# Patient Record
Sex: FEMALE | Race: BLACK OR AFRICAN AMERICAN | NOT HISPANIC OR LATINO | URBAN - METROPOLITAN AREA
[De-identification: names, ages, dates, MRNs, and addresses within clinical notes are randomized per-mention and may not be internally consistent; named-entity substitution may affect disease eponyms.]

---

## 2017-05-25 VITALS
WEIGHT: 156.97 LBS | RESPIRATION RATE: 17 BRPM | HEIGHT: 67 IN | OXYGEN SATURATION: 96 % | HEART RATE: 68 BPM | DIASTOLIC BLOOD PRESSURE: 87 MMHG | TEMPERATURE: 98 F | SYSTOLIC BLOOD PRESSURE: 127 MMHG

## 2017-05-25 NOTE — PATIENT PROFILE ADULT. - PMH
Anxiety    Breast CA, left  lumpectomy / RTX in the past  Hypertension    Irritable bowel syndrome (IBS)    Polyp of colon  "pre-cancerous" x 2, removed 11/2014  Sleep apnea Anxiety    Breast CA, left  lumpectomy / RTX in the past  HLD (hyperlipidemia)    Hypertension    Irritable bowel syndrome (IBS)    Polyp of colon  "pre-cancerous" x 2, removed 11/2014  Sleep apnea Anxiety    Breast CA, left  lumpectomy / RTX in the past  HLD (hyperlipidemia)    Hypertension    Irritable bowel syndrome (IBS)    Polyp of colon  "pre-cancerous" x 2, removed 11/2014  Sleep apnea  uses  cpap machine

## 2017-05-25 NOTE — PATIENT PROFILE ADULT. - PSH
S/P     S/P colon resection  reversal, had complications for fibriods  S/P hysterectomy Breast lump    History of surgery  right groin fatty tissue removed  S/P     S/P colon resection  reversal, had complications for fibriods  S/P hysterectomy Breast lump    H/O lumpectomy  left  History of surgery  right groin fatty tissue removed  S/P     S/P colon resection  reversal, had complications for fibriods  S/P hysterectomy

## 2017-05-26 LAB
APPEARANCE UR: CLEAR — SIGNIFICANT CHANGE UP
BACTERIA # UR AUTO: PRESENT /HPF
BILIRUB UR-MCNC: NEGATIVE — SIGNIFICANT CHANGE UP
COLOR SPEC: YELLOW — SIGNIFICANT CHANGE UP
COMMENT - URINE: SIGNIFICANT CHANGE UP
DIFF PNL FLD: (no result)
EPI CELLS # UR: (no result) /HPF
GLUCOSE UR QL: NEGATIVE — SIGNIFICANT CHANGE UP
KETONES UR-MCNC: NEGATIVE — SIGNIFICANT CHANGE UP
LEUKOCYTE ESTERASE UR-ACNC: (no result)
NITRITE UR-MCNC: NEGATIVE — SIGNIFICANT CHANGE UP
PH UR: 6 — SIGNIFICANT CHANGE UP (ref 5–8)
PROT UR-MCNC: 30 MG/DL
RBC CASTS # UR COMP ASSIST: (no result) /HPF
SP GR SPEC: 1.02 — SIGNIFICANT CHANGE UP (ref 1–1.03)
UROBILINOGEN FLD QL: 0.2 E.U./DL — SIGNIFICANT CHANGE UP
WBC UR QL: > 10 /HPF

## 2017-05-26 NOTE — ASU PATIENT PROFILE, ADULT - PMH
Anxiety    Breast CA, left  lumpectomy / RTX in the past  Hypertension    Irritable bowel syndrome (IBS)    Polyp of colon  "pre-cancerous" x 2, removed 11/2014  Sleep apnea

## 2017-05-26 NOTE — ASU PATIENT PROFILE, ADULT - PSH
Breast lump    S/P     S/P colon resection  reversal, had complications for fibriods  S/P hysterectomy

## 2017-05-27 LAB
CULTURE RESULTS: NO GROWTH — SIGNIFICANT CHANGE UP
SPECIMEN SOURCE: SIGNIFICANT CHANGE UP

## 2017-05-28 LAB
MRSA PCR RESULT.: NEGATIVE — SIGNIFICANT CHANGE UP
S AUREUS DNA NOSE QL NAA+PROBE: NEGATIVE — SIGNIFICANT CHANGE UP

## 2017-06-02 ENCOUNTER — INPATIENT (INPATIENT)
Facility: HOSPITAL | Age: 67
LOS: 3 days | Discharge: NURSING, REHAB OR LONG-TERM CARE | DRG: 460 | End: 2017-06-06
Attending: ORTHOPAEDIC SURGERY | Admitting: ORTHOPAEDIC SURGERY
Payer: COMMERCIAL

## 2017-06-02 DIAGNOSIS — Z90.710 ACQUIRED ABSENCE OF BOTH CERVIX AND UTERUS: Chronic | ICD-10-CM

## 2017-06-02 DIAGNOSIS — Z98.89 OTHER SPECIFIED POSTPROCEDURAL STATES: Chronic | ICD-10-CM

## 2017-06-02 DIAGNOSIS — Z98.890 OTHER SPECIFIED POSTPROCEDURAL STATES: Chronic | ICD-10-CM

## 2017-06-02 DIAGNOSIS — N63 UNSPECIFIED LUMP IN BREAST: Chronic | ICD-10-CM

## 2017-06-02 DIAGNOSIS — M54.42 LUMBAGO WITH SCIATICA, LEFT SIDE: ICD-10-CM

## 2017-06-02 LAB
APPEARANCE UR: SIGNIFICANT CHANGE UP
BACTERIA # UR AUTO: (no result) /HPF
BILIRUB UR-MCNC: NEGATIVE — SIGNIFICANT CHANGE UP
COLOR SPEC: YELLOW — SIGNIFICANT CHANGE UP
COMMENT - URINE: SIGNIFICANT CHANGE UP
DIFF PNL FLD: (no result)
EPI CELLS # UR: (no result) /HPF
GLUCOSE UR QL: NEGATIVE — SIGNIFICANT CHANGE UP
KETONES UR-MCNC: NEGATIVE — SIGNIFICANT CHANGE UP
LEUKOCYTE ESTERASE UR-ACNC: (no result)
NITRITE UR-MCNC: NEGATIVE — SIGNIFICANT CHANGE UP
PH UR: 6 — SIGNIFICANT CHANGE UP (ref 5–8)
PROT UR-MCNC: 30 MG/DL
RBC CASTS # UR COMP ASSIST: (no result) /HPF
SP GR SPEC: 1.02 — SIGNIFICANT CHANGE UP (ref 1–1.03)
UROBILINOGEN FLD QL: 0.2 E.U./DL — SIGNIFICANT CHANGE UP
WBC UR QL: (no result) /HPF

## 2017-06-02 RX ORDER — ASCORBIC ACID 60 MG
500 TABLET,CHEWABLE ORAL
Qty: 0 | Refills: 0 | Status: DISCONTINUED | OUTPATIENT
Start: 2017-06-02 | End: 2017-06-06

## 2017-06-02 RX ORDER — BUPIVACAINE 13.3 MG/ML
20 INJECTION, SUSPENSION, LIPOSOMAL INFILTRATION ONCE
Qty: 0 | Refills: 0 | Status: DISCONTINUED | OUTPATIENT
Start: 2017-06-02 | End: 2017-06-06

## 2017-06-02 RX ORDER — ATORVASTATIN CALCIUM 80 MG/1
10 TABLET, FILM COATED ORAL AT BEDTIME
Qty: 0 | Refills: 0 | Status: DISCONTINUED | OUTPATIENT
Start: 2017-06-02 | End: 2017-06-06

## 2017-06-02 RX ORDER — NALOXONE HYDROCHLORIDE 4 MG/.1ML
0.1 SPRAY NASAL
Qty: 0 | Refills: 0 | Status: DISCONTINUED | OUTPATIENT
Start: 2017-06-02 | End: 2017-06-06

## 2017-06-02 RX ORDER — ONDANSETRON 8 MG/1
4 TABLET, FILM COATED ORAL EVERY 6 HOURS
Qty: 0 | Refills: 0 | Status: DISCONTINUED | OUTPATIENT
Start: 2017-06-02 | End: 2017-06-06

## 2017-06-02 RX ORDER — FAMOTIDINE 10 MG/ML
20 INJECTION INTRAVENOUS EVERY 12 HOURS
Qty: 0 | Refills: 0 | Status: DISCONTINUED | OUTPATIENT
Start: 2017-06-02 | End: 2017-06-06

## 2017-06-02 RX ORDER — NITROFURANTOIN MACROCRYSTAL 50 MG
100 CAPSULE ORAL
Qty: 0 | Refills: 0 | Status: DISCONTINUED | OUTPATIENT
Start: 2017-06-02 | End: 2017-06-06

## 2017-06-02 RX ORDER — HYDROMORPHONE HYDROCHLORIDE 2 MG/ML
0.5 INJECTION INTRAMUSCULAR; INTRAVENOUS; SUBCUTANEOUS
Qty: 0 | Refills: 0 | Status: DISCONTINUED | OUTPATIENT
Start: 2017-06-02 | End: 2017-06-03

## 2017-06-02 RX ORDER — DOCUSATE SODIUM 100 MG
100 CAPSULE ORAL THREE TIMES A DAY
Qty: 0 | Refills: 0 | Status: DISCONTINUED | OUTPATIENT
Start: 2017-06-02 | End: 2017-06-06

## 2017-06-02 RX ORDER — SODIUM CHLORIDE 9 MG/ML
1000 INJECTION, SOLUTION INTRAVENOUS
Qty: 0 | Refills: 0 | Status: DISCONTINUED | OUTPATIENT
Start: 2017-06-02 | End: 2017-06-06

## 2017-06-02 RX ORDER — HYDROMORPHONE HYDROCHLORIDE 2 MG/ML
30 INJECTION INTRAMUSCULAR; INTRAVENOUS; SUBCUTANEOUS
Qty: 0 | Refills: 0 | Status: DISCONTINUED | OUTPATIENT
Start: 2017-06-02 | End: 2017-06-03

## 2017-06-02 RX ORDER — MAGNESIUM HYDROXIDE 400 MG/1
30 TABLET, CHEWABLE ORAL EVERY 12 HOURS
Qty: 0 | Refills: 0 | Status: DISCONTINUED | OUTPATIENT
Start: 2017-06-02 | End: 2017-06-06

## 2017-06-02 RX ORDER — FOLIC ACID 0.8 MG
1 TABLET ORAL DAILY
Qty: 0 | Refills: 0 | Status: DISCONTINUED | OUTPATIENT
Start: 2017-06-02 | End: 2017-06-06

## 2017-06-02 RX ORDER — ACETAMINOPHEN 500 MG
1000 TABLET ORAL ONCE
Qty: 0 | Refills: 0 | Status: DISCONTINUED | OUTPATIENT
Start: 2017-06-02 | End: 2017-06-03

## 2017-06-02 RX ORDER — SENNA PLUS 8.6 MG/1
2 TABLET ORAL AT BEDTIME
Qty: 0 | Refills: 0 | Status: DISCONTINUED | OUTPATIENT
Start: 2017-06-02 | End: 2017-06-06

## 2017-06-02 RX ORDER — CEFAZOLIN SODIUM 1 G
2000 VIAL (EA) INJECTION EVERY 8 HOURS
Qty: 0 | Refills: 0 | Status: COMPLETED | OUTPATIENT
Start: 2017-06-02 | End: 2017-06-03

## 2017-06-02 RX ORDER — METOCLOPRAMIDE HCL 10 MG
10 TABLET ORAL EVERY 12 HOURS
Qty: 0 | Refills: 0 | Status: DISCONTINUED | OUTPATIENT
Start: 2017-06-02 | End: 2017-06-06

## 2017-06-02 RX ORDER — ACETAMINOPHEN 500 MG
650 TABLET ORAL EVERY 6 HOURS
Qty: 0 | Refills: 0 | Status: DISCONTINUED | OUTPATIENT
Start: 2017-06-02 | End: 2017-06-06

## 2017-06-02 RX ORDER — AMLODIPINE BESYLATE 2.5 MG/1
2.5 TABLET ORAL DAILY
Qty: 0 | Refills: 0 | Status: DISCONTINUED | OUTPATIENT
Start: 2017-06-02 | End: 2017-06-06

## 2017-06-02 RX ADMIN — HYDROMORPHONE HYDROCHLORIDE 0.5 MILLIGRAM(S): 2 INJECTION INTRAMUSCULAR; INTRAVENOUS; SUBCUTANEOUS at 20:02

## 2017-06-02 RX ADMIN — HYDROMORPHONE HYDROCHLORIDE 0.5 MILLIGRAM(S): 2 INJECTION INTRAMUSCULAR; INTRAVENOUS; SUBCUTANEOUS at 21:05

## 2017-06-02 RX ADMIN — Medication 100 MILLIGRAM(S): at 23:47

## 2017-06-02 RX ADMIN — HYDROMORPHONE HYDROCHLORIDE 30 MILLILITER(S): 2 INJECTION INTRAMUSCULAR; INTRAVENOUS; SUBCUTANEOUS at 20:04

## 2017-06-02 NOTE — PROGRESS NOTE ADULT - SUBJECTIVE AND OBJECTIVE BOX
Orthopaedic Surgery Post-Operative Note  Procedure: PSF L4-S1  Surgeon: Daly  Pt comfortable, pain controlled  Denies CP, SOB, numbness/tingling of extremity    PE: VS:  AFVSS  Dressing: CDI   DSG CDI    B/L LE:    Strength:  R: 5/5 GS, 5/5 TA, 5/5 EHL, 5/5 Q, 5/5 H, 5/5 IP  L: 5/5 GS, 5/5 TA, 5/5 EHL, 5/5 Q, 5/5 H, 5/5 IP    Sensation:  R: L2-S1 SILT  L: L2-S1 SILT    Extremity:  B/L: WWP    Labs: None    X-Ray: None  A/P:      66F     s/p PSH L4-S1  Stable  IV Abx Ancef  Pain control  PT/OOB WBAT  f/u AM labs  DVT PPX SCD  XR POD2  LSO from [facility] MD PGY2   Pager: [phone]

## 2017-06-02 NOTE — H&P ADULT - NSHPLABSRESULTS_GEN_ALL_CORE
Preop CBC, BMP, PT/PTT/INR, within normal limits- reviewed by medical clearance.   Abnormal pre-op UA treated with 7 days of Cipro. Repeat UA day of surgery.  Preop EKG: NSR, reviewed by medical clearance.

## 2017-06-02 NOTE — CONSULT NOTE ADULT - SUBJECTIVE AND OBJECTIVE BOX
Pain Management Consult Note - [facility] [phone]    Chief Complaint:  back pain    HPI:  67 y/o female with back pain that radiates down the left leg.  Also complains of tingling/burning sensation and weakness in the left leg.  States she was taking ibuprofen and percocet 5/325 2 tabs/day as an outpatient.      Pain is ___ sharp ____dull ___burning _x__achy ___ Intensity: ____ mild ___mod ___severe     Location ____surgical site ____cervical ___x__lumbar ____abd ____upper ext__x__lower ext    Worse with __x__activity _x___movement _____physical therapy___ Rest    Improved with _x___medication __x__rest ____physical therapy    ROS: Const:  __-_febrile   Eyes:___ENT:_-__CV: __-_chest pain  Resp: __-__sob  GI:_-__nausea _-_vomiting _-__abd pain _+__npo ___clears __full diet __bm  :__-_ Musk: _+__pain ___spasm  Skin:___ Neuro:  _-__autrvtal_-__emdlszywe___ numbness _+__weakness _+__paresth  Psych:__anxiety  Endo:_-__ Heme:_-__Allergy:NKDA_________, ___all others reviewed and negative    PAST MEDICAL & SURGICAL HISTORY:  Polyp of colon: &quot;pre-cancerous&quot; x 2, removed 2014  Irritable bowel syndrome (IBS)  Sleep apnea  Hypertension  Breast CA, left: lumpectomy / RTX in the past  Anxiety  Breast lump  S/P hysterectomy  S/P colon resection: reversal, had complications for fibriods  S/P       SH: _denies__Tobacco   ___Alcohol                          FH:FAMILY HISTORY:  Family history of coronary artery disease (Mother)          T(C): --  HR: --  BP: --  RR: --  SpO2: --  Wt(kg): --    T(C): --  HR: --  BP: --  RR: --  SpO2: --  Wt(kg): --    T(C): --  HR: --  BP: --  RR: --  SpO2: --  Wt(kg): --    PHYSICAL EXAM:  Gen Appearance: __x_no acute distress _x__appropriate        Neuro: _x__SILT feet__x__ EOM Intact Psych: AAOX_3_, _x__mood/affect appropriate        Eyes: _x__conjunctiva WNL  _x____ Pupils equal and round        ENT: x___ears and nose atraumatic__x Hearing grossly intact        Neck: ___trachea midline, no visible masses ___thyroid without palpable mass    Resp: _x__Nml WOB____No tactile fremitus ___clear to auscultation    Cardio: ___extremities free from edema ____pedal pulses palpable    GI/Abdomen: _x__soft __x___ Nontender______Nondistended_____HSM    Lymphatic: ___no palpable nodes in neck  ___no palpable nodes calves and feet    Skin/Wound: ___Incision, ___Dressing c/d/i,   ____surrounding tissues soft,  ___drain/chest tube present____    Muscular: EHL _5__/5  Gastrocnemius___/5    _x__absent clubbing/cyanosis      ASSESSMENT: This is a 66y old Female with a history of   M54.16: M54.16  Family history of coronary artery disease (Mother)  Polyp of colon: &quot;pre-cancerous&quot; x 2, removed 2014  Irritable bowel syndrome (IBS)  Sleep apnea  Hypertension  Breast CA, left: lumpectomy / RTX in the past  Anxiety  Breast lump  S/P hysterectomy  S/P colon resection: reversal, had complications for fibriods  S/P   and worsening back pain going for PSF L4-S1 today    Recommended Treatment PLAN:  1.  Dilaudid PCA 0/0.2mg/6min with 0.5 mg IV q2h prn  2.  Tylenol 1000 mg IV x1 Pain Management Consult Note - [facility] [phone]    Chief Complaint:  back pain    HPI:  67 y/o female with back pain that radiates down the left leg.  Also complains of tingling/burning sensation and weakness in the left leg.  States she was taking ibuprofen and percocet 5/325 2 tabs/day as an outpatient.      Pain is ___ sharp ____dull ___burning _x__achy ___ Intensity: ____ mild ___mod ___severe     Location ____surgical site ____cervical ___x__lumbar ____abd ____upper ext__x__lower ext    Worse with __x__activity _x___movement _____physical therapy___ Rest    Improved with _x___medication __x__rest ____physical therapy    ROS: Const:  __-_febrile   Eyes:___ENT:_-__CV: __-_chest pain  Resp: __-__sob  GI:_-__nausea _-_vomiting _-__abd pain _+__npo ___clears __full diet __bm  :__-_ Musk: _+__pain ___spasm  Skin:___ Neuro:  _-__uxdxgiyq_-__blhfygjbw___ numbness _+__weakness _+__paresth  Psych:__anxiety  Endo:_-__ Heme:_-__Allergy:NKDA_________, ___all others reviewed and negative    PAST MEDICAL & SURGICAL HISTORY:  Polyp of colon: &quot;pre-cancerous&quot; x 2, removed 2014  Irritable bowel syndrome (IBS)  Sleep apnea  Hypertension  Breast CA, left: lumpectomy / RTX in the past  Anxiety  Breast lump  S/P hysterectomy  S/P colon resection: reversal, had complications for fibriods  S/P       SH: _denies__Tobacco   ___Alcohol                          FH:FAMILY HISTORY:  Family history of coronary artery disease (Mother)          T(C): --97.0  HR: --70  BP: --128/70  RR: --18  SpO2: --  Wt(kg): --    T(C): --  HR: --  BP: --  RR: --  SpO2: --  Wt(kg): --    T(C): --  HR: --  BP: --  RR: --  SpO2: --  Wt(kg): --    PHYSICAL EXAM:  Gen Appearance: __x_no acute distress _x__appropriate        Neuro: _x__SILT feet__x__ EOM Intact Psych: AAOX_3_, _x__mood/affect appropriate        Eyes: _x__conjunctiva WNL  _x____ Pupils equal and round        ENT: x___ears and nose atraumatic__x Hearing grossly intact        Neck: ___trachea midline, no visible masses ___thyroid without palpable mass    Resp: _x__Nml WOB____No tactile fremitus ___clear to auscultation    Cardio: ___extremities free from edema ____pedal pulses palpable    GI/Abdomen: _x__soft __x___ Nontender______Nondistended_____HSM    Lymphatic: ___no palpable nodes in neck  ___no palpable nodes calves and feet    Skin/Wound: ___Incision, ___Dressing c/d/i,   ____surrounding tissues soft,  ___drain/chest tube present____    Muscular: EHL _5__/5  Gastrocnemius___/5    _x__absent clubbing/cyanosis      ASSESSMENT: This is a 66y old Female with a history of   M54.16: M54.16  Family history of coronary artery disease (Mother)  Polyp of colon: &quot;pre-cancerous&quot; x 2, removed 2014  Irritable bowel syndrome (IBS)  Sleep apnea  Hypertension  Breast CA, left: lumpectomy / RTX in the past  Anxiety  Breast lump  S/P hysterectomy  S/P colon resection: reversal, had complications for fibriods  S/P   and worsening back pain going for PSF L4-S1 today    Recommended Treatment PLAN:  1.  Dilaudid PCA 0/0.2mg/6min with 0.5 mg IV q2h prn  2.  Tylenol 1000 mg IV x1

## 2017-06-02 NOTE — H&P ADULT - NSHPPHYSICALEXAM_GEN_ALL_CORE
PE: Normal head, atraumatic. Normal skin, no rashes/lesions/erythema.  MSK: No deformity or open wounds. No rashes or lesions. EHL/TA/GS/FHL 5/5 BLE. Sensation intact and equal BLE. Skin warm and well perfused. DP palpable BLE. Cap refill brisk. Decreased ROM to lumbar spine secondary to pain.     Rest of PE per medical clearance.

## 2017-06-02 NOTE — H&P ADULT - HISTORY OF PRESENT ILLNESS
66F with low back pain x over 1 year, worsened in November 2016 after a fall in her kitchen. Pain radiates down LLE, pain is constant. Pt denies numbness to her BLE but endorses feeling weakness in her LLE causing her to walk with a limp and also has intermittent tingling down her LLE. Pt ambulates with no assistance. Denies hx of DVT.

## 2017-06-02 NOTE — H&P ADULT - PROBLEM SELECTOR PLAN 1
Admit to Orthopedics  Medically optimized by Dr. KASIA Kauffman  Elective L4-S1 decompression and PSF today.

## 2017-06-03 LAB
CULTURE RESULTS: NO GROWTH — SIGNIFICANT CHANGE UP
MRSA PCR RESULT.: NEGATIVE — SIGNIFICANT CHANGE UP
S AUREUS DNA NOSE QL NAA+PROBE: NEGATIVE — SIGNIFICANT CHANGE UP
SPECIMEN SOURCE: SIGNIFICANT CHANGE UP

## 2017-06-03 RX ORDER — HYDROMORPHONE HYDROCHLORIDE 2 MG/ML
0.5 INJECTION INTRAMUSCULAR; INTRAVENOUS; SUBCUTANEOUS
Qty: 0 | Refills: 0 | Status: DISCONTINUED | OUTPATIENT
Start: 2017-06-03 | End: 2017-06-06

## 2017-06-03 RX ORDER — ACETAMINOPHEN 500 MG
975 TABLET ORAL EVERY 8 HOURS
Qty: 0 | Refills: 0 | Status: DISCONTINUED | OUTPATIENT
Start: 2017-06-03 | End: 2017-06-06

## 2017-06-03 RX ORDER — HYDROMORPHONE HYDROCHLORIDE 2 MG/ML
2 INJECTION INTRAMUSCULAR; INTRAVENOUS; SUBCUTANEOUS EVERY 4 HOURS
Qty: 0 | Refills: 0 | Status: DISCONTINUED | OUTPATIENT
Start: 2017-06-03 | End: 2017-06-04

## 2017-06-03 RX ORDER — HYDROMORPHONE HYDROCHLORIDE 2 MG/ML
4 INJECTION INTRAMUSCULAR; INTRAVENOUS; SUBCUTANEOUS EVERY 4 HOURS
Qty: 0 | Refills: 0 | Status: DISCONTINUED | OUTPATIENT
Start: 2017-06-03 | End: 2017-06-04

## 2017-06-03 RX ADMIN — ATORVASTATIN CALCIUM 10 MILLIGRAM(S): 80 TABLET, FILM COATED ORAL at 22:14

## 2017-06-03 RX ADMIN — HYDROMORPHONE HYDROCHLORIDE 4 MILLIGRAM(S): 2 INJECTION INTRAMUSCULAR; INTRAVENOUS; SUBCUTANEOUS at 13:42

## 2017-06-03 RX ADMIN — HYDROMORPHONE HYDROCHLORIDE 4 MILLIGRAM(S): 2 INJECTION INTRAMUSCULAR; INTRAVENOUS; SUBCUTANEOUS at 18:27

## 2017-06-03 RX ADMIN — Medication 500 MILLIGRAM(S): at 06:09

## 2017-06-03 RX ADMIN — Medication 100 MILLIGRAM(S): at 13:41

## 2017-06-03 RX ADMIN — Medication 1 MILLIGRAM(S): at 10:49

## 2017-06-03 RX ADMIN — Medication 975 MILLIGRAM(S): at 22:14

## 2017-06-03 RX ADMIN — SENNA PLUS 2 TABLET(S): 8.6 TABLET ORAL at 22:14

## 2017-06-03 RX ADMIN — HYDROMORPHONE HYDROCHLORIDE 4 MILLIGRAM(S): 2 INJECTION INTRAMUSCULAR; INTRAVENOUS; SUBCUTANEOUS at 17:57

## 2017-06-03 RX ADMIN — Medication 100 MILLIGRAM(S): at 07:04

## 2017-06-03 RX ADMIN — AMLODIPINE BESYLATE 2.5 MILLIGRAM(S): 2.5 TABLET ORAL at 06:09

## 2017-06-03 RX ADMIN — Medication 10 MILLIGRAM(S): at 17:56

## 2017-06-03 RX ADMIN — HYDROMORPHONE HYDROCHLORIDE 4 MILLIGRAM(S): 2 INJECTION INTRAMUSCULAR; INTRAVENOUS; SUBCUTANEOUS at 14:12

## 2017-06-03 RX ADMIN — Medication 1 TABLET(S): at 13:41

## 2017-06-03 RX ADMIN — Medication 500 MILLIGRAM(S): at 17:57

## 2017-06-03 RX ADMIN — Medication 100 MILLIGRAM(S): at 18:04

## 2017-06-03 RX ADMIN — FAMOTIDINE 20 MILLIGRAM(S): 10 INJECTION INTRAVENOUS at 06:09

## 2017-06-03 RX ADMIN — FAMOTIDINE 20 MILLIGRAM(S): 10 INJECTION INTRAVENOUS at 17:57

## 2017-06-03 RX ADMIN — Medication 10 MILLIGRAM(S): at 06:10

## 2017-06-03 RX ADMIN — Medication 100 MILLIGRAM(S): at 06:09

## 2017-06-03 RX ADMIN — Medication 975 MILLIGRAM(S): at 13:41

## 2017-06-03 RX ADMIN — Medication 1 TABLET(S): at 22:14

## 2017-06-03 RX ADMIN — Medication 1 TABLET(S): at 10:50

## 2017-06-03 RX ADMIN — Medication 1 TABLET(S): at 06:09

## 2017-06-03 RX ADMIN — Medication 100 MILLIGRAM(S): at 10:49

## 2017-06-03 RX ADMIN — Medication 100 MILLIGRAM(S): at 22:14

## 2017-06-03 NOTE — PHYSICAL THERAPY INITIAL EVALUATION ADULT - ADDITIONAL COMMENTS
Patient lives alone in an apartment, 4 stairs to enter building. Patient reports independence with all functional mobility without the use of an assistive device prior to hospitalization.

## 2017-06-03 NOTE — PHYSICAL THERAPY INITIAL EVALUATION ADULT - ACTIVE RANGE OF MOTION EXAMINATION, REHAB EVAL
Left UE Active ROM was WFL (within functional limits)/Left LE Active ROM was WFL (within functional limits)/Right LE Active ROM was WFL (within functional limits)/Right UE Active ROM was WFL (within functional limits)

## 2017-06-03 NOTE — PROGRESS NOTE ADULT - SUBJECTIVE AND OBJECTIVE BOX
No events    T(F): 99.5, Max: 99.6 (06-02 @ 22:50)  HR: 80 (79 - 96)  BP: 132/73 (132/73 - 157/91)  RR: 16 (12 - 24)  SpO2: 98% (95% - 99%)  Wt(kg): --  I & Os for 24h ending 06-03 @ 07:00  =============================================  IN:    lactated ringers.: 360 ml    Total IN: 360 ml  ---------------------------------------------  OUT:    Indwelling Catheter - Urethral: 1700 ml    Accordian: 325 ml    Total OUT: 2025 ml  ---------------------------------------------  Total NET: -1665 ml    I & Os for current day (as of 06-03 @ 10:33)  =============================================  IN:    Oral Fluid: 120 ml    Total IN: 120 ml  ---------------------------------------------  OUT:    Indwelling Catheter - Urethral: 750 ml    Total OUT: 750 ml  ---------------------------------------------  Total NET: -630 ml    PE: DSG CDI    B/L LE:    Strength:  R: 5/5 GS, 5/5 TA, 5/5 EHL, 5/5 Q, 5/5 H, 5/5 IP  L: 5/5 GS, 5/5 TA, 5/5 EHL, 5/5 Q, 5/5 H, 5/5 IP    Sensation:  R: L2-S1 SILT  L: L2-S1 SILT    Extremity:  B/L: WWP

## 2017-06-03 NOTE — PHYSICAL THERAPY INITIAL EVALUATION ADULT - CRITERIA FOR SKILLED THERAPEUTIC INTERVENTIONS
impairments found/predicted duration of therapy intervention/functional limitations in following categories/therapy frequency/rehab potential/anticipated equipment needs at discharge/risk reduction/prevention/anticipated discharge recommendation

## 2017-06-03 NOTE — PHYSICAL THERAPY INITIAL EVALUATION ADULT - PASSIVE RANGE OF MOTION EXAMINATION, REHAB EVAL
Right UE Passive ROM was WFL (within functional limits)/Left LE Passive ROM was WFL (within functional limits)/Left UE Passive ROM was WFL (within functional limits)/Right LE Passive ROM was WFL (within functional limits)

## 2017-06-03 NOTE — PROGRESS NOTE ADULT - ASSESSMENT
66y f s/p PSF L4-S1  POD#1  -Pain Control  -PT WBAT  -DVT PPX SCD  -Dispo Planning TBD  -Customized TLSO from [facility]  -F/u Neville metzgers - on macrobid for UTI

## 2017-06-03 NOTE — PHYSICAL THERAPY INITIAL EVALUATION ADULT - PLANNED THERAPY INTERVENTIONS, PT EVAL
gait training/balance training/bed mobility training/postural re-education/ROM/transfer training/strengthening

## 2017-06-03 NOTE — PROGRESS NOTE ADULT - SUBJECTIVE AND OBJECTIVE BOX
Status post lumbar decompression and fusionPLIF L4-S1  Patient is conscious and oriented  Patient states preoperative pain has decreased.  Patient have not waked yet .  Patient denies any fever chills.  Patient is not wearing the brace.  The patient feels some mild stiffness in the lumbar spine; however the patient is very happy with the outcome of the surgery.  Patient denies any bowel or bladder incontinence  Lumbar spine- skin is clean dry and intact.  Incision is clean dry and intact there is no erythema, exudates or effusion. Power is 5 out of 5 for Pssoas quads TA and Gastrocs.  Sensation is grossly intact.  Reflexes are brisk and symmetrical.  Negative clonus, downgoing Babinski  Pulses ok in both legs  Drain in  Pain control  Abx  PT  Lumbar brace all time. No bending lifting or twisting

## 2017-06-03 NOTE — PHYSICAL THERAPY INITIAL EVALUATION ADULT - PERTINENT HX OF CURRENT PROBLEM, REHAB EVAL
66 yea old female presenting with low back pain x over 1 year, worsened in November 2016 after a fall in her kitchen. Pain radiates down LLE, pain is constant

## 2017-06-03 NOTE — PROGRESS NOTE ADULT - SUBJECTIVE AND OBJECTIVE BOX
Pain Management Progress Note - [facility] [phone]    HPI:  Pt. complains of incisional back pain.  States PCA is not helping manage pain much.            Pertinent PMH: Pain at: __x_Back ___Neck___Knee ___Hip ___Shoulder ___ Opioid tolerance    Pain is ___ sharp ____dull ___burning __x_achy ___ Intensity: ____ mild ____mod ____severe     Location __x___surgical site _____cervical __x___lumbar ____abd _____upper ext____lower ext    Worse with __x__activity _x___movement _____physical therapy___ Rest    Improved with __x__medication _x___rest ____physical therapy      naloxone Injectable  ondansetron Injectable  lactated ringers.  famotidine    Tablet  metoclopramide Injectable  docusate sodium  magnesium hydroxide Suspension  senna  calcium carbonate 1250 mG + Vitamin D (OsCal 500 + D)  folic acid  multivitamin  ascorbic acid  nitrofurantoin (MACROBID)  acetaminophen   Tablet  atorvastatin  amLODIPine   Tablet  HYDROmorphone   Tablet  HYDROmorphone   Tablet  HYDROmorphone  Injectable  acetaminophen   Tablet      ROS: Const:  ___febrile   Eyes:___ENT:___CV: ___chest pain  Resp: ____sob  GI:_-__nausea _-__vomiting __-__abd pain ___npo ___clears __+_full diet __bm  :___ Musk: _+__pain ___spasm  Skin:___ Neuro:  _-__twnmmsjn_-__vzcvmzkcj____ numbness ___weakness ___paresthesia  Psych:___anxiety  Endo:___ Heme:___Allergy:___                T(C): 37.5, Max: 37.6 (-02 @ 22:50)  HR: 70 (70 - 96)  BP: 124/79 (120/72 - 157/91)  RR: 16 (12 - 24)  SpO2: 99% (95% - 99%)  Wt(kg): --     PHYSICAL EXAM:  Gen Appearance: _x__no acute distress _x__appropriate         Neuro: __x_SILT feet_x___ EOM Intact Psych: AAOX_3_, _x__mood/affect appropriate        Eyes: _x__conjunctiva WNL  ____x_ Pupils equal and round        ENT: _x__ears and nose atraumatic_x__ Hearing grossly intact        Neck: ___trachea midline, no visible masses ___thyroid without palpable mass    Resp: _x__Nml WOB____No tactile fremitus ___clear to auscultation    Cardio: ___extremities free from edema ____pedal pulses palpable    GI/Abdomen: ___soft _____ Nontender______Nondistended_____HSM    Lymphatic: ___no palpable nodes in neck  ___no palpable nodes calves and feet    Skin/Wound: ___Incision, ___Dressing c/d/i,   ____surrounding tissues soft,  ___drain/chest tube present____    Muscular: EHL _5__/5  Gastrocnemius___/5    __x_absent clubbing/cyanosis         ASSESSMENT:  This is a 66y old Female with a history of:  M54.16  No h/o HF  Family history of coronary artery disease (Mother)  HLD (hyperlipidemia)  Polyp of colon  Irritable bowel syndrome (IBS)  Sleep apnea  Hypertension  Breast CA, left  Anxiety  Chronic midline low back pain with left-sided sciatica  H/O lumpectomy  History of surgery  Breast lump  S/P hysterectomy  S/P colon resection  S/P   and back pain and is S/P L4-S1 PSF and pain is not well controlled with PCA      Recommended Treatment PLAN:  1.  D/C PCA  2.  Dilaudid 2-4 mg po q4h prn  3.  Dilaudid 0.5 mg IV q2h prn  4.  Tylenol 975 mg po q8h

## 2017-06-03 NOTE — PHYSICAL THERAPY INITIAL EVALUATION ADULT - LEVEL OF INDEPENDENCE: SIT/STAND, REHAB EVAL
to be assessed. Unable to assess secondary to increased back back with sitting. Patient became emotional secondary to increased pain and started to cry, requesting to return to supine

## 2017-06-04 DIAGNOSIS — N39.0 URINARY TRACT INFECTION, SITE NOT SPECIFIED: ICD-10-CM

## 2017-06-04 DIAGNOSIS — I10 ESSENTIAL (PRIMARY) HYPERTENSION: ICD-10-CM

## 2017-06-04 DIAGNOSIS — K58.9 IRRITABLE BOWEL SYNDROME WITHOUT DIARRHEA: ICD-10-CM

## 2017-06-04 DIAGNOSIS — G47.30 SLEEP APNEA, UNSPECIFIED: ICD-10-CM

## 2017-06-04 DIAGNOSIS — E78.5 HYPERLIPIDEMIA, UNSPECIFIED: ICD-10-CM

## 2017-06-04 LAB
ANION GAP SERPL CALC-SCNC: 12 MMOL/L — SIGNIFICANT CHANGE UP (ref 5–17)
BUN SERPL-MCNC: 12 MG/DL — SIGNIFICANT CHANGE UP (ref 7–23)
CALCIUM SERPL-MCNC: 9.4 MG/DL — SIGNIFICANT CHANGE UP (ref 8.4–10.5)
CHLORIDE SERPL-SCNC: 98 MMOL/L — SIGNIFICANT CHANGE UP (ref 96–108)
CO2 SERPL-SCNC: 27 MMOL/L — SIGNIFICANT CHANGE UP (ref 22–31)
CREAT SERPL-MCNC: 0.6 MG/DL — SIGNIFICANT CHANGE UP (ref 0.5–1.3)
GLUCOSE SERPL-MCNC: 133 MG/DL — HIGH (ref 70–99)
HCT VFR BLD CALC: 36.8 % — SIGNIFICANT CHANGE UP (ref 34.5–45)
HGB BLD-MCNC: 11.6 G/DL — SIGNIFICANT CHANGE UP (ref 11.5–15.5)
MCHC RBC-ENTMCNC: 28.4 PG — SIGNIFICANT CHANGE UP (ref 27–34)
MCHC RBC-ENTMCNC: 31.5 G/DL — LOW (ref 32–36)
MCV RBC AUTO: 90.2 FL — SIGNIFICANT CHANGE UP (ref 80–100)
PLATELET # BLD AUTO: 173 K/UL — SIGNIFICANT CHANGE UP (ref 150–400)
POTASSIUM SERPL-MCNC: 3.6 MMOL/L — SIGNIFICANT CHANGE UP (ref 3.5–5.3)
POTASSIUM SERPL-SCNC: 3.6 MMOL/L — SIGNIFICANT CHANGE UP (ref 3.5–5.3)
RBC # BLD: 4.08 M/UL — SIGNIFICANT CHANGE UP (ref 3.8–5.2)
RBC # FLD: 13.8 % — SIGNIFICANT CHANGE UP (ref 10.3–16.9)
SODIUM SERPL-SCNC: 137 MMOL/L — SIGNIFICANT CHANGE UP (ref 135–145)
WBC # BLD: 10.7 K/UL — HIGH (ref 3.8–10.5)
WBC # FLD AUTO: 10.7 K/UL — HIGH (ref 3.8–10.5)

## 2017-06-04 PROCEDURE — 72100 X-RAY EXAM L-S SPINE 2/3 VWS: CPT | Mod: 26

## 2017-06-04 PROCEDURE — 99223 1ST HOSP IP/OBS HIGH 75: CPT | Mod: GC

## 2017-06-04 RX ORDER — LIDOCAINE 4 G/100G
1 CREAM TOPICAL DAILY
Qty: 0 | Refills: 0 | Status: DISCONTINUED | OUTPATIENT
Start: 2017-06-04 | End: 2017-06-06

## 2017-06-04 RX ORDER — POLYETHYLENE GLYCOL 3350 17 G/17G
17 POWDER, FOR SOLUTION ORAL DAILY
Qty: 0 | Refills: 0 | Status: DISCONTINUED | OUTPATIENT
Start: 2017-06-04 | End: 2017-06-06

## 2017-06-04 RX ADMIN — SENNA PLUS 2 TABLET(S): 8.6 TABLET ORAL at 22:16

## 2017-06-04 RX ADMIN — HYDROMORPHONE HYDROCHLORIDE 2 MILLIGRAM(S): 2 INJECTION INTRAMUSCULAR; INTRAVENOUS; SUBCUTANEOUS at 05:02

## 2017-06-04 RX ADMIN — Medication 975 MILLIGRAM(S): at 05:02

## 2017-06-04 RX ADMIN — Medication 100 MILLIGRAM(S): at 18:47

## 2017-06-04 RX ADMIN — Medication 1 TABLET(S): at 05:03

## 2017-06-04 RX ADMIN — Medication 975 MILLIGRAM(S): at 22:16

## 2017-06-04 RX ADMIN — Medication 500 MILLIGRAM(S): at 18:47

## 2017-06-04 RX ADMIN — FAMOTIDINE 20 MILLIGRAM(S): 10 INJECTION INTRAVENOUS at 05:02

## 2017-06-04 RX ADMIN — Medication 500 MILLIGRAM(S): at 05:02

## 2017-06-04 RX ADMIN — Medication 1 TABLET(S): at 14:08

## 2017-06-04 RX ADMIN — Medication 975 MILLIGRAM(S): at 14:09

## 2017-06-04 RX ADMIN — HYDROMORPHONE HYDROCHLORIDE 4 MILLIGRAM(S): 2 INJECTION INTRAMUSCULAR; INTRAVENOUS; SUBCUTANEOUS at 12:22

## 2017-06-04 RX ADMIN — Medication 1 TABLET(S): at 22:16

## 2017-06-04 RX ADMIN — AMLODIPINE BESYLATE 2.5 MILLIGRAM(S): 2.5 TABLET ORAL at 09:54

## 2017-06-04 RX ADMIN — Medication 100 MILLIGRAM(S): at 22:16

## 2017-06-04 RX ADMIN — HYDROMORPHONE HYDROCHLORIDE 4 MILLIGRAM(S): 2 INJECTION INTRAMUSCULAR; INTRAVENOUS; SUBCUTANEOUS at 11:22

## 2017-06-04 RX ADMIN — Medication 100 MILLIGRAM(S): at 05:02

## 2017-06-04 RX ADMIN — Medication 1 MILLIGRAM(S): at 14:08

## 2017-06-04 RX ADMIN — ATORVASTATIN CALCIUM 10 MILLIGRAM(S): 80 TABLET, FILM COATED ORAL at 22:16

## 2017-06-04 RX ADMIN — Medication 100 MILLIGRAM(S): at 14:08

## 2017-06-04 RX ADMIN — Medication 10 MILLIGRAM(S): at 05:02

## 2017-06-04 RX ADMIN — HYDROMORPHONE HYDROCHLORIDE 2 MILLIGRAM(S): 2 INJECTION INTRAMUSCULAR; INTRAVENOUS; SUBCUTANEOUS at 06:00

## 2017-06-04 RX ADMIN — FAMOTIDINE 20 MILLIGRAM(S): 10 INJECTION INTRAVENOUS at 18:47

## 2017-06-04 RX ADMIN — Medication 100 MILLIGRAM(S): at 09:54

## 2017-06-04 NOTE — PROGRESS NOTE ADULT - ASSESSMENT
66y f s/p PSF L4-S1  POD#2  -Pain Control  -PT WBAT  -DVT PPX SCD  -Dispo Planning  -Customized TLSO from [facility]  -F/u Neville cason - on macrobid for UTI

## 2017-06-04 NOTE — PROGRESS NOTE ADULT - SUBJECTIVE AND OBJECTIVE BOX
No events    T(F): 100.1, Max: 100.1 (06-04 @ 04:47)  HR: 78 (66 - 80)  BP: 134/82 (108/58 - 134/82)  RR: 16 (16 - 20)  SpO2: 95% (95% - 99%)  Wt(kg): --    I & Os for current day (as of 06-04 @ 08:33)  =============================================  IN:    Oral Fluid: 760 ml    Total IN: 760 ml  ---------------------------------------------  OUT:    Indwelling Catheter - Urethral: 2650 ml    Accordian: 270 ml    Total OUT: 2920 ml  ---------------------------------------------  Total NET: -2160 ml      PE: DSG CDI    B/L LE:    Strength:  R: 5/5 GS, 5/5 TA, 5/5 EHL, 5/5 Q, 5/5 H, 5/5 IP  L: 5/5 GS, 5/5 TA, 5/5 EHL, 5/5 Q, 5/5 H, 5/5 IP    Sensation:  R: L2-S1 SILT  L: L2-S1 SILT    Extremity:  B/L: WWP

## 2017-06-04 NOTE — CONSULT NOTE ADULT - SUBJECTIVE AND OBJECTIVE BOX
Patient is a 66y old  Female who presents with a chief complaint of low back pain (2017 13:56)      HPI:  66F with low back pain x over 1 year, worsened in 2016 after a fall in her kitchen. Pain radiates down LLE, pain is constant. Pt denies numbness to her BLE but endorses feeling weakness in her LLE causing her to walk with a limp and also has intermittent tingling down her LLE. Pt ambulates with no assistance. Denies hx of DVT. (2017 13:56)      PAST MEDICAL & SURGICAL HISTORY:  HLD (hyperlipidemia)  Polyp of colon: &quot;pre-cancerous&quot; x 2, removed 2014  Irritable bowel syndrome (IBS)  Sleep apnea: uses  cpap machine  Hypertension  Breast CA, left: lumpectomy / RTX in the past  Anxiety  H/O lumpectomy: left  History of surgery: right groin fatty tissue removed  Breast lump  S/P hysterectomy  S/P colon resection: reversal, had complications for fibriods  S/P       FAMILY HISTORY:  Family history of coronary artery disease (Mother)      SOCIAL HISTORY:  Smoking Status: [ ] Current, [ ] Former, [ ] Never  Pack Years:    MEDICATIONS:  Pulmonary:    Antimicrobials:  nitrofurantoin (MACROBID) 100milliGRAM(s) Oral two times a day with meals    Anticoagulants:    Onc:    GI/:  famotidine    Tablet 20milliGRAM(s) Oral every 12 hours  docusate sodium 100milliGRAM(s) Oral three times a day  magnesium hydroxide Suspension 30milliLiter(s) Oral every 12 hours PRN  senna 2Tablet(s) Oral at bedtime  bisacodyl 5milliGRAM(s) Oral every 12 hours PRN  bisacodyl Suppository 10milliGRAM(s) Rectal daily PRN  polyethylene glycol 3350 17Gram(s) Oral daily    Endocrine:  atorvastatin 10milliGRAM(s) Oral at bedtime    Cardiac:  amLODIPine   Tablet 2.5milliGRAM(s) Oral daily    Other Medications:  naloxone Injectable 0.1milliGRAM(s) IV Push every 3 minutes PRN  ondansetron Injectable 4milliGRAM(s) IV Push every 6 hours PRN  BUpivacaine liposome 1.3% Injectable (no eMAR) 20milliLiter(s) Local Injection once  lactated ringers. 1000milliLiter(s) IV Continuous <Continuous>  metoclopramide Injectable 10milliGRAM(s) IV Push every 12 hours  calcium carbonate 1250 mG + Vitamin D (OsCal 500 + D) 1Tablet(s) Oral three times a day  folic acid 1milliGRAM(s) Oral daily  multivitamin 1Tablet(s) Oral daily  ascorbic acid 500milliGRAM(s) Oral two times a day  acetaminophen   Tablet 650milliGRAM(s) Oral every 6 hours PRN  HYDROmorphone  Injectable 0.5milliGRAM(s) IV Push every 2 hours PRN  acetaminophen   Tablet 975milliGRAM(s) Oral every 8 hours  oxyCODONE  5 mG/acetaminophen 325 mG 1Tablet(s) Oral every 4 hours PRN  oxyCODONE  5 mG/acetaminophen 325 mG 2Tablet(s) Oral every 4 hours PRN  lidocaine   Patch 1Patch Transdermal daily      Allergies    sulfa drugs (Unknown)    Intolerances        Vital Signs Last 24 Hrs  T(C): 36.2, Max: 37.8 ( @ 04:47)  T(F): 97.2, Max: 100.1 ( @ 04:47)  HR: 89 (67 - 94)  BP: 123/79 (108/58 - 134/82)  BP(mean): --  RR: 16 (15 - 17)  SpO2: 93% (93% - 98%)  I & Os for 24h ending  @ 07:00  =============================================  IN: 760 ml / OUT: 2920 ml / NET: -2160 ml    I & Os for current day (as of  @ 18:06)  =============================================  IN: 600 ml / OUT: 1170 ml / NET: -570 ml        LABS:      CBC Full  -  ( 2017 14:59 )  WBC Count : 10.7 K/uL  Hemoglobin : 11.6 g/dL  Hematocrit : 36.8 %  Platelet Count - Automated : 173 K/uL  Mean Cell Volume : 90.2 fL  Mean Cell Hemoglobin : 28.4 pg  Mean Cell Hemoglobin Concentration : 31.5 g/dL  Auto Neutrophil # : x  Auto Lymphocyte # : x  Auto Monocyte # : x  Auto Eosinophil # : x  Auto Basophil # : x  Auto Neutrophil % : x  Auto Lymphocyte % : x  Auto Monocyte % : x  Auto Eosinophil % : x  Auto Basophil % : x    06-04    137  |  98  |  12  ----------------------------<  133<H>  3.6   |  27  |  0.60    Ca    9.4      2017 14:59    urine culture negative x2                      RADIOLOGY & ADDITIONAL STUDIES (The following images were personally reviewed):

## 2017-06-05 LAB
ANION GAP SERPL CALC-SCNC: 9 MMOL/L — SIGNIFICANT CHANGE UP (ref 5–17)
BUN SERPL-MCNC: 13 MG/DL — SIGNIFICANT CHANGE UP (ref 7–23)
CALCIUM SERPL-MCNC: 9.5 MG/DL — SIGNIFICANT CHANGE UP (ref 8.4–10.5)
CHLORIDE SERPL-SCNC: 97 MMOL/L — SIGNIFICANT CHANGE UP (ref 96–108)
CO2 SERPL-SCNC: 31 MMOL/L — SIGNIFICANT CHANGE UP (ref 22–31)
CREAT SERPL-MCNC: 0.6 MG/DL — SIGNIFICANT CHANGE UP (ref 0.5–1.3)
GLUCOSE SERPL-MCNC: 119 MG/DL — HIGH (ref 70–99)
HCT VFR BLD CALC: 34.5 % — SIGNIFICANT CHANGE UP (ref 34.5–45)
HGB BLD-MCNC: 11.1 G/DL — LOW (ref 11.5–15.5)
MCHC RBC-ENTMCNC: 29.1 PG — SIGNIFICANT CHANGE UP (ref 27–34)
MCHC RBC-ENTMCNC: 32.2 G/DL — SIGNIFICANT CHANGE UP (ref 32–36)
MCV RBC AUTO: 90.3 FL — SIGNIFICANT CHANGE UP (ref 80–100)
PLATELET # BLD AUTO: 169 K/UL — SIGNIFICANT CHANGE UP (ref 150–400)
POTASSIUM SERPL-MCNC: 3.6 MMOL/L — SIGNIFICANT CHANGE UP (ref 3.5–5.3)
POTASSIUM SERPL-SCNC: 3.6 MMOL/L — SIGNIFICANT CHANGE UP (ref 3.5–5.3)
RBC # BLD: 3.82 M/UL — SIGNIFICANT CHANGE UP (ref 3.8–5.2)
RBC # FLD: 13.7 % — SIGNIFICANT CHANGE UP (ref 10.3–16.9)
SODIUM SERPL-SCNC: 137 MMOL/L — SIGNIFICANT CHANGE UP (ref 135–145)
WBC # BLD: 8.8 K/UL — SIGNIFICANT CHANGE UP (ref 3.8–10.5)
WBC # FLD AUTO: 8.8 K/UL — SIGNIFICANT CHANGE UP (ref 3.8–10.5)

## 2017-06-05 PROCEDURE — 99232 SBSQ HOSP IP/OBS MODERATE 35: CPT | Mod: GC

## 2017-06-05 RX ORDER — LACTULOSE 10 G/15ML
10 SOLUTION ORAL ONCE
Qty: 0 | Refills: 0 | Status: COMPLETED | OUTPATIENT
Start: 2017-06-05 | End: 2017-06-05

## 2017-06-05 RX ADMIN — Medication 100 MILLIGRAM(S): at 05:42

## 2017-06-05 RX ADMIN — SENNA PLUS 2 TABLET(S): 8.6 TABLET ORAL at 21:38

## 2017-06-05 RX ADMIN — AMLODIPINE BESYLATE 2.5 MILLIGRAM(S): 2.5 TABLET ORAL at 05:42

## 2017-06-05 RX ADMIN — Medication 1 TABLET(S): at 21:39

## 2017-06-05 RX ADMIN — FAMOTIDINE 20 MILLIGRAM(S): 10 INJECTION INTRAVENOUS at 05:42

## 2017-06-05 RX ADMIN — FAMOTIDINE 20 MILLIGRAM(S): 10 INJECTION INTRAVENOUS at 18:11

## 2017-06-05 RX ADMIN — Medication 100 MILLIGRAM(S): at 15:03

## 2017-06-05 RX ADMIN — Medication 975 MILLIGRAM(S): at 05:43

## 2017-06-05 RX ADMIN — Medication 975 MILLIGRAM(S): at 13:00

## 2017-06-05 RX ADMIN — Medication 1 TABLET(S): at 05:42

## 2017-06-05 RX ADMIN — Medication 1 TABLET(S): at 12:44

## 2017-06-05 RX ADMIN — Medication 975 MILLIGRAM(S): at 21:39

## 2017-06-05 RX ADMIN — LACTULOSE 10 GRAM(S): 10 SOLUTION ORAL at 15:03

## 2017-06-05 RX ADMIN — Medication 1 MILLIGRAM(S): at 12:44

## 2017-06-05 RX ADMIN — Medication 1 TABLET(S): at 15:03

## 2017-06-05 RX ADMIN — ATORVASTATIN CALCIUM 10 MILLIGRAM(S): 80 TABLET, FILM COATED ORAL at 21:38

## 2017-06-05 RX ADMIN — Medication 100 MILLIGRAM(S): at 18:11

## 2017-06-05 RX ADMIN — Medication 100 MILLIGRAM(S): at 08:07

## 2017-06-05 RX ADMIN — Medication 500 MILLIGRAM(S): at 05:42

## 2017-06-05 RX ADMIN — Medication 500 MILLIGRAM(S): at 18:11

## 2017-06-05 RX ADMIN — Medication 100 MILLIGRAM(S): at 21:39

## 2017-06-05 NOTE — DISCHARGE NOTE ADULT - CARE PROVIDER_API CALL
Naveen Kauffman (DO), Orthopaedic Surgery  [address] [place], [place] [address]  Phone: [phone]  Fax: [phone]

## 2017-06-05 NOTE — DISCHARGE NOTE ADULT - PLAN OF CARE
improvement after surgery No strenuous activity, heavy lifting, driving, tub bathing, or returning to work until cleared by MD.  You may shower--dressing is waterproof.  Change dressing daily x 5 days, then leave incision open to air.  Follow up with Dr. Kauffman, call office to schedule an appt within 10-14 days.  If you don't have a bowel movement by post op day 3, then take Milk of Magnesia (over the counter).  If no bowel movement by at least post op day 5, then use a Dulcolax suppository (over the counter) and/or a Fleets enema--if still no bowel movement, call your MD.  Contact your doctor if you experience: fever greater than 101.5, chills, chest pain, difficulty breathing, bleeding, redness or heat around the incision.

## 2017-06-05 NOTE — PROGRESS NOTE ADULT - SUBJECTIVE AND OBJECTIVE BOX
Pain Management Progress Note - [facility] [phone]    HPI:  Pt. states pain controlled.  Pt denies side effects with pain meds.          Pertinent PMH: Pain at: __x_Back ___Neck___Knee ___Hip ___Shoulder ___ Opioid tolerance    Pain is ___ sharp ____dull ___burning __x_achy ___ Intensity: ___x_ mild __x__mod ____severe     Location __x___surgical site _____cervical __x___lumbar ____abd _____upper ext____lower ext    Worse with __x__activity _x___movement _____physical therapy___ Rest    Improved with __x__medication _x___rest ____physical therapy          ROS: Const:  ___febrile   Eyes:___ENT:___CV: ___chest pain  Resp: ____sob  GI:_-__nausea _-__vomiting __-__abd pain ___npo ___clears __+_full diet __bm  :___ Musk: _+__pain ___spasm  Skin:___ Neuro:  _-__wtoeyotm_-__wngccrmke____ numbness ___weakness ___paresthesia  Psych:___anxiety  Endo:___ Heme:___Allergy:___      Vital Signs Last 24 Hrs  T(C): 36.7, Max: 37.2 (06-04 @ 21:13)  T(F): 98, Max: 99 (06-04 @ 21:13)  HR: 76 (70 - 94)  BP: 119/83 (112/73 - 128/81)  BP(mean): --  RR: 17 (15 - 17)  SpO2: 94% (93% - 100%)     PHYSICAL EXAM:  Gen Appearance: _x__no acute distress _x__appropriate         Neuro: __x_SILT feet_x___ EOM Intact Psych: AAOX_3_, _x__mood/affect appropriate        Eyes: _x__conjunctiva WNL  ____x_ Pupils equal and round        ENT: _x__ears and nose atraumatic_x__ Hearing grossly intact        Neck: ___trachea midline, no visible masses ___thyroid without palpable mass    Resp: _x__Nml WOB____No tactile fremitus ___clear to auscultation    Cardio: ___extremities free from edema ____pedal pulses palpable    GI/Abdomen: ___soft _____ Nontender______Nondistended_____HSM    Lymphatic: ___no palpable nodes in neck  ___no palpable nodes calves and feet    Skin/Wound: ___Incision, ___Dressing c/d/i,   ____surrounding tissues soft,  ___drain/chest tube present____    Muscular: EHL _5__/5  Gastrocnemius___/5    __x_absent clubbing/cyanosis         ASSESSMENT:  This is a 66y old Female with a history of:  Back pain and is S/P L4-S1 PSF and pain is not well controlled with PCA      Recommended Treatment PLAN:    1.  Dilaudid 2-4 mg po q4h prn  2.  Dilaudid 0.5 mg IV q2h prn  3.  Tylenol 975 mg po q8h

## 2017-06-05 NOTE — DISCHARGE NOTE ADULT - MEDICATION SUMMARY - MEDICATIONS TO TAKE
I will START or STAY ON the medications listed below when I get home from the hospital:    vitamin B complex  --   once a day  -- Indication: For Home med    acetaminophen-oxycodone 325 mg-5 mg oral tablet  -- 1 tab(s) by mouth every 4 hours, As needed, Moderate Pain (4 - 6)  -- Indication: For pain    acetaminophen-oxycodone 325 mg-5 mg oral tablet  -- 2 tab(s) by mouth every 4 hours, As needed, Severe Pain (7 - 10)  -- Indication: For pain    pravastatin 10 mg oral tablet  -- 1 tab(s) by mouth once a day  -- Indication: For Home med    Norvasc 2.5 mg oral tablet  -- 1 tab(s) by mouth once a day  -- Indication: For Home med

## 2017-06-05 NOTE — DISCHARGE NOTE ADULT - CARE PLAN
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  You may shower--dressing is waterproof.  Change dressing daily x 5 days, then leave incision open to air.  Follow up with Dr. Kauffman, call office to schedule an appt within 10-14 days.  If you don't have a bowel movement by post op day 3, then take Milk of Magnesia (over the counter).  If no bowel movement by at least post op day 5, then use a Dulcolax suppository (over the counter) and/or a Fleets enema--if still no bowel movement, call your MD.  Contact your doctor if you experience: fever greater than 101.5, chills, chest pain, difficulty breathing, bleeding, redness or heat around the incision.

## 2017-06-05 NOTE — PROGRESS NOTE ADULT - PROBLEM SELECTOR PLAN 1
Urine culture was negative and discontinue antibiotic. The Lua was removed. The patient is afebrile normal white count

## 2017-06-05 NOTE — PROGRESS NOTE ADULT - SUBJECTIVE AND OBJECTIVE BOX
Patient seen and examined at bedside.     SUBJECTIVE: No acute events overnight.  Pain tolerable.    Denies Chest Pain/SOB.    Denies Headache.    Denies Nausea/vomiting.      OBJECTIVE:   T(C): 36.7, Max: 37.2 (06-04 @ 21:13)  T(F): 98, Max: 99 (06-04 @ 21:13)  HR: 76 (70 - 94)  BP: 119/83 (112/73 - 128/81)  RR:  (15 - 17)  SpO2:  (93% - 100%)  Wt(kg): --    NAD, non labored respirations  Affected extremity:          Dressing: changed          Drains: none         Sensation: [x ] intact to light touch  [ ] decreased    complains of pins and needles RLE                          Vascular: [x ] warm well perfused; capillary refill <3 seconds          Motor exam: [x ]         [ ] Upper extremity                   Sanjuanita (c5)   Bi(c5/6)   WE(c6)   EE(c7)    (c8)                                                R           5              5            5             5             5                                               L            5              5            5             5            5         [x ] Lower extremity                   IP(L2)     Q(L3)     TA(L4)     EHL(L5)     GS(s1)                                                 R          5           5          5            5              5                                               L           5           5         5             5              5                                     11.1<L>  8.8   )-------------------( 169      ( 06-05 @ 02:21 )                 34.5     I&O's Detail  I & Os for 24h ending 04 Jun 2017 07:00  =============================================  IN:    Oral Fluid: 760 ml    Total IN: 760 ml  ---------------------------------------------  OUT:    Indwelling Catheter - Urethral: 2650 ml    Accordian: 270 ml    Total OUT: 2920 ml  ---------------------------------------------  Total NET: -2160 ml    I & Os for current day (as of 05 Jun 2017 06:26)  =============================================  IN:    Oral Fluid: 600 ml    Total IN: 600 ml  ---------------------------------------------  OUT:    Indwelling Catheter - Urethral: 700 ml    Voided: 400 ml    Accordian: 160 ml    Total OUT: 1260 ml  ---------------------------------------------  Total NET: -660 ml      A/P :  66y Female s/p Lumbar Laminectomy L4-S1 PSF   -    Pain control + bowel regimen  -    DVT ppx: SCDs    -    Periop abx    -    ADAT  -    Check AM labs  -    Weight bearing status:   WBAT        -    Physical Therapy  -    Resume home meds  -    Dispo planning

## 2017-06-05 NOTE — PROGRESS NOTE ADULT - SUBJECTIVE AND OBJECTIVE BOX
Status post lumbar decompression and fusion PLIF L4-S1  Patient is conscious and oriented  Patient states preoperative pain has decreased.  Patient have not waked yet .  Patient denies any fever chills.  Patient is not wearing the brace.  The patient feels some mild stiffness in the lumbar spine; however the patient is very happy with the outcome of the surgery.  Patient denies any bowel or bladder incontinence  Lumbar spine- skin is clean dry and intact.  Incision is clean dry and intact there is no erythema, exudates or effusion. Power is 5 out of 5 for Pssoas quads TA and Gastrocs.  Sensation is grossly intact.  Reflexes are brisk and symmetrical.  Negative clonus, downgoing Babinski  Pulses ok in both legs  Drain in  Pain control  Abx  PT  Lumbar brace all time. No bending lifting or twisting

## 2017-06-05 NOTE — PROGRESS NOTE ADULT - SUBJECTIVE AND OBJECTIVE BOX
POST OPERATIVE DAY #: 3  STATUS POST: PSF L4-5                       SUBJECTIVE: Patient seen and examined.     Pain:  well controlled      OBJECTIVE:     Vital Signs Last 24 Hrs  T(C): 37.2, Max: 37.2 (06-04 @ 21:13)  T(F): 98.9, Max: 99 (06-04 @ 21:13)  HR: 75 (70 - 94)  BP: 114/78 (112/73 - 128/81)  BP(mean): --  RR: 16 (15 - 17)  SpO2: 95% (93% - 100%)    B/L lower extremity:          Dressing: clean/dry/intact, changed today. Drain intact, output: 90 overnight.         Sensation: intact to light touch to patient's baseline         Motor exam:  5 / 5 EHL          warm, well-perfused; capillary refill < 3 seconds              I&O's Detail    I & Os for current day (as of 05 Jun 2017 11:42)  =============================================  IN:    Oral Fluid: 600 ml    Total IN: 600 ml  ---------------------------------------------  OUT:    Indwelling Catheter - Urethral: 700 ml    Voided: 400 ml    Accordian: 160 ml    Total OUT: 1260 ml  ---------------------------------------------  Total NET: -660 ml      LABS:                        11.1   8.8   )-----------( 169      ( 05 Jun 2017 02:21 )             34.5     06-05    137  |  97  |  13  ----------------------------<  119<H>  3.6   |  31  |  0.60    Ca    9.5      05 Jun 2017 02:20            MEDICATIONS:  nitrofurantoin (MACROBID) 100milliGRAM(s) Oral two times a day with meals    ondansetron Injectable 4milliGRAM(s) IV Push every 6 hours PRN  metoclopramide Injectable 10milliGRAM(s) IV Push every 12 hours  acetaminophen   Tablet 650milliGRAM(s) Oral every 6 hours PRN  HYDROmorphone  Injectable 0.5milliGRAM(s) IV Push every 2 hours PRN  acetaminophen   Tablet 975milliGRAM(s) Oral every 8 hours  oxyCODONE  5 mG/acetaminophen 325 mG 1Tablet(s) Oral every 4 hours PRN  oxyCODONE  5 mG/acetaminophen 325 mG 2Tablet(s) Oral every 4 hours PRN        RADIOLOGY & ADDITIONAL STUDIES:    ASSESSMENT AND PLAN:     1. Analgesic pain control  2. DVT prophylaxis: SCDs  3. Macrobid for UTI  4. [facility] needs to be in the brace at all times per Dr. Luz  5. Weight Bearing Status:  Weight bearing as tolerated   6. Disposition:  Subacute Rehab

## 2017-06-05 NOTE — PROGRESS NOTE ADULT - SUBJECTIVE AND OBJECTIVE BOX
Interval Events: reviewed  Patient seen and examined at bedside.    Patient is a 66y old  Female who presents with a chief complaint of low back pain (2017 11:45)    She's doing better the catheter was removed. She is did not have a bowel movement and the belly is to stand  PAST MEDICAL & SURGICAL HISTORY:  HLD (hyperlipidemia)  Polyp of colon: &quot;pre-cancerous&quot; x 2, removed 2014  Irritable bowel syndrome (IBS)  Sleep apnea: uses  cpap machine  Hypertension  Breast CA, left: lumpectomy / RTX in the past  Anxiety  H/O lumpectomy: left  History of surgery: right groin fatty tissue removed  Breast lump  S/P hysterectomy  S/P colon resection: reversal, had complications for fibriods  S/P       MEDICATIONS:  Pulmonary:    Antimicrobials:  nitrofurantoin (MACROBID) 100milliGRAM(s) Oral two times a day with meals    Anticoagulants:    Cardiac:  amLODIPine   Tablet 2.5milliGRAM(s) Oral daily      Allergies    sulfa drugs (Unknown)    Intolerances        Vital Signs Last 24 Hrs  T(C): 36.8, Max: 37.2 ( @ 09:30)  T(F): 98.2, Max: 98.9 ( @ 09:30)  HR: 80 (67 - 80)  BP: 125/85 (114/78 - 127/86)  BP(mean): --  RR: 16 (16 - 17)  SpO2: 98% (93% - 98%)  I & Os for 24h ending  @ 07:00  =============================================  IN: 600 ml / OUT: 1260 ml / NET: -660 ml    I & Os for current day (as of  @ 22:46)  =============================================  IN: 0 ml / OUT: 40 ml / NET: -40 ml        LABS:      CBC Full  -  ( 2017 02:21 )  WBC Count : 8.8 K/uL  Hemoglobin : 11.1 g/dL  Hematocrit : 34.5 %  Platelet Count - Automated : 169 K/uL  Mean Cell Volume : 90.3 fL  Mean Cell Hemoglobin : 29.1 pg  Mean Cell Hemoglobin Concentration : 32.2 g/dL  Auto Neutrophil # : x  Auto Lymphocyte # : x  Auto Monocyte # : x  Auto Eosinophil # : x  Auto Basophil # : x  Auto Neutrophil % : x  Auto Lymphocyte % : x  Auto Monocyte % : x  Auto Eosinophil % : x  Auto Basophil % : x    06-05    137  |  97  |  13  ----------------------------<  119<H>  3.6   |  31  |  0.60    Ca    9.5      2017 02:20                          RADIOLOGY & ADDITIONAL STUDIES (The following images were personally reviewed):  Lua:                                 No  Urine output:               Yes       DVT prophylaxis:         Yes        Flattus:                          Yes          Bowel movement:             No

## 2017-06-06 VITALS
DIASTOLIC BLOOD PRESSURE: 80 MMHG | HEART RATE: 62 BPM | OXYGEN SATURATION: 100 % | SYSTOLIC BLOOD PRESSURE: 122 MMHG | TEMPERATURE: 97 F | RESPIRATION RATE: 16 BRPM

## 2017-06-06 PROCEDURE — 87641 MR-STAPH DNA AMP PROBE: CPT

## 2017-06-06 PROCEDURE — 76000 FLUOROSCOPY <1 HR PHYS/QHP: CPT

## 2017-06-06 PROCEDURE — 80048 BASIC METABOLIC PNL TOTAL CA: CPT

## 2017-06-06 PROCEDURE — 97116 GAIT TRAINING THERAPY: CPT

## 2017-06-06 PROCEDURE — 72100 X-RAY EXAM L-S SPINE 2/3 VWS: CPT

## 2017-06-06 PROCEDURE — 85027 COMPLETE CBC AUTOMATED: CPT

## 2017-06-06 PROCEDURE — C1889: CPT

## 2017-06-06 PROCEDURE — 97161 PT EVAL LOW COMPLEX 20 MIN: CPT

## 2017-06-06 PROCEDURE — 86850 RBC ANTIBODY SCREEN: CPT

## 2017-06-06 PROCEDURE — 36415 COLL VENOUS BLD VENIPUNCTURE: CPT

## 2017-06-06 PROCEDURE — 86901 BLOOD TYPING SEROLOGIC RH(D): CPT

## 2017-06-06 PROCEDURE — 88304 TISSUE EXAM BY PATHOLOGIST: CPT

## 2017-06-06 PROCEDURE — C1713: CPT

## 2017-06-06 PROCEDURE — 81001 URINALYSIS AUTO W/SCOPE: CPT

## 2017-06-06 PROCEDURE — 87086 URINE CULTURE/COLONY COUNT: CPT

## 2017-06-06 PROCEDURE — 86900 BLOOD TYPING SEROLOGIC ABO: CPT

## 2017-06-06 RX ORDER — OXYCODONE HYDROCHLORIDE 5 MG/1
1 TABLET ORAL
Qty: 0 | Refills: 0 | COMMUNITY
Start: 2017-06-06

## 2017-06-06 RX ORDER — OXYCODONE HYDROCHLORIDE 5 MG/1
2 TABLET ORAL
Qty: 0 | Refills: 0 | COMMUNITY
Start: 2017-06-06

## 2017-06-06 RX ADMIN — Medication 1 TABLET(S): at 06:01

## 2017-06-06 RX ADMIN — Medication 5 MILLIGRAM(S): at 06:01

## 2017-06-06 RX ADMIN — Medication 100 MILLIGRAM(S): at 09:25

## 2017-06-06 RX ADMIN — Medication 500 MILLIGRAM(S): at 06:01

## 2017-06-06 RX ADMIN — Medication 1 TABLET(S): at 14:06

## 2017-06-06 RX ADMIN — Medication 100 MILLIGRAM(S): at 14:06

## 2017-06-06 RX ADMIN — AMLODIPINE BESYLATE 2.5 MILLIGRAM(S): 2.5 TABLET ORAL at 06:01

## 2017-06-06 RX ADMIN — Medication 100 MILLIGRAM(S): at 06:01

## 2017-06-06 RX ADMIN — Medication 1 TABLET(S): at 12:14

## 2017-06-06 RX ADMIN — Medication 975 MILLIGRAM(S): at 06:01

## 2017-06-06 RX ADMIN — Medication 975 MILLIGRAM(S): at 14:06

## 2017-06-06 RX ADMIN — FAMOTIDINE 20 MILLIGRAM(S): 10 INJECTION INTRAVENOUS at 06:01

## 2017-06-06 RX ADMIN — Medication 1 MILLIGRAM(S): at 12:14

## 2017-06-06 NOTE — PROGRESS NOTE ADULT - PROVIDER SPECIALTY LIST ADULT
Internal Medicine
Orthopedics
Pain Medicine
Orthopedics
Orthopedics

## 2017-06-06 NOTE — PROGRESS NOTE ADULT - SUBJECTIVE AND OBJECTIVE BOX
POST OPERATIVE DAY #: 4  STATUS POST: PSF L4-S1                        SUBJECTIVE: Patient seen and examined.     Pain:  well controlled      OBJECTIVE:     Vital Signs Last 24 Hrs  T(C): 36.9, Max: 37.1 (06-05 @ 17:13)  T(F): 98.4, Max: 98.8 (06-05 @ 17:13)  HR: 68 (67 - 80)  BP: 115/78 (115/78 - 127/86)  BP(mean): --  RR: 16 (16 - 17)  SpO2: 96% (93% - 98%)    Affected extremity:          Dressing: clean/dry/intact          Sensation: intact to light touch to patient's baseline         Motor exam:  5 / 5 EHL          warm, well-perfused; capillary refill < 3 seconds              I&O's Detail    I & Os for current day (as of 06 Jun 2017 09:32)  =============================================  IN:    Total IN: 0 ml  ---------------------------------------------  OUT:    Voided: 600 ml    Accordian: 95 ml    Total OUT: 695 ml  ---------------------------------------------  Total NET: -695 ml      LABS:                        11.1   8.8   )-----------( 169      ( 05 Jun 2017 02:21 )             34.5     06-05    137  |  97  |  13  ----------------------------<  119<H>  3.6   |  31  |  0.60    Ca    9.5      05 Jun 2017 02:20            MEDICATIONS:  nitrofurantoin (MACROBID) 100milliGRAM(s) Oral two times a day with meals    ondansetron Injectable 4milliGRAM(s) IV Push every 6 hours PRN  metoclopramide Injectable 10milliGRAM(s) IV Push every 12 hours  acetaminophen   Tablet 650milliGRAM(s) Oral every 6 hours PRN  HYDROmorphone  Injectable 0.5milliGRAM(s) IV Push every 2 hours PRN  acetaminophen   Tablet 975milliGRAM(s) Oral every 8 hours  oxyCODONE  5 mG/acetaminophen 325 mG 1Tablet(s) Oral every 4 hours PRN  oxyCODONE  5 mG/acetaminophen 325 mG 2Tablet(s) Oral every 4 hours PRN        RADIOLOGY & ADDITIONAL STUDIES:    ASSESSMENT AND PLAN:     1. Analgesic pain control  2. DVT prophylaxis: SCDs  3. Weight Bearing Status:  Weight bearing as tolerated   4. Disposition:  Subacute Rehab POST OPERATIVE DAY #: 4  STATUS POST: PSF L4-S1                        SUBJECTIVE: Patient seen and examined.     Pain:  well controlled      OBJECTIVE:     Vital Signs Last 24 Hrs  T(C): 36.9, Max: 37.1 (06-05 @ 17:13)  T(F): 98.4, Max: 98.8 (06-05 @ 17:13)  HR: 68 (67 - 80)  BP: 115/78 (115/78 - 127/86)  BP(mean): --  RR: 16 (16 - 17)  SpO2: 96% (93% - 98%)    Affected extremity:          Dressing: clean/dry/intact, drain intact         Sensation: intact to light touch to patient's baseline         Motor exam:  5 / 5 EHL          warm, well-perfused; capillary refill < 3 seconds              I&O's Detail    I & Os for current day (as of 06 Jun 2017 09:32)  =============================================  IN:    Total IN: 0 ml  ---------------------------------------------  OUT:    Voided: 600 ml    Accordian: 95 ml    Total OUT: 695 ml  ---------------------------------------------  Total NET: -695 ml      LABS:                        11.1   8.8   )-----------( 169      ( 05 Jun 2017 02:21 )             34.5     06-05    137  |  97  |  13  ----------------------------<  119<H>  3.6   |  31  |  0.60    Ca    9.5      05 Jun 2017 02:20            MEDICATIONS:  nitrofurantoin (MACROBID) 100milliGRAM(s) Oral two times a day with meals    ondansetron Injectable 4milliGRAM(s) IV Push every 6 hours PRN  metoclopramide Injectable 10milliGRAM(s) IV Push every 12 hours  acetaminophen   Tablet 650milliGRAM(s) Oral every 6 hours PRN  HYDROmorphone  Injectable 0.5milliGRAM(s) IV Push every 2 hours PRN  acetaminophen   Tablet 975milliGRAM(s) Oral every 8 hours  oxyCODONE  5 mG/acetaminophen 325 mG 1Tablet(s) Oral every 4 hours PRN  oxyCODONE  5 mG/acetaminophen 325 mG 2Tablet(s) Oral every 4 hours PRN        RADIOLOGY & ADDITIONAL STUDIES:    ASSESSMENT AND PLAN:     1. Analgesic pain control  2. DVT prophylaxis: SCDs  3. Weight Bearing Status:  Weight bearing as tolerated   4. Disposition:  Subacute Rehab

## 2017-06-06 NOTE — PROGRESS NOTE ADULT - ATTENDING COMMENTS
pt seen and examined doing well. + flatus   agree with above   for rehab
pt seen agree with above   pt has mild bowel distention not passing flatus will decrease pain meds and add motility agents   for xrays   brace
I discussed the case with the PA and to give laxative. The abdomen is distended but she has bowel sounds no clinical evidence of small bowel obstruction

## 2017-06-06 NOTE — PROGRESS NOTE ADULT - SUBJECTIVE AND OBJECTIVE BOX
Patient seen and examined at bedside.     SUBJECTIVE: No acute events overnight.  Pain tolerable.    Denies Chest Pain/SOB.    Denies Headache.    Denies Nausea/vomiting.      OBJECTIVE:   T(C): 36.9, Max: 37.2 (06-05 @ 09:30)  T(F): 98.4, Max: 98.9 (06-05 @ 09:30)  HR: 68 (67 - 80)  BP: 115/78 (114/78 - 127/86)  RR:  (16 - 17)  SpO2:  (93% - 98%)  Wt(kg): --    NAD, non labored respirations  Affected extremity:          Dressing: clean/dry/intact          Drains: none         Sensation: [x ] intact to light touch  [ ] decreased                              Vascular: [x ] warm well perfused; capillary refill <3 seconds          Motor exam: [x ]         [ ] Upper extremity                   Sanjuanita (c5)   Bi(c5/6)   WE(c6)   EE(c7)    (c8)                                                R           5              5            5             5             5                                               L            5              5            5             5            5         [x ] Lower extremity                   IP(L2)     Q(L3)     TA(L4)     EHL(L5)     GS(s1)                                                 R          5           5          5            5              5                                               L           5           5         5             5              5                                     11.1<L>  8.8   )-------------------( 169      ( 06-05 @ 02:21 )                 34.5     I&O's Detail  I & Os for 24h ending 05 Jun 2017 07:00  =============================================  IN:    Oral Fluid: 600 ml    Total IN: 600 ml  ---------------------------------------------  OUT:    Indwelling Catheter - Urethral: 700 ml    Voided: 400 ml    Accordian: 160 ml    Total OUT: 1260 ml  ---------------------------------------------  Total NET: -660 ml    I & Os for current day (as of 06 Jun 2017 06:16)  =============================================  IN:    Total IN: 0 ml  ---------------------------------------------  OUT:    Voided: 600 ml    Accordian: 95 ml    Total OUT: 695 ml  ---------------------------------------------  Total NET: -695 ml      A/P :  66y Female s/p Lumbar Laminectomy PSF L4-S1 06/02/17       -    Pain control + bowel regimen  -    DVT ppx: SCDs    -    Periop abx    -    ADAT  -    Check AM labs  -    Weight bearing status:   WBAT        -    Physical Therapy  -    Resume home meds  -    Dispo planning

## 2017-06-06 NOTE — PROGRESS NOTE ADULT - SUBJECTIVE AND OBJECTIVE BOX
Pain Management Progress Note - [facility] [phone]    HPI:  Pt. complains of back pain.  States left leg pain that was present prior to surgery is improved.  Used prn pain medication minimally over the last 24 hours.  Complains of constipation.          Pertinent PMH: Pain at: __x_Back ___Neck___Knee ___Hip ___Shoulder ___ Opioid tolerance    Pain is ___ sharp ____dull ___burning __x_achy ___ Intensity: ____ mild ____mod ____severe     Location _x____surgical site _____cervical __x___lumbar ____abd _____upper ext____lower ext    Worse with __x__activity __x__movement _____physical therapy___ Rest    Improved with __x__medication __x__rest ____physical therapy      naloxone Injectable  ondansetron Injectable  lactated ringers.  famotidine    Tablet  metoclopramide Injectable  docusate sodium  magnesium hydroxide Suspension  senna  calcium carbonate 1250 mG + Vitamin D (OsCal 500 + D)  folic acid  multivitamin  ascorbic acid  nitrofurantoin (MACROBID)  acetaminophen   Tablet  atorvastatin  amLODIPine   Tablet  HYDROmorphone  Injectable  acetaminophen   Tablet  oxyCODONE  5 mG/acetaminophen 325 mG  oxyCODONE  5 mG/acetaminophen 325 mG  bisacodyl  bisacodyl Suppository  polyethylene glycol 3350  lidocaine   Patch  bisacodyl Suppository      ROS: Const:  ___febrile   Eyes:___ENT:___CV: _-__chest pain  Resp: ____sob  GI:__-_nausea _-__vomiting ____abd pain ___npo ___clears +___full diet __bm  :___ Musk: +___pain ___spasm  Skin:___ Neuro:  __-_qmmnwawo_-__iazupzsft__-__ numbness _-__weakness ___paresthesia  Psych:___anxiety  Endo:___ Heme:___Allergy:___          Hemoglobin: 11.1 g/dL ( @ 02:21)  Hemoglobin: 11.6 g/dL ( @ 14:59)        T(C): 37.3, Max: 37.3 ( @ 09:25)  HR: 85 (67 - 85)  BP: 109/72 (109/72 - 127/86)  RR: 16 (16 - 17)  SpO2: 97% (93% - 98%)  Wt(kg): --     PHYSICAL EXAM:  Gen Appearance: __x_no acute distress _x_appropriate         Neuro: _x__SILT feet___x_ EOM Intact Psych: AAOX_3_, _x__mood/affect appropriate        Eyes: _x__conjunctiva WNL  ___x__ Pupils equal and round        ENT: _x__ears and nose atraumatic_x__ Hearing grossly intact        Neck: ___trachea midline, no visible masses ___thyroid without palpable mass    Resp: _x__Nml WOB____No tactile fremitus ___clear to auscultation    Cardio: _x__extremities free from edema ____pedal pulses palpable    GI/Abdomen: ___soft _____ Nontender____x__distended_____HSM    Lymphatic: ___no palpable nodes in neck  ___no palpable nodes calves and feet    Skin/Wound: ___Incision, ___Dressing c/d/i,   ____surrounding tissues soft,  _x__drain/chest tube present____    Muscular: EHL _5__/5  Gastrocnemius___/5    __x_absent clubbing/cyanosis         ASSESSMENT:  This is a 66y old Female with a history of:  HLD (hyperlipidemia)  Polyp of colon  Irritable bowel syndrome (IBS)  Sleep apnea  Hypertension  Breast CA, left  Anxiety  Chronic midline low back pain with left-sided sciatica  Hypertension  UTI (urinary tract infection)  H/O lumpectomy  History of surgery  Breast lump  S/P hysterectomy  S/P colon resection  S/P   and back pain S/P PSF L4-S1 and pain is tolerable with use of minimal narcotics.      Recommended Treatment PLAN:  1.  Percocet 5/325 1-2 tab q4h prn  2.  Dilaudid 0.5 mg IV q2h prn

## 2017-06-08 DIAGNOSIS — G47.30 SLEEP APNEA, UNSPECIFIED: ICD-10-CM

## 2017-06-08 DIAGNOSIS — M51.17 INTERVERTEBRAL DISC DISORDERS WITH RADICULOPATHY, LUMBOSACRAL REGION: ICD-10-CM

## 2017-06-08 DIAGNOSIS — E78.5 HYPERLIPIDEMIA, UNSPECIFIED: ICD-10-CM

## 2017-06-08 DIAGNOSIS — Z86.010 PERSONAL HISTORY OF COLONIC POLYPS: ICD-10-CM

## 2017-06-08 DIAGNOSIS — I10 ESSENTIAL (PRIMARY) HYPERTENSION: ICD-10-CM

## 2017-06-08 DIAGNOSIS — K58.9 IRRITABLE BOWEL SYNDROME WITHOUT DIARRHEA: ICD-10-CM

## 2017-06-08 DIAGNOSIS — Z85.3 PERSONAL HISTORY OF MALIGNANT NEOPLASM OF BREAST: ICD-10-CM

## 2017-06-08 DIAGNOSIS — F41.9 ANXIETY DISORDER, UNSPECIFIED: ICD-10-CM

## 2017-06-08 DIAGNOSIS — Z88.2 ALLERGY STATUS TO SULFONAMIDES: ICD-10-CM

## 2017-06-08 DIAGNOSIS — M48.07 SPINAL STENOSIS, LUMBOSACRAL REGION: ICD-10-CM

## 2017-06-08 DIAGNOSIS — Z90.710 ACQUIRED ABSENCE OF BOTH CERVIX AND UTERUS: ICD-10-CM

## 2017-06-14 LAB — SURGICAL PATHOLOGY STUDY: SIGNIFICANT CHANGE UP

## 2017-06-17 ENCOUNTER — RESULT REVIEW (OUTPATIENT)
Age: 67
End: 2017-06-17

## 2017-06-27 ENCOUNTER — EMERGENCY (EMERGENCY)
Facility: HOSPITAL | Age: 67
LOS: 1 days | Discharge: HOME | End: 2017-06-27
Attending: EMERGENCY MEDICINE | Admitting: EMERGENCY MEDICINE
Payer: MEDICARE

## 2017-06-27 VITALS
OXYGEN SATURATION: 98 % | SYSTOLIC BLOOD PRESSURE: 114 MMHG | DIASTOLIC BLOOD PRESSURE: 86 MMHG | HEART RATE: 72 BPM | TEMPERATURE: 98 F | RESPIRATION RATE: 18 BRPM

## 2017-06-27 VITALS
TEMPERATURE: 98 F | OXYGEN SATURATION: 97 % | SYSTOLIC BLOOD PRESSURE: 141 MMHG | DIASTOLIC BLOOD PRESSURE: 868 MMHG | HEART RATE: 70 BPM | RESPIRATION RATE: 18 BRPM

## 2017-06-27 DIAGNOSIS — Z98.89 OTHER SPECIFIED POSTPROCEDURAL STATES: Chronic | ICD-10-CM

## 2017-06-27 DIAGNOSIS — Z98.890 OTHER SPECIFIED POSTPROCEDURAL STATES: Chronic | ICD-10-CM

## 2017-06-27 DIAGNOSIS — Z90.710 ACQUIRED ABSENCE OF BOTH CERVIX AND UTERUS: Chronic | ICD-10-CM

## 2017-06-27 DIAGNOSIS — N63 UNSPECIFIED LUMP IN BREAST: Chronic | ICD-10-CM

## 2017-06-27 LAB
ALBUMIN SERPL ELPH-MCNC: 3.9 G/DL — SIGNIFICANT CHANGE UP (ref 3.3–5)
ALP SERPL-CCNC: 92 U/L — SIGNIFICANT CHANGE UP (ref 40–120)
ALT FLD-CCNC: 15 U/L — SIGNIFICANT CHANGE UP (ref 4–33)
APPEARANCE UR: SIGNIFICANT CHANGE UP
AST SERPL-CCNC: 18 U/L — SIGNIFICANT CHANGE UP (ref 4–32)
BACTERIA # UR AUTO: SIGNIFICANT CHANGE UP
BASOPHILS # BLD AUTO: 0.04 K/UL — SIGNIFICANT CHANGE UP (ref 0–0.2)
BASOPHILS NFR BLD AUTO: 0.5 % — SIGNIFICANT CHANGE UP (ref 0–2)
BILIRUB SERPL-MCNC: 0.2 MG/DL — SIGNIFICANT CHANGE UP (ref 0.2–1.2)
BILIRUB UR-MCNC: NEGATIVE — SIGNIFICANT CHANGE UP
BLOOD UR QL VISUAL: HIGH
BUN SERPL-MCNC: 16 MG/DL — SIGNIFICANT CHANGE UP (ref 7–23)
CALCIUM SERPL-MCNC: 10.2 MG/DL — SIGNIFICANT CHANGE UP (ref 8.4–10.5)
CHLORIDE SERPL-SCNC: 103 MMOL/L — SIGNIFICANT CHANGE UP (ref 98–107)
CO2 SERPL-SCNC: 27 MMOL/L — SIGNIFICANT CHANGE UP (ref 22–31)
COLOR SPEC: YELLOW — SIGNIFICANT CHANGE UP
CREAT SERPL-MCNC: 0.79 MG/DL — SIGNIFICANT CHANGE UP (ref 0.5–1.3)
EOSINOPHIL # BLD AUTO: 0.74 K/UL — HIGH (ref 0–0.5)
EOSINOPHIL NFR BLD AUTO: 9.4 % — HIGH (ref 0–6)
GLUCOSE SERPL-MCNC: 95 MG/DL — SIGNIFICANT CHANGE UP (ref 70–99)
GLUCOSE UR-MCNC: NEGATIVE — SIGNIFICANT CHANGE UP
HCT VFR BLD CALC: 36.4 % — SIGNIFICANT CHANGE UP (ref 34.5–45)
HGB BLD-MCNC: 11.3 G/DL — LOW (ref 11.5–15.5)
HYALINE CASTS # UR AUTO: SIGNIFICANT CHANGE UP (ref 0–?)
IMM GRANULOCYTES NFR BLD AUTO: 0.3 % — SIGNIFICANT CHANGE UP (ref 0–1.5)
KETONES UR-MCNC: NEGATIVE — SIGNIFICANT CHANGE UP
LEUKOCYTE ESTERASE UR-ACNC: HIGH
LYMPHOCYTES # BLD AUTO: 2.64 K/UL — SIGNIFICANT CHANGE UP (ref 1–3.3)
LYMPHOCYTES # BLD AUTO: 33.4 % — SIGNIFICANT CHANGE UP (ref 13–44)
MCHC RBC-ENTMCNC: 28.5 PG — SIGNIFICANT CHANGE UP (ref 27–34)
MCHC RBC-ENTMCNC: 31 % — LOW (ref 32–36)
MCV RBC AUTO: 91.9 FL — SIGNIFICANT CHANGE UP (ref 80–100)
MONOCYTES # BLD AUTO: 0.68 K/UL — SIGNIFICANT CHANGE UP (ref 0–0.9)
MONOCYTES NFR BLD AUTO: 8.6 % — SIGNIFICANT CHANGE UP (ref 2–14)
MUCOUS THREADS # UR AUTO: SIGNIFICANT CHANGE UP
NEUTROPHILS # BLD AUTO: 3.79 K/UL — SIGNIFICANT CHANGE UP (ref 1.8–7.4)
NEUTROPHILS NFR BLD AUTO: 47.8 % — SIGNIFICANT CHANGE UP (ref 43–77)
NITRITE UR-MCNC: NEGATIVE — SIGNIFICANT CHANGE UP
PH UR: 6.5 — SIGNIFICANT CHANGE UP (ref 4.6–8)
PLATELET # BLD AUTO: 230 K/UL — SIGNIFICANT CHANGE UP (ref 150–400)
PMV BLD: 12.7 FL — SIGNIFICANT CHANGE UP (ref 7–13)
POTASSIUM SERPL-MCNC: 4.7 MMOL/L — SIGNIFICANT CHANGE UP (ref 3.5–5.3)
POTASSIUM SERPL-SCNC: 4.7 MMOL/L — SIGNIFICANT CHANGE UP (ref 3.5–5.3)
PROT SERPL-MCNC: 7.1 G/DL — SIGNIFICANT CHANGE UP (ref 6–8.3)
PROT UR-MCNC: 30 — HIGH
RBC # BLD: 3.96 M/UL — SIGNIFICANT CHANGE UP (ref 3.8–5.2)
RBC # FLD: 13.7 % — SIGNIFICANT CHANGE UP (ref 10.3–14.5)
RBC CASTS # UR COMP ASSIST: >50 — HIGH (ref 0–?)
SODIUM SERPL-SCNC: 143 MMOL/L — SIGNIFICANT CHANGE UP (ref 135–145)
SP GR SPEC: 1.02 — SIGNIFICANT CHANGE UP (ref 1–1.03)
SQUAMOUS # UR AUTO: SIGNIFICANT CHANGE UP
UROBILINOGEN FLD QL: NORMAL E.U. — SIGNIFICANT CHANGE UP (ref 0.1–0.2)
WBC # BLD: 7.91 K/UL — SIGNIFICANT CHANGE UP (ref 3.8–10.5)
WBC # FLD AUTO: 7.91 K/UL — SIGNIFICANT CHANGE UP (ref 3.8–10.5)
WBC UR QL: >50 — HIGH (ref 0–?)
YEAST BUDDING # UR COMP ASSIST: SIGNIFICANT CHANGE UP

## 2017-06-27 PROCEDURE — 99285 EMERGENCY DEPT VISIT HI MDM: CPT | Mod: GC

## 2017-06-27 RX ORDER — MORPHINE SULFATE 50 MG/1
4 CAPSULE, EXTENDED RELEASE ORAL ONCE
Qty: 0 | Refills: 0 | Status: DISCONTINUED | OUTPATIENT
Start: 2017-06-27 | End: 2017-06-27

## 2017-06-27 RX ORDER — CEPHALEXIN 500 MG
1 CAPSULE ORAL
Qty: 14 | Refills: 0 | OUTPATIENT
Start: 2017-06-27 | End: 2017-07-04

## 2017-06-27 RX ORDER — CEFTRIAXONE 500 MG/1
1 INJECTION, POWDER, FOR SOLUTION INTRAMUSCULAR; INTRAVENOUS ONCE
Qty: 0 | Refills: 0 | Status: COMPLETED | OUTPATIENT
Start: 2017-06-27 | End: 2017-06-27

## 2017-06-27 RX ADMIN — MORPHINE SULFATE 4 MILLIGRAM(S): 50 CAPSULE, EXTENDED RELEASE ORAL at 21:16

## 2017-06-27 RX ADMIN — MORPHINE SULFATE 4 MILLIGRAM(S): 50 CAPSULE, EXTENDED RELEASE ORAL at 18:10

## 2017-06-27 RX ADMIN — MORPHINE SULFATE 4 MILLIGRAM(S): 50 CAPSULE, EXTENDED RELEASE ORAL at 17:49

## 2017-06-27 RX ADMIN — MORPHINE SULFATE 4 MILLIGRAM(S): 50 CAPSULE, EXTENDED RELEASE ORAL at 21:35

## 2017-06-27 RX ADMIN — CEFTRIAXONE 100 GRAM(S): 500 INJECTION, POWDER, FOR SOLUTION INTRAMUSCULAR; INTRAVENOUS at 23:10

## 2017-06-27 NOTE — ED PROVIDER NOTE - PMH
Anxiety    Breast CA, left  lumpectomy / RTX in the past  HLD (hyperlipidemia)    Hypertension    Irritable bowel syndrome (IBS)    Polyp of colon  "pre-cancerous" x 2, removed 11/2014  Sleep apnea  uses  cpap machine

## 2017-06-27 NOTE — ED PROVIDER NOTE - CONDUCTED A DETAILED DISCUSSION WITH PATIENT AND/OR GUARDIAN REGARDING, MDM
radiology results/lab results radiology results/lab results/need for outpatient follow-up/return to ED if symptoms worsen, persist or questions arise

## 2017-06-27 NOTE — ED ADULT TRIAGE NOTE - CHIEF COMPLAINT QUOTE
s/p spine surgery 3 weeks ago and states "since 1 week before my surgery I have been having trouble my bladder."  c/o dysuria and urinary urgency.  "they say I have red blood cell in my urine but I don't see any blood.  They told me to come to the ER."  hx: htn, spine surgery r.t. herniated discs

## 2017-06-27 NOTE — ED PROVIDER NOTE - PROGRESS NOTE DETAILS
UA positive for large leuk esterase, will d/c with course of keflex, f/u urology outpatient and w/ PMD. UA positive for large leuk esterase, will d/c with course of keflex, f/u urology outpatient and w/ PMD. f/u urine culture.

## 2017-06-27 NOTE — ED ADULT NURSE NOTE - NS ED PATIENT SAFETY CONERN FT
Patient reports does not feel safe at home after discharge from facility. States no aftercare was planned.

## 2017-06-27 NOTE — ED PROVIDER NOTE - ABDOMINAL EXAM
nondistended/no organomegaly/no rebound, guarding, or peritoneal signs; +exquisite suprapubic tenderness, making patient desire to urinate./soft/tender...

## 2017-06-27 NOTE — ED ADULT NURSE REASSESSMENT NOTE - NS ED NURSE REASSESS COMMENT FT1
rec'd pt. a&ox3, c/o back pain, denies any numbness. MD aware. Morphine given as ordered. awaits Urologist. g20 saline lock noted on rt ac with no ss of infiltration. will continue to monitor

## 2017-06-27 NOTE — ED ADULT NURSE REASSESSMENT NOTE - NS ED NURSE REASSESS COMMENT FT1
Patient states would like to speak with social work in order to get information in finding arrangements for after care at home. Called extension, no answer. Will call again.

## 2017-06-27 NOTE — ED ADULT NURSE NOTE - OBJECTIVE STATEMENT
A&Ox3, respirations even and unlabored, skin intact with no signs of decubiti, scar along midback with no redness or swelling, tenderness on palpation and patient guards and grimace area, abdomen soft, lower abdominal tenderness. Patient reports spinal fusion surgery x 3 weeks ago at [facility], was discharged from rehab facility on Friday, reports dysuria and urgency, when voiding feels faint "I feel like I'm going to pass out". Was told by PCP she has RBCs in urine and recommended to visit ER for continued evaluation.

## 2017-06-27 NOTE — ED PROVIDER NOTE - ATTENDING CONTRIBUTION TO CARE
AME Attending Note - Dr. Choudhary 65 y/o F w/ a PMHx of HTN, Breast CA s/p lumpectomy and radiation in remission, s/p lumbar laminectomy who p/w bladder pain since 6/3/17 associated with dysuria, urgency and dysuria despite 2 courses of Ciprl.   PE: pt is alert and oriented, perrl, ent normal, membranes are moist, neck supple. no lymphadenopathy or thyroid enlargement, No JVD.  Chest clear to P&A, Heart- reg rhythm without murmur, rubs or gallops, radial pulses equal bilaterally.  Abd is soft, with suprapubic tenderness, Bowel sounds are active. no mass or organomegaly. : No CVA tenderness. Neuro:  Pt alert and oriented x 3. Perrl    Distal neurosensory is intact. Motor function is 5/5 strength bilaterally.  No focal deficits. Extremities:  No edema.  Skin: warm and dry.  Plan labs, U/A Imp. UTI

## 2017-06-27 NOTE — ED PROVIDER NOTE - SKIN, MLM
Skin normal color for race, warm, dry and intact. No evidence of rash. Midline back scar with slight tenderness but only

## 2017-06-27 NOTE — ED PROVIDER NOTE - OBJECTIVE STATEMENT
Pt is a 67 y/o F w/ a PMHx of HTN, Breast CA s/p lumpectomy and radiation in remission, s/p lumbar laminectomy who p/w bladder pain since 6/3/17. Pt states that the pain initially started prior to surgery treated with a course of cipro then surgery was done and pain has progressively gotten worse and treated again with course of cipro. Pt states it hurts the most when she tries to urinate to the point where she feels like the pain is going to make her pass out. Pt started on oxybutynin as outpt. Pt +fevers and chills intermittently during these episodes. +partial colectomy in past but with normal BMs (Last yesterday), +flatus, no new lower extremity weakness. Pt was told that she had blood in her urine on recent tests and was told to go to ER. Recently out of rehab from recent surgery.

## 2017-06-27 NOTE — ED PROVIDER NOTE - PSH
Breast lump    H/O lumpectomy  left  History of surgery  right groin fatty tissue removed  S/P     S/P colon resection  reversal, had complications for fibriods  S/P hysterectomy

## 2017-06-29 LAB
BACTERIA UR CULT: SIGNIFICANT CHANGE UP
SPECIMEN SOURCE: SIGNIFICANT CHANGE UP

## 2017-07-14 ENCOUNTER — APPOINTMENT (OUTPATIENT)
Dept: UROLOGY | Facility: CLINIC | Age: 67
End: 2017-07-14

## 2017-07-14 RX ORDER — ACETAMINOPHEN AND CODEINE 300; 30 MG/1; MG/1
300-30 TABLET ORAL
Qty: 30 | Refills: 0 | Status: COMPLETED | COMMUNITY
Start: 2017-02-21

## 2017-07-14 RX ORDER — OXYBUTYNIN CHLORIDE 5 MG/1
5 TABLET, EXTENDED RELEASE ORAL
Qty: 7 | Refills: 0 | Status: COMPLETED | COMMUNITY
Start: 2017-06-22

## 2017-07-18 LAB
BACTERIA UR CULT: NORMAL
BILIRUB UR QL STRIP: NORMAL
CLARITY UR: CLEAR
COLLECTION METHOD: NORMAL
GLUCOSE UR-MCNC: NORMAL
HCG UR QL: 0.2 EU/DL
HGB UR QL STRIP.AUTO: ABNORMAL
KETONES UR-MCNC: NORMAL
LEUKOCYTE ESTERASE UR QL STRIP: ABNORMAL
NITRITE UR QL STRIP: NORMAL
PH UR STRIP: 5.5
PROT UR STRIP-MCNC: 100
SP GR UR STRIP: 1.03

## 2017-07-21 ENCOUNTER — APPOINTMENT (OUTPATIENT)
Dept: INFECTIOUS DISEASE | Facility: CLINIC | Age: 67
End: 2017-07-21

## 2017-09-01 ENCOUNTER — APPOINTMENT (OUTPATIENT)
Dept: UROLOGY | Facility: CLINIC | Age: 67
End: 2017-09-01

## 2017-09-01 ENCOUNTER — APPOINTMENT (OUTPATIENT)
Dept: UROLOGY | Facility: CLINIC | Age: 67
End: 2017-09-01
Payer: COMMERCIAL

## 2017-09-01 VITALS
RESPIRATION RATE: 16 BRPM | TEMPERATURE: 98.7 F | DIASTOLIC BLOOD PRESSURE: 79 MMHG | SYSTOLIC BLOOD PRESSURE: 114 MMHG | HEART RATE: 80 BPM

## 2017-09-01 PROCEDURE — 99213 OFFICE O/P EST LOW 20 MIN: CPT

## 2017-09-02 LAB
APPEARANCE: ABNORMAL
BACTERIA: ABNORMAL
BILIRUBIN URINE: NEGATIVE
BLOOD URINE: ABNORMAL
COLOR: ABNORMAL
GLUCOSE QUALITATIVE U: NORMAL MG/DL
HYALINE CASTS: 0 /LPF
KETONES URINE: ABNORMAL
LEUKOCYTE ESTERASE URINE: ABNORMAL
MICROSCOPIC-UA: NORMAL
NITRITE URINE: NEGATIVE
PH URINE: 5.5
PROTEIN URINE: 100 MG/DL
RED BLOOD CELLS URINE: 36 /HPF
SPECIFIC GRAVITY URINE: 1.02
SQUAMOUS EPITHELIAL CELLS: 3 /HPF
URINE COMMENTS: NORMAL
UROBILINOGEN URINE: 1 MG/DL
WHITE BLOOD CELLS URINE: >720 /HPF

## 2017-09-06 LAB — BACTERIA UR CULT: NORMAL

## 2017-09-12 ENCOUNTER — APPOINTMENT (OUTPATIENT)
Dept: UROLOGY | Facility: CLINIC | Age: 67
End: 2017-09-12
Payer: COMMERCIAL

## 2017-09-12 ENCOUNTER — APPOINTMENT (OUTPATIENT)
Dept: UROLOGY | Facility: CLINIC | Age: 67
End: 2017-09-12

## 2017-09-12 ENCOUNTER — OUTPATIENT (OUTPATIENT)
Dept: OUTPATIENT SERVICES | Facility: HOSPITAL | Age: 67
LOS: 1 days | End: 2017-09-12
Payer: COMMERCIAL

## 2017-09-12 VITALS
DIASTOLIC BLOOD PRESSURE: 81 MMHG | RESPIRATION RATE: 16 BRPM | TEMPERATURE: 98.3 F | HEART RATE: 61 BPM | SYSTOLIC BLOOD PRESSURE: 137 MMHG

## 2017-09-12 DIAGNOSIS — Z98.890 OTHER SPECIFIED POSTPROCEDURAL STATES: Chronic | ICD-10-CM

## 2017-09-12 DIAGNOSIS — Z90.710 ACQUIRED ABSENCE OF BOTH CERVIX AND UTERUS: Chronic | ICD-10-CM

## 2017-09-12 DIAGNOSIS — N63 UNSPECIFIED LUMP IN BREAST: Chronic | ICD-10-CM

## 2017-09-12 DIAGNOSIS — R35.0 FREQUENCY OF MICTURITION: ICD-10-CM

## 2017-09-12 DIAGNOSIS — Z98.89 OTHER SPECIFIED POSTPROCEDURAL STATES: Chronic | ICD-10-CM

## 2017-09-12 PROCEDURE — 52000 CYSTOURETHROSCOPY: CPT

## 2017-09-14 DIAGNOSIS — D49.4 NEOPLASM OF UNSPECIFIED BEHAVIOR OF BLADDER: ICD-10-CM

## 2017-09-14 DIAGNOSIS — R39.89 OTHER SYMPTOMS AND SIGNS INVOLVING THE GENITOURINARY SYSTEM: ICD-10-CM

## 2017-09-18 LAB — CORE LAB FLUID CYTOLOGY: NORMAL

## 2017-09-28 ENCOUNTER — OUTPATIENT (OUTPATIENT)
Dept: OUTPATIENT SERVICES | Facility: HOSPITAL | Age: 67
LOS: 1 days | End: 2017-09-28
Payer: COMMERCIAL

## 2017-09-28 VITALS
DIASTOLIC BLOOD PRESSURE: 89 MMHG | HEART RATE: 72 BPM | TEMPERATURE: 98 F | RESPIRATION RATE: 14 BRPM | HEIGHT: 67 IN | OXYGEN SATURATION: 96 % | WEIGHT: 156.09 LBS | SYSTOLIC BLOOD PRESSURE: 155 MMHG

## 2017-09-28 DIAGNOSIS — Z98.89 OTHER SPECIFIED POSTPROCEDURAL STATES: Chronic | ICD-10-CM

## 2017-09-28 DIAGNOSIS — Z98.890 OTHER SPECIFIED POSTPROCEDURAL STATES: Chronic | ICD-10-CM

## 2017-09-28 DIAGNOSIS — D41.4 NEOPLASM OF UNCERTAIN BEHAVIOR OF BLADDER: ICD-10-CM

## 2017-09-28 DIAGNOSIS — Z90.710 ACQUIRED ABSENCE OF BOTH CERVIX AND UTERUS: Chronic | ICD-10-CM

## 2017-09-28 DIAGNOSIS — N63 UNSPECIFIED LUMP IN BREAST: Chronic | ICD-10-CM

## 2017-09-28 DIAGNOSIS — Z01.818 ENCOUNTER FOR OTHER PREPROCEDURAL EXAMINATION: ICD-10-CM

## 2017-09-28 DIAGNOSIS — D49.4 NEOPLASM OF UNSPECIFIED BEHAVIOR OF BLADDER: ICD-10-CM

## 2017-09-28 DIAGNOSIS — G47.30 SLEEP APNEA, UNSPECIFIED: ICD-10-CM

## 2017-09-28 DIAGNOSIS — I10 ESSENTIAL (PRIMARY) HYPERTENSION: ICD-10-CM

## 2017-09-28 LAB
HCT VFR BLD CALC: 39.6 % — SIGNIFICANT CHANGE UP (ref 34.5–45)
HGB BLD-MCNC: 12.5 G/DL — SIGNIFICANT CHANGE UP (ref 11.5–15.5)
MCHC RBC-ENTMCNC: 27.7 PG — SIGNIFICANT CHANGE UP (ref 27–34)
MCHC RBC-ENTMCNC: 31.6 GM/DL — LOW (ref 32–36)
MCV RBC AUTO: 87.6 FL — SIGNIFICANT CHANGE UP (ref 80–100)
PLATELET # BLD AUTO: 177 K/UL — SIGNIFICANT CHANGE UP (ref 150–400)
RBC # BLD: 4.52 M/UL — SIGNIFICANT CHANGE UP (ref 3.8–5.2)
RBC # FLD: 15.4 % — HIGH (ref 10.3–14.5)
WBC # BLD: 6.72 K/UL — SIGNIFICANT CHANGE UP (ref 3.8–10.5)
WBC # FLD AUTO: 6.72 K/UL — SIGNIFICANT CHANGE UP (ref 3.8–10.5)

## 2017-09-28 PROCEDURE — G0463: CPT

## 2017-09-28 PROCEDURE — 80048 BASIC METABOLIC PNL TOTAL CA: CPT

## 2017-09-28 PROCEDURE — 85027 COMPLETE CBC AUTOMATED: CPT

## 2017-09-28 RX ORDER — CEFAZOLIN SODIUM 1 G
2000 VIAL (EA) INJECTION ONCE
Qty: 0 | Refills: 0 | Status: DISCONTINUED | OUTPATIENT
Start: 2017-10-04 | End: 2017-10-19

## 2017-09-28 RX ORDER — LIDOCAINE HCL 20 MG/ML
0.2 VIAL (ML) INJECTION ONCE
Qty: 0 | Refills: 0 | Status: DISCONTINUED | OUTPATIENT
Start: 2017-10-04 | End: 2017-10-19

## 2017-09-28 NOTE — H&P PST ADULT - PSH
S/P     S/P colon resection  reversal, had complications for fibriods  S/P hysterectomy Breast lump    H/O lumpectomy  left   History of surgery  right groin fatty tissue removed  Personal history of spine surgery  L1-L4 fusion 2017  S/P     S/P colon resection  reversal, had complications for fibriods  S/P hysterectomy

## 2017-09-28 NOTE — H&P PST ADULT - PMH
Anxiety    Breast CA, left    Hypertension    Irritable bowel syndrome (IBS)    Sleep apnea Anxiety    Bladder polyp    Breast CA, left  lumpectomy / RTX in the past  HLD (hyperlipidemia)    Hypertension    Irritable bowel syndrome (IBS)    Polyp of colon  "pre-cancerous" x 2, removed 11/2014  Sleep apnea  uses  cpap machine

## 2017-09-28 NOTE — H&P PST ADULT - ACTIVITY
able to walk up 2 flights of stairs, able to wash dishes, dusting, sweeping able to walk up 2 flights of stairs, able to wash dishes, dusting, sweeping, activity level limited by chronic back pain

## 2017-09-28 NOTE — H&P PST ADULT - NEUROLOGICAL SYMPTOMS
difficulty walking/chronic low back pain with radiculopathy to LLE- constant, "discomfort", 7-8/10, takes percocet prn for some pain relief

## 2017-09-28 NOTE — H&P PST ADULT - HISTORY OF PRESENT ILLNESS
65 yo female with h/o IBS, KARI (on CPAP) Anxiety, remote Breast CA, Hypertension, Sleep apnea, colon resection (as a result of fibroids complication, states 17cm or inches of colon were removed)  c/o crampy diffuse intermittent abd pain, 5/10-10/10, associated with chills, watery diarrhea, 10-15 episodes, and nausea with nonbloody/nonbilous vomiting x 3 episodes; The pt states that she was not hospitalized or on Abx recently. Reports no sick contacts and being seen for Abd fullness and burning recently by Dr. Mac GI, who was planing on performing EGD. Reports colonoscopy 3 years ago - s/p removal of 3 polyps.   The pt was dining out with family a day prior to this admission/symptoms (ethnic food including chicken), however, nobody else developed symptoms.  The pt takes Librax PRN, also has been self medicating for general Abd symptoms, as above, with Pepto-Bismol and Zantac OTC, for the past months on/off. 63 yo female with h/o IBS, KARI (on CPAP) Anxiety, remote Breast CA (s/p left lumpectomy, XRT 2007), Hypertension, colon resection (as a result of fibroids complication, states 17cm or inches of colon were removed). s/p lumbar spinal fusion surgery at [facility] in June 2017. c/o recurrent UTI x 4 months, evaluated by urologist, cysto revealed bladder polyp. now presents to PST scheduled for TURBT.  repeat urine CNS done on 9/1/2017- no growth.

## 2017-09-29 LAB
ANION GAP SERPL CALC-SCNC: 16 MMOL/L — SIGNIFICANT CHANGE UP (ref 5–17)
BUN SERPL-MCNC: 15 MG/DL — SIGNIFICANT CHANGE UP (ref 7–23)
CALCIUM SERPL-MCNC: 10.4 MG/DL — SIGNIFICANT CHANGE UP (ref 8.4–10.5)
CHLORIDE SERPL-SCNC: 102 MMOL/L — SIGNIFICANT CHANGE UP (ref 96–108)
CO2 SERPL-SCNC: 24 MMOL/L — SIGNIFICANT CHANGE UP (ref 22–31)
CREAT SERPL-MCNC: 0.87 MG/DL — SIGNIFICANT CHANGE UP (ref 0.5–1.3)
GLUCOSE SERPL-MCNC: 88 MG/DL — SIGNIFICANT CHANGE UP (ref 70–99)
POTASSIUM SERPL-MCNC: 4.4 MMOL/L — SIGNIFICANT CHANGE UP (ref 3.5–5.3)
POTASSIUM SERPL-SCNC: 4.4 MMOL/L — SIGNIFICANT CHANGE UP (ref 3.5–5.3)
SODIUM SERPL-SCNC: 142 MMOL/L — SIGNIFICANT CHANGE UP (ref 135–145)

## 2017-10-04 ENCOUNTER — TRANSCRIPTION ENCOUNTER (OUTPATIENT)
Age: 67
End: 2017-10-04

## 2017-10-04 ENCOUNTER — APPOINTMENT (OUTPATIENT)
Dept: UROLOGY | Facility: HOSPITAL | Age: 67
End: 2017-10-04

## 2017-10-04 ENCOUNTER — OUTPATIENT (OUTPATIENT)
Dept: OUTPATIENT SERVICES | Facility: HOSPITAL | Age: 67
LOS: 1 days | Discharge: HOME | End: 2017-10-04
Payer: COMMERCIAL

## 2017-10-04 ENCOUNTER — RESULT REVIEW (OUTPATIENT)
Age: 67
End: 2017-10-04

## 2017-10-04 VITALS
DIASTOLIC BLOOD PRESSURE: 94 MMHG | HEART RATE: 64 BPM | OXYGEN SATURATION: 96 % | SYSTOLIC BLOOD PRESSURE: 155 MMHG | TEMPERATURE: 99 F | RESPIRATION RATE: 18 BRPM

## 2017-10-04 VITALS
DIASTOLIC BLOOD PRESSURE: 92 MMHG | RESPIRATION RATE: 16 BRPM | OXYGEN SATURATION: 98 % | TEMPERATURE: 99 F | SYSTOLIC BLOOD PRESSURE: 153 MMHG | WEIGHT: 156.09 LBS | HEIGHT: 67 IN | HEART RATE: 63 BPM

## 2017-10-04 DIAGNOSIS — Z98.890 OTHER SPECIFIED POSTPROCEDURAL STATES: Chronic | ICD-10-CM

## 2017-10-04 DIAGNOSIS — Z98.89 OTHER SPECIFIED POSTPROCEDURAL STATES: Chronic | ICD-10-CM

## 2017-10-04 DIAGNOSIS — D49.4 NEOPLASM OF UNSPECIFIED BEHAVIOR OF BLADDER: ICD-10-CM

## 2017-10-04 DIAGNOSIS — N63 UNSPECIFIED LUMP IN BREAST: Chronic | ICD-10-CM

## 2017-10-04 DIAGNOSIS — Z90.710 ACQUIRED ABSENCE OF BOTH CERVIX AND UTERUS: Chronic | ICD-10-CM

## 2017-10-04 PROCEDURE — 88307 TISSUE EXAM BY PATHOLOGIST: CPT

## 2017-10-04 PROCEDURE — 88307 TISSUE EXAM BY PATHOLOGIST: CPT | Mod: 26

## 2017-10-04 PROCEDURE — 52235 CYSTOSCOPY AND TREATMENT: CPT

## 2017-10-04 RX ORDER — SODIUM CHLORIDE 9 MG/ML
1000 INJECTION, SOLUTION INTRAVENOUS
Qty: 0 | Refills: 0 | Status: DISCONTINUED | OUTPATIENT
Start: 2017-10-04 | End: 2017-10-19

## 2017-10-04 RX ORDER — FENTANYL CITRATE 50 UG/ML
25 INJECTION INTRAVENOUS
Qty: 0 | Refills: 0 | Status: DISCONTINUED | OUTPATIENT
Start: 2017-10-04 | End: 2017-10-04

## 2017-10-04 RX ORDER — CEPHALEXIN 500 MG
1 CAPSULE ORAL
Qty: 9 | Refills: 0 | OUTPATIENT
Start: 2017-10-04 | End: 2017-10-07

## 2017-10-04 RX ORDER — PHENAZOPYRIDINE HCL 100 MG
1 TABLET ORAL
Qty: 6 | Refills: 0 | OUTPATIENT
Start: 2017-10-04 | End: 2017-10-06

## 2017-10-04 RX ORDER — SODIUM CHLORIDE 9 MG/ML
3 INJECTION INTRAMUSCULAR; INTRAVENOUS; SUBCUTANEOUS EVERY 8 HOURS
Qty: 0 | Refills: 0 | Status: DISCONTINUED | OUTPATIENT
Start: 2017-10-04 | End: 2017-10-04

## 2017-10-04 RX ORDER — HYDROMORPHONE HYDROCHLORIDE 2 MG/ML
0.5 INJECTION INTRAMUSCULAR; INTRAVENOUS; SUBCUTANEOUS
Qty: 0 | Refills: 0 | Status: DISCONTINUED | OUTPATIENT
Start: 2017-10-04 | End: 2017-10-04

## 2017-10-04 RX ADMIN — HYDROMORPHONE HYDROCHLORIDE 0.5 MILLIGRAM(S): 2 INJECTION INTRAMUSCULAR; INTRAVENOUS; SUBCUTANEOUS at 15:00

## 2017-10-04 RX ADMIN — SODIUM CHLORIDE 125 MILLILITER(S): 9 INJECTION, SOLUTION INTRAVENOUS at 15:12

## 2017-10-04 RX ADMIN — HYDROMORPHONE HYDROCHLORIDE 0.5 MILLIGRAM(S): 2 INJECTION INTRAMUSCULAR; INTRAVENOUS; SUBCUTANEOUS at 15:12

## 2017-10-04 NOTE — BRIEF OPERATIVE NOTE - PROCEDURE
<<-----Click on this checkbox to enter Procedure TURBT, at orifice of ureter, with cystoscopy  10/04/2017    Active  SUSHMA

## 2017-10-04 NOTE — ASU DISCHARGE PLAN (ADULT/PEDIATRIC). - MEDICATION SUMMARY - MEDICATIONS TO TAKE
I will START or STAY ON the medications listed below when I get home from the hospital:    vitamin B complex  --   once a day  -- Indication: For home med    Percocet 5/325 oral tablet  -- 1 tab(s) by mouth every 8 hours, As Needed  -- Indication: For home med    gabapentin 300 mg oral capsule  -- 1 cap(s) by mouth once a day (at bedtime)  -- Indication: For home med    pravastatin 10 mg oral tablet  -- 1 tab(s) by mouth once a day  -- Indication: For home med    Norvasc 2.5 mg oral tablet  -- 1 tab(s) by mouth once a day (at bedtime)  -- Indication: For home med    Keflex 500 mg oral capsule  -- 1 cap(s) by mouth every 8 hours   -- Finish all this medication unless otherwise directed by prescriber.    -- Indication: For UTI prevention    Pyridium 200 mg oral tablet  -- 1 tab(s) by mouth 3 times a day (after meals)   -- May discolor urine or feces.  Medication should be taken with plenty of water.  Take with food or milk.    -- Indication: For urinary discomfort    VESIcare 10 mg oral tablet  -- 1 tab(s) by mouth once a day (at bedtime)  -- Indication: For home med

## 2017-10-04 NOTE — ASU DISCHARGE PLAN (ADULT/PEDIATRIC). - SPECIAL INSTRUCTIONS
Take pyridium for two days for urinary discomfort.     Tylenol or ibuprofen for pain.    Drink plenty of fluids. You may notice blood in your urine for several days, which is normal.    Take 3 days of antibiotics to prevent urinary tract infection    Dr. Hargrove would like to have you call next week to discuss biopsy results.    [facility]  [address] [place] [address] [phone]

## 2017-10-08 LAB — SURGICAL PATHOLOGY STUDY: SIGNIFICANT CHANGE UP

## 2017-10-09 ENCOUNTER — RESULT REVIEW (OUTPATIENT)
Age: 67
End: 2017-10-09

## 2017-10-10 ENCOUNTER — FORM ENCOUNTER (OUTPATIENT)
Age: 67
End: 2017-10-10

## 2017-10-11 ENCOUNTER — APPOINTMENT (OUTPATIENT)
Dept: CT IMAGING | Facility: IMAGING CENTER | Age: 67
End: 2017-10-11
Payer: COMMERCIAL

## 2017-10-11 ENCOUNTER — OUTPATIENT (OUTPATIENT)
Dept: OUTPATIENT SERVICES | Facility: HOSPITAL | Age: 67
LOS: 1 days | End: 2017-10-11
Payer: COMMERCIAL

## 2017-10-11 DIAGNOSIS — Z98.89 OTHER SPECIFIED POSTPROCEDURAL STATES: Chronic | ICD-10-CM

## 2017-10-11 DIAGNOSIS — Z90.710 ACQUIRED ABSENCE OF BOTH CERVIX AND UTERUS: Chronic | ICD-10-CM

## 2017-10-11 DIAGNOSIS — N63 UNSPECIFIED LUMP IN BREAST: Chronic | ICD-10-CM

## 2017-10-11 DIAGNOSIS — Z98.890 OTHER SPECIFIED POSTPROCEDURAL STATES: Chronic | ICD-10-CM

## 2017-10-11 DIAGNOSIS — C67.6 MALIGNANT NEOPLASM OF URETERIC ORIFICE: ICD-10-CM

## 2017-10-11 PROCEDURE — 71260 CT THORAX DX C+: CPT

## 2017-10-11 PROCEDURE — 74178 CT ABD&PLV WO CNTR FLWD CNTR: CPT | Mod: 26

## 2017-10-11 PROCEDURE — 74178 CT ABD&PLV WO CNTR FLWD CNTR: CPT

## 2017-10-11 PROCEDURE — 82565 ASSAY OF CREATININE: CPT

## 2017-10-11 PROCEDURE — 71260 CT THORAX DX C+: CPT | Mod: 26

## 2017-10-13 ENCOUNTER — APPOINTMENT (OUTPATIENT)
Dept: UROLOGY | Facility: CLINIC | Age: 67
End: 2017-10-13
Payer: COMMERCIAL

## 2017-10-13 PROCEDURE — 99215 OFFICE O/P EST HI 40 MIN: CPT

## 2017-10-20 ENCOUNTER — OUTPATIENT (OUTPATIENT)
Dept: OUTPATIENT SERVICES | Facility: HOSPITAL | Age: 67
LOS: 1 days | Discharge: HOME | End: 2017-10-20

## 2017-10-20 ENCOUNTER — OUTPATIENT (OUTPATIENT)
Dept: OUTPATIENT SERVICES | Facility: HOSPITAL | Age: 67
LOS: 1 days | Discharge: STILL ADMITTED OR ADMINISTRATIVE | End: 2017-10-20

## 2017-10-20 DIAGNOSIS — Z98.890 OTHER SPECIFIED POSTPROCEDURAL STATES: Chronic | ICD-10-CM

## 2017-10-20 DIAGNOSIS — Z98.89 OTHER SPECIFIED POSTPROCEDURAL STATES: Chronic | ICD-10-CM

## 2017-10-20 DIAGNOSIS — C67.9 MALIGNANT NEOPLASM OF BLADDER, UNSPECIFIED: ICD-10-CM

## 2017-10-20 DIAGNOSIS — N63 UNSPECIFIED LUMP IN BREAST: Chronic | ICD-10-CM

## 2017-10-20 DIAGNOSIS — Z90.710 ACQUIRED ABSENCE OF BOTH CERVIX AND UTERUS: Chronic | ICD-10-CM

## 2017-10-23 DIAGNOSIS — F43.23 ADJUSTMENT DISORDER WITH MIXED ANXIETY AND DEPRESSED MOOD: ICD-10-CM

## 2017-10-25 ENCOUNTER — APPOINTMENT (OUTPATIENT)
Dept: HEMATOLOGY ONCOLOGY | Facility: CLINIC | Age: 67
End: 2017-10-25
Payer: COMMERCIAL

## 2017-10-25 VITALS
HEIGHT: 67 IN | WEIGHT: 154.54 LBS | HEART RATE: 64 BPM | OXYGEN SATURATION: 96 % | BODY MASS INDEX: 24.26 KG/M2 | TEMPERATURE: 98.5 F | SYSTOLIC BLOOD PRESSURE: 127 MMHG | RESPIRATION RATE: 16 BRPM | DIASTOLIC BLOOD PRESSURE: 87 MMHG

## 2017-10-25 DIAGNOSIS — Z81.1 FAMILY HISTORY OF ALCOHOL ABUSE AND DEPENDENCE: ICD-10-CM

## 2017-10-25 DIAGNOSIS — Z60.2 PROBLEMS RELATED TO LIVING ALONE: ICD-10-CM

## 2017-10-25 DIAGNOSIS — Z63.5 DISRUPTION OF FAMILY BY SEPARATION AND DIVORCE: ICD-10-CM

## 2017-10-25 DIAGNOSIS — Z78.9 OTHER SPECIFIED HEALTH STATUS: ICD-10-CM

## 2017-10-25 DIAGNOSIS — Z82.49 FAMILY HISTORY OF ISCHEMIC HEART DISEASE AND OTHER DISEASES OF THE CIRCULATORY SYSTEM: ICD-10-CM

## 2017-10-25 DIAGNOSIS — D49.3 NEOPLASM OF UNSPECIFIED BEHAVIOR OF BREAST: ICD-10-CM

## 2017-10-25 PROCEDURE — 99245 OFF/OP CONSLTJ NEW/EST HI 55: CPT

## 2017-10-25 RX ORDER — SENNOSIDES 8.6 MG TABLETS 8.6 MG/1
8.6 TABLET ORAL
Qty: 60 | Refills: 0 | Status: DISCONTINUED | COMMUNITY
Start: 2017-06-22 | End: 2017-10-25

## 2017-10-25 RX ORDER — CEPHALEXIN 500 MG/1
500 CAPSULE ORAL
Qty: 14 | Refills: 0 | Status: DISCONTINUED | COMMUNITY
Start: 2017-06-28 | End: 2017-10-25

## 2017-10-25 SDOH — SOCIAL STABILITY - SOCIAL INSECURITY: PROBLEMS RELATED TO LIVING ALONE: Z60.2

## 2017-10-25 SDOH — SOCIAL STABILITY - SOCIAL INSECURITY: DISRUPTION OF FAMILY BY SEPARATION AND DIVORCE: Z63.5

## 2017-10-27 ENCOUNTER — RESULT REVIEW (OUTPATIENT)
Age: 67
End: 2017-10-27

## 2017-10-27 ENCOUNTER — APPOINTMENT (OUTPATIENT)
Dept: HEMATOLOGY ONCOLOGY | Facility: CLINIC | Age: 67
End: 2017-10-27
Payer: COMMERCIAL

## 2017-10-27 VITALS
SYSTOLIC BLOOD PRESSURE: 134 MMHG | TEMPERATURE: 98.1 F | DIASTOLIC BLOOD PRESSURE: 84 MMHG | RESPIRATION RATE: 16 BRPM | HEART RATE: 70 BPM | WEIGHT: 158.07 LBS | BODY MASS INDEX: 24.76 KG/M2 | OXYGEN SATURATION: 94 %

## 2017-10-27 LAB
HCT VFR BLD CALC: 38.9 % — SIGNIFICANT CHANGE UP (ref 34.5–45)
HGB BLD-MCNC: 13.2 G/DL — SIGNIFICANT CHANGE UP (ref 11.5–15.5)
MCHC RBC-ENTMCNC: 29.9 PG — SIGNIFICANT CHANGE UP (ref 27–34)
MCHC RBC-ENTMCNC: 33.8 G/DL — SIGNIFICANT CHANGE UP (ref 32–36)
MCV RBC AUTO: 88.5 FL — SIGNIFICANT CHANGE UP (ref 80–100)
PLATELET # BLD AUTO: 184 K/UL — SIGNIFICANT CHANGE UP (ref 150–400)
RBC # BLD: 4.4 M/UL — SIGNIFICANT CHANGE UP (ref 3.8–5.2)
RBC # FLD: 13.8 % — SIGNIFICANT CHANGE UP (ref 10.3–14.5)
WBC # BLD: 6.1 K/UL — SIGNIFICANT CHANGE UP (ref 3.8–10.5)
WBC # FLD AUTO: 6.1 K/UL — SIGNIFICANT CHANGE UP (ref 3.8–10.5)

## 2017-10-27 PROCEDURE — 99213 OFFICE O/P EST LOW 20 MIN: CPT

## 2017-10-30 ENCOUNTER — OUTPATIENT (OUTPATIENT)
Dept: OUTPATIENT SERVICES | Facility: HOSPITAL | Age: 67
LOS: 1 days | Discharge: HOME | End: 2017-10-30

## 2017-10-30 DIAGNOSIS — Z98.89 OTHER SPECIFIED POSTPROCEDURAL STATES: Chronic | ICD-10-CM

## 2017-10-30 DIAGNOSIS — Z90.710 ACQUIRED ABSENCE OF BOTH CERVIX AND UTERUS: Chronic | ICD-10-CM

## 2017-10-30 DIAGNOSIS — Z98.890 OTHER SPECIFIED POSTPROCEDURAL STATES: Chronic | ICD-10-CM

## 2017-10-30 DIAGNOSIS — N63 UNSPECIFIED LUMP IN BREAST: Chronic | ICD-10-CM

## 2017-10-31 DIAGNOSIS — F43.23 ADJUSTMENT DISORDER WITH MIXED ANXIETY AND DEPRESSED MOOD: ICD-10-CM

## 2017-11-02 ENCOUNTER — OUTPATIENT (OUTPATIENT)
Dept: OUTPATIENT SERVICES | Facility: HOSPITAL | Age: 67
LOS: 1 days | End: 2017-11-02
Payer: COMMERCIAL

## 2017-11-02 ENCOUNTER — APPOINTMENT (OUTPATIENT)
Dept: NUCLEAR MEDICINE | Facility: IMAGING CENTER | Age: 67
End: 2017-11-02

## 2017-11-02 DIAGNOSIS — Z98.89 OTHER SPECIFIED POSTPROCEDURAL STATES: Chronic | ICD-10-CM

## 2017-11-02 DIAGNOSIS — Z98.890 OTHER SPECIFIED POSTPROCEDURAL STATES: Chronic | ICD-10-CM

## 2017-11-02 DIAGNOSIS — Z90.710 ACQUIRED ABSENCE OF BOTH CERVIX AND UTERUS: Chronic | ICD-10-CM

## 2017-11-02 DIAGNOSIS — N63 UNSPECIFIED LUMP IN BREAST: Chronic | ICD-10-CM

## 2017-11-02 DIAGNOSIS — C67.6 MALIGNANT NEOPLASM OF URETERIC ORIFICE: ICD-10-CM

## 2017-11-02 LAB — ACID FAST STN UR: NORMAL

## 2017-11-02 PROCEDURE — 78306 BONE IMAGING WHOLE BODY: CPT | Mod: 26

## 2017-11-02 PROCEDURE — 78306 BONE IMAGING WHOLE BODY: CPT

## 2017-11-02 PROCEDURE — A9561: CPT

## 2017-11-03 ENCOUNTER — RESULT REVIEW (OUTPATIENT)
Age: 67
End: 2017-11-03

## 2017-11-03 ENCOUNTER — APPOINTMENT (OUTPATIENT)
Dept: INFUSION THERAPY | Facility: HOSPITAL | Age: 67
End: 2017-11-03

## 2017-11-03 LAB
HCT VFR BLD CALC: 39.3 % — SIGNIFICANT CHANGE UP (ref 34.5–45)
HGB BLD-MCNC: 13.4 G/DL — SIGNIFICANT CHANGE UP (ref 11.5–15.5)
MCHC RBC-ENTMCNC: 29.7 PG — SIGNIFICANT CHANGE UP (ref 27–34)
MCHC RBC-ENTMCNC: 34.1 G/DL — SIGNIFICANT CHANGE UP (ref 32–36)
MCV RBC AUTO: 87 FL — SIGNIFICANT CHANGE UP (ref 80–100)
PLATELET # BLD AUTO: 158 K/UL — SIGNIFICANT CHANGE UP (ref 150–400)
RBC # BLD: 4.52 M/UL — SIGNIFICANT CHANGE UP (ref 3.8–5.2)
RBC # FLD: 13.6 % — SIGNIFICANT CHANGE UP (ref 10.3–14.5)
WBC # BLD: 5.8 K/UL — SIGNIFICANT CHANGE UP (ref 3.8–10.5)
WBC # FLD AUTO: 5.8 K/UL — SIGNIFICANT CHANGE UP (ref 3.8–10.5)

## 2017-11-06 DIAGNOSIS — E86.0 DEHYDRATION: ICD-10-CM

## 2017-11-06 DIAGNOSIS — R11.2 NAUSEA WITH VOMITING, UNSPECIFIED: ICD-10-CM

## 2017-11-06 DIAGNOSIS — Z51.11 ENCOUNTER FOR ANTINEOPLASTIC CHEMOTHERAPY: ICD-10-CM

## 2017-11-06 LAB
ALBUMIN SERPL ELPH-MCNC: 4.1 G/DL
ALP BLD-CCNC: 107 U/L
ALT SERPL-CCNC: 17 U/L
ANION GAP SERPL CALC-SCNC: 16 MMOL/L
AST SERPL-CCNC: 18 U/L
BILIRUB SERPL-MCNC: 0.3 MG/DL
BUN SERPL-MCNC: 14 MG/DL
CALCIUM SERPL-MCNC: 9.7 MG/DL
CHLORIDE SERPL-SCNC: 104 MMOL/L
CO2 SERPL-SCNC: 24 MMOL/L
CREAT SERPL-MCNC: 0.77 MG/DL
GLUCOSE SERPL-MCNC: 88 MG/DL
HBV CORE IGG+IGM SER QL: NONREACTIVE
HBV SURFACE AG SER QL: NONREACTIVE
HCV AB SER QL: NONREACTIVE
HCV S/CO RATIO: 0.24 S/CO
LDH SERPL-CCNC: 185 U/L
MAGNESIUM SERPL-MCNC: 1.7 MG/DL
POTASSIUM SERPL-SCNC: 4 MMOL/L
PROT SERPL-MCNC: 6.9 G/DL
SODIUM SERPL-SCNC: 144 MMOL/L

## 2017-11-10 ENCOUNTER — APPOINTMENT (OUTPATIENT)
Dept: INFUSION THERAPY | Facility: HOSPITAL | Age: 67
End: 2017-11-10

## 2017-11-10 ENCOUNTER — RESULT REVIEW (OUTPATIENT)
Age: 67
End: 2017-11-10

## 2017-11-10 LAB
HCT VFR BLD CALC: 39.4 % — SIGNIFICANT CHANGE UP (ref 34.5–45)
HGB BLD-MCNC: 13.3 G/DL — SIGNIFICANT CHANGE UP (ref 11.5–15.5)
MCHC RBC-ENTMCNC: 29.5 PG — SIGNIFICANT CHANGE UP (ref 27–34)
MCHC RBC-ENTMCNC: 33.8 G/DL — SIGNIFICANT CHANGE UP (ref 32–36)
MCV RBC AUTO: 87.2 FL — SIGNIFICANT CHANGE UP (ref 80–100)
PLATELET # BLD AUTO: 104 K/UL — LOW (ref 150–400)
RBC # BLD: 4.52 M/UL — SIGNIFICANT CHANGE UP (ref 3.8–5.2)
RBC # FLD: 13.2 % — SIGNIFICANT CHANGE UP (ref 10.3–14.5)
WBC # BLD: 4.4 K/UL — SIGNIFICANT CHANGE UP (ref 3.8–10.5)
WBC # FLD AUTO: 4.4 K/UL — SIGNIFICANT CHANGE UP (ref 3.8–10.5)

## 2017-11-13 ENCOUNTER — RESULT REVIEW (OUTPATIENT)
Age: 67
End: 2017-11-13

## 2017-11-13 ENCOUNTER — APPOINTMENT (OUTPATIENT)
Dept: HEMATOLOGY ONCOLOGY | Facility: CLINIC | Age: 67
End: 2017-11-13
Payer: COMMERCIAL

## 2017-11-13 VITALS
WEIGHT: 152.12 LBS | OXYGEN SATURATION: 99 % | BODY MASS INDEX: 23.83 KG/M2 | SYSTOLIC BLOOD PRESSURE: 120 MMHG | RESPIRATION RATE: 16 BRPM | DIASTOLIC BLOOD PRESSURE: 78 MMHG | TEMPERATURE: 99 F | HEART RATE: 70 BPM

## 2017-11-13 DIAGNOSIS — T50.905A ADVERSE EFFECT OF UNSPECIFIED DRUGS, MEDICAMENTS AND BIOLOGICAL SUBSTANCES, INITIAL ENCOUNTER: ICD-10-CM

## 2017-11-13 LAB
BASOPHILS # BLD AUTO: 0 K/UL — SIGNIFICANT CHANGE UP (ref 0–0.2)
BASOPHILS NFR BLD AUTO: 0 % — SIGNIFICANT CHANGE UP (ref 0–2)
EOSINOPHIL # BLD AUTO: 0 K/UL — SIGNIFICANT CHANGE UP (ref 0–0.5)
EOSINOPHIL NFR BLD AUTO: 0.9 % — SIGNIFICANT CHANGE UP (ref 0–6)
HCT VFR BLD CALC: 36.9 % — SIGNIFICANT CHANGE UP (ref 34.5–45)
HGB BLD-MCNC: 12.4 G/DL — SIGNIFICANT CHANGE UP (ref 11.5–15.5)
LYMPHOCYTES # BLD AUTO: 1.6 K/UL — SIGNIFICANT CHANGE UP (ref 1–3.3)
LYMPHOCYTES # BLD AUTO: 35.6 % — SIGNIFICANT CHANGE UP (ref 13–44)
MCHC RBC-ENTMCNC: 29.5 PG — SIGNIFICANT CHANGE UP (ref 27–34)
MCHC RBC-ENTMCNC: 33.6 G/DL — SIGNIFICANT CHANGE UP (ref 32–36)
MCV RBC AUTO: 87.7 FL — SIGNIFICANT CHANGE UP (ref 80–100)
MONOCYTES # BLD AUTO: 0 K/UL — SIGNIFICANT CHANGE UP (ref 0–0.9)
MONOCYTES NFR BLD AUTO: 0.5 % — LOW (ref 2–14)
NEUTROPHILS # BLD AUTO: 2.9 K/UL — SIGNIFICANT CHANGE UP (ref 1.8–7.4)
NEUTROPHILS NFR BLD AUTO: 63 % — SIGNIFICANT CHANGE UP (ref 43–77)
PLATELET # BLD AUTO: 90 K/UL — LOW (ref 150–400)
RBC # BLD: 4.21 M/UL — SIGNIFICANT CHANGE UP (ref 3.8–5.2)
RBC # FLD: 13.2 % — SIGNIFICANT CHANGE UP (ref 10.3–14.5)
WBC # BLD: 4.6 K/UL — SIGNIFICANT CHANGE UP (ref 3.8–10.5)
WBC # FLD AUTO: 4.6 K/UL — SIGNIFICANT CHANGE UP (ref 3.8–10.5)

## 2017-11-13 PROCEDURE — 99215 OFFICE O/P EST HI 40 MIN: CPT

## 2017-11-13 RX ORDER — SOLIFENACIN SUCCINATE 10 MG/1
10 TABLET, FILM COATED ORAL DAILY
Qty: 30 | Refills: 3 | Status: DISCONTINUED | COMMUNITY
Start: 2017-07-14 | End: 2017-11-13

## 2017-11-17 ENCOUNTER — OUTPATIENT (OUTPATIENT)
Dept: OUTPATIENT SERVICES | Facility: HOSPITAL | Age: 67
LOS: 1 days | Discharge: HOME | End: 2017-11-17

## 2017-11-17 DIAGNOSIS — Z98.890 OTHER SPECIFIED POSTPROCEDURAL STATES: Chronic | ICD-10-CM

## 2017-11-17 DIAGNOSIS — Z90.710 ACQUIRED ABSENCE OF BOTH CERVIX AND UTERUS: Chronic | ICD-10-CM

## 2017-11-17 DIAGNOSIS — C67.9 MALIGNANT NEOPLASM OF BLADDER, UNSPECIFIED: ICD-10-CM

## 2017-11-17 DIAGNOSIS — Z98.89 OTHER SPECIFIED POSTPROCEDURAL STATES: Chronic | ICD-10-CM

## 2017-11-17 DIAGNOSIS — N63 UNSPECIFIED LUMP IN BREAST: Chronic | ICD-10-CM

## 2017-11-17 LAB
ALBUMIN SERPL ELPH-MCNC: 3.8 G/DL
ALP BLD-CCNC: 109 U/L
ALT SERPL-CCNC: 16 U/L
ANION GAP SERPL CALC-SCNC: 19 MMOL/L
AST SERPL-CCNC: 14 U/L
BILIRUB SERPL-MCNC: 0.6 MG/DL
BUN SERPL-MCNC: 15 MG/DL
CALCIUM SERPL-MCNC: 9.3 MG/DL
CHLORIDE SERPL-SCNC: 102 MMOL/L
CO2 SERPL-SCNC: 24 MMOL/L
CREAT SERPL-MCNC: 0.69 MG/DL
GLUCOSE SERPL-MCNC: 110 MG/DL
LDH SERPL-CCNC: 177 U/L
POTASSIUM SERPL-SCNC: 3.9 MMOL/L
PROT SERPL-MCNC: 6.5 G/DL
SODIUM SERPL-SCNC: 145 MMOL/L

## 2017-11-21 ENCOUNTER — RESULT REVIEW (OUTPATIENT)
Age: 67
End: 2017-11-21

## 2017-11-21 ENCOUNTER — APPOINTMENT (OUTPATIENT)
Dept: HEMATOLOGY ONCOLOGY | Facility: CLINIC | Age: 67
End: 2017-11-21
Payer: COMMERCIAL

## 2017-11-21 VITALS
OXYGEN SATURATION: 96 % | RESPIRATION RATE: 16 BRPM | WEIGHT: 156.53 LBS | HEART RATE: 77 BPM | DIASTOLIC BLOOD PRESSURE: 83 MMHG | SYSTOLIC BLOOD PRESSURE: 124 MMHG | BODY MASS INDEX: 24.52 KG/M2

## 2017-11-21 LAB
HCT VFR BLD CALC: 37.2 % — SIGNIFICANT CHANGE UP (ref 34.5–45)
HGB BLD-MCNC: 12.4 G/DL — SIGNIFICANT CHANGE UP (ref 11.5–15.5)
MCHC RBC-ENTMCNC: 29.6 PG — SIGNIFICANT CHANGE UP (ref 27–34)
MCHC RBC-ENTMCNC: 33.3 G/DL — SIGNIFICANT CHANGE UP (ref 32–36)
MCV RBC AUTO: 88.9 FL — SIGNIFICANT CHANGE UP (ref 80–100)
PLATELET # BLD AUTO: 284 K/UL — SIGNIFICANT CHANGE UP (ref 150–400)
RBC # BLD: 4.18 M/UL — SIGNIFICANT CHANGE UP (ref 3.8–5.2)
RBC # FLD: 13.5 % — SIGNIFICANT CHANGE UP (ref 10.3–14.5)
WBC # BLD: 4.3 K/UL — SIGNIFICANT CHANGE UP (ref 3.8–10.5)
WBC # FLD AUTO: 4.3 K/UL — SIGNIFICANT CHANGE UP (ref 3.8–10.5)

## 2017-11-21 PROCEDURE — 99214 OFFICE O/P EST MOD 30 MIN: CPT

## 2017-11-21 RX ORDER — MOMETASONE FUROATE 1 MG/G
0.1 OINTMENT TOPICAL
Qty: 180 | Refills: 0 | Status: DISCONTINUED | COMMUNITY
Start: 2017-05-22

## 2017-11-21 RX ORDER — PHENAZOPYRIDINE HYDROCHLORIDE 200 MG/1
200 TABLET ORAL
Qty: 6 | Refills: 0 | Status: DISCONTINUED | COMMUNITY
Start: 2017-10-04

## 2017-11-21 RX ORDER — ALPRAZOLAM 0.5 MG/1
0.5 TABLET ORAL
Qty: 30 | Refills: 0 | Status: DISCONTINUED | COMMUNITY
Start: 2017-09-14

## 2017-11-21 RX ORDER — SERTRALINE 25 MG/1
25 TABLET, FILM COATED ORAL
Qty: 60 | Refills: 0 | Status: DISCONTINUED | COMMUNITY
Start: 2017-10-20

## 2017-11-21 RX ORDER — GABAPENTIN 300 MG/1
300 CAPSULE ORAL
Qty: 30 | Refills: 0 | Status: DISCONTINUED | COMMUNITY
Start: 2017-08-31

## 2017-11-21 RX ORDER — SIMVASTATIN 10 MG/1
10 TABLET, FILM COATED ORAL
Qty: 30 | Refills: 0 | Status: DISCONTINUED | COMMUNITY
Start: 2017-06-22

## 2017-11-21 RX ORDER — OXYCODONE AND ACETAMINOPHEN 5; 325 MG/1; MG/1
5-325 TABLET ORAL
Qty: 50 | Refills: 0 | Status: DISCONTINUED | COMMUNITY
Start: 2017-05-26

## 2017-11-21 RX ORDER — CIPROFLOXACIN HYDROCHLORIDE 500 MG/1
500 TABLET, FILM COATED ORAL
Qty: 10 | Refills: 0 | Status: DISCONTINUED | COMMUNITY
Start: 2017-05-26

## 2017-11-22 LAB
ALBUMIN SERPL ELPH-MCNC: 3.8 G/DL
ALP BLD-CCNC: 104 U/L
ALT SERPL-CCNC: 17 U/L
ANION GAP SERPL CALC-SCNC: 12 MMOL/L
AST SERPL-CCNC: 17 U/L
BILIRUB SERPL-MCNC: <0.2 MG/DL
BUN SERPL-MCNC: 13 MG/DL
CALCIUM SERPL-MCNC: 9.8 MG/DL
CHLORIDE SERPL-SCNC: 105 MMOL/L
CO2 SERPL-SCNC: 24 MMOL/L
CREAT SERPL-MCNC: 0.67 MG/DL
GLUCOSE SERPL-MCNC: 97 MG/DL
LDH SERPL-CCNC: 192 U/L
MAGNESIUM SERPL-MCNC: 1.9 MG/DL
POTASSIUM SERPL-SCNC: 4.2 MMOL/L
PROT SERPL-MCNC: 6.5 G/DL
SODIUM SERPL-SCNC: 141 MMOL/L

## 2017-11-24 ENCOUNTER — RESULT REVIEW (OUTPATIENT)
Age: 67
End: 2017-11-24

## 2017-11-24 ENCOUNTER — APPOINTMENT (OUTPATIENT)
Dept: INFUSION THERAPY | Facility: HOSPITAL | Age: 67
End: 2017-11-24

## 2017-11-24 LAB
HCT VFR BLD CALC: 33.5 % — LOW (ref 34.5–45)
HGB BLD-MCNC: 11.9 G/DL — SIGNIFICANT CHANGE UP (ref 11.5–15.5)
MCHC RBC-ENTMCNC: 31.2 PG — SIGNIFICANT CHANGE UP (ref 27–34)
MCHC RBC-ENTMCNC: 35.4 G/DL — SIGNIFICANT CHANGE UP (ref 32–36)
MCV RBC AUTO: 88.2 FL — SIGNIFICANT CHANGE UP (ref 80–100)
PLATELET # BLD AUTO: 478 K/UL — HIGH (ref 150–400)
RBC # BLD: 3.8 M/UL — SIGNIFICANT CHANGE UP (ref 3.8–5.2)
RBC # FLD: 13.7 % — SIGNIFICANT CHANGE UP (ref 10.3–14.5)
WBC # BLD: 5.2 K/UL — SIGNIFICANT CHANGE UP (ref 3.8–10.5)
WBC # FLD AUTO: 5.2 K/UL — SIGNIFICANT CHANGE UP (ref 3.8–10.5)

## 2017-11-27 DIAGNOSIS — E86.0 DEHYDRATION: ICD-10-CM

## 2017-11-27 DIAGNOSIS — Z51.11 ENCOUNTER FOR ANTINEOPLASTIC CHEMOTHERAPY: ICD-10-CM

## 2017-11-27 DIAGNOSIS — R11.2 NAUSEA WITH VOMITING, UNSPECIFIED: ICD-10-CM

## 2017-12-01 ENCOUNTER — FORM ENCOUNTER (OUTPATIENT)
Age: 67
End: 2017-12-01

## 2017-12-01 ENCOUNTER — APPOINTMENT (OUTPATIENT)
Dept: INFUSION THERAPY | Facility: HOSPITAL | Age: 67
End: 2017-12-01

## 2017-12-01 ENCOUNTER — RESULT REVIEW (OUTPATIENT)
Age: 67
End: 2017-12-01

## 2017-12-01 LAB
HCT VFR BLD CALC: 35.9 % — SIGNIFICANT CHANGE UP (ref 34.5–45)
HGB BLD-MCNC: 12.4 G/DL — SIGNIFICANT CHANGE UP (ref 11.5–15.5)
MCHC RBC-ENTMCNC: 29.6 PG — SIGNIFICANT CHANGE UP (ref 27–34)
MCHC RBC-ENTMCNC: 34.5 G/DL — SIGNIFICANT CHANGE UP (ref 32–36)
MCV RBC AUTO: 85.7 FL — SIGNIFICANT CHANGE UP (ref 80–100)
PLATELET # BLD AUTO: 349 K/UL — SIGNIFICANT CHANGE UP (ref 150–400)
RBC # BLD: 4.19 M/UL — SIGNIFICANT CHANGE UP (ref 3.8–5.2)
RBC # FLD: 13.6 % — SIGNIFICANT CHANGE UP (ref 10.3–14.5)
WBC # BLD: 6 K/UL — SIGNIFICANT CHANGE UP (ref 3.8–10.5)
WBC # FLD AUTO: 6 K/UL — SIGNIFICANT CHANGE UP (ref 3.8–10.5)

## 2017-12-01 RX ORDER — SOLIFENACIN SUCCINATE 10 MG/1
1 TABLET ORAL
Qty: 0 | Refills: 0 | COMMUNITY

## 2017-12-01 RX ORDER — GABAPENTIN 400 MG/1
1 CAPSULE ORAL
Qty: 0 | Refills: 0 | COMMUNITY

## 2017-12-02 ENCOUNTER — OUTPATIENT (OUTPATIENT)
Dept: OUTPATIENT SERVICES | Facility: HOSPITAL | Age: 67
LOS: 1 days | End: 2017-12-02
Payer: COMMERCIAL

## 2017-12-02 ENCOUNTER — APPOINTMENT (OUTPATIENT)
Dept: MRI IMAGING | Facility: IMAGING CENTER | Age: 67
End: 2017-12-02
Payer: COMMERCIAL

## 2017-12-02 DIAGNOSIS — Z98.890 OTHER SPECIFIED POSTPROCEDURAL STATES: Chronic | ICD-10-CM

## 2017-12-02 DIAGNOSIS — Z90.710 ACQUIRED ABSENCE OF BOTH CERVIX AND UTERUS: Chronic | ICD-10-CM

## 2017-12-02 DIAGNOSIS — Z98.89 OTHER SPECIFIED POSTPROCEDURAL STATES: Chronic | ICD-10-CM

## 2017-12-02 DIAGNOSIS — Z00.8 ENCOUNTER FOR OTHER GENERAL EXAMINATION: ICD-10-CM

## 2017-12-02 DIAGNOSIS — C67.6 MALIGNANT NEOPLASM OF URETERIC ORIFICE: ICD-10-CM

## 2017-12-02 DIAGNOSIS — N63 UNSPECIFIED LUMP IN BREAST: Chronic | ICD-10-CM

## 2017-12-02 PROCEDURE — 72158 MRI LUMBAR SPINE W/O & W/DYE: CPT

## 2017-12-02 PROCEDURE — 72158 MRI LUMBAR SPINE W/O & W/DYE: CPT | Mod: 26

## 2017-12-02 PROCEDURE — A9585: CPT

## 2017-12-02 PROCEDURE — 82565 ASSAY OF CREATININE: CPT

## 2017-12-08 ENCOUNTER — RESULT REVIEW (OUTPATIENT)
Age: 67
End: 2017-12-08

## 2017-12-08 ENCOUNTER — APPOINTMENT (OUTPATIENT)
Dept: HEMATOLOGY ONCOLOGY | Facility: CLINIC | Age: 67
End: 2017-12-08
Payer: COMMERCIAL

## 2017-12-08 VITALS
WEIGHT: 154.32 LBS | TEMPERATURE: 99 F | RESPIRATION RATE: 16 BRPM | SYSTOLIC BLOOD PRESSURE: 120 MMHG | DIASTOLIC BLOOD PRESSURE: 80 MMHG | BODY MASS INDEX: 24.17 KG/M2 | HEART RATE: 78 BPM | OXYGEN SATURATION: 99 %

## 2017-12-08 LAB
HCT VFR BLD CALC: 31.3 % — LOW (ref 34.5–45)
HGB BLD-MCNC: 10.8 G/DL — LOW (ref 11.5–15.5)
MCHC RBC-ENTMCNC: 30.4 PG — SIGNIFICANT CHANGE UP (ref 27–34)
MCHC RBC-ENTMCNC: 34.5 G/DL — SIGNIFICANT CHANGE UP (ref 32–36)
MCV RBC AUTO: 88 FL — SIGNIFICANT CHANGE UP (ref 80–100)
PLATELET # BLD AUTO: 112 K/UL — LOW (ref 150–400)
RBC # BLD: 3.55 M/UL — LOW (ref 3.8–5.2)
RBC # FLD: 13.3 % — SIGNIFICANT CHANGE UP (ref 10.3–14.5)
WBC # BLD: 4.2 K/UL — SIGNIFICANT CHANGE UP (ref 3.8–10.5)
WBC # FLD AUTO: 4.2 K/UL — SIGNIFICANT CHANGE UP (ref 3.8–10.5)

## 2017-12-08 PROCEDURE — 99215 OFFICE O/P EST HI 40 MIN: CPT

## 2017-12-08 RX ORDER — OXYCODONE 5 MG/1
5 TABLET ORAL
Qty: 20 | Refills: 0 | Status: DISCONTINUED | COMMUNITY
Start: 2017-06-23 | End: 2017-12-08

## 2017-12-11 LAB
ALBUMIN SERPL ELPH-MCNC: 4 G/DL
ALP BLD-CCNC: 106 U/L
ALT SERPL-CCNC: 14 U/L
ANION GAP SERPL CALC-SCNC: 13 MMOL/L
AST SERPL-CCNC: 14 U/L
BILIRUB SERPL-MCNC: 0.2 MG/DL
BUN SERPL-MCNC: 14 MG/DL
CALCIUM SERPL-MCNC: 9.3 MG/DL
CHLORIDE SERPL-SCNC: 101 MMOL/L
CO2 SERPL-SCNC: 28 MMOL/L
CREAT SERPL-MCNC: 0.65 MG/DL
GLUCOSE SERPL-MCNC: 107 MG/DL
LDH SERPL-CCNC: 170 U/L
MAGNESIUM SERPL-MCNC: 1.7 MG/DL
POTASSIUM SERPL-SCNC: 4 MMOL/L
PROT SERPL-MCNC: 6.2 G/DL
SODIUM SERPL-SCNC: 142 MMOL/L

## 2017-12-13 ENCOUNTER — OUTPATIENT (OUTPATIENT)
Dept: OUTPATIENT SERVICES | Facility: HOSPITAL | Age: 67
LOS: 1 days | Discharge: HOME | End: 2017-12-13

## 2017-12-13 DIAGNOSIS — Z98.890 OTHER SPECIFIED POSTPROCEDURAL STATES: Chronic | ICD-10-CM

## 2017-12-13 DIAGNOSIS — Z98.89 OTHER SPECIFIED POSTPROCEDURAL STATES: Chronic | ICD-10-CM

## 2017-12-13 DIAGNOSIS — Z90.710 ACQUIRED ABSENCE OF BOTH CERVIX AND UTERUS: Chronic | ICD-10-CM

## 2017-12-13 DIAGNOSIS — N63 UNSPECIFIED LUMP IN BREAST: Chronic | ICD-10-CM

## 2017-12-14 ENCOUNTER — APPOINTMENT (OUTPATIENT)
Dept: RADIATION ONCOLOGY | Facility: CLINIC | Age: 67
End: 2017-12-14
Payer: MEDICARE

## 2017-12-14 ENCOUNTER — APPOINTMENT (OUTPATIENT)
Age: 67
End: 2017-12-14
Payer: COMMERCIAL

## 2017-12-14 VITALS
HEART RATE: 67 BPM | RESPIRATION RATE: 16 BRPM | HEIGHT: 66 IN | TEMPERATURE: 98.24 F | SYSTOLIC BLOOD PRESSURE: 138 MMHG | DIASTOLIC BLOOD PRESSURE: 88 MMHG | BODY MASS INDEX: 25.03 KG/M2 | WEIGHT: 155.75 LBS | OXYGEN SATURATION: 97 %

## 2017-12-14 PROCEDURE — 99244 OFF/OP CNSLTJ NEW/EST MOD 40: CPT | Mod: 25

## 2017-12-14 PROCEDURE — 77263 THER RADIOLOGY TX PLNG CPLX: CPT

## 2017-12-15 ENCOUNTER — APPOINTMENT (OUTPATIENT)
Dept: INFUSION THERAPY | Facility: HOSPITAL | Age: 67
End: 2017-12-15

## 2017-12-15 ENCOUNTER — OUTPATIENT (OUTPATIENT)
Dept: OUTPATIENT SERVICES | Facility: HOSPITAL | Age: 67
LOS: 1 days | Discharge: HOME | End: 2017-12-15

## 2017-12-15 DIAGNOSIS — N63 UNSPECIFIED LUMP IN BREAST: Chronic | ICD-10-CM

## 2017-12-15 DIAGNOSIS — Z98.890 OTHER SPECIFIED POSTPROCEDURAL STATES: Chronic | ICD-10-CM

## 2017-12-15 DIAGNOSIS — Z98.89 OTHER SPECIFIED POSTPROCEDURAL STATES: Chronic | ICD-10-CM

## 2017-12-15 DIAGNOSIS — Z90.710 ACQUIRED ABSENCE OF BOTH CERVIX AND UTERUS: Chronic | ICD-10-CM

## 2017-12-15 DIAGNOSIS — C67.9 MALIGNANT NEOPLASM OF BLADDER, UNSPECIFIED: ICD-10-CM

## 2017-12-20 ENCOUNTER — APPOINTMENT (OUTPATIENT)
Age: 67
End: 2017-12-20

## 2017-12-20 PROCEDURE — 77290 THER RAD SIMULAJ FIELD CPLX: CPT | Mod: 26

## 2017-12-20 PROCEDURE — 77333 RADIATION TREATMENT AID(S): CPT | Mod: 26

## 2017-12-22 ENCOUNTER — APPOINTMENT (OUTPATIENT)
Dept: INFUSION THERAPY | Facility: HOSPITAL | Age: 67
End: 2017-12-22

## 2017-12-29 ENCOUNTER — RESULT REVIEW (OUTPATIENT)
Age: 67
End: 2017-12-29

## 2017-12-29 ENCOUNTER — APPOINTMENT (OUTPATIENT)
Dept: HEMATOLOGY ONCOLOGY | Facility: CLINIC | Age: 67
End: 2017-12-29
Payer: COMMERCIAL

## 2017-12-29 VITALS
HEART RATE: 84 BPM | WEIGHT: 154.32 LBS | TEMPERATURE: 208.94 F | BODY MASS INDEX: 24.91 KG/M2 | OXYGEN SATURATION: 95 % | RESPIRATION RATE: 16 BRPM | DIASTOLIC BLOOD PRESSURE: 99 MMHG | SYSTOLIC BLOOD PRESSURE: 146 MMHG

## 2017-12-29 LAB
ALBUMIN SERPL ELPH-MCNC: 4.3 G/DL
ALP BLD-CCNC: 108 U/L
ALT SERPL-CCNC: 17 U/L
ANION GAP SERPL CALC-SCNC: 14 MMOL/L
AST SERPL-CCNC: 17 U/L
BILIRUB SERPL-MCNC: 0.4 MG/DL
BUN SERPL-MCNC: 13 MG/DL
CALCIUM SERPL-MCNC: 10 MG/DL
CHLORIDE SERPL-SCNC: 102 MMOL/L
CO2 SERPL-SCNC: 28 MMOL/L
CREAT SERPL-MCNC: 0.73 MG/DL
GLUCOSE SERPL-MCNC: 139 MG/DL
HCT VFR BLD CALC: 37.1 % — SIGNIFICANT CHANGE UP (ref 34.5–45)
HGB BLD-MCNC: 12.3 G/DL — SIGNIFICANT CHANGE UP (ref 11.5–15.5)
MAGNESIUM SERPL-MCNC: 1.6 MG/DL
MCHC RBC-ENTMCNC: 29.7 PG — SIGNIFICANT CHANGE UP (ref 27–34)
MCHC RBC-ENTMCNC: 33.3 G/DL — SIGNIFICANT CHANGE UP (ref 32–36)
MCV RBC AUTO: 89.3 FL — SIGNIFICANT CHANGE UP (ref 80–100)
PLATELET # BLD AUTO: 219 K/UL — SIGNIFICANT CHANGE UP (ref 150–400)
POTASSIUM SERPL-SCNC: 3.9 MMOL/L
PROT SERPL-MCNC: 7 G/DL
RBC # BLD: 4.15 M/UL — SIGNIFICANT CHANGE UP (ref 3.8–5.2)
RBC # FLD: 15 % — HIGH (ref 10.3–14.5)
SODIUM SERPL-SCNC: 144 MMOL/L
WBC # BLD: 6.6 K/UL — SIGNIFICANT CHANGE UP (ref 3.8–10.5)
WBC # FLD AUTO: 6.6 K/UL — SIGNIFICANT CHANGE UP (ref 3.8–10.5)

## 2017-12-29 PROCEDURE — 99214 OFFICE O/P EST MOD 30 MIN: CPT

## 2018-01-02 ENCOUNTER — FORM ENCOUNTER (OUTPATIENT)
Age: 68
End: 2018-01-02

## 2018-01-02 PROCEDURE — 77306 TELETHX ISODOSE PLAN SIMPLE: CPT | Mod: 26

## 2018-01-03 ENCOUNTER — OUTPATIENT (OUTPATIENT)
Dept: OUTPATIENT SERVICES | Facility: HOSPITAL | Age: 68
LOS: 1 days | End: 2018-01-03
Payer: COMMERCIAL

## 2018-01-03 ENCOUNTER — APPOINTMENT (OUTPATIENT)
Dept: CT IMAGING | Facility: IMAGING CENTER | Age: 68
End: 2018-01-03
Payer: COMMERCIAL

## 2018-01-03 DIAGNOSIS — Z98.890 OTHER SPECIFIED POSTPROCEDURAL STATES: Chronic | ICD-10-CM

## 2018-01-03 DIAGNOSIS — Z98.89 OTHER SPECIFIED POSTPROCEDURAL STATES: Chronic | ICD-10-CM

## 2018-01-03 DIAGNOSIS — N63 UNSPECIFIED LUMP IN BREAST: Chronic | ICD-10-CM

## 2018-01-03 DIAGNOSIS — Z90.710 ACQUIRED ABSENCE OF BOTH CERVIX AND UTERUS: Chronic | ICD-10-CM

## 2018-01-03 DIAGNOSIS — D49.4 NEOPLASM OF UNSPECIFIED BEHAVIOR OF BLADDER: ICD-10-CM

## 2018-01-03 DIAGNOSIS — C67.6 MALIGNANT NEOPLASM OF URETERIC ORIFICE: ICD-10-CM

## 2018-01-03 PROCEDURE — 71270 CT THORAX DX C-/C+: CPT | Mod: 26

## 2018-01-03 PROCEDURE — 82565 ASSAY OF CREATININE: CPT

## 2018-01-03 PROCEDURE — 74178 CT ABD&PLV WO CNTR FLWD CNTR: CPT | Mod: 26

## 2018-01-03 PROCEDURE — 71270 CT THORAX DX C-/C+: CPT

## 2018-01-03 PROCEDURE — 74178 CT ABD&PLV WO CNTR FLWD CNTR: CPT

## 2018-01-05 PROCEDURE — 77280 THER RAD SIMULAJ FIELD SMPL: CPT | Mod: 26

## 2018-01-09 VITALS
TEMPERATURE: 208.9 F | HEART RATE: 60 BPM | DIASTOLIC BLOOD PRESSURE: 86 MMHG | WEIGHT: 148.5 LBS | BODY MASS INDEX: 23.97 KG/M2 | RESPIRATION RATE: 14 BRPM | OXYGEN SATURATION: 97 % | SYSTOLIC BLOOD PRESSURE: 127 MMHG

## 2018-01-10 ENCOUNTER — APPOINTMENT (OUTPATIENT)
Dept: PULMONOLOGY | Facility: CLINIC | Age: 68
End: 2018-01-10
Payer: COMMERCIAL

## 2018-01-10 VITALS
TEMPERATURE: 97.9 F | SYSTOLIC BLOOD PRESSURE: 120 MMHG | DIASTOLIC BLOOD PRESSURE: 80 MMHG | OXYGEN SATURATION: 97 % | BODY MASS INDEX: 24.59 KG/M2 | RESPIRATION RATE: 16 BRPM | WEIGHT: 153 LBS | HEART RATE: 72 BPM | HEIGHT: 66 IN

## 2018-01-10 DIAGNOSIS — Z99.89 OBSTRUCTIVE SLEEP APNEA (ADULT) (PEDIATRIC): ICD-10-CM

## 2018-01-10 DIAGNOSIS — G47.33 OBSTRUCTIVE SLEEP APNEA (ADULT) (PEDIATRIC): ICD-10-CM

## 2018-01-10 PROCEDURE — 99215 OFFICE O/P EST HI 40 MIN: CPT

## 2018-01-10 RX ORDER — DRONABINOL 5 MG/1
5 CAPSULE ORAL EVERY 8 HOURS
Qty: 90 | Refills: 0 | Status: DISCONTINUED | COMMUNITY
Start: 2017-11-24 | End: 2018-01-10

## 2018-01-10 RX ORDER — METOCLOPRAMIDE 5 MG/1
5 TABLET ORAL
Qty: 30 | Refills: 5 | Status: DISCONTINUED | COMMUNITY
Start: 2017-10-27 | End: 2018-01-10

## 2018-01-10 RX ORDER — GEMCITABINE 100 MG/ML
INJECTION, SOLUTION INTRAVENOUS
Refills: 0 | Status: DISCONTINUED | COMMUNITY
End: 2018-01-10

## 2018-01-10 RX ORDER — DEXAMETHASONE 4 MG/1
4 TABLET ORAL
Qty: 30 | Refills: 0 | Status: DISCONTINUED | COMMUNITY
Start: 2017-10-27 | End: 2018-01-10

## 2018-01-10 RX ORDER — CISPLATIN 1 MG/ML
INJECTION, SOLUTION INTRAVENOUS
Refills: 0 | Status: DISCONTINUED | COMMUNITY
End: 2018-01-10

## 2018-01-10 RX ORDER — PROCHLORPERAZINE MALEATE 10 MG/1
10 TABLET ORAL
Qty: 30 | Refills: 3 | Status: DISCONTINUED | COMMUNITY
Start: 2017-11-24 | End: 2018-01-10

## 2018-01-12 PROCEDURE — 77427 RADIATION TX MANAGEMENT X5: CPT

## 2018-01-25 ENCOUNTER — OUTPATIENT (OUTPATIENT)
Dept: OUTPATIENT SERVICES | Facility: HOSPITAL | Age: 68
LOS: 1 days | Discharge: HOME | End: 2018-01-25

## 2018-01-25 DIAGNOSIS — Z90.710 ACQUIRED ABSENCE OF BOTH CERVIX AND UTERUS: Chronic | ICD-10-CM

## 2018-01-25 DIAGNOSIS — Z98.890 OTHER SPECIFIED POSTPROCEDURAL STATES: Chronic | ICD-10-CM

## 2018-01-25 DIAGNOSIS — Z98.89 OTHER SPECIFIED POSTPROCEDURAL STATES: Chronic | ICD-10-CM

## 2018-01-25 DIAGNOSIS — C67.9 MALIGNANT NEOPLASM OF BLADDER, UNSPECIFIED: ICD-10-CM

## 2018-01-25 DIAGNOSIS — N63 UNSPECIFIED LUMP IN BREAST: Chronic | ICD-10-CM

## 2018-01-26 ENCOUNTER — RESULT REVIEW (OUTPATIENT)
Age: 68
End: 2018-01-26

## 2018-01-26 ENCOUNTER — APPOINTMENT (OUTPATIENT)
Dept: HEMATOLOGY ONCOLOGY | Facility: CLINIC | Age: 68
End: 2018-01-26
Payer: COMMERCIAL

## 2018-01-26 VITALS
SYSTOLIC BLOOD PRESSURE: 120 MMHG | BODY MASS INDEX: 24.55 KG/M2 | WEIGHT: 152.12 LBS | HEART RATE: 70 BPM | OXYGEN SATURATION: 98 % | TEMPERATURE: 98.4 F | DIASTOLIC BLOOD PRESSURE: 80 MMHG | RESPIRATION RATE: 16 BRPM

## 2018-01-26 DIAGNOSIS — Z92.3 PERSONAL HISTORY OF IRRADIATION: ICD-10-CM

## 2018-01-26 LAB
HCT VFR BLD CALC: 35.4 % — SIGNIFICANT CHANGE UP (ref 34.5–45)
HGB BLD-MCNC: 12.3 G/DL — SIGNIFICANT CHANGE UP (ref 11.5–15.5)
MCHC RBC-ENTMCNC: 31.4 PG — SIGNIFICANT CHANGE UP (ref 27–34)
MCHC RBC-ENTMCNC: 34.9 G/DL — SIGNIFICANT CHANGE UP (ref 32–36)
MCV RBC AUTO: 90 FL — SIGNIFICANT CHANGE UP (ref 80–100)
PLATELET # BLD AUTO: 187 K/UL — SIGNIFICANT CHANGE UP (ref 150–400)
RBC # BLD: 3.93 M/UL — SIGNIFICANT CHANGE UP (ref 3.8–5.2)
RBC # FLD: 14 % — SIGNIFICANT CHANGE UP (ref 10.3–14.5)
WBC # BLD: 4.2 K/UL — SIGNIFICANT CHANGE UP (ref 3.8–10.5)
WBC # FLD AUTO: 4.2 K/UL — SIGNIFICANT CHANGE UP (ref 3.8–10.5)

## 2018-01-26 PROCEDURE — 99214 OFFICE O/P EST MOD 30 MIN: CPT

## 2018-01-29 LAB
ALBUMIN SERPL ELPH-MCNC: 4.4 G/DL
ALP BLD-CCNC: 100 U/L
ALT SERPL-CCNC: 11 U/L
ANION GAP SERPL CALC-SCNC: 13 MMOL/L
AST SERPL-CCNC: 16 U/L
BILIRUB SERPL-MCNC: 0.4 MG/DL
BUN SERPL-MCNC: 15 MG/DL
CALCIUM SERPL-MCNC: 10.3 MG/DL
CHLORIDE SERPL-SCNC: 103 MMOL/L
CO2 SERPL-SCNC: 28 MMOL/L
CORTIS SERPL-MCNC: 9 UG/DL
CREAT SERPL-MCNC: 0.69 MG/DL
GLUCOSE SERPL-MCNC: 107 MG/DL
LDH SERPL-CCNC: 179 U/L
POTASSIUM SERPL-SCNC: 4.5 MMOL/L
PROT SERPL-MCNC: 7.1 G/DL
SODIUM SERPL-SCNC: 144 MMOL/L
T4 FREE SERPL-MCNC: 1.3 NG/DL
TSH SERPL-ACNC: 0.46 UIU/ML

## 2018-02-12 ENCOUNTER — APPOINTMENT (OUTPATIENT)
Dept: INFUSION THERAPY | Facility: HOSPITAL | Age: 68
End: 2018-02-12

## 2018-02-13 DIAGNOSIS — Z51.11 ENCOUNTER FOR ANTINEOPLASTIC CHEMOTHERAPY: ICD-10-CM

## 2018-02-27 ENCOUNTER — OUTPATIENT (OUTPATIENT)
Dept: OUTPATIENT SERVICES | Facility: HOSPITAL | Age: 68
LOS: 1 days | Discharge: HOME | End: 2018-02-27

## 2018-02-27 ENCOUNTER — APPOINTMENT (OUTPATIENT)
Dept: RADIATION ONCOLOGY | Facility: CLINIC | Age: 68
End: 2018-02-27
Payer: COMMERCIAL

## 2018-02-27 VITALS
DIASTOLIC BLOOD PRESSURE: 84 MMHG | HEART RATE: 77 BPM | BODY MASS INDEX: 24.52 KG/M2 | OXYGEN SATURATION: 97 % | HEIGHT: 66 IN | TEMPERATURE: 97.8 F | WEIGHT: 152.56 LBS | SYSTOLIC BLOOD PRESSURE: 128 MMHG | RESPIRATION RATE: 16 BRPM

## 2018-02-27 DIAGNOSIS — N63 UNSPECIFIED LUMP IN BREAST: Chronic | ICD-10-CM

## 2018-02-27 DIAGNOSIS — C67.9 MALIGNANT NEOPLASM OF BLADDER, UNSPECIFIED: ICD-10-CM

## 2018-02-27 DIAGNOSIS — Z98.89 OTHER SPECIFIED POSTPROCEDURAL STATES: Chronic | ICD-10-CM

## 2018-02-27 DIAGNOSIS — Z98.890 OTHER SPECIFIED POSTPROCEDURAL STATES: Chronic | ICD-10-CM

## 2018-02-27 DIAGNOSIS — Z90.710 ACQUIRED ABSENCE OF BOTH CERVIX AND UTERUS: Chronic | ICD-10-CM

## 2018-02-27 PROCEDURE — 99024 POSTOP FOLLOW-UP VISIT: CPT | Mod: GC

## 2018-03-05 ENCOUNTER — OTHER (OUTPATIENT)
Age: 68
End: 2018-03-05

## 2018-03-05 ENCOUNTER — APPOINTMENT (OUTPATIENT)
Dept: INFUSION THERAPY | Facility: HOSPITAL | Age: 68
End: 2018-03-05

## 2018-03-06 DIAGNOSIS — Z51.11 ENCOUNTER FOR ANTINEOPLASTIC CHEMOTHERAPY: ICD-10-CM

## 2018-03-14 ENCOUNTER — RESULT REVIEW (OUTPATIENT)
Age: 68
End: 2018-03-14

## 2018-03-14 ENCOUNTER — APPOINTMENT (OUTPATIENT)
Dept: HEMATOLOGY ONCOLOGY | Facility: CLINIC | Age: 68
End: 2018-03-14
Payer: COMMERCIAL

## 2018-03-14 VITALS
HEART RATE: 68 BPM | SYSTOLIC BLOOD PRESSURE: 120 MMHG | OXYGEN SATURATION: 99 % | WEIGHT: 149.89 LBS | DIASTOLIC BLOOD PRESSURE: 80 MMHG | BODY MASS INDEX: 24.2 KG/M2 | RESPIRATION RATE: 16 BRPM | TEMPERATURE: 98.2 F

## 2018-03-14 LAB
ALBUMIN SERPL ELPH-MCNC: 4 G/DL
ALP BLD-CCNC: 113 U/L
ALT SERPL-CCNC: 19 U/L
ANION GAP SERPL CALC-SCNC: 13 MMOL/L
AST SERPL-CCNC: 20 U/L
BILIRUB SERPL-MCNC: 0.3 MG/DL
BUN SERPL-MCNC: 11 MG/DL
CALCIUM SERPL-MCNC: 9.9 MG/DL
CHLORIDE SERPL-SCNC: 104 MMOL/L
CO2 SERPL-SCNC: 27 MMOL/L
CORTIS SERPL-MCNC: 10.9 UG/DL
CREAT SERPL-MCNC: 0.62 MG/DL
GLUCOSE SERPL-MCNC: 116 MG/DL
HCT VFR BLD CALC: 36.6 % — SIGNIFICANT CHANGE UP (ref 34.5–45)
HGB BLD-MCNC: 12.5 G/DL — SIGNIFICANT CHANGE UP (ref 11.5–15.5)
LDH SERPL-CCNC: 170 U/L
MCHC RBC-ENTMCNC: 31 PG — SIGNIFICANT CHANGE UP (ref 27–34)
MCHC RBC-ENTMCNC: 34.2 G/DL — SIGNIFICANT CHANGE UP (ref 32–36)
MCV RBC AUTO: 90.6 FL — SIGNIFICANT CHANGE UP (ref 80–100)
PLATELET # BLD AUTO: 123 K/UL — LOW (ref 150–400)
POTASSIUM SERPL-SCNC: 3.8 MMOL/L
PROT SERPL-MCNC: 7.1 G/DL
RBC # BLD: 4.04 M/UL — SIGNIFICANT CHANGE UP (ref 3.8–5.2)
RBC # FLD: 13.4 % — SIGNIFICANT CHANGE UP (ref 10.3–14.5)
SODIUM SERPL-SCNC: 144 MMOL/L
TSH SERPL-ACNC: 0.64 UIU/ML
WBC # BLD: 4.8 K/UL — SIGNIFICANT CHANGE UP (ref 3.8–10.5)
WBC # FLD AUTO: 4.8 K/UL — SIGNIFICANT CHANGE UP (ref 3.8–10.5)

## 2018-03-14 PROCEDURE — 99214 OFFICE O/P EST MOD 30 MIN: CPT

## 2018-03-14 RX ORDER — LACTULOSE 10 G/15ML
10 SOLUTION ORAL EVERY 6 HOURS
Qty: 1200 | Refills: 3 | Status: DISCONTINUED | COMMUNITY
Start: 2018-01-22 | End: 2018-03-14

## 2018-03-14 RX ORDER — CYCLOBENZAPRINE HYDROCHLORIDE 5 MG/1
5 TABLET, FILM COATED ORAL
Qty: 90 | Refills: 0 | Status: DISCONTINUED | COMMUNITY
Start: 2018-01-26 | End: 2018-03-14

## 2018-03-14 RX ORDER — HYDROMORPHONE HYDROCHLORIDE 2 MG/1
2 TABLET ORAL
Qty: 120 | Refills: 0 | Status: DISCONTINUED | COMMUNITY
Start: 2017-12-08 | End: 2018-03-14

## 2018-03-25 NOTE — H&P PST ADULT - PRO PAIN EXPRESSION
The history, relevant review of systems, past medical and surgical history, medical decision making, and physical examination was documented by the scribe in my presence and I attest to the accuracy of the documentation.
verbalization

## 2018-03-26 ENCOUNTER — APPOINTMENT (OUTPATIENT)
Dept: INFUSION THERAPY | Facility: HOSPITAL | Age: 68
End: 2018-03-26

## 2018-04-02 ENCOUNTER — OUTPATIENT (OUTPATIENT)
Dept: OUTPATIENT SERVICES | Facility: HOSPITAL | Age: 68
LOS: 1 days | Discharge: HOME | End: 2018-04-02

## 2018-04-02 DIAGNOSIS — Z90.710 ACQUIRED ABSENCE OF BOTH CERVIX AND UTERUS: Chronic | ICD-10-CM

## 2018-04-02 DIAGNOSIS — C67.9 MALIGNANT NEOPLASM OF BLADDER, UNSPECIFIED: ICD-10-CM

## 2018-04-02 DIAGNOSIS — Z98.890 OTHER SPECIFIED POSTPROCEDURAL STATES: Chronic | ICD-10-CM

## 2018-04-02 DIAGNOSIS — Z98.89 OTHER SPECIFIED POSTPROCEDURAL STATES: Chronic | ICD-10-CM

## 2018-04-02 DIAGNOSIS — N63 UNSPECIFIED LUMP IN BREAST: Chronic | ICD-10-CM

## 2018-04-03 ENCOUNTER — FORM ENCOUNTER (OUTPATIENT)
Age: 68
End: 2018-04-03

## 2018-04-04 ENCOUNTER — APPOINTMENT (OUTPATIENT)
Dept: HEMATOLOGY ONCOLOGY | Facility: CLINIC | Age: 68
End: 2018-04-04
Payer: COMMERCIAL

## 2018-04-04 ENCOUNTER — OUTPATIENT (OUTPATIENT)
Dept: OUTPATIENT SERVICES | Facility: HOSPITAL | Age: 68
LOS: 1 days | End: 2018-04-04
Payer: COMMERCIAL

## 2018-04-04 ENCOUNTER — APPOINTMENT (OUTPATIENT)
Dept: RADIOLOGY | Facility: IMAGING CENTER | Age: 68
End: 2018-04-04

## 2018-04-04 VITALS
TEMPERATURE: 99 F | SYSTOLIC BLOOD PRESSURE: 120 MMHG | BODY MASS INDEX: 24.16 KG/M2 | WEIGHT: 149.67 LBS | DIASTOLIC BLOOD PRESSURE: 80 MMHG | HEART RATE: 62 BPM | OXYGEN SATURATION: 99 % | RESPIRATION RATE: 16 BRPM

## 2018-04-04 DIAGNOSIS — Z98.890 OTHER SPECIFIED POSTPROCEDURAL STATES: Chronic | ICD-10-CM

## 2018-04-04 DIAGNOSIS — M25.541 PAIN IN JOINTS OF RIGHT HAND: ICD-10-CM

## 2018-04-04 DIAGNOSIS — Z98.89 OTHER SPECIFIED POSTPROCEDURAL STATES: Chronic | ICD-10-CM

## 2018-04-04 DIAGNOSIS — N63 UNSPECIFIED LUMP IN BREAST: Chronic | ICD-10-CM

## 2018-04-04 DIAGNOSIS — Z90.710 ACQUIRED ABSENCE OF BOTH CERVIX AND UTERUS: Chronic | ICD-10-CM

## 2018-04-04 PROCEDURE — 73120 X-RAY EXAM OF HAND: CPT | Mod: 26,50

## 2018-04-04 PROCEDURE — 99214 OFFICE O/P EST MOD 30 MIN: CPT

## 2018-04-04 PROCEDURE — 73120 X-RAY EXAM OF HAND: CPT

## 2018-04-16 ENCOUNTER — APPOINTMENT (OUTPATIENT)
Dept: INFUSION THERAPY | Facility: HOSPITAL | Age: 68
End: 2018-04-16

## 2018-04-25 ENCOUNTER — APPOINTMENT (OUTPATIENT)
Dept: INFUSION THERAPY | Facility: HOSPITAL | Age: 68
End: 2018-04-25

## 2018-04-25 ENCOUNTER — APPOINTMENT (OUTPATIENT)
Dept: HEMATOLOGY ONCOLOGY | Facility: CLINIC | Age: 68
End: 2018-04-25
Payer: COMMERCIAL

## 2018-04-25 ENCOUNTER — RESULT REVIEW (OUTPATIENT)
Age: 68
End: 2018-04-25

## 2018-04-25 VITALS
OXYGEN SATURATION: 98 % | RESPIRATION RATE: 16 BRPM | SYSTOLIC BLOOD PRESSURE: 118 MMHG | BODY MASS INDEX: 23.66 KG/M2 | WEIGHT: 146.59 LBS | HEART RATE: 58 BPM | DIASTOLIC BLOOD PRESSURE: 70 MMHG | TEMPERATURE: 98.4 F

## 2018-04-25 LAB
HCT VFR BLD CALC: 38.4 % — SIGNIFICANT CHANGE UP (ref 34.5–45)
HGB BLD-MCNC: 12.8 G/DL — SIGNIFICANT CHANGE UP (ref 11.5–15.5)
MCHC RBC-ENTMCNC: 29.8 PG — SIGNIFICANT CHANGE UP (ref 27–34)
MCHC RBC-ENTMCNC: 33.3 G/DL — SIGNIFICANT CHANGE UP (ref 32–36)
MCV RBC AUTO: 89.5 FL — SIGNIFICANT CHANGE UP (ref 80–100)
PLATELET # BLD AUTO: 163 K/UL — SIGNIFICANT CHANGE UP (ref 150–400)
RBC # BLD: 4.29 M/UL — SIGNIFICANT CHANGE UP (ref 3.8–5.2)
RBC # FLD: 13.2 % — SIGNIFICANT CHANGE UP (ref 10.3–14.5)
WBC # BLD: 5 K/UL — SIGNIFICANT CHANGE UP (ref 3.8–10.5)
WBC # FLD AUTO: 5 K/UL — SIGNIFICANT CHANGE UP (ref 3.8–10.5)

## 2018-04-25 PROCEDURE — 99214 OFFICE O/P EST MOD 30 MIN: CPT

## 2018-04-26 DIAGNOSIS — Z51.11 ENCOUNTER FOR ANTINEOPLASTIC CHEMOTHERAPY: ICD-10-CM

## 2018-04-27 LAB
ALBUMIN SERPL ELPH-MCNC: 4 G/DL
ALP BLD-CCNC: 109 U/L
ALT SERPL-CCNC: 16 U/L
ANION GAP SERPL CALC-SCNC: 15 MMOL/L
AST SERPL-CCNC: 16 U/L
BILIRUB SERPL-MCNC: 0.3 MG/DL
BUN SERPL-MCNC: 13 MG/DL
CALCIUM SERPL-MCNC: 9.6 MG/DL
CHLORIDE SERPL-SCNC: 104 MMOL/L
CO2 SERPL-SCNC: 27 MMOL/L
CORTIS SERPL-MCNC: 8.2 UG/DL
CREAT SERPL-MCNC: 0.72 MG/DL
GLUCOSE SERPL-MCNC: 102 MG/DL
LDH SERPL-CCNC: 177 U/L
POTASSIUM SERPL-SCNC: 4.2 MMOL/L
PROT SERPL-MCNC: 6.6 G/DL
SODIUM SERPL-SCNC: 146 MMOL/L
T4 SERPL-MCNC: 7.1 UG/DL
TSH SERPL-ACNC: 0.56 UIU/ML

## 2018-05-02 ENCOUNTER — FORM ENCOUNTER (OUTPATIENT)
Age: 68
End: 2018-05-02

## 2018-05-03 ENCOUNTER — OUTPATIENT (OUTPATIENT)
Dept: OUTPATIENT SERVICES | Facility: HOSPITAL | Age: 68
LOS: 1 days | End: 2018-05-03
Payer: COMMERCIAL

## 2018-05-03 ENCOUNTER — OUTPATIENT (OUTPATIENT)
Dept: OUTPATIENT SERVICES | Facility: HOSPITAL | Age: 68
LOS: 1 days | Discharge: HOME | End: 2018-05-03

## 2018-05-03 ENCOUNTER — APPOINTMENT (OUTPATIENT)
Dept: CT IMAGING | Facility: IMAGING CENTER | Age: 68
End: 2018-05-03
Payer: COMMERCIAL

## 2018-05-03 DIAGNOSIS — N63 UNSPECIFIED LUMP IN BREAST: Chronic | ICD-10-CM

## 2018-05-03 DIAGNOSIS — Z98.89 OTHER SPECIFIED POSTPROCEDURAL STATES: Chronic | ICD-10-CM

## 2018-05-03 DIAGNOSIS — Z98.890 OTHER SPECIFIED POSTPROCEDURAL STATES: Chronic | ICD-10-CM

## 2018-05-03 DIAGNOSIS — C67.9 MALIGNANT NEOPLASM OF BLADDER, UNSPECIFIED: ICD-10-CM

## 2018-05-03 DIAGNOSIS — C67.6 MALIGNANT NEOPLASM OF URETERIC ORIFICE: ICD-10-CM

## 2018-05-03 DIAGNOSIS — Z90.710 ACQUIRED ABSENCE OF BOTH CERVIX AND UTERUS: Chronic | ICD-10-CM

## 2018-05-03 PROCEDURE — 71260 CT THORAX DX C+: CPT

## 2018-05-03 PROCEDURE — 74178 CT ABD&PLV WO CNTR FLWD CNTR: CPT

## 2018-05-03 PROCEDURE — 71260 CT THORAX DX C+: CPT | Mod: 26

## 2018-05-03 PROCEDURE — 74178 CT ABD&PLV WO CNTR FLWD CNTR: CPT | Mod: 26

## 2018-05-07 ENCOUNTER — APPOINTMENT (OUTPATIENT)
Dept: HEMATOLOGY ONCOLOGY | Facility: CLINIC | Age: 68
End: 2018-05-07
Payer: COMMERCIAL

## 2018-05-07 VITALS
TEMPERATURE: 97.9 F | OXYGEN SATURATION: 99 % | SYSTOLIC BLOOD PRESSURE: 130 MMHG | HEART RATE: 62 BPM | WEIGHT: 147.71 LBS | BODY MASS INDEX: 23.84 KG/M2 | RESPIRATION RATE: 16 BRPM | DIASTOLIC BLOOD PRESSURE: 70 MMHG

## 2018-05-07 PROCEDURE — 99214 OFFICE O/P EST MOD 30 MIN: CPT

## 2018-05-16 ENCOUNTER — APPOINTMENT (OUTPATIENT)
Dept: INFUSION THERAPY | Facility: HOSPITAL | Age: 68
End: 2018-05-16

## 2018-05-17 DIAGNOSIS — Z51.11 ENCOUNTER FOR ANTINEOPLASTIC CHEMOTHERAPY: ICD-10-CM

## 2018-05-30 ENCOUNTER — APPOINTMENT (OUTPATIENT)
Dept: INFUSION THERAPY | Facility: HOSPITAL | Age: 68
End: 2018-05-30

## 2018-05-30 ENCOUNTER — APPOINTMENT (OUTPATIENT)
Dept: HEMATOLOGY ONCOLOGY | Facility: CLINIC | Age: 68
End: 2018-05-30
Payer: COMMERCIAL

## 2018-05-30 VITALS
TEMPERATURE: 97.1 F | RESPIRATION RATE: 16 BRPM | BODY MASS INDEX: 23.48 KG/M2 | OXYGEN SATURATION: 98 % | DIASTOLIC BLOOD PRESSURE: 80 MMHG | HEART RATE: 60 BPM | WEIGHT: 145.48 LBS | SYSTOLIC BLOOD PRESSURE: 120 MMHG

## 2018-05-30 PROCEDURE — 99214 OFFICE O/P EST MOD 30 MIN: CPT

## 2018-06-04 ENCOUNTER — OUTPATIENT (OUTPATIENT)
Dept: OUTPATIENT SERVICES | Facility: HOSPITAL | Age: 68
LOS: 1 days | Discharge: HOME | End: 2018-06-04

## 2018-06-04 DIAGNOSIS — Z98.890 OTHER SPECIFIED POSTPROCEDURAL STATES: Chronic | ICD-10-CM

## 2018-06-04 DIAGNOSIS — Z98.89 OTHER SPECIFIED POSTPROCEDURAL STATES: Chronic | ICD-10-CM

## 2018-06-04 DIAGNOSIS — N63 UNSPECIFIED LUMP IN BREAST: Chronic | ICD-10-CM

## 2018-06-04 DIAGNOSIS — Z90.710 ACQUIRED ABSENCE OF BOTH CERVIX AND UTERUS: Chronic | ICD-10-CM

## 2018-06-04 DIAGNOSIS — C67.9 MALIGNANT NEOPLASM OF BLADDER, UNSPECIFIED: ICD-10-CM

## 2018-06-06 ENCOUNTER — APPOINTMENT (OUTPATIENT)
Dept: INFUSION THERAPY | Facility: HOSPITAL | Age: 68
End: 2018-06-06

## 2018-06-07 DIAGNOSIS — Z51.11 ENCOUNTER FOR ANTINEOPLASTIC CHEMOTHERAPY: ICD-10-CM

## 2018-06-19 ENCOUNTER — APPOINTMENT (OUTPATIENT)
Dept: HEMATOLOGY ONCOLOGY | Facility: CLINIC | Age: 68
End: 2018-06-19
Payer: COMMERCIAL

## 2018-06-19 ENCOUNTER — LABORATORY RESULT (OUTPATIENT)
Age: 68
End: 2018-06-19

## 2018-06-19 ENCOUNTER — RESULT REVIEW (OUTPATIENT)
Age: 68
End: 2018-06-19

## 2018-06-19 VITALS
RESPIRATION RATE: 16 BRPM | TEMPERATURE: 98.4 F | SYSTOLIC BLOOD PRESSURE: 152 MMHG | DIASTOLIC BLOOD PRESSURE: 95 MMHG | HEART RATE: 58 BPM | BODY MASS INDEX: 24.48 KG/M2 | WEIGHT: 151.68 LBS | OXYGEN SATURATION: 98 %

## 2018-06-19 LAB
ALBUMIN SERPL ELPH-MCNC: 4 G/DL
ALP BLD-CCNC: 103 U/L
ALT SERPL-CCNC: 13 U/L
ANION GAP SERPL CALC-SCNC: 14 MMOL/L
APPEARANCE: CLEAR
AST SERPL-CCNC: 15 U/L
BASOPHILS # BLD AUTO: 0 K/UL — SIGNIFICANT CHANGE UP (ref 0–0.2)
BASOPHILS NFR BLD AUTO: 0.2 % — SIGNIFICANT CHANGE UP (ref 0–2)
BILIRUB SERPL-MCNC: 0.2 MG/DL
BILIRUBIN URINE: NEGATIVE
BLOOD URINE: ABNORMAL
BUN SERPL-MCNC: 17 MG/DL
CALCIUM SERPL-MCNC: 9.3 MG/DL
CHLORIDE SERPL-SCNC: 106 MMOL/L
CO2 SERPL-SCNC: 24 MMOL/L
COLOR: YELLOW
CORTIS SERPL-MCNC: 10.1 UG/DL
CREAT SERPL-MCNC: 0.69 MG/DL
EOSINOPHIL # BLD AUTO: 0.1 K/UL — SIGNIFICANT CHANGE UP (ref 0–0.5)
EOSINOPHIL NFR BLD AUTO: 2.4 % — SIGNIFICANT CHANGE UP (ref 0–6)
GLUCOSE QUALITATIVE U: NEGATIVE MG/DL
GLUCOSE SERPL-MCNC: 111 MG/DL
HCT VFR BLD CALC: 36.7 % — SIGNIFICANT CHANGE UP (ref 34.5–45)
HGB BLD-MCNC: 12.2 G/DL — SIGNIFICANT CHANGE UP (ref 11.5–15.5)
KETONES URINE: NEGATIVE
LEUKOCYTE ESTERASE URINE: NEGATIVE
LYMPHOCYTES # BLD AUTO: 1.4 K/UL — SIGNIFICANT CHANGE UP (ref 1–3.3)
LYMPHOCYTES # BLD AUTO: 30.1 % — SIGNIFICANT CHANGE UP (ref 13–44)
MCHC RBC-ENTMCNC: 30 PG — SIGNIFICANT CHANGE UP (ref 27–34)
MCHC RBC-ENTMCNC: 33.3 G/DL — SIGNIFICANT CHANGE UP (ref 32–36)
MCV RBC AUTO: 90.2 FL — SIGNIFICANT CHANGE UP (ref 80–100)
MONOCYTES # BLD AUTO: 0.6 K/UL — SIGNIFICANT CHANGE UP (ref 0–0.9)
MONOCYTES NFR BLD AUTO: 12.2 % — SIGNIFICANT CHANGE UP (ref 2–14)
NEUTROPHILS # BLD AUTO: 2.6 K/UL — SIGNIFICANT CHANGE UP (ref 1.8–7.4)
NEUTROPHILS NFR BLD AUTO: 55 % — SIGNIFICANT CHANGE UP (ref 43–77)
NITRITE URINE: NEGATIVE
PH URINE: 5.5
PLATELET # BLD AUTO: 149 K/UL — LOW (ref 150–400)
POTASSIUM SERPL-SCNC: 3.8 MMOL/L
PROT SERPL-MCNC: 6.5 G/DL
PROTEIN URINE: NEGATIVE MG/DL
RBC # BLD: 4.07 M/UL — SIGNIFICANT CHANGE UP (ref 3.8–5.2)
RBC # FLD: 13.7 % — SIGNIFICANT CHANGE UP (ref 10.3–14.5)
SODIUM SERPL-SCNC: 144 MMOL/L
SPECIFIC GRAVITY URINE: 1.02
T4 SERPL-MCNC: 6.8 UG/DL
TSH SERPL-ACNC: 0.87 UIU/ML
UROBILINOGEN URINE: NEGATIVE MG/DL
WBC # BLD: 4.6 K/UL — SIGNIFICANT CHANGE UP (ref 3.8–10.5)
WBC # FLD AUTO: 4.6 K/UL — SIGNIFICANT CHANGE UP (ref 3.8–10.5)

## 2018-06-19 PROCEDURE — 99214 OFFICE O/P EST MOD 30 MIN: CPT

## 2018-06-26 ENCOUNTER — APPOINTMENT (OUTPATIENT)
Dept: UROLOGY | Facility: CLINIC | Age: 68
End: 2018-06-26
Payer: COMMERCIAL

## 2018-06-26 PROCEDURE — 99214 OFFICE O/P EST MOD 30 MIN: CPT

## 2018-06-27 ENCOUNTER — APPOINTMENT (OUTPATIENT)
Dept: INFUSION THERAPY | Facility: HOSPITAL | Age: 68
End: 2018-06-27

## 2018-07-03 ENCOUNTER — OUTPATIENT (OUTPATIENT)
Dept: OUTPATIENT SERVICES | Facility: HOSPITAL | Age: 68
LOS: 1 days | Discharge: HOME | End: 2018-07-03

## 2018-07-03 DIAGNOSIS — C67.9 MALIGNANT NEOPLASM OF BLADDER, UNSPECIFIED: ICD-10-CM

## 2018-07-03 DIAGNOSIS — Z98.890 OTHER SPECIFIED POSTPROCEDURAL STATES: Chronic | ICD-10-CM

## 2018-07-03 DIAGNOSIS — Z90.710 ACQUIRED ABSENCE OF BOTH CERVIX AND UTERUS: Chronic | ICD-10-CM

## 2018-07-03 DIAGNOSIS — Z98.89 OTHER SPECIFIED POSTPROCEDURAL STATES: Chronic | ICD-10-CM

## 2018-07-03 DIAGNOSIS — N63 UNSPECIFIED LUMP IN BREAST: Chronic | ICD-10-CM

## 2018-07-10 ENCOUNTER — APPOINTMENT (OUTPATIENT)
Dept: HEMATOLOGY ONCOLOGY | Facility: CLINIC | Age: 68
End: 2018-07-10
Payer: COMMERCIAL

## 2018-07-10 VITALS
WEIGHT: 149.89 LBS | RESPIRATION RATE: 16 BRPM | SYSTOLIC BLOOD PRESSURE: 120 MMHG | DIASTOLIC BLOOD PRESSURE: 86 MMHG | OXYGEN SATURATION: 99 % | TEMPERATURE: 97.8 F | BODY MASS INDEX: 24.2 KG/M2 | HEART RATE: 64 BPM

## 2018-07-10 PROCEDURE — 99214 OFFICE O/P EST MOD 30 MIN: CPT

## 2018-07-18 ENCOUNTER — APPOINTMENT (OUTPATIENT)
Dept: INFUSION THERAPY | Facility: HOSPITAL | Age: 68
End: 2018-07-18

## 2018-07-18 PROBLEM — D41.4 NEOPLASM OF UNCERTAIN BEHAVIOR OF BLADDER: Chronic | Status: ACTIVE | Noted: 2017-09-28

## 2018-07-18 PROBLEM — E78.5 HYPERLIPIDEMIA, UNSPECIFIED: Chronic | Status: ACTIVE | Noted: 2017-06-02

## 2018-07-19 DIAGNOSIS — Z51.11 ENCOUNTER FOR ANTINEOPLASTIC CHEMOTHERAPY: ICD-10-CM

## 2018-07-31 ENCOUNTER — OUTPATIENT (OUTPATIENT)
Dept: OUTPATIENT SERVICES | Facility: HOSPITAL | Age: 68
LOS: 1 days | Discharge: HOME | End: 2018-07-31

## 2018-07-31 DIAGNOSIS — Z98.890 OTHER SPECIFIED POSTPROCEDURAL STATES: Chronic | ICD-10-CM

## 2018-07-31 DIAGNOSIS — Z98.89 OTHER SPECIFIED POSTPROCEDURAL STATES: Chronic | ICD-10-CM

## 2018-07-31 DIAGNOSIS — C67.9 MALIGNANT NEOPLASM OF BLADDER, UNSPECIFIED: ICD-10-CM

## 2018-07-31 DIAGNOSIS — Z90.710 ACQUIRED ABSENCE OF BOTH CERVIX AND UTERUS: Chronic | ICD-10-CM

## 2018-07-31 DIAGNOSIS — N63 UNSPECIFIED LUMP IN BREAST: Chronic | ICD-10-CM

## 2018-08-09 ENCOUNTER — APPOINTMENT (OUTPATIENT)
Dept: INFUSION THERAPY | Facility: HOSPITAL | Age: 68
End: 2018-08-09

## 2018-08-10 DIAGNOSIS — Z51.11 ENCOUNTER FOR ANTINEOPLASTIC CHEMOTHERAPY: ICD-10-CM

## 2018-08-29 ENCOUNTER — APPOINTMENT (OUTPATIENT)
Dept: INFUSION THERAPY | Facility: HOSPITAL | Age: 68
End: 2018-08-29

## 2018-08-29 ENCOUNTER — APPOINTMENT (OUTPATIENT)
Dept: HEMATOLOGY ONCOLOGY | Facility: CLINIC | Age: 68
End: 2018-08-29
Payer: COMMERCIAL

## 2018-08-29 VITALS
RESPIRATION RATE: 17 BRPM | HEART RATE: 67 BPM | WEIGHT: 155.43 LBS | TEMPERATURE: 98.6 F | BODY MASS INDEX: 25.09 KG/M2 | OXYGEN SATURATION: 99 % | SYSTOLIC BLOOD PRESSURE: 126 MMHG | DIASTOLIC BLOOD PRESSURE: 88 MMHG

## 2018-08-29 PROCEDURE — 99214 OFFICE O/P EST MOD 30 MIN: CPT

## 2018-08-29 RX ORDER — IBUPROFEN 800 MG/1
800 TABLET, FILM COATED ORAL 3 TIMES DAILY
Qty: 90 | Refills: 5 | Status: DISCONTINUED | COMMUNITY
Start: 2018-05-30 | End: 2018-08-29

## 2018-09-07 NOTE — H&P ADULT - NSHPREVIEWOFSYSTEMS_GEN_ALL_CORE
MSK: + for low back and left leg pain.  See HPI.
Adequate: hears normal conversation without difficulty

## 2018-09-12 ENCOUNTER — OUTPATIENT (OUTPATIENT)
Dept: OUTPATIENT SERVICES | Facility: HOSPITAL | Age: 68
LOS: 1 days | Discharge: HOME | End: 2018-09-12

## 2018-09-12 DIAGNOSIS — Z90.710 ACQUIRED ABSENCE OF BOTH CERVIX AND UTERUS: Chronic | ICD-10-CM

## 2018-09-12 DIAGNOSIS — Z98.890 OTHER SPECIFIED POSTPROCEDURAL STATES: Chronic | ICD-10-CM

## 2018-09-12 DIAGNOSIS — C67.9 MALIGNANT NEOPLASM OF BLADDER, UNSPECIFIED: ICD-10-CM

## 2018-09-12 DIAGNOSIS — N63 UNSPECIFIED LUMP IN BREAST: Chronic | ICD-10-CM

## 2018-09-12 DIAGNOSIS — Z98.89 OTHER SPECIFIED POSTPROCEDURAL STATES: Chronic | ICD-10-CM

## 2018-09-19 ENCOUNTER — LABORATORY RESULT (OUTPATIENT)
Age: 68
End: 2018-09-19

## 2018-09-19 ENCOUNTER — FORM ENCOUNTER (OUTPATIENT)
Age: 68
End: 2018-09-19

## 2018-09-19 ENCOUNTER — RESULT REVIEW (OUTPATIENT)
Age: 68
End: 2018-09-19

## 2018-09-19 ENCOUNTER — APPOINTMENT (OUTPATIENT)
Dept: HEMATOLOGY ONCOLOGY | Facility: CLINIC | Age: 68
End: 2018-09-19
Payer: COMMERCIAL

## 2018-09-19 ENCOUNTER — APPOINTMENT (OUTPATIENT)
Dept: INFUSION THERAPY | Facility: HOSPITAL | Age: 68
End: 2018-09-19

## 2018-09-19 VITALS
RESPIRATION RATE: 16 BRPM | WEIGHT: 157.63 LBS | OXYGEN SATURATION: 96 % | DIASTOLIC BLOOD PRESSURE: 88 MMHG | TEMPERATURE: 98.3 F | BODY MASS INDEX: 25.44 KG/M2 | SYSTOLIC BLOOD PRESSURE: 145 MMHG | HEART RATE: 63 BPM

## 2018-09-19 LAB
HCT VFR BLD CALC: 39.3 % — SIGNIFICANT CHANGE UP (ref 34.5–45)
HGB BLD-MCNC: 13.2 G/DL — SIGNIFICANT CHANGE UP (ref 11.5–15.5)
MCHC RBC-ENTMCNC: 30.5 PG — SIGNIFICANT CHANGE UP (ref 27–34)
MCHC RBC-ENTMCNC: 33.6 G/DL — SIGNIFICANT CHANGE UP (ref 32–36)
MCV RBC AUTO: 90.7 FL — SIGNIFICANT CHANGE UP (ref 80–100)
PLATELET # BLD AUTO: 152 K/UL — SIGNIFICANT CHANGE UP (ref 150–400)
RBC # BLD: 4.33 M/UL — SIGNIFICANT CHANGE UP (ref 3.8–5.2)
RBC # FLD: 13 % — SIGNIFICANT CHANGE UP (ref 10.3–14.5)
WBC # BLD: 5 K/UL — SIGNIFICANT CHANGE UP (ref 3.8–10.5)
WBC # FLD AUTO: 5 K/UL — SIGNIFICANT CHANGE UP (ref 3.8–10.5)

## 2018-09-19 PROCEDURE — 99214 OFFICE O/P EST MOD 30 MIN: CPT

## 2018-09-19 NOTE — REVIEW OF SYSTEMS
[Fatigue] : fatigue [Lower Ext Edema] : lower extremity edema [Dysuria] : dysuria [Anxiety] : anxiety [Negative] : Gastrointestinal [Fever] : no fever [Chills] : no chills [Night Sweats] : no night sweats [Recent Change In Weight] : ~T no recent weight change [Chest Pain] : no chest pain [Palpitations] : no palpitations [Leg Claudication] : no intermittent leg claudication [Cough] : no cough [SOB on Exertion] : no shortness of breath during exertion [Incontinence] : no incontinence [Joint Pain] : no joint pain [Joint Stiffness] : no joint stiffness [Skin Rash] : no skin rash [Dizziness] : no dizziness [Difficulty Walking] : no difficulty walking [Depression] : no depression [Hot Flashes] : no hot flashes [Muscle Weakness] : no muscle weakness [Easy Bleeding] : no tendency for easy bleeding [Easy Bruising] : no tendency for easy bruising [de-identified] : vitiligo more prominent.  [de-identified] : no headaches

## 2018-09-19 NOTE — PHYSICAL EXAM
[Fully active, able to carry on all pre-disease performance without restriction] : Status 0 - Fully active, able to carry on all pre-disease performance without restriction [Normal] : affect appropriate [de-identified] : usual back pain to percussion, lumbar [de-identified] : callouses medial to the ball of feet, bilaterally, vitiligo

## 2018-09-19 NOTE — HISTORY OF PRESENT ILLNESS
[Disease: _____________________] : Disease: [unfilled] [T: ___] : T[unfilled] [N: ___] : N[unfilled] [M: ___] : M[unfilled] [AJCC Stage: ____] : AJCC Stage: [unfilled] [de-identified] : Ms. 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 The tumor was found on cystoscopy at the right ureteral orifice. 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 She is referred for consideration of neoadjuvant chemotherapy. In late May, at the time of scheduled back surgery, she thought she had a UTI. Urine was tested and she was treated, then had the surgery. She had burning post-op. She was treated again with an antibiotic. She went to Rehab and was treated again and she was seen by a urologist there. Went to PCP after discharge from rehab, he noted some blood in the urine. She went to [facility] ER and \par she was referred to Dr. Hargrove. She underwent a cystoscopy, revealing tumor. She never noted blood in her urine, but did note it was darker than usual. Still has some "tingling" sensation, no incontinence. Nocturia occurs, which she says is related to awakening from CPAP. No cough. WALKER/bone pains.\par \par 10/27/17...Seen at [facility] [place]. They wanted to repeat procedures again, including cystoscopy. They called  again today. Saw Dr. Montalvo. She was asked to return next Thursday. They want to do a PET CT scan. They recommended she receive cis/gem, likely due to the glandular differentiation. She was overwhelmed by all the information she was given. No pains, no cough/SOB.  \par \par 10/27/17...Juju completed cycle 1 this past Friday, and she noticed on Saturday that her lips appeared swollen and she noticed sores in her mouth. This occurred after going out to eat chinese food.Today she feels that it may be going away. She has fatigue, and feels chills but no fever. She just feels " lousy."  She is feeling Nausea this time and if she takes the metoclopramide in time, then she is okay, She denies vomiting. She is also having some dysgeusia, She denies paraesthesias of the fingers and toes.  She is having constipation, and she is controlling it with the stool softeners.She states her appetite is ok, but the food doesn't taste good, and it hurts when she swallows\par \par 11/21/17...Chemo with cis/gem #2 due this Friday.has increased lower back pain. The pain had stopped. She had prior back surgery. The pain became more notable since starting the chemo. She had constipation with the chemo and noted an increase in the pain with the constipation in lower back near the site of the surgery. The pain feels like pressure, described as heavy, somewhat relieved by BM. Worse with activities. Took some oxycodone a few days ago, but had a headaches afterwards. Pain score currently at 6-7, no recent pain med use. Taking MiraLAX, senna, Colace and prune juice with occasional MOM. Appetite is good, some altered taste, when present, affects appetite. Had some nausea and vomited x 3 after initial chemo, more gagging than vomiting. She felt she had some sores in the mouth after the gem alone, resolved. No paresthesias. She had some discomfort in the right wrist area, at the site where chemo was administered. Fatigue present all the time, more since the start of chemo. No hematuria, good flow, no urgency or incontinence as before. \par \par 12/11/17...day 15, cycle #2, cis/gem.  Juju has increased sensitivity in the lips after chemo. She had some vomiting at the end of her chemo infusion, however she did not have increased nausea and vomiting since. She has fatigue, and she complains of constipation and is taking miralax. She does complain of back pain that has increased in intensity.She notices it when she bends over. She feels that there is something noticeably there in her back. She takes pain medication to help her fall asleep. She doesn't sleep well. She does use c-pap secondary to sleep apnea but she is awaken from her sleep secondary to nocturia. She also notices her pain more when she has constipation and has to go to the bathroom. She describes her lower back pain as 8/10 at worst, and best 5/10. HEr back pain went away after the surgery and she noticed it came back once starting Chemo. She states her appetite is good, She does complain of dysgeusia. She denies paraesthesias. She does not like how the oxycodone makes her feel. It gives her HAs.\par \par 1/26/18...Had RT from the 9th to the 13th of January. She feels there is some decrease in the pain, not as piercing. She feels the area is stiff. Worse when she awakens. Takes 3 x 325 Tylenol at night which helps the pain. has constipation, went one week w/o evacuation. Was given lactulose, but she feels that MiraLAX works better. She is less active as a result of the pain. the pain is worse when she bends. Appetite is variable. No weight loss. Has occasional nausea. No vomiting. Has some indigestion at times. No blood in the urine, no dysuria. No incontinence. Fatigue is present. No pains elsewhere. \par \par 3/14/18...She still has some pain. She is doing PT, which helps temporarily. She has not had the relief of pain that she thought would be the result. Pain score at 4-5, described as aggravation and it inhibits activities. Worse with bending, getting in and out of cars. She is not taking hydromorphone at this time.. She is distressed with constipation from the pain meds. Says lactulose does not work. She takes some Tylenol sporadically, not daily. Appetite is good, lost 1 kilo. No hematuria, no dysuria, no frequency, no incontinence. No pains elsewhere. She remains very active. She has alopecia. No diarrhea. No cough/WALKER. Fatigue is present, mild. She feels it at the end of the day. No N/V. \par \par 4/4/18...Completed 3 cycles of Tecentriq. She noes curling of the right fourth finger involuntarily. She can bend it back up, but it hurts to do so. No difficulty with strength on the right hand, no tremors.  She has tried Icy Hot w/o benefit. She points to DIP and PIP joints as the sites of pain. She feels she has lumps on her head. She is losing her hair, likely secondary to the chemo she received. Her scalp is pruritic. She has also lost pubic hair. Her pain continues to get better,went to PT for 7 weeks. She is able to do all normal activities, with mild fatigue. Appetite is good, weight is stable. No dizziness, no unsteadiness, no headaches. No other issues. No hematuria. \par \par 4/25/18...Missed Rx on 4/16/18. feels "great". Still has back pains, much improved. No pain while seated, but will come on after a while. No worse with ambulation. Does not awaken her. Pain can be as high as 7-8 with activities, best at 3-4. No pain meds utilized. No blood in urine. denies fatigue until late in the day. Appetite is good, but she has lost about 3 pounds. She is avoiding fat products as she has an upset stomach from fats. Had a URI, treated with azithromycin, Flonase and a codeine containing cough suppressant, still has some residual cough. No diarrhea. No dyspnea. has constipation. \par \par 5/7/18...On atezolizumab since 2/12/18. Feels well. Still has some pains, not clearly as severe as before. It is worse if she walks for a while, such as several hundred feet. Also more prominent if she stands too long. Does not bother her at night or awaken her. It does not stop her activities. Takes an occasional ibuprofen, not daily. Appetite is good, weight is stable. No N/V. No diarrhea. No cough/WALKER. Some fatigue. No leg edema. \par \par 5/30/18...Feels "okay". Still has some back pain, nothing like it was before. Has a bunion of the foot which is bothersome and the podiatrist has recommended surgery. No cough/sputum,. No diarrhea. Appetite is good, but lost 2 pounds since last visit. No other pains. No headaches, no paresthesias. No dizziness noted, no balance issues. Mild fatigue, improved.\par \par 6/19/18...Cycle 6 was administered on 6/6/18, due #7 on 6/27/18. "I'm feeling good". Noted some pressure and noted urinary frequency about one week ago, then resolved. No dysuria, no blood, no frequency, nocturia x 1. The usual lower back pain, no pain at the current time. Extra exertion brings on the low back pain and she is not sure if this is from the cancer or from the prior back surgery. She notes several areas over her skin becoming depigmented in small spots. No pruritus. Appetite is good, no weight loss. Actually gained a few pounds. No diarrhea. has some fatigue towards the end of the day. No dizziness. No paresthesias. No cough/WALKER. \par \par 7/7/18...On atezo since 2/12/18. Has received 7 cycles. Saw Dr. Hargrove late June, cysto recommended, but she decided to wait until  after vacation. She notes lightening of her skin. She didn't go to the dermatologist. She has a prior dermatologist that she might see. No diarrhea. No upset stomach. Appetite is ":great", no weight loss. No cough/WALKER. No paresthesias. Minor fatigue along with aches in the evenings. No headaches, no dizziness. No leg edema. Slight hematuria. \par \par 8/29/18...last scans in May\par Reports skin changes due to the vitiligo has been bothering her.  Reports this has been happening more since last month.  Has also been taking Valium from two years ago occasionally for anxiety.  Reports has taken it once every 2-3 weeks, only when she feels overwhelmed with anxiety.  Has not seen an psychiatrist yet, but has been seeing a psychologist.  She has seeing her for one year..  When she gets anxious it occurs mostly at night.  Denies any pain, except after walking a long period of time.  Will occasionally use a cane.  Reports no urinary frequency.  No urinary incontinence, no hematuria.  Reports regular bowel movements.  Reports good appetite, occasional dyspepsia with fried foods.   [de-identified] : high-grade [FreeTextEntry1] : cis/gem, started chemo 11/3/17 as neoadjuvant, then bone mets, had RT. Now on atezolizumab since February 12 [de-identified] : Did not have an appt today, was added onto schedule. She has urgency, no dysuria, no foul odor. She feels bloated as well for the past 3 days. She wonders if it is related to her vitiligo, which is prominent in the urogenital area. Urine is clear, no blood. No fevers, no chills. Appetite is "great", gaining weight. No cough/WALKER. NO diarrheal stools, last BM yesterday, normal. No N/V. No vitiligo is more prominent, which concerns her. She says the bottom of her feet are tender, at the ball of the feet. Toes are numb. This has occurred over the last week. She has also had cramps in her hands. Mostly the thumb, told of he had an injection for her trigger finger. received cycle # 11 of atezolizumab today.

## 2018-09-19 NOTE — CONSULT LETTER
[Dear  ___] : Dear  [unfilled], [Courtesy Letter:] : I had the pleasure of seeing your patient, [unfilled], in my office today. [Please see my note below.] : Please see my note below. [Consult Closing:] : Thank you very much for allowing me to participate in the care of this patient.  If you have any questions, please do not hesitate to contact me. [Sincerely,] : Sincerely, [DrAzam  ___] : Dr. BRANDT

## 2018-09-19 NOTE — ASSESSMENT
[Palliative Care Plan] : not applicable at this time [FreeTextEntry1] : Juju is seen in the office today in followup. She received her treatment with atezolizumab today. She was started on therapy in February of 2018, and today was dose #11.\par \par She notes some urgency with mild dysuria. She believes it is related to her vitiligo, which has been present in the urogenital region for some time, but has been increasing since she started on immunotherapy. She says that her urine is clear. There has been no blood in the urine. She reports no fevers or chills. Her appetite is very good and she's been gaining weight. She does note some bloating sensation in the lower abdomen in recent times. She's also had pain in the bottom of her feet and she says they aren't tender. Toes are somewhat numb. She has an appointment to see a podiatrist tomorrow.\par \par On physical examination, her areas of vitiligo are somewhat more prominent. There are no palpable abnormalities on examination of the abdomen. There is some percussion tenderness over the lower lumbar spine, which has been present since before her diagnosis of metastatic bladder cancer, and are likely related to her prior lumbar surgery. The bottom of her feet show calluses that are located medial to the ball of her feet. There is some mild erythema.\par \par Skin toxicity usually occurs in the form of rash with atezolizumab. I suggested that she try some urea-based cream to the bottom of her feet. We will see what the podiatrist as tomorrow. She may benefit from seeing a dermatologist, and I can arrange that.\par \par Because of the bloating sensation in her urinary complaints, I have ordered a CT scan of the chest abdomen and pelvis. I have not performed one for some time, as her disease was non-measurable when the past. The bloating may indicate that she has progression of disease at this time. Urinalysis and urine culture has also been requested.\par \par All questions were answered to the best my ability and to her apparent satisfaction. She will be seen once again in 3 weeks' time.

## 2018-09-20 ENCOUNTER — APPOINTMENT (OUTPATIENT)
Dept: CT IMAGING | Facility: IMAGING CENTER | Age: 68
End: 2018-09-20
Payer: COMMERCIAL

## 2018-09-20 ENCOUNTER — OUTPATIENT (OUTPATIENT)
Dept: OUTPATIENT SERVICES | Facility: HOSPITAL | Age: 68
LOS: 1 days | End: 2018-09-20
Payer: COMMERCIAL

## 2018-09-20 DIAGNOSIS — Z98.89 OTHER SPECIFIED POSTPROCEDURAL STATES: Chronic | ICD-10-CM

## 2018-09-20 DIAGNOSIS — C67.6 MALIGNANT NEOPLASM OF URETERIC ORIFICE: ICD-10-CM

## 2018-09-20 DIAGNOSIS — Z98.890 OTHER SPECIFIED POSTPROCEDURAL STATES: Chronic | ICD-10-CM

## 2018-09-20 DIAGNOSIS — Z90.710 ACQUIRED ABSENCE OF BOTH CERVIX AND UTERUS: Chronic | ICD-10-CM

## 2018-09-20 DIAGNOSIS — N63 UNSPECIFIED LUMP IN BREAST: Chronic | ICD-10-CM

## 2018-09-20 DIAGNOSIS — Z51.11 ENCOUNTER FOR ANTINEOPLASTIC CHEMOTHERAPY: ICD-10-CM

## 2018-09-20 LAB
APPEARANCE: CLEAR
BACTERIA: NEGATIVE
BILIRUBIN URINE: NEGATIVE
BLOOD URINE: ABNORMAL
COLOR: YELLOW
GLUCOSE QUALITATIVE U: NEGATIVE MG/DL
KETONES URINE: NEGATIVE
LEUKOCYTE ESTERASE URINE: NEGATIVE
MICROSCOPIC-UA: NORMAL
NITRITE URINE: NEGATIVE
PH URINE: 7.5
PROTEIN URINE: NEGATIVE MG/DL
RED BLOOD CELLS URINE: 2 /HPF
SPECIFIC GRAVITY URINE: 1.01
SQUAMOUS EPITHELIAL CELLS: 1 /HPF
UROBILINOGEN URINE: NEGATIVE MG/DL
WHITE BLOOD CELLS URINE: 1 /HPF

## 2018-09-20 PROCEDURE — 74178 CT ABD&PLV WO CNTR FLWD CNTR: CPT

## 2018-09-20 PROCEDURE — 71260 CT THORAX DX C+: CPT

## 2018-09-20 PROCEDURE — 74178 CT ABD&PLV WO CNTR FLWD CNTR: CPT | Mod: 26

## 2018-09-20 PROCEDURE — 71260 CT THORAX DX C+: CPT | Mod: 26

## 2018-09-21 ENCOUNTER — APPOINTMENT (OUTPATIENT)
Dept: UROLOGY | Facility: CLINIC | Age: 68
End: 2018-09-21
Payer: COMMERCIAL

## 2018-09-21 LAB — BACTERIA UR CULT: NORMAL

## 2018-09-21 PROCEDURE — 99213 OFFICE O/P EST LOW 20 MIN: CPT

## 2018-09-24 LAB — CORE LAB FLUID CYTOLOGY: NORMAL

## 2018-10-10 ENCOUNTER — APPOINTMENT (OUTPATIENT)
Dept: INFUSION THERAPY | Facility: HOSPITAL | Age: 68
End: 2018-10-10

## 2018-10-11 ENCOUNTER — APPOINTMENT (OUTPATIENT)
Dept: HEMATOLOGY ONCOLOGY | Facility: CLINIC | Age: 68
End: 2018-10-11
Payer: COMMERCIAL

## 2018-10-11 VITALS
HEART RATE: 58 BPM | WEIGHT: 153.66 LBS | DIASTOLIC BLOOD PRESSURE: 83 MMHG | SYSTOLIC BLOOD PRESSURE: 129 MMHG | RESPIRATION RATE: 16 BRPM | BODY MASS INDEX: 24.8 KG/M2 | TEMPERATURE: 98.2 F | OXYGEN SATURATION: 95 %

## 2018-10-11 PROCEDURE — 99214 OFFICE O/P EST MOD 30 MIN: CPT

## 2018-10-16 ENCOUNTER — OUTPATIENT (OUTPATIENT)
Dept: OUTPATIENT SERVICES | Facility: HOSPITAL | Age: 68
LOS: 1 days | End: 2018-10-16
Payer: COMMERCIAL

## 2018-10-16 ENCOUNTER — APPOINTMENT (OUTPATIENT)
Dept: UROLOGY | Facility: CLINIC | Age: 68
End: 2018-10-16
Payer: COMMERCIAL

## 2018-10-16 VITALS
HEIGHT: 66 IN | BODY MASS INDEX: 25.07 KG/M2 | RESPIRATION RATE: 15 BRPM | TEMPERATURE: 98 F | DIASTOLIC BLOOD PRESSURE: 77 MMHG | HEART RATE: 64 BPM | SYSTOLIC BLOOD PRESSURE: 135 MMHG | WEIGHT: 156 LBS

## 2018-10-16 DIAGNOSIS — R35.0 FREQUENCY OF MICTURITION: ICD-10-CM

## 2018-10-16 DIAGNOSIS — Z98.890 OTHER SPECIFIED POSTPROCEDURAL STATES: Chronic | ICD-10-CM

## 2018-10-16 DIAGNOSIS — Z98.89 OTHER SPECIFIED POSTPROCEDURAL STATES: Chronic | ICD-10-CM

## 2018-10-16 DIAGNOSIS — N63 UNSPECIFIED LUMP IN BREAST: Chronic | ICD-10-CM

## 2018-10-16 DIAGNOSIS — Z90.710 ACQUIRED ABSENCE OF BOTH CERVIX AND UTERUS: Chronic | ICD-10-CM

## 2018-10-16 PROCEDURE — 52000 CYSTOURETHROSCOPY: CPT

## 2018-10-17 DIAGNOSIS — C67.6 MALIGNANT NEOPLASM OF URETERIC ORIFICE: ICD-10-CM

## 2018-10-23 ENCOUNTER — OUTPATIENT (OUTPATIENT)
Dept: OUTPATIENT SERVICES | Facility: HOSPITAL | Age: 68
LOS: 1 days | Discharge: HOME | End: 2018-10-23

## 2018-10-23 DIAGNOSIS — N63 UNSPECIFIED LUMP IN BREAST: Chronic | ICD-10-CM

## 2018-10-23 DIAGNOSIS — Z98.89 OTHER SPECIFIED POSTPROCEDURAL STATES: Chronic | ICD-10-CM

## 2018-10-23 DIAGNOSIS — Z98.890 OTHER SPECIFIED POSTPROCEDURAL STATES: Chronic | ICD-10-CM

## 2018-10-23 DIAGNOSIS — C67.9 MALIGNANT NEOPLASM OF BLADDER, UNSPECIFIED: ICD-10-CM

## 2018-10-23 DIAGNOSIS — Z90.710 ACQUIRED ABSENCE OF BOTH CERVIX AND UTERUS: Chronic | ICD-10-CM

## 2018-10-31 ENCOUNTER — LABORATORY RESULT (OUTPATIENT)
Age: 68
End: 2018-10-31

## 2018-10-31 ENCOUNTER — APPOINTMENT (OUTPATIENT)
Dept: INFUSION THERAPY | Facility: HOSPITAL | Age: 68
End: 2018-10-31

## 2018-10-31 ENCOUNTER — APPOINTMENT (OUTPATIENT)
Dept: HEMATOLOGY ONCOLOGY | Facility: CLINIC | Age: 68
End: 2018-10-31
Payer: COMMERCIAL

## 2018-10-31 VITALS
DIASTOLIC BLOOD PRESSURE: 88 MMHG | HEART RATE: 59 BPM | SYSTOLIC BLOOD PRESSURE: 137 MMHG | WEIGHT: 155.42 LBS | OXYGEN SATURATION: 98 % | RESPIRATION RATE: 16 BRPM | TEMPERATURE: 98.2 F | BODY MASS INDEX: 25.09 KG/M2

## 2018-10-31 PROCEDURE — 99214 OFFICE O/P EST MOD 30 MIN: CPT

## 2018-11-01 DIAGNOSIS — Z51.11 ENCOUNTER FOR ANTINEOPLASTIC CHEMOTHERAPY: ICD-10-CM

## 2018-11-16 ENCOUNTER — OTHER (OUTPATIENT)
Age: 68
End: 2018-11-16

## 2018-11-21 ENCOUNTER — APPOINTMENT (OUTPATIENT)
Dept: HEMATOLOGY ONCOLOGY | Facility: CLINIC | Age: 68
End: 2018-11-21
Payer: COMMERCIAL

## 2018-11-21 ENCOUNTER — RESULT REVIEW (OUTPATIENT)
Age: 68
End: 2018-11-21

## 2018-11-21 VITALS
OXYGEN SATURATION: 96 % | BODY MASS INDEX: 25.26 KG/M2 | TEMPERATURE: 98.3 F | HEART RATE: 62 BPM | WEIGHT: 156.53 LBS | RESPIRATION RATE: 16 BRPM | SYSTOLIC BLOOD PRESSURE: 134 MMHG | DIASTOLIC BLOOD PRESSURE: 87 MMHG

## 2018-11-21 LAB
CORTIS SERPL-MCNC: 13.5 UG/DL
HCT VFR BLD CALC: 39.6 % — SIGNIFICANT CHANGE UP (ref 34.5–45)
HGB BLD-MCNC: 13.4 G/DL — SIGNIFICANT CHANGE UP (ref 11.5–15.5)
MCHC RBC-ENTMCNC: 30.3 PG — SIGNIFICANT CHANGE UP (ref 27–34)
MCHC RBC-ENTMCNC: 33.7 G/DL — SIGNIFICANT CHANGE UP (ref 32–36)
MCV RBC AUTO: 89.7 FL — SIGNIFICANT CHANGE UP (ref 80–100)
PLATELET # BLD AUTO: 161 K/UL — SIGNIFICANT CHANGE UP (ref 150–400)
RBC # BLD: 4.41 M/UL — SIGNIFICANT CHANGE UP (ref 3.8–5.2)
RBC # FLD: 13.3 % — SIGNIFICANT CHANGE UP (ref 10.3–14.5)
TSH SERPL-ACNC: 0.76 UIU/ML
WBC # BLD: 5.2 K/UL — SIGNIFICANT CHANGE UP (ref 3.8–10.5)
WBC # FLD AUTO: 5.2 K/UL — SIGNIFICANT CHANGE UP (ref 3.8–10.5)

## 2018-11-21 PROCEDURE — 99214 OFFICE O/P EST MOD 30 MIN: CPT

## 2018-11-21 NOTE — PHYSICAL EXAM
[Fully active, able to carry on all pre-disease performance without restriction] : Status 0 - Fully active, able to carry on all pre-disease performance without restriction [Normal] : affect appropriate [de-identified] : some tenderness over the lower spine

## 2018-11-21 NOTE — REVIEW OF SYSTEMS
[Anxiety] : anxiety [Depression] : depression [Negative] : ENT [Fever] : no fever [Chills] : no chills [Recent Change In Weight] : ~T no recent weight change [Chest Pain] : no chest pain [Palpitations] : no palpitations [Lower Ext Edema] : no lower extremity edema [Wheezing] : no wheezing [Cough] : no cough [SOB on Exertion] : no shortness of breath during exertion [Abdominal Pain] : no abdominal pain [Vomiting] : no vomiting [Constipation] : no constipation [Diarrhea] : no diarrhea [Incontinence] : no incontinence [Joint Pain] : no joint pain [Joint Stiffness] : no joint stiffness [Muscle Pain] : no muscle pain [Skin Rash] : no skin rash [Hot Flashes] : no hot flashes [Muscle Weakness] : no muscle weakness [Easy Bleeding] : no tendency for easy bleeding [Easy Bruising] : no tendency for easy bruising [FreeTextEntry2] : mild fatigue [FreeTextEntry8] : no hematuria, no frequency, urgency [FreeTextEntry9] : low back pain, back stiffness [de-identified] : No HA, paresthesias of the toes, present for 5 months

## 2018-11-21 NOTE — HISTORY OF PRESENT ILLNESS
[Disease: _____________________] : Disease: [unfilled] [T: ___] : T[unfilled] [N: ___] : N[unfilled] [M: ___] : M[unfilled] [AJCC Stage: ____] : AJCC Stage: [unfilled] [de-identified] : Ms. 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 The tumor was found on cystoscopy at the right ureteral orifice. 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 She is referred for consideration of neoadjuvant chemotherapy. In late May, at the time of scheduled back surgery, she thought she had a UTI. Urine was tested and she was treated, then had the surgery. She had burning post-op. She was treated again with an antibiotic. She went to Rehab and was treated again and she was seen by a urologist there. Went to PCP after discharge from rehab, he noted some blood in the urine. She went to [facility] ER and \par she was referred to Dr. Hargrove. She underwent a cystoscopy, revealing tumor. She never noted blood in her urine, but did note it was darker than usual. Still has some "tingling" sensation, no incontinence. Nocturia occurs, which she says is related to awakening from CPAP. No cough. WALKER/bone pains.\par \par 10/27/17...Seen at [facility] in second opinion yesterday. They wanted to repeat procedures again, including cystoscopy. They called  again today. Saw Dr. Montalvo. She was asked to return next Thursday. They want to do a PET CT scan. They recommended she receive cis/gem, likely due to the glandular differentiation. She was overwhelmed by all the information she was given. No pains, no cough/SOB.  \par \par 10/27/17...Juju completed cycle 1 this past Friday, and she noticed on Saturday that her lips appeared swollen and she noticed sores in her mouth. This occurred after going out to eat chinese food.Today she feels that it may be going away. She has fatigue, and feels chills but no fever. She just feels " lousy."  She is feeling Nausea this time and if she takes the metoclopramide in time, then she is okay, She denies vomiting. She is also having some dysgeusia, She denies paraesthesias of the fingers and toes.  She is having constipation, and she is controlling it with the stool softeners.She states her appetite is ok, but the food doesn't taste good, and it hurts when she swallows\par \par 11/21/17...Chemo with cis/gem #2 due this Friday.has increased lower back pain. The pain had stopped. She had prior back surgery. The pain became more notable since starting the chemo. She had constipation with the chemo and noted an increase in the pain with the constipation in lower back near the site of the surgery. The pain feels like pressure, described as heavy, somewhat relieved by BM. Worse with activities. Took some oxycodone a few days ago, but had a headaches afterwards. Pain score currently at 6-7, no recent pain med use. Taking MiraLAX, senna, Colace and prune juice with occasional MOM. Appetite is good, some altered taste, when present, affects appetite. Had some nausea and vomited x 3 after initial chemo, more gagging than vomiting. She felt she had some sores in the mouth after the gem alone, resolved. No paresthesias. She had some discomfort in the right wrist area, at the site where chemo was administered. Fatigue present all the time, more since the start of chemo. No hematuria, good flow, no urgency or incontinence as before. \par \par 12/11/17...day 15, cycle #2, cis/gem.  Juju has increased sensitivity in the lips after chemo. She had some vomiting at the end of her chemo infusion, however she did not have increased nausea and vomiting since. She has fatigue, and she complains of constipation and is taking miralax. She does complain of back pain that has increased in intensity.She notices it when she bends over. She feels that there is something noticeably there in her back. She takes pain medication to help her fall asleep. She doesn't sleep well. She does use c-pap secondary to sleep apnea but she is awaken from her sleep secondary to nocturia. She also notices her pain more when she has constipation and has to go to the bathroom. She describes her lower back pain as 8/10 at worst, and best 5/10. HEr back pain went away after the surgery and she noticed it came back once starting Chemo. She states her appetite is good, She does complain of dysgeusia. She denies paraesthesias. She does not like how the oxycodone makes her feel. It gives her HAs.\par \par 1/26/18...Had RT from the 9th to the 13th of January. She feels there is some decrease in the pain, not as piercing. She feels the area is stiff. Worse when she awakens. Takes 3 x 325 Tylenol at night which helps the pain. has constipation, went one week w/o evacuation. Was given lactulose, but she feels that MiraLAX works better. She is less active as a result of the pain. the pain is worse when she bends. Appetite is variable. No weight loss. Has occasional nausea. No vomiting. Has some indigestion at times. No blood in the urine, no dysuria. No incontinence. Fatigue is present. No pains elsewhere. \par \par 3/14/18...She still has some pain. She is doing PT, which helps temporarily. She has not had the relief of pain that she thought would be the result. Pain score at 4-5, described as aggravation and it inhibits activities. Worse with bending, getting in and out of cars. She is not taking hydromorphone at this time.. She is distressed with constipation from the pain meds. Says lactulose does not work. She takes some Tylenol sporadically, not daily. Appetite is good, lost 1 kilo. No hematuria, no dysuria, no frequency, no incontinence. No pains elsewhere. She remains very active. She has alopecia. No diarrhea. No cough/WALKER. Fatigue is present, mild. She feels it at the end of the day. No N/V. \par \par 4/4/18...Completed 3 cycles of Tecentriq. She noes curling of the right fourth finger involuntarily. She can bend it back up, but it hurts to do so. No difficulty with strength on the right hand, no tremors.  She has tried Icy Hot w/o benefit. She points to DIP and PIP joints as the sites of pain. She feels she has lumps on her head. She is losing her hair, likely secondary to the chemo she received. Her scalp is pruritic. She has also lost pubic hair. Her pain continues to get better,went to PT for 7 weeks. She is able to do all normal activities, with mild fatigue. Appetite is good, weight is stable. No dizziness, no unsteadiness, no headaches. No other issues. No hematuria. \par \par 4/25/18...Missed Rx on 4/16/18. feels "great". Still has back pains, much improved. No pain while seated, but will come on after a while. No worse with ambulation. Does not awaken her. Pain can be as high as 7-8 with activities, best at 3-4. No pain meds utilized. No blood in urine. denies fatigue until late in the day. Appetite is good, but she has lost about 3 pounds. She is avoiding fat products as she has an upset stomach from fats. Had a URI, treated with azithromycin, Flonase and a codeine containing cough suppressant, still has some residual cough. No diarrhea. No dyspnea. has constipation. \par \par 5/7/18...On atezolizumab since 2/12/18. Feels well. Still has some pains, not clearly as severe as before. It is worse if she walks for a while, such as several hundred feet. Also more prominent if she stands too long. Does not bother her at night or awaken her. It does not stop her activities. Takes an occasional ibuprofen, not daily. Appetite is good, weight is stable. No N/V. No diarrhea. No cough/WALKER. Some fatigue. No leg edema. \par \par 5/30/18...Feels "okay". Still has some back pain, nothing like it was before. Has a bunion of the foot which is bothersome and the podiatrist has recommended surgery. No cough/sputum,. No diarrhea. Appetite is good, but lost 2 pounds since last visit. No other pains. No headaches, no paresthesias. No dizziness noted, no balance issues. Mild fatigue, improved.\par \par 6/19/18...Cycle 6 was administered on 6/6/18, due #7 on 6/27/18. "I'm feeling good". Noted some pressure and noted urinary frequency about one week ago, then resolved. No dysuria, no blood, no frequency, nocturia x 1. The usual lower back pain, no pain at the current time. Extra exertion brings on the low back pain and she is not sure if this is from the cancer or from the prior back surgery. She notes several areas over her skin becoming depigmented in small spots. No pruritus. Appetite is good, no weight loss. Actually gained a few pounds. No diarrhea. has some fatigue towards the end of the day. No dizziness. No paresthesias. No cough/WALKER. \par \par 7/7/18...On atezo since 2/12/18. Has received 7 cycles. Saw Dr. Hargrove late June, cysto recommended, but she decided to wait until  after vacation. She notes lightening of her skin. She didn't go to the dermatologist. She has a prior dermatologist that she might see. No diarrhea. No upset stomach. Appetite is ":great", no weight loss. No cough/WALKER. No paresthesias. Minor fatigue along with aches in the evenings. No headaches, no dizziness. No leg edema. Slight hematuria. \par \par 8/29/18...last scans in May\par Reports skin changes due to the vitiligo has been bothering her.  Reports this has been happening more since last month.  Has also been taking Valium from two years ago occasionally for anxiety.  Reports has taken it once every 2-3 weeks, only when she feels overwhelmed with anxiety.  Has not seen an psychiatrist yet, but has been seeing a psychologist.  She has seeing her for one year..  When she gets anxious it occurs mostly at night.  Denies any pain, except after walking a long period of time.  Will occasionally use a cane.  Reports no urinary frequency.  No urinary incontinence, no hematuria.  Reports regular bowel movements.  Reports good appetite, occasional dyspepsia with fried foods.  \par \par 8/19/18...Did not have an appt today, was added onto schedule. She has urgency, no dysuria, no foul odor. She feels bloated as well for the past 3 days. She wonders if it is related to her vitiligo, which is prominent in the urogenital area. Urine is clear, no blood. No fevers, no chills. Appetite is "great", gaining weight. No cough/WALKER. NO diarrheal stools, last BM yesterday, normal. No N/V. No vitiligo is more prominent, which concerns her. She says the bottom of her feet are tender, at the ball of the feet. Toes are numb. This has occurred over the last week. She has also had cramps in her hands. Mostly the thumb, told of he had an injection for her trigger finger. received cycle # 11 of atezolizumab today. \par \par 10/11/18...dose # 12 today. Saw Paulie Hargrove, plan for cysto. "I'm doing well".  She says that her feet always feels like there "is something there", involving the toes, no longer affects the ball of the foot. Saw her podiatrist. Cysto is scheduled for the 16th. Her vitiligo is more prominent his is prominent on the hands. She feels that some parts of her hands are darker as well. Appetite is "fabulous". No N/V/D/C. Urine flow is good, no incontinence, no blood, no dysuria. Urgency and fullness sensation resolved. No cough/SOB. No headaches. No fatigue\par \par 10/31/18....Dose #13 today. Feels well at this time. Back pain remains the same as before, no pain med use. If she does extra activities she has pain. She had back surgery before and she believes it is from the prior surgery and not the mets. No fatigue. Appetite is good, weight is stable., No diarrheal stools. No hematuria noted. No dysuria. Usual activities performed without impairment. Vitiligo is somewhat more prominent, which disturbs her. No leg edema. No cough, no WALKER. No upset stomach. No fevers, no chills. Had cysto on October 16, all was normal.  [de-identified] : high-grade [FreeTextEntry1] : cis/gem, started chemo 11/3/17 as neoadjuvant, then bone mets, had RT. Now on atezolizumab since February 12 [de-identified] : Toi "okay". Pain in the back somewhat more prominent, the pain from her prior surgery.Appetite is jeramy, no weight loss. No cough/WALKER. No diarrhea, no upset stomach. Some minor fatigue. She thinks she is depressed, attends a support group here. Starting with a therapist. [place]  left [place] recently[place]

## 2018-11-21 NOTE — ASSESSMENT
[Palliative Care Plan] : not applicable at this time [FreeTextEntry1] : Juju continues to do well at this time. Her appetite is good. There is no weight loss. She has her usual low back pain, which is secondary to prior surgery. It is somewhat more prominent at this time, but she's been in the garage and climbing and taking [place] decorations in decorating her home for the holidays. She is low but says that she seen a therapist at this time. Her  has left her and she is quite a bit to say in regards to this. There is no gross hematuria. She did have some small amount of red blood cells in her urine. She had cystoscopy performed in September which revealed no abnormality.\par \par She continues on treatment with atezolizumab, which was started on February of 2018. She is continued on therapy without any evidence of progression of disease. She has had no worsening of her back pain secondary to her malignancy, and this represents disease control. She is doing well without any significant toxicities and will continue on therapy. All questions were answer to the best of my ability and to her apparent satisfaction.

## 2018-11-23 ENCOUNTER — APPOINTMENT (OUTPATIENT)
Dept: INFUSION THERAPY | Facility: HOSPITAL | Age: 68
End: 2018-11-23

## 2018-11-23 ENCOUNTER — OUTPATIENT (OUTPATIENT)
Dept: OUTPATIENT SERVICES | Facility: HOSPITAL | Age: 68
LOS: 1 days | Discharge: HOME | End: 2018-11-23

## 2018-11-23 DIAGNOSIS — Z98.89 OTHER SPECIFIED POSTPROCEDURAL STATES: Chronic | ICD-10-CM

## 2018-11-23 DIAGNOSIS — Z98.890 OTHER SPECIFIED POSTPROCEDURAL STATES: Chronic | ICD-10-CM

## 2018-11-23 DIAGNOSIS — C67.9 MALIGNANT NEOPLASM OF BLADDER, UNSPECIFIED: ICD-10-CM

## 2018-11-23 DIAGNOSIS — Z90.710 ACQUIRED ABSENCE OF BOTH CERVIX AND UTERUS: Chronic | ICD-10-CM

## 2018-11-23 DIAGNOSIS — N63 UNSPECIFIED LUMP IN BREAST: Chronic | ICD-10-CM

## 2018-11-23 LAB
ALBUMIN SERPL ELPH-MCNC: 4.4 G/DL
ALP BLD-CCNC: 105 U/L
ALT SERPL-CCNC: 18 U/L
ANION GAP SERPL CALC-SCNC: 11 MMOL/L
AST SERPL-CCNC: 18 U/L
BILIRUB SERPL-MCNC: 0.4 MG/DL
BUN SERPL-MCNC: 14 MG/DL
CALCIUM SERPL-MCNC: 9.6 MG/DL
CHLORIDE SERPL-SCNC: 104 MMOL/L
CO2 SERPL-SCNC: 29 MMOL/L
CREAT SERPL-MCNC: 0.7 MG/DL
GLUCOSE SERPL-MCNC: 104 MG/DL
POTASSIUM SERPL-SCNC: 4.3 MMOL/L
PROT SERPL-MCNC: 6.9 G/DL
SODIUM SERPL-SCNC: 144 MMOL/L

## 2018-11-26 DIAGNOSIS — Z51.11 ENCOUNTER FOR ANTINEOPLASTIC CHEMOTHERAPY: ICD-10-CM

## 2018-12-11 ENCOUNTER — RESULT REVIEW (OUTPATIENT)
Age: 68
End: 2018-12-11

## 2018-12-11 ENCOUNTER — APPOINTMENT (OUTPATIENT)
Dept: HEMATOLOGY ONCOLOGY | Facility: CLINIC | Age: 68
End: 2018-12-11
Payer: COMMERCIAL

## 2018-12-11 VITALS
DIASTOLIC BLOOD PRESSURE: 83 MMHG | TEMPERATURE: 98.1 F | HEART RATE: 69 BPM | SYSTOLIC BLOOD PRESSURE: 125 MMHG | WEIGHT: 156.53 LBS | BODY MASS INDEX: 25.26 KG/M2 | OXYGEN SATURATION: 96 % | RESPIRATION RATE: 16 BRPM

## 2018-12-11 LAB
HCT VFR BLD CALC: 39.5 % — SIGNIFICANT CHANGE UP (ref 34.5–45)
HGB BLD-MCNC: 13.5 G/DL — SIGNIFICANT CHANGE UP (ref 11.5–15.5)
MCHC RBC-ENTMCNC: 30.2 PG — SIGNIFICANT CHANGE UP (ref 27–34)
MCHC RBC-ENTMCNC: 34.1 G/DL — SIGNIFICANT CHANGE UP (ref 32–36)
MCV RBC AUTO: 88.6 FL — SIGNIFICANT CHANGE UP (ref 80–100)
PLATELET # BLD AUTO: 164 K/UL — SIGNIFICANT CHANGE UP (ref 150–400)
RBC # BLD: 4.46 M/UL — SIGNIFICANT CHANGE UP (ref 3.8–5.2)
RBC # FLD: 13.1 % — SIGNIFICANT CHANGE UP (ref 10.3–14.5)
WBC # BLD: 5.2 K/UL — SIGNIFICANT CHANGE UP (ref 3.8–10.5)
WBC # FLD AUTO: 5.2 K/UL — SIGNIFICANT CHANGE UP (ref 3.8–10.5)

## 2018-12-11 PROCEDURE — 99214 OFFICE O/P EST MOD 30 MIN: CPT

## 2018-12-11 NOTE — REVIEW OF SYSTEMS
[Dysuria] : dysuria [Negative] : Respiratory [Fever] : no fever [Chills] : no chills [Night Sweats] : no night sweats [Fatigue] : no fatigue [Recent Change In Weight] : ~T no recent weight change [Dysphagia] : no dysphagia [Loss of Hearing] : no loss of hearing [Nosebleeds] : no nosebleeds [Odynophagia] : no odynophagia [Mucosal Pain] : no mucosal pain [Abdominal Pain] : no abdominal pain [Vomiting] : no vomiting [Constipation] : no constipation [Diarrhea] : no diarrhea [Incontinence] : no incontinence [Joint Pain] : no joint pain [Joint Stiffness] : no joint stiffness [Muscle Pain] : no muscle pain [Dizziness] : no dizziness [Anxiety] : no anxiety [Depression] : no depression [Muscle Weakness] : no muscle weakness [Easy Bleeding] : no tendency for easy bleeding [Easy Bruising] : no tendency for easy bruising [FreeTextEntry4] : sensitivity of tongue to spices and salt [FreeTextEntry9] : low back pains. [de-identified] : vitilgo, no rash  [de-identified] : no HA, neuropathy is better, related to back injury

## 2018-12-11 NOTE — HISTORY OF PRESENT ILLNESS
[Disease: _____________________] : Disease: [unfilled] [T: ___] : T[unfilled] [N: ___] : N[unfilled] [M: ___] : M[unfilled] [AJCC Stage: ____] : AJCC Stage: [unfilled] [de-identified] : Ms. 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 The tumor was found on cystoscopy at the right ureteral orifice. 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 She is referred for consideration of neoadjuvant chemotherapy. In late May, at the time of scheduled back surgery, she thought she had a UTI. Urine was tested and she was treated, then had the surgery. She had burning post-op. She was treated again with an antibiotic. She went to Rehab and was treated again and she was seen by a urologist there. Went to PCP after discharge from rehab, he noted some blood in the urine. She went [facility] ER and \par she was referred to Dr. Hargrove. She underwent a cystoscopy, revealing tumor. She never noted blood in her urine, but did note it was darker than usual. Still has some "tingling" sensation, no incontinence. Nocturia occurs, which she says is related to awakening from CPAP. No cough. WALKER/bone pains.\par \par 10/27/17...[place] [facility] [place]. They wanted to repeat procedures again, including cystoscopy. They called  again today. Saw Dr. Montalvo. She was asked to return next Thursday. They want to do a PET CT scan. They recommended she receive cis/gem, likely due to the glandular differentiation. She was overwhelmed by all the information she was given. No pains, no cough/SOB.  \par \par 10/27/17...Juju completed cycle 1 this past Friday, and she noticed on Saturday that her lips appeared swollen and she noticed sores in her mouth. This occurred after going out to eat chinese food.Today she feels that it may be going away. She has fatigue, and feels chills but no fever. She just feels " lousy."  She is feeling Nausea this time and if she takes the metoclopramide in time, then she is okay, She denies vomiting. She is also having some dysgeusia, She denies paraesthesias of the fingers and toes.  She is having constipation, and she is controlling it with the stool softeners.She states her appetite is ok, but the food doesn't taste good, and it hurts when she swallows\par \par 11/21/17...Chemo with cis/gem #2 due this Friday.has increased lower back pain. The pain had stopped. She had prior back surgery. The pain became more notable since starting the chemo. She had constipation with the chemo and noted an increase in the pain with the constipation in lower back near the site of the surgery. The pain feels like pressure, described as heavy, somewhat relieved by BM. Worse with activities. Took some oxycodone a few days ago, but had a headaches afterwards. Pain score currently at 6-7, no recent pain med use. Taking MiraLAX, senna, Colace and prune juice with occasional MOM. Appetite is good, some altered taste, when present, affects appetite. Had some nausea and vomited x 3 after initial chemo, more gagging than vomiting. She felt she had some sores in the mouth after the gem alone, resolved. No paresthesias. She had some discomfort in the right wrist area, at the site where chemo was administered. Fatigue present all the time, more since the start of chemo. No hematuria, good flow, no urgency or incontinence as before. \par \par 12/11/17...day 15, cycle #2, cis/gem.  Juju has increased sensitivity in the lips after chemo. She had some vomiting at the end of her chemo infusion, however she did not have increased nausea and vomiting since. She has fatigue, and she complains of constipation and is taking miralax. She does complain of back pain that has increased in intensity.She notices it when she bends over. She feels that there is something noticeably there in her back. She takes pain medication to help her fall asleep. She doesn't sleep well. She does use c-pap secondary to sleep apnea but she is awaken from her sleep secondary to nocturia. She also notices her pain more when she has constipation and has to go to the bathroom. She describes her lower back pain as 8/10 at worst, and best 5/10. HEr back pain went away after the surgery and she noticed it came back once starting Chemo. She states her appetite is good, She does complain of dysgeusia. She denies paraesthesias. She does not like how the oxycodone makes her feel. It gives her HAs.\par \par 1/26/18...Had RT from the 9th to the 13th of January. She feels there is some decrease in the pain, not as piercing. She feels the area is stiff. Worse when she awakens. Takes 3 x 325 Tylenol at night which helps the pain. has constipation, went one week w/o evacuation. Was given lactulose, but she feels that MiraLAX works better. She is less active as a result of the pain. the pain is worse when she bends. Appetite is variable. No weight loss. Has occasional nausea. No vomiting. Has some indigestion at times. No blood in the urine, no dysuria. No incontinence. Fatigue is present. No pains elsewhere. \par \par 3/14/18...She still has some pain. She is doing PT, which helps temporarily. She has not had the relief of pain that she thought would be the result. Pain score at 4-5, described as aggravation and it inhibits activities. Worse with bending, getting in and out of cars. She is not taking hydromorphone at this time.. She is distressed with constipation from the pain meds. Says lactulose does not work. She takes some Tylenol sporadically, not daily. Appetite is good, lost 1 kilo. No hematuria, no dysuria, no frequency, no incontinence. No pains elsewhere. She remains very active. She has alopecia. No diarrhea. No cough/WALKER. Fatigue is present, mild. She feels it at the end of the day. No N/V. \par \par 4/4/18...Completed 3 cycles of Tecentriq. She noes curling of the right fourth finger involuntarily. She can bend it back up, but it hurts to do so. No difficulty with strength on the right hand, no tremors.  She has tried Icy Hot w/o benefit. She points to DIP and PIP joints as the sites of pain. She feels she has lumps on her head. She is losing her hair, likely secondary to the chemo she received. Her scalp is pruritic. She has also lost pubic hair. Her pain continues to get better,went to PT for 7 weeks. She is able to do all normal activities, with mild fatigue. Appetite is good, weight is stable. No dizziness, no unsteadiness, no headaches. No other issues. No hematuria. \par \par 4/25/18...Missed Rx on 4/16/18. feels "great". Still has back pains, much improved. No pain while seated, but will come on after a while. No worse with ambulation. Does not awaken her. Pain can be as high as 7-8 with activities, best at 3-4. No pain meds utilized. No blood in urine. denies fatigue until late in the day. Appetite is good, but she has lost about 3 pounds. She is avoiding fat products as she has an upset stomach from fats. Had a URI, treated with azithromycin, Flonase and a codeine containing cough suppressant, still has some residual cough. No diarrhea. No dyspnea. has constipation. \par \par 5/7/18...On atezolizumab since 2/12/18. Feels well. Still has some pains, not clearly as severe as before. It is worse if she walks for a while, such as several hundred feet. Also more prominent if she stands too long. Does not bother her at night or awaken her. It does not stop her activities. Takes an occasional ibuprofen, not daily. Appetite is good, weight is stable. No N/V. No diarrhea. No cough/WALKER. Some fatigue. No leg edema. \par \par 5/30/18...Feels "okay". Still has some back pain, nothing like it was before. Has a bunion of the foot which is bothersome and the podiatrist has recommended surgery. No cough/sputum,. No diarrhea. Appetite is good, but lost 2 pounds since last visit. No other pains. No headaches, no paresthesias. No dizziness noted, no balance issues. Mild fatigue, improved.\par \par 6/19/18...Cycle 6 was administered on 6/6/18, due #7 on 6/27/18. "I'm feeling good". Noted some pressure and noted urinary frequency about one week ago, then resolved. No dysuria, no blood, no frequency, nocturia x 1. The usual lower back pain, no pain at the current time. Extra exertion brings on the low back pain and she is not sure if this is from the cancer or from the prior back surgery. She notes several areas over her skin becoming depigmented in small spots. No pruritus. Appetite is good, no weight loss. Actually gained a few pounds. No diarrhea. has some fatigue towards the end of the day. No dizziness. No paresthesias. No cough/WALKER. \par \par 7/7/18...On atezo since 2/12/18. Has received 7 cycles. Saw Dr. Hargrove late June, cysto recommended, but she decided to wait until  after vacation. She notes lightening of her skin. She didn't go to the dermatologist. She has a prior dermatologist that she might see. No diarrhea. No upset stomach. Appetite is ":great", no weight loss. No cough/WALKER. No paresthesias. Minor fatigue along with aches in the evenings. No headaches, no dizziness. No leg edema. Slight hematuria. \par \par 8/29/18...last scans in May\par Reports skin changes due to the vitiligo has been bothering her.  Reports this has been happening more since last month.  Has also been taking Valium from two years ago occasionally for anxiety.  Reports has taken it once every 2-3 weeks, only when she feels overwhelmed with anxiety.  Has not seen an psychiatrist yet, but has been seeing a psychologist.  She has seeing her for one year..  When she gets anxious it occurs mostly at night.  Denies any pain, except after walking a long period of time.  Will occasionally use a cane.  Reports no urinary frequency.  No urinary incontinence, no hematuria.  Reports regular bowel movements.  Reports good appetite, occasional dyspepsia with fried foods.  \par \par 8/19/18...Did not have an appt today, was added onto schedule. She has urgency, no dysuria, no foul odor. She feels bloated as well for the past 3 days. She wonders if it is related to her vitiligo, which is prominent in the urogenital area. Urine is clear, no blood. No fevers, no chills. Appetite is "great", gaining weight. No cough/WALKER. NO diarrheal stools, last BM yesterday, normal. No N/V. No vitiligo is more prominent, which concerns her. She says the bottom of her feet are tender, at the ball of the feet. Toes are numb. This has occurred over the last week. She has also had cramps in her hands. Mostly the thumb, told of he had an injection for her trigger finger. received cycle # 11 of atezolizumab today. \par \par 10/11/18...dose # 12 today. Saw Paulie Hargrove, plan for cysto. "I'm doing well".  She says that her feet always feels like there "is something there", involving the toes, no longer affects the ball of the foot. Saw her podiatrist. Cysto is scheduled for the 16th. Her vitiligo is more prominent his is prominent on the hands. She feels that some parts of her hands are darker as well. Appetite is "fabulous". No N/V/D/C. Urine flow is good, no incontinence, no blood, no dysuria. Urgency and fullness sensation resolved. No cough/SOB. No headaches. No fatigue\par \par 10/31/18....Dose #13 today. Feels well at this time. Back pain remains the same as before, no pain med use. If she does extra activities she has pain. She had back surgery before and she believes it is from the prior surgery and not the mets. No fatigue. Appetite is good, weight is stable., No diarrheal stools. No hematuria noted. No dysuria. Usual activities performed without impairment. Vitiligo is somewhat more prominent, which disturbs her. No leg edema. No cough, no WALKER. No upset stomach. No fevers, no chills. Had cysto on October 16, all was normal. \par \par 11/21/18...Feels "okay". Pain in the back somewhat more prominent, the pain from her prior surgery.Appetite is jeramy, no weight loss. No cough/WALKER. No diarrhea, no upset stomach. Some minor fatigue. She thinks she is depressed, attends a support group here. Starting with a therapist. [place]  left [place] recently[place]  [de-identified] : high-grade [FreeTextEntry1] : cis/gem, started chemo 11/3/17 as neoadjuvant, then bone mets, had RT. Now on atezolizumab since February 12 [de-identified] : Last scans 9/20/18. Getting laser therapy for her vitiligo, which she may not continue due to high c-pays. feels as if her tongue is sensitive to heat since starting the laser therapy. She senses salt more than before. Otherwise well, back pain "bearable", RTS, "part of my life". No pain meds used. Appetite is good, weight is stable. No diarrheal stools No cough, No SOB. She has some sensation in the bladder or vaginal area, pruritic, calmed with Vagisil.  She is concerned about some blood in the urine, microscopic....she had a cysto on 10/22/18, revealing no issue. She asked about clearance for sexual activity. No fatigue, no headaches. No dysuria, but occasional mild irritation. No incontinence. No urinary frequency, rare nocturia.

## 2018-12-11 NOTE — ASSESSMENT
[Palliative Care Plan] : not applicable at this time [FreeTextEntry1] : Juju is seen in followup in the office today. She is received atezolizumab since February of 2018 is on cycle #13 at this time. She reports that she feels well. She has her usual back pain from her prior surgery for which she does not take any pain medications. She also notes that the neuropathy from that surgery may be somewhat less at this time. Her appetite has been good. She does have some taste ulceration with some sensitivity to salts and spices. This has occurred over the course of the last month or so, coincident with starting of laser therapy for her vitiligo and she has received treatment around her mouth. Her appetite is good, and her weight is stable. She is concerned about the previously noted microscopic hematuria. She had a cystoscopy performed in October which revealed no evidence of tumor. Her last urine in September here revealed one red blood cell and trace blood.\par \par There are no rashes. She has no fatigue. She is considering returning to work. She went to a support group and realized how fortunate she is at this time.\par \par All questions were answered to the best my ability and to her apparent satisfaction. She will be seen once again in 3 weeks' time and I will try to coordinate her visit on the same day as treatment as long as she has blood work performed before that.

## 2018-12-12 ENCOUNTER — APPOINTMENT (OUTPATIENT)
Dept: INFUSION THERAPY | Facility: HOSPITAL | Age: 68
End: 2018-12-12

## 2018-12-12 LAB
ALBUMIN SERPL ELPH-MCNC: 4.4 G/DL
ALP BLD-CCNC: 105 U/L
ALT SERPL-CCNC: 18 U/L
ANION GAP SERPL CALC-SCNC: 13 MMOL/L
AST SERPL-CCNC: 17 U/L
BILIRUB SERPL-MCNC: 0.3 MG/DL
BUN SERPL-MCNC: 16 MG/DL
CALCIUM SERPL-MCNC: 9.9 MG/DL
CHLORIDE SERPL-SCNC: 104 MMOL/L
CO2 SERPL-SCNC: 28 MMOL/L
CORTIS SERPL-MCNC: 12.3 UG/DL
CREAT SERPL-MCNC: 0.71 MG/DL
GLUCOSE SERPL-MCNC: 116 MG/DL
POTASSIUM SERPL-SCNC: 3.8 MMOL/L
PROT SERPL-MCNC: 7 G/DL
SODIUM SERPL-SCNC: 145 MMOL/L
TSH SERPL-ACNC: 0.82 UIU/ML

## 2018-12-17 ENCOUNTER — OUTPATIENT (OUTPATIENT)
Dept: OUTPATIENT SERVICES | Facility: HOSPITAL | Age: 68
LOS: 1 days | Discharge: HOME | End: 2018-12-17

## 2018-12-17 DIAGNOSIS — N63 UNSPECIFIED LUMP IN BREAST: Chronic | ICD-10-CM

## 2018-12-17 DIAGNOSIS — Z98.890 OTHER SPECIFIED POSTPROCEDURAL STATES: Chronic | ICD-10-CM

## 2018-12-17 DIAGNOSIS — Z90.710 ACQUIRED ABSENCE OF BOTH CERVIX AND UTERUS: Chronic | ICD-10-CM

## 2018-12-17 DIAGNOSIS — Z98.89 OTHER SPECIFIED POSTPROCEDURAL STATES: Chronic | ICD-10-CM

## 2018-12-17 DIAGNOSIS — C67.9 MALIGNANT NEOPLASM OF BLADDER, UNSPECIFIED: ICD-10-CM

## 2018-12-27 NOTE — CONSULT LETTER
[Dear  ___] : Dear  [unfilled], [Courtesy Letter:] : I had the pleasure of seeing your patient, [unfilled], in my office today. [Please see my note below.] : Please see my note below. [Consult Closing:] : Thank you very much for allowing me to participate in the care of this patient.  If you have any questions, please do not hesitate to contact me. [Sincerely,] : Sincerely, [DrAzam  ___] : Dr. BRNADT

## 2018-12-31 ENCOUNTER — RESULT REVIEW (OUTPATIENT)
Age: 68
End: 2018-12-31

## 2018-12-31 ENCOUNTER — APPOINTMENT (OUTPATIENT)
Dept: HEMATOLOGY ONCOLOGY | Facility: CLINIC | Age: 68
End: 2018-12-31

## 2018-12-31 LAB
ALBUMIN SERPL ELPH-MCNC: 4.4 G/DL
ALP BLD-CCNC: 109 U/L
ALT SERPL-CCNC: 17 U/L
ANION GAP SERPL CALC-SCNC: 11 MMOL/L
AST SERPL-CCNC: 20 U/L
BILIRUB SERPL-MCNC: 0.4 MG/DL
BUN SERPL-MCNC: 12 MG/DL
CALCIUM SERPL-MCNC: 9.6 MG/DL
CHLORIDE SERPL-SCNC: 103 MMOL/L
CO2 SERPL-SCNC: 28 MMOL/L
CREAT SERPL-MCNC: 0.71 MG/DL
GLUCOSE SERPL-MCNC: 110 MG/DL
HCT VFR BLD CALC: 40.5 % — SIGNIFICANT CHANGE UP (ref 34.5–45)
HGB BLD-MCNC: 13.6 G/DL — SIGNIFICANT CHANGE UP (ref 11.5–15.5)
MCHC RBC-ENTMCNC: 30.1 PG — SIGNIFICANT CHANGE UP (ref 27–34)
MCHC RBC-ENTMCNC: 33.6 G/DL — SIGNIFICANT CHANGE UP (ref 32–36)
MCV RBC AUTO: 89.6 FL — SIGNIFICANT CHANGE UP (ref 80–100)
PLATELET # BLD AUTO: 175 K/UL — SIGNIFICANT CHANGE UP (ref 150–400)
POTASSIUM SERPL-SCNC: 4.6 MMOL/L
PROT SERPL-MCNC: 7 G/DL
RBC # BLD: 4.52 M/UL — SIGNIFICANT CHANGE UP (ref 3.8–5.2)
RBC # FLD: 13.3 % — SIGNIFICANT CHANGE UP (ref 10.3–14.5)
SODIUM SERPL-SCNC: 142 MMOL/L
WBC # BLD: 5.4 K/UL — SIGNIFICANT CHANGE UP (ref 3.8–10.5)
WBC # FLD AUTO: 5.4 K/UL — SIGNIFICANT CHANGE UP (ref 3.8–10.5)

## 2019-01-02 ENCOUNTER — APPOINTMENT (OUTPATIENT)
Dept: HEMATOLOGY ONCOLOGY | Facility: CLINIC | Age: 69
End: 2019-01-02
Payer: COMMERCIAL

## 2019-01-02 ENCOUNTER — APPOINTMENT (OUTPATIENT)
Dept: INFUSION THERAPY | Facility: HOSPITAL | Age: 69
End: 2019-01-02

## 2019-01-02 VITALS
DIASTOLIC BLOOD PRESSURE: 84 MMHG | HEART RATE: 78 BPM | BODY MASS INDEX: 26.69 KG/M2 | WEIGHT: 165.34 LBS | TEMPERATURE: 98.5 F | SYSTOLIC BLOOD PRESSURE: 132 MMHG | RESPIRATION RATE: 16 BRPM | OXYGEN SATURATION: 98 %

## 2019-01-02 LAB
CORTIS SERPL-MCNC: 7.3 UG/DL
TSH SERPL-ACNC: 0.7 UIU/ML

## 2019-01-02 PROCEDURE — 99214 OFFICE O/P EST MOD 30 MIN: CPT

## 2019-01-02 NOTE — REVIEW OF SYSTEMS
[Fatigue] : fatigue [Recent Change In Weight] : ~T recent weight change [Joint Pain] : joint pain [Skin Rash] : skin rash [Anxiety] : anxiety [Negative] : ENT [Fever] : no fever [Chills] : no chills [Night Sweats] : no night sweats [Chest Pain] : no chest pain [Palpitations] : no palpitations [Lower Ext Edema] : no lower extremity edema [Wheezing] : no wheezing [Cough] : no cough [SOB on Exertion] : no shortness of breath during exertion [Abdominal Pain] : no abdominal pain [Vomiting] : no vomiting [Constipation] : no constipation [Diarrhea] : no diarrhea [Dysuria] : no dysuria [Incontinence] : no incontinence [Joint Stiffness] : no joint stiffness [Muscle Pain] : no muscle pain [Dizziness] : no dizziness [Depression] : no depression [Hot Flashes] : no hot flashes [Muscle Weakness] : no muscle weakness [Easy Bleeding] : no tendency for easy bleeding [Easy Bruising] : no tendency for easy bruising [FreeTextEntry2] : gained 3 kilos, mild fatigue [FreeTextEntry9] : "usual aches and pains", no cramps, trigger finger, had shots [de-identified] : vitiligo [de-identified] : No HA, no paresthesias

## 2019-01-02 NOTE — HISTORY OF PRESENT ILLNESS
[Disease: _____________________] : Disease: [unfilled] [T: ___] : T[unfilled] [N: ___] : N[unfilled] [M: ___] : M[unfilled] [AJCC Stage: ____] : AJCC Stage: [unfilled] [de-identified] : Ms. 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 The tumor was found on cystoscopy at the right ureteral orifice. 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 She is referred for consideration of neoadjuvant chemotherapy. In late May, at the time of scheduled back surgery, she thought she had a UTI. Urine was tested and she was treated, then had the surgery. She had burning post-op. She was treated again with an antibiotic. She went to Rehab and was treated again and she was seen by a urologist there. Went to PCP after discharge from rehab, he noted some blood in the urine. She went to [facility] ER and \par she was referred to Dr. Hargrove. She underwent a cystoscopy, revealing tumor. She never noted blood in her urine, but did note it was darker than usual. Still has some "tingling" sensation, no incontinence. Nocturia occurs, which she says is related to awakening from CPAP. No cough. WALKER/bone pains.\par \par 10/27/17...Seen at [facility] in second opinion yesterday. They wanted to repeat procedures again, including cystoscopy. They called  again today. Saw Dr. Montalvo. She was asked to return next Thursday. They want to do a PET CT scan. They recommended she receive cis/gem, likely due to the glandular differentiation. She was overwhelmed by all the information she was given. No pains, no cough/SOB.  \par \par 10/27/17...Juju completed cycle 1 this past Friday, and she noticed on Saturday that her lips appeared swollen and she noticed sores in her mouth. This occurred after going out to eat chinese food.Today she feels that it may be going away. She has fatigue, and feels chills but no fever. She just feels " lousy."  She is feeling Nausea this time and if she takes the metoclopramide in time, then she is okay, She denies vomiting. She is also having some dysgeusia, She denies paraesthesias of the fingers and toes.  She is having constipation, and she is controlling it with the stool softeners.She states her appetite is ok, but the food doesn't taste good, and it hurts when she swallows\par \par 11/21/17...Chemo with cis/gem #2 due this Friday.has increased lower back pain. The pain had stopped. She had prior back surgery. The pain became more notable since starting the chemo. She had constipation with the chemo and noted an increase in the pain with the constipation in lower back near the site of the surgery. The pain feels like pressure, described as heavy, somewhat relieved by BM. Worse with activities. Took some oxycodone a few days ago, but had a headaches afterwards. Pain score currently at 6-7, no recent pain med use. Taking MiraLAX, senna, Colace and prune juice with occasional MOM. Appetite is good, some altered taste, when present, affects appetite. Had some nausea and vomited x 3 after initial chemo, more gagging than vomiting. She felt she had some sores in the mouth after the gem alone, resolved. No paresthesias. She had some discomfort in the right wrist area, at the site where chemo was administered. Fatigue present all the time, more since the start of chemo. No hematuria, good flow, no urgency or incontinence as before. \par \par 12/11/17...day 15, cycle #2, cis/gem.  Juju has increased sensitivity in the lips after chemo. She had some vomiting at the end of her chemo infusion, however she did not have increased nausea and vomiting since. She has fatigue, and she complains of constipation and is taking miralax. She does complain of back pain that has increased in intensity.She notices it when she bends over. She feels that there is something noticeably there in her back. She takes pain medication to help her fall asleep. She doesn't sleep well. She does use c-pap secondary to sleep apnea but she is awaken from her sleep secondary to nocturia. She also notices her pain more when she has constipation and has to go to the bathroom. She describes her lower back pain as 8/10 at worst, and best 5/10. HEr back pain went away after the surgery and she noticed it came back once starting Chemo. She states her appetite is good, She does complain of dysgeusia. She denies paraesthesias. She does not like how the oxycodone makes her feel. It gives her HAs.\par \par 1/26/18...Had RT from the 9th to the 13th of January. She feels there is some decrease in the pain, not as piercing. She feels the area is stiff. Worse when she awakens. Takes 3 x 325 Tylenol at night which helps the pain. has constipation, went one week w/o evacuation. Was given lactulose, but she feels that MiraLAX works better. She is less active as a result of the pain. the pain is worse when she bends. Appetite is variable. No weight loss. Has occasional nausea. No vomiting. Has some indigestion at times. No blood in the urine, no dysuria. No incontinence. Fatigue is present. No pains elsewhere. \par \par 3/14/18...She still has some pain. She is doing PT, which helps temporarily. She has not had the relief of pain that she thought would be the result. Pain score at 4-5, described as aggravation and it inhibits activities. Worse with bending, getting in and out of cars. She is not taking hydromorphone at this time.. She is distressed with constipation from the pain meds. Says lactulose does not work. She takes some Tylenol sporadically, not daily. Appetite is good, lost 1 kilo. No hematuria, no dysuria, no frequency, no incontinence. No pains elsewhere. She remains very active. She has alopecia. No diarrhea. No cough/WALKER. Fatigue is present, mild. She feels it at the end of the day. No N/V. \par \par 4/4/18...Completed 3 cycles of Tecentriq. She noes curling of the right fourth finger involuntarily. She can bend it back up, but it hurts to do so. No difficulty with strength on the right hand, no tremors.  She has tried Icy Hot w/o benefit. She points to DIP and PIP joints as the sites of pain. She feels she has lumps on her head. She is losing her hair, likely secondary to the chemo she received. Her scalp is pruritic. She has also lost pubic hair. Her pain continues to get better,went to PT for 7 weeks. She is able to do all normal activities, with mild fatigue. Appetite is good, weight is stable. No dizziness, no unsteadiness, no headaches. No other issues. No hematuria. \par \par 4/25/18...Missed Rx on 4/16/18. feels "great". Still has back pains, much improved. No pain while seated, but will come on after a while. No worse with ambulation. Does not awaken her. Pain can be as high as 7-8 with activities, best at 3-4. No pain meds utilized. No blood in urine. denies fatigue until late in the day. Appetite is good, but she has lost about 3 pounds. She is avoiding fat products as she has an upset stomach from fats. Had a URI, treated with azithromycin, Flonase and a codeine containing cough suppressant, still has some residual cough. No diarrhea. No dyspnea. has constipation. \par \par 5/7/18...On atezolizumab since 2/12/18. Feels well. Still has some pains, not clearly as severe as before. It is worse if she walks for a while, such as several hundred feet. Also more prominent if she stands too long. Does not bother her at night or awaken her. It does not stop her activities. Takes an occasional ibuprofen, not daily. Appetite is good, weight is stable. No N/V. No diarrhea. No cough/WALKER. Some fatigue. No leg edema. \par \par 5/30/18...Feels "okay". Still has some back pain, nothing like it was before. Has a bunion of the foot which is bothersome and the podiatrist has recommended surgery. No cough/sputum,. No diarrhea. Appetite is good, but lost 2 pounds since last visit. No other pains. No headaches, no paresthesias. No dizziness noted, no balance issues. Mild fatigue, improved.\par \par 6/19/18...Cycle 6 was administered on 6/6/18, due #7 on 6/27/18. "I'm feeling good". Noted some pressure and noted urinary frequency about one week ago, then resolved. No dysuria, no blood, no frequency, nocturia x 1. The usual lower back pain, no pain at the current time. Extra exertion brings on the low back pain and she is not sure if this is from the cancer or from the prior back surgery. She notes several areas over her skin becoming depigmented in small spots. No pruritus. Appetite is good, no weight loss. Actually gained a few pounds. No diarrhea. has some fatigue towards the end of the day. No dizziness. No paresthesias. No cough/WALKER. \par \par 7/7/18...On atezo since 2/12/18. Has received 7 cycles. Saw Dr. Hargrove late June, cysto recommended, but she decided to wait until  after vacation. She notes lightening of her skin. She didn't go to the dermatologist. She has a prior dermatologist that she might see. No diarrhea. No upset stomach. Appetite is ":great", no weight loss. No cough/WALKER. No paresthesias. Minor fatigue along with aches in the evenings. No headaches, no dizziness. No leg edema. Slight hematuria. \par \par 8/29/18...last scans in May\par Reports skin changes due to the vitiligo has been bothering her.  Reports this has been happening more since last month.  Has also been taking Valium from two years ago occasionally for anxiety.  Reports has taken it once every 2-3 weeks, only when she feels overwhelmed with anxiety.  Has not seen an psychiatrist yet, but has been seeing a psychologist.  She has seeing her for one year..  When she gets anxious it occurs mostly at night.  Denies any pain, except after walking a long period of time.  Will occasionally use a cane.  Reports no urinary frequency.  No urinary incontinence, no hematuria.  Reports regular bowel movements.  Reports good appetite, occasional dyspepsia with fried foods.  \par \par 8/19/18...Did not have an appt today, was added onto schedule. She has urgency, no dysuria, no foul odor. She feels bloated as well for the past 3 days. She wonders if it is related to her vitiligo, which is prominent in the urogenital area. Urine is clear, no blood. No fevers, no chills. Appetite is "great", gaining weight. No cough/WALKER. NO diarrheal stools, last BM yesterday, normal. No N/V. No vitiligo is more prominent, which concerns her. She says the bottom of her feet are tender, at the ball of the feet. Toes are numb. This has occurred over the last week. She has also had cramps in her hands. Mostly the thumb, told of he had an injection for her trigger finger. received cycle # 11 of atezolizumab today. \par \par 10/11/18...dose # 12 today. Saw Paulie Hargrove, plan for cysto. "I'm doing well".  She says that her feet always feels like there "is something there", involving the toes, no longer affects the ball of the foot. Saw her podiatrist. Cysto is scheduled for the 16th. Her vitiligo is more prominent his is prominent on the hands. She feels that some parts of her hands are darker as well. Appetite is "fabulous". No N/V/D/C. Urine flow is good, no incontinence, no blood, no dysuria. Urgency and fullness sensation resolved. No cough/SOB. No headaches. No fatigue\par \par 10/31/18....Dose #13 today. Feels well at this time. Back pain remains the same as before, no pain med use. If she does extra activities she has pain. She had back surgery before and she believes it is from the prior surgery and not the mets. No fatigue. Appetite is good, weight is stable., No diarrheal stools. No hematuria noted. No dysuria. Usual activities performed without impairment. Vitiligo is somewhat more prominent, which disturbs her. No leg edema. No cough, no WALKER. No upset stomach. No fevers, no chills. Had cysto on October 16, all was normal. \par \par 11/21/18...Feels "okay". Pain in the back somewhat more prominent, the pain from her prior surgery.Appetite is jeramy, no weight loss. No cough/WALKER. No diarrhea, no upset stomach. Some minor fatigue. She thinks she is depressed, attends a support group here. Starting with a therapist. He  left her recently. [place]. \par \par 12/11/18...Last scans 9/20/18. Getting laser therapy for her vitiligo, which she may not continue due to high c-pays. feels as if her tongue is sensitive to heat since starting the laser therapy. She senses salt more than before. Otherwise well, back pain "bearable", RTS, "part of my life". No pain meds used. Appetite is good, weight is stable. No diarrheal stools No cough, No SOB. She has some sensation in the bladder or vaginal area, pruritic, calmed with Vagisil.  She is concerned about some blood in the urine, microscopic....she had a cysto on 10/22/18, revealing no issue. She asked about clearance for sexual activity. No fatigue, no headaches. No dysuria, but occasional mild irritation. No incontinence. No urinary frequency, rare nocturia.  [de-identified] : high-grade [FreeTextEntry1] : cis/gem, started chemo 11/3/17 as neoadjuvant, then bone mets, had RT. Now on atezolizumab since February 12 [de-identified] : On atezolizumab since 2/12/18. Feels "great". No pains except for usual aches. Appetite is good, weight has increased. No cough, no WALKER. No diarrheal stools, No upset stomach. No edema. No skin rashes. Vitiligo is "going crazy", goes 2 times a week for laser therapy. States she may stop the laser therapy.

## 2019-01-02 NOTE — RESULTS/DATA
[FreeTextEntry1] : Laboratory values from December 31 reveal a normal chemistry panel. The TSH was 0.7. The a.m. cortisol value was 7.3. The CBC was normal.

## 2019-01-02 NOTE — ASSESSMENT
[Palliative Care Plan] : not applicable at this time [FreeTextEntry1] : Juju seen in the office in followup. She is on atezolizumab since mid February of this year. She states that she feels great. She reports no pains except for her usual aches. Her appetite is good and her weight is increased. There are no respiratory complaints. There were no diarrheal stools. She has no upset stomach. She continues to be focused on her vitiligo, and she been receiving laser therapy. She is considering discontinuing the laser therapy at this time.\par \par She did not have any measurable disease upon starting treatment. She had metastases within the lumbar spine became very painful during her second course of chemotherapy. S1 these lesions were discovered on an MRI. Most recent CT scan shows no new lesions or progression of disease.\par \par She is tolerating therapy very well without any major issues. All questions were asked to the best my ability and to her apparent satisfaction. She is having some issues with her . He left her when she became ill. He wants to purchase a home and move down south, and she is not willing to relocate at this time since she is doing so well. She is seeing a therapist at this time.

## 2019-01-03 DIAGNOSIS — Z51.11 ENCOUNTER FOR ANTINEOPLASTIC CHEMOTHERAPY: ICD-10-CM

## 2019-01-16 ENCOUNTER — OUTPATIENT (OUTPATIENT)
Dept: OUTPATIENT SERVICES | Facility: HOSPITAL | Age: 69
LOS: 1 days | Discharge: HOME | End: 2019-01-16

## 2019-01-16 DIAGNOSIS — C67.9 MALIGNANT NEOPLASM OF BLADDER, UNSPECIFIED: ICD-10-CM

## 2019-01-16 DIAGNOSIS — N63 UNSPECIFIED LUMP IN BREAST: Chronic | ICD-10-CM

## 2019-01-16 DIAGNOSIS — Z98.890 OTHER SPECIFIED POSTPROCEDURAL STATES: Chronic | ICD-10-CM

## 2019-01-16 DIAGNOSIS — Z98.89 OTHER SPECIFIED POSTPROCEDURAL STATES: Chronic | ICD-10-CM

## 2019-01-16 DIAGNOSIS — Z90.710 ACQUIRED ABSENCE OF BOTH CERVIX AND UTERUS: Chronic | ICD-10-CM

## 2019-01-23 ENCOUNTER — APPOINTMENT (OUTPATIENT)
Dept: INFUSION THERAPY | Facility: HOSPITAL | Age: 69
End: 2019-01-23

## 2019-01-23 ENCOUNTER — APPOINTMENT (OUTPATIENT)
Dept: HEMATOLOGY ONCOLOGY | Facility: CLINIC | Age: 69
End: 2019-01-23
Payer: COMMERCIAL

## 2019-01-23 VITALS
OXYGEN SATURATION: 98 % | RESPIRATION RATE: 16 BRPM | HEART RATE: 59 BPM | SYSTOLIC BLOOD PRESSURE: 129 MMHG | WEIGHT: 165.34 LBS | TEMPERATURE: 98.4 F | BODY MASS INDEX: 26.69 KG/M2 | DIASTOLIC BLOOD PRESSURE: 88 MMHG

## 2019-01-23 PROCEDURE — 99215 OFFICE O/P EST HI 40 MIN: CPT

## 2019-01-24 DIAGNOSIS — Z51.11 ENCOUNTER FOR ANTINEOPLASTIC CHEMOTHERAPY: ICD-10-CM

## 2019-01-25 ENCOUNTER — FORM ENCOUNTER (OUTPATIENT)
Age: 69
End: 2019-01-25

## 2019-01-26 ENCOUNTER — APPOINTMENT (OUTPATIENT)
Dept: CT IMAGING | Facility: IMAGING CENTER | Age: 69
End: 2019-01-26
Payer: COMMERCIAL

## 2019-01-26 ENCOUNTER — OUTPATIENT (OUTPATIENT)
Dept: OUTPATIENT SERVICES | Facility: HOSPITAL | Age: 69
LOS: 1 days | End: 2019-01-26
Payer: COMMERCIAL

## 2019-01-26 DIAGNOSIS — Z00.8 ENCOUNTER FOR OTHER GENERAL EXAMINATION: ICD-10-CM

## 2019-01-26 DIAGNOSIS — Z98.89 OTHER SPECIFIED POSTPROCEDURAL STATES: Chronic | ICD-10-CM

## 2019-01-26 DIAGNOSIS — Z98.890 OTHER SPECIFIED POSTPROCEDURAL STATES: Chronic | ICD-10-CM

## 2019-01-26 DIAGNOSIS — N63 UNSPECIFIED LUMP IN BREAST: Chronic | ICD-10-CM

## 2019-01-26 DIAGNOSIS — Z90.710 ACQUIRED ABSENCE OF BOTH CERVIX AND UTERUS: Chronic | ICD-10-CM

## 2019-01-26 PROCEDURE — 71260 CT THORAX DX C+: CPT

## 2019-01-26 PROCEDURE — 71260 CT THORAX DX C+: CPT | Mod: 26

## 2019-01-26 PROCEDURE — 74178 CT ABD&PLV WO CNTR FLWD CNTR: CPT

## 2019-01-26 PROCEDURE — 74178 CT ABD&PLV WO CNTR FLWD CNTR: CPT | Mod: 26

## 2019-02-04 NOTE — HISTORY OF PRESENT ILLNESS
[Disease: _____________________] : Disease: [unfilled] [T: ___] : T[unfilled] [N: ___] : N[unfilled] [M: ___] : M[unfilled] [AJCC Stage: ____] : AJCC Stage: [unfilled] [de-identified] : Ms. 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 The tumor was found on cystoscopy at the right ureteral orifice. 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 She is referred for consideration of neoadjuvant chemotherapy. In late May, at the time of scheduled back surgery, she thought she had a UTI. Urine was tested and she was treated, then had the surgery. She had burning post-op. She was treated again with an antibiotic. She went to Rehab and was treated again and she was seen by a urologist there. Went to PCP after discharge from rehab, he noted some blood in the urine. She went to [facility] ER and \par she was referred to Dr. Hargrove. She underwent a cystoscopy, revealing tumor. She never noted blood in her urine, but did note it was darker than usual. Still has some "tingling" sensation, no incontinence. Nocturia occurs, which she says is related to awakening from CPAP. No cough. WALKER/bone pains.\par \par 10/27/17...Seen at [facility] in second opinion yesterday. They wanted to repeat procedures again, including cystoscopy. They called  again today. Saw Dr. Montalvo. She was asked to return next Thursday. They want to do a PET CT scan. They recommended she receive cis/gem, likely due to the glandular differentiation. She was overwhelmed by all the information she was given. No pains, no cough/SOB.  \par \par 10/27/17...Juju completed cycle 1 this past Friday, and she noticed on Saturday that her lips appeared swollen and she noticed sores in her mouth. This occurred after going out to eat chinese food.Today she feels that it may be going away. She has fatigue, and feels chills but no fever. She just feels " lousy."  She is feeling Nausea this time and if she takes the metoclopramide in time, then she is okay, She denies vomiting. She is also having some dysgeusia, She denies paraesthesias of the fingers and toes.  She is having constipation, and she is controlling it with the stool softeners.She states her appetite is ok, but the food doesn't taste good, and it hurts when she swallows\par \par 11/21/17...Chemo with cis/gem #2 due this Friday.has increased lower back pain. The pain had stopped. She had prior back surgery. The pain became more notable since starting the chemo. She had constipation with the chemo and noted an increase in the pain with the constipation in lower back near the site of the surgery. The pain feels like pressure, described as heavy, somewhat relieved by BM. Worse with activities. Took some oxycodone a few days ago, but had a headaches afterwards. Pain score currently at 6-7, no recent pain med use. Taking MiraLAX, senna, Colace and prune juice with occasional MOM. Appetite is good, some altered taste, when present, affects appetite. Had some nausea and vomited x 3 after initial chemo, more gagging than vomiting. She felt she had some sores in the mouth after the gem alone, resolved. No paresthesias. She had some discomfort in the right wrist area, at the site where chemo was administered. Fatigue present all the time, more since the start of chemo. No hematuria, good flow, no urgency or incontinence as before. \par \par 12/11/17...day 15, cycle #2, cis/gem.  Juju has increased sensitivity in the lips after chemo. She had some vomiting at the end of her chemo infusion, however she did not have increased nausea and vomiting since. She has fatigue, and she complains of constipation and is taking miralax. She does complain of back pain that has increased in intensity.She notices it when she bends over. She feels that there is something noticeably there in her back. She takes pain medication to help her fall asleep. She doesn't sleep well. She does use c-pap secondary to sleep apnea but she is awaken from her sleep secondary to nocturia. She also notices her pain more when she has constipation and has to go to the bathroom. She describes her lower back pain as 8/10 at worst, and best 5/10. HEr back pain went away after the surgery and she noticed it came back once starting Chemo. She states her appetite is good, She does complain of dysgeusia. She denies paraesthesias. She does not like how the oxycodone makes her feel. It gives her HAs.\par \par 1/26/18...Had RT from the 9th to the 13th of January. She feels there is some decrease in the pain, not as piercing. She feels the area is stiff. Worse when she awakens. Takes 3 x 325 Tylenol at night which helps the pain. has constipation, went one week w/o evacuation. Was given lactulose, but she feels that MiraLAX works better. She is less active as a result of the pain. the pain is worse when she bends. Appetite is variable. No weight loss. Has occasional nausea. No vomiting. Has some indigestion at times. No blood in the urine, no dysuria. No incontinence. Fatigue is present. No pains elsewhere. \par \par 3/14/18...She still has some pain. She is doing PT, which helps temporarily. She has not had the relief of pain that she thought would be the result. Pain score at 4-5, described as aggravation and it inhibits activities. Worse with bending, getting in and out of cars. She is not taking hydromorphone at this time.. She is distressed with constipation from the pain meds. Says lactulose does not work. She takes some Tylenol sporadically, not daily. Appetite is good, lost 1 kilo. No hematuria, no dysuria, no frequency, no incontinence. No pains elsewhere. She remains very active. She has alopecia. No diarrhea. No cough/WALKER. Fatigue is present, mild. She feels it at the end of the day. No N/V. \par \par 4/4/18...Completed 3 cycles of Tecentriq. She noes curling of the right fourth finger involuntarily. She can bend it back up, but it hurts to do so. No difficulty with strength on the right hand, no tremors.  She has tried Icy Hot w/o benefit. She points to DIP and PIP joints as the sites of pain. She feels she has lumps on her head. She is losing her hair, likely secondary to the chemo she received. Her scalp is pruritic. She has also lost pubic hair. Her pain continues to get better,went to PT for 7 weeks. She is able to do all normal activities, with mild fatigue. Appetite is good, weight is stable. No dizziness, no unsteadiness, no headaches. No other issues. No hematuria. \par \par 4/25/18...Missed Rx on 4/16/18. feels "great". Still has back pains, much improved. No pain while seated, but will come on after a while. No worse with ambulation. Does not awaken her. Pain can be as high as 7-8 with activities, best at 3-4. No pain meds utilized. No blood in urine. denies fatigue until late in the day. Appetite is good, but she has lost about 3 pounds. She is avoiding fat products as she has an upset stomach from fats. Had a URI, treated with azithromycin, Flonase and a codeine containing cough suppressant, still has some residual cough. No diarrhea. No dyspnea. has constipation. \par \par 5/7/18...On atezolizumab since 2/12/18. Feels well. Still has some pains, not clearly as severe as before. It is worse if she walks for a while, such as several hundred feet. Also more prominent if she stands too long. Does not bother her at night or awaken her. It does not stop her activities. Takes an occasional ibuprofen, not daily. Appetite is good, weight is stable. No N/V. No diarrhea. No cough/WALKER. Some fatigue. No leg edema. \par \par 5/30/18...Feels "okay". Still has some back pain, nothing like it was before. Has a bunion of the foot which is bothersome and the podiatrist has recommended surgery. No cough/sputum,. No diarrhea. Appetite is good, but lost 2 pounds since last visit. No other pains. No headaches, no paresthesias. No dizziness noted, no balance issues. Mild fatigue, improved.\par \par 6/19/18...Cycle 6 was administered on 6/6/18, due #7 on 6/27/18. "I'm feeling good". Noted some pressure and noted urinary frequency about one week ago, then resolved. No dysuria, no blood, no frequency, nocturia x 1. The usual lower back pain, no pain at the current time. Extra exertion brings on the low back pain and she is not sure if this is from the cancer or from the prior back surgery. She notes several areas over her skin becoming depigmented in small spots. No pruritus. Appetite is good, no weight loss. Actually gained a few pounds. No diarrhea. has some fatigue towards the end of the day. No dizziness. No paresthesias. No cough/WALKER. \par \par 7/7/18...On atezo since 2/12/18. Has received 7 cycles. Saw Dr. Hargrove late June, cysto recommended, but she decided to wait until  after vacation. She notes lightening of her skin. She didn't go to the dermatologist. She has a prior dermatologist that she might see. No diarrhea. No upset stomach. Appetite is ":great", no weight loss. No cough/WALKER. No paresthesias. Minor fatigue along with aches in the evenings. No headaches, no dizziness. No leg edema. Slight hematuria. \par \par 8/29/18...last scans in May\par Reports skin changes due to the vitiligo has been bothering her.  Reports this has been happening more since last month.  Has also been taking Valium from two years ago occasionally for anxiety.  Reports has taken it once every 2-3 weeks, only when she feels overwhelmed with anxiety.  Has not seen an psychiatrist yet, but has been seeing a psychologist.  She has seeing her for one year..  When she gets anxious it occurs mostly at night.  Denies any pain, except after walking a long period of time.  Will occasionally use a cane.  Reports no urinary frequency.  No urinary incontinence, no hematuria.  Reports regular bowel movements.  Reports good appetite, occasional dyspepsia with fried foods.  \par \par 8/19/18...Did not have an appt today, was added onto schedule. She has urgency, no dysuria, no foul odor. She feels bloated as well for the past 3 days. She wonders if it is related to her vitiligo, which is prominent in the urogenital area. Urine is clear, no blood. No fevers, no chills. Appetite is "great", gaining weight. No cough/WALKER. NO diarrheal stools, last BM yesterday, normal. No N/V. No vitiligo is more prominent, which concerns her. She says the bottom of her feet are tender, at the ball of the feet. Toes are numb. This has occurred over the last week. She has also had cramps in her hands. Mostly the thumb, told of he had an injection for her trigger finger. received cycle # 11 of atezolizumab today. \par \par 10/11/18...dose # 12 today. Saw Paulie Hargrove, plan for cysto. "I'm doing well".  She says that her feet always feels like there "is something there", involving the toes, no longer affects the ball of the foot. Saw her podiatrist. Cysto is scheduled for the 16th. Her vitiligo is more prominent his is prominent on the hands. She feels that some parts of her hands are darker as well. Appetite is "fabulous". No N/V/D/C. Urine flow is good, no incontinence, no blood, no dysuria. Urgency and fullness sensation resolved. No cough/SOB. No headaches. No fatigue\par \par 10/31/18....Dose #13 today. Feels well at this time. Back pain remains the same as before, no pain med use. If she does extra activities she has pain. She had back surgery before and she believes it is from the prior surgery and not the mets. No fatigue. Appetite is good, weight is stable., No diarrheal stools. No hematuria noted. No dysuria. Usual activities performed without impairment. Vitiligo is somewhat more prominent, which disturbs her. No leg edema. No cough, no WALKER. No upset stomach. No fevers, no chills. Had cysto on October 16, all was normal. \par \par 11/21/18...Feels "okay". Pain in the back somewhat more prominent, the pain from her prior surgery.Appetite is jeramy, no weight loss. No cough/WALKER. No diarrhea, no upset stomach. Some minor fatigue. She thinks she is depressed, attends a support group here. Starting with a therapist. He  left [place] recently. [place] \par \par 12/11/18...Last scans 9/20/18. Getting laser therapy for her vitiligo, which she may not continue due to high c-pays. feels as if her tongue is sensitive to heat since starting the laser therapy. She senses salt more than before. Otherwise well, back pain "bearable", RTS, "part of my life". No pain meds used. Appetite is good, weight is stable. No diarrheal stools No cough, No SOB. She has some sensation in the bladder or vaginal area, pruritic, calmed with Vagisil.  She is concerned about some blood in the urine, microscopic....she had a cysto on 10/22/18, revealing no issue. She asked about clearance for sexual activity. No fatigue, no headaches. No dysuria, but occasional mild irritation. No incontinence. No urinary frequency, rare nocturia. \par \par 1/3/19 : On atezolizumab since 2/12/18. Feels "great". No pains except for usual aches. Appetite is good, weight has increased. No cough, no WALKER. No diarrheal stools, No upset stomach. No edema. No skin rashes. Vitiligo is "going crazy", goes 2 times a week for laser therapy. States she may stop the laser therapy.  [de-identified] : high-grade [FreeTextEntry1] : cis/gem, started chemo 11/3/17 as neoadjuvant, then bone mets, had RT. Now on atezolizumab since February 12 [de-identified] : 1/23/19 : Here for Atezolizumab(since 2/18) and follow up. \par She has been well overall wihtout any AEs aside form vitiligo which is a known side effect of these ICI's.

## 2019-02-04 NOTE — PHYSICAL EXAM
[Fully active, able to carry on all pre-disease performance without restriction] : Status 0 - Fully active, able to carry on all pre-disease performance without restriction [Normal] : affect appropriate [de-identified] : bilateral hands : visible vitiligo in the palms and fingers, surrounding her lips.

## 2019-02-04 NOTE — REVIEW OF SYSTEMS
[Fatigue] : fatigue [Recent Change In Weight] : ~T recent weight change [Joint Pain] : joint pain [Skin Rash] : skin rash [Anxiety] : anxiety [Negative] : ENT [Fever] : no fever [Chills] : no chills [Night Sweats] : no night sweats [Chest Pain] : no chest pain [Palpitations] : no palpitations [Lower Ext Edema] : no lower extremity edema [Wheezing] : no wheezing [Cough] : no cough [SOB on Exertion] : no shortness of breath during exertion [Abdominal Pain] : no abdominal pain [Vomiting] : no vomiting [Constipation] : no constipation [Diarrhea] : no diarrhea [Dysuria] : no dysuria [Incontinence] : no incontinence [Joint Stiffness] : no joint stiffness [Muscle Pain] : no muscle pain [Dizziness] : no dizziness [Depression] : no depression [Hot Flashes] : no hot flashes [Muscle Weakness] : no muscle weakness [Easy Bleeding] : no tendency for easy bleeding [Easy Bruising] : no tendency for easy bruising [FreeTextEntry2] : gained 3 kilos, mild fatigue [FreeTextEntry9] : "usual aches and pains", no cramps, trigger finger, had shots [de-identified] : vitiligo [de-identified] : No HA, no paresthesias

## 2019-02-04 NOTE — ASSESSMENT
[Palliative Care Plan] : not applicable at this time [FreeTextEntry1] : Juju seen in the office in followup. She is on atezolizumab since mid February of this year. She states that she feels great. She reports no pains except for her usual aches. Her appetite is good and her weight is increased. There are no respiratory complaints. There were no diarrheal stools. She has no upset stomach. She continues to be focused on her vitiligo, and she been receiving laser therapy. She is considering discontinuing the laser therapy at this time.\par \par She did not have any measurable disease upon starting treatment. She had metastases within the lumbar spine became very painful during her second course of chemotherapy. S1 these lesions were discovered on an MRI. Most recent CT scan shows no new lesions or progression of disease.\par \par She is tolerating therapy very well without any major issues at this point aside from vitiligo which is worsening however not a criteria to hold therapy. She was seen by her dermatologist at [facility] who agreed and also this extends to her vaginal region as well. \par \par Her last scan was 9/18 and therefore we will obtain another set of CT chest., Abd. Pelvis prior to her next treatment in 3 weeks. \par \par Dominick Kahn, ANP-BC

## 2019-02-12 ENCOUNTER — APPOINTMENT (OUTPATIENT)
Dept: HEMATOLOGY ONCOLOGY | Facility: CLINIC | Age: 69
End: 2019-02-12
Payer: MEDICARE

## 2019-02-12 ENCOUNTER — APPOINTMENT (OUTPATIENT)
Dept: INFUSION THERAPY | Facility: HOSPITAL | Age: 69
End: 2019-02-12

## 2019-02-12 VITALS
HEART RATE: 91 BPM | WEIGHT: 165.34 LBS | BODY MASS INDEX: 26.69 KG/M2 | TEMPERATURE: 98.1 F | DIASTOLIC BLOOD PRESSURE: 79 MMHG | RESPIRATION RATE: 16 BRPM | SYSTOLIC BLOOD PRESSURE: 127 MMHG

## 2019-02-12 PROCEDURE — 99214 OFFICE O/P EST MOD 30 MIN: CPT

## 2019-02-12 NOTE — RESULTS/DATA
[FreeTextEntry1] : EXAM: CT CHEST IC \par EXAM: CT ABDOMEN AND PELVIS WAW IC \par PROCEDURE DATE: 01/26/2019 \par INTERPRETATION: CLINICAL INFORMATION: Metastatic urothelial carcinoma for follow-up. Cancer of the urinary bladder. \par COMPARISON: Prior CTs, the most recent dated 9/20/2018. \par PROCEDURE: \par CT of the Chest, Abdomen and Pelvis was performed with and without intravenous contrast. Precontrast, Nephrographic and Excretory phases were acquired. Postcontrast \par images were obtained of the chest. 10 mg of Lasix was administered. Intravenous contrast: 90 ml Omnipaque 350. 10 ml discarded. Oral contrast: None. \par Sagittal and coronal reformats were performed. \par FINDINGS: \par CHEST: \par LUNGS AND LARGE AIRWAYS: Patent central airways. Calcified granuloma is again noted in the right lower lobe. Linear atelectasis in the right lower \par lobe, unchanged. Peripheral reticular opacities in the left upper lobe, which may be secondary to radiation therapy. \par PLEURA: No pleural effusion. Nodular thickening of the minor fissure, unchanged \par VESSELS: Atherosclerotic calcification of the aorta and coronary arteries. \par HEART: Heart size is normal. No pericardial effusion. \par MEDIASTINUM AND ALEJANDRINA: No lymphadenopathy. \par CHEST WALL AND LOWER NECK: Left axillary clips. \par ABDOMEN AND PELVIS: \par LIVER: Within normal limits. \par BILE DUCTS: Normal caliber. \par GALLBLADDER: Within normal limits. \par SPLEEN: Within normal limits. \par PANCREAS: Within normal limits. \par ADRENALS: Within normal limits. \par KIDNEYS/URETERS: No hydronephrosis. Nonobstructing 0.3 cm calculus in the lower pole of the left kidney. Symmetric enhancement. No filling defects in \par the opacified portions of the ureters bilaterally. Segments of both ureters are unopacified. The distal right ureter is not opacified with excreted contrast. \par BLADDER: There is apparent soft tissue density at the posterior aspect of the bladder in the region of the right ureterovesical junction (7:507 and \par 4:143). The distal right ureter is not opacified. \par REPRODUCTIVE ORGANS: Status post hysterectomy. \par BOWEL: No bowel obstruction. Colonic diverticulosis. \par PERITONEUM: No ascites. \par VESSELS: Atherosclerotic calcification. \par RETROPERITONEUM: No lymphadenopathy. \par ABDOMINAL WALL: Within normal limits. \par BONES: Degenerative changes in the spine. Status post posterior fusion with interbody grafts at L4-S1. Sclerotic lesion in the T12 vertebral body, unchanged. \par IMPRESSION: No hydronephrosis. \par The distal right ureter is not opacified with excreted contrast. \par Apparent soft tissue density at the right posterior aspect of the urinary bladder in the region of the right ureterovesical junction. Correlation with cystoscopy is suggested. \par Stable appearance of the chest as compared to 9/20/2018. \par These findings were discussed with nurse Leslie Tate in Dr. Richter's office on 1/28/2019 at 11:51 AM by Dr. Blackburn with read back confirmation. \par JAYLON BLACKBURN M.D., ATTENDING RADIOLOGIST \par This document has been electronically signed. Jan 28 2019 11:56AM \par \par

## 2019-02-12 NOTE — REVIEW OF SYSTEMS
[Fatigue] : fatigue [Eye Pain] : eye pain [Dysphagia] : dysphagia [Anxiety] : anxiety [Negative] : Gastrointestinal [Fever] : no fever [Chills] : no chills [Night Sweats] : no night sweats [Recent Change In Weight] : ~T no recent weight change [Chest Pain] : no chest pain [Palpitations] : no palpitations [Lower Ext Edema] : no lower extremity edema [Wheezing] : no wheezing [Cough] : no cough [SOB on Exertion] : no shortness of breath during exertion [Dysuria] : no dysuria [Incontinence] : no incontinence [Muscle Pain] : no muscle pain [Dizziness] : no dizziness [Difficulty Walking] : no difficulty walking [Depression] : no depression [Muscle Weakness] : no muscle weakness [Easy Bleeding] : no tendency for easy bleeding [Easy Bruising] : no tendency for easy bruising [FreeTextEntry2] : mild fatigue [FreeTextEntry8] : no blood [FreeTextEntry9] : arthritic pains, no changes [de-identified] : vitiligo [de-identified] : No HA.

## 2019-02-12 NOTE — ASSESSMENT
[Palliative Care Plan] : not applicable at this time [FreeTextEntry1] : Juju is seen in the office today in followup. She started atezolizumab exactly one year ago today. She states that she feels "fine". She has no diarrhea, upset stomach, or respiratory complaints. Her appetite has been good. She has difficulty with her hands once again, and she was seen by neurology. She has received some injections. There is no sensory deficit of her hands. She denies any fatigue, rash, or pruritus. The vitiligo continues to be more prominent. She was getting laser therapy but stopped it. She says that she had a complication of blistering of her lips as a result. The vitiligo affected her genitalia before starting immunotherapy. She now has been like to more extensive on the hands, the axilla, as well as the breast.\par \par On physical examination, she appears well. There no significant findings other than mild tenderness to percussion over her lower back where she previously had surgical intervention.\par \par Laboratory values from today are pending. We went over the results of her recent CT scan.\par \par All questions were answered to the best my ability and to her apparent satisfaction. She will continue on therapy and I'll see her once again in 3 weeks' time.

## 2019-02-12 NOTE — PHYSICAL EXAM
[Restricted in physically strenuous activity but ambulatory and able to carry out work of a light or sedentary nature] : Status 1- Restricted in physically strenuous activity but ambulatory and able to carry out work of a light or sedentary nature, e.g., light house work, office work [Normal] : affect appropriate [de-identified] : well hydrated

## 2019-02-12 NOTE — HISTORY OF PRESENT ILLNESS
[Disease: _____________________] : Disease: [unfilled] [T: ___] : T[unfilled] [N: ___] : N[unfilled] [M: ___] : M[unfilled] [AJCC Stage: ____] : AJCC Stage: [unfilled] [de-identified] : Ms. 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 The tumor was found on cystoscopy at the right ureteral orifice. 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 She is referred for consideration of neoadjuvant chemotherapy. In late May, at the time of scheduled back surgery, she thought she had a UTI. Urine was tested and she was treated, then had the surgery. She had burning post-op. She was treated again with an antibiotic. She went to Rehab and was treated again and she was seen by a urologist there. Went to PCP after discharge from rehab, he noted some blood in the urine. She went to [facility] ER and \par she was referred to Dr. Hargrove. She underwent a cystoscopy, revealing tumor. She never noted blood in her urine, but did note it was darker than usual. Still has some "tingling" sensation, no incontinence. Nocturia occurs, which she says is related to awakening from CPAP. No cough. WALKER/bone pains.\par \par 10/27/17...Seen at [facility] in second opinion yesterday. They wanted to repeat procedures again, including cystoscopy. They called  again today. Saw Dr. Montalvo. She was asked to return next Thursday. They want to do a PET CT scan. They recommended she receive cis/gem, likely due to the glandular differentiation. She was overwhelmed by all the information she was given. No pains, no cough/SOB.  \par \par 10/27/17...Juju completed cycle 1 this past Friday, and she noticed on Saturday that her lips appeared swollen and she noticed sores in her mouth. This occurred after going out to eat chinese food.Today she feels that it may be going away. She has fatigue, and feels chills but no fever. She just feels " lousy."  She is feeling Nausea this time and if she takes the metoclopramide in time, then she is okay, She denies vomiting. She is also having some dysgeusia, She denies paraesthesias of the fingers and toes.  She is having constipation, and she is controlling it with the stool softeners.She states her appetite is ok, but the food doesn't taste good, and it hurts when she swallows\par \par 11/21/17...Chemo with cis/gem #2 due this Friday.has increased lower back pain. The pain had stopped. She had prior back surgery. The pain became more notable since starting the chemo. She had constipation with the chemo and noted an increase in the pain with the constipation in lower back near the site of the surgery. The pain feels like pressure, described as heavy, somewhat relieved by BM. Worse with activities. Took some oxycodone a few days ago, but had a headaches afterwards. Pain score currently at 6-7, no recent pain med use. Taking MiraLAX, senna, Colace and prune juice with occasional MOM. Appetite is good, some altered taste, when present, affects appetite. Had some nausea and vomited x 3 after initial chemo, more gagging than vomiting. She felt she had some sores in the mouth after the gem alone, resolved. No paresthesias. She had some discomfort in the right wrist area, at the site where chemo was administered. Fatigue present all the time, more since the start of chemo. No hematuria, good flow, no urgency or incontinence as before. \par \par 12/11/17...day 15, cycle #2, cis/gem.  Juju has increased sensitivity in the lips after chemo. She had some vomiting at the end of her chemo infusion, however she did not have increased nausea and vomiting since. She has fatigue, and she complains of constipation and is taking miralax. She does complain of back pain that has increased in intensity.She notices it when she bends over. She feels that there is something noticeably there in her back. She takes pain medication to help her fall asleep. She doesn't sleep well. She does use c-pap secondary to sleep apnea but she is awaken from her sleep secondary to nocturia. She also notices her pain more when she has constipation and has to go to the bathroom. She describes her lower back pain as 8/10 at worst, and best 5/10. HEr back pain went away after the surgery and she noticed it came back once starting Chemo. She states her appetite is good, She does complain of dysgeusia. She denies paraesthesias. She does not like how the oxycodone makes her feel. It gives her HAs.\par \par 1/26/18...Had RT from the 9th to the 13th of January. She feels there is some decrease in the pain, not as piercing. She feels the area is stiff. Worse when she awakens. Takes 3 x 325 Tylenol at night which helps the pain. has constipation, went one week w/o evacuation. Was given lactulose, but she feels that MiraLAX works better. She is less active as a result of the pain. the pain is worse when she bends. Appetite is variable. No weight loss. Has occasional nausea. No vomiting. Has some indigestion at times. No blood in the urine, no dysuria. No incontinence. Fatigue is present. No pains elsewhere. \par \par 3/14/18...She still has some pain. She is doing PT, which helps temporarily. She has not had the relief of pain that she thought would be the result. Pain score at 4-5, described as aggravation and it inhibits activities. Worse with bending, getting in and out of cars. She is not taking hydromorphone at this time.. She is distressed with constipation from the pain meds. Says lactulose does not work. She takes some Tylenol sporadically, not daily. Appetite is good, lost 1 kilo. No hematuria, no dysuria, no frequency, no incontinence. No pains elsewhere. She remains very active. She has alopecia. No diarrhea. No cough/WALKER. Fatigue is present, mild. She feels it at the end of the day. No N/V. \par \par 4/4/18...Completed 3 cycles of Tecentriq. She noes curling of the right fourth finger involuntarily. She can bend it back up, but it hurts to do so. No difficulty with strength on the right hand, no tremors.  She has tried Icy Hot w/o benefit. She points to DIP and PIP joints as the sites of pain. She feels she has lumps on her head. She is losing her hair, likely secondary to the chemo she received. Her scalp is pruritic. She has also lost pubic hair. Her pain continues to get better,went to PT for 7 weeks. She is able to do all normal activities, with mild fatigue. Appetite is good, weight is stable. No dizziness, no unsteadiness, no headaches. No other issues. No hematuria. \par \par 4/25/18...Missed Rx on 4/16/18. feels "great". Still has back pains, much improved. No pain while seated, but will come on after a while. No worse with ambulation. Does not awaken her. Pain can be as high as 7-8 with activities, best at 3-4. No pain meds utilized. No blood in urine. denies fatigue until late in the day. Appetite is good, but she has lost about 3 pounds. She is avoiding fat products as she has an upset stomach from fats. Had a URI, treated with azithromycin, Flonase and a codeine containing cough suppressant, still has some residual cough. No diarrhea. No dyspnea. has constipation. \par \par 5/7/18...On atezolizumab since 2/12/18. Feels well. Still has some pains, not clearly as severe as before. It is worse if she walks for a while, such as several hundred feet. Also more prominent if she stands too long. Does not bother her at night or awaken her. It does not stop her activities. Takes an occasional ibuprofen, not daily. Appetite is good, weight is stable. No N/V. No diarrhea. No cough/WALKER. Some fatigue. No leg edema. \par \par 5/30/18...Feels "okay". Still has some back pain, nothing like it was before. Has a bunion of the foot which is bothersome and the podiatrist has recommended surgery. No cough/sputum,. No diarrhea. Appetite is good, but lost 2 pounds since last visit. No other pains. No headaches, no paresthesias. No dizziness noted, no balance issues. Mild fatigue, improved.\par \par 6/19/18...Cycle 6 was administered on 6/6/18, due #7 on 6/27/18. "I'm feeling good". Noted some pressure and noted urinary frequency about one week ago, then resolved. No dysuria, no blood, no frequency, nocturia x 1. The usual lower back pain, no pain at the current time. Extra exertion brings on the low back pain and she is not sure if this is from the cancer or from the prior back surgery. She notes several areas over her skin becoming depigmented in small spots. No pruritus. Appetite is good, no weight loss. Actually gained a few pounds. No diarrhea. has some fatigue towards the end of the day. No dizziness. No paresthesias. No cough/WALKER. \par \par 7/7/18...On atezo since 2/12/18. Has received 7 cycles. Saw Dr. Hargrove late June, cysto recommended, but she decided to wait until  after vacation. She notes lightening of her skin. She didn't go to the dermatologist. She has a prior dermatologist that she might see. No diarrhea. No upset stomach. Appetite is ":great", no weight loss. No cough/WALKER. No paresthesias. Minor fatigue along with aches in the evenings. No headaches, no dizziness. No leg edema. Slight hematuria. \par \par 8/29/18...last scans in May\par Reports skin changes due to the vitiligo has been bothering her.  Reports this has been happening more since last month.  Has also been taking Valium from two years ago occasionally for anxiety.  Reports has taken it once every 2-3 weeks, only when she feels overwhelmed with anxiety.  Has not seen an psychiatrist yet, but has been seeing a psychologist.  She has seeing her for one year..  When she gets anxious it occurs mostly at night.  Denies any pain, except after walking a long period of time.  Will occasionally use a cane.  Reports no urinary frequency.  No urinary incontinence, no hematuria.  Reports regular bowel movements.  Reports good appetite, occasional dyspepsia with fried foods.  \par \par 8/19/18...Did not have an appt today, was added onto schedule. She has urgency, no dysuria, no foul odor. She feels bloated as well for the past 3 days. She wonders if it is related to her vitiligo, which is prominent in the urogenital area. Urine is clear, no blood. No fevers, no chills. Appetite is "great", gaining weight. No cough/WALKER. NO diarrheal stools, last BM yesterday, normal. No N/V. No vitiligo is more prominent, which concerns her. She says the bottom of her feet are tender, at the ball of the feet. Toes are numb. This has occurred over the last week. She has also had cramps in her hands. Mostly the thumb, told of he had an injection for her trigger finger. received cycle # 11 of atezolizumab today. \par \par 10/11/18...dose # 12 today. Saw Paulie Hargrove, plan for cysto. "I'm doing well".  She says that her feet always feels like there "is something there", involving the toes, no longer affects the ball of the foot. Saw her podiatrist. Cysto is scheduled for the 16th. Her vitiligo is more prominent his is prominent on the hands. She feels that some parts of her hands are darker as well. Appetite is "fabulous". No N/V/D/C. Urine flow is good, no incontinence, no blood, no dysuria. Urgency and fullness sensation resolved. No cough/SOB. No headaches. No fatigue\par \par 10/31/18....Dose #13 today. Feels well at this time. Back pain remains the same as before, no pain med use. If she does extra activities she has pain. She had back surgery before and she believes it is from the prior surgery and not the mets. No fatigue. Appetite is good, weight is stable., No diarrheal stools. No hematuria noted. No dysuria. Usual activities performed without impairment. Vitiligo is somewhat more prominent, which disturbs her. No leg edema. No cough, no WALKER. No upset stomach. No fevers, no chills. Had cysto on October 16, all was normal. \par \par 11/21/18...Feels "okay". Pain in the back somewhat more prominent, the pain from her prior surgery.Appetite is jeramy, no weight loss. No cough/WALKER. No diarrhea, no upset stomach. Some minor fatigue. She thinks she is depressed, attends a support group here. Starting with a therapist. He  left [place] recently. [place] \par \par 12/11/18...Last scans 9/20/18. Getting laser therapy for her vitiligo, which she may not continue due to high c-pays. feels as if her tongue is sensitive to heat since starting the laser therapy. She senses salt more than before. Otherwise well, back pain "bearable", RTS, "part of my life". No pain meds used. Appetite is good, weight is stable. No diarrheal stools No cough, No SOB. She has some sensation in the bladder or vaginal area, pruritic, calmed with Vagisil.  She is concerned about some blood in the urine, microscopic....she had a cysto on 10/22/18, revealing no issue. She asked about clearance for sexual activity. No fatigue, no headaches. No dysuria, but occasional mild irritation. No incontinence. No urinary frequency, rare nocturia. \par \par 1/3/19 : On atezolizumab since 2/12/18. Feels "great". No pains except for usual aches. Appetite is good, weight has increased. No cough, no WALKER. No diarrheal stools, No upset stomach. No edema. No skin rashes. Vitiligo is "going crazy", goes 2 times a week for laser therapy. States she may stop the laser therapy. \par \par 1/23/19...1/23/19 : Here for Atezolizumab(since 2/18) and follow up. \par She has been well overall without any AEs aside form vitiligo which is a known side effect of these ICI's.  [de-identified] : high-grade [FreeTextEntry1] : cis/gem, started chemo 11/3/17 as neoadjuvant, then bone mets, had RT. Now on atezolizumab since February 12, 2018 [de-identified] : One year on atezolizumab. Feels "fine". No diarrheal stool, no dyspepsia, no cough/WALKER. Appetite s good, no weight loss. No headaches, some paresthesias of the feet, no change. Has seen neurology and received injections. Can't open her hands at times. No fatigue, no pruritus. NO skin rashes. Vitiligo continues to be more prominent. Went for laser therapy, wit stopped it. She says her lips blistered. Vitiligo affects genitalia, present prior to Rx, has vitiligo of the hands, axilla and breasts.

## 2019-02-25 ENCOUNTER — OUTPATIENT (OUTPATIENT)
Dept: OUTPATIENT SERVICES | Facility: HOSPITAL | Age: 69
LOS: 1 days | Discharge: HOME | End: 2019-02-25

## 2019-02-25 DIAGNOSIS — Z90.710 ACQUIRED ABSENCE OF BOTH CERVIX AND UTERUS: Chronic | ICD-10-CM

## 2019-02-25 DIAGNOSIS — Z98.89 OTHER SPECIFIED POSTPROCEDURAL STATES: Chronic | ICD-10-CM

## 2019-02-25 DIAGNOSIS — C67.9 MALIGNANT NEOPLASM OF BLADDER, UNSPECIFIED: ICD-10-CM

## 2019-02-25 DIAGNOSIS — Z98.890 OTHER SPECIFIED POSTPROCEDURAL STATES: Chronic | ICD-10-CM

## 2019-02-25 DIAGNOSIS — N63 UNSPECIFIED LUMP IN BREAST: Chronic | ICD-10-CM

## 2019-02-28 ENCOUNTER — OTHER (OUTPATIENT)
Age: 69
End: 2019-02-28

## 2019-03-02 ENCOUNTER — FORM ENCOUNTER (OUTPATIENT)
Age: 69
End: 2019-03-02

## 2019-03-03 ENCOUNTER — OUTPATIENT (OUTPATIENT)
Dept: OUTPATIENT SERVICES | Facility: HOSPITAL | Age: 69
LOS: 1 days | End: 2019-03-03
Payer: COMMERCIAL

## 2019-03-03 ENCOUNTER — APPOINTMENT (OUTPATIENT)
Dept: MRI IMAGING | Facility: IMAGING CENTER | Age: 69
End: 2019-03-03
Payer: COMMERCIAL

## 2019-03-03 DIAGNOSIS — G89.3 NEOPLASM RELATED PAIN (ACUTE) (CHRONIC): ICD-10-CM

## 2019-03-03 DIAGNOSIS — Z98.89 OTHER SPECIFIED POSTPROCEDURAL STATES: Chronic | ICD-10-CM

## 2019-03-03 DIAGNOSIS — M89.8X9 OTHER SPECIFIED DISORDERS OF BONE, UNSPECIFIED SITE: ICD-10-CM

## 2019-03-03 DIAGNOSIS — Z98.890 OTHER SPECIFIED POSTPROCEDURAL STATES: Chronic | ICD-10-CM

## 2019-03-03 DIAGNOSIS — N63 UNSPECIFIED LUMP IN BREAST: Chronic | ICD-10-CM

## 2019-03-03 DIAGNOSIS — C67.6 MALIGNANT NEOPLASM OF URETERIC ORIFICE: ICD-10-CM

## 2019-03-03 DIAGNOSIS — Z90.710 ACQUIRED ABSENCE OF BOTH CERVIX AND UTERUS: Chronic | ICD-10-CM

## 2019-03-03 PROCEDURE — 72158 MRI LUMBAR SPINE W/O & W/DYE: CPT

## 2019-03-03 PROCEDURE — A9585: CPT

## 2019-03-03 PROCEDURE — 72158 MRI LUMBAR SPINE W/O & W/DYE: CPT | Mod: 26

## 2019-03-06 ENCOUNTER — LABORATORY RESULT (OUTPATIENT)
Age: 69
End: 2019-03-06

## 2019-03-06 ENCOUNTER — APPOINTMENT (OUTPATIENT)
Dept: INFUSION THERAPY | Facility: HOSPITAL | Age: 69
End: 2019-03-06

## 2019-03-06 ENCOUNTER — RESULT REVIEW (OUTPATIENT)
Age: 69
End: 2019-03-06

## 2019-03-06 ENCOUNTER — APPOINTMENT (OUTPATIENT)
Dept: HEMATOLOGY ONCOLOGY | Facility: CLINIC | Age: 69
End: 2019-03-06
Payer: COMMERCIAL

## 2019-03-06 VITALS
TEMPERATURE: 98.7 F | WEIGHT: 165.32 LBS | HEART RATE: 69 BPM | OXYGEN SATURATION: 97 % | BODY MASS INDEX: 26.69 KG/M2 | DIASTOLIC BLOOD PRESSURE: 98 MMHG | SYSTOLIC BLOOD PRESSURE: 151 MMHG | RESPIRATION RATE: 18 BRPM

## 2019-03-06 DIAGNOSIS — D49.4 NEOPLASM OF UNSPECIFIED BEHAVIOR OF BLADDER: ICD-10-CM

## 2019-03-06 LAB
HCT VFR BLD CALC: 39.3 % — SIGNIFICANT CHANGE UP (ref 34.5–45)
HGB BLD-MCNC: 13.1 G/DL — SIGNIFICANT CHANGE UP (ref 11.5–15.5)
MCHC RBC-ENTMCNC: 29.9 PG — SIGNIFICANT CHANGE UP (ref 27–34)
MCHC RBC-ENTMCNC: 33.5 G/DL — SIGNIFICANT CHANGE UP (ref 32–36)
MCV RBC AUTO: 89.5 FL — SIGNIFICANT CHANGE UP (ref 80–100)
PLATELET # BLD AUTO: 190 K/UL — SIGNIFICANT CHANGE UP (ref 150–400)
RBC # BLD: 4.39 M/UL — SIGNIFICANT CHANGE UP (ref 3.8–5.2)
RBC # FLD: 12.9 % — SIGNIFICANT CHANGE UP (ref 10.3–14.5)
WBC # BLD: 6.6 K/UL — SIGNIFICANT CHANGE UP (ref 3.8–10.5)
WBC # FLD AUTO: 6.6 K/UL — SIGNIFICANT CHANGE UP (ref 3.8–10.5)

## 2019-03-06 PROCEDURE — 99215 OFFICE O/P EST HI 40 MIN: CPT

## 2019-03-07 DIAGNOSIS — Z51.11 ENCOUNTER FOR ANTINEOPLASTIC CHEMOTHERAPY: ICD-10-CM

## 2019-03-07 LAB
ALBUMIN SERPL ELPH-MCNC: 4.4 G/DL
ALP BLD-CCNC: 109 U/L
ALT SERPL-CCNC: 14 U/L
ANION GAP SERPL CALC-SCNC: 12 MMOL/L
AST SERPL-CCNC: 17 U/L
BILIRUB SERPL-MCNC: 0.4 MG/DL
BUN SERPL-MCNC: 16 MG/DL
CALCIUM SERPL-MCNC: 9.8 MG/DL
CHLORIDE SERPL-SCNC: 103 MMOL/L
CO2 SERPL-SCNC: 26 MMOL/L
CREAT SERPL-MCNC: 0.69 MG/DL
GLUCOSE SERPL-MCNC: 97 MG/DL
POTASSIUM SERPL-SCNC: 4.5 MMOL/L
PROT SERPL-MCNC: 7.1 G/DL
SODIUM SERPL-SCNC: 141 MMOL/L
TSH SERPL-ACNC: 0.74 UIU/ML

## 2019-03-26 ENCOUNTER — APPOINTMENT (OUTPATIENT)
Dept: HEMATOLOGY ONCOLOGY | Facility: CLINIC | Age: 69
End: 2019-03-26
Payer: COMMERCIAL

## 2019-03-26 ENCOUNTER — APPOINTMENT (OUTPATIENT)
Dept: INFUSION THERAPY | Facility: HOSPITAL | Age: 69
End: 2019-03-26

## 2019-03-26 VITALS
DIASTOLIC BLOOD PRESSURE: 85 MMHG | OXYGEN SATURATION: 74 % | BODY MASS INDEX: 26.87 KG/M2 | RESPIRATION RATE: 22 BRPM | SYSTOLIC BLOOD PRESSURE: 132 MMHG | TEMPERATURE: 97.9 F | WEIGHT: 166.45 LBS | HEART RATE: 97 BPM

## 2019-03-26 PROCEDURE — 99215 OFFICE O/P EST HI 40 MIN: CPT

## 2019-03-26 NOTE — CONSULT LETTER
[Dear  ___] : Dear  [unfilled], [Courtesy Letter:] : I had the pleasure of seeing your patient, [unfilled], in my office today. [Please see my note below.] : Please see my note below. [Consult Closing:] : Thank you very much for allowing me to participate in the care of this patient.  If you have any questions, please do not hesitate to contact me. [Sincerely,] : Sincerely, [DrAzam  ___] : Dr. BRANDT [FreeTextEntry2] : Dr. Kalen Kauffman MD [facility]

## 2019-03-26 NOTE — HISTORY OF PRESENT ILLNESS
[Disease: _____________________] : Disease: [unfilled] [T: ___] : T[unfilled] [N: ___] : N[unfilled] [M: ___] : M[unfilled] [AJCC Stage: ____] : AJCC Stage: [unfilled] [de-identified] : Ms. 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 The tumor was found on cystoscopy at the right ureteral orifice. 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 She is referred for consideration of neoadjuvant chemotherapy. In late May, at the time of scheduled back surgery, she thought she had a UTI. Urine was tested and she was treated, then had the surgery. She had burning post-op. She was treated again with an antibiotic. She went to Rehab and was treated again and she was seen by a urologist there. Went to PCP after discharge from rehab, he noted some blood in the urine. She went to [facility] ER and \par she was referred to Dr. Hargrove. She underwent a cystoscopy, revealing tumor. She never noted blood in her urine, but did note it was darker than usual. Still has some "tingling" sensation, no incontinence. Nocturia occurs, which she says is related to awakening from CPAP. No cough. CHRISTIE/bone pains.\par \par 10/27/17...Seen at [facility] [place] in second opinion yesterday. They wanted to repeat procedures again, including cystoscopy. They called  again today. Saw Dr. Montalvo. She was asked to return next Thursday. They want to do a PET CT scan. They recommended she receive cis/gem, likely due to the glandular differentiation. She was overwhelmed by all the information she was given. No pains, no cough/SOB.  \par \par 10/27/17...Juju completed cycle 1 this past Friday, and she noticed on Saturday that her lips appeared swollen and she noticed sores in her mouth. This occurred after going out to eat chinese food.Today she feels that it may be going away. She has fatigue, and feels chills but no fever. She just feels " lousy."  She is feeling Nausea this time and if she takes the metoclopramide in time, then she is okay, She denies vomiting. She is also having some dysgeusia, She denies paraesthesias of the fingers and toes.  She is having constipation, and she is controlling it with the stool softeners.She states her appetite is ok, but the food doesn't taste good, and it hurts when she swallows\par \par 11/21/17...Chemo with cis/gem #2 due this Friday.has increased lower back pain. The pain had stopped. She had prior back surgery. The pain became more notable since starting the chemo. She had constipation with the chemo and noted an increase in the pain with the constipation in lower back near the site of the surgery. The pain feels like pressure, described as heavy, somewhat relieved by BM. Worse with activities. Took some oxycodone a few days ago, but had a headaches afterwards. Pain score currently at 6-7, no recent pain med use. Taking MiraLAX, senna, Colace and prune juice with occasional MOM. Appetite is good, some altered taste, when present, affects appetite. Had some nausea and vomited x 3 after initial chemo, more gagging than vomiting. She felt she had some sores in the mouth after the gem alone, resolved. No paresthesias. She had some discomfort in the right wrist area, at the site where chemo was administered. Fatigue present all the time, more since the start of chemo. No hematuria, good flow, no urgency or incontinence as before. \par \par 12/11/17...day 15, cycle #2, cis/gem.  Juju has increased sensitivity in the lips after chemo. She had some vomiting at the end of her chemo infusion, however she did not have increased nausea and vomiting since. She has fatigue, and she complains of constipation and is taking miralax. She does complain of back pain that has increased in intensity.She notices it when she bends over. She feels that there is something noticeably there in her back. She takes pain medication to help her fall asleep. She doesn't sleep well. She does use c-pap secondary to sleep apnea but she is awaken from her sleep secondary to nocturia. She also notices her pain more when she has constipation and has to go to the bathroom. She describes her lower back pain as 8/10 at worst, and best 5/10. HEr back pain went away after the surgery and she noticed it came back once starting Chemo. She states her appetite is good, She does complain of dysgeusia. She denies paraesthesias. She does not like how the oxycodone makes her feel. It gives her HAs.\par \par 1/26/18...Had RT from the 9th to the 13th of January. She feels there is some decrease in the pain, not as piercing. She feels the area is stiff. Worse when she awakens. Takes 3 x 325 Tylenol at night which helps the pain. has constipation, went one week w/o evacuation. Was given lactulose, but she feels that MiraLAX works better. She is less active as a result of the pain. the pain is worse when she bends. Appetite is variable. No weight loss. Has occasional nausea. No vomiting. Has some indigestion at times. No blood in the urine, no dysuria. No incontinence. Fatigue is present. No pains elsewhere. \par \par 3/14/18...She still has some pain. She is doing PT, which helps temporarily. She has not had the relief of pain that she thought would be the result. Pain score at 4-5, described as aggravation and it inhibits activities. Worse with bending, getting in and out of cars. She is not taking hydromorphone at this time.. She is distressed with constipation from the pain meds. Says lactulose does not work. She takes some Tylenol sporadically, not daily. Appetite is good, lost 1 kilo. No hematuria, no dysuria, no frequency, no incontinence. No pains elsewhere. She remains very active. She has alopecia. No diarrhea. No cough/CHRISTIE. Fatigue is present, mild. She feels it at the end of the day. No N/V. \par \par 4/4/18...Completed 3 cycles of Tecentriq. She noes curling of the right fourth finger involuntarily. She can bend it back up, but it hurts to do so. No difficulty with strength on the right hand, no tremors.  She has tried Icy Hot w/o benefit. She points to DIP and PIP joints as the sites of pain. She feels she has lumps on her head. She is losing her hair, likely secondary to the chemo she received. Her scalp is pruritic. She has also lost pubic hair. Her pain continues to get better,went to PT for 7 weeks. She is able to do all normal activities, with mild fatigue. Appetite is good, weight is stable. No dizziness, no unsteadiness, no headaches. No other issues. No hematuria. \par \par 4/25/18...Missed Rx on 4/16/18. feels "great". Still has back pains, much improved. No pain while seated, but will come on after a while. No worse with ambulation. Does not awaken her. Pain can be as high as 7-8 with activities, best at 3-4. No pain meds utilized. No blood in urine. denies fatigue until late in the day. Appetite is good, but she has lost about 3 pounds. She is avoiding fat products as she has an upset stomach from fats. Had a URI, treated with azithromycin, Flonase and a codeine containing cough suppressant, still has some residual cough. No diarrhea. No dyspnea. has constipation. \par \par 5/7/18...On atezolizumab since 2/12/18. Feels well. Still has some pains, not clearly as severe as before. It is worse if she walks for a while, such as several hundred feet. Also more prominent if she stands too long. Does not bother her at night or awaken her. It does not stop her activities. Takes an occasional ibuprofen, not daily. Appetite is good, weight is stable. No N/V. No diarrhea. No cough/CHRISTIE. Some fatigue. No leg edema. \par \par 5/30/18...Feels "okay". Still has some back pain, nothing like it was before. Has a bunion of the foot which is bothersome and the podiatrist has recommended surgery. No cough/sputum,. No diarrhea. Appetite is good, but lost 2 pounds since last visit. No other pains. No headaches, no paresthesias. No dizziness noted, no balance issues. Mild fatigue, improved.\par \par 6/19/18...Cycle 6 was administered on 6/6/18, due #7 on 6/27/18. "I'm feeling good". Noted some pressure and noted urinary frequency about one week ago, then resolved. No dysuria, no blood, no frequency, nocturia x 1. The usual lower back pain, no pain at the current time. Extra exertion brings on the low back pain and she is not sure if this is from the cancer or from the prior back surgery. She notes several areas over her skin becoming depigmented in small spots. No pruritus. Appetite is good, no weight loss. Actually gained a few pounds. No diarrhea. has some fatigue towards the end of the day. No dizziness. No paresthesias. No cough/CHRISTIE. \par \par 7/7/18...On atezo since 2/12/18. Has received 7 cycles. Saw Dr. Hargrove late June, cysto recommended, but she decided to wait until  after vacation. She notes lightening of her skin. She didn't go to the dermatologist. She has a prior dermatologist that she might see. No diarrhea. No upset stomach. Appetite is ":great", no weight loss. No cough/CHRISTIE. No paresthesias. Minor fatigue along with aches in the evenings. No headaches, no dizziness. No leg edema. Slight hematuria. \par \par 8/29/18...last scans in May\par Reports skin changes due to the vitiligo has been bothering her.  Reports this has been happening more since last month.  Has also been taking Valium from two years ago occasionally for anxiety.  Reports has taken it once every 2-3 weeks, only when she feels overwhelmed with anxiety.  Has not seen an psychiatrist yet, but has been seeing a psychologist.  She has seeing her for one year..  When she gets anxious it occurs mostly at night.  Denies any pain, except after walking a long period of time.  Will occasionally use a cane.  Reports no urinary frequency.  No urinary incontinence, no hematuria.  Reports regular bowel movements.  Reports good appetite, occasional dyspepsia with fried foods.  \par \par 8/19/18...Did not have an appt today, was added onto schedule. She has urgency, no dysuria, no foul odor. She feels bloated as well for the past 3 days. She wonders if it is related to her vitiligo, which is prominent in the urogenital area. Urine is clear, no blood. No fevers, no chills. Appetite is "great", gaining weight. No cough/CHRISTIE. NO diarrheal stools, last BM yesterday, normal. No N/V. No vitiligo is more prominent, which concerns her. She says the bottom of her feet are tender, at the ball of the feet. Toes are numb. This has occurred over the last week. She has also had cramps in her hands. Mostly the thumb, told of he had an injection for her trigger finger. received cycle # 11 of atezolizumab today. \par \par 10/11/18...dose # 12 today. Saw Paulie Hargrove, plan for cysto. "I'm doing well".  She says that her feet always feels like there "is something there", involving the toes, no longer affects the ball of the foot. Saw her podiatrist. Cysto is scheduled for the 16th. Her vitiligo is more prominent his is prominent on the hands. She feels that some parts of her hands are darker as well. Appetite is "fabulous". No N/V/D/C. Urine flow is good, no incontinence, no blood, no dysuria. Urgency and fullness sensation resolved. No cough/SOB. No headaches. No fatigue\par \par 10/31/18....Dose #13 today. Feels well at this time. Back pain remains the same as before, no pain med use. If she does extra activities she has pain. She had back surgery before and she believes it is from the prior surgery and not the mets. No fatigue. Appetite is good, weight is stable., No diarrheal stools. No hematuria noted. No dysuria. Usual activities performed without impairment. Vitiligo is somewhat more prominent, which disturbs her. No leg edema. No cough, no CHRISTIE. No upset stomach. No fevers, no chills. Had cysto on October 16, all was normal. \par \par 11/21/18...Feels "okay". Pain in the back somewhat more prominent, the pain from her prior surgery.Appetite is jeramy, no weight loss. No cough/CHRISTIE. No diarrhea, no upset stomach. Some minor fatigue. She thinks she is depressed, attends a support group here. Starting with a therapist. He  left [place] recently. [place] \par \par 12/11/18...Last scans 9/20/18. Getting laser therapy for her vitiligo, which she may not continue due to high c-pays. feels as if her tongue is sensitive to heat since starting the laser therapy. She senses salt more than before. Otherwise well, back pain "bearable", RTS, "part of my life". No pain meds used. Appetite is good, weight is stable. No diarrheal stools No cough, No SOB. She has some sensation in the bladder or vaginal area, pruritic, calmed with Vagisil.  She is concerned about some blood in the urine, microscopic....she had a cysto on 10/22/18, revealing no issue. She asked about clearance for sexual activity. No fatigue, no headaches. No dysuria, but occasional mild irritation. No incontinence. No urinary frequency, rare nocturia. \par \par 1/3/19 : On atezolizumab since 2/12/18. Feels "great". No pains except for usual aches. Appetite is good, weight has increased. No cough, no CHRISTIE. No diarrheal stools, No upset stomach. No edema. No skin rashes. Vitiligo is "going crazy", goes 2 times a week for laser therapy. States she may stop the laser therapy. \par \par 1/23/19...1/23/19 : Here for Atezolizumab(since 2/18) and follow up. \par She has been well overall without any AEs aside form vitiligo which is a known side effect of these ICI's. \par \par 2/13/19 : One year on atezolizumab. Feels "fine". No diarrheal stool, no dyspepsia, no cough/CHRISTIE. Appetite s good, no weight loss. No headaches, some paresthesias of the feet, no change. Has seen neurology and received injections. Can't open her hands at times. No fatigue, no pruritus. NO skin rashes. Vitiligo continues to be more prominent. Went for laser therapy, wit stopped it. She says her lips blistered. Vitiligo affects genitalia, present prior to Rx, has vitiligo of the hands, axilla and breasts. \par \par 3/6/19 : Ms. Roweparson is here for a follow up and atezolizumab. She has been having lower back pain that resembled pain she had when she had recurrence. She underwent MRI on 3/3/19. This showed re-demonstrating the spine lesions, no changes were noted on the MRI. She has DJD and bulging disc with narrowing in L4-5.\par No new lesions were seen. She has not followed up with Dr. VINICIUS Kauffman for this either.\par \par 3/26/19 : Here for Atezolizumab. She had  R thumb and hand arthritis 2 weeks ago and was on pulse steroid and now much eased. She did not notify us but rather went to see her hand specialist.  She take Aleve at times with good effect. Her back DJD is bothersome but christie snot affects her ADLS and will follow up with Dr. Kauffman.  [de-identified] : high-grade [FreeTextEntry1] : cis/gem, started chemo 11/3/17 as neoadjuvant, then bone mets, had RT. Now on atezolizumab since February 12, 2018

## 2019-03-26 NOTE — ASSESSMENT
[Palliative Care Plan] : not applicable at this time [FreeTextEntry1] : Juju is seen in the office today in followup. She started atezolizumab exactly one year ago today. She states that she feels "fine". She has no diarrhea, upset stomach, or respiratory complaints. Her appetite has been good. She has difficulty with her hands once again, and she was seen by neurology. She has received some injections. There is no sensory deficit of her hands. She denies any fatigue, rash, or pruritus. The vitiligo continues to be more prominent. She was getting laser therapy but stopped it. She says that she had a complication of blistering of her lips as a result. The vitiligo affected her genitalia before starting immunotherapy. She now has more extensive areas on the hands, the axilla, as well as the breast.\par \par She had an episode of flair of arthritis in her R hand but now much improved. She has sensitivity in her genitourinary region due to vitiligo and paresthesias. \par \par We will proceed  with therapy today as planned. \par \par All questions were answered to the best my ability and to her apparent satisfaction. She will continue on therapy and we will see her once again in 3 weeks' time with Dr. Richter.\par \par Dominick Kahn, ANP-BC

## 2019-03-26 NOTE — REVIEW OF SYSTEMS
[Fatigue] : fatigue [Eye Pain] : eye pain [Dysphagia] : dysphagia [Anxiety] : anxiety [Negative] : Allergic/Immunologic [Fever] : no fever [Chills] : no chills [Night Sweats] : no night sweats [Recent Change In Weight] : ~T no recent weight change [Chest Pain] : no chest pain [Palpitations] : no palpitations [Lower Ext Edema] : no lower extremity edema [Wheezing] : no wheezing [Cough] : no cough [SOB on Exertion] : no shortness of breath during exertion [Dysuria] : no dysuria [Incontinence] : no incontinence [Muscle Pain] : no muscle pain [Dizziness] : no dizziness [Difficulty Walking] : no difficulty walking [Depression] : no depression [Muscle Weakness] : no muscle weakness [Easy Bleeding] : no tendency for easy bleeding [Easy Bruising] : no tendency for easy bruising [FreeTextEntry2] : mild fatigue [FreeTextEntry8] : no blood [FreeTextEntry9] : arthritic pains, no changes [de-identified] : vitiligo [de-identified] : No HA.

## 2019-03-26 NOTE — RESULTS/DATA
[FreeTextEntry1] : EXAM: MR SPINE LUMBAR WAW IC \par \par \par PROCEDURE DATE: 03/03/2019 \par \par \par \par INTERPRETATION: \par LUMBOSACRAL SPINE MRI \par \par CLINICAL INFORMATION: Severe back pain. Prior lumbar spine surgery. History \par of metastatic bladder cancer status post radiation January 2018, now with \par recent severe pain. \par TECHNIQUE: Multiplanar, multisequence MR imaging was obtained of the \par lumbosacral spine with and without the administration of intravenous \par contrast. 8 cc of Gadavist wrist administered intravenously. 2 cc were \par discarded \par \par COMPARISON: Lumbar spine MRI dated 12/2/2017. \par \par FINDINGS: \par \par \par Redemonstration of enhancing foci within the L1 and L2 vertebral bodies. \par Lesion within the T12 vertebral body is unchanged in size with interval \par decrease in enhancement. There is anterior endplate marrow edema at T11/T12, \par slightly worsened when compared with prior MRI. No new enhancing foci are \par visualized. \par \par Redemonstration of status post posterior decompression and instrumented \par fusion from L4 through S1. Susceptibility from interbody spacers are \par visualized at L4/L5 and L5/S1. \par \par DISC LEVEL EVALUATION: \par \par L1/L2: No spinal canal or foraminal narrowing. \par L2/L3: No spinal canal or foraminal narrowing. \par L3/L4: No spinal canal or foraminal narrowing. Small left ligamentum flavum \par cyst measuring 4 mm. \par L4/L5: Status post posterior decompression. Small disc bulge and bilateral \par facet arthrosis resulting in mild left foraminal narrowing. Granulation \par tissue postcontrast enhancement is visualized along the posterior aspect of \par the L5 vertebral body within the ventral epidural space as well as dorsal to \par the thecal sac. \par L5/S1: Status post posterior decompression. Circumferential epidural \par granulation tissue surrounds the spinal canal and proximal left lateral \par recess/L5 nerve root. No spinal canal narrowing and mild bilateral foraminal \par narrowing. \par \par SPINAL ALIGNMENT: Preservation of the vertebral body heights. There is no \par listhesis. \par DISTAL CORD AND CONUS: Distal cord terminates at L1. Distal cord and conus \par are intact. No abnormal cord signal enhancement. \par SI JOINTS: Sacroiliac joints are intact. \par PARASPINAL MUSCLE AND SOFT TISSUES: Paraspinal muscle atrophy at the level \par of the sacrum. \par INTRAABDOMINAL/INTRAPELVIC SOFT TISSUES: Unremarkable. \par \par IMPRESSION: \par Redemonstration of enhancing foci within the L1 and L2 vertebral bodies. \par Interval decrease in enhancement of T12 vertebral body lesion. No new \par enhancing foci visualized. \par \par Status post posterior decompression and instrumented fusion from L4 through \par S1 with circumferential granulation tissue at the level of L5 involving the \par left L5 lateral recess. \par \par \par CONNOR BELLA M.D., ATTENDING RADIOLOGIST \par This document has been electronically signed. Mar 5 2019 4:05PM \par \par \par \par \par \par EXAM: CT CHEST IC \par EXAM: CT ABDOMEN AND PELVIS WAW IC \par PROCEDURE DATE: 01/26/2019 \par INTERPRETATION: CLINICAL INFORMATION: Metastatic urothelial carcinoma for follow-up. Cancer of the urinary bladder. \par COMPARISON: Prior CTs, the most recent dated 9/20/2018. \par PROCEDURE: \par CT of the Chest, Abdomen and Pelvis was performed with and without intravenous contrast. Precontrast, Nephrographic and Excretory phases were acquired. Postcontrast \par images were obtained of the chest. 10 mg of Lasix was administered. Intravenous contrast: 90 ml Omnipaque 350. 10 ml discarded. Oral contrast: None. \par Sagittal and coronal reformats were performed. \par FINDINGS: \par CHEST: \par LUNGS AND LARGE AIRWAYS: Patent central airways. Calcified granuloma is again noted in the right lower lobe. Linear atelectasis in the right lower \par lobe, unchanged. Peripheral reticular opacities in the left upper lobe, which may be secondary to radiation therapy. \par PLEURA: No pleural effusion. Nodular thickening of the minor fissure, unchanged \par VESSELS: Atherosclerotic calcification of the aorta and coronary arteries. \par HEART: Heart size is normal. No pericardial effusion. \par MEDIASTINUM AND ALEJANDRINA: No lymphadenopathy. \par CHEST WALL AND LOWER NECK: Left axillary clips. \par ABDOMEN AND PELVIS: \par LIVER: Within normal limits. \par BILE DUCTS: Normal caliber. \par GALLBLADDER: Within normal limits. \par SPLEEN: Within normal limits. \par PANCREAS: Within normal limits. \par ADRENALS: Within normal limits. \par KIDNEYS/URETERS: No hydronephrosis. Nonobstructing 0.3 cm calculus in the lower pole of the left kidney. Symmetric enhancement. No filling defects in \par the opacified portions of the ureters bilaterally. Segments of both ureters are unopacified. The distal right ureter is not opacified with excreted contrast. \par BLADDER: There is apparent soft tissue density at the posterior aspect of the bladder in the region of the right ureterovesical junction (7:507 and \par 4:143). The distal right ureter is not opacified. \par REPRODUCTIVE ORGANS: Status post hysterectomy. \par BOWEL: No bowel obstruction. Colonic diverticulosis. \par PERITONEUM: No ascites. \par VESSELS: Atherosclerotic calcification. \par RETROPERITONEUM: No lymphadenopathy. \par ABDOMINAL WALL: Within normal limits. \par BONES: Degenerative changes in the spine. Status post posterior fusion with interbody grafts at L4-S1. Sclerotic lesion in the T12 vertebral body, unchanged. \par IMPRESSION: No hydronephrosis. \par The distal right ureter is not opacified with excreted contrast. \par Apparent soft tissue density at the right posterior aspect of the urinary bladder in the region of the right ureterovesical junction. Correlation with cystoscopy is suggested. \par Stable appearance of the chest as compared to 9/20/2018. \par These findings were discussed with nurse Leslie Tate in Dr. Richter's office on 1/28/2019 at 11:[address] Gusman with read back confirmation. \par JAYLON GUSMAN M.D., ATTENDING RADIOLOGIST \par This document has been electronically signed. Jan 28 2019 11:56AM \par \par

## 2019-03-26 NOTE — PHYSICAL EXAM
[Restricted in physically strenuous activity but ambulatory and able to carry out work of a light or sedentary nature] : Status 1- Restricted in physically strenuous activity but ambulatory and able to carry out work of a light or sedentary nature, e.g., light house work, office work [Normal] : affect appropriate [de-identified] : well hydrated

## 2019-03-29 ENCOUNTER — OTHER (OUTPATIENT)
Age: 69
End: 2019-03-29

## 2019-04-04 ENCOUNTER — OUTPATIENT (OUTPATIENT)
Dept: OUTPATIENT SERVICES | Facility: HOSPITAL | Age: 69
LOS: 1 days | Discharge: HOME | End: 2019-04-04

## 2019-04-04 DIAGNOSIS — Z98.89 OTHER SPECIFIED POSTPROCEDURAL STATES: Chronic | ICD-10-CM

## 2019-04-04 DIAGNOSIS — C67.9 MALIGNANT NEOPLASM OF BLADDER, UNSPECIFIED: ICD-10-CM

## 2019-04-04 DIAGNOSIS — Z90.710 ACQUIRED ABSENCE OF BOTH CERVIX AND UTERUS: Chronic | ICD-10-CM

## 2019-04-04 DIAGNOSIS — Z98.890 OTHER SPECIFIED POSTPROCEDURAL STATES: Chronic | ICD-10-CM

## 2019-04-04 DIAGNOSIS — N63 UNSPECIFIED LUMP IN BREAST: Chronic | ICD-10-CM

## 2019-04-08 ENCOUNTER — APPOINTMENT (OUTPATIENT)
Dept: PULMONOLOGY | Facility: CLINIC | Age: 69
End: 2019-04-08
Payer: MEDICARE

## 2019-04-08 VITALS
TEMPERATURE: 97.5 F | WEIGHT: 166 LBS | BODY MASS INDEX: 26.79 KG/M2 | RESPIRATION RATE: 16 BRPM | OXYGEN SATURATION: 98 % | DIASTOLIC BLOOD PRESSURE: 92 MMHG | SYSTOLIC BLOOD PRESSURE: 136 MMHG | HEART RATE: 88 BPM

## 2019-04-08 PROCEDURE — 99214 OFFICE O/P EST MOD 30 MIN: CPT | Mod: GC

## 2019-04-10 NOTE — PHYSICAL EXAM
[Normal Appearance] : normal appearance [General Appearance - In No Acute Distress] : no acute distress [Normal Conjunctiva] : the conjunctiva exhibited no abnormalities [IV] : IV [Neck Appearance] : the appearance of the neck was normal [Heart Rate And Rhythm] : heart rate was normal and rhythm regular [Murmurs] : no murmurs [] : no respiratory distress [Auscultation Breath Sounds / Voice Sounds] : lungs were clear to auscultation bilaterally [Involuntary Movements] : no involuntary movements were seen [No Focal Deficits] : no focal deficits [Affect] : the affect was normal [Mood] : the mood was normal [Respiration, Rhythm And Depth] : normal respiratory rhythm and effort [Heart Sounds] : normal S1 and S2 [Exaggerated Use Of Accessory Muscles For Inspiration] : no accessory muscle use [Nail Clubbing] : no clubbing of the fingernails [Cyanosis, Localized] : no localized cyanosis [FreeTextEntry2] : no pedal edema

## 2019-04-10 NOTE — ASSESSMENT
[FreeTextEntry1] : 69 y/o F on CPAP for severe obstructive sleep apnea (AHI 68.7) since 2014, continues to benefit from CPAP 8 cmH2O, with improvement in sleep quality and hypersomnolence.\par \par The patient was diagnosed with bladder cancer in 2017 underwent chemotherapy and radiation therapy. She is currently undergoing Immunotherapy, which has affected her nightly compliance.  She had a 13 lb weight gain.\par \par A review of therapy and compliance data demonstrates adequate compliance, effective pressure, and no significant mask leak.  She will continue CPAP 8 cmH2O with nasal mask as tolerated during her cancer therapy, \par Stimulus control: I advised the patient to avoid spending large amounts of time in bed awake, to get into bed only when sleepy in order to increase sleep efficiency. I explained the role of this intervention in strengthening the connection between the bed, bedroom environment and the ability to fall asleep.\par She will follow-up in one year or sooner as needed.

## 2019-04-10 NOTE — HISTORY OF PRESENT ILLNESS
[AHI: ___ per hour] : Apnea-hypopnea index:  [unfilled] per hour [T90%: ___] : T90%: [unfilled]% [Date: ___] : the most recent therapeutic polysomnogram was completed [unfilled] [CPAP: ___ cmH2O] : CPAP: [unfilled] cmH2O [ESS: ___] : ESS score [unfilled] [% Days used: ____] : Days used: [unfilled] % [Mean nightly usage: ___ hrs] : Mean nightly usage: [unfilled] hrs [Therapy based AHI: ___ /hr] : Therapy based AHI: [unfilled] / hr [FreeTextEntry1] : This 68 year old female presents for annual follow-up visit to continue CPAP 8 cmH2O therapy for severe obstructive sleep apnea (AHI 68.7). \par \par COMORBID: HTN, HLD  Bladder cancer s/p chemotherapy and radiation therapy. currently on immunotherapy\par \par The patient was diagnosed with severe KARI in 2014, has been utilizing CPAP regularly until Oct//2017 when she was diagnosed with bladder cancer.  She since uses it most nights for 4-6 hours.  She notes benefit from CPAP with improvement in sleep and a decrease in daytime somnolence. She can doze off primarily with passive activities. She denies drowsy driving.  The patient tolerates the pressure and nasal mask well.  She receives supplies regularly from [facility].   \par \par Patient falls asleep while watching TV without CPAP between 8:30 - 9 pm. She wakes up at 1 am and falls back asleep with CPAP therapy and arises around 6-7 am, receiving 6 hours of restorative sleep nightly.\par \par She reports a 13 lb weight gain within the year. She has chronic nasal congestion. She is currently receiving Immunotherapy every 3 weeks for bladder ca. Patient started working as a  in December 2018 with reports to feeling more energized and happier. [Nocturnal Oxygen] : The patient does not use nocturnal oxygen

## 2019-04-10 NOTE — REVIEW OF SYSTEMS
[Nasal Congestion] : nasal congestion [Anxious] : anxious [Arthralgias] : arthralgias [Unintentional Sleep while inactive] : unintentional sleep while inactive [Recent Wt Gain (___ Lbs)] : recent [unfilled] ~Ulb weight gain [Negative] : Endocrine [Fatigue] : no fatigue [Postnasal Drip] : no postnasal drip [Shortness Of Breath] : no shortness of breath [A.M. Dry Mouth] : no a.m. dry mouth [Palpitations] : no palpitations [Heartburn] : no heartburn [Nocturia] : no nocturia [Depression] : no depression [Snoring] : no snoring [Witnessed Apneas] : demonstrated no ~M apnea [Frequent Nocturnal Awakenings] : no nocturnal awakenings from sleep [Unintentional Sleep while active] : no unintentional sleep while active [Awakes Unrefreshed] : restorative sleep [Awakes With Headache] : awakes without a headache [Awakes With Dry Mouth] : awakes without dry mouth [Recent Wt Loss (___ Lbs)] : no recent weight loss [Difficulty Initiating Sleep] : no difficulty falling asleep [Lower Extremity Discomfort] : no lower extremity discomfort [Difficulty Maintaining Sleep] : no difficulty maintaining sleep [Irresistible urge to move legs] : no irresistible urge to move legs because of lower extremity discomfort [LE discomfort relieved by movement] : lower extremity discomfort not relieved by movement [Hypnogogic Hallucinations] : no hypnogogic hallucinations [Unusual Sleep Behavior] : no unusual sleep behavior [Hypnopompic Hallucinations] : no hypnopompic hallucinations [Sleep Paralysis] : no sleep paralysis [de-identified] : tingling in left leg since back surgery [FreeTextEntry2] : 6-7 am  [FreeTextEntry1] : falls asleep with the TV by 8:30 to 9 pm  [FreeTextEntry3] : within 15 minutes  [FreeTextEntry4] : 0-1 [FreeTextEntry5] : 30 minutes  [FreeTextEntry6] : 6 hours of restful sleep  [FreeTextEntry7] : no scheduled naps

## 2019-04-16 ENCOUNTER — APPOINTMENT (OUTPATIENT)
Dept: HEMATOLOGY ONCOLOGY | Facility: CLINIC | Age: 69
End: 2019-04-16
Payer: MEDICARE

## 2019-04-16 ENCOUNTER — LABORATORY RESULT (OUTPATIENT)
Age: 69
End: 2019-04-16

## 2019-04-16 ENCOUNTER — APPOINTMENT (OUTPATIENT)
Dept: INFUSION THERAPY | Facility: HOSPITAL | Age: 69
End: 2019-04-16

## 2019-04-16 ENCOUNTER — RESULT REVIEW (OUTPATIENT)
Age: 69
End: 2019-04-16

## 2019-04-16 VITALS
BODY MASS INDEX: 26.72 KG/M2 | WEIGHT: 165.55 LBS | OXYGEN SATURATION: 96 % | RESPIRATION RATE: 16 BRPM | SYSTOLIC BLOOD PRESSURE: 121 MMHG | TEMPERATURE: 98.5 F | HEART RATE: 89 BPM | DIASTOLIC BLOOD PRESSURE: 78 MMHG

## 2019-04-16 LAB
ALBUMIN SERPL ELPH-MCNC: 4.1 G/DL
ALP BLD-CCNC: 82 U/L
ALT SERPL-CCNC: 17 U/L
ANION GAP SERPL CALC-SCNC: 9 MMOL/L
AST SERPL-CCNC: 18 U/L
BILIRUB SERPL-MCNC: 0.3 MG/DL
BUN SERPL-MCNC: 11 MG/DL
CALCIUM SERPL-MCNC: 9.6 MG/DL
CHLORIDE SERPL-SCNC: 106 MMOL/L
CO2 SERPL-SCNC: 28 MMOL/L
CREAT SERPL-MCNC: 0.73 MG/DL
GLUCOSE SERPL-MCNC: 121 MG/DL
HCT VFR BLD CALC: 37 % — SIGNIFICANT CHANGE UP (ref 34.5–45)
HGB BLD-MCNC: 12.8 G/DL — SIGNIFICANT CHANGE UP (ref 11.5–15.5)
MCHC RBC-ENTMCNC: 31 PG — SIGNIFICANT CHANGE UP (ref 27–34)
MCHC RBC-ENTMCNC: 34.6 G/DL — SIGNIFICANT CHANGE UP (ref 32–36)
MCV RBC AUTO: 89.5 FL — SIGNIFICANT CHANGE UP (ref 80–100)
PLATELET # BLD AUTO: 190 K/UL — SIGNIFICANT CHANGE UP (ref 150–400)
POTASSIUM SERPL-SCNC: 3.7 MMOL/L
PROT SERPL-MCNC: 6.4 G/DL
RBC # BLD: 4.13 M/UL — SIGNIFICANT CHANGE UP (ref 3.8–5.2)
RBC # FLD: 13.4 % — SIGNIFICANT CHANGE UP (ref 10.3–14.5)
SODIUM SERPL-SCNC: 143 MMOL/L
T4 SERPL-MCNC: 6.5 UG/DL
TSH SERPL-ACNC: 0.64 UIU/ML
WBC # BLD: 4.8 K/UL — SIGNIFICANT CHANGE UP (ref 3.8–10.5)
WBC # FLD AUTO: 4.8 K/UL — SIGNIFICANT CHANGE UP (ref 3.8–10.5)

## 2019-04-16 PROCEDURE — 99214 OFFICE O/P EST MOD 30 MIN: CPT

## 2019-04-16 NOTE — ADDENDUM
[FreeTextEntry1] : Today's chemistry panel was normal. The TSH was 0.64 with a T4 level of 6.5. CBC was normal.
 Symptoms

## 2019-04-16 NOTE — HISTORY OF PRESENT ILLNESS
[Disease: _____________________] : Disease: [unfilled] [T: ___] : T[unfilled] [N: ___] : N[unfilled] [M: ___] : M[unfilled] [AJCC Stage: ____] : AJCC Stage: [unfilled] [de-identified] : Ms. 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 The tumor was found on cystoscopy at the right ureteral orifice. 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 She is referred for consideration of neoadjuvant chemotherapy. In late May, at the time of scheduled back surgery, she thought she had a UTI. Urine was tested and she was treated, then had the surgery. She had burning post-op. She was treated again with an antibiotic. She went to Rehab and was treated again and she was seen by a urologist there. Went to PCP after discharge from rehab, he noted some blood in the urine. She went to [facility] ER and \par she was referred to Dr. Hargrove. She underwent a cystoscopy, revealing tumor. She never noted blood in her urine, but did note it was darker than usual. Still has some "tingling" sensation, no incontinence. Nocturia occurs, which she says is related to awakening from CPAP. No cough. WALKER/bone pains.\par \par 10/27/17...Seen at [facility] in second opinion yesterday. They wanted to repeat procedures again, including cystoscopy. They called  again today. Saw Dr. Montalvo. She was asked to return next Thursday. They want to do a PET CT scan. They recommended she receive cis/gem, likely due to the glandular differentiation. She was overwhelmed by all the information she was given. No pains, no cough/SOB.  \par \par 10/27/17...Juju completed cycle 1 this past Friday, and she noticed on Saturday that her lips appeared swollen and she noticed sores in her mouth. This occurred after going out to eat chinese food.Today she feels that it may be going away. She has fatigue, and feels chills but no fever. She just feels " lousy."  She is feeling Nausea this time and if she takes the metoclopramide in time, then she is okay, She denies vomiting. She is also having some dysgeusia, She denies paraesthesias of the fingers and toes.  She is having constipation, and she is controlling it with the stool softeners.She states her appetite is ok, but the food doesn't taste good, and it hurts when she swallows\par \par 11/21/17...Chemo with cis/gem #2 due this Friday.has increased lower back pain. The pain had stopped. She had prior back surgery. The pain became more notable since starting the chemo. She had constipation with the chemo and noted an increase in the pain with the constipation in lower back near the site of the surgery. The pain feels like pressure, described as heavy, somewhat relieved by BM. Worse with activities. Took some oxycodone a few days ago, but had a headaches afterwards. Pain score currently at 6-7, no recent pain med use. Taking MiraLAX, senna, Colace and prune juice with occasional MOM. Appetite is good, some altered taste, when present, affects appetite. Had some nausea and vomited x 3 after initial chemo, more gagging than vomiting. She felt she had some sores in the mouth after the gem alone, resolved. No paresthesias. She had some discomfort in the right wrist area, at the site where chemo was administered. Fatigue present all the time, more since the start of chemo. No hematuria, good flow, no urgency or incontinence as before. \par \par 12/11/17...day 15, cycle #2, cis/gem.  Juju has increased sensitivity in the lips after chemo. She had some vomiting at the end of her chemo infusion, however she did not have increased nausea and vomiting since. She has fatigue, and she complains of constipation and is taking miralax. She does complain of back pain that has increased in intensity.She notices it when she bends over. She feels that there is something noticeably there in her back. She takes pain medication to help her fall asleep. She doesn't sleep well. She does use c-pap secondary to sleep apnea but she is awaken from her sleep secondary to nocturia. She also notices her pain more when she has constipation and has to go to the bathroom. She describes her lower back pain as 8/10 at worst, and best 5/10. HEr back pain went away after the surgery and she noticed it came back once starting Chemo. She states her appetite is good, She does complain of dysgeusia. She denies paraesthesias. She does not like how the oxycodone makes her feel. It gives her HAs.\par \par 1/26/18...Had RT from the 9th to the 13th of January. She feels there is some decrease in the pain, not as piercing. She feels the area is stiff. Worse when she awakens. Takes 3 x 325 Tylenol at night which helps the pain. has constipation, went one week w/o evacuation. Was given lactulose, but she feels that MiraLAX works better. She is less active as a result of the pain. the pain is worse when she bends. Appetite is variable. No weight loss. Has occasional nausea. No vomiting. Has some indigestion at times. No blood in the urine, no dysuria. No incontinence. Fatigue is present. No pains elsewhere. \par \par 3/14/18...She still has some pain. She is doing PT, which helps temporarily. She has not had the relief of pain that she thought would be the result. Pain score at 4-5, described as aggravation and it inhibits activities. Worse with bending, getting in and out of cars. She is not taking hydromorphone at this time.. She is distressed with constipation from the pain meds. Says lactulose does not work. She takes some Tylenol sporadically, not daily. Appetite is good, lost 1 kilo. No hematuria, no dysuria, no frequency, no incontinence. No pains elsewhere. She remains very active. She has alopecia. No diarrhea. No cough/WALKER. Fatigue is present, mild. She feels it at the end of the day. No N/V. \par \par 4/4/18...Completed 3 cycles of Tecentriq. She noes curling of the right fourth finger involuntarily. She can bend it back up, but it hurts to do so. No difficulty with strength on the right hand, no tremors.  She has tried Icy Hot w/o benefit. She points to DIP and PIP joints as the sites of pain. She feels she has lumps on her head. She is losing her hair, likely secondary to the chemo she received. Her scalp is pruritic. She has also lost pubic hair. Her pain continues to get better,went to PT for 7 weeks. She is able to do all normal activities, with mild fatigue. Appetite is good, weight is stable. No dizziness, no unsteadiness, no headaches. No other issues. No hematuria. \par \par 4/25/18...Missed Rx on 4/16/18. feels "great". Still has back pains, much improved. No pain while seated, but will come on after a while. No worse with ambulation. Does not awaken her. Pain can be as high as 7-8 with activities, best at 3-4. No pain meds utilized. No blood in urine. denies fatigue until late in the day. Appetite is good, but she has lost about 3 pounds. She is avoiding fat products as she has an upset stomach from fats. Had a URI, treated with azithromycin, Flonase and a codeine containing cough suppressant, still has some residual cough. No diarrhea. No dyspnea. has constipation. \par \par 5/7/18...On atezolizumab since 2/12/18. Feels well. Still has some pains, not clearly as severe as before. It is worse if she walks for a while, such as several hundred feet. Also more prominent if she stands too long. Does not bother her at night or awaken her. It does not stop her activities. Takes an occasional ibuprofen, not daily. Appetite is good, weight is stable. No N/V. No diarrhea. No cough/WALKER. Some fatigue. No leg edema. \par \par 5/30/18...Feels "okay". Still has some back pain, nothing like it was before. Has a bunion of the foot which is bothersome and the podiatrist has recommended surgery. No cough/sputum,. No diarrhea. Appetite is good, but lost 2 pounds since last visit. No other pains. No headaches, no paresthesias. No dizziness noted, no balance issues. Mild fatigue, improved.\par \par 6/19/18...Cycle 6 was administered on 6/6/18, due #7 on 6/27/18. "I'm feeling good". Noted some pressure and noted urinary frequency about one week ago, then resolved. No dysuria, no blood, no frequency, nocturia x 1. The usual lower back pain, no pain at the current time. Extra exertion brings on the low back pain and she is not sure if this is from the cancer or from the prior back surgery. She notes several areas over her skin becoming depigmented in small spots. No pruritus. Appetite is good, no weight loss. Actually gained a few pounds. No diarrhea. has some fatigue towards the end of the day. No dizziness. No paresthesias. No cough/WALKER. \par \par 7/7/18...On atezo since 2/12/18. Has received 7 cycles. Saw Dr. Hargrove late June, cysto recommended, but she decided to wait until  after vacation. She notes lightening of her skin. She didn't go to the dermatologist. She has a prior dermatologist that she might see. No diarrhea. No upset stomach. Appetite is ":great", no weight loss. No cough/WALKER. No paresthesias. Minor fatigue along with aches in the evenings. No headaches, no dizziness. No leg edema. Slight hematuria. \par \par 8/29/18...last scans in May\par Reports skin changes due to the vitiligo has been bothering her.  Reports this has been happening more since last month.  Has also been taking Valium from two years ago occasionally for anxiety.  Reports has taken it once every 2-3 weeks, only when she feels overwhelmed with anxiety.  Has not seen an psychiatrist yet, but has been seeing a psychologist.  She has seeing her for one year..  When she gets anxious it occurs mostly at night.  Denies any pain, except after walking a long period of time.  Will occasionally use a cane.  Reports no urinary frequency.  No urinary incontinence, no hematuria.  Reports regular bowel movements.  Reports good appetite, occasional dyspepsia with fried foods.  \par \par 8/19/18...Did not have an appt today, was added onto schedule. She has urgency, no dysuria, no foul odor. She feels bloated as well for the past 3 days. She wonders if it is related to her vitiligo, which is prominent in the urogenital area. Urine is clear, no blood. No fevers, no chills. Appetite is "great", gaining weight. No cough/WALKER. NO diarrheal stools, last BM yesterday, normal. No N/V. No vitiligo is more prominent, which concerns her. She says the bottom of her feet are tender, at the ball of the feet. Toes are numb. This has occurred over the last week. She has also had cramps in her hands. Mostly the thumb, told of he had an injection for her trigger finger. received cycle # 11 of atezolizumab today. \par \par 10/11/18...dose # 12 today. Saw Paulie Hargrove, plan for cysto. "I'm doing well".  She says that her feet always feels like there "is something there", involving the toes, no longer affects the ball of the foot. Saw her podiatrist. Cysto is scheduled for the 16th. Her vitiligo is more prominent his is prominent on the hands. She feels that some parts of her hands are darker as well. Appetite is "fabulous". No N/V/D/C. Urine flow is good, no incontinence, no blood, no dysuria. Urgency and fullness sensation resolved. No cough/SOB. No headaches. No fatigue\par \par 10/31/18....Dose #13 today. Feels well at this time. Back pain remains the same as before, no pain med use. If she does extra activities she has pain. She had back surgery before and she believes it is from the prior surgery and not the mets. No fatigue. Appetite is good, weight is stable., No diarrheal stools. No hematuria noted. No dysuria. Usual activities performed without impairment. Vitiligo is somewhat more prominent, which disturbs her. No leg edema. No cough, no WALKER. No upset stomach. No fevers, no chills. Had cysto on October 16, all was normal. \par \par 11/21/18...Feels "okay". Pain in the back somewhat more prominent, the pain from her prior surgery.Appetite is jeramy, no weight loss. No cough/WALKER. No diarrhea, no upset stomach. Some minor fatigue. She thinks she is depressed, attends a support group here. Starting with a therapist. He  left [place] recently[place] \par \par 12/11/18...Last scans 9/20/18. Getting laser therapy for her vitiligo, which she may not continue due to high c-pays. feels as if her tongue is sensitive to heat since starting the laser therapy. She senses salt more than before. Otherwise well, back pain "bearable", RTS, "part of my life". No pain meds used. Appetite is good, weight is stable. No diarrheal stools No cough, No SOB. She has some sensation in the bladder or vaginal area, pruritic, calmed with Vagisil.  She is concerned about some blood in the urine, microscopic....she had a cysto on 10/22/18, revealing no issue. She asked about clearance for sexual activity. No fatigue, no headaches. No dysuria, but occasional mild irritation. No incontinence. No urinary frequency, rare nocturia. \par \par 1/3/19 : On atezolizumab since 2/12/18. Feels "great". No pains except for usual aches. Appetite is good, weight has increased. No cough, no WALKER. No diarrheal stools, No upset stomach. No edema. No skin rashes. Vitiligo is "going crazy", goes 2 times a week for laser therapy. States she may stop the laser therapy. \par \par 1/23/19...1/23/19 : Here for Atezolizumab(since 2/18) and follow up. \par She has been well overall without any AEs aside form vitiligo which is a known side effect of these ICI's. \par \par 2/13/19 : One year on atezolizumab. Feels "fine". No diarrheal stool, no dyspepsia, no cough/WALKER. Appetite s good, no weight loss. No headaches, some paresthesias of the feet, no change. Has seen neurology and received injections. Can't open her hands at times. No fatigue, no pruritus. NO skin rashes. Vitiligo continues to be more prominent. Went for laser therapy, wit stopped it. She says her lips blistered. Vitiligo affects genitalia, present prior to Rx, has vitiligo of the hands, axilla and breasts. \par \par 3/6/19...3/6/19 : Ms. Solis is here for a follow up and atezolizumab. She has been having lower back pain that resembled pain she had when she had recurrence. She underwent MRI on 3/3/19. This showed re-demonstrating the spine lesions, no changes were noted on the MRI. She has DJD and bulging disc with narrowing in L4-5.\par No new lesions were seen. She has not followed up with Dr. VINICIUS Kauffman for this either. OTHerwise she feels well.  [de-identified] : high-grade [FreeTextEntry1] : cis/gem, started chemo 11/3/17 as neoadjuvant, then bone mets, had RT. Now on atezolizumab since February 12, 2018 [de-identified] : saw her orthopedic surgeon, who said the pain is not related to her cancer, but to scar tissue. He gave her Flexeril and Meloxicam. He referred her for PT. The pain is much better. The knot" is not as big as before. Otherwise, :fabulous". Appetite is good, weight is stable. Has some fatigue, noted at the end of the day. No pruritus, no rash. Vitiligo is progressive. No cough, no WALKER. No nausea, no vomiting, occ upset stomach. No diarrheal stools. Now 14 months on therapy.

## 2019-04-16 NOTE — REVIEW OF SYSTEMS
[Fatigue] : fatigue [Skin Rash] : skin rash [Anxiety] : anxiety [Negative] : ENT [Fever] : no fever [Chills] : no chills [Night Sweats] : no night sweats [Recent Change In Weight] : ~T no recent weight change [Chest Pain] : no chest pain [Palpitations] : no palpitations [Wheezing] : no wheezing [Cough] : no cough [SOB on Exertion] : no shortness of breath during exertion [Abdominal Pain] : no abdominal pain [Vomiting] : no vomiting [Constipation] : no constipation [Diarrhea] : no diarrhea [Dysuria] : no dysuria [Incontinence] : no incontinence [Joint Pain] : no joint pain [Dizziness] : no dizziness [Difficulty Walking] : no difficulty walking [Muscle Weakness] : no muscle weakness [Depression] : no depression [Easy Bleeding] : no tendency for easy bleeding [Easy Bruising] : no tendency for easy bruising [FreeTextEntry3] : told of cataracts [de-identified] : vitiligo [FreeTextEntry9] : low back pains, some hand cramps, told of tendonitis. [FreeTextEntry8] : no blood [de-identified] : no HA., paresthesias, dating to the back surgery

## 2019-04-16 NOTE — ASSESSMENT
[Palliative Care Plan] : not applicable at this time [FreeTextEntry1] : Juju seen in the office today in followup. She is received 14 months of atezolizumab at this time. She initially was treated with dose dense MVAC but developed severe low back pain and an MRI revealed the presence of metastases in the spine. She was given radiation therapy and then transitioned to atezolizumab. She is tolerating therapy well with the exception of her complaints about increasing vitiligo.\par \par She saw her of the peak surgeon who told her that the pain that she is experiencing is likely secondary to issues with her low back pain and her previous surgery. He prescribed Flexeril as well as my last exam which he says is improving the pain. She also prescribed physical therapy but she is disinclined to do so. I recommended that she do strongly consider the physical therapy.\par \par She does have some discomfort to percussion along the lower spine and there is some paraspinal muscle irritation as well to palpation. Lower she may motor power is normal.\par \par All questions or masses the best of my ability and to her apparent satisfaction. She will continue on therapy. She'll have a CT scan done in the middle of May to assess for stability of her disease. The only site of disease and she has had is in the lower lumbar spine.

## 2019-04-16 NOTE — CONSULT LETTER
[Courtesy Letter:] : I had the pleasure of seeing your patient, [unfilled], in my office today. [Dear  ___] : Dear  [unfilled], [Consult Closing:] : Thank you very much for allowing me to participate in the care of this patient.  If you have any questions, please do not hesitate to contact me. [Please see my note below.] : Please see my note below. [Sincerely,] : Sincerely, [DrAzam  ___] : Dr. BRANDT [FreeTextEntry2] : Dr. Kalen Kauffman MD [facility]

## 2019-04-17 DIAGNOSIS — Z51.11 ENCOUNTER FOR ANTINEOPLASTIC CHEMOTHERAPY: ICD-10-CM

## 2019-05-03 ENCOUNTER — OUTPATIENT (OUTPATIENT)
Dept: OUTPATIENT SERVICES | Facility: HOSPITAL | Age: 69
LOS: 1 days | Discharge: HOME | End: 2019-05-03

## 2019-05-03 DIAGNOSIS — Z98.89 OTHER SPECIFIED POSTPROCEDURAL STATES: Chronic | ICD-10-CM

## 2019-05-03 DIAGNOSIS — N63 UNSPECIFIED LUMP IN BREAST: Chronic | ICD-10-CM

## 2019-05-03 DIAGNOSIS — Z98.890 OTHER SPECIFIED POSTPROCEDURAL STATES: Chronic | ICD-10-CM

## 2019-05-03 DIAGNOSIS — C67.9 MALIGNANT NEOPLASM OF BLADDER, UNSPECIFIED: ICD-10-CM

## 2019-05-03 DIAGNOSIS — Z90.710 ACQUIRED ABSENCE OF BOTH CERVIX AND UTERUS: Chronic | ICD-10-CM

## 2019-05-07 ENCOUNTER — APPOINTMENT (OUTPATIENT)
Dept: HEMATOLOGY ONCOLOGY | Facility: CLINIC | Age: 69
End: 2019-05-07

## 2019-05-09 ENCOUNTER — APPOINTMENT (OUTPATIENT)
Dept: INFUSION THERAPY | Facility: HOSPITAL | Age: 69
End: 2019-05-09

## 2019-05-10 DIAGNOSIS — Z51.11 ENCOUNTER FOR ANTINEOPLASTIC CHEMOTHERAPY: ICD-10-CM

## 2019-05-13 ENCOUNTER — FORM ENCOUNTER (OUTPATIENT)
Age: 69
End: 2019-05-13

## 2019-05-14 ENCOUNTER — OUTPATIENT (OUTPATIENT)
Dept: OUTPATIENT SERVICES | Facility: HOSPITAL | Age: 69
LOS: 1 days | End: 2019-05-14
Payer: MEDICARE

## 2019-05-14 ENCOUNTER — APPOINTMENT (OUTPATIENT)
Dept: CT IMAGING | Facility: IMAGING CENTER | Age: 69
End: 2019-05-14
Payer: COMMERCIAL

## 2019-05-14 DIAGNOSIS — Z98.89 OTHER SPECIFIED POSTPROCEDURAL STATES: Chronic | ICD-10-CM

## 2019-05-14 DIAGNOSIS — C67.6 MALIGNANT NEOPLASM OF URETERIC ORIFICE: ICD-10-CM

## 2019-05-14 DIAGNOSIS — Z98.890 OTHER SPECIFIED POSTPROCEDURAL STATES: Chronic | ICD-10-CM

## 2019-05-14 DIAGNOSIS — N63 UNSPECIFIED LUMP IN BREAST: Chronic | ICD-10-CM

## 2019-05-14 DIAGNOSIS — Z90.710 ACQUIRED ABSENCE OF BOTH CERVIX AND UTERUS: Chronic | ICD-10-CM

## 2019-05-14 PROCEDURE — 74177 CT ABD & PELVIS W/CONTRAST: CPT

## 2019-05-14 PROCEDURE — 71260 CT THORAX DX C+: CPT | Mod: 26

## 2019-05-14 PROCEDURE — 71260 CT THORAX DX C+: CPT

## 2019-05-14 PROCEDURE — 74177 CT ABD & PELVIS W/CONTRAST: CPT | Mod: 26

## 2019-05-28 ENCOUNTER — APPOINTMENT (OUTPATIENT)
Dept: INFUSION THERAPY | Facility: HOSPITAL | Age: 69
End: 2019-05-28

## 2019-05-28 ENCOUNTER — RESULT REVIEW (OUTPATIENT)
Age: 69
End: 2019-05-28

## 2019-05-28 ENCOUNTER — APPOINTMENT (OUTPATIENT)
Dept: HEMATOLOGY ONCOLOGY | Facility: CLINIC | Age: 69
End: 2019-05-28
Payer: MEDICARE

## 2019-05-28 VITALS
DIASTOLIC BLOOD PRESSURE: 98 MMHG | TEMPERATURE: 99.1 F | RESPIRATION RATE: 16 BRPM | WEIGHT: 165.34 LBS | BODY MASS INDEX: 26.69 KG/M2 | OXYGEN SATURATION: 96 % | SYSTOLIC BLOOD PRESSURE: 155 MMHG | HEART RATE: 71 BPM

## 2019-05-28 LAB
HCT VFR BLD CALC: 39.7 % — SIGNIFICANT CHANGE UP (ref 34.5–45)
HGB BLD-MCNC: 13.2 G/DL — SIGNIFICANT CHANGE UP (ref 11.5–15.5)
MCHC RBC-ENTMCNC: 29.7 PG — SIGNIFICANT CHANGE UP (ref 27–34)
MCHC RBC-ENTMCNC: 33.3 G/DL — SIGNIFICANT CHANGE UP (ref 32–36)
MCV RBC AUTO: 89 FL — SIGNIFICANT CHANGE UP (ref 80–100)
PLATELET # BLD AUTO: 212 K/UL — SIGNIFICANT CHANGE UP (ref 150–400)
RBC # BLD: 4.46 M/UL — SIGNIFICANT CHANGE UP (ref 3.8–5.2)
RBC # FLD: 13.5 % — SIGNIFICANT CHANGE UP (ref 10.3–14.5)
WBC # BLD: 6.1 K/UL — SIGNIFICANT CHANGE UP (ref 3.8–10.5)
WBC # FLD AUTO: 6.1 K/UL — SIGNIFICANT CHANGE UP (ref 3.8–10.5)

## 2019-05-28 PROCEDURE — 99214 OFFICE O/P EST MOD 30 MIN: CPT

## 2019-05-28 RX ORDER — CYCLOBENZAPRINE HYDROCHLORIDE 7.5 MG/1
TABLET, FILM COATED ORAL
Refills: 0 | Status: DISCONTINUED | COMMUNITY
End: 2019-05-28

## 2019-05-28 RX ORDER — MELOXICAM 15 MG/1
TABLET ORAL
Refills: 0 | Status: DISCONTINUED | COMMUNITY
End: 2019-05-28

## 2019-05-29 LAB
ALBUMIN SERPL ELPH-MCNC: 4.2 G/DL
ALP BLD-CCNC: 109 U/L
ALT SERPL-CCNC: 14 U/L
ANION GAP SERPL CALC-SCNC: 13 MMOL/L
AST SERPL-CCNC: 21 U/L
BILIRUB SERPL-MCNC: 0.4 MG/DL
BUN SERPL-MCNC: 16 MG/DL
CALCIUM SERPL-MCNC: 9.7 MG/DL
CHLORIDE SERPL-SCNC: 104 MMOL/L
CO2 SERPL-SCNC: 26 MMOL/L
CORTIS SERPL-MCNC: 11.1 UG/DL
CREAT SERPL-MCNC: 0.65 MG/DL
GLUCOSE SERPL-MCNC: 112 MG/DL
POTASSIUM SERPL-SCNC: 4.1 MMOL/L
PROT SERPL-MCNC: 7.1 G/DL
SODIUM SERPL-SCNC: 143 MMOL/L
T4 SERPL-MCNC: 6.8 UG/DL
TSH SERPL-ACNC: 0.81 UIU/ML

## 2019-05-29 NOTE — ASSESSMENT
[Palliative Care Plan] : not applicable at this time [FreeTextEntry1] : Juju seen in the office today in followup. She's been on atezolizumab since February of 2018. She recently exacerbation of back pain for which she was given cyclobenzaprine and meloxicam. This is now improved and she is no longer taking the medications. She did return to work in April. She feels well otherwise. She does have a head cold with congestion at this time producing some yellow sputum. The nasal congestion and runny nose have improved. There are no fevers or chills. There is no wheezing, and the physical findings are normal in the chest. Her appetite is good and his no weight loss. There is no diarrhea. She does have fatigue in the evening and this indicates that she return to work. There is no rash or pruritus. Her vitiligo continues to progress by her assessment.\par \par She is tolerating therapy reasonably well. She does not have measurable disease as her disease was within bone in the sites were also radiated. The fact that she's been on treatment for 15 months and is not had any progression of disease likely indicates that she is having benefit from treatment. All questions were answered to the best of my ability and to her apparent satisfaction. I will see her once again in 3 weeks' time.

## 2019-05-29 NOTE — HISTORY OF PRESENT ILLNESS
[Disease: _____________________] : Disease: [unfilled] [T: ___] : T[unfilled] [N: ___] : N[unfilled] [M: ___] : M[unfilled] [AJCC Stage: ____] : AJCC Stage: [unfilled] [de-identified] : Ms. 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 The tumor was found on cystoscopy at the right ureteral orifice. 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 She is referred for consideration of neoadjuvant chemotherapy. In late May, at the time of scheduled back surgery, she thought she had a UTI. Urine was tested and she was treated, then had the surgery. She had burning post-op. She was treated again with an antibiotic. She went to Rehab and was treated again and she was seen by a urologist there. Went to PCP after discharge from rehab, he noted some blood in the urine. She went to [facility] ER and \par she was referred to Dr. Hargrove. She underwent a cystoscopy, revealing tumor. She never noted blood in her urine, but did note it was darker than usual. Still has some "tingling" sensation, no incontinence. Nocturia occurs, which she says is related to awakening from CPAP. No cough. WALKER/bone pains.\par \par 10/27/17...Seen at [facility] in second opinion yesterday. They wanted to repeat procedures again, including cystoscopy. They called  again today. Saw Dr. Montalvo. She was asked to return next Thursday. They want to do a PET CT scan. They recommended she receive cis/gem, likely due to the glandular differentiation. She was overwhelmed by all the information she was given. No pains, no cough/SOB.  \par \par 10/27/17...Juju completed cycle 1 this past Friday, and she noticed on Saturday that her lips appeared swollen and she noticed sores in her mouth. This occurred after going out to eat chinese food.Today she feels that it may be going away. She has fatigue, and feels chills but no fever. She just feels " lousy."  She is feeling Nausea this time and if she takes the metoclopramide in time, then she is okay, She denies vomiting. She is also having some dysgeusia, She denies paraesthesias of the fingers and toes.  She is having constipation, and she is controlling it with the stool softeners.She states her appetite is ok, but the food doesn't taste good, and it hurts when she swallows\par \par 11/21/17...Chemo with cis/gem #2 due this Friday.has increased lower back pain. The pain had stopped. She had prior back surgery. The pain became more notable since starting the chemo. She had constipation with the chemo and noted an increase in the pain with the constipation in lower back near the site of the surgery. The pain feels like pressure, described as heavy, somewhat relieved by BM. Worse with activities. Took some oxycodone a few days ago, but had a headaches afterwards. Pain score currently at 6-7, no recent pain med use. Taking MiraLAX, senna, Colace and prune juice with occasional MOM. Appetite is good, some altered taste, when present, affects appetite. Had some nausea and vomited x 3 after initial chemo, more gagging than vomiting. She felt she had some sores in the mouth after the gem alone, resolved. No paresthesias. She had some discomfort in the right wrist area, at the site where chemo was administered. Fatigue present all the time, more since the start of chemo. No hematuria, good flow, no urgency or incontinence as before. \par \par 12/11/17...day 15, cycle #2, cis/gem.  Juju has increased sensitivity in the lips after chemo. She had some vomiting at the end of her chemo infusion, however she did not have increased nausea and vomiting since. She has fatigue, and she complains of constipation and is taking miralax. She does complain of back pain that has increased in intensity.She notices it when she bends over. She feels that there is something noticeably there in her back. She takes pain medication to help her fall asleep. She doesn't sleep well. She does use c-pap secondary to sleep apnea but she is awaken from her sleep secondary to nocturia. She also notices her pain more when she has constipation and has to go to the bathroom. She describes her lower back pain as 8/10 at worst, and best 5/10. HEr back pain went away after the surgery and she noticed it came back once starting Chemo. She states her appetite is good, She does complain of dysgeusia. She denies paraesthesias. She does not like how the oxycodone makes her feel. It gives her HAs.\par \par 1/26/18...Had RT from the 9th to the 13th of January. She feels there is some decrease in the pain, not as piercing. She feels the area is stiff. Worse when she awakens. Takes 3 x 325 Tylenol at night which helps the pain. has constipation, went one week w/o evacuation. Was given lactulose, but she feels that MiraLAX works better. She is less active as a result of the pain. the pain is worse when she bends. Appetite is variable. No weight loss. Has occasional nausea. No vomiting. Has some indigestion at times. No blood in the urine, no dysuria. No incontinence. Fatigue is present. No pains elsewhere. \par \par 3/14/18...She still has some pain. She is doing PT, which helps temporarily. She has not had the relief of pain that she thought would be the result. Pain score at 4-5, described as aggravation and it inhibits activities. Worse with bending, getting in and out of cars. She is not taking hydromorphone at this time.. She is distressed with constipation from the pain meds. Says lactulose does not work. She takes some Tylenol sporadically, not daily. Appetite is good, lost 1 kilo. No hematuria, no dysuria, no frequency, no incontinence. No pains elsewhere. She remains very active. She has alopecia. No diarrhea. No cough/WALKER. Fatigue is present, mild. She feels it at the end of the day. No N/V. \par \par 4/4/18...Completed 3 cycles of Tecentriq. She noes curling of the right fourth finger involuntarily. She can bend it back up, but it hurts to do so. No difficulty with strength on the right hand, no tremors.  She has tried Icy Hot w/o benefit. She points to DIP and PIP joints as the sites of pain. She feels she has lumps on her head. She is losing her hair, likely secondary to the chemo she received. Her scalp is pruritic. She has also lost pubic hair. Her pain continues to get better,went to PT for 7 weeks. She is able to do all normal activities, with mild fatigue. Appetite is good, weight is stable. No dizziness, no unsteadiness, no headaches. No other issues. No hematuria. \par \par 4/25/18...Missed Rx on 4/16/18. feels "great". Still has back pains, much improved. No pain while seated, but will come on after a while. No worse with ambulation. Does not awaken her. Pain can be as high as 7-8 with activities, best at 3-4. No pain meds utilized. No blood in urine. denies fatigue until late in the day. Appetite is good, but she has lost about 3 pounds. She is avoiding fat products as she has an upset stomach from fats. Had a URI, treated with azithromycin, Flonase and a codeine containing cough suppressant, still has some residual cough. No diarrhea. No dyspnea. has constipation. \par \par 5/7/18...On atezolizumab since 2/12/18. Feels well. Still has some pains, not clearly as severe as before. It is worse if she walks for a while, such as several hundred feet. Also more prominent if she stands too long. Does not bother her at night or awaken her. It does not stop her activities. Takes an occasional ibuprofen, not daily. Appetite is good, weight is stable. No N/V. No diarrhea. No cough/WALKER. Some fatigue. No leg edema. \par \par 5/30/18...Feels "okay". Still has some back pain, nothing like it was before. Has a bunion of the foot which is bothersome and the podiatrist has recommended surgery. No cough/sputum,. No diarrhea. Appetite is good, but lost 2 pounds since last visit. No other pains. No headaches, no paresthesias. No dizziness noted, no balance issues. Mild fatigue, improved.\par \par 6/19/18...Cycle 6 was administered on 6/6/18, due #7 on 6/27/18. "I'm feeling good". Noted some pressure and noted urinary frequency about one week ago, then resolved. No dysuria, no blood, no frequency, nocturia x 1. The usual lower back pain, no pain at the current time. Extra exertion brings on the low back pain and she is not sure if this is from the cancer or from the prior back surgery. She notes several areas over her skin becoming depigmented in small spots. No pruritus. Appetite is good, no weight loss. Actually gained a few pounds. No diarrhea. has some fatigue towards the end of the day. No dizziness. No paresthesias. No cough/WALKER. \par \par 7/7/18...On atezo since 2/12/18. Has received 7 cycles. Saw Dr. Hargrove late June, cysto recommended, but she decided to wait until  after vacation. She notes lightening of her skin. She didn't go to the dermatologist. She has a prior dermatologist that she might see. No diarrhea. No upset stomach. Appetite is ":great", no weight loss. No cough/WALKER. No paresthesias. Minor fatigue along with aches in the evenings. No headaches, no dizziness. No leg edema. Slight hematuria. \par \par 8/29/18...last scans in May\par Reports skin changes due to the vitiligo has been bothering her.  Reports this has been happening more since last month.  Has also been taking Valium from two years ago occasionally for anxiety.  Reports has taken it once every 2-3 weeks, only when she feels overwhelmed with anxiety.  Has not seen an psychiatrist yet, but has been seeing a psychologist.  She has seeing her for one year..  When she gets anxious it occurs mostly at night.  Denies any pain, except after walking a long period of time.  Will occasionally use a cane.  Reports no urinary frequency.  No urinary incontinence, no hematuria.  Reports regular bowel movements.  Reports good appetite, occasional dyspepsia with fried foods.  \par \par 8/19/18...Did not have an appt today, was added onto schedule. She has urgency, no dysuria, no foul odor. She feels bloated as well for the past 3 days. She wonders if it is related to her vitiligo, which is prominent in the urogenital area. Urine is clear, no blood. No fevers, no chills. Appetite is "great", gaining weight. No cough/WALKER. NO diarrheal stools, last BM yesterday, normal. No N/V. No vitiligo is more prominent, which concerns her. She says the bottom of her feet are tender, at the ball of the feet. Toes are numb. This has occurred over the last week. She has also had cramps in her hands. Mostly the thumb, told of he had an injection for her trigger finger. received cycle # 11 of atezolizumab today. \par \par 10/11/18...dose # 12 today. Saw Paulie Hargrove, plan for cysto. "I'm doing well".  She says that her feet always feels like there "is something there", involving the toes, no longer affects the ball of the foot. Saw her podiatrist. Cysto is scheduled for the 16th. Her vitiligo is more prominent his is prominent on the hands. She feels that some parts of her hands are darker as well. Appetite is "fabulous". No N/V/D/C. Urine flow is good, no incontinence, no blood, no dysuria. Urgency and fullness sensation resolved. No cough/SOB. No headaches. No fatigue\par \par 10/31/18....Dose #13 today. Feels well at this time. Back pain remains the same as before, no pain med use. If she does extra activities she has pain. She had back surgery before and she believes it is from the prior surgery and not the mets. No fatigue. Appetite is good, weight is stable., No diarrheal stools. No hematuria noted. No dysuria. Usual activities performed without impairment. Vitiligo is somewhat more prominent, which disturbs her. No leg edema. No cough, no WALKER. No upset stomach. No fevers, no chills. Had cysto on October 16, all was normal. \par \par 11/21/18...Feels "okay". Pain in the back somewhat more prominent, the pain from her prior surgery.Appetite is jeramy, no weight loss. No cough/WALKER. No diarrhea, no upset stomach. Some minor fatigue. She thinks she is depressed, attends a support group here. Starting with a therapist. He  left [place] recently. [place] \par \par 12/11/18...Last scans 9/20/18. Getting laser therapy for her vitiligo, which she may not continue due to high c-pays. feels as if her tongue is sensitive to heat since starting the laser therapy. She senses salt more than before. Otherwise well, back pain "bearable", RTS, "part of my life". No pain meds used. Appetite is good, weight is stable. No diarrheal stools No cough, No SOB. She has some sensation in the bladder or vaginal area, pruritic, calmed with Vagisil.  She is concerned about some blood in the urine, microscopic....she had a cysto on 10/22/18, revealing no issue. She asked about clearance for sexual activity. No fatigue, no headaches. No dysuria, but occasional mild irritation. No incontinence. No urinary frequency, rare nocturia. \par \par 1/3/19 : On atezolizumab since 2/12/18. Feels "great". No pains except for usual aches. Appetite is good, weight has increased. No cough, no WALKER. No diarrheal stools, No upset stomach. No edema. No skin rashes. Vitiligo is "going crazy", goes 2 times a week for laser therapy. States she may stop the laser therapy. \par \par 1/23/19...1/23/19 : Here for Atezolizumab(since 2/18) and follow up. \par She has been well overall without any AEs aside form vitiligo which is a known side effect of these ICI's. \par \par 2/13/19 : One year on atezolizumab. Feels "fine". No diarrheal stool, no dyspepsia, no cough/WALKER. Appetite s good, no weight loss. No headaches, some paresthesias of the feet, no change. Has seen neurology and received injections. Can't open her hands at times. No fatigue, no pruritus. NO skin rashes. Vitiligo continues to be more prominent. Went for laser therapy, wit stopped it. She says her lips blistered. Vitiligo affects genitalia, present prior to Rx, has vitiligo of the hands, axilla and breasts. \par \par 3/6/19...3/6/19 : Ms. Crespo is here for a follow up and atezolizumab. She has been having lower back pain that resembled pain she had when she had recurrence. She underwent MRI on 3/3/19. This showed re-demonstrating the spine lesions, no changes were noted on the MRI. She has DJD and bulging disc with narrowing in L4-5.\par No new lesions were seen. She has not followed up with Dr. VINICIUS Kauffman for this either. OTHerwise she feels well. \par \par 4/16/19...saw her orthopedic surgeon, who said the pain is not related to her cancer, but to scar tissue. He gave her Flexeril and Meloxicam. He referred her for PT. The pain is much better. The knot" is not as big as before. Otherwise, :fabulous". Appetite is good, weight is stable. Has some fatigue, noted at the end of the day. No pruritus, no rash. Vitiligo is progressive. No cough, no WALKER. No nausea, no vomiting, occ upset stomach. No diarrheal stools. Now 14 months on therapy.  [FreeTextEntry1] : cis/gem, started chemo 11/3/17 as neoadjuvant, then bone mets, had RT. Now on atezolizumab since February 12, 2018 [de-identified] : high-grade [de-identified] : On Atezo since 2/2018. Had some back pain exacerbation recently, was on meloxicam and cyclobenzaprine, which has resolved to a great degree. She returned to work in April. Feels well otherwise, has a "head cold", with congestion, yellow sputum. Taking Claritin and other drugs, nasal congestion and runny nose improved. She is concerned it has moved to the chest. No F/C, no SOB, no wheezing. Appetite is good, no weight loss. No diarrheal stools., no upset stomach. Fatigue is present, in  the evening, she is "done". This has been the case since she returned to work. No rash, no pruritus. Vitiligo is progressing.

## 2019-05-29 NOTE — REVIEW OF SYSTEMS
[Fatigue] : fatigue [Cough] : cough [Anxiety] : anxiety [Negative] : Gastrointestinal [Fever] : no fever [Night Sweats] : no night sweats [Recent Change In Weight] : ~T no recent weight change [Chills] : no chills [Chest Pain] : no chest pain [Palpitations] : no palpitations [Lower Ext Edema] : no lower extremity edema [SOB on Exertion] : no shortness of breath during exertion [Wheezing] : no wheezing [Dysuria] : no dysuria [Incontinence] : no incontinence [Joint Pain] : no joint pain [Joint Stiffness] : no joint stiffness [Muscle Pain] : no muscle pain [Dizziness] : no dizziness [Skin Rash] : no skin rash [Depression] : no depression [Muscle Weakness] : no muscle weakness [Easy Bleeding] : no tendency for easy bleeding [FreeTextEntry8] : no hematuria [Easy Bruising] : no tendency for easy bruising [FreeTextEntry4] : nasal congestion, watery eyes [de-identified] : No headaches, no paresthesias...this resolved [de-identified] : vitiligo

## 2019-05-29 NOTE — RESULTS/DATA
[FreeTextEntry1] : EXAM: CT CHEST IC \par EXAM: CT ABDOMEN AND PELVIS OC IC \par PROCEDURE DATE: 05/14/2019 \par INTERPRETATION: CLINICAL INFORMATION: Urothelial cancer. \par COMPARISON: 1/26/2019. \par PROCEDURE: \par CT of the Chest, Abdomen and Pelvis was performed with intravenous contrast. Intravenous contrast: 90 ml Omnipaque 350. 10 ml discarded. \par Oral contrast: positive contrast was administered. Sagittal and coronal reformats were performed. \par FINDINGS: \par CHEST: \par LUNGS AND LARGE AIRWAYS: Patent central airways. No pulmonary nodules. Calcified granulomas. \par PLEURA: No pleural effusion. \par VESSELS: Within normal limits. \par HEART: Heart size is normal. No pericardial effusion. \par MEDIASTINUM AND ALEJANDRINA: No lymphadenopathy. \par CHEST WALL AND LOWER NECK: Left axillary lymph node dissection. \par ABDOMEN AND PELVIS: \par LIVER: Within normal limits. \par BILE DUCTS: Normal caliber. \par GALLBLADDER: Within normal limits. \par SPLEEN: Within normal limits. \par PANCREAS: Within normal limits. \par ADRENALS: Within normal limits. \par KIDNEYS/URETERS: Punctate left lower pole calculus. \par BLADDER: Within normal limits. \par REPRODUCTIVE ORGANS: Hysterectomy. No adnexal mass. \par BOWEL: No bowel obstruction. Colonic diverticulosis. \par PERITONEUM: No ascites. \par VESSELS: Within normal limits. \par RETROPERITONEUM: No lymphadenopathy. \par ABDOMINAL WALL: Within normal limits. \par BONES: Lumbar spine fusion hardware and posterior laminectomy changes. \par IMPRESSION: No evidence of metastatic disease. \par NAHUM BUCIO M.D., ATTENDING RADIOLOGIST \par This document has been electronically signed. May 15 2019 3:11PM \par

## 2019-05-29 NOTE — ADDENDUM
[FreeTextEntry1] : The cortisol level was 11.1. The comprehensive metabolic panel was normal. The total T4 was 6.8. The TSH was 0.81. The CBC was normal.

## 2019-05-29 NOTE — CONSULT LETTER
[Dear  ___] : Dear  [unfilled], [Courtesy Letter:] : I had the pleasure of seeing your patient, [unfilled], in my office today. [Please see my note below.] : Please see my note below. [Consult Closing:] : Thank you very much for allowing me to participate in the care of this patient.  If you have any questions, please do not hesitate to contact me. [Sincerely,] : Sincerely, [DrAzam  ___] : Dr. BRANDT [FreeTextEntry2] : Dr. Kalen Kauffman MD [facility]

## 2019-06-13 ENCOUNTER — OUTPATIENT (OUTPATIENT)
Dept: OUTPATIENT SERVICES | Facility: HOSPITAL | Age: 69
LOS: 1 days | Discharge: HOME | End: 2019-06-13

## 2019-06-13 DIAGNOSIS — Z98.89 OTHER SPECIFIED POSTPROCEDURAL STATES: Chronic | ICD-10-CM

## 2019-06-13 DIAGNOSIS — C67.9 MALIGNANT NEOPLASM OF BLADDER, UNSPECIFIED: ICD-10-CM

## 2019-06-13 DIAGNOSIS — N63 UNSPECIFIED LUMP IN BREAST: Chronic | ICD-10-CM

## 2019-06-13 DIAGNOSIS — Z98.890 OTHER SPECIFIED POSTPROCEDURAL STATES: Chronic | ICD-10-CM

## 2019-06-13 DIAGNOSIS — Z90.710 ACQUIRED ABSENCE OF BOTH CERVIX AND UTERUS: Chronic | ICD-10-CM

## 2019-06-18 ENCOUNTER — APPOINTMENT (OUTPATIENT)
Dept: INFUSION THERAPY | Facility: HOSPITAL | Age: 69
End: 2019-06-18

## 2019-06-18 ENCOUNTER — APPOINTMENT (OUTPATIENT)
Dept: HEMATOLOGY ONCOLOGY | Facility: CLINIC | Age: 69
End: 2019-06-18

## 2019-06-19 DIAGNOSIS — Z51.11 ENCOUNTER FOR ANTINEOPLASTIC CHEMOTHERAPY: ICD-10-CM

## 2019-07-01 ENCOUNTER — OTHER (OUTPATIENT)
Age: 69
End: 2019-07-01

## 2019-07-09 ENCOUNTER — APPOINTMENT (OUTPATIENT)
Dept: INFUSION THERAPY | Facility: HOSPITAL | Age: 69
End: 2019-07-09

## 2019-07-09 ENCOUNTER — LABORATORY RESULT (OUTPATIENT)
Age: 69
End: 2019-07-09

## 2019-07-09 ENCOUNTER — APPOINTMENT (OUTPATIENT)
Dept: HEMATOLOGY ONCOLOGY | Facility: CLINIC | Age: 69
End: 2019-07-09
Payer: MEDICARE

## 2019-07-09 VITALS
OXYGEN SATURATION: 98 % | TEMPERATURE: 98.4 F | HEART RATE: 62 BPM | RESPIRATION RATE: 16 BRPM | WEIGHT: 168.87 LBS | DIASTOLIC BLOOD PRESSURE: 83 MMHG | BODY MASS INDEX: 27.26 KG/M2 | SYSTOLIC BLOOD PRESSURE: 128 MMHG

## 2019-07-09 PROCEDURE — 99214 OFFICE O/P EST MOD 30 MIN: CPT

## 2019-07-09 NOTE — HISTORY OF PRESENT ILLNESS
[de-identified] : Ms. 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 The tumor was found on cystoscopy at the right ureteral orifice. 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 She is referred for consideration of neoadjuvant chemotherapy. In late May, at the time of scheduled back surgery, she thought she had a UTI. Urine was tested and she was treated, then had the surgery. She had burning post-op. She was treated again with an antibiotic. She went to Rehab and was treated again and she was seen by a urologist there. Went to PCP after discharge from rehab, he noted some blood in the urine. She went to [facility] ER and \par she was referred to Dr. Hargrove. She underwent a cystoscopy, revealing tumor. She never noted blood in her urine, but did note it was darker than usual. Still has some "tingling" sensation, no incontinence. Nocturia occurs, which she says is related to awakening from CPAP. No cough. WALKER/bone pains.\par \par 10/27/17...[place] [facility] [place]. They wanted to repeat procedures again, including cystoscopy. They called  again today. Saw Dr. Montalvo. She was asked to return next Thursday. They want to do a PET CT scan. They recommended she receive cis/gem, likely due to the glandular differentiation. She was overwhelmed by all the information she was given. No pains, no cough/SOB.  \par \par 10/27/17...Juju completed cycle 1 this past Friday, and she noticed on Saturday that her lips appeared swollen and she noticed sores in her mouth. This occurred after going out to eat chinese food.Today she feels that it may be going away. She has fatigue, and feels chills but no fever. She just feels " lousy."  She is feeling Nausea this time and if she takes the metoclopramide in time, then she is okay, She denies vomiting. She is also having some dysgeusia, She denies paraesthesias of the fingers and toes.  She is having constipation, and she is controlling it with the stool softeners.She states her appetite is ok, but the food doesn't taste good, and it hurts when she swallows\par \par 11/21/17...Chemo with cis/gem #2 due this Friday.has increased lower back pain. The pain had stopped. She had prior back surgery. The pain became more notable since starting the chemo. She had constipation with the chemo and noted an increase in the pain with the constipation in lower back near the site of the surgery. The pain feels like pressure, described as heavy, somewhat relieved by BM. Worse with activities. Took some oxycodone a few days ago, but had a headaches afterwards. Pain score currently at 6-7, no recent pain med use. Taking MiraLAX, senna, Colace and prune juice with occasional MOM. Appetite is good, some altered taste, when present, affects appetite. Had some nausea and vomited x 3 after initial chemo, more gagging than vomiting. She felt she had some sores in the mouth after the gem alone, resolved. No paresthesias. She had some discomfort in the right wrist area, at the site where chemo was administered. Fatigue present all the time, more since the start of chemo. No hematuria, good flow, no urgency or incontinence as before. \par \par 12/11/17...day 15, cycle #2, cis/gem.  Juju has increased sensitivity in the lips after chemo. She had some vomiting at the end of her chemo infusion, however she did not have increased nausea and vomiting since. She has fatigue, and she complains of constipation and is taking miralax. She does complain of back pain that has increased in intensity.She notices it when she bends over. She feels that there is something noticeably there in her back. She takes pain medication to help her fall asleep. She doesn't sleep well. She does use c-pap secondary to sleep apnea but she is awaken from her sleep secondary to nocturia. She also notices her pain more when she has constipation and has to go to the bathroom. She describes her lower back pain as 8/10 at worst, and best 5/10. HEr back pain went away after the surgery and she noticed it came back once starting Chemo. She states her appetite is good, She does complain of dysgeusia. She denies paraesthesias. She does not like how the oxycodone makes her feel. It gives her HAs.\par \par 1/26/18...Had RT from the 9th to the 13th of January. She feels there is some decrease in the pain, not as piercing. She feels the area is stiff. Worse when she awakens. Takes 3 x 325 Tylenol at night which helps the pain. has constipation, went one week w/o evacuation. Was given lactulose, but she feels that MiraLAX works better. She is less active as a result of the pain. the pain is worse when she bends. Appetite is variable. No weight loss. Has occasional nausea. No vomiting. Has some indigestion at times. No blood in the urine, no dysuria. No incontinence. Fatigue is present. No pains elsewhere. \par \par 3/14/18...She still has some pain. She is doing PT, which helps temporarily. She has not had the relief of pain that she thought would be the result. Pain score at 4-5, described as aggravation and it inhibits activities. Worse with bending, getting in and out of cars. She is not taking hydromorphone at this time.. She is distressed with constipation from the pain meds. Says lactulose does not work. She takes some Tylenol sporadically, not daily. Appetite is good, lost 1 kilo. No hematuria, no dysuria, no frequency, no incontinence. No pains elsewhere. She remains very active. She has alopecia. No diarrhea. No cough/WALKER. Fatigue is present, mild. She feels it at the end of the day. No N/V. \par \par 4/4/18...Completed 3 cycles of Tecentriq. She noes curling of the right fourth finger involuntarily. She can bend it back up, but it hurts to do so. No difficulty with strength on the right hand, no tremors.  She has tried Icy Hot w/o benefit. She points to DIP and PIP joints as the sites of pain. She feels she has lumps on her head. She is losing her hair, likely secondary to the chemo she received. Her scalp is pruritic. She has also lost pubic hair. Her pain continues to get better,went to PT for 7 weeks. She is able to do all normal activities, with mild fatigue. Appetite is good, weight is stable. No dizziness, no unsteadiness, no headaches. No other issues. No hematuria. \par \par 4/25/18...Missed Rx on 4/16/18. feels "great". Still has back pains, much improved. No pain while seated, but will come on after a while. No worse with ambulation. Does not awaken her. Pain can be as high as 7-8 with activities, best at 3-4. No pain meds utilized. No blood in urine. denies fatigue until late in the day. Appetite is good, but she has lost about 3 pounds. She is avoiding fat products as she has an upset stomach from fats. Had a URI, treated with azithromycin, Flonase and a codeine containing cough suppressant, still has some residual cough. No diarrhea. No dyspnea. has constipation. \par \par 5/7/18...On atezolizumab since 2/12/18. Feels well. Still has some pains, not clearly as severe as before. It is worse if she walks for a while, such as several hundred feet. Also more prominent if she stands too long. Does not bother her at night or awaken her. It does not stop her activities. Takes an occasional ibuprofen, not daily. Appetite is good, weight is stable. No N/V. No diarrhea. No cough/WALKER. Some fatigue. No leg edema. \par \par 5/30/18...Feels "okay". Still has some back pain, nothing like it was before. Has a bunion of the foot which is bothersome and the podiatrist has recommended surgery. No cough/sputum,. No diarrhea. Appetite is good, but lost 2 pounds since last visit. No other pains. No headaches, no paresthesias. No dizziness noted, no balance issues. Mild fatigue, improved.\par \par 6/19/18...Cycle 6 was administered on 6/6/18, due #7 on 6/27/18. "I'm feeling good". Noted some pressure and noted urinary frequency about one week ago, then resolved. No dysuria, no blood, no frequency, nocturia x 1. The usual lower back pain, no pain at the current time. Extra exertion brings on the low back pain and she is not sure if this is from the cancer or from the prior back surgery. She notes several areas over her skin becoming depigmented in small spots. No pruritus. Appetite is good, no weight loss. Actually gained a few pounds. No diarrhea. has some fatigue towards the end of the day. No dizziness. No paresthesias. No cough/WALKER. \par \par 7/7/18...On atezo since 2/12/18. Has received 7 cycles. Saw Dr. Hargrove late June, cysto recommended, but she decided to wait until  after vacation. She notes lightening of her skin. She didn't go to the dermatologist. She has a prior dermatologist that she might see. No diarrhea. No upset stomach. Appetite is ":great", no weight loss. No cough/WALKER. No paresthesias. Minor fatigue along with aches in the evenings. No headaches, no dizziness. No leg edema. Slight hematuria. \par \par 8/29/18...last scans in May\par Reports skin changes due to the vitiligo has been bothering her.  Reports this has been happening more since last month.  Has also been taking Valium from two years ago occasionally for anxiety.  Reports has taken it once every 2-3 weeks, only when she feels overwhelmed with anxiety.  Has not seen an psychiatrist yet, but has been seeing a psychologist.  She has seeing her for one year..  When she gets anxious it occurs mostly at night.  Denies any pain, except after walking a long period of time.  Will occasionally use a cane.  Reports no urinary frequency.  No urinary incontinence, no hematuria.  Reports regular bowel movements.  Reports good appetite, occasional dyspepsia with fried foods.  \par \par 8/19/18...Did not have an appt today, was added onto schedule. She has urgency, no dysuria, no foul odor. She feels bloated as well for the past 3 days. She wonders if it is related to her vitiligo, which is prominent in the urogenital area. Urine is clear, no blood. No fevers, no chills. Appetite is "great", gaining weight. No cough/WALKER. NO diarrheal stools, last BM yesterday, normal. No N/V. No vitiligo is more prominent, which concerns her. She says the bottom of her feet are tender, at the ball of the feet. Toes are numb. This has occurred over the last week. She has also had cramps in her hands. Mostly the thumb, told of he had an injection for her trigger finger. received cycle # 11 of atezolizumab today. \par \par 10/11/18...dose # 12 today. Saw Paulie Hargrove, plan for cysto. "I'm doing well".  She says that her feet always feels like there "is something there", involving the toes, no longer affects the ball of the foot. Saw her podiatrist. Cysto is scheduled for the 16th. Her vitiligo is more prominent his is prominent on the hands. She feels that some parts of her hands are darker as well. Appetite is "fabulous". No N/V/D/C. Urine flow is good, no incontinence, no blood, no dysuria. Urgency and fullness sensation resolved. No cough/SOB. No headaches. No fatigue\par \par 10/31/18....Dose #13 today. Feels well at this time. Back pain remains the same as before, no pain med use. If she does extra activities she has pain. She had back surgery before and she believes it is from the prior surgery and not the mets. No fatigue. Appetite is good, weight is stable., No diarrheal stools. No hematuria noted. No dysuria. Usual activities performed without impairment. Vitiligo is somewhat more prominent, which disturbs her. No leg edema. No cough, no WALKER. No upset stomach. No fevers, no chills. Had cysto on October 16, all was normal. \par \par 11/21/18...Feels "okay". Pain in the back somewhat more prominent, the pain from her prior surgery.Appetite is jeramy, no weight loss. No cough/WALKER. No diarrhea, no upset stomach. Some minor fatigue. She thinks she is depressed, attends a support group here. Starting with a therapist. He  left [place] recently[place] \par \par 12/11/18...Last scans 9/20/18. Getting laser therapy for her vitiligo, which she may not continue due to high c-pays. feels as if her tongue is sensitive to heat since starting the laser therapy. She senses salt more than before. Otherwise well, back pain "bearable", RTS, "part of my life". No pain meds used. Appetite is good, weight is stable. No diarrheal stools No cough, No SOB. She has some sensation in the bladder or vaginal area, pruritic, calmed with Vagisil.  She is concerned about some blood in the urine, microscopic....she had a cysto on 10/22/18, revealing no issue. She asked about clearance for sexual activity. No fatigue, no headaches. No dysuria, but occasional mild irritation. No incontinence. No urinary frequency, rare nocturia. \par \par 1/3/19 : On atezolizumab since 2/12/18. Feels "great". No pains except for usual aches. Appetite is good, weight has increased. No cough, no WALKER. No diarrheal stools, No upset stomach. No edema. No skin rashes. Vitiligo is "going crazy", goes 2 times a week for laser therapy. States she may stop the laser therapy. \par \par 1/23/19...1/23/19 : Here for Atezolizumab(since 2/18) and follow up. \par She has been well overall without any AEs aside form vitiligo which is a known side effect of these ICI's. \par \par 2/13/19 : One year on atezolizumab. Feels "fine". No diarrheal stool, no dyspepsia, no cough/WALKER. Appetite s good, no weight loss. No headaches, some paresthesias of the feet, no change. Has seen neurology and received injections. Can't open her hands at times. No fatigue, no pruritus. NO skin rashes. Vitiligo continues to be more prominent. Went for laser therapy, wit stopped it. She says her lips blistered. Vitiligo affects genitalia, present prior to Rx, has vitiligo of the hands, axilla and breasts. \par \par 3/6/19...3/6/19 : Ms. Crespo is here for a follow up and atezolizumab. She has been having lower back pain that resembled pain she had when she had recurrence. She underwent MRI on 3/3/19. This showed re-demonstrating the spine lesions, no changes were noted on the MRI. She has DJD and bulging disc with narrowing in L4-5.\par No new lesions were seen. She has not followed up with Dr. VINICIUS Kauffman for this either. OTHerwise she feels well. \par \par 4/16/19...saw her orthopedic surgeon, who said the pain is not related to her cancer, but to scar tissue. He gave her Flexeril and Meloxicam. He referred her for PT. The pain is much better. The knot" is not as big as before. Otherwise, :fabulous". Appetite is good, weight is stable. Has some fatigue, noted at the end of the day. No pruritus, no rash. Vitiligo is progressive. No cough, no WALKER. No nausea, no vomiting, occ upset stomach. No diarrheal stools. Now 14 months on therapy. \par \par 5/28/19...On Atezo since 2/2018. Had some back pain exacerbation recently, was on meloxicam and cyclobenzaprine, which has resolved to a great degree. She returned to work in April. Feels well otherwise, has a "head cold", with congestion, yellow sputum. Taking Claritin and other drugs, nasal congestion and runny nose improved. She is concerned it has moved to the chest. No F/C, no SOB, no wheezing. Appetite is good, no weight loss. No diarrheal stools., no upset stomach. Fatigue is present, in  the evening, she is "done". This has been the case since she returned to work. No rash, no pruritus. Vitiligo is progressing. [de-identified] : high-grade [FreeTextEntry1] : cis/gem, started chemo 11/3/17 as neoadjuvant, then bone mets, had RT. Now on atezolizumab since February 12, 2018 [de-identified] : Working and has fatigue. She feels that the fatigue is mostly due to the work schedule. 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 Cycle 25 is today. Overall, feels well. has some back pain, which she feels is related to her prior surgery. No hematuria noted, no incontinence. No dysuria. Appetite is excellent, no weight loss. No cough, No WALKER. No diarrheal stools. No F/C. No edema.

## 2019-07-09 NOTE — PHYSICAL EXAM
[Fully active, able to carry on all pre-disease performance without restriction] : Status 0 - Fully active, able to carry on all pre-disease performance without restriction [Normal] : affect appropriate [de-identified] : vitiligo, no worse

## 2019-07-09 NOTE — ASSESSMENT
[FreeTextEntry1] : Juju seen in followup today. She is working part-time and she has some fatigue as a result. 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 There is no diarrhea. There are no respiratory complaints. She feels reasonably well other than the fatigue. There are no rashes or pruritus. Vitiligo remains the same at this point.\par \par On physical examination, she appears well. The HEENT examination is normal. The chest is clear. This heart examination reveals a mild increase second heart sound. There is no murmur. There is no edema extremities. There is no spinal column or chest wall tenderness to palpation or percussion. \par \par Today is dose #25 of atezolizumab. She's been tolerating therapy well without any major adverse events. She will continue on treatment. I'll see her once again in 6 weeks time given that she has no significant toxicity. She should contact me if there are any concerns in the interim. Laboratory results will be drawn today at the time of her treatment. All questions are answered the best of my ability and to her apparent satisfaction.

## 2019-07-09 NOTE — REVIEW OF SYSTEMS
[Fatigue] : fatigue [Anxiety] : anxiety [Negative] : Gastrointestinal [Fever] : no fever [Chills] : no chills [Recent Change In Weight] : ~T no recent weight change [Chest Pain] : no chest pain [Palpitations] : no palpitations [Lower Ext Edema] : no lower extremity edema [Wheezing] : no wheezing [Cough] : no cough [SOB on Exertion] : no shortness of breath during exertion [Dysuria] : no dysuria [Incontinence] : no incontinence [Joint Pain] : no joint pain [Joint Stiffness] : no joint stiffness [Muscle Pain] : no muscle pain [Skin Rash] : no skin rash [Dizziness] : no dizziness [Depression] : no depression [Muscle Weakness] : no muscle weakness [Easy Bleeding] : no tendency for easy bleeding [Easy Bruising] : no tendency for easy bruising [FreeTextEntry9] : low back pains, as before [de-identified] : No HAA,  paresthesias of the bottom of the feet, since surgery, no worse.  [de-identified] : vitiligo, no rash., no pruritus

## 2019-07-11 NOTE — REVIEW OF SYSTEMS
[Fatigue] : fatigue [Eye Pain] : eye pain [Dysphagia] : dysphagia [Anxiety] : anxiety [Negative] : Gastrointestinal [Fever] : no fever [Night Sweats] : no night sweats [Chills] : no chills [Recent Change In Weight] : ~T no recent weight change [Chest Pain] : no chest pain [Palpitations] : no palpitations [Lower Ext Edema] : no lower extremity edema [Wheezing] : no wheezing [Cough] : no cough [SOB on Exertion] : no shortness of breath during exertion [Dysuria] : no dysuria [Incontinence] : no incontinence [Muscle Pain] : no muscle pain [Difficulty Walking] : no difficulty walking [Dizziness] : no dizziness [Depression] : no depression [Muscle Weakness] : no muscle weakness [Easy Bleeding] : no tendency for easy bleeding [Easy Bruising] : no tendency for easy bruising [FreeTextEntry2] : mild fatigue [FreeTextEntry8] : no blood [FreeTextEntry9] : arthritic pains, no changes [de-identified] : vitiligo [de-identified] : No HA.

## 2019-07-11 NOTE — ASSESSMENT
[Palliative Care Plan] : not applicable at this time [FreeTextEntry1] : Juju is seen in the office today in followup. She started atezolizumab exactly one year ago today. She states that she feels "fine". She has no diarrhea, upset stomach, or respiratory complaints. Her appetite has been good. She has difficulty with her hands once again, and she was seen by neurology. She has received some injections. There is no sensory deficit of her hands. She denies any fatigue, rash, or pruritus. The vitiligo continues to be more prominent. She was getting laser therapy but stopped it. She says that she had a complication of blistering of her lips as a result. The vitiligo affected her genitalia before starting immunotherapy. She now has more extensive areas on the hands, the axilla, as well as the breast.\par \par On physical examination, she appears well. There no significant findings other than mild tenderness to percussion over her lower back where she previously had surgical intervention.\par MRI did not show worsening or new areas however showed bulging disc between L4-5, arthrosis, and L sided foramen narrowing associated. we will review this with Dr. Kauffman as well and encouraged her for a follow up with him.\par \par We will proceed  with therapy today as planned. \par \par All questions were answered to the best my ability and to her apparent satisfaction. She will continue on therapy and I'll see her once again in 3 weeks' time.\par \par Dominick Kahn, ANP-BC

## 2019-07-11 NOTE — PHYSICAL EXAM
[Restricted in physically strenuous activity but ambulatory and able to carry out work of a light or sedentary nature] : Status 1- Restricted in physically strenuous activity but ambulatory and able to carry out work of a light or sedentary nature, e.g., light house work, office work [Normal] : affect appropriate [de-identified] : well hydrated

## 2019-07-11 NOTE — CONSULT LETTER
[Dear  ___] : Dear  [unfilled], [Courtesy Letter:] : I had the pleasure of seeing your patient, [unfilled], in my office today. [Please see my note below.] : Please see my note below. [Consult Closing:] : Thank you very much for allowing me to participate in the care of this patient.  If you have any questions, please do not hesitate to contact me. [Sincerely,] : Sincerely, [DrAzam  ___] : Dr. BRANDT [FreeTextEntry2] : Dr. Kalen Kauffman MD [facility]

## 2019-07-11 NOTE — HISTORY OF PRESENT ILLNESS
[Disease: _____________________] : Disease: [unfilled] [T: ___] : T[unfilled] [N: ___] : N[unfilled] [M: ___] : M[unfilled] [AJCC Stage: ____] : AJCC Stage: [unfilled] [de-identified] : Ms. 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 The tumor was found on cystoscopy at the right ureteral orifice. 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 She is referred for consideration of neoadjuvant chemotherapy. In late May, at the time of scheduled back surgery, she thought she had a UTI. Urine was tested and she was treated, then had the surgery. She had burning post-op. She was treated again with an antibiotic. She went to Rehab and was treated again and she was seen by a urologist there. Went to PCP after discharge from rehab, he noted some blood in the urine. She went to [facility] ER and \par she was referred to Dr. Hargrove. She underwent a cystoscopy, revealing tumor. She never noted blood in her urine, but did note it was darker than usual. Still has some "tingling" sensation, no incontinence. Nocturia occurs, which she says is related to awakening from CPAP. No cough. WALKER/bone pains.\par \par 10/27/17...[place] [facility] [place]. They wanted to repeat procedures again, including cystoscopy. They called  again today. Saw Dr. Montalvo. She was asked to return next Thursday. They want to do a PET CT scan. They recommended she receive cis/gem, likely due to the glandular differentiation. She was overwhelmed by all the information she was given. No pains, no cough/SOB.  \par \par 10/27/17...Juju completed cycle 1 this past Friday, and she noticed on Saturday that her lips appeared swollen and she noticed sores in her mouth. This occurred after going out to eat chinese food.Today she feels that it may be going away. She has fatigue, and feels chills but no fever. She just feels " lousy."  She is feeling Nausea this time and if she takes the metoclopramide in time, then she is okay, She denies vomiting. She is also having some dysgeusia, She denies paraesthesias of the fingers and toes.  She is having constipation, and she is controlling it with the stool softeners.She states her appetite is ok, but the food doesn't taste good, and it hurts when she swallows\par \par 11/21/17...Chemo with cis/gem #2 due this Friday.has increased lower back pain. The pain had stopped. She had prior back surgery. The pain became more notable since starting the chemo. She had constipation with the chemo and noted an increase in the pain with the constipation in lower back near the site of the surgery. The pain feels like pressure, described as heavy, somewhat relieved by BM. Worse with activities. Took some oxycodone a few days ago, but had a headaches afterwards. Pain score currently at 6-7, no recent pain med use. Taking MiraLAX, senna, Colace and prune juice with occasional MOM. Appetite is good, some altered taste, when present, affects appetite. Had some nausea and vomited x 3 after initial chemo, more gagging than vomiting. She felt she had some sores in the mouth after the gem alone, resolved. No paresthesias. She had some discomfort in the right wrist area, at the site where chemo was administered. Fatigue present all the time, more since the start of chemo. No hematuria, good flow, no urgency or incontinence as before. \par \par 12/11/17...day 15, cycle #2, cis/gem.  Juju has increased sensitivity in the lips after chemo. She had some vomiting at the end of her chemo infusion, however she did not have increased nausea and vomiting since. She has fatigue, and she complains of constipation and is taking miralax. She does complain of back pain that has increased in intensity.She notices it when she bends over. She feels that there is something noticeably there in her back. She takes pain medication to help her fall asleep. She doesn't sleep well. She does use c-pap secondary to sleep apnea but she is awaken from her sleep secondary to nocturia. She also notices her pain more when she has constipation and has to go to the bathroom. She describes her lower back pain as 8/10 at worst, and best 5/10. HEr back pain went away after the surgery and she noticed it came back once starting Chemo. She states her appetite is good, She does complain of dysgeusia. She denies paraesthesias. She does not like how the oxycodone makes her feel. It gives her HAs.\par \par 1/26/18...Had RT from the 9th to the 13th of January. She feels there is some decrease in the pain, not as piercing. She feels the area is stiff. Worse when she awakens. Takes 3 x 325 Tylenol at night which helps the pain. has constipation, went one week w/o evacuation. Was given lactulose, but she feels that MiraLAX works better. She is less active as a result of the pain. the pain is worse when she bends. Appetite is variable. No weight loss. Has occasional nausea. No vomiting. Has some indigestion at times. No blood in the urine, no dysuria. No incontinence. Fatigue is present. No pains elsewhere. \par \par 3/14/18...She still has some pain. She is doing PT, which helps temporarily. She has not had the relief of pain that she thought would be the result. Pain score at 4-5, described as aggravation and it inhibits activities. Worse with bending, getting in and out of cars. She is not taking hydromorphone at this time.. She is distressed with constipation from the pain meds. Says lactulose does not work. She takes some Tylenol sporadically, not daily. Appetite is good, lost 1 kilo. No hematuria, no dysuria, no frequency, no incontinence. No pains elsewhere. She remains very active. She has alopecia. No diarrhea. No cough/WALKER. Fatigue is present, mild. She feels it at the end of the day. No N/V. \par \par 4/4/18...Completed 3 cycles of Tecentriq. She noes curling of the right fourth finger involuntarily. She can bend it back up, but it hurts to do so. No difficulty with strength on the right hand, no tremors.  She has tried Icy Hot w/o benefit. She points to DIP and PIP joints as the sites of pain. She feels she has lumps on her head. She is losing her hair, likely secondary to the chemo she received. Her scalp is pruritic. She has also lost pubic hair. Her pain continues to get better,went to PT for 7 weeks. She is able to do all normal activities, with mild fatigue. Appetite is good, weight is stable. No dizziness, no unsteadiness, no headaches. No other issues. No hematuria. \par \par 4/25/18...Missed Rx on 4/16/18. feels "great". Still has back pains, much improved. No pain while seated, but will come on after a while. No worse with ambulation. Does not awaken her. Pain can be as high as 7-8 with activities, best at 3-4. No pain meds utilized. No blood in urine. denies fatigue until late in the day. Appetite is good, but she has lost about 3 pounds. She is avoiding fat products as she has an upset stomach from fats. Had a URI, treated with azithromycin, Flonase and a codeine containing cough suppressant, still has some residual cough. No diarrhea. No dyspnea. has constipation. \par \par 5/7/18...On atezolizumab since 2/12/18. Feels well. Still has some pains, not clearly as severe as before. It is worse if she walks for a while, such as several hundred feet. Also more prominent if she stands too long. Does not bother her at night or awaken her. It does not stop her activities. Takes an occasional ibuprofen, not daily. Appetite is good, weight is stable. No N/V. No diarrhea. No cough/WALKER. Some fatigue. No leg edema. \par \par 5/30/18...Feels "okay". Still has some back pain, nothing like it was before. Has a bunion of the foot which is bothersome and the podiatrist has recommended surgery. No cough/sputum,. No diarrhea. Appetite is good, but lost 2 pounds since last visit. No other pains. No headaches, no paresthesias. No dizziness noted, no balance issues. Mild fatigue, improved.\par \par 6/19/18...Cycle 6 was administered on 6/6/18, due #7 on 6/27/18. "I'm feeling good". Noted some pressure and noted urinary frequency about one week ago, then resolved. No dysuria, no blood, no frequency, nocturia x 1. The usual lower back pain, no pain at the current time. Extra exertion brings on the low back pain and she is not sure if this is from the cancer or from the prior back surgery. She notes several areas over her skin becoming depigmented in small spots. No pruritus. Appetite is good, no weight loss. Actually gained a few pounds. No diarrhea. has some fatigue towards the end of the day. No dizziness. No paresthesias. No cough/WALKER. \par \par 7/7/18...On atezo since 2/12/18. Has received 7 cycles. Saw Dr. Hargrove late June, cysto recommended, but she decided to wait until  after vacation. She notes lightening of her skin. She didn't go to the dermatologist. She has a prior dermatologist that she might see. No diarrhea. No upset stomach. Appetite is ":great", no weight loss. No cough/WALKER. No paresthesias. Minor fatigue along with aches in the evenings. No headaches, no dizziness. No leg edema. Slight hematuria. \par \par 8/29/18...last scans in May\par Reports skin changes due to the vitiligo has been bothering her.  Reports this has been happening more since last month.  Has also been taking Valium from two years ago occasionally for anxiety.  Reports has taken it once every 2-3 weeks, only when she feels overwhelmed with anxiety.  Has not seen an psychiatrist yet, but has been seeing a psychologist.  She has seeing her for one year..  When she gets anxious it occurs mostly at night.  Denies any pain, except after walking a long period of time.  Will occasionally use a cane.  Reports no urinary frequency.  No urinary incontinence, no hematuria.  Reports regular bowel movements.  Reports good appetite, occasional dyspepsia with fried foods.  \par \par 8/19/18...Did not have an appt today, was added onto schedule. She has urgency, no dysuria, no foul odor. She feels bloated as well for the past 3 days. She wonders if it is related to her vitiligo, which is prominent in the urogenital area. Urine is clear, no blood. No fevers, no chills. Appetite is "great", gaining weight. No cough/WALKER. NO diarrheal stools, last BM yesterday, normal. No N/V. No vitiligo is more prominent, which concerns her. She says the bottom of her feet are tender, at the ball of the feet. Toes are numb. This has occurred over the last week. She has also had cramps in her hands. Mostly the thumb, told of he had an injection for her trigger finger. received cycle # 11 of atezolizumab today. \par \par 10/11/18...dose # 12 today. Saw Paulie Hargrove, plan for cysto. "I'm doing well".  She says that her feet always feels like there "is something there", involving the toes, no longer affects the ball of the foot. Saw her podiatrist. Cysto is scheduled for the 16th. Her vitiligo is more prominent his is prominent on the hands. She feels that some parts of her hands are darker as well. Appetite is "fabulous". No N/V/D/C. Urine flow is good, no incontinence, no blood, no dysuria. Urgency and fullness sensation resolved. No cough/SOB. No headaches. No fatigue\par \par 10/31/18....Dose #13 today. Feels well at this time. Back pain remains the same as before, no pain med use. If she does extra activities she has pain. She had back surgery before and she believes it is from the prior surgery and not the mets. No fatigue. Appetite is good, weight is stable., No diarrheal stools. No hematuria noted. No dysuria. Usual activities performed without impairment. Vitiligo is somewhat more prominent, which disturbs her. No leg edema. No cough, no WALKER. No upset stomach. No fevers, no chills. Had cysto on October 16, all was normal. \par \par 11/21/18...Feels "okay". Pain in the back somewhat more prominent, the pain from her prior surgery.Appetite is jeramy, no weight loss. No cough/WALKER. No diarrhea, no upset stomach. Some minor fatigue. She thinks she is depressed, attends a support group here. Starting with a therapist. He  left [place] recently[place] \par \par 12/11/18...Last scans 9/20/18. Getting laser therapy for her vitiligo, which she may not continue due to high c-pays. feels as if her tongue is sensitive to heat since starting the laser therapy. She senses salt more than before. Otherwise well, back pain "bearable", RTS, "part of my life". No pain meds used. Appetite is good, weight is stable. No diarrheal stools No cough, No SOB. She has some sensation in the bladder or vaginal area, pruritic, calmed with Vagisil.  She is concerned about some blood in the urine, microscopic....she had a cysto on 10/22/18, revealing no issue. She asked about clearance for sexual activity. No fatigue, no headaches. No dysuria, but occasional mild irritation. No incontinence. No urinary frequency, rare nocturia. \par \par 1/3/19 : On atezolizumab since 2/12/18. Feels "great". No pains except for usual aches. Appetite is good, weight has increased. No cough, no WALKER. No diarrheal stools, No upset stomach. No edema. No skin rashes. Vitiligo is "going crazy", goes 2 times a week for laser therapy. States she may stop the laser therapy. \par \par 1/23/19...1/23/19 : Here for Atezolizumab(since 2/18) and follow up. \par She has been well overall without any AEs aside form vitiligo which is a known side effect of these ICI's. \par \par 2/13/19 : One year on atezolizumab. Feels "fine". No diarrheal stool, no dyspepsia, no cough/WALKER. Appetite s good, no weight loss. No headaches, some paresthesias of the feet, no change. Has seen neurology and received injections. Can't open her hands at times. No fatigue, no pruritus. NO skin rashes. Vitiligo continues to be more prominent. Went for laser therapy, wit stopped it. She says her lips blistered. Vitiligo affects genitalia, present prior to Rx, has vitiligo of the hands, axilla and breasts.  [FreeTextEntry1] : cis/gem, started chemo 11/3/17 as neoadjuvant, then bone mets, had RT. Now on atezolizumab since February 12, 2018 [de-identified] : high-grade [de-identified] : 3/6/19 : Ms. Solis is here for a follow up and atezolizumab. She has been having lower back pain that resembled pain she had when she had recurrence. She underwent MRI on 3/3/19. This showed re-demonstrating the spine lesions, no changes were noted on the MRI. She has DJD and bulging disc with narrowing in L4-5.\par No new lesions were seen. She has not followed up with Dr. VINICIUS Kauffman for this either. OTHerwise she feels well.

## 2019-07-19 ENCOUNTER — OUTPATIENT (OUTPATIENT)
Dept: OUTPATIENT SERVICES | Facility: HOSPITAL | Age: 69
LOS: 1 days | Discharge: HOME | End: 2019-07-19

## 2019-07-19 DIAGNOSIS — Z98.890 OTHER SPECIFIED POSTPROCEDURAL STATES: Chronic | ICD-10-CM

## 2019-07-19 DIAGNOSIS — Z90.710 ACQUIRED ABSENCE OF BOTH CERVIX AND UTERUS: Chronic | ICD-10-CM

## 2019-07-19 DIAGNOSIS — Z98.89 OTHER SPECIFIED POSTPROCEDURAL STATES: Chronic | ICD-10-CM

## 2019-07-19 DIAGNOSIS — N63 UNSPECIFIED LUMP IN BREAST: Chronic | ICD-10-CM

## 2019-07-19 DIAGNOSIS — C67.9 MALIGNANT NEOPLASM OF BLADDER, UNSPECIFIED: ICD-10-CM

## 2019-07-30 ENCOUNTER — APPOINTMENT (OUTPATIENT)
Dept: HEMATOLOGY ONCOLOGY | Facility: CLINIC | Age: 69
End: 2019-07-30

## 2019-07-30 ENCOUNTER — APPOINTMENT (OUTPATIENT)
Dept: INFUSION THERAPY | Facility: HOSPITAL | Age: 69
End: 2019-07-30

## 2019-07-31 DIAGNOSIS — Z51.11 ENCOUNTER FOR ANTINEOPLASTIC CHEMOTHERAPY: ICD-10-CM

## 2019-08-16 ENCOUNTER — OUTPATIENT (OUTPATIENT)
Dept: OUTPATIENT SERVICES | Facility: HOSPITAL | Age: 69
LOS: 1 days | Discharge: HOME | End: 2019-08-16

## 2019-08-16 DIAGNOSIS — Z98.89 OTHER SPECIFIED POSTPROCEDURAL STATES: Chronic | ICD-10-CM

## 2019-08-16 DIAGNOSIS — Z90.710 ACQUIRED ABSENCE OF BOTH CERVIX AND UTERUS: Chronic | ICD-10-CM

## 2019-08-16 DIAGNOSIS — Z98.890 OTHER SPECIFIED POSTPROCEDURAL STATES: Chronic | ICD-10-CM

## 2019-08-16 DIAGNOSIS — N63 UNSPECIFIED LUMP IN BREAST: Chronic | ICD-10-CM

## 2019-08-16 DIAGNOSIS — C67.9 MALIGNANT NEOPLASM OF BLADDER, UNSPECIFIED: ICD-10-CM

## 2019-08-20 ENCOUNTER — APPOINTMENT (OUTPATIENT)
Dept: INFUSION THERAPY | Facility: HOSPITAL | Age: 69
End: 2019-08-20

## 2019-08-20 ENCOUNTER — APPOINTMENT (OUTPATIENT)
Dept: HEMATOLOGY ONCOLOGY | Facility: CLINIC | Age: 69
End: 2019-08-20
Payer: MEDICARE

## 2019-08-20 ENCOUNTER — RESULT REVIEW (OUTPATIENT)
Age: 69
End: 2019-08-20

## 2019-08-20 VITALS
DIASTOLIC BLOOD PRESSURE: 86 MMHG | OXYGEN SATURATION: 99 % | TEMPERATURE: 98.3 F | BODY MASS INDEX: 27.51 KG/M2 | WEIGHT: 170.42 LBS | RESPIRATION RATE: 16 BRPM | SYSTOLIC BLOOD PRESSURE: 135 MMHG | HEART RATE: 71 BPM

## 2019-08-20 LAB
HCT VFR BLD CALC: 39.3 % — SIGNIFICANT CHANGE UP (ref 34.5–45)
HGB BLD-MCNC: 13.2 G/DL — SIGNIFICANT CHANGE UP (ref 11.5–15.5)
MCHC RBC-ENTMCNC: 30.8 PG — SIGNIFICANT CHANGE UP (ref 27–34)
MCHC RBC-ENTMCNC: 33.6 G/DL — SIGNIFICANT CHANGE UP (ref 32–36)
MCV RBC AUTO: 91.8 FL — SIGNIFICANT CHANGE UP (ref 80–100)
PLATELET # BLD AUTO: 178 K/UL — SIGNIFICANT CHANGE UP (ref 150–400)
RBC # BLD: 4.28 M/UL — SIGNIFICANT CHANGE UP (ref 3.8–5.2)
RBC # FLD: 13.5 % — SIGNIFICANT CHANGE UP (ref 10.3–14.5)
WBC # BLD: 5.2 K/UL — SIGNIFICANT CHANGE UP (ref 3.8–10.5)
WBC # FLD AUTO: 5.2 K/UL — SIGNIFICANT CHANGE UP (ref 3.8–10.5)

## 2019-08-20 PROCEDURE — 99213 OFFICE O/P EST LOW 20 MIN: CPT

## 2019-08-20 RX ORDER — PNV NO.95/FERROUS FUM/FOLIC AC 28MG-0.8MG
TABLET ORAL
Refills: 0 | Status: COMPLETED | COMMUNITY
End: 2019-08-20

## 2019-08-20 NOTE — CONSULT LETTER
[Dear  ___] : Dear  [unfilled], [Courtesy Letter:] : I had the pleasure of seeing your patient, [unfilled], in my office today. [Please see my note below.] : Please see my note below. [Consult Closing:] : Thank you very much for allowing me to participate in the care of this patient.  If you have any questions, please do not hesitate to contact me. [Sincerely,] : Sincerely, [DrAzam  ___] : Dr. BRANDT [FreeTextEntry2] : Dr. Kalen Kauffman MD [facility]

## 2019-08-20 NOTE — ASSESSMENT
[Palliative Care Plan] : not applicable at this time [FreeTextEntry1] : Progress in the office today. She's been on atezolizumab for approximately 1.5 years for metastatic urothelial cancer. She feels well overall. She notes occasional tingling sensation when she voids which she believes is secondary to sensitivity from the vitiligo. She is sad today, as she works as a  for an elderly demented patient who suddenly passed this morning. She was tearful and we discussed her emotions.\par \par She continues to have some low back pain which is due to her prior surgery and not to her metastases. There no diarrheal stools. There is no cough. There is no shortness of breath. She denies fatigue.\par \par On physical examination, she appears well. There are no notable findings present.\par \par Today's cortisol level was 4.8. It has been normal in many determinations but in October of last year was 5.1. The chemistry panel was normal. The TSH was 0.67 and the CBC was normal.\par \par She continues on atezolizumab without any major adverse effect. All questions were answered to the best of my ability and her apparent satisfaction. She will be seen once again in 3 weeks' time.  She will have a CT scan in September

## 2019-08-20 NOTE — REVIEW OF SYSTEMS
[Anxiety] : anxiety [Negative] : Gastrointestinal [Fever] : no fever [Chills] : no chills [Night Sweats] : no night sweats [Recent Change In Weight] : ~T no recent weight change [Fatigue] : no fatigue [Dysuria] : no dysuria [Joint Pain] : no joint pain [Joint Stiffness] : no joint stiffness [Muscle Pain] : no muscle pain [Dizziness] : no dizziness [Skin Rash] : no skin rash [Depression] : no depression [Muscle Weakness] : no muscle weakness [Easy Bleeding] : no tendency for easy bleeding [Easy Bruising] : no tendency for easy bruising [de-identified] : no headaches, no paresthesias [de-identified] : vitiligo

## 2019-08-20 NOTE — HISTORY OF PRESENT ILLNESS
[Disease: _____________________] : Disease: [unfilled] [T: ___] : T[unfilled] [N: ___] : N[unfilled] [M: ___] : M[unfilled] [AJCC Stage: ____] : AJCC Stage: [unfilled] [FreeTextEntry1] : cis/gem, started chemo 11/3/17 as neoadjuvant, then bone mets, had RT. Now on atezolizumab since February 12, 2018 [de-identified] : high-grade [de-identified] : Ms. 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 The tumor was found on cystoscopy at the right ureteral orifice. 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 She is referred for consideration of neoadjuvant chemotherapy. In late May, at the time of scheduled back surgery, she thought she had a UTI. Urine was tested and she was treated, then had the surgery. She had burning post-op. She was treated again with an antibiotic. She went to Rehab and was treated again and she was seen by a urologist there. Went to PCP after discharge from rehab, he noted some blood in the urine. She went to [facility] ER and \par she was referred to Dr. Hargrove. She underwent a cystoscopy, revealing tumor. She never noted blood in her urine, but did note it was darker than usual. Still has some "tingling" sensation, no incontinence. Nocturia occurs, which she says is related to awakening from CPAP. No cough. WALKER/bone pains.\par \par 10/27/17...Seen at [facility] in second opinion yesterday. They wanted to repeat procedures again, including cystoscopy. They called  again today. Saw Dr. Montalvo. She was asked to return next Thursday. They want to do a PET CT scan. They recommended she receive cis/gem, likely due to the glandular differentiation. She was overwhelmed by all the information she was given. No pains, no cough/SOB.  \par \par 10/27/17...Juju completed cycle 1 this past Friday, and she noticed on Saturday that her lips appeared swollen and she noticed sores in her mouth. This occurred after going out to eat chinese food.Today she feels that it may be going away. She has fatigue, and feels chills but no fever. She just feels " lousy."  She is feeling Nausea this time and if she takes the metoclopramide in time, then she is okay, She denies vomiting. She is also having some dysgeusia, She denies paraesthesias of the fingers and toes.  She is having constipation, and she is controlling it with the stool softeners.She states her appetite is ok, but the food doesn't taste good, and it hurts when she swallows\par \par 11/21/17...Chemo with cis/gem #2 due this Friday.has increased lower back pain. The pain had stopped. She had prior back surgery. The pain became more notable since starting the chemo. She had constipation with the chemo and noted an increase in the pain with the constipation in lower back near the site of the surgery. The pain feels like pressure, described as heavy, somewhat relieved by BM. Worse with activities. Took some oxycodone a few days ago, but had a headaches afterwards. Pain score currently at 6-7, no recent pain med use. Taking MiraLAX, senna, Colace and prune juice with occasional MOM. Appetite is good, some altered taste, when present, affects appetite. Had some nausea and vomited x 3 after initial chemo, more gagging than vomiting. She felt she had some sores in the mouth after the gem alone, resolved. No paresthesias. She had some discomfort in the right wrist area, at the site where chemo was administered. Fatigue present all the time, more since the start of chemo. No hematuria, good flow, no urgency or incontinence as before. \par \par 12/11/17...day 15, cycle #2, cis/gem.  Juju has increased sensitivity in the lips after chemo. She had some vomiting at the end of her chemo infusion, however she did not have increased nausea and vomiting since. She has fatigue, and she complains of constipation and is taking miralax. She does complain of back pain that has increased in intensity.She notices it when she bends over. She feels that there is something noticeably there in her back. She takes pain medication to help her fall asleep. She doesn't sleep well. She does use c-pap secondary to sleep apnea but she is awaken from her sleep secondary to nocturia. She also notices her pain more when she has constipation and has to go to the bathroom. She describes her lower back pain as 8/10 at worst, and best 5/10. HEr back pain went away after the surgery and she noticed it came back once starting Chemo. She states her appetite is good, She does complain of dysgeusia. She denies paraesthesias. She does not like how the oxycodone makes her feel. It gives her HAs.\par \par 1/26/18...Had RT from the 9th to the 13th of January. She feels there is some decrease in the pain, not as piercing. She feels the area is stiff. Worse when she awakens. Takes 3 x 325 Tylenol at night which helps the pain. has constipation, went one week w/o evacuation. Was given lactulose, but she feels that MiraLAX works better. She is less active as a result of the pain. the pain is worse when she bends. Appetite is variable. No weight loss. Has occasional nausea. No vomiting. Has some indigestion at times. No blood in the urine, no dysuria. No incontinence. Fatigue is present. No pains elsewhere. \par \par 3/14/18...She still has some pain. She is doing PT, which helps temporarily. She has not had the relief of pain that she thought would be the result. Pain score at 4-5, described as aggravation and it inhibits activities. Worse with bending, getting in and out of cars. She is not taking hydromorphone at this time.. She is distressed with constipation from the pain meds. Says lactulose does not work. She takes some Tylenol sporadically, not daily. Appetite is good, lost 1 kilo. No hematuria, no dysuria, no frequency, no incontinence. No pains elsewhere. She remains very active. She has alopecia. No diarrhea. No cough/WALKER. Fatigue is present, mild. She feels it at the end of the day. No N/V. \par \par 4/4/18...Completed 3 cycles of Tecentriq. She noes curling of the right fourth finger involuntarily. She can bend it back up, but it hurts to do so. No difficulty with strength on the right hand, no tremors.  She has tried Icy Hot w/o benefit. She points to DIP and PIP joints as the sites of pain. She feels she has lumps on her head. She is losing her hair, likely secondary to the chemo she received. Her scalp is pruritic. She has also lost pubic hair. Her pain continues to get better,went to PT for 7 weeks. She is able to do all normal activities, with mild fatigue. Appetite is good, weight is stable. No dizziness, no unsteadiness, no headaches. No other issues. No hematuria. \par \par 4/25/18...Missed Rx on 4/16/18. feels "great". Still has back pains, much improved. No pain while seated, but will come on after a while. No worse with ambulation. Does not awaken her. Pain can be as high as 7-8 with activities, best at 3-4. No pain meds utilized. No blood in urine. denies fatigue until late in the day. Appetite is good, but she has lost about 3 pounds. She is avoiding fat products as she has an upset stomach from fats. Had a URI, treated with azithromycin, Flonase and a codeine containing cough suppressant, still has some residual cough. No diarrhea. No dyspnea. has constipation. \par \par 5/7/18...On atezolizumab since 2/12/18. Feels well. Still has some pains, not clearly as severe as before. It is worse if she walks for a while, such as several hundred feet. Also more prominent if she stands too long. Does not bother her at night or awaken her. It does not stop her activities. Takes an occasional ibuprofen, not daily. Appetite is good, weight is stable. No N/V. No diarrhea. No cough/WALKER. Some fatigue. No leg edema. \par \par 5/30/18...Feels "okay". Still has some back pain, nothing like it was before. Has a bunion of the foot which is bothersome and the podiatrist has recommended surgery. No cough/sputum,. No diarrhea. Appetite is good, but lost 2 pounds since last visit. No other pains. No headaches, no paresthesias. No dizziness noted, no balance issues. Mild fatigue, improved.\par \par 6/19/18...Cycle 6 was administered on 6/6/18, due #7 on 6/27/18. "I'm feeling good". Noted some pressure and noted urinary frequency about one week ago, then resolved. No dysuria, no blood, no frequency, nocturia x 1. The usual lower back pain, no pain at the current time. Extra exertion brings on the low back pain and she is not sure if this is from the cancer or from the prior back surgery. She notes several areas over her skin becoming depigmented in small spots. No pruritus. Appetite is good, no weight loss. Actually gained a few pounds. No diarrhea. has some fatigue towards the end of the day. No dizziness. No paresthesias. No cough/WALKER. \par \par 7/7/18...On atezo since 2/12/18. Has received 7 cycles. Saw Dr. Hargrove late June, cysto recommended, but she decided to wait until  after vacation. She notes lightening of her skin. She didn't go to the dermatologist. She has a prior dermatologist that she might see. No diarrhea. No upset stomach. Appetite is ":great", no weight loss. No cough/WALKER. No paresthesias. Minor fatigue along with aches in the evenings. No headaches, no dizziness. No leg edema. Slight hematuria. \par \par 8/29/18...last scans in May\par Reports skin changes due to the vitiligo has been bothering her.  Reports this has been happening more since last month.  Has also been taking Valium from two years ago occasionally for anxiety.  Reports has taken it once every 2-3 weeks, only when she feels overwhelmed with anxiety.  Has not seen an psychiatrist yet, but has been seeing a psychologist.  She has seeing her for one year..  When she gets anxious it occurs mostly at night.  Denies any pain, except after walking a long period of time.  Will occasionally use a cane.  Reports no urinary frequency.  No urinary incontinence, no hematuria.  Reports regular bowel movements.  Reports good appetite, occasional dyspepsia with fried foods.  \par \par 8/19/18...Did not have an appt today, was added onto schedule. She has urgency, no dysuria, no foul odor. She feels bloated as well for the past 3 days. She wonders if it is related to her vitiligo, which is prominent in the urogenital area. Urine is clear, no blood. No fevers, no chills. Appetite is "great", gaining weight. No cough/WALKER. NO diarrheal stools, last BM yesterday, normal. No N/V. No vitiligo is more prominent, which concerns her. She says the bottom of her feet are tender, at the ball of the feet. Toes are numb. This has occurred over the last week. She has also had cramps in her hands. Mostly the thumb, told of he had an injection for her trigger finger. received cycle # 11 of atezolizumab today. \par \par 10/11/18...dose # 12 today. Saw Paulie Hargrove, plan for cysto. "I'm doing well".  She says that her feet always feels like there "is something there", involving the toes, no longer affects the ball of the foot. Saw her podiatrist. Cysto is scheduled for the 16th. Her vitiligo is more prominent his is prominent on the hands. She feels that some parts of her hands are darker as well. Appetite is "fabulous". No N/V/D/C. Urine flow is good, no incontinence, no blood, no dysuria. Urgency and fullness sensation resolved. No cough/SOB. No headaches. No fatigue\par \par 10/31/18....Dose #13 today. Feels well at this time. Back pain remains the same as before, no pain med use. If she does extra activities she has pain. She had back surgery before and she believes it is from the prior surgery and not the mets. No fatigue. Appetite is good, weight is stable., No diarrheal stools. No hematuria noted. No dysuria. Usual activities performed without impairment. Vitiligo is somewhat more prominent, which disturbs her. No leg edema. No cough, no WALKER. No upset stomach. No fevers, no chills. Had cysto on October 16, all was normal. \par \par 11/21/18...Feels "okay". Pain in the back somewhat more prominent, the pain from her prior surgery.Appetite is jeramy, no weight loss. No cough/WALKER. No diarrhea, no upset stomach. Some minor fatigue. She thinks she is depressed, attends a support group here. Starting with a therapist. He  left [place] recently[place] \par \par 12/11/18...Last scans 9/20/18. Getting laser therapy for her vitiligo, which she may not continue due to high c-pays. feels as if her tongue is sensitive to heat since starting the laser therapy. She senses salt more than before. Otherwise well, back pain "bearable", RTS, "part of my life". No pain meds used. Appetite is good, weight is stable. No diarrheal stools No cough, No SOB. She has some sensation in the bladder or vaginal area, pruritic, calmed with Vagisil.  She is concerned about some blood in the urine, microscopic....she had a cysto on 10/22/18, revealing no issue. She asked about clearance for sexual activity. No fatigue, no headaches. No dysuria, but occasional mild irritation. No incontinence. No urinary frequency, rare nocturia. \par \par 1/3/19 : On atezolizumab since 2/12/18. Feels "great". No pains except for usual aches. Appetite is good, weight has increased. No cough, no WALKER. No diarrheal stools, No upset stomach. No edema. No skin rashes. Vitiligo is "going crazy", goes 2 times a week for laser therapy. States she may stop the laser therapy. \par \par 1/23/19...1/23/19 : Here for Atezolizumab(since 2/18) and follow up. \par She has been well overall without any AEs aside form vitiligo which is a known side effect of these ICI's. \par \par 2/13/19 : One year on atezolizumab. Feels "fine". No diarrheal stool, no dyspepsia, no cough/WALKER. Appetite s good, no weight loss. No headaches, some paresthesias of the feet, no change. Has seen neurology and received injections. Can't open her hands at times. No fatigue, no pruritus. NO skin rashes. Vitiligo continues to be more prominent. Went for laser therapy, wit stopped it. She says her lips blistered. Vitiligo affects genitalia, present prior to Rx, has vitiligo of the hands, axilla and breasts. \par \par 3/6/19...3/6/19 : Ms. Crespo is here for a follow up and atezolizumab. She has been having lower back pain that resembled pain she had when she had recurrence. She underwent MRI on 3/3/19. This showed re-demonstrating the spine lesions, no changes were noted on the MRI. She has DJD and bulging disc with narrowing in L4-5.\par No new lesions were seen. She has not followed up with Dr. VINICIUS Kauffman for this either. OTHerwise she feels well. \par \par 4/16/19...saw her orthopedic surgeon, who said the pain is not related to her cancer, but to scar tissue. He gave her Flexeril and Meloxicam. He referred her for PT. The pain is much better. The knot" is not as big as before. Otherwise, :fabulous". Appetite is good, weight is stable. Has some fatigue, noted at the end of the day. No pruritus, no rash. Vitiligo is progressive. No cough, no WALKER. No nausea, no vomiting, occ upset stomach. No diarrheal stools. Now 14 months on therapy. \par \par 5/28/19...On Atezo since 2/2018. Had some back pain exacerbation recently, was on meloxicam and cyclobenzaprine, which has resolved to a great degree. She returned to work in April. Feels well otherwise, has a "head cold", with congestion, yellow sputum. Taking Claritin and other drugs, nasal congestion and runny nose improved. She is concerned it has moved to the chest. No F/C, no SOB, no wheezing. Appetite is good, no weight loss. No diarrheal stools., no upset stomach. Fatigue is present, in  the evening, she is "done". This has been the case since she returned to work. No rash, no pruritus. Vitiligo is progressing.\par \par 7/9/19...Working and has fatigue. She feels that the fatigue is mostly due to the work schedule. 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 Cycle 25 is today. Overall, feels well. has some back pain, which she feels is related to her prior surgery. No hematuria noted, no incontinence. No dysuria. Appetite is excellent, no weight loss. No cough, No WALKER. No diarrheal stools. No F/C. No edema.  [de-identified] : Now 1.5 years on atezo at this time. Feels well overall. has an occasional tingling when she voids, which she attributes to the genital vitiligo. She has some cream to apply. She is working as a  to the elderly and one of her clients  this AM. This upsets her somewhat. She became upset and tearful. Appetite is normal, weight is stable. She continues to have some back pains as before, related to prior surgery, perhaps somewhat more since she had to expend more effort. No diarrheal stools, no cough. No SOB No headaches, no dizziness. No fatigue, taking Geritol, which she feels has helped her. No fevers, no chills, no urinary issues

## 2019-08-21 DIAGNOSIS — Z51.11 ENCOUNTER FOR ANTINEOPLASTIC CHEMOTHERAPY: ICD-10-CM

## 2019-08-21 LAB
ALBUMIN SERPL ELPH-MCNC: 4.2 G/DL
ALP BLD-CCNC: 105 U/L
ALT SERPL-CCNC: 15 U/L
ANION GAP SERPL CALC-SCNC: 12 MMOL/L
AST SERPL-CCNC: 15 U/L
BILIRUB SERPL-MCNC: 0.3 MG/DL
BUN SERPL-MCNC: 15 MG/DL
CALCIUM SERPL-MCNC: 9.6 MG/DL
CHLORIDE SERPL-SCNC: 103 MMOL/L
CO2 SERPL-SCNC: 24 MMOL/L
CORTIS SERPL-MCNC: 4.8 UG/DL
CREAT SERPL-MCNC: 0.68 MG/DL
GLUCOSE SERPL-MCNC: 116 MG/DL
POTASSIUM SERPL-SCNC: 3.7 MMOL/L
PROT SERPL-MCNC: 7 G/DL
SODIUM SERPL-SCNC: 139 MMOL/L
TSH SERPL-ACNC: 0.67 UIU/ML

## 2019-09-04 ENCOUNTER — OUTPATIENT (OUTPATIENT)
Dept: OUTPATIENT SERVICES | Facility: HOSPITAL | Age: 69
LOS: 1 days | Discharge: HOME | End: 2019-09-04

## 2019-09-04 DIAGNOSIS — Z90.710 ACQUIRED ABSENCE OF BOTH CERVIX AND UTERUS: Chronic | ICD-10-CM

## 2019-09-04 DIAGNOSIS — Z98.89 OTHER SPECIFIED POSTPROCEDURAL STATES: Chronic | ICD-10-CM

## 2019-09-04 DIAGNOSIS — Z98.890 OTHER SPECIFIED POSTPROCEDURAL STATES: Chronic | ICD-10-CM

## 2019-09-04 DIAGNOSIS — N63 UNSPECIFIED LUMP IN BREAST: Chronic | ICD-10-CM

## 2019-09-04 DIAGNOSIS — C67.9 MALIGNANT NEOPLASM OF BLADDER, UNSPECIFIED: ICD-10-CM

## 2019-09-10 ENCOUNTER — RESULT REVIEW (OUTPATIENT)
Age: 69
End: 2019-09-10

## 2019-09-10 ENCOUNTER — APPOINTMENT (OUTPATIENT)
Dept: HEMATOLOGY ONCOLOGY | Facility: CLINIC | Age: 69
End: 2019-09-10
Payer: MEDICARE

## 2019-09-10 ENCOUNTER — APPOINTMENT (OUTPATIENT)
Dept: INFUSION THERAPY | Facility: HOSPITAL | Age: 69
End: 2019-09-10

## 2019-09-10 VITALS
WEIGHT: 168.43 LBS | DIASTOLIC BLOOD PRESSURE: 82 MMHG | RESPIRATION RATE: 16 BRPM | BODY MASS INDEX: 27.19 KG/M2 | HEART RATE: 68 BPM | SYSTOLIC BLOOD PRESSURE: 122 MMHG | OXYGEN SATURATION: 95 % | TEMPERATURE: 98.2 F

## 2019-09-10 LAB
CORTIS SERPL-MCNC: 7.1 UG/DL
HCT VFR BLD CALC: 41.4 % — SIGNIFICANT CHANGE UP (ref 34.5–45)
HGB BLD-MCNC: 13.4 G/DL — SIGNIFICANT CHANGE UP (ref 11.5–15.5)
MCHC RBC-ENTMCNC: 30 PG — SIGNIFICANT CHANGE UP (ref 27–34)
MCHC RBC-ENTMCNC: 32.4 G/DL — SIGNIFICANT CHANGE UP (ref 32–36)
MCV RBC AUTO: 92.8 FL — SIGNIFICANT CHANGE UP (ref 80–100)
PLATELET # BLD AUTO: 174 K/UL — SIGNIFICANT CHANGE UP (ref 150–400)
RBC # BLD: 4.46 M/UL — SIGNIFICANT CHANGE UP (ref 3.8–5.2)
RBC # FLD: 14.3 % — SIGNIFICANT CHANGE UP (ref 10.3–14.5)
TSH SERPL-ACNC: 0.6 UIU/ML
WBC # BLD: 5.6 K/UL — SIGNIFICANT CHANGE UP (ref 3.8–10.5)
WBC # FLD AUTO: 5.6 K/UL — SIGNIFICANT CHANGE UP (ref 3.8–10.5)

## 2019-09-10 PROCEDURE — 99214 OFFICE O/P EST MOD 30 MIN: CPT

## 2019-09-10 NOTE — REVIEW OF SYSTEMS
[Anxiety] : anxiety [Depression] : depression [Negative] : Gastrointestinal [Fever] : no fever [Chills] : no chills [Fatigue] : no fatigue [Night Sweats] : no night sweats [Wheezing] : no wheezing [Recent Change In Weight] : ~T no recent weight change [SOB on Exertion] : no shortness of breath during exertion [Cough] : no cough [Incontinence] : no incontinence [Dysuria] : no dysuria [Joint Pain] : no joint pain [Skin Rash] : no skin rash [Muscle Weakness] : no muscle weakness [Dizziness] : no dizziness [Easy Bruising] : no tendency for easy bruising [Easy Bleeding] : no tendency for easy bleeding [FreeTextEntry4] : no nasal congestion [FreeTextEntry9] : low back pain, no pain currently, just an annoyance [de-identified] : occ brief headache, notes paresthesias at night.  [de-identified] : depression is well controlled

## 2019-09-10 NOTE — HISTORY OF PRESENT ILLNESS
[Disease: _____________________] : Disease: [unfilled] [T: ___] : T[unfilled] [N: ___] : N[unfilled] [M: ___] : M[unfilled] [AJCC Stage: ____] : AJCC Stage: [unfilled] [de-identified] : Ms. 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 The tumor was found on cystoscopy at the right ureteral orifice. 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 She is referred for consideration of neoadjuvant chemotherapy. In late May, at the time of scheduled back surgery, she thought she had a UTI. Urine was tested and she was treated, then had the surgery. She had burning post-op. She was treated again with an antibiotic. She went to Rehab and was treated again and she was seen by a urologist there. Went to PCP after discharge from rehab, he noted some blood in the urine. She went to [facility] ER and \par she was referred to Dr. Hargrove. She underwent a cystoscopy, revealing tumor. She never noted blood in her urine, but did note it was darker than usual. Still has some "tingling" sensation, no incontinence. Nocturia occurs, which she says is related to awakening from CPAP. No cough. WALKER/bone pains.\par \par 10/27/17...[place] [facility] [place]. They wanted to repeat procedures again, including cystoscopy. They called  again today. Saw Dr. Montalvo. She was asked to return next Thursday. They want to do a PET CT scan. They recommended she receive cis/gem, likely due to the glandular differentiation. She was overwhelmed by all the information she was given. No pains, no cough/SOB.  \par \par 10/27/17...Juju completed cycle 1 this past Friday, and she noticed on Saturday that her lips appeared swollen and she noticed sores in her mouth. This occurred after going out to eat chinese food.Today she feels that it may be going away. She has fatigue, and feels chills but no fever. She just feels " lousy."  She is feeling Nausea this time and if she takes the metoclopramide in time, then she is okay, She denies vomiting. She is also having some dysgeusia, She denies paraesthesias of the fingers and toes.  She is having constipation, and she is controlling it with the stool softeners.She states her appetite is ok, but the food doesn't taste good, and it hurts when she swallows\par \par 17...Chemo with cis/gem #2 due this Friday.has increased lower back pain. The pain had stopped. She had prior back surgery. The pain became more notable since starting the chemo. She had constipation with the chemo and noted an increase in the pain with the constipation in lower back near the site of the surgery. The pain feels like pressure, described as heavy, somewhat relieved by BM. Worse with activities. Took some oxycodone a few days ago, but had a headaches afterwards. Pain score currently at 6-7, no recent pain med use. Taking MiraLAX, senna, Colace and prune juice with occasional MOM. Appetite is good, some altered taste, when present, affects appetite. Had some nausea and vomited x 3 after initial chemo, more gagging than vomiting. She felt she had some sores in the mouth after the gem alone, resolved. No paresthesias. She had some discomfort in the right wrist area, at the site where chemo was administered. Fatigue present all the time, more since the start of chemo. No hematuria, good flow, no urgency or incontinence as before. \par \par 17...day 15, cycle #2, cis/gem.  Juju has increased sensitivity in the lips after chemo. She had some vomiting at the end of her chemo infusion, however she did not have increased nausea and vomiting since. She has fatigue, and she complains of constipation and is taking miralax. She does complain of back pain that has increased in intensity.She notices it when she bends over. She feels that there is something noticeably there in her back. She takes pain medication to help her fall asleep. She doesn't sleep well. She does use c-pap secondary to sleep apnea but she is awaken from her sleep secondary to nocturia. She also notices her pain more when she has constipation and has to go to the bathroom. She describes her lower back pain as 8/10 at worst, and best 5/10. HEr back pain went away after the surgery and she noticed it came back once starting Chemo. She states her appetite is good, She does complain of dysgeusia. She denies paraesthesias. She does not like how the oxycodone makes her feel. It gives her HAs.\par \par 18...Had RT from the  to the . She feels there is some decrease in the pain, not as piercing. She feels the area is stiff. Worse when she awakens. Takes 3 x 325 Tylenol at night which helps the pain. has constipation, went one week w/o evacuation. Was given lactulose, but she feels that MiraLAX works better. She is less active as a result of the pain. the pain is worse when she bends. Appetite is variable. No weight loss. Has occasional nausea. No vomiting. Has some indigestion at times. No blood in the urine, no dysuria. No incontinence. Fatigue is present. No pains elsewhere. \par \par 3/14/18...She still has some pain. She is doing PT, which helps temporarily. She has not had the relief of pain that she thought would be the result. Pain score at 4-5, described as aggravation and it inhibits activities. Worse with bending, getting in and out of cars. She is not taking hydromorphone at this time.. She is distressed with constipation from the pain meds. Says lactulose does not work. She takes some Tylenol sporadically, not daily. Appetite is good, lost 1 kilo. No hematuria, no dysuria, no frequency, no incontinence. No pains elsewhere. She remains very active. She has alopecia. No diarrhea. No cough/WALKER. Fatigue is present, mild. She feels it at the end of the day. No N/V. \par \par 18...Completed 3 cycles of Tecentriq. She noes curling of the right fourth finger involuntarily. She can bend it back up, but it hurts to do so. No difficulty with strength on the right hand, no tremors.  She has tried Icy Hot w/o benefit. She points to DIP and PIP joints as the sites of pain. She feels she has lumps on her head. She is losing her hair, likely secondary to the chemo she received. Her scalp is pruritic. She has also lost pubic hair. Her pain continues to get better,went to PT for 7 weeks. She is able to do all normal activities, with mild fatigue. Appetite is good, weight is stable. No dizziness, no unsteadiness, no headaches. No other issues. No hematuria. \par \par 18...Missed Rx on 18. feels "great". Still has back pains, much improved. No pain while seated, but will come on after a while. No worse with ambulation. Does not awaken her. Pain can be as high as 7-8 with activities, best at 3-4. No pain meds utilized. No blood in urine. denies fatigue until late in the day. Appetite is good, but she has lost about 3 pounds. She is avoiding fat products as she has an upset stomach from fats. Had a URI, treated with azithromycin, Flonase and a codeine containing cough suppressant, still has some residual cough. No diarrhea. No dyspnea. has constipation. \par \par 18...On atezolizumab since 18. Feels well. Still has some pains, not clearly as severe as before. It is worse if she walks for a while, such as several hundred feet. Also more prominent if she stands too long. Does not bother her at night or awaken her. It does not stop her activities. Takes an occasional ibuprofen, not daily. Appetite is good, weight is stable. No N/V. No diarrhea. No cough/WALKER. Some fatigue. No leg edema. \par \par 18...Feels "okay". Still has some back pain, nothing like it was before. Has a bunion of the foot which is bothersome and the podiatrist has recommended surgery. No cough/sputum,. No diarrhea. Appetite is good, but lost 2 pounds since last visit. No other pains. No headaches, no paresthesias. No dizziness noted, no balance issues. Mild fatigue, improved.\par \par 18...Cycle 6 was administered on 18, due #7 on 18. "I'm feeling good". Noted some pressure and noted urinary frequency about one week ago, then resolved. No dysuria, no blood, no frequency, nocturia x 1. The usual lower back pain, no pain at the current time. Extra exertion brings on the low back pain and she is not sure if this is from the cancer or from the prior back surgery. She notes several areas over her skin becoming depigmented in small spots. No pruritus. Appetite is good, no weight loss. Actually gained a few pounds. No diarrhea. has some fatigue towards the end of the day. No dizziness. No paresthesias. No cough/WALKER. \par \par 18...On atezo since 18. Has received 7 cycles. Saw Dr. Hargrove late , cysto recommended, but she decided to wait until  after vacation. She notes lightening of her skin. She didn't go to the dermatologist. She has a prior dermatologist that she might see. No diarrhea. No upset stomach. Appetite is ":great", no weight loss. No cough/WALKER. No paresthesias. Minor fatigue along with aches in the evenings. No headaches, no dizziness. No leg edema. Slight hematuria. \par \par 18...last scans in May\par Reports skin changes due to the vitiligo has been bothering her.  Reports this has been happening more since last month.  Has also been taking Valium from two years ago occasionally for anxiety.  Reports has taken it once every 2-3 weeks, only when she feels overwhelmed with anxiety.  Has not seen an psychiatrist yet, but has been seeing a psychologist.  She has seeing her for one year..  When she gets anxious it occurs mostly at night.  Denies any pain, except after walking a long period of time.  Will occasionally use a cane.  Reports no urinary frequency.  No urinary incontinence, no hematuria.  Reports regular bowel movements.  Reports good appetite, occasional dyspepsia with fried foods.  \par \par 18...Did not have an appt today, was added onto schedule. She has urgency, no dysuria, no foul odor. She feels bloated as well for the past 3 days. She wonders if it is related to her vitiligo, which is prominent in the urogenital area. Urine is clear, no blood. No fevers, no chills. Appetite is "great", gaining weight. No cough/AWLKER. NO diarrheal stools, last BM yesterday, normal. No N/V. No vitiligo is more prominent, which concerns her. She says the bottom of her feet are tender, at the ball of the feet. Toes are numb. This has occurred over the last week. She has also had cramps in her hands. Mostly the thumb, told of he had an injection for her trigger finger. received cycle # 11 of atezolizumab today. \par \par 10/11/18...dose # 12 today. Saw Paulie Hargrove, plan for cysto. "I'm doing well".  She says that her feet always feels like there "is something there", involving the toes, no longer affects the ball of the foot. Saw her podiatrist. Cysto is scheduled for the . Her vitiligo is more prominent his is prominent on the hands. She feels that some parts of her hands are darker as well. Appetite is "fabulous". No N/V/D/C. Urine flow is good, no incontinence, no blood, no dysuria. Urgency and fullness sensation resolved. No cough/SOB. No headaches. No fatigue\par \par 10/31/18....Dose #13 today. Feels well at this time. Back pain remains the same as before, no pain med use. If she does extra activities she has pain. She had back surgery before and she believes it is from the prior surgery and not the mets. No fatigue. Appetite is good, weight is stable., No diarrheal stools. No hematuria noted. No dysuria. Usual activities performed without impairment. Vitiligo is somewhat more prominent, which disturbs her. No leg edema. No cough, no WALKER. No upset stomach. No fevers, no chills. Had cysto on , all was normal. \par \par 18...Feels "okay". Pain in the back somewhat more prominent, the pain from her prior surgery.Appetite is jeramy, no weight loss. No cough/WALKER. No diarrhea, no upset stomach. Some minor fatigue. She thinks she is depressed, attends a support group here. Starting with a therapist. He  left [place] recently[place] \par \par 18...Last scans 18. Getting laser therapy for her vitiligo, which she may not continue due to high c-pays. feels as if her tongue is sensitive to heat since starting the laser therapy. She senses salt more than before. Otherwise well, back pain "bearable", RTS, "part of my life". No pain meds used. Appetite is good, weight is stable. No diarrheal stools No cough, No SOB. She has some sensation in the bladder or vaginal area, pruritic, calmed with Vagisil.  She is concerned about some blood in the urine, microscopic....she had a cysto on 10/22/18, revealing no issue. She asked about clearance for sexual activity. No fatigue, no headaches. No dysuria, but occasional mild irritation. No incontinence. No urinary frequency, rare nocturia. \par \par 1/3/19 : On atezolizumab since 18. Feels "great". No pains except for usual aches. Appetite is good, weight has increased. No cough, no WALKER. No diarrheal stools, No upset stomach. No edema. No skin rashes. Vitiligo is "going crazy", goes 2 times a week for laser therapy. States she may stop the laser therapy. \par \par 19...19 : Here for Atezolizumab(since ) and follow up. \par She has been well overall without any AEs aside form vitiligo which is a known side effect of these ICI's. \par \par 19 : One year on atezolizumab. Feels "fine". No diarrheal stool, no dyspepsia, no cough/WALKER. Appetite s good, no weight loss. No headaches, some paresthesias of the feet, no change. Has seen neurology and received injections. Can't open her hands at times. No fatigue, no pruritus. NO skin rashes. Vitiligo continues to be more prominent. Went for laser therapy, wit stopped it. She says her lips blistered. Vitiligo affects genitalia, present prior to Rx, has vitiligo of the hands, axilla and breasts. \par \par 3/6/19...3/6/19 : Ms. Crespo is here for a follow up and atezolizumab. She has been having lower back pain that resembled pain she had when she had recurrence. She underwent MRI on 3/3/19. This showed re-demonstrating the spine lesions, no changes were noted on the MRI. She has DJD and bulging disc with narrowing in L4-5.\par No new lesions were seen. She has not followed up with Dr. VINICIUS Kauffman for this either. OTHerwise she feels well. \par \par 19...saw her orthopedic surgeon, who said the pain is not related to her cancer, but to scar tissue. He gave her Flexeril and Meloxicam. He referred her for PT. The pain is much better. The knot" is not as big as before. Otherwise, :fabulous". Appetite is good, weight is stable. Has some fatigue, noted at the end of the day. No pruritus, no rash. Vitiligo is progressive. No cough, no WALKER. No nausea, no vomiting, occ upset stomach. No diarrheal stools. Now 14 months on therapy. \par \par 19...On Atezo since 2018. Had some back pain exacerbation recently, was on meloxicam and cyclobenzaprine, which has resolved to a great degree. She returned to work in April. Feels well otherwise, has a "head cold", with congestion, yellow sputum. Taking Claritin and other drugs, nasal congestion and runny nose improved. She is concerned it has moved to the chest. No F/C, no SOB, no wheezing. Appetite is good, no weight loss. No diarrheal stools., no upset stomach. Fatigue is present, in  the evening, she is "done". This has been the case since she returned to work. No rash, no pruritus. Vitiligo is progressing.\par \par 19...Working and has fatigue. She feels that the fatigue is mostly due to the work schedule. 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 Cycle 25 is today. Overall, feels well. has some back pain, which she feels is related to her prior surgery. No hematuria noted, no incontinence. No dysuria. Appetite is excellent, no weight loss. No cough, No WALKER. No diarrheal stools. No F/C. No edema. \par \par 19...Now 1.5 years on atezo at this time. Feels well overall. has an occasional tingling when she voids, which she attributes to the genital vitiligo. She has some cream to apply. She is working as a  to the elderly and one of her clients  this AM. This upsets her somewhat. She became upset and tearful. Appetite is normal, weight is stable. She continues to have some back pains as before, related to prior surgery, perhaps somewhat more since she had to expend more effort. No diarrheal stools, no cough. No SOB No headaches, no dizziness. No fatigue, taking Geritol, which she feels has helped her. No fevers, no chills, no urinary issues [FreeTextEntry1] : cis/gem, started chemo 11/3/17 as neoadjuvant, then bone mets, had RT. Now on atezolizumab since February 12, 2018 [de-identified] : high-grade [de-identified] : Feels "wonderful". Back pain RTS, not severe, has taken some oxycodone at bed time recently Uses CPAP to sleep, occasionally awakened by back pain. She saw her orthopedist, who said that everything looked the same.Appetite is good, no weight loss. No cough, no WALKER, No diarrheal stools. No fatigue. No rash, no pruritus. Occ headaches, feel like sinus headaches, no meds, brief duration, slight in intensity. No edema. Orthopedist did plain films. Occ tramadol use. \par \par \par \par \par

## 2019-09-10 NOTE — ASSESSMENT
[Palliative Care Plan] : not applicable at this time [FreeTextEntry1] : Juju seen in the office today. She feels well. She continues to have her usual back pains. She saw the orthopedist and had plain films done and was told that there was no change. Her appetite is good. There's no weight loss. No respiratory complaints or diarrheal stools. She denies fatigue. There are no skin changes or pruritus. She has occasional headaches which she feels are related to sinus issues. They are brief duration and a slight intensity.\par \par On physical examination, she appears well. There is no significant findings on physical exam. Her cortisol level was somewhat low S. time as it has been before. This will be checked once again today. Thyroid functions will be checked as well. She is not had a CT scan since mid-May, and one will be ordered today to be done before her next visit.\par \par All questions were answered the best of my ability and to her apparent satisfaction. She started atezolizumab on February 12, 2018, and is now more than 1.5 years through treatment. I will see her once again in 6 weeks' time.

## 2019-09-10 NOTE — PHYSICAL EXAM
[Fully active, able to carry on all pre-disease performance without restriction] : Status 0 - Fully active, able to carry on all pre-disease performance without restriction [Normal] : affect appropriate [de-identified] : occ extrasystoles

## 2019-09-10 NOTE — CONSULT LETTER
[Dear  ___] : Dear  [unfilled], [Courtesy Letter:] : I had the pleasure of seeing your patient, [unfilled], in my office today. [Please see my note below.] : Please see my note below. [Consult Closing:] : Thank you very much for allowing me to participate in the care of this patient.  If you have any questions, please do not hesitate to contact me. [Sincerely,] : Sincerely, [DrAzam  ___] : Dr. BRANDT [FreeTextEntry2] : Dr. Kalen Kauffman MD [facility]

## 2019-09-11 DIAGNOSIS — Z51.11 ENCOUNTER FOR ANTINEOPLASTIC CHEMOTHERAPY: ICD-10-CM

## 2019-09-11 LAB
ALBUMIN SERPL ELPH-MCNC: 4.2 G/DL
ALP BLD-CCNC: 101 U/L
ALT SERPL-CCNC: 15 U/L
ANION GAP SERPL CALC-SCNC: 19 MMOL/L
AST SERPL-CCNC: 17 U/L
BILIRUB SERPL-MCNC: 0.4 MG/DL
BUN SERPL-MCNC: 14 MG/DL
CALCIUM SERPL-MCNC: 9.4 MG/DL
CHLORIDE SERPL-SCNC: 103 MMOL/L
CO2 SERPL-SCNC: 22 MMOL/L
CREAT SERPL-MCNC: 0.73 MG/DL
GLUCOSE SERPL-MCNC: 110 MG/DL
POTASSIUM SERPL-SCNC: 3.7 MMOL/L
PROT SERPL-MCNC: 6.7 G/DL
SODIUM SERPL-SCNC: 143 MMOL/L

## 2019-09-16 ENCOUNTER — FORM ENCOUNTER (OUTPATIENT)
Age: 69
End: 2019-09-16

## 2019-09-17 ENCOUNTER — OUTPATIENT (OUTPATIENT)
Dept: OUTPATIENT SERVICES | Facility: HOSPITAL | Age: 69
LOS: 1 days | End: 2019-09-17
Payer: MEDICARE

## 2019-09-17 ENCOUNTER — APPOINTMENT (OUTPATIENT)
Dept: CT IMAGING | Facility: IMAGING CENTER | Age: 69
End: 2019-09-17
Payer: MEDICARE

## 2019-09-17 DIAGNOSIS — C68.9 MALIGNANT NEOPLASM OF URINARY ORGAN, UNSPECIFIED: ICD-10-CM

## 2019-09-17 DIAGNOSIS — Z90.710 ACQUIRED ABSENCE OF BOTH CERVIX AND UTERUS: Chronic | ICD-10-CM

## 2019-09-17 DIAGNOSIS — Z98.89 OTHER SPECIFIED POSTPROCEDURAL STATES: Chronic | ICD-10-CM

## 2019-09-17 DIAGNOSIS — Z98.890 OTHER SPECIFIED POSTPROCEDURAL STATES: Chronic | ICD-10-CM

## 2019-09-17 DIAGNOSIS — N63 UNSPECIFIED LUMP IN BREAST: Chronic | ICD-10-CM

## 2019-09-17 PROCEDURE — 74178 CT ABD&PLV WO CNTR FLWD CNTR: CPT

## 2019-09-17 PROCEDURE — 71260 CT THORAX DX C+: CPT

## 2019-09-17 PROCEDURE — 74178 CT ABD&PLV WO CNTR FLWD CNTR: CPT | Mod: 26

## 2019-09-17 PROCEDURE — 71260 CT THORAX DX C+: CPT | Mod: 26

## 2019-10-01 ENCOUNTER — APPOINTMENT (OUTPATIENT)
Dept: INFUSION THERAPY | Facility: HOSPITAL | Age: 69
End: 2019-10-01

## 2019-10-17 ENCOUNTER — OUTPATIENT (OUTPATIENT)
Dept: OUTPATIENT SERVICES | Facility: HOSPITAL | Age: 69
LOS: 1 days | Discharge: HOME | End: 2019-10-17

## 2019-10-17 DIAGNOSIS — Z98.890 OTHER SPECIFIED POSTPROCEDURAL STATES: Chronic | ICD-10-CM

## 2019-10-17 DIAGNOSIS — Z90.710 ACQUIRED ABSENCE OF BOTH CERVIX AND UTERUS: Chronic | ICD-10-CM

## 2019-10-17 DIAGNOSIS — C67.9 MALIGNANT NEOPLASM OF BLADDER, UNSPECIFIED: ICD-10-CM

## 2019-10-17 DIAGNOSIS — Z98.89 OTHER SPECIFIED POSTPROCEDURAL STATES: Chronic | ICD-10-CM

## 2019-10-17 DIAGNOSIS — N63 UNSPECIFIED LUMP IN BREAST: Chronic | ICD-10-CM

## 2019-10-22 ENCOUNTER — APPOINTMENT (OUTPATIENT)
Dept: INFUSION THERAPY | Facility: HOSPITAL | Age: 69
End: 2019-10-22

## 2019-10-23 DIAGNOSIS — Z51.11 ENCOUNTER FOR ANTINEOPLASTIC CHEMOTHERAPY: ICD-10-CM

## 2019-10-31 ENCOUNTER — APPOINTMENT (OUTPATIENT)
Dept: HEMATOLOGY ONCOLOGY | Facility: CLINIC | Age: 69
End: 2019-10-31
Payer: MEDICARE

## 2019-10-31 VITALS
SYSTOLIC BLOOD PRESSURE: 124 MMHG | DIASTOLIC BLOOD PRESSURE: 81 MMHG | OXYGEN SATURATION: 98 % | BODY MASS INDEX: 27.26 KG/M2 | WEIGHT: 168.87 LBS | TEMPERATURE: 98.6 F | RESPIRATION RATE: 17 BRPM | HEART RATE: 64 BPM

## 2019-10-31 PROCEDURE — 99214 OFFICE O/P EST MOD 30 MIN: CPT

## 2019-10-31 NOTE — RESULTS/DATA
[FreeTextEntry1] : EXAM: CT ABDOMEN AND PELVIS OC WAWIC \par EXAM: CT CHEST IC \par PROCEDURE DATE: 09/17/2019 \par INTERPRETATION: CLINICAL INFORMATION: Metastatic urothelial cancer on immunotherapy, assess for progression/response. \par COMPARISON: CT chest abdomen and pelvis dated 5/14/2019. \par PROCEDURE: \par CT of the Chest, Abdomen and Pelvis was performed with and without intravenous contrast. \par Precontrast, Nephrographic and Excretory phases were acquired. \par 10 mg of Lasix was administered. \par Intravenous contrast: 90 ml Omnipaque 350. 10 ml discarded. \par Oral contrast: None. \par Sagittal and coronal reformats were performed. \par FINDINGS: \par CHEST: \par LUNGS AND LARGE AIRWAYS: Patent central airways. No pulmonary nodules. Right-sided punctate calcified granulomas. \par PLEURA: No pleural effusion. \par VESSELS: Atherosclerotic disease of the aorta and coronary arteries. \par HEART: Heart size is normal. No pericardial effusion. \par MEDIASTINUM AND ALEJANDRINA: No lymphadenopathy. \par CHEST WALL AND LOWER NECK: Left axillary surgical clips. Stable left chest wall intercostal lipoma. \par ABDOMEN AND PELVIS: \par LIVER: Within normal limits. \par BILE DUCTS: Normal caliber. \par GALLBLADDER: Within normal limits. \par SPLEEN: Within normal limits. \par PANCREAS: Within normal limits. \par ADRENALS: Within normal limits. \par KIDNEYS/URETERS: Symmetrically enhance without hydronephrosis. No collecting system or ureteral filling defect. Nonobstructing left lower pole calculus. \par BLADDER: No bladder wall thickening or bladder mass. \par REPRODUCTIVE ORGANS: Hysterectomy. \par BOWEL: No bowel obstruction. Colonic diverticulosis. \par PERITONEUM: No ascites. \par VESSELS: Atherosclerotic changes. \par RETROPERITONEUM/LYMPH NODES: No lymphadenopathy. \par ABDOMINAL WALL: Postsurgical changes. \par BONES: Degenerative changes of the spine. Spinal fusion from L4 to S1 with intervertebral disc spacers. Stable sclerotic lesion within the T12 vertebral body. New central compression deformity of the L3 superior endplate, likely a Schmorl's node. \par IMPRESSION: \par No evidence of recurrent or metastatic disease. Stable exam as compared to 5/14/2019. \par CHRISTIANNE WHITAKER M.D., RADIOLOGY RESIDENT \par This document has been electronically signed. \par EDUARDO MARIA M.D., ATTENDING RADIOLOGIST \par This document has been electronically signed. Sep 18 2019 8:19AM

## 2019-10-31 NOTE — ASSESSMENT
[Palliative Care Plan] : not applicable at this time [FreeTextEntry1] : Juju seen in the office today in followup. She's been on atezolizumab since February 2018. She was found to have bone metastases and lumbar spine. She also previously had surgery with right implants for back problems before her diagnosis of bladder cancer. She's noted that the back pain she is somewhat more prominent recently. 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 We had a lengthy discussion in regards to her feelings, and she has seen a therapist in the past. Her pain is currently at 4 and its worst in the last 24 hours is 4 as well. The breast was no pain. The pain is not any worse with walking and she finds is actually better. Her appetite is great and her weight is stable. There are no GI complaints. There's no cough or dyspnea. She does have some fatigue at the end of day after working. There are no headaches. She is going to see a dermatologist as well as the orthopedist. She has significant vitiligo which is worsened over time. He was present before the start of immunotherapy in the urogenital area. She says she has some tingling when she voids which she attributes to the vitiligo. She says this is not dysuria.\par \par On physical examination, she appears well. There are no palpable lymph nodes. Chest is clear and heart examination is normal. There are no palpable abnormalities on examination of the abdomen. Extremities normal.\par \par She continues on atezolizumab at this time with disease stability. Her pain will be evaluated by the orthopedist, and I mentioned to her that disease progression could also cause issues with increasing pain. She is certain it is due to her prior back surgery.\par \par The plan is for her to continue on atezolizumab to complete 2 years of therapy which will be in February of this coming year. She is tolerating it well without any major adverse effects. All questions were answered to best my ability and to her apparent satisfaction. She is going to be away at the time of her next scheduled visit, and she is going to cancel that visit. I will see her later in December.

## 2019-10-31 NOTE — PHYSICAL EXAM
[Fully active, able to carry on all pre-disease performance without restriction] : Status 0 - Fully active, able to carry on all pre-disease performance without restriction [Normal] : affect appropriate [de-identified] : no edema [de-identified] : mild lumbar discomfort to percussion [de-identified] : vitiligo

## 2019-10-31 NOTE — HISTORY OF PRESENT ILLNESS
[Disease: _____________________] : Disease: [unfilled] [T: ___] : T[unfilled] [N: ___] : N[unfilled] [M: ___] : M[unfilled] [AJCC Stage: ____] : AJCC Stage: [unfilled] [de-identified] : Ms. 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 The tumor was found on cystoscopy at the right ureteral orifice. 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 She is referred for consideration of neoadjuvant chemotherapy. In late May, at the time of scheduled back surgery, she thought she had a UTI. Urine was tested and she was treated, then had the surgery. She had burning post-op. She was treated again with an antibiotic. She went to Rehab and was treated again and she was seen by a urologist there. Went to PCP after discharge from rehab, he noted some blood in the urine. She went to [facility] ER and \par she was referred to Dr. Hargrove. She underwent a cystoscopy, revealing tumor. She never noted blood in her urine, but did note it was darker than usual. Still has some "tingling" sensation, no incontinence. Nocturia occurs, which she says is related to awakening from CPAP. No cough. WALKER/bone pains.\par \par 10/27/17...Seen at [facility] in second opinion yesterday. They wanted to repeat procedures again, including cystoscopy. They called  again today. Saw Dr. Montalvo. She was asked to return next Thursday. They want to do a PET CT scan. They recommended she receive cis/gem, likely due to the glandular differentiation. She was overwhelmed by all the information she was given. No pains, no cough/SOB.  \par \par 10/27/17...Juju completed cycle 1 this past Friday, and she noticed on Saturday that her lips appeared swollen and she noticed sores in her mouth. This occurred after going out to eat chinese food.Today she feels that it may be going away. She has fatigue, and feels chills but no fever. She just feels " lousy."  She is feeling Nausea this time and if she takes the metoclopramide in time, then she is okay, She denies vomiting. She is also having some dysgeusia, She denies paraesthesias of the fingers and toes.  She is having constipation, and she is controlling it with the stool softeners.She states her appetite is ok, but the food doesn't taste good, and it hurts when she swallows\par \par 17...Chemo with cis/gem #2 due this Friday.has increased lower back pain. The pain had stopped. She had prior back surgery. The pain became more notable since starting the chemo. She had constipation with the chemo and noted an increase in the pain with the constipation in lower back near the site of the surgery. The pain feels like pressure, described as heavy, somewhat relieved by BM. Worse with activities. Took some oxycodone a few days ago, but had a headaches afterwards. Pain score currently at 6-7, no recent pain med use. Taking MiraLAX, senna, Colace and prune juice with occasional MOM. Appetite is good, some altered taste, when present, affects appetite. Had some nausea and vomited x 3 after initial chemo, more gagging than vomiting. She felt she had some sores in the mouth after the gem alone, resolved. No paresthesias. She had some discomfort in the right wrist area, at the site where chemo was administered. Fatigue present all the time, more since the start of chemo. No hematuria, good flow, no urgency or incontinence as before. \par \par 17...day 15, cycle #2, cis/gem.  Juju has increased sensitivity in the lips after chemo. She had some vomiting at the end of her chemo infusion, however she did not have increased nausea and vomiting since. She has fatigue, and she complains of constipation and is taking miralax. She does complain of back pain that has increased in intensity.She notices it when she bends over. She feels that there is something noticeably there in her back. She takes pain medication to help her fall asleep. She doesn't sleep well. She does use c-pap secondary to sleep apnea but she is awaken from her sleep secondary to nocturia. She also notices her pain more when she has constipation and has to go to the bathroom. She describes her lower back pain as 8/10 at worst, and best 5/10. HEr back pain went away after the surgery and she noticed it came back once starting Chemo. She states her appetite is good, She does complain of dysgeusia. She denies paraesthesias. She does not like how the oxycodone makes her feel. It gives her HAs.\par \par 18...Had RT from the  to the . She feels there is some decrease in the pain, not as piercing. She feels the area is stiff. Worse when she awakens. Takes 3 x 325 Tylenol at night which helps the pain. has constipation, went one week w/o evacuation. Was given lactulose, but she feels that MiraLAX works better. She is less active as a result of the pain. the pain is worse when she bends. Appetite is variable. No weight loss. Has occasional nausea. No vomiting. Has some indigestion at times. No blood in the urine, no dysuria. No incontinence. Fatigue is present. No pains elsewhere. \par \par 3/14/18...She still has some pain. She is doing PT, which helps temporarily. She has not had the relief of pain that she thought would be the result. Pain score at 4-5, described as aggravation and it inhibits activities. Worse with bending, getting in and out of cars. She is not taking hydromorphone at this time.. She is distressed with constipation from the pain meds. Says lactulose does not work. She takes some Tylenol sporadically, not daily. Appetite is good, lost 1 kilo. No hematuria, no dysuria, no frequency, no incontinence. No pains elsewhere. She remains very active. She has alopecia. No diarrhea. No cough/WALKER. Fatigue is present, mild. She feels it at the end of the day. No N/V. \par \par 18...Completed 3 cycles of Tecentriq. She noes curling of the right fourth finger involuntarily. She can bend it back up, but it hurts to do so. No difficulty with strength on the right hand, no tremors.  She has tried Icy Hot w/o benefit. She points to DIP and PIP joints as the sites of pain. She feels she has lumps on her head. She is losing her hair, likely secondary to the chemo she received. Her scalp is pruritic. She has also lost pubic hair. Her pain continues to get better,went to PT for 7 weeks. She is able to do all normal activities, with mild fatigue. Appetite is good, weight is stable. No dizziness, no unsteadiness, no headaches. No other issues. No hematuria. \par \par 18...Missed Rx on 18. feels "great". Still has back pains, much improved. No pain while seated, but will come on after a while. No worse with ambulation. Does not awaken her. Pain can be as high as 7-8 with activities, best at 3-4. No pain meds utilized. No blood in urine. denies fatigue until late in the day. Appetite is good, but she has lost about 3 pounds. She is avoiding fat products as she has an upset stomach from fats. Had a URI, treated with azithromycin, Flonase and a codeine containing cough suppressant, still has some residual cough. No diarrhea. No dyspnea. has constipation. \par \par 18...On atezolizumab since 18. Feels well. Still has some pains, not clearly as severe as before. It is worse if she walks for a while, such as several hundred feet. Also more prominent if she stands too long. Does not bother her at night or awaken her. It does not stop her activities. Takes an occasional ibuprofen, not daily. Appetite is good, weight is stable. No N/V. No diarrhea. No cough/WALKER. Some fatigue. No leg edema. \par \par 18...Feels "okay". Still has some back pain, nothing like it was before. Has a bunion of the foot which is bothersome and the podiatrist has recommended surgery. No cough/sputum,. No diarrhea. Appetite is good, but lost 2 pounds since last visit. No other pains. No headaches, no paresthesias. No dizziness noted, no balance issues. Mild fatigue, improved.\par \par 18...Cycle 6 was administered on 18, due #7 on 18. "I'm feeling good". Noted some pressure and noted urinary frequency about one week ago, then resolved. No dysuria, no blood, no frequency, nocturia x 1. The usual lower back pain, no pain at the current time. Extra exertion brings on the low back pain and she is not sure if this is from the cancer or from the prior back surgery. She notes several areas over her skin becoming depigmented in small spots. No pruritus. Appetite is good, no weight loss. Actually gained a few pounds. No diarrhea. has some fatigue towards the end of the day. No dizziness. No paresthesias. No cough/WALKER. \par \par 18...On atezo since 18. Has received 7 cycles. Saw Dr. Hargrove late , cysto recommended, but she decided to wait until  after vacation. She notes lightening of her skin. She didn't go to the dermatologist. She has a prior dermatologist that she might see. No diarrhea. No upset stomach. Appetite is ":great", no weight loss. No cough/WALKER. No paresthesias. Minor fatigue along with aches in the evenings. No headaches, no dizziness. No leg edema. Slight hematuria. \par \par 18...last scans in May\par Reports skin changes due to the vitiligo has been bothering her.  Reports this has been happening more since last month.  Has also been taking Valium from two years ago occasionally for anxiety.  Reports has taken it once every 2-3 weeks, only when she feels overwhelmed with anxiety.  Has not seen an psychiatrist yet, but has been seeing a psychologist.  She has seeing her for one year..  When she gets anxious it occurs mostly at night.  Denies any pain, except after walking a long period of time.  Will occasionally use a cane.  Reports no urinary frequency.  No urinary incontinence, no hematuria.  Reports regular bowel movements.  Reports good appetite, occasional dyspepsia with fried foods.  \par \par 18...Did not have an appt today, was added onto schedule. She has urgency, no dysuria, no foul odor. She feels bloated as well for the past 3 days. She wonders if it is related to her vitiligo, which is prominent in the urogenital area. Urine is clear, no blood. No fevers, no chills. Appetite is "great", gaining weight. No cough/WALKER. NO diarrheal stools, last BM yesterday, normal. No N/V. No vitiligo is more prominent, which concerns her. She says the bottom of her feet are tender, at the ball of the feet. Toes are numb. This has occurred over the last week. She has also had cramps in her hands. Mostly the thumb, told of he had an injection for her trigger finger. received cycle # 11 of atezolizumab today. \par \par 10/11/18...dose # 12 today. Saw Paulie Hargrove, plan for cysto. "I'm doing well".  She says that her feet always feels like there "is something there", involving the toes, no longer affects the ball of the foot. Saw her podiatrist. Cysto is scheduled for the . Her vitiligo is more prominent his is prominent on the hands. She feels that some parts of her hands are darker as well. Appetite is "fabulous". No N/V/D/C. Urine flow is good, no incontinence, no blood, no dysuria. Urgency and fullness sensation resolved. No cough/SOB. No headaches. No fatigue\par \par 10/31/18....Dose #13 today. Feels well at this time. Back pain remains the same as before, no pain med use. If she does extra activities she has pain. She had back surgery before and she believes it is from the prior surgery and not the mets. No fatigue. Appetite is good, weight is stable., No diarrheal stools. No hematuria noted. No dysuria. Usual activities performed without impairment. Vitiligo is somewhat more prominent, which disturbs her. No leg edema. No cough, no WALKER. No upset stomach. No fevers, no chills. Had cysto on , all was normal. \par \par 18...Feels "okay". Pain in the back somewhat more prominent, the pain from her prior surgery.Appetite is jeramy, no weight loss. No cough/WALKER. No diarrhea, no upset stomach. Some minor fatigue. She thinks she is depressed, attends a support group here. Starting with a therapist. He  left [place] recently. [place] \par \par 18...Last scans 18. Getting laser therapy for her vitiligo, which she may not continue due to high c-pays. feels as if her tongue is sensitive to heat since starting the laser therapy. She senses salt more than before. Otherwise well, back pain "bearable", RTS, "part of my life". No pain meds used. Appetite is good, weight is stable. No diarrheal stools No cough, No SOB. She has some sensation in the bladder or vaginal area, pruritic, calmed with Vagisil.  She is concerned about some blood in the urine, microscopic....she had a cysto on 10/22/18, revealing no issue. She asked about clearance for sexual activity. No fatigue, no headaches. No dysuria, but occasional mild irritation. No incontinence. No urinary frequency, rare nocturia. \par \par 1/3/19 : On atezolizumab since 18. Feels "great". No pains except for usual aches. Appetite is good, weight has increased. No cough, no WALKER. No diarrheal stools, No upset stomach. No edema. No skin rashes. Vitiligo is "going crazy", goes 2 times a week for laser therapy. States she may stop the laser therapy. \par \par 19...19 : Here for Atezolizumab(since ) and follow up. \par She has been well overall without any AEs aside form vitiligo which is a known side effect of these ICI's. \par \par 19 : One year on atezolizumab. Feels "fine". No diarrheal stool, no dyspepsia, no cough/WALKER. Appetite s good, no weight loss. No headaches, some paresthesias of the feet, no change. Has seen neurology and received injections. Can't open her hands at times. No fatigue, no pruritus. NO skin rashes. Vitiligo continues to be more prominent. Went for laser therapy, wit stopped it. She says her lips blistered. Vitiligo affects genitalia, present prior to Rx, has vitiligo of the hands, axilla and breasts. \par \par 3/6/19...3/6/19 : Ms. Crespo is here for a follow up and atezolizumab. She has been having lower back pain that resembled pain she had when she had recurrence. She underwent MRI on 3/3/19. This showed re-demonstrating the spine lesions, no changes were noted on the MRI. She has DJD and bulging disc with narrowing in L4-5.\par No new lesions were seen. She has not followed up with Dr. VINICIUS Kauffman for this either. OTHerwise she feels well. \par \par 19...saw her orthopedic surgeon, who said the pain is not related to her cancer, but to scar tissue. He gave her Flexeril and Meloxicam. He referred her for PT. The pain is much better. The knot" is not as big as before. Otherwise, :fabulous". Appetite is good, weight is stable. Has some fatigue, noted at the end of the day. No pruritus, no rash. Vitiligo is progressive. No cough, no WALKER. No nausea, no vomiting, occ upset stomach. No diarrheal stools. Now 14 months on therapy. \par \par 19...On Atezo since 2018. Had some back pain exacerbation recently, was on meloxicam and cyclobenzaprine, which has resolved to a great degree. She returned to work in April. Feels well otherwise, has a "head cold", with congestion, yellow sputum. Taking Claritin and other drugs, nasal congestion and runny nose improved. She is concerned it has moved to the chest. No F/C, no SOB, no wheezing. Appetite is good, no weight loss. No diarrheal stools., no upset stomach. Fatigue is present, in  the evening, she is "done". This has been the case since she returned to work. No rash, no pruritus. Vitiligo is progressing.\par \par 19...Working and has fatigue. She feels that the fatigue is mostly due to the work schedule. 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 Cycle 25 is today. Overall, feels well. has some back pain, which she feels is related to her prior surgery. No hematuria noted, no incontinence. No dysuria. Appetite is excellent, no weight loss. No cough, No WALKER. No diarrheal stools. No F/C. No edema. \par \par 19...Now 1.5 years on atezo at this time. Feels well overall. has an occasional tingling when she voids, which she attributes to the genital vitiligo. She has some cream to apply. She is working as a  to the elderly and one of her clients  this AM. This upsets her somewhat. She became upset and tearful. Appetite is normal, weight is stable. She continues to have some back pains as before, related to prior surgery, perhaps somewhat more since she had to expend more effort. No diarrheal stools, no cough. No SOB No headaches, no dizziness. No fatigue, taking Geritol, which she feels has helped her. No fevers, no chills, no urinary issues.\par \par 9/10/19...Feels "wonderful". Back pain RTS, not severe, has taken some oxycodone at bed time recently Uses CPAP to sleep, occasionally awakened by back pain. She saw her orthopedist, who said that everything looked the same.Appetite is good, no weight loss. No cough, no WALKER, No diarrheal stools. No fatigue. No rash, no pruritus. Occ headaches, feel like sinus headaches, no meds, brief duration, slight in intensity. No edema. Orthopedist did plain films. Occ tramadol use.  [de-identified] : high-grade [FreeTextEntry1] : cis/gem, started chemo 11/3/17 as neoadjuvant, then bone mets, had RT. Now on atezolizumab since February 12, 2018 [de-identified] : On atezo since February 2018. Now has more below back pains Become worse in July or August, not present all the time. Occasionally awakens her. Takes oxycodone or hydromorphone infrequently. Pain at 4 currently, worst at  4 in the last 24, best at zero. No worse with walking, actually better. Appetite is "great". Weight stable. o N/V/C/D. No cough, no dyspnea. Working, has some fatigue at the end of the day. No headaches, occ paresthesias.  She had prior surgery with rods in her back. Going t see derm as well. Has some "tingling when she voids, which she attributes to the worsening vitiligo in the urogenital area. NO blood, nocturia x 1, flow is fine, no incontinence. No dysuria. No edema. Wants t cancel 11/26 appointment, wants to go to [place].\par \par \par \par \par

## 2019-10-31 NOTE — CONSULT LETTER
[Dear  ___] : Dear  [unfilled], [Courtesy Letter:] : I had the pleasure of seeing your patient, [unfilled], in my office today. [Please see my note below.] : Please see my note below. [Consult Closing:] : Thank you very much for allowing me to participate in the care of this patient.  If you have any questions, please do not hesitate to contact me. [Sincerely,] : Sincerely, [DrAzam  ___] : Dr. BRANDT [FreeTextEntry2] : Dr. Kalen Kauffman MD [facility]

## 2019-10-31 NOTE — REVIEW OF SYSTEMS
[Fatigue] : fatigue [Joint Stiffness] : joint stiffness [Depression] : depression [Negative] : Gastrointestinal [Fever] : no fever [Chills] : no chills [Night Sweats] : no night sweats [Recent Change In Weight] : ~T no recent weight change [Chest Pain] : no chest pain [Palpitations] : no palpitations [Lower Ext Edema] : no lower extremity edema [Wheezing] : no wheezing [Cough] : no cough [SOB on Exertion] : no shortness of breath during exertion [Dysuria] : no dysuria [Incontinence] : no incontinence [Joint Pain] : no joint pain [Dizziness] : no dizziness [Anxiety] : no anxiety [Hot Flashes] : no hot flashes [Muscle Weakness] : no muscle weakness [Easy Bleeding] : no tendency for easy bleeding [Easy Bruising] : no tendency for easy bruising [FreeTextEntry9] : low back pains, joint stiffness in hands. No cramps [de-identified] : vitilgo [de-identified] : no HA [de-identified] : has a therapist, no meds

## 2019-11-12 ENCOUNTER — APPOINTMENT (OUTPATIENT)
Dept: INFUSION THERAPY | Facility: HOSPITAL | Age: 69
End: 2019-11-12

## 2019-11-26 ENCOUNTER — APPOINTMENT (OUTPATIENT)
Dept: HEMATOLOGY ONCOLOGY | Facility: CLINIC | Age: 69
End: 2019-11-26

## 2019-11-27 ENCOUNTER — OUTPATIENT (OUTPATIENT)
Dept: OUTPATIENT SERVICES | Facility: HOSPITAL | Age: 69
LOS: 1 days | Discharge: HOME | End: 2019-11-27

## 2019-11-27 DIAGNOSIS — Z98.890 OTHER SPECIFIED POSTPROCEDURAL STATES: Chronic | ICD-10-CM

## 2019-11-27 DIAGNOSIS — N63 UNSPECIFIED LUMP IN BREAST: Chronic | ICD-10-CM

## 2019-11-27 DIAGNOSIS — Z98.89 OTHER SPECIFIED POSTPROCEDURAL STATES: Chronic | ICD-10-CM

## 2019-11-27 DIAGNOSIS — Z90.710 ACQUIRED ABSENCE OF BOTH CERVIX AND UTERUS: Chronic | ICD-10-CM

## 2019-11-27 DIAGNOSIS — C67.9 MALIGNANT NEOPLASM OF BLADDER, UNSPECIFIED: ICD-10-CM

## 2019-12-03 ENCOUNTER — APPOINTMENT (OUTPATIENT)
Dept: INFUSION THERAPY | Facility: HOSPITAL | Age: 69
End: 2019-12-03

## 2019-12-04 DIAGNOSIS — Z51.11 ENCOUNTER FOR ANTINEOPLASTIC CHEMOTHERAPY: ICD-10-CM

## 2019-12-17 ENCOUNTER — RESULT REVIEW (OUTPATIENT)
Age: 69
End: 2019-12-17

## 2019-12-17 ENCOUNTER — APPOINTMENT (OUTPATIENT)
Dept: HEMATOLOGY ONCOLOGY | Facility: CLINIC | Age: 69
End: 2019-12-17
Payer: MEDICARE

## 2019-12-17 VITALS
TEMPERATURE: 97.7 F | HEART RATE: 66 BPM | DIASTOLIC BLOOD PRESSURE: 85 MMHG | RESPIRATION RATE: 16 BRPM | WEIGHT: 167.99 LBS | BODY MASS INDEX: 27.11 KG/M2 | OXYGEN SATURATION: 98 % | SYSTOLIC BLOOD PRESSURE: 130 MMHG

## 2019-12-17 LAB
HCT VFR BLD CALC: 40.1 % — SIGNIFICANT CHANGE UP (ref 34.5–45)
HGB BLD-MCNC: 13.4 G/DL — SIGNIFICANT CHANGE UP (ref 11.5–15.5)
MCHC RBC-ENTMCNC: 30.6 PG — SIGNIFICANT CHANGE UP (ref 27–34)
MCHC RBC-ENTMCNC: 33.4 G/DL — SIGNIFICANT CHANGE UP (ref 32–36)
MCV RBC AUTO: 91.7 FL — SIGNIFICANT CHANGE UP (ref 80–100)
PLATELET # BLD AUTO: 175 K/UL — SIGNIFICANT CHANGE UP (ref 150–400)
RBC # BLD: 4.37 M/UL — SIGNIFICANT CHANGE UP (ref 3.8–5.2)
RBC # FLD: 13.2 % — SIGNIFICANT CHANGE UP (ref 10.3–14.5)
WBC # BLD: 5.8 K/UL — SIGNIFICANT CHANGE UP (ref 3.8–10.5)
WBC # FLD AUTO: 5.8 K/UL — SIGNIFICANT CHANGE UP (ref 3.8–10.5)

## 2019-12-17 PROCEDURE — 99213 OFFICE O/P EST LOW 20 MIN: CPT

## 2019-12-17 NOTE — HISTORY OF PRESENT ILLNESS
[Disease: _____________________] : Disease: [unfilled] [T: ___] : T[unfilled] [M: ___] : M[unfilled] [N: ___] : N[unfilled] [AJCC Stage: ____] : AJCC Stage: [unfilled] [de-identified] : Ms. 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 The tumor was found on cystoscopy at the right ureteral orifice. 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 She is referred for consideration of neoadjuvant chemotherapy. In late May, at the time of scheduled back surgery, she thought she had a UTI. Urine was tested and she was treated, then had the surgery. She had burning post-op. She was treated again with an antibiotic. She went to Rehab and was treated again and she was seen by a urologist there. Went to PCP after discharge from rehab, he noted some blood in the urine. She went to [facility] ER and \par she was referred to Dr. Hargrove. She underwent a cystoscopy, revealing tumor. She never noted blood in her urine, but did note it was darker than usual. Still has some "tingling" sensation, no incontinence. Nocturia occurs, which she says is related to awakening from CPAP. No cough. WALKER/bone pains.\par \par 10/27/17...Seen at [facility] in second opinion yesterday. They wanted to repeat procedures again, including cystoscopy. They called  again today. Saw Dr. Montalvo. She was asked to return next Thursday. They want to do a PET CT scan. They recommended she receive cis/gem, likely due to the glandular differentiation. She was overwhelmed by all the information she was given. No pains, no cough/SOB.  \par \par 10/27/17...Juju completed cycle 1 this past Friday, and she noticed on Saturday that her lips appeared swollen and she noticed sores in her mouth. This occurred after going out to eat chinese food.Today she feels that it may be going away. She has fatigue, and feels chills but no fever. She just feels " lousy."  She is feeling Nausea this time and if she takes the metoclopramide in time, then she is okay, She denies vomiting. She is also having some dysgeusia, She denies paraesthesias of the fingers and toes.  She is having constipation, and she is controlling it with the stool softeners.She states her appetite is ok, but the food doesn't taste good, and it hurts when she swallows\par \par 17...Chemo with cis/gem #2 due this Friday.has increased lower back pain. The pain had stopped. She had prior back surgery. The pain became more notable since starting the chemo. She had constipation with the chemo and noted an increase in the pain with the constipation in lower back near the site of the surgery. The pain feels like pressure, described as heavy, somewhat relieved by BM. Worse with activities. Took some oxycodone a few days ago, but had a headaches afterwards. Pain score currently at 6-7, no recent pain med use. Taking MiraLAX, senna, Colace and prune juice with occasional MOM. Appetite is good, some altered taste, when present, affects appetite. Had some nausea and vomited x 3 after initial chemo, more gagging than vomiting. She felt she had some sores in the mouth after the gem alone, resolved. No paresthesias. She had some discomfort in the right wrist area, at the site where chemo was administered. Fatigue present all the time, more since the start of chemo. No hematuria, good flow, no urgency or incontinence as before. \par \par 17...day 15, cycle #2, cis/gem.  Juju has increased sensitivity in the lips after chemo. She had some vomiting at the end of her chemo infusion, however she did not have increased nausea and vomiting since. She has fatigue, and she complains of constipation and is taking miralax. She does complain of back pain that has increased in intensity.She notices it when she bends over. She feels that there is something noticeably there in her back. She takes pain medication to help her fall asleep. She doesn't sleep well. She does use c-pap secondary to sleep apnea but she is awaken from her sleep secondary to nocturia. She also notices her pain more when she has constipation and has to go to the bathroom. She describes her lower back pain as 8/10 at worst, and best 5/10. HEr back pain went away after the surgery and she noticed it came back once starting Chemo. She states her appetite is good, She does complain of dysgeusia. She denies paraesthesias. She does not like how the oxycodone makes her feel. It gives her HAs.\par \par 18...Had RT from the  to the . She feels there is some decrease in the pain, not as piercing. She feels the area is stiff. Worse when she awakens. Takes 3 x 325 Tylenol at night which helps the pain. has constipation, went one week w/o evacuation. Was given lactulose, but she feels that MiraLAX works better. She is less active as a result of the pain. the pain is worse when she bends. Appetite is variable. No weight loss. Has occasional nausea. No vomiting. Has some indigestion at times. No blood in the urine, no dysuria. No incontinence. Fatigue is present. No pains elsewhere. \par \par 3/14/18...She still has some pain. She is doing PT, which helps temporarily. She has not had the relief of pain that she thought would be the result. Pain score at 4-5, described as aggravation and it inhibits activities. Worse with bending, getting in and out of cars. She is not taking hydromorphone at this time.. She is distressed with constipation from the pain meds. Says lactulose does not work. She takes some Tylenol sporadically, not daily. Appetite is good, lost 1 kilo. No hematuria, no dysuria, no frequency, no incontinence. No pains elsewhere. She remains very active. She has alopecia. No diarrhea. No cough/WALKER. Fatigue is present, mild. She feels it at the end of the day. No N/V. \par \par 18...Completed 3 cycles of Tecentriq. She noes curling of the right fourth finger involuntarily. She can bend it back up, but it hurts to do so. No difficulty with strength on the right hand, no tremors.  She has tried Icy Hot w/o benefit. She points to DIP and PIP joints as the sites of pain. She feels she has lumps on her head. She is losing her hair, likely secondary to the chemo she received. Her scalp is pruritic. She has also lost pubic hair. Her pain continues to get better,went to PT for 7 weeks. She is able to do all normal activities, with mild fatigue. Appetite is good, weight is stable. No dizziness, no unsteadiness, no headaches. No other issues. No hematuria. \par \par 18...Missed Rx on 18. feels "great". Still has back pains, much improved. No pain while seated, but will come on after a while. No worse with ambulation. Does not awaken her. Pain can be as high as 7-8 with activities, best at 3-4. No pain meds utilized. No blood in urine. denies fatigue until late in the day. Appetite is good, but she has lost about 3 pounds. She is avoiding fat products as she has an upset stomach from fats. Had a URI, treated with azithromycin, Flonase and a codeine containing cough suppressant, still has some residual cough. No diarrhea. No dyspnea. has constipation. \par \par 18...On atezolizumab since 18. Feels well. Still has some pains, not clearly as severe as before. It is worse if she walks for a while, such as several hundred feet. Also more prominent if she stands too long. Does not bother her at night or awaken her. It does not stop her activities. Takes an occasional ibuprofen, not daily. Appetite is good, weight is stable. No N/V. No diarrhea. No cough/WALKER. Some fatigue. No leg edema. \par \par 18...Feels "okay". Still has some back pain, nothing like it was before. Has a bunion of the foot which is bothersome and the podiatrist has recommended surgery. No cough/sputum,. No diarrhea. Appetite is good, but lost 2 pounds since last visit. No other pains. No headaches, no paresthesias. No dizziness noted, no balance issues. Mild fatigue, improved.\par \par 18...Cycle 6 was administered on 18, due #7 on 18. "I'm feeling good". Noted some pressure and noted urinary frequency about one week ago, then resolved. No dysuria, no blood, no frequency, nocturia x 1. The usual lower back pain, no pain at the current time. Extra exertion brings on the low back pain and she is not sure if this is from the cancer or from the prior back surgery. She notes several areas over her skin becoming depigmented in small spots. No pruritus. Appetite is good, no weight loss. Actually gained a few pounds. No diarrhea. has some fatigue towards the end of the day. No dizziness. No paresthesias. No cough/AWLKER. \par \par 18...On atezo since 18. Has received 7 cycles. Saw Dr. Hargrove late , cysto recommended, but she decided to wait until  after vacation. She notes lightening of her skin. She didn't go to the dermatologist. She has a prior dermatologist that she might see. No diarrhea. No upset stomach. Appetite is ":great", no weight loss. No cough/WALKER. No paresthesias. Minor fatigue along with aches in the evenings. No headaches, no dizziness. No leg edema. Slight hematuria. \par \par 18...last scans in May\par Reports skin changes due to the vitiligo has been bothering her.  Reports this has been happening more since last month.  Has also been taking Valium from two years ago occasionally for anxiety.  Reports has taken it once every 2-3 weeks, only when she feels overwhelmed with anxiety.  Has not seen an psychiatrist yet, but has been seeing a psychologist.  She has seeing her for one year..  When she gets anxious it occurs mostly at night.  Denies any pain, except after walking a long period of time.  Will occasionally use a cane.  Reports no urinary frequency.  No urinary incontinence, no hematuria.  Reports regular bowel movements.  Reports good appetite, occasional dyspepsia with fried foods.  \par \par 18...Did not have an appt today, was added onto schedule. She has urgency, no dysuria, no foul odor. She feels bloated as well for the past 3 days. She wonders if it is related to her vitiligo, which is prominent in the urogenital area. Urine is clear, no blood. No fevers, no chills. Appetite is "great", gaining weight. No cough/WALKER. NO diarrheal stools, last BM yesterday, normal. No N/V. No vitiligo is more prominent, which concerns her. She says the bottom of her feet are tender, at the ball of the feet. Toes are numb. This has occurred over the last week. She has also had cramps in her hands. Mostly the thumb, told of he had an injection for her trigger finger. received cycle # 11 of atezolizumab today. \par \par 10/11/18...dose # 12 today. Saw Paulie Hargrove, plan for cysto. "I'm doing well".  She says that her feet always feels like there "is something there", involving the toes, no longer affects the ball of the foot. Saw her podiatrist. Cysto is scheduled for the . Her vitiligo is more prominent his is prominent on the hands. She feels that some parts of her hands are darker as well. Appetite is "fabulous". No N/V/D/C. Urine flow is good, no incontinence, no blood, no dysuria. Urgency and fullness sensation resolved. No cough/SOB. No headaches. No fatigue\par \par 10/31/18....Dose #13 today. Feels well at this time. Back pain remains the same as before, no pain med use. If she does extra activities she has pain. She had back surgery before and she believes it is from the prior surgery and not the mets. No fatigue. Appetite is good, weight is stable., No diarrheal stools. No hematuria noted. No dysuria. Usual activities performed without impairment. Vitiligo is somewhat more prominent, which disturbs her. No leg edema. No cough, no WALKER. No upset stomach. No fevers, no chills. Had cysto on , all was normal. \par \par 18...Feels "okay". Pain in the back somewhat more prominent, the pain from her prior surgery.Appetite is jeramy, no weight loss. No cough/WALKER. No diarrhea, no upset stomach. Some minor fatigue. She thinks she is depressed, attends a support group here. Starting with a therapist. He  left her recently. [place]. \par \par 18...Last scans 18. Getting laser therapy for her vitiligo, which she may not continue due to high c-pays. feels as if her tongue is sensitive to heat since starting the laser therapy. She senses salt more than before. Otherwise well, back pain "bearable", RTS, "part of my life". No pain meds used. Appetite is good, weight is stable. No diarrheal stools No cough, No SOB. She has some sensation in the bladder or vaginal area, pruritic, calmed with Vagisil.  She is concerned about some blood in the urine, microscopic....she had a cysto on 10/22/18, revealing no issue. She asked about clearance for sexual activity. No fatigue, no headaches. No dysuria, but occasional mild irritation. No incontinence. No urinary frequency, rare nocturia. \par \par 1/3/19 : On atezolizumab since 18. Feels "great". No pains except for usual aches. Appetite is good, weight has increased. No cough, no WALKER. No diarrheal stools, No upset stomach. No edema. No skin rashes. Vitiligo is "going crazy", goes 2 times a week for laser therapy. States she may stop the laser therapy. \par \par 19...19 : Here for Atezolizumab(since ) and follow up. \par She has been well overall without any AEs aside form vitiligo which is a known side effect of these ICI's. \par \par 19 : One year on atezolizumab. Feels "fine". No diarrheal stool, no dyspepsia, no cough/WALKER. Appetite s good, no weight loss. No headaches, some paresthesias of the feet, no change. Has seen neurology and received injections. Can't open her hands at times. No fatigue, no pruritus. NO skin rashes. Vitiligo continues to be more prominent. Went for laser therapy, wit stopped it. She says her lips blistered. Vitiligo affects genitalia, present prior to Rx, has vitiligo of the hands, axilla and breasts. \par \par 3/6/19...3/6/19 : Ms. Crespo is here for a follow up and atezolizumab. She has been having lower back pain that resembled pain she had when she had recurrence. She underwent MRI on 3/3/19. This showed re-demonstrating the spine lesions, no changes were noted on the MRI. She has DJD and bulging disc with narrowing in L4-5.\par No new lesions were seen. She has not followed up with Dr. VINICIUS Kauffman for this either. OTHerwise she feels well. \par \par 19...saw her orthopedic surgeon, who said the pain is not related to her cancer, but to scar tissue. He gave her Flexeril and Meloxicam. He referred her for PT. The pain is much better. The knot" is not as big as before. Otherwise, :fabulous". Appetite is good, weight is stable. Has some fatigue, noted at the end of the day. No pruritus, no rash. Vitiligo is progressive. No cough, no WALKER. No nausea, no vomiting, occ upset stomach. No diarrheal stools. Now 14 months on therapy. \par \par 19...On Atezo since 2018. Had some back pain exacerbation recently, was on meloxicam and cyclobenzaprine, which has resolved to a great degree. She returned to work in April. Feels well otherwise, has a "head cold", with congestion, yellow sputum. Taking Claritin and other drugs, nasal congestion and runny nose improved. She is concerned it has moved to the chest. No F/C, no SOB, no wheezing. Appetite is good, no weight loss. No diarrheal stools., no upset stomach. Fatigue is present, in  the evening, she is "done". This has been the case since she returned to work. No rash, no pruritus. Vitiligo is progressing.\par \par 19...Working and has fatigue. She feels that the fatigue is mostly due to the work schedule. 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 Cycle 25 is today. Overall, feels well. has some back pain, which she feels is related to her prior surgery. No hematuria noted, no incontinence. No dysuria. Appetite is excellent, no weight loss. No cough, No WALKER. No diarrheal stools. No F/C. No edema. \par \par 19...Now 1.5 years on atezo at this time. Feels well overall. has an occasional tingling when she voids, which she attributes to the genital vitiligo. She has some cream to apply. She is working as a  to the elderly and one of her clients  this AM. This upsets her somewhat. She became upset and tearful. Appetite is normal, weight is stable. She continues to have some back pains as before, related to prior surgery, perhaps somewhat more since she had to expend more effort. No diarrheal stools, no cough. No SOB No headaches, no dizziness. No fatigue, taking Geritol, which she feels has helped her. No fevers, no chills, no urinary issues.\par \par 9/10/19...Feels "wonderful". Back pain RTS, not severe, has taken some oxycodone at bed time recently Uses CPAP to sleep, occasionally awakened by back pain. She saw her orthopedist, who said that everything looked the same.Appetite is good, no weight loss. No cough, no WALKER, No diarrheal stools. No fatigue. No rash, no pruritus. Occ headaches, feel like sinus headaches, no meds, brief duration, slight in intensity. No edema. Orthopedist did plain films. Occ tramadol use. \par \par 10/31/19...On atezo since 2018. Now has more below back pains Become worse in July or August, not present all the time. Occasionally awakens her. Takes oxycodone or hydromorphone infrequently. Pain at 4 currently, worst at  4 in the last 24, best at zero. No worse with walking, actually better. Appetite is "great". Weight stable. o N/V/C/D. No cough, no dyspnea. Working, has some fatigue at the end of the day. No headaches, occ paresthesias.  She had prior surgery with rods in her back. Going t see derm as well. Has some "tingling when she voids, which she attributes to the worsening vitiligo in the urogenital area. NO blood, nocturia x 1, flow is fine, no incontinence. No dysuria. No edema. Wants t cancel  appointment, wants to go to [place]. [de-identified] : high-grade [FreeTextEntry1] : cis/gem, started chemo 11/3/17 as neoadjuvant, then bone mets, had RT. Now on atezolizumab since February 12, 2018 [de-identified] : Had an MRI of the neck, ordered by Dr Naveen Kauffman, who performed her lumbar surgery. She was told she requires surgery in the neck. She was started on meloxicam, which she says does not help. December 22 dose will be # 33. The neck pain feels "like a monkey on my back", started about 6 weeks ago after her last visit. She was told it has nothing to do with her cancer. She did not bring the MRI results with her. Appetite is "great", no weight loss. No blood in the urine, no dysuria. She was started on some topicals and fluconazole for genital rash/vaginitis, saw both GYN and derm. No fatigue. Started atezo in February 2018. No edema. No diarrheal stools, no cough/WALKER. getting some PT/hydrotherapy. \par \par \par \par \par

## 2019-12-17 NOTE — ASSESSMENT
[Palliative Care Plan] : not applicable at this time [FreeTextEntry1] : Juju seen in the office in followup. She had an MRI of her neck as she developed some neck pains after her last visit. Didn't present for 6 weeks. She saw her orthopedist who recommended she have surgery. She did not bring the results of her MRI with her. She was started on meloxicam which she feels has not helped. She continues on atezolizumab. In December 22 dose will be dose #33. She was told that this pain had nothing to do with her cancer. Her appetite is great and has no weight loss. There is no hematuria.\par \par On physical examination, she appears well. There were no significant physical findings other than some discomfort in the lumbar spine area where she previously had surgery as well as evidence of metastases. \par \par She is tolerating atezolizumab well without any adverse effects. She will continue on treatment for a full 2 years, which will end in February. All questions were answered to best of my ability and to her apparent satisfaction. She has appointments to see me again shortly.

## 2019-12-17 NOTE — PHYSICAL EXAM
[Fully active, able to carry on all pre-disease performance without restriction] : Status 0 - Fully active, able to carry on all pre-disease performance without restriction [Normal] : affect appropriate [de-identified] : paraspinal muscles are tender bilaterally [de-identified] : no edema [de-identified] : some tenderness over lumbar spine [de-identified] : vitiligo

## 2019-12-17 NOTE — CONSULT LETTER
[Dear  ___] : Dear  [unfilled], [Please see my note below.] : Please see my note below. [Courtesy Letter:] : I had the pleasure of seeing your patient, [unfilled], in my office today. [Consult Closing:] : Thank you very much for allowing me to participate in the care of this patient.  If you have any questions, please do not hesitate to contact me. [Sincerely,] : Sincerely, [DrAzam  ___] : Dr. BRANDT [FreeTextEntry2] : Dr. Kalen Kauffman MD [facility]

## 2019-12-17 NOTE — REVIEW OF SYSTEMS
[Joint Pain] : joint pain [Anxiety] : anxiety [Depression] : depression [Negative] : Gastrointestinal [Fever] : no fever [Chills] : no chills [Night Sweats] : no night sweats [Fatigue] : no fatigue [Recent Change In Weight] : ~T no recent weight change [Chest Pain] : no chest pain [Palpitations] : no palpitations [Lower Ext Edema] : no lower extremity edema [Wheezing] : no wheezing [Cough] : no cough [SOB on Exertion] : no shortness of breath during exertion [Incontinence] : no incontinence [Joint Stiffness] : no joint stiffness [Dysuria] : no dysuria [Muscle Pain] : no muscle pain [Skin Rash] : no skin rash [Easy Bleeding] : no tendency for easy bleeding [Dizziness] : no dizziness [Muscle Weakness] : no muscle weakness [Easy Bruising] : no tendency for easy bruising [FreeTextEntry9] : neck and low back, occ  cramps--left hand, told of trigger finger [de-identified] : vitiligo [de-identified] : feels she can control it, sees a therapist [de-identified] : No HA, no paresthesias, neuropathy resolved.

## 2019-12-19 LAB
ALBUMIN SERPL ELPH-MCNC: 4.4 G/DL
ALP BLD-CCNC: 100 U/L
ALT SERPL-CCNC: 15 U/L
ANION GAP SERPL CALC-SCNC: 15 MMOL/L
AST SERPL-CCNC: 18 U/L
BILIRUB SERPL-MCNC: 0.5 MG/DL
BUN SERPL-MCNC: 15 MG/DL
CALCIUM SERPL-MCNC: 9.9 MG/DL
CHLORIDE SERPL-SCNC: 103 MMOL/L
CO2 SERPL-SCNC: 24 MMOL/L
CORTIS SERPL-MCNC: 6.9 UG/DL
CREAT SERPL-MCNC: 0.71 MG/DL
GLUCOSE SERPL-MCNC: 114 MG/DL
POTASSIUM SERPL-SCNC: 4.5 MMOL/L
PROT SERPL-MCNC: 6.9 G/DL
SODIUM SERPL-SCNC: 142 MMOL/L
T4 FREE SERPL-MCNC: 1.3 NG/DL
TSH SERPL-ACNC: 0.7 UIU/ML

## 2019-12-24 ENCOUNTER — APPOINTMENT (OUTPATIENT)
Dept: INFUSION THERAPY | Facility: HOSPITAL | Age: 69
End: 2019-12-24

## 2020-01-06 ENCOUNTER — APPOINTMENT (OUTPATIENT)
Dept: RADIOLOGY | Facility: IMAGING CENTER | Age: 70
End: 2020-01-06

## 2020-01-06 ENCOUNTER — OUTPATIENT (OUTPATIENT)
Dept: OUTPATIENT SERVICES | Facility: HOSPITAL | Age: 70
LOS: 1 days | End: 2020-01-06
Payer: MEDICARE

## 2020-01-06 DIAGNOSIS — Z90.710 ACQUIRED ABSENCE OF BOTH CERVIX AND UTERUS: Chronic | ICD-10-CM

## 2020-01-06 DIAGNOSIS — Z98.89 OTHER SPECIFIED POSTPROCEDURAL STATES: Chronic | ICD-10-CM

## 2020-01-06 DIAGNOSIS — N63 UNSPECIFIED LUMP IN BREAST: Chronic | ICD-10-CM

## 2020-01-06 DIAGNOSIS — Z00.8 ENCOUNTER FOR OTHER GENERAL EXAMINATION: ICD-10-CM

## 2020-01-06 DIAGNOSIS — Z98.890 OTHER SPECIFIED POSTPROCEDURAL STATES: Chronic | ICD-10-CM

## 2020-01-06 PROCEDURE — 71046 X-RAY EXAM CHEST 2 VIEWS: CPT

## 2020-01-06 PROCEDURE — 71046 X-RAY EXAM CHEST 2 VIEWS: CPT | Mod: 26

## 2020-01-10 ENCOUNTER — OUTPATIENT (OUTPATIENT)
Dept: OUTPATIENT SERVICES | Facility: HOSPITAL | Age: 70
LOS: 1 days | Discharge: HOME | End: 2020-01-10

## 2020-01-10 DIAGNOSIS — Z98.890 OTHER SPECIFIED POSTPROCEDURAL STATES: Chronic | ICD-10-CM

## 2020-01-10 DIAGNOSIS — C67.9 MALIGNANT NEOPLASM OF BLADDER, UNSPECIFIED: ICD-10-CM

## 2020-01-10 DIAGNOSIS — N63 UNSPECIFIED LUMP IN BREAST: Chronic | ICD-10-CM

## 2020-01-10 DIAGNOSIS — Z90.710 ACQUIRED ABSENCE OF BOTH CERVIX AND UTERUS: Chronic | ICD-10-CM

## 2020-01-10 DIAGNOSIS — Z98.89 OTHER SPECIFIED POSTPROCEDURAL STATES: Chronic | ICD-10-CM

## 2020-01-14 ENCOUNTER — LABORATORY RESULT (OUTPATIENT)
Age: 70
End: 2020-01-14

## 2020-01-14 ENCOUNTER — APPOINTMENT (OUTPATIENT)
Dept: INFUSION THERAPY | Facility: HOSPITAL | Age: 70
End: 2020-01-14

## 2020-01-14 ENCOUNTER — APPOINTMENT (OUTPATIENT)
Dept: HEMATOLOGY ONCOLOGY | Facility: CLINIC | Age: 70
End: 2020-01-14
Payer: MEDICARE

## 2020-01-14 VITALS
RESPIRATION RATE: 17 BRPM | TEMPERATURE: 99.1 F | WEIGHT: 169.76 LBS | SYSTOLIC BLOOD PRESSURE: 131 MMHG | HEART RATE: 66 BPM | OXYGEN SATURATION: 98 % | DIASTOLIC BLOOD PRESSURE: 87 MMHG | BODY MASS INDEX: 27.4 KG/M2

## 2020-01-14 PROCEDURE — 99213 OFFICE O/P EST LOW 20 MIN: CPT

## 2020-01-14 RX ORDER — CHROMIUM 200 MCG
1000 TABLET ORAL
Refills: 0 | Status: DISCONTINUED | COMMUNITY
End: 2020-01-14

## 2020-01-14 RX ORDER — ERYTHROMYCIN AND BENZOYL PEROXIDE 3 %-5 %
5-3 KIT TOPICAL
Qty: 47 | Refills: 0 | Status: DISCONTINUED | COMMUNITY
Start: 2019-12-04 | End: 2020-01-14

## 2020-01-14 NOTE — PHYSICAL EXAM
[Fully active, able to carry on all pre-disease performance without restriction] : Status 0 - Fully active, able to carry on all pre-disease performance without restriction [Normal] : affect appropriate [de-identified] : no edema

## 2020-01-14 NOTE — ASSESSMENT
[Palliative Care Plan] : not applicable at this time [FreeTextEntry1] : Juju seen in the office today. She was recently treated for urinary tract infection, although culture came back negative. She was also given fluconazole. She also received  azithromycin for an upper respiratory tract infection. She was using her CPAP machine. She feels well at this time. She denies any fevers. There is no fatigue. Her appetite is good and his no weight loss. Her back pains are "okay" and she does not take any pain medications. There are no cough or respiratory complaints. There were no diarrheal stools.\par \par The physical examination is remarkable. Today's dose #34 of atezolizumab. She will complete one more dose for a full 2 years of therapy and then we will discontinue treatment and observe her. She has tolerated therapy very well with the only significant complaint being an exacerbation of her already pre-existing vitiligo with more extensive involvement.\par \par All questions were answered the best my ability and to her apparent satisfaction.  I will see her once again in 3 months time.

## 2020-01-14 NOTE — HISTORY OF PRESENT ILLNESS
[Disease: _____________________] : Disease: [unfilled] [T: ___] : T[unfilled] [N: ___] : N[unfilled] [M: ___] : M[unfilled] [AJCC Stage: ____] : AJCC Stage: [unfilled] [de-identified] : Ms. 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 The tumor was found on cystoscopy at the right ureteral orifice. 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 She is referred for consideration of neoadjuvant chemotherapy. In late May, at the time of scheduled back surgery, she thought she had a UTI. Urine was tested and she was treated, then had the surgery. She had burning post-op. She was treated again with an antibiotic. She went to Rehab and was treated again and she was seen by a urologist there. Went to PCP after discharge from rehab, he noted some blood in the urine. She went to [facility] ER and \par she was referred to Dr. Hargrove. She underwent a cystoscopy, revealing tumor. She never noted blood in her urine, but did note it was darker than usual. Still has some "tingling" sensation, no incontinence. Nocturia occurs, which she says is related to awakening from CPAP. No cough. WALKER/bone pains.\par \par 10/27/17...Seen at [facility] [place]. They wanted to repeat procedures again, including cystoscopy. They called  again today. Saw Dr. Montalvo. She was asked to return next Thursday. They want to do a PET CT scan. They recommended she receive cis/gem, likely due to the glandular differentiation. She was overwhelmed by all the information she was given. No pains, no cough/SOB.  \par \par 10/27/17...Juju completed cycle 1 this past Friday, and she noticed on Saturday that her lips appeared swollen and she noticed sores in her mouth. This occurred after going out to eat chinese food.Today she feels that it may be going away. She has fatigue, and feels chills but no fever. She just feels " lousy."  She is feeling Nausea this time and if she takes the metoclopramide in time, then she is okay, She denies vomiting. She is also having some dysgeusia, She denies paraesthesias of the fingers and toes.  She is having constipation, and she is controlling it with the stool softeners.She states her appetite is ok, but the food doesn't taste good, and it hurts when she swallows\par \par 17...Chemo with cis/gem #2 due this Friday.has increased lower back pain. The pain had stopped. She had prior back surgery. The pain became more notable since starting the chemo. She had constipation with the chemo and noted an increase in the pain with the constipation in lower back near the site of the surgery. The pain feels like pressure, described as heavy, somewhat relieved by BM. Worse with activities. Took some oxycodone a few days ago, but had a headaches afterwards. Pain score currently at 6-7, no recent pain med use. Taking MiraLAX, senna, Colace and prune juice with occasional MOM. Appetite is good, some altered taste, when present, affects appetite. Had some nausea and vomited x 3 after initial chemo, more gagging than vomiting. She felt she had some sores in the mouth after the gem alone, resolved. No paresthesias. She had some discomfort in the right wrist area, at the site where chemo was administered. Fatigue present all the time, more since the start of chemo. No hematuria, good flow, no urgency or incontinence as before. \par \par 17...day 15, cycle #2, cis/gem.  Juju has increased sensitivity in the lips after chemo. She had some vomiting at the end of her chemo infusion, however she did not have increased nausea and vomiting since. She has fatigue, and she complains of constipation and is taking miralax. She does complain of back pain that has increased in intensity.She notices it when she bends over. She feels that there is something noticeably there in her back. She takes pain medication to help her fall asleep. She doesn't sleep well. She does use c-pap secondary to sleep apnea but she is awaken from her sleep secondary to nocturia. She also notices her pain more when she has constipation and has to go to the bathroom. She describes her lower back pain as 8/10 at worst, and best 5/10. HEr back pain went away after the surgery and she noticed it came back once starting Chemo. She states her appetite is good, She does complain of dysgeusia. She denies paraesthesias. She does not like how the oxycodone makes her feel. It gives her HAs.\par \par 18...Had RT from the  to the . She feels there is some decrease in the pain, not as piercing. She feels the area is stiff. Worse when she awakens. Takes 3 x 325 Tylenol at night which helps the pain. has constipation, went one week w/o evacuation. Was given lactulose, but she feels that MiraLAX works better. She is less active as a result of the pain. the pain is worse when she bends. Appetite is variable. No weight loss. Has occasional nausea. No vomiting. Has some indigestion at times. No blood in the urine, no dysuria. No incontinence. Fatigue is present. No pains elsewhere. \par \par 3/14/18...She still has some pain. She is doing PT, which helps temporarily. She has not had the relief of pain that she thought would be the result. Pain score at 4-5, described as aggravation and it inhibits activities. Worse with bending, getting in and out of cars. She is not taking hydromorphone at this time.. She is distressed with constipation from the pain meds. Says lactulose does not work. She takes some Tylenol sporadically, not daily. Appetite is good, lost 1 kilo. No hematuria, no dysuria, no frequency, no incontinence. No pains elsewhere. She remains very active. She has alopecia. No diarrhea. No cough/WALKER. Fatigue is present, mild. She feels it at the end of the day. No N/V. \par \par 18...Completed 3 cycles of Tecentriq. She noes curling of the right fourth finger involuntarily. She can bend it back up, but it hurts to do so. No difficulty with strength on the right hand, no tremors.  She has tried Icy Hot w/o benefit. She points to DIP and PIP joints as the sites of pain. She feels she has lumps on her head. She is losing her hair, likely secondary to the chemo she received. Her scalp is pruritic. She has also lost pubic hair. Her pain continues to get better,went to PT for 7 weeks. She is able to do all normal activities, with mild fatigue. Appetite is good, weight is stable. No dizziness, no unsteadiness, no headaches. No other issues. No hematuria. \par \par 18...Missed Rx on 18. feels "great". Still has back pains, much improved. No pain while seated, but will come on after a while. No worse with ambulation. Does not awaken her. Pain can be as high as 7-8 with activities, best at 3-4. No pain meds utilized. No blood in urine. denies fatigue until late in the day. Appetite is good, but she has lost about 3 pounds. She is avoiding fat products as she has an upset stomach from fats. Had a URI, treated with azithromycin, Flonase and a codeine containing cough suppressant, still has some residual cough. No diarrhea. No dyspnea. has constipation. \par \par 18...On atezolizumab since 18. Feels well. Still has some pains, not clearly as severe as before. It is worse if she walks for a while, such as several hundred feet. Also more prominent if she stands too long. Does not bother her at night or awaken her. It does not stop her activities. Takes an occasional ibuprofen, not daily. Appetite is good, weight is stable. No N/V. No diarrhea. No cough/WALKER. Some fatigue. No leg edema. \par \par 18...Feels "okay". Still has some back pain, nothing like it was before. Has a bunion of the foot which is bothersome and the podiatrist has recommended surgery. No cough/sputum,. No diarrhea. Appetite is good, but lost 2 pounds since last visit. No other pains. No headaches, no paresthesias. No dizziness noted, no balance issues. Mild fatigue, improved.\par \par 18...Cycle 6 was administered on 18, due #7 on 18. "I'm feeling good". Noted some pressure and noted urinary frequency about one week ago, then resolved. No dysuria, no blood, no frequency, nocturia x 1. The usual lower back pain, no pain at the current time. Extra exertion brings on the low back pain and she is not sure if this is from the cancer or from the prior back surgery. She notes several areas over her skin becoming depigmented in small spots. No pruritus. Appetite is good, no weight loss. Actually gained a few pounds. No diarrhea. has some fatigue towards the end of the day. No dizziness. No paresthesias. No cough/WALKER. \par \par 18...On atezo since 18. Has received 7 cycles. Saw Dr. Hargrove late , cysto recommended, but she decided to wait until  after vacation. She notes lightening of her skin. She didn't go to the dermatologist. She has a prior dermatologist that she might see. No diarrhea. No upset stomach. Appetite is ":great", no weight loss. No cough/WALKER. No paresthesias. Minor fatigue along with aches in the evenings. No headaches, no dizziness. No leg edema. Slight hematuria. \par \par 18...last scans in May\par Reports skin changes due to the vitiligo has been bothering her.  Reports this has been happening more since last month.  Has also been taking Valium from two years ago occasionally for anxiety.  Reports has taken it once every 2-3 weeks, only when she feels overwhelmed with anxiety.  Has not seen an psychiatrist yet, but has been seeing a psychologist.  She has seeing her for one year..  When she gets anxious it occurs mostly at night.  Denies any pain, except after walking a long period of time.  Will occasionally use a cane.  Reports no urinary frequency.  No urinary incontinence, no hematuria.  Reports regular bowel movements.  Reports good appetite, occasional dyspepsia with fried foods.  \par \par 18...Did not have an appt today, was added onto schedule. She has urgency, no dysuria, no foul odor. She feels bloated as well for the past 3 days. She wonders if it is related to her vitiligo, which is prominent in the urogenital area. Urine is clear, no blood. No fevers, no chills. Appetite is "great", gaining weight. No cough/WALKER. NO diarrheal stools, last BM yesterday, normal. No N/V. No vitiligo is more prominent, which concerns her. She says the bottom of her feet are tender, at the ball of the feet. Toes are numb. This has occurred over the last week. She has also had cramps in her hands. Mostly the thumb, told of he had an injection for her trigger finger. received cycle # 11 of atezolizumab today. \par \par 10/11/18...dose # 12 today. Saw Paulie Hargrove, plan for cysto. "I'm doing well".  She says that her feet always feels like there "is something there", involving the toes, no longer affects the ball of the foot. Saw her podiatrist. Cysto is scheduled for the . Her vitiligo is more prominent his is prominent on the hands. She feels that some parts of her hands are darker as well. Appetite is "fabulous". No N/V/D/C. Urine flow is good, no incontinence, no blood, no dysuria. Urgency and fullness sensation resolved. No cough/SOB. No headaches. No fatigue\par \par 10/31/18....Dose #13 today. Feels well at this time. Back pain remains the same as before, no pain med use. If she does extra activities she has pain. She had back surgery before and she believes it is from the prior surgery and not the mets. No fatigue. Appetite is good, weight is stable., No diarrheal stools. No hematuria noted. No dysuria. Usual activities performed without impairment. Vitiligo is somewhat more prominent, which disturbs her. No leg edema. No cough, no WALKER. No upset stomach. No fevers, no chills. Had cysto on , all was normal. \par \par 18...Feels "okay". Pain in the back somewhat more prominent, the pain from her prior surgery.Appetite is jeramy, no weight loss. No cough/WALKRE. No diarrhea, no upset stomach. Some minor fatigue. She thinks she is depressed, attends a support group here. Starting with a therapist. He  left her recently. [place]. \par \par 18...Last scans 18. Getting laser therapy for her vitiligo, which she may not continue due to high c-pays. feels as if her tongue is sensitive to heat since starting the laser therapy. She senses salt more than before. Otherwise well, back pain "bearable", RTS, "part of my life". No pain meds used. Appetite is good, weight is stable. No diarrheal stools No cough, No SOB. She has some sensation in the bladder or vaginal area, pruritic, calmed with Vagisil.  She is concerned about some blood in the urine, microscopic....she had a cysto on 10/22/18, revealing no issue. She asked about clearance for sexual activity. No fatigue, no headaches. No dysuria, but occasional mild irritation. No incontinence. No urinary frequency, rare nocturia. \par \par 1/3/19 : On atezolizumab since 18. Feels "great". No pains except for usual aches. Appetite is good, weight has increased. No cough, no WALKER. No diarrheal stools, No upset stomach. No edema. No skin rashes. Vitiligo is "going crazy", goes 2 times a week for laser therapy. States she may stop the laser therapy. \par \par 19...19 : Here for Atezolizumab(since ) and follow up. \par She has been well overall without any AEs aside form vitiligo which is a known side effect of these ICI's. \par \par 19 : One year on atezolizumab. Feels "fine". No diarrheal stool, no dyspepsia, no cough/WALKER. Appetite s good, no weight loss. No headaches, some paresthesias of the feet, no change. Has seen neurology and received injections. Can't open her hands at times. No fatigue, no pruritus. NO skin rashes. Vitiligo continues to be more prominent. Went for laser therapy, wit stopped it. She says her lips blistered. Vitiligo affects genitalia, present prior to Rx, has vitiligo of the hands, axilla and breasts. \par \par 3/6/19...3/6/19 : Ms. Crespo is here for a follow up and atezolizumab. She has been having lower back pain that resembled pain she had when she had recurrence. She underwent MRI on 3/3/19. This showed re-demonstrating the spine lesions, no changes were noted on the MRI. She has DJD and bulging disc with narrowing in L4-5.\par No new lesions were seen. She has not followed up with Dr. VINICIUS Kauffman for this either. OTHerwise she feels well. \par \par 19...saw her orthopedic surgeon, who said the pain is not related to her cancer, but to scar tissue. He gave her Flexeril and Meloxicam. He referred her for PT. The pain is much better. The knot" is not as big as before. Otherwise, :fabulous". Appetite is good, weight is stable. Has some fatigue, noted at the end of the day. No pruritus, no rash. Vitiligo is progressive. No cough, no WALKER. No nausea, no vomiting, occ upset stomach. No diarrheal stools. Now 14 months on therapy. \par \par 19...On Atezo since 2018. Had some back pain exacerbation recently, was on meloxicam and cyclobenzaprine, which has resolved to a great degree. She returned to work in April. Feels well otherwise, has a "head cold", with congestion, yellow sputum. Taking Claritin and other drugs, nasal congestion and runny nose improved. She is concerned it has moved to the chest. No F/C, no SOB, no wheezing. Appetite is good, no weight loss. No diarrheal stools., no upset stomach. Fatigue is present, in  the evening, she is "done". This has been the case since she returned to work. No rash, no pruritus. Vitiligo is progressing.\par \par 19...Working and has fatigue. She feels that the fatigue is mostly due to the work schedule. 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 Cycle 25 is today. Overall, feels well. has some back pain, which she feels is related to her prior surgery. No hematuria noted, no incontinence. No dysuria. Appetite is excellent, no weight loss. No cough, No WALKER. No diarrheal stools. No F/C. No edema. \par \par 19...Now 1.5 years on atezo at this time. Feels well overall. has an occasional tingling when she voids, which she attributes to the genital vitiligo. She has some cream to apply. She is working as a  to the elderly and one of her clients  this AM. This upsets her somewhat. She became upset and tearful. Appetite is normal, weight is stable. She continues to have some back pains as before, related to prior surgery, perhaps somewhat more since she had to expend more effort. No diarrheal stools, no cough. No SOB No headaches, no dizziness. No fatigue, taking Geritol, which she feels has helped her. No fevers, no chills, no urinary issues.\par \par 9/10/19...Feels "wonderful". Back pain RTS, not severe, has taken some oxycodone at bed time recently Uses CPAP to sleep, occasionally awakened by back pain. She saw her orthopedist, who said that everything looked the same.Appetite is good, no weight loss. No cough, no WALKER, No diarrheal stools. No fatigue. No rash, no pruritus. Occ headaches, feel like sinus headaches, no meds, brief duration, slight in intensity. No edema. Orthopedist did plain films. Occ tramadol use. \par \par 10/31/19...On atezo since 2018. Now has more below back pains Become worse in July or August, not present all the time. Occasionally awakens her. Takes oxycodone or hydromorphone infrequently. Pain at 4 currently, worst at  4 in the last 24, best at zero. No worse with walking, actually better. Appetite is "great". Weight stable. o N/V/C/D. No cough, no dyspnea. Working, has some fatigue at the end of the day. No headaches, occ paresthesias.  She had prior surgery with rods in her back. Going t see derm as well. Has some "tingling when she voids, which she attributes to the worsening vitiligo in the urogenital area. NO blood, nocturia x 1, flow is fine, no incontinence. No dysuria. No edema. Wants t cancel  appointment, wants to go to [place].\par \par 19...Had an MRI of the neck, ordered by Dr Naveen Kauffman, who performed her lumbar surgery. She was told she requires surgery in the neck. She was started on meloxicam, which she says does not help.  dose will be # 33. The neck pain feels "like a monkey on my back", started about 6 weeks ago after her last visit. She was told it has nothing to do with her cancer. She did not bring the MRI results with her. Appetite is "great", no weight loss. No blood in the urine, no dysuria. She was started on some topicals and fluconazole for genital rash/vaginitis, saw both GYN and derm. No fatigue. Started atezo in 2018. No edema. No diarrheal stools, no cough/WALKER. getting some PT/hydrotherapy.  [de-identified] : high-grade [FreeTextEntry1] : cis/gem, started chemo 11/3/17 as neoadjuvant, then bone mets, had RT. Now on atezolizumab since February 12, 2018 [de-identified] : Feels well today. Had some gyn issues, told of a urine infection, treated with Cipro and Diflucan, completed both. Saw her PCP for an annual exam. She had large leucocyte esterase, few bacteria , but a negative culture. She has been noting chills recently. No fevers. NO fatigue. Appetite is fine, no weight loss. Back pains are "okay", no meds used. No edema. No cough, No WALKER. No diarrheal stools. She was on her CPAP recently and was also treated with a Z pack. \par \par \par \par \par

## 2020-01-14 NOTE — REVIEW OF SYSTEMS
[Chills] : chills [Negative] : ENT [Night Sweats] : no night sweats [Fever] : no fever [Fatigue] : no fatigue [Recent Change In Weight] : ~T no recent weight change [Chest Pain] : no chest pain [Palpitations] : no palpitations [Lower Ext Edema] : no lower extremity edema [Wheezing] : no wheezing [Cough] : no cough [SOB on Exertion] : no shortness of breath during exertion [Dysuria] : no dysuria [Joint Stiffness] : no joint stiffness [Joint Pain] : no joint pain [Incontinence] : no incontinence [Anxiety] : no anxiety [Dizziness] : no dizziness [Muscle Weakness] : no muscle weakness [Depression] : no depression [FreeTextEntry9] : usual back pains, no crmaps [Easy Bleeding] : no tendency for easy bleeding [Easy Bruising] : no tendency for easy bruising [de-identified] : vitiligo, no rashes, minor pruritus [de-identified] : No HA,  paresthesias are better

## 2020-01-14 NOTE — CONSULT LETTER
[Courtesy Letter:] : I had the pleasure of seeing your patient, [unfilled], in my office today. [Dear  ___] : Dear  [unfilled], [Please see my note below.] : Please see my note below. [Consult Closing:] : Thank you very much for allowing me to participate in the care of this patient.  If you have any questions, please do not hesitate to contact me. [Sincerely,] : Sincerely, [DrAzam  ___] : Dr. BRANDT [FreeTextEntry2] : Dr. Kalen Kauffman MD [facility]

## 2020-01-15 DIAGNOSIS — Z51.11 ENCOUNTER FOR ANTINEOPLASTIC CHEMOTHERAPY: ICD-10-CM

## 2020-01-21 ENCOUNTER — OUTPATIENT (OUTPATIENT)
Dept: OUTPATIENT SERVICES | Facility: HOSPITAL | Age: 70
LOS: 1 days | End: 2020-01-21
Payer: MEDICARE

## 2020-01-21 ENCOUNTER — APPOINTMENT (OUTPATIENT)
Dept: UROLOGY | Facility: CLINIC | Age: 70
End: 2020-01-21
Payer: MEDICARE

## 2020-01-21 VITALS — HEART RATE: 68 BPM | DIASTOLIC BLOOD PRESSURE: 84 MMHG | RESPIRATION RATE: 16 BRPM | SYSTOLIC BLOOD PRESSURE: 127 MMHG

## 2020-01-21 DIAGNOSIS — Z98.890 OTHER SPECIFIED POSTPROCEDURAL STATES: Chronic | ICD-10-CM

## 2020-01-21 DIAGNOSIS — Z98.89 OTHER SPECIFIED POSTPROCEDURAL STATES: Chronic | ICD-10-CM

## 2020-01-21 DIAGNOSIS — Z90.710 ACQUIRED ABSENCE OF BOTH CERVIX AND UTERUS: Chronic | ICD-10-CM

## 2020-01-21 DIAGNOSIS — N63 UNSPECIFIED LUMP IN BREAST: Chronic | ICD-10-CM

## 2020-01-21 PROCEDURE — 52000 CYSTOURETHROSCOPY: CPT

## 2020-01-22 ENCOUNTER — APPOINTMENT (OUTPATIENT)
Dept: ULTRASOUND IMAGING | Facility: IMAGING CENTER | Age: 70
End: 2020-01-22
Payer: MEDICARE

## 2020-01-22 ENCOUNTER — OUTPATIENT (OUTPATIENT)
Dept: OUTPATIENT SERVICES | Facility: HOSPITAL | Age: 70
LOS: 1 days | End: 2020-01-22
Payer: MEDICARE

## 2020-01-22 DIAGNOSIS — Z98.89 OTHER SPECIFIED POSTPROCEDURAL STATES: Chronic | ICD-10-CM

## 2020-01-22 DIAGNOSIS — R10.2 PELVIC AND PERINEAL PAIN: ICD-10-CM

## 2020-01-22 DIAGNOSIS — Z90.710 ACQUIRED ABSENCE OF BOTH CERVIX AND UTERUS: Chronic | ICD-10-CM

## 2020-01-22 DIAGNOSIS — Z98.890 OTHER SPECIFIED POSTPROCEDURAL STATES: Chronic | ICD-10-CM

## 2020-01-22 DIAGNOSIS — N63 UNSPECIFIED LUMP IN BREAST: Chronic | ICD-10-CM

## 2020-01-22 LAB — URINE CYTOLOGY: NORMAL

## 2020-01-22 PROCEDURE — 76856 US EXAM PELVIC COMPLETE: CPT | Mod: 26

## 2020-01-22 PROCEDURE — 76830 TRANSVAGINAL US NON-OB: CPT | Mod: 26

## 2020-01-22 PROCEDURE — 76856 US EXAM PELVIC COMPLETE: CPT

## 2020-01-22 PROCEDURE — 76830 TRANSVAGINAL US NON-OB: CPT

## 2020-01-22 NOTE — HISTORY OF PRESENT ILLNESS
[FreeTextEntry1] : Patient seen initially last year for management of urethral pain which began following spinal fusion L1-4 - she had a adame for 3 days. After which she noted shooting pain in the urethra at the end of voiding and was associated with increased urinary frequency, urgency and incontinence. She was treated with Ciprofloxacin for a UTI. While in rehab the symptoms recurred/continued and she was treated with ciprofloxacin, oxybutynin and Pyridium. This helped the frequency but not the pain.I ended up doing a cystoscopy due to microscopic hematuria which noted a bladder tumor. She is now s/p TURBT. The tumor was sessile and covering the tight UO which i unroofed. Pathology c/w pT2 disease. F/u CT scan with no evidence of metastasis. There is no hydronephrosis though distal right ureter dilated. 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 Now S/P XRT to lesion and on Tecentriq with stable disease on recent scan.\par seen in June, today noting some feeling in her bladder; she notes some feeling of more frequency and a tingly sensation not discomfort after voiding. No hematuria or change in FOS. \par reviewed recent scan and no obvious tumor in bladder wall. \par Comes back with some same feelings - feels pressure alleviates to some degree; Ibuprofen takes care of it. No dysuria or hematuria though some increased frequency. Recent UA negative and culture negative.  \par \par about to finish therapy - here as notes chills with no fever, increased urinary frequency and sensation of tingling with voiding; no dysuria or hematuria. PCP checked UA which looked like UTI but culture was negative; took days of Cipro with no help. F/U UA - 6 RBC 7 WBCs. \par  \par

## 2020-01-23 LAB — BACTERIA UR CULT: NORMAL

## 2020-01-27 DIAGNOSIS — C67.6 MALIGNANT NEOPLASM OF URETERIC ORIFICE: ICD-10-CM

## 2020-02-04 ENCOUNTER — APPOINTMENT (OUTPATIENT)
Dept: HEMATOLOGY ONCOLOGY | Facility: CLINIC | Age: 70
End: 2020-02-04
Payer: MEDICARE

## 2020-02-04 ENCOUNTER — APPOINTMENT (OUTPATIENT)
Dept: INFUSION THERAPY | Facility: HOSPITAL | Age: 70
End: 2020-02-04

## 2020-02-04 VITALS
BODY MASS INDEX: 27.83 KG/M2 | DIASTOLIC BLOOD PRESSURE: 86 MMHG | WEIGHT: 172.4 LBS | OXYGEN SATURATION: 98 % | HEART RATE: 69 BPM | RESPIRATION RATE: 17 BRPM | SYSTOLIC BLOOD PRESSURE: 136 MMHG

## 2020-02-04 PROCEDURE — 99214 OFFICE O/P EST MOD 30 MIN: CPT

## 2020-02-04 RX ORDER — FLUCONAZOLE 150 MG/1
150 TABLET ORAL
Qty: 2 | Refills: 0 | Status: DISCONTINUED | COMMUNITY
Start: 2019-12-10 | End: 2020-02-04

## 2020-02-04 NOTE — ASSESSMENT
[Palliative Care Plan] : not applicable at this time [FreeTextEntry1] : Juju is seen in followup today. She brought her daughter, son, and daughter-in-law to the visit today. I have never met than previously. Today was to be her last dose of atezolizumab. She saw her gynecologist and complained of some pressure sensation. A sonogram was performed, and this revealed a vascular mass in the left pelvis, size was not specified. An MRI was recommended, and it has been ordered. It is scheduled to be done in 2 days. She continues to feel some pressure in the bladder area. It is constant but not increased with voiding. Recent cystoscopy was negative. Her appetite is good, and there is no weight loss. There were no other areas of pain or discomfort. There no fevers or chills. She has her usual low back pains and has had prior back surgery. Her primary care doctor was concerned about a left supraclavicular mass and fullness. A chest x-ray was ordered and no abnormality was noted.\par \par On physical examination, there are a few small soft lymph nodes in the supraclavicular area. They do not have the characteristic of malignant transformation, but have not been fully evaluated with a CT scan. I proposed a CT scan of the area, but she wants to have the MRI first and determine what this issue is. She will likely require a biopsy. I will have to see the films before it can be determined whether a biopsy can easily be performed or not. I described however likely be a CT-guided biopsy given the location within the pelvis. She had many questions to ask in regards of whether additional treatments would be involved, I explained to her that we must know what we are dealing with before we can have any discussion of potential treatments.\par \par Her daughter and daughter-in-law had multiple questions to ask as well. All questions were answered to the best my ability and to their apparent satisfaction. I will contact her after the results of the MRI is known and plan for a biopsy if it is feasible.\par \par

## 2020-02-04 NOTE — PHYSICAL EXAM
[Fully active, able to carry on all pre-disease performance without restriction] : Status 0 - Fully active, able to carry on all pre-disease performance without restriction [Normal] : affect appropriate [de-identified] : minimal soft left supraclavicular LNs

## 2020-02-04 NOTE — CONSULT LETTER
[Dear  ___] : Dear  [unfilled], [Please see my note below.] : Please see my note below. [Consult Closing:] : Thank you very much for allowing me to participate in the care of this patient.  If you have any questions, please do not hesitate to contact me. [Courtesy Letter:] : I had the pleasure of seeing your patient, [unfilled], in my office today. [Sincerely,] : Sincerely, [DrAzam  ___] : Dr. BRANDT [FreeTextEntry2] : Dr. Kalen Kauffman MD [facility]

## 2020-02-04 NOTE — REVIEW OF SYSTEMS
[Anxiety] : anxiety [Depression] : depression [Negative] : Eyes [Chest Pain] : no chest pain [Palpitations] : no palpitations [Wheezing] : no wheezing [Lower Ext Edema] : no lower extremity edema [Cough] : no cough [SOB on Exertion] : no shortness of breath during exertion [Vomiting] : no vomiting [Abdominal Pain] : no abdominal pain [Diarrhea] : no diarrhea [Difficulty Walking] : no difficulty walking [Dizziness] : no dizziness [Muscle Weakness] : no muscle weakness [Easy Bleeding] : no tendency for easy bleeding [Easy Bruising] : no tendency for easy bruising [FreeTextEntry9] : chronic joint pains and low back pains as before [FreeTextEntry7] : pelvic pressure [de-identified] : NO HA, no paresthesias  [de-identified] : vitiligo

## 2020-02-04 NOTE — RESULTS/DATA
[FreeTextEntry1] : EXAM: US TRANSVAGINAL \par EXAM: US PELVIC COMPLETE \par PROCEDURE DATE: 01/22/2020 \par INTERPRETATION: CLINICAL INFORMATION: 69-year-old female with bladder cancer. Pelvic pain. Status post hysterectomy. \par COMPARISON: None available. \par TECHNIQUE: \par Endovaginal and transabdominal pelvic sonogram. \par FINDINGS: \par Uterine bed unremarkable. \par Right ovary: Not visualized. \par Left ovary: Not visualized. \par Fluid: None. \par Vascular irregular soft tissue deep left pelvis suspicious for metastatic implant. Suggest follow-up pelvic MRI. \par IMPRESSION: \par Deep left pelvic soft tissue implant. Suggest follow-up pelvic MRI. \par AMANDEEP KOEHLER M.D., ATTENDING RADIOLOGIST \par This document has been electronically signed. Jan 23 2020 11:34AM \par ******************************************************\par \par

## 2020-02-05 ENCOUNTER — FORM ENCOUNTER (OUTPATIENT)
Age: 70
End: 2020-02-05

## 2020-02-06 ENCOUNTER — APPOINTMENT (OUTPATIENT)
Dept: MRI IMAGING | Facility: CLINIC | Age: 70
End: 2020-02-06
Payer: MEDICARE

## 2020-02-06 ENCOUNTER — OUTPATIENT (OUTPATIENT)
Dept: OUTPATIENT SERVICES | Facility: HOSPITAL | Age: 70
LOS: 1 days | End: 2020-02-06
Payer: MEDICARE

## 2020-02-06 DIAGNOSIS — Z90.710 ACQUIRED ABSENCE OF BOTH CERVIX AND UTERUS: Chronic | ICD-10-CM

## 2020-02-06 DIAGNOSIS — N63 UNSPECIFIED LUMP IN BREAST: Chronic | ICD-10-CM

## 2020-02-06 DIAGNOSIS — Z98.890 OTHER SPECIFIED POSTPROCEDURAL STATES: Chronic | ICD-10-CM

## 2020-02-06 DIAGNOSIS — C67.6 MALIGNANT NEOPLASM OF URETERIC ORIFICE: ICD-10-CM

## 2020-02-06 DIAGNOSIS — Z98.89 OTHER SPECIFIED POSTPROCEDURAL STATES: Chronic | ICD-10-CM

## 2020-02-06 PROCEDURE — 74183 MRI ABD W/O CNTR FLWD CNTR: CPT | Mod: 26

## 2020-02-06 PROCEDURE — 74183 MRI ABD W/O CNTR FLWD CNTR: CPT

## 2020-02-06 PROCEDURE — 72197 MRI PELVIS W/O & W/DYE: CPT | Mod: 26

## 2020-02-06 PROCEDURE — 72197 MRI PELVIS W/O & W/DYE: CPT

## 2020-02-06 PROCEDURE — A9585: CPT

## 2020-02-14 ENCOUNTER — FORM ENCOUNTER (OUTPATIENT)
Age: 70
End: 2020-02-14

## 2020-02-14 ENCOUNTER — APPOINTMENT (OUTPATIENT)
Dept: MRI IMAGING | Facility: IMAGING CENTER | Age: 70
End: 2020-02-14

## 2020-02-15 ENCOUNTER — APPOINTMENT (OUTPATIENT)
Dept: NUCLEAR MEDICINE | Facility: IMAGING CENTER | Age: 70
End: 2020-02-15
Payer: MEDICARE

## 2020-02-15 ENCOUNTER — OUTPATIENT (OUTPATIENT)
Dept: OUTPATIENT SERVICES | Facility: HOSPITAL | Age: 70
LOS: 1 days | End: 2020-02-15
Payer: MEDICARE

## 2020-02-15 DIAGNOSIS — C67.6 MALIGNANT NEOPLASM OF URETERIC ORIFICE: ICD-10-CM

## 2020-02-15 DIAGNOSIS — Z98.890 OTHER SPECIFIED POSTPROCEDURAL STATES: Chronic | ICD-10-CM

## 2020-02-15 DIAGNOSIS — Z98.89 OTHER SPECIFIED POSTPROCEDURAL STATES: Chronic | ICD-10-CM

## 2020-02-15 DIAGNOSIS — N63 UNSPECIFIED LUMP IN BREAST: Chronic | ICD-10-CM

## 2020-02-15 DIAGNOSIS — Z90.710 ACQUIRED ABSENCE OF BOTH CERVIX AND UTERUS: Chronic | ICD-10-CM

## 2020-02-15 PROCEDURE — A9552: CPT

## 2020-02-15 PROCEDURE — 78815 PET IMAGE W/CT SKULL-THIGH: CPT | Mod: 26,PI

## 2020-02-15 PROCEDURE — 78815 PET IMAGE W/CT SKULL-THIGH: CPT

## 2020-06-06 NOTE — H&P PST ADULT - MALLAMPATI CLASS
The patient is a 46y Female complaining of
Class II - visualization of the soft palate, fauces, and uvula

## 2020-06-25 ENCOUNTER — OUTPATIENT (OUTPATIENT)
Dept: OUTPATIENT SERVICES | Facility: HOSPITAL | Age: 70
LOS: 1 days | Discharge: HOME | End: 2020-06-25

## 2020-06-25 DIAGNOSIS — Z98.890 OTHER SPECIFIED POSTPROCEDURAL STATES: Chronic | ICD-10-CM

## 2020-06-25 DIAGNOSIS — Z98.89 OTHER SPECIFIED POSTPROCEDURAL STATES: Chronic | ICD-10-CM

## 2020-06-25 DIAGNOSIS — C67.9 MALIGNANT NEOPLASM OF BLADDER, UNSPECIFIED: ICD-10-CM

## 2020-06-25 DIAGNOSIS — N63 UNSPECIFIED LUMP IN BREAST: Chronic | ICD-10-CM

## 2020-06-25 DIAGNOSIS — Z90.710 ACQUIRED ABSENCE OF BOTH CERVIX AND UTERUS: Chronic | ICD-10-CM

## 2020-06-30 ENCOUNTER — APPOINTMENT (OUTPATIENT)
Dept: HEMATOLOGY ONCOLOGY | Facility: CLINIC | Age: 70
End: 2020-06-30
Payer: MEDICARE

## 2020-06-30 ENCOUNTER — RESULT REVIEW (OUTPATIENT)
Age: 70
End: 2020-06-30

## 2020-06-30 VITALS
DIASTOLIC BLOOD PRESSURE: 82 MMHG | SYSTOLIC BLOOD PRESSURE: 125 MMHG | HEART RATE: 64 BPM | BODY MASS INDEX: 26.69 KG/M2 | TEMPERATURE: 98.7 F | OXYGEN SATURATION: 97 % | RESPIRATION RATE: 17 BRPM | WEIGHT: 165.34 LBS

## 2020-06-30 LAB
ALBUMIN SERPL ELPH-MCNC: 4.5 G/DL
ALP BLD-CCNC: 93 U/L
ALT SERPL-CCNC: 15 U/L
ANION GAP SERPL CALC-SCNC: 10 MMOL/L
AST SERPL-CCNC: 20 U/L
BASOPHILS # BLD AUTO: 0.02 K/UL — SIGNIFICANT CHANGE UP (ref 0–0.2)
BASOPHILS NFR BLD AUTO: 0.4 % — SIGNIFICANT CHANGE UP (ref 0–2)
BILIRUB SERPL-MCNC: 0.5 MG/DL
BUN SERPL-MCNC: 13 MG/DL
CALCIUM SERPL-MCNC: 9.4 MG/DL
CHLORIDE SERPL-SCNC: 105 MMOL/L
CO2 SERPL-SCNC: 28 MMOL/L
CREAT SERPL-MCNC: 0.8 MG/DL
EOSINOPHIL # BLD AUTO: 0.16 K/UL — SIGNIFICANT CHANGE UP (ref 0–0.5)
EOSINOPHIL NFR BLD AUTO: 3.2 % — SIGNIFICANT CHANGE UP (ref 0–6)
GLUCOSE SERPL-MCNC: 109 MG/DL
HCT VFR BLD CALC: 40.1 % — SIGNIFICANT CHANGE UP (ref 34.5–45)
HGB BLD-MCNC: 13.2 G/DL — SIGNIFICANT CHANGE UP (ref 11.5–15.5)
IMM GRANULOCYTES NFR BLD AUTO: 0.4 % — SIGNIFICANT CHANGE UP (ref 0–1.5)
LYMPHOCYTES # BLD AUTO: 1.62 K/UL — SIGNIFICANT CHANGE UP (ref 1–3.3)
LYMPHOCYTES # BLD AUTO: 32 % — SIGNIFICANT CHANGE UP (ref 13–44)
MAGNESIUM SERPL-MCNC: 1.9 MG/DL
MCHC RBC-ENTMCNC: 29.9 PG — SIGNIFICANT CHANGE UP (ref 27–34)
MCHC RBC-ENTMCNC: 32.9 GM/DL — SIGNIFICANT CHANGE UP (ref 32–36)
MCV RBC AUTO: 90.7 FL — SIGNIFICANT CHANGE UP (ref 80–100)
MONOCYTES # BLD AUTO: 0.71 K/UL — SIGNIFICANT CHANGE UP (ref 0–0.9)
MONOCYTES NFR BLD AUTO: 14 % — SIGNIFICANT CHANGE UP (ref 2–14)
NEUTROPHILS # BLD AUTO: 2.54 K/UL — SIGNIFICANT CHANGE UP (ref 1.8–7.4)
NEUTROPHILS NFR BLD AUTO: 50 % — SIGNIFICANT CHANGE UP (ref 43–77)
NRBC # BLD: 0 /100 WBCS — SIGNIFICANT CHANGE UP (ref 0–0)
PLATELET # BLD AUTO: 166 K/UL — SIGNIFICANT CHANGE UP (ref 150–400)
POTASSIUM SERPL-SCNC: 3.9 MMOL/L
PROT SERPL-MCNC: 6.9 G/DL
RBC # BLD: 4.42 M/UL — SIGNIFICANT CHANGE UP (ref 3.8–5.2)
RBC # FLD: 14.5 % — SIGNIFICANT CHANGE UP (ref 10.3–14.5)
SODIUM SERPL-SCNC: 142 MMOL/L
TSH SERPL-ACNC: 0.77 UIU/ML
WBC # BLD: 5.07 K/UL — SIGNIFICANT CHANGE UP (ref 3.8–10.5)
WBC # FLD AUTO: 5.07 K/UL — SIGNIFICANT CHANGE UP (ref 3.8–10.5)

## 2020-06-30 PROCEDURE — 99213 OFFICE O/P EST LOW 20 MIN: CPT

## 2020-06-30 RX ORDER — ATEZOLIZUMAB 1200 MG/20ML
1200 INJECTION, SOLUTION INTRAVENOUS
Refills: 0 | Status: DISCONTINUED | COMMUNITY
End: 2020-06-30

## 2020-06-30 NOTE — PHYSICAL EXAM
[Fully active, able to carry on all pre-disease performance without restriction] : Status 0 - Fully active, able to carry on all pre-disease performance without restriction [Normal] : affect appropriate [de-identified] : vitiligo

## 2020-06-30 NOTE — ASSESSMENT
[Palliative Care Plan] : not applicable at this time [FreeTextEntry1] : Juju is seen in the office today. She completed 2 years of atezolizumab in February of this year. She had a PET scan done subsequent to the study, and she was found to have uptake in the sinuses. She's on your nose and throat doctor, and she is currently on her third course of treatment with antibiotics. She is due to have a procedure done in the next few days. She also had a partial denture formed and she says she can't use it due to pain. She was told that she has TMJ and continues an evaluation for this. She otherwise feels well. There is no hematuria. There is no dysuria or incontinence. She denies any fatigue. There's no cough or shortness of breath. She notes that her vitiligo is progressing. This is secondary to her treatment with immunotherapy. She continues to have some low back pain which she says "I can live with it". She had back pain antedating her diagnosis of metastatic urothelial carcinoma. Specifically, the MRI did not show any uptake in the lumbar spine.\par \par On physical examination, she appears well. There no significant findings other than some percussion tenderness over the lower lumbar spine, which has been the case for years.\par \par All questions are asked her to the best of my ability and to her apparent satisfaction. She may continue to have loss of melanin as a result of the immunotherapy. I informed her that the immunotherapy continues to work and hopefully will continue to suppress tumor cells. All questions were answered to the best my ability and to her apparent satisfaction. I will see her once again in 3 months time. She will have repeat imaging performed in early August.\par \par

## 2020-06-30 NOTE — REVIEW OF SYSTEMS
[Constipation] : constipation [Anxiety] : anxiety [Fever] : no fever [Chills] : no chills [Recent Change In Weight] : ~T no recent weight change [Fatigue] : no fatigue [Eye Pain] : no eye pain [Dysphagia] : no dysphagia [Hoarseness] : no hoarseness [Red Eyes] : eyes not red [Chest Pain] : no chest pain [Odynophagia] : no odynophagia [Palpitations] : no palpitations [Abdominal Pain] : no abdominal pain [SOB on Exertion] : no shortness of breath during exertion [Cough] : no cough [Lower Ext Edema] : no lower extremity edema [Diarrhea] : no diarrhea [Vomiting] : no vomiting [Dysuria] : no dysuria [Incontinence] : no incontinence [Joint Pain] : no joint pain [Muscle Pain] : no muscle pain [Joint Stiffness] : no joint stiffness [Muscle Weakness] : no muscle weakness [Dizziness] : no dizziness [Depression] : no depression [Easy Bruising] : no tendency for easy bruising [FreeTextEntry4] : sinus issues [Easy Bleeding] : no tendency for easy bleeding [de-identified] : vitiligo [de-identified] : NO HA, minor paresthesias, noted in toes, at night [FreeTextEntry9] : low back pain as before

## 2020-06-30 NOTE — HISTORY OF PRESENT ILLNESS
[Disease: _____________________] : Disease: [unfilled] [T: ___] : T[unfilled] [N: ___] : N[unfilled] [M: ___] : M[unfilled] [AJCC Stage: ____] : AJCC Stage: [unfilled] [de-identified] : Ms. 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 The tumor was found on cystoscopy at the right ureteral orifice. 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 She is referred for consideration of neoadjuvant chemotherapy. In late May, at the time of scheduled back surgery, she thought she had a UTI. Urine was tested and she was treated, then had the surgery. She had burning post-op. She was treated again with an antibiotic. She went to Rehab and was treated again and she was seen by a urologist there. Went to PCP after discharge from rehab, he noted some blood in the urine. She went to [facility] ER and \par she was referred to Dr. Hargrove. She underwent a cystoscopy, revealing tumor. She never noted blood in her urine, but did note it was darker than usual. Still has some "tingling" sensation, no incontinence. Nocturia occurs, which she says is related to awakening from CPAP. No cough. WALKER/bone pains.\par \par 10/27/17...[place] [facility] [place]. They wanted to repeat procedures again, including cystoscopy. They called  again today. Saw Dr. Montalvo. She was asked to return next Thursday. They want to do a PET CT scan. They recommended she receive cis/gem, likely due to the glandular differentiation. She was overwhelmed by all the information she was given. No pains, no cough/SOB.  \par \par 10/27/17...Juju completed cycle 1 this past Friday, and she noticed on Saturday that her lips appeared swollen and she noticed sores in her mouth. This occurred after going out to eat chinese food.Today she feels that it may be going away. She has fatigue, and feels chills but no fever. She just feels " lousy."  She is feeling Nausea this time and if she takes the metoclopramide in time, then she is okay, She denies vomiting. She is also having some dysgeusia, She denies paraesthesias of the fingers and toes.  She is having constipation, and she is controlling it with the stool softeners.She states her appetite is ok, but the food doesn't taste good, and it hurts when she swallows\par \par 17...Chemo with cis/gem #2 due this Friday.has increased lower back pain. The pain had stopped. She had prior back surgery. The pain became more notable since starting the chemo. She had constipation with the chemo and noted an increase in the pain with the constipation in lower back near the site of the surgery. The pain feels like pressure, described as heavy, somewhat relieved by BM. Worse with activities. Took some oxycodone a few days ago, but had a headaches afterwards. Pain score currently at 6-7, no recent pain med use. Taking MiraLAX, senna, Colace and prune juice with occasional MOM. Appetite is good, some altered taste, when present, affects appetite. Had some nausea and vomited x 3 after initial chemo, more gagging than vomiting. She felt she had some sores in the mouth after the gem alone, resolved. No paresthesias. She had some discomfort in the right wrist area, at the site where chemo was administered. Fatigue present all the time, more since the start of chemo. No hematuria, good flow, no urgency or incontinence as before. \par \par 17...day 15, cycle #2, cis/gem.  Juju has increased sensitivity in the lips after chemo. She had some vomiting at the end of her chemo infusion, however she did not have increased nausea and vomiting since. She has fatigue, and she complains of constipation and is taking miralax. She does complain of back pain that has increased in intensity.She notices it when she bends over. She feels that there is something noticeably there in her back. She takes pain medication to help her fall asleep. She doesn't sleep well. She does use c-pap secondary to sleep apnea but she is awaken from her sleep secondary to nocturia. She also notices her pain more when she has constipation and has to go to the bathroom. She describes her lower back pain as 8/10 at worst, and best 5/10. HEr back pain went away after the surgery and she noticed it came back once starting Chemo. She states her appetite is good, She does complain of dysgeusia. She denies paraesthesias. She does not like how the oxycodone makes her feel. It gives her HAs.\par \par 18...Had RT from the  to the . She feels there is some decrease in the pain, not as piercing. She feels the area is stiff. Worse when she awakens. Takes 3 x 325 Tylenol at night which helps the pain. has constipation, went one week w/o evacuation. Was given lactulose, but she feels that MiraLAX works better. She is less active as a result of the pain. the pain is worse when she bends. Appetite is variable. No weight loss. Has occasional nausea. No vomiting. Has some indigestion at times. No blood in the urine, no dysuria. No incontinence. Fatigue is present. No pains elsewhere. \par \par 3/14/18...She still has some pain. She is doing PT, which helps temporarily. She has not had the relief of pain that she thought would be the result. Pain score at 4-5, described as aggravation and it inhibits activities. Worse with bending, getting in and out of cars. She is not taking hydromorphone at this time.. She is distressed with constipation from the pain meds. Says lactulose does not work. She takes some Tylenol sporadically, not daily. Appetite is good, lost 1 kilo. No hematuria, no dysuria, no frequency, no incontinence. No pains elsewhere. She remains very active. She has alopecia. No diarrhea. No cough/WALKER. Fatigue is present, mild. She feels it at the end of the day. No N/V. \par \par 18...Completed 3 cycles of Tecentriq. She noes curling of the right fourth finger involuntarily. She can bend it back up, but it hurts to do so. No difficulty with strength on the right hand, no tremors.  She has tried Icy Hot w/o benefit. She points to DIP and PIP joints as the sites of pain. She feels she has lumps on her head. She is losing her hair, likely secondary to the chemo she received. Her scalp is pruritic. She has also lost pubic hair. Her pain continues to get better,went to PT for 7 weeks. She is able to do all normal activities, with mild fatigue. Appetite is good, weight is stable. No dizziness, no unsteadiness, no headaches. No other issues. No hematuria. \par \par 18...Missed Rx on 18. feels "great". Still has back pains, much improved. No pain while seated, but will come on after a while. No worse with ambulation. Does not awaken her. Pain can be as high as 7-8 with activities, best at 3-4. No pain meds utilized. No blood in urine. denies fatigue until late in the day. Appetite is good, but she has lost about 3 pounds. She is avoiding fat products as she has an upset stomach from fats. Had a URI, treated with azithromycin, Flonase and a codeine containing cough suppressant, still has some residual cough. No diarrhea. No dyspnea. has constipation. \par \par 18...On atezolizumab since 18. Feels well. Still has some pains, not clearly as severe as before. It is worse if she walks for a while, such as several hundred feet. Also more prominent if she stands too long. Does not bother her at night or awaken her. It does not stop her activities. Takes an occasional ibuprofen, not daily. Appetite is good, weight is stable. No N/V. No diarrhea. No cough/WALKER. Some fatigue. No leg edema. \par \par 18...Feels "okay". Still has some back pain, nothing like it was before. Has a bunion of the foot which is bothersome and the podiatrist has recommended surgery. No cough/sputum,. No diarrhea. Appetite is good, but lost 2 pounds since last visit. No other pains. No headaches, no paresthesias. No dizziness noted, no balance issues. Mild fatigue, improved.\par \par 18...Cycle 6 was administered on 18, due #7 on 18. "I'm feeling good". Noted some pressure and noted urinary frequency about one week ago, then resolved. No dysuria, no blood, no frequency, nocturia x 1. The usual lower back pain, no pain at the current time. Extra exertion brings on the low back pain and she is not sure if this is from the cancer or from the prior back surgery. She notes several areas over her skin becoming depigmented in small spots. No pruritus. Appetite is good, no weight loss. Actually gained a few pounds. No diarrhea. has some fatigue towards the end of the day. No dizziness. No paresthesias. No cough/WALKER. \par \par 18...On atezo since 18. Has received 7 cycles. Saw Dr. Hargrove late , cysto recommended, but she decided to wait until  after vacation. She notes lightening of her skin. She didn't go to the dermatologist. She has a prior dermatologist that she might see. No diarrhea. No upset stomach. Appetite is ":great", no weight loss. No cough/WALKER. No paresthesias. Minor fatigue along with aches in the evenings. No headaches, no dizziness. No leg edema. Slight hematuria. \par \par 18...last scans in May\par Reports skin changes due to the vitiligo has been bothering her.  Reports this has been happening more since last month.  Has also been taking Valium from two years ago occasionally for anxiety.  Reports has taken it once every 2-3 weeks, only when she feels overwhelmed with anxiety.  Has not seen an psychiatrist yet, but has been seeing a psychologist.  She has seeing her for one year..  When she gets anxious it occurs mostly at night.  Denies any pain, except after walking a long period of time.  Will occasionally use a cane.  Reports no urinary frequency.  No urinary incontinence, no hematuria.  Reports regular bowel movements.  Reports good appetite, occasional dyspepsia with fried foods.  \par \par 18...Did not have an appt today, was added onto schedule. She has urgency, no dysuria, no foul odor. She feels bloated as well for the past 3 days. She wonders if it is related to her vitiligo, which is prominent in the urogenital area. Urine is clear, no blood. No fevers, no chills. Appetite is "great", gaining weight. No cough/WALKER. NO diarrheal stools, last BM yesterday, normal. No N/V. No vitiligo is more prominent, which concerns her. She says the bottom of her feet are tender, at the ball of the feet. Toes are numb. This has occurred over the last week. She has also had cramps in her hands. Mostly the thumb, told of he had an injection for her trigger finger. received cycle # 11 of atezolizumab today. \par \par 10/11/18...dose # 12 today. Saw Paulie Hargrove, plan for cysto. "I'm doing well".  She says that her feet always feels like there "is something there", involving the toes, no longer affects the ball of the foot. Saw her podiatrist. Cysto is scheduled for the . Her vitiligo is more prominent his is prominent on the hands. She feels that some parts of her hands are darker as well. Appetite is "fabulous". No N/V/D/C. Urine flow is good, no incontinence, no blood, no dysuria. Urgency and fullness sensation resolved. No cough/SOB. No headaches. No fatigue\par \par 10/31/18....Dose #13 today. Feels well at this time. Back pain remains the same as before, no pain med use. If she does extra activities she has pain. She had back surgery before and she believes it is from the prior surgery and not the mets. No fatigue. Appetite is good, weight is stable., No diarrheal stools. No hematuria noted. No dysuria. Usual activities performed without impairment. Vitiligo is somewhat more prominent, which disturbs her. No leg edema. No cough, no WALKER. No upset stomach. No fevers, no chills. Had cysto on , all was normal. \par \par 18...Feels "okay". Pain in the back somewhat more prominent, the pain from her prior surgery.Appetite is jeramy, no weight loss. No cough/WALKER. No diarrhea, no upset stomach. Some minor fatigue. She thinks she is depressed, attends a support group here. Starting with a therapist. He  left her recently. [place]. \par \par 18...Last scans 18. Getting laser therapy for her vitiligo, which she may not continue due to high c-pays. feels as if her tongue is sensitive to heat since starting the laser therapy. She senses salt more than before. Otherwise well, back pain "bearable", RTS, "part of my life". No pain meds used. Appetite is good, weight is stable. No diarrheal stools No cough, No SOB. She has some sensation in the bladder or vaginal area, pruritic, calmed with Vagisil.  She is concerned about some blood in the urine, microscopic....she had a cysto on 10/22/18, revealing no issue. She asked about clearance for sexual activity. No fatigue, no headaches. No dysuria, but occasional mild irritation. No incontinence. No urinary frequency, rare nocturia. \par \par 1/3/19 : On atezolizumab since 18. Feels "great". No pains except for usual aches. Appetite is good, weight has increased. No cough, no WALKER. No diarrheal stools, No upset stomach. No edema. No skin rashes. Vitiligo is "going crazy", goes 2 times a week for laser therapy. States she may stop the laser therapy. \par \par 19...19 : Here for Atezolizumab(since ) and follow up. \par She has been well overall without any AEs aside form vitiligo which is a known side effect of these ICI's. \par \par 19 : One year on atezolizumab. Feels "fine". No diarrheal stool, no dyspepsia, no cough/WALKER. Appetite s good, no weight loss. No headaches, some paresthesias of the feet, no change. Has seen neurology and received injections. Can't open her hands at times. No fatigue, no pruritus. NO skin rashes. Vitiligo continues to be more prominent. Went for laser therapy, wit stopped it. She says her lips blistered. Vitiligo affects genitalia, present prior to Rx, has vitiligo of the hands, axilla and breasts. \par \par 3/6/19...3/6/19 : Ms. Crespo is here for a follow up and atezolizumab. She has been having lower back pain that resembled pain she had when she had recurrence. She underwent MRI on 3/3/19. This showed re-demonstrating the spine lesions, no changes were noted on the MRI. She has DJD and bulging disc with narrowing in L4-5.\par No new lesions were seen. She has not followed up with Dr. VINICIUS Kauffman for this either. OTHerwise she feels well. \par \par 19...saw her orthopedic surgeon, who said the pain is not related to her cancer, but to scar tissue. He gave her Flexeril and Meloxicam. He referred her for PT. The pain is much better. The knot" is not as big as before. Otherwise, :fabulous". Appetite is good, weight is stable. Has some fatigue, noted at the end of the day. No pruritus, no rash. Vitiligo is progressive. No cough, no WALKER. No nausea, no vomiting, occ upset stomach. No diarrheal stools. Now 14 months on therapy. \par \par 19...On Atezo since 2018. Had some back pain exacerbation recently, was on meloxicam and cyclobenzaprine, which has resolved to a great degree. She returned to work in April. Feels well otherwise, has a "head cold", with congestion, yellow sputum. Taking Claritin and other drugs, nasal congestion and runny nose improved. She is concerned it has moved to the chest. No F/C, no SOB, no wheezing. Appetite is good, no weight loss. No diarrheal stools., no upset stomach. Fatigue is present, in  the evening, she is "done". This has been the case since she returned to work. No rash, no pruritus. Vitiligo is progressing.\par \par 19...Working and has fatigue. She feels that the fatigue is mostly due to the work schedule. 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 Cycle 25 is today. Overall, feels well. has some back pain, which she feels is related to her prior surgery. No hematuria noted, no incontinence. No dysuria. Appetite is excellent, no weight loss. No cough, No WALKER. No diarrheal stools. No F/C. No edema. \par \par 19...Now 1.5 years on atezo at this time. Feels well overall. has an occasional tingling when she voids, which she attributes to the genital vitiligo. She has some cream to apply. She is working as a  to the elderly and one of her clients  this AM. This upsets her somewhat. She became upset and tearful. Appetite is normal, weight is stable. She continues to have some back pains as before, related to prior surgery, perhaps somewhat more since she had to expend more effort. No diarrheal stools, no cough. No SOB No headaches, no dizziness. No fatigue, taking Geritol, which she feels has helped her. No fevers, no chills, no urinary issues.\par \par 9/10/19...Feels "wonderful". Back pain RTS, not severe, has taken some oxycodone at bed time recently Uses CPAP to sleep, occasionally awakened by back pain. She saw her orthopedist, who said that everything looked the same.Appetite is good, no weight loss. No cough, no WALKER, No diarrheal stools. No fatigue. No rash, no pruritus. Occ headaches, feel like sinus headaches, no meds, brief duration, slight in intensity. No edema. Orthopedist did plain films. Occ tramadol use. \par \par 10/31/19...On atezo since 2018. Now has more below back pains Become worse in July or August, not present all the time. Occasionally awakens her. Takes oxycodone or hydromorphone infrequently. Pain at 4 currently, worst at  4 in the last 24, best at zero. No worse with walking, actually better. Appetite is "great". Weight stable. o N/V/C/D. No cough, no dyspnea. Working, has some fatigue at the end of the day. No headaches, occ paresthesias.  She had prior surgery with rods in her back. Going t see derm as well. Has some "tingling when she voids, which she attributes to the worsening vitiligo in the urogenital area. NO blood, nocturia x 1, flow is fine, no incontinence. No dysuria. No edema. Wants t cancel  appointment, wants to go to [place].\par \par 19...Had an MRI of the neck, ordered by Dr Naveen Kauffman, who performed her lumbar surgery. She was told she requires surgery in the neck. She was started on meloxicam, which she says does not help.  dose will be # 33. The neck pain feels "like a monkey on my back", started about 6 weeks ago after her last visit. She was told it has nothing to do with her cancer. She did not bring the MRI results with her. Appetite is "great", no weight loss. No blood in the urine, no dysuria. She was started on some topicals and fluconazole for genital rash/vaginitis, saw both GYN and derm. No fatigue. Started atezo in 2018. No edema. No diarrheal stools, no cough/WALKER. getting some PT/hydrotherapy. \par \par 20...Feels well today. Had some gyn issues, told of a urine infection, treated with Cipro and Diflucan, completed both. Saw her PCP for an annual exam. She had large leucocyte esterase, few bacteria , but a negative culture. She has been noting chills recently. No fevers. NO fatigue. Appetite is fine, no weight loss. Back pains are "okay", no meds used. No edema. No cough, No WALKER. No diarrheal stools. She was on her CPAP recently and was also treated with a Z pack. \par \par 20...Saw GYN for routine follow up and had some pruritus as well. She was having intermittent chills as well as some abdominal fullness. No findings on exam apparently. She was sent for a sono, which revealed a mass, vascular, MRI ordered, not done as yet. She feels pressure in the bladder area. The pressure is constant, not increased with voiding, but has a "sensation". Recent cysto was negative. Appetite is good, weight increased. No other areas of pain/discomfort. No fevers,no chills. She has her usual low back pains. She has pressure in the lower pelvis, much better compared to 2 weeks ago. PCP felt a left supraclavicular mass and ordered a CXR. [de-identified] : Completed 2 years of atezolizumab in February. Has had sinus infection, on third course of antibiotics, to have a procedure in 3 weeks. Seeing a TMJ specialist for pain in her jaw. Recently had a partial denture made and she can't use it due to the pain. Back pain the same. "I live with it". She dropped 7 pounds and she can't eat like she used to. Latest antibiotic was doxycycline. She is on Mobic for the jaw pains. Otherwise well. NO blood in urine, no dysuria. no incontinence. No fatigue. No cough, no dyspnea. No headaches. No nausea, no vomiting. Vitiligo progressing\par \par \par \par \par  [de-identified] : high-grade [FreeTextEntry1] : cis/gem, started chemo 11/3/17 as neoadjuvant, then bone mets, had RT. Now on atezolizumab since February 12, 2018, ended 2/4/20.

## 2020-06-30 NOTE — CONSULT LETTER
[Dear  ___] : Dear  [unfilled], [Consult Closing:] : Thank you very much for allowing me to participate in the care of this patient.  If you have any questions, please do not hesitate to contact me. [Courtesy Letter:] : I had the pleasure of seeing your patient, [unfilled], in my office today. [Please see my note below.] : Please see my note below. [Sincerely,] : Sincerely, [DrAzam  ___] : Dr. BRANDT [FreeTextEntry2] : Dr. Kalen Kauffman MD [facility]

## 2020-08-01 DIAGNOSIS — Z01.818 ENCOUNTER FOR OTHER PREPROCEDURAL EXAMINATION: ICD-10-CM

## 2020-08-02 ENCOUNTER — APPOINTMENT (OUTPATIENT)
Dept: DISASTER EMERGENCY | Facility: CLINIC | Age: 70
End: 2020-08-02

## 2020-08-02 LAB — SARS-COV-2 N GENE NPH QL NAA+PROBE: NOT DETECTED

## 2020-08-29 ENCOUNTER — APPOINTMENT (OUTPATIENT)
Dept: DISASTER EMERGENCY | Facility: CLINIC | Age: 70
End: 2020-08-29

## 2020-08-29 LAB — SARS-COV-2 N GENE NPH QL NAA+PROBE: NOT DETECTED

## 2020-09-29 ENCOUNTER — APPOINTMENT (OUTPATIENT)
Dept: UROLOGY | Facility: CLINIC | Age: 70
End: 2020-09-29
Payer: MEDICARE

## 2020-09-29 VITALS
RESPIRATION RATE: 17 BRPM | BODY MASS INDEX: 26.52 KG/M2 | WEIGHT: 165 LBS | HEART RATE: 63 BPM | HEIGHT: 66 IN | DIASTOLIC BLOOD PRESSURE: 68 MMHG | SYSTOLIC BLOOD PRESSURE: 110 MMHG

## 2020-09-29 VITALS — TEMPERATURE: 97.2 F

## 2020-09-29 PROCEDURE — 99213 OFFICE O/P EST LOW 20 MIN: CPT

## 2020-09-29 PROCEDURE — 88112 CYTOPATH CELL ENHANCE TECH: CPT | Mod: 26

## 2020-09-29 NOTE — HISTORY OF PRESENT ILLNESS
[FreeTextEntry1] : Patient seen initially last year for management of urethral pain which began following spinal fusion L1-4 - she had a adame for 3 days. After which she noted shooting pain in the urethra at the end of voiding and was associated with increased urinary frequency, urgency and incontinence. She was treated with Ciprofloxacin for a UTI. While in rehab the symptoms recurred/continued and she was treated with ciprofloxacin, oxybutynin and Pyridium. This helped the frequency but not the pain.I ended up doing a cystoscopy due to microscopic hematuria which noted a bladder tumor. She is now s/p TURBT. The tumor was sessile and covering the tight UO which i unroofed. Pathology c/w pT2 disease. F/u CT scan with no evidence of metastasis. There is no hydronephrosis though distal right ureter dilated. 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 Now S/P XRT to lesion and on Tecentriq with stable disease on recent scan.\par seen in June, today noting some feeling in her bladder; she notes some feeling of more frequency and a tingly sensation not discomfort after voiding. No hematuria or change in FOS. \par reviewed recent scan and no obvious tumor in bladder wall. \par Comes back with some same feelings - feels pressure alleviates to some degree; Ibuprofen takes care of it. No dysuria or hematuria though some increased frequency. Recent UA negative and culture negative.  \par \par about to finish therapy - here as notes chills with no fever, increased urinary frequency and sensation of tingling with voiding; no dysuria or hematuria. PCP checked UA which looked like UTI but culture was negative; took days of Cipro with no help. F/U UA - 6 RBC 7 WBCs. \par had cystoscopy 1/10 - negative. PET scan and MRI 2/20 both OK. Including area of spine. \par \par has been on Cefdinir foe sinus infection; then had fever associated with bladder pressure and increased frequency. Seen in Urgent care and started on Cipro -  improvement within days See negative culture in system. Now back to baseline though even before this has a bit more frequency than in the apst\par  \par

## 2020-10-01 LAB
APPEARANCE: CLEAR
BACTERIA: NEGATIVE
BILIRUBIN URINE: NEGATIVE
BLOOD URINE: NORMAL
COLOR: NORMAL
GLUCOSE QUALITATIVE U: NEGATIVE
HYALINE CASTS: 0 /LPF
KETONES URINE: NEGATIVE
LEUKOCYTE ESTERASE URINE: NEGATIVE
MICROSCOPIC-UA: NORMAL
NITRITE URINE: NEGATIVE
PH URINE: 6.5
PROTEIN URINE: NEGATIVE
RED BLOOD CELLS URINE: 3 /HPF
SPECIFIC GRAVITY URINE: 1.01
SQUAMOUS EPITHELIAL CELLS: 1 /HPF
URINE CYTOLOGY: NORMAL
UROBILINOGEN URINE: NORMAL
WHITE BLOOD CELLS URINE: 1 /HPF

## 2020-10-06 ENCOUNTER — OUTPATIENT (OUTPATIENT)
Dept: OUTPATIENT SERVICES | Facility: HOSPITAL | Age: 70
LOS: 1 days | End: 2020-10-06
Payer: MEDICARE

## 2020-10-06 ENCOUNTER — APPOINTMENT (OUTPATIENT)
Dept: NUCLEAR MEDICINE | Facility: IMAGING CENTER | Age: 70
End: 2020-10-06
Payer: MEDICARE

## 2020-10-06 ENCOUNTER — RESULT REVIEW (OUTPATIENT)
Age: 70
End: 2020-10-06

## 2020-10-06 DIAGNOSIS — Z98.890 OTHER SPECIFIED POSTPROCEDURAL STATES: Chronic | ICD-10-CM

## 2020-10-06 DIAGNOSIS — Z90.710 ACQUIRED ABSENCE OF BOTH CERVIX AND UTERUS: Chronic | ICD-10-CM

## 2020-10-06 DIAGNOSIS — C68.9 MALIGNANT NEOPLASM OF URINARY ORGAN, UNSPECIFIED: ICD-10-CM

## 2020-10-06 DIAGNOSIS — Z98.89 OTHER SPECIFIED POSTPROCEDURAL STATES: Chronic | ICD-10-CM

## 2020-10-06 DIAGNOSIS — N63 UNSPECIFIED LUMP IN BREAST: Chronic | ICD-10-CM

## 2020-10-06 DIAGNOSIS — G89.3 NEOPLASM RELATED PAIN (ACUTE) (CHRONIC): ICD-10-CM

## 2020-10-06 PROCEDURE — 78815 PET IMAGE W/CT SKULL-THIGH: CPT

## 2020-10-06 PROCEDURE — 78815 PET IMAGE W/CT SKULL-THIGH: CPT | Mod: 26,PS

## 2020-10-06 PROCEDURE — A9552: CPT

## 2020-10-23 ENCOUNTER — OUTPATIENT (OUTPATIENT)
Dept: OUTPATIENT SERVICES | Facility: HOSPITAL | Age: 70
LOS: 1 days | Discharge: HOME | End: 2020-10-23

## 2020-10-23 DIAGNOSIS — Z90.710 ACQUIRED ABSENCE OF BOTH CERVIX AND UTERUS: Chronic | ICD-10-CM

## 2020-10-23 DIAGNOSIS — Z98.89 OTHER SPECIFIED POSTPROCEDURAL STATES: Chronic | ICD-10-CM

## 2020-10-23 DIAGNOSIS — N63 UNSPECIFIED LUMP IN BREAST: Chronic | ICD-10-CM

## 2020-10-23 DIAGNOSIS — Z98.890 OTHER SPECIFIED POSTPROCEDURAL STATES: Chronic | ICD-10-CM

## 2020-10-23 DIAGNOSIS — C67.9 MALIGNANT NEOPLASM OF BLADDER, UNSPECIFIED: ICD-10-CM

## 2020-10-27 ENCOUNTER — APPOINTMENT (OUTPATIENT)
Dept: HEMATOLOGY ONCOLOGY | Facility: CLINIC | Age: 70
End: 2020-10-27

## 2020-10-27 ENCOUNTER — APPOINTMENT (OUTPATIENT)
Dept: HEMATOLOGY ONCOLOGY | Facility: CLINIC | Age: 70
End: 2020-10-27
Payer: MEDICARE

## 2020-10-27 PROCEDURE — 99213 OFFICE O/P EST LOW 20 MIN: CPT | Mod: 95

## 2020-10-27 RX ORDER — LINACLOTIDE 145 UG/1
145 CAPSULE, GELATIN COATED ORAL
Refills: 0 | Status: ACTIVE | COMMUNITY

## 2020-10-27 RX ORDER — CLOBETASOL PROPIONATE 0.5 MG/G
0.05 CREAM TOPICAL
Qty: 30 | Refills: 0 | Status: DISCONTINUED | COMMUNITY
Start: 2019-12-04 | End: 2020-10-27

## 2020-10-27 NOTE — ASSESSMENT
[Palliative Care Plan] : not applicable at this time [FreeTextEntry1] : Juju is seen via telehealth services today.  She states that she feels "okay".  She has been having issues after eating dairy products, and this has been present since about February of this year.  She develops an upset stomach afterwards with bloating.  There is no nausea vomiting or diarrhea.  She is going to see a gastroenterologist in regards to this.  She notes that if she takes Linzess, she feels better but is only been taking it on an as-needed basis.  Her appetite is good and there is no weight loss.  There were no pains elsewhere.  When sitting down or getting up, she does have her low back pain and she takes Tylenol extra strength if needed.  This is related to prior surgery in the past and was also a site where she had metastatic disease with severe pain at the onset of her diagnosis.  There are no fevers or chills.  There are no headaches or dizziness.  She recently had a urinary tract infection and was seen in urgent care center.  She was having fevers and chills at that time.  She has been on several antibiotics for sinusitis since February.  She had a PET scan ordered by Dr. Hargrove because she was complaining of localized discomfort in the urethral area which she felt was possibly secondary to her vitiligo, but it is unlikely to be the cause.  The PET scan does not show any evidence of metastases.  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  She has some jaw pain as well but clearance of the infection or inflammation in her sinus has been an issue.\par \par The good news is that the PET scan does not show any active disease at this time.  She has been off of immunotherapy since February and she continues to do well.  All questions were answered to the best of my ability and to her apparent satisfaction.  I will see her once again in 3 months time via telehealth services.

## 2020-10-27 NOTE — REVIEW OF SYSTEMS
[Abdominal Pain] : abdominal pain [Constipation] : constipation [Anxiety] : anxiety [Depression] : depression [Negative] : ENT [Fever] : no fever [Chills] : no chills [Recent Change In Weight] : ~T no recent weight change [Chest Pain] : no chest pain [Palpitations] : no palpitations [Lower Ext Edema] : no lower extremity edema [Cough] : no cough [SOB on Exertion] : no shortness of breath during exertion [Vomiting] : no vomiting [Dysuria] : no dysuria [Incontinence] : no incontinence [Joint Pain] : no joint pain [Skin Rash] : no skin rash [Dizziness] : no dizziness [Hot Flashes] : no hot flashes [Muscle Weakness] : no muscle weakness [Easy Bruising] : no tendency for easy bruising [FreeTextEntry2] : mild fatigue [FreeTextEntry3] : saw michael recently [FreeTextEntry7] : discomfort/bloating [FreeTextEntry8] : no blood [FreeTextEntry9] : lower back pains, no cramps [de-identified] : vitiligo RTS [de-identified] : No HA,  paresthesias are improved [de-identified] : saw a therapist, was given some Xanax, but only 10 tabs, took about 4. No self harm

## 2020-10-27 NOTE — HISTORY OF PRESENT ILLNESS
[Disease: _____________________] : Disease: [unfilled] [T: ___] : T[unfilled] [N: ___] : N[unfilled] [M: ___] : M[unfilled] [AJCC Stage: ____] : AJCC Stage: [unfilled] [Home] : at home, [unfilled] , at the time of the visit. [Medical Office: ([facility])___] : at the medical office located in  [Verbal consent obtained from patient] : the patient, [unfilled] [de-identified] : Ms. 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 The tumor was found on cystoscopy at the right ureteral orifice. 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 She is referred for consideration of neoadjuvant chemotherapy. In late May, at the time of scheduled back surgery, she thought she had a UTI. Urine was tested and she was treated, then had the surgery. She had burning post-op. She was treated again with an antibiotic. She went to Rehab and was treated again and she was seen by a urologist there. Went to PCP after discharge from rehab, he noted some blood in the urine. She went to [facility] ER and \par she was referred to Dr. Hargrove. She underwent a cystoscopy, revealing tumor. She never noted blood in her urine, but did note it was darker than usual. Still has some "tingling" sensation, no incontinence. Nocturia occurs, which she says is related to awakening from CPAP. No cough. WALKER/bone pains.\par \par 10/27/17...Seen at [facility] in second opinion yesterday. They wanted to repeat procedures again, including cystoscopy. They called  again today. Saw Dr. Montalvo. She was asked to return next Thursday. They want to do a PET CT scan. They recommended she receive cis/gem, likely due to the glandular differentiation. She was overwhelmed by all the information she was given. No pains, no cough/SOB.  \par \par 10/27/17...Juju completed cycle 1 this past Friday, and she noticed on Saturday that her lips appeared swollen and she noticed sores in her mouth. This occurred after going out to eat chinese food.Today she feels that it may be going away. She has fatigue, and feels chills but no fever. She just feels " lousy."  She is feeling Nausea this time and if she takes the metoclopramide in time, then she is okay, She denies vomiting. She is also having some dysgeusia, She denies paraesthesias of the fingers and toes.  She is having constipation, and she is controlling it with the stool softeners.She states her appetite is ok, but the food doesn't taste good, and it hurts when she swallows\par \par 17...Chemo with cis/gem #2 due this Friday.has increased lower back pain. The pain had stopped. She had prior back surgery. The pain became more notable since starting the chemo. She had constipation with the chemo and noted an increase in the pain with the constipation in lower back near the site of the surgery. The pain feels like pressure, described as heavy, somewhat relieved by BM. Worse with activities. Took some oxycodone a few days ago, but had a headaches afterwards. Pain score currently at 6-7, no recent pain med use. Taking MiraLAX, senna, Colace and prune juice with occasional MOM. Appetite is good, some altered taste, when present, affects appetite. Had some nausea and vomited x 3 after initial chemo, more gagging than vomiting. She felt she had some sores in the mouth after the gem alone, resolved. No paresthesias. She had some discomfort in the right wrist area, at the site where chemo was administered. Fatigue present all the time, more since the start of chemo. No hematuria, good flow, no urgency or incontinence as before. \par \par 17...day 15, cycle #2, cis/gem.  Juju has increased sensitivity in the lips after chemo. She had some vomiting at the end of her chemo infusion, however she did not have increased nausea and vomiting since. She has fatigue, and she complains of constipation and is taking miralax. She does complain of back pain that has increased in intensity.She notices it when she bends over. She feels that there is something noticeably there in her back. She takes pain medication to help her fall asleep. She doesn't sleep well. She does use c-pap secondary to sleep apnea but she is awaken from her sleep secondary to nocturia. She also notices her pain more when she has constipation and has to go to the bathroom. She describes her lower back pain as 8/10 at worst, and best 5/10. HEr back pain went away after the surgery and she noticed it came back once starting Chemo. She states her appetite is good, She does complain of dysgeusia. She denies paraesthesias. She does not like how the oxycodone makes her feel. It gives her HAs.\par \par 18...Had RT from the  to the . She feels there is some decrease in the pain, not as piercing. She feels the area is stiff. Worse when she awakens. Takes 3 x 325 Tylenol at night which helps the pain. has constipation, went one week w/o evacuation. Was given lactulose, but she feels that MiraLAX works better. She is less active as a result of the pain. the pain is worse when she bends. Appetite is variable. No weight loss. Has occasional nausea. No vomiting. Has some indigestion at times. No blood in the urine, no dysuria. No incontinence. Fatigue is present. No pains elsewhere. \par \par 3/14/18...She still has some pain. She is doing PT, which helps temporarily. She has not had the relief of pain that she thought would be the result. Pain score at 4-5, described as aggravation and it inhibits activities. Worse with bending, getting in and out of cars. She is not taking hydromorphone at this time.. She is distressed with constipation from the pain meds. Says lactulose does not work. She takes some Tylenol sporadically, not daily. Appetite is good, lost 1 kilo. No hematuria, no dysuria, no frequency, no incontinence. No pains elsewhere. She remains very active. She has alopecia. No diarrhea. No cough/WALKER. Fatigue is present, mild. She feels it at the end of the day. No N/V. \par \par 18...Completed 3 cycles of Tecentriq. She noes curling of the right fourth finger involuntarily. She can bend it back up, but it hurts to do so. No difficulty with strength on the right hand, no tremors.  She has tried Icy Hot w/o benefit. She points to DIP and PIP joints as the sites of pain. She feels she has lumps on her head. She is losing her hair, likely secondary to the chemo she received. Her scalp is pruritic. She has also lost pubic hair. Her pain continues to get better,went to PT for 7 weeks. She is able to do all normal activities, with mild fatigue. Appetite is good, weight is stable. No dizziness, no unsteadiness, no headaches. No other issues. No hematuria. \par \par 18...Missed Rx on 18. feels "great". Still has back pains, much improved. No pain while seated, but will come on after a while. No worse with ambulation. Does not awaken her. Pain can be as high as 7-8 with activities, best at 3-4. No pain meds utilized. No blood in urine. denies fatigue until late in the day. Appetite is good, but she has lost about 3 pounds. She is avoiding fat products as she has an upset stomach from fats. Had a URI, treated with azithromycin, Flonase and a codeine containing cough suppressant, still has some residual cough. No diarrhea. No dyspnea. has constipation. \par \par 18...On atezolizumab since 18. Feels well. Still has some pains, not clearly as severe as before. It is worse if she walks for a while, such as several hundred feet. Also more prominent if she stands too long. Does not bother her at night or awaken her. It does not stop her activities. Takes an occasional ibuprofen, not daily. Appetite is good, weight is stable. No N/V. No diarrhea. No cough/WALKER. Some fatigue. No leg edema. \par \par 18...Feels "okay". Still has some back pain, nothing like it was before. Has a bunion of the foot which is bothersome and the podiatrist has recommended surgery. No cough/sputum,. No diarrhea. Appetite is good, but lost 2 pounds since last visit. No other pains. No headaches, no paresthesias. No dizziness noted, no balance issues. Mild fatigue, improved.\par \par 18...Cycle 6 was administered on 18, due #7 on 18. "I'm feeling good". Noted some pressure and noted urinary frequency about one week ago, then resolved. No dysuria, no blood, no frequency, nocturia x 1. The usual lower back pain, no pain at the current time. Extra exertion brings on the low back pain and she is not sure if this is from the cancer or from the prior back surgery. She notes several areas over her skin becoming depigmented in small spots. No pruritus. Appetite is good, no weight loss. Actually gained a few pounds. No diarrhea. has some fatigue towards the end of the day. No dizziness. No paresthesias. No cough/WALKER. \par \par 18...On atezo since 18. Has received 7 cycles. Saw Dr. Hargrove late , cysto recommended, but she decided to wait until  after vacation. She notes lightening of her skin. She didn't go to the dermatologist. She has a prior dermatologist that she might see. No diarrhea. No upset stomach. Appetite is ":great", no weight loss. No cough/WALKER. No paresthesias. Minor fatigue along with aches in the evenings. No headaches, no dizziness. No leg edema. Slight hematuria. \par \par 18...last scans in May\par Reports skin changes due to the vitiligo has been bothering her.  Reports this has been happening more since last month.  Has also been taking Valium from two years ago occasionally for anxiety.  Reports has taken it once every 2-3 weeks, only when she feels overwhelmed with anxiety.  Has not seen an psychiatrist yet, but has been seeing a psychologist.  She has seeing her for one year..  When she gets anxious it occurs mostly at night.  Denies any pain, except after walking a long period of time.  Will occasionally use a cane.  Reports no urinary frequency.  No urinary incontinence, no hematuria.  Reports regular bowel movements.  Reports good appetite, occasional dyspepsia with fried foods.  \par \par 18...Did not have an appt today, was added onto schedule. She has urgency, no dysuria, no foul odor. She feels bloated as well for the past 3 days. She wonders if it is related to her vitiligo, which is prominent in the urogenital area. Urine is clear, no blood. No fevers, no chills. Appetite is "great", gaining weight. No cough/WALKER. NO diarrheal stools, last BM yesterday, normal. No N/V. No vitiligo is more prominent, which concerns her. She says the bottom of her feet are tender, at the ball of the feet. Toes are numb. This has occurred over the last week. She has also had cramps in her hands. Mostly the thumb, told of he had an injection for her trigger finger. received cycle # 11 of atezolizumab today. \par \par 10/11/18...dose # 12 today. Saw Paulie Hargrove, plan for cysto. "I'm doing well".  She says that her feet always feels like there "is something there", involving the toes, no longer affects the ball of the foot. Saw her podiatrist. Cysto is scheduled for the . Her vitiligo is more prominent his is prominent on the hands. She feels that some parts of her hands are darker as well. Appetite is "fabulous". No N/V/D/C. Urine flow is good, no incontinence, no blood, no dysuria. Urgency and fullness sensation resolved. No cough/SOB. No headaches. No fatigue\par \par 10/31/18....Dose #13 today. Feels well at this time. Back pain remains the same as before, no pain med use. If she does extra activities she has pain. She had back surgery before and she believes it is from the prior surgery and not the mets. No fatigue. Appetite is good, weight is stable., No diarrheal stools. No hematuria noted. No dysuria. Usual activities performed without impairment. Vitiligo is somewhat more prominent, which disturbs her. No leg edema. No cough, no WALKER. No upset stomach. No fevers, no chills. Had cysto on , all was normal. \par \par 18...Feels "okay". Pain in the back somewhat more prominent, the pain from her prior surgery.Appetite is jeramy, no weight loss. No cough/WALKER. No diarrhea, no upset stomach. Some minor fatigue. She thinks she is depressed, attends a support group here. Starting with a therapist. He  left her recently. [place]. \par \par 18...Last scans 18. Getting laser therapy for her vitiligo, which she may not continue due to high c-pays. feels as if her tongue is sensitive to heat since starting the laser therapy. She senses salt more than before. Otherwise well, back pain "bearable", RTS, "part of my life". No pain meds used. Appetite is good, weight is stable. No diarrheal stools No cough, No SOB. She has some sensation in the bladder or vaginal area, pruritic, calmed with Vagisil.  She is concerned about some blood in the urine, microscopic....she had a cysto on 10/22/18, revealing no issue. She asked about clearance for sexual activity. No fatigue, no headaches. No dysuria, but occasional mild irritation. No incontinence. No urinary frequency, rare nocturia. \par \par 1/3/19 : On atezolizumab since 18. Feels "great". No pains except for usual aches. Appetite is good, weight has increased. No cough, no WALKER. No diarrheal stools, No upset stomach. No edema. No skin rashes. Vitiligo is "going crazy", goes 2 times a week for laser therapy. States she may stop the laser therapy. \par \par 19...19 : Here for Atezolizumab(since ) and follow up. \par She has been well overall without any AEs aside form vitiligo which is a known side effect of these ICI's. \par \par 19 : One year on atezolizumab. Feels "fine". No diarrheal stool, no dyspepsia, no cough/WALKER. Appetite s good, no weight loss. No headaches, some paresthesias of the feet, no change. Has seen neurology and received injections. Can't open her hands at times. No fatigue, no pruritus. NO skin rashes. Vitiligo continues to be more prominent. Went for laser therapy, wit stopped it. She says her lips blistered. Vitiligo affects genitalia, present prior to Rx, has vitiligo of the hands, axilla and breasts. \par \par 3/6/19...3/6/19 : Ms. Crespo is here for a follow up and atezolizumab. She has been having lower back pain that resembled pain she had when she had recurrence. She underwent MRI on 3/3/19. This showed re-demonstrating the spine lesions, no changes were noted on the MRI. She has DJD and bulging disc with narrowing in L4-5.\par No new lesions were seen. She has not followed up with Dr. VINICIUS Kafufman for this either. OTHerwise she feels well. \par \par 19...saw her orthopedic surgeon, who said the pain is not related to her cancer, but to scar tissue. He gave her Flexeril and Meloxicam. He referred her for PT. The pain is much better. The knot" is not as big as before. Otherwise, :fabulous". Appetite is good, weight is stable. Has some fatigue, noted at the end of the day. No pruritus, no rash. Vitiligo is progressive. No cough, no WALKER. No nausea, no vomiting, occ upset stomach. No diarrheal stools. Now 14 months on therapy. \par \par 19...On Atezo since 2018. Had some back pain exacerbation recently, was on meloxicam and cyclobenzaprine, which has resolved to a great degree. She returned to work in April. Feels well otherwise, has a "head cold", with congestion, yellow sputum. Taking Claritin and other drugs, nasal congestion and runny nose improved. She is concerned it has moved to the chest. No F/C, no SOB, no wheezing. Appetite is good, no weight loss. No diarrheal stools., no upset stomach. Fatigue is present, in  the evening, she is "done". This has been the case since she returned to work. No rash, no pruritus. Vitiligo is progressing.\par \par 19...Working and has fatigue. She feels that the fatigue is mostly due to the work schedule. 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 Cycle 25 is today. Overall, feels well. has some back pain, which she feels is related to her prior surgery. No hematuria noted, no incontinence. No dysuria. Appetite is excellent, no weight loss. No cough, No WALKER. No diarrheal stools. No F/C. No edema. \par \par 19...Now 1.5 years on atezo at this time. Feels well overall. has an occasional tingling when she voids, which she attributes to the genital vitiligo. She has some cream to apply. She is working as a  to the elderly and one of her clients  this AM. This upsets her somewhat. She became upset and tearful. Appetite is normal, weight is stable. She continues to have some back pains as before, related to prior surgery, perhaps somewhat more since she had to expend more effort. No diarrheal stools, no cough. No SOB No headaches, no dizziness. No fatigue, taking Geritol, which she feels has helped her. No fevers, no chills, no urinary issues.\par \par 9/10/19...Feels "wonderful". Back pain RTS, not severe, has taken some oxycodone at bed time recently Uses CPAP to sleep, occasionally awakened by back pain. She saw her orthopedist, who said that everything looked the same.Appetite is good, no weight loss. No cough, no WALKER, No diarrheal stools. No fatigue. No rash, no pruritus. Occ headaches, feel like sinus headaches, no meds, brief duration, slight in intensity. No edema. Orthopedist did plain films. Occ tramadol use. \par \par 10/31/19...On atezo since 2018. Now has more below back pains Become worse in July or August, not present all the time. Occasionally awakens her. Takes oxycodone or hydromorphone infrequently. Pain at 4 currently, worst at  4 in the last 24, best at zero. No worse with walking, actually better. Appetite is "great". Weight stable. o N/V/C/D. No cough, no dyspnea. Working, has some fatigue at the end of the day. No headaches, occ paresthesias.  She had prior surgery with rods in her back. Going t see derm as well. Has some "tingling when she voids, which she attributes to the worsening vitiligo in the urogenital area. NO blood, nocturia x 1, flow is fine, no incontinence. No dysuria. No edema. Wants t cancel  appointment, wants to go to [place].\par \par 19...Had an MRI of the neck, ordered by Dr Naveen Kauffman, who performed her lumbar surgery. She was told she requires surgery in the neck. She was started on meloxicam, which she says does not help.  dose will be # 33. The neck pain feels "like a monkey on my back", started about 6 weeks ago after her last visit. She was told it has nothing to do with her cancer. She did not bring the MRI results with her. Appetite is "great", no weight loss. No blood in the urine, no dysuria. She was started on some topicals and fluconazole for genital rash/vaginitis, saw both GYN and derm. No fatigue. Started atezo in 2018. No edema. No diarrheal stools, no cough/WALKER. getting some PT/hydrotherapy. \par \par 20...Feels well today. Had some gyn issues, told of a urine infection, treated with Cipro and Diflucan, completed both. Saw her PCP for an annual exam. She had large leucocyte esterase, few bacteria , but a negative culture. She has been noting chills recently. No fevers. NO fatigue. Appetite is fine, no weight loss. Back pains are "okay", no meds used. No edema. No cough, No WALKER. No diarrheal stools. She was on her CPAP recently and was also treated with a Z pack. \par \par 20...Saw GYN for routine follow up and had some pruritus as well. She was having intermittent chills as well as some abdominal fullness. No findings on exam apparently. She was sent for a sono, which revealed a mass, vascular, MRI ordered, not done as yet. She feels pressure in the bladder area. The pressure is constant, not increased with voiding, but has a "sensation". Recent cysto was negative. Appetite is good, weight increased. No other areas of pain/discomfort. No fevers,no chills. She has her usual low back pains. She has pressure in the lower pelvis, much better compared to 2 weeks ago. PCP felt a left supraclavicular mass and ordered a CXR.\par \par 20...Completed 2 years of atezolizumab in February. Has had sinus infection, on third course of antibiotics, to have a procedure in 3 weeks. Seeing a TMJ specialist for pain in her jaw. Recently had a partial denture made and she can't use it due to the pain. Back pain the same. "I live with it". She dropped 7 pounds and she can't eat like she used to. Latest antibiotic was doxycycline. She is on Mobic for the jaw pains. Otherwise well. NO blood in urine, no dysuria. no incontinence. No fatigue. No cough, no dyspnea. No headaches. No nausea, no vomiting. Vitiligo progressing\par  [de-identified] : high-grade [FreeTextEntry1] : cis/gem, started chemo 11/3/17 as neoadjuvant, then bone mets, had RT. Now on atezolizumab since February 12, 2018, ended 2/4/20.  [de-identified] : Verbal permission for telehealth services granted by the patient, Juju Da Silva on October 27, 2020 at 10:25 AM\par Feels "okay". To see a gastroenterologist as she is having issues with dairy, stared earlier this year. She has an upset stomach with certain meals with bloating, no N/V/D. If she takes Linzess, she feels better. She does not take it every day. Appetite is good, no weight loss. Back pains continues, may be up to 6 or 7, every day pains, no change form last visit. Rare pain med use. Takes Tylenol ES if he wakes her up at night. No opioids used. No pains elsewhere. Sitting down and getting up brings on the pains, better with activities. No cough, no WALKER. No edema. No hematuria. Saw Dr Hargrove for urinary frequency and pressure in the lower abdomen. No fevers, no chills. NO headaches. NO dizziness. \par Had UTI, seen in in urgent care, had fevers/chills.  She was on several antibiotics for sinusitis from February onwards. PET shows continual opacification of the sinus. Has some jaw pains, and she says this cannot be addressed until sinus cared for. Urine was negative recently. PET shows ZACH. \par \par \par \par

## 2020-10-27 NOTE — CONSULT LETTER
[Dear  ___] : Dear  [unfilled], [Courtesy Letter:] : I had the pleasure of seeing your patient, [unfilled], in my office today. [Please see my note below.] : Please see my note below. [Consult Closing:] : Thank you very much for allowing me to participate in the care of this patient.  If you have any questions, please do not hesitate to contact me. [Sincerely,] : Sincerely, [DrAzam  ___] : Dr. BRANDT [FreeTextEntry2] : Dr. Kalen Kauffman MD [facility]

## 2020-10-27 NOTE — RESULTS/DATA
[FreeTextEntry1] : EXAM: PETCT MACK ONC FDG SUBS \par PROCEDURE DATE: 10/06/2020 \par INTERPRETATION: PROCEDURE: PET/CT SKULL BASE-MID THIGH IMAGING (61284) \par RADIOPHARMACEUTICAL: 11.56 mCi F-18, FDG, I.V. \par CLINICAL STATEMENT: 70-year-old female referred for follow-up metastatic urinary bladder cancer status post radiation therapy to T12 lesion, now on Tecentriq. PET/CT is done as part of the subsequent treatment strategy evaluation. \par TECHNIQUE: Fasting blood sugar measured prior to injection of radiopharmaceutical was 97 mg/dl. Following intravenous injection of the radiopharmaceutical and an uptake period of approximately 56 minutes, FDG-PET/CT was obtained on a [employer] 710 from skull base to mid thigh. Oral contrast was given during the uptake period. CT was performed during shallow respiration. The CT protocol was optimized for PET attenuation correction and to provide anatomic detail for localization of PET abnormalities. The CT protocol was not designed to produce and cannot replace state-of-the-art diagnostic CT images with specific imaging protocols for different body parts and indications. Images were reviewed on a dedicated workstation using multiplanar reconstruction. \par The standardized uptake values (SUV) are normalized to patient body weight and indicate the highest activity concentration (SUV max) in a given disease site. All image numbers refer to axial image number. \par COMPARISON: PET/CT from 2/15/2020 \par OTHER STUDIES USED FOR CORRELATION: No interval related imaging studies. \par FINDINGS: \par HEAD/NECK: Unchanged FDG avid near total opacification of left maxillary sinus (SUV 6.3; image 22; previous SUV 7.0). Non-FDG avid right maxillary polyp or retention cyst, unchanged. Physiologic FDG activity in the visualized brain, major salivary glands, neck muscles. \par CHEST: No abnormal FDG activity. No lymphadenopathy. Surgical clips in left axilla. \par LUNGS: No abnormal FDG activity. Calcified right lower lobe granuloma (image 82), unchanged. \par PLEURA/PERICARDIUM:No abnormal FDG activity. No effusion. \par HEPATOBILIARY/PANCREAS: Physiologic FDG activity. Liver SUV mean is 2.7, previously 2.8. \par SPLEEN: Physiologic FDG activity. Normal in size. \par ADRENAL GLANDS: No abnormal FDG activity. No nodule. \par KIDNEYS/URINARY BLADDER: Physiologic excreted FDG activity. Subcentimeter nonobstructive left renal calculus, unchanged. \par PELVIC ORGANS: Hysterectomy. No abnormal activity in the vaginal cuff. Non-FDG avid structure in the left aspect of the vaginal cuff/apex, noted to be unchanged on CT since 2017, appears not significantly changed on CT. \par ABDOMINOPELVIC NODES: No enlarged or FDG avid lymph node. \par BOWEL/PERITONEUM/MESENTERY: No abnormal FDG activity. \par BONES/SOFT TISSUES:No abnormal FDG activity. Spinal fusion from L4 to S1 with intervertebral disc spacers, as on prior study. Stable non-FDG avid sclerotic lesion within the T12 vertebral body. Non-FDG avid central compression deformity of L3 superior endplate, likely a small lymph node, unchanged since CT from 9/17/2019. \par IMPRESSION: FDG-PET/CT scan demonstrates: \par 1. Unchanged FDG avid partial opacification of left maxillary sinus as compared to PET/CT from 2/15/2020. \par 2. Non-FDG avid soft tissue left aspect of the vaginal cuff, corresponding to findings on MRI from 2/6/2020, and stable on CT since 2017, compatible with a benign etiology. \par JOHNNIE HADDAD M.D., NUCLEAR MEDICINE ATTENDING \par This document has been electronically signed. Oct 7 2020 11:14AM

## 2021-01-21 PROBLEM — G89.3 MALIGNANT BONE PAIN: Status: ACTIVE | Noted: 2018-03-30

## 2021-01-22 ENCOUNTER — OUTPATIENT (OUTPATIENT)
Dept: OUTPATIENT SERVICES | Facility: HOSPITAL | Age: 71
LOS: 1 days | Discharge: HOME | End: 2021-01-22

## 2021-01-22 DIAGNOSIS — Z90.710 ACQUIRED ABSENCE OF BOTH CERVIX AND UTERUS: Chronic | ICD-10-CM

## 2021-01-22 DIAGNOSIS — Z98.890 OTHER SPECIFIED POSTPROCEDURAL STATES: Chronic | ICD-10-CM

## 2021-01-22 DIAGNOSIS — C67.9 MALIGNANT NEOPLASM OF BLADDER, UNSPECIFIED: ICD-10-CM

## 2021-01-22 DIAGNOSIS — Z98.89 OTHER SPECIFIED POSTPROCEDURAL STATES: Chronic | ICD-10-CM

## 2021-01-22 DIAGNOSIS — N63 UNSPECIFIED LUMP IN BREAST: Chronic | ICD-10-CM

## 2021-01-26 ENCOUNTER — APPOINTMENT (OUTPATIENT)
Dept: HEMATOLOGY ONCOLOGY | Facility: CLINIC | Age: 71
End: 2021-01-26

## 2021-01-26 DIAGNOSIS — M89.8X9 NEOPLASM RELATED PAIN (ACUTE) (CHRONIC): ICD-10-CM

## 2021-01-26 DIAGNOSIS — G89.3 NEOPLASM RELATED PAIN (ACUTE) (CHRONIC): ICD-10-CM

## 2021-02-03 ENCOUNTER — APPOINTMENT (OUTPATIENT)
Dept: HEMATOLOGY ONCOLOGY | Facility: CLINIC | Age: 71
End: 2021-02-03
Payer: MEDICARE

## 2021-02-03 VITALS
SYSTOLIC BLOOD PRESSURE: 142 MMHG | BODY MASS INDEX: 28.86 KG/M2 | WEIGHT: 177.47 LBS | OXYGEN SATURATION: 96 % | RESPIRATION RATE: 18 BRPM | TEMPERATURE: 97.2 F | HEART RATE: 68 BPM | HEIGHT: 65.87 IN | DIASTOLIC BLOOD PRESSURE: 90 MMHG

## 2021-02-03 VITALS — DIASTOLIC BLOOD PRESSURE: 88 MMHG | SYSTOLIC BLOOD PRESSURE: 133 MMHG

## 2021-02-03 PROCEDURE — 99213 OFFICE O/P EST LOW 20 MIN: CPT

## 2021-02-03 NOTE — ASSESSMENT
[FreeTextEntry1] : Juju presents for follow-up.  She was scheduled for telehealth, but she did not want to be seen via video.  She feels well.  Her appetite is good.  She gained weight, up 5.5 kg from her last recorded weight.  She has not had any coronavirus infection.  She has not received her vaccine yet.  She reports no edema.  There is no GI complaints.  And there is no blood in her urine and is no incontinence.  She denies any headaches or dizziness.  She does have paresthesias which have been present since her back surgery.  These were no worse after the chemotherapy.  She received 2 cycles of MVAC and had progression of disease with bone lesions.  She was then switched from neoadjuvant chemotherapy and received radiation therapy to her spine.  She completed 2 years of atezolizumab 1 year ago.\par \par She reports some minor aches and pains involving her knees and her lower back.  She had a trigger finger and received injection for that.  The vitiligo is stable.\par \par On physical examination, she appears well.  Her performance status is 0.  There is no palpable adenopathy.  The chest is clear and the heart examination is normal.  There were no palpable abnormalities upon examination of the abdomen.  The extremities are normal.  There is no spinal column or chest wall tenderness to palpation or percussion.\par \par Her blood pressure was elevated today and it was repeated afterwards with a decrease.  Her blood pressure has been fine in the past.  She is only on amlodipine 2.5 mg.  She did gain weight and this may be responsible for some of her increase in blood pressure.  She should address this with her primary care doctor.  Her last laboratory work was in June.  Since should be going to her primary care doctor, she can have laboratory work done at that time.  I do not believe there is any need for additional blood work at this time to be followed by more extensive evaluation.\par \par Her last imaging study was in February 2020 and she had a PET scan at that time which did not reveal any evidence of active disease.  She is in complete remission.  Her only site of metastasis was in the lumbar spine and bones, which was irradiated.\par \par All questions were answered to the best of my ability and to her apparent satisfaction.  I suggested that she have imaging studies performed, but she does not want to do it at this time as she is concerned about her risk from coronavirus.  I will see her once again in 6 months.

## 2021-02-03 NOTE — HISTORY OF PRESENT ILLNESS
[de-identified] : Ms. 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 The tumor was found on cystoscopy at the right ureteral orifice. 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 She is referred for consideration of neoadjuvant chemotherapy. In late May, at the time of scheduled back surgery, she thought she had a UTI. Urine was tested and she was treated, then had the surgery. She had burning post-op. She was treated again with an antibiotic. She went to Rehab and was treated again and she was seen by a urologist there. Went to PCP after discharge from rehab, he noted some blood in the urine. She went to [facility] ER and \par she was referred to Dr. Hargrove. She underwent a cystoscopy, revealing tumor. She never noted blood in her urine, but did note it was darker than usual. Still has some "tingling" sensation, no incontinence. Nocturia occurs, which she says is related to awakening from CPAP. No cough. WALKER/bone pains.\par \par 10/27/17...Seen at [facility] in second opinion yesterday. They wanted to repeat procedures again, including cystoscopy. They called  again today. Saw Dr. Montalvo. She was asked to return next Thursday. They want to do a PET CT scan. They recommended she receive cis/gem, likely due to the glandular differentiation. She was overwhelmed by all the information she was given. No pains, no cough/SOB.  \par \par 10/27/17...Juju completed cycle 1 this past Friday, and she noticed on Saturday that her lips appeared swollen and she noticed sores in her mouth. This occurred after going out to eat chinese food.Today she feels that it may be going away. She has fatigue, and feels chills but no fever. She just feels " lousy."  She is feeling Nausea this time and if she takes the metoclopramide in time, then she is okay, She denies vomiting. She is also having some dysgeusia, She denies paraesthesias of the fingers and toes.  She is having constipation, and she is controlling it with the stool softeners.She states her appetite is ok, but the food doesn't taste good, and it hurts when she swallows\par \par 17...Chemo with cis/gem #2 due this Friday.has increased lower back pain. The pain had stopped. She had prior back surgery. The pain became more notable since starting the chemo. She had constipation with the chemo and noted an increase in the pain with the constipation in lower back near the site of the surgery. The pain feels like pressure, described as heavy, somewhat relieved by BM. Worse with activities. Took some oxycodone a few days ago, but had a headaches afterwards. Pain score currently at 6-7, no recent pain med use. Taking MiraLAX, senna, Colace and prune juice with occasional MOM. Appetite is good, some altered taste, when present, affects appetite. Had some nausea and vomited x 3 after initial chemo, more gagging than vomiting. She felt she had some sores in the mouth after the gem alone, resolved. No paresthesias. She had some discomfort in the right wrist area, at the site where chemo was administered. Fatigue present all the time, more since the start of chemo. No hematuria, good flow, no urgency or incontinence as before. \par \par 17...day 15, cycle #2, cis/gem.  Juju has increased sensitivity in the lips after chemo. She had some vomiting at the end of her chemo infusion, however she did not have increased nausea and vomiting since. She has fatigue, and she complains of constipation and is taking miralax. She does complain of back pain that has increased in intensity.She notices it when she bends over. She feels that there is something noticeably there in her back. She takes pain medication to help her fall asleep. She doesn't sleep well. She does use c-pap secondary to sleep apnea but she is awaken from her sleep secondary to nocturia. She also notices her pain more when she has constipation and has to go to the bathroom. She describes her lower back pain as 8/10 at worst, and best 5/10. HEr back pain went away after the surgery and she noticed it came back once starting Chemo. She states her appetite is good, She does complain of dysgeusia. She denies paraesthesias. She does not like how the oxycodone makes her feel. It gives her HAs.\par \par 18...Had RT from the  to the . She feels there is some decrease in the pain, not as piercing. She feels the area is stiff. Worse when she awakens. Takes 3 x 325 Tylenol at night which helps the pain. has constipation, went one week w/o evacuation. Was given lactulose, but she feels that MiraLAX works better. She is less active as a result of the pain. the pain is worse when she bends. Appetite is variable. No weight loss. Has occasional nausea. No vomiting. Has some indigestion at times. No blood in the urine, no dysuria. No incontinence. Fatigue is present. No pains elsewhere. \par \par 3/14/18...She still has some pain. She is doing PT, which helps temporarily. She has not had the relief of pain that she thought would be the result. Pain score at 4-5, described as aggravation and it inhibits activities. Worse with bending, getting in and out of cars. She is not taking hydromorphone at this time.. She is distressed with constipation from the pain meds. Says lactulose does not work. She takes some Tylenol sporadically, not daily. Appetite is good, lost 1 kilo. No hematuria, no dysuria, no frequency, no incontinence. No pains elsewhere. She remains very active. She has alopecia. No diarrhea. No cough/WALKER. Fatigue is present, mild. She feels it at the end of the day. No N/V. \par \par 18...Completed 3 cycles of Tecentriq. She noes curling of the right fourth finger involuntarily. She can bend it back up, but it hurts to do so. No difficulty with strength on the right hand, no tremors.  She has tried Icy Hot w/o benefit. She points to DIP and PIP joints as the sites of pain. She feels she has lumps on her head. She is losing her hair, likely secondary to the chemo she received. Her scalp is pruritic. She has also lost pubic hair. Her pain continues to get better,went to PT for 7 weeks. She is able to do all normal activities, with mild fatigue. Appetite is good, weight is stable. No dizziness, no unsteadiness, no headaches. No other issues. No hematuria. \par \par 18...Missed Rx on 18. feels "great". Still has back pains, much improved. No pain while seated, but will come on after a while. No worse with ambulation. Does not awaken her. Pain can be as high as 7-8 with activities, best at 3-4. No pain meds utilized. No blood in urine. denies fatigue until late in the day. Appetite is good, but she has lost about 3 pounds. She is avoiding fat products as she has an upset stomach from fats. Had a URI, treated with azithromycin, Flonase and a codeine containing cough suppressant, still has some residual cough. No diarrhea. No dyspnea. has constipation. \par \par 18...On atezolizumab since 18. Feels well. Still has some pains, not clearly as severe as before. It is worse if she walks for a while, such as several hundred feet. Also more prominent if she stands too long. Does not bother her at night or awaken her. It does not stop her activities. Takes an occasional ibuprofen, not daily. Appetite is good, weight is stable. No N/V. No diarrhea. No cough/WALKER. Some fatigue. No leg edema. \par \par 18...Feels "okay". Still has some back pain, nothing like it was before. Has a bunion of the foot which is bothersome and the podiatrist has recommended surgery. No cough/sputum,. No diarrhea. Appetite is good, but lost 2 pounds since last visit. No other pains. No headaches, no paresthesias. No dizziness noted, no balance issues. Mild fatigue, improved.\par \par 18...Cycle 6 was administered on 18, due #7 on 18. "I'm feeling good". Noted some pressure and noted urinary frequency about one week ago, then resolved. No dysuria, no blood, no frequency, nocturia x 1. The usual lower back pain, no pain at the current time. Extra exertion brings on the low back pain and she is not sure if this is from the cancer or from the prior back surgery. She notes several areas over her skin becoming depigmented in small spots. No pruritus. Appetite is good, no weight loss. Actually gained a few pounds. No diarrhea. has some fatigue towards the end of the day. No dizziness. No paresthesias. No cough/WALKER. \par \par 18...On atezo since 18. Has received 7 cycles. Saw Dr. Hargrove late , cysto recommended, but she decided to wait until  after vacation. She notes lightening of her skin. She didn't go to the dermatologist. She has a prior dermatologist that she might see. No diarrhea. No upset stomach. Appetite is ":great", no weight loss. No cough/WALKER. No paresthesias. Minor fatigue along with aches in the evenings. No headaches, no dizziness. No leg edema. Slight hematuria. \par \par 18...last scans in May\par Reports skin changes due to the vitiligo has been bothering her.  Reports this has been happening more since last month.  Has also been taking Valium from two years ago occasionally for anxiety.  Reports has taken it once every 2-3 weeks, only when she feels overwhelmed with anxiety.  Has not seen an psychiatrist yet, but has been seeing a psychologist.  She has seeing her for one year..  When she gets anxious it occurs mostly at night.  Denies any pain, except after walking a long period of time.  Will occasionally use a cane.  Reports no urinary frequency.  No urinary incontinence, no hematuria.  Reports regular bowel movements.  Reports good appetite, occasional dyspepsia with fried foods.  \par \par 18...Did not have an appt today, was added onto schedule. She has urgency, no dysuria, no foul odor. She feels bloated as well for the past 3 days. She wonders if it is related to her vitiligo, which is prominent in the urogenital area. Urine is clear, no blood. No fevers, no chills. Appetite is "great", gaining weight. No cough/WALKER. NO diarrheal stools, last BM yesterday, normal. No N/V. No vitiligo is more prominent, which concerns her. She says the bottom of her feet are tender, at the ball of the feet. Toes are numb. This has occurred over the last week. She has also had cramps in her hands. Mostly the thumb, told of he had an injection for her trigger finger. received cycle # 11 of atezolizumab today. \par \par 10/11/18...dose # 12 today. Saw Paulie Hargrove, plan for cysto. "I'm doing well".  She says that her feet always feels like there "is something there", involving the toes, no longer affects the ball of the foot. Saw her podiatrist. Cysto is scheduled for the . Her vitiligo is more prominent his is prominent on the hands. She feels that some parts of her hands are darker as well. Appetite is "fabulous". No N/V/D/C. Urine flow is good, no incontinence, no blood, no dysuria. Urgency and fullness sensation resolved. No cough/SOB. No headaches. No fatigue\par \par 10/31/18....Dose #13 today. Feels well at this time. Back pain remains the same as before, no pain med use. If she does extra activities she has pain. She had back surgery before and she believes it is from the prior surgery and not the mets. No fatigue. Appetite is good, weight is stable., No diarrheal stools. No hematuria noted. No dysuria. Usual activities performed without impairment. Vitiligo is somewhat more prominent, which disturbs her. No leg edema. No cough, no WALKER. No upset stomach. No fevers, no chills. Had cysto on , all was normal. \par \par 18...Feels "okay". Pain in the back somewhat more prominent, the pain from her prior surgery.Appetite is jeramy, no weight loss. No cough/WALKER. No diarrhea, no upset stomach. Some minor fatigue. She thinks she is depressed, attends a support group here. Starting with a therapist. He  left her recently. [place]. \par \par 18...Last scans 18. Getting laser therapy for her vitiligo, which she may not continue due to high c-pays. feels as if her tongue is sensitive to heat since starting the laser therapy. She senses salt more than before. Otherwise well, back pain "bearable", RTS, "part of my life". No pain meds used. Appetite is good, weight is stable. No diarrheal stools No cough, No SOB. She has some sensation in the bladder or vaginal area, pruritic, calmed with Vagisil.  She is concerned about some blood in the urine, microscopic....she had a cysto on 10/22/18, revealing no issue. She asked about clearance for sexual activity. No fatigue, no headaches. No dysuria, but occasional mild irritation. No incontinence. No urinary frequency, rare nocturia. \par \par 1/3/19 : On atezolizumab since 18. Feels "great". No pains except for usual aches. Appetite is good, weight has increased. No cough, no WALKER. No diarrheal stools, No upset stomach. No edema. No skin rashes. Vitiligo is "going crazy", goes 2 times a week for laser therapy. States she may stop the laser therapy. \par \par 19...19 : Here for Atezolizumab(since ) and follow up. \par She has been well overall without any AEs aside form vitiligo which is a known side effect of these ICI's. \par \par 19 : One year on atezolizumab. Feels "fine". No diarrheal stool, no dyspepsia, no cough/WALKER. Appetite s good, no weight loss. No headaches, some paresthesias of the feet, no change. Has seen neurology and received injections. Can't open her hands at times. No fatigue, no pruritus. NO skin rashes. Vitiligo continues to be more prominent. Went for laser therapy, wit stopped it. She says her lips blistered. Vitiligo affects genitalia, present prior to Rx, has vitiligo of the hands, axilla and breasts. \par \par 3/6/19...3/6/19 : Ms. Crespo is here for a follow up and atezolizumab. She has been having lower back pain that resembled pain she had when she had recurrence. She underwent MRI on 3/3/19. This showed re-demonstrating the spine lesions, no changes were noted on the MRI. She has DJD and bulging disc with narrowing in L4-5.\par No new lesions were seen. She has not followed up with Dr. VINICIUS Kauffman for this either. OTHerwise she feels well. \par \par 19...saw her orthopedic surgeon, who said the pain is not related to her cancer, but to scar tissue. He gave her Flexeril and Meloxicam. He referred her for PT. The pain is much better. The knot" is not as big as before. Otherwise, :fabulous". Appetite is good, weight is stable. Has some fatigue, noted at the end of the day. No pruritus, no rash. Vitiligo is progressive. No cough, no WALKER. No nausea, no vomiting, occ upset stomach. No diarrheal stools. Now 14 months on therapy. \par \par 19...On Atezo since 2018. Had some back pain exacerbation recently, was on meloxicam and cyclobenzaprine, which has resolved to a great degree. She returned to work in April. Feels well otherwise, has a "head cold", with congestion, yellow sputum. Taking Claritin and other drugs, nasal congestion and runny nose improved. She is concerned it has moved to the chest. No F/C, no SOB, no wheezing. Appetite is good, no weight loss. No diarrheal stools., no upset stomach. Fatigue is present, in  the evening, she is "done". This has been the case since she returned to work. No rash, no pruritus. Vitiligo is progressing.\par \par 19...Working and has fatigue. She feels that the fatigue is mostly due to the work schedule. 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 Cycle 25 is today. Overall, feels well. has some back pain, which she feels is related to her prior surgery. No hematuria noted, no incontinence. No dysuria. Appetite is excellent, no weight loss. No cough, No WALKER. No diarrheal stools. No F/C. No edema. \par \par 19...Now 1.5 years on atezo at this time. Feels well overall. has an occasional tingling when she voids, which she attributes to the genital vitiligo. She has some cream to apply. She is working as a  to the elderly and one of her clients  this AM. This upsets her somewhat. She became upset and tearful. Appetite is normal, weight is stable. She continues to have some back pains as before, related to prior surgery, perhaps somewhat more since she had to expend more effort. No diarrheal stools, no cough. No SOB No headaches, no dizziness. No fatigue, taking Geritol, which she feels has helped her. No fevers, no chills, no urinary issues.\par \par 9/10/19...Feels "wonderful". Back pain RTS, not severe, has taken some oxycodone at bed time recently Uses CPAP to sleep, occasionally awakened by back pain. She saw her orthopedist, who said that everything looked the same.Appetite is good, no weight loss. No cough, no WALKER, No diarrheal stools. No fatigue. No rash, no pruritus. Occ headaches, feel like sinus headaches, no meds, brief duration, slight in intensity. No edema. Orthopedist did plain films. Occ tramadol use. \par \par 10/31/19...On atezo since 2018. Now has more below back pains Become worse in July or August, not present all the time. Occasionally awakens her. Takes oxycodone or hydromorphone infrequently. Pain at 4 currently, worst at  4 in the last 24, best at zero. No worse with walking, actually better. Appetite is "great". Weight stable. o N/V/C/D. No cough, no dyspnea. Working, has some fatigue at the end of the day. No headaches, occ paresthesias.  She had prior surgery with rods in her back. Going t see derm as well. Has some "tingling when she voids, which she attributes to the worsening vitiligo in the urogenital area. NO blood, nocturia x 1, flow is fine, no incontinence. No dysuria. No edema. Wants t cancel  appointment, wants to go to [place].\par \par 19...Had an MRI of the neck, ordered by Dr Naveen Kauffman, who performed her lumbar surgery. She was told she requires surgery in the neck. She was started on meloxicam, which she says does not help.  dose will be # 33. The neck pain feels "like a monkey on my back", started about 6 weeks ago after her last visit. She was told it has nothing to do with her cancer. She did not bring the MRI results with her. Appetite is "great", no weight loss. No blood in the urine, no dysuria. She was started on some topicals and fluconazole for genital rash/vaginitis, saw both GYN and derm. No fatigue. Started atezo in 2018. No edema. No diarrheal stools, no cough/WALKER. getting some PT/hydrotherapy. \par \par 20...Feels well today. Had some gyn issues, told of a urine infection, treated with Cipro and Diflucan, completed both. Saw her PCP for an annual exam. She had large leucocyte esterase, few bacteria , but a negative culture. She has been noting chills recently. No fevers. NO fatigue. Appetite is fine, no weight loss. Back pains are "okay", no meds used. No edema. No cough, No WALKER. No diarrheal stools. She was on her CPAP recently and was also treated with a Z pack. \par \par 20...Saw GYN for routine follow up and had some pruritus as well. She was having intermittent chills as well as some abdominal fullness. No findings on exam apparently. She was sent for a sono, which revealed a mass, vascular, MRI ordered, not done as yet. She feels pressure in the bladder area. The pressure is constant, not increased with voiding, but has a "sensation". Recent cysto was negative. Appetite is good, weight increased. No other areas of pain/discomfort. No fevers,no chills. She has her usual low back pains. She has pressure in the lower pelvis, much better compared to 2 weeks ago. PCP felt a left supraclavicular mass and ordered a CXR.\par \par 20...Completed 2 years of atezolizumab in February. Has had sinus infection, on third course of antibiotics, to have a procedure in 3 weeks. Seeing a TMJ specialist for pain in her jaw. Recently had a partial denture made and she can't use it due to the pain. Back pain the same. "I live with it". She dropped 7 pounds and she can't eat like she used to. Latest antibiotic was doxycycline. She is on Mobic for the jaw pains. Otherwise well. NO blood in urine, no dysuria. no incontinence. No fatigue. No cough, no dyspnea. No headaches. No nausea, no vomiting. Vitiligo progressing\par \par 10/27/20...Verbal permission for telehealth services granted by the patient, Juju Da Silva on 2020 at 10:25 AM\par Feels "okay". To see a gastroenterologist as she is having issues with dairy, stared earlier this year. She has an upset stomach with certain meals with bloating, no N/V/D. If she takes Linzess, she feels better. She does not take it every day. Appetite is good, no weight loss. Back pains continues, may be up to 6 or 7, every day pains, no change form last visit. Rare pain med use. Takes Tylenol ES if he wakes her up at night. No opioids used. No pains elsewhere. Sitting down and getting up brings on the pains, better with activities. No cough, no WALKER. No edema. No hematuria. Saw Dr Hargrove for urinary frequency and pressure in the lower abdomen. No fevers, no chills. NO headaches. NO dizziness. \par Had UTI, seen in in urgent care, had fevers/chills.  She was on several antibiotics for sinusitis from February onwards. PET shows continual opacification of the sinus. Has some jaw pains, and she says this cannot be addressed until sinus cared for. Urine was negative recently. PET shows ZACH. \par \par  [de-identified] : high-grade [FreeTextEntry1] : cis/gem, started chemo 11/3/17 as neoadjuvant, then bone mets, had RT. Now on atezolizumab since February 12, 2018, ended 2/4/20.  [de-identified] : Presents for follow up, refused to do telehealth as she was scheduled for.  Feels well. Appetite is good, gained weight, up 5.5 kilos form last recorded weight. Has not had coronavirus. No edema. No N/V/D/C. No blood in urine, no incontinence. No headaches, no dizziness, some paresthesias since her back surgery, no worse after the chemotherapy. No tinnitus. No fatigue. \par \par

## 2021-02-03 NOTE — REVIEW OF SYSTEMS
[Recent Change In Weight] : ~T recent weight change [Joint Pain] : joint pain [Anxiety] : anxiety [Negative] : Genitourinary [Fever] : no fever [Chills] : no chills [Night Sweats] : no night sweats [Fatigue] : no fatigue [Muscle Pain] : no muscle pain [Skin Rash] : no skin rash [Dizziness] : no dizziness [Depression] : no depression [Muscle Weakness] : no muscle weakness [Easy Bleeding] : no tendency for easy bleeding [Easy Bruising] : no tendency for easy bruising [FreeTextEntry9] : lower back, chronic pains. trigger finger, received injection [de-identified] : vitiligo [de-identified] : No HA, Has paresthesias of feet

## 2021-03-24 PROBLEM — Z00.00 ENCOUNTER FOR PREVENTIVE HEALTH EXAMINATION: Status: ACTIVE | Noted: 2021-03-24

## 2021-03-24 NOTE — DISCHARGE NOTE ADULT - MEDICATION SUMMARY - MEDICATIONS TO STOP TAKING
Detail Level: Detailed Quality 431: Preventive Care And Screening: Unhealthy Alcohol Use - Screening: Patient screened for unhealthy alcohol use using a single question and scores less than 2 times per year Quality 154 Part A: Falls: Risk Assessment (Should Be Reported With Measure 155.): Falls risk assessment not completed, reason not otherwise specified Quality 128: Preventive Care And Screening: Body Mass Index (Bmi) Screening And Follow-Up Plan: BMI documented as out of range, follow up plan not documented, reason not otherwise specified. Quality 47: Advance Care Plan: Advance Care Planning discussed and documented; advance care plan or surrogate decision maker documented in the medical record. Quality 111:Pneumonia Vaccination Status For Older Adults: Pneumococcal Vaccination Previously Received Quality 226: Preventive Care And Screening: Tobacco Use: Screening And Cessation Intervention: Patient screened for tobacco and never smoked Quality 110: Preventive Care And Screening: Influenza Immunization: Influenza Immunization Administered during Influenza season I will STOP taking the medications listed below when I get home from the hospital:  None

## 2021-04-27 ENCOUNTER — NON-APPOINTMENT (OUTPATIENT)
Age: 71
End: 2021-04-27

## 2021-04-27 ENCOUNTER — EMERGENCY (EMERGENCY)
Facility: HOSPITAL | Age: 71
LOS: 1 days | Discharge: HOME | End: 2021-04-27
Attending: EMERGENCY MEDICINE | Admitting: EMERGENCY MEDICINE
Payer: MEDICARE

## 2021-04-27 VITALS
DIASTOLIC BLOOD PRESSURE: 79 MMHG | TEMPERATURE: 98 F | SYSTOLIC BLOOD PRESSURE: 139 MMHG | RESPIRATION RATE: 16 BRPM | OXYGEN SATURATION: 98 % | HEART RATE: 67 BPM

## 2021-04-27 VITALS
HEART RATE: 72 BPM | OXYGEN SATURATION: 99 % | TEMPERATURE: 98 F | SYSTOLIC BLOOD PRESSURE: 160 MMHG | RESPIRATION RATE: 18 BRPM | DIASTOLIC BLOOD PRESSURE: 101 MMHG | HEIGHT: 67 IN

## 2021-04-27 DIAGNOSIS — Z98.890 OTHER SPECIFIED POSTPROCEDURAL STATES: Chronic | ICD-10-CM

## 2021-04-27 DIAGNOSIS — Z90.710 ACQUIRED ABSENCE OF BOTH CERVIX AND UTERUS: Chronic | ICD-10-CM

## 2021-04-27 DIAGNOSIS — Z98.89 OTHER SPECIFIED POSTPROCEDURAL STATES: Chronic | ICD-10-CM

## 2021-04-27 DIAGNOSIS — N63 UNSPECIFIED LUMP IN BREAST: Chronic | ICD-10-CM

## 2021-04-27 LAB
ALBUMIN SERPL ELPH-MCNC: 4.2 G/DL — SIGNIFICANT CHANGE UP (ref 3.3–5)
ALP SERPL-CCNC: 132 U/L — HIGH (ref 40–120)
ALT FLD-CCNC: 15 U/L — SIGNIFICANT CHANGE UP (ref 4–33)
ANION GAP SERPL CALC-SCNC: 13 MMOL/L — SIGNIFICANT CHANGE UP (ref 7–14)
AST SERPL-CCNC: 20 U/L — SIGNIFICANT CHANGE UP (ref 4–32)
BASOPHILS # BLD AUTO: 0.02 K/UL — SIGNIFICANT CHANGE UP (ref 0–0.2)
BASOPHILS NFR BLD AUTO: 0.3 % — SIGNIFICANT CHANGE UP (ref 0–2)
BILIRUB SERPL-MCNC: 0.4 MG/DL — SIGNIFICANT CHANGE UP (ref 0.2–1.2)
BUN SERPL-MCNC: 12 MG/DL — SIGNIFICANT CHANGE UP (ref 7–23)
CALCIUM SERPL-MCNC: 9.5 MG/DL — SIGNIFICANT CHANGE UP (ref 8.4–10.5)
CHLORIDE SERPL-SCNC: 103 MMOL/L — SIGNIFICANT CHANGE UP (ref 98–107)
CO2 SERPL-SCNC: 27 MMOL/L — SIGNIFICANT CHANGE UP (ref 22–31)
CREAT SERPL-MCNC: 0.61 MG/DL — SIGNIFICANT CHANGE UP (ref 0.5–1.3)
EOSINOPHIL # BLD AUTO: 0.17 K/UL — SIGNIFICANT CHANGE UP (ref 0–0.5)
EOSINOPHIL NFR BLD AUTO: 2.7 % — SIGNIFICANT CHANGE UP (ref 0–6)
ERYTHROCYTE [SEDIMENTATION RATE] IN BLOOD: 31 MM/HR — HIGH (ref 4–25)
GLUCOSE SERPL-MCNC: 88 MG/DL — SIGNIFICANT CHANGE UP (ref 70–99)
HCT VFR BLD CALC: 39.4 % — SIGNIFICANT CHANGE UP (ref 34.5–45)
HGB BLD-MCNC: 12.8 G/DL — SIGNIFICANT CHANGE UP (ref 11.5–15.5)
IANC: 3.28 K/UL — SIGNIFICANT CHANGE UP (ref 1.5–8.5)
IMM GRANULOCYTES NFR BLD AUTO: 0.2 % — SIGNIFICANT CHANGE UP (ref 0–1.5)
LYMPHOCYTES # BLD AUTO: 2.13 K/UL — SIGNIFICANT CHANGE UP (ref 1–3.3)
LYMPHOCYTES # BLD AUTO: 33.6 % — SIGNIFICANT CHANGE UP (ref 13–44)
MCHC RBC-ENTMCNC: 28.7 PG — SIGNIFICANT CHANGE UP (ref 27–34)
MCHC RBC-ENTMCNC: 32.5 GM/DL — SIGNIFICANT CHANGE UP (ref 32–36)
MCV RBC AUTO: 88.3 FL — SIGNIFICANT CHANGE UP (ref 80–100)
MONOCYTES # BLD AUTO: 0.73 K/UL — SIGNIFICANT CHANGE UP (ref 0–0.9)
MONOCYTES NFR BLD AUTO: 11.5 % — SIGNIFICANT CHANGE UP (ref 2–14)
NEUTROPHILS # BLD AUTO: 3.28 K/UL — SIGNIFICANT CHANGE UP (ref 1.8–7.4)
NEUTROPHILS NFR BLD AUTO: 51.7 % — SIGNIFICANT CHANGE UP (ref 43–77)
NRBC # BLD: 0 /100 WBCS — SIGNIFICANT CHANGE UP
NRBC # FLD: 0 K/UL — SIGNIFICANT CHANGE UP
PLATELET # BLD AUTO: 215 K/UL — SIGNIFICANT CHANGE UP (ref 150–400)
POTASSIUM SERPL-MCNC: 4 MMOL/L — SIGNIFICANT CHANGE UP (ref 3.5–5.3)
POTASSIUM SERPL-SCNC: 4 MMOL/L — SIGNIFICANT CHANGE UP (ref 3.5–5.3)
PROT SERPL-MCNC: 7.6 G/DL — SIGNIFICANT CHANGE UP (ref 6–8.3)
RBC # BLD: 4.46 M/UL — SIGNIFICANT CHANGE UP (ref 3.8–5.2)
RBC # FLD: 13.7 % — SIGNIFICANT CHANGE UP (ref 10.3–14.5)
SARS-COV-2 RNA SPEC QL NAA+PROBE: SIGNIFICANT CHANGE UP
SODIUM SERPL-SCNC: 143 MMOL/L — SIGNIFICANT CHANGE UP (ref 135–145)
WBC # BLD: 6.34 K/UL — SIGNIFICANT CHANGE UP (ref 3.8–10.5)
WBC # FLD AUTO: 6.34 K/UL — SIGNIFICANT CHANGE UP (ref 3.8–10.5)

## 2021-04-27 PROCEDURE — 99284 EMERGENCY DEPT VISIT MOD MDM: CPT | Mod: CS

## 2021-04-27 PROCEDURE — 73630 X-RAY EXAM OF FOOT: CPT | Mod: 26,LT

## 2021-04-27 PROCEDURE — 73600 X-RAY EXAM OF ANKLE: CPT | Mod: 26,LT

## 2021-04-27 RX ORDER — KETOROLAC TROMETHAMINE 30 MG/ML
15 SYRINGE (ML) INJECTION ONCE
Refills: 0 | Status: DISCONTINUED | OUTPATIENT
Start: 2021-04-27 | End: 2021-04-27

## 2021-04-27 RX ORDER — ACETAMINOPHEN 500 MG
650 TABLET ORAL ONCE
Refills: 0 | Status: DISCONTINUED | OUTPATIENT
Start: 2021-04-27 | End: 2021-04-27

## 2021-04-27 RX ORDER — ACETAMINOPHEN 500 MG
650 TABLET ORAL ONCE
Refills: 0 | Status: COMPLETED | OUTPATIENT
Start: 2021-04-27 | End: 2021-04-27

## 2021-04-27 RX ADMIN — Medication 15 MILLIGRAM(S): at 16:31

## 2021-04-27 RX ADMIN — Medication 650 MILLIGRAM(S): at 16:31

## 2021-04-27 NOTE — ED ADULT NURSE NOTE - NSIMPLEMENTINTERV_GEN_ALL_ED
Implemented All Fall with Harm Risk Interventions:  [place] to call system. Call bell, personal items and telephone within reach. Instruct patient to call for assistance. Room bathroom lighting operational. Non-slip footwear when patient is off stretcher. Physically safe environment: no spills, clutter or unnecessary equipment. Stretcher in lowest position, wheels locked, appropriate side rails in place. Provide visual cue, wrist band, yellow gown, etc. Monitor gait and stability. Monitor for mental status changes and reorient to person, place, and time. Review medications for side effects contributing to fall risk. Reinforce activity limits and safety measures with patient and family. Provide visual clues: red socks.

## 2021-04-27 NOTE — ED ADULT NURSE NOTE - OBJECTIVE STATEMENT
Pt to bed 14.  Alert and oriented x 3. Ambulatory. Pt c/o left later ankle pain from an injury two weeks ago. Pt had fall causing compression fracture and left ankle wound. Wound appears clean dry and intact, No drainage, erythema or swelling noted. Safety maintained. Will continue to monitor.

## 2021-04-27 NOTE — ED PROVIDER NOTE - PATIENT PORTAL LINK FT
You can access the FollowMyHealth Patient Portal offered by [facility] by registering at the following website: [email or web address]. By joining [facility]’s FollowMyHealth portal, you will also be able to view your health information using other applications (apps) compatible with our system.

## 2021-04-27 NOTE — ED PROVIDER NOTE - PHYSICAL EXAMINATION
General appearance: NAD, conversant, afebrile    Neck: Trachea midline; Full range of motion, supple   Pulm: normal respiratory effort and no intercostal retractions, normal work of breathing   Abdomen: Soft, non-tender, non-distended; no guarding or rebound   Extremities: No peripheral edema, healing left lateral malleoli 4cm wound with ttp and local edema   Skin: Dry, normal temperature, turgor and texture; no rash   Psych: Appropriate affect, cooperative; alert and oriented to person, place and time General appearance: NAD, conversant, afebrile    Neck: Trachea midline; Full range of motion, supple   Pulm: normal respiratory effort and no intercostal retractions, normal work of breathing   Abdomen: Soft, non-tender, non-distended; no guarding or rebound   Extremities: No peripheral edema, FROM left ankle    Skin: Dry, normal temperature, turgor and texture; no rash, Left ankle with granulomatous healing 4cm wound over lateral malleolus, no surrounding erythema, edema   Psych: Appropriate affect, cooperative; alert and oriented to person, place and time

## 2021-04-27 NOTE — ED PROCEDURE NOTE - ATTENDING CONTRIBUTION TO CARE
Dr. Stark: I personally supervised this procedure performed by the resident and I agree with the above documentation.

## 2021-04-27 NOTE — ED PROVIDER NOTE - NSFOLLOWUPINSTRUCTIONS_ED_ALL_ED_FT
Rest, drink plenty of fluids  Advance activity as tolerated  Continue all previously prescribed medications as directed including augmentin  Follow up with your PMD - bring copies of your results  Return to the ER for worsening swelling, pain, fevers, difficulty walking on the ankle, or other new or concerning symptoms

## 2021-04-27 NOTE — ED ADULT TRIAGE NOTE - CHIEF COMPLAINT QUOTE
Pt s/p fall 2 weeks ago, reports pain and infection to left ankle. Denies PMH of DM. Pt on antibiotics for suspected infection the area, has a wound from the fall. Pt with acute compression fracture of the spine that was also result of the fall. Denies fevers.

## 2021-04-27 NOTE — ED PROVIDER NOTE - ATTENDING CONTRIBUTION TO CARE
Dr. Stark: 71 yo female with HTN in ED with worsening pain and wound to left ankle.  Had a fall 2 weeks ago and injured back and left ankle.  Left ankle wound being evaluated by PMD but not improving with PO abx and so sent to ED.  No fever, N/V/D or abdominal pain.  On exam left ankle without swelling but there is a jagged approx 4 cm laceration posterior to lateral mal, with surrounding granulation tissue.  Both feet equally cool and with intact DP pulses.

## 2021-04-27 NOTE — ED PROVIDER NOTE - CLINICAL SUMMARY MEDICAL DECISION MAKING FREE TEXT BOX
69yo female with pmh htn presenting with left foot/ ankle pain. 69yo female with pmh htn presenting with left foot/ ankle pain.  Wound healing with unremarkable exam.  Was sent to ED to r/o deeper infection, osteo.  No hx dm.  Will check labs and get xray.  Treat pain and reassess.

## 2021-04-27 NOTE — ED PROVIDER NOTE - PSH
Breast lump    H/O lumpectomy  left   History of surgery  right groin fatty tissue removed  Personal history of spine surgery  L1-L4 fusion 2017  S/P     S/P colon resection  reversal, had complications for fibriods  S/P hysterectomy

## 2021-04-27 NOTE — ED PROVIDER NOTE - OBJECTIVE STATEMENT
71yo female with pmh htn presenting with left foot/ ankle pain.  States 2 weeks ago she had a fall in which she had compression fracture on the spine and wound to left ankle.  Over past few days has been endorsing worsening pain of left ankle at site of wound.  She was started on augmentin per pmd.  No fevers.  She was sent to ED to r/o osteo.

## 2021-04-27 NOTE — ED PROVIDER NOTE - PMH
Anxiety    Bladder polyp    Breast CA, left  lumpectomy / RTX in the past  HLD (hyperlipidemia)    Hypertension    Irritable bowel syndrome (IBS)    Polyp of colon  "pre-cancerous" x 2, removed 11/2014  Sleep apnea  uses  cpap machine

## 2021-04-27 NOTE — ED PROVIDER NOTE - PROGRESS NOTE DETAILS
Updated patient on results, labs unremarkable.  Patient has follow up with pmd.  Will dc and change to doxyclycline

## 2021-04-28 ENCOUNTER — APPOINTMENT (OUTPATIENT)
Dept: WOUND CARE | Facility: CLINIC | Age: 71
End: 2021-04-28
Payer: MEDICARE

## 2021-04-28 VITALS — RESPIRATION RATE: 18 BRPM | HEART RATE: 77 BPM | SYSTOLIC BLOOD PRESSURE: 141 MMHG | DIASTOLIC BLOOD PRESSURE: 86 MMHG
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
## 2021-04-28 PROCEDURE — 99072 ADDL SUPL MATRL&STAF TM PHE: CPT

## 2021-04-28 PROCEDURE — 11042 DBRDMT SUBQ TIS 1ST 20SQCM/<: CPT

## 2021-04-28 RX ORDER — COLLAGENASE SANTYL 250 [ARB'U]/G
250 OINTMENT TOPICAL DAILY
Qty: 1 | Refills: 0 | Status: DISCONTINUED | COMMUNITY
Start: 2021-04-28 | End: 2021-04-28

## 2021-04-28 NOTE — PHYSICAL EXAM
[JVD] : no jugular venous distention  [Normal Breath Sounds] : Normal breath sounds [2+] : left 2+ [Ankle Swelling (On Exam)] : present [Ankle Swelling Bilaterally] : bilaterally  [Ankle Swelling On The Right] : mild [Abdomen Tenderness] : ~T ~M No abdominal tenderness [Skin Ulcer] : ulcer [Alert] : alert [Oriented to Person] : oriented to person [Oriented to Place] : oriented to place [Oriented to Time] : oriented to time [Calm] : calm [de-identified] : NAD, ambulatory [de-identified] : AT [de-identified] : supple [de-identified] : soft [Please See PDF for [employer]] : Please See PDF for [employer].

## 2021-04-28 NOTE — PLAN
[FreeTextEntry1] : Plan:\par Apply lidocaine or topical anesthetic if needed to reduce pain upon washing the wound.\par Wash wound with ---- Dakins 0.125%\par Apply ---- santyl\par Apply ----dakin's wet to dry then milena wrap\par Change dressing ---daily\par \par Leg elevation as tolerated\par Encouraged ambulation or exercise.\par Optimization of nutrition.\par Offloading to the wound site.\par \par -----Wound supplies ordered via DME\par Patient given contact information to DME\par \par Follow up appointment scheduled for 1-2 weeks\par \par TeleHealth Services discussed with the patient and/or family.  Discharge instructions given including download of [facility] information regarding:\par 1)  [employer] [facility] to obtain medical records\par 2)  [employer] [facility] to conduct Face-to-Face TeleHealth visit\par 3)  [employer] for the Patient (patient takes a picture of their wound which is sent to the patient's chart for review)\par \par

## 2021-04-28 NOTE — HISTORY OF PRESENT ILLNESS
[FreeTextEntry1] : Ms. KIM ROLLINS   presents to the office with a wound since 4/5/2021  . The wound is located on  the left lateral leg. The patient has complaints of pain and sensitivity.  The patient has been dressing the wound with aquaphor and a band\par age.  The patient denies fevers or chills. The patient has localized pain to the wound upon dressing changes. The patient has no other complaints or associated symptoms. \par \par Patient had a fall 4/5/2021 outside of her home and hurt her back and her leg. She was supposed to get a procedure done on 4/29/2021 for her spinal fracture, but it was canceled due to the infection in the wound on her leg. Her primary care doctor prescribed amoxicillin-clav on 4/25/2021.

## 2021-04-28 NOTE — ASSESSMENT
[FreeTextEntry1] : The patient presents with a wound to the left leg.  Swelling noted to the extremity.  \par HEIDY/PVR and standing venous reflux study scheduled.\par No clinical sign of infection

## 2021-05-02 LAB
CULTURE RESULTS: SIGNIFICANT CHANGE UP
CULTURE RESULTS: SIGNIFICANT CHANGE UP
SPECIMEN SOURCE: SIGNIFICANT CHANGE UP
SPECIMEN SOURCE: SIGNIFICANT CHANGE UP

## 2021-05-03 ENCOUNTER — NON-APPOINTMENT (OUTPATIENT)
Age: 71
End: 2021-05-03

## 2021-05-04 ENCOUNTER — APPOINTMENT (OUTPATIENT)
Dept: WOUND CARE | Facility: CLINIC | Age: 71
End: 2021-05-04
Payer: MEDICARE

## 2021-05-04 ENCOUNTER — NON-APPOINTMENT (OUTPATIENT)
Age: 71
End: 2021-05-04

## 2021-05-04 PROCEDURE — 11042 DBRDMT SUBQ TIS 1ST 20SQCM/<: CPT

## 2021-05-04 NOTE — PLAN
[FreeTextEntry1] : Plan:\par Apply lidocaine or topical anesthetic if needed to reduce pain upon washing the wound.  Patient has \par Wash wound with ---- Dakins 0.125%\par Apply ---- santyl\par Apply ----dakin's wet to dry then milena wrap\par Change dressing ---daily\par \par Leg elevation as tolerated\par Encouraged ambulation or exercise.\par Optimization of nutrition.\par Offloading to the wound site.\par \par -----Wound supplies ordered via DME\par Patient given contact information to DME.  Patient received supplies and brought them in to show us.  Patient instructed on how to cleanse the wound and apply the ointment.  \par \par Patient apologized for being agitated.  She is anxious that her wound may not be healing due to recurrent cancer.  She states that she was told that she does not have signs of cancer.  D/w patient the importance of using the pain medication on the wound and daily scrub with application of santyl.  If wound demonstrates increased granulation tissue by next week, will apply ted.  Continue compression.\par \par Follow up appointment scheduled for 1-2 weeks\par \par TeleHealth Services discussed with the patient and/or family.  Discharge instructions given including download of Vonnie information regarding:\par 1)  [facility] [employer] [facility] to obtain medical records\par 2)  [employer] [facility] to conduct Face-to-Face TeleHealth visit\par 3)  [employer] for the Patient (patient takes a picture of their wound which is sent to the patient's chart for review)\par \par

## 2021-05-04 NOTE — PHYSICAL EXAM
[Normal Breath Sounds] : Normal breath sounds [2+] : left 2+ [Ankle Swelling (On Exam)] : present [Ankle Swelling Bilaterally] : bilaterally  [Ankle Swelling On The Right] : mild [Skin Ulcer] : ulcer [Alert] : alert [Oriented to Person] : oriented to person [Oriented to Place] : oriented to place [Oriented to Time] : oriented to time [Agitated] : agitated [Calm] : calm [Please See PDF for Tissue Analytics] : Please See PDF for [employer]. [JVD] : no jugular venous distention  [Abdomen Tenderness] : ~T ~M No abdominal tenderness [de-identified] : NAD, ambulatory [de-identified] : AT [de-identified] : supple [de-identified] : soft

## 2021-05-04 NOTE — HISTORY OF PRESENT ILLNESS
[FreeTextEntry1] : Ms. KIM ROLLINS   presents to the office with a wound since 4/5/2021  . The wound is located on  the left lateral leg. The patient has complaints of pain and sensitivity.  Previously, the patient has been dressing the wound with aquaphor and a band-age.  The patient denies fevers or chills. The patient has localized pain to the wound upon dressing changes. The patient has no other complaints or associated symptoms. \par \par Patient had a fall 4/5/2021 outside of her home and hurt her back and her leg. She was supposed to get a procedure done on 4/29/2021 for her spinal fracture, but it was canceled due to the infection in the wound on her leg. Her primary care doctor prescribed amoxicillin-clav on 4/25/2021.  The patient feels that the "antibiotics are a waste of time" and the "burning keeps her up at night".  She states that she received X rays and MRI negative for infection.  Patient received topical pain medication which she accidentally placed on her mouth which felt "numb".  Patient has had difficulty in washing her leg due the "anxiety and pain" that she feels.

## 2021-05-05 ENCOUNTER — APPOINTMENT (OUTPATIENT)
Dept: WOUND CARE | Facility: CLINIC | Age: 71
End: 2021-05-05

## 2021-05-11 ENCOUNTER — NON-APPOINTMENT (OUTPATIENT)
Age: 71
End: 2021-05-11

## 2021-05-12 ENCOUNTER — APPOINTMENT (OUTPATIENT)
Dept: WOUND CARE | Facility: CLINIC | Age: 71
End: 2021-05-12
Payer: MEDICARE

## 2021-05-12 VITALS
TEMPERATURE: 97.1 F | SYSTOLIC BLOOD PRESSURE: 151 MMHG | DIASTOLIC BLOOD PRESSURE: 88 MMHG | RESPIRATION RATE: 18 BRPM | HEART RATE: 65 BPM

## 2021-05-12 PROCEDURE — 0598T R-T FLUOR WOUND IMG 1ST SITE: CPT

## 2021-05-12 PROCEDURE — 11042 DBRDMT SUBQ TIS 1ST 20SQCM/<: CPT

## 2021-05-12 RX ORDER — AMOXICILLIN AND CLAVULANATE POTASSIUM 875; 125 MG/1; MG/1
875-125 TABLET, COATED ORAL
Refills: 0 | Status: DISCONTINUED | COMMUNITY
End: 2021-05-12

## 2021-05-12 NOTE — PLAN
[FreeTextEntry1] : Plan:\par Apply lidocaine or topical anesthetic if needed to reduce pain upon washing the wound.  Patient has \par Wash wound with ---- Dakins 0.125%\par Apply ---- santyl\par Apply ----dakin's wet to dry then milena wrap\par Change dressing ---daily\par \par Leg elevation as tolerated\par Encouraged ambulation or exercise.\par Optimization of nutrition.\par Offloading to the wound site.\par \par -----Wound supplies ordered via DME\par Patient given contact information to DME.  Patient received supplies and brought them in to show us.  Patient instructed on how to cleanse the wound and apply the ointment.  \par \par Patient apologized for being agitated.  She is anxious that her wound may not be healing due to recurrent cancer.  She states that she was told that she does not have signs of cancer.  D/w patient the importance of using the pain medication on the wound and daily scrub with application of santyl.  If wound demonstrates increased granulation tissue by next week, will apply ted.  Continue compression.\par \par Follow up appointment scheduled for 1-2 weeks\par \par TeleHealth Services discussed with the patient and/or family.  Discharge instructions given including download of Von[facility] information regarding:\par 1)  [facility] [employer] [facility] to obtain medical records\par 2)  [employer] [facility] to conduct Face-to-Face TeleHealth visit\par 3)  [employer] for the Patient (patient takes a picture of their wound which is sent to the patient's chart for review)\par \par

## 2021-05-12 NOTE — PHYSICAL EXAM
[Normal Breath Sounds] : Normal breath sounds [2+] : left 2+ [Ankle Swelling (On Exam)] : present [Ankle Swelling Bilaterally] : bilaterally  [Ankle Swelling On The Right] : mild [Skin Ulcer] : ulcer [Alert] : alert [Oriented to Person] : oriented to person [Oriented to Place] : oriented to place [Oriented to Time] : oriented to time [Agitated] : agitated [Calm] : calm [Please See PDF for Tissue Analytics] : Please See PDF for [employer]. [JVD] : no jugular venous distention  [Abdomen Tenderness] : ~T ~M No abdominal tenderness [de-identified] : NAD, ambulatory [de-identified] : AT [de-identified] : supple [de-identified] : soft

## 2021-05-18 ENCOUNTER — NON-APPOINTMENT (OUTPATIENT)
Age: 71
End: 2021-05-18

## 2021-05-19 ENCOUNTER — APPOINTMENT (OUTPATIENT)
Dept: WOUND CARE | Facility: CLINIC | Age: 71
End: 2021-05-19
Payer: MEDICARE

## 2021-05-19 PROCEDURE — 11042 DBRDMT SUBQ TIS 1ST 20SQCM/<: CPT

## 2021-05-19 NOTE — PLAN
[FreeTextEntry1] : Plan:\par s/p excisional debridement\par Apply lidocaine or topical anesthetic if needed to reduce pain upon washing the wound.  Patient has \par Wash wound with ---- Dakins 0.125%\par Apply ----ted\par Apply ----\par Change dressing ---daily\par \par Leg elevation as tolerated\par Encouraged ambulation or exercise.\par Optimization of nutrition.\par Offloading to the wound site.\par \par -----Wound supplies ordered via DME\par Patient given contact information to DME.  Patient received supplies and brought them in to show us.  Patient instructed on how to cleanse the wound and apply the ointment.  \par \par Patient apologized for being agitated.  She is anxious that her wound may not be healing due to recurrent cancer.  She states that she was told that she does not have signs of cancer.  D/w patient the importance of using the pain medication on the wound and daily scrub with application of santyl.  If wound demonstrates increased granulation tissue by next week, will apply ted.  Continue compression.\par \par Follow up appointment scheduled for 1-2 weeks\par \par TeleHealth Services discussed with the patient and/or family.  Discharge instructions given including download of Von[facility] information regarding:\par 1)  [facility] [employer] [facility] to obtain medical records\par 2)  [employer] [facility] to conduct Face-to-Face TeleHealth visit\par 3)  [employer] for the Patient (patient takes a picture of their wound which is sent to the patient's chart for review)\par \par

## 2021-05-19 NOTE — PHYSICAL EXAM
[Normal Breath Sounds] : Normal breath sounds [2+] : left 2+ [Ankle Swelling (On Exam)] : present [Ankle Swelling Bilaterally] : bilaterally  [Ankle Swelling On The Right] : mild [Skin Ulcer] : ulcer [Alert] : alert [Oriented to Person] : oriented to person [Oriented to Place] : oriented to place [Oriented to Time] : oriented to time [Agitated] : agitated [Calm] : calm [Please See PDF for Tissue Analytics] : Please See PDF for [employer]. [JVD] : no jugular venous distention  [Abdomen Tenderness] : ~T ~M No abdominal tenderness [de-identified] : NAD, ambulatory [de-identified] : AT [de-identified] : supple [de-identified] : soft

## 2021-05-25 ENCOUNTER — NON-APPOINTMENT (OUTPATIENT)
Age: 71
End: 2021-05-25

## 2021-05-26 ENCOUNTER — APPOINTMENT (OUTPATIENT)
Dept: WOUND CARE | Facility: CLINIC | Age: 71
End: 2021-05-26
Payer: MEDICARE

## 2021-05-26 VITALS — WEIGHT: 177 LBS | BODY MASS INDEX: 29.49 KG/M2 | HEIGHT: 65 IN | TEMPERATURE: 96.5 F

## 2021-05-26 VITALS — DIASTOLIC BLOOD PRESSURE: 89 MMHG | SYSTOLIC BLOOD PRESSURE: 139 MMHG | HEART RATE: 71 BPM

## 2021-05-26 PROCEDURE — 0598T R-T FLUOR WOUND IMG 1ST SITE: CPT

## 2021-05-26 PROCEDURE — 97597 DBRDMT OPN WND 1ST 20 CM/<: CPT

## 2021-05-28 NOTE — PLAN
[FreeTextEntry1] : Plan:\par s/p excisional debridement\par Apply lidocaine or topical anesthetic if needed to reduce pain upon washing the wound.  Patient has \par Wash wound with ---- Dakins 0.125%\par Apply ----ted\par Change dressing ---daily\par \par Leg elevation as tolerated\par Encouraged ambulation or exercise.\par Optimization of nutrition.\par Offloading to the wound site.\par \par -----Wound supplies ordered via DME\par Patient given contact information to DME.  Patient received supplies and brought them in to show us.  Patient instructed on how to cleanse the wound and apply the ointment.  \par \par Patient apologized for being agitated.  She is anxious that her wound may not be healing due to recurrent cancer.  She states that she was told that she does not have signs of cancer.  D/w patient the importance of using the pain medication on the wound and daily scrub with application of santyl.  If wound demonstrates increased granulation tissue by next week, will apply ted.  Continue compression.\par \par Follow up appointment scheduled for 1-2 weeks\par \par TeleHealth Services discussed with the patient and/or family.  Discharge instructions given including download of Von[facility] information regarding:\par 1)  [facility] [employer] [facility] to obtain medical records\par 2)  [employer] [facility] to conduct Face-to-Face TeleHealth visit\par 3)  [employer] for the Patient (patient takes a picture of their wound which is sent to the patient's chart for review)\par \par

## 2021-05-28 NOTE — ASSESSMENT
[Ambulatory] : Ambulatory [FreeTextEntry1] : The patient presents with a wound to the left leg.  Swelling noted to the extremity.  \par HEIDY/PVR and standing venous reflux study scheduled.\par No clinical sign of infection

## 2021-05-28 NOTE — PHYSICAL EXAM
[JVD] : no jugular venous distention  [Normal Breath Sounds] : Normal breath sounds [2+] : left 2+ [Ankle Swelling (On Exam)] : present [Ankle Swelling Bilaterally] : bilaterally  [Ankle Swelling On The Right] : mild [Abdomen Tenderness] : ~T ~M No abdominal tenderness [Skin Ulcer] : ulcer [Alert] : alert [Oriented to Person] : oriented to person [Oriented to Place] : oriented to place [Oriented to Time] : oriented to time [Agitated] : agitated [Calm] : calm [de-identified] : NAD, ambulatory [de-identified] : AT [de-identified] : supple [de-identified] : soft [Please See PDF for [employer]] : Please See PDF for [employer].

## 2021-06-01 NOTE — ED ADULT TRIAGE NOTE - MEANS OF ARRIVAL
Quality 431: Preventive Care And Screening: Unhealthy Alcohol Use - Screening: Patient screened for unhealthy alcohol use using a single question and scores less than 2 times per year Detail Level: Detailed Quality 110: Preventive Care And Screening: Influenza Immunization: Influenza Immunization Administered during Influenza season Quality 226: Preventive Care And Screening: Tobacco Use: Screening And Cessation Intervention: Patient screened for tobacco use and is an ex/non-smoker Quality 130: Documentation Of Current Medications In The Medical Record: Current Medications Documented Quality 111:Pneumonia Vaccination Status For Older Adults: Pneumococcal Vaccination Previously Received ambulatory

## 2021-06-01 NOTE — H&P PST ADULT - CARDIOVASCULAR
Patient called pre-service center [facility]) Joann with red flag complaint. Brief description of triage: Mother states that Torri Ford has some foul smelling urine and today she has started crying with urination. Please see triage below for more detailed information. Triage indicates for patient to be seen today    Care advice provided, patient verbalizes understanding; denies any other questions or concerns; instructed to call back for any new or worsening symptoms. Writer provided warm transfer to [place] [facility] for appointment scheduling. Attention Provider: Thank you for allowing me to participate in the care of your patient. The patient was connected to triage in response to information provided to the [facility]. Please do not respond through this encounter as the response is not directed to a shared pool. Reason for Disposition   All other painful urination in females (Exception: probable soap vulvitis and/or soap urethritis)    Answer Assessment - Initial Assessment Questions  1. SEVERITY: \"How bad is the pain? \"        * MILD: complains slightly about urination hurting      * MODERATE: complains greatly or cries during urination       * SEVERE: excruciating pain, interferes with most normal activities, child unable or unwilling to urinate because of pain      Moderate, wears pull ups and sometimes uses the toilet    2. FREQUENCY: \"How many times has she had painful urination today? \"       Yes for frequency    3. PATTERN: \"Does it come and go, or is it constant? \"       If constant: \"Is it getting better, staying the same, or worsening? \"        If intermittent: \"How long does it last?\"  \"Does your child have the pain now? \"        No pain now    4. ONSET: \"When did the painful urination start? \"       2-3 days with odorous urine and today started with crying    5. FEVER: \"Is there a fever? \" If so, ask: \"What is it, how was it measured, and when did it start? \"       No    6.  RECURRENT PROBLEM: \"Has your child had painful urination before? \" If so, ask: \"When was the last time? \" and \"What happened that time? \"  \"Ever have a urine infection in the past?\"      No    7. CAUSE: \"What do you think is causing the painful urination? \"      Unsure    Protocols used: URINATION PAIN - FEMALE-PEDIATRIC-OH negative Regular rate & rhythm, normal S1, S2; no murmurs, gallops or rubs; no S3, S4

## 2021-06-08 ENCOUNTER — NON-APPOINTMENT (OUTPATIENT)
Age: 71
End: 2021-06-08

## 2021-06-09 ENCOUNTER — APPOINTMENT (OUTPATIENT)
Dept: WOUND CARE | Facility: CLINIC | Age: 71
End: 2021-06-09

## 2021-06-15 ENCOUNTER — NON-APPOINTMENT (OUTPATIENT)
Age: 71
End: 2021-06-15

## 2021-06-16 ENCOUNTER — APPOINTMENT (OUTPATIENT)
Dept: WOUND CARE | Facility: CLINIC | Age: 71
End: 2021-06-16
Payer: MEDICARE

## 2021-06-16 VITALS
HEART RATE: 68 BPM | RESPIRATION RATE: 18 BRPM | TEMPERATURE: 96.4 F | SYSTOLIC BLOOD PRESSURE: 138 MMHG | DIASTOLIC BLOOD PRESSURE: 89 MMHG

## 2021-06-16 DIAGNOSIS — T14.90XA INJURY, UNSPECIFIED, INITIAL ENCOUNTER: ICD-10-CM

## 2021-06-16 PROCEDURE — 99213 OFFICE O/P EST LOW 20 MIN: CPT

## 2021-06-16 NOTE — ASSESSMENT
[Ambulatory] : Ambulatory [FreeTextEntry1] : \par 6/16/21\par wound to left lateral ankle is healed, cavilon applied

## 2021-06-16 NOTE — PHYSICAL EXAM
[Normal Breath Sounds] : Normal breath sounds [2+] : left 2+ [Ankle Swelling (On Exam)] : present [Ankle Swelling Bilaterally] : bilaterally  [Ankle Swelling On The Right] : mild [Skin Ulcer] : ulcer [Alert] : alert [Oriented to Place] : oriented to place [Oriented to Person] : oriented to person [Oriented to Time] : oriented to time [Agitated] : agitated [Calm] : calm [Please See PDF for [employer]] : Please See PDF for [employer]. [JVD] : no jugular venous distention  [Abdomen Tenderness] : ~T ~M No abdominal tenderness [de-identified] : NAD, ambulatory [de-identified] : AT [de-identified] : supple [de-identified] : soft

## 2021-08-01 ENCOUNTER — OUTPATIENT (OUTPATIENT)
Dept: OUTPATIENT SERVICES | Facility: HOSPITAL | Age: 71
LOS: 1 days | Discharge: HOME | End: 2021-08-01

## 2021-08-01 DIAGNOSIS — C67.9 MALIGNANT NEOPLASM OF BLADDER, UNSPECIFIED: ICD-10-CM

## 2021-08-01 DIAGNOSIS — Z90.710 ACQUIRED ABSENCE OF BOTH CERVIX AND UTERUS: Chronic | ICD-10-CM

## 2021-08-01 DIAGNOSIS — N63 UNSPECIFIED LUMP IN BREAST: Chronic | ICD-10-CM

## 2021-08-01 DIAGNOSIS — Z98.890 OTHER SPECIFIED POSTPROCEDURAL STATES: Chronic | ICD-10-CM

## 2021-08-01 DIAGNOSIS — Z98.89 OTHER SPECIFIED POSTPROCEDURAL STATES: Chronic | ICD-10-CM

## 2021-08-03 ENCOUNTER — RESULT REVIEW (OUTPATIENT)
Age: 71
End: 2021-08-03

## 2021-08-03 ENCOUNTER — APPOINTMENT (OUTPATIENT)
Dept: HEMATOLOGY ONCOLOGY | Facility: CLINIC | Age: 71
End: 2021-08-03
Payer: MEDICARE

## 2021-08-03 VITALS
SYSTOLIC BLOOD PRESSURE: 135 MMHG | BODY MASS INDEX: 26.47 KG/M2 | TEMPERATURE: 98 F | HEART RATE: 60 BPM | OXYGEN SATURATION: 98 % | HEIGHT: 66.69 IN | DIASTOLIC BLOOD PRESSURE: 84 MMHG | RESPIRATION RATE: 17 BRPM | WEIGHT: 166.67 LBS

## 2021-08-03 LAB
ALBUMIN SERPL ELPH-MCNC: 4.3 G/DL
ALP BLD-CCNC: 106 U/L
ALT SERPL-CCNC: 13 U/L
ANION GAP SERPL CALC-SCNC: 15 MMOL/L
AST SERPL-CCNC: 17 U/L
BASOPHILS # BLD AUTO: 0.03 K/UL — SIGNIFICANT CHANGE UP (ref 0–0.2)
BASOPHILS NFR BLD AUTO: 0.5 % — SIGNIFICANT CHANGE UP (ref 0–2)
BILIRUB SERPL-MCNC: 0.4 MG/DL
BUN SERPL-MCNC: 11 MG/DL
CALCIUM SERPL-MCNC: 9.7 MG/DL
CHLORIDE SERPL-SCNC: 102 MMOL/L
CO2 SERPL-SCNC: 24 MMOL/L
CREAT SERPL-MCNC: 0.68 MG/DL
EOSINOPHIL # BLD AUTO: 0.16 K/UL — SIGNIFICANT CHANGE UP (ref 0–0.5)
EOSINOPHIL NFR BLD AUTO: 2.4 % — SIGNIFICANT CHANGE UP (ref 0–6)
GLUCOSE SERPL-MCNC: 107 MG/DL
HCT VFR BLD CALC: 40.2 % — SIGNIFICANT CHANGE UP (ref 34.5–45)
HGB BLD-MCNC: 12.8 G/DL — SIGNIFICANT CHANGE UP (ref 11.5–15.5)
IMM GRANULOCYTES NFR BLD AUTO: 0.5 % — SIGNIFICANT CHANGE UP (ref 0–1.5)
LYMPHOCYTES # BLD AUTO: 1.82 K/UL — SIGNIFICANT CHANGE UP (ref 1–3.3)
LYMPHOCYTES # BLD AUTO: 27.6 % — SIGNIFICANT CHANGE UP (ref 13–44)
MAGNESIUM SERPL-MCNC: 1.7 MG/DL
MCHC RBC-ENTMCNC: 29.2 PG — SIGNIFICANT CHANGE UP (ref 27–34)
MCHC RBC-ENTMCNC: 31.8 G/DL — LOW (ref 32–36)
MCV RBC AUTO: 91.8 FL — SIGNIFICANT CHANGE UP (ref 80–100)
MONOCYTES # BLD AUTO: 0.8 K/UL — SIGNIFICANT CHANGE UP (ref 0–0.9)
MONOCYTES NFR BLD AUTO: 12.1 % — SIGNIFICANT CHANGE UP (ref 2–14)
NEUTROPHILS # BLD AUTO: 3.76 K/UL — SIGNIFICANT CHANGE UP (ref 1.8–7.4)
NEUTROPHILS NFR BLD AUTO: 56.9 % — SIGNIFICANT CHANGE UP (ref 43–77)
NRBC # BLD: 0 /100 WBCS — SIGNIFICANT CHANGE UP (ref 0–0)
PLATELET # BLD AUTO: 206 K/UL — SIGNIFICANT CHANGE UP (ref 150–400)
POTASSIUM SERPL-SCNC: 3.8 MMOL/L
PROT SERPL-MCNC: 7 G/DL
RBC # BLD: 4.38 M/UL — SIGNIFICANT CHANGE UP (ref 3.8–5.2)
RBC # FLD: 14.6 % — HIGH (ref 10.3–14.5)
SODIUM SERPL-SCNC: 142 MMOL/L
T4 FREE SERPL-MCNC: 1.3 NG/DL
TSH SERPL-ACNC: 1.33 UIU/ML
WBC # BLD: 6.6 K/UL — SIGNIFICANT CHANGE UP (ref 3.8–10.5)
WBC # FLD AUTO: 6.6 K/UL — SIGNIFICANT CHANGE UP (ref 3.8–10.5)

## 2021-08-03 PROCEDURE — 99213 OFFICE O/P EST LOW 20 MIN: CPT

## 2021-08-03 RX ORDER — METHOCARBAMOL 750 MG/1
750 TABLET, FILM COATED ORAL
Refills: 0 | Status: DISCONTINUED | COMMUNITY
End: 2021-08-03

## 2021-08-03 RX ORDER — CALCITONIN SALMON 200 [IU]/1
200 SPRAY, METERED NASAL
Refills: 0 | Status: DISCONTINUED | COMMUNITY
End: 2021-08-03

## 2021-08-03 RX ORDER — BENZOCAINE 20% / LIDOCAINE 10% / TETRACAINE 10% 20; 10; 10 G/100G; G/100G; G/100G
OINTMENT TOPICAL DAILY
Qty: 1 | Refills: 1 | Status: DISCONTINUED | COMMUNITY
Start: 2021-04-28 | End: 2021-08-03

## 2021-08-03 RX ORDER — OXYCODONE AND ACETAMINOPHEN 5; 325 MG/1; MG/1
5-325 TABLET ORAL
Refills: 0 | Status: DISCONTINUED | COMMUNITY
End: 2021-08-03

## 2021-08-03 RX ORDER — COLLAGENASE SANTYL 250 [ARB'U]/G
250 OINTMENT TOPICAL DAILY
Qty: 1 | Refills: 3 | Status: DISCONTINUED | COMMUNITY
Start: 2021-04-28 | End: 2021-08-03

## 2021-08-03 NOTE — ASSESSMENT
[Palliative Care Plan] : not applicable at this time [FreeTextEntry1] : Juju is seen in the office today in follow-up.  She had a fall and fractured one of her lumbar vertebra.  She also had a wound in her leg at that time and she was seeing a wound specialist as well.  She is on gabapentin at this time at bedtime for neuropathy.  She does not know the dose and she only takes it at bedtime.  Or her films were done at [facility].  She says that she had an MRI done as well.  She thinks that it was L1 that was fractured.  She has a history of chronic low back pain which is worse with bending.  There is always some annoyance present.  The neuropathy involves the bottom of her right foot and toes.  It does not affect the left side.  She feels well otherwise.  There is no other sites of pain.  Her appetite is good and there is no weight loss.  She has lost 3.7 kg from her May weight.  There is no blood in the urine or burning.  There is no incontinence or urgency.  He denies any headaches dizziness or balance issues.  There is no cough or shortness of breath.  There are no GI complaints.  She was seen by an orthopedist at [facility] and they wanted to do an epidural, however was not done action.  When she returned after the infection cleared, and it was felt that she no longer needed it.  She cannot remember the name of the orthopedic surgeon and she said she will call my office with the name.\par \par On physical examination, she appears well.  She is in no acute distress.  Her performance status is 0.  There is no palpable adenopathy present.  The lungs are clear and the heart examination is normal.  There is no edema of the extremities.  There is no straight leg raising positivity.  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  She received radiation therapy at that time.  She was then started on atezolizumab and continued it for 2 years, ending in February 2020.  She has now been off therapy for 18 months and continues to do well at this point.\par \par All questions were answered to the best of my ability and to her apparent satisfaction.  She will contact me with the orthopedic physicians number so that I can call for the results of her recent MRI scan.  Laboratory work will be performed today and I will contact her with the results.

## 2021-08-03 NOTE — REVIEW OF SYSTEMS
[Negative] : Gastrointestinal [Fever] : no fever [Chills] : no chills [Fatigue] : no fatigue [Recent Change In Weight] : ~T no recent weight change [Chest Pain] : no chest pain [Palpitations] : no palpitations [Lower Ext Edema] : no lower extremity edema [Cough] : no cough [SOB on Exertion] : no shortness of breath during exertion [Dysuria] : no dysuria [Incontinence] : no incontinence [Joint Pain] : no joint pain [Joint Stiffness] : no joint stiffness [Muscle Pain] : no muscle pain [Skin Rash] : no skin rash [Dizziness] : no dizziness [Anxiety] : no anxiety [Depression] : no depression [Muscle Weakness] : no muscle weakness [Easy Bleeding] : no tendency for easy bleeding [Easy Bruising] : no tendency for easy bruising [FreeTextEntry3] : cataract [FreeTextEntry9] : no cramps [de-identified] : no pruritus [de-identified] : No HA. paresthesias of the right foot

## 2021-08-03 NOTE — PHYSICAL EXAM
[Fully active, able to carry on all pre-disease performance without restriction] : Status 0 - Fully active, able to carry on all pre-disease performance without restriction [Normal] : affect appropriate [de-identified] : no edema

## 2021-08-03 NOTE — HISTORY OF PRESENT ILLNESS
[Disease: _____________________] : Disease: [unfilled] [T: ___] : T[unfilled] [N: ___] : N[unfilled] [M: ___] : M[unfilled] [AJCC Stage: ____] : AJCC Stage: [unfilled] [de-identified] : Ms. 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 The tumor was found on cystoscopy at the right ureteral orifice. 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 She is referred for consideration of neoadjuvant chemotherapy. In late May, at the time of scheduled back surgery, she thought she had a UTI. Urine was tested and she was treated, then had the surgery. She had burning post-op. She was treated again with an antibiotic. She went to Rehab and was treated again and she was seen by a urologist there. Went to PCP after discharge from rehab, he noted some blood in the urine. She went to [facility] ER and \par she was referred to Dr. Hargrove. She underwent a cystoscopy, revealing tumor. She never noted blood in her urine, but did note it was darker than usual. Still has some "tingling" sensation, no incontinence. Nocturia occurs, which she says is related to awakening from CPAP. No cough. WALKER/bone pains.\par \par 10/27/17...Seen at [facility] in second opinion yesterday. They wanted to repeat procedures again, including cystoscopy. They called  again today. Saw Dr. Montalvo. She was asked to return next Thursday. They want to do a PET CT scan. They recommended she receive cis/gem, likely due to the glandular differentiation. She was overwhelmed by all the information she was given. No pains, no cough/SOB.  \par \par 10/27/17...Juju completed cycle 1 this past Friday, and she noticed on Saturday that her lips appeared swollen and she noticed sores in her mouth. This occurred after going out to eat chinese food.Today she feels that it may be going away. She has fatigue, and feels chills but no fever. She just feels " lousy."  She is feeling Nausea this time and if she takes the metoclopramide in time, then she is okay, She denies vomiting. She is also having some dysgeusia, She denies paraesthesias of the fingers and toes.  She is having constipation, and she is controlling it with the stool softeners.She states her appetite is ok, but the food doesn't taste good, and it hurts when she swallows\par \par 17...Chemo with cis/gem #2 due this Friday.has increased lower back pain. The pain had stopped. She had prior back surgery. The pain became more notable since starting the chemo. She had constipation with the chemo and noted an increase in the pain with the constipation in lower back near the site of the surgery. The pain feels like pressure, described as heavy, somewhat relieved by BM. Worse with activities. Took some oxycodone a few days ago, but had a headaches afterwards. Pain score currently at 6-7, no recent pain med use. Taking MiraLAX, senna, Colace and prune juice with occasional MOM. Appetite is good, some altered taste, when present, affects appetite. Had some nausea and vomited x 3 after initial chemo, more gagging than vomiting. She felt she had some sores in the mouth after the gem alone, resolved. No paresthesias. She had some discomfort in the right wrist area, at the site where chemo was administered. Fatigue present all the time, more since the start of chemo. No hematuria, good flow, no urgency or incontinence as before. \par \par 17...day 15, cycle #2, cis/gem.  Juju has increased sensitivity in the lips after chemo. She had some vomiting at the end of her chemo infusion, however she did not have increased nausea and vomiting since. She has fatigue, and she complains of constipation and is taking miralax. She does complain of back pain that has increased in intensity.She notices it when she bends over. She feels that there is something noticeably there in her back. She takes pain medication to help her fall asleep. She doesn't sleep well. She does use c-pap secondary to sleep apnea but she is awaken from her sleep secondary to nocturia. She also notices her pain more when she has constipation and has to go to the bathroom. She describes her lower back pain as 8/10 at worst, and best 5/10. HEr back pain went away after the surgery and she noticed it came back once starting Chemo. She states her appetite is good, She does complain of dysgeusia. She denies paraesthesias. She does not like how the oxycodone makes her feel. It gives her HAs.\par \par 18...Had RT from the  to the . She feels there is some decrease in the pain, not as piercing. She feels the area is stiff. Worse when she awakens. Takes 3 x 325 Tylenol at night which helps the pain. has constipation, went one week w/o evacuation. Was given lactulose, but she feels that MiraLAX works better. She is less active as a result of the pain. the pain is worse when she bends. Appetite is variable. No weight loss. Has occasional nausea. No vomiting. Has some indigestion at times. No blood in the urine, no dysuria. No incontinence. Fatigue is present. No pains elsewhere. \par \par 3/14/18...She still has some pain. She is doing PT, which helps temporarily. She has not had the relief of pain that she thought would be the result. Pain score at 4-5, described as aggravation and it inhibits activities. Worse with bending, getting in and out of cars. She is not taking hydromorphone at this time.. She is distressed with constipation from the pain meds. Says lactulose does not work. She takes some Tylenol sporadically, not daily. Appetite is good, lost 1 kilo. No hematuria, no dysuria, no frequency, no incontinence. No pains elsewhere. She remains very active. She has alopecia. No diarrhea. No cough/WALKER. Fatigue is present, mild. She feels it at the end of the day. No N/V. \par \par 18...Completed 3 cycles of Tecentriq. She noes curling of the right fourth finger involuntarily. She can bend it back up, but it hurts to do so. No difficulty with strength on the right hand, no tremors.  She has tried Icy Hot w/o benefit. She points to DIP and PIP joints as the sites of pain. She feels she has lumps on her head. She is losing her hair, likely secondary to the chemo she received. Her scalp is pruritic. She has also lost pubic hair. Her pain continues to get better,went to PT for 7 weeks. She is able to do all normal activities, with mild fatigue. Appetite is good, weight is stable. No dizziness, no unsteadiness, no headaches. No other issues. No hematuria. \par \par 18...Missed Rx on 18. feels "great". Still has back pains, much improved. No pain while seated, but will come on after a while. No worse with ambulation. Does not awaken her. Pain can be as high as 7-8 with activities, best at 3-4. No pain meds utilized. No blood in urine. denies fatigue until late in the day. Appetite is good, but she has lost about 3 pounds. She is avoiding fat products as she has an upset stomach from fats. Had a URI, treated with azithromycin, Flonase and a codeine containing cough suppressant, still has some residual cough. No diarrhea. No dyspnea. has constipation. \par \par 18...On atezolizumab since 18. Feels well. Still has some pains, not clearly as severe as before. It is worse if she walks for a while, such as several hundred feet. Also more prominent if she stands too long. Does not bother her at night or awaken her. It does not stop her activities. Takes an occasional ibuprofen, not daily. Appetite is good, weight is stable. No N/V. No diarrhea. No cough/WALKER. Some fatigue. No leg edema. \par \par 18...Feels "okay". Still has some back pain, nothing like it was before. Has a bunion of the foot which is bothersome and the podiatrist has recommended surgery. No cough/sputum,. No diarrhea. Appetite is good, but lost 2 pounds since last visit. No other pains. No headaches, no paresthesias. No dizziness noted, no balance issues. Mild fatigue, improved.\par \par 18...Cycle 6 was administered on 18, due #7 on 18. "I'm feeling good". Noted some pressure and noted urinary frequency about one week ago, then resolved. No dysuria, no blood, no frequency, nocturia x 1. The usual lower back pain, no pain at the current time. Extra exertion brings on the low back pain and she is not sure if this is from the cancer or from the prior back surgery. She notes several areas over her skin becoming depigmented in small spots. No pruritus. Appetite is good, no weight loss. Actually gained a few pounds. No diarrhea. has some fatigue towards the end of the day. No dizziness. No paresthesias. No cough/WALKER. \par \par 18...On atezo since 18. Has received 7 cycles. Saw Dr. Hargrove late , cysto recommended, but she decided to wait until  after vacation. She notes lightening of her skin. She didn't go to the dermatologist. She has a prior dermatologist that she might see. No diarrhea. No upset stomach. Appetite is ":great", no weight loss. No cough/WALKER. No paresthesias. Minor fatigue along with aches in the evenings. No headaches, no dizziness. No leg edema. Slight hematuria. \par \par 18...last scans in May\par Reports skin changes due to the vitiligo has been bothering her.  Reports this has been happening more since last month.  Has also been taking Valium from two years ago occasionally for anxiety.  Reports has taken it once every 2-3 weeks, only when she feels overwhelmed with anxiety.  Has not seen an psychiatrist yet, but has been seeing a psychologist.  She has seeing her for one year..  When she gets anxious it occurs mostly at night.  Denies any pain, except after walking a long period of time.  Will occasionally use a cane.  Reports no urinary frequency.  No urinary incontinence, no hematuria.  Reports regular bowel movements.  Reports good appetite, occasional dyspepsia with fried foods.  \par \par 18...Did not have an appt today, was added onto schedule. She has urgency, no dysuria, no foul odor. She feels bloated as well for the past 3 days. She wonders if it is related to her vitiligo, which is prominent in the urogenital area. Urine is clear, no blood. No fevers, no chills. Appetite is "great", gaining weight. No cough/WALKER. NO diarrheal stools, last BM yesterday, normal. No N/V. No vitiligo is more prominent, which concerns her. She says the bottom of her feet are tender, at the ball of the feet. Toes are numb. This has occurred over the last week. She has also had cramps in her hands. Mostly the thumb, told of he had an injection for her trigger finger. received cycle # 11 of atezolizumab today. \par \par 10/11/18...dose # 12 today. Saw Paulie Hargrove, plan for cysto. "I'm doing well".  She says that her feet always feels like there "is something there", involving the toes, no longer affects the ball of the foot. Saw her podiatrist. Cysto is scheduled for the . Her vitiligo is more prominent his is prominent on the hands. She feels that some parts of her hands are darker as well. Appetite is "fabulous". No N/V/D/C. Urine flow is good, no incontinence, no blood, no dysuria. Urgency and fullness sensation resolved. No cough/SOB. No headaches. No fatigue\par \par 10/31/18....Dose #13 today. Feels well at this time. Back pain remains the same as before, no pain med use. If she does extra activities she has pain. She had back surgery before and she believes it is from the prior surgery and not the mets. No fatigue. Appetite is good, weight is stable., No diarrheal stools. No hematuria noted. No dysuria. Usual activities performed without impairment. Vitiligo is somewhat more prominent, which disturbs her. No leg edema. No cough, no WALKER. No upset stomach. No fevers, no chills. Had cysto on , all was normal. \par \par 18...Feels "okay". Pain in the back somewhat more prominent, the pain from her prior surgery.Appetite is jeramy, no weight loss. No cough/WALKER. No diarrhea, no upset stomach. Some minor fatigue. She thinks she is depressed, attends a support group here. Starting with a therapist. He  left her recently. [place] \par \par 18...Last scans 18. Getting laser therapy for her vitiligo, which she may not continue due to high c-pays. feels as if her tongue is sensitive to heat since starting the laser therapy. She senses salt more than before. Otherwise well, back pain "bearable", RTS, "part of my life". No pain meds used. Appetite is good, weight is stable. No diarrheal stools No cough, No SOB. She has some sensation in the bladder or vaginal area, pruritic, calmed with Vagisil.  She is concerned about some blood in the urine, microscopic....she had a cysto on 10/22/18, revealing no issue. She asked about clearance for sexual activity. No fatigue, no headaches. No dysuria, but occasional mild irritation. No incontinence. No urinary frequency, rare nocturia. \par \par 1/3/19 : On atezolizumab since 18. Feels "great". No pains except for usual aches. Appetite is good, weight has increased. No cough, no WALKER. No diarrheal stools, No upset stomach. No edema. No skin rashes. Vitiligo is "going crazy", goes 2 times a week for laser therapy. States she may stop the laser therapy. \par \par 19...19 : Here for Atezolizumab(since ) and follow up. \par She has been well overall without any AEs aside form vitiligo which is a known side effect of these ICI's. \par \par 19 : One year on atezolizumab. Feels "fine". No diarrheal stool, no dyspepsia, no cough/WALKER. Appetite s good, no weight loss. No headaches, some paresthesias of the feet, no change. Has seen neurology and received injections. Can't open her hands at times. No fatigue, no pruritus. NO skin rashes. Vitiligo continues to be more prominent. Went for laser therapy, wit stopped it. She says her lips blistered. Vitiligo affects genitalia, present prior to Rx, has vitiligo of the hands, axilla and breasts. \par \par 3/6/19...3/6/19 : Ms. Crespo is here for a follow up and atezolizumab. She has been having lower back pain that resembled pain she had when she had recurrence. She underwent MRI on 3/3/19. This showed re-demonstrating the spine lesions, no changes were noted on the MRI. She has DJD and bulging disc with narrowing in L4-5.\par No new lesions were seen. She has not followed up with Dr. VINICIUS Kauffman for this either. OTHerwise she feels well. \par \par 19...saw her orthopedic surgeon, who said the pain is not related to her cancer, but to scar tissue. He gave her Flexeril and Meloxicam. He referred her for PT. The pain is much better. The knot" is not as big as before. Otherwise, :fabulous". Appetite is good, weight is stable. Has some fatigue, noted at the end of the day. No pruritus, no rash. Vitiligo is progressive. No cough, no WALKER. No nausea, no vomiting, occ upset stomach. No diarrheal stools. Now 14 months on therapy. \par \par 19...On Atezo since 2018. Had some back pain exacerbation recently, was on meloxicam and cyclobenzaprine, which has resolved to a great degree. She returned to work in April. Feels well otherwise, has a "head cold", with congestion, yellow sputum. Taking Claritin and other drugs, nasal congestion and runny nose improved. She is concerned it has moved to the chest. No F/C, no SOB, no wheezing. Appetite is good, no weight loss. No diarrheal stools., no upset stomach. Fatigue is present, in  the evening, she is "done". This has been the case since she returned to work. No rash, no pruritus. Vitiligo is progressing.\par \par 19...Working and has fatigue. She feels that the fatigue is mostly due to the work schedule. 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 Cycle 25 is today. Overall, feels well. has some back pain, which she feels is related to her prior surgery. No hematuria noted, no incontinence. No dysuria. Appetite is excellent, no weight loss. No cough, No WALKER. No diarrheal stools. No F/C. No edema. \par \par 19...Now 1.5 years on atezo at this time. Feels well overall. has an occasional tingling when she voids, which she attributes to the genital vitiligo. She has some cream to apply. She is working as a  to the elderly and one of her clients  this AM. This upsets her somewhat. She became upset and tearful. Appetite is normal, weight is stable. She continues to have some back pains as before, related to prior surgery, perhaps somewhat more since she had to expend more effort. No diarrheal stools, no cough. No SOB No headaches, no dizziness. No fatigue, taking Geritol, which she feels has helped her. No fevers, no chills, no urinary issues.\par \par 9/10/19...Feels "wonderful". Back pain RTS, not severe, has taken some oxycodone at bed time recently Uses CPAP to sleep, occasionally awakened by back pain. She saw her orthopedist, who said that everything looked the same.Appetite is good, no weight loss. No cough, no WALKER, No diarrheal stools. No fatigue. No rash, no pruritus. Occ headaches, feel like sinus headaches, no meds, brief duration, slight in intensity. No edema. Orthopedist did plain films. Occ tramadol use. \par \par 10/31/19...On atezo since 2018. Now has more below back pains Become worse in July or August, not present all the time. Occasionally awakens her. Takes oxycodone or hydromorphone infrequently. Pain at 4 currently, worst at  4 in the last 24, best at zero. No worse with walking, actually better. Appetite is "great". Weight stable. o N/V/C/D. No cough, no dyspnea. Working, has some fatigue at the end of the day. No headaches, occ paresthesias.  She had prior surgery with rods in her back. Going t see derm as well. Has some "tingling when she voids, which she attributes to the worsening vitiligo in the urogenital area. NO blood, nocturia x 1, flow is fine, no incontinence. No dysuria. No edema. Wants t cancel  appointment, wants to go to [place].\par \par 19...Had an MRI of the neck, ordered by Dr Naveen Kauffman, who performed her lumbar surgery. She was told she requires surgery in the neck. She was started on meloxicam, which she says does not help.  dose will be # 33. The neck pain feels "like a monkey on my back", started about 6 weeks ago after her last visit. She was told it has nothing to do with her cancer. She did not bring the MRI results with her. Appetite is "great", no weight loss. No blood in the urine, no dysuria. She was started on some topicals and fluconazole for genital rash/vaginitis, saw both GYN and derm. No fatigue. Started atezo in 2018. No edema. No diarrheal stools, no cough/WALKER. getting some PT/hydrotherapy. \par \par 20...Feels well today. Had some gyn issues, told of a urine infection, treated with Cipro and Diflucan, completed both. Saw her PCP for an annual exam. She had large leucocyte esterase, few bacteria , but a negative culture. She has been noting chills recently. No fevers. NO fatigue. Appetite is fine, no weight loss. Back pains are "okay", no meds used. No edema. No cough, No WALKER. No diarrheal stools. She was on her CPAP recently and was also treated with a Z pack. \par \par 20...Saw GYN for routine follow up and had some pruritus as well. She was having intermittent chills as well as some abdominal fullness. No findings on exam apparently. She was sent for a sono, which revealed a mass, vascular, MRI ordered, not done as yet. She feels pressure in the bladder area. The pressure is constant, not increased with voiding, but has a "sensation". Recent cysto was negative. Appetite is good, weight increased. No other areas of pain/discomfort. No fevers,no chills. She has her usual low back pains. She has pressure in the lower pelvis, much better compared to 2 weeks ago. PCP felt a left supraclavicular mass and ordered a CXR.\par \par 20...Completed 2 years of atezolizumab in February. Has had sinus infection, on third course of antibiotics, to have a procedure in 3 weeks. Seeing a TMJ specialist for pain in her jaw. Recently had a partial denture made and she can't use it due to the pain. Back pain the same. "I live with it". She dropped 7 pounds and she can't eat like she used to. Latest antibiotic was doxycycline. She is on Mobic for the jaw pains. Otherwise well. NO blood in urine, no dysuria. no incontinence. No fatigue. No cough, no dyspnea. No headaches. No nausea, no vomiting. Vitiligo progressing\par \par 10/27/20...Verbal permission for telehealth services granted by the patient, Juju Da Silva on 2020 at 10:25 AM\par Feels "okay". To see a gastroenterologist as she is having issues with dairy, stared earlier this year. She has an upset stomach with certain meals with bloating, no N/V/D. If she takes Linzess, she feels better. She does not take it every day. Appetite is good, no weight loss. Back pains continues, may be up to 6 or 7, every day pains, no change form last visit. Rare pain med use. Takes Tylenol ES if he wakes her up at night. No opioids used. No pains elsewhere. Sitting down and getting up brings on the pains, better with activities. No cough, no WALKER. No edema. No hematuria. Saw Dr Hargrove for urinary frequency and pressure in the lower abdomen. No fevers, no chills. NO headaches. NO dizziness. \par Had UTI, seen in in urgent care, had fevers/chills.  She was on several antibiotics for sinusitis from February onwards. PET shows continual opacification of the sinus. Has some jaw pains, and she says this cannot be addressed until sinus cared for. Urine was negative recently. PET shows ZACH. \par \par 2/3/21 - Presents for follow up, refused to do telehealth as she was scheduled for.  Feels well. Appetite is good, gained weight, up 5.5 kilos form last recorded weight. Has not had coronavirus. No edema. No N/V/D/C. No blood in urine, no incontinence. No headaches, no dizziness, some paresthesias since her back surgery, no worse after the chemotherapy. No tinnitus. No fatigue. \par  [de-identified] : high-grade [FreeTextEntry1] : cis/gem, started chemo 11/3/17 as neoadjuvant, then bone mets, had RT. Now on atezolizumab since February 12, 2018, ended 2/4/20.  [de-identified] : 8/3/21 - Presents for follow up. had a fall and had a fracture i her lumbar sine. She had a wound in her leg and was seeing a wound specialist. She is on gabapentin for neuropathy, dose unknown, only at bedtime. She had her films done at [facility]. She says she had an MRI as well. Thinks it was L1 that was fractured. She has chronic low back pain, worse with bending, always some issue present. The neuropathy involves the bottom of the feet and toes. This is only on the right side. The gabapentin helps to some degree. Feels well otherwise, no other sites of pain. Appetite is good, no weight loss that she perceives, although down 3.7 kilos from May. No blood, in the urine, no dysuria, no incontinence. No urgency. No edema noted. No headaches, no dizziness. No cough no WALKER. No N/V/D/C. She saw ortho at [facility] and they wanted to do epidural. Due to the infection, it was not done. After healing of the wound, she no longer needed the epidural. Can't recall he name of the ortho surgeon, she will call with the name. \par

## 2021-12-03 PROBLEM — M54.50 LOW BACK PAIN SYNDROME: Status: ACTIVE | Noted: 2019-03-06

## 2021-12-06 ENCOUNTER — OUTPATIENT (OUTPATIENT)
Dept: OUTPATIENT SERVICES | Facility: HOSPITAL | Age: 71
LOS: 1 days | Discharge: HOME | End: 2021-12-06

## 2021-12-06 DIAGNOSIS — Z98.89 OTHER SPECIFIED POSTPROCEDURAL STATES: Chronic | ICD-10-CM

## 2021-12-06 DIAGNOSIS — Z90.710 ACQUIRED ABSENCE OF BOTH CERVIX AND UTERUS: Chronic | ICD-10-CM

## 2021-12-06 DIAGNOSIS — Z98.890 OTHER SPECIFIED POSTPROCEDURAL STATES: Chronic | ICD-10-CM

## 2021-12-06 DIAGNOSIS — C67.9 MALIGNANT NEOPLASM OF BLADDER, UNSPECIFIED: ICD-10-CM

## 2021-12-06 DIAGNOSIS — N63 UNSPECIFIED LUMP IN BREAST: Chronic | ICD-10-CM

## 2021-12-07 ENCOUNTER — APPOINTMENT (OUTPATIENT)
Dept: HEMATOLOGY ONCOLOGY | Facility: CLINIC | Age: 71
End: 2021-12-07
Payer: MEDICARE

## 2021-12-07 ENCOUNTER — RESULT REVIEW (OUTPATIENT)
Age: 71
End: 2021-12-07

## 2021-12-07 VITALS
WEIGHT: 158.07 LBS | BODY MASS INDEX: 25.1 KG/M2 | HEIGHT: 66.54 IN | OXYGEN SATURATION: 96 % | TEMPERATURE: 97.2 F | SYSTOLIC BLOOD PRESSURE: 117 MMHG | HEART RATE: 71 BPM | DIASTOLIC BLOOD PRESSURE: 77 MMHG | RESPIRATION RATE: 16 BRPM

## 2021-12-07 DIAGNOSIS — M54.50 LOW BACK PAIN, UNSPECIFIED: ICD-10-CM

## 2021-12-07 LAB
ALBUMIN SERPL ELPH-MCNC: 4.2 G/DL
ALP BLD-CCNC: 93 U/L
ALT SERPL-CCNC: 14 U/L
ANION GAP SERPL CALC-SCNC: 12 MMOL/L
APPEARANCE: CLEAR
AST SERPL-CCNC: 16 U/L
BACTERIA: NEGATIVE
BASOPHILS # BLD AUTO: 0.04 K/UL — SIGNIFICANT CHANGE UP (ref 0–0.2)
BASOPHILS NFR BLD AUTO: 0.7 % — SIGNIFICANT CHANGE UP (ref 0–2)
BILIRUB SERPL-MCNC: 0.4 MG/DL
BILIRUBIN URINE: NEGATIVE
BLOOD URINE: NEGATIVE
BUN SERPL-MCNC: 14 MG/DL
CALCIUM SERPL-MCNC: 9.5 MG/DL
CHLORIDE SERPL-SCNC: 104 MMOL/L
CO2 SERPL-SCNC: 26 MMOL/L
COLOR: NORMAL
CREAT SERPL-MCNC: 0.69 MG/DL
EOSINOPHIL # BLD AUTO: 0.16 K/UL — SIGNIFICANT CHANGE UP (ref 0–0.5)
EOSINOPHIL NFR BLD AUTO: 2.9 % — SIGNIFICANT CHANGE UP (ref 0–6)
GLUCOSE QUALITATIVE U: NEGATIVE
GLUCOSE SERPL-MCNC: 112 MG/DL
HCT VFR BLD CALC: 40.9 % — SIGNIFICANT CHANGE UP (ref 34.5–45)
HGB BLD-MCNC: 13 G/DL — SIGNIFICANT CHANGE UP (ref 11.5–15.5)
HYALINE CASTS: 0 /LPF
IMM GRANULOCYTES NFR BLD AUTO: 0.4 % — SIGNIFICANT CHANGE UP (ref 0–1.5)
KETONES URINE: NEGATIVE
LEUKOCYTE ESTERASE URINE: NEGATIVE
LYMPHOCYTES # BLD AUTO: 1.93 K/UL — SIGNIFICANT CHANGE UP (ref 1–3.3)
LYMPHOCYTES # BLD AUTO: 34.5 % — SIGNIFICANT CHANGE UP (ref 13–44)
MAGNESIUM SERPL-MCNC: 1.9 MG/DL
MCHC RBC-ENTMCNC: 28.9 PG — SIGNIFICANT CHANGE UP (ref 27–34)
MCHC RBC-ENTMCNC: 31.8 G/DL — LOW (ref 32–36)
MCV RBC AUTO: 90.9 FL — SIGNIFICANT CHANGE UP (ref 80–100)
MICROSCOPIC-UA: NORMAL
MONOCYTES # BLD AUTO: 0.74 K/UL — SIGNIFICANT CHANGE UP (ref 0–0.9)
MONOCYTES NFR BLD AUTO: 13.2 % — SIGNIFICANT CHANGE UP (ref 2–14)
NEUTROPHILS # BLD AUTO: 2.7 K/UL — SIGNIFICANT CHANGE UP (ref 1.8–7.4)
NEUTROPHILS NFR BLD AUTO: 48.3 % — SIGNIFICANT CHANGE UP (ref 43–77)
NITRITE URINE: NEGATIVE
NRBC # BLD: 0 /100 WBCS — SIGNIFICANT CHANGE UP (ref 0–0)
PH URINE: 6.5
PLATELET # BLD AUTO: 195 K/UL — SIGNIFICANT CHANGE UP (ref 150–400)
POTASSIUM SERPL-SCNC: 4.1 MMOL/L
PROT SERPL-MCNC: 6.8 G/DL
PROTEIN URINE: NORMAL
RBC # BLD: 4.5 M/UL — SIGNIFICANT CHANGE UP (ref 3.8–5.2)
RBC # FLD: 15 % — HIGH (ref 10.3–14.5)
RED BLOOD CELLS URINE: 8 /HPF
SODIUM SERPL-SCNC: 142 MMOL/L
SPECIFIC GRAVITY URINE: 1.02
SQUAMOUS EPITHELIAL CELLS: 10 /HPF
TSH SERPL-ACNC: 0.6 UIU/ML
UROBILINOGEN URINE: NORMAL
WBC # BLD: 5.59 K/UL — SIGNIFICANT CHANGE UP (ref 3.8–10.5)
WBC # FLD AUTO: 5.59 K/UL — SIGNIFICANT CHANGE UP (ref 3.8–10.5)
WHITE BLOOD CELLS URINE: 3 /HPF

## 2021-12-07 PROCEDURE — 99213 OFFICE O/P EST LOW 20 MIN: CPT

## 2021-12-07 RX ORDER — GABAPENTIN 100 MG/1
100 CAPSULE ORAL
Refills: 0 | Status: DISCONTINUED | COMMUNITY
End: 2021-12-07

## 2021-12-07 RX ORDER — IBUPROFEN 600 MG/1
600 TABLET, FILM COATED ORAL
Refills: 0 | Status: DISCONTINUED | COMMUNITY
End: 2021-12-07

## 2021-12-07 RX ORDER — IBUPROFEN 400 MG/1
400 TABLET, FILM COATED ORAL 3 TIMES DAILY
Qty: 30 | Refills: 0 | Status: DISCONTINUED | COMMUNITY
Start: 2021-05-04 | End: 2021-12-07

## 2021-12-07 NOTE — HISTORY OF PRESENT ILLNESS
[Disease: _____________________] : Disease: [unfilled] [T: ___] : T[unfilled] [N: ___] : N[unfilled] [M: ___] : M[unfilled] [AJCC Stage: ____] : AJCC Stage: [unfilled] [de-identified] : Ms. 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 The tumor was found on cystoscopy at the right ureteral orifice. 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 She is referred for consideration of neoadjuvant chemotherapy. In late May, at the time of scheduled back surgery, she thought she had a UTI. Urine was tested and she was treated, then had the surgery. She had burning post-op. She was treated again with an antibiotic. She went to Rehab and was treated again and she was seen by a urologist there. Went to PCP after discharge from rehab, he noted some blood in the urine. She went to [facility] ER and \par she was referred to Dr. Hargrove. She underwent a cystoscopy, revealing tumor. She never noted blood in her urine, but did note it was darker than usual. Still has some "tingling" sensation, no incontinence. Nocturia occurs, which she says is related to awakening from CPAP. No cough. WALKER/bone pains.\par \par 10/27/17...[place] [facility] [place]. They wanted to repeat procedures again, including cystoscopy. They called  again today. Saw Dr. Montalvo. She was asked to return next Thursday. They want to do a PET CT scan. They recommended she receive cis/gem, likely due to the glandular differentiation. She was overwhelmed by all the information she was given. No pains, no cough/SOB.  \par \par 10/27/17...Juju completed cycle 1 this past Friday, and she noticed on Saturday that her lips appeared swollen and she noticed sores in her mouth. This occurred after going out to eat chinese food.Today she feels that it may be going away. She has fatigue, and feels chills but no fever. She just feels " lousy."  She is feeling Nausea this time and if she takes the metoclopramide in time, then she is okay, She denies vomiting. She is also having some dysgeusia, She denies paraesthesias of the fingers and toes.  She is having constipation, and she is controlling it with the stool softeners.She states her appetite is ok, but the food doesn't taste good, and it hurts when she swallows\par \par 17...Chemo with cis/gem #2 due this Friday.has increased lower back pain. The pain had stopped. She had prior back surgery. The pain became more notable since starting the chemo. She had constipation with the chemo and noted an increase in the pain with the constipation in lower back near the site of the surgery. The pain feels like pressure, described as heavy, somewhat relieved by BM. Worse with activities. Took some oxycodone a few days ago, but had a headaches afterwards. Pain score currently at 6-7, no recent pain med use. Taking MiraLAX, senna, Colace and prune juice with occasional MOM. Appetite is good, some altered taste, when present, affects appetite. Had some nausea and vomited x 3 after initial chemo, more gagging than vomiting. She felt she had some sores in the mouth after the gem alone, resolved. No paresthesias. She had some discomfort in the right wrist area, at the site where chemo was administered. Fatigue present all the time, more since the start of chemo. No hematuria, good flow, no urgency or incontinence as before. \par \par 17...day 15, cycle #2, cis/gem.  Juju has increased sensitivity in the lips after chemo. She had some vomiting at the end of her chemo infusion, however she did not have increased nausea and vomiting since. She has fatigue, and she complains of constipation and is taking miralax. She does complain of back pain that has increased in intensity.She notices it when she bends over. She feels that there is something noticeably there in her back. She takes pain medication to help her fall asleep. She doesn't sleep well. She does use c-pap secondary to sleep apnea but she is awaken from her sleep secondary to nocturia. She also notices her pain more when she has constipation and has to go to the bathroom. She describes her lower back pain as 8/10 at worst, and best 5/10. HEr back pain went away after the surgery and she noticed it came back once starting Chemo. She states her appetite is good, She does complain of dysgeusia. She denies paraesthesias. She does not like how the oxycodone makes her feel. It gives her HAs.\par \par 18...Had RT from the  to the . She feels there is some decrease in the pain, not as piercing. She feels the area is stiff. Worse when she awakens. Takes 3 x 325 Tylenol at night which helps the pain. has constipation, went one week w/o evacuation. Was given lactulose, but she feels that MiraLAX works better. She is less active as a result of the pain. the pain is worse when she bends. Appetite is variable. No weight loss. Has occasional nausea. No vomiting. Has some indigestion at times. No blood in the urine, no dysuria. No incontinence. Fatigue is present. No pains elsewhere. \par \par 3/14/18...She still has some pain. She is doing PT, which helps temporarily. She has not had the relief of pain that she thought would be the result. Pain score at 4-5, described as aggravation and it inhibits activities. Worse with bending, getting in and out of cars. She is not taking hydromorphone at this time.. She is distressed with constipation from the pain meds. Says lactulose does not work. She takes some Tylenol sporadically, not daily. Appetite is good, lost 1 kilo. No hematuria, no dysuria, no frequency, no incontinence. No pains elsewhere. She remains very active. She has alopecia. No diarrhea. No cough/WALKER. Fatigue is present, mild. She feels it at the end of the day. No N/V. \par \par 18...Completed 3 cycles of Tecentriq. She noes curling of the right fourth finger involuntarily. She can bend it back up, but it hurts to do so. No difficulty with strength on the right hand, no tremors.  She has tried Icy Hot w/o benefit. She points to DIP and PIP joints as the sites of pain. She feels she has lumps on her head. She is losing her hair, likely secondary to the chemo she received. Her scalp is pruritic. She has also lost pubic hair. Her pain continues to get better,went to PT for 7 weeks. She is able to do all normal activities, with mild fatigue. Appetite is good, weight is stable. No dizziness, no unsteadiness, no headaches. No other issues. No hematuria. \par \par 18...Missed Rx on 18. feels "great". Still has back pains, much improved. No pain while seated, but will come on after a while. No worse with ambulation. Does not awaken her. Pain can be as high as 7-8 with activities, best at 3-4. No pain meds utilized. No blood in urine. denies fatigue until late in the day. Appetite is good, but she has lost about 3 pounds. She is avoiding fat products as she has an upset stomach from fats. Had a URI, treated with azithromycin, Flonase and a codeine containing cough suppressant, still has some residual cough. No diarrhea. No dyspnea. has constipation. \par \par 18...On atezolizumab since 18. Feels well. Still has some pains, not clearly as severe as before. It is worse if she walks for a while, such as several hundred feet. Also more prominent if she stands too long. Does not bother her at night or awaken her. It does not stop her activities. Takes an occasional ibuprofen, not daily. Appetite is good, weight is stable. No N/V. No diarrhea. No cough/WALKER. Some fatigue. No leg edema. \par \par 18...Feels "okay". Still has some back pain, nothing like it was before. Has a bunion of the foot which is bothersome and the podiatrist has recommended surgery. No cough/sputum,. No diarrhea. Appetite is good, but lost 2 pounds since last visit. No other pains. No headaches, no paresthesias. No dizziness noted, no balance issues. Mild fatigue, improved.\par \par 18...Cycle 6 was administered on 18, due #7 on 18. "I'm feeling good". Noted some pressure and noted urinary frequency about one week ago, then resolved. No dysuria, no blood, no frequency, nocturia x 1. The usual lower back pain, no pain at the current time. Extra exertion brings on the low back pain and she is not sure if this is from the cancer or from the prior back surgery. She notes several areas over her skin becoming depigmented in small spots. No pruritus. Appetite is good, no weight loss. Actually gained a few pounds. No diarrhea. has some fatigue towards the end of the day. No dizziness. No paresthesias. No cough/WALKER. \par \par 18...On atezo since 18. Has received 7 cycles. Saw Dr. Hargrove late , cysto recommended, but she decided to wait until  after vacation. She notes lightening of her skin. She didn't go to the dermatologist. She has a prior dermatologist that she might see. No diarrhea. No upset stomach. Appetite is ":great", no weight loss. No cough/WALKER. No paresthesias. Minor fatigue along with aches in the evenings. No headaches, no dizziness. No leg edema. Slight hematuria. \par \par 18...last scans in May\par Reports skin changes due to the vitiligo has been bothering her.  Reports this has been happening more since last month.  Has also been taking Valium from two years ago occasionally for anxiety.  Reports has taken it once every 2-3 weeks, only when she feels overwhelmed with anxiety.  Has not seen an psychiatrist yet, but has been seeing a psychologist.  She has seeing her for one year..  When she gets anxious it occurs mostly at night.  Denies any pain, except after walking a long period of time.  Will occasionally use a cane.  Reports no urinary frequency.  No urinary incontinence, no hematuria.  Reports regular bowel movements.  Reports good appetite, occasional dyspepsia with fried foods.  \par \par 18...Did not have an appt today, was added onto schedule. She has urgency, no dysuria, no foul odor. She feels bloated as well for the past 3 days. She wonders if it is related to her vitiligo, which is prominent in the urogenital area. Urine is clear, no blood. No fevers, no chills. Appetite is "great", gaining weight. No cough/WALKER. NO diarrheal stools, last BM yesterday, normal. No N/V. No vitiligo is more prominent, which concerns her. She says the bottom of her feet are tender, at the ball of the feet. Toes are numb. This has occurred over the last week. She has also had cramps in her hands. Mostly the thumb, told of he had an injection for her trigger finger. received cycle # 11 of atezolizumab today. \par \par 10/11/18...dose # 12 today. Saw Paulie Hargrove, plan for cysto. "I'm doing well".  She says that her feet always feels like there "is something there", involving the toes, no longer affects the ball of the foot. Saw her podiatrist. Cysto is scheduled for the . Her vitiligo is more prominent his is prominent on the hands. She feels that some parts of her hands are darker as well. Appetite is "fabulous". No N/V/D/C. Urine flow is good, no incontinence, no blood, no dysuria. Urgency and fullness sensation resolved. No cough/SOB. No headaches. No fatigue\par \par 10/31/18....Dose #13 today. Feels well at this time. Back pain remains the same as before, no pain med use. If she does extra activities she has pain. She had back surgery before and she believes it is from the prior surgery and not the mets. No fatigue. Appetite is good, weight is stable., No diarrheal stools. No hematuria noted. No dysuria. Usual activities performed without impairment. Vitiligo is somewhat more prominent, which disturbs her. No leg edema. No cough, no WALKER. No upset stomach. No fevers, no chills. Had cysto on , all was normal. \par \par 18...Feels "okay". Pain in the back somewhat more prominent, the pain from her prior surgery.Appetite is jeramy, no weight loss. No cough/WALKER. No diarrhea, no upset stomach. Some minor fatigue. She thinks she is depressed, attends a support group here. Starting with a therapist. He  left her recently. [place]. \par \par 18...Last scans 18. Getting laser therapy for her vitiligo, which she may not continue due to high c-pays. feels as if her tongue is sensitive to heat since starting the laser therapy. She senses salt more than before. Otherwise well, back pain "bearable", RTS, "part of my life". No pain meds used. Appetite is good, weight is stable. No diarrheal stools No cough, No SOB. She has some sensation in the bladder or vaginal area, pruritic, calmed with Vagisil.  She is concerned about some blood in the urine, microscopic....she had a cysto on 10/22/18, revealing no issue. She asked about clearance for sexual activity. No fatigue, no headaches. No dysuria, but occasional mild irritation. No incontinence. No urinary frequency, rare nocturia. \par \par 1/3/19 : On atezolizumab since 18. Feels "great". No pains except for usual aches. Appetite is good, weight has increased. No cough, no WALKER. No diarrheal stools, No upset stomach. No edema. No skin rashes. Vitiligo is "going crazy", goes 2 times a week for laser therapy. States she may stop the laser therapy. \par \par 19...19 : Here for Atezolizumab(since ) and follow up. \par She has been well overall without any AEs aside form vitiligo which is a known side effect of these ICI's. \par \par 19 : One year on atezolizumab. Feels "fine". No diarrheal stool, no dyspepsia, no cough/WALKER. Appetite s good, no weight loss. No headaches, some paresthesias of the feet, no change. Has seen neurology and received injections. Can't open her hands at times. No fatigue, no pruritus. NO skin rashes. Vitiligo continues to be more prominent. Went for laser therapy, wit stopped it. She says her lips blistered. Vitiligo affects genitalia, present prior to Rx, has vitiligo of the hands, axilla and breasts. \par \par 3/6/19...3/6/19 : Ms. Crespo is here for a follow up and atezolizumab. She has been having lower back pain that resembled pain she had when she had recurrence. She underwent MRI on 3/3/19. This showed re-demonstrating the spine lesions, no changes were noted on the MRI. She has DJD and bulging disc with narrowing in L4-5.\par No new lesions were seen. She has not followed up with Dr. VINICIUS Kauffman for this either. OTHerwise she feels well. \par \par 19...saw her orthopedic surgeon, who said the pain is not related to her cancer, but to scar tissue. He gave her Flexeril and Meloxicam. He referred her for PT. The pain is much better. The knot" is not as big as before. Otherwise, :fabulous". Appetite is good, weight is stable. Has some fatigue, noted at the end of the day. No pruritus, no rash. Vitiligo is progressive. No cough, no WALKER. No nausea, no vomiting, occ upset stomach. No diarrheal stools. Now 14 months on therapy. \par \par 19...On Atezo since 2018. Had some back pain exacerbation recently, was on meloxicam and cyclobenzaprine, which has resolved to a great degree. She returned to work in April. Feels well otherwise, has a "head cold", with congestion, yellow sputum. Taking Claritin and other drugs, nasal congestion and runny nose improved. She is concerned it has moved to the chest. No F/C, no SOB, no wheezing. Appetite is good, no weight loss. No diarrheal stools., no upset stomach. Fatigue is present, in  the evening, she is "done". This has been the case since she returned to work. No rash, no pruritus. Vitiligo is progressing.\par \par 19...Working and has fatigue. She feels that the fatigue is mostly due to the work schedule. 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 Cycle 25 is today. Overall, feels well. has some back pain, which she feels is related to her prior surgery. No hematuria noted, no incontinence. No dysuria. Appetite is excellent, no weight loss. No cough, No WALKER. No diarrheal stools. No F/C. No edema. \par \par 19...Now 1.5 years on atezo at this time. Feels well overall. has an occasional tingling when she voids, which she attributes to the genital vitiligo. She has some cream to apply. She is working as a  to the elderly and one of her clients  this AM. This upsets her somewhat. She became upset and tearful. Appetite is normal, weight is stable. She continues to have some back pains as before, related to prior surgery, perhaps somewhat more since she had to expend more effort. No diarrheal stools, no cough. No SOB No headaches, no dizziness. No fatigue, taking Geritol, which she feels has helped her. No fevers, no chills, no urinary issues.\par \par 9/10/19...Feels "wonderful". Back pain RTS, not severe, has taken some oxycodone at bed time recently Uses CPAP to sleep, occasionally awakened by back pain. She saw her orthopedist, who said that everything looked the same.Appetite is good, no weight loss. No cough, no WALKER, No diarrheal stools. No fatigue. No rash, no pruritus. Occ headaches, feel like sinus headaches, no meds, brief duration, slight in intensity. No edema. Orthopedist did plain films. Occ tramadol use. \par \par 10/31/19...On atezo since 2018. Now has more below back pains Become worse in July or August, not present all the time. Occasionally awakens her. Takes oxycodone or hydromorphone infrequently. Pain at 4 currently, worst at  4 in the last 24, best at zero. No worse with walking, actually better. Appetite is "great". Weight stable. o N/V/C/D. No cough, no dyspnea. Working, has some fatigue at the end of the day. No headaches, occ paresthesias.  She had prior surgery with rods in her back. Going t see derm as well. Has some "tingling when she voids, which she attributes to the worsening vitiligo in the urogenital area. NO blood, nocturia x 1, flow is fine, no incontinence. No dysuria. No edema. Wants t cancel  appointment, wants to go to [place].\par \par 19...Had an MRI of the neck, ordered by Dr Naveen Kauffman, who performed her lumbar surgery. She was told she requires surgery in the neck. She was started on meloxicam, which she says does not help.  dose will be # 33. The neck pain feels "like a monkey on my back", started about 6 weeks ago after her last visit. She was told it has nothing to do with her cancer. She did not bring the MRI results with her. Appetite is "great", no weight loss. No blood in the urine, no dysuria. She was started on some topicals and fluconazole for genital rash/vaginitis, saw both GYN and derm. No fatigue. Started atezo in 2018. No edema. No diarrheal stools, no cough/WALKER. getting some PT/hydrotherapy. \par \par 20...Feels well today. Had some gyn issues, told of a urine infection, treated with Cipro and Diflucan, completed both. Saw her PCP for an annual exam. She had large leucocyte esterase, few bacteria , but a negative culture. She has been noting chills recently. No fevers. NO fatigue. Appetite is fine, no weight loss. Back pains are "okay", no meds used. No edema. No cough, No WALKER. No diarrheal stools. She was on her CPAP recently and was also treated with a Z pack. \par \par 20...Saw GYN for routine follow up and had some pruritus as well. She was having intermittent chills as well as some abdominal fullness. No findings on exam apparently. She was sent for a sono, which revealed a mass, vascular, MRI ordered, not done as yet. She feels pressure in the bladder area. The pressure is constant, not increased with voiding, but has a "sensation". Recent cysto was negative. Appetite is good, weight increased. No other areas of pain/discomfort. No fevers,no chills. She has her usual low back pains. She has pressure in the lower pelvis, much better compared to 2 weeks ago. PCP felt a left supraclavicular mass and ordered a CXR.\par \par 20...Completed 2 years of atezolizumab in February. Has had sinus infection, on third course of antibiotics, to have a procedure in 3 weeks. Seeing a TMJ specialist for pain in her jaw. Recently had a partial denture made and she can't use it due to the pain. Back pain the same. "I live with it". She dropped 7 pounds and she can't eat like she used to. Latest antibiotic was doxycycline. She is on Mobic for the jaw pains. Otherwise well. NO blood in urine, no dysuria. no incontinence. No fatigue. No cough, no dyspnea. No headaches. No nausea, no vomiting. Vitiligo progressing\par \par 10/27/20...Verbal permission for telehealth services granted by the patient, Juju Da Silva on 2020 at 10:25 AM\par Feels "okay". To see a gastroenterologist as she is having issues with dairy, stared earlier this year. She has an upset stomach with certain meals with bloating, no N/V/D. If she takes Linzess, she feels better. She does not take it every day. Appetite is good, no weight loss. Back pains continues, may be up to 6 or 7, every day pains, no change form last visit. Rare pain med use. Takes Tylenol ES if he wakes her up at night. No opioids used. No pains elsewhere. Sitting down and getting up brings on the pains, better with activities. No cough, no WALKER. No edema. No hematuria. Saw Dr Hargrove for urinary frequency and pressure in the lower abdomen. No fevers, no chills. NO headaches. NO dizziness. \par Had UTI, seen in in urgent care, had fevers/chills.  She was on several antibiotics for sinusitis from February onwards. PET shows continual opacification of the sinus. Has some jaw pains, and she says this cannot be addressed until sinus cared for. Urine was negative recently. PET shows ZACH. \par \par 2/3/21 - Presents for follow up, refused to do telehealth as she was scheduled for.  Feels well. Appetite is good, gained weight, up 5.5 kilos form last recorded weight. Has not had coronavirus. No edema. No N/V/D/C. No blood in urine, no incontinence. No headaches, no dizziness, some paresthesias since her back surgery, no worse after the chemotherapy. No tinnitus. No fatigue. \par \par 8/3/21 - Presents for follow up. had a fall and had a fracture i her lumbar sine. She had a wound in her leg and was seeing a wound specialist. She is on gabapentin for neuropathy, dose unknown, only at bedtime. She had her films done [facility]. She says she had an MRI as well. Thinks it was L1 that was fractured. She has chronic low back pain, worse with bending, always some issue present. The neuropathy involves the bottom of the feet and toes. This is only on the right side. The gabapentin helps to some degree. Feels well otherwise, no other sites of pain. Appetite is good, no weight loss that she perceives, although down 3.7 kilos from May. No blood, in the urine, no dysuria, no incontinence. No urgency. No edema noted. No headaches, no dizziness. No cough no WALKER. No N/V/D/C. She saw ortho at [facility] and they wanted to do epidural. Due to the infection, it was not done. After healing of the wound, she no longer needed the epidural. Can't recall he name of the ortho surgeon, she will call with the name. \par  [de-identified] : high-grade [FreeTextEntry1] : cis/gem, started chemo 11/3/17 as neoadjuvant, then bone mets, had RT. Now on atezolizumab since February 12, 2018, ended 2/4/20.  [de-identified] : 12/7/21...almost 2 years sine the end of 2 years of atezolizumab. Feels "fine". Has her usual aches and pains no change. Appetite is good, follows with PCP, ha A1c checked, says she eliminated sugar and sweets. Appetite is good, dropped 4 kilos since August. No N/V/D/C. No cough, no WALKER. Usual low back pains, prior surgery. No meds used. Urine is "perfect". No blood, no dysuria. no incontinence. Last saw Paulie Hargrove in September 2020. The leg wound healed. No headaches, no dizziness. She has paresthesias, since the surgery, no longer taking the gabapentin, wears socks at night, which helps. No F/C. No leg edema. Usual fatigue, no change.

## 2021-12-07 NOTE — CONSULT LETTER
[Dear  ___] : Dear  [unfilled], [Courtesy Letter:] : I had the pleasure of seeing your patient, [unfilled], in my office today. [Please see my note below.] : Please see my note below. [Consult Closing:] : Thank you very much for allowing me to participate in the care of this patient.  If you have any questions, please do not hesitate to contact me. [Sincerely,] : Sincerely, [DrAzam  ___] : Dr. BRANDT [FreeTextEntry2] : Dr. Kalen Kauffman MD [facility]

## 2021-12-07 NOTE — PHYSICAL EXAM
[Fully active, able to carry on all pre-disease performance without restriction] : Status 0 - Fully active, able to carry on all pre-disease performance without restriction [Normal] : affect appropriate [de-identified] : no edema

## 2021-12-07 NOTE — ASSESSMENT
[Palliative Care Plan] : not applicable at this time [FreeTextEntry1] : Juju is seen in the office today in follow-up.  She was diagnosed with urothelial carcinoma and referred to me for neoadjuvant chemotherapy.  She had tumor at the right ureteral orifice.  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  She started on treatment with cisplatin and gemcitabine in November 2017 and then developed excruciating low back pain.  She was found to have tumor within the lumbar spine.  She had previous surgery in her back and had chronic pain at that site which worsened.  She received radiation therapy and then was started on atezolizumab in February 2018.  She completed 2 years of treatment in February 2020.  A PET scan at that time revealed no evidence of disease.\par \par She is almost 2 years since the end of her atezolizumab treatment.  She states that she feels "fine".  She has her usual aches and pains which are unchanged.  Her appetite is good.  Her primary care doctor has been monitoring her hemoglobin A1c, and she has eliminated sugar and sweets from her diet.  Her appetite is good but she dropped 4 kg since August as a result of the dietary change.  There is no nausea vomiting diarrhea or constipation.  She has no cough or shortness of breath.  She has her usual low back pains.  She does not take any medications.  Her urine is "perfect".  There is no blood or dysuria.  She has no incontinence.  She last saw Dr. Hargrove in September 2020.  Her urinary cytology was negative at that time.  She had a leg wound that has healed.  She reports no headaches.  There is no dizziness.  She does have paresthesias since the surgery in her back.  She is no longer taking the gabapentin.  She wears socks at night which seems to help.  The neuropathy affects the toes and is more on the right side than the left.  She has her usual fatigue which is unchanged.  She was told of developing cataracts recently.\par \par On physical examination, she appears well.  There is no palpable adenopathy.  There is no spinal column or chest wall tenderness to palpation or percussion.  The lungs are clear and the heart examination is normal.  There is no palpable abnormality upon examination of the abdomen.  The extremities are normal.\par \par She has not had any imaging in almost 2 years.  A CT urogram will be performed.  I told her to make an appointment with Dr. Vivar, and I contacted him via the EMR to notify that she is doing well.  The urine was sent along with her blood work today.\par \par All questions were answered to the best of my ability and to her apparent satisfaction.  I explained the need for repeat imaging and urologic follow-up, as she could have a second primary tumor develop.  I will see her once again in 4 months time.

## 2021-12-07 NOTE — REVIEW OF SYSTEMS
[Fatigue] : fatigue [Recent Change In Weight] : ~T recent weight change [Anxiety] : anxiety [Negative] : Gastrointestinal [Fever] : no fever [Chills] : no chills [Chest Pain] : no chest pain [Palpitations] : no palpitations [Lower Ext Edema] : no lower extremity edema [Wheezing] : no wheezing [Cough] : no cough [SOB on Exertion] : no shortness of breath during exertion [Dysuria] : no dysuria [Incontinence] : no incontinence [Joint Pain] : no joint pain [Joint Stiffness] : no joint stiffness [Muscle Pain] : no muscle pain [Skin Rash] : no skin rash [Skin Wound] : no skin wound [Dizziness] : no dizziness [Depression] : no depression [Muscle Weakness] : no muscle weakness [Easy Bleeding] : no tendency for easy bleeding [Easy Bruising] : no tendency for easy bruising [FreeTextEntry9] : no cramps [de-identified] : No HA, paresthesias after back surgery, R>L, toes only

## 2021-12-10 ENCOUNTER — NON-APPOINTMENT (OUTPATIENT)
Age: 71
End: 2021-12-10

## 2021-12-13 ENCOUNTER — OUTPATIENT (OUTPATIENT)
Dept: OUTPATIENT SERVICES | Facility: HOSPITAL | Age: 71
LOS: 1 days | End: 2021-12-13
Payer: MEDICARE

## 2021-12-13 ENCOUNTER — APPOINTMENT (OUTPATIENT)
Dept: CT IMAGING | Facility: CLINIC | Age: 71
End: 2021-12-13
Payer: MEDICARE

## 2021-12-13 DIAGNOSIS — Z98.89 OTHER SPECIFIED POSTPROCEDURAL STATES: Chronic | ICD-10-CM

## 2021-12-13 DIAGNOSIS — Z98.890 OTHER SPECIFIED POSTPROCEDURAL STATES: Chronic | ICD-10-CM

## 2021-12-13 DIAGNOSIS — Z90.710 ACQUIRED ABSENCE OF BOTH CERVIX AND UTERUS: Chronic | ICD-10-CM

## 2021-12-13 DIAGNOSIS — N63 UNSPECIFIED LUMP IN BREAST: Chronic | ICD-10-CM

## 2021-12-13 DIAGNOSIS — C68.9 MALIGNANT NEOPLASM OF URINARY ORGAN, UNSPECIFIED: ICD-10-CM

## 2021-12-13 LAB
APPEARANCE: CLEAR
BACTERIA: NEGATIVE
BILIRUBIN URINE: NEGATIVE
BLOOD URINE: NORMAL
COLOR: YELLOW
GLUCOSE QUALITATIVE U: NEGATIVE
HYALINE CASTS: 2 /LPF
KETONES URINE: NEGATIVE
LEUKOCYTE ESTERASE URINE: ABNORMAL
MICROSCOPIC-UA: NORMAL
NITRITE URINE: NEGATIVE
PH URINE: 6
PROTEIN URINE: NORMAL
RED BLOOD CELLS URINE: 5 /HPF
SPECIFIC GRAVITY URINE: 1.02
SQUAMOUS EPITHELIAL CELLS: 5 /HPF
UROBILINOGEN URINE: NORMAL
WHITE BLOOD CELLS URINE: 2 /HPF

## 2021-12-13 PROCEDURE — 74178 CT ABD&PLV WO CNTR FLWD CNTR: CPT | Mod: 26,MH

## 2021-12-13 PROCEDURE — 74178 CT ABD&PLV WO CNTR FLWD CNTR: CPT

## 2021-12-14 ENCOUNTER — APPOINTMENT (OUTPATIENT)
Dept: UROLOGY | Facility: CLINIC | Age: 71
End: 2021-12-14
Payer: MEDICARE

## 2021-12-14 DIAGNOSIS — R39.89 OTHER SYMPTOMS AND SIGNS INVOLVING THE GENITOURINARY SYSTEM: ICD-10-CM

## 2021-12-14 PROCEDURE — 88112 CYTOPATH CELL ENHANCE TECH: CPT | Mod: 26

## 2021-12-14 PROCEDURE — 99213 OFFICE O/P EST LOW 20 MIN: CPT

## 2021-12-14 NOTE — HISTORY OF PRESENT ILLNESS
[FreeTextEntry1] : Patient seen initially last year for management of urethral pain which began following spinal fusion L1-4 - she had a adame for 3 days. After which she noted shooting pain in the urethra at the end of voiding and was associated with increased urinary frequency, urgency and incontinence. She was treated with Ciprofloxacin for a UTI. While in rehab the symptoms recurred/continued and she was treated with ciprofloxacin, oxybutynin and Pyridium. This helped the frequency but not the pain.I ended up doing a cystoscopy due to microscopic hematuria which noted a bladder tumor. She is now s/p TURBT. The tumor was sessile and covering the tight UO which i unroofed. Pathology c/w pT2 disease. F/u CT scan with no evidence of metastasis. There is no hydronephrosis though distal right ureter dilated. 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 Now S/P XRT to lesion and on Tecentriq with stable disease on recent scan.\par seen in June, today noting some feeling in her bladder; she notes some feeling of more frequency and a tingly sensation not discomfort after voiding. No hematuria or change in FOS. \par reviewed recent scan and no obvious tumor in bladder wall. \par Comes back with some same feelings - feels pressure alleviates to some degree; Ibuprofen takes care of it. No dysuria or hematuria though some increased frequency. Recent UA negative and culture negative.  \par \par about to finish therapy - here as notes chills with no fever, increased urinary frequency and sensation of tingling with voiding; no dysuria or hematuria. PCP checked UA which looked like UTI but culture was negative; took days of Cipro with no help. F/U UA - 6 RBC 7 WBCs. \par had cystoscopy 1/10 - negative. PET scan and MRI 2/20 both OK. Including area of spine. \par \par has been on Cefdinir foe sinus infection; then had fever associated with bladder pressure and increased frequency. Seen in Urgent care and started on Cipro -  improvement within days See negative culture in system. Now back to baseline though even before this has a bit more frequency than in the past \par \par 12/21 noted increased urgency and urine has strong odor. UA not impressive for UTI and has + 50K Strep Bovis. \par  \par

## 2021-12-15 LAB
BACTERIA UR CULT: ABNORMAL
URINE CYTOLOGY: NORMAL

## 2022-04-15 ENCOUNTER — OUTPATIENT (OUTPATIENT)
Dept: OUTPATIENT SERVICES | Facility: HOSPITAL | Age: 72
LOS: 1 days | Discharge: HOME | End: 2022-04-15

## 2022-04-15 DIAGNOSIS — Z90.710 ACQUIRED ABSENCE OF BOTH CERVIX AND UTERUS: Chronic | ICD-10-CM

## 2022-04-15 DIAGNOSIS — C67.9 MALIGNANT NEOPLASM OF BLADDER, UNSPECIFIED: ICD-10-CM

## 2022-04-15 DIAGNOSIS — Z98.89 OTHER SPECIFIED POSTPROCEDURAL STATES: Chronic | ICD-10-CM

## 2022-04-15 DIAGNOSIS — N63 UNSPECIFIED LUMP IN BREAST: Chronic | ICD-10-CM

## 2022-04-15 DIAGNOSIS — Z98.890 OTHER SPECIFIED POSTPROCEDURAL STATES: Chronic | ICD-10-CM

## 2022-04-19 ENCOUNTER — APPOINTMENT (OUTPATIENT)
Dept: HEMATOLOGY ONCOLOGY | Facility: CLINIC | Age: 72
End: 2022-04-19
Payer: MEDICARE

## 2022-04-19 VITALS
BODY MASS INDEX: 27.46 KG/M2 | RESPIRATION RATE: 16 BRPM | DIASTOLIC BLOOD PRESSURE: 76 MMHG | HEIGHT: 65.67 IN | TEMPERATURE: 97.3 F | HEART RATE: 83 BPM | OXYGEN SATURATION: 95 % | SYSTOLIC BLOOD PRESSURE: 121 MMHG | WEIGHT: 168.85 LBS

## 2022-04-19 PROCEDURE — 99213 OFFICE O/P EST LOW 20 MIN: CPT

## 2022-04-19 RX ORDER — PENICILLIN V POTASSIUM 500 MG/1
500 TABLET, FILM COATED ORAL 3 TIMES DAILY
Qty: 21 | Refills: 0 | Status: DISCONTINUED | COMMUNITY
Start: 2021-12-14 | End: 2022-04-19

## 2022-04-19 NOTE — PHYSICAL EXAM
[Fully active, able to carry on all pre-disease performance without restriction] : Status 0 - Fully active, able to carry on all pre-disease performance without restriction [Normal] : affect appropriate [de-identified] : no edema

## 2022-04-19 NOTE — ASSESSMENT
[Palliative Care Plan] : not applicable at this time [FreeTextEntry1] : Juju is seen in the office in follow-up today.  She completed 2 years of atezolizumab in February 2020.  She recently had a mammogram done and she was noted to have some microcalcifications.  This was done at [facility].  She is set up for stereotactic biopsy, and she is obviously very concerned.  She had breast cancer in the left breast previously and underwent a lumpectomy and radiation.  She said there was a lot of concern as to whether or not she needed radiation at that time.  She has some anxiety as a result of this development.  There was no palpable mass.  She otherwise feels well.  Her appetite is good and there is no weight loss or weight gain.  She has no headaches or dizziness.  There is no edema.  She has no chest pain chest pressure or palpitations.  There is some mild constipation at times.  She is taking Linzess which she says helps a great deal.  She was recently told that she has cataracts but they are not mature.  She does have some paresthesias which are unchanged, and they are present after her prior back surgery.\par \par On physical examination, she appears well.  Her performance status is 0.  There is no palpable adenopathy present.  The chest is clear and the heart examination is normal.  There is no edema in extremities.  The abdominal examination is normal.  There is some pain to percussion over the lumbar spine which has been the case for considerable time at the site where she had prior surgery.\par \par Her last CT scan was performed in December 2021.  This did not reveal any evidence of disease recurrence.  I will see her once again in 3 months time, and will likely do another CT scan at that point.  All questions were answered to the best of my ability and to her apparent satisfaction.

## 2022-04-19 NOTE — REVIEW OF SYSTEMS
[Constipation] : constipation [Anxiety] : anxiety [Negative] : ENT [Fever] : no fever [Chills] : no chills [Fatigue] : no fatigue [Recent Change In Weight] : ~T no recent weight change [Chest Pain] : no chest pain [Palpitations] : no palpitations [Lower Ext Edema] : no lower extremity edema [Wheezing] : no wheezing [Cough] : no cough [SOB on Exertion] : no shortness of breath during exertion [Abdominal Pain] : no abdominal pain [Vomiting] : no vomiting [Diarrhea] : no diarrhea [Dysuria] : no dysuria [Incontinence] : no incontinence [Joint Pain] : no joint pain [Joint Stiffness] : no joint stiffness [Muscle Pain] : no muscle pain [Skin Rash] : no skin rash [Dizziness] : no dizziness [Difficulty Walking] : no difficulty walking [Depression] : no depression [Muscle Weakness] : no muscle weakness [Easy Bleeding] : no tendency for easy bleeding [Easy Bruising] : no tendency for easy bruising [FreeTextEntry8] : no blood [FreeTextEntry4] : has cataracts [FreeTextEntry9] : no cramps [de-identified] : No HA, has paresthesias, no change, prior back surgery

## 2022-04-19 NOTE — HISTORY OF PRESENT ILLNESS
[Disease: _____________________] : Disease: [unfilled] [T: ___] : T[unfilled] [N: ___] : N[unfilled] [M: ___] : M[unfilled] [AJCC Stage: ____] : AJCC Stage: [unfilled] [de-identified] : Ms. 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 The tumor was found on cystoscopy at the right ureteral orifice. 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 She is referred for consideration of neoadjuvant chemotherapy. In late May, at the time of scheduled back surgery, she thought she had a UTI. Urine was tested and she was treated, then had the surgery. She had burning post-op. She was treated again with an antibiotic. She went to Rehab and was treated again and she was seen by a urologist there. Went to PCP after discharge from rehab, he noted some blood in the urine. She went to [facility] ER and \par she was referred to Dr. Hargrove. She underwent a cystoscopy, revealing tumor. She never noted blood in her urine, but did note it was darker than usual. Still has some "tingling" sensation, no incontinence. Nocturia occurs, which she says is related to awakening from CPAP. No cough. WALKER/bone pains.\par \par 10/27/17...[place] [facility] [place]. They wanted to repeat procedures again, including cystoscopy. They called  again today. Saw Dr. Montalvo. She was asked to return next Thursday. They want to do a PET CT scan. They recommended she receive cis/gem, likely due to the glandular differentiation. She was overwhelmed by all the information she was given. No pains, no cough/SOB.  \par \par 10/27/17...Juju completed cycle 1 this past Friday, and she noticed on Saturday that her lips appeared swollen and she noticed sores in her mouth. This occurred after going out to eat chinese food.Today she feels that it may be going away. She has fatigue, and feels chills but no fever. She just feels " lousy."  She is feeling Nausea this time and if she takes the metoclopramide in time, then she is okay, She denies vomiting. She is also having some dysgeusia, She denies paraesthesias of the fingers and toes.  She is having constipation, and she is controlling it with the stool softeners.She states her appetite is ok, but the food doesn't taste good, and it hurts when she swallows\par \par 17...Chemo with cis/gem #2 due this Friday.has increased lower back pain. The pain had stopped. She had prior back surgery. The pain became more notable since starting the chemo. She had constipation with the chemo and noted an increase in the pain with the constipation in lower back near the site of the surgery. The pain feels like pressure, described as heavy, somewhat relieved by BM. Worse with activities. Took some oxycodone a few days ago, but had a headaches afterwards. Pain score currently at 6-7, no recent pain med use. Taking MiraLAX, senna, Colace and prune juice with occasional MOM. Appetite is good, some altered taste, when present, affects appetite. Had some nausea and vomited x 3 after initial chemo, more gagging than vomiting. She felt she had some sores in the mouth after the gem alone, resolved. No paresthesias. She had some discomfort in the right wrist area, at the site where chemo was administered. Fatigue present all the time, more since the start of chemo. No hematuria, good flow, no urgency or incontinence as before. \par \par 17...day 15, cycle #2, cis/gem.  Juju has increased sensitivity in the lips after chemo. She had some vomiting at the end of her chemo infusion, however she did not have increased nausea and vomiting since. She has fatigue, and she complains of constipation and is taking miralax. She does complain of back pain that has increased in intensity.She notices it when she bends over. She feels that there is something noticeably there in her back. She takes pain medication to help her fall asleep. She doesn't sleep well. She does use c-pap secondary to sleep apnea but she is awaken from her sleep secondary to nocturia. She also notices her pain more when she has constipation and has to go to the bathroom. She describes her lower back pain as 8/10 at worst, and best 5/10. HEr back pain went away after the surgery and she noticed it came back once starting Chemo. She states her appetite is good, She does complain of dysgeusia. She denies paraesthesias. She does not like how the oxycodone makes her feel. It gives her HAs.\par \par 18...Had RT from the  to the . She feels there is some decrease in the pain, not as piercing. She feels the area is stiff. Worse when she awakens. Takes 3 x 325 Tylenol at night which helps the pain. has constipation, went one week w/o evacuation. Was given lactulose, but she feels that MiraLAX works better. She is less active as a result of the pain. the pain is worse when she bends. Appetite is variable. No weight loss. Has occasional nausea. No vomiting. Has some indigestion at times. No blood in the urine, no dysuria. No incontinence. Fatigue is present. No pains elsewhere. \par \par 3/14/18...She still has some pain. She is doing PT, which helps temporarily. She has not had the relief of pain that she thought would be the result. Pain score at 4-5, described as aggravation and it inhibits activities. Worse with bending, getting in and out of cars. She is not taking hydromorphone at this time.. She is distressed with constipation from the pain meds. Says lactulose does not work. She takes some Tylenol sporadically, not daily. Appetite is good, lost 1 kilo. No hematuria, no dysuria, no frequency, no incontinence. No pains elsewhere. She remains very active. She has alopecia. No diarrhea. No cough/WALKER. Fatigue is present, mild. She feels it at the end of the day. No N/V. \par \par 18...Completed 3 cycles of Tecentriq. She noes curling of the right fourth finger involuntarily. She can bend it back up, but it hurts to do so. No difficulty with strength on the right hand, no tremors.  She has tried Icy Hot w/o benefit. She points to DIP and PIP joints as the sites of pain. She feels she has lumps on her head. She is losing her hair, likely secondary to the chemo she received. Her scalp is pruritic. She has also lost pubic hair. Her pain continues to get better,went to PT for 7 weeks. She is able to do all normal activities, with mild fatigue. Appetite is good, weight is stable. No dizziness, no unsteadiness, no headaches. No other issues. No hematuria. \par \par 18...Missed Rx on 18. feels "great". Still has back pains, much improved. No pain while seated, but will come on after a while. No worse with ambulation. Does not awaken her. Pain can be as high as 7-8 with activities, best at 3-4. No pain meds utilized. No blood in urine. denies fatigue until late in the day. Appetite is good, but she has lost about 3 pounds. She is avoiding fat products as she has an upset stomach from fats. Had a URI, treated with azithromycin, Flonase and a codeine containing cough suppressant, still has some residual cough. No diarrhea. No dyspnea. has constipation. \par \par 18...On atezolizumab since 18. Feels well. Still has some pains, not clearly as severe as before. It is worse if she walks for a while, such as several hundred feet. Also more prominent if she stands too long. Does not bother her at night or awaken her. It does not stop her activities. Takes an occasional ibuprofen, not daily. Appetite is good, weight is stable. No N/V. No diarrhea. No cough/WALKER. Some fatigue. No leg edema. \par \par 18...Feels "okay". Still has some back pain, nothing like it was before. Has a bunion of the foot which is bothersome and the podiatrist has recommended surgery. No cough/sputum,. No diarrhea. Appetite is good, but lost 2 pounds since last visit. No other pains. No headaches, no paresthesias. No dizziness noted, no balance issues. Mild fatigue, improved.\par \par 18...Cycle 6 was administered on 18, due #7 on 18. "I'm feeling good". Noted some pressure and noted urinary frequency about one week ago, then resolved. No dysuria, no blood, no frequency, nocturia x 1. The usual lower back pain, no pain at the current time. Extra exertion brings on the low back pain and she is not sure if this is from the cancer or from the prior back surgery. She notes several areas over her skin becoming depigmented in small spots. No pruritus. Appetite is good, no weight loss. Actually gained a few pounds. No diarrhea. has some fatigue towards the end of the day. No dizziness. No paresthesias. No cough/WALKER. \par \par 18...On atezo since 18. Has received 7 cycles. Saw Dr. Hargrove late , cysto recommended, but she decided to wait until  after vacation. She notes lightening of her skin. She didn't go to the dermatologist. She has a prior dermatologist that she might see. No diarrhea. No upset stomach. Appetite is ":great", no weight loss. No cough/WALKER. No paresthesias. Minor fatigue along with aches in the evenings. No headaches, no dizziness. No leg edema. Slight hematuria. \par \par 18...last scans in May\par Reports skin changes due to the vitiligo has been bothering her.  Reports this has been happening more since last month.  Has also been taking Valium from two years ago occasionally for anxiety.  Reports has taken it once every 2-3 weeks, only when she feels overwhelmed with anxiety.  Has not seen an psychiatrist yet, but has been seeing a psychologist.  She has seeing her for one year..  When she gets anxious it occurs mostly at night.  Denies any pain, except after walking a long period of time.  Will occasionally use a cane.  Reports no urinary frequency.  No urinary incontinence, no hematuria.  Reports regular bowel movements.  Reports good appetite, occasional dyspepsia with fried foods.  \par \par 18...Did not have an appt today, was added onto schedule. She has urgency, no dysuria, no foul odor. She feels bloated as well for the past 3 days. She wonders if it is related to her vitiligo, which is prominent in the urogenital area. Urine is clear, no blood. No fevers, no chills. Appetite is "great", gaining weight. No cough/WALKER. NO diarrheal stools, last BM yesterday, normal. No N/V. No vitiligo is more prominent, which concerns her. She says the bottom of her feet are tender, at the ball of the feet. Toes are numb. This has occurred over the last week. She has also had cramps in her hands. Mostly the thumb, told of he had an injection for her trigger finger. received cycle # 11 of atezolizumab today. \par \par 10/11/18...dose # 12 today. Saw Paulie Hargrove, plan for cysto. "I'm doing well".  She says that her feet always feels like there "is something there", involving the toes, no longer affects the ball of the foot. Saw her podiatrist. Cysto is scheduled for the . Her vitiligo is more prominent his is prominent on the hands. She feels that some parts of her hands are darker as well. Appetite is "fabulous". No N/V/D/C. Urine flow is good, no incontinence, no blood, no dysuria. Urgency and fullness sensation resolved. No cough/SOB. No headaches. No fatigue\par \par 10/31/18....Dose #13 today. Feels well at this time. Back pain remains the same as before, no pain med use. If she does extra activities she has pain. She had back surgery before and she believes it is from the prior surgery and not the mets. No fatigue. Appetite is good, weight is stable., No diarrheal stools. No hematuria noted. No dysuria. Usual activities performed without impairment. Vitiligo is somewhat more prominent, which disturbs her. No leg edema. No cough, no WALKER. No upset stomach. No fevers, no chills. Had cysto on , all was normal. \par \par 18...Feels "okay". Pain in the back somewhat more prominent, the pain from her prior surgery.Appetite is jeramy, no weight loss. No cough/WALKER. No diarrhea, no upset stomach. Some minor fatigue. She thinks she is depressed, attends a support group here. Starting with a therapist. He  left her recently. [place] \par \par 18...Last scans 18. Getting laser therapy for her vitiligo, which she may not continue due to high c-pays. feels as if her tongue is sensitive to heat since starting the laser therapy. She senses salt more than before. Otherwise well, back pain "bearable", RTS, "part of my life". No pain meds used. Appetite is good, weight is stable. No diarrheal stools No cough, No SOB. She has some sensation in the bladder or vaginal area, pruritic, calmed with Vagisil.  She is concerned about some blood in the urine, microscopic....she had a cysto on 10/22/18, revealing no issue. She asked about clearance for sexual activity. No fatigue, no headaches. No dysuria, but occasional mild irritation. No incontinence. No urinary frequency, rare nocturia. \par \par 1/3/19 : On atezolizumab since 18. Feels "great". No pains except for usual aches. Appetite is good, weight has increased. No cough, no WALKER. No diarrheal stools, No upset stomach. No edema. No skin rashes. Vitiligo is "going crazy", goes 2 times a week for laser therapy. States she may stop the laser therapy. \par \par 19...19 : Here for Atezolizumab(since ) and follow up. \par She has been well overall without any AEs aside form vitiligo which is a known side effect of these ICI's. \par \par 19 : One year on atezolizumab. Feels "fine". No diarrheal stool, no dyspepsia, no cough/WALKER. Appetite s good, no weight loss. No headaches, some paresthesias of the feet, no change. Has seen neurology and received injections. Can't open her hands at times. No fatigue, no pruritus. NO skin rashes. Vitiligo continues to be more prominent. Went for laser therapy, wit stopped it. She says her lips blistered. Vitiligo affects genitalia, present prior to Rx, has vitiligo of the hands, axilla and breasts. \par \par 3/6/19...3/6/19 : Ms. Crespo is here for a follow up and atezolizumab. She has been having lower back pain that resembled pain she had when she had recurrence. She underwent MRI on 3/3/19. This showed re-demonstrating the spine lesions, no changes were noted on the MRI. She has DJD and bulging disc with narrowing in L4-5.\par No new lesions were seen. She has not followed up with Dr. VINICIUS Kauffman for this either. OTHerwise she feels well. \par \par 19...saw her orthopedic surgeon, who said the pain is not related to her cancer, but to scar tissue. He gave her Flexeril and Meloxicam. He referred her for PT. The pain is much better. The knot" is not as big as before. Otherwise, :fabulous". Appetite is good, weight is stable. Has some fatigue, noted at the end of the day. No pruritus, no rash. Vitiligo is progressive. No cough, no WALKER. No nausea, no vomiting, occ upset stomach. No diarrheal stools. Now 14 months on therapy. \par \par 19...On Atezo since 2018. Had some back pain exacerbation recently, was on meloxicam and cyclobenzaprine, which has resolved to a great degree. She returned to work in April. Feels well otherwise, has a "head cold", with congestion, yellow sputum. Taking Claritin and other drugs, nasal congestion and runny nose improved. She is concerned it has moved to the chest. No F/C, no SOB, no wheezing. Appetite is good, no weight loss. No diarrheal stools., no upset stomach. Fatigue is present, in  the evening, she is "done". This has been the case since she returned to work. No rash, no pruritus. Vitiligo is progressing.\par \par 19...Working and has fatigue. She feels that the fatigue is mostly due to the work schedule. 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 Cycle 25 is today. Overall, feels well. has some back pain, which she feels is related to her prior surgery. No hematuria noted, no incontinence. No dysuria. Appetite is excellent, no weight loss. No cough, No WALKER. No diarrheal stools. No F/C. No edema. \par \par 19...Now 1.5 years on atezo at this time. Feels well overall. has an occasional tingling when she voids, which she attributes to the genital vitiligo. She has some cream to apply. She is working as a  to the elderly and one of her clients  this AM. This upsets her somewhat. She became upset and tearful. Appetite is normal, weight is stable. She continues to have some back pains as before, related to prior surgery, perhaps somewhat more since she had to expend more effort. No diarrheal stools, no cough. No SOB No headaches, no dizziness. No fatigue, taking Geritol, which she feels has helped her. No fevers, no chills, no urinary issues.\par \par 9/10/19...Feels "wonderful". Back pain RTS, not severe, has taken some oxycodone at bed time recently Uses CPAP to sleep, occasionally awakened by back pain. She saw her orthopedist, who said that everything looked the same.Appetite is good, no weight loss. No cough, no WALKER, No diarrheal stools. No fatigue. No rash, no pruritus. Occ headaches, feel like sinus headaches, no meds, brief duration, slight in intensity. No edema. Orthopedist did plain films. Occ tramadol use. \par \par 10/31/19...On atezo since 2018. Now has more below back pains Become worse in July or August, not present all the time. Occasionally awakens her. Takes oxycodone or hydromorphone infrequently. Pain at 4 currently, worst at  4 in the last 24, best at zero. No worse with walking, actually better. Appetite is "great". Weight stable. o N/V/C/D. No cough, no dyspnea. Working, has some fatigue at the end of the day. No headaches, occ paresthesias.  She had prior surgery with rods in her back. Going t see derm as well. Has some "tingling when she voids, which she attributes to the worsening vitiligo in the urogenital area. NO blood, nocturia x 1, flow is fine, no incontinence. No dysuria. No edema. Wants t cancel  appointment, wants to go to [place].\par \par 19...Had an MRI of the neck, ordered by Dr Naveen Kauffman, who performed her lumbar surgery. She was told she requires surgery in the neck. She was started on meloxicam, which she says does not help.  dose will be # 33. The neck pain feels "like a monkey on my back", started about 6 weeks ago after her last visit. She was told it has nothing to do with her cancer. She did not bring the MRI results with her. Appetite is "great", no weight loss. No blood in the urine, no dysuria. She was started on some topicals and fluconazole for genital rash/vaginitis, saw both GYN and derm. No fatigue. Started atezo in 2018. No edema. No diarrheal stools, no cough/WALKER. getting some PT/hydrotherapy. \par \par 20...Feels well today. Had some gyn issues, told of a urine infection, treated with Cipro and Diflucan, completed both. Saw her PCP for an annual exam. She had large leucocyte esterase, few bacteria , but a negative culture. She has been noting chills recently. No fevers. NO fatigue. Appetite is fine, no weight loss. Back pains are "okay", no meds used. No edema. No cough, No WALKER. No diarrheal stools. She was on her CPAP recently and was also treated with a Z pack. \par \par 20...Saw GYN for routine follow up and had some pruritus as well. She was having intermittent chills as well as some abdominal fullness. No findings on exam apparently. She was sent for a sono, which revealed a mass, vascular, MRI ordered, not done as yet. She feels pressure in the bladder area. The pressure is constant, not increased with voiding, but has a "sensation". Recent cysto was negative. Appetite is good, weight increased. No other areas of pain/discomfort. No fevers,no chills. She has her usual low back pains. She has pressure in the lower pelvis, much better compared to 2 weeks ago. PCP felt a left supraclavicular mass and ordered a CXR.\par \par 20...Completed 2 years of atezolizumab in February. Has had sinus infection, on third course of antibiotics, to have a procedure in 3 weeks. Seeing a TMJ specialist for pain in her jaw. Recently had a partial denture made and she can't use it due to the pain. Back pain the same. "I live with it". She dropped 7 pounds and she can't eat like she used to. Latest antibiotic was doxycycline. She is on Mobic for the jaw pains. Otherwise well. NO blood in urine, no dysuria. no incontinence. No fatigue. No cough, no dyspnea. No headaches. No nausea, no vomiting. Vitiligo progressing\par \par 10/27/20...Verbal permission for telehealth services granted by the patient, Juju Da Silva on 2020 at 10:25 AM\par Feels "okay". To see a gastroenterologist as she is having issues with dairy, stared earlier this year. She has an upset stomach with certain meals with bloating, no N/V/D. If she takes Linzess, she feels better. She does not take it every day. Appetite is good, no weight loss. Back pains continues, may be up to 6 or 7, every day pains, no change form last visit. Rare pain med use. Takes Tylenol ES if he wakes her up at night. No opioids used. No pains elsewhere. Sitting down and getting up brings on the pains, better with activities. No cough, no WALKER. No edema. No hematuria. Saw Dr Hargrove for urinary frequency and pressure in the lower abdomen. No fevers, no chills. NO headaches. NO dizziness. \par Had UTI, seen in in urgent care, had fevers/chills.  She was on several antibiotics for sinusitis from February onwards. PET shows continual opacification of the sinus. Has some jaw pains, and she says this cannot be addressed until sinus cared for. Urine was negative recently. PET shows ZACH. \par \par 2/3/21 - Presents for follow up, refused to do telehealth as she was scheduled for.  Feels well. Appetite is good, gained weight, up 5.5 kilos form last recorded weight. Has not had coronavirus. No edema. No N/V/D/C. No blood in urine, no incontinence. No headaches, no dizziness, some paresthesias since her back surgery, no worse after the chemotherapy. No tinnitus. No fatigue. \par \par 8/3/21 - Presents for follow up. had a fall and had a fracture i her lumbar sine. She had a wound in her leg and was seeing a wound specialist. She is on gabapentin for neuropathy, dose unknown, only at bedtime. She had her films done at [facility]. She says she had an MRI as well. Thinks it was L1 that was fractured. She has chronic low back pain, worse with bending, always some issue present. The neuropathy involves the bottom of the feet and toes. This is only on the right side. The gabapentin helps to some degree. Feels well otherwise, no other sites of pain. Appetite is good, no weight loss that she perceives, although down 3.7 kilos from May. No blood, in the urine, no dysuria, no incontinence. No urgency. No edema noted. No headaches, no dizziness. No cough no WALKER. No N/V/D/C. She saw ortho at [facility] and they wanted to do epidural. Due to the infection, it was not done. After healing of the wound, she no longer needed the epidural. Can't recall he name of the ortho surgeon, she will call with the name. \par \par 21...almost 2 years since the end of 2 years of atezolizumab. Feels "fine". Has her usual aches and pains no change. Appetite is good, follows with PCP, ha A1c checked, says she eliminated sugar and sweets. Appetite is good, dropped 4 kilos since August. No N/V/D/C. No cough, no WALKER. Usual low back pains, prior surgery. No meds used. Urine is "perfect". No blood, no dysuria. no incontinence. Last saw Paulie Hargrove in 2020. The leg wound healed. No headaches, no dizziness. She has paresthesias, since the surgery, no longer taking the gabapentin, wears socks at night, which helps. No F/C. No leg edema. Usual fatigue, no change.  [de-identified] : high-grade [FreeTextEntry1] : cis/gem, started chemo 11/3/17 as neoadjuvant, then bone mets, had RT. Now on atezolizumab since February 12, 2018, ended 2/4/20.  [de-identified] : 4/19/22...completed atezo in February 2020, after 2 years of therapy. Had a mammogram done and she was noted to have microcalcifications, done at [facility]. She is set up for a stereotactic biopsy. This has alarmed her. She says she had a breast cancer in the left breast. had  a lumpectomy and RT at that time. She has some anxiety as a result of this development. there is no palpable mass. Otherwise feels well. Appetite is good, no weight loss/gain. No headaches, no dizziness. NO leg edema. No chest pain./pressure/palpitations. No N/V/D, occ constipation, on Linzess.

## 2022-04-19 NOTE — CONSULT LETTER
[Dear  ___] : Dear  [unfilled], [Courtesy Letter:] : I had the pleasure of seeing your patient, [unfilled], in my office today. [Please see my note below.] : Please see my note below. [Consult Closing:] : Thank you very much for allowing me to participate in the care of this patient.  If you have any questions, please do not hesitate to contact me. [Sincerely,] : Sincerely, [DrAzam  ___] : Dr. BRANDT [FreeTextEntry2] : Dr. Kalen Kauffman MD [facility]

## 2022-06-24 NOTE — DISCHARGE NOTE ADULT - NS AS DC STROKE DX YN
Left message to call back.   
Patient was seen with Aj Andrew MD on 6/22 and has future appointment on 8/1 with labs prior.   Patient has reviewed labs via [facility] tk:  Last viewed in Patient Portal:     6/16/2022  9:13 PM     By:     Jose Finley         This encounter closed.    
Unable to reach patient by phone.  Please explain to patient that the Lyme disease lab was negative however this can be a false negative and would advise he complete the 14 day course of doxycycline as prescribed.  LFTs very elevated. Had he been drinking more ETOH recently? Would like to recheck in 2 weeks and consider liver U/S but he can discuss with his new PCP- or he can switch to me as his PCP if he wants. Seek care ASAP if worsening symptoms.  Let me know if he is feeling same, better or worse.  
no

## 2022-07-04 ENCOUNTER — EMERGENCY (EMERGENCY)
Facility: HOSPITAL | Age: 72
LOS: 1 days | Discharge: HOME | End: 2022-07-04
Attending: STUDENT IN AN ORGANIZED HEALTH CARE EDUCATION/TRAINING PROGRAM | Admitting: STUDENT IN AN ORGANIZED HEALTH CARE EDUCATION/TRAINING PROGRAM

## 2022-07-04 VITALS
RESPIRATION RATE: 18 BRPM | SYSTOLIC BLOOD PRESSURE: 135 MMHG | HEIGHT: 67 IN | DIASTOLIC BLOOD PRESSURE: 76 MMHG | HEART RATE: 79 BPM | TEMPERATURE: 98 F | OXYGEN SATURATION: 100 %

## 2022-07-04 VITALS
DIASTOLIC BLOOD PRESSURE: 88 MMHG | SYSTOLIC BLOOD PRESSURE: 137 MMHG | OXYGEN SATURATION: 100 % | HEART RATE: 80 BPM | RESPIRATION RATE: 16 BRPM

## 2022-07-04 DIAGNOSIS — N63 UNSPECIFIED LUMP IN BREAST: Chronic | ICD-10-CM

## 2022-07-04 DIAGNOSIS — Z98.890 OTHER SPECIFIED POSTPROCEDURAL STATES: Chronic | ICD-10-CM

## 2022-07-04 DIAGNOSIS — Z98.89 OTHER SPECIFIED POSTPROCEDURAL STATES: Chronic | ICD-10-CM

## 2022-07-04 DIAGNOSIS — Z90.710 ACQUIRED ABSENCE OF BOTH CERVIX AND UTERUS: Chronic | ICD-10-CM

## 2022-07-04 LAB
ALBUMIN SERPL ELPH-MCNC: 4 G/DL — SIGNIFICANT CHANGE UP (ref 3.3–5)
ALP SERPL-CCNC: 89 U/L — SIGNIFICANT CHANGE UP (ref 40–120)
ALT FLD-CCNC: 13 U/L — SIGNIFICANT CHANGE UP (ref 4–33)
ANION GAP SERPL CALC-SCNC: 9 MMOL/L — SIGNIFICANT CHANGE UP (ref 7–14)
AST SERPL-CCNC: 16 U/L — SIGNIFICANT CHANGE UP (ref 4–32)
BASOPHILS # BLD AUTO: 0.03 K/UL — SIGNIFICANT CHANGE UP (ref 0–0.2)
BASOPHILS NFR BLD AUTO: 0.5 % — SIGNIFICANT CHANGE UP (ref 0–2)
BILIRUB SERPL-MCNC: 0.4 MG/DL — SIGNIFICANT CHANGE UP (ref 0.2–1.2)
BUN SERPL-MCNC: 18 MG/DL — SIGNIFICANT CHANGE UP (ref 7–23)
CALCIUM SERPL-MCNC: 9.5 MG/DL — SIGNIFICANT CHANGE UP (ref 8.4–10.5)
CHLORIDE SERPL-SCNC: 102 MMOL/L — SIGNIFICANT CHANGE UP (ref 98–107)
CO2 SERPL-SCNC: 27 MMOL/L — SIGNIFICANT CHANGE UP (ref 22–31)
CREAT SERPL-MCNC: 0.78 MG/DL — SIGNIFICANT CHANGE UP (ref 0.5–1.3)
EGFR: 81 ML/MIN/1.73M2 — SIGNIFICANT CHANGE UP
EOSINOPHIL # BLD AUTO: 0.23 K/UL — SIGNIFICANT CHANGE UP (ref 0–0.5)
EOSINOPHIL NFR BLD AUTO: 3.7 % — SIGNIFICANT CHANGE UP (ref 0–6)
GLUCOSE SERPL-MCNC: 110 MG/DL — HIGH (ref 70–99)
HCT VFR BLD CALC: 38.3 % — SIGNIFICANT CHANGE UP (ref 34.5–45)
HGB BLD-MCNC: 12.2 G/DL — SIGNIFICANT CHANGE UP (ref 11.5–15.5)
IANC: 3.2 K/UL — SIGNIFICANT CHANGE UP (ref 1.8–7.4)
IMM GRANULOCYTES NFR BLD AUTO: 0.2 % — SIGNIFICANT CHANGE UP (ref 0–1.5)
LYMPHOCYTES # BLD AUTO: 1.85 K/UL — SIGNIFICANT CHANGE UP (ref 1–3.3)
LYMPHOCYTES # BLD AUTO: 29.5 % — SIGNIFICANT CHANGE UP (ref 13–44)
MCHC RBC-ENTMCNC: 28.6 PG — SIGNIFICANT CHANGE UP (ref 27–34)
MCHC RBC-ENTMCNC: 31.9 GM/DL — LOW (ref 32–36)
MCV RBC AUTO: 89.9 FL — SIGNIFICANT CHANGE UP (ref 80–100)
MONOCYTES # BLD AUTO: 0.95 K/UL — HIGH (ref 0–0.9)
MONOCYTES NFR BLD AUTO: 15.2 % — HIGH (ref 2–14)
NEUTROPHILS # BLD AUTO: 3.2 K/UL — SIGNIFICANT CHANGE UP (ref 1.8–7.4)
NEUTROPHILS NFR BLD AUTO: 50.9 % — SIGNIFICANT CHANGE UP (ref 43–77)
NRBC # BLD: 0 /100 WBCS — SIGNIFICANT CHANGE UP
NRBC # FLD: 0 K/UL — SIGNIFICANT CHANGE UP
PLATELET # BLD AUTO: 196 K/UL — SIGNIFICANT CHANGE UP (ref 150–400)
POTASSIUM SERPL-MCNC: 3.7 MMOL/L — SIGNIFICANT CHANGE UP (ref 3.5–5.3)
POTASSIUM SERPL-SCNC: 3.7 MMOL/L — SIGNIFICANT CHANGE UP (ref 3.5–5.3)
PROT SERPL-MCNC: 6.9 G/DL — SIGNIFICANT CHANGE UP (ref 6–8.3)
RBC # BLD: 4.26 M/UL — SIGNIFICANT CHANGE UP (ref 3.8–5.2)
RBC # FLD: 14.1 % — SIGNIFICANT CHANGE UP (ref 10.3–14.5)
SODIUM SERPL-SCNC: 138 MMOL/L — SIGNIFICANT CHANGE UP (ref 135–145)
WBC # BLD: 6.27 K/UL — SIGNIFICANT CHANGE UP (ref 3.8–10.5)
WBC # FLD AUTO: 6.27 K/UL — SIGNIFICANT CHANGE UP (ref 3.8–10.5)

## 2022-07-04 PROCEDURE — 99284 EMERGENCY DEPT VISIT MOD MDM: CPT | Mod: GC

## 2022-07-04 PROCEDURE — 93971 EXTREMITY STUDY: CPT | Mod: 26,LT

## 2022-07-04 PROCEDURE — 71046 X-RAY EXAM CHEST 2 VIEWS: CPT | Mod: 26

## 2022-07-04 RX ORDER — KETOROLAC TROMETHAMINE 30 MG/ML
15 SYRINGE (ML) INJECTION ONCE
Refills: 0 | Status: DISCONTINUED | OUTPATIENT
Start: 2022-07-04 | End: 2022-07-04

## 2022-07-04 RX ORDER — OXYCODONE HYDROCHLORIDE 5 MG/1
5 TABLET ORAL ONCE
Refills: 0 | Status: DISCONTINUED | OUTPATIENT
Start: 2022-07-04 | End: 2022-07-04

## 2022-07-04 RX ORDER — GABAPENTIN 400 MG/1
100 CAPSULE ORAL ONCE
Refills: 0 | Status: COMPLETED | OUTPATIENT
Start: 2022-07-04 | End: 2022-07-04

## 2022-07-04 RX ADMIN — GABAPENTIN 100 MILLIGRAM(S): 400 CAPSULE ORAL at 03:05

## 2022-07-04 RX ADMIN — Medication 15 MILLIGRAM(S): at 03:05

## 2022-07-04 RX ADMIN — OXYCODONE HYDROCHLORIDE 5 MILLIGRAM(S): 5 TABLET ORAL at 05:47

## 2022-07-04 NOTE — ED PROVIDER NOTE - ATTENDING CONTRIBUTION TO CARE
72 yo f past medical history breast ca recent lumpectomy, remote hx of bladder ca, preDiabetes, with bilateral ue paresthesia, burning pain and reported LUE swelling. no trauma. no fever chills, cp sob, abd pain n/v, rash. exam as above, FROM of neck, no c spine tenderness, unable to reproduce exacerbate pain with cervical ROM. suspect neuropathic, r/o dvt. plan: labs, us, xr, symptom relief prn, reassess.

## 2022-07-04 NOTE — ED PROVIDER NOTE - NSICDXPASTMEDICALHX_GEN_ALL_CORE_FT
PAST MEDICAL HISTORY:  Anxiety     Bladder polyp     Breast CA, left lumpectomy / RTX in the past    HLD (hyperlipidemia)     Hypertension     Irritable bowel syndrome (IBS)     Polyp of colon "pre-cancerous" x 2, removed 11/2014    Sleep apnea uses  cpap machine

## 2022-07-04 NOTE — ED PROVIDER NOTE - OBJECTIVE STATEMENT
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 Patient has had pins and needles sensation and burning in bilateral UE since the lumpectomy. Today had swelling in the left upper extremity down to the hand with increased pain. Minor swelling in lower extremities. Denies SOB, CP, fevers, chills, nausea, vomiting, numbness. Endorses lower back pain that is chronic but worse today.

## 2022-07-04 NOTE — ED ADULT NURSE NOTE - OBJECTIVE STATEMENT
Pt a&POx4, ambulatory. PT in NAD, resp equal and unlabored. pmhx: breast ca, R sided lumpectomy 6/19, HTN. Pt c/o bilat hand/arm swelling x1 day L worse than R. L hand swelling observed. Swelling associated with burning pain throughout hand/arm for approx 2 weeks. Pt denies; numbness/tingling, heaviness in arm, CP, facial, droop, slurred speech, redness, rash, CP, SOB, fever/chills. 20G to R ac.

## 2022-07-04 NOTE — ED PROVIDER NOTE - CLINICAL SUMMARY MEDICAL DECISION MAKING FREE TEXT BOX
72 yo F with malignancy presents with left upper extremity swelling, bilateral upper extremity pain. Pain likely neuropathic however swelling indicative of dvt - will ultrasound LUE. Will rule out chf with labs. 72 yo F with malignancy presents with left upper extremity swelling, bilateral upper extremity pain. Pain likely neuropathic however swelling poss dvt - will ultrasound LUE. Will rule out chf with labs.

## 2022-07-04 NOTE — ED ADULT TRIAGE NOTE - CHIEF COMPLAINT QUOTE
6/19 had R side lumpectomy done 2/2 breast CA - c/o bilateral arm pain, swelling to hands today, body aches - L hand does appear swollen in triage

## 2022-07-04 NOTE — ED PROVIDER NOTE - PATIENT PORTAL LINK FT
You can access the FollowMyHealth Patient Portal offered by [facility] by registering at the following website: [email or web address]. By joining [facility]’s FollowMyHealth portal, you will also be able to view your health information using other applications (apps) compatible with our system.

## 2022-07-04 NOTE — ED PROVIDER NOTE - NS ED ROS FT
GENERAL: no fever, no chills, no weight loss  EYES: no change in vision, no irritation, no discharge, no redness, no pain  HEENT: no trouble swallowing or speaking, no throat pain, no ear pain  CARDIAC: no chest pain, no palpitations   PULMONARY: no cough, no shortness of breath, no wheezing  GI: no abdominal pain, no nausea, no vomiting, no diarrhea, no constipation, no melena, no hematochezia, no hematemesis  : no changes in urination, no dysuria, no frequency, no hematuria, no discharge  SKIN: no rashes  NEURO: no headache, no numbness, no weakness  MSK: +bilateral UE pain, LUE swelling, no joint pain, no muscle pain, no back pain, no calf pain

## 2022-07-04 NOTE — ED PROVIDER NOTE - PHYSICAL EXAMINATION
GENERAL: no acute distress, non-toxic appearing  HEAD: normocephalic, atraumatic  HEENT: normal conjunctiva, oral mucosa moist, neck supple  CARDIAC: regular rate and rhythm, normal S1 and S2,  no appreciable murmurs  PULM: clear to ascultation bilaterally, no crackles, rales, rhonchi, or wheezing  GI: abdomen nondistended, soft, nontender, no guarding or rebound tenderness  NEURO: alert and oriented x 3, normal speech, PERRLA, EOMI, no focal motor or sensory deficits, nonantalgic gait  MSK: +LUE: pitting edema in entirety of extremity with ttp, no erythema, Right upper extremity tender to palpation but no swelling no sensation deficits, pulses intact bilatearlly. No lower extremity swelling.   SKIN: no visible rashes, dry, well-perfused  PSYCH: appropriate mood and affect

## 2022-07-04 NOTE — ED PROVIDER NOTE - NSICDXFAMILYHX_GEN_ALL_CORE_FT
FAMILY HISTORY:  Mother  Still living? Unknown  Family history of coronary artery disease, Age at diagnosis: Age Unknown

## 2022-07-04 NOTE — ED PROVIDER NOTE - NSICDXPASTSURGICALHX_GEN_ALL_CORE_FT
PAST SURGICAL HISTORY:  Breast lump     H/O lumpectomy left     History of surgery right groin fatty tissue removed    Personal history of spine surgery L1-L4 fusion 2017    S/P      S/P colon resection reversal, had complications for fibriods    S/P hysterectomy

## 2022-07-05 ENCOUNTER — OUTPATIENT (OUTPATIENT)
Dept: OUTPATIENT SERVICES | Facility: HOSPITAL | Age: 72
LOS: 1 days | Discharge: HOME | End: 2022-07-05

## 2022-07-05 ENCOUNTER — APPOINTMENT (OUTPATIENT)
Dept: RADIATION ONCOLOGY | Facility: CLINIC | Age: 72
End: 2022-07-05

## 2022-07-05 VITALS
DIASTOLIC BLOOD PRESSURE: 83 MMHG | HEIGHT: 65 IN | RESPIRATION RATE: 17 BRPM | WEIGHT: 167.88 LBS | SYSTOLIC BLOOD PRESSURE: 123 MMHG | OXYGEN SATURATION: 95 % | BODY MASS INDEX: 27.97 KG/M2 | HEART RATE: 64 BPM | TEMPERATURE: 97.52 F

## 2022-07-05 DIAGNOSIS — Z90.710 ACQUIRED ABSENCE OF BOTH CERVIX AND UTERUS: Chronic | ICD-10-CM

## 2022-07-05 DIAGNOSIS — Z98.890 OTHER SPECIFIED POSTPROCEDURAL STATES: Chronic | ICD-10-CM

## 2022-07-05 DIAGNOSIS — Z98.89 OTHER SPECIFIED POSTPROCEDURAL STATES: Chronic | ICD-10-CM

## 2022-07-05 DIAGNOSIS — D05.11 INTRADUCTAL CARCINOMA IN SITU OF RIGHT BREAST: ICD-10-CM

## 2022-07-05 DIAGNOSIS — N63 UNSPECIFIED LUMP IN BREAST: Chronic | ICD-10-CM

## 2022-07-05 PROCEDURE — 99204 OFFICE O/P NEW MOD 45 MIN: CPT | Mod: 25

## 2022-07-05 PROCEDURE — 77263 THER RADIOLOGY TX PLNG CPLX: CPT

## 2022-07-05 NOTE — VITALS
[Maximal Pain Intensity: 4/10] : 4/10 [NoTreatment Scheduled] : no treatment scheduled [90: Able to carry normal activity; minor signs or symptoms of disease.] : 90: Able to carry normal activity; minor signs or symptoms of disease.  [Pain Location: ___] : Pain Location: [unfilled] [3 - Distress Level] : Distress Level: 3

## 2022-07-07 ENCOUNTER — NON-APPOINTMENT (OUTPATIENT)
Age: 72
End: 2022-07-07

## 2022-07-07 PROCEDURE — 77290 THER RAD SIMULAJ FIELD CPLX: CPT | Mod: 26

## 2022-07-07 PROCEDURE — 77333 RADIATION TREATMENT AID(S): CPT | Mod: 26

## 2022-07-12 PROBLEM — D05.11 DUCTAL CARCINOMA IN SITU (DCIS) OF RIGHT BREAST: Status: ACTIVE | Noted: 2022-07-12

## 2022-07-12 NOTE — REVIEW OF SYSTEMS
[Urinary Frequency] : urinary frequency [Nocturia] : nocturia [Gait Disturbance] : gait disturbance [Anxiety] : anxiety [Negative] : Heme/Lymph [Eye Pain] : no eye pain [Visual Disturbances] : no visual disturbances [Dysphagia] : no dysphagia [Nosebleeds] : no nosebleeds [Chest Pain] : no chest pain [Palpitations] : no palpitations [Shortness Of Breath] : no shortness of breath [Cough] : no cough [Joint Pain] : no joint pain [Skin Rash] : no skin rash [Confused] : no confusion [Dizziness] : no dizziness [de-identified] : healed right breast surgical incision

## 2022-07-12 NOTE — LETTER CLOSING
[Consult Closing:] : Thank you for allowing me to participate in the care of this patient.  If you have any questions, please do not hesitate to contact me. [Sincerely yours,] : Sincerely yours, [FreeTextEntry3] : Mali Kahn MD\par

## 2022-07-12 NOTE — PHYSICAL EXAM
[] : no respiratory distress [Exaggerated Use Of Accessory Muscles For Inspiration] : no accessory muscle use [Sclera] : the sclera and conjunctiva were normal [Heart Rate And Rhythm] : heart rate and rhythm were normal [Abdomen Soft] : soft [Nondistended] : nondistended [Supraclavicular Lymph Nodes Enlarged Bilaterally] : supraclavicular [Axillary Lymph Nodes Enlarged Bilaterally] : axillary [Normal] : oriented to person, place and time, the affect was normal, the mood was normal and not anxious [de-identified] : Right lumpectomy scar is clean and dry, no erythema, no hyperpigmentation.

## 2022-07-12 NOTE — HISTORY OF PRESENT ILLNESS
[FreeTextEntry1] : Ms. Petey Alfaro is a 71 year old female with a past history significant for DCIS of the left breast in 2006 managed with breast conservation including radiation therapy. She also has a history of metastatic bladder cancer, for which her treatment has included immunotherapy palliative RT to T11-L3. She presents with a DCIS of the right breast, referred for management recommendations. \par \par She underwent a routine screening mammogram in April 2022, notable for a new region of suspicious calcifications in the right breast. \par \par She subsequently underwent stereotactic biopsy on 4/25/22 revealing high grade DCIS. Reportedly ER-IN- per Dr. Esquivel's consult note and in Dr. Phillips's post-op follow-up notes, but this is not noted in the surgical pathology.  \par \par After this, she proceeded with right breast lumpectomy 5/12/22 revealing high grade DCIS w/associated comedo necrosis, measuring 8 mm, cribriform pattern. Margins were negative with distance from posterior margin 0.4 mm, medial margin 1.04 mm, and lateral margin 1.66 mm. Also noted were biopsy site changes, and a small fibroadenoma. \par \par She proceeded with re-excision 6/9/22 with Dr. Phillips. After this, all margins were clear, with no evidence of residual carcinoma on inferior, medial and posterior margins. On lateral margin, there was a small focus of intraductal carcinoma, grade 3, with DCIS 5 mm from the resection margin. \par \par Reports negative ED eval. over weekend for left arm and leg swelling, with associated tingling hands and feet.  Advised to follow up with PCP, vascular surgery, and neurology; awaiting appointments. Has baseline back pain that is grossly stable. She presents to discuss adjuvant radiation therapy. She reports healing well, without significant pain of the breast.

## 2022-07-13 PROCEDURE — 77300 RADIATION THERAPY DOSE PLAN: CPT | Mod: 26

## 2022-07-13 PROCEDURE — 77334 RADIATION TREATMENT AID(S): CPT | Mod: 26

## 2022-07-13 PROCEDURE — 77295 3-D RADIOTHERAPY PLAN: CPT | Mod: 26

## 2022-07-14 ENCOUNTER — RESULT REVIEW (OUTPATIENT)
Age: 72
End: 2022-07-14

## 2022-07-14 LAB — SURGICAL PATHOLOGY STUDY: SIGNIFICANT CHANGE UP

## 2022-07-14 PROCEDURE — 88321 CONSLTJ&REPRT SLD PREP ELSWR: CPT

## 2022-07-15 PROCEDURE — 77280 THER RAD SIMULAJ FIELD SMPL: CPT | Mod: 26

## 2022-07-19 PROCEDURE — 77427 RADIATION TX MANAGEMENT X5: CPT

## 2022-07-19 NOTE — VITALS
[Maximal Pain Intensity: 4/10] : 4/10 [Pain Location: ___] : Pain Location: [unfilled] [NoTreatment Scheduled] : no treatment scheduled [90: Able to carry normal activity; minor signs or symptoms of disease.] : 90: Able to carry normal activity; minor signs or symptoms of disease.

## 2022-07-20 ENCOUNTER — NON-APPOINTMENT (OUTPATIENT)
Age: 72
End: 2022-07-20

## 2022-07-25 PROCEDURE — 77427 RADIATION TX MANAGEMENT X5: CPT

## 2022-07-27 NOTE — DISEASE MANAGEMENT
[Pathological] : TNM Stage: p [0] : 0 [TTNM] : is [NTNM] : 0 [MTNM] : 0 [de-identified] : 1524 cGy [de-identified] : 2600 cGy [de-identified] : Left breast

## 2022-07-27 NOTE — PHYSICAL EXAM
[Normal] : well developed, well nourished, in no acute distress [de-identified] : Right lumpectomy scar is clean and dry, no erythema,

## 2022-07-27 NOTE — HISTORY OF PRESENT ILLNESS
[FreeTextEntry1] : Ms. Zayas is a 71 year old female with stage 0 DCIS of the right breast. She underwent lumpectomy with re-excision to achieve negative margins. Her history is significant for left sided DCIS managed with breast conservation in 2006 and metastatic bladder cancer currently under observation after achieving ZACH status with immunotherapy in 2021. She is now receiving breast conservation with radiation therapy in 5 fractions, fast forward regimen.\par \par She is feeling generally well. She denies fatigue and pain. She has not noted any skin reactions. She is using Aquaphor on the skin. \par

## 2022-08-01 ENCOUNTER — NON-APPOINTMENT (OUTPATIENT)
Age: 72
End: 2022-08-01

## 2022-08-20 ENCOUNTER — OUTPATIENT (OUTPATIENT)
Dept: OUTPATIENT SERVICES | Facility: HOSPITAL | Age: 72
LOS: 1 days | Discharge: HOME | End: 2022-08-20

## 2022-08-20 DIAGNOSIS — Z90.710 ACQUIRED ABSENCE OF BOTH CERVIX AND UTERUS: Chronic | ICD-10-CM

## 2022-08-20 DIAGNOSIS — Z98.890 OTHER SPECIFIED POSTPROCEDURAL STATES: Chronic | ICD-10-CM

## 2022-08-20 DIAGNOSIS — Z98.89 OTHER SPECIFIED POSTPROCEDURAL STATES: Chronic | ICD-10-CM

## 2022-08-20 DIAGNOSIS — C67.9 MALIGNANT NEOPLASM OF BLADDER, UNSPECIFIED: ICD-10-CM

## 2022-08-20 DIAGNOSIS — N63 UNSPECIFIED LUMP IN BREAST: Chronic | ICD-10-CM

## 2022-08-23 ENCOUNTER — APPOINTMENT (OUTPATIENT)
Dept: HEMATOLOGY ONCOLOGY | Facility: CLINIC | Age: 72
End: 2022-08-23

## 2022-08-23 VITALS
OXYGEN SATURATION: 96 % | SYSTOLIC BLOOD PRESSURE: 131 MMHG | RESPIRATION RATE: 16 BRPM | TEMPERATURE: 206.96 F | BODY MASS INDEX: 28.07 KG/M2 | HEART RATE: 67 BPM | DIASTOLIC BLOOD PRESSURE: 84 MMHG | WEIGHT: 168.65 LBS

## 2022-08-23 PROCEDURE — 99214 OFFICE O/P EST MOD 30 MIN: CPT

## 2022-08-23 NOTE — PHYSICAL EXAM
[Restricted in physically strenuous activity but ambulatory and able to carry out work of a light or sedentary nature] : Status 1- Restricted in physically strenuous activity but ambulatory and able to carry out work of a light or sedentary nature, e.g., light house work, office work [Normal] : affect appropriate [de-identified] : no edema [de-identified] : vitiligo is less

## 2022-08-23 NOTE — ASSESSMENT
[Palliative Care Plan] : not applicable at this time [FreeTextEntry1] : Juju is seen in the office today in follow-up.  She completed 2 years of atezolizumab in February 2020.  She has been off treatment now for 2-1/2 years.  Recently, she developed some neuropathy and she said that her feet started burning.  She had previous surgery in her spine, but did not have any previous neuropathy.  She also had swelling of her hand on the left side which is now improving.  It is still present to some degree and you cannot see the veins very prominently.  She was seen in the emergency room and kept overnight.  No blood clot was noted within her arm.  She was recently diagnosed with breast cancer and had surgery on May 3.  She had revision of the surgery on May 9.  And this was on the opposite side.  She was found to have DCIS.  She received radiation therapy with Mali Kahn for a total of 5 fractions.  This was apparently just to the breast.  The swelling started after the radiation therapy, but the swelling is on the opposite side involving the left hand.  She says is now resolving.  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  She saw a neurologist for the paresthesias.  She was given gabapentin at 300 mg at bedtime.  Her back pains remain the same which are periodic.  She does not take any medication for this.  Her appetite is "fantastic".  There is no weight loss.  She has no cough or shortness of breath.  There were no GI complaints.  There is no headaches or dizziness.  She has no balance issues.  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  5 hours/day.  No edema of the legs is noted.  There were no fevers or chills.  She has no pain at the current time and has had no pain in the last 24 hours.She has not noted any hematuria.  There were no muscle cramps.\par \par \par On physical examination, she appears well and in no acute distress.  Her performance status is 1.  There is no palpable adenopathy present.  The lungs are clear and the heart examination is normal.  There is no murmur present.  There is no edema of the extremities.  The abdominal examination is normal.  There is no spinal column tenderness except for over the lumbar spine which is a chronic issue.  She notes that the vitiligo is less prominent than before.\par \par She is recently seen her primary care doctor and had blood work performed.  Those results were not available to me, and I am not going to have her repeat blood work again today.  She had a breast biopsy which showed ductal carcinoma in situ with high nuclear grade, with both cribriform and comedo necrosis with microcalcifications.\par \par With respect to her previous bladder cancer, there is no evidence of disease.  Her most recent visit with Dr. Hargrove was in December and a urinalysis and cytology were negative at that time.\par \par All questions were answered to the best my ability and to her apparent satisfaction.  She is quite frustrated when she has multiple complaints at this time and she says that no one can give her an answer about her complaints.  I suspect that her primary care doctor also checked a thyroid-stimulating hormone, and I will assume it was normal.  Hypothyroidism can occur during or after the use of immunotherapy I will see her once again in 4 months time.  All questions were answered to the best of my ability and to her apparent satisfaction.

## 2022-08-23 NOTE — HISTORY OF PRESENT ILLNESS
[Disease: _____________________] : Disease: [unfilled] [T: ___] : T[unfilled] [N: ___] : N[unfilled] [M: ___] : M[unfilled] [AJCC Stage: ____] : AJCC Stage: [unfilled] [de-identified] : Ms. 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 The tumor was found on cystoscopy at the right ureteral orifice. 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 She is referred for consideration of neoadjuvant chemotherapy. In late May, at the time of scheduled back surgery, she thought she had a UTI. Urine was tested and she was treated, then had the surgery. She had burning post-op. She was treated again with an antibiotic. She went to Rehab and was treated again and she was seen by a urologist there. Went to PCP after discharge from rehab, he noted some blood in the urine. She went to [facility] ER and \par she was referred to Dr. Hargrove. She underwent a cystoscopy, revealing tumor. She never noted blood in her urine, but did note it was darker than usual. Still has some "tingling" sensation, no incontinence. Nocturia occurs, which she says is related to awakening from CPAP. No cough. WALKER/bone pains.\par \par 10/27/17...[place] [facility] [place]. They wanted to repeat procedures again, including cystoscopy. They called  again today. Saw Dr. Montalvo. She was asked to return next Thursday. They want to do a PET CT scan. They recommended she receive cis/gem, likely due to the glandular differentiation. She was overwhelmed by all the information she was given. No pains, no cough/SOB.  \par \par 10/27/17...Juju completed cycle 1 this past Friday, and she noticed on Saturday that her lips appeared swollen and she noticed sores in her mouth. This occurred after going out to eat chinese food.Today she feels that it may be going away. She has fatigue, and feels chills but no fever. She just feels " lousy."  She is feeling Nausea this time and if she takes the metoclopramide in time, then she is okay, She denies vomiting. She is also having some dysgeusia, She denies paraesthesias of the fingers and toes.  She is having constipation, and she is controlling it with the stool softeners.She states her appetite is ok, but the food doesn't taste good, and it hurts when she swallows\par \par 17...Chemo with cis/gem #2 due this Friday.has increased lower back pain. The pain had stopped. She had prior back surgery. The pain became more notable since starting the chemo. She had constipation with the chemo and noted an increase in the pain with the constipation in lower back near the site of the surgery. The pain feels like pressure, described as heavy, somewhat relieved by BM. Worse with activities. Took some oxycodone a few days ago, but had a headaches afterwards. Pain score currently at 6-7, no recent pain med use. Taking MiraLAX, senna, Colace and prune juice with occasional MOM. Appetite is good, some altered taste, when present, affects appetite. Had some nausea and vomited x 3 after initial chemo, more gagging than vomiting. She felt she had some sores in the mouth after the gem alone, resolved. No paresthesias. She had some discomfort in the right wrist area, at the site where chemo was administered. Fatigue present all the time, more since the start of chemo. No hematuria, good flow, no urgency or incontinence as before. \par \par 17...day 15, cycle #2, cis/gem.  Juju has increased sensitivity in the lips after chemo. She had some vomiting at the end of her chemo infusion, however she did not have increased nausea and vomiting since. She has fatigue, and she complains of constipation and is taking miralax. She does complain of back pain that has increased in intensity.She notices it when she bends over. She feels that there is something noticeably there in her back. She takes pain medication to help her fall asleep. She doesn't sleep well. She does use c-pap secondary to sleep apnea but she is awaken from her sleep secondary to nocturia. She also notices her pain more when she has constipation and has to go to the bathroom. She describes her lower back pain as 8/10 at worst, and best 5/10. HEr back pain went away after the surgery and she noticed it came back once starting Chemo. She states her appetite is good, She does complain of dysgeusia. She denies paraesthesias. She does not like how the oxycodone makes her feel. It gives her HAs.\par \par 18...Had RT from the  to the . She feels there is some decrease in the pain, not as piercing. She feels the area is stiff. Worse when she awakens. Takes 3 x 325 Tylenol at night which helps the pain. has constipation, went one week w/o evacuation. Was given lactulose, but she feels that MiraLAX works better. She is less active as a result of the pain. the pain is worse when she bends. Appetite is variable. No weight loss. Has occasional nausea. No vomiting. Has some indigestion at times. No blood in the urine, no dysuria. No incontinence. Fatigue is present. No pains elsewhere. \par \par 3/14/18...She still has some pain. She is doing PT, which helps temporarily. She has not had the relief of pain that she thought would be the result. Pain score at 4-5, described as aggravation and it inhibits activities. Worse with bending, getting in and out of cars. She is not taking hydromorphone at this time.. She is distressed with constipation from the pain meds. Says lactulose does not work. She takes some Tylenol sporadically, not daily. Appetite is good, lost 1 kilo. No hematuria, no dysuria, no frequency, no incontinence. No pains elsewhere. She remains very active. She has alopecia. No diarrhea. No cough/WALKER. Fatigue is present, mild. She feels it at the end of the day. No N/V. \par \par 18...Completed 3 cycles of Tecentriq. She noes curling of the right fourth finger involuntarily. She can bend it back up, but it hurts to do so. No difficulty with strength on the right hand, no tremors.  She has tried Icy Hot w/o benefit. She points to DIP and PIP joints as the sites of pain. She feels she has lumps on her head. She is losing her hair, likely secondary to the chemo she received. Her scalp is pruritic. She has also lost pubic hair. Her pain continues to get better,went to PT for 7 weeks. She is able to do all normal activities, with mild fatigue. Appetite is good, weight is stable. No dizziness, no unsteadiness, no headaches. No other issues. No hematuria. \par \par 18...Missed Rx on 18. feels "great". Still has back pains, much improved. No pain while seated, but will come on after a while. No worse with ambulation. Does not awaken her. Pain can be as high as 7-8 with activities, best at 3-4. No pain meds utilized. No blood in urine. denies fatigue until late in the day. Appetite is good, but she has lost about 3 pounds. She is avoiding fat products as she has an upset stomach from fats. Had a URI, treated with azithromycin, Flonase and a codeine containing cough suppressant, still has some residual cough. No diarrhea. No dyspnea. has constipation. \par \par 18...On atezolizumab since 18. Feels well. Still has some pains, not clearly as severe as before. It is worse if she walks for a while, such as several hundred feet. Also more prominent if she stands too long. Does not bother her at night or awaken her. It does not stop her activities. Takes an occasional ibuprofen, not daily. Appetite is good, weight is stable. No N/V. No diarrhea. No cough/WALKER. Some fatigue. No leg edema. \par \par 18...Feels "okay". Still has some back pain, nothing like it was before. Has a bunion of the foot which is bothersome and the podiatrist has recommended surgery. No cough/sputum,. No diarrhea. Appetite is good, but lost 2 pounds since last visit. No other pains. No headaches, no paresthesias. No dizziness noted, no balance issues. Mild fatigue, improved.\par \par 18...Cycle 6 was administered on 18, due #7 on 18. "I'm feeling good". Noted some pressure and noted urinary frequency about one week ago, then resolved. No dysuria, no blood, no frequency, nocturia x 1. The usual lower back pain, no pain at the current time. Extra exertion brings on the low back pain and she is not sure if this is from the cancer or from the prior back surgery. She notes several areas over her skin becoming depigmented in small spots. No pruritus. Appetite is good, no weight loss. Actually gained a few pounds. No diarrhea. has some fatigue towards the end of the day. No dizziness. No paresthesias. No cough/WALKER. \par \par 18...On atezo since 18. Has received 7 cycles. Saw Dr. Hargrove late , cysto recommended, but she decided to wait until  after vacation. She notes lightening of her skin. She didn't go to the dermatologist. She has a prior dermatologist that she might see. No diarrhea. No upset stomach. Appetite is ":great", no weight loss. No cough/WALKER. No paresthesias. Minor fatigue along with aches in the evenings. No headaches, no dizziness. No leg edema. Slight hematuria. \par \par 18...last scans in May\par Reports skin changes due to the vitiligo has been bothering her.  Reports this has been happening more since last month.  Has also been taking Valium from two years ago occasionally for anxiety.  Reports has taken it once every 2-3 weeks, only when she feels overwhelmed with anxiety.  Has not seen an psychiatrist yet, but has been seeing a psychologist.  She has seeing her for one year..  When she gets anxious it occurs mostly at night.  Denies any pain, except after walking a long period of time.  Will occasionally use a cane.  Reports no urinary frequency.  No urinary incontinence, no hematuria.  Reports regular bowel movements.  Reports good appetite, occasional dyspepsia with fried foods.  \par \par 18...Did not have an appt today, was added onto schedule. She has urgency, no dysuria, no foul odor. She feels bloated as well for the past 3 days. She wonders if it is related to her vitiligo, which is prominent in the urogenital area. Urine is clear, no blood. No fevers, no chills. Appetite is "great", gaining weight. No cough/WALKER. NO diarrheal stools, last BM yesterday, normal. No N/V. No vitiligo is more prominent, which concerns her. She says the bottom of her feet are tender, at the ball of the feet. Toes are numb. This has occurred over the last week. She has also had cramps in her hands. Mostly the thumb, told of he had an injection for her trigger finger. received cycle # 11 of atezolizumab today. \par \par 10/11/18...dose # 12 today. Saw Paulie Hargrove, plan for cysto. "I'm doing well".  She says that her feet always feels like there "is something there", involving the toes, no longer affects the ball of the foot. Saw her podiatrist. Cysto is scheduled for the . Her vitiligo is more prominent his is prominent on the hands. She feels that some parts of her hands are darker as well. Appetite is "fabulous". No N/V/D/C. Urine flow is good, no incontinence, no blood, no dysuria. Urgency and fullness sensation resolved. No cough/SOB. No headaches. No fatigue\par \par 10/31/18....Dose #13 today. Feels well at this time. Back pain remains the same as before, no pain med use. If she does extra activities she has pain. She had back surgery before and she believes it is from the prior surgery and not the mets. No fatigue. Appetite is good, weight is stable., No diarrheal stools. No hematuria noted. No dysuria. Usual activities performed without impairment. Vitiligo is somewhat more prominent, which disturbs her. No leg edema. No cough, no WALKER. No upset stomach. No fevers, no chills. Had cysto on , all was normal. \par \par 18...Feels "okay". Pain in the back somewhat more prominent, the pain from her prior surgery.Appetite is jeramy, no weight loss. No cough/WALKER. No diarrhea, no upset stomach. Some minor fatigue. She thinks she is depressed, attends a support group here. Starting with a therapist. He  left her recently. [place]. \par \par 18...Last scans 18. Getting laser therapy for her vitiligo, which she may not continue due to high c-pays. feels as if her tongue is sensitive to heat since starting the laser therapy. She senses salt more than before. Otherwise well, back pain "bearable", RTS, "part of my life". No pain meds used. Appetite is good, weight is stable. No diarrheal stools No cough, No SOB. She has some sensation in the bladder or vaginal area, pruritic, calmed with Vagisil.  She is concerned about some blood in the urine, microscopic....she had a cysto on 10/22/18, revealing no issue. She asked about clearance for sexual activity. No fatigue, no headaches. No dysuria, but occasional mild irritation. No incontinence. No urinary frequency, rare nocturia. \par \par 1/3/19 : On atezolizumab since 18. Feels "great". No pains except for usual aches. Appetite is good, weight has increased. No cough, no WALKER. No diarrheal stools, No upset stomach. No edema. No skin rashes. Vitiligo is "going crazy", goes 2 times a week for laser therapy. States she may stop the laser therapy. \par \par 19...19 : Here for Atezolizumab(since ) and follow up. \par She has been well overall without any AEs aside form vitiligo which is a known side effect of these ICI's. \par \par 19 : One year on atezolizumab. Feels "fine". No diarrheal stool, no dyspepsia, no cough/WALKER. Appetite s good, no weight loss. No headaches, some paresthesias of the feet, no change. Has seen neurology and received injections. Can't open her hands at times. No fatigue, no pruritus. NO skin rashes. Vitiligo continues to be more prominent. Went for laser therapy, wit stopped it. She says her lips blistered. Vitiligo affects genitalia, present prior to Rx, has vitiligo of the hands, axilla and breasts. \par \par 3/6/19...3/6/19 : Ms. Crespo is here for a follow up and atezolizumab. She has been having lower back pain that resembled pain she had when she had recurrence. She underwent MRI on 3/3/19. This showed re-demonstrating the spine lesions, no changes were noted on the MRI. She has DJD and bulging disc with narrowing in L4-5.\par No new lesions were seen. She has not followed up with Dr. VINICIUS Kauffman for this either. OTHerwise she feels well. \par \par 19...saw her orthopedic surgeon, who said the pain is not related to her cancer, but to scar tissue. He gave her Flexeril and Meloxicam. He referred her for PT. The pain is much better. The knot" is not as big as before. Otherwise, :fabulous". Appetite is good, weight is stable. Has some fatigue, noted at the end of the day. No pruritus, no rash. Vitiligo is progressive. No cough, no WALKER. No nausea, no vomiting, occ upset stomach. No diarrheal stools. Now 14 months on therapy. \par \par 19...On Atezo since 2018. Had some back pain exacerbation recently, was on meloxicam and cyclobenzaprine, which has resolved to a great degree. She returned to work in April. Feels well otherwise, has a "head cold", with congestion, yellow sputum. Taking Claritin and other drugs, nasal congestion and runny nose improved. She is concerned it has moved to the chest. No F/C, no SOB, no wheezing. Appetite is good, no weight loss. No diarrheal stools., no upset stomach. Fatigue is present, in  the evening, she is "done". This has been the case since she returned to work. No rash, no pruritus. Vitiligo is progressing.\par \par 19...Working and has fatigue. She feels that the fatigue is mostly due to the work schedule. 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 Cycle 25 is today. Overall, feels well. has some back pain, which she feels is related to her prior surgery. No hematuria noted, no incontinence. No dysuria. Appetite is excellent, no weight loss. No cough, No WALKER. No diarrheal stools. No F/C. No edema. \par \par 19...Now 1.5 years on atezo at this time. Feels well overall. has an occasional tingling when she voids, which she attributes to the genital vitiligo. She has some cream to apply. She is working as a  to the elderly and one of her clients  this AM. This upsets her somewhat. She became upset and tearful. Appetite is normal, weight is stable. She continues to have some back pains as before, related to prior surgery, perhaps somewhat more since she had to expend more effort. No diarrheal stools, no cough. No SOB No headaches, no dizziness. No fatigue, taking Geritol, which she feels has helped her. No fevers, no chills, no urinary issues.\par \par 9/10/19...Feels "wonderful". Back pain RTS, not severe, has taken some oxycodone at bed time recently Uses CPAP to sleep, occasionally awakened by back pain. She saw her orthopedist, who said that everything looked the same.Appetite is good, no weight loss. No cough, no WALKER, No diarrheal stools. No fatigue. No rash, no pruritus. Occ headaches, feel like sinus headaches, no meds, brief duration, slight in intensity. No edema. Orthopedist did plain films. Occ tramadol use. \par \par 10/31/19...On atezo since 2018. Now has more below back pains Become worse in July or August, not present all the time. Occasionally awakens her. Takes oxycodone or hydromorphone infrequently. Pain at 4 currently, worst at  4 in the last 24, best at zero. No worse with walking, actually better. Appetite is "great". Weight stable. o N/V/C/D. No cough, no dyspnea. Working, has some fatigue at the end of the day. No headaches, occ paresthesias.  She had prior surgery with rods in her back. Going t see derm as well. Has some "tingling when she voids, which she attributes to the worsening vitiligo in the urogenital area. NO blood, nocturia x 1, flow is fine, no incontinence. No dysuria. No edema. Wants t cancel  appointment, wants to go to [place].\par \par 19...Had an MRI of the neck, ordered by Dr Naveen Kauffman, who performed her lumbar surgery. She was told she requires surgery in the neck. She was started on meloxicam, which she says does not help.  dose will be # 33. The neck pain feels "like a monkey on my back", started about 6 weeks ago after her last visit. She was told it has nothing to do with her cancer. She did not bring the MRI results with her. Appetite is "great", no weight loss. No blood in the urine, no dysuria. She was started on some topicals and fluconazole for genital rash/vaginitis, saw both GYN and derm. No fatigue. Started atezo in 2018. No edema. No diarrheal stools, no cough/WALKER. getting some PT/hydrotherapy. \par \par 20...Feels well today. Had some gyn issues, told of a urine infection, treated with Cipro and Diflucan, completed both. Saw her PCP for an annual exam. She had large leucocyte esterase, few bacteria , but a negative culture. She has been noting chills recently. No fevers. NO fatigue. Appetite is fine, no weight loss. Back pains are "okay", no meds used. No edema. No cough, No WALKER. No diarrheal stools. She was on her CPAP recently and was also treated with a Z pack. \par \par 20...Saw GYN for routine follow up and had some pruritus as well. She was having intermittent chills as well as some abdominal fullness. No findings on exam apparently. She was sent for a sono, which revealed a mass, vascular, MRI ordered, not done as yet. She feels pressure in the bladder area. The pressure is constant, not increased with voiding, but has a "sensation". Recent cysto was negative. Appetite is good, weight increased. No other areas of pain/discomfort. No fevers,no chills. She has her usual low back pains. She has pressure in the lower pelvis, much better compared to 2 weeks ago. PCP felt a left supraclavicular mass and ordered a CXR.\par \par 20...Completed 2 years of atezolizumab in February. Has had sinus infection, on third course of antibiotics, to have a procedure in 3 weeks. Seeing a TMJ specialist for pain in her jaw. Recently had a partial denture made and she can't use it due to the pain. Back pain the same. "I live with it". She dropped 7 pounds and she can't eat like she used to. Latest antibiotic was doxycycline. She is on Mobic for the jaw pains. Otherwise well. NO blood in urine, no dysuria. no incontinence. No fatigue. No cough, no dyspnea. No headaches. No nausea, no vomiting. Vitiligo progressing\par \par 10/27/20...Verbal permission for telehealth services granted by the patient, Juju Da Silva on 2020 at 10:25 AM\par Feels "okay". To see a gastroenterologist as she is having issues with dairy, stared earlier this year. She has an upset stomach with certain meals with bloating, no N/V/D. If she takes Linzess, she feels better. She does not take it every day. Appetite is good, no weight loss. Back pains continues, may be up to 6 or 7, every day pains, no change form last visit. Rare pain med use. Takes Tylenol ES if he wakes her up at night. No opioids used. No pains elsewhere. Sitting down and getting up brings on the pains, better with activities. No cough, no WALKER. No edema. No hematuria. Saw Dr Hargrove for urinary frequency and pressure in the lower abdomen. No fevers, no chills. NO headaches. NO dizziness. \par Had UTI, seen in in urgent care, had fevers/chills.  She was on several antibiotics for sinusitis from February onwards. PET shows continual opacification of the sinus. Has some jaw pains, and she says this cannot be addressed until sinus cared for. Urine was negative recently. PET shows ZACH. \par \par 2/3/21 - Presents for follow up, refused to do telehealth as she was scheduled for.  Feels well. Appetite is good, gained weight, up 5.5 kilos form last recorded weight. Has not had coronavirus. No edema. No N/V/D/C. No blood in urine, no incontinence. No headaches, no dizziness, some paresthesias since her back surgery, no worse after the chemotherapy. No tinnitus. No fatigue. \par \par 8/3/21 - Presents for follow up. had a fall and had a fracture i her lumbar sine. She had a wound in her leg and was seeing a wound specialist. She is on gabapentin for neuropathy, dose unknown, only at bedtime. She had her films done at [facility]. She says she had an MRI as well. Thinks it was L1 that was fractured. She has chronic low back pain, worse with bending, always some issue present. The neuropathy involves the bottom of the feet and toes. This is only on the right side. The gabapentin helps to some degree. Feels well otherwise, no other sites of pain. Appetite is good, no weight loss that she perceives, although down 3.7 kilos from May. No blood, in the urine, no dysuria, no incontinence. No urgency. No edema noted. No headaches, no dizziness. No cough no WALKER. No N/V/D/C. She saw ortho at [facility] and they wanted to do epidural. Due to the infection, it was not done. After healing of the wound, she no longer needed the epidural. Can't recall he name of the ortho surgeon, she will call with the name. \par \par 21...almost 2 years since the end of 2 years of atezolizumab. Feels "fine". Has her usual aches and pains no change. Appetite is good, follows with PCP, ha A1c checked, says she eliminated sugar and sweets. Appetite is good, dropped 4 kilos since August. No N/V/D/C. No cough, no WALKER. Usual low back pains, prior surgery. No meds used. Urine is "perfect". No blood, no dysuria. no incontinence. Last saw Paulie Hargrove in 2020. The leg wound healed. No headaches, no dizziness. She has paresthesias, since the surgery, no longer taking the gabapentin, wears socks at night, which helps. No F/C. No leg edema. Usual fatigue, no change. \par \par 22...completed atezo in 2020, after 2 years of therapy. Had a mammogram done and she was noted to have microcalcifications, done at [facility]. She is set up for a stereotactic biopsy. This has alarmed her. She says she had a breast cancer in the left breast. had  a lumpectomy and RT at that time. She has some anxiety as a result of this development. there is no palpable mass. Otherwise feels well. Appetite is good, no weight loss/gain. No headaches, no dizziness. NO leg edema. No chest pain./pressure/palpitations. No N/V/D, occ constipation, on Linzess.  [de-identified] : high-grade [FreeTextEntry1] : cis/gem, started chemo 11/3/17 as neoadjuvant, then bone mets, had RT. Now on atezolizumab since February 12, 2018, ended 2/4/20.  [de-identified] : 8/23/22...completed atezo in February 2020, after 2 years of therapy. Her feet started burning due to neuropathy. had swelling of her hand on the left side, was seen in the ER and kept overnight, no blood clot was noted. Had breast cancer diagnosis, had surgery May 3, had revision on May 9th, right side. had DCIS, had RT with [place] F[facility], 5 fractions. Apparently just to the breast. Swelling started after the RT> Now resolving. had an echo done, says her aortic valve is "twisted", likely stenosis, at 25%.  She saw a neurologist for the paresthesias. takes gabapentin 300 mg at bedtime. back is the same, periodic pains, no meds used. Appetite is "fantastic", no weight loss. No cough, no WALKER. No N/V/D/C. No headaches, no dizziness. No balance issues. Works 3 days a week,  to an elderly person. No edema of the legs noted. No fevers, no chills. No pain at this time, none in the past 24 hours.

## 2022-08-23 NOTE — CONSULT LETTER
[Dear  ___] : Dear  [unfilled], [Courtesy Letter:] : I had the pleasure of seeing your patient, [unfilled], in my office today. [Please see my note below.] : Please see my note below. [Consult Closing:] : Thank you very much for allowing me to participate in the care of this patient.  If you have any questions, please do not hesitate to contact me. [Sincerely,] : Sincerely, [DrAzam  ___] : Dr. BRANDT [FreeTextEntry2] : Dr. Kalen Kauffman MD [facility]

## 2022-08-23 NOTE — REVIEW OF SYSTEMS
[Vision Problems] : vision problems [Negative] : ENT [Fever] : no fever [Chills] : no chills [Fatigue] : no fatigue [Recent Change In Weight] : ~T no recent weight change [Chest Pain] : no chest pain [Palpitations] : no palpitations [Lower Ext Edema] : no lower extremity edema [Cough] : no cough [SOB on Exertion] : no shortness of breath during exertion [Abdominal Pain] : no abdominal pain [Vomiting] : no vomiting [Constipation] : no constipation [Diarrhea] : no diarrhea [Dysuria] : no dysuria [Incontinence] : no incontinence [Joint Pain] : no joint pain [Joint Stiffness] : no joint stiffness [Muscle Pain] : no muscle pain [Skin Rash] : no skin rash [Dizziness] : no dizziness [Anxiety] : no anxiety [Depression] : no depression [Muscle Weakness] : no muscle weakness [Easy Bleeding] : no tendency for easy bleeding [Easy Bruising] : no tendency for easy bruising [FreeTextEntry8] : no hematuria [FreeTextEntry9] : no cramps  [de-identified] : No HA, has neuropathy

## 2022-08-30 ENCOUNTER — NON-APPOINTMENT (OUTPATIENT)
Age: 72
End: 2022-08-30

## 2022-09-08 ENCOUNTER — APPOINTMENT (OUTPATIENT)
Dept: RADIATION ONCOLOGY | Facility: CLINIC | Age: 72
End: 2022-09-08

## 2022-12-05 ENCOUNTER — APPOINTMENT (OUTPATIENT)
Dept: OBGYN | Facility: CLINIC | Age: 72
End: 2022-12-05

## 2022-12-19 ENCOUNTER — OUTPATIENT (OUTPATIENT)
Dept: OUTPATIENT SERVICES | Facility: HOSPITAL | Age: 72
LOS: 1 days | Discharge: HOME | End: 2022-12-19

## 2022-12-19 DIAGNOSIS — N63 UNSPECIFIED LUMP IN BREAST: Chronic | ICD-10-CM

## 2022-12-19 DIAGNOSIS — C67.9 MALIGNANT NEOPLASM OF BLADDER, UNSPECIFIED: ICD-10-CM

## 2022-12-19 DIAGNOSIS — Z98.890 OTHER SPECIFIED POSTPROCEDURAL STATES: Chronic | ICD-10-CM

## 2022-12-19 DIAGNOSIS — Z90.710 ACQUIRED ABSENCE OF BOTH CERVIX AND UTERUS: Chronic | ICD-10-CM

## 2022-12-19 DIAGNOSIS — Z98.89 OTHER SPECIFIED POSTPROCEDURAL STATES: Chronic | ICD-10-CM

## 2022-12-20 PROBLEM — G62.9 SENSORY NEUROPATHY: Status: ACTIVE | Noted: 2018-10-31

## 2022-12-27 ENCOUNTER — APPOINTMENT (OUTPATIENT)
Dept: HEMATOLOGY ONCOLOGY | Facility: CLINIC | Age: 72
End: 2022-12-27

## 2022-12-27 DIAGNOSIS — G62.9 POLYNEUROPATHY, UNSPECIFIED: ICD-10-CM

## 2022-12-27 PROCEDURE — 99213 OFFICE O/P EST LOW 20 MIN: CPT | Mod: 95

## 2022-12-27 RX ORDER — DOXYCYCLINE 100 MG/1
100 CAPSULE ORAL
Qty: 20 | Refills: 0 | Status: DISCONTINUED | COMMUNITY
Start: 2022-12-20

## 2022-12-27 RX ORDER — AZITHROMYCIN 250 MG/1
250 TABLET, FILM COATED ORAL
Qty: 6 | Refills: 0 | Status: DISCONTINUED | COMMUNITY
Start: 2022-10-04

## 2022-12-27 RX ORDER — SILVER SULFADIAZINE 10 MG/G
1 CREAM TOPICAL
Qty: 50 | Refills: 0 | Status: DISCONTINUED | COMMUNITY
Start: 2022-12-09

## 2022-12-27 RX ORDER — TINIDAZOLE 500 MG/1
500 TABLET, FILM COATED ORAL
Qty: 8 | Refills: 0 | Status: DISCONTINUED | COMMUNITY
Start: 2022-09-27

## 2022-12-27 RX ORDER — ALBUTEROL SULFATE 90 UG/1
108 (90 BASE) INHALANT RESPIRATORY (INHALATION)
Qty: 8 | Refills: 0 | Status: DISCONTINUED | COMMUNITY
Start: 2022-10-04

## 2022-12-27 RX ORDER — DOXEPIN 6 MG/1
6 TABLET, FILM COATED ORAL
Qty: 85 | Refills: 0 | Status: DISCONTINUED | COMMUNITY
Start: 2022-08-05

## 2022-12-27 RX ORDER — HYOSCYAMINE SULFATE 0.12 MG/1
0.12 TABLET ORAL
Qty: 120 | Refills: 0 | Status: DISCONTINUED | COMMUNITY
Start: 2022-09-12

## 2022-12-27 RX ORDER — TIZANIDINE 4 MG/1
4 TABLET ORAL
Qty: 30 | Refills: 0 | Status: DISCONTINUED | COMMUNITY
Start: 2022-12-09

## 2022-12-27 RX ORDER — SERTRALINE HYDROCHLORIDE 50 MG/1
50 TABLET, FILM COATED ORAL
Qty: 30 | Refills: 0 | Status: DISCONTINUED | COMMUNITY
Start: 2022-10-04

## 2022-12-27 RX ORDER — PREDNISONE 5 MG/1
5 TABLET ORAL
Qty: 10 | Refills: 0 | Status: DISCONTINUED | COMMUNITY
Start: 2022-10-04

## 2022-12-27 RX ORDER — PANTOPRAZOLE 40 MG/1
40 TABLET, DELAYED RELEASE ORAL
Qty: 90 | Refills: 0 | Status: DISCONTINUED | COMMUNITY
Start: 2022-09-09

## 2022-12-27 RX ORDER — SODIUM SULFATE, POTASSIUM SULFATE AND MAGNESIUM SULFATE 1.6; 3.13; 17.5 G/177ML; G/177ML; G/177ML
17.5-3.13-1.6 SOLUTION ORAL
Qty: 354 | Refills: 0 | Status: DISCONTINUED | COMMUNITY
Start: 2022-09-09

## 2022-12-27 NOTE — REVIEW OF SYSTEMS
[Lower Ext Edema] : lower extremity edema [Cough] : cough [Constipation] : constipation [Muscle Pain] : muscle pain [Skin Wound] : skin wound [Anxiety] : anxiety [Depression] : depression [Negative] : ENT [Fever] : no fever [Chills] : no chills [Fatigue] : no fatigue [Recent Change In Weight] : ~T no recent weight change [Chest Pain] : no chest pain [Palpitations] : no palpitations [SOB on Exertion] : no shortness of breath during exertion [Abdominal Pain] : no abdominal pain [Vomiting] : no vomiting [Diarrhea] : no diarrhea [Dysuria] : no dysuria [Incontinence] : no incontinence [Joint Pain] : no joint pain [Joint Stiffness] : no joint stiffness [Skin Rash] : no skin rash [Dizziness] : no dizziness [Muscle Weakness] : no muscle weakness [Easy Bleeding] : no tendency for easy bleeding [Easy Bruising] : no tendency for easy bruising [FreeTextEntry5] : edema on the side of the fracture and wound [FreeTextEntry8] : no hematuria [FreeTextEntry9] : related to the foot area, no cramps [de-identified] : No HA, occ paresthesias

## 2022-12-27 NOTE — ASSESSMENT
[FreeTextEntry1] : Juju is seen via telehealth services today.  She completed 2 years of atezolizumab in February 2020, now she is approaching 3 years off of treatment.  She went to visit friends in [place] in November and then developed pain while she was there.  She was found to have some swelling of the foot and told her fracture of the metatarsal bone.  She had a cast placed, and then a week later developed a wound covering her foot from the cast.  She was told that the cast was put on inappropriately.  She was given IV antibiotics for 5 days in the hospital and she is currently on doxycycline.  She was on a number of other medications which she denies taking at this time, including doxepin.  She has a wound care nurse who comes to visit her.  There were no fevers or chills.  She was on tramadol for pain which she says she is run out of.  She is on gabapentin as well with some help in the pain.  Her appetite is decreased, which she attributes to the antibiotics.  She feels that she has not lost any weight.  There is no cough or shortness of breath.  She says that she is in bed a lot due to the pain.  She reports no fatigue.  There were no headaches or dizziness.  There is no blood in the urine.  She has no pains elsewhere.  There is no nausea vomiting or diarrhea but she has constipation which she attributes to the tramadol.  She had radiation therapy to her breast for DCIS.  She is not apparently on tamoxifen.  She says that she saw an oncologist in regards to the breast cancer [facility] but could not provide me with her name.  She is post to be seen this month and follow-up but missed her appointment.  Her last imaging for her urothelial carcinoma was December 2021.  She notes some edema on the side with a fracture and wound is.  Present.  She has some muscle pains related to the foot issues.  There were no muscle cramps.  She has occasional paresthesias.  She does admit to having anxiety and depression.  She is unable to return to [place] at this due to the foot issues.\par \par On physical examination, she appears in no acute distress.  Her performance status is 2.  She has not had any recent blood work performed in our laboratory is as she is away, but certainly she had blood work performed during her hospital stay.  I am unaware of those results.\par \par She has not had any evidence of recurrent disease.  She should have another CT urogram performed at this time, but we will delay this until she returns to [place].  All questions were answered to the best of my ability and to her apparent satisfaction.  When she returns to the ER, she will make an appointment to see me in the office.

## 2022-12-27 NOTE — HISTORY OF PRESENT ILLNESS
[de-identified] : Ms. 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 The tumor was found on cystoscopy at the right ureteral orifice. 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 She is referred for consideration of neoadjuvant chemotherapy. In late May, at the time of scheduled back surgery, she thought she had a UTI. Urine was tested and she was treated, then had the surgery. She had burning post-op. She was treated again with an antibiotic. She went to Rehab and was treated again and she was seen by a urologist there. Went to PCP after discharge from rehab, he noted some blood in the urine. She went to [facility] ER and \par she was referred to Dr. Hargrove. She underwent a cystoscopy, revealing tumor. She never noted blood in her urine, but did note it was darker than usual. Still has some "tingling" sensation, no incontinence. Nocturia occurs, which she says is related to awakening from CPAP. No cough. WALKER/bone pains.\par \par 10/27/17...Seen at [facility] [place]. They wanted to repeat procedures again, including cystoscopy. They called  again today. Saw Dr. Montalvo. She was asked to return next Thursday. They want to do a PET CT scan. They recommended she receive cis/gem, likely due to the glandular differentiation. She was overwhelmed by all the information she was given. No pains, no cough/SOB.  \par \par 10/27/17...Juju completed cycle 1 this past Friday, and she noticed on Saturday that her lips appeared swollen and she noticed sores in her mouth. This occurred after going out to eat chinese food.Today she feels that it may be going away. She has fatigue, and feels chills but no fever. She just feels " lousy."  She is feeling Nausea this time and if she takes the metoclopramide in time, then she is okay, She denies vomiting. She is also having some dysgeusia, She denies paraesthesias of the fingers and toes.  She is having constipation, and she is controlling it with the stool softeners.She states her appetite is ok, but the food doesn't taste good, and it hurts when she swallows\par \par 17...Chemo with cis/gem #2 due this Friday.has increased lower back pain. The pain had stopped. She had prior back surgery. The pain became more notable since starting the chemo. She had constipation with the chemo and noted an increase in the pain with the constipation in lower back near the site of the surgery. The pain feels like pressure, described as heavy, somewhat relieved by BM. Worse with activities. Took some oxycodone a few days ago, but had a headaches afterwards. Pain score currently at 6-7, no recent pain med use. Taking MiraLAX, senna, Colace and prune juice with occasional MOM. Appetite is good, some altered taste, when present, affects appetite. Had some nausea and vomited x 3 after initial chemo, more gagging than vomiting. She felt she had some sores in the mouth after the gem alone, resolved. No paresthesias. She had some discomfort in the right wrist area, at the site where chemo was administered. Fatigue present all the time, more since the start of chemo. No hematuria, good flow, no urgency or incontinence as before. \par \par 17...day 15, cycle #2, cis/gem.  Juju has increased sensitivity in the lips after chemo. She had some vomiting at the end of her chemo infusion, however she did not have increased nausea and vomiting since. She has fatigue, and she complains of constipation and is taking miralax. She does complain of back pain that has increased in intensity.She notices it when she bends over. She feels that there is something noticeably there in her back. She takes pain medication to help her fall asleep. She doesn't sleep well. She does use c-pap secondary to sleep apnea but she is awaken from her sleep secondary to nocturia. She also notices her pain more when she has constipation and has to go to the bathroom. She describes her lower back pain as 8/10 at worst, and best 5/10. HEr back pain went away after the surgery and she noticed it came back once starting Chemo. She states her appetite is good, She does complain of dysgeusia. She denies paraesthesias. She does not like how the oxycodone makes her feel. It gives her HAs.\par \par 18...Had RT from the  to the . She feels there is some decrease in the pain, not as piercing. She feels the area is stiff. Worse when she awakens. Takes 3 x 325 Tylenol at night which helps the pain. has constipation, went one week w/o evacuation. Was given lactulose, but she feels that MiraLAX works better. She is less active as a result of the pain. the pain is worse when she bends. Appetite is variable. No weight loss. Has occasional nausea. No vomiting. Has some indigestion at times. No blood in the urine, no dysuria. No incontinence. Fatigue is present. No pains elsewhere. \par \par 3/14/18...She still has some pain. She is doing PT, which helps temporarily. She has not had the relief of pain that she thought would be the result. Pain score at 4-5, described as aggravation and it inhibits activities. Worse with bending, getting in and out of cars. She is not taking hydromorphone at this time.. She is distressed with constipation from the pain meds. Says lactulose does not work. She takes some Tylenol sporadically, not daily. Appetite is good, lost 1 kilo. No hematuria, no dysuria, no frequency, no incontinence. No pains elsewhere. She remains very active. She has alopecia. No diarrhea. No cough/WALKER. Fatigue is present, mild. She feels it at the end of the day. No N/V. \par \par 18...Completed 3 cycles of Tecentriq. She noes curling of the right fourth finger involuntarily. She can bend it back up, but it hurts to do so. No difficulty with strength on the right hand, no tremors.  She has tried Icy Hot w/o benefit. She points to DIP and PIP joints as the sites of pain. She feels she has lumps on her head. She is losing her hair, likely secondary to the chemo she received. Her scalp is pruritic. She has also lost pubic hair. Her pain continues to get better,went to PT for 7 weeks. She is able to do all normal activities, with mild fatigue. Appetite is good, weight is stable. No dizziness, no unsteadiness, no headaches. No other issues. No hematuria. \par \par 18...Missed Rx on 18. feels "great". Still has back pains, much improved. No pain while seated, but will come on after a while. No worse with ambulation. Does not awaken her. Pain can be as high as 7-8 with activities, best at 3-4. No pain meds utilized. No blood in urine. denies fatigue until late in the day. Appetite is good, but she has lost about 3 pounds. She is avoiding fat products as she has an upset stomach from fats. Had a URI, treated with azithromycin, Flonase and a codeine containing cough suppressant, still has some residual cough. No diarrhea. No dyspnea. has constipation. \par \par 18...On atezolizumab since 18. Feels well. Still has some pains, not clearly as severe as before. It is worse if she walks for a while, such as several hundred feet. Also more prominent if she stands too long. Does not bother her at night or awaken her. It does not stop her activities. Takes an occasional ibuprofen, not daily. Appetite is good, weight is stable. No N/V. No diarrhea. No cough/WALKER. Some fatigue. No leg edema. \par \par 18...Feels "okay". Still has some back pain, nothing like it was before. Has a bunion of the foot which is bothersome and the podiatrist has recommended surgery. No cough/sputum,. No diarrhea. Appetite is good, but lost 2 pounds since last visit. No other pains. No headaches, no paresthesias. No dizziness noted, no balance issues. Mild fatigue, improved.\par \par 18...Cycle 6 was administered on 18, due #7 on 18. "I'm feeling good". Noted some pressure and noted urinary frequency about one week ago, then resolved. No dysuria, no blood, no frequency, nocturia x 1. The usual lower back pain, no pain at the current time. Extra exertion brings on the low back pain and she is not sure if this is from the cancer or from the prior back surgery. She notes several areas over her skin becoming depigmented in small spots. No pruritus. Appetite is good, no weight loss. Actually gained a few pounds. No diarrhea. has some fatigue towards the end of the day. No dizziness. No paresthesias. No cough/WALKER. \par \par 18...On atezo since 18. Has received 7 cycles. Saw Dr. Hargrove late , cysto recommended, but she decided to wait until  after vacation. She notes lightening of her skin. She didn't go to the dermatologist. She has a prior dermatologist that she might see. No diarrhea. No upset stomach. Appetite is ":great", no weight loss. No cough/WALKER. No paresthesias. Minor fatigue along with aches in the evenings. No headaches, no dizziness. No leg edema. Slight hematuria. \par \par 18...last scans in May\par Reports skin changes due to the vitiligo has been bothering her.  Reports this has been happening more since last month.  Has also been taking Valium from two years ago occasionally for anxiety.  Reports has taken it once every 2-3 weeks, only when she feels overwhelmed with anxiety.  Has not seen an psychiatrist yet, but has been seeing a psychologist.  She has seeing her for one year..  When she gets anxious it occurs mostly at night.  Denies any pain, except after walking a long period of time.  Will occasionally use a cane.  Reports no urinary frequency.  No urinary incontinence, no hematuria.  Reports regular bowel movements.  Reports good appetite, occasional dyspepsia with fried foods.  \par \par 18...Did not have an appt today, was added onto schedule. She has urgency, no dysuria, no foul odor. She feels bloated as well for the past 3 days. She wonders if it is related to her vitiligo, which is prominent in the urogenital area. Urine is clear, no blood. No fevers, no chills. Appetite is "great", gaining weight. No cough/WALKER. NO diarrheal stools, last BM yesterday, normal. No N/V. No vitiligo is more prominent, which concerns her. She says the bottom of her feet are tender, at the ball of the feet. Toes are numb. This has occurred over the last week. She has also had cramps in her hands. Mostly the thumb, told of he had an injection for her trigger finger. received cycle # 11 of atezolizumab today. \par \par 10/11/18...dose # 12 today. Saw Paulie Hargrove, plan for cysto. "I'm doing well".  She says that her feet always feels like there "is something there", involving the toes, no longer affects the ball of the foot. Saw her podiatrist. Cysto is scheduled for the . Her vitiligo is more prominent his is prominent on the hands. She feels that some parts of her hands are darker as well. Appetite is "fabulous". No N/V/D/C. Urine flow is good, no incontinence, no blood, no dysuria. Urgency and fullness sensation resolved. No cough/SOB. No headaches. No fatigue\par \par 10/31/18....Dose #13 today. Feels well at this time. Back pain remains the same as before, no pain med use. If she does extra activities she has pain. She had back surgery before and she believes it is from the prior surgery and not the mets. No fatigue. Appetite is good, weight is stable., No diarrheal stools. No hematuria noted. No dysuria. Usual activities performed without impairment. Vitiligo is somewhat more prominent, which disturbs her. No leg edema. No cough, no WALKER. No upset stomach. No fevers, no chills. Had cysto on , all was normal. \par \par 18...Feels "okay". Pain in the back somewhat more prominent, the pain from her prior surgery.Appetite is jeramy, no weight loss. No cough/WALKER. No diarrhea, no upset stomach. Some minor fatigue. She thinks she is depressed, attends a support group here. Starting with a therapist. He  left her recently. [place] \par \par 18...Last scans 18. Getting laser therapy for her vitiligo, which she may not continue due to high c-pays. feels as if her tongue is sensitive to heat since starting the laser therapy. She senses salt more than before. Otherwise well, back pain "bearable", RTS, "part of my life". No pain meds used. Appetite is good, weight is stable. No diarrheal stools No cough, No SOB. She has some sensation in the bladder or vaginal area, pruritic, calmed with Vagisil.  She is concerned about some blood in the urine, microscopic....she had a cysto on 10/22/18, revealing no issue. She asked about clearance for sexual activity. No fatigue, no headaches. No dysuria, but occasional mild irritation. No incontinence. No urinary frequency, rare nocturia. \par \par 1/3/19 : On atezolizumab since 18. Feels "great". No pains except for usual aches. Appetite is good, weight has increased. No cough, no WALKER. No diarrheal stools, No upset stomach. No edema. No skin rashes. Vitiligo is "going crazy", goes 2 times a week for laser therapy. States she may stop the laser therapy. \par \par 19...19 : Here for Atezolizumab(since ) and follow up. \par She has been well overall without any AEs aside form vitiligo which is a known side effect of these ICI's. \par \par 19 : One year on atezolizumab. Feels "fine". No diarrheal stool, no dyspepsia, no cough/WALKER. Appetite s good, no weight loss. No headaches, some paresthesias of the feet, no change. Has seen neurology and received injections. Can't open her hands at times. No fatigue, no pruritus. NO skin rashes. Vitiligo continues to be more prominent. Went for laser therapy, wit stopped it. She says her lips blistered. Vitiligo affects genitalia, present prior to Rx, has vitiligo of the hands, axilla and breasts. \par \par 3/6/19...3/6/19 : Ms. Crespo is here for a follow up and atezolizumab. She has been having lower back pain that resembled pain she had when she had recurrence. She underwent MRI on 3/3/19. This showed re-demonstrating the spine lesions, no changes were noted on the MRI. She has DJD and bulging disc with narrowing in L4-5.\par No new lesions were seen. She has not followed up with Dr. VINICIUS Kauffman for this either. OTHerwise she feels well. \par \par 19...saw her orthopedic surgeon, who said the pain is not related to her cancer, but to scar tissue. He gave her Flexeril and Meloxicam. He referred her for PT. The pain is much better. The knot" is not as big as before. Otherwise, :fabulous". Appetite is good, weight is stable. Has some fatigue, noted at the end of the day. No pruritus, no rash. Vitiligo is progressive. No cough, no WALKER. No nausea, no vomiting, occ upset stomach. No diarrheal stools. Now 14 months on therapy. \par \par 19...On Atezo since 2018. Had some back pain exacerbation recently, was on meloxicam and cyclobenzaprine, which has resolved to a great degree. She returned to work in April. Feels well otherwise, has a "head cold", with congestion, yellow sputum. Taking Claritin and other drugs, nasal congestion and runny nose improved. She is concerned it has moved to the chest. No F/C, no SOB, no wheezing. Appetite is good, no weight loss. No diarrheal stools., no upset stomach. Fatigue is present, in  the evening, she is "done". This has been the case since she returned to work. No rash, no pruritus. Vitiligo is progressing.\par \par 19...Working and has fatigue. She feels that the fatigue is mostly due to the work schedule. 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 Cycle 25 is today. Overall, feels well. has some back pain, which she feels is related to her prior surgery. No hematuria noted, no incontinence. No dysuria. Appetite is excellent, no weight loss. No cough, No WALKER. No diarrheal stools. No F/C. No edema. \par \par 19...Now 1.5 years on atezo at this time. Feels well overall. has an occasional tingling when she voids, which she attributes to the genital vitiligo. She has some cream to apply. She is working as a  to the elderly and one of her clients  this AM. This upsets her somewhat. She became upset and tearful. Appetite is normal, weight is stable. She continues to have some back pains as before, related to prior surgery, perhaps somewhat more since she had to expend more effort. No diarrheal stools, no cough. No SOB No headaches, no dizziness. No fatigue, taking Geritol, which she feels has helped her. No fevers, no chills, no urinary issues.\par \par 9/10/19...Feels "wonderful". Back pain RTS, not severe, has taken some oxycodone at bed time recently Uses CPAP to sleep, occasionally awakened by back pain. She saw her orthopedist, who said that everything looked the same.Appetite is good, no weight loss. No cough, no WALKER, No diarrheal stools. No fatigue. No rash, no pruritus. Occ headaches, feel like sinus headaches, no meds, brief duration, slight in intensity. No edema. Orthopedist did plain films. Occ tramadol use. \par \par 10/31/19...On atezo since 2018. Now has more below back pains Become worse in July or August, not present all the time. Occasionally awakens her. Takes oxycodone or hydromorphone infrequently. Pain at 4 currently, worst at  4 in the last 24, best at zero. No worse with walking, actually better. Appetite is "great". Weight stable. o N/V/C/D. No cough, no dyspnea. Working, has some fatigue at the end of the day. No headaches, occ paresthesias.  She had prior surgery with rods in her back. Going t see derm as well. Has some "tingling when she voids, which she attributes to the worsening vitiligo in the urogenital area. NO blood, nocturia x 1, flow is fine, no incontinence. No dysuria. No edema. Wants t cancel  appointment, wants to go to [place].\par \par 19...Had an MRI of the neck, ordered by Dr Naveen Kauffman, who performed her lumbar surgery. She was told she requires surgery in the neck. She was started on meloxicam, which she says does not help.  dose will be # 33. The neck pain feels "like a monkey on my back", started about 6 weeks ago after her last visit. She was told it has nothing to do with her cancer. She did not bring the MRI results with her. Appetite is "great", no weight loss. No blood in the urine, no dysuria. She was started on some topicals and fluconazole for genital rash/vaginitis, saw both GYN and derm. No fatigue. Started atezo in 2018. No edema. No diarrheal stools, no cough/WALKER. getting some PT/hydrotherapy. \par \par 20...Feels well today. Had some gyn issues, told of a urine infection, treated with Cipro and Diflucan, completed both. Saw her PCP for an annual exam. She had large leucocyte esterase, few bacteria , but a negative culture. She has been noting chills recently. No fevers. NO fatigue. Appetite is fine, no weight loss. Back pains are "okay", no meds used. No edema. No cough, No WALKER. No diarrheal stools. She was on her CPAP recently and was also treated with a Z pack. \par \par 20...Saw GYN for routine follow up and had some pruritus as well. She was having intermittent chills as well as some abdominal fullness. No findings on exam apparently. She was sent for a sono, which revealed a mass, vascular, MRI ordered, not done as yet. She feels pressure in the bladder area. The pressure is constant, not increased with voiding, but has a "sensation". Recent cysto was negative. Appetite is good, weight increased. No other areas of pain/discomfort. No fevers,no chills. She has her usual low back pains. She has pressure in the lower pelvis, much better compared to 2 weeks ago. PCP felt a left supraclavicular mass and ordered a CXR.\par \par 20...Completed 2 years of atezolizumab in February. Has had sinus infection, on third course of antibiotics, to have a procedure in 3 weeks. Seeing a TMJ specialist for pain in her jaw. Recently had a partial denture made and she can't use it due to the pain. Back pain the same. "I live with it". She dropped 7 pounds and she can't eat like she used to. Latest antibiotic was doxycycline. She is on Mobic for the jaw pains. Otherwise well. NO blood in urine, no dysuria. no incontinence. No fatigue. No cough, no dyspnea. No headaches. No nausea, no vomiting. Vitiligo progressing\par \par 10/27/20...Verbal permission for telehealth services granted by the patient, Juju Da Silva on 2020 at 10:25 AM\par Feels "okay". To see a gastroenterologist as she is having issues with dairy, stared earlier this year. She has an upset stomach with certain meals with bloating, no N/V/D. If she takes Linzess, she feels better. She does not take it every day. Appetite is good, no weight loss. Back pains continues, may be up to 6 or 7, every day pains, no change form last visit. Rare pain med use. Takes Tylenol ES if he wakes her up at night. No opioids used. No pains elsewhere. Sitting down and getting up brings on the pains, better with activities. No cough, no WALKER. No edema. No hematuria. Saw Dr Hargrove for urinary frequency and pressure in the lower abdomen. No fevers, no chills. NO headaches. NO dizziness. \par Had UTI, seen in in urgent care, had fevers/chills.  She was on several antibiotics for sinusitis from February onwards. PET shows continual opacification of the sinus. Has some jaw pains, and she says this cannot be addressed until sinus cared for. Urine was negative recently. PET shows ZACH. \par \par 2/3/21 - Presents for follow up, refused to do telehealth as she was scheduled for.  Feels well. Appetite is good, gained weight, up 5.5 kilos form last recorded weight. Has not had coronavirus. No edema. No N/V/D/C. No blood in urine, no incontinence. No headaches, no dizziness, some paresthesias since her back surgery, no worse after the chemotherapy. No tinnitus. No fatigue. \par \par 8/3/21 - Presents for follow up. had a fall and had a fracture i her lumbar sine. She had a wound in her leg and was seeing a wound specialist. She is on gabapentin for neuropathy, dose unknown, only at bedtime. She had her films done at [facility]. She says she had an MRI as well. Thinks it was L1 that was fractured. She has chronic low back pain, worse with bending, always some issue present. The neuropathy involves the bottom of the feet and toes. This is only on the right side. The gabapentin helps to some degree. Feels well otherwise, no other sites of pain. Appetite is good, no weight loss that she perceives, although down 3.7 kilos from May. No blood, in the urine, no dysuria, no incontinence. No urgency. No edema noted. No headaches, no dizziness. No cough no WALKER. No N/V/D/C. She saw ortho at [facility] and they wanted to do epidural. Due to the infection, it was not done. After healing of the wound, she no longer needed the epidural. Can't recall he name of the ortho surgeon, she will call with the name. \par \par 21...almost 2 years since the end of 2 years of atezolizumab. Feels "fine". Has her usual aches and pains no change. Appetite is good, follows with PCP, ha A1c checked, says she eliminated sugar and sweets. Appetite is good, dropped 4 kilos since August. No N/V/D/C. No cough, no WALKER. Usual low back pains, prior surgery. No meds used. Urine is "perfect". No blood, no dysuria. no incontinence. Last saw Paulie Hargrove in 2020. The leg wound healed. No headaches, no dizziness. She has paresthesias, since the surgery, no longer taking the gabapentin, wears socks at night, which helps. No F/C. No leg edema. Usual fatigue, no change. \par \par 22...completed atezo in 2020, after 2 years of therapy. Had a mammogram done and she was noted to have microcalcifications, done at [facility]. She is set up for a stereotactic biopsy. This has alarmed her. She says she had a breast cancer in the left breast. had  a lumpectomy and RT at that time. She has some anxiety as a result of this development. there is no palpable mass. Otherwise feels well. Appetite is good, no weight loss/gain. No headaches, no dizziness. NO leg edema. No chest pain./pressure/palpitations. No N/V/D, occ constipation, on Linzess. \par \par 22...completed atezo in 2020, after 2 years of therapy. Her feet started burning due to neuropathy. had swelling of her hand on the left side, was seen in the ER and kept overnight, no blood clot was noted. Had breast cancer diagnosis, had surgery May 3, had revision on May 9th, right side. had DCIS, had RT with Mali Kahn, 5 fractions. Apparently just to the breast. Swelling started after the RT> Now resolving. had an echo done, says her aortic valve is "twisted", likely stenosis, at 25%.  She saw a neurologist for the paresthesias. takes gabapentin 300 mg at bedtime. back is the same, periodic pains, no meds used. Appetite is "fantastic", no weight loss. No cough, no WALKER. No N/V/D/C. No headaches, no dizziness. No balance issues. Works 3 days a week,  to an elderly person. No edema of the legs noted. No fevers, no chills. No pain at this time, none in the past 24 hours.  [de-identified] : high-grade [FreeTextEntry1] : cis/gem, started chemo 11/3/17 as neoadjuvant, then bone mets, had RT. Now on atezolizumab since February 12, 2018, ended 2/4/20.  [de-identified] : 12/27/22...completed atezo in February 2020, after 2 years of therapy. Visiting [place] since November, developed pain and swelling of the foot, told of a fracture of the metatarsal, had a cast placed, then developed a wound covering her foot from the cast. On doxycycline as antibiotic. Had IV antibiotics while in the hospital. Has a wound care nurse who visits.  No fevers, no chills. On tramadol for pain, which she says she has run out of. On gabapentin as well with some help. Appetite is decreased, which she attributes to the antibiotics. Feels she has not lost weight. NO cough, no WALKER. In bed a lot due to pain, No fatigue. NO headaches, no dizziness. NO blood in the urine. No pains elsewhere. No N/V/D/...has constipation. 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 Not on tamoxifen. Saw an oncologist in regards to the breast cancer at [facility]. Was due to be seen this month, but missed appt. Last imaging December 2021.\par '''''''''''''''''\par \par \par \par \par \par \par \par \par \par \par \par \par

## 2022-12-27 NOTE — REASON FOR VISIT
[Home] : at home, [unfilled] , at the time of the visit. [Medical Office: ([facility])___] : at the medical office located in  [Patient] : the patient [Self] : self [FreeTextEntry2] : met urothelial cancer

## 2023-02-03 ENCOUNTER — APPOINTMENT (OUTPATIENT)
Dept: PODIATRY | Facility: CLINIC | Age: 73
End: 2023-02-03
Payer: MEDICARE

## 2023-02-03 DIAGNOSIS — Z85.51 PERSONAL HISTORY OF MALIGNANT NEOPLASM OF BLADDER: ICD-10-CM

## 2023-02-03 DIAGNOSIS — I10 ESSENTIAL (PRIMARY) HYPERTENSION: ICD-10-CM

## 2023-02-03 DIAGNOSIS — Z85.3 PERSONAL HISTORY OF MALIGNANT NEOPLASM OF BREAST: ICD-10-CM

## 2023-02-03 DIAGNOSIS — G47.30 SLEEP APNEA, UNSPECIFIED: ICD-10-CM

## 2023-02-03 DIAGNOSIS — S92.351A DISPLACED FRACTURE OF FIFTH METATARSAL BONE, RIGHT FOOT, INITIAL ENCOUNTER FOR CLOSED FRACTURE: ICD-10-CM

## 2023-02-03 PROCEDURE — 99214 OFFICE O/P EST MOD 30 MIN: CPT | Mod: 25

## 2023-02-03 PROCEDURE — 73630 X-RAY EXAM OF FOOT: CPT | Mod: RT

## 2023-02-06 ENCOUNTER — APPOINTMENT (OUTPATIENT)
Dept: ORTHOPEDIC SURGERY | Facility: CLINIC | Age: 73
End: 2023-02-06

## 2023-02-06 ENCOUNTER — APPOINTMENT (OUTPATIENT)
Dept: WOUND CARE | Facility: CLINIC | Age: 73
End: 2023-02-06

## 2023-02-08 PROBLEM — S92.351A FRACTURE OF FIFTH METATARSAL BONE OF RIGHT FOOT: Status: ACTIVE | Noted: 2023-02-06

## 2023-02-08 NOTE — PROCEDURE
[FreeTextEntry1] : I got x-rays, multiple views to evaluate the fracture.\par X-ray Report: (Right foot - 3 views) X-rays demonstrate an avulsion fracture at the base of the 5th metatarsal with minimal displacement and signs of healing. There are no signs of gas, periosteal break or osteomyelitis. The fracture is still in the healing process. There is still some lucency, avulsion type fracture.

## 2023-02-08 NOTE — ASSESSMENT
[FreeTextEntry1] : \par Impression: Painful wound of the right foot. Healing 5th metatarsal avulsion fracture in the healing process. Pain.\par \par Treatment: I surgically scrubbed and cleansed the wound. I put a Silvadene dressing on. I want the patient using gauze to dress it with the collagenase that she has daily. I gave her an Ace wrap and I want her using a fracture shoe daily for the next 2 weeks. I will see her back in the office and I will make a determination as to treatment going forward. She is going to continue using an assistive device. I want her to rest and elevate. With any change in the condition, I want her to contact the office otherwise I will see her back in the office in 2 weeks. I want her to soak with warm water and Epsom salt daily. She will continue the bandaging herself that she feels comfortable doing. The fracture is still in the healing process. There is still some lucency, avulsion type fracture so I want her to continue the fracture boot. I will see her back in 2 weeks and make a determination as to treatment going forward.

## 2023-02-08 NOTE — HISTORY OF PRESENT ILLNESS
[FreeTextEntry1] : Patient presents today. She sees that somewhere around 11/13/2022 she had injured her right foot. It continued to swell and get worse. She went in for treatment somewhere around 11/21/2022 where she was diagnosed with a fracture of the right foot base of the 5th metatarsal. The patient states she was put into a soft cast and it continued to become more painful. She followed up back and there was a blistering wound that became infected at her right foot. She ended up developing cellulitis and she was put into the hospital for IV antibiotics. She had work-up with MRI which was negative for osteomyelitis. She ended up developing a painful wound that was initially painful. It was a 10/10 and she had it treated weekly. She has been in a fracture shoe and using collagenase. She presents to me today with a painful level of 7/10.

## 2023-02-08 NOTE — PHYSICAL EXAM
[2+] : left foot dorsalis pedis 2+ [Vibration Dec.] : diminished vibratory sensation at the level of the toes [FreeTextEntry3] : PT: trace bilateral. [de-identified] : The fracture is still in the healing process. There is still some lucency, avulsion type fracture. [FreeTextEntry1] : Peripheral neuropathy from back surgery.

## 2023-02-17 ENCOUNTER — APPOINTMENT (OUTPATIENT)
Dept: PODIATRY | Facility: CLINIC | Age: 73
End: 2023-02-17
Payer: MEDICARE

## 2023-02-17 DIAGNOSIS — M10.9 GOUT, UNSPECIFIED: ICD-10-CM

## 2023-02-17 PROCEDURE — 99213 OFFICE O/P EST LOW 20 MIN: CPT | Mod: 25

## 2023-02-17 PROCEDURE — 20600 DRAIN/INJ JOINT/BURSA W/O US: CPT | Mod: LT

## 2023-02-24 PROBLEM — M10.9: Status: ACTIVE | Noted: 2023-02-21

## 2023-02-24 PROBLEM — M10.9 GOUT OF ANKLE: Status: ACTIVE | Noted: 2023-02-21

## 2023-02-24 NOTE — ASSESSMENT
[FreeTextEntry1] : \par Impression: Gout left ankle. Pain. Wound right foot.\par \par Treatment: I scrubbed the wound on the right. I put a Silvadene dressing on. I showed her how to use gauze and I gave her supplies to help make it easier to rewrap it. She will continue the surgical shoe. \par On the left side she consented and under strict sterile technique after prepping with alcohol and using Ethyl Chloride, I used 1 1/2cc's of a combination of Marcaine and Kenalog-40 and injected the acute gout. I started her on Colcrys once a day for 3 days. With continued pain that persists I want her to get uric acid levels. I will see her back in 2 weeks for reevaluation of both problems. Call with any worsening or problem.

## 2023-02-24 NOTE — PHYSICAL EXAM
[2+] : left foot dorsalis pedis 2+ [Vibration Dec.] : diminished vibratory sensation at the level of the toes [FreeTextEntry3] : PT: trace bilateral. [de-identified] : There is no instability. It is severely painful to even light touch at the left lateral ankle and it looks like this is acute gout. She has had peas and shellfish recently. \par  [FreeTextEntry1] : Peripheral neuropathy from back surgery.

## 2023-02-24 NOTE — HISTORY OF PRESENT ILLNESS
[FreeTextEntry1] : Patient presents today for 2 problems. She has a right foot wound that continues to contract and improve. She is using a small Ace bandage that is causing a little irritation. She is using Collagenase and Silvadene. It is improving. Over the last day and a half, she has pain that she woke up with around the left lateral ankle that got red and swollen. She did not twist it. \par

## 2023-02-27 ENCOUNTER — APPOINTMENT (OUTPATIENT)
Dept: PODIATRY | Facility: CLINIC | Age: 73
End: 2023-02-27
Payer: MEDICARE

## 2023-02-27 DIAGNOSIS — M19.91 PRIMARY OSTEOARTHRITIS, UNSPECIFIED SITE: ICD-10-CM

## 2023-02-27 PROCEDURE — 20605 DRAIN/INJ JOINT/BURSA W/O US: CPT | Mod: LT

## 2023-02-27 PROCEDURE — 73630 X-RAY EXAM OF FOOT: CPT | Mod: LT

## 2023-02-27 PROCEDURE — 99212 OFFICE O/P EST SF 10 MIN: CPT | Mod: 25

## 2023-03-01 PROBLEM — M19.91 PRIMARY OSTEOARTHRITIS: Status: ACTIVE | Noted: 2023-02-28

## 2023-03-01 NOTE — ASSESSMENT
[FreeTextEntry1] : \par Impression: Compensation and painful inflammatory flare-up at the sinus tarsi.\par \par Treatment: She consented. I prepped the area with alcohol. I used Ethyl Chloride and injected 1 1/2cc's of a combination of Marcaine and Kenalog-40. She is going to soak with warm water and Epsom salt. I put her on Meloxicam. I want her to get back into New Balance sneakers for both feet. With continued pain that persists on the left side I will order an MRI.

## 2023-03-01 NOTE — HISTORY OF PRESENT ILLNESS
[Sneakers] : rosie [FreeTextEntry1] : Patient presents today for evaluation. She has good circulation and she has this wound on the right foot that has been a chronic problem after a fracture. The fracture is healed and now the wound is pretty much healed in. She has been in a surgical shoe. She has been compensating and she has this pain over the sinus tarsi on the left side. It is still quite sensitive. Injection gave very temporary relief. She had uric acid levels, negative for gout. No open wound. No signs of infection.\par

## 2023-03-01 NOTE — PHYSICAL EXAM
[2+] : left foot dorsalis pedis 2+ [Vibration Dec.] : diminished vibratory sensation at the level of the toes [FreeTextEntry3] : PT: trace bilateral. [de-identified] : On the left side she has a lot of sensitivity over the sinus tarsi.  It is not painful with ROM but there is slight swelling and inflammation in this area. [FreeTextEntry1] : Peripheral neuropathy from back surgery.

## 2023-03-03 ENCOUNTER — APPOINTMENT (OUTPATIENT)
Dept: PODIATRY | Facility: CLINIC | Age: 73
End: 2023-03-03

## 2023-03-14 ENCOUNTER — APPOINTMENT (OUTPATIENT)
Dept: PODIATRY | Facility: CLINIC | Age: 73
End: 2023-03-14
Payer: MEDICARE

## 2023-03-14 PROCEDURE — 99213 OFFICE O/P EST LOW 20 MIN: CPT

## 2023-03-15 ENCOUNTER — APPOINTMENT (OUTPATIENT)
Dept: MRI IMAGING | Facility: CLINIC | Age: 73
End: 2023-03-15
Payer: MEDICARE

## 2023-03-15 ENCOUNTER — OUTPATIENT (OUTPATIENT)
Dept: OUTPATIENT SERVICES | Facility: HOSPITAL | Age: 73
LOS: 1 days | End: 2023-03-15
Payer: MEDICARE

## 2023-03-15 ENCOUNTER — RESULT REVIEW (OUTPATIENT)
Age: 73
End: 2023-03-15

## 2023-03-15 DIAGNOSIS — Z98.890 OTHER SPECIFIED POSTPROCEDURAL STATES: Chronic | ICD-10-CM

## 2023-03-15 DIAGNOSIS — Z98.89 OTHER SPECIFIED POSTPROCEDURAL STATES: Chronic | ICD-10-CM

## 2023-03-15 DIAGNOSIS — Z00.00 ENCOUNTER FOR GENERAL ADULT MEDICAL EXAMINATION WITHOUT ABNORMAL FINDINGS: ICD-10-CM

## 2023-03-15 DIAGNOSIS — Z90.710 ACQUIRED ABSENCE OF BOTH CERVIX AND UTERUS: Chronic | ICD-10-CM

## 2023-03-15 DIAGNOSIS — N63 UNSPECIFIED LUMP IN BREAST: Chronic | ICD-10-CM

## 2023-03-15 PROCEDURE — 73721 MRI JNT OF LWR EXTRE W/O DYE: CPT | Mod: MH

## 2023-03-15 PROCEDURE — 73721 MRI JNT OF LWR EXTRE W/O DYE: CPT | Mod: 26,LT,MH

## 2023-03-17 ENCOUNTER — APPOINTMENT (OUTPATIENT)
Dept: PODIATRY | Facility: CLINIC | Age: 73
End: 2023-03-17
Payer: MEDICARE

## 2023-03-17 DIAGNOSIS — S93.409A SPRAIN OF UNSPECIFIED LIGAMENT OF UNSPECIFIED ANKLE, INITIAL ENCOUNTER: ICD-10-CM

## 2023-03-17 PROCEDURE — 99212 OFFICE O/P EST SF 10 MIN: CPT

## 2023-03-17 NOTE — HISTORY OF PRESENT ILLNESS
[FreeTextEntry1] : Patient is S/P complicated right foot fracture with infection and that is resolved now but she has been compensating and putting weight on the left side and she has pain that could be as bad as 8/10 at the left ankle region laterally. She denies trauma and it is really not getting better. She has been soaking it and she has very good comfortable New Balance sneakers.

## 2023-03-17 NOTE — ASSESSMENT
[FreeTextEntry1] : \par Impression: Compensation and painful inflammatory flare-up at the sinus tarsi.\par \par Treatment: At this point I think she deserves an MRI for better evaluation. Obviously with any worsening or increased swelling or redness she needs to go to the Emergency Room. I started her on a Medrol dose pack and I am going to get the results of the MRI and she is going to initiate the start of the Medrol dose pack today.

## 2023-03-17 NOTE — PHYSICAL EXAM
[2+] : left foot dorsalis pedis 2+ [Vibration Dec.] : diminished vibratory sensation at the level of the toes [FreeTextEntry3] : PT: trace bilateral. [de-identified] : The tendons are noted to be intact. This does not appear to be infected but it is a very sensitive nodular area.  [FreeTextEntry1] : Peripheral neuropathy from back surgery.

## 2023-03-21 PROBLEM — S93.409A ANKLE SPRAIN: Status: ACTIVE | Noted: 2023-03-20

## 2023-03-21 NOTE — HISTORY OF PRESENT ILLNESS
[Sneakers] : rosie [FreeTextEntry1] : Patient presents today. She has a bit of a complicated case in that she had a fracture of the right foot and it became infected. It was complicated. She was compensating and then the left foot became hot and painful.  \par I sent her for an MRI which demonstrates Grade 1 subacute on Grade 2/3 chronic multi ligamentous sprain injury of the left ankle including chronic rupture vs. functional rupture ATFL. Sub acute Grade II sprain of the left inferior peroneal retinaculum. Partial thickness retromalleolar interstitial split tear peroneus brevis tendon.\par She has been on a Medrol dose pack. She is MCC through it. Her symptoms have improved about 90%.

## 2023-03-21 NOTE — PHYSICAL EXAM
[2+] : left foot dorsalis pedis 2+ [Vibration Dec.] : diminished vibratory sensation at the level of the toes [FreeTextEntry3] : PT: trace bilateral. [de-identified] :  There is no instability and the peroneus brevis tendon appears to function well. The right foot also is doing nicely.  [FreeTextEntry1] : Peripheral neuropathy from back surgery.

## 2023-03-21 NOTE — ASSESSMENT
[FreeTextEntry1] : \par Impression: Left ankle sprain. Pain.\par \par Treatment: At this point I just want her to finish the Medrol dose pack and rest in terms of mild activity level for the next week and then may gradually increase activity thereafter. I would like her to go for rheumatology appointment because she complains of some locking up in her hands. I want it noted that she does have peripheral neuropathy which is, I think, radiculopathy from her lower back.

## 2023-04-03 PROBLEM — M48.50XA COMPRESSION FRACTURE OF SPINE: Status: ACTIVE | Noted: 2021-04-28

## 2023-04-06 ENCOUNTER — APPOINTMENT (OUTPATIENT)
Dept: ORTHOPEDIC SURGERY | Facility: CLINIC | Age: 73
End: 2023-04-06
Payer: MEDICARE

## 2023-04-06 ENCOUNTER — APPOINTMENT (OUTPATIENT)
Dept: ORTHOPEDIC SURGERY | Facility: CLINIC | Age: 73
End: 2023-04-06

## 2023-04-06 VITALS — HEIGHT: 65 IN | BODY MASS INDEX: 27.99 KG/M2 | WEIGHT: 168 LBS

## 2023-04-06 DIAGNOSIS — Z00.00 ENCOUNTER FOR GENERAL ADULT MEDICAL EXAMINATION W/OUT ABNORMAL FINDINGS: ICD-10-CM

## 2023-04-06 PROCEDURE — 99204 OFFICE O/P NEW MOD 45 MIN: CPT

## 2023-04-06 PROCEDURE — 73610 X-RAY EXAM OF ANKLE: CPT | Mod: 26,LT,PD

## 2023-04-06 NOTE — PHYSICAL EXAM
[Left] : left foot and ankle [NL (40)] : plantar flexion 40 degrees [NL 30)] : inversion 30 degrees [NL (20)] : eversion 20 degrees [5___] : eversion 5[unfilled]/5 [2+] : dorsalis pedis pulse: 2+ [] : patient ambulates without assistive device [FreeTextEntry3] : punctuate wound at lateral mall w/o exposed fat. +surround erythema. small amount of purulence expressed - culture obtained [FreeTextEntry8] : ttp at wound

## 2023-04-06 NOTE — HISTORY OF PRESENT ILLNESS
[8] : 8 [7] : 7 [Burning] : burning [Dull/Aching] : dull/aching [Sharp] : sharp [Throbbing] : throbbing [de-identified] : 04/06/2023: 3 weeks L foot pain w/o injury. in 11/22 had R foot fx c/b infection that required hospitalization. was started on augmentin yesterday by pcp. had been on mdp from [facility] as well.  denies f/c. denies dm/tob. h/o bladder ca -- no longer on chemo/radiation.\par  [] : Post Surgical Visit: no [FreeTextEntry1] : LT foot

## 2023-04-06 NOTE — ASSESSMENT
[FreeTextEntry1] : cx obtained\par on abx\par advised stopping steroid\par rec f/up in wound care center -- patient states has one across street from her house and will call to schedule appt\par advised to go to ED if signs of worsening infection as described\par patient in agreement w/ plan\par f/up prn

## 2023-04-06 NOTE — DATA REVIEWED
[MRI] : MRI [Left] : left [Ankle] : ankle [I reviewed the films/CD and additionally noted] : I reviewed the films/CD and additionally noted [FreeTextEntry1] : chronic changes assoc w/ prior sprain -- maría

## 2023-04-07 ENCOUNTER — OUTPATIENT (OUTPATIENT)
Dept: OUTPATIENT SERVICES | Facility: HOSPITAL | Age: 73
LOS: 1 days | Discharge: HOME | End: 2023-04-07

## 2023-04-07 ENCOUNTER — NON-APPOINTMENT (OUTPATIENT)
Age: 73
End: 2023-04-07

## 2023-04-07 DIAGNOSIS — Z98.89 OTHER SPECIFIED POSTPROCEDURAL STATES: Chronic | ICD-10-CM

## 2023-04-07 DIAGNOSIS — Z98.890 OTHER SPECIFIED POSTPROCEDURAL STATES: Chronic | ICD-10-CM

## 2023-04-07 DIAGNOSIS — N63 UNSPECIFIED LUMP IN BREAST: Chronic | ICD-10-CM

## 2023-04-07 DIAGNOSIS — C67.9 MALIGNANT NEOPLASM OF BLADDER, UNSPECIFIED: ICD-10-CM

## 2023-04-07 DIAGNOSIS — Z90.710 ACQUIRED ABSENCE OF BOTH CERVIX AND UTERUS: Chronic | ICD-10-CM

## 2023-04-08 ENCOUNTER — INPATIENT (INPATIENT)
Facility: HOSPITAL | Age: 73
LOS: 10 days | Discharge: HOME HEALTH | End: 2023-04-19
Attending: INTERNAL MEDICINE | Admitting: INTERNAL MEDICINE
Payer: MEDICARE

## 2023-04-08 VITALS
RESPIRATION RATE: 18 BRPM | OXYGEN SATURATION: 97 % | TEMPERATURE: 99 F | SYSTOLIC BLOOD PRESSURE: 134 MMHG | DIASTOLIC BLOOD PRESSURE: 82 MMHG | HEART RATE: 93 BPM

## 2023-04-08 DIAGNOSIS — D64.9 ANEMIA, UNSPECIFIED: ICD-10-CM

## 2023-04-08 DIAGNOSIS — Z29.9 ENCOUNTER FOR PROPHYLACTIC MEASURES, UNSPECIFIED: ICD-10-CM

## 2023-04-08 DIAGNOSIS — L03.116 CELLULITIS OF LEFT LOWER LIMB: ICD-10-CM

## 2023-04-08 DIAGNOSIS — D70.3 NEUTROPENIA DUE TO INFECTION: ICD-10-CM

## 2023-04-08 DIAGNOSIS — N63 UNSPECIFIED LUMP IN BREAST: Chronic | ICD-10-CM

## 2023-04-08 DIAGNOSIS — Z98.89 OTHER SPECIFIED POSTPROCEDURAL STATES: Chronic | ICD-10-CM

## 2023-04-08 DIAGNOSIS — I10 ESSENTIAL (PRIMARY) HYPERTENSION: ICD-10-CM

## 2023-04-08 DIAGNOSIS — Z98.890 OTHER SPECIFIED POSTPROCEDURAL STATES: Chronic | ICD-10-CM

## 2023-04-08 DIAGNOSIS — E78.5 HYPERLIPIDEMIA, UNSPECIFIED: ICD-10-CM

## 2023-04-08 DIAGNOSIS — Z90.710 ACQUIRED ABSENCE OF BOTH CERVIX AND UTERUS: Chronic | ICD-10-CM

## 2023-04-08 DIAGNOSIS — L03.90 CELLULITIS, UNSPECIFIED: ICD-10-CM

## 2023-04-08 LAB
ALBUMIN SERPL ELPH-MCNC: 3.8 G/DL — SIGNIFICANT CHANGE UP (ref 3.3–5)
ALP SERPL-CCNC: 98 U/L — SIGNIFICANT CHANGE UP (ref 40–120)
ALT FLD-CCNC: 14 U/L — SIGNIFICANT CHANGE UP (ref 4–33)
ANION GAP SERPL CALC-SCNC: 13 MMOL/L — SIGNIFICANT CHANGE UP (ref 7–14)
AST SERPL-CCNC: 21 U/L — SIGNIFICANT CHANGE UP (ref 4–32)
BASE EXCESS BLDV CALC-SCNC: 2.9 MMOL/L — SIGNIFICANT CHANGE UP (ref -2–3)
BASOPHILS # BLD AUTO: 0.02 K/UL — SIGNIFICANT CHANGE UP (ref 0–0.2)
BASOPHILS NFR BLD AUTO: 0.2 % — SIGNIFICANT CHANGE UP (ref 0–2)
BILIRUB SERPL-MCNC: 0.2 MG/DL — SIGNIFICANT CHANGE UP (ref 0.2–1.2)
BLOOD GAS VENOUS COMPREHENSIVE RESULT: SIGNIFICANT CHANGE UP
BUN SERPL-MCNC: 21 MG/DL — SIGNIFICANT CHANGE UP (ref 7–23)
CALCIUM SERPL-MCNC: 10.4 MG/DL — SIGNIFICANT CHANGE UP (ref 8.4–10.5)
CHLORIDE BLDV-SCNC: 104 MMOL/L — SIGNIFICANT CHANGE UP (ref 96–108)
CHLORIDE SERPL-SCNC: 104 MMOL/L — SIGNIFICANT CHANGE UP (ref 98–107)
CO2 BLDV-SCNC: 31.4 MMOL/L — HIGH (ref 22–26)
CO2 SERPL-SCNC: 24 MMOL/L — SIGNIFICANT CHANGE UP (ref 22–31)
CREAT SERPL-MCNC: 0.87 MG/DL — SIGNIFICANT CHANGE UP (ref 0.5–1.3)
CRP SERPL-MCNC: 45.7 MG/L — HIGH
EGFR: 70 ML/MIN/1.73M2 — SIGNIFICANT CHANGE UP
EOSINOPHIL # BLD AUTO: 0.25 K/UL — SIGNIFICANT CHANGE UP (ref 0–0.5)
EOSINOPHIL NFR BLD AUTO: 3 % — SIGNIFICANT CHANGE UP (ref 0–6)
ERYTHROCYTE [SEDIMENTATION RATE] IN BLOOD: 62 MM/HR — HIGH (ref 4–25)
FLUAV AG NPH QL: SIGNIFICANT CHANGE UP
FLUBV AG NPH QL: SIGNIFICANT CHANGE UP
GAS PNL BLDV: 138 MMOL/L — SIGNIFICANT CHANGE UP (ref 136–145)
GLUCOSE BLDV-MCNC: 94 MG/DL — SIGNIFICANT CHANGE UP (ref 70–99)
GLUCOSE SERPL-MCNC: 102 MG/DL — HIGH (ref 70–99)
HCO3 BLDV-SCNC: 30 MMOL/L — HIGH (ref 22–29)
HCT VFR BLD CALC: 37.1 % — SIGNIFICANT CHANGE UP (ref 34.5–45)
HCT VFR BLDA CALC: 35 % — SIGNIFICANT CHANGE UP (ref 34.5–46.5)
HGB BLD CALC-MCNC: 11.6 G/DL — LOW (ref 11.7–16.1)
HGB BLD-MCNC: 11.2 G/DL — LOW (ref 11.5–15.5)
IANC: 5.43 K/UL — SIGNIFICANT CHANGE UP (ref 1.8–7.4)
IMM GRANULOCYTES NFR BLD AUTO: 0.5 % — SIGNIFICANT CHANGE UP (ref 0–0.9)
LACTATE BLDV-MCNC: 0.7 MMOL/L — SIGNIFICANT CHANGE UP (ref 0.5–2)
LYMPHOCYTES # BLD AUTO: 1.66 K/UL — SIGNIFICANT CHANGE UP (ref 1–3.3)
LYMPHOCYTES # BLD AUTO: 20.1 % — SIGNIFICANT CHANGE UP (ref 13–44)
MCHC RBC-ENTMCNC: 27.1 PG — SIGNIFICANT CHANGE UP (ref 27–34)
MCHC RBC-ENTMCNC: 30.2 GM/DL — LOW (ref 32–36)
MCV RBC AUTO: 89.6 FL — SIGNIFICANT CHANGE UP (ref 80–100)
MONOCYTES # BLD AUTO: 0.86 K/UL — SIGNIFICANT CHANGE UP (ref 0–0.9)
MONOCYTES NFR BLD AUTO: 10.4 % — SIGNIFICANT CHANGE UP (ref 2–14)
NEUTROPHILS # BLD AUTO: 5.43 K/UL — SIGNIFICANT CHANGE UP (ref 1.8–7.4)
NEUTROPHILS NFR BLD AUTO: 65.8 % — SIGNIFICANT CHANGE UP (ref 43–77)
NRBC # BLD: 0 /100 WBCS — SIGNIFICANT CHANGE UP (ref 0–0)
NRBC # FLD: 0 K/UL — SIGNIFICANT CHANGE UP (ref 0–0)
PCO2 BLDV: 55 MMHG — HIGH (ref 39–52)
PH BLDV: 7.34 — SIGNIFICANT CHANGE UP (ref 7.32–7.43)
PLATELET # BLD AUTO: 281 K/UL — SIGNIFICANT CHANGE UP (ref 150–400)
PO2 BLDV: 34 MMHG — SIGNIFICANT CHANGE UP (ref 25–45)
POTASSIUM BLDV-SCNC: 3.7 MMOL/L — SIGNIFICANT CHANGE UP (ref 3.5–5.1)
POTASSIUM SERPL-MCNC: 4 MMOL/L — SIGNIFICANT CHANGE UP (ref 3.5–5.3)
POTASSIUM SERPL-SCNC: 4 MMOL/L — SIGNIFICANT CHANGE UP (ref 3.5–5.3)
PROT SERPL-MCNC: 6.7 G/DL — SIGNIFICANT CHANGE UP (ref 6–8.3)
RBC # BLD: 4.14 M/UL — SIGNIFICANT CHANGE UP (ref 3.8–5.2)
RBC # FLD: 15.1 % — HIGH (ref 10.3–14.5)
RSV RNA NPH QL NAA+NON-PROBE: SIGNIFICANT CHANGE UP
SAO2 % BLDV: 54.1 % — LOW (ref 67–88)
SARS-COV-2 RNA SPEC QL NAA+PROBE: SIGNIFICANT CHANGE UP
SODIUM SERPL-SCNC: 141 MMOL/L — SIGNIFICANT CHANGE UP (ref 135–145)
WBC # BLD: 8.26 K/UL — SIGNIFICANT CHANGE UP (ref 3.8–10.5)
WBC # FLD AUTO: 8.26 K/UL — SIGNIFICANT CHANGE UP (ref 3.8–10.5)

## 2023-04-08 PROCEDURE — 99285 EMERGENCY DEPT VISIT HI MDM: CPT | Mod: FS,25

## 2023-04-08 PROCEDURE — 99223 1ST HOSP IP/OBS HIGH 75: CPT

## 2023-04-08 PROCEDURE — 36000 PLACE NEEDLE IN VEIN: CPT

## 2023-04-08 PROCEDURE — 73610 X-RAY EXAM OF ANKLE: CPT | Mod: 26,LT

## 2023-04-08 RX ORDER — ACETAMINOPHEN 500 MG
650 TABLET ORAL EVERY 6 HOURS
Refills: 0 | Status: DISCONTINUED | OUTPATIENT
Start: 2023-04-08 | End: 2023-04-19

## 2023-04-08 RX ORDER — ATORVASTATIN CALCIUM 80 MG/1
10 TABLET, FILM COATED ORAL AT BEDTIME
Refills: 0 | Status: DISCONTINUED | OUTPATIENT
Start: 2023-04-08 | End: 2023-04-19

## 2023-04-08 RX ORDER — GABAPENTIN 400 MG/1
300 CAPSULE ORAL THREE TIMES A DAY
Refills: 0 | Status: DISCONTINUED | OUTPATIENT
Start: 2023-04-08 | End: 2023-04-19

## 2023-04-08 RX ORDER — HYDROMORPHONE HYDROCHLORIDE 2 MG/ML
1 INJECTION INTRAMUSCULAR; INTRAVENOUS; SUBCUTANEOUS ONCE
Refills: 0 | Status: DISCONTINUED | OUTPATIENT
Start: 2023-04-08 | End: 2023-04-08

## 2023-04-08 RX ORDER — MORPHINE SULFATE 50 MG/1
4 CAPSULE, EXTENDED RELEASE ORAL ONCE
Refills: 0 | Status: DISCONTINUED | OUTPATIENT
Start: 2023-04-08 | End: 2023-04-08

## 2023-04-08 RX ORDER — VANCOMYCIN HCL 1 G
1250 VIAL (EA) INTRAVENOUS EVERY 12 HOURS
Refills: 0 | Status: DISCONTINUED | OUTPATIENT
Start: 2023-04-09 | End: 2023-04-10

## 2023-04-08 RX ORDER — ENOXAPARIN SODIUM 100 MG/ML
40 INJECTION SUBCUTANEOUS EVERY 24 HOURS
Refills: 0 | Status: DISCONTINUED | OUTPATIENT
Start: 2023-04-08 | End: 2023-04-19

## 2023-04-08 RX ORDER — LANOLIN ALCOHOL/MO/W.PET/CERES
3 CREAM (GRAM) TOPICAL AT BEDTIME
Refills: 0 | Status: DISCONTINUED | OUTPATIENT
Start: 2023-04-08 | End: 2023-04-19

## 2023-04-08 RX ORDER — OXYCODONE HYDROCHLORIDE 5 MG/1
5 TABLET ORAL EVERY 6 HOURS
Refills: 0 | Status: DISCONTINUED | OUTPATIENT
Start: 2023-04-08 | End: 2023-04-09

## 2023-04-08 RX ORDER — AMLODIPINE BESYLATE 2.5 MG/1
2.5 TABLET ORAL AT BEDTIME
Refills: 0 | Status: DISCONTINUED | OUTPATIENT
Start: 2023-04-08 | End: 2023-04-19

## 2023-04-08 RX ORDER — OXYCODONE HYDROCHLORIDE 5 MG/1
10 TABLET ORAL EVERY 6 HOURS
Refills: 0 | Status: DISCONTINUED | OUTPATIENT
Start: 2023-04-08 | End: 2023-04-09

## 2023-04-08 RX ADMIN — HYDROMORPHONE HYDROCHLORIDE 1 MILLIGRAM(S): 2 INJECTION INTRAMUSCULAR; INTRAVENOUS; SUBCUTANEOUS at 22:20

## 2023-04-08 RX ADMIN — MORPHINE SULFATE 4 MILLIGRAM(S): 50 CAPSULE, EXTENDED RELEASE ORAL at 18:20

## 2023-04-08 RX ADMIN — MORPHINE SULFATE 4 MILLIGRAM(S): 50 CAPSULE, EXTENDED RELEASE ORAL at 18:03

## 2023-04-08 RX ADMIN — Medication 600 MILLIGRAM(S): at 18:03

## 2023-04-08 RX ADMIN — Medication 100 MILLIGRAM(S): at 18:03

## 2023-04-08 RX ADMIN — HYDROMORPHONE HYDROCHLORIDE 1 MILLIGRAM(S): 2 INJECTION INTRAMUSCULAR; INTRAVENOUS; SUBCUTANEOUS at 22:05

## 2023-04-08 NOTE — ED PROVIDER NOTE - CLINICAL SUMMARY MEDICAL DECISION MAKING FREE TEXT BOX
72-year-old female with past medical history of breast cancer status post right breast lumpectomy, bladder cancer with mets to the spine, in remission, hypertension, hyperlipidemia presents to the ER complaining of left ankle pain swelling and redness times approximately 1 week, followed up with PCP, podiatry on wednesday 4/5  and orthopedic doctor on thursday 4/6 was prescribed Augmentin has had no relief of pain or symptoms and now her presents to the ER for evaluation.  Patient explains that she had an injury to the right side sustaining a fracture a few months ago that was complicated by MRSA and requiring hospitalization for IV antibiotics and she is concerned that this infection is similar to that   afebrile.   overlying cellulitis. ROM of joint intact, clinically not septic joint   labs, esr, crp, xray, clinda, pain control, likely admit for failed outpt abx.

## 2023-04-08 NOTE — ED ADULT NURSE REASSESSMENT NOTE - NS ED NURSE REASSESS COMMENT FT1
Received report from day RN. Pt resting comfortably in bed, respirations are even and unlabored, vitals as charted. Endorsing no complaints at this time. Awaiting bed assignment

## 2023-04-08 NOTE — ED ADULT NURSE NOTE - OBJECTIVE STATEMENT
Pt received to rm 26 presents with b/l ankle pain. Reports she is being txed outpatient with antibiotics however pain too severe so decided to come to ER. Pt left ankle appears swollen and red. Denies recent trauma to left ankle however reports she broke her rt foot and had infection there previously. Pt a&ox4, ambulatory with assistance, respirations even and unlabored, abd soft and nondistended, nontender to palpation. Awaiting MD neri, will await orders and continue to monitor.

## 2023-04-08 NOTE — H&P ADULT - PROBLEM SELECTOR PLAN 1
Left lateral ankle with area of skin breakdown and surrounding erythema, edema, tenderness w/o any drainage. Pt with reported previous hx of MRSA infection in right foot. Wound cx from 4/6 from orthopedic office without any bacteria growth however pt states only a very small amount of clear fluid was expressed. XR w/o evidence of osteo or gas. Not improved on augmentin. S/p IV clinda in ED.  - will start IV vanc given previous hx of MRSA infection  - f/u podiatry consult  - oxycodone prn for pain Left lateral ankle with area of skin breakdown and surrounding erythema, edema, tenderness w/o any drainage. Pt with reported previous hx of MRSA infection in right foot. Wound cx from 4/6 from orthopedic office without any bacteria growth however pt states only a very small amount of clear fluid was expressed. XR w/o evidence of osteo or gas. Not improved on augmentin. S/p IV clinda in ED.  - will start IV vanc given previous hx of MRSA infection  - f/u podiatry consult  - oxycodone prn and home gabapentin 300 mg TID for pain Left lateral ankle with area of skin breakdown and surrounding erythema, edema, tenderness w/o any drainage. Pt with reported previous hx of MRSA infection in right foot. Wound cx from 4/6 from orthopedic office without any bacteria growth however pt states only a very small amount of clear fluid was expressed. XR w/o evidence of osteo or gas. Not improved on augmentin. S/p IV clinda in ED. Less likely septic joint vs gout  - will start IV vanc given previous hx of MRSA infection  - f/u podiatry consult  - oxycodone prn and home gabapentin 300 mg TID for pain

## 2023-04-08 NOTE — ED ADULT TRIAGE NOTE - CHIEF COMPLAINT QUOTE
Left ankle pain with redness and swelling x 4 days. Pt reports worsening pain and difficulty ambulating. PMH breast CA, bladder CA, HTN
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
## 2023-04-08 NOTE — H&P ADULT - HISTORY OF PRESENT ILLNESS
72-year-old female with past medical history of breast cancer status post right breast lumpectomy, bladder cancer with mets to the spine, in remission, hypertension, hyperlipidemia presents to the ER complaining of left ankle pain swelling and redness times approximately 1 week.    In ED, vitals T 98.7, HR 93, /82, RR 18, O2 sat 97% on RA  Labs significant for Hb 11.2, ESR 62, CRP 45.7  EKG personally reviewed  CXR:  Imaging:   Prelim radiology IMPRESSION:  No cortical erosions or subcutaneous tracking of gas to suggest   osteomyelitis.    ED management: IV clindamycin x1, IV morphine 4 mg x1 72-year-old female with past medical history of breast cancer status post right breast lumpectomy, bladder cancer with mets to the spine, in remission, hypertension, hyperlipidemia presents to the ER complaining of left ankle pain swelling and redness times approximately 1 week. Pt states she sprained her ankle in March and has been following up with her podiatrist, Dr. Coley. She states her left ankle had become swollen and erythematous and was started on course of steroid with some improvement however it has worsened this past week. She states it initially started off as a bump and it has since become erythematous and red. She denies any purulent drainage but reports she has hx of MRSA infection from previous right foot infection. She states she went to see an orthopedic doctor who expressed out clear fluid and sent it off for culture. She was started on augmentin 3 days ago. She reports severe pain, and is now having difficulty walking due to pain when bearing weight. She denies fevers but reports chills. Denies chest pain, SOB, abd pain, n/v/d, or urinary symptoms. She states she has been taking oxycodone, tylenol, and ibuprofen with mild relief at home    In ED, vitals T 98.7, HR 93, /82, RR 18, O2 sat 97% on RA  Labs significant for Hb 11.2, ESR 62, CRP 45.7  Imaging:   Prelim radiology IMPRESSION:  No cortical erosions or subcutaneous tracking of gas to suggest   osteomyelitis.    ED management: IV clindamycin x1, IV morphine 4 mg x1

## 2023-04-08 NOTE — H&P ADULT - NSHPLABSRESULTS_GEN_ALL_CORE
.  LABS:                         11.2   8.26  )-----------( 281      ( 08 Apr 2023 16:30 )             37.1     04-08    141  |  104  |  21  ----------------------------<  102<H>  4.0   |  24  |  0.87    Ca    10.4      08 Apr 2023 16:30    TPro  6.7  /  Alb  3.8  /  TBili  0.2  /  DBili  x   /  AST  21  /  ALT  14  /  AlkPhos  98  04-08                  RADIOLOGY, EKG & ADDITIONAL TESTS: Reviewed.

## 2023-04-08 NOTE — CONSULT NOTE ADULT - SUBJECTIVE AND OBJECTIVE BOX
Patient is a 72y old  Female who presents with a chief complaint of cellulitis (2023 20:52)      HPI:  72-year-old female with past medical history of breast cancer status post right breast lumpectomy, bladder cancer with mets to the spine, in remission, hypertension, hyperlipidemia presents to the ER complaining of left ankle pain swelling and redness times approximately 1 week. Pt states she sprained her ankle in March and has been following up with her podiatrist, Dr. Coley. She states her left ankle had become swollen and erythematous and was started on course of steroid with some improvement however it has worsened this past week. She states it initially started off as a bump and it has since become erythematous and red. She denies any purulent drainage but reports she has hx of MRSA infection from previous right foot infection. She states she went to see an orthopedic doctor who expressed out clear fluid and sent it off for culture. She was started on augmentin 3 days ago. She reports severe pain, and is now having difficulty walking due to pain when bearing weight. She denies fevers but reports chills. Denies chest pain, SOB, abd pain, n/v/d, or urinary symptoms. She states she has been taking oxycodone, tylenol, and ibuprofen with mild relief at home    In ED, vitals T 98.7, HR 93, /82, RR 18, O2 sat 97% on RA  Labs significant for Hb 11.2, ESR 62, CRP 45.7  Imaging:   Prelim radiology IMPRESSION:  No cortical erosions or subcutaneous tracking of gas to suggest   osteomyelitis.    ED management: IV clindamycin x1, IV morphine 4 mg x1 (2023 20:52)      PAST MEDICAL & SURGICAL HISTORY:  Anxiety      Breast CA, left  lumpectomy / RTX in the past      Hypertension      Sleep apnea  uses  cpap machine      Irritable bowel syndrome (IBS)      Polyp of colon  "pre-cancerous" x 2, removed 2014      HLD (hyperlipidemia)      Bladder polyp      S/P       S/P colon resection  reversal, had complications for fibriods      S/P hysterectomy      Breast lump      History of surgery  right groin fatty tissue removed      H/O lumpectomy  left       Personal history of spine surgery  L1-L4 fusion 2017          MEDICATIONS  (STANDING):  amLODIPine   Tablet 2.5 milliGRAM(s) Oral at bedtime  atorvastatin 10 milliGRAM(s) Oral at bedtime  enoxaparin Injectable 40 milliGRAM(s) SubCutaneous every 24 hours  gabapentin 300 milliGRAM(s) Oral three times a day  polyethylene glycol 3350 17 Gram(s) Oral daily  senna 2 Tablet(s) Oral at bedtime  vancomycin  IVPB 1250 milliGRAM(s) IV Intermittent every 12 hours    MEDICATIONS  (PRN):  acetaminophen     Tablet .. 650 milliGRAM(s) Oral every 6 hours PRN Temp greater or equal to 38C (100.4F), Mild Pain (1 - 3)  melatonin 3 milliGRAM(s) Oral at bedtime PRN Insomnia  oxyCODONE    IR 5 milliGRAM(s) Oral every 6 hours PRN Moderate Pain (4 - 6)  oxyCODONE    IR 10 milliGRAM(s) Oral every 6 hours PRN Severe Pain (7 - 10)      Allergies    sulfa drugs (Unknown)    Intolerances        VITALS:    Vital Signs Last 24 Hrs  T(C): 36.6 (2023 04:42), Max: 37.1 (2023 14:31)  T(F): 97.8 (2023 04:42), Max: 98.8 (2023 23:34)  HR: 62 (2023 04:42) (62 - 93)  BP: 135/83 (2023 04:42) (116/68 - 140/91)  BP(mean): --  RR: 16 (2023 04:42) (16 - 18)  SpO2: 98% (2023 04:42) (96% - 100%)    Parameters below as of 2023 04:42  Patient On (Oxygen Delivery Method): room air        LABS:                          11.2   8.26  )-----------( 281      ( 2023 16:30 )             37.1       04-08    141  |  104  |  21  ----------------------------<  102<H>  4.0   |  24  |  0.87    Ca    10.4      2023 16:30    TPro  6.7  /  Alb  3.8  /  TBili  0.2  /  DBili  x   /  AST  21  /  ALT  14  /  AlkPhos  98  04-08      CAPILLARY BLOOD GLUCOSE              LOWER EXTREMITY PHYSICAL EXAM:    Vascular: DP/PT 2/4, B/L, CFT <3 seconds B/L, Temperature gradient warm to warm on right , warm to cool on L.   Neuro: Epicritic sensation intact to the level of toes, B/L.  Musculoskeletal/Ortho: pain on palpation tot he dorsum of left lateral malleolus  Skin: left lateral ankle with nodular lesion noted distal to sinus tarsi, deepika lesion erythema, no break in skin, no open wounds, no open lesions, no pus, no drainage no excess warmth.    RADIOLOGY & ADDITIONAL STUDIES:    < from: Xray Ankle Complete 3 Views, Left (23 @ 17:33) >    ******PRELIMINARY REPORT******      ******PRELIMINARY REPORT******         ACC: [id number] EXAM:  XR ANKLE COMP MIN 3 VIEWS LT   ORDERED BY: LAKEISHA POE     PROCEDURE DATE:  2023    ******PRELIMINARY REPORT******      ******PRELIMINARY REPORT******           INTERPRETATION:  CLINICAL INDICATION: Cellulitis.    TECHNIQUE: X-ray left ankle 3 views    COMPARISON: X-ray left ankle 2021    FINDINGS:    No acute fracture or dislocation.  No cortical erosions or subcutaneous tracking of gasto suggest   osteomyelitis.  Mild ankle soft tissue swelling.    IMPRESSION:  No cortical erosions or subcutaneous tracking of gas to suggest   osteomyelitis.        ******PRELIMINARY REPORT******      ******PRELIMINARY REPORT******       RANJEET BOWIE MD; Resident Radiologist  This document is a PRELIMINARY interpretation and is pending final   attending approval. 2023  6:23PM    < end of copied text >   Patient is a 72y old  Female who presents with a chief complaint of cellulitis (2023 20:52)      HPI:  72-year-old female with past medical history of breast cancer status post right breast lumpectomy, bladder cancer with mets to the spine, in remission, hypertension, hyperlipidemia presents to the ER complaining of left ankle pain swelling and redness times approximately 1 week. Pt states she sprained her ankle in March and has been following up with her podiatrist, Dr. Coley. She states her left ankle had become swollen and erythematous and was started on course of steroid with some improvement however it has worsened this past week. She states it initially started off as a bump and it has since become erythematous and red. She denies any purulent drainage but reports she has hx of MRSA infection from previous right foot infection. She states she went to see an orthopedic doctor who expressed out clear fluid and sent it off for culture. She was started on augmentin 3 days ago. She reports severe pain, and is now having difficulty walking due to pain when bearing weight. She denies fevers but reports chills. Denies chest pain, SOB, abd pain, n/v/d, or urinary symptoms. She states she has been taking oxycodone, tylenol, and ibuprofen with mild relief at home    In ED, vitals T 98.7, HR 93, /82, RR 18, O2 sat 97% on RA  Labs significant for Hb 11.2, ESR 62, CRP 45.7  Imaging:   Prelim radiology IMPRESSION:  No cortical erosions or subcutaneous tracking of gas to suggest   osteomyelitis.    ED management: IV clindamycin x1, IV morphine 4 mg x1 (2023 20:52)      PAST MEDICAL & SURGICAL HISTORY:  Anxiety      Breast CA, left  lumpectomy / RTX in the past      Hypertension      Sleep apnea  uses  cpap machine      Irritable bowel syndrome (IBS)      Polyp of colon  "pre-cancerous" x 2, removed 2014      HLD (hyperlipidemia)      Bladder polyp      S/P       S/P colon resection  reversal, had complications for fibriods      S/P hysterectomy      Breast lump      History of surgery  right groin fatty tissue removed      H/O lumpectomy  left       Personal history of spine surgery  L1-L4 fusion 2017          MEDICATIONS  (STANDING):  amLODIPine   Tablet 2.5 milliGRAM(s) Oral at bedtime  atorvastatin 10 milliGRAM(s) Oral at bedtime  enoxaparin Injectable 40 milliGRAM(s) SubCutaneous every 24 hours  gabapentin 300 milliGRAM(s) Oral three times a day  polyethylene glycol 3350 17 Gram(s) Oral daily  senna 2 Tablet(s) Oral at bedtime  vancomycin  IVPB 1250 milliGRAM(s) IV Intermittent every 12 hours    MEDICATIONS  (PRN):  acetaminophen     Tablet .. 650 milliGRAM(s) Oral every 6 hours PRN Temp greater or equal to 38C (100.4F), Mild Pain (1 - 3)  melatonin 3 milliGRAM(s) Oral at bedtime PRN Insomnia  oxyCODONE    IR 5 milliGRAM(s) Oral every 6 hours PRN Moderate Pain (4 - 6)  oxyCODONE    IR 10 milliGRAM(s) Oral every 6 hours PRN Severe Pain (7 - 10)      Allergies    sulfa drugs (Unknown)    Intolerances        VITALS:    Vital Signs Last 24 Hrs  T(C): 36.6 (2023 04:42), Max: 37.1 (2023 14:31)  T(F): 97.8 (2023 04:42), Max: 98.8 (2023 23:34)  HR: 62 (2023 04:42) (62 - 93)  BP: 135/83 (2023 04:42) (116/68 - 140/91)  BP(mean): --  RR: 16 (2023 04:42) (16 - 18)  SpO2: 98% (2023 04:42) (96% - 100%)    Parameters below as of 2023 04:42  Patient On (Oxygen Delivery Method): room air        LABS:                          11.2   8.26  )-----------( 281      ( 2023 16:30 )             37.1       04-08    141  |  104  |  21  ----------------------------<  102<H>  4.0   |  24  |  0.87    Ca    10.4      2023 16:30    TPro  6.7  /  Alb  3.8  /  TBili  0.2  /  DBili  x   /  AST  21  /  ALT  14  /  AlkPhos  98  04-08      CAPILLARY BLOOD GLUCOSE              LOWER EXTREMITY PHYSICAL EXAM:    Vascular: DP/PT 2/4, B/L, CFT <3 seconds B/L, Temperature gradient warm to warm on left , warm to cool on right.   Neuro: Epicritic sensation intact to the level of toes, B/L.  Musculoskeletal/Ortho: pain on palpation tot he dorsum of left lateral malleolus  Skin: left lateral ankle with nodular lesion noted distal to sinus tarsi, deepika lesion erythema, no break in skin, no open wounds, no open lesions, no pus, no drainage no excess warmth.    RADIOLOGY & ADDITIONAL STUDIES:    < from: Xray Ankle Complete 3 Views, Left (23 @ 17:33) >    ******PRELIMINARY REPORT******      ******PRELIMINARY REPORT******         ACC: [id number] EXAM:  XR ANKLE COMP MIN 3 VIEWS LT   ORDERED BY: LAKEISHA POE     PROCEDURE DATE:  2023    ******PRELIMINARY REPORT******      ******PRELIMINARY REPORT******           INTERPRETATION:  CLINICAL INDICATION: Cellulitis.    TECHNIQUE: X-ray left ankle 3 views    COMPARISON: X-ray left ankle 2021    FINDINGS:    No acute fracture or dislocation.  No cortical erosions or subcutaneous tracking of gasto suggest   osteomyelitis.  Mild ankle soft tissue swelling.    IMPRESSION:  No cortical erosions or subcutaneous tracking of gas to suggest   osteomyelitis.        ******PRELIMINARY REPORT******      ******PRELIMINARY REPORT******       RANJEET BOWIE MD; Resident Radiologist  This document is a PRELIMINARY interpretation and is pending final   attending approval. 2023  6:23PM    < end of copied text >

## 2023-04-08 NOTE — H&P ADULT - TIME BILLING
I have spent a total of greater than 75 minutes time spent to prepare to see the patient, obtaining and reviewing history, physical examination, explaining the diagnosis, prognosis and treatment plan with the patient/family/caregiver. I also have spent the time ordering studies and testing, interpreting results, medicine reconciliation, subspecialty consultation and documentation as above.

## 2023-04-08 NOTE — H&P ADULT - ASSESSMENT
72-year-old female with past medical history of breast cancer status post right breast lumpectomy, bladder cancer with mets to the spine, in remission, hypertension, hyperlipidemia presents to the ER complaining of left ankle pain swelling and redness times approximately 1 week. Admit for IV abx for cellulitis.

## 2023-04-08 NOTE — H&P ADULT - NSHPPHYSICALEXAM_GEN_ALL_CORE
VITAL SIGNS:  T(C): 36.9 (04-08-23 @ 20:28), Max: 37.1 (04-08-23 @ 14:31)  T(F): 98.5 (04-08-23 @ 20:28), Max: 98.7 (04-08-23 @ 14:31)  HR: 71 (04-08-23 @ 20:28) (69 - 93)  BP: 139/88 (04-08-23 @ 20:28) (134/82 - 140/91)  BP(mean): --  RR: 16 (04-08-23 @ 20:28) (16 - 18)  SpO2: 100% (04-08-23 @ 20:28) (97% - 100%)  Wt(kg): --    PHYSICAL EXAM:    Constitutional: resting comfortably in bed; NAD  Head: NC/AT  Eyes: PERRL, EOMI, anicteric sclera  ENT: no nasal discharge; uvula midline, no oropharyngeal erythema or exudates; MMM  Neck: supple  Respiratory: CTA B/L; no W/R/R, no retractions  Cardiac: +S1/S2; RRR; no M/R/G  Gastrointestinal: abdomen soft, NT/ND; no rebound or guarding; +BSx4  Back: spine midline, no bony tenderness  Extremities: WWP, no clubbing or cyanosis; no peripheral edema  Musculoskeletal: NROM x4; no joint swelling, tenderness or erythema  Vascular: distal pulses intact  Dermatologic: skin warm, dry and intact; no rashes  Lymphatic: no submandibular or cervical LAD  Neurologic: AAOx3; moves all 4 extremities  Psychiatric: affect and characteristics of appearance, verbalizations, behaviors are appropriate VITAL SIGNS:  T(C): 36.9 (04-08-23 @ 20:28), Max: 37.1 (04-08-23 @ 14:31)  T(F): 98.5 (04-08-23 @ 20:28), Max: 98.7 (04-08-23 @ 14:31)  HR: 71 (04-08-23 @ 20:28) (69 - 93)  BP: 139/88 (04-08-23 @ 20:28) (134/82 - 140/91)  BP(mean): --  RR: 16 (04-08-23 @ 20:28) (16 - 18)  SpO2: 100% (04-08-23 @ 20:28) (97% - 100%)  Wt(kg): --    PHYSICAL EXAM:    Constitutional: resting comfortably in bed; NAD  Head: NC/AT  Eyes: PERRL, EOMI, anicteric sclera  ENT: no nasal discharge; uvula midline, no oropharyngeal erythema or exudates; MMM  Neck: supple  Respiratory: CTA B/L; no W/R/R, no retractions  Cardiac: +S1/S2; RRR; no M/R/G  Gastrointestinal: abdomen soft, NT/ND; no rebound or guarding; +BSx4  Back: spine midline, no bony tenderness  Extremities: WWP, no clubbing or cyanosis; no peripheral edema  Musculoskeletal: NROM x4; lateral left ankle with area small 1 cm skin break down with surrounding erythema and swelling. Tender to palpation. ROM intact however somewhat limited 2/2 pain. No purulent drainage.   Vascular: distal pulses intact  Dermatologic: skin warm, dry and intact; no rashes  Lymphatic: no submandibular or cervical LAD  Neurologic: AAOx3; moves all 4 extremities  Psychiatric: affect and characteristics of appearance, verbalizations, behaviors are appropriate

## 2023-04-08 NOTE — ED ADULT NURSE REASSESSMENT NOTE - NS ED NURSE REASSESS COMMENT FT1
Break RN: Pt received A&Ox4, ambulatory at baseline. Pt resting in stretcher. Respirations even and unlabored, skin intact, NAD. Awaiting US IV placement for medication and imaging.

## 2023-04-08 NOTE — ED ADULT NURSE REASSESSMENT NOTE - NS ED NURSE REASSESS COMMENT FT1
Admitting team at pt bedside assessing pt and updating pt on plan of care, will continue to monitor . Podiatry at pt bedside assessing pt and updating pt on plan of care, will continue to monitor .

## 2023-04-08 NOTE — ED PROVIDER NOTE - ATTENDING APP SHARED VISIT CONTRIBUTION OF CARE
72-year-old female with past medical history of breast cancer status post lumpectomy and bladder cancer with mets to the spine presents to ED for evaluation of left ankle pain and redness x1 week.  Reports she had similar symptoms on the right side after an injury and fracture few months ago with MRSA, required hospitalization with IV antibiotics.  Reports she was prescribed Augmentin for the symptoms by her doctor and has not had any pain reduction or symptom improvement, reports worsening of symptoms.  Denies fevers or chills

## 2023-04-08 NOTE — ED ADULT NURSE REASSESSMENT NOTE - NS ED NURSE REASSESS COMMENT FT1
[facility] Pt resting in stretcher, respirations are even and unlabored. Pt endorsing no complaints at this time. To be transported

## 2023-04-08 NOTE — CONSULT NOTE ADULT - ASSESSMENT
72F with left lateral ankle nodular lesion  -Patient seen and evaluated  -Patient is afebrile, WBC 8.26, ESR 62, CRP 4.57  - Left lateral ankle with nodular lesion noted distal to sinus tarsi, deepika lesion erythema, no break in skin, no open wounds, no open lesions, no pus, no drainage no excess warmth.  - Patient already admitted to medicine  - Continue with IV vanco  - left foot xray and ankle so no gas, no OM, no fracture  - Recommending derm and ID consult  - Recommending prednisone for inflammation  - Ordered left ankle MR to rule out any deeper abcess  - Pod plan local wound care pending ID/Derm recs  - Discussed with Attending   72F with left lateral ankle nodular lesion  -Patient seen and evaluated  -Patient is afebrile, WBC 8.26, ESR 62, CRP 4.57  - Left lateral ankle with nodular lesion noted distal to sinus tarsi, deepika lesion erythema, no break in skin, no open wounds, no open lesions, no pus, no drainage no excess warmth.  - Patient already admitted to medicine  - Continue with IV vanco  - left foot xray and ankle so no gas, no OM, no fracture  - Recommending derm and ID consult  - Recommending prednisone for inflammation  - Ordered left ankle MR to rule out any deeper abcess  - Pod plan local wound care pending MRI/ ID and Derm recs  - Discussed with Attending   72F with left lateral ankle nodular lesion  -Patient seen and evaluated  -Patient is afebrile, WBC 8.26, ESR 62, CRP 4.57  - Left lateral ankle with nodular lesion noted distal to sinus tarsi, deepika lesion erythema, no break in skin, no open wounds, no open lesions, no pus, no drainage no excess warmth.  - Patient already admitted to medicine  - Continue with IV vanco  - left foot xray and ankle so no gas, no OM, no fracture  - Recommending derm and ID consult  - Recommending prednisone for inflammation  - Ordered left ankle MR to rule out any deeper abscess  - Pod plan local wound care pending MRI/ ID and Derm recs  - Discussed with Attending   72F with left lateral ankle nodular lesion  -Patient seen and evaluated  -Patient is afebrile, WBC 8.26, ESR 62, CRP 4.57  - Left lateral ankle with nodular lesion noted distal to sinus tarsi, deepika lesion erythema, no break in skin, no open wounds, no open lesions, no pus, no drainage no excess warmth.  - Patient already admitted to medicine  - Recommended IV vanco and zosyn  - left foot xray and ankle so no gas, no OM, no fracture  - Recommending Derm, ID and rheumatology consult  - Recommending prednisone for inflammation  - Ordered left ankle MR to rule out any deeper abscess  - Pod plan local wound care pending MRI/ ID Derm recs  - Discussed with Attending

## 2023-04-08 NOTE — ED PROVIDER NOTE - OBJECTIVE STATEMENT
72-year-old female with past medical history of breast cancer status post right breast lumpectomy, bladder cancer with mets to the spine, in remission, hypertension, hyperlipidemia presents to the ER complaining of left ankle pain swelling and redness times approximately 1 week, followed up with PCP, podiatry on wednesday 4/5  and orthopedic doctor on thursday 4/6 was prescribed Augmentin has had no relief of pain or symptoms and now her presents to the ER for evaluation.  Patient explains that she had an injury to the right side sustaining a fracture a few months ago that was complicated by MRSA and requiring hospitalization for IV antibiotics and she is concerned that this infection is similar to that .  Denies fevers, chest pain, shortness of breath, nausea, vomiting, abdominal pain.

## 2023-04-08 NOTE — ED PROVIDER NOTE - PHYSICAL EXAMINATION
Vital signs reviewed.   CONSTITUTIONAL:  anxious, tearful; well-nourished; in no apparent distress. Non-toxic appearing.   HEAD: Normocephalic, atraumatic.  EYES: PERRL, EOM intact, conjunctiva and sclera WNL.  CARD: Normal S1, S2; no murmurs, rubs, or gallops noted.  RESP: Normal chest excursion with respiration; breath sounds clear and equal bilaterally; no wheezes, rhonchi, or rales.  ABD/GI: soft, non-distended; non-tender; no palpable organomegaly, no pulsatile mass.  EXT/MS: moves all extremities; distal pulses are normal, no pedal edema. LLE: hip , knee, tib fib with out abnormality. left lateral ankle with ulceration and approx 2cm spreading erythema with warmth. ROM intact. pulses intact. patient with TTP over ankle.   SKIN: Normal for age and race; warm; dry; good turgor  NEURO: Awake, alert, oriented x 3, no gross deficits, CN II-XII grossly intact, no motor or sensory deficit noted.

## 2023-04-09 LAB
ANION GAP SERPL CALC-SCNC: 13 MMOL/L — SIGNIFICANT CHANGE UP (ref 7–14)
BASOPHILS # BLD AUTO: 0.03 K/UL — SIGNIFICANT CHANGE UP (ref 0–0.2)
BASOPHILS NFR BLD AUTO: 0.3 % — SIGNIFICANT CHANGE UP (ref 0–2)
BUN SERPL-MCNC: 17 MG/DL — SIGNIFICANT CHANGE UP (ref 7–23)
CALCIUM SERPL-MCNC: 9.7 MG/DL — SIGNIFICANT CHANGE UP (ref 8.4–10.5)
CHLORIDE SERPL-SCNC: 98 MMOL/L — SIGNIFICANT CHANGE UP (ref 98–107)
CO2 SERPL-SCNC: 28 MMOL/L — SIGNIFICANT CHANGE UP (ref 22–31)
CREAT SERPL-MCNC: 0.64 MG/DL — SIGNIFICANT CHANGE UP (ref 0.5–1.3)
EGFR: 93 ML/MIN/1.73M2 — SIGNIFICANT CHANGE UP
EOSINOPHIL # BLD AUTO: 0.18 K/UL — SIGNIFICANT CHANGE UP (ref 0–0.5)
EOSINOPHIL NFR BLD AUTO: 1.7 % — SIGNIFICANT CHANGE UP (ref 0–6)
GLUCOSE SERPL-MCNC: 116 MG/DL — HIGH (ref 70–99)
GRAM STN FLD: SIGNIFICANT CHANGE UP
HCT VFR BLD CALC: 37.6 % — SIGNIFICANT CHANGE UP (ref 34.5–45)
HCV AB S/CO SERPL IA: 0.16 S/CO — SIGNIFICANT CHANGE UP (ref 0–0.99)
HCV AB SERPL-IMP: SIGNIFICANT CHANGE UP
HGB BLD-MCNC: 11.7 G/DL — SIGNIFICANT CHANGE UP (ref 11.5–15.5)
IANC: 6.76 K/UL — SIGNIFICANT CHANGE UP (ref 1.8–7.4)
IMM GRANULOCYTES NFR BLD AUTO: 0.4 % — SIGNIFICANT CHANGE UP (ref 0–0.9)
LYMPHOCYTES # BLD AUTO: 2.18 K/UL — SIGNIFICANT CHANGE UP (ref 1–3.3)
LYMPHOCYTES # BLD AUTO: 20.9 % — SIGNIFICANT CHANGE UP (ref 13–44)
MAGNESIUM SERPL-MCNC: 1.7 MG/DL — SIGNIFICANT CHANGE UP (ref 1.6–2.6)
MCHC RBC-ENTMCNC: 27.9 PG — SIGNIFICANT CHANGE UP (ref 27–34)
MCHC RBC-ENTMCNC: 31.1 GM/DL — LOW (ref 32–36)
MCV RBC AUTO: 89.7 FL — SIGNIFICANT CHANGE UP (ref 80–100)
MONOCYTES # BLD AUTO: 1.25 K/UL — HIGH (ref 0–0.9)
MONOCYTES NFR BLD AUTO: 12 % — SIGNIFICANT CHANGE UP (ref 2–14)
NEUTROPHILS # BLD AUTO: 6.76 K/UL — SIGNIFICANT CHANGE UP (ref 1.8–7.4)
NEUTROPHILS NFR BLD AUTO: 64.7 % — SIGNIFICANT CHANGE UP (ref 43–77)
NRBC # BLD: 0 /100 WBCS — SIGNIFICANT CHANGE UP (ref 0–0)
NRBC # FLD: 0 K/UL — SIGNIFICANT CHANGE UP (ref 0–0)
PHOSPHATE SERPL-MCNC: 3.4 MG/DL — SIGNIFICANT CHANGE UP (ref 2.5–4.5)
PLATELET # BLD AUTO: 300 K/UL — SIGNIFICANT CHANGE UP (ref 150–400)
POTASSIUM SERPL-MCNC: 3.5 MMOL/L — SIGNIFICANT CHANGE UP (ref 3.5–5.3)
POTASSIUM SERPL-SCNC: 3.5 MMOL/L — SIGNIFICANT CHANGE UP (ref 3.5–5.3)
RBC # BLD: 4.19 M/UL — SIGNIFICANT CHANGE UP (ref 3.8–5.2)
RBC # FLD: 15.1 % — HIGH (ref 10.3–14.5)
SODIUM SERPL-SCNC: 139 MMOL/L — SIGNIFICANT CHANGE UP (ref 135–145)
SPECIMEN SOURCE: SIGNIFICANT CHANGE UP
WBC # BLD: 10.44 K/UL — SIGNIFICANT CHANGE UP (ref 3.8–10.5)
WBC # FLD AUTO: 10.44 K/UL — SIGNIFICANT CHANGE UP (ref 3.8–10.5)

## 2023-04-09 PROCEDURE — 88312 SPECIAL STAINS GROUP 1: CPT | Mod: 26

## 2023-04-09 PROCEDURE — 88305 TISSUE EXAM BY PATHOLOGIST: CPT | Mod: 26

## 2023-04-09 PROCEDURE — 71045 X-RAY EXAM CHEST 1 VIEW: CPT | Mod: 26

## 2023-04-09 PROCEDURE — 93010 ELECTROCARDIOGRAM REPORT: CPT

## 2023-04-09 PROCEDURE — 88342 IMHCHEM/IMCYTCHM 1ST ANTB: CPT | Mod: 26

## 2023-04-09 PROCEDURE — 99222 1ST HOSP IP/OBS MODERATE 55: CPT

## 2023-04-09 PROCEDURE — 99222 1ST HOSP IP/OBS MODERATE 55: CPT | Mod: GC

## 2023-04-09 PROCEDURE — 99223 1ST HOSP IP/OBS HIGH 75: CPT

## 2023-04-09 PROCEDURE — 88341 IMHCHEM/IMCYTCHM EA ADD ANTB: CPT | Mod: 26

## 2023-04-09 RX ORDER — OXYCODONE HYDROCHLORIDE 5 MG/1
5 TABLET ORAL EVERY 4 HOURS
Refills: 0 | Status: DISCONTINUED | OUTPATIENT
Start: 2023-04-09 | End: 2023-04-12

## 2023-04-09 RX ORDER — ONDANSETRON 8 MG/1
4 TABLET, FILM COATED ORAL ONCE
Refills: 0 | Status: COMPLETED | OUTPATIENT
Start: 2023-04-09 | End: 2023-04-09

## 2023-04-09 RX ORDER — OXYCODONE HYDROCHLORIDE 5 MG/1
10 TABLET ORAL EVERY 4 HOURS
Refills: 0 | Status: DISCONTINUED | OUTPATIENT
Start: 2023-04-09 | End: 2023-04-12

## 2023-04-09 RX ORDER — POLYETHYLENE GLYCOL 3350 17 G/17G
17 POWDER, FOR SOLUTION ORAL DAILY
Refills: 0 | Status: DISCONTINUED | OUTPATIENT
Start: 2023-04-09 | End: 2023-04-19

## 2023-04-09 RX ORDER — AMLODIPINE BESYLATE 2.5 MG/1
5 TABLET ORAL ONCE
Refills: 0 | Status: DISCONTINUED | OUTPATIENT
Start: 2023-04-09 | End: 2023-04-09

## 2023-04-09 RX ORDER — SENNA PLUS 8.6 MG/1
2 TABLET ORAL AT BEDTIME
Refills: 0 | Status: DISCONTINUED | OUTPATIENT
Start: 2023-04-09 | End: 2023-04-19

## 2023-04-09 RX ORDER — PANTOPRAZOLE SODIUM 20 MG/1
40 TABLET, DELAYED RELEASE ORAL
Refills: 0 | Status: DISCONTINUED | OUTPATIENT
Start: 2023-04-09 | End: 2023-04-19

## 2023-04-09 RX ORDER — ONDANSETRON 8 MG/1
4 TABLET, FILM COATED ORAL EVERY 6 HOURS
Refills: 0 | Status: DISCONTINUED | OUTPATIENT
Start: 2023-04-09 | End: 2023-04-19

## 2023-04-09 RX ORDER — AMLODIPINE BESYLATE 2.5 MG/1
2.5 TABLET ORAL ONCE
Refills: 0 | Status: COMPLETED | OUTPATIENT
Start: 2023-04-09 | End: 2023-04-09

## 2023-04-09 RX ORDER — HYDROMORPHONE HYDROCHLORIDE 2 MG/ML
0.5 INJECTION INTRAMUSCULAR; INTRAVENOUS; SUBCUTANEOUS ONCE
Refills: 0 | Status: DISCONTINUED | OUTPATIENT
Start: 2023-04-09 | End: 2023-04-09

## 2023-04-09 RX ADMIN — OXYCODONE HYDROCHLORIDE 10 MILLIGRAM(S): 5 TABLET ORAL at 23:30

## 2023-04-09 RX ADMIN — GABAPENTIN 300 MILLIGRAM(S): 400 CAPSULE ORAL at 13:18

## 2023-04-09 RX ADMIN — HYDROMORPHONE HYDROCHLORIDE 0.5 MILLIGRAM(S): 2 INJECTION INTRAMUSCULAR; INTRAVENOUS; SUBCUTANEOUS at 16:48

## 2023-04-09 RX ADMIN — HYDROMORPHONE HYDROCHLORIDE 0.5 MILLIGRAM(S): 2 INJECTION INTRAMUSCULAR; INTRAVENOUS; SUBCUTANEOUS at 16:28

## 2023-04-09 RX ADMIN — Medication 166.67 MILLIGRAM(S): at 13:18

## 2023-04-09 RX ADMIN — OXYCODONE HYDROCHLORIDE 10 MILLIGRAM(S): 5 TABLET ORAL at 01:48

## 2023-04-09 RX ADMIN — ONDANSETRON 4 MILLIGRAM(S): 8 TABLET, FILM COATED ORAL at 07:11

## 2023-04-09 RX ADMIN — OXYCODONE HYDROCHLORIDE 10 MILLIGRAM(S): 5 TABLET ORAL at 15:21

## 2023-04-09 RX ADMIN — ATORVASTATIN CALCIUM 10 MILLIGRAM(S): 80 TABLET, FILM COATED ORAL at 22:54

## 2023-04-09 RX ADMIN — OXYCODONE HYDROCHLORIDE 10 MILLIGRAM(S): 5 TABLET ORAL at 09:46

## 2023-04-09 RX ADMIN — ENOXAPARIN SODIUM 40 MILLIGRAM(S): 100 INJECTION SUBCUTANEOUS at 05:26

## 2023-04-09 RX ADMIN — OXYCODONE HYDROCHLORIDE 10 MILLIGRAM(S): 5 TABLET ORAL at 08:46

## 2023-04-09 RX ADMIN — GABAPENTIN 300 MILLIGRAM(S): 400 CAPSULE ORAL at 22:18

## 2023-04-09 RX ADMIN — Medication 166.67 MILLIGRAM(S): at 02:00

## 2023-04-09 RX ADMIN — GABAPENTIN 300 MILLIGRAM(S): 400 CAPSULE ORAL at 05:26

## 2023-04-09 RX ADMIN — Medication 40 MILLIGRAM(S): at 13:17

## 2023-04-09 RX ADMIN — OXYCODONE HYDROCHLORIDE 10 MILLIGRAM(S): 5 TABLET ORAL at 02:48

## 2023-04-09 RX ADMIN — ONDANSETRON 4 MILLIGRAM(S): 8 TABLET, FILM COATED ORAL at 13:18

## 2023-04-09 RX ADMIN — OXYCODONE HYDROCHLORIDE 10 MILLIGRAM(S): 5 TABLET ORAL at 14:21

## 2023-04-09 RX ADMIN — AMLODIPINE BESYLATE 2.5 MILLIGRAM(S): 2.5 TABLET ORAL at 22:58

## 2023-04-09 RX ADMIN — AMLODIPINE BESYLATE 2.5 MILLIGRAM(S): 2.5 TABLET ORAL at 01:48

## 2023-04-09 RX ADMIN — OXYCODONE HYDROCHLORIDE 10 MILLIGRAM(S): 5 TABLET ORAL at 22:53

## 2023-04-09 RX ADMIN — POLYETHYLENE GLYCOL 3350 17 GRAM(S): 17 POWDER, FOR SOLUTION ORAL at 13:22

## 2023-04-09 RX ADMIN — SENNA PLUS 2 TABLET(S): 8.6 TABLET ORAL at 23:49

## 2023-04-09 NOTE — PROGRESS NOTE ADULT - ASSESSMENT
72-year-old female with past medical history of breast cancer status post right breast lumpectomy, bladder cancer with mets to the spine, in remission, hypertension, hyperlipidemia presents to the ER complaining of left ankle pain swelling and redness times approximately 1 week. Admit for IV abx for cellulitis.        Problem/Plan - 1:  ·  Problem: Cellulitis.   ·  Plan: Left lateral ankle with area of skin breakdown and surrounding erythema, edema, tenderness w/o any drainage. Pt with reported previous hx of MRSA infection in right foot. Wound cx from 4/6 from orthopedic office without any bacteria growth however pt states only a very small amount of clear fluid was expressed. XR w/o evidence of osteo or gas. Not improved on augmentin. S/p IV clinda in ED. Less likely septic joint vs gout  - will start IV vanc given previous hx of MRSA infection  - f/u podiatry consult  - oxycodone prn and home gabapentin 300 mg TID for pain.    there was concern for pyoderma gangrenosum :   seen by ID and rheumatology   recommend derm consult for tissue diagnosis   don't think it is pyoderma , and recommend to d/c IV steroids : will do      Problem/Plan - 2:  ·  Problem: Anemia.   ·  Plan: Hb 11.2. Pt denies any bleeding, blood in stools/black stools  - continue to monitor.     Problem/Plan - 3:  ·  Problem: HTN (hypertension).   ·  Plan: BP stable.  - c/w amlodipine 2.5 mg QD.     Problem/Plan - 4:  ·  Problem: HLD (hyperlipidemia).   ·  Plan: On pravastatin at home.   - c/w atorvastatin 10 mg QD.     Problem/Plan - 5:  ·  Problem: Prophylactic measure.   ·  Plan: DVT prophylaxis: lovenox  Diet: dash/tlc.    dispo: MRI foot and derm consult for tissue biopsy

## 2023-04-09 NOTE — PATIENT PROFILE ADULT - FALL HARM RISK - HARM RISK INTERVENTIONS

## 2023-04-09 NOTE — CONSULT NOTE ADULT - ASSESSMENT
71 yo F PMH breast cancer (s/p R lumpectomy), bladder cancer with mets to the spine (now in remission since 2018), HTN, HLD, L1-L4 fusion, R foot fx s/p MRSA infection (1/2023) p/w left ankle pain swelling and erythema for 3 weeks. Rheumatology consulted for evaluation.     #left ankle pain/swelling/erythema with nodular lesion overlying lateral malleolous  =developed 3 weeks ago w/o improvement on 7days of PO steroids or 3 days of outpatient Augmentin  =no leukocytosis, has elevated inflammatory markers, x-ray w/o fracture or signs of gas or obvious osteomyelitis  =has recent hx of small R foot fx s/p MRSA infection (1/2023) w/ similar presentation that improved w/ antibiotic course  =based on exam findings, suspect infection. It does not appear like pyoderma gangrenosum. Differential for nodular lesions that can be associated w/ autoimmune disease include RA, SLE, Vasculitis, sarcoidosis, however patient does not have any stigmata of this on history or exam.    Plan:  -will order RF, CCP, JAIRO, ANCA, MPO, PR3, ACE, Vit D 1,25, Vit D 2,5   -recommend tissue biopsy and MRI for further information  -f/u dermatology and ID evaluation  -would hold off on steroids as infection still remains on the differential   -will follow     Discussed with Attending Dr. Wei Jacobsen DO  Rheumatology Fellow  Pager: [phone]  Available on TEAMS 71 yo F PMH breast cancer (s/p R lumpectomy), bladder cancer with mets to the spine (now in remission since 2018), HTN, HLD, L1-L4 fusion, R foot fx s/p MRSA infection (1/2023) p/w left ankle pain swelling and erythema for 3 weeks. Rheumatology consulted for evaluation.     #left ankle pain/swelling/erythema with nodular lesion overlying lateral malleolus  =developed 3 weeks ago w/o improvement on 7days of PO steroids or 3 days of outpatient Augmentin  =no leukocytosis, has elevated inflammatory markers, x-ray w/o fracture or signs of gas or obvious osteomyelitis  =has recent hx of small R foot fx s/p MRSA infection (1/2023) w/ similar presentation that improved w/ antibiotic course  =based on exam findings, suspect infection. It does not appear like pyoderma gangrenosum. Can also consider arterial ulcer based on location, however has warm foot w/ palpable pulses. Differential for nodular lesions that can be associated w/ autoimmune disease include RA, SLE, Vasculitis, sarcoidosis, however patient does not have any stigmata of this on history or exam.    Plan:  -will order RF, CCP, JAIRO, ANCA, MPO, PR3, ACE, Vit D 1,25, Vit D 2,5   -recommend tissue biopsy and MRI for further information  -f/u dermatology and ID evaluation  -would hold off on steroids as infection still remains on the differential   -will follow     Discussed with Attending Dr. Wei Jacobsen DO  Rheumatology Fellow  Pager: [phone]  Available on TEAMS

## 2023-04-09 NOTE — PATIENT PROFILE ADULT - VISION (WITH CORRECTIVE LENSES IF THE PATIENT USUALLY WEARS THEM):
pt states she does carry prescription glasses/Normal vision: sees adequately in most situations; can see medication labels, newsprint

## 2023-04-09 NOTE — CONSULT NOTE ADULT - ASSESSMENT
Ms Angelo is a 72-year-old female with PMH of breast cancer s/p right breast lumpectomy, bladder cancer with mets to the spine, in remission, hypertension, hyperlipidemia presents to the ER complaining of left ankle pain swelling and redness times approximately 1 week. Pt states she sprained her ankle in March and has been following up with her podiatrist, Dr. Coley. She states her left ankle had become swollen and erythematous and was started on course of steroid with some improvement however it has worsened this past week. She states it initially started off as a bump and it has since become erythematous and red. She denies any purulent drainage but reports she has hx of MRSA infection from previous right foot infection. She states she went to see an orthopedic doctor who expressed out clear fluid and sent it off for culture. She was started on augmentin 3 days ago. She reports severe pain, and is now having difficulty walking due to pain when bearing weight. She denies fevers but reports chills. ID consulted for the same,     WORKUP  Xray with OM and with mild soft tissue swelling  As per H&P:  Wound cx from 4/6 from orthopedic office without any bacteria growth     DIAGNOSIS and IMPRESSION  ·	Lt Ankle Nodular lesion    Afebrile, no leukocytosis, ESR 62, CRP 45.7,  s/p clinda in ED and pt started on vancomycin  IVPB 1250 every 12 hour  Left lateral ankle with nodular lesion n with deepika lesion erythema, which was very tender to touch with limited range of motion.   Here pt started on  solumedrol 40mg q12    RECOMMENDATIONS  Blood cx in lab  Will f/u MRI      PT TO BE SEEN. PRELIM NOTE  PENDING RECS. PLEASE WAIT FOR FINAL RECS AFTER DISCUSSION WITH ATTENDINGAnna Carlson MD, PGY5  ID fellow  [employer] Teams Preferred  After 5pm/weekends call [phone]   Ms Angelo is a 72-year-old female with PMH of breast cancer s/p right breast lumpectomy, bladder cancer with mets to the spine, in remission, hypertension, hyperlipidemia presents to the ER complaining of left ankle pain swelling and redness times approximately 1 week. Pt states she sprained her ankle in March and has been following up with her podiatrist, Dr. Coley. She states her left ankle had become swollen and erythematous and was started on course of steroid with some improvement however it has worsened this past week. She states it initially started off as a bump and it has since become erythematous and red. She denies any purulent drainage but reports she has hx of MRSA infection from previous right foot infection. She states she went to see an orthopedic doctor who expressed out clear fluid and sent it off for culture. She was started on augmentin 3 days ago. She reports severe pain, and is now having difficulty walking due to pain when bearing weight. She denies fevers but reports chills. ID consulted for the same,     WORKUP  Xray with OM and with mild soft tissue swelling  As per H&P:  Wound cx from 4/6 from orthopedic office without any bacteria growth     DIAGNOSIS and IMPRESSION  ·	Lt Ankle Nodular lesion    Afebrile, no leukocytosis, ESR 62, CRP 45.7,  s/p clinda in ED and pt started on vancomycin  IVPB 1250 every 12 hour  Left lateral ankle with nodular lesion n with deepika lesion erythema, which was very tender to touch with limited range of motion.   Here pt started on  solumedrol 40mg q12    RECOMMENDATIONS  Blood cx in lab -> Will f/u  Will f/u MRI  c/w vanc for now  Please check Vancomycin trough before 4th sequential dose. Target trough levels 15-20  Would DC solumedrol, but defer to Rheum/Podiatry    Pt seen and examined. Case d/w attending   Recommendation provided to primary team via MS Teams.    Bhupinder Carlson MD, PGY5  ID fellow  [employer] Teams Preferred  After 5pm/weekends call [phone]

## 2023-04-09 NOTE — CONSULT NOTE ADULT - ASSESSMENT
___________________________  #Painful subcutaneous nodule   ddx includes early PG aleksandr in the setting of improvement with PO steroids, arterial (aleksandr given current location), infectious       Performed skin biopsy x 2 for H&E and TC as below.    Dermatology Punch Biopsy Procedure Note  -After risks and benefits of procedure including bleeding, infection and scar were reviewed (consents including photo consent reviewed, signed and in chart), allergies were reviewed and time out performed.     -Area cleaned with rubbing alcohol and anesthetized with lidocaine and epinephrine.  A 4mm punch biopsy x 2 was performed to the L lateral malleolus x 2 (TC & H&E), hemostasis achieved with 4-0 Chromic gut sutures. Dressing with Vaseline. Wound care reviewed with patient and team.  -Sutures are dissolvable, no need for removal. Please leave dressing on for 24-48 hours and after that please apply vaseline on the biopsy site 2-3 times daily to keep area moist and promote healing.      The patient's chart was reviewed in addition to being seen and examined at bedside with the dermatology attending Dr. Dorsey .  Recommendations were communicated with the primary team.        Tracy Mckenzie MD  Resident Physician, PGY-2  [facility] Dermatology  Pager: [phone]  Office: [phone] ___________________________  #Painful subcutaneous nodule   ddx includes pustular PG aleksandr in the setting of improvement with PO steroids, arterial (aleksandr given current location), and infectious     - Would switch from vanc to PO doxycyline 100mg BID for both infectious and as doxy can be used to treat PG.  - START clobetasol 0.05% ointment BID for 2 weeks on / 1 week off. Repeat cycles as necessary. Please ask the pharmacy to dispense multiple tubes.  - Intralesional kenalog 40mg 0.1cc injected to lesion, site first prepped with alcohol. SED including atrophy and skin lightening.   - Recommend vascular consult for w/u of possible underlying arterial/venous disease.  - Please have patient follow up with outpatient dermatology within 2 weeks of discharge with Dr. Dorsey at [address] [place] [address], [address]. Our team will help faciliate follow-up, please ensure patient's contact information in the chart is up to date and our scheduling team will reach out to arrange follow-up.       -------------------------------  Performed skin biopsy for H&E and TC as below.    Dermatology Punch Biopsy Procedure Note  -After risks and benefits of procedure including bleeding, infection and scar were reviewed (consents including photo consent reviewed, signed and in chart), allergies were reviewed and time out performed.     -Area cleaned with rubbing alcohol and anesthetized with lidocaine and epinephrine.  A 6mm punch biopsy was performed to the L lateral malleolus split for TC & H&E, hemostasis achieved with 4-0 Chromic gut sutures. Dressing with Vaseline. Wound care reviewed with patient and team.  -Sutures are dissolvable, no need for removal. Please leave dressing on for 24-48 hours and after that please apply vaseline on the biopsy site 2-3 times daily to keep area moist and promote healing.      The patient's chart was reviewed in addition to being seen and examined at bedside with the dermatology attending Dr. Dorsey .  Recommendations were communicated with the primary team.        Tracy Mckenzie MD  Resident Physician, PGY-2  [facility] Dermatology  Pager: [phone]  Office: [phone] ___________________________  #Painful subcutaneous nodule   ddx includes pustular PG aleksandr in the setting of improvement with PO steroids and h/o prior ulceration on R lateral foot (pt  has photos), arterial (aleksandr given current location), and infectious     - Would switch from vanc to PO doxycyline 100mg BID for both infectious and as doxy can be used to treat PG.  - START clobetasol 0.05% ointment BID for 2 weeks on / 1 week off. Repeat cycles as necessary. Please ask the pharmacy to dispense multiple tubes.  - Intralesional kenalog 40mg 0.1cc injected to lesion, site first prepped with alcohol. SED including atrophy and skin lightening.   - Recommend vascular consult for w/u of possible underlying arterial/venous disease.  - Please have patient follow up with outpatient dermatology within 2 weeks of discharge with Dr. Dorsey at [address] [place] [address], [address]. Our team will help faciliate follow-up, please ensure patient's contact information in the chart is up to date and our scheduling team will reach out to arrange follow-up.       -------------------------------  Performed skin biopsy for H&E and TC as below.    Dermatology Punch Biopsy Procedure Note  -After risks and benefits of procedure including bleeding, infection and scar were reviewed (consents including photo consent reviewed, signed and in chart), allergies were reviewed and time out performed.     -Area cleaned with rubbing alcohol and anesthetized with lidocaine and epinephrine.  A 6mm punch biopsy was performed to the L lateral malleolus split for TC & H&E, hemostasis achieved with 4-0 Chromic gut sutures. Dressing with Vaseline. Wound care reviewed with patient and team.  -Sutures are dissolvable, no need for removal. Please leave dressing on for 24-48 hours and after that please apply vaseline on the biopsy site 2-3 times daily to keep area moist and promote healing.      The patient's chart was reviewed in addition to being seen and examined at bedside with the dermatology attending Dr. Dorsey .  Recommendations were communicated with the primary team.        Tracy Mckenzie MD  Resident Physician, PGY-2  [facility] Dermatology  Pager: [phone]  Office: [phone] ___________________________  #Painful subcutaneous nodule   ddx includes pustular PG aleksandr in the setting of improvement with PO steroids and h/o prior ulceration on R lateral foot (pt  has photos), arterial (aleksandr given current location), and infectious     - Would switch from vanc to PO doxycyline 100mg BID for both infectious and as doxy can be used to treat PG.  - START clobetasol 0.05% ointment BID for 2 weeks on / 1 week off. Repeat cycles as necessary. Please ask the pharmacy to dispense multiple tubes.  - Recommend vascular consult for w/u of possible underlying arterial/venous disease.  - Punch biopsy 4.9 as below, will fu   - Wound culture 4.9, will fu   - Intralesional kenalog 40mg 0.1cc injected to lesion, site first prepped with alcohol. SED including atrophy and skin lightening.   - Please have patient follow up with outpatient dermatology within 2 weeks of discharge with Dr. Dorsey at [address] [place] [address], [address]. Our team will help faciliate follow-up, please ensure patient's contact information in the chart is up to date and our scheduling team will reach out to arrange follow-up.       -------------------------------  Performed skin biopsy for H&E and TC as below.    Dermatology Punch Biopsy Procedure Note  -After risks and benefits of procedure including bleeding, infection and scar were reviewed (consents including photo consent reviewed, signed and in chart), allergies were reviewed and time out performed.     -Area cleaned with rubbing alcohol and anesthetized with lidocaine and epinephrine.  A 6mm punch biopsy was performed to the L lateral malleolus split for TC & H&E, hemostasis achieved with 4-0 Chromic gut sutures. Dressing with Vaseline. Wound care reviewed with patient and team.  -Sutures are dissolvable, no need for removal. Please leave dressing on for 24-48 hours and after that please apply vaseline on the biopsy site 2-3 times daily to keep area moist and promote healing.      The patient's chart was reviewed in addition to being seen and examined at bedside with the dermatology attending Dr. Dorsey .  Recommendations were communicated with the primary team.        Tracy Mckenzie MD  Resident Physician, PGY-2  [facility] Dermatology  Pager: [phone]  Office: [phone]

## 2023-04-09 NOTE — PROGRESS NOTE ADULT - ASSESSMENT
72F with left lateral ankle nodular lesion  -Patient seen and evaluated  -Patient is afebrile, WBC 8.26, ESR 62, CRP 4.57  - Left lateral ankle with nodular lesion noted distal to sinus tarsi, deepika lesion erythema, no break in skin, no open wounds, no open lesions, no pus, no drainage no excess warmth.  - Patient already admitted to medicine  - Recommended IV vanco and zosyn  - left foot xray and ankle so no gas, no OM, no fracture  - Recommending Derm, ID and rheumatology consult  - Recommending prednisone for inflammation  - Ordered left ankle MR to rule out any deeper abscess  - Pod plan local wound care pending MRI/ ID Derm recs  - Discussed with Attending        Pt w/ painful lateral left rearfoot, recently worsened w/ increased pain. Will like to rule out infectous process versus inflammatory disorder. Recent MRI demonstrated no abscess. Pt had similar condition on right foot. Palpable pulses, erythema at lateral rearfoot ~3 cm, very sensative to touch but no obvious signs of abscess. Elevated inflammatory markers. Continue broad spectrum abx and would like repeat MRI and will monitor any change or worsening will require incision which she be held off on for now. Want rheumatology to evaluate for inflamattory condition and ID to follow.    72F with left lateral ankle nodular lesion  -Patient seen and evaluated  -Patient is afebrile, WBC 10.44, ESR 62, CRP 4.57  - Left lateral ankle with nodular lesion noted distal to sinus tarsi, deepika lesion erythema, no break in skin, no open wounds, no open lesions, no pus, no drainage no excess warmth. no obvious signs of abscess, tender to touch with limited range of motion   - Recommended broad spectrum antibiotics: Vanco/zosyn   - left foot xray and ankle so no gas, no OM, no fracture  - Recommending Derm consult  - Recommend ID consult   - Recommend rheumatology consult to rule out possible inflammatory underlying condition   - Recommend prednisone for inflammation   - Left ankle MR to rule out any deeper abscess vs septic ankle pending   - Pod plan local wound care pending MRI/ ID/Derm/rheum recs  - Seen with Attending   72F with left lateral ankle nodular lesion  -Patient seen and evaluated  -Patient is afebrile, WBC 10.44, ESR 62, CRP 4.57  - Left lateral ankle with nodular lesion noted distal to sinus tarsi, deepika lesion erythema, no break in skin, no open wounds, no open lesions, no drainage or excess warmth, no purulence or obvious signs of abscess, very tender to touch with limited range of motion.   - Recommended broad spectrum antibiotics: Vanco/zosyn   - left foot xray and ankle so no gas, no OM, no fracture  - Recommending Derm consult  - Recommend ID consult   - Recommend rheumatology consult to rule out possible inflammatory underlying condition   - Recommend prednisone for inflammation   - Left ankle MR to rule out any deeper abscess vs septic ankle pending   - Pod plan local wound care pending MRI/ ID/Derm/rheum recs  - Seen with Attending

## 2023-04-09 NOTE — CONSULT NOTE ADULT - SUBJECTIVE AND OBJECTIVE BOX
KIM ACKERMAN  [id number]    HPI: 73 yo F PMH breast cancer (s/p R lumpectomy), bladder cancer with mets to the spine (now in remission), HTN, HLD, R foot MRSA infection p/w left ankle pain swelling and redness times approximately 2 weeks. Pt states she sprained her ankle in March and has been following up with her podiatrist, Dr. Coley. She states her left ankle had become swollen and erythematous and was started on course of steroid with some improvement however it has worsened this past week. She states it initially started off as a bump and it has since become erythematous and red. She denies any purulent drainage but reports she has hx of MRSA infection from previous right foot infection. She states she went to see an orthopedic doctor who expressed out clear fluid and sent it off for culture. She was started on augmentin 3 days ago. She reports severe pain, and is now having difficulty walking due to pain when bearing weight. She denies fevers but reports chills. Denies chest pain, SOB, abd pain, n/v/d, or urinary symptoms. She states she has been taking oxycodone, tylenol, and ibuprofen with mild relief at home    Hospital Course: Afebrile w/ VSS, no leukocytosis, Hgb 11.2, ESR 62, CRP 45. X-ray ankle w/o cortical erosions or subcutaneous tracking of gas to suggest osteomyelitis. Received IV clindamycin x1, IV morphine 4 mg x1. Seen by podiatry for L lateral ankle with nodular lesion, recommended for IV vanco and zosyn, Derm, ID and rheumatology consult. Started on prednisone for inflammation, solumedrol 40mg q12, and ordered for left ankle MRI to rule out any deeper abscess. Rheumatology consulted for evaluation.     Subjective:     ROS:    Meds: gabapentin, amlodipine, pravastatin  Surgical hx: S/P , S/P colon resection and reversal, S/P hysterectomy, right groin fatty tissue removal, lumpectomy, L1-L4 fusion 2017  Social hx:       MEDICATIONS  (STANDING):  amLODIPine   Tablet 2.5 milliGRAM(s) Oral at bedtime  atorvastatin 10 milliGRAM(s) Oral at bedtime  enoxaparin Injectable 40 milliGRAM(s) SubCutaneous every 24 hours  gabapentin 300 milliGRAM(s) Oral three times a day  methylPREDNISolone sodium succinate Injectable 40 milliGRAM(s) IV Push every 12 hours  pantoprazole    Tablet 40 milliGRAM(s) Oral before breakfast  polyethylene glycol 3350 17 Gram(s) Oral daily  senna 2 Tablet(s) Oral at bedtime  vancomycin  IVPB 1250 milliGRAM(s) IV Intermittent every 12 hours    MEDICATIONS  (PRN):  acetaminophen     Tablet .. 650 milliGRAM(s) Oral every 6 hours PRN Temp greater or equal to 38C (100.4F), Mild Pain (1 - 3)  aluminum hydroxide/magnesium hydroxide/simethicone Suspension 30 milliLiter(s) Oral every 6 hours PRN Dyspepsia  melatonin 3 milliGRAM(s) Oral at bedtime PRN Insomnia  ondansetron Injectable 4 milliGRAM(s) IV Push every 6 hours PRN Nausea and/or Vomiting  oxyCODONE    IR 5 milliGRAM(s) Oral every 4 hours PRN Moderate Pain (4 - 6)  oxyCODONE    IR 10 milliGRAM(s) Oral every 4 hours PRN Severe Pain (7 - 10)      Allergies    sulfa drugs (Unknown)    Intolerances        PERTINENT MEDICATION HISTORY:    MEDICATIONS:    ALLERGIES:    SURGERIES:    HOSPITALIZATIONS:    FAMILY HISTORY:    TRAVEL HISTORY:    SOCIAL HISTORY:    FAMILY HISTORY:  Family history of coronary artery disease (Mother)        Vital Signs Last 24 Hrs  T(C): 36.6 (2023 04:42), Max: 37.1 (2023 14:31)  T(F): 97.8 (2023 04:42), Max: 98.8 (2023 23:34)  HR: 66 (2023 10:00) (62 - 93)  BP: 124/73 (2023 10:00) (116/68 - 140/91)  BP(mean): --  RR: 18 (2023 10:00) (16 - 18)  SpO2: 98% (2023 10:00) (96% - 100%)    Parameters below as of 2023 10:00  Patient On (Oxygen Delivery Method): room air        Physical Exam:  General: Breathing comfortably on room air, no distress  HEENT: EOMI, MMM, no oral ulcers  CVS: +S1/S2, RRR  Resp: CTA b/l. No crackles/wheezing  GI: Soft, NT/ND +BS  MSK: 5/5 muscle strength in arms and legs. B/l shoulder, elbow, wrist, MCP/PIP/DIP, Knee, ankle w/o swelling/erythema/or tenderness  Skin: no visible rashes    LABS:                        11.7   10.44 )-----------( 300      ( 2023 06:19 )             37.6     04-    139  |  98  |  17  ----------------------------<  116<H>  3.5   |  28  |  0.64    Ca    9.7      2023 06:19  Phos  3.4     04-09  Mg     1.70     -    TPro  6.7  /  Alb  3.8  /  TBili  0.2  /  DBili  x   /  AST  21  /  ALT  14  /  AlkPhos  98  04-08          RADIOLOGY & ADDITIONAL STUDIES: Reviewed     KIM ACKERMAN  [id number]    HPI: 73 yo F PMH breast cancer (s/p R lumpectomy), bladder cancer with mets to the spine (now in remission), HTN, HLD, L1-L4 fusion, R foot fx s/p MRSA infection (2023) p/w left ankle pain swelling and erythema for 3 weeks. Says that 3 weeks ago, developed swelling and mobile cyst like lesion on L lateral ankle, which has grown and become more painful over the weeks. Pt states she saw her podiatrist, Dr. oCley, who performed x-ray and MRI outpatient, and was told she likely had L ankle sprain. She was then started on course of steroids for 1 week with some improvement, however it has worsened this past week. On Thursday, she went to see an orthopedic doctor who expressed out clear fluid, and was told it was negative culture. She was started on Augmentin 3 days ago, and feels like it has not helped. Says she has been walking and bearing more weight on L foot more than usual following her recent R foot infection. Denies trauma or bug bite or skin break to L foot.     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 X-ray ankle w/o cortical erosions or subcutaneous tracking of gas to suggest osteomyelitis. Received IV clindamycin x1, IV morphine 4 mg x1. Seen by podiatry for L lateral ankle with nodular lesion, recommended for IV vanco and zosyn, Derm, ID and rheumatology consult. Started on prednisone for inflammation, solumedrol 40mg q12, and ordered for left ankle MRI to rule out any deeper abscess. Rheumatology consulted for evaluation.      Meds: gabapentin, amlodipine, pravastatin  Surgical hx: S/P , S/P colon resection and reversal, S/P hysterectomy, right groin fatty tissue removal, lumpectomy, L1-L4 fusion 2017  Social hx: Has 2 kids, both born 2 term. 2 ectopic pregnancies. No miscarriage or blood clot hx.    MEDICATIONS  (STANDING):  amLODIPine   Tablet 2.5 milliGRAM(s) Oral at bedtime  atorvastatin 10 milliGRAM(s) Oral at bedtime  enoxaparin Injectable 40 milliGRAM(s) SubCutaneous every 24 hours  gabapentin 300 milliGRAM(s) Oral three times a day  methylPREDNISolone sodium succinate Injectable 40 milliGRAM(s) IV Push every 12 hours  pantoprazole    Tablet 40 milliGRAM(s) Oral before breakfast  polyethylene glycol 3350 17 Gram(s) Oral daily  senna 2 Tablet(s) Oral at bedtime  vancomycin  IVPB 1250 milliGRAM(s) IV Intermittent every 12 hours    MEDICATIONS  (PRN):  acetaminophen     Tablet .. 650 milliGRAM(s) Oral every 6 hours PRN Temp greater or equal to 38C (100.4F), Mild Pain (1 - 3)  aluminum hydroxide/magnesium hydroxide/simethicone Suspension 30 milliLiter(s) Oral every 6 hours PRN Dyspepsia  melatonin 3 milliGRAM(s) Oral at bedtime PRN Insomnia  ondansetron Injectable 4 milliGRAM(s) IV Push every 6 hours PRN Nausea and/or Vomiting  oxyCODONE    IR 5 milliGRAM(s) Oral every 4 hours PRN Moderate Pain (4 - 6)  oxyCODONE    IR 10 milliGRAM(s) Oral every 4 hours PRN Severe Pain (7 - 10)      Allergies    sulfa drugs (Unknown)    Intolerances    FAMILY HISTORY:  Family history of coronary artery disease (Mother)        Vital Signs Last 24 Hrs  T(C): 36.6 (2023 04:42), Max: 37.1 (2023 14:31)  T(F): 97.8 (2023 04:42), Max: 98.8 (2023 23:34)  HR: 66 (2023 10:00) (62 - 93)  BP: 124/73 (2023 10:00) (116/68 - 140/91)  BP(mean): --  RR: 18 (2023 10:00) (16 - 18)  SpO2: 98% (2023 10:00) (96% - 100%)    Parameters below as of 2023 10:00  Patient On (Oxygen Delivery Method): room air        Physical Exam:  General: Breathing comfortably on room air, no distress, lethargic  HEENT: EOMI, MMM, no oral ulcers  CVS: +S1/S2, RRR  Resp: CTA b/l. No crackles/wheezing  GI: Soft, NT/ND +BS  MSK: L ankle w/ moderate swelling w/ hyperpigmented plaque overlying L lateral malleolus - warm and tender to touch  Skin: no visible rashes. Scarred lesion on lateral R foot w/o erythema or warmth    LABS:                        11.7   10.44 )-----------( 300      ( 2023 06:19 )             37.6     04-    139  |  98  |  17  ----------------------------<  116<H>  3.5   |  28  |  0.64    Ca    9.7      2023 06:19  Phos  3.4     -  Mg     1.70     -    TPro  6.7  /  Alb  3.8  /  TBili  0.2  /  DBili  x   /  AST  21  /  ALT  14  /  AlkPhos  98  -08          RADIOLOGY & ADDITIONAL STUDIES: Reviewed    < from: Xray Ankle Complete 3 Views, Left (23 @ 17:33) >    ACC: [id number] EXAM:  XR ANKLE COMP MIN 3 VIEWS LT   ORDERED BY: LAKEISHA POE     PROCEDURE DATE:  2023          INTERPRETATION:  CLINICAL INDICATION: Cellulitis.    TECHNIQUE: X-ray left ankle 3 views    COMPARISON: X-ray left ankle 2021    FINDINGS:    No acute fracture or dislocation.  No definite focal cortical erosion or periosteal reaction.  Mild ankle soft tissue swelling. No radiopaque foreign body.    IMPRESSION:  No convincing plain radiographic evidence for acute osteomyelitis.    --- End of Report ---          RANJEET BOWIE MD; Resident Radiologist  This document has been electronically signed.  ANA RANGEL MD; Attending Radiologist  This document has been electronically signed. 2023  9:27AM    < end of copied text >   KIM ACKERMAN  [id number]    HPI: 73 yo F PMH breast cancer (s/p R lumpectomy), bladder cancer with mets to the spine (now in remission), HTN, HLD, L1-L4 fusion, R foot fx s/p MRSA infection (2023) p/w left ankle pain swelling and erythema for 3 weeks. Says that 3 weeks ago, developed swelling and mobile cyst like lesion on L lateral ankle, which has grown and become more painful over the weeks. Pt states she saw her podiatrist, Dr. Coley, who performed x-ray and MRI outpatient, and was told she likely had L ankle sprain. She was then started on course of steroids for 1 week with some improvement, however it has worsened this past week. On Thursday, she went to see an orthopedic doctor who expressed out clear fluid, and was told it was negative culture. She was started on Augmentin 3 days ago, and feels like it has not helped. Says she has been walking and bearing more weight on L foot more than usual following her recent R foot infection. Denies trauma or bug bite or skin break to L foot.     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 X-ray ankle w/o cortical erosions or subcutaneous tracking of gas to suggest osteomyelitis. Received IV clindamycin x1, IV morphine 4 mg x1. Seen by podiatry for L lateral ankle with nodular lesion, recommended for IV vanco and zosyn, Derm, ID and rheumatology consult. Started on prednisone for inflammation, solumedrol 40mg q12, and ordered for left ankle MRI to rule out any deeper abscess. Rheumatology consulted for evaluation.      Meds: gabapentin, amlodipine, pravastatin  Surgical hx: S/P , S/P colon resection and reversal, S/P hysterectomy, right groin fatty tissue removal, lumpectomy, L1-L4 fusion 2017  Social hx: Has 2 kids, both born 2 term. 2 ectopic pregnancies. No miscarriage or blood clot hx.    MEDICATIONS  (STANDING):  amLODIPine   Tablet 2.5 milliGRAM(s) Oral at bedtime  atorvastatin 10 milliGRAM(s) Oral at bedtime  enoxaparin Injectable 40 milliGRAM(s) SubCutaneous every 24 hours  gabapentin 300 milliGRAM(s) Oral three times a day  methylPREDNISolone sodium succinate Injectable 40 milliGRAM(s) IV Push every 12 hours  pantoprazole    Tablet 40 milliGRAM(s) Oral before breakfast  polyethylene glycol 3350 17 Gram(s) Oral daily  senna 2 Tablet(s) Oral at bedtime  vancomycin  IVPB 1250 milliGRAM(s) IV Intermittent every 12 hours    MEDICATIONS  (PRN):  acetaminophen     Tablet .. 650 milliGRAM(s) Oral every 6 hours PRN Temp greater or equal to 38C (100.4F), Mild Pain (1 - 3)  aluminum hydroxide/magnesium hydroxide/simethicone Suspension 30 milliLiter(s) Oral every 6 hours PRN Dyspepsia  melatonin 3 milliGRAM(s) Oral at bedtime PRN Insomnia  ondansetron Injectable 4 milliGRAM(s) IV Push every 6 hours PRN Nausea and/or Vomiting  oxyCODONE    IR 5 milliGRAM(s) Oral every 4 hours PRN Moderate Pain (4 - 6)  oxyCODONE    IR 10 milliGRAM(s) Oral every 4 hours PRN Severe Pain (7 - 10)      Allergies    sulfa drugs (Unknown)    Intolerances    FAMILY HISTORY:  Family history of coronary artery disease (Mother)        Vital Signs Last 24 Hrs  T(C): 36.6 (2023 04:42), Max: 37.1 (2023 14:31)  T(F): 97.8 (2023 04:42), Max: 98.8 (2023 23:34)  HR: 66 (2023 10:00) (62 - 93)  BP: 124/73 (2023 10:00) (116/68 - 140/91)  RR: 18 (2023 10:00) (16 - 18)  SpO2: 98% (2023 10:00) (96% - 100%)    Physical Exam:  General: Breathing comfortably on room air, no distress, lethargic  HEENT: EOMI, MMM, no oral ulcers  CVS: +S1/S2, RRR  Resp: CTA b/l. No crackles/wheezing  GI: Soft, NT/ND +BS  MSK: L ankle w/ moderate swelling w/ hyperpigmented plaque overlying L lateral malleolus - warm and tender to touch  Skin: no visible rashes. Scarred lesion on lateral R foot w/o erythema or warmth    LABS:                        11.7   10.44 )-----------( 300      ( 2023 06:19 )             37.6     04-    139  |  98  |  17  ----------------------------<  116<H>  3.5   |  28  |  0.64    Ca    9.7      2023 06:19  Phos  3.4     -  Mg     1.70     -    TPro  6.7  /  Alb  3.8  /  TBili  0.2  /  DBili  x   /  AST  21  /  ALT  14  /  AlkPhos  98  04-08          RADIOLOGY & ADDITIONAL STUDIES:    < from: Xray Ankle Complete 3 Views, Left (23 @ 17:33) >    ACC: [id number] EXAM:  XR ANKLE COMP MIN 3 VIEWS LT   ORDERED BY: LAKEISHA POE     PROCEDURE DATE:  2023          INTERPRETATION:  CLINICAL INDICATION: Cellulitis.    TECHNIQUE: X-ray left ankle 3 views    COMPARISON: X-ray left ankle 2021    FINDINGS:    No acute fracture or dislocation.  No definite focal cortical erosion or periosteal reaction.  Mild ankle soft tissue swelling. No radiopaque foreign body.    IMPRESSION:  No convincing plain radiographic evidence for acute osteomyelitis.    --- End of Report ---          RANJEET BOWIE MD; Resident Radiologist  This document has been electronically signed.  ANA RANGEL MD; Attending Radiologist  This document has been electronically signed. 2023  9:27AM    < end of copied text >

## 2023-04-09 NOTE — PROGRESS NOTE ADULT - SUBJECTIVE AND OBJECTIVE BOX
Patient is a 72y old  Female who presents with a chief complaint of cellulitis (08 Apr 2023 21:30)       INTERVAL HPI/OVERNIGHT EVENTS:  Patient seen and evaluated at bedside.  Pt is resting comfortable in NAD. Denies N/V/F/C.    Allergies    sulfa drugs (Unknown)    Intolerances        Vital Signs Last 24 Hrs  T(C): 36.6 (09 Apr 2023 04:42), Max: 37.1 (08 Apr 2023 14:31)  T(F): 97.8 (09 Apr 2023 04:42), Max: 98.8 (08 Apr 2023 23:34)  HR: 62 (09 Apr 2023 04:42) (62 - 93)  BP: 135/83 (09 Apr 2023 04:42) (116/68 - 140/91)  BP(mean): --  RR: 16 (09 Apr 2023 04:42) (16 - 18)  SpO2: 98% (09 Apr 2023 04:42) (96% - 100%)    Parameters below as of 09 Apr 2023 04:42  Patient On (Oxygen Delivery Method): room air        LABS:                        11.7   10.44 )-----------( 300      ( 09 Apr 2023 06:19 )             37.6     04-09    x   |  x   |  17  ----------------------------<  116<H>  x    |  28  |  0.64    Ca    9.7      09 Apr 2023 06:19  Phos  3.4     04-09  Mg     1.70     04-09    TPro  6.7  /  Alb  3.8  /  TBili  0.2  /  DBili  x   /  AST  21  /  ALT  14  /  AlkPhos  98  04-08        CAPILLARY BLOOD GLUCOSE          Lower Extremity Physical Exam:  Vascular: DP/PT _/4 B/L, CFT <_sec x 10, Temp gradient_ B/L  Neurology: Epicritic sensation _ to level of _, B/L  Musculoskeletal/Ortho:   Skin:   Wound #1:  Location:  Size:  Etiology:  Depth:   Wound bed:   Drainage:  Odor:   Periwound:    RADIOLOGY & ADDITIONAL TESTS:   Patient is a 72y old  Female who presents with a chief complaint of cellulitis (08 Apr 2023 21:30)       INTERVAL HPI/OVERNIGHT EVENTS:  Patient seen and evaluated at bedside.  Pt is resting comfortable in NAD. Denies N/V/F/C.    Allergies    sulfa drugs (Unknown)    Intolerances        Vital Signs Last 24 Hrs  T(C): 36.6 (09 Apr 2023 04:42), Max: 37.1 (08 Apr 2023 14:31)  T(F): 97.8 (09 Apr 2023 04:42), Max: 98.8 (08 Apr 2023 23:34)  HR: 62 (09 Apr 2023 04:42) (62 - 93)  BP: 135/83 (09 Apr 2023 04:42) (116/68 - 140/91)  BP(mean): --  RR: 16 (09 Apr 2023 04:42) (16 - 18)  SpO2: 98% (09 Apr 2023 04:42) (96% - 100%)    Parameters below as of 09 Apr 2023 04:42  Patient On (Oxygen Delivery Method): room air        LABS:                        11.7   10.44 )-----------( 300      ( 09 Apr 2023 06:19 )             37.6     04-09    x   |  x   |  17  ----------------------------<  116<H>  x    |  28  |  0.64    Ca    9.7      09 Apr 2023 06:19  Phos  3.4     04-09  Mg     1.70     04-09    TPro  6.7  /  Alb  3.8  /  TBili  0.2  /  DBili  x   /  AST  21  /  ALT  14  /  AlkPhos  98  04-08        CAPILLARY BLOOD GLUCOSE          Lower Extremity Physical Exam:  Vascular: DP/PT 2/4, B/L, CFT <3 seconds B/L, Temperature gradient warm to warm on left , warm to cool on right.   Neuro: Epicritic sensation intact to the level of toes, B/L.  Musculoskeletal/Ortho: pain on palpation tot he dorsum of left lateral malleolus  Skin: Left lateral ankle with nodular lesion noted distal to sinus tarsi, deepika lesion erythema, no break in skin, no open wounds, no open lesions, no pus, no drainage no excess warmth, tender to touch with limited range of motion     RADIOLOGY & ADDITIONAL TESTS:     Patient is a 72y old  Female who presents with a chief complaint of cellulitis (08 Apr 2023 21:30)       INTERVAL HPI/OVERNIGHT EVENTS:  Patient seen and evaluated at bedside.  Pt is resting comfortable in NAD. Denies N/V/F/C.    Allergies    sulfa drugs (Unknown)    Intolerances        Vital Signs Last 24 Hrs  T(C): 36.6 (09 Apr 2023 04:42), Max: 37.1 (08 Apr 2023 14:31)  T(F): 97.8 (09 Apr 2023 04:42), Max: 98.8 (08 Apr 2023 23:34)  HR: 62 (09 Apr 2023 04:42) (62 - 93)  BP: 135/83 (09 Apr 2023 04:42) (116/68 - 140/91)  BP(mean): --  RR: 16 (09 Apr 2023 04:42) (16 - 18)  SpO2: 98% (09 Apr 2023 04:42) (96% - 100%)    Parameters below as of 09 Apr 2023 04:42  Patient On (Oxygen Delivery Method): room air        LABS:                        11.7   10.44 )-----------( 300      ( 09 Apr 2023 06:19 )             37.6     04-09    x   |  x   |  17  ----------------------------<  116<H>  x    |  28  |  0.64    Ca    9.7      09 Apr 2023 06:19  Phos  3.4     04-09  Mg     1.70     04-09    TPro  6.7  /  Alb  3.8  /  TBili  0.2  /  DBili  x   /  AST  21  /  ALT  14  /  AlkPhos  98  04-08        CAPILLARY BLOOD GLUCOSE          Lower Extremity Physical Exam:  Vascular: DP/PT 2/4, B/L, CFT <3 seconds B/L, Temperature gradient warm to warm on left , warm to cool on right.   Neuro: Epicritic sensation intact to the level of toes, B/L.  Musculoskeletal/Ortho: pain on palpation tot he dorsum of left lateral malleolus  Skin: Left lateral ankle with nodular lesion noted distal to sinus tarsi, deepika lesion erythema, no break in skin, no open wounds, no open lesions, no drainage or excess warmth, no purulence or obvious signs of abscess, very tender to touch with limited range of motion.     RADIOLOGY & ADDITIONAL TESTS:  < from: Xray Ankle Complete 3 Views, Left (04.08.23 @ 17:33) >    ACC: [id number] EXAM:  XR ANKLE COMP MIN 3 VIEWS LT   ORDERED BY: LAKEISHA POE     PROCEDURE DATE:  04/08/2023          INTERPRETATION:  CLINICAL INDICATION: Cellulitis.    TECHNIQUE: X-ray left ankle 3 views    COMPARISON: X-ray left ankle 4/27/2021    FINDINGS:    No acute fracture or dislocation.  No definite focal cortical erosion or periosteal reaction.  Mild ankle soft tissue swelling. No radiopaque foreign body.    IMPRESSION:  No convincing plain radiographic evidence for acute osteomyelitis.    --- End of Report ---          RANJEET BOWIE MD; Resident Radiologist  This document has been electronically signed.  ANA RANGEL MD; Attending Radiologist  This document has been electronically signed. Apr 9 2023  9:27AM    < end of copied text >

## 2023-04-09 NOTE — CONSULT NOTE ADULT - SUBJECTIVE AND OBJECTIVE BOX
Patient is a 72y old  Female who presents with a chief complaint of cellulitis (2023 11:34)    HPI: Ms Angelo is a 72-year-old female with PMH of breast cancer s/p right breast lumpectomy, bladder cancer with mets to the spine, in remission, hypertension, hyperlipidemia presents to the ER complaining of left ankle pain swelling and redness times approximately 1 week. Pt states she sprained her ankle in March and has been following up with her podiatrist, Dr. Coley. She states her left ankle had become swollen and erythematous and was started on course of steroid with some improvement however it has worsened this past week. She states it initially started off as a bump and it has since become erythematous and red. She denies any purulent drainage but reports she has hx of MRSA infection from previous right foot infection. She states she went to see an orthopedic doctor who expressed out clear fluid and sent it off for culture. She was started on augmentin 3 days ago. She reports severe pain, and is now having difficulty walking due to pain when bearing weight. She denies fevers but reports chills. Denies chest pain, SOB, abd pain, n/v/d, or urinary symptoms. She states she has been taking oxycodone, tylenol, and ibuprofen with mild relief at home    Here VSS, no leukocytosis, ESR 62, CRP 45.7, Xray with OM and with mild soft tissue swelling. Pt noted to have Left lateral ankle with nodular lesion n with deepika lesion erythema, which was very tender to touch with limited range of motion.   s/p clinda in ED and pt started on vancomycin  IVPB 1250 every 12 hours. ID consutled for the same.      REVIEW OF SYSTEMS  [  ] ROS unobtainable because:    [ x ] All other systems negative except as noted below    Constitutional:  [ ] fever [ ] chills  [ ] weight loss  [ ]night sweat  [ ]poor appetite/PO intake [ ]fatigue   Skin:  [ ] rash [ ] phlebitis	  Eyes: [ ] icterus [ ] pain  [ ] discharge	  ENMT: [ ] sore throat  [ ] thrush [ ] ulcers [ ] exudates [ ]anosmia  Respiratory: [ ] dyspnea [ ] hemoptysis [ ] cough [ ] sputum	  Cardiovascular:  [ ] chest pain [ ] palpitations [ ] edema	  Gastrointestinal:  [ ] nausea [ ] vomiting [ ] diarrhea [ ] constipation [ ] pain	  Genitourinary:  [ ] dysuria [ ] frequency [ ] hematuria [ ] discharge [ ] flank pain  [ ] incontinence  Musculoskeletal:  [ ] myalgias [ ] arthralgias [ ] arthritis  [ ] back pain  Neurological:  [ ] headache [ ] weakness [ ] seizures  [ ] confusion/altered mental status    prior hospital charts reviewed [V]  primary team notes reviewed [V]  other consultant notes reviewed [V]    PAST MEDICAL & SURGICAL HISTORY:  Anxiety      Breast CA, left  lumpectomy / RTX in the past      Hypertension      Sleep apnea  uses  cpap machine      Irritable bowel syndrome (IBS)      Polyp of colon  "pre-cancerous" x 2, removed 2014      HLD (hyperlipidemia)      Bladder polyp      S/P       S/P colon resection  reversal, had complications for fibriods      S/P hysterectomy      Breast lump      History of surgery  right groin fatty tissue removed      H/O lumpectomy  left       Personal history of spine surgery  L1-L4 fusion 2017          SOCIAL HISTORY:  - Denied smoking/vaping/alcohol/recreational drug use    FAMILY HISTORY:  Family history of coronary artery disease (Mother)        Allergies  sulfa drugs (Unknown)        ANTIMICROBIALS:  vancomycin  IVPB 1250 every 12 hours      ANTIMICROBIALS (past 90 days):  MEDICATIONS  (STANDING):  clindamycin IVPB   100 mL/Hr IV Intermittent (23 @ 18:03)    vancomycin  IVPB   166.67 mL/Hr IV Intermittent (23 @ 02:00)        OTHER MEDS:   MEDICATIONS  (STANDING):  acetaminophen     Tablet .. 650 every 6 hours PRN  aluminum hydroxide/magnesium hydroxide/simethicone Suspension 30 every 6 hours PRN  amLODIPine   Tablet 2.5 at bedtime  atorvastatin 10 at bedtime  enoxaparin Injectable 40 every 24 hours  gabapentin 300 three times a day  melatonin 3 at bedtime PRN  methylPREDNISolone sodium succinate Injectable 40 every 12 hours  ondansetron Injectable 4 every 6 hours PRN  oxyCODONE    IR 5 every 4 hours PRN  oxyCODONE    IR 10 every 4 hours PRN  pantoprazole    Tablet 40 before breakfast  polyethylene glycol 3350 17 daily  senna 2 at bedtime      VITALS:  Vital Signs Last 24 Hrs  T(F): 97.8 (23 @ 04:42), Max: 98.8 (23 @ 23:34)    Vital Signs Last 24 Hrs  HR: 66 (23 @ 10:00) (62 - 93)  BP: 124/73 (23 @ 10:00) (116/68 - 140/91)  RR: 18 (23 @ 10:00)  SpO2: 98% (23 @ 10:00) (96% - 100%)  Wt(kg): --    EXAM:    GA: NAD, AOx3  HEENT: oral cavity no lesion  CV: nl S1/S2, no RMG  Lungs: CTAB, No distress  Abd: BS+, soft, nontender, no rebounding pain  Ext: no edema  Neuro: No focal deficits  Skin: Intact  IV: no phlebitis    Labs:                        11.7   10.44 )-----------( 300      ( 2023 06:19 )             37.6         139  |  98  |  17  ----------------------------<  116<H>  3.5   |  28  |  0.64    Ca    9.7      2023 06:19  Phos  3.4       Mg     1.70         TPro  6.7  /  Alb  3.8  /  TBili  0.2  /  DBili  x   /  AST  21  /  ALT  14  /  AlkPhos  98        WBC Trend:  WBC Count: 10.44 (23 @ 06:19)  WBC Count: 8.26 (23 @ 16:30)      Auto Neutrophil #: 6.76 K/uL (23 @ 06:19)  Auto Neutrophil #: 5.43 K/uL (23 @ 16:30)  Auto Neutrophil #: 3.20 K/uL (22 @ 03:03)      Creatine Trend:  Creatinine, Serum: 0.64 ()  Creatinine, Serum: 0.87 ()      Liver Biochemical Testing Trend:  Alanine Aminotransferase (ALT/SGPT): 14 ()  Alanine Aminotransferase (ALT/SGPT): 13 ()  Aspartate Aminotransferase (AST/SGOT): 21 (23 @ 16:30)  Aspartate Aminotransferase (AST/SGOT): 16 (22 @ 03:03)  Bilirubin Total, Serum: 0.2 ()  Bilirubin Total, Serum: 0.4 ()      Trend LDH      Auto Eosinophil %: 1.7 % (23 @ 06:19)  Auto Eosinophil %: 3.0 % (23 @ 16:30)          MICROBIOLOGY:      C-Reactive Protein, Serum: 45.7 ()  Sedimentation Rate, Erythrocyte: 62 mm/hr (23 @ 16:30)      RADIOLOGY:  imaging below personally reviewed    < from: Xray Ankle Complete 3 Views, Left (23 @ 17:33) >  No convincing plain radiographic evidence for acute osteomyelitis.  Mild ankle soft tissue swelling  < end of copied text >   Patient is a 72y old  Female who presents with a chief complaint of cellulitis (2023 11:34)    HPI: Ms Angelo is a 72-year-old female with PMH of breast cancer s/p right breast lumpectomy, bladder cancer with mets to the spine, in remission, hypertension, hyperlipidemia presents to the ER complaining of left ankle pain swelling and redness times approximately 1 week. Pt states she sprained her ankle in March and has been following up with her podiatrist, Dr. Coley. She states her left ankle had become swollen and erythematous and was started on course of steroid with some improvement however it has worsened this past week. She states it initially started off as a bump and it has since become erythematous and red. She denies any purulent drainage but reports she has hx of MRSA infection from previous right foot infection. She states she went to see an orthopedic doctor who expressed out clear fluid and sent it off for culture. She was started on augmentin 3 days ago. She reports severe pain, and is now having difficulty walking due to pain when bearing weight. She denies fevers but reports chills. Denies chest pain, SOB, abd pain, n/v/d, or urinary symptoms. She states she has been taking oxycodone, tylenol, and ibuprofen with mild relief at home    Here VSS, no leukocytosis, ESR 62, CRP 45.7, Xray with OM and with mild soft tissue swelling. Pt noted to have Left lateral ankle with nodular lesion n with deepika lesion erythema, which was very tender to touch with limited range of motion.   s/p clinda in ED and pt started on vancomycin  IVPB 1250 every 12 hours. ID consutled for the same.      REVIEW OF SYSTEMS  [  ] ROS unobtainable because:    [ x ] All other systems negative except as noted below    Constitutional:  [ ] fever [ ] chills  [ ] weight loss  [ ]night sweat  [ ]poor appetite/PO intake [ ]fatigue   Skin:  [ ] rash [ ] phlebitis	  Eyes: [ ] icterus [ ] pain  [ ] discharge	  ENMT: [ ] sore throat  [ ] thrush [ ] ulcers [ ] exudates [ ]anosmia  Respiratory: [ ] dyspnea [ ] hemoptysis [ ] cough [ ] sputum	  Cardiovascular:  [ ] chest pain [ ] palpitations [ ] edema	  Gastrointestinal:  [ ] nausea [ ] vomiting [ ] diarrhea [ ] constipation [ ] pain	  Genitourinary:  [ ] dysuria [ ] frequency [ ] hematuria [ ] discharge [ ] flank pain  [ ] incontinence  Musculoskeletal:  [ ] myalgias [ ] arthralgias [ ] arthritis  [ ] back pain  Neurological:  [ ] headache [ ] weakness [ ] seizures  [ ] confusion/altered mental status    prior hospital charts reviewed [V]  primary team notes reviewed [V]  other consultant notes reviewed [V]    PAST MEDICAL & SURGICAL HISTORY:  Anxiety      Breast CA, left  lumpectomy / RTX in the past      Hypertension      Sleep apnea  uses  cpap machine      Irritable bowel syndrome (IBS)      Polyp of colon  "pre-cancerous" x 2, removed 2014      HLD (hyperlipidemia)      Bladder polyp      S/P       S/P colon resection  reversal, had complications for fibriods      S/P hysterectomy      Breast lump      History of surgery  right groin fatty tissue removed      H/O lumpectomy  left       Personal history of spine surgery  L1-L4 fusion 2017          SOCIAL HISTORY:  - Denied smoking/vaping/alcohol/recreational drug use    FAMILY HISTORY:  Family history of coronary artery disease (Mother)        Allergies  sulfa drugs (Unknown)        ANTIMICROBIALS:  vancomycin  IVPB 1250 every 12 hours      ANTIMICROBIALS (past 90 days):  MEDICATIONS  (STANDING):  clindamycin IVPB   100 mL/Hr IV Intermittent (23 @ 18:03)    vancomycin  IVPB   166.67 mL/Hr IV Intermittent (23 @ 02:00)        OTHER MEDS:   MEDICATIONS  (STANDING):  acetaminophen     Tablet .. 650 every 6 hours PRN  aluminum hydroxide/magnesium hydroxide/simethicone Suspension 30 every 6 hours PRN  amLODIPine   Tablet 2.5 at bedtime  atorvastatin 10 at bedtime  enoxaparin Injectable 40 every 24 hours  gabapentin 300 three times a day  melatonin 3 at bedtime PRN  methylPREDNISolone sodium succinate Injectable 40 every 12 hours  ondansetron Injectable 4 every 6 hours PRN  oxyCODONE    IR 5 every 4 hours PRN  oxyCODONE    IR 10 every 4 hours PRN  pantoprazole    Tablet 40 before breakfast  polyethylene glycol 3350 17 daily  senna 2 at bedtime      VITALS:  Vital Signs Last 24 Hrs  T(F): 97.8 (23 @ 04:42), Max: 98.8 (23 @ 23:34)    Vital Signs Last 24 Hrs  HR: 66 (23 @ 10:00) (62 - 93)  BP: 124/73 (23 @ 10:00) (116/68 - 140/91)  RR: 18 (23 @ 10:00)  SpO2: 98% (23 @ 10:00) (96% - 100%)  Wt(kg): --    EXAM:    GA: NAD, AOx3  HEENT: oral cavity no lesion  CV: nl S1/S2, no RMG  Lungs: CTAB, No distress  Abd: BS+, soft, nontender, no rebounding pain  Ext: left ankle lateral malleolus with redness and erythema and very tender. No open wound  Neuro: No focal deficits  Skin: Intact  IV: no phlebitis    Labs:                        11.7   10.44 )-----------( 300      ( 2023 06:19 )             37.6         139  |  98  |  17  ----------------------------<  116<H>  3.5   |  28  |  0.64    Ca    9.7      2023 06:19  Phos  3.4       Mg     1.70         TPro  6.7  /  Alb  3.8  /  TBili  0.2  /  DBili  x   /  AST  21  /  ALT  14  /  AlkPhos  98        WBC Trend:  WBC Count: 10.44 (23 @ 06:19)  WBC Count: 8.26 (23 @ 16:30)      Auto Neutrophil #: 6.76 K/uL (23 @ 06:19)  Auto Neutrophil #: 5.43 K/uL (23 @ 16:30)  Auto Neutrophil #: 3.20 K/uL (22 @ 03:03)      Creatine Trend:  Creatinine, Serum: 0.64 ()  Creatinine, Serum: 0.87 ()      Liver Biochemical Testing Trend:  Alanine Aminotransferase (ALT/SGPT): 14 ()  Alanine Aminotransferase (ALT/SGPT): 13 ()  Aspartate Aminotransferase (AST/SGOT): 21 (23 @ 16:30)  Aspartate Aminotransferase (AST/SGOT): 16 (22 @ 03:03)  Bilirubin Total, Serum: 0.2 ()  Bilirubin Total, Serum: 0.4 ()      Trend LDH      Auto Eosinophil %: 1.7 % (23 @ 06:19)  Auto Eosinophil %: 3.0 % (23 @ 16:30)          MICROBIOLOGY:      C-Reactive Protein, Serum: 45.7 ()  Sedimentation Rate, Erythrocyte: 62 mm/hr (23 @ 16:30)      RADIOLOGY:  imaging below personally reviewed    < from: Xray Ankle Complete 3 Views, Left (23 @ 17:33) >  No convincing plain radiographic evidence for acute osteomyelitis.  Mild ankle soft tissue swelling  < end of copied text >   (3) adequate

## 2023-04-09 NOTE — PROGRESS NOTE ADULT - SUBJECTIVE AND OBJECTIVE BOX
Patient is a 72y old  Female who presents with a chief complaint of cellulitis (2023 12:36)      INTERVAL HPI/OVERNIGHT EVENTS: seen and examined, family bedside, NAD , aaox3 , denies any c/o   T(C): 36.6 (23 @ 04:42), Max: 37.1 (23 @ 23:34)  HR: 66 (23 @ 10:00) (62 - 75)  BP: 124/73 (23 @ 10:00) (116/68 - 140/91)  RR: 18 (23 @ 10:00) (16 - 18)  SpO2: 98% (23 @ 10:00) (96% - 100%)  Wt(kg): --  I&O's Summary      PAST MEDICAL & SURGICAL HISTORY:  Anxiety      Breast CA, left  lumpectomy / RTX in the past      Hypertension      Sleep apnea  uses  cpap machine      Irritable bowel syndrome (IBS)      Polyp of colon  "pre-cancerous" x 2, removed 2014      HLD (hyperlipidemia)      Bladder polyp      S/P       S/P colon resection  reversal, had complications for fibriods      S/P hysterectomy      Breast lump      History of surgery  right groin fatty tissue removed      H/O lumpectomy  left       Personal history of spine surgery  L1-L4 fusion 2017          SOCIAL HISTORY  Alcohol:  Tobacco:  Illicit substance use:    FAMILY HISTORY:    REVIEW OF SYSTEMS:  CONSTITUTIONAL: No fever, weight loss, or fatigue  EYES: No eye pain, visual disturbances, or discharge  ENMT:  No difficulty hearing, tinnitus, vertigo; No sinus or throat pain  NECK: No pain or stiffness  RESPIRATORY: No cough, wheezing, chills or hemoptysis; No shortness of breath  CARDIOVASCULAR: No chest pain, palpitations, dizziness, or leg swelling  GASTROINTESTINAL: No abdominal or epigastric pain. No nausea, vomiting, or hematemesis; No diarrhea or constipation. No melena or hematochezia.  GENITOURINARY: No dysuria, frequency, hematuria, or incontinence  NEUROLOGICAL: No headaches, memory loss, loss of strength, numbness, or tremors  SKIN: No itching, burning, rashes, or lesions   LYMPH NODES: No enlarged glands  ENDOCRINE: No heat or cold intolerance; No hair loss  MUSCULOSKELETAL: No joint pain or swelling; No muscle, back, or extremity pain  PSYCHIATRIC: No depression, anxiety, mood swings, or difficulty sleeping  HEME/LYMPH: No easy bruising, or bleeding gums  ALLERY AND IMMUNOLOGIC: No hives or eczema    RADIOLOGY & ADDITIONAL TESTS:    Imaging Personally Reviewed:  [ ] YES  [ ] NO    Consultant(s) Notes Reviewed:  [ ] YES  [ ] NO    PHYSICAL EXAM:  GENERAL: NAD, well-groomed, well-developed  HEAD:  Atraumatic, Normocephalic  EYES: EOMI, PERRLA, conjunctiva and sclera clear  ENMT: No tonsillar erythema, exudates, or enlargement; Moist mucous membranes, Good dentition, No lesions  NECK: Supple, No JVD, Normal thyroid  NERVOUS SYSTEM:  Alert & Oriented X3, Good concentration; Motor Strength 5/5 B/L upper and lower extremities; DTRs 2+ intact and symmetric  CHEST/LUNG: Clear to percussion bilaterally; No rales, rhonchi, wheezing, or rubs  HEART: Regular rate and rhythm; No murmurs, rubs, or gallops  ABDOMEN: Soft, Nontender, Nondistended; Bowel sounds present  EXTREMITIES:  2+ Peripheral Pulses, No clubbing, cyanosis, or edema  LYMPH: No lymphadenopathy noted  SKIN: No rashes or lesions    LABS:                        11.7   10.44 )-----------( 300      ( 2023 06:19 )             37.6     -    139  |  98  |  17  ----------------------------<  116<H>  3.5   |  28  |  0.64    Ca    9.7      2023 06:19  Phos  3.4     -  Mg     1.70         TPro  6.7  /  Alb  3.8  /  TBili  0.2  /  DBili  x   /  AST  21  /  ALT  14  /  AlkPhos  98  08        CAPILLARY BLOOD GLUCOSE                MEDICATIONS  (STANDING):  amLODIPine   Tablet 2.5 milliGRAM(s) Oral at bedtime  atorvastatin 10 milliGRAM(s) Oral at bedtime  enoxaparin Injectable 40 milliGRAM(s) SubCutaneous every 24 hours  gabapentin 300 milliGRAM(s) Oral three times a day  methylPREDNISolone sodium succinate Injectable 40 milliGRAM(s) IV Push every 12 hours  pantoprazole    Tablet 40 milliGRAM(s) Oral before breakfast  polyethylene glycol 3350 17 Gram(s) Oral daily  senna 2 Tablet(s) Oral at bedtime  vancomycin  IVPB 1250 milliGRAM(s) IV Intermittent every 12 hours    MEDICATIONS  (PRN):  acetaminophen     Tablet .. 650 milliGRAM(s) Oral every 6 hours PRN Temp greater or equal to 38C (100.4F), Mild Pain (1 - 3)  aluminum hydroxide/magnesium hydroxide/simethicone Suspension 30 milliLiter(s) Oral every 6 hours PRN Dyspepsia  melatonin 3 milliGRAM(s) Oral at bedtime PRN Insomnia  ondansetron Injectable 4 milliGRAM(s) IV Push every 6 hours PRN Nausea and/or Vomiting  oxyCODONE    IR 5 milliGRAM(s) Oral every 4 hours PRN Moderate Pain (4 - 6)  oxyCODONE    IR 10 milliGRAM(s) Oral every 4 hours PRN Severe Pain (7 - 10)      Care Discussed with Consultants/Other Providers [ ] YES  [ ] NO

## 2023-04-09 NOTE — CONSULT NOTE ADULT - SUBJECTIVE AND OBJECTIVE BOX
HPI:  72-year-old female with past medical history of breast cancer status post right breast lumpectomy and XRT (April last yr), bladder cancer with mets to the spine s/p immunotherapy (pt unsure which), in remission, hypertension, hyperlipidemia presents to the ER complaining of left ankle pain swelling and redness times approximately 1 week. Pt states she sprained her ankle in March and has been following up with her podiatrist, Dr. Coley. She states her left ankle had become swollen and erythematous and was started on course of steroid with some improvement however it has worsened this past week. She states it initially started off as a bump and it has since become erythematous and red. She denies any purulent drainage but reports she has hx of MRSA infection from previous right foot infection. She states she went to see an orthopedic doctor who expressed out clear fluid and sent it off for culture. She was started on augmentin 3 days ago. She reports severe pain, and is now having difficulty walking due to pain when bearing weight. She denies fevers but reports chills. Denies chest pain, SOB, abd pain, n/v/d, or urinary symptoms. She states she has been taking oxycodone, tylenol, and ibuprofen with mild relief at home    In ED, vitals T 98.7, HR 93, /82, RR 18, O2 sat 97% on RA  Labs significant for Hb 11.2, ESR 62, CRP 45.7  Imaging:   Prelim radiology IMPRESSION:  No cortical erosions or subcutaneous tracking of gas to suggest   osteomyelitis.    ED management: IV clindamycin x1, IV morphine 4 mg x1 (2023 20:52)    Derm HPI:   Pt states that around March 10 she developed swelling and pain in the R ankle and then develops a skin lesion that has progressively grown and become more painful to the point that she cannot walk. A few weeks ago pt received a week long steroid course w/ significant improvement in her pain. Started on Augmentin 3 days prior w/o improvement. States this is similar to a prior occurrence a yr ago on her R lateral foot that took over 4 months and was initially unresponsive to antibiotics but eventually be report grew staph. Notes chills but no fevers. Has h/o IBS on linzest, has had a recent colonoscopy. No known IBD. Denies blood in stool. + h/o malignancy     PAST MEDICAL & SURGICAL HISTORY:  Anxiety      Breast CA, left  lumpectomy / RTX in the past      Hypertension      Sleep apnea  uses  cpap machine      Irritable bowel syndrome (IBS)      Polyp of colon  "pre-cancerous" x 2, removed 2014      HLD (hyperlipidemia)      Bladder polyp      S/P       S/P colon resection  reversal, had complications for fibriods      S/P hysterectomy      Breast lump      History of surgery  right groin fatty tissue removed      H/O lumpectomy  left       Personal history of spine surgery  L1-L4 fusion 2017          Review of Systems:    General: no fevers/chills, no fatigue 	  Skin/Breast: see HPI  Ophthalmologic: no change in vision  ENT: no change in hearing  Respiratory and Thorax: no SOB or cough  Cardiovascular: no palpitations or chest pain  Gastrointestinal: no abdominal pain or blood in stool   Genitourinary: no dysuria or frequency  Musculoskeletal: no joint pains or weakness	  Neurological: no weakness, numbness , or tingling    MEDICATIONS  (STANDING):  amLODIPine   Tablet 2.5 milliGRAM(s) Oral at bedtime  atorvastatin 10 milliGRAM(s) Oral at bedtime  enoxaparin Injectable 40 milliGRAM(s) SubCutaneous every 24 hours  gabapentin 300 milliGRAM(s) Oral three times a day  HYDROmorphone  Injectable 0.5 milliGRAM(s) IV Push once  pantoprazole    Tablet 40 milliGRAM(s) Oral before breakfast  polyethylene glycol 3350 17 Gram(s) Oral daily  senna 2 Tablet(s) Oral at bedtime  vancomycin  IVPB 1250 milliGRAM(s) IV Intermittent every 12 hours    ALLERGIES: sulfa drugs        SOCIAL HISTORY:  ____________________________________  Social History:  Independent of ADLs  FAMILY HISTORY:  Family history of coronary artery disease (Mother)          VITAL SIGNS LAST 24 HOURS:  T(F): 98.1 ( @ 15:23), Max: 98.8 ( @ 23:34)  HR: 67 ( @ 15:23) (62 - 75)  BP: 130/75 ( @ 15:23) (116/68 - 140/91)  RR: 18 ( @ 15:23) (16 - 18)    PHYSICAL EXAM:     The patient was alert and oriented X 3, well nourished, and in no  apparent distress.  There was no visible lymphadenopathy.  Conjunctiva were non injected  There was no clubbing of extremities.    Of note on skin exam:   - exquisitely tender violaceous nodule with central pustule with surrounding erythema and edema of the surrounding areas  - extremities warm     ____________________________________    LABS:                        11.7   10.44 )-----------( 300      ( 2023 06:19 )             37.6     -    139  |  98  |  17  ----------------------------<  116<H>  3.5   |  28  |  0.64    Ca    9.7      2023 06:19  Phos  3.4     04-  Mg     1.70     -    TPro  6.7  /  Alb  3.8  /  TBili  0.2  /  DBili  x   /  AST  21  /  ALT  14  /  AlkPhos  98  08       HPI:  72-year-old female with past medical history of breast cancer status post right breast lumpectomy and XRT (April last yr), bladder cancer with mets to the spine s/p immunotherapy (pt unsure which), in remission, hypertension, hyperlipidemia presents to the ER complaining of left ankle pain swelling and redness times approximately 1 week. Pt states she sprained her ankle in March and has been following up with her podiatrist, Dr. Coley. She states her left ankle had become swollen and erythematous and was started on course of steroid with some improvement however it has worsened this past week. She states it initially started off as a bump and it has since become erythematous and red. She denies any purulent drainage but reports she has hx of MRSA infection from previous right foot infection. She states she went to see an orthopedic doctor who expressed out clear fluid and sent it off for culture. She was started on augmentin 3 days ago. She reports severe pain, and is now having difficulty walking due to pain when bearing weight. She denies fevers but reports chills. Denies chest pain, SOB, abd pain, n/v/d, or urinary symptoms. She states she has been taking oxycodone, tylenol, and ibuprofen with mild relief at home    In ED, vitals T 98.7, HR 93, /82, RR 18, O2 sat 97% on RA  Labs significant for Hb 11.2, ESR 62, CRP 45.7  Imaging:   Prelim radiology IMPRESSION:  No cortical erosions or subcutaneous tracking of gas to suggest   osteomyelitis.    ED management: IV clindamycin x1, IV morphine 4 mg x1 (2023 20:52)    Derm HPI:   Pt states that around March 10 she developed swelling and pain in the R ankle and then develops a skin lesion that has progressively grown and become more painful to the point that she cannot walk. A few weeks ago pt received a week long steroid course w/ significant improvement in her pain. Started on Augmentin 3 days prior w/o improvement. States this is similar to a prior occurrence a yr ago on her R lateral foot that took over 4 months and was initially unresponsive to antibiotics but eventually be report grew staph and believes eventually responded to  doxy. Notes chills but no fevers. Has h/o IBS on linzest, has had a recent colonoscopy. No known IBD. Denies blood in stool. + h/o malignancy     PAST MEDICAL & SURGICAL HISTORY:  Anxiety      Breast CA, left  lumpectomy / RTX in the past      Hypertension      Sleep apnea  uses  cpap machine      Irritable bowel syndrome (IBS)      Polyp of colon  "pre-cancerous" x 2, removed 2014      HLD (hyperlipidemia)      Bladder polyp      S/P       S/P colon resection  reversal, had complications for fibriods      S/P hysterectomy      Breast lump      History of surgery  right groin fatty tissue removed      H/O lumpectomy  left       Personal history of spine surgery  L1-L4 fusion 2017          Review of Systems:    General: no fevers/chills, no fatigue 	  Skin/Breast: see HPI  Ophthalmologic: no change in vision  ENT: no change in hearing  Respiratory and Thorax: no SOB or cough  Cardiovascular: no palpitations or chest pain  Gastrointestinal: no abdominal pain or blood in stool   Genitourinary: no dysuria or frequency  Musculoskeletal: no joint pains or weakness	  Neurological: no weakness, numbness , or tingling    MEDICATIONS  (STANDING):  amLODIPine   Tablet 2.5 milliGRAM(s) Oral at bedtime  atorvastatin 10 milliGRAM(s) Oral at bedtime  enoxaparin Injectable 40 milliGRAM(s) SubCutaneous every 24 hours  gabapentin 300 milliGRAM(s) Oral three times a day  HYDROmorphone  Injectable 0.5 milliGRAM(s) IV Push once  pantoprazole    Tablet 40 milliGRAM(s) Oral before breakfast  polyethylene glycol 3350 17 Gram(s) Oral daily  senna 2 Tablet(s) Oral at bedtime  vancomycin  IVPB 1250 milliGRAM(s) IV Intermittent every 12 hours    ALLERGIES: sulfa drugs        SOCIAL HISTORY:  ____________________________________  Social History:  Independent of ADLs  FAMILY HISTORY:  Family history of coronary artery disease (Mother)          VITAL SIGNS LAST 24 HOURS:  T(F): 98.1 ( @ 15:23), Max: 98.8 ( @ 23:34)  HR: 67 ( @ 15:23) (62 - 75)  BP: 130/75 ( @ 15:23) (116/68 - 140/91)  RR: 18 ( @ 15:23) (16 - 18)    PHYSICAL EXAM:     The patient was alert and oriented X 3, well nourished, and in no  apparent distress.  There was no visible lymphadenopathy.  Conjunctiva were non injected  There was no clubbing of extremities.    Of note on skin exam:   - exquisitely tender violaceous nodule with central pustule with surrounding erythema and edema of the surrounding areas  - extremities warm     ____________________________________    LABS:                        11.7   10.44 )-----------( 300      ( 2023 06:19 )             37.6     04-09    139  |  98  |  17  ----------------------------<  116<H>  3.5   |  28  |  0.64    Ca    9.7      2023 06:19  Phos  3.4     04-09  Mg     1.70     04-09    TPro  6.7  /  Alb  3.8  /  TBili  0.2  /  DBili  x   /  AST  21  /  ALT  14  /  AlkPhos  98  04-08       HPI:  72-year-old female with past medical history of breast cancer status post right breast lumpectomy and XRT (April last yr), bladder cancer with mets to the spine s/p immunotherapy (pt unsure which), in remission, hypertension, hyperlipidemia presents to the ER complaining of left ankle pain swelling and redness times approximately 1 week. Pt states she sprained her ankle in March and has been following up with her podiatrist, Dr. Coley. She states her left ankle had become swollen and erythematous and was started on course of steroid with some improvement however it has worsened this past week. She states it initially started off as a bump and it has since become erythematous and red. She denies any purulent drainage but reports she has hx of MRSA infection from previous right foot infection. She states she went to see an orthopedic doctor who expressed out clear fluid and sent it off for culture. She was started on augmentin 3 days ago. She reports severe pain, and is now having difficulty walking due to pain when bearing weight. She denies fevers but reports chills. Denies chest pain, SOB, abd pain, n/v/d, or urinary symptoms. She states she has been taking oxycodone, tylenol, and ibuprofen with mild relief at home    In ED, vitals T 98.7, HR 93, /82, RR 18, O2 sat 97% on RA  Labs significant for Hb 11.2, ESR 62, CRP 45.7  Imaging:   Prelim radiology IMPRESSION:  No cortical erosions or subcutaneous tracking of gas to suggest   osteomyelitis.    ED management: IV clindamycin x1, IV morphine 4 mg x1 (2023 20:52)    Derm HPI:   Pt states that around March 10 she developed swelling and pain in the R ankle and then develops a skin lesion that has progressively grown and become more painful to the point that she cannot walk. A few weeks ago pt received a week long steroid course w/ significant improvement in her pain. Started on Augmentin 3 days prior w/o improvement. States this is similar to a prior occurrence in 2022 on her R lateral foot that took over 4 months and was initially unresponsive to antibiotics but eventually be report grew staph and believes eventually responded to  doxy. Notes chills but no fevers. Has h/o IBS on linzest, has had a recent colonoscopy. No known IBD. Denies blood in stool. + h/o malignancy     PAST MEDICAL & SURGICAL HISTORY:  Anxiety      Breast CA, left  lumpectomy / RTX in the past      Hypertension      Sleep apnea  uses  cpap machine      Irritable bowel syndrome (IBS)      Polyp of colon  "pre-cancerous" x 2, removed 2014      HLD (hyperlipidemia)      Bladder polyp      S/P       S/P colon resection  reversal, had complications for fibriods      S/P hysterectomy      Breast lump      History of surgery  right groin fatty tissue removed      H/O lumpectomy  left       Personal history of spine surgery  L1-L4 fusion 2017          Review of Systems:    General: no fevers/chills, no fatigue 	  Skin/Breast: see HPI  Ophthalmologic: no change in vision  ENT: no change in hearing  Respiratory and Thorax: no SOB or cough  Cardiovascular: no palpitations or chest pain  Gastrointestinal: no abdominal pain or blood in stool   Genitourinary: no dysuria or frequency  Musculoskeletal: no joint pains or weakness	  Neurological: no weakness, numbness , or tingling    MEDICATIONS  (STANDING):  amLODIPine   Tablet 2.5 milliGRAM(s) Oral at bedtime  atorvastatin 10 milliGRAM(s) Oral at bedtime  enoxaparin Injectable 40 milliGRAM(s) SubCutaneous every 24 hours  gabapentin 300 milliGRAM(s) Oral three times a day  HYDROmorphone  Injectable 0.5 milliGRAM(s) IV Push once  pantoprazole    Tablet 40 milliGRAM(s) Oral before breakfast  polyethylene glycol 3350 17 Gram(s) Oral daily  senna 2 Tablet(s) Oral at bedtime  vancomycin  IVPB 1250 milliGRAM(s) IV Intermittent every 12 hours    ALLERGIES: sulfa drugs        SOCIAL HISTORY:  ____________________________________  Social History:  Independent of ADLs  FAMILY HISTORY:  Family history of coronary artery disease (Mother)          VITAL SIGNS LAST 24 HOURS:  T(F): 98.1 ( @ 15:23), Max: 98.8 ( @ 23:34)  HR: 67 ( @ 15:23) (62 - 75)  BP: 130/75 ( @ 15:23) (116/68 - 140/91)  RR: 18 ( @ 15:23) (16 - 18)    PHYSICAL EXAM:     The patient was alert and oriented X 3, well nourished, and in no  apparent distress.  There was no visible lymphadenopathy.  Conjunctiva were non injected  There was no clubbing of extremities.    Of note on skin exam:   - exquisitely tender violaceous nodule with central pustule with surrounding erythema and edema of the surrounding areas  - extremities warm     ____________________________________    LABS:                        11.7   10.44 )-----------( 300      ( 2023 06:19 )             37.6     04-    139  |  98  |  17  ----------------------------<  116<H>  3.5   |  28  |  0.64    Ca    9.7      2023 06:19  Phos  3.4     04-09  Mg     1.70     -    TPro  6.7  /  Alb  3.8  /  TBili  0.2  /  DBili  x   /  AST  21  /  ALT  14  /  AlkPhos  98  -08       HPI:  72-year-old female with past medical history of breast cancer status post right breast lumpectomy and XRT (April last yr), bladder cancer with mets to the spine s/p immunotherapy (pt unsure which), in remission, hypertension, hyperlipidemia presents to the ER complaining of left ankle pain swelling and redness times approximately 1 week. Pt states she sprained her ankle in March and has been following up with her podiatrist, Dr. Coley. She states her left ankle had become swollen and erythematous and was started on course of steroid with some improvement however it has worsened this past week. She states it initially started off as a bump and it has since become erythematous and red. She denies any purulent drainage but reports she has hx of MRSA infection from previous right foot infection. She states she went to see an orthopedic doctor who expressed out clear fluid and sent it off for culture. She was started on augmentin 3 days ago. She reports severe pain, and is now having difficulty walking due to pain when bearing weight. She denies fevers but reports chills. Denies chest pain, SOB, abd pain, n/v/d, or urinary symptoms. She states she has been taking oxycodone, tylenol, and ibuprofen with mild relief at home    In ED, vitals T 98.7, HR 93, /82, RR 18, O2 sat 97% on RA  Labs significant for Hb 11.2, ESR 62, CRP 45.7  Imaging:   Prelim radiology IMPRESSION:  No cortical erosions or subcutaneous tracking of gas to suggest   osteomyelitis.  ED management: IV clindamycin x1, IV morphine 4 mg x1 (2023 20:52)    Derm HPI:   Pt states that around March 10 she developed swelling and pain in the L ankle and then develops a skin lesion that has progressively grown and become more painful to the point that she cannot walk. A few weeks ago pt received a week long steroid course w/ significant improvement in her pain. Started on Augmentin 3 days prior w/o improvement. States this is similar to a prior occurrence in 2022 on her R lateral foot that took over 4 months and was initially unresponsive to antibiotics but eventually be report grew staph and believes eventually responded to  doxy. Notes chills but no fevers. Has h/o IBS on linzest, has had a recent colonoscopy. No known IBD. Denies blood in stool. + h/o malignancy     PAST MEDICAL & SURGICAL HISTORY:  Anxiety      Breast CA, left  lumpectomy / RTX in the past      Hypertension      Sleep apnea  uses  cpap machine      Irritable bowel syndrome (IBS)      Polyp of colon  "pre-cancerous" x 2, removed 2014      HLD (hyperlipidemia)      Bladder polyp      S/P       S/P colon resection  reversal, had complications for fibriods      S/P hysterectomy      Breast lump      History of surgery  right groin fatty tissue removed      H/O lumpectomy  left       Personal history of spine surgery  L1-L4 fusion 2017          Review of Systems:    General: no fevers/chills, no fatigue 	  Skin/Breast: see HPI  Ophthalmologic: no change in vision  ENT: no change in hearing  Respiratory and Thorax: no SOB or cough  Cardiovascular: no palpitations or chest pain  Gastrointestinal: no abdominal pain or blood in stool   Genitourinary: no dysuria or frequency  Musculoskeletal: no joint pains or weakness	  Neurological: no weakness, numbness , or tingling    MEDICATIONS  (STANDING):  amLODIPine   Tablet 2.5 milliGRAM(s) Oral at bedtime  atorvastatin 10 milliGRAM(s) Oral at bedtime  enoxaparin Injectable 40 milliGRAM(s) SubCutaneous every 24 hours  gabapentin 300 milliGRAM(s) Oral three times a day  HYDROmorphone  Injectable 0.5 milliGRAM(s) IV Push once  pantoprazole    Tablet 40 milliGRAM(s) Oral before breakfast  polyethylene glycol 3350 17 Gram(s) Oral daily  senna 2 Tablet(s) Oral at bedtime  vancomycin  IVPB 1250 milliGRAM(s) IV Intermittent every 12 hours    ALLERGIES: sulfa drugs        SOCIAL HISTORY:  ____________________________________  Social History:  Independent of ADLs  FAMILY HISTORY:  Family history of coronary artery disease (Mother)          VITAL SIGNS LAST 24 HOURS:  T(F): 98.1 ( @ 15:23), Max: 98.8 ( @ 23:34)  HR: 67 ( @ 15:23) (62 - 75)  BP: 130/75 ( @ 15:23) (116/68 - 140/91)  RR: 18 ( @ 15:23) (16 - 18)    PHYSICAL EXAM:     The patient was alert and oriented X 3, well nourished, and in no  apparent distress.  There was no visible lymphadenopathy.  Conjunctiva were non injected  There was no clubbing of extremities.    Of note on skin exam:   - exquisitely tender violaceous nodule with central pustule with surrounding erythema and edema of the surrounding areas  - extremities warm     ____________________________________    LABS:                        11.7   10.44 )-----------( 300      ( 2023 06:19 )             37.6     04-    139  |  98  |  17  ----------------------------<  116<H>  3.5   |  28  |  0.64    Ca    9.7      2023 06:19  Phos  3.4     04-09  Mg     1.70     -    TPro  6.7  /  Alb  3.8  /  TBili  0.2  /  DBili  x   /  AST  21  /  ALT  14  /  AlkPhos  98  -08

## 2023-04-10 ENCOUNTER — APPOINTMENT (OUTPATIENT)
Dept: HEMATOLOGY ONCOLOGY | Facility: CLINIC | Age: 73
End: 2023-04-10

## 2023-04-10 DIAGNOSIS — M48.50XA COLLAPSED VERTEBRA, NOT ELSEWHERE CLASSIFIED, SITE UNSPECIFIED, INITIAL ENCOUNTER FOR FRACTURE: ICD-10-CM

## 2023-04-10 DIAGNOSIS — I77.1 STRICTURE OF ARTERY: ICD-10-CM

## 2023-04-10 LAB
ANION GAP SERPL CALC-SCNC: 14 MMOL/L — SIGNIFICANT CHANGE UP (ref 7–14)
ANION GAP SERPL CALC-SCNC: 15 MMOL/L — HIGH (ref 7–14)
APTT BLD: 20.6 SEC — LOW (ref 27–36.3)
BLD GP AB SCN SERPL QL: NEGATIVE — SIGNIFICANT CHANGE UP
BUN SERPL-MCNC: 21 MG/DL — SIGNIFICANT CHANGE UP (ref 7–23)
BUN SERPL-MCNC: 21 MG/DL — SIGNIFICANT CHANGE UP (ref 7–23)
CALCIUM SERPL-MCNC: 9.8 MG/DL — SIGNIFICANT CHANGE UP (ref 8.4–10.5)
CALCIUM SERPL-MCNC: 9.8 MG/DL — SIGNIFICANT CHANGE UP (ref 8.4–10.5)
CHLORIDE SERPL-SCNC: 96 MMOL/L — LOW (ref 98–107)
CHLORIDE SERPL-SCNC: 96 MMOL/L — LOW (ref 98–107)
CO2 SERPL-SCNC: 23 MMOL/L — SIGNIFICANT CHANGE UP (ref 22–31)
CO2 SERPL-SCNC: 27 MMOL/L — SIGNIFICANT CHANGE UP (ref 22–31)
CREAT SERPL-MCNC: 0.89 MG/DL — SIGNIFICANT CHANGE UP (ref 0.5–1.3)
CREAT SERPL-MCNC: 0.91 MG/DL — SIGNIFICANT CHANGE UP (ref 0.5–1.3)
EGFR: 67 ML/MIN/1.73M2 — SIGNIFICANT CHANGE UP
EGFR: 68 ML/MIN/1.73M2 — SIGNIFICANT CHANGE UP
GLUCOSE SERPL-MCNC: 104 MG/DL — HIGH (ref 70–99)
GLUCOSE SERPL-MCNC: 95 MG/DL — SIGNIFICANT CHANGE UP (ref 70–99)
HCT VFR BLD CALC: 39.9 % — SIGNIFICANT CHANGE UP (ref 34.5–45)
HGB BLD-MCNC: 12.5 G/DL — SIGNIFICANT CHANGE UP (ref 11.5–15.5)
INR BLD: 1.12 RATIO — SIGNIFICANT CHANGE UP (ref 0.88–1.16)
MCHC RBC-ENTMCNC: 28 PG — SIGNIFICANT CHANGE UP (ref 27–34)
MCHC RBC-ENTMCNC: 31.3 GM/DL — LOW (ref 32–36)
MCV RBC AUTO: 89.5 FL — SIGNIFICANT CHANGE UP (ref 80–100)
NIGHT BLUE STAIN TISS: SIGNIFICANT CHANGE UP
NRBC # BLD: 0 /100 WBCS — SIGNIFICANT CHANGE UP (ref 0–0)
NRBC # FLD: 0.03 K/UL — HIGH (ref 0–0)
PLATELET # BLD AUTO: 332 K/UL — SIGNIFICANT CHANGE UP (ref 150–400)
POTASSIUM SERPL-MCNC: 4.1 MMOL/L — SIGNIFICANT CHANGE UP (ref 3.5–5.3)
POTASSIUM SERPL-MCNC: 4.6 MMOL/L — SIGNIFICANT CHANGE UP (ref 3.5–5.3)
POTASSIUM SERPL-SCNC: 4.1 MMOL/L — SIGNIFICANT CHANGE UP (ref 3.5–5.3)
POTASSIUM SERPL-SCNC: 4.6 MMOL/L — SIGNIFICANT CHANGE UP (ref 3.5–5.3)
PROTHROM AB SERPL-ACNC: 13 SEC — SIGNIFICANT CHANGE UP (ref 10.5–13.4)
RBC # BLD: 4.46 M/UL — SIGNIFICANT CHANGE UP (ref 3.8–5.2)
RBC # FLD: 15 % — HIGH (ref 10.3–14.5)
RH IG SCN BLD-IMP: POSITIVE — SIGNIFICANT CHANGE UP
RHEUMATOID FACT SERPL-ACNC: 12 IU/ML — SIGNIFICANT CHANGE UP (ref 0–13)
SODIUM SERPL-SCNC: 134 MMOL/L — LOW (ref 135–145)
SODIUM SERPL-SCNC: 137 MMOL/L — SIGNIFICANT CHANGE UP (ref 135–145)
SPECIMEN SOURCE: SIGNIFICANT CHANGE UP
VANCOMYCIN TROUGH SERPL-MCNC: 23.4 UG/ML — HIGH (ref 10–20)
WBC # BLD: 13.16 K/UL — HIGH (ref 3.8–10.5)
WBC # FLD AUTO: 13.16 K/UL — HIGH (ref 3.8–10.5)

## 2023-04-10 PROCEDURE — 99232 SBSQ HOSP IP/OBS MODERATE 35: CPT | Mod: GC

## 2023-04-10 PROCEDURE — 73723 MRI JOINT LWR EXTR W/O&W/DYE: CPT | Mod: 26,LT

## 2023-04-10 PROCEDURE — 99222 1ST HOSP IP/OBS MODERATE 55: CPT | Mod: FS

## 2023-04-10 PROCEDURE — 99232 SBSQ HOSP IP/OBS MODERATE 35: CPT

## 2023-04-10 RX ORDER — PIPERACILLIN AND TAZOBACTAM 4; .5 G/20ML; G/20ML
3.38 INJECTION, POWDER, LYOPHILIZED, FOR SOLUTION INTRAVENOUS EVERY 8 HOURS
Refills: 0 | Status: DISCONTINUED | OUTPATIENT
Start: 2023-04-10 | End: 2023-04-12

## 2023-04-10 RX ORDER — VANCOMYCIN HCL 1 G
1000 VIAL (EA) INTRAVENOUS EVERY 24 HOURS
Refills: 0 | Status: DISCONTINUED | OUTPATIENT
Start: 2023-04-11 | End: 2023-04-13

## 2023-04-10 RX ORDER — ACETAMINOPHEN 500 MG
1000 TABLET ORAL ONCE
Refills: 0 | Status: COMPLETED | OUTPATIENT
Start: 2023-04-10 | End: 2023-04-10

## 2023-04-10 RX ADMIN — PIPERACILLIN AND TAZOBACTAM 25 GRAM(S): 4; .5 INJECTION, POWDER, LYOPHILIZED, FOR SOLUTION INTRAVENOUS at 17:43

## 2023-04-10 RX ADMIN — AMLODIPINE BESYLATE 2.5 MILLIGRAM(S): 2.5 TABLET ORAL at 22:30

## 2023-04-10 RX ADMIN — Medication 166.67 MILLIGRAM(S): at 02:09

## 2023-04-10 RX ADMIN — Medication 400 MILLIGRAM(S): at 22:20

## 2023-04-10 RX ADMIN — OXYCODONE HYDROCHLORIDE 10 MILLIGRAM(S): 5 TABLET ORAL at 13:08

## 2023-04-10 RX ADMIN — GABAPENTIN 300 MILLIGRAM(S): 400 CAPSULE ORAL at 22:23

## 2023-04-10 RX ADMIN — GABAPENTIN 300 MILLIGRAM(S): 400 CAPSULE ORAL at 07:36

## 2023-04-10 RX ADMIN — Medication 1 APPLICATION(S): at 17:44

## 2023-04-10 RX ADMIN — ATORVASTATIN CALCIUM 10 MILLIGRAM(S): 80 TABLET, FILM COATED ORAL at 22:30

## 2023-04-10 RX ADMIN — Medication 1000 MILLIGRAM(S): at 23:20

## 2023-04-10 RX ADMIN — GABAPENTIN 300 MILLIGRAM(S): 400 CAPSULE ORAL at 13:08

## 2023-04-10 RX ADMIN — OXYCODONE HYDROCHLORIDE 10 MILLIGRAM(S): 5 TABLET ORAL at 14:08

## 2023-04-10 RX ADMIN — POLYETHYLENE GLYCOL 3350 17 GRAM(S): 17 POWDER, FOR SOLUTION ORAL at 17:43

## 2023-04-10 RX ADMIN — PANTOPRAZOLE SODIUM 40 MILLIGRAM(S): 20 TABLET, DELAYED RELEASE ORAL at 07:35

## 2023-04-10 NOTE — PROGRESS NOTE ADULT - SUBJECTIVE AND OBJECTIVE BOX
CC: FU for wound infection    Saw/spoke to patient. No fevers, no chills. No new complains. Improved.    Allergies  sulfa drugs (Unknown)    ANTIMICROBIALS:  vancomycin  IVPB 1250 every 12 hours    PE:    Vital Signs Last 24 Hrs  T(C): 36.8 (10 Apr 2023 07:00), Max: 37.1 (09 Apr 2023 21:16)  T(F): 98.3 (10 Apr 2023 07:00), Max: 98.7 (09 Apr 2023 21:16)  HR: 93 (10 Apr 2023 07:00) (67 - 93)  BP: 124/78 (10 Apr 2023 07:00) (119/77 - 130/75)  RR: 16 (10 Apr 2023 07:00) (16 - 18)  SpO2: 96% (10 Apr 2023 07:00) (96% - 98%)    Gen: AOx3, NAD, non-toxic  CV: Nontachycardic  Resp: Breathing comfortably, RA  Abd: Soft, nontender  IV/Skin: No thrombophlebitis    LABS:                        12.5   13.16 )-----------( 332      ( 10 Apr 2023 08:00 )             39.9     04-10    137  |  96<L>  |  21  ----------------------------<  95  4.1   |  27  |  0.91    Ca    9.8      10 Apr 2023 11:39  Phos  3.4     04-09  Mg     1.70     04-09    TPro  6.7  /  Alb  3.8  /  TBili  0.2  /  DBili  x   /  AST  21  /  ALT  14  /  AlkPhos  98  04-08    MICROBIOLOGY:  Vancomycin Level, Trough: 23.4 ug/mL (04-10-23 @ 11:39)    .Tissue Other, L lateral malleous  04-09-23   Testing in progress  --    No polymorphonuclear cells seen per low power field  No organisms seen per oil power field    .Blood Blood-Peripheral  04-08-23   No growth to date.  --  --      .Blood Blood-Venous  04-08-23   No growth to date.  --  --    RADIOLOGY:    4/9 XR:    IMPRESSION:  But otherwise clear lungs. No pleural effusions or pneumothorax.    Stable cardiac and mediastinal silhouettes.    Trachea midline.    Redemonstrated generalized mild osteopenia and mild spinal degenerative   changes with slight scoliotic curvature.    Left axillary region surgical clips again noted.  Nonspecific gaseous distention of viscus beneath left hemidiaphragm.   Dermal Autograft Text: The defect edges were debeveled with a #15 scalpel blade.  Given the location of the defect, shape of the defect and the proximity to free margins a dermal autograft was deemed most appropriate.  Using a sterile surgical marker, the primary defect shape was transferred to the donor site. The area thus outlined was incised deep to adipose tissue with a #15 scalpel blade.  The harvested graft was then trimmed of adipose and epidermal tissue until only dermis was left.  The skin graft was then placed in the primary defect and oriented appropriately.

## 2023-04-10 NOTE — PROGRESS NOTE ADULT - SUBJECTIVE AND OBJECTIVE BOX
Patient is a 72y old  Female who presents with a chief complaint of cellulitis (10 Apr 2023 18:45)      INTERVAL HPI/OVERNIGHT EVENTS: lethargic / sleepy  T(C): 37.1 (23 @ 04:41), Max: 38.8 (04-10-23 @ 21:53)  HR: 56 (23 @ 04:43) (56 - 93)  BP: 109/68 (23 @ 04:41) (109/68 - 161/79)  RR: 14 (23 @ 04:41) (14 - 20)  SpO2: 99% (23 @ 04:43) (71% - 99%)  Wt(kg): --  I&O's Summary    10 Apr 2023 07:01  -  2023 04:50  --------------------------------------------------------  IN: 260 mL / OUT: 0 mL / NET: 260 mL        PAST MEDICAL & SURGICAL HISTORY:  Anxiety      Breast CA, left  lumpectomy / RTX in the past      Hypertension      Sleep apnea  uses  cpap machine      Irritable bowel syndrome (IBS)      Polyp of colon  "pre-cancerous" x 2, removed 2014      HLD (hyperlipidemia)      Bladder polyp      S/P       S/P colon resection  reversal, had complications for fibriods      S/P hysterectomy      Breast lump      History of surgery  right groin fatty tissue removed      H/O lumpectomy  left       Personal history of spine surgery  L1-L4 fusion 2017          SOCIAL HISTORY  Alcohol:  Tobacco:  Illicit substance use:    FAMILY HISTORY:    REVIEW OF SYSTEMS:  CONSTITUTIONAL: No fever, weight loss, or fatigue  EYES: No eye pain, visual disturbances, or discharge  ENMT:  No difficulty hearing, tinnitus, vertigo; No sinus or throat pain  NECK: No pain or stiffness  RESPIRATORY: No cough, wheezing, chills or hemoptysis; No shortness of breath  CARDIOVASCULAR: No chest pain, palpitations, dizziness, or leg swelling  GASTROINTESTINAL: No abdominal or epigastric pain. No nausea, vomiting, or hematemesis; No diarrhea or constipation. No melena or hematochezia.  GENITOURINARY: No dysuria, frequency, hematuria, or incontinence  NEUROLOGICAL: No headaches, memory loss, loss of strength, numbness, or tremors  SKIN: No itching, burning, rashes, or lesions   LYMPH NODES: No enlarged glands  ENDOCRINE: No heat or cold intolerance; No hair loss  MUSCULOSKELETAL: No joint pain or swelling; No muscle, back, or extremity pain  PSYCHIATRIC: No depression, anxiety, mood swings, or difficulty sleeping  HEME/LYMPH: No easy bruising, or bleeding gums  ALLERY AND IMMUNOLOGIC: No hives or eczema    RADIOLOGY & ADDITIONAL TESTS:    Imaging Personally Reviewed:  [ ] YES  [ ] NO    Consultant(s) Notes Reviewed:  [ ] YES  [ ] NO    PHYSICAL EXAM:  GENERAL: NAD, well-groomed, well-developed  HEAD:  Atraumatic, Normocephalic  EYES: EOMI, PERRLA, conjunctiva and sclera clear  ENMT: No tonsillar erythema, exudates, or enlargement; Moist mucous membranes, Good dentition, No lesions  NECK: Supple, No JVD, Normal thyroid  NERVOUS SYSTEM:  Alert & Oriented X3, Good concentration; Motor Strength 5/5 B/L upper and lower extremities; DTRs 2+ intact and symmetric  CHEST/LUNG: Clear to percussion bilaterally; No rales, rhonchi, wheezing, or rubs  HEART: Regular rate and rhythm; No murmurs, rubs, or gallops  ABDOMEN: Soft, Nontender, Nondistended; Bowel sounds present  EXTREMITIES:  2+ Peripheral Pulses, No clubbing, cyanosis, or edema  LYMPH: No lymphadenopathy noted  SKIN: No rashes or lesions    LABS:                        12.5   13.16 )-----------( 332      ( 10 Apr 2023 08:00 )             39.9     04-10    137  |  96<L>  |  21  ----------------------------<  95  4.1   |  27  |  0.91    Ca    9.8      10 Apr 2023 11:39  Phos  3.4     04-09  Mg     1.70     04-09      PT/INR - ( 10 Apr 2023 08:00 )   PT: 13.0 sec;   INR: 1.12 ratio         PTT - ( 10 Apr 2023 08:00 )  PTT:20.6 sec    CAPILLARY BLOOD GLUCOSE                MEDICATIONS  (STANDING):  amLODIPine   Tablet 2.5 milliGRAM(s) Oral at bedtime  atorvastatin 10 milliGRAM(s) Oral at bedtime  clobetasol 0.05% Ointment 1 Application(s) Topical two times a day  enoxaparin Injectable 40 milliGRAM(s) SubCutaneous every 24 hours  gabapentin 300 milliGRAM(s) Oral three times a day  pantoprazole    Tablet 40 milliGRAM(s) Oral before breakfast  piperacillin/tazobactam IVPB.. 3.375 Gram(s) IV Intermittent every 8 hours  polyethylene glycol 3350 17 Gram(s) Oral daily  senna 2 Tablet(s) Oral at bedtime  vancomycin  IVPB 1000 milliGRAM(s) IV Intermittent every 24 hours    MEDICATIONS  (PRN):  acetaminophen     Tablet .. 650 milliGRAM(s) Oral every 6 hours PRN Temp greater or equal to 38C (100.4F), Mild Pain (1 - 3)  aluminum hydroxide/magnesium hydroxide/simethicone Suspension 30 milliLiter(s) Oral every 6 hours PRN Dyspepsia  melatonin 3 milliGRAM(s) Oral at bedtime PRN Insomnia  ondansetron Injectable 4 milliGRAM(s) IV Push every 6 hours PRN Nausea and/or Vomiting  oxyCODONE    IR 5 milliGRAM(s) Oral every 4 hours PRN Moderate Pain (4 - 6)  oxyCODONE    IR 10 milliGRAM(s) Oral every 4 hours PRN Severe Pain (7 - 10)      Care Discussed with Consultants/Other Providers [ ] YES  [ ] NO

## 2023-04-10 NOTE — PROGRESS NOTE ADULT - SUBJECTIVE AND OBJECTIVE BOX
Patient is a 72y old  Female who presents with a chief complaint of cellulitis (09 Apr 2023 16:21)       INTERVAL HPI/OVERNIGHT EVENTS:  Patient seen and evaluated at bedside.  Pt is resting comfortable in NAD. Denies N/V/F/C.  Pain rated at X/10    Allergies    sulfa drugs (Unknown)    Intolerances        Vital Signs Last 24 Hrs  T(C): 36.8 (10 Apr 2023 07:00), Max: 37.1 (09 Apr 2023 21:16)  T(F): 98.3 (10 Apr 2023 07:00), Max: 98.7 (09 Apr 2023 21:16)  HR: 93 (10 Apr 2023 07:00) (67 - 93)  BP: 124/78 (10 Apr 2023 07:00) (119/77 - 130/75)  BP(mean): --  RR: 16 (10 Apr 2023 07:00) (16 - 18)  SpO2: 96% (10 Apr 2023 07:00) (96% - 98%)    Parameters below as of 10 Apr 2023 07:00  Patient On (Oxygen Delivery Method): room air        LABS:                        12.5   13.16 )-----------( 332      ( 10 Apr 2023 08:00 )             39.9     04-10    137  |  96<L>  |  21  ----------------------------<  95  4.1   |  27  |  0.91    Ca    9.8      10 Apr 2023 11:39  Phos  3.4     04-09  Mg     1.70     04-09    TPro  6.7  /  Alb  3.8  /  TBili  0.2  /  DBili  x   /  AST  21  /  ALT  14  /  AlkPhos  98  04-08    PT/INR - ( 10 Apr 2023 08:00 )   PT: 13.0 sec;   INR: 1.12 ratio         PTT - ( 10 Apr 2023 08:00 )  PTT:20.6 sec    CAPILLARY BLOOD GLUCOSE          Lower Extremity Physical Exam:  Vascular: DP/PT 2/4, B/L, CFT <3 seconds B/L, Temperature gradient warm to warm on left , warm to cool on right.   Neuro: Epicritic sensation intact to the level of toes, B/L.  Musculoskeletal/Ortho: pain on palpation to the dorsum of left lateral malleolus mildly improved, L ankle ROM wnl  Skin: L lateral malleoli nodular lesion, now with central lesion wound s/p punch biopsy with derm, granular wound bed, periwound erythema improved mildly, no drainage, no pus, no malodor.      RADIOLOGY & ADDITIONAL TESTS:

## 2023-04-10 NOTE — CONSULT NOTE ADULT - ATTENDING COMMENTS
72-year-old female with PMH of breast cancer s/p right breast lumpectomy, bladder cancer with mets to the spine, in remission, hypertension, hyperlipidemia presents to the ER complaining of left ankle pain swelling and redness times approximately 1 week  No fever, no leukocytosis  LLE pain and swelling, reported discharge at outside physician office  Painful nodular lesion at L foot, complains of pain in ankle  XR with no OM  Culture from site reportedly negative  Infected SSTI at site  Overall, Cellulitis/infected nodule, elevated ESR,   - Vanco 1250mg q 12 (monitor levels)  - Continue off clinda  - F/U MRI  - F/U BCXs  - F/U podiatry  - Would DC solumedrol, but defer to Rheum/Podiatry  - F/U rheum    Herb Leos MD  Contact on TEAMS messaging from 9am - 5pm  From 5pm-9am, on weekends, or if no response call [phone]    I was physically present for the key portions of the evaluation and management service provided. I saw and examined the patient. I agree with the above history, physical, and plan except for any discrepancies which I have documented in “Attending Statements.” Please refer to “Attending Statements” for final plan.
LLE lesion - concern for infection is greater than concern for autoimmune process.  Nevertheless will evaluate for autoimmune process with workup as above.     Maryam Carvajal MD  [phone]
Painful SQ nodule of L lateral ankle, unclear etiology. Pt notes improvement in pain with recent short course of medrol pack. Pt with hx of similar lesion on R lateral foot, which progressed to ulcer and responded to oral doxycycline per patient.   DDx includes pustular pyoderma gangrenosum (favored) vs. rule out venous/arterial ulcer vs. infectious cause  Punch biopsy taken today: sent for H&E and tissue culture.   ILK40 x0.1cc injected into lesion immediately after biopsy procedure.     Recommend clobetasol ointment BID x2 weeks, as well as PO doxycycline 100mg BID x6 weeks.   FU outpatient with dermatology within 1 month.     Patient was seen and examined at bedside with resident Dr. Mckenzie, with patient's son and sisters at bedside. Patient's history was reviewed in extensive detail.   >60 mins total spent on this encounter.   Sabi Dorsey MD  Assistant Clinical Professor of Dermatology  [employer] [employer] [facility] [employer] at [facility]
TERESA Anglin MD performed a history and physical exam of the patient and discussed  the findings and plan with the house officer. I reviewed the resident note and agree with the findings and plan   I Jeremias Anglin MD have personally seen and examined the patient at bedside today at 9 pm

## 2023-04-10 NOTE — PROGRESS NOTE ADULT - SUBJECTIVE AND OBJECTIVE BOX
Robert Chow MD, PGY-4  Rheumatology Fellow  Reachable on TEAMS    KIM ACKERMAN  [id number]    INTERVAL EVENTS  s/p biopsy of lesion in LLE. significant drowsiness upon interview.     MEDICATIONS  (STANDING):  amLODIPine   Tablet 2.5 milliGRAM(s) Oral at bedtime  atorvastatin 10 milliGRAM(s) Oral at bedtime  clobetasol 0.05% Ointment 1 Application(s) Topical two times a day  enoxaparin Injectable 40 milliGRAM(s) SubCutaneous every 24 hours  gabapentin 300 milliGRAM(s) Oral three times a day  pantoprazole    Tablet 40 milliGRAM(s) Oral before breakfast  piperacillin/tazobactam IVPB.. 3.375 Gram(s) IV Intermittent every 8 hours  polyethylene glycol 3350 17 Gram(s) Oral daily  senna 2 Tablet(s) Oral at bedtime    MEDICATIONS  (PRN):  acetaminophen     Tablet .. 650 milliGRAM(s) Oral every 6 hours PRN Temp greater or equal to 38C (100.4F), Mild Pain (1 - 3)  aluminum hydroxide/magnesium hydroxide/simethicone Suspension 30 milliLiter(s) Oral every 6 hours PRN Dyspepsia  melatonin 3 milliGRAM(s) Oral at bedtime PRN Insomnia  ondansetron Injectable 4 milliGRAM(s) IV Push every 6 hours PRN Nausea and/or Vomiting  oxyCODONE    IR 5 milliGRAM(s) Oral every 4 hours PRN Moderate Pain (4 - 6)  oxyCODONE    IR 10 milliGRAM(s) Oral every 4 hours PRN Severe Pain (7 - 10)        Vital Signs Last 24 Hrs  T(C): 37.1 (10 Apr 2023 15:13), Max: 37.1 (09 Apr 2023 21:16)  T(F): 98.7 (10 Apr 2023 15:13), Max: 98.7 (09 Apr 2023 21:16)  HR: 67 (10 Apr 2023 15:13) (67 - 93)  BP: 127/69 (10 Apr 2023 15:13) (119/77 - 127/69)  BP(mean): 85 (10 Apr 2023 15:13) (85 - 85)  RR: 17 (10 Apr 2023 15:13) (16 - 17)  SpO2: 95% (10 Apr 2023 15:13) (95% - 96%)    Parameters below as of 10 Apr 2023 15:13  Patient On (Oxygen Delivery Method): room air        Physical Exam:  General: Breathing comfortably on room air, no distress, lethargic  Resp: breathing comfortably  GI: Soft, NT/ND +BS  MSK: L ankle w/ moderate swelling w/ open ovoid ulcer  Skin: no visible rashes      LABS:  cret                        12.5   13.16 )-----------( 332      ( 10 Apr 2023 08:00 )             39.9     04-10    137  |  96<L>  |  21  ----------------------------<  95  4.1   |  27  |  0.91    Ca    9.8      10 Apr 2023 11:39  Phos  3.4     04-09  Mg     1.70     04-09      PT/INR - ( 10 Apr 2023 08:00 )   PT: 13.0 sec;   INR: 1.12 ratio         PTT - ( 10 Apr 2023 08:00 )  PTT:20.6 sec      RADIOLOGY & ADDITIONAL STUDIES:    < from: Xray Ankle Complete 3 Views, Left (04.08.23 @ 17:33) >    ACC: [id number] EXAM:  XR ANKLE COMP MIN 3 VIEWS LT   ORDERED BY: LAKEISHA POE     PROCEDURE DATE:  04/08/2023          INTERPRETATION:  CLINICAL INDICATION: Cellulitis.    TECHNIQUE: X-ray left ankle 3 views    COMPARISON: X-ray left ankle 4/27/2021    FINDINGS:    No acute fracture or dislocation.  No definite focal cortical erosion or periosteal reaction.  Mild ankle soft tissue swelling. No radiopaque foreign body.    IMPRESSION:  No convincing plain radiographic evidence for acute osteomyelitis.    --- End of Report ---          RANJEET BOWIE MD; Resident Radiologist  This document has been electronically signed.  ANA RANGEL MD; Attending Radiologist  This document has been electronically signed. Apr 9 2023  9:27AM    < end of copied text >

## 2023-04-10 NOTE — PROGRESS NOTE ADULT - ASSESSMENT
71 yo F PMH breast cancer (s/p R lumpectomy), bladder cancer with mets to the spine (now in remission since 2018), HTN, HLD, L1-L4 fusion, R foot fx s/p MRSA infection (1/2023) p/w left ankle pain swelling and erythema for 3 weeks. Rheumatology consulted for evaluation.     #left ankle pain/swelling/erythema with nodular lesion overlying lateral malleolus  =developed 3 weeks ago w/o improvement on 7days of PO steroids or 3 days of outpatient Augmentin  =no leukocytosis, has elevated inflammatory markers, x-ray w/o fracture or signs of gas or obvious osteomyelitis  =has recent hx of small R foot fx s/p MRSA infection (1/2023) w/ similar presentation that improved w/ antibiotic course  =based on exam findings, suspect infection. It does not appear like pyoderma gangrenosum. Can also consider arterial ulcer based on location, however has warm foot w/ palpable pulses. Differential for nodular lesions that can be associated w/ autoimmune disease include RA, SLE, Vasculitis, sarcoidosis, however patient does not have any stigmata of this on history or exam.    Plan:  -will f/u RF, CCP, JAIRO, ANCA, MPO, PR3, ACE, Vit D 1,25, Vit D 2,5   -to f/u  tissue biopsy. pending MRI for further information  -would hold off on steroids until infection has been ruled out. the patient also received intralesional steroids   -recommend going down on narcotics due to lethargy  -will follow     Discussed with Attending Dr. Carvajal

## 2023-04-10 NOTE — CONSULT NOTE ADULT - SUBJECTIVE AND OBJECTIVE BOX
Vascular Surgery Consult  Consulting surgical team: Vascular  Consulting attending: Linnette BO    HPI:  72F PMHx breast cancer status post right breast lumpectomy, bladder cancer with mets to the spine, in remission, hypertension, hyperlipidemia presents to the ER complaining of left ankle pain swelling and redness times approximately 1 week. Pt states she sprained her ankle in March and has been following up with her podiatrist, Dr. Coley. She states her left ankle had become swollen and erythematous and was started on course of steroid with some improvement however it has worsened this past week. She states it initially started off as a bump and it has since become erythematous and red. She denies any purulent drainage but reports she has hx of MRSA infection from previous right foot infection. She states she went to see an orthopedic doctor who expressed out clear fluid and sent it off for culture. She was started on augmentin 3 days ago. She reports severe pain, and is now having difficulty walking due to pain when bearing weight. She denies fevers but reports chills. Denies chest pain, SOB, abd pain, n/v/d, or urinary symptoms. She states she has been taking oxycodone, tylenol, and ibuprofen with mild relief at home    In ED, vitals T 98.7, HR 93, /82, RR 18, O2 sat 97% on RA  Labs significant for Hb 11.2, ESR 62, CRP 45.7  Imaging:   Prelim radiology IMPRESSION:  No cortical erosions or subcutaneous tracking of gas to suggest   osteomyelitis.    ED management: IV clindamycin x1, IV morphine 4 mg x1 (2023 20:52)      PAST MEDICAL HISTORY:  Anxiety  Breast CA, left  Hypertension  Sleep apnea  Irritable bowel syndrome (IBS)  Polyp of colon  HLD (hyperlipidemia)  Bladder polyp        PAST SURGICAL HISTORY:  S/P     S/P colon resection    S/P hysterectomy    Breast lump    History of surgery    H/O lumpectomy    Personal history of spine surgery        MEDICATIONS:  acetaminophen     Tablet .. 650 milliGRAM(s) Oral every 6 hours PRN  aluminum hydroxide/magnesium hydroxide/simethicone Suspension 30 milliLiter(s) Oral every 6 hours PRN  amLODIPine   Tablet 2.5 milliGRAM(s) Oral at bedtime  atorvastatin 10 milliGRAM(s) Oral at bedtime  clobetasol 0.05% Ointment 1 Application(s) Topical two times a day  enoxaparin Injectable 40 milliGRAM(s) SubCutaneous every 24 hours  gabapentin 300 milliGRAM(s) Oral three times a day  melatonin 3 milliGRAM(s) Oral at bedtime PRN  ondansetron Injectable 4 milliGRAM(s) IV Push every 6 hours PRN  oxyCODONE    IR 5 milliGRAM(s) Oral every 4 hours PRN  oxyCODONE    IR 10 milliGRAM(s) Oral every 4 hours PRN  pantoprazole    Tablet 40 milliGRAM(s) Oral before breakfast  piperacillin/tazobactam IVPB.. 3.375 Gram(s) IV Intermittent every 8 hours  polyethylene glycol 3350 17 Gram(s) Oral daily  senna 2 Tablet(s) Oral at bedtime      ALLERGIES:  sulfa drugs (Unknown)      VITALS & I/Os:  Vital Signs Last 24 Hrs  T(C): 37.1 (10 Apr 2023 15:13), Max: 37.1 (2023 21:16)  T(F): 98.7 (10 Apr 2023 15:13), Max: 98.7 (2023 21:16)  HR: 67 (10 Apr 2023 15:13) (67 - 93)  BP: 127/69 (10 Apr 2023 15:13) (119/77 - 127/80)  BP(mean): 85 (10 Apr 2023 15:13) (85 - 85)  RR: 17 (10 Apr 2023 15:13) (16 - 18)  SpO2: 95% (10 Apr 2023 15:13) (95% - 96%)    Parameters below as of 10 Apr 2023 15:13  Patient On (Oxygen Delivery Method): room air        I&O's Summary      PHYSICAL EXAM:  General: No acute distress  Respiratory: Nonlabored  Cardiovascular: RRR  Abdominal: Soft, NTND  Extremities: Warm, b/l DP/PT/AT signals dopperable, left lateral malleoli nodular lesion, with central lesion s/p punch biopsy, granular wound bed, periwound erythema improved mildly, no drainage, no pus, no malodor.    LABS:                        12.5   13.16 )-----------( 332      ( 10 Apr 2023 08:00 )             39.9     04-10    137  |  96<L>  |  21  ----------------------------<  95  4.1   |  27  |  0.91    Ca    9.8      10 Apr 2023 11:39  Phos  3.4     04-  Mg     1.70     -    TPro  6.7  /  Alb  3.8  /  TBili  0.2  /  DBili  x   /  AST  21  /  ALT  14  /  AlkPhos  98  04-08    Lactate:    PT/INR - ( 10 Apr 2023 08:00 )   PT: 13.0 sec;   INR: 1.12 ratio         PTT - ( 10 Apr 2023 08:00 )  PTT:20.6 sec              IMAGING:                                                                                               Vascular Surgery Consult  Consulting surgical team: Vascular  Consulting attending: Linnette BO    HPI:  72F PMHx breast cancer status post right breast lumpectomy, bladder cancer with mets to the spine, in remission, hypertension, hyperlipidemia presents to the ER complaining of left ankle pain swelling and redness times approximately 1 week. Pt states she sprained her ankle in March and has been following up with her podiatrist, Dr. Coley. She states her left ankle had become swollen and erythematous and was started on course of steroid with some improvement however it has worsened this past week. She states it initially started off as a bump and it has since become erythematous and red. She denies any purulent drainage but reports she has hx of MRSA infection from previous right foot infection. She states she went to see an orthopedic doctor who expressed out clear fluid and sent it off for culture. She was started on augmentin 3 days ago. She reports severe pain, and is now having difficulty walking due to pain when bearing weight. She denies fevers but reports chills. Denies chest pain, SOB, abd pain, n/v/d, or urinary symptoms. She states she has been taking oxycodone, tylenol, and ibuprofen with mild relief at home  VASCULAR SURG ATT ADDENDUM  PT denies nocturnal leg or foot cramps or intermittent claudication  pt denies personal or fam hx of connective tissue dz   pt denies left lat mallolus injury     In ED, vitals T 98.7, HR 93, /82, RR 18, O2 sat 97% on RA  Labs significant for Hb 11.2, ESR 62, CRP 45.7  Imaging:   Prelim radiology IMPRESSION:  No cortical erosions or subcutaneous tracking of gas to suggest   osteomyelitis.    ED management: IV clindamycin x1, IV morphine 4 mg x1 (2023 20:52)      PAST MEDICAL HISTORY:  Anxiety  Breast CA, left  Hypertension  Sleep apnea  Irritable bowel syndrome (IBS)  Polyp of colon  HLD (hyperlipidemia)  Bladder polyp        PAST SURGICAL HISTORY:  S/P     S/P colon resection    S/P hysterectomy    Breast lump    History of surgery    H/O lumpectomy    Personal history of spine surgery        MEDICATIONS:  acetaminophen     Tablet .. 650 milliGRAM(s) Oral every 6 hours PRN  aluminum hydroxide/magnesium hydroxide/simethicone Suspension 30 milliLiter(s) Oral every 6 hours PRN  amLODIPine   Tablet 2.5 milliGRAM(s) Oral at bedtime  atorvastatin 10 milliGRAM(s) Oral at bedtime  clobetasol 0.05% Ointment 1 Application(s) Topical two times a day  enoxaparin Injectable 40 milliGRAM(s) SubCutaneous every 24 hours  gabapentin 300 milliGRAM(s) Oral three times a day  melatonin 3 milliGRAM(s) Oral at bedtime PRN  ondansetron Injectable 4 milliGRAM(s) IV Push every 6 hours PRN  oxyCODONE    IR 5 milliGRAM(s) Oral every 4 hours PRN  oxyCODONE    IR 10 milliGRAM(s) Oral every 4 hours PRN  pantoprazole    Tablet 40 milliGRAM(s) Oral before breakfast  piperacillin/tazobactam IVPB.. 3.375 Gram(s) IV Intermittent every 8 hours  polyethylene glycol 3350 17 Gram(s) Oral daily  senna 2 Tablet(s) Oral at bedtime      ALLERGIES:  sulfa drugs (Unknown)      VITALS & I/Os:  Vital Signs Last 24 Hrs  T(C): 37.1 (10 Apr 2023 15:13), Max: 37.1 (2023 21:16)  T(F): 98.7 (10 Apr 2023 15:13), Max: 98.7 (2023 21:16)  HR: 67 (10 Apr 2023 15:13) (67 - 93)  BP: 127/69 (10 Apr 2023 15:13) (119/77 - 127/80)  BP(mean): 85 (10 Apr 2023 15:13) (85 - 85)  RR: 17 (10 Apr 2023 15:13) (16 - 18)  SpO2: 95% (10 Apr 2023 15:13) (95% - 96%)    Parameters below as of 10 Apr 2023 15:13  Patient On (Oxygen Delivery Method): room air        I&O's Summary      PHYSICAL EXAM:  General: No acute distress  Respiratory: Nonlabored  Cardiovascular: RRR  Abdominal: Soft, NTND  Extremities: Warm, b/l DP/PT/AT signals dopperable, left lateral malleoli nodular lesion, with central lesion s/p punch biopsy, granular wound bed, periwound erythema improved mildly, no drainage, no pus, no malodor.    LABS:                        12.5   13.16 )-----------( 332      ( 10 Apr 2023 08:00 )             39.9     04-10    137  |  96<L>  |  21  ----------------------------<  95  4.1   |  27  |  0.91    Ca    9.8      10 Apr 2023 11:39  Phos  3.4     04-09  Mg     1.70     04-09    TPro  6.7  /  Alb  3.8  /  TBili  0.2  /  DBili  x   /  AST  21  /  ALT  14  /  AlkPhos  98  04-08    Lactate:    PT/INR - ( 10 Apr 2023 08:00 )   PT: 13.0 sec;   INR: 1.12 ratio         PTT - ( 10 Apr 2023 08:00 )  PTT:20.6 sec              IMAGING:

## 2023-04-10 NOTE — PROGRESS NOTE ADULT - ASSESSMENT
72-year-old female with past medical history of breast cancer status post right breast lumpectomy, bladder cancer with mets to the spine, in remission, hypertension, hyperlipidemia presents to the ER complaining of left ankle pain swelling and redness times approximately 1 week. Admit for IV abx for cellulitis.        Problem/Plan - 1:  ·  Problem: Cellulitis.   ·  Plan: c/w IV abx   zosyn/ vanco IV   d/w podiatry : plan for left ankle I&D tomorrow   hold off on steroids for now   s/p wound biopsy by derm      Problem/Plan - 2:  ·  Problem: Anemia.   ·  Plan: Hb 11.2. Pt denies any bleeding, blood in stools/black stools  - continue to monitor.     Problem/Plan - 3:  ·  Problem: HTN (hypertension).   ·  Plan: BP stable.  - c/w amlodipine 2.5 mg QD.     Problem/Plan - 4:  ·  Problem: HLD (hyperlipidemia).   ·  Plan: On pravastatin at home.   - c/w atorvastatin 10 mg QD.     Problem/Plan - 5:  ·  Problem: Prophylactic measure.   ·  Plan: DVT prophylaxis: lovenox  Diet: dash/tlc.    dispo: pt. is medically stable with minimal cardiovascular risk for the procedure

## 2023-04-10 NOTE — CONSULT NOTE ADULT - ASSESSMENT
72F PMHx breast cancer status post right breast lumpectomy, bladder cancer with mets to the spine, in remission, hypertension, hyperlipidemia presents with left lateral ankle nodular lesion. Plan for podiatry local wound care vs OR L ankle I+D.   Vascular consulted for evaluation of potential arterial ulcer    Plan:  Patient has strong dopperable PT/DP/AT LLE arterial signals on exam; unlikely for arterial ulcer  - no acute surgical indication indicated at this time  - recommend b/l LE venous duplexes to r/o any DVTs  - care per primary   - vascular to follow    Plan discussed with and approved by fellow on behalf of Dr. Linnette Agosto MD PGY-2  Vascular Surgery C Team   [phone]   72F PMHx breast cancer status post right breast lumpectomy, bladder cancer with mets to the spine, in remission, hypertension, hyperlipidemia presents with left lateral ankle nodular lesion. Plan for podiatry local wound care vs OR L ankle I+D.   Vascular consulted for evaluation of potential arterial ulcer etiology unclear     Plan:  recommend jessica/pvr  agree w rheum eval  continue woundcare  will follow

## 2023-04-10 NOTE — CONSULT NOTE ADULT - PROBLEM SELECTOR RECOMMENDATION 9
TERESA Anglin MD performed a history and physical exam of the patient and discussed  the findings and plan with the house officer. I reviewed the resident note and agree with the findings and plan   I Jeremias Anglin MD have personally seen and examined the patient at bedside today at 9 pm

## 2023-04-10 NOTE — PROGRESS NOTE ADULT - ASSESSMENT
72F with left lateral ankle nodular lesion  - Patient seen and evaluated  - Afebrile, WBC up to 13  - L lateral malleoli nodular lesion, now with central lesion wound s/p punch biopsy with derm, granular wound bed, periwound erythema improved mildly, no drainage, no pus, no malodor.  - left foot xray and ankle so no gas, no OM, no fracture  - Recommended broad spectrum antibiotics with IV Vanco and Zosyn (discussed with primary team and ID)  - ID recs appreciated   - s/p L lateral malleoli punch biopsy, recs appreciated   - Rheum recs appreciated    - Recommend no cortisone to L ankle wound   - Left ankle MRI pending   - No acute surgical intervention at this time, will await clinical course with IV ABx and team recs   - Pod plan local wound care versus OR L ankle incision and drainage pending clinical improvement and L ankle MRI   - Please document medical optimization for possible L ankle I&D under anesthesia   - Seen with attending

## 2023-04-10 NOTE — CONSULT NOTE ADULT - MENTAL STATUS
a and o x 3     mild arterial insuff w moderate  trophic skin changes  Pulse exam                         right         left   femoral        +2            +2  dpa                +2            +2  pta                 +1            +1  LLE Wound lat malleous  3mm diam x 2mm depth  w limited granulation tissue

## 2023-04-10 NOTE — PROGRESS NOTE ADULT - ASSESSMENT
72-year-old female with PMH of breast cancer s/p right breast lumpectomy, bladder cancer with mets to the spine, in remission, hypertension, hyperlipidemia presents to the ER complaining of left ankle pain swelling and redness times approximately 1 week  No fever, no leukocytosis  LLE pain and swelling, reported discharge at outside physician office  Painful nodular lesion at L foot, complains of pain in ankle  XR with no OM  Culture from site reportedly negative  Infected SSTI at site  Overall, Cellulitis/infected nodule, elevated ESR,   - Vanco 1g q 12 (check AM level tomorrow)  - Zosyn 3.375g q 8  - F/U pending cultures  - F/U MRI  - F/U BCXs  - F/U podiatry, Rheum, Derm    Herb Leos MD  Contact on TEAMS messaging from 9am - 5pm  From 5pm-9am, on weekends, or if no response call [phone] 72-year-old female with PMH of breast cancer s/p right breast lumpectomy, bladder cancer with mets to the spine, in remission, hypertension, hyperlipidemia presents to the ER complaining of left ankle pain swelling and redness times approximately 1 week  No fever, no leukocytosis  LLE pain and swelling, reported discharge at outside physician office  Painful nodular lesion at L foot, complains of pain in ankle  XR with no OM  Culture from site reportedly negative  Infected SSTI at site  Overall, Cellulitis/infected nodule, elevated ESR,   - Vanco 1g q 24 (check AM level tomorrow)  - Zosyn 3.375g q 8  - F/U pending cultures  - F/U MRI  - F/U BCXs  - F/U podiatry, Rheum, Derm    Herb Leos MD  Contact on TEAMS messaging from 9am - 5pm  From 5pm-9am, on weekends, or if no response call [phone]

## 2023-04-11 LAB
ACE SERPL-CCNC: 20 U/L — SIGNIFICANT CHANGE UP (ref 14–82)
ALBUMIN SERPL ELPH-MCNC: 4 G/DL — SIGNIFICANT CHANGE UP (ref 3.3–5)
ALP SERPL-CCNC: 90 U/L — SIGNIFICANT CHANGE UP (ref 40–120)
ALT FLD-CCNC: 10 U/L — SIGNIFICANT CHANGE UP (ref 4–33)
ANION GAP SERPL CALC-SCNC: 11 MMOL/L — SIGNIFICANT CHANGE UP (ref 7–14)
AST SERPL-CCNC: 12 U/L — SIGNIFICANT CHANGE UP (ref 4–32)
BASOPHILS # BLD AUTO: 0.04 K/UL — SIGNIFICANT CHANGE UP (ref 0–0.2)
BASOPHILS NFR BLD AUTO: 0.4 % — SIGNIFICANT CHANGE UP (ref 0–2)
BILIRUB SERPL-MCNC: 0.3 MG/DL — SIGNIFICANT CHANGE UP (ref 0.2–1.2)
BUN SERPL-MCNC: 24 MG/DL — HIGH (ref 7–23)
CALCIUM SERPL-MCNC: 9.6 MG/DL — SIGNIFICANT CHANGE UP (ref 8.4–10.5)
CCP IGG SERPL-ACNC: <8 UNITS — SIGNIFICANT CHANGE UP
CHLORIDE SERPL-SCNC: 95 MMOL/L — LOW (ref 98–107)
CO2 SERPL-SCNC: 30 MMOL/L — SIGNIFICANT CHANGE UP (ref 22–31)
CREAT SERPL-MCNC: 1.13 MG/DL — SIGNIFICANT CHANGE UP (ref 0.5–1.3)
EGFR: 51 ML/MIN/1.73M2 — LOW
EOSINOPHIL # BLD AUTO: 0.21 K/UL — SIGNIFICANT CHANGE UP (ref 0–0.5)
EOSINOPHIL NFR BLD AUTO: 2.3 % — SIGNIFICANT CHANGE UP (ref 0–6)
GLUCOSE SERPL-MCNC: 109 MG/DL — HIGH (ref 70–99)
HCT VFR BLD CALC: 39.6 % — SIGNIFICANT CHANGE UP (ref 34.5–45)
HGB BLD-MCNC: 11.9 G/DL — SIGNIFICANT CHANGE UP (ref 11.5–15.5)
IANC: 5.75 K/UL — SIGNIFICANT CHANGE UP (ref 1.8–7.4)
IMM GRANULOCYTES NFR BLD AUTO: 0.8 % — SIGNIFICANT CHANGE UP (ref 0–0.9)
LYMPHOCYTES # BLD AUTO: 1.58 K/UL — SIGNIFICANT CHANGE UP (ref 1–3.3)
LYMPHOCYTES # BLD AUTO: 17.6 % — SIGNIFICANT CHANGE UP (ref 13–44)
MAGNESIUM SERPL-MCNC: 2.2 MG/DL — SIGNIFICANT CHANGE UP (ref 1.6–2.6)
MCHC RBC-ENTMCNC: 27.3 PG — SIGNIFICANT CHANGE UP (ref 27–34)
MCHC RBC-ENTMCNC: 30.1 GM/DL — LOW (ref 32–36)
MCV RBC AUTO: 90.8 FL — SIGNIFICANT CHANGE UP (ref 80–100)
MONOCYTES # BLD AUTO: 1.31 K/UL — HIGH (ref 0–0.9)
MONOCYTES NFR BLD AUTO: 14.6 % — HIGH (ref 2–14)
MPO AB + PR3 PNL SER: SIGNIFICANT CHANGE UP
MYELOPEROXIDASE AB SER-ACNC: <5 UNITS — SIGNIFICANT CHANGE UP
MYELOPEROXIDASE CELLS FLD QL: NEGATIVE — SIGNIFICANT CHANGE UP
NEUTROPHILS # BLD AUTO: 5.75 K/UL — SIGNIFICANT CHANGE UP (ref 1.8–7.4)
NEUTROPHILS NFR BLD AUTO: 64.3 % — SIGNIFICANT CHANGE UP (ref 43–77)
NRBC # BLD: 0 /100 WBCS — SIGNIFICANT CHANGE UP (ref 0–0)
NRBC # FLD: 0 K/UL — SIGNIFICANT CHANGE UP (ref 0–0)
PHOSPHATE SERPL-MCNC: 4 MG/DL — SIGNIFICANT CHANGE UP (ref 2.5–4.5)
PLATELET # BLD AUTO: 326 K/UL — SIGNIFICANT CHANGE UP (ref 150–400)
POTASSIUM SERPL-MCNC: 4 MMOL/L — SIGNIFICANT CHANGE UP (ref 3.5–5.3)
POTASSIUM SERPL-SCNC: 4 MMOL/L — SIGNIFICANT CHANGE UP (ref 3.5–5.3)
PROT SERPL-MCNC: 7.2 G/DL — SIGNIFICANT CHANGE UP (ref 6–8.3)
PROTEINASE3 AB FLD-ACNC: 8.8 UNITS — SIGNIFICANT CHANGE UP
PROTEINASE3 AB SER-ACNC: NEGATIVE — SIGNIFICANT CHANGE UP
RBC # BLD: 4.36 M/UL — SIGNIFICANT CHANGE UP (ref 3.8–5.2)
RBC # FLD: 14.8 % — HIGH (ref 10.3–14.5)
RF+CCP IGG SER-IMP: NEGATIVE — SIGNIFICANT CHANGE UP
SODIUM SERPL-SCNC: 136 MMOL/L — SIGNIFICANT CHANGE UP (ref 135–145)
VANCOMYCIN FLD-MCNC: 9.1 UG/ML — SIGNIFICANT CHANGE UP
VIT D25+D1,25 OH+D1,25 PNL SERPL-MCNC: 104 PG/ML — HIGH (ref 19.9–79.3)
WBC # BLD: 8.96 K/UL — SIGNIFICANT CHANGE UP (ref 3.8–10.5)
WBC # FLD AUTO: 8.96 K/UL — SIGNIFICANT CHANGE UP (ref 3.8–10.5)

## 2023-04-11 PROCEDURE — 99232 SBSQ HOSP IP/OBS MODERATE 35: CPT

## 2023-04-11 PROCEDURE — 93970 EXTREMITY STUDY: CPT | Mod: 26

## 2023-04-11 RX ADMIN — ENOXAPARIN SODIUM 40 MILLIGRAM(S): 100 INJECTION SUBCUTANEOUS at 05:02

## 2023-04-11 RX ADMIN — Medication 250 MILLIGRAM(S): at 10:56

## 2023-04-11 RX ADMIN — PIPERACILLIN AND TAZOBACTAM 25 GRAM(S): 4; .5 INJECTION, POWDER, LYOPHILIZED, FOR SOLUTION INTRAVENOUS at 10:56

## 2023-04-11 RX ADMIN — AMLODIPINE BESYLATE 2.5 MILLIGRAM(S): 2.5 TABLET ORAL at 21:17

## 2023-04-11 RX ADMIN — POLYETHYLENE GLYCOL 3350 17 GRAM(S): 17 POWDER, FOR SOLUTION ORAL at 17:10

## 2023-04-11 RX ADMIN — Medication 1 APPLICATION(S): at 17:10

## 2023-04-11 RX ADMIN — Medication 1 APPLICATION(S): at 06:18

## 2023-04-11 RX ADMIN — GABAPENTIN 300 MILLIGRAM(S): 400 CAPSULE ORAL at 05:02

## 2023-04-11 RX ADMIN — GABAPENTIN 300 MILLIGRAM(S): 400 CAPSULE ORAL at 21:18

## 2023-04-11 RX ADMIN — Medication 650 MILLIGRAM(S): at 13:18

## 2023-04-11 RX ADMIN — Medication 650 MILLIGRAM(S): at 14:15

## 2023-04-11 RX ADMIN — PIPERACILLIN AND TAZOBACTAM 25 GRAM(S): 4; .5 INJECTION, POWDER, LYOPHILIZED, FOR SOLUTION INTRAVENOUS at 17:09

## 2023-04-11 RX ADMIN — GABAPENTIN 300 MILLIGRAM(S): 400 CAPSULE ORAL at 13:17

## 2023-04-11 RX ADMIN — PIPERACILLIN AND TAZOBACTAM 25 GRAM(S): 4; .5 INJECTION, POWDER, LYOPHILIZED, FOR SOLUTION INTRAVENOUS at 02:18

## 2023-04-11 RX ADMIN — PANTOPRAZOLE SODIUM 40 MILLIGRAM(S): 20 TABLET, DELAYED RELEASE ORAL at 05:02

## 2023-04-11 RX ADMIN — ATORVASTATIN CALCIUM 10 MILLIGRAM(S): 80 TABLET, FILM COATED ORAL at 21:18

## 2023-04-11 RX ADMIN — SENNA PLUS 2 TABLET(S): 8.6 TABLET ORAL at 21:18

## 2023-04-11 NOTE — PROGRESS NOTE ADULT - SUBJECTIVE AND OBJECTIVE BOX
Patient more comfortable with less pain. Significant improvement. Afebrile.  WBC 8.96 improved  MRI no OM no abscess, cellulitis  Vascular OK good pulse  Open small wound s/p punch biopsy  Erythema and edema minimal  minimal to no drainage and culture prelim no growth    Bedside exercises given  ID recommendations for antibiosis. Will need full ST course.  Sterile dressing applied  Path pending from dermatology.

## 2023-04-11 NOTE — PROGRESS NOTE ADULT - SUBJECTIVE AND OBJECTIVE BOX
CC: F/U for Wound infection    Complains of abd pain, upset stomach.    Allergies  sulfa drugs (Unknown)    ANTIMICROBIALS:  piperacillin/tazobactam IVPB.. 3.375 every 8 hours  vancomycin  IVPB 1000 every 24 hours    PE:    Vital Signs Last 24 Hrs  T(C): 37.2 (11 Apr 2023 11:46), Max: 38.8 (10 Apr 2023 21:53)  T(F): 99 (11 Apr 2023 11:46), Max: 101.9 (10 Apr 2023 21:53)  HR: 76 (11 Apr 2023 11:46) (56 - 93)  BP: 142/91 (11 Apr 2023 11:46) (109/68 - 161/79)  RR: 17 (11 Apr 2023 11:46) (14 - 20)  SpO2: 96% (11 Apr 2023 11:46) (71% - 99%)    Gen: AOx3, NAD, non-toxic  CV: Nontachycardic  Resp: Breathing comfortably, RA  Abd: Soft, nontender  IV/Skin: No thrombophlebitis    LABS:                        11.9   8.96  )-----------( 326      ( 11 Apr 2023 05:55 )             39.6     04-11    136  |  95<L>  |  24<H>  ----------------------------<  109<H>  4.0   |  30  |  1.13    Ca    9.6      11 Apr 2023 05:55  Phos  4.0     04-11  Mg     2.20     04-11    TPro  7.2  /  Alb  4.0  /  TBili  0.3  /  DBili  x   /  AST  12  /  ALT  10  /  AlkPhos  90  04-11    MICROBIOLOGY:  Vancomycin Level, Random: 9.1 ug/mL (04-11-23 @ 05:55)    .Tissue Other, L lateral malleous  04-09-23   Testing in progress  --    No polymorphonuclear cells seen per low power field  No organisms seen per oil power field    .Blood Blood-Peripheral  04-08-23   No growth to date.  --  --    .Blood Blood-Venous  04-08-23   No growth to date.  --  --    RADIOLOGY:    4/10 MR:    Impression:    Nonspecific subcutaneous edema predominantly along the anterolateral   aspect of the ankle and dorsolateral aspect of the foot, which may   represent lower extremity edema or cellulitis. No drainable soft tissue   fluid collection is seen. No evidence of osteomyelitis.    Other findings as above.

## 2023-04-11 NOTE — CONSULT NOTE ADULT - ASSESSMENT
A/P  72-year-old female with past medical history of breast cancer status post right breast lumpectomy, bladder cancer with mets to the spine, in remission, hypertension, hyperlipidemia presents to the ER complaining of left ankle pain swelling and redness times approximately 1 week. Admit for IV abx for cellulitis.     #Cellulitis.   -abx per med  -pod f/u  -r/o osteo  -await I&D of left ankle  -check ekg, unable ot locate in chart or sunrise  -pending ecg patient remains cardiac optimized to proceed with low cardiac risk     #Anemia.   -cont to trend heme    #HTN (hypertension).   -stable  -cont amlodipine 2.5 mg QD.      dvt ppx      70 minutes spent on total encounter; more than 50% of the visit was spent counseling and/or coordinating care by the attending physician.

## 2023-04-11 NOTE — PROGRESS NOTE ADULT - ASSESSMENT
72-year-old female with PMH of breast cancer s/p right breast lumpectomy, bladder cancer with mets to the spine, in remission, hypertension, hyperlipidemia presents to the ER complaining of left ankle pain swelling and redness times approximately 1 week  No fever, no leukocytosis  LLE pain and swelling, reported discharge at outside physician office  Painful nodular lesion at L foot, complains of pain in ankle  XR with no OM  Culture from site reportedly negative  Infected SSTI at site  MR with subcutaneous edema, LE edema vs cellulitis, no fluid collection, no OM  Overall, Cellulitis/infected nodule, elevated ESR  - Vanco 1g q 24 (monitor levels)  - DC Zosyn--complaining of stomach upset/D, culture negative  - F/U pending cultures  - F/U BCXs  - F/U podiatry, Rheum, Derm    Herb Leos MD  Contact on TEAMS messaging from 9am - 5pm  From 5pm-9am, on weekends, or if no response call [phone]

## 2023-04-11 NOTE — PROGRESS NOTE ADULT - ASSESSMENT
72F PMHx breast cancer status post right breast lumpectomy, bladder cancer with mets to the spine, in remission, hypertension, hyperlipidemia presents with left lateral ankle nodular lesion. Plan for podiatry local wound care vs OR L ankle I+D.   Vascular consulted for evaluation of potential arterial ulcer etiology unclear     Plan:  recommend jessica/pvr  continue woundcare  tent lle angio on fri   will follow

## 2023-04-11 NOTE — PROGRESS NOTE ADULT - SUBJECTIVE AND OBJECTIVE BOX
Patient is a 72y old  Female who presents with a chief complaint of cellulitis (11 Apr 2023 20:02)      Vascular Surgery Attending Progress Note    Interval HPI: pt w/o c/o     Medications:  acetaminophen     Tablet .. 650 milliGRAM(s) Oral every 6 hours PRN  aluminum hydroxide/magnesium hydroxide/simethicone Suspension 30 milliLiter(s) Oral every 6 hours PRN  amLODIPine   Tablet 2.5 milliGRAM(s) Oral at bedtime  atorvastatin 10 milliGRAM(s) Oral at bedtime  clobetasol 0.05% Ointment 1 Application(s) Topical two times a day  enoxaparin Injectable 40 milliGRAM(s) SubCutaneous every 24 hours  gabapentin 300 milliGRAM(s) Oral three times a day  melatonin 3 milliGRAM(s) Oral at bedtime PRN  ondansetron Injectable 4 milliGRAM(s) IV Push every 6 hours PRN  oxyCODONE    IR 10 milliGRAM(s) Oral every 4 hours PRN  oxyCODONE    IR 5 milliGRAM(s) Oral every 4 hours PRN  pantoprazole    Tablet 40 milliGRAM(s) Oral before breakfast  piperacillin/tazobactam IVPB.. 3.375 Gram(s) IV Intermittent every 8 hours  polyethylene glycol 3350 17 Gram(s) Oral daily  senna 2 Tablet(s) Oral at bedtime  vancomycin  IVPB 1000 milliGRAM(s) IV Intermittent every 24 hours      Vital Signs Last 24 Hrs  T(C): 37.2 (11 Apr 2023 11:46), Max: 37.2 (11 Apr 2023 11:46)  T(F): 99 (11 Apr 2023 11:46), Max: 99 (11 Apr 2023 11:46)  HR: 70 (11 Apr 2023 19:17) (56 - 76)  BP: 142/91 (11 Apr 2023 11:46) (109/68 - 142/91)  BP(mean): --  RR: 17 (11 Apr 2023 11:46) (14 - 17)  SpO2: 98% (11 Apr 2023 19:17) (96% - 99%)    Parameters below as of 11 Apr 2023 04:41  Patient On (Oxygen Delivery Method): BiPAP/CPAP      I&O's Summary    10 Apr 2023 07:01  -  11 Apr 2023 07:00  --------------------------------------------------------  IN: 260 mL / OUT: 0 mL / NET: 260 mL        Physical Exam:  Vascular:  left lat malleolus wd stable     LABS:                        11.9   8.96  )-----------( 326      ( 11 Apr 2023 05:55 )             39.6     04-11    136  |  95<L>  |  24<H>  ----------------------------<  109<H>  4.0   |  30  |  1.13    Ca    9.6      11 Apr 2023 05:55  Phos  4.0     04-11  Mg     2.20     04-11    TPro  7.2  /  Alb  4.0  /  TBili  0.3  /  DBili  x   /  AST  12  /  ALT  10  /  AlkPhos  90  04-11    PT/INR - ( 10 Apr 2023 08:00 )   PT: 13.0 sec;   INR: 1.12 ratio         PTT - ( 10 Apr 2023 08:00 )  PTT:20.6 sec    MRI foot reviewed   tirso le neg for dvt     TONY IRELAND MD  [phone] Cell [phone]

## 2023-04-11 NOTE — PROVIDER CONTACT NOTE (CHANGE IN STATUS NOTIFICATION) - ACTION/TREATMENT ORDERED:
PEDRO Chawla notified. He ordered to give Tylenol IV, adminsitered as ordered. Placed patient on nopn rebreather, sPO2 improved to 95%. RT notified for CPAP

## 2023-04-11 NOTE — PHARMACOTHERAPY INTERVENTION NOTE - COMMENTS
72 F on IV vancomycin and Zosyn for wound infection.  Scr 1.13 increased slightly.  Pt was initially receiving 1.25 g IV q12h and high trough was drawn on 4/10.     calculated using PrecisePk if patient remains on Vancomycin 1 gram IV q24h.    Recommendation(s):  1)  Continue Vancomycin 1 g IV q24h as AUC attained would be within the goal range of 400-600.    Red Baldwin, PharmD, BCIDP  Clinical Pharmacy Specialist, Infectious Diseases  Spectra extension [phone]  .

## 2023-04-11 NOTE — CONSULT NOTE ADULT - SUBJECTIVE AND OBJECTIVE BOX
CARDIOLOGY CONSULT NOTE - DR. BARNEY    HPI:  72-year-old female with past medical history of breast cancer status post right breast lumpectomy, bladder cancer with mets to the spine, in remission, hypertension, hyperlipidemia presents to the ER complaining of left ankle pain swelling and redness times approximately 1 week. Pt states she sprained her ankle in March and has been following up with her podiatrist, Dr. Coley. She states her left ankle had become swollen and erythematous and was started on course of steroid with some improvement however it has worsened this past week. She states it initially started off as a bump and it has since become erythematous and red. She denies any purulent drainage but reports she has hx of MRSA infection from previous right foot infection. She states she went to see an orthopedic doctor who expressed out clear fluid and sent it off for culture. She was started on augmentin 3 days ago. She reports severe pain, and is now having difficulty walking due to pain when bearing weight. She denies fevers but reports chills. Denies chest pain, SOB, abd pain, n/v/d, or urinary symptoms. She states she has been taking oxycodone, tylenol, and ibuprofen with mild relief at home    In ED, vitals T 98.7, HR 93, /82, RR 18, O2 sat 97% on RA  Labs significant for Hb 11.2, ESR 62, CRP 45.7  Imaging:   Prelim radiology IMPRESSION:  No cortical erosions or subcutaneous tracking of gas to suggest   osteomyelitis.    ED management: IV clindamycin x1, IV morphine 4 mg x1 (2023 20:52)    no chest pain, dyspnea, palps, n, v, cough    PAST MEDICAL & SURGICAL HISTORY:  Anxiety      Breast CA, left  lumpectomy / RTX in the past      Hypertension      Sleep apnea  uses  cpap machine      Irritable bowel syndrome (IBS)      Polyp of colon  "pre-cancerous" x 2, removed 2014      HLD (hyperlipidemia)      Bladder polyp      S/P       S/P colon resection  reversal, had complications for fibriods      S/P hysterectomy      Breast lump      History of surgery  right groin fatty tissue removed      H/O lumpectomy  left       Personal history of spine surgery  L1-L4 fusion 2017            PREVIOUS DIAGNOSTIC TESTING:    [ ] Echocardiogram:  [ ]  Catheterization:  [ ] Stress Test:  	    MEDICATIONS:    Home Medications:  gabapentin 300 mg oral tablet: 1 tab(s) orally 3 times a day (2023 22:39)  Norvasc 2.5 mg oral tablet: 1 tab(s) orally once a day (at bedtime) (2023 22:41)  oxyCODONE 5 mg oral tablet:  (2023 22:41)  pravastatin 20 mg oral tablet: 1 orally once a day (at bedtime) (2023 22:40)  Vitamin D2: 1 tab(s) orally once a day (2023 22:41)      MEDICATIONS  (STANDING):  amLODIPine   Tablet 2.5 milliGRAM(s) Oral at bedtime  atorvastatin 10 milliGRAM(s) Oral at bedtime  clobetasol 0.05% Ointment 1 Application(s) Topical two times a day  enoxaparin Injectable 40 milliGRAM(s) SubCutaneous every 24 hours  gabapentin 300 milliGRAM(s) Oral three times a day  pantoprazole    Tablet 40 milliGRAM(s) Oral before breakfast  piperacillin/tazobactam IVPB.. 3.375 Gram(s) IV Intermittent every 8 hours  polyethylene glycol 3350 17 Gram(s) Oral daily  senna 2 Tablet(s) Oral at bedtime  vancomycin  IVPB 1000 milliGRAM(s) IV Intermittent every 24 hours      FAMILY HISTORY:  Family history of coronary artery disease (Mother)        SOCIAL HISTORY:    [ x] Non-smoker  [ ] Smoker  [ ] Alcohol    Allergies    sulfa drugs (Unknown)    Intolerances    	    REVIEW OF SYSTEMS:  CONSTITUTIONAL: No fever, weight loss, or fatigue  EYES: No eye pain, visual disturbances, or discharge  ENMT:  No difficulty hearing, tinnitus, vertigo; No sinus or throat pain  NECK: No pain or stiffness  RESPIRATORY: No cough, wheezing, chills or hemoptysis; No Shortness of Breath  CARDIOVASCULAR: as HPI  GASTROINTESTINAL: No abdominal or epigastric pain. No nausea, vomiting, or hematemesis; No diarrhea or constipation. No melena or hematochezia.  GENITOURINARY: No dysuria, frequency, hematuria, or incontinence  NEUROLOGICAL: No headaches, memory loss, loss of strength, numbness, or tremors  SKIN: No itching, burning, rashes, or lesions   	  [ ] All others negative	  [ ] Unable to obtain    PHYSICAL EXAM:    T(C): 37.1 (23 @ 04:41), Max: 38.8 (04-10-23 @ 21:53)  HR: 56 (23 @ 04:43) (56 - 93)  BP: 109/68 (23 @ 04:41) (109/68 - 161/79)  RR: 14 (23 @ 04:41) (14 - 20)  SpO2: 99% (23 @ 04:43) (71% - 99%)  Wt(kg): --  I&O's Summary    10 Apr 2023 07:01  -  2023 07:00  --------------------------------------------------------  IN: 260 mL / OUT: 0 mL / NET: 260 mL      Daily     Daily     Appearance: Normal	  Psychiatry: A & O x 3, Mood & affect appropriate  HEENT:   Normal oral mucosa, PERRL, EOMI	  Lymphatic: No lymphadenopathy  Cardiovascular: Normal S1 S2,RRR, No JVD, No murmurs  Respiratory: Lungs clear to auscultation	  Gastrointestinal:  Soft, Non-tender, + BS	  Skin: No rashes, No ecchymoses, No cyanosis	  Neurologic: Non-focal  Extremities: Normal range of motion,left ankle wrapped  Vascular: Peripheral pulses palpable 2+ bilaterally    TELEMETRY: 	    ECG:  	  RADIOLOGY:  OTHER: 	  	  LABS:	 	    CARDIAC MARKERS:        proBNP:     Lipid Profile:   HgA1c:   TSH:                           12.5   13.16 )-----------( 332      ( 10 Apr 2023 08:00 )             39.9     -11    136  |  95<L>  |  24<H>  ----------------------------<  109<H>  4.0   |  30  |  1.13    Ca    9.6      2023 05:55  Phos  4.0       Mg     2.20         TPro  7.2  /  Alb  4.0  /  TBili  0.3  /  DBili  x   /  AST  12  /  ALT  10  /  AlkPhos  90  04-11    PT/INR - ( 10 Apr 2023 08:00 )   PT: 13.0 sec;   INR: 1.12 ratio         PTT - ( 10 Apr 2023 08:00 )  PTT:20.6 sec    Creatinine, Serum: 1.13 mg/dL (23 @ 05:55)  Creatinine, Serum: 0.91 mg/dL (04-10-23 @ 11:39)  Creatinine, Serum: 0.89 mg/dL (04-10-23 @ 09:48)  Creatinine, Serum: 0.64 mg/dL (23 @ 06:19)  Creatinine, Serum: 0.87 mg/dL (23 @ 16:30)        ASSESSMENT/PLAN:

## 2023-04-11 NOTE — PROGRESS NOTE ADULT - SUBJECTIVE AND OBJECTIVE BOX
Patient is a 72y old  Female who presents with a chief complaint of cellulitis (2023 09:26)      INTERVAL HPI/OVERNIGHT EVENTS: seen and examined , improving pain , spiked fever   T(C): 37.2 (23 @ 11:46), Max: 38.8 (04-10-23 @ 21:53)  HR: 70 (23 @ 19:17) (56 - 93)  BP: 142/91 (23 @ 11:46) (109/68 - 161/79)  RR: 17 (23 @ 11:46) (14 - 20)  SpO2: 98% (23 @ 19:17) (71% - 99%)  Wt(kg): --  I&O's Summary    10 Apr 2023 07:01  -  2023 07:00  --------------------------------------------------------  IN: 260 mL / OUT: 0 mL / NET: 260 mL        PAST MEDICAL & SURGICAL HISTORY:  Anxiety      Breast CA, left  lumpectomy / RTX in the past      Hypertension      Sleep apnea  uses  cpap machine      Irritable bowel syndrome (IBS)      Polyp of colon  "pre-cancerous" x 2, removed 2014      HLD (hyperlipidemia)      Bladder polyp      S/P       S/P colon resection  reversal, had complications for fibriods      S/P hysterectomy      Breast lump      History of surgery  right groin fatty tissue removed      H/O lumpectomy  left       Personal history of spine surgery  L1-L4 fusion 2017          SOCIAL HISTORY  Alcohol:  Tobacco:  Illicit substance use:    FAMILY HISTORY:    REVIEW OF SYSTEMS:  CONSTITUTIONAL: No fever, weight loss, or fatigue  EYES: No eye pain, visual disturbances, or discharge  ENMT:  No difficulty hearing, tinnitus, vertigo; No sinus or throat pain  NECK: No pain or stiffness  RESPIRATORY: No cough, wheezing, chills or hemoptysis; No shortness of breath  CARDIOVASCULAR: No chest pain, palpitations, dizziness, or leg swelling  GASTROINTESTINAL: No abdominal or epigastric pain. No nausea, vomiting, or hematemesis; No diarrhea or constipation. No melena or hematochezia.  GENITOURINARY: No dysuria, frequency, hematuria, or incontinence  NEUROLOGICAL: No headaches, memory loss, loss of strength, numbness, or tremors  SKIN: No itching, burning, rashes, or lesions   LYMPH NODES: No enlarged glands  ENDOCRINE: No heat or cold intolerance; No hair loss  MUSCULOSKELETAL: No joint pain or swelling; No muscle, back, or extremity pain  PSYCHIATRIC: No depression, anxiety, mood swings, or difficulty sleeping  HEME/LYMPH: No easy bruising, or bleeding gums  ALLERY AND IMMUNOLOGIC: No hives or eczema    RADIOLOGY & ADDITIONAL TESTS:    Imaging Personally Reviewed:  [ ] YES  [ ] NO    Consultant(s) Notes Reviewed:  [ ] YES  [ ] NO    PHYSICAL EXAM:  GENERAL: NAD, well-groomed, well-developed  HEAD:  Atraumatic, Normocephalic  EYES: EOMI, PERRLA, conjunctiva and sclera clear  ENMT: No tonsillar erythema, exudates, or enlargement; Moist mucous membranes, Good dentition, No lesions  NECK: Supple, No JVD, Normal thyroid  NERVOUS SYSTEM:  Alert & Oriented X3, Good concentration; Motor Strength 5/5 B/L upper and lower extremities; DTRs 2+ intact and symmetric  CHEST/LUNG: Clear to percussion bilaterally; No rales, rhonchi, wheezing, or rubs  HEART: Regular rate and rhythm; No murmurs, rubs, or gallops  ABDOMEN: Soft, Nontender, Nondistended; Bowel sounds present  EXTREMITIES:  2+ Peripheral Pulses, No clubbing, cyanosis, or edema  LYMPH: No lymphadenopathy noted  SKIN: No rashes or lesions    LABS:                        11.9   8.96  )-----------( 326      ( 2023 05:55 )             39.6     04-11    136  |  95<L>  |  24<H>  ----------------------------<  109<H>  4.0   |  30  |  1.13    Ca    9.6      2023 05:55  Phos  4.0       Mg     2.20         TPro  7.2  /  Alb  4.0  /  TBili  0.3  /  DBili  x   /  AST  12  /  ALT  10  /  AlkPhos  90  -    PT/INR - ( 10 Apr 2023 08:00 )   PT: 13.0 sec;   INR: 1.12 ratio         PTT - ( 10 Apr 2023 08:00 )  PTT:20.6 sec    CAPILLARY BLOOD GLUCOSE                MEDICATIONS  (STANDING):  amLODIPine   Tablet 2.5 milliGRAM(s) Oral at bedtime  atorvastatin 10 milliGRAM(s) Oral at bedtime  clobetasol 0.05% Ointment 1 Application(s) Topical two times a day  enoxaparin Injectable 40 milliGRAM(s) SubCutaneous every 24 hours  gabapentin 300 milliGRAM(s) Oral three times a day  pantoprazole    Tablet 40 milliGRAM(s) Oral before breakfast  piperacillin/tazobactam IVPB.. 3.375 Gram(s) IV Intermittent every 8 hours  polyethylene glycol 3350 17 Gram(s) Oral daily  senna 2 Tablet(s) Oral at bedtime  vancomycin  IVPB 1000 milliGRAM(s) IV Intermittent every 24 hours    MEDICATIONS  (PRN):  acetaminophen     Tablet .. 650 milliGRAM(s) Oral every 6 hours PRN Temp greater or equal to 38C (100.4F), Mild Pain (1 - 3)  aluminum hydroxide/magnesium hydroxide/simethicone Suspension 30 milliLiter(s) Oral every 6 hours PRN Dyspepsia  melatonin 3 milliGRAM(s) Oral at bedtime PRN Insomnia  ondansetron Injectable 4 milliGRAM(s) IV Push every 6 hours PRN Nausea and/or Vomiting  oxyCODONE    IR 10 milliGRAM(s) Oral every 4 hours PRN Severe Pain (7 - 10)  oxyCODONE    IR 5 milliGRAM(s) Oral every 4 hours PRN Moderate Pain (4 - 6)      Care Discussed with Consultants/Other Providers [ ] YES  [ ] NO

## 2023-04-12 ENCOUNTER — APPOINTMENT (OUTPATIENT)
Dept: WOUND CARE | Facility: CLINIC | Age: 73
End: 2023-04-12

## 2023-04-12 LAB
ANION GAP SERPL CALC-SCNC: 10 MMOL/L — SIGNIFICANT CHANGE UP (ref 7–14)
BACTERIA WND CULT: NORMAL
BASOPHILS # BLD AUTO: 0.04 K/UL — SIGNIFICANT CHANGE UP (ref 0–0.2)
BASOPHILS NFR BLD AUTO: 0.5 % — SIGNIFICANT CHANGE UP (ref 0–2)
BUN SERPL-MCNC: 23 MG/DL — SIGNIFICANT CHANGE UP (ref 7–23)
CALCIUM SERPL-MCNC: 9.3 MG/DL — SIGNIFICANT CHANGE UP (ref 8.4–10.5)
CHLORIDE SERPL-SCNC: 99 MMOL/L — SIGNIFICANT CHANGE UP (ref 98–107)
CO2 SERPL-SCNC: 28 MMOL/L — SIGNIFICANT CHANGE UP (ref 22–31)
CREAT SERPL-MCNC: 0.9 MG/DL — SIGNIFICANT CHANGE UP (ref 0.5–1.3)
EGFR: 68 ML/MIN/1.73M2 — SIGNIFICANT CHANGE UP
EOSINOPHIL # BLD AUTO: 0.23 K/UL — SIGNIFICANT CHANGE UP (ref 0–0.5)
EOSINOPHIL NFR BLD AUTO: 3 % — SIGNIFICANT CHANGE UP (ref 0–6)
GLUCOSE SERPL-MCNC: 111 MG/DL — HIGH (ref 70–99)
HCT VFR BLD CALC: 36 % — SIGNIFICANT CHANGE UP (ref 34.5–45)
HGB BLD-MCNC: 10.9 G/DL — LOW (ref 11.5–15.5)
IANC: 5.08 K/UL — SIGNIFICANT CHANGE UP (ref 1.8–7.4)
IMM GRANULOCYTES NFR BLD AUTO: 0.4 % — SIGNIFICANT CHANGE UP (ref 0–0.9)
LYMPHOCYTES # BLD AUTO: 1.27 K/UL — SIGNIFICANT CHANGE UP (ref 1–3.3)
LYMPHOCYTES # BLD AUTO: 16.3 % — SIGNIFICANT CHANGE UP (ref 13–44)
MAGNESIUM SERPL-MCNC: 2.2 MG/DL — SIGNIFICANT CHANGE UP (ref 1.6–2.6)
MCHC RBC-ENTMCNC: 27.3 PG — SIGNIFICANT CHANGE UP (ref 27–34)
MCHC RBC-ENTMCNC: 30.3 GM/DL — LOW (ref 32–36)
MCV RBC AUTO: 90 FL — SIGNIFICANT CHANGE UP (ref 80–100)
MONOCYTES # BLD AUTO: 1.14 K/UL — HIGH (ref 0–0.9)
MONOCYTES NFR BLD AUTO: 14.6 % — HIGH (ref 2–14)
NEUTROPHILS # BLD AUTO: 5.08 K/UL — SIGNIFICANT CHANGE UP (ref 1.8–7.4)
NEUTROPHILS NFR BLD AUTO: 65.2 % — SIGNIFICANT CHANGE UP (ref 43–77)
NRBC # BLD: 0 /100 WBCS — SIGNIFICANT CHANGE UP (ref 0–0)
NRBC # FLD: 0 K/UL — SIGNIFICANT CHANGE UP (ref 0–0)
PHOSPHATE SERPL-MCNC: 3 MG/DL — SIGNIFICANT CHANGE UP (ref 2.5–4.5)
PLATELET # BLD AUTO: 302 K/UL — SIGNIFICANT CHANGE UP (ref 150–400)
POTASSIUM SERPL-MCNC: SIGNIFICANT CHANGE UP MMOL/L (ref 3.5–5.3)
POTASSIUM SERPL-SCNC: SIGNIFICANT CHANGE UP MMOL/L (ref 3.5–5.3)
RBC # BLD: 4 M/UL — SIGNIFICANT CHANGE UP (ref 3.8–5.2)
RBC # FLD: 14.8 % — HIGH (ref 10.3–14.5)
SODIUM SERPL-SCNC: 137 MMOL/L — SIGNIFICANT CHANGE UP (ref 135–145)
WBC # BLD: 7.79 K/UL — SIGNIFICANT CHANGE UP (ref 3.8–10.5)
WBC # FLD AUTO: 7.79 K/UL — SIGNIFICANT CHANGE UP (ref 3.8–10.5)

## 2023-04-12 PROCEDURE — 99232 SBSQ HOSP IP/OBS MODERATE 35: CPT | Mod: GC

## 2023-04-12 PROCEDURE — 99232 SBSQ HOSP IP/OBS MODERATE 35: CPT

## 2023-04-12 PROCEDURE — 93923 UPR/LXTR ART STDY 3+ LVLS: CPT | Mod: 26

## 2023-04-12 PROCEDURE — 99222 1ST HOSP IP/OBS MODERATE 55: CPT | Mod: FS

## 2023-04-12 RX ORDER — OXYCODONE HYDROCHLORIDE 5 MG/1
5 TABLET ORAL EVERY 4 HOURS
Refills: 0 | Status: DISCONTINUED | OUTPATIENT
Start: 2023-04-12 | End: 2023-04-19

## 2023-04-12 RX ORDER — OXYCODONE HYDROCHLORIDE 5 MG/1
2.5 TABLET ORAL EVERY 4 HOURS
Refills: 0 | Status: DISCONTINUED | OUTPATIENT
Start: 2023-04-12 | End: 2023-04-13

## 2023-04-12 RX ADMIN — POLYETHYLENE GLYCOL 3350 17 GRAM(S): 17 POWDER, FOR SOLUTION ORAL at 17:57

## 2023-04-12 RX ADMIN — ENOXAPARIN SODIUM 40 MILLIGRAM(S): 100 INJECTION SUBCUTANEOUS at 05:41

## 2023-04-12 RX ADMIN — OXYCODONE HYDROCHLORIDE 2.5 MILLIGRAM(S): 5 TABLET ORAL at 18:45

## 2023-04-12 RX ADMIN — OXYCODONE HYDROCHLORIDE 2.5 MILLIGRAM(S): 5 TABLET ORAL at 11:59

## 2023-04-12 RX ADMIN — OXYCODONE HYDROCHLORIDE 2.5 MILLIGRAM(S): 5 TABLET ORAL at 17:57

## 2023-04-12 RX ADMIN — GABAPENTIN 300 MILLIGRAM(S): 400 CAPSULE ORAL at 12:01

## 2023-04-12 RX ADMIN — Medication 1 APPLICATION(S): at 05:41

## 2023-04-12 RX ADMIN — Medication 250 MILLIGRAM(S): at 11:37

## 2023-04-12 RX ADMIN — Medication 650 MILLIGRAM(S): at 02:53

## 2023-04-12 RX ADMIN — OXYCODONE HYDROCHLORIDE 2.5 MILLIGRAM(S): 5 TABLET ORAL at 13:00

## 2023-04-12 RX ADMIN — GABAPENTIN 300 MILLIGRAM(S): 400 CAPSULE ORAL at 05:40

## 2023-04-12 RX ADMIN — AMLODIPINE BESYLATE 2.5 MILLIGRAM(S): 2.5 TABLET ORAL at 21:25

## 2023-04-12 RX ADMIN — GABAPENTIN 300 MILLIGRAM(S): 400 CAPSULE ORAL at 21:24

## 2023-04-12 RX ADMIN — Medication 650 MILLIGRAM(S): at 02:28

## 2023-04-12 RX ADMIN — ATORVASTATIN CALCIUM 10 MILLIGRAM(S): 80 TABLET, FILM COATED ORAL at 21:24

## 2023-04-12 RX ADMIN — PIPERACILLIN AND TAZOBACTAM 25 GRAM(S): 4; .5 INJECTION, POWDER, LYOPHILIZED, FOR SOLUTION INTRAVENOUS at 02:28

## 2023-04-12 RX ADMIN — Medication 1 APPLICATION(S): at 17:59

## 2023-04-12 RX ADMIN — PANTOPRAZOLE SODIUM 40 MILLIGRAM(S): 20 TABLET, DELAYED RELEASE ORAL at 05:40

## 2023-04-12 NOTE — CONSULT NOTE ADULT - ASSESSMENT
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     On treatment for cellulitis  MRI reviewed  Afebrile since 4/10   On vancomycin    -Cellulitis improved on vancomycin  -She missed her follow up with Oncologist Dr Richter 2/2 hospitalization, she needs to have CT urogram done for follow up of her urothelial carcinoma which can be done as outpatient  -  -  -Supportive care, pain control, Nutrition, PT, DVT ppx  -Outpatient oncology f/u    Will follow. Please do not hesitate to call back with questions.     Emely Queen MD  Medical Oncology Attending  C: [phone]     time spent on direct patient care, interdisciplinary discussions and chart review. 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     MRI reviewed  Nonspecific subcutaneous edema predominantly along the anterolateral   aspect of the ankle and dorsolateral aspect of the foot, which may   represent lower extremity edema or cellulitis. No drainable soft tissue   fluid collection is seen. No evidence of osteomyelitis.    Afebrile since 4/10   On vancomycin    -Cellulitis improved on vancomycin  -She missed her follow up with Oncologist Dr Richter 2/2 hospitalization, she needs to have CT urogram done for follow up of her urothelial carcinoma which can be done as outpatient  -No Concerning findings for metastatic disease or Osteomyelitis based on the MRI  -Supportive care, pain control, Nutrition, PT, DVT ppx  -Outpatient oncology f/u    Will follow. Please do not hesitate to call back with questions.     Emely Queen MD  Medical Oncology Attending  C: [phone]     time spent on direct patient care, interdisciplinary discussions and chart review. 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     MRI reviewed  Nonspecific subcutaneous edema predominantly along the anterolateral   aspect of the ankle and dorsolateral aspect of the foot, which may   represent lower extremity edema or cellulitis. No drainable soft tissue   fluid collection is seen. No evidence of osteomyelitis.    Afebrile since 4/10   On vancomycin  Given the appearance of the lesion, concern is raised for pyoderma gangrenosum, s/p derm evaluation and punch biopsy. Path pending     -Derm, Vascular surgery and ID input apprecaited  -Follow path  -She is s/p 2 years of Atezolizumab, however the last infusion was more than 3 years ago. Cases of pyoderma gangrenosum has been reported with immune check point inhibitors, however given the 3 year lag, this is rare.  -Consider CT c/a/p with IV contrast to assess for malignancy  -She missed her follow up with Oncologist Dr Richter 2/2 hospitalization  -No Concerning findings for metastatic disease or Osteomyelitis based on the MRI  -Supportive care, pain control, Nutrition, PT, DVT ppx  -Outpatient oncology f/u    Will follow. Please do not hesitate to call back with questions.     Emely Queen MD  Medical Oncology Attending  C: [phone]     time spent on direct patient care, interdisciplinary discussions and chart review.

## 2023-04-12 NOTE — PHYSICAL THERAPY INITIAL EVALUATION ADULT - PERTINENT HX OF CURRENT PROBLEM, REHAB EVAL
72-year-old female with past medical history of breast cancer status post right breast lumpectomy, bladder cancer with mets to the spine, in remission, hypertension, hyperlipidemia presents to the ER complaining of left ankle pain swelling and redness times approximately 1 week.

## 2023-04-12 NOTE — PROGRESS NOTE ADULT - SUBJECTIVE AND OBJECTIVE BOX
CC: F/U for Wound infection    Saw/spoke to patient. No new complaints. Unchanged.    Allergies  sulfa drugs (Unknown)    ANTIMICROBIALS:  vancomycin  IVPB 1000 every 24 hours    OTHER MEDS:  acetaminophen     Tablet .. 650 milliGRAM(s) Oral every 6 hours PRN  aluminum hydroxide/magnesium hydroxide/simethicone Suspension 30 milliLiter(s) Oral every 6 hours PRN  amLODIPine   Tablet 2.5 milliGRAM(s) Oral at bedtime  atorvastatin 10 milliGRAM(s) Oral at bedtime  clobetasol 0.05% Ointment 1 Application(s) Topical two times a day  enoxaparin Injectable 40 milliGRAM(s) SubCutaneous every 24 hours  gabapentin 300 milliGRAM(s) Oral three times a day  melatonin 3 milliGRAM(s) Oral at bedtime PRN  ondansetron Injectable 4 milliGRAM(s) IV Push every 6 hours PRN  oxyCODONE    IR 2.5 milliGRAM(s) Oral every 4 hours PRN  oxyCODONE    IR 5 milliGRAM(s) Oral every 4 hours PRN  pantoprazole    Tablet 40 milliGRAM(s) Oral before breakfast  polyethylene glycol 3350 17 Gram(s) Oral daily  senna 2 Tablet(s) Oral at bedtime    PE:    Vital Signs Last 24 Hrs  T(C): 36.2 (12 Apr 2023 13:22), Max: 37.1 (11 Apr 2023 20:23)  T(F): 97.2 (12 Apr 2023 13:22), Max: 98.7 (11 Apr 2023 20:23)  HR: 72 (12 Apr 2023 15:07) (64 - 74)  BP: 129/69 (12 Apr 2023 13:22) (127/78 - 132/77)  RR: 18 (12 Apr 2023 13:22) (18 - 18)  SpO2: 95% (12 Apr 2023 15:07) (95% - 100%)    Gen: AOx3, NAD, non-toxic  CV: Nontachycardic  Resp: Breathing comfortably, RA  Abd: Soft, nontender  IV/Skin: No thrombophlebitis    LABS:                        10.9   7.79  )-----------( 302      ( 12 Apr 2023 04:10 )             36.0     04-12    137  |  99  |  23  ----------------------------<  111<H>  TNP   |  28  |  0.90    Ca    9.3      12 Apr 2023 04:10  Phos  3.0     04-12  Mg     2.20     04-12    TPro  7.2  /  Alb  4.0  /  TBili  0.3  /  DBili  x   /  AST  12  /  ALT  10  /  AlkPhos  90  04-11    MICROBIOLOGY:    .Tissue Other, L lateral malleous  04-09-23   Culture is being performed.  --    No polymorphonuclear cells seen per low power field  No organisms seen per oil power field    .Blood Blood-Peripheral  04-08-23   No growth to date.  --  --    .Blood Blood-Venous  04-08-23   No growth to date.  --  --    RADIOLOGY:    4/10 MR:  Impression:    Nonspecific subcutaneous edema predominantly along the anterolateral   aspect of the ankle and dorsolateral aspect of the foot, which may   represent lower extremity edema or cellulitis. No drainable soft tissue   fluid collection is seen. No evidence of osteomyelitis.    Other findings as above.

## 2023-04-12 NOTE — CONSULT NOTE ADULT - SUBJECTIVE AND OBJECTIVE BOX
Patient is a 72y old  Female who presents with a chief complaint of cellulitis (2023 10:14)      HPI:  72-year-old female with past medical history of breast cancer status post right breast lumpectomy, bladder cancer with mets to the spine, in remission, hypertension, hyperlipidemia presents to the ER complaining of left ankle pain swelling and redness times approximately 1 week. Pt states she sprained her ankle in March and has been following up with her podiatrist, Dr. Coley. She states her left ankle had become swollen and erythematous and was started on course of steroid with some improvement however it has worsened this past week. She states it initially started off as a bump and it has since become erythematous and red. She denies any purulent drainage but reports she has hx of MRSA infection from previous right foot infection. She states she went to see an orthopedic doctor who expressed out clear fluid and sent it off for culture. She was started on augmentin 3 days ago. She reports severe pain, and is now having difficulty walking due to pain when bearing weight. She denies fevers but reports chills. Denies chest pain, SOB, abd pain, n/v/d, or urinary symptoms. She states she has been taking oxycodone, tylenol, and ibuprofen with mild relief at home    In ED, vitals T 98.7, HR 93, /82, RR 18, O2 sat 97% on RA  Labs significant for Hb 11.2, ESR 62, CRP 45.7  Imaging:   Prelim radiology IMPRESSION:  No cortical erosions or subcutaneous tracking of gas to suggest   osteomyelitis.    ED management: IV clindamycin x1, IV morphine 4 mg x1 (2023 20:52)       Oncologic History:      ROS: as above     PAST MEDICAL & SURGICAL HISTORY:  Anxiety      Breast CA, left  lumpectomy / RTX in the past      Hypertension      Sleep apnea  uses  cpap machine      Irritable bowel syndrome (IBS)      Polyp of colon  "pre-cancerous" x 2, removed 2014      HLD (hyperlipidemia)      Bladder polyp      S/P       S/P colon resection  reversal, had complications for fibriods      S/P hysterectomy      Breast lump      History of surgery  right groin fatty tissue removed      H/O lumpectomy  left       Personal history of spine surgery  L1-L4 fusion 2017          SOCIAL HISTORY:    FAMILY HISTORY:  Family history of coronary artery disease (Mother)        MEDICATIONS  (STANDING):  amLODIPine   Tablet 2.5 milliGRAM(s) Oral at bedtime  atorvastatin 10 milliGRAM(s) Oral at bedtime  clobetasol 0.05% Ointment 1 Application(s) Topical two times a day  enoxaparin Injectable 40 milliGRAM(s) SubCutaneous every 24 hours  gabapentin 300 milliGRAM(s) Oral three times a day  pantoprazole    Tablet 40 milliGRAM(s) Oral before breakfast  polyethylene glycol 3350 17 Gram(s) Oral daily  senna 2 Tablet(s) Oral at bedtime  vancomycin  IVPB 1000 milliGRAM(s) IV Intermittent every 24 hours    MEDICATIONS  (PRN):  acetaminophen     Tablet .. 650 milliGRAM(s) Oral every 6 hours PRN Temp greater or equal to 38C (100.4F), Mild Pain (1 - 3)  aluminum hydroxide/magnesium hydroxide/simethicone Suspension 30 milliLiter(s) Oral every 6 hours PRN Dyspepsia  melatonin 3 milliGRAM(s) Oral at bedtime PRN Insomnia  ondansetron Injectable 4 milliGRAM(s) IV Push every 6 hours PRN Nausea and/or Vomiting  oxyCODONE    IR 5 milliGRAM(s) Oral every 4 hours PRN Moderate Pain (4 - 6)  oxyCODONE    IR 10 milliGRAM(s) Oral every 4 hours PRN Severe Pain (7 - 10)      Allergies    sulfa drugs (Unknown)    Intolerances        Vital Signs Last 24 Hrs  T(C): 36.9 (2023 04:45), Max: 37.2 (2023 11:46)  T(F): 98.5 (2023 04:45), Max: 99 (2023 11:46)  HR: 70 (2023 08:05) (58 - 76)  BP: 132/77 (2023 04:45) (127/78 - 142/91)  BP(mean): --  RR: 18 (2023 04:45) (17 - 18)  SpO2: 99% (2023 08:05) (96% - 100%)    Parameters below as of 2023 04:45  Patient On (Oxygen Delivery Method): room air        PHYSICAL EXAM  General: adult in NAD  HEENT: clear oropharynx, anicteric sclera, pink conjunctiva  Neck: supple  CV: normal S1/S2 with no murmur rubs or gallops  Lungs: positive air movement b/l ant lungs, clear to auscultation, no wheezes, no rales  Abdomen: soft non-tender non-distended, no hepatosplenomegaly  Ext: no clubbing cyanosis or edema  Skin: no rashes and no petechiae  Neuro: alert and oriented X 3, none focal    LABS:                          10.9   7.79  )-----------( 302      ( 2023 04:10 )             36.0         Mean Cell Volume : 90.0 fL  Mean Cell Hemoglobin : 27.3 pg  Mean Cell Hemoglobin Concentration : 30.3 gm/dL  Auto Neutrophil # : 5.08 K/uL  Auto Lymphocyte # : 1.27 K/uL  Auto Monocyte # : 1.14 K/uL  Auto Eosinophil # : 0.23 K/uL  Auto Basophil # : 0.04 K/uL  Auto Neutrophil % : 65.2 %  Auto Lymphocyte % : 16.3 %  Auto Monocyte % : 14.6 %  Auto Eosinophil % : 3.0 %  Auto Basophil % : 0.5 %      Serial CBC's   @ 04:10  Hct-36.0 / Hgb-10.9 / Plat-302 / RBC-4.00 / WBC-7.79  Serial CBC's   @ 05:55  Hct-39.6 / Hgb-11.9 / Plat-326 / RBC-4.36 / WBC-8.96  Serial CBC's  04-10 @ 08:00  Hct-39.9 / Hgb-12.5 / Plat-332 / RBC-4.46 / WBC-13.16  Serial CBC's   @ 06:19  Hct-37.6 / Hgb-11.7 / Plat-300 / RBC-4.19 / WBC-10.44  Serial CBC's   @ 16:30  Hct-37.1 / Hgb-11.2 / Plat-281 / RBC-4.14 / WBC-8.26      04-12    137  |  99  |  23  ----------------------------<  111<H>  TNP   |  28  |  0.90    Ca    9.3      2023 04:10  Phos  3.0     04-12  Mg     2.20     04-12    TPro  7.2  /  Alb  4.0  /  TBili  0.3  /  DBili  x   /  AST  12  /  ALT  10  /  AlkPhos  90  04-11                      RADIOLOGY & ADDITIONAL STUDIES:     Patient is a 72y old  Female who presents with a chief complaint of cellulitis (2023 10:14)      HPI:  72-year-old female with past medical history of breast cancer status post right breast lumpectomy, bladder cancer with mets to the spine, in remission, hypertension, hyperlipidemia presents to the ER complaining of left ankle pain swelling and redness times approximately 1 week. Pt states she sprained her ankle in March and has been following up with her podiatrist, Dr. Coley. She states her left ankle had become swollen and erythematous and was started on course of steroid with some improvement however it has worsened this past week. She states it initially started off as a bump and it has since become erythematous and red. She denies any purulent drainage but reports she has hx of MRSA infection from previous right foot infection. She states she went to see an orthopedic doctor who expressed out clear fluid and sent it off for culture. She was started on augmentin 3 days ago. She reports severe pain, and is now having difficulty walking due to pain when bearing weight. She denies fevers but reports chills. Denies chest pain, SOB, abd pain, n/v/d, or urinary symptoms. She states she has been taking oxycodone, tylenol, and ibuprofen with mild relief at home    In ED, vitals T 98.7, HR 93, /82, RR 18, O2 sat 97% on RA  Labs significant for Hb 11.2, ESR 62, CRP 45.7  Imaging:   Prelim radiology IMPRESSION:  No cortical erosions or subcutaneous tracking of gas to suggest   osteomyelitis.    ED management: IV clindamycin x1, IV morphine 4 mg x1 (2023 20:52)     ROS: as above     PAST MEDICAL & SURGICAL HISTORY:  Anxiety      Breast CA, left  lumpectomy / RTX in the past      Hypertension      Sleep apnea  uses  cpap machine      Irritable bowel syndrome (IBS)      Polyp of colon  "pre-cancerous" x 2, removed 2014      HLD (hyperlipidemia)      Bladder polyp      S/P       S/P colon resection  reversal, had complications for fibriods      S/P hysterectomy      Breast lump      History of surgery  right groin fatty tissue removed      H/O lumpectomy  left       Personal history of spine surgery  L1-L4 fusion 2017          SOCIAL HISTORY:    FAMILY HISTORY:  Family history of coronary artery disease (Mother)        MEDICATIONS  (STANDING):  amLODIPine   Tablet 2.5 milliGRAM(s) Oral at bedtime  atorvastatin 10 milliGRAM(s) Oral at bedtime  clobetasol 0.05% Ointment 1 Application(s) Topical two times a day  enoxaparin Injectable 40 milliGRAM(s) SubCutaneous every 24 hours  gabapentin 300 milliGRAM(s) Oral three times a day  pantoprazole    Tablet 40 milliGRAM(s) Oral before breakfast  polyethylene glycol 3350 17 Gram(s) Oral daily  senna 2 Tablet(s) Oral at bedtime  vancomycin  IVPB 1000 milliGRAM(s) IV Intermittent every 24 hours    MEDICATIONS  (PRN):  acetaminophen     Tablet .. 650 milliGRAM(s) Oral every 6 hours PRN Temp greater or equal to 38C (100.4F), Mild Pain (1 - 3)  aluminum hydroxide/magnesium hydroxide/simethicone Suspension 30 milliLiter(s) Oral every 6 hours PRN Dyspepsia  melatonin 3 milliGRAM(s) Oral at bedtime PRN Insomnia  ondansetron Injectable 4 milliGRAM(s) IV Push every 6 hours PRN Nausea and/or Vomiting  oxyCODONE    IR 5 milliGRAM(s) Oral every 4 hours PRN Moderate Pain (4 - 6)  oxyCODONE    IR 10 milliGRAM(s) Oral every 4 hours PRN Severe Pain (7 - 10)      Allergies    sulfa drugs (Unknown)    Intolerances        Vital Signs Last 24 Hrs  T(C): 36.9 (2023 04:45), Max: 37.2 (2023 11:46)  T(F): 98.5 (2023 04:45), Max: 99 (2023 11:46)  HR: 70 (2023 08:05) (58 - 76)  BP: 132/77 (2023 04:45) (127/78 - 142/91)  BP(mean): --  RR: 18 (2023 04:45) (17 - 18)  SpO2: 99% (2023 08:05) (96% - 100%)    Parameters below as of 2023 04:45  Patient On (Oxygen Delivery Method): room air        PHYSICAL EXAM  General: adult in NAD  HEENT: clear oropharynx, anicteric sclera, pink conjunctiva  Neck: supple  CV: normal S1/S2 with no murmur rubs or gallops  Lungs: positive air movement b/l ant lungs, clear to auscultation, no wheezes, no rales  Abdomen: soft non-tender non-distended, no hepatosplenomegaly  Ext: no clubbing cyanosis or edema  Skin: no rashes and no petechiae  Neuro: alert and oriented X 3, none focal    LABS:                          10.9   7.79  )-----------( 302      ( 2023 04:10 )             36.0         Mean Cell Volume : 90.0 fL  Mean Cell Hemoglobin : 27.3 pg  Mean Cell Hemoglobin Concentration : 30.3 gm/dL  Auto Neutrophil # : 5.08 K/uL  Auto Lymphocyte # : 1.27 K/uL  Auto Monocyte # : 1.14 K/uL  Auto Eosinophil # : 0.23 K/uL  Auto Basophil # : 0.04 K/uL  Auto Neutrophil % : 65.2 %  Auto Lymphocyte % : 16.3 %  Auto Monocyte % : 14.6 %  Auto Eosinophil % : 3.0 %  Auto Basophil % : 0.5 %      Serial CBC's   @ 04:10  Hct-36.0 / Hgb-10.9 / Plat-302 / RBC-4.00 / WBC-7.79  Serial CBC's   @ 05:55  Hct-39.6 / Hgb-11.9 / Plat-326 / RBC-4.36 / WBC-8.96  Serial CBC's  04-10 @ 08:00  Hct-39.9 / Hgb-12.5 / Plat-332 / RBC-4.46 / WBC-13.16  Serial CBC's   @ 06:19  Hct-37.6 / Hgb-11.7 / Plat-300 / RBC-4.19 / WBC-10.44  Serial CBC's   @ 16:30  Hct-37.1 / Hgb-11.2 / Plat-281 / RBC-4.14 / WBC-8.26      -    137  |  99  |  23  ----------------------------<  111<H>  TNP   |  28  |  0.90    Ca    9.3      2023 04:10  Phos  3.0     04-12  Mg     2.20     04-12    TPro  7.2  /  Alb  4.0  /  TBili  0.3  /  DBili  x   /  AST  12  /  ALT  10  /  AlkPhos  90  04-11                      RADIOLOGY & ADDITIONAL STUDIES:

## 2023-04-12 NOTE — PHYSICAL THERAPY INITIAL EVALUATION ADULT - ADDITIONAL COMMENTS
Pt reports she lives in a home alone a few steps to negotiates fully independent, does not have DME, no history of falls.

## 2023-04-12 NOTE — PROGRESS NOTE ADULT - SUBJECTIVE AND OBJECTIVE BOX
Patient is a 72y old  Female who presents with a chief complaint of cellulitis (11 Apr 2023 22:36)       INTERVAL HPI/OVERNIGHT EVENTS:  Patient seen and evaluated at bedside.  Pt is resting comfortable in NAD. Denies N/V/F/C.    Allergies    sulfa drugs (Unknown)    Intolerances        Vital Signs Last 24 Hrs  T(C): 36.9 (12 Apr 2023 04:45), Max: 37.2 (11 Apr 2023 11:46)  T(F): 98.5 (12 Apr 2023 04:45), Max: 99 (11 Apr 2023 11:46)  HR: 70 (12 Apr 2023 08:05) (58 - 76)  BP: 132/77 (12 Apr 2023 04:45) (127/78 - 142/91)  BP(mean): --  RR: 18 (12 Apr 2023 04:45) (17 - 18)  SpO2: 99% (12 Apr 2023 08:05) (96% - 100%)    Parameters below as of 12 Apr 2023 04:45  Patient On (Oxygen Delivery Method): room air        LABS:                        10.9   7.79  )-----------( 302      ( 12 Apr 2023 04:10 )             36.0     04-12    137  |  99  |  23  ----------------------------<  111<H>  TNP   |  28  |  0.90    Ca    9.3      12 Apr 2023 04:10  Phos  3.0     04-12  Mg     2.20     04-12    TPro  7.2  /  Alb  4.0  /  TBili  0.3  /  DBili  x   /  AST  12  /  ALT  10  /  AlkPhos  90  04-11        CAPILLARY BLOOD GLUCOSE          Lower Extremity Physical Exam:    Vascular: DP/PT 2/4, B/L, CFT <3 seconds B/L, Temperature gradient warm to warm on left , warm to cool on right.   Neuro: Epicritic sensation intact to the level of toes, B/L.  Musculoskeletal/Ortho: pain on palpation to the dorsum of left lateral malleolus mildly improved, L ankle ROM wnl  Skin: L lateral malleoli nodular lesion, now with central lesion wound s/p punch biopsy with derm, granular wound bed, periwound erythema improved mildly, no drainage, no pus, no malodor.      RADIOLOGY & ADDITIONAL TESTS:

## 2023-04-12 NOTE — PROGRESS NOTE ADULT - SUBJECTIVE AND OBJECTIVE BOX
CARDIOLOGY FOLLOW UP NOTE - DR. BARNEY    Patient Name: KIM ACKERMAN  Date of Service: 04-12-23 @ 15:24    no new events      Subjective:    cv: denies chest pain, dyspnea, palpitations, dizziness  pulmonary: denies cough  GI: denies abdominal pain, nausea, vomiting  vascular/legs: no edema   skin: no rash  ROS: otherwise negative   overnight events:      PHYSICAL EXAM:  T(C): 36.2 (04-12-23 @ 13:22), Max: 37.1 (04-11-23 @ 20:23)  HR: 72 (04-12-23 @ 15:07) (64 - 74)  BP: 129/69 (04-12-23 @ 13:22) (127/78 - 132/77)  RR: 18 (04-12-23 @ 13:22) (18 - 18)  SpO2: 95% (04-12-23 @ 15:07) (95% - 100%)  Wt(kg): --  I&O's Summary    Daily     Daily     Appearance: Normal	  Cardiovascular: Normal S1 S2,RRR, No JVD, No murmurs  Respiratory: Lungs clear to auscultation	  Gastrointestinal:  Soft, Non-tender, + BS	  Extremities: Normal range of motion, No clubbing, cyanosis or edema      Home Medications:  gabapentin 300 mg oral tablet: 1 tab(s) orally 3 times a day (08 Apr 2023 22:39)  Norvasc 2.5 mg oral tablet: 1 tab(s) orally once a day (at bedtime) (08 Apr 2023 22:41)  oxyCODONE 5 mg oral tablet:  (08 Apr 2023 22:41)  pravastatin 20 mg oral tablet: 1 orally once a day (at bedtime) (08 Apr 2023 22:40)  Vitamin D2: 1 tab(s) orally once a day (08 Apr 2023 22:41)      MEDICATIONS  (STANDING):  amLODIPine   Tablet 2.5 milliGRAM(s) Oral at bedtime  atorvastatin 10 milliGRAM(s) Oral at bedtime  clobetasol 0.05% Ointment 1 Application(s) Topical two times a day  enoxaparin Injectable 40 milliGRAM(s) SubCutaneous every 24 hours  gabapentin 300 milliGRAM(s) Oral three times a day  pantoprazole    Tablet 40 milliGRAM(s) Oral before breakfast  polyethylene glycol 3350 17 Gram(s) Oral daily  senna 2 Tablet(s) Oral at bedtime  vancomycin  IVPB 1000 milliGRAM(s) IV Intermittent every 24 hours      TELEMETRY: 	    ECG:  	  RADIOLOGY:   DIAGNOSTIC TESTING:  [ ] Echocardiogram:  [ ] Catheterization:  [ ] Stress Test:    OTHER: 	    LABS:	 	    CARDIAC MARKERS:                                      10.9   7.79  )-----------( 302      ( 12 Apr 2023 04:10 )             36.0     04-12    137  |  99  |  23  ----------------------------<  111<H>  TNP   |  28  |  0.90    Ca    9.3      12 Apr 2023 04:10  Phos  3.0     04-12  Mg     2.20     04-12    TPro  7.2  /  Alb  4.0  /  TBili  0.3  /  DBili  x   /  AST  12  /  ALT  10  /  AlkPhos  90  04-11    proBNP:     Lipid Profile:   HgA1c:     Creatinine, Serum: 0.90 mg/dL (04-12-23 @ 04:10)  Creatinine, Serum: 1.13 mg/dL (04-11-23 @ 05:55)  Creatinine, Serum: 0.91 mg/dL (04-10-23 @ 11:39)  Creatinine, Serum: 0.89 mg/dL (04-10-23 @ 09:48)

## 2023-04-12 NOTE — PROGRESS NOTE ADULT - ASSESSMENT
72-year-old female with PMH of breast cancer s/p right breast lumpectomy, bladder cancer with mets to the spine, in remission, hypertension, hyperlipidemia presents to the ER complaining of left ankle pain swelling and redness times approximately 1 week  No fever, no leukocytosis  LLE pain and swelling, reported discharge at outside physician office  Painful nodular lesion at L foot, complains of pain in ankle  XR with no OM  Culture from site reportedly negative  Infected SSTI at site  MR with subcutaneous edema, LE edema vs cellulitis, no fluid collection, no OM  Overall, Cellulitis/infected nodule, elevated ESR  - Vanco 1g q 24 (monitor levels), 10 days then DC. If discharge planning, anticipate would complete treatment with PO course  - F/U pending cultures  - F/U BCXs  - F/U podiatry, Rheum, Derm  - F/U pending Pathology    Herb Leos MD  Contact on TEAMS messaging from 9am - 5pm  From 5pm-9am, on weekends, or if no response call [phone]

## 2023-04-12 NOTE — PROGRESS NOTE ADULT - SUBJECTIVE AND OBJECTIVE BOX
Robert Chow MD, PGY-4  Rheumatology Fellow  Reachable on TEAMS    KIM ACKERMAN  [id number]    INTERVAL EVENTS  patient more awake compared to last assessment. notes no difference in symptoms    MEDICATIONS  (STANDING):  amLODIPine   Tablet 2.5 milliGRAM(s) Oral at bedtime  atorvastatin 10 milliGRAM(s) Oral at bedtime  clobetasol 0.05% Ointment 1 Application(s) Topical two times a day  enoxaparin Injectable 40 milliGRAM(s) SubCutaneous every 24 hours  gabapentin 300 milliGRAM(s) Oral three times a day  pantoprazole    Tablet 40 milliGRAM(s) Oral before breakfast  polyethylene glycol 3350 17 Gram(s) Oral daily  senna 2 Tablet(s) Oral at bedtime  vancomycin  IVPB 1000 milliGRAM(s) IV Intermittent every 24 hours    MEDICATIONS  (PRN):  acetaminophen     Tablet .. 650 milliGRAM(s) Oral every 6 hours PRN Temp greater or equal to 38C (100.4F), Mild Pain (1 - 3)  aluminum hydroxide/magnesium hydroxide/simethicone Suspension 30 milliLiter(s) Oral every 6 hours PRN Dyspepsia  melatonin 3 milliGRAM(s) Oral at bedtime PRN Insomnia  ondansetron Injectable 4 milliGRAM(s) IV Push every 6 hours PRN Nausea and/or Vomiting  oxyCODONE    IR 2.5 milliGRAM(s) Oral every 4 hours PRN Moderate Pain (4 - 6)  oxyCODONE    IR 5 milliGRAM(s) Oral every 4 hours PRN Severe Pain (7 - 10)      Vital Signs Last 24 Hrs  T(C): 36.2 (12 Apr 2023 13:22), Max: 37.1 (11 Apr 2023 20:23)  T(F): 97.2 (12 Apr 2023 13:22), Max: 98.7 (11 Apr 2023 20:23)  HR: 72 (12 Apr 2023 15:07) (64 - 74)  BP: 129/69 (12 Apr 2023 13:22) (127/78 - 132/77)  BP(mean): --  RR: 18 (12 Apr 2023 13:22) (18 - 18)  SpO2: 95% (12 Apr 2023 15:07) (95% - 100%)    Parameters below as of 12 Apr 2023 04:45  Patient On (Oxygen Delivery Method): room air      Physical Exam:  General: no acute distress  Resp: breathing comfortably  GI: Soft, NT/ND +BS  MSK: no swelling/wamrth/erythema of the joints of the UE/ RLE without swelling/wamth/erythema  Skin: L ankle w/ minimal swelling w/ open ovoid ulcer        LABS:  cret                        10.9   7.79  )-----------( 302      ( 12 Apr 2023 04:10 )             36.0     04-12    137  |  99  |  23  ----------------------------<  111<H>  TNP   |  28  |  0.90    Ca    9.3      12 Apr 2023 04:10  Phos  3.0     04-12  Mg     2.20     04-12    TPro  7.2  /  Alb  4.0  /  TBili  0.3  /  DBili  x   /  AST  12  /  ALT  10  /  AlkPhos  90  04-11          RADIOLOGY & ADDITIONAL STUDIES:    < from: Xray Ankle Complete 3 Views, Left (04.08.23 @ 17:33) >    ACC: [id number] EXAM:  XR ANKLE COMP MIN 3 VIEWS LT   ORDERED BY: LAKEISHA POE     PROCEDURE DATE:  04/08/2023          INTERPRETATION:  CLINICAL INDICATION: Cellulitis.    TECHNIQUE: X-ray left ankle 3 views    COMPARISON: X-ray left ankle 4/27/2021    FINDINGS:    No acute fracture or dislocation.  No definite focal cortical erosion or periosteal reaction.  Mild ankle soft tissue swelling. No radiopaque foreign body.    IMPRESSION:  No convincing plain radiographic evidence for acute osteomyelitis.    --- End of Report ---          RANJEET BOWIE MD; Resident Radiologist  This document has been electronically signed.  ANA RANGEL MD; Attending Radiologist  This document has been electronically signed. Apr 9 2023  9:27AM    < end of copied text >   Robert Chow MD, PGY-4  Rheumatology Fellow  Reachable on TEAMS    KIM ACKERMAN  [id number]    INTERVAL EVENTS  patient more awake compared to last assessment. notes no difference in symptoms  still with significant pain in the left ankle    Chart reviewed:  Interim events noted, consultant notes reviewed, primary team notes reviewed.     MEDICATIONS  (STANDING):  amLODIPine   Tablet 2.5 milliGRAM(s) Oral at bedtime  atorvastatin 10 milliGRAM(s) Oral at bedtime  clobetasol 0.05% Ointment 1 Application(s) Topical two times a day  enoxaparin Injectable 40 milliGRAM(s) SubCutaneous every 24 hours  gabapentin 300 milliGRAM(s) Oral three times a day  pantoprazole    Tablet 40 milliGRAM(s) Oral before breakfast  polyethylene glycol 3350 17 Gram(s) Oral daily  senna 2 Tablet(s) Oral at bedtime  vancomycin  IVPB 1000 milliGRAM(s) IV Intermittent every 24 hours    MEDICATIONS  (PRN):  acetaminophen     Tablet .. 650 milliGRAM(s) Oral every 6 hours PRN Temp greater or equal to 38C (100.4F), Mild Pain (1 - 3)  aluminum hydroxide/magnesium hydroxide/simethicone Suspension 30 milliLiter(s) Oral every 6 hours PRN Dyspepsia  melatonin 3 milliGRAM(s) Oral at bedtime PRN Insomnia  ondansetron Injectable 4 milliGRAM(s) IV Push every 6 hours PRN Nausea and/or Vomiting  oxyCODONE    IR 2.5 milliGRAM(s) Oral every 4 hours PRN Moderate Pain (4 - 6)  oxyCODONE    IR 5 milliGRAM(s) Oral every 4 hours PRN Severe Pain (7 - 10)      Vital Signs Last 24 Hrs  T(C): 36.2 (12 Apr 2023 13:22), Max: 37.1 (11 Apr 2023 20:23)  T(F): 97.2 (12 Apr 2023 13:22), Max: 98.7 (11 Apr 2023 20:23)  HR: 72 (12 Apr 2023 15:07) (64 - 74)  BP: 129/69 (12 Apr 2023 13:22) (127/78 - 132/77)  RR: 18 (12 Apr 2023 13:22) (18 - 18)  SpO2: 95% (12 Apr 2023 15:07) (95% - 100%)    Parameters below as of 12 Apr 2023 04:45  Patient On (Oxygen Delivery Method): room air      Physical Exam:  General: no acute distress  Resp: breathing comfortably  GI: Soft, NT/ND +BS  MSK: no swelling/wamrth/erythema of the joints of the UE/ RLE without swelling/wamth/erythema  Skin: L ankle w/ minimal swelling w/ open ovoid ulcer        LABS:                        10.9   7.79  )-----------( 302      ( 12 Apr 2023 04:10 )             36.0     04-12    137  |  99  |  23  ----------------------------<  111<H>  TNP   |  28  |  0.90    Ca    9.3      12 Apr 2023 04:10  Phos  3.0     04-12  Mg     2.20     04-12    TPro  7.2  /  Alb  4.0  /  TBili  0.3  /  DBili  x   /  AST  12  /  ALT  10  /  AlkPhos  90  04-11          RADIOLOGY & ADDITIONAL STUDIES:    < from: Xray Ankle Complete 3 Views, Left (04.08.23 @ 17:33) >    ACC: [id number] EXAM:  XR ANKLE COMP MIN 3 VIEWS LT   ORDERED BY: LAKEISHA POE     PROCEDURE DATE:  04/08/2023          INTERPRETATION:  CLINICAL INDICATION: Cellulitis.    TECHNIQUE: X-ray left ankle 3 views    COMPARISON: X-ray left ankle 4/27/2021    FINDINGS:    No acute fracture or dislocation.  No definite focal cortical erosion or periosteal reaction.  Mild ankle soft tissue swelling. No radiopaque foreign body.    IMPRESSION:  No convincing plain radiographic evidence for acute osteomyelitis.    --- End of Report ---          RANJEET BOWIE MD; Resident Radiologist  This document has been electronically signed.  ANA RANGEL MD; Attending Radiologist  This document has been electronically signed. Apr 9 2023  9:27AM    < end of copied text >

## 2023-04-12 NOTE — PROGRESS NOTE ADULT - ASSESSMENT
73 yo F PMH breast cancer (s/p R lumpectomy), bladder cancer with mets to the spine (now in remission since 2018), HTN, HLD, L1-L4 fusion, R foot fx s/p MRSA infection (1/2023) p/w left ankle pain swelling and erythema for 3 weeks. Rheumatology consulted for evaluation.     #left ankle pain/swelling/erythema with nodular lesion overlying lateral malleolus  =developed 3 weeks ago w/o improvement on 7days of PO steroids or 3 days of outpatient Augmentin  =no leukocytosis, has elevated inflammatory markers, x-ray w/o fracture or signs of gas or obvious osteomyelitis  =has recent hx of small R foot fx s/p MRSA infection (1/2023) w/ similar presentation that improved w/ antibiotic course  =based on exam findings, suspect infection. It does not appear like pyoderma gangrenosum. Can also consider arterial ulcer based on location, however has warm foot w/ palpable pulses. Differential for nodular lesions that can be associated w/ autoimmune disease include RA, SLE, Vasculitis, sarcoidosis, however patient does not have any stigmata of this on history or exam.  -Vitamin D 1.25 elevated which maybe due to a granulomatous disease such as sarcoidosis  -serologies: negative ANCA, MPO, PR3, ACE RF/CCP.   Vitamin D 1,25  elevated  -MR ankle with non-specific edema. non-diagnostic    Plan:  -serologies as above are negative. vitamin D 1,25 elevated which maybe in the context of a granulomatous disease  -will f/u vitamin D 25  -to f/u  tissue biopsy results  -would hold off on steroids until infection has been ruled out. the patient also received intralesional steroids      Discussed with Attending Dr. Carvajal

## 2023-04-12 NOTE — PROGRESS NOTE ADULT - ASSESSMENT
A/P  72-year-old female with past medical history of breast cancer status post right breast lumpectomy, bladder cancer with mets to the spine, in remission, hypertension, hyperlipidemia presents to the ER complaining of left ankle pain swelling and redness times approximately 1 week. Admit for IV abx for cellulitis.     #Cellulitis/foot wound  -abx per med  -pod f/u  -r/o osteo  -pod/vasc w/u  -await le angio     #Anemia.   -cont to trend heme    #HTN (hypertension).   -stable  -cont amlodipine 2.5 mg QD.      dvt ppx      35 minutes spent on total encounter; more than 50% of the visit was spent counseling and/or coordinating care by the attending physician.

## 2023-04-12 NOTE — CONSULT NOTE ADULT - CONSULT REASON
painful lesion
ankle nodular lesion
Bladder cancer
Eval Left foot lesion/ preop podiatry
preop
Left ankle cellulitis
cellulitis

## 2023-04-12 NOTE — PROGRESS NOTE ADULT - SUBJECTIVE AND OBJECTIVE BOX
Patient is a 72y old  Female who presents with a chief complaint of cellulitis (2023 16:18)      INTERVAL HPI/OVERNIGHT EVENTS: doing fair , afebrile   T(C): 37.2 (23 @ 20:56), Max: 37.2 (23 @ 20:56)  HR: 73 (23 @ 20:56) (67 - 73)  BP: 111/70 (23 @ 20:56) (111/70 - 128/73)  RR: 19 (23 @ 20:56) (18 - 19)  SpO2: 98% (23 @ 20:56) (97% - 100%)  Wt(kg): --  I&O's Summary      PAST MEDICAL & SURGICAL HISTORY:  Anxiety      Breast CA, left  lumpectomy / RTX in the past      Hypertension      Sleep apnea  uses  cpap machine      Irritable bowel syndrome (IBS)      Polyp of colon  "pre-cancerous" x 2, removed 2014      HLD (hyperlipidemia)      Bladder polyp      S/P       S/P colon resection  reversal, had complications for fibriods      S/P hysterectomy      Breast lump      History of surgery  right groin fatty tissue removed      H/O lumpectomy  left       Personal history of spine surgery  L1-L4 fusion 2017          SOCIAL HISTORY  Alcohol:  Tobacco:  Illicit substance use:    FAMILY HISTORY:    REVIEW OF SYSTEMS:  CONSTITUTIONAL: No fever, weight loss, or fatigue  EYES: No eye pain, visual disturbances, or discharge  ENMT:  No difficulty hearing, tinnitus, vertigo; No sinus or throat pain  NECK: No pain or stiffness  RESPIRATORY: No cough, wheezing, chills or hemoptysis; No shortness of breath  CARDIOVASCULAR: No chest pain, palpitations, dizziness, or leg swelling  GASTROINTESTINAL: No abdominal or epigastric pain. No nausea, vomiting, or hematemesis; No diarrhea or constipation. No melena or hematochezia.  GENITOURINARY: No dysuria, frequency, hematuria, or incontinence  NEUROLOGICAL: No headaches, memory loss, loss of strength, numbness, or tremors  SKIN: No itching, burning, rashes, or lesions   LYMPH NODES: No enlarged glands  ENDOCRINE: No heat or cold intolerance; No hair loss  MUSCULOSKELETAL: No joint pain or swelling; No muscle, back, or extremity pain  PSYCHIATRIC: No depression, anxiety, mood swings, or difficulty sleeping  HEME/LYMPH: No easy bruising, or bleeding gums  ALLERY AND IMMUNOLOGIC: No hives or eczema    RADIOLOGY & ADDITIONAL TESTS:    Imaging Personally Reviewed:  [ ] YES  [ ] NO    Consultant(s) Notes Reviewed:  [ ] YES  [ ] NO    PHYSICAL EXAM:  GENERAL: NAD, well-groomed, well-developed  HEAD:  Atraumatic, Normocephalic  EYES: EOMI, PERRLA, conjunctiva and sclera clear  ENMT: No tonsillar erythema, exudates, or enlargement; Moist mucous membranes, Good dentition, No lesions  NECK: Supple, No JVD, Normal thyroid  NERVOUS SYSTEM:  Alert & Oriented X3, Good concentration; Motor Strength 5/5 B/L upper and lower extremities; DTRs 2+ intact and symmetric  CHEST/LUNG: Clear to percussion bilaterally; No rales, rhonchi, wheezing, or rubs  HEART: Regular rate and rhythm; No murmurs, rubs, or gallops  ABDOMEN: Soft, Nontender, Nondistended; Bowel sounds present  EXTREMITIES:  2+ Peripheral Pulses, No clubbing, cyanosis, or edema  LYMPH: No lymphadenopathy noted  SKIN: No rashes or lesions    LABS:                        10.6   7.87  )-----------( 284      ( 2023 03:32 )             34.2     04-13    139  |  100  |  23  ----------------------------<  134<H>  3.7   |  31  |  0.98    Ca    9.4      2023 03:32  Phos  2.9     04-13  Mg     2.00     04-13          CAPILLARY BLOOD GLUCOSE                MEDICATIONS  (STANDING):  amLODIPine   Tablet 2.5 milliGRAM(s) Oral at bedtime  atorvastatin 10 milliGRAM(s) Oral at bedtime  clobetasol 0.05% Ointment 1 Application(s) Topical two times a day  enoxaparin Injectable 40 milliGRAM(s) SubCutaneous every 24 hours  gabapentin 300 milliGRAM(s) Oral three times a day  pantoprazole    Tablet 40 milliGRAM(s) Oral before breakfast  polyethylene glycol 3350 17 Gram(s) Oral daily  senna 2 Tablet(s) Oral at bedtime  vancomycin  IVPB 1250 milliGRAM(s) IV Intermittent every 24 hours    MEDICATIONS  (PRN):  acetaminophen     Tablet .. 650 milliGRAM(s) Oral every 6 hours PRN Temp greater or equal to 38C (100.4F), Mild Pain (1 - 3)  aluminum hydroxide/magnesium hydroxide/simethicone Suspension 30 milliLiter(s) Oral every 6 hours PRN Dyspepsia  melatonin 3 milliGRAM(s) Oral at bedtime PRN Insomnia  ondansetron Injectable 4 milliGRAM(s) IV Push every 6 hours PRN Nausea and/or Vomiting  oxyCODONE    IR 2.5 milliGRAM(s) Oral every 4 hours PRN Moderate Pain (4 - 6)  oxyCODONE    IR 5 milliGRAM(s) Oral every 4 hours PRN Severe Pain (7 - 10)      Care Discussed with Consultants/Other Providers [ ] YES  [ ] NO

## 2023-04-12 NOTE — PHYSICAL THERAPY INITIAL EVALUATION ADULT - NSPTDISCHREC_GEN_A_CORE
Home PT to address ambulation limitations associated with L ankle Cellulitis.  Pt would benefit from a rolling walker to improve gait and overall mobility/Home PT

## 2023-04-12 NOTE — PROGRESS NOTE ADULT - ASSESSMENT
72F PMHx breast cancer status post right breast lumpectomy, bladder cancer with mets to the spine, in remission, hypertension, hyperlipidemia presents with left lateral ankle nodular lesion. Plan for podiatry local wound care vs OR L ankle I+D.   Vascular consulted for evaluation of potential arterial ulcer etiology unclear     Plan:  continue woundcare  tent lle angio on fri   indications risks and benefits d/w pt  pt consents  will follow

## 2023-04-12 NOTE — PHYSICAL THERAPY INITIAL EVALUATION ADULT - ASSISTIVE DEVICE FOR TRANSFER: GAIT, REHAB EVAL
Antalgic gait given LLE ankle discomfort.  Pt reporting difficulty with weight bearing of LLE.  Will continue to monitor pts ambulation ability./rolling walker

## 2023-04-12 NOTE — CONSULT NOTE ADULT - CONSULT REQUESTED DATE/TIME
08-Apr-2023 21:50
09-Apr-2023 11:35
09-Apr-2023 12:38
09-Apr-2023 16:21
10-Apr-2023 15:45
12-Apr-2023 10:49
11-Apr-2023 08:51

## 2023-04-12 NOTE — PROGRESS NOTE ADULT - ATTENDING COMMENTS
left ankle lesion - infection -vs- nodular pyoderma vs - autoimmune process   hold off on systemic steroids  has received intralesional steroids and topical steroids from derm   follow up MRI and biopsy results   antibiotics per ID   will continue to follow with you     Maryam Carvajal MD  [facility], Division of Rheumatology   [address], [address]  [place] [address]  [phone]  [email or web address]  available on TEAMS
Left ankle pain and lesion  pending biopsy report to determine etiology of lesion     Maryam Carvajal MD  [phone]

## 2023-04-12 NOTE — PROGRESS NOTE ADULT - SUBJECTIVE AND OBJECTIVE BOX
Patient is a 72y old  Female who presents with a chief complaint of cellulitis (12 Apr 2023 17:11)      Vascular Surgery Attending Progress Note    Interval HPI: pt w/o new c/o     Medications:  acetaminophen     Tablet .. 650 milliGRAM(s) Oral every 6 hours PRN  aluminum hydroxide/magnesium hydroxide/simethicone Suspension 30 milliLiter(s) Oral every 6 hours PRN  amLODIPine   Tablet 2.5 milliGRAM(s) Oral at bedtime  atorvastatin 10 milliGRAM(s) Oral at bedtime  clobetasol 0.05% Ointment 1 Application(s) Topical two times a day  enoxaparin Injectable 40 milliGRAM(s) SubCutaneous every 24 hours  gabapentin 300 milliGRAM(s) Oral three times a day  melatonin 3 milliGRAM(s) Oral at bedtime PRN  ondansetron Injectable 4 milliGRAM(s) IV Push every 6 hours PRN  oxyCODONE    IR 2.5 milliGRAM(s) Oral every 4 hours PRN  oxyCODONE    IR 5 milliGRAM(s) Oral every 4 hours PRN  pantoprazole    Tablet 40 milliGRAM(s) Oral before breakfast  polyethylene glycol 3350 17 Gram(s) Oral daily  senna 2 Tablet(s) Oral at bedtime  vancomycin  IVPB 1000 milliGRAM(s) IV Intermittent every 24 hours      Vital Signs Last 24 Hrs  T(C): 36.2 (12 Apr 2023 13:22), Max: 37.1 (11 Apr 2023 20:23)  T(F): 97.2 (12 Apr 2023 13:22), Max: 98.7 (11 Apr 2023 20:23)  HR: 72 (12 Apr 2023 15:07) (64 - 74)  BP: 129/69 (12 Apr 2023 13:22) (127/78 - 132/77)  BP(mean): --  RR: 18 (12 Apr 2023 13:22) (18 - 18)  SpO2: 95% (12 Apr 2023 15:07) (95% - 100%)    Parameters below as of 12 Apr 2023 04:45  Patient On (Oxygen Delivery Method): room air      I&O's Summary      Physical Exam:  Neuro  A&Ox3 VSS  Vascular:  left foot dressing c/d/i    LABS:                        10.9   7.79  )-----------( 302      ( 12 Apr 2023 04:10 )             36.0     04-12    137  |  99  |  23  ----------------------------<  111<H>  TNP   |  28  |  0.90    Ca    9.3      12 Apr 2023 04:10  Phos  3.0     04-12  Mg     2.20     04-12    TPro  7.2  /  Alb  4.0  /  TBili  0.3  /  DBili  x   /  AST  12  /  ALT  10  /  AlkPhos  90  04-11    MRI foot reviewed w pt  jessica/pvr reviewed w pt     TONY IRELAND MD  [phone] [phone]

## 2023-04-12 NOTE — PROGRESS NOTE ADULT - ASSESSMENT
72-year-old female with past medical history of breast cancer status post right breast lumpectomy, bladder cancer with mets to the spine, in remission, hypertension, hyperlipidemia presents to the ER complaining of left ankle pain swelling and redness times approximately 1 week. Admit for IV abx for cellulitis.       #  LE / ankle Cellulitis :  doing better   c/w IV abx   ID/ podiatry following     PAD :  vascular consulted      Anemia.   of ch dis   H/H stable     # HTN (hypertension).   controlled   -c/w amlodipine     #HLD (hyperlipidemia).   c/w statin     c/w IV abx

## 2023-04-12 NOTE — PHYSICAL THERAPY INITIAL EVALUATION ADULT - GENERAL OBSERVATIONS, REHAB EVAL
Pt received in chair all lines intact NAD all precautions observed RN made aware, call bell left within reach, PT will continue to follow.

## 2023-04-12 NOTE — PROGRESS NOTE ADULT - ASSESSMENT
72F with left lateral ankle nodular lesion  - Patient seen and evaluated  - Afebrile, WBC 7.79  - L lateral malleoli nodular lesion, now with central lesion wound s/p punch biopsy with derm, granular wound bed, periwound erythema improved mildly, no drainage, no pus, no malodor.  - Vasc recs ABIPVR  - left foot xray and ankle so no gas, no OM, no fracture  - Recommended broad spectrum antibiotics with IV Vanco and Zosyn (discussed with primary team and ID)  - ID recs appreciated   - s/p L lateral malleoli punch biopsy, recs appreciated   - Rheum recs appreciated    - Recommend no cortisone to L ankle wound   - Left ankle MRI: no OM no Abscess   - No acute surgical intervention at this time, will await clinical course with IV ABx and team recs   - Pod plan local wound care pending Team recs.   - Discussed with attending   72F with left lateral ankle nodular lesion  - Patient seen and evaluated  - Afebrile, WBC 7.79  - L lateral malleoli nodular lesion, now with central lesion wound s/p punch biopsy with derm, granular wound bed, periwound erythema improved mildly, no drainage, no pus, no malodor.  - left foot xray and ankle so no gas, no OM, no fracture  - Left ankle MRI: no OM no Abscess   - Recommended broad spectrum antibiotics with IV Vanco and Zosyn (discussed with primary team and ID)  - ID recs appreciated   - Rheum recs appreciated    - Vasc recs ABIPVR  - Derm path pending   - L ankle culture: no grow prelim,  - Recommend no cortisone to L ankle wound   - No acute surgical intervention at this time, will await clinical course with IV ABx and team recs   - Pod plan local wound care pending Team final  recs.   - Discussed with attending

## 2023-04-13 ENCOUNTER — APPOINTMENT (OUTPATIENT)
Dept: PODIATRY | Facility: HOSPITAL | Age: 73
End: 2023-04-13
Payer: MEDICARE

## 2023-04-13 LAB
24R-OH-CALCIDIOL SERPL-MCNC: 30.5 NG/ML — SIGNIFICANT CHANGE UP (ref 30–80)
ANA PAT FLD IF-IMP: ABNORMAL
ANA TITR SER: ABNORMAL
ANION GAP SERPL CALC-SCNC: 8 MMOL/L — SIGNIFICANT CHANGE UP (ref 7–14)
BASOPHILS # BLD AUTO: 0.05 K/UL — SIGNIFICANT CHANGE UP (ref 0–0.2)
BASOPHILS NFR BLD AUTO: 0.6 % — SIGNIFICANT CHANGE UP (ref 0–2)
BUN SERPL-MCNC: 23 MG/DL — SIGNIFICANT CHANGE UP (ref 7–23)
CALCIUM SERPL-MCNC: 9.4 MG/DL — SIGNIFICANT CHANGE UP (ref 8.4–10.5)
CHLORIDE SERPL-SCNC: 100 MMOL/L — SIGNIFICANT CHANGE UP (ref 98–107)
CO2 SERPL-SCNC: 31 MMOL/L — SIGNIFICANT CHANGE UP (ref 22–31)
CREAT SERPL-MCNC: 0.98 MG/DL — SIGNIFICANT CHANGE UP (ref 0.5–1.3)
CULTURE RESULTS: SIGNIFICANT CHANGE UP
CULTURE RESULTS: SIGNIFICANT CHANGE UP
EGFR: 61 ML/MIN/1.73M2 — SIGNIFICANT CHANGE UP
EOSINOPHIL # BLD AUTO: 0.2 K/UL — SIGNIFICANT CHANGE UP (ref 0–0.5)
EOSINOPHIL NFR BLD AUTO: 2.5 % — SIGNIFICANT CHANGE UP (ref 0–6)
GAMMA INTERFERON BACKGROUND BLD IA-ACNC: 0.03 IU/ML — SIGNIFICANT CHANGE UP
GLUCOSE SERPL-MCNC: 134 MG/DL — HIGH (ref 70–99)
HCT VFR BLD CALC: 34.2 % — LOW (ref 34.5–45)
HGB BLD-MCNC: 10.6 G/DL — LOW (ref 11.5–15.5)
IANC: 4.97 K/UL — SIGNIFICANT CHANGE UP (ref 1.8–7.4)
IMM GRANULOCYTES NFR BLD AUTO: 0.6 % — SIGNIFICANT CHANGE UP (ref 0–0.9)
LYMPHOCYTES # BLD AUTO: 1.48 K/UL — SIGNIFICANT CHANGE UP (ref 1–3.3)
LYMPHOCYTES # BLD AUTO: 18.8 % — SIGNIFICANT CHANGE UP (ref 13–44)
M TB IFN-G BLD-IMP: NEGATIVE — SIGNIFICANT CHANGE UP
M TB IFN-G CD4+ BCKGRND COR BLD-ACNC: 0 IU/ML — SIGNIFICANT CHANGE UP
M TB IFN-G CD4+CD8+ BCKGRND COR BLD-ACNC: 0 IU/ML — SIGNIFICANT CHANGE UP
MAGNESIUM SERPL-MCNC: 2 MG/DL — SIGNIFICANT CHANGE UP (ref 1.6–2.6)
MCHC RBC-ENTMCNC: 27.9 PG — SIGNIFICANT CHANGE UP (ref 27–34)
MCHC RBC-ENTMCNC: 31 GM/DL — LOW (ref 32–36)
MCV RBC AUTO: 90 FL — SIGNIFICANT CHANGE UP (ref 80–100)
MONOCYTES # BLD AUTO: 1.12 K/UL — HIGH (ref 0–0.9)
MONOCYTES NFR BLD AUTO: 14.2 % — HIGH (ref 2–14)
NEUTROPHILS # BLD AUTO: 4.97 K/UL — SIGNIFICANT CHANGE UP (ref 1.8–7.4)
NEUTROPHILS NFR BLD AUTO: 63.3 % — SIGNIFICANT CHANGE UP (ref 43–77)
NRBC # BLD: 0 /100 WBCS — SIGNIFICANT CHANGE UP (ref 0–0)
NRBC # FLD: 0 K/UL — SIGNIFICANT CHANGE UP (ref 0–0)
PHOSPHATE SERPL-MCNC: 2.9 MG/DL — SIGNIFICANT CHANGE UP (ref 2.5–4.5)
PLATELET # BLD AUTO: 284 K/UL — SIGNIFICANT CHANGE UP (ref 150–400)
POTASSIUM SERPL-MCNC: 3.7 MMOL/L — SIGNIFICANT CHANGE UP (ref 3.5–5.3)
POTASSIUM SERPL-SCNC: 3.7 MMOL/L — SIGNIFICANT CHANGE UP (ref 3.5–5.3)
QUANT TB PLUS MITOGEN MINUS NIL: 1.57 IU/ML — SIGNIFICANT CHANGE UP
RBC # BLD: 3.8 M/UL — SIGNIFICANT CHANGE UP (ref 3.8–5.2)
RBC # FLD: 14.8 % — HIGH (ref 10.3–14.5)
SARS-COV-2 RNA SPEC QL NAA+PROBE: SIGNIFICANT CHANGE UP
SODIUM SERPL-SCNC: 139 MMOL/L — SIGNIFICANT CHANGE UP (ref 135–145)
SPECIMEN SOURCE: SIGNIFICANT CHANGE UP
SPECIMEN SOURCE: SIGNIFICANT CHANGE UP
VANCOMYCIN TROUGH SERPL-MCNC: 7.2 UG/ML — LOW (ref 10–20)
WBC # BLD: 7.87 K/UL — SIGNIFICANT CHANGE UP (ref 3.8–10.5)
WBC # FLD AUTO: 7.87 K/UL — SIGNIFICANT CHANGE UP (ref 3.8–10.5)

## 2023-04-13 PROCEDURE — 99232 SBSQ HOSP IP/OBS MODERATE 35: CPT

## 2023-04-13 PROCEDURE — 93010 ELECTROCARDIOGRAM REPORT: CPT

## 2023-04-13 PROCEDURE — 99232 SBSQ HOSP IP/OBS MODERATE 35: CPT | Mod: FS

## 2023-04-13 PROCEDURE — 99222 1ST HOSP IP/OBS MODERATE 55: CPT

## 2023-04-13 RX ORDER — VANCOMYCIN HCL 1 G
1250 VIAL (EA) INTRAVENOUS EVERY 24 HOURS
Refills: 0 | Status: DISCONTINUED | OUTPATIENT
Start: 2023-04-13 | End: 2023-04-15

## 2023-04-13 RX ADMIN — OXYCODONE HYDROCHLORIDE 2.5 MILLIGRAM(S): 5 TABLET ORAL at 19:43

## 2023-04-13 RX ADMIN — AMLODIPINE BESYLATE 2.5 MILLIGRAM(S): 2.5 TABLET ORAL at 21:07

## 2023-04-13 RX ADMIN — Medication 1 APPLICATION(S): at 05:19

## 2023-04-13 RX ADMIN — GABAPENTIN 300 MILLIGRAM(S): 400 CAPSULE ORAL at 15:04

## 2023-04-13 RX ADMIN — ENOXAPARIN SODIUM 40 MILLIGRAM(S): 100 INJECTION SUBCUTANEOUS at 05:19

## 2023-04-13 RX ADMIN — GABAPENTIN 300 MILLIGRAM(S): 400 CAPSULE ORAL at 21:07

## 2023-04-13 RX ADMIN — Medication 166.67 MILLIGRAM(S): at 05:19

## 2023-04-13 RX ADMIN — PANTOPRAZOLE SODIUM 40 MILLIGRAM(S): 20 TABLET, DELAYED RELEASE ORAL at 05:20

## 2023-04-13 RX ADMIN — OXYCODONE HYDROCHLORIDE 2.5 MILLIGRAM(S): 5 TABLET ORAL at 19:56

## 2023-04-13 RX ADMIN — GABAPENTIN 300 MILLIGRAM(S): 400 CAPSULE ORAL at 05:19

## 2023-04-13 RX ADMIN — POLYETHYLENE GLYCOL 3350 17 GRAM(S): 17 POWDER, FOR SOLUTION ORAL at 15:13

## 2023-04-13 RX ADMIN — Medication 1 APPLICATION(S): at 17:58

## 2023-04-13 RX ADMIN — ATORVASTATIN CALCIUM 10 MILLIGRAM(S): 80 TABLET, FILM COATED ORAL at 21:07

## 2023-04-13 NOTE — PROGRESS NOTE ADULT - ASSESSMENT
72-year-old female with past medical history of breast cancer status post right breast lumpectomy, bladder cancer with mets to the spine, in remission, hypertension, hyperlipidemia presents to the ER complaining of left ankle pain swelling and redness times approximately 1 week. Admit for IV abx for cellulitis.       #  LE / ankle Cellulitis :  doing better   c/w IV abx   ID/ podiatry following     PAD :  -seen by vascular   scheduled for LE angiogram in am      Anemia.   of ch dis   H/H stable     # HTN (hypertension).   controlled   -c/w amlodipine     #HLD (hyperlipidemia).   c/w statin     dispo: medically stable with moderate cardiovascular risk for the procedure

## 2023-04-13 NOTE — PROGRESS NOTE ADULT - SUBJECTIVE AND OBJECTIVE BOX
Patient is a 72y old  Female who presents with a chief complaint of cellulitis (13 Apr 2023 15:08)       INTERVAL HPI/OVERNIGHT EVENTS:  Patient seen and evaluated at bedside.  Pt is resting comfortable in NAD. Denies N/V/F/C.    Allergies    sulfa drugs (Unknown)    Intolerances        Vital Signs Last 24 Hrs  T(C): 36.7 (13 Apr 2023 12:38), Max: 36.8 (12 Apr 2023 20:43)  T(F): 98 (13 Apr 2023 12:38), Max: 98.2 (12 Apr 2023 20:43)  HR: 68 (13 Apr 2023 12:38) (67 - 69)  BP: 128/73 (13 Apr 2023 12:38) (122/71 - 128/73)  BP(mean): --  RR: 18 (13 Apr 2023 12:38) (18 - 19)  SpO2: 97% (13 Apr 2023 12:38) (97% - 100%)    Parameters below as of 13 Apr 2023 04:31  Patient On (Oxygen Delivery Method): room air        LABS:                        10.6   7.87  )-----------( 284      ( 13 Apr 2023 03:32 )             34.2     04-13    139  |  100  |  23  ----------------------------<  134<H>  3.7   |  31  |  0.98    Ca    9.4      13 Apr 2023 03:32  Phos  2.9     04-13  Mg     2.00     04-13          CAPILLARY BLOOD GLUCOSE          Lower Extremity Physical Exam:  Vascular: DP/PT 2/4, B/L, CFT <3 seconds B/L, Temperature gradient warm to warm on left , warm to cool on right.   Neuro: Epicritic sensation intact to the level of toes, B/L.  Musculoskeletal/Ortho: pain on palpation to the dorsum of left lateral malleolus mildly improved, L ankle ROM wnl  Skin: L lateral malleoli nodular lesion, now with central lesion wound s/p punch biopsy with derm, granular wound bed, periwound erythema improved mildly, no drainage, no pus, no malodor.    RADIOLOGY & ADDITIONAL TESTS:

## 2023-04-13 NOTE — PROGRESS NOTE ADULT - ASSESSMENT
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     MRI reviewed  Nonspecific subcutaneous edema predominantly along the anterolateral   aspect of the ankle and dorsolateral aspect of the foot, which may   represent lower extremity edema or cellulitis. No drainable soft tissue   fluid collection is seen. No evidence of osteomyelitis.    Afebrile since 4/10   On vancomycin  Given the appearance of the lesion, concern is raised for pyoderma gangrenosum, s/p derm evaluation and punch biopsy. Path pending     -Rheum, Derm,ID input appreciated  - Rheum workup so far unremarkable  -Follow path of tissue biopsy  -She is s/p 2 years of Atezolizumab, however the last infusion was more than 3 years ago. Cases of pyoderma gangrenosum has been reported with immune check point inhibitors, however given the 3 year lag, this is rare.  -No Concerning findings for metastatic disease or Osteomyelitis based on the MRI of ankle  -Rec CT c/a/p with IV contrast to assess for malignancy  -Seen by Vascular for eval of potential arterial ulcer etiology unclear , tentatively planned for a LLE jasper delaney 4/14- will follow  -Rest of care as per primary team  -Patient to followup with Dr. Richter ([facility]) upon discharge  -C/w Supportive care, pain control, Nutrition, PT, DVT ppx  -Oncology will continue to follow with you      Case discussed with Dr. Bernice WRIGHT  Oncology Physician Assistant  [facility]  Pager [phone] also available on Teams    If before 8am/after 5pm or on weekends please page On-call Oncology Fellow       time spent on direct patient care, interdisciplinary discussions and chart review. 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     MRI reviewed  Nonspecific subcutaneous edema predominantly along the anterolateral   aspect of the ankle and dorsolateral aspect of the foot, which may   represent lower extremity edema or cellulitis. No drainable soft tissue   fluid collection is seen. No evidence of osteomyelitis.    Afebrile since 4/10   On vancomycin  Given the appearance of the lesion, concern is raised for pyoderma gangrenosum, s/p derm evaluation and punch biopsy. Path pending     -Rheum, Derm,ID input appreciated  - Rheum workup so far unremarkable  -Follow path of skin biopsy  -She is s/p 2 years of Atezolizumab, however the last infusion was more than 3 years ago. Cases of pyoderma gangrenosum has been reported with immune check point inhibitors, however given the 3 year lag, this is rare.  -No Concerning findings for metastatic disease or Osteomyelitis based on the MRI of ankle  -Rec CT c/a/p with IV contrast to assess for malignancy  -Seen by Vascular for eval of potential arterial ulcer etiology unclear , tentatively planned for a LLE jasper delaney 4/14- will follow  -Rest of care as per primary team  -Patient to followup with Dr. Richter ([facility]) upon discharge  -C/w Supportive care, pain control, Nutrition, PT, DVT ppx  -Oncology will continue to follow with you      Case discussed with Dr. Bernice WRIGHT  Oncology Physician Assistant  [facility]  Pager [phone] also available on Teams    If before 8am/after 5pm or on weekends please page On-call Oncology Fellow       time spent on direct patient care, interdisciplinary discussions and chart review.

## 2023-04-13 NOTE — PROGRESS NOTE ADULT - SUBJECTIVE AND OBJECTIVE BOX
Patient is a 72y old  Female who presents with a chief complaint of cellulitis (2023 15:20)      INTERVAL HPI/OVERNIGHT EVENTS: scheduled for LE angiogram in am   T(C): 37.2 (23 @ 20:56), Max: 37.2 (23 @ 20:56)  HR: 73 (23 @ 20:56) (67 - 73)  BP: 111/70 (23 @ 20:56) (111/70 - 128/73)  RR: 19 (23 @ 20:56) (18 - 19)  SpO2: 98% (23 @ 20:56) (97% - 100%)  Wt(kg): --  I&O's Summary      PAST MEDICAL & SURGICAL HISTORY:  Anxiety      Breast CA, left  lumpectomy / RTX in the past      Hypertension      Sleep apnea  uses  cpap machine      Irritable bowel syndrome (IBS)      Polyp of colon  "pre-cancerous" x 2, removed 2014      HLD (hyperlipidemia)      Bladder polyp      S/P       S/P colon resection  reversal, had complications for fibriods      S/P hysterectomy      Breast lump      History of surgery  right groin fatty tissue removed      H/O lumpectomy  left       Personal history of spine surgery  L1-L4 fusion 2017          SOCIAL HISTORY  Alcohol:  Tobacco:  Illicit substance use:    FAMILY HISTORY:    REVIEW OF SYSTEMS:  CONSTITUTIONAL: No fever, weight loss, or fatigue  EYES: No eye pain, visual disturbances, or discharge  ENMT:  No difficulty hearing, tinnitus, vertigo; No sinus or throat pain  NECK: No pain or stiffness  RESPIRATORY: No cough, wheezing, chills or hemoptysis; No shortness of breath  CARDIOVASCULAR: No chest pain, palpitations, dizziness, or leg swelling  GASTROINTESTINAL: No abdominal or epigastric pain. No nausea, vomiting, or hematemesis; No diarrhea or constipation. No melena or hematochezia.  GENITOURINARY: No dysuria, frequency, hematuria, or incontinence  NEUROLOGICAL: No headaches, memory loss, loss of strength, numbness, or tremors  SKIN: No itching, burning, rashes, or lesions   LYMPH NODES: No enlarged glands  ENDOCRINE: No heat or cold intolerance; No hair loss  MUSCULOSKELETAL: No joint pain or swelling; No muscle, back, or extremity pain  PSYCHIATRIC: No depression, anxiety, mood swings, or difficulty sleeping  HEME/LYMPH: No easy bruising, or bleeding gums  ALLERY AND IMMUNOLOGIC: No hives or eczema    RADIOLOGY & ADDITIONAL TESTS:    Imaging Personally Reviewed:  [ ] YES  [ ] NO    Consultant(s) Notes Reviewed:  [ ] YES  [ ] NO    PHYSICAL EXAM:  GENERAL: NAD, well-groomed, well-developed  HEAD:  Atraumatic, Normocephalic  EYES: EOMI, PERRLA, conjunctiva and sclera clear  ENMT: No tonsillar erythema, exudates, or enlargement; Moist mucous membranes, Good dentition, No lesions  NECK: Supple, No JVD, Normal thyroid  NERVOUS SYSTEM:  Alert & Oriented X3, Good concentration; Motor Strength 5/5 B/L upper and lower extremities; DTRs 2+ intact and symmetric  CHEST/LUNG: Clear to percussion bilaterally; No rales, rhonchi, wheezing, or rubs  HEART: Regular rate and rhythm; No murmurs, rubs, or gallops  ABDOMEN: Soft, Nontender, Nondistended; Bowel sounds present  EXTREMITIES:  2+ Peripheral Pulses, No clubbing, cyanosis, or edema  LYMPH: No lymphadenopathy noted  SKIN: No rashes or lesions    LABS:                        10.6   7.87  )-----------( 284      ( 2023 03:32 )             34.2     04-13    139  |  100  |  23  ----------------------------<  134<H>  3.7   |  31  |  0.98    Ca    9.4      2023 03:32  Phos  2.9     04-13  Mg     2.00     04-13          CAPILLARY BLOOD GLUCOSE                MEDICATIONS  (STANDING):  amLODIPine   Tablet 2.5 milliGRAM(s) Oral at bedtime  atorvastatin 10 milliGRAM(s) Oral at bedtime  clobetasol 0.05% Ointment 1 Application(s) Topical two times a day  enoxaparin Injectable 40 milliGRAM(s) SubCutaneous every 24 hours  gabapentin 300 milliGRAM(s) Oral three times a day  pantoprazole    Tablet 40 milliGRAM(s) Oral before breakfast  polyethylene glycol 3350 17 Gram(s) Oral daily  senna 2 Tablet(s) Oral at bedtime  vancomycin  IVPB 1250 milliGRAM(s) IV Intermittent every 24 hours    MEDICATIONS  (PRN):  acetaminophen     Tablet .. 650 milliGRAM(s) Oral every 6 hours PRN Temp greater or equal to 38C (100.4F), Mild Pain (1 - 3)  aluminum hydroxide/magnesium hydroxide/simethicone Suspension 30 milliLiter(s) Oral every 6 hours PRN Dyspepsia  melatonin 3 milliGRAM(s) Oral at bedtime PRN Insomnia  ondansetron Injectable 4 milliGRAM(s) IV Push every 6 hours PRN Nausea and/or Vomiting  oxyCODONE    IR 2.5 milliGRAM(s) Oral every 4 hours PRN Moderate Pain (4 - 6)  oxyCODONE    IR 5 milliGRAM(s) Oral every 4 hours PRN Severe Pain (7 - 10)      Care Discussed with Consultants/Other Providers [ ] YES  [ ] NO

## 2023-04-13 NOTE — PROGRESS NOTE ADULT - SUBJECTIVE AND OBJECTIVE BOX
CC: F/U for Wound infection    Saw/spoke to patient. No fevers, no chills. No new complaints.    Allergies  sulfa drugs (Unknown)    ANTIMICROBIALS:  vancomycin  IVPB 1250 every 24 hours    PE:    Vital Signs Last 24 Hrs  T(C): 36.7 (13 Apr 2023 12:38), Max: 36.8 (12 Apr 2023 20:43)  T(F): 98 (13 Apr 2023 12:38), Max: 98.2 (12 Apr 2023 20:43)  HR: 68 (13 Apr 2023 12:38) (67 - 69)  BP: 128/73 (13 Apr 2023 12:38) (122/71 - 128/73)  RR: 18 (13 Apr 2023 12:38) (18 - 19)  SpO2: 97% (13 Apr 2023 12:38) (97% - 100%)    Gen: AOx3, NAD, non-toxic  CV: Nontachycardic  Resp: Breathing comfortably, RA  Abd: Soft, nontender  IV/Skin: No thrombophlebitis    LABS:                        10.6   7.87  )-----------( 284      ( 13 Apr 2023 03:32 )             34.2     04-13    139  |  100  |  23  ----------------------------<  134<H>  3.7   |  31  |  0.98    Ca    9.4      13 Apr 2023 03:32  Phos  2.9     04-13  Mg     2.00     04-13    MICROBIOLOGY:  Vancomycin Level, Trough: 7.2 ug/mL (04-13-23 @ 03:32)    .Tissue Other, L lateral malleous  04-09-23   Culture is being performed.  --    No polymorphonuclear cells seen per low power field  No organisms seen per oil power field    .Blood Blood-Peripheral  04-08-23   No growth to date.  --  --    .Blood Blood-Venous  04-08-23   No growth to date.  --  --    RADIOLOGY:    4/10 MR:    Impression:    Nonspecific subcutaneous edema predominantly along the anterolateral   aspect of the ankle and dorsolateral aspect of the foot, which may   represent lower extremity edema or cellulitis. No drainable soft tissue   fluid collection is seen. No evidence of osteomyelitis.    Other findings as above.

## 2023-04-13 NOTE — PROGRESS NOTE ADULT - SUBJECTIVE AND OBJECTIVE BOX
CARDIOLOGY FOLLOW UP NOTE - DR. BARNEY    Patient Name: KIM ACKERMAN  Date of Service: 04-13-23 @ 15:09    no new events, complaints    Subjective:    cv: denies chest pain, dyspnea, palpitations, dizziness  pulmonary: denies cough  GI: denies abdominal pain, nausea, vomiting  vascular/legs: no edema   skin: no rash  ROS: otherwise negative   overnight events:      PHYSICAL EXAM:  T(C): 36.7 (04-13-23 @ 12:38), Max: 36.8 (04-12-23 @ 20:43)  HR: 68 (04-13-23 @ 12:38) (67 - 69)  BP: 128/73 (04-13-23 @ 12:38) (122/71 - 128/73)  RR: 18 (04-13-23 @ 12:38) (18 - 19)  SpO2: 97% (04-13-23 @ 12:38) (97% - 100%)  Wt(kg): --  I&O's Summary    Daily     Daily     Appearance: Normal	  Cardiovascular: Normal S1 S2,RRR, No JVD, No murmurs  Respiratory: Lungs clear to auscultation	  Gastrointestinal:  Soft, Non-tender, + BS	  Extremities: Normal range of motion, No clubbing, cyanosis or edema      Home Medications:  gabapentin 300 mg oral tablet: 1 tab(s) orally 3 times a day (08 Apr 2023 22:39)  Norvasc 2.5 mg oral tablet: 1 tab(s) orally once a day (at bedtime) (08 Apr 2023 22:41)  oxyCODONE 5 mg oral tablet:  (08 Apr 2023 22:41)  pravastatin 20 mg oral tablet: 1 orally once a day (at bedtime) (08 Apr 2023 22:40)  Vitamin D2: 1 tab(s) orally once a day (08 Apr 2023 22:41)      MEDICATIONS  (STANDING):  amLODIPine   Tablet 2.5 milliGRAM(s) Oral at bedtime  atorvastatin 10 milliGRAM(s) Oral at bedtime  clobetasol 0.05% Ointment 1 Application(s) Topical two times a day  enoxaparin Injectable 40 milliGRAM(s) SubCutaneous every 24 hours  gabapentin 300 milliGRAM(s) Oral three times a day  pantoprazole    Tablet 40 milliGRAM(s) Oral before breakfast  polyethylene glycol 3350 17 Gram(s) Oral daily  senna 2 Tablet(s) Oral at bedtime  vancomycin  IVPB 1250 milliGRAM(s) IV Intermittent every 24 hours      TELEMETRY: 	    ECG:  	  RADIOLOGY:   DIAGNOSTIC TESTING:  [ ] Echocardiogram:  [ ] Catheterization:  [ ] Stress Test:    OTHER: 	    LABS:	 	    CARDIAC MARKERS:                                      10.6   7.87  )-----------( 284      ( 13 Apr 2023 03:32 )             34.2     04-13    139  |  100  |  23  ----------------------------<  134<H>  3.7   |  31  |  0.98    Ca    9.4      13 Apr 2023 03:32  Phos  2.9     04-13  Mg     2.00     04-13      proBNP:     Lipid Profile:   HgA1c:     Creatinine, Serum: 0.98 mg/dL (04-13-23 @ 03:32)  Creatinine, Serum: 0.90 mg/dL (04-12-23 @ 04:10)  Creatinine, Serum: 1.13 mg/dL (04-11-23 @ 05:55)

## 2023-04-13 NOTE — PROGRESS NOTE ADULT - SUBJECTIVE AND OBJECTIVE BOX
Patient is a 72y old  Female who presents with a chief complaint of cellulitis (12 Apr 2023 19:50)      Vascular Surgery Attending Progress Note    Interval HPI: pt wants to proceed  w lle angio    Medications:  acetaminophen     Tablet .. 650 milliGRAM(s) Oral every 6 hours PRN  aluminum hydroxide/magnesium hydroxide/simethicone Suspension 30 milliLiter(s) Oral every 6 hours PRN  amLODIPine   Tablet 2.5 milliGRAM(s) Oral at bedtime  atorvastatin 10 milliGRAM(s) Oral at bedtime  clobetasol 0.05% Ointment 1 Application(s) Topical two times a day  enoxaparin Injectable 40 milliGRAM(s) SubCutaneous every 24 hours  gabapentin 300 milliGRAM(s) Oral three times a day  melatonin 3 milliGRAM(s) Oral at bedtime PRN  ondansetron Injectable 4 milliGRAM(s) IV Push every 6 hours PRN  oxyCODONE    IR 2.5 milliGRAM(s) Oral every 4 hours PRN  oxyCODONE    IR 5 milliGRAM(s) Oral every 4 hours PRN  pantoprazole    Tablet 40 milliGRAM(s) Oral before breakfast  polyethylene glycol 3350 17 Gram(s) Oral daily  senna 2 Tablet(s) Oral at bedtime  vancomycin  IVPB 1250 milliGRAM(s) IV Intermittent every 24 hours      Vital Signs Last 24 Hrs  T(C): 36.7 (13 Apr 2023 04:31), Max: 36.8 (12 Apr 2023 20:43)  T(F): 98 (13 Apr 2023 04:31), Max: 98.2 (12 Apr 2023 20:43)  HR: 67 (13 Apr 2023 04:31) (64 - 72)  BP: 122/71 (13 Apr 2023 04:31) (122/71 - 129/69)  BP(mean): --  RR: 19 (13 Apr 2023 04:31) (18 - 19)  SpO2: 100% (13 Apr 2023 04:31) (95% - 100%)    Parameters below as of 13 Apr 2023 04:31  Patient On (Oxygen Delivery Method): room air      I&O's Summary      Physical Exam:  Neuro  A&Ox3 VSS  Vascular:  left ankle dressing c/d/i    LABS:                        10.6   7.87  )-----------( 284      ( 13 Apr 2023 03:32 )             34.2     04-13    139  |  100  |  23  ----------------------------<  134<H>  3.7   |  31  |  0.98    Ca    9.4      13 Apr 2023 03:32  Phos  2.9     04-13  Mg     2.00     04-13          TONY IRELAND MD  [phone] Cell [phone]

## 2023-04-13 NOTE — PROGRESS NOTE ADULT - SUBJECTIVE AND OBJECTIVE BOX
INTERVAL HPI/OVERNIGHT EVENTS:  Patient seen at bedside.  Patient tearful, overwhelmed by all the information that she is getting  Fearful about upcoming tests    VITAL SIGNS:  T(F): 98 (04-13-23 @ 12:38)  HR: 68 (04-13-23 @ 12:38)  BP: 128/73 (04-13-23 @ 12:38)  RR: 18 (04-13-23 @ 12:38)  SpO2: 97% (04-13-23 @ 12:38)  Wt(kg): --    PHYSICAL EXAM:  Appearance: Normal	  Cardiovascular: Normal S1 S2,RRR, No JVD, No murmurs  Respiratory: Lungs clear to auscultation	  Gastrointestinal:  Soft, Non-tender, + BS	  Extremities: Normal range of motion, No clubbing, cyanosis or edema    MEDICATIONS  (STANDING):  amLODIPine   Tablet 2.5 milliGRAM(s) Oral at bedtime  atorvastatin 10 milliGRAM(s) Oral at bedtime  clobetasol 0.05% Ointment 1 Application(s) Topical two times a day  enoxaparin Injectable 40 milliGRAM(s) SubCutaneous every 24 hours  gabapentin 300 milliGRAM(s) Oral three times a day  pantoprazole    Tablet 40 milliGRAM(s) Oral before breakfast  polyethylene glycol 3350 17 Gram(s) Oral daily  senna 2 Tablet(s) Oral at bedtime  vancomycin  IVPB 1250 milliGRAM(s) IV Intermittent every 24 hours    MEDICATIONS  (PRN):  acetaminophen     Tablet .. 650 milliGRAM(s) Oral every 6 hours PRN Temp greater or equal to 38C (100.4F), Mild Pain (1 - 3)  aluminum hydroxide/magnesium hydroxide/simethicone Suspension 30 milliLiter(s) Oral every 6 hours PRN Dyspepsia  melatonin 3 milliGRAM(s) Oral at bedtime PRN Insomnia  ondansetron Injectable 4 milliGRAM(s) IV Push every 6 hours PRN Nausea and/or Vomiting  oxyCODONE    IR 2.5 milliGRAM(s) Oral every 4 hours PRN Moderate Pain (4 - 6)  oxyCODONE    IR 5 milliGRAM(s) Oral every 4 hours PRN Severe Pain (7 - 10)      Allergies    sulfa drugs (Unknown)    Intolerances        LABS:                        10.6   7.87  )-----------( 284      ( 13 Apr 2023 03:32 )             34.2     04-13    139  |  100  |  23  ----------------------------<  134<H>  3.7   |  31  |  0.98    Ca    9.4      13 Apr 2023 03:32  Phos  2.9     04-13  Mg     2.00     04-13            RADIOLOGY & ADDITIONAL TESTS:  Studies reviewed.

## 2023-04-13 NOTE — PROGRESS NOTE ADULT - ASSESSMENT
72-year-old female with PMH of breast cancer s/p right breast lumpectomy, bladder cancer with mets to the spine, in remission, hypertension, hyperlipidemia presents to the ER complaining of left ankle pain swelling and redness times approximately 1 week  No fever, no leukocytosis  LLE pain and swelling, reported discharge at outside physician office  Painful nodular lesion at L foot, complains of pain in ankle  XR with no OM  Culture from site reportedly negative  Infected SSTI at site  MR with subcutaneous edema, LE edema vs cellulitis, no fluid collection, no OM  Overall, Cellulitis/infected nodule, elevated ESR  - Vanco 1250mg q 24 to complete 10 day course (through 4/18/23), if DC planning, can send out on PO Doxycycline 100mg q 12 and Keflex 500mg q 6 to complete course  - F/U pending cultures  - F/U BCXs  - F/U podiatry, Rheum, Derm, Vascular  - F/U pending Pathology    Signing off. Please call with further questions or change in status.    Herb Leos MD  Contact on TEAMS messaging from 9am - 5pm  From 5pm-9am, on weekends, or if no response call [phone]

## 2023-04-13 NOTE — PROGRESS NOTE ADULT - ASSESSMENT
72F with left lateral ankle lesion s/p punch biopsy with derm.  - Patient seen and evaluated.  - Afebrile, WBC 7.87.  - L lateral malleoli nodular lesion, now with central lesion wound s/p punch biopsy with derm, granular wound bed, periwound erythema improved mildly, no drainage, no pus, no malodor.  - Left foot xray and ankle so no gas, no OM, no fracture.  - Left ankle MRI: no OM, no abscess.   - ID recs for IV vancomycin, appreciated.  - Rheum recs, appreciated.  - HEIDY/PVR: RABI 1.24, RTBI 1.26, LABI 1.24, LTBI 1.24, BL waveforms triphasic.  - Derm Punch Biopsy Path: Pending.  - L ankle culture: no grow prelim.  - Recommend daily wound care with xeroform.  - Vasc planning LLE angio tomorrow 4/14, appreciated.  - Pod Plan: Local wound care with xeroform.   - Seen with attending.

## 2023-04-13 NOTE — CHART NOTE - NSCHARTNOTEFT_GEN_A_CORE
PRE-OPERATIVE DIAGNOSIS: Arterial ulcer  SURGEON: Dr. Anglin  PROCEDURE: Left lower extremity angiogram, possible stent, possible angioplasty      VITAL SIGNS:  T(C): 36.7 (13 Apr 2023 12:38), Max: 36.8 (12 Apr 2023 20:43)  T(F): 98 (13 Apr 2023 12:38), Max: 98.2 (12 Apr 2023 20:43)  HR: 68 (13 Apr 2023 12:38) (67 - 69)  BP: 128/73 (13 Apr 2023 12:38) (122/71 - 128/73)  BP(mean): --  RR: 18 (13 Apr 2023 12:38) (18 - 19)  SpO2: 97% (13 Apr 2023 12:38) (97% - 100%)      LABS:    CBC:                      10.6   7.87  )-----------( 284      ( 13 Apr 2023 03:32 )             34.2     CHEM:  139  |  100  |  23  ----------------------------<  134<H>  3.7   |  31  |  0.98    Ca    9.4      13 Apr 2023 03:32  Phos  2.9     04-13  Mg     2.00     04-13      ASSESSMENT:  Patient is a 72 year old female with a PMHx of breast cancer (S/P right breast lumpectomy), bladder cancer (with mets to the spine - in remission), HTN and HLD who presented with left lateral ankle nodular lesion.      PLAN:  - OR 4/14/23 for left lower extremity angiogram, possible stent, possible angioplasty with Dr. Anglin  - NPO past midnight  - COVID swab negative from 4/13/23      #[id number]  Vascular Surgery PRE-OPERATIVE DIAGNOSIS: Arterial ulcer  SURGEON: Dr. Anglin  PROCEDURE: Left lower extremity angiogram, possible stent, possible angioplasty      VITAL SIGNS:  T(C): 36.7 (13 Apr 2023 12:38), Max: 36.8 (12 Apr 2023 20:43)  T(F): 98 (13 Apr 2023 12:38), Max: 98.2 (12 Apr 2023 20:43)  HR: 68 (13 Apr 2023 12:38) (67 - 69)  BP: 128/73 (13 Apr 2023 12:38) (122/71 - 128/73)  BP(mean): --  RR: 18 (13 Apr 2023 12:38) (18 - 19)  SpO2: 97% (13 Apr 2023 12:38) (97% - 100%)      LABS:    CBC:                      10.6   7.87  )-----------( 284      ( 13 Apr 2023 03:32 )             34.2     CHEM:  139  |  100  |  23  ----------------------------<  134<H>  3.7   |  31  |  0.98    Ca    9.4      13 Apr 2023 03:32  Phos  2.9     04-13  Mg     2.00     04-13      ASSESSMENT:  Patient is a 72 year old female with a PMHx of breast cancer (S/P right breast lumpectomy), bladder cancer (with mets to the spine - in remission), HTN and HLD who presented with left lateral ankle nodular lesion.      PLAN:  - OR 4/14/23 for left lower extremity angiogram, possible stent, possible angioplasty with Dr. Anglin  - NPO past midnight  - Follow up labs at 1AM  - COVID swab negative from 4/13/23      #[id number]  Vascular Surgery

## 2023-04-13 NOTE — PROGRESS NOTE ADULT - ASSESSMENT
72F PMHx breast cancer status post right breast lumpectomy, bladder cancer with mets to the spine, in remission, hypertension, hyperlipidemia presents with left lateral ankle nodular lesion. Plan for podiatry local wound care vs OR L ankle I+D.   Vascular consulted for evaluation of potential arterial ulcer etiology unclear     Plan:  continue woundcare  tent lle angio on fri   will follow

## 2023-04-14 ENCOUNTER — TRANSCRIPTION ENCOUNTER (OUTPATIENT)
Age: 73
End: 2023-04-14

## 2023-04-14 ENCOUNTER — APPOINTMENT (OUTPATIENT)
Dept: VASCULAR SURGERY | Facility: HOSPITAL | Age: 73
End: 2023-04-14

## 2023-04-14 LAB
ANION GAP SERPL CALC-SCNC: 11 MMOL/L — SIGNIFICANT CHANGE UP (ref 7–14)
APTT BLD: 26.8 SEC — LOW (ref 27–36.3)
BLD GP AB SCN SERPL QL: NEGATIVE — SIGNIFICANT CHANGE UP
BUN SERPL-MCNC: 19 MG/DL — SIGNIFICANT CHANGE UP (ref 7–23)
CALCIUM SERPL-MCNC: 9.6 MG/DL — SIGNIFICANT CHANGE UP (ref 8.4–10.5)
CHLORIDE SERPL-SCNC: 100 MMOL/L — SIGNIFICANT CHANGE UP (ref 98–107)
CO2 SERPL-SCNC: 28 MMOL/L — SIGNIFICANT CHANGE UP (ref 22–31)
CREAT SERPL-MCNC: 0.89 MG/DL — SIGNIFICANT CHANGE UP (ref 0.5–1.3)
CULTURE RESULTS: SIGNIFICANT CHANGE UP
CULTURE RESULTS: SIGNIFICANT CHANGE UP
EGFR: 68 ML/MIN/1.73M2 — SIGNIFICANT CHANGE UP
GLUCOSE SERPL-MCNC: 133 MG/DL — HIGH (ref 70–99)
HCT VFR BLD CALC: 35.8 % — SIGNIFICANT CHANGE UP (ref 34.5–45)
HGB BLD-MCNC: 11 G/DL — LOW (ref 11.5–15.5)
INR BLD: 1.26 RATIO — HIGH (ref 0.88–1.16)
MAGNESIUM SERPL-MCNC: 1.9 MG/DL — SIGNIFICANT CHANGE UP (ref 1.6–2.6)
MCHC RBC-ENTMCNC: 27.3 PG — SIGNIFICANT CHANGE UP (ref 27–34)
MCHC RBC-ENTMCNC: 30.7 GM/DL — LOW (ref 32–36)
MCV RBC AUTO: 88.8 FL — SIGNIFICANT CHANGE UP (ref 80–100)
NRBC # BLD: 0 /100 WBCS — SIGNIFICANT CHANGE UP (ref 0–0)
NRBC # FLD: 0 K/UL — SIGNIFICANT CHANGE UP (ref 0–0)
PHOSPHATE SERPL-MCNC: 2.4 MG/DL — LOW (ref 2.5–4.5)
PHOSPHATE SERPL-MCNC: 4.9 MG/DL — HIGH (ref 2.5–4.5)
PLATELET # BLD AUTO: 284 K/UL — SIGNIFICANT CHANGE UP (ref 150–400)
POTASSIUM SERPL-MCNC: 3.9 MMOL/L — SIGNIFICANT CHANGE UP (ref 3.5–5.3)
POTASSIUM SERPL-SCNC: 3.9 MMOL/L — SIGNIFICANT CHANGE UP (ref 3.5–5.3)
PROTHROM AB SERPL-ACNC: 14.7 SEC — HIGH (ref 10.5–13.4)
RBC # BLD: 4.03 M/UL — SIGNIFICANT CHANGE UP (ref 3.8–5.2)
RBC # FLD: 14.7 % — HIGH (ref 10.3–14.5)
RH IG SCN BLD-IMP: POSITIVE — SIGNIFICANT CHANGE UP
SODIUM SERPL-SCNC: 139 MMOL/L — SIGNIFICANT CHANGE UP (ref 135–145)
SPECIMEN SOURCE: SIGNIFICANT CHANGE UP
SPECIMEN SOURCE: SIGNIFICANT CHANGE UP
WBC # BLD: 9.03 K/UL — SIGNIFICANT CHANGE UP (ref 3.8–10.5)
WBC # FLD AUTO: 9.03 K/UL — SIGNIFICANT CHANGE UP (ref 3.8–10.5)

## 2023-04-14 PROCEDURE — 75716 ARTERY X-RAYS ARMS/LEGS: CPT | Mod: 26

## 2023-04-14 PROCEDURE — 75625 CONTRAST EXAM ABDOMINL AORTA: CPT | Mod: 26

## 2023-04-14 PROCEDURE — 36245 INS CATH ABD/L-EXT ART 1ST: CPT | Mod: LT

## 2023-04-14 PROCEDURE — 75710 ARTERY X-RAYS ARM/LEG: CPT | Mod: 26,59,LT

## 2023-04-14 RX ORDER — SODIUM CHLORIDE 9 MG/ML
1000 INJECTION INTRAMUSCULAR; INTRAVENOUS; SUBCUTANEOUS
Refills: 0 | Status: DISCONTINUED | OUTPATIENT
Start: 2023-04-14 | End: 2023-04-19

## 2023-04-14 RX ORDER — MAGNESIUM SULFATE 500 MG/ML
2 VIAL (ML) INJECTION ONCE
Refills: 0 | Status: COMPLETED | OUTPATIENT
Start: 2023-04-14 | End: 2023-04-14

## 2023-04-14 RX ADMIN — Medication 25 GRAM(S): at 02:46

## 2023-04-14 RX ADMIN — Medication 1 APPLICATION(S): at 04:16

## 2023-04-14 RX ADMIN — Medication 85 MILLIMOLE(S): at 04:15

## 2023-04-14 RX ADMIN — GABAPENTIN 300 MILLIGRAM(S): 400 CAPSULE ORAL at 21:47

## 2023-04-14 RX ADMIN — GABAPENTIN 300 MILLIGRAM(S): 400 CAPSULE ORAL at 04:18

## 2023-04-14 RX ADMIN — SODIUM CHLORIDE 50 MILLILITER(S): 9 INJECTION INTRAMUSCULAR; INTRAVENOUS; SUBCUTANEOUS at 17:30

## 2023-04-14 RX ADMIN — OXYCODONE HYDROCHLORIDE 5 MILLIGRAM(S): 5 TABLET ORAL at 22:47

## 2023-04-14 RX ADMIN — PANTOPRAZOLE SODIUM 40 MILLIGRAM(S): 20 TABLET, DELAYED RELEASE ORAL at 04:16

## 2023-04-14 RX ADMIN — OXYCODONE HYDROCHLORIDE 5 MILLIGRAM(S): 5 TABLET ORAL at 21:47

## 2023-04-14 RX ADMIN — AMLODIPINE BESYLATE 2.5 MILLIGRAM(S): 2.5 TABLET ORAL at 21:47

## 2023-04-14 RX ADMIN — ENOXAPARIN SODIUM 40 MILLIGRAM(S): 100 INJECTION SUBCUTANEOUS at 05:08

## 2023-04-14 RX ADMIN — ATORVASTATIN CALCIUM 10 MILLIGRAM(S): 80 TABLET, FILM COATED ORAL at 21:47

## 2023-04-14 RX ADMIN — Medication 166.67 MILLIGRAM(S): at 03:15

## 2023-04-14 NOTE — DISCHARGE NOTE PROVIDER - HOSPITAL COURSE
72-year-old female with past medical history of breast cancer status post right breast lumpectomy, bladder cancer with mets to the spine, in remission, hypertension, hyperlipidemia presents to the ER complaining of left ankle pain swelling and redness times approximately 1 week. Admit for IV abx for cellulitis.     #  LE / ankle Cellulitis :  doing better   - Consulted by Infectious Disease and Podiatry, given IV Vanco and Zosyn, will complete a 10 Day course of Doxycycline 100mg Q12 PO and Keflex 500mg Q6 PO on discharge.  - Foot XR with no OM, Culture from site reportedly negative, MR with subcutaneous edema, LE edema vs cellulitis, no fluid collection, no OM  - Rheum team consulted patient, serologic work up negative  - Derm team consulted patient and L lateral malleoli nodular lesion, now with central lesion s/p punch biopsy with derm, patient to follow up in the outpatient setting for biopsy results (negative thus far)      PAD :  -vascular team consulted patient, s/p left lower extremity angiogram w/3-vessel run-off to ankle c/w small vessel disease  - distal dis , no stent/ or angioplasty  - received wound care, home wound care established  - continue pletal 50 bid     anemia of chronic disease   - H/H stable     HTN (hypertension)   - controlled   - c/w amlodipine     HLD (hyperlipidemia)   - c/w statin     On 4/19/23 case was discussed with __________, patient is medically cleared and optimized for discharge today. All medications were reviewed with attending, and sent to mutually agreed upon pharmacy. 72-year-old female with past medical history of breast cancer status post right breast lumpectomy, bladder cancer with mets to the spine, in remission, hypertension, hyperlipidemia presents to the ER complaining of left ankle pain swelling and redness times approximately 1 week. Admit for IV abx for cellulitis.     LE / ankle Cellulitis   - Consulted by Infectious Disease and Podiatry, given IV Vanco and Zosyn, will complete a 10 Day course of Doxycycline 100mg Q12 PO and Keflex 500mg Q6 PO on discharge.  - Foot XR with no OM, Culture from site reportedly negative, MR with subcutaneous edema, LE edema vs cellulitis, no fluid collection, no OM  - Rheum team consulted patient, serologic work up negative  - Derm team consulted patient and L lateral malleoli nodular lesion, now with central lesion s/p punch biopsy with derm, patient to follow up in the outpatient setting for biopsy results (negative thus far)      PAD   -vascular team consulted patient, s/p left lower extremity angiogram w/3-vessel run-off to ankle c/w small vessel disease  - distal dis , no stent/ or angioplasty  - received wound care, home wound care established  - continue pletal 50 bid     PMHx of Breast CA and Bladder Ca  - Consulted by Heme/Onc team, no Concerning findings for metastatic disease or Osteomyelitis based on the MRI of ankle  - CT C/A/P w/ IV contrast showed wall thickening of the proximal sigmoid colon and 2 mm nodule in the right lower lobe is not identified, may be inflammatory.   -Patient to followup with Dr. Richter ([facility]) upon discharge    anemia of chronic disease   - H/H stable     HTN (hypertension)   - controlled   - consulted by Cardiology team, c/w amlodipine     HLD (hyperlipidemia)   - c/w statin     On 4/19/23 case was discussed with , patient is medically cleared and optimized for discharge today. All medications were reviewed with attending, and sent to mutually agreed upon pharmacy.

## 2023-04-14 NOTE — PROGRESS NOTE ADULT - ASSESSMENT
Electrodesiccation Text: The wound bed was treated with electrodesiccation after the biopsy was performed. Notification Instructions: Patient will be notified of biopsy results. However, patient instructed to call the office if not contacted within 2 weeks. Bill [id number] For Specimen Handling/Conveyance To Laboratory?: no 72F with left lateral ankle lesion s/p punch biopsy with derm.  - Patient seen and evaluated.  - Afebrile, no leukocytosis  - L lateral malleoli nodular lesion, now with central lesion wound s/p punch biopsy with derm, granular wound bed with necrotic patch, periwound erythema resolved, no drainage, no pus, no malodor.  - Left foot xray and ankle so no gas, no OM, no fracture.  - Left ankle MRI: no OM, no abscess.   - Rheum recs, appreciated.  - HEIDY/PVR: RABI 1.24, RTBI 1.26, LABI 1.24, LTBI 1.24, BL waveforms triphasic.  - Derm Punch Biopsy Path: pending  - L ankle culture: no grow prelim  - Recommend daily wound care with xeroform  - ID recs PO Doxy and Keflex x 10 days upon d/c, recs appreciated  - Vasc planning LLE angio tomorrow 4/14, appreciated  - Pod plan local wound care pending vasc recs   - Discussed with attending Cryotherapy Text: The wound bed was treated with cryotherapy after the biopsy was performed. Curettage Text: The wound bed was treated with curettage after the biopsy was performed. Post-Care Instructions: I reviewed with the patient in detail post-care instructions. Patient is to keep the biopsy site covered overnight, and then apply petrolatum twice daily until healed. Patient may apply vinegar/water soaks to remove any crusting. Hemostasis: Aluminum Chloride Dressing: bandage X Size Of Lesion In Cm: 0.2 Biopsy Method: Double edge Personna blades Detail Level: Detailed Size Of Lesion In Cm: 0.4 Silver Nitrate Text: The wound bed was treated with silver nitrate after the biopsy was performed. Accession #: [id number] Additional Anesthesia Volume In Cc (Will Not Render If 0): 0 Lab: 660 Consent: Written consent was obtained and risks were reviewed including but not limited to scarring, infection, bleeding, scabbing, incomplete removal, nerve damage and allergy to anesthesia. Wound Care: Petrolatum Type Of Destruction Used: Curettage Anesthesia Volume In Cc (Will Not Render If 0): 0.5 Biopsy Type: H and E Anesthesia Type: 1% lidocaine with epinephrine and a 1:10 solution of 8.4% sodium bicarbonate Billing Type: Third-Party Bill Electrodesiccation And Curettage Text: The wound bed was treated with electrodesiccation and curettage after the biopsy was performed.

## 2023-04-14 NOTE — PROGRESS NOTE ADULT - SUBJECTIVE AND OBJECTIVE BOX
Patient is a 72y old  Female who presents with a chief complaint of cellulitis (2023 19:06)      INTERVAL HPI/OVERNIGHT EVENTS: seen and examined, s/p LE angiogram   T(C): 36.8 (23 @ 21:30), Max: 36.8 (23 @ 21:30)  HR: 73 (23 @ 21:30) (67 - 73)  BP: 122/71 (23 @ 21:30) (122/71 - 133/80)  RR: 17 (23 @ 21:30) (17 - 18)  SpO2: 97% (23 @ 21:30) (97% - 99%)  Wt(kg): --  I&O's Summary      PAST MEDICAL & SURGICAL HISTORY:  Anxiety      Breast CA, left  lumpectomy / RTX in the past      Hypertension      Sleep apnea  uses  cpap machine      Irritable bowel syndrome (IBS)      Polyp of colon  "pre-cancerous" x 2, removed 2014      HLD (hyperlipidemia)      Bladder polyp      S/P       S/P colon resection  reversal, had complications for fibriods      S/P hysterectomy      Breast lump      History of surgery  right groin fatty tissue removed      H/O lumpectomy  left       Personal history of spine surgery  L1-L4 fusion 2017          SOCIAL HISTORY  Alcohol:  Tobacco:  Illicit substance use:    FAMILY HISTORY:    REVIEW OF SYSTEMS:  CONSTITUTIONAL: No fever, weight loss, or fatigue  EYES: No eye pain, visual disturbances, or discharge  ENMT:  No difficulty hearing, tinnitus, vertigo; No sinus or throat pain  NECK: No pain or stiffness  RESPIRATORY: No cough, wheezing, chills or hemoptysis; No shortness of breath  CARDIOVASCULAR: No chest pain, palpitations, dizziness, or leg swelling  GASTROINTESTINAL: No abdominal or epigastric pain. No nausea, vomiting, or hematemesis; No diarrhea or constipation. No melena or hematochezia.  GENITOURINARY: No dysuria, frequency, hematuria, or incontinence  NEUROLOGICAL: No headaches, memory loss, loss of strength, numbness, or tremors  SKIN: No itching, burning, rashes, or lesions   LYMPH NODES: No enlarged glands  ENDOCRINE: No heat or cold intolerance; No hair loss  MUSCULOSKELETAL: No joint pain or swelling; No muscle, back, or extremity pain  PSYCHIATRIC: No depression, anxiety, mood swings, or difficulty sleeping  HEME/LYMPH: No easy bruising, or bleeding gums  ALLERY AND IMMUNOLOGIC: No hives or eczema    RADIOLOGY & ADDITIONAL TESTS:    Imaging Personally Reviewed:  [ ] YES  [ ] NO    Consultant(s) Notes Reviewed:  [ ] YES  [ ] NO    PHYSICAL EXAM:  GENERAL: NAD, well-groomed, well-developed  HEAD:  Atraumatic, Normocephalic  EYES: EOMI, PERRLA, conjunctiva and sclera clear  ENMT: No tonsillar erythema, exudates, or enlargement; Moist mucous membranes, Good dentition, No lesions  NECK: Supple, No JVD, Normal thyroid  NERVOUS SYSTEM:  Alert & Oriented X3, Good concentration; Motor Strength 5/5 B/L upper and lower extremities; DTRs 2+ intact and symmetric  CHEST/LUNG: Clear to percussion bilaterally; No rales, rhonchi, wheezing, or rubs  HEART: Regular rate and rhythm; No murmurs, rubs, or gallops  ABDOMEN: Soft, Nontender, Nondistended; Bowel sounds present  EXTREMITIES:  2+ Peripheral Pulses, No clubbing, cyanosis, or edema  LYMPH: No lymphadenopathy noted  SKIN: No rashes or lesions    LABS:                        11.2   10.62 )-----------( 289      ( 15 Apr 2023 04:15 )             36.8     04-15    138  |  100  |  19  ----------------------------<  131<H>  4.1   |  28  |  0.94    Ca    9.2      15 Apr 2023 04:15  Phos  4.1     04-15  Mg     2.10     04-15      PT/INR - ( 2023 00:06 )   PT: 14.7 sec;   INR: 1.26 ratio         PTT - ( 2023 00:06 )  PTT:26.8 sec    CAPILLARY BLOOD GLUCOSE                MEDICATIONS  (STANDING):  amLODIPine   Tablet 2.5 milliGRAM(s) Oral at bedtime  atorvastatin 10 milliGRAM(s) Oral at bedtime  clobetasol 0.05% Ointment 1 Application(s) Topical two times a day  enoxaparin Injectable 40 milliGRAM(s) SubCutaneous every 24 hours  gabapentin 300 milliGRAM(s) Oral three times a day  pantoprazole    Tablet 40 milliGRAM(s) Oral before breakfast  polyethylene glycol 3350 17 Gram(s) Oral daily  senna 2 Tablet(s) Oral at bedtime  sodium chloride 0.9%. 1000 milliLiter(s) (50 mL/Hr) IV Continuous <Continuous>  vancomycin  IVPB 1250 milliGRAM(s) IV Intermittent every 24 hours    MEDICATIONS  (PRN):  acetaminophen     Tablet .. 650 milliGRAM(s) Oral every 6 hours PRN Temp greater or equal to 38C (100.4F), Mild Pain (1 - 3)  aluminum hydroxide/magnesium hydroxide/simethicone Suspension 30 milliLiter(s) Oral every 6 hours PRN Dyspepsia  melatonin 3 milliGRAM(s) Oral at bedtime PRN Insomnia  ondansetron Injectable 4 milliGRAM(s) IV Push every 6 hours PRN Nausea and/or Vomiting  oxyCODONE    IR 2.5 milliGRAM(s) Oral every 4 hours PRN Moderate Pain (4 - 6)  oxyCODONE    IR 5 milliGRAM(s) Oral every 4 hours PRN Severe Pain (7 - 10)      Care Discussed with Consultants/Other Providers [ ] YES  [ ] NO

## 2023-04-14 NOTE — CHART NOTE - NSCHARTNOTEFT_GEN_A_CORE
VASCULAR SURGERY POST-OPERATIVE NOTE    Patient is s/p left lower extremity angiogram w/3-vessel run-off to ankle c/w small vessel disease (4/14).    Subjective: Patient reports pain at the right groin incisional site, controlled with medication. Denies chest pain, shortness of breath, nausea, vomiting. Is not yet passing gas or having bowel movements. Not yet urinating independently or ambulating independently.    Vital Signs Last 24 Hrs  T(C): 36.7 (14 Apr 2023 19:00), Max: 36.9 (14 Apr 2023 04:00)  T(F): 98.1 (14 Apr 2023 19:00), Max: 98.4 (14 Apr 2023 04:00)  HR: 70 (14 Apr 2023 19:00) (67 - 70)  BP: 132/78 (14 Apr 2023 19:00) (124/65 - 133/80)  RR: 18 (14 Apr 2023 19:00) (18 - 18)  SpO2: 98% (14 Apr 2023 19:00) (98% - 99%)  Parameters below as of 14 Apr 2023 19:00  Patient On (Oxygen Delivery Method): room air    I&O's Detail    Physical Exam:  General: NAD, resting comfortably in bed  Pulmonary: Nonlabored breathing, no respiratory distress  Abdominal: soft, non-tender, non-distended.  Extremities: R groin access c/d/i, no erythema/edema. Dopplerable L PT/DP pulses. +L chronic ankle lesion.    LABS:             11.0   9.03  )-----------( 284      ( 14 Apr 2023 00:06 )             35.8     04-14    139  |  100  |  19  ----------------------------<  133<H>  3.9   |  28  |  0.89    Ca    9.6      14 Apr 2023 00:06  Phos  4.9     04-14  Mg     1.90     04-14      PT/INR - ( 14 Apr 2023 00:06 )   PT: 14.7 sec;   INR: 1.26 ratio         PTT - ( 14 Apr 2023 00:06 )  PTT:26.8 sec      Assessment:  72F w/PMHx significant for HTN. HLD, breast cancer s/p right breast lumpectomy, bladder cancer with mets to the spine in remission, s/p left lower extremity angiogram w/3-vessel run-off to ankle c/w small vessel disease (4/14).    Plan:  - Care as per primary  - Dressing removal tomorrow, OK for d/c afterwards from surgical perspective  - Cilostazol for life, no hindrance for any other concomitant AC   - ASA, Plavix  - f/u out-pt w/Dr. Anglin  - Diet: ADAT  - DVT ppx: lovenox 40mg QD    Vascular Surgery  [id number]

## 2023-04-14 NOTE — DISCHARGE NOTE PROVIDER - NSDCFUADDAPPT_GEN_ALL_CORE_FT
========================  Podiatry Discharge Instructions:  - Follow up: Please follow up with Dr. Simental within 1 week of discharge from the hospital, please call [phone] for appointment and discuss that you recently were seen in the hospital.  - Wound Care: please apply Xeroform to left ankle wound followed by 4x4 Gauze, and Kerlex. DAILY   - Weight bearing: Please weight bearing as tolerated in a surgical shoe to L Lower Extremity   - Antibiotics: Please continue as instructed.    ======================== ========================  Podiatry Discharge Instructions:  - Follow up:     Please follow up with Dr. Simental within 1 week of discharge from the hospital, please call [phone] for appointment and discuss that you recently were seen in the hospital.  Please follow up with Dr. Coley within 2 week of discharge from the hospital, please call [phone] for appointment and discuss that you recently were seen in the hospital.    - Wound Care: please apply Xeroform to left ankle wound followed by 4x4 Gauze, and Kerlex. DAILY   - Weight bearing: Please weight bearing as tolerated in a surgical shoe to L Lower Extremity   - Antibiotics: Please continue as instructed.    ======================== Continue medications as prescribed. Follow up with your primary care doctor, Dermatologist, Vascular Surgeon, Foot surgeon, and rheumatologist for further evaluation and management. Please call to make an appointment within 1-2 weeks of discharge.     Please follow up with Dr. Simental within 1 week of discharge from the hospital, please call [phone] for appointment and discuss that you recently were seen in the hospital.    Please follow up with Dr. Coley within 2 week of discharge from the hospital, please call [phone] for appointment and discuss that you recently were seen in the hospital.     Continue medications as prescribed. Follow up with your primary care doctor, Dermatologist, Vascular Surgeon, and Foot surgeon for further evaluation and management. Please call to make an appointment within 1-2 weeks of discharge.     Please follow up with Dr. Simental within 1 week of discharge from the hospital, please call [phone] for appointment and discuss that you recently were seen in the hospital.    Please follow up with Dr. Coley within 2 week of discharge from the hospital, please call [phone] for appointment and discuss that you recently were seen in the hospital.      follow up with outpatient dermatology within 2 weeks of discharge with Dr. Dorsey at [address] [facility], [address]

## 2023-04-14 NOTE — DISCHARGE NOTE PROVIDER - PROVIDER TOKENS
PROVIDER:[TOKEN:[[id number]]] PROVIDER:[TOKEN:[[id number]]],PROVIDER:[TOKEN:[[id number]]] PROVIDER:[TOKEN:[[id number]]],PROVIDER:[TOKEN:[[id number]]],PROVIDER:[TOKEN:[[id number]]],PROVIDER:[TOKEN:[[id number]]] PROVIDER:[TOKEN:[[id number]]],PROVIDER:[TOKEN:[[id number]]],PROVIDER:[TOKEN:[[id number]]],PROVIDER:[TOKEN:[[id number]]],FREE:[LAST:[Jeremias],FIRST:[Rosca],PHONE:[phone]],FAX:[(   )    -]]

## 2023-04-14 NOTE — DISCHARGE NOTE PROVIDER - NSDCCPCAREPLAN_GEN_ALL_CORE_FT
PRINCIPAL DISCHARGE DIAGNOSIS  Diagnosis: Cellulitis of left lower extremity  Assessment and Plan of Treatment: You came to the hospital for left foot pain and were found to have Lower leg/ankle Cellulitis (a tissue infection). You were seen by the infectious disease team and podiatry team (foot doctors) and given antibiotics. It is highly recommended that you continue and complete the antibiotics as prescribed. An X-Ray of your foot was negative for bone infection. An MRI of your foot was negative for bone infection. You were seen by the rheumatology team and all blood work was negative. You were seen by the dermatology team and had a biopsy performed. Continue medications as prescribed. Follow up with your primary care doctor, dermatologist, infectious disease, and podiatrists for further evaluation and management. Please call to make an appointment within 1-2 weeks of discharge.      SECONDARY DISCHARGE DIAGNOSES  Diagnosis: Arterial insufficiency with ischemic ulcer  Assessment and Plan of Treatment: You were found to have an ulcer on your foot. You were seen by the vascular team and an angiogram (blood flow scan) of your foot showed small vessel disease and no stents or angioplasty was performed. You received wound care treatment and given the medication Pletal. Continue medications as prescribed. Follow up with your primary care doctor and vascular surgeon for further evaluation and management. Please call to make an appointment within 1-2 weeks of discharge.    Diagnosis: Urothelial cancer  Assessment and Plan of Treatment: You have a past medical history of cancer. You were seen by the hematology/Oncology team (blood and cancer doctors). CAT scan of your Chest, Abdomen, and Pelvic showed thickening of your colon and a 2mm nodule in the right lung. It is recommended your follow up with  at [facility]. Continue medications as prescribed. Follow up with your primary care doctor and oncologist for further evaluation and management. Please call to make an appointment within 1-2 weeks of discharge.    Diagnosis: HTN (hypertension)  Assessment and Plan of Treatment: You have a history of high blood pressure. You were seen by the [facility]. Continue current blood pressure medication regimen as directed. Monitor for any visual changes, headaches or dizziness.  Monitor blood pressure regularly.  Follow up with your PCP for further management for high blood pressure, please call to make appointment within 1 week of discharge (hypertension)    Diagnosis: HLD (hyperlipidemia)  Assessment and Plan of Treatment: You have a history of high cholesterol. Continue cholesterol control medications. Continue DASH diet. Follow up with your PCP within 1 week of discharge for further management and monitoring of lipid and cholesterol panels. Please call to make an appointment.

## 2023-04-14 NOTE — BRIEF OPERATIVE NOTE - OPERATION/FINDINGS
LLE angio, R. groin access, manual pressure hold  3-vessel run off to ankle, small vessel dx distally

## 2023-04-14 NOTE — DISCHARGE NOTE PROVIDER - CARE PROVIDERS DIRECT ADDRESSES
,[email or web address],DirectAddress_Unknown ,[email or web address],DirectAddress_Unknown,DirectAddress_Unknown,[email or web address] ,[email or web address],DirectAddress_Unknown,DirectAddress_Unknown,DirectAddress_Unknown,DirectAddress_Unknown

## 2023-04-14 NOTE — CHART NOTE - NSCHARTNOTEFT_GEN_A_CORE
s/p LLE angio w/3-vessel run-off to ankle c/w small vessel dx    -Pletal for life, no hindrance for any concomitant AC   -f/u out-pt w/Dr. Anglin  -No further vascular surgical intervention   -D/C per primary s/p LLE angio w/3-vessel run-off to ankle c/w small vessel dx    -Pletal for life, no hindrance for any other concomitant AC   -f/u out-pt w/Dr. Anglin  -No further vascular surgical intervention   -Dressing removal tomorrow  -Diet per primary,    -D/C per primary

## 2023-04-14 NOTE — PROGRESS NOTE ADULT - SUBJECTIVE AND OBJECTIVE BOX
CARDIOLOGY FOLLOW UP NOTE - DR. BARNEY    Patient Name: KIM ACKERMAN  Date of Service: 04-14-23 @ 12:19    no new events      Subjective:    cv: denies chest pain, dyspnea, palpitations, dizziness  pulmonary: denies cough  GI: denies abdominal pain, nausea, vomiting  vascular/legs: no edema   skin: no rash  ROS: otherwise negative   overnight events:      PHYSICAL EXAM:  T(C): 36.5 (04-14-23 @ 11:36), Max: 37.2 (04-13-23 @ 20:56)  HR: 67 (04-14-23 @ 11:36) (67 - 73)  BP: 133/80 (04-14-23 @ 11:36) (111/70 - 133/80)  RR: 18 (04-14-23 @ 11:36) (18 - 19)  SpO2: 99% (04-14-23 @ 11:36) (97% - 99%)  Wt(kg): --  I&O's Summary    Daily     Daily     Appearance: Normal	  Cardiovascular: Normal S1 S2,RRR, No JVD, No murmurs  Respiratory: Lungs clear to auscultation	  Gastrointestinal:  Soft, Non-tender, + BS	  Extremities: Normal range of motion, No clubbing, cyanosis or edema      Home Medications:  gabapentin 300 mg oral tablet: 1 tab(s) orally 3 times a day (08 Apr 2023 22:39)  Norvasc 2.5 mg oral tablet: 1 tab(s) orally once a day (at bedtime) (08 Apr 2023 22:41)  oxyCODONE 5 mg oral tablet:  (08 Apr 2023 22:41)  pravastatin 20 mg oral tablet: 1 orally once a day (at bedtime) (08 Apr 2023 22:40)  Vitamin D2: 1 tab(s) orally once a day (08 Apr 2023 22:41)      MEDICATIONS  (STANDING):  amLODIPine   Tablet 2.5 milliGRAM(s) Oral at bedtime  atorvastatin 10 milliGRAM(s) Oral at bedtime  clobetasol 0.05% Ointment 1 Application(s) Topical two times a day  enoxaparin Injectable 40 milliGRAM(s) SubCutaneous every 24 hours  gabapentin 300 milliGRAM(s) Oral three times a day  pantoprazole    Tablet 40 milliGRAM(s) Oral before breakfast  polyethylene glycol 3350 17 Gram(s) Oral daily  senna 2 Tablet(s) Oral at bedtime  vancomycin  IVPB 1250 milliGRAM(s) IV Intermittent every 24 hours      TELEMETRY: 	    ECG:  	  RADIOLOGY:   DIAGNOSTIC TESTING:  [ ] Echocardiogram:  [ ] Catheterization:  [ ] Stress Test:    OTHER: 	    LABS:	 	    CARDIAC MARKERS:                                      11.0   9.03  )-----------( 284      ( 14 Apr 2023 00:06 )             35.8     04-14    139  |  100  |  19  ----------------------------<  133<H>  3.9   |  28  |  0.89    Ca    9.6      14 Apr 2023 00:06  Phos  4.9     04-14  Mg     1.90     04-14      proBNP:   PT/INR - ( 14 Apr 2023 00:06 )   PT: 14.7 sec;   INR: 1.26 ratio         PTT - ( 14 Apr 2023 00:06 )  PTT:26.8 sec  Lipid Profile:   HgA1c:     Creatinine, Serum: 0.89 mg/dL (04-14-23 @ 00:06)  Creatinine, Serum: 0.98 mg/dL (04-13-23 @ 03:32)  Creatinine, Serum: 0.90 mg/dL (04-12-23 @ 04:10)

## 2023-04-14 NOTE — PROGRESS NOTE ADULT - SUBJECTIVE AND OBJECTIVE BOX
Patient is a 72y old  Female who presents with a chief complaint of cellulitis (13 Apr 2023 16:18)       INTERVAL HPI/OVERNIGHT EVENTS:  Patient seen and evaluated at bedside.  Pt is resting comfortable in NAD. Denies N/V/F/C.  Pain rated at X/10    Allergies    sulfa drugs (Unknown)    Intolerances        Vital Signs Last 24 Hrs  T(C): 36.9 (14 Apr 2023 04:00), Max: 37.2 (13 Apr 2023 20:56)  T(F): 98.4 (14 Apr 2023 04:00), Max: 98.9 (13 Apr 2023 20:56)  HR: 70 (14 Apr 2023 04:00) (68 - 73)  BP: 124/65 (14 Apr 2023 04:00) (111/70 - 128/73)  BP(mean): --  RR: 18 (14 Apr 2023 04:00) (18 - 19)  SpO2: 99% (14 Apr 2023 04:00) (97% - 99%)    Parameters below as of 14 Apr 2023 04:00  Patient On (Oxygen Delivery Method): room air        LABS:                        11.0   9.03  )-----------( 284      ( 14 Apr 2023 00:06 )             35.8     04-14    139  |  100  |  19  ----------------------------<  133<H>  3.9   |  28  |  0.89    Ca    9.6      14 Apr 2023 00:06  Phos  4.9     04-14  Mg     1.90     04-14      PT/INR - ( 14 Apr 2023 00:06 )   PT: 14.7 sec;   INR: 1.26 ratio         PTT - ( 14 Apr 2023 00:06 )  PTT:26.8 sec    CAPILLARY BLOOD GLUCOSE          Lower Extremity Physical Exam:  Vascular: DP/PT 2/4, B/L, CFT <3 seconds B/L, Temperature gradient warm to warm on left , warm to cool on right.   Neuro: Epicritic sensation intact to the level of toes, B/L.  Musculoskeletal/Ortho: pain on palpation to the dorsum of left lateral malleolus mildly improved, L ankle ROM wnl  Skin: L lateral malleoli nodular lesion, now with central lesion wound s/p punch biopsy with derm, granular wound bed with necrotic patch, periwound erythema resolved, no drainage, no pus, no malodor.    RADIOLOGY & ADDITIONAL TESTS:

## 2023-04-14 NOTE — DISCHARGE NOTE PROVIDER - CARE PROVIDER_API CALL
Keya Simental)  Surgery; Vascular Surgery  [address] [facility], [address]  [place] [address]  Phone: [phone]  Fax: [phone]  Follow Up Time:    Keya Simental)  Surgery; Vascular Surgery  [address] [facility], [address]  [place] [address]  Phone: [phone]  Fax: [phone]  Follow Up Time:     Stefan Coley (DPM)  Foot Surgery; Recon RearfootAnkle Surgery  [address] [place] [address]  Phone: [phone]  Fax: [phone]  Follow Up Time:    Keya Simental)  Surgery; Vascular Surgery  [address] [facility], [address]  [place] [address]  Phone: [phone]  Fax: [phone]  Follow Up Time:     Stefan Coley (DPM)  Foot Surgery; Recon RearfootAnkle Surgery  [address] [place] [address]  Phone: [phone]  Fax: [phone]  Follow Up Time:     Prem Curtis [facility]  [address] [place] [address]  Phone: [phone]  Fax: [phone]  Follow Up Time:     Sabi Dorsey)  [facility] Dermatology  Dermatology at [address] [address]  Phone: [phone]  Fax: [phone]  Follow Up Time:    Keya Simental)  Surgery; Vascular Surgery  [address] [facility], [address]  [place] [address]  Phone: [phone]  Fax: [phone]  Follow Up Time:     Stefan Coley (DPM)  Foot Surgery; Recon RearfootAnkle Surgery  [address] [place] [address]  Phone: [phone]  Fax: [phone]  Follow Up Time:     Prem Curtis [facility]  [address] [place] [address]  Phone: [phone]  Fax: [phone]  Follow Up Time:     Sabi Dorsey)  [facility] Dermatology  Dermatology at [address] [address]  Phone: [phone]  Fax: [phone]  Follow Up Time:     Linnette Mcdowell  Phone: [phone]  Fax: (   )    -  Follow Up Time:

## 2023-04-14 NOTE — PROGRESS NOTE ADULT - ASSESSMENT
72-year-old female with past medical history of breast cancer status post right breast lumpectomy, bladder cancer with mets to the spine, in remission, hypertension, hyperlipidemia presents to the ER complaining of left ankle pain swelling and redness times approximately 1 week. Admit for IV abx for cellulitis.     #  LE / ankle Cellulitis :  doing better   c/w IV abx   ID/ podiatry following     PAD :  -seen by vascular   scheduled for LE angiogram done   distal dis , no stent/ or angioplasty  recommend pletal daily      Anemia.   of ch dis   H/H stable     # HTN (hypertension).   controlled   -c/w amlodipine     #HLD (hyperlipidemia).   c/w statin     dispo: now stable to be d/c home in am

## 2023-04-14 NOTE — DISCHARGE NOTE PROVIDER - NSDCMRMEDTOKEN_GEN_ALL_CORE_FT
gabapentin 300 mg oral tablet: 1 tab(s) orally 3 times a day  Norvasc 2.5 mg oral tablet: 1 tab(s) orally once a day (at bedtime)  oxyCODONE 5 mg oral tablet:   pravastatin 20 mg oral tablet: 1 orally once a day (at bedtime)  Vitamin D2: 1 tab(s) orally once a day   atorvastatin 10 mg oral tablet: 1 tab(s) orally once a day (at bedtime)  cephalexin 500 mg oral capsule: 1 cap(s) orally every 6 hours  cilostazol 50 mg oral tablet: 1 tab(s) orally 2 times a day  doxycycline hyclate 100 mg oral capsule: 1 cap(s) orally every 12 hours  gabapentin 300 mg oral tablet: 1 tab(s) orally 3 times a day  Norvasc 2.5 mg oral tablet: 1 tab(s) orally once a day (at bedtime)  oxyCODONE 5 mg oral tablet: 1 tab(s) orally every 6 hours MDD: 20  pantoprazole 40 mg oral delayed release tablet: 1 tab(s) orally once a day (before a meal)  polyethylene glycol 3350 oral powder for reconstitution: 17 gram(s) orally once a day  senna leaf extract oral tablet: 2 tab(s) orally once a day (at bedtime)  Vitamin D2: 1 tab(s) orally once a day   atorvastatin 10 mg oral tablet: 1 tab(s) orally once a day (at bedtime)  cephalexin 500 mg oral capsule: 1 cap(s) orally every 6 hours  cilostazol 50 mg oral tablet: 1 tab(s) orally 2 times a day  doxycycline hyclate 100 mg oral capsule: 1 cap(s) orally every 12 hours  gabapentin 300 mg oral tablet: 1 tab(s) orally 3 times a day  naloxone 4 mg/0.1 mL nasal spray: 4 milligram(s) intranasally as needed for  Opioid overdose One spray in each nostril. Call 911 if using.  Norvasc 2.5 mg oral tablet: 1 tab(s) orally once a day (at bedtime)  oxyCODONE 5 mg oral tablet: 1 tab(s) orally every 6 hours as needed for  moderate pain MDD: 20  pantoprazole 40 mg oral delayed release tablet: 1 tab(s) orally once a day (before a meal)  polyethylene glycol 3350 oral powder for reconstitution: 17 gram(s) orally once a day  senna leaf extract oral tablet: 2 tab(s) orally once a day (at bedtime)  Vitamin D2: 1 tab(s) orally once a day

## 2023-04-14 NOTE — DISCHARGE NOTE PROVIDER - NSDCFUADDINST_GEN_ALL_CORE_FT
- Wound Care: please apply Xeroform to left ankle wound followed by 4x4 Gauze, and Kerlex. DAILY   - Weight bearing: Please weight bearing as tolerated in a surgical shoe to L Lower Extremity   - Antibiotics: Please continue as instructed.

## 2023-04-14 NOTE — DISCHARGE NOTE PROVIDER - NPI NUMBER (FOR SYSADMIN USE ONLY) :
[[id number]] [[id number]],[[id number]] [[id number]],[[id number]],[[id number]],[[id number]] [[id number]],[[id number]],[[id number]],[[id number]],[UNKNOWN]

## 2023-04-15 ENCOUNTER — APPOINTMENT (OUTPATIENT)
Dept: PODIATRY | Facility: HOSPITAL | Age: 73
End: 2023-04-15
Payer: MEDICARE

## 2023-04-15 LAB
ANION GAP SERPL CALC-SCNC: 10 MMOL/L — SIGNIFICANT CHANGE UP (ref 7–14)
BASOPHILS # BLD AUTO: 0.04 K/UL — SIGNIFICANT CHANGE UP (ref 0–0.2)
BASOPHILS NFR BLD AUTO: 0.4 % — SIGNIFICANT CHANGE UP (ref 0–2)
BUN SERPL-MCNC: 19 MG/DL — SIGNIFICANT CHANGE UP (ref 7–23)
CALCIUM SERPL-MCNC: 9.2 MG/DL — SIGNIFICANT CHANGE UP (ref 8.4–10.5)
CHLORIDE SERPL-SCNC: 100 MMOL/L — SIGNIFICANT CHANGE UP (ref 98–107)
CO2 SERPL-SCNC: 28 MMOL/L — SIGNIFICANT CHANGE UP (ref 22–31)
CREAT SERPL-MCNC: 0.94 MG/DL — SIGNIFICANT CHANGE UP (ref 0.5–1.3)
EGFR: 64 ML/MIN/1.73M2 — SIGNIFICANT CHANGE UP
EOSINOPHIL # BLD AUTO: 0.23 K/UL — SIGNIFICANT CHANGE UP (ref 0–0.5)
EOSINOPHIL NFR BLD AUTO: 2.2 % — SIGNIFICANT CHANGE UP (ref 0–6)
GLUCOSE SERPL-MCNC: 131 MG/DL — HIGH (ref 70–99)
HCT VFR BLD CALC: 36.8 % — SIGNIFICANT CHANGE UP (ref 34.5–45)
HGB BLD-MCNC: 11.2 G/DL — LOW (ref 11.5–15.5)
IANC: 7.31 K/UL — SIGNIFICANT CHANGE UP (ref 1.8–7.4)
IMM GRANULOCYTES NFR BLD AUTO: 0.6 % — SIGNIFICANT CHANGE UP (ref 0–0.9)
LYMPHOCYTES # BLD AUTO: 1.66 K/UL — SIGNIFICANT CHANGE UP (ref 1–3.3)
LYMPHOCYTES # BLD AUTO: 15.6 % — SIGNIFICANT CHANGE UP (ref 13–44)
MAGNESIUM SERPL-MCNC: 2.1 MG/DL — SIGNIFICANT CHANGE UP (ref 1.6–2.6)
MCHC RBC-ENTMCNC: 27.9 PG — SIGNIFICANT CHANGE UP (ref 27–34)
MCHC RBC-ENTMCNC: 30.4 GM/DL — LOW (ref 32–36)
MCV RBC AUTO: 91.8 FL — SIGNIFICANT CHANGE UP (ref 80–100)
MONOCYTES # BLD AUTO: 1.32 K/UL — HIGH (ref 0–0.9)
MONOCYTES NFR BLD AUTO: 12.4 % — SIGNIFICANT CHANGE UP (ref 2–14)
NEUTROPHILS # BLD AUTO: 7.31 K/UL — SIGNIFICANT CHANGE UP (ref 1.8–7.4)
NEUTROPHILS NFR BLD AUTO: 68.8 % — SIGNIFICANT CHANGE UP (ref 43–77)
NRBC # BLD: 0 /100 WBCS — SIGNIFICANT CHANGE UP (ref 0–0)
NRBC # FLD: 0 K/UL — SIGNIFICANT CHANGE UP (ref 0–0)
PHOSPHATE SERPL-MCNC: 4.1 MG/DL — SIGNIFICANT CHANGE UP (ref 2.5–4.5)
PLATELET # BLD AUTO: 289 K/UL — SIGNIFICANT CHANGE UP (ref 150–400)
POTASSIUM SERPL-MCNC: 4.1 MMOL/L — SIGNIFICANT CHANGE UP (ref 3.5–5.3)
POTASSIUM SERPL-SCNC: 4.1 MMOL/L — SIGNIFICANT CHANGE UP (ref 3.5–5.3)
RBC # BLD: 4.01 M/UL — SIGNIFICANT CHANGE UP (ref 3.8–5.2)
RBC # FLD: 14.9 % — HIGH (ref 10.3–14.5)
SODIUM SERPL-SCNC: 138 MMOL/L — SIGNIFICANT CHANGE UP (ref 135–145)
VANCOMYCIN TROUGH SERPL-MCNC: <4 UG/ML — LOW (ref 10–20)
WBC # BLD: 10.62 K/UL — HIGH (ref 3.8–10.5)
WBC # FLD AUTO: 10.62 K/UL — HIGH (ref 3.8–10.5)

## 2023-04-15 PROCEDURE — 99222 1ST HOSP IP/OBS MODERATE 55: CPT

## 2023-04-15 RX ORDER — VANCOMYCIN HCL 1 G
1250 VIAL (EA) INTRAVENOUS EVERY 12 HOURS
Refills: 0 | Status: COMPLETED | OUTPATIENT
Start: 2023-04-15 | End: 2023-04-17

## 2023-04-15 RX ADMIN — AMLODIPINE BESYLATE 2.5 MILLIGRAM(S): 2.5 TABLET ORAL at 21:36

## 2023-04-15 RX ADMIN — GABAPENTIN 300 MILLIGRAM(S): 400 CAPSULE ORAL at 14:26

## 2023-04-15 RX ADMIN — Medication 650 MILLIGRAM(S): at 21:35

## 2023-04-15 RX ADMIN — GABAPENTIN 300 MILLIGRAM(S): 400 CAPSULE ORAL at 21:35

## 2023-04-15 RX ADMIN — Medication 166.67 MILLIGRAM(S): at 21:36

## 2023-04-15 RX ADMIN — PANTOPRAZOLE SODIUM 40 MILLIGRAM(S): 20 TABLET, DELAYED RELEASE ORAL at 06:20

## 2023-04-15 RX ADMIN — Medication 650 MILLIGRAM(S): at 22:45

## 2023-04-15 RX ADMIN — GABAPENTIN 300 MILLIGRAM(S): 400 CAPSULE ORAL at 06:20

## 2023-04-15 RX ADMIN — ENOXAPARIN SODIUM 40 MILLIGRAM(S): 100 INJECTION SUBCUTANEOUS at 06:20

## 2023-04-15 RX ADMIN — ATORVASTATIN CALCIUM 10 MILLIGRAM(S): 80 TABLET, FILM COATED ORAL at 21:36

## 2023-04-15 RX ADMIN — SENNA PLUS 2 TABLET(S): 8.6 TABLET ORAL at 21:35

## 2023-04-15 RX ADMIN — Medication 166.67 MILLIGRAM(S): at 10:34

## 2023-04-15 NOTE — PROGRESS NOTE ADULT - SUBJECTIVE AND OBJECTIVE BOX
Vascular Surgery Progress Note  Patient is a 72y old  Female who presents with a chief complaint of cellulitis (15 Apr 2023 12:34)      INTERVAL EVENTS: No acute events overnight.  SUBJECTIVE: Patient seen and examined at bedside with surgical team, patient without complaints.      OBJECTIVE:    Vital Signs Last 24 Hrs  T(C): 36.8 (15 Apr 2023 12:35), Max: 36.8 (14 Apr 2023 21:30)  T(F): 98.2 (15 Apr 2023 12:35), Max: 98.2 (14 Apr 2023 21:30)  HR: 68 (15 Apr 2023 12:35) (68 - 73)  BP: 112/78 (15 Apr 2023 12:35) (112/78 - 132/78)  BP(mean): --  RR: 17 (15 Apr 2023 12:35) (16 - 18)  SpO2: 98% (15 Apr 2023 12:35) (97% - 99%)    Parameters below as of 15 Apr 2023 06:26  Patient On (Oxygen Delivery Method): room air    I&O's Detail    14 Apr 2023 07:01  -  15 Apr 2023 07:00  --------------------------------------------------------  IN:    Oral Fluid: 200 mL    sodium chloride 0.9%: 150 mL  Total IN: 350 mL    OUT:  Total OUT: 0 mL    Total NET: 350 mL      MEDICATIONS  (STANDING):  amLODIPine   Tablet 2.5 milliGRAM(s) Oral at bedtime  atorvastatin 10 milliGRAM(s) Oral at bedtime  clobetasol 0.05% Ointment 1 Application(s) Topical two times a day  enoxaparin Injectable 40 milliGRAM(s) SubCutaneous every 24 hours  gabapentin 300 milliGRAM(s) Oral three times a day  pantoprazole    Tablet 40 milliGRAM(s) Oral before breakfast  polyethylene glycol 3350 17 Gram(s) Oral daily  senna 2 Tablet(s) Oral at bedtime  sodium chloride 0.9%. 1000 milliLiter(s) (50 mL/Hr) IV Continuous <Continuous>  vancomycin  IVPB 1250 milliGRAM(s) IV Intermittent every 12 hours    MEDICATIONS  (PRN):  acetaminophen     Tablet .. 650 milliGRAM(s) Oral every 6 hours PRN Temp greater or equal to 38C (100.4F), Mild Pain (1 - 3)  aluminum hydroxide/magnesium hydroxide/simethicone Suspension 30 milliLiter(s) Oral every 6 hours PRN Dyspepsia  melatonin 3 milliGRAM(s) Oral at bedtime PRN Insomnia  ondansetron Injectable 4 milliGRAM(s) IV Push every 6 hours PRN Nausea and/or Vomiting  oxyCODONE    IR 2.5 milliGRAM(s) Oral every 4 hours PRN Moderate Pain (4 - 6)  oxyCODONE    IR 5 milliGRAM(s) Oral every 4 hours PRN Severe Pain (7 - 10)      PHYSICAL EXAM:  Constitutional: A&Ox3, NAD  Respiratory: Unlabored breathing  Abdomen: Soft, nondistended, NTTP. No rebound or guarding.  Extremities: WWP, left ankle dressing c/d/i    LABS:                        11.2   10.62 )-----------( 289      ( 15 Apr 2023 04:15 )             36.8     04-15    138  |  100  |  19  ----------------------------<  131<H>  4.1   |  28  |  0.94    Ca    9.2      15 Apr 2023 04:15  Phos  4.1     04-15  Mg     2.10     04-15      PT/INR - ( 14 Apr 2023 00:06 )   PT: 14.7 sec;   INR: 1.26 ratio         PTT - ( 14 Apr 2023 00:06 )  PTT:26.8 sec          IMAGING:     Vascular Surgery Progress Note  Patient is a 72y old  Female who presents with a chief complaint of cellulitis (15 Apr 2023 12:34)      INTERVAL EVENTS: No acute events overnight.  SUBJECTIVE: Patient seen and examined at bedside with surgical team, patient without complaints.      OBJECTIVE:    Vital Signs Last 24 Hrs  T(C): 36.8 (15 Apr 2023 12:35), Max: 36.8 (14 Apr 2023 21:30)  T(F): 98.2 (15 Apr 2023 12:35), Max: 98.2 (14 Apr 2023 21:30)  HR: 68 (15 Apr 2023 12:35) (68 - 73)  BP: 112/78 (15 Apr 2023 12:35) (112/78 - 132/78)  BP(mean): --  RR: 17 (15 Apr 2023 12:35) (16 - 18)  SpO2: 98% (15 Apr 2023 12:35) (97% - 99%)    Parameters below as of 15 Apr 2023 06:26  Patient On (Oxygen Delivery Method): room air    I&O's Detail    14 Apr 2023 07:01  -  15 Apr 2023 07:00  --------------------------------------------------------  IN:    Oral Fluid: 200 mL    sodium chloride 0.9%: 150 mL  Total IN: 350 mL    OUT:  Total OUT: 0 mL    Total NET: 350 mL      MEDICATIONS  (STANDING):  amLODIPine   Tablet 2.5 milliGRAM(s) Oral at bedtime  atorvastatin 10 milliGRAM(s) Oral at bedtime  clobetasol 0.05% Ointment 1 Application(s) Topical two times a day  enoxaparin Injectable 40 milliGRAM(s) SubCutaneous every 24 hours  gabapentin 300 milliGRAM(s) Oral three times a day  pantoprazole    Tablet 40 milliGRAM(s) Oral before breakfast  polyethylene glycol 3350 17 Gram(s) Oral daily  senna 2 Tablet(s) Oral at bedtime  sodium chloride 0.9%. 1000 milliLiter(s) (50 mL/Hr) IV Continuous <Continuous>  vancomycin  IVPB 1250 milliGRAM(s) IV Intermittent every 12 hours    MEDICATIONS  (PRN):  acetaminophen     Tablet .. 650 milliGRAM(s) Oral every 6 hours PRN Temp greater or equal to 38C (100.4F), Mild Pain (1 - 3)  aluminum hydroxide/magnesium hydroxide/simethicone Suspension 30 milliLiter(s) Oral every 6 hours PRN Dyspepsia  melatonin 3 milliGRAM(s) Oral at bedtime PRN Insomnia  ondansetron Injectable 4 milliGRAM(s) IV Push every 6 hours PRN Nausea and/or Vomiting  oxyCODONE    IR 2.5 milliGRAM(s) Oral every 4 hours PRN Moderate Pain (4 - 6)  oxyCODONE    IR 5 milliGRAM(s) Oral every 4 hours PRN Severe Pain (7 - 10)      PHYSICAL EXAM:  Constitutional: A&Ox3, NAD  Respiratory: Unlabored breathing  Abdomen: Soft, nondistended, NTTP. No rebound or guarding.  Groins: R groin soft, no hematoma or mass appreciated  Extremities: WWP, left ankle dressing c/d/i    LABS:                        11.2   10.62 )-----------( 289      ( 15 Apr 2023 04:15 )             36.8     04-15    138  |  100  |  19  ----------------------------<  131<H>  4.1   |  28  |  0.94    Ca    9.2      15 Apr 2023 04:15  Phos  4.1     04-15  Mg     2.10     04-15      PT/INR - ( 14 Apr 2023 00:06 )   PT: 14.7 sec;   INR: 1.26 ratio         PTT - ( 14 Apr 2023 00:06 )  PTT:26.8 sec          IMAGING:

## 2023-04-15 NOTE — PROGRESS NOTE ADULT - ASSESSMENT
72F PMHx breast cancer status post right breast lumpectomy, bladder cancer with mets to the spine, in remission, hypertension, hyperlipidemia presents with left lateral ankle nodular lesion. Plan for podiatry local wound care vs OR L ankle I+D.   Vascular consulted for evaluation of potential arterial ulcer etiology. now s/p LLE angio    Plan:  - continue wound care  - okay to resume ASA/Plavix  - start pletal 50 BID today  - will continue to follow     Vascular sx  [id number]

## 2023-04-15 NOTE — PROGRESS NOTE ADULT - SUBJECTIVE AND OBJECTIVE BOX
CARDIOLOGY FOLLOW UP - Dr. Frost  DATE OF SERVICE: 4/15/23    CC no cp or sob       REVIEW OF SYSTEMS:  CONSTITUTIONAL: No fever, weight loss, or fatigue  RESPIRATORY: No cough, wheezing, chills or hemoptysis; No Shortness of Breath  CARDIOVASCULAR: No chest pain, palpitations, passing out, dizziness, or leg swelling  GASTROINTESTINAL: No abdominal or epigastric pain. No nausea, vomiting, or hematemesis; No diarrhea or constipation. No melena or hematochezia.  VASCULAR: No edema     PHYSICAL EXAM:  T(C): 36.6 (04-15-23 @ 06:26), Max: 36.8 (04-14-23 @ 21:30)  HR: 72 (04-15-23 @ 06:26) (67 - 73)  BP: 121/69 (04-15-23 @ 06:26) (121/69 - 133/80)  RR: 16 (04-15-23 @ 06:26) (16 - 18)  SpO2: 99% (04-15-23 @ 06:26) (97% - 99%)  Wt(kg): --  I&O's Summary    14 Apr 2023 07:01  -  15 Apr 2023 07:00  --------------------------------------------------------  IN: 350 mL / OUT: 0 mL / NET: 350 mL        Appearance: Normal	  Cardiovascular: Normal S1 S2,RRR, No JVD, No murmurs  Respiratory: Lungs clear to auscultation	  Gastrointestinal:  Soft, Non-tender, + BS	  Extremities: Normal range of motion, No clubbing, cyanosis or edema      Home Medications:  gabapentin 300 mg oral tablet: 1 tab(s) orally 3 times a day (08 Apr 2023 22:39)  Norvasc 2.5 mg oral tablet: 1 tab(s) orally once a day (at bedtime) (08 Apr 2023 22:41)  oxyCODONE 5 mg oral tablet:  (08 Apr 2023 22:41)  pravastatin 20 mg oral tablet: 1 orally once a day (at bedtime) (08 Apr 2023 22:40)  Vitamin D2: 1 tab(s) orally once a day (08 Apr 2023 22:41)      MEDICATIONS  (STANDING):  amLODIPine   Tablet 2.5 milliGRAM(s) Oral at bedtime  atorvastatin 10 milliGRAM(s) Oral at bedtime  clobetasol 0.05% Ointment 1 Application(s) Topical two times a day  enoxaparin Injectable 40 milliGRAM(s) SubCutaneous every 24 hours  gabapentin 300 milliGRAM(s) Oral three times a day  pantoprazole    Tablet 40 milliGRAM(s) Oral before breakfast  polyethylene glycol 3350 17 Gram(s) Oral daily  senna 2 Tablet(s) Oral at bedtime  sodium chloride 0.9%. 1000 milliLiter(s) (50 mL/Hr) IV Continuous <Continuous>  vancomycin  IVPB 1250 milliGRAM(s) IV Intermittent every 12 hours      TELEMETRY: 	    ECG:  	  RADIOLOGY:   DIAGNOSTIC TESTING:  [ ] Echocardiogram:  [ ]  Catheterization:  [ ] Stress Test:    OTHER: 	    LABS:	 	                            11.2   10.62 )-----------( 289      ( 15 Apr 2023 04:15 )             36.8     04-15    138  |  100  |  19  ----------------------------<  131<H>  4.1   |  28  |  0.94    Ca    9.2      15 Apr 2023 04:15  Phos  4.1     04-15  Mg     2.10     04-15      PT/INR - ( 14 Apr 2023 00:06 )   PT: 14.7 sec;   INR: 1.26 ratio         PTT - ( 14 Apr 2023 00:06 )  PTT:26.8 sec

## 2023-04-15 NOTE — PROGRESS NOTE ADULT - SUBJECTIVE AND OBJECTIVE BOX
Patient is a 72y old  Female who presents with a chief complaint of cellulitis (15 Apr 2023 09:10)       INTERVAL HPI/OVERNIGHT EVENTS:  Patient seen and evaluated at bedside.  Pt is resting comfortable in NAD. Denies N/V/F/C.    Allergies    sulfa drugs (Unknown)    Intolerances        Vital Signs Last 24 Hrs  T(C): 36.6 (15 Apr 2023 06:26), Max: 36.8 (14 Apr 2023 21:30)  T(F): 97.8 (15 Apr 2023 06:26), Max: 98.2 (14 Apr 2023 21:30)  HR: 72 (15 Apr 2023 06:26) (70 - 73)  BP: 121/69 (15 Apr 2023 06:26) (121/69 - 132/78)  BP(mean): --  RR: 16 (15 Apr 2023 06:26) (16 - 18)  SpO2: 99% (15 Apr 2023 06:26) (97% - 99%)    Parameters below as of 15 Apr 2023 06:26  Patient On (Oxygen Delivery Method): room air        LABS:                        11.2   10.62 )-----------( 289      ( 15 Apr 2023 04:15 )             36.8     04-15    138  |  100  |  19  ----------------------------<  131<H>  4.1   |  28  |  0.94    Ca    9.2      15 Apr 2023 04:15  Phos  4.1     04-15  Mg     2.10     04-15      PT/INR - ( 14 Apr 2023 00:06 )   PT: 14.7 sec;   INR: 1.26 ratio         PTT - ( 14 Apr 2023 00:06 )  PTT:26.8 sec    CAPILLARY BLOOD GLUCOSE          Lower Extremity Physical Exam:    Vascular: DP/PT 2/4, B/L, CFT <3 seconds B/L, Temperature gradient warm to warm on left , warm to cool on right.   Neuro: Epicritic sensation intact to the level of toes, B/L.  Musculoskeletal/Ortho: pain on palpation to the dorsum of left lateral malleolus mildly improved, L ankle ROM wnl  Skin: L lateral malleoli nodular lesion, now with central lesion wound s/p punch biopsy with derm, granular wound bed with necrotic patch, periwound erythema resolved, no drainage, no pus, no malodor.      RADIOLOGY & ADDITIONAL TESTS:

## 2023-04-15 NOTE — PROGRESS NOTE ADULT - ASSESSMENT
72F with left lateral ankle lesion s/p punch biopsy with derm.  - Patient seen and evaluated.  - Afebrile, no leukocytosis  - L lateral malleoli nodular lesion, now with central lesion wound s/p punch biopsy with derm, granular wound bed with necrotic patch, periwound erythema resolved, no drainage, no pus, no malodor.  - Left foot xray and ankle so no gas, no OM, no fracture.  - Left ankle MRI: no OM, no abscess.   - HEIDY/PVR: RABI 1.24, RTBI 1.26, LABI 1.24, LTBI 1.24, BL waveforms triphasic.  - Derm Punch Biopsy Path: pending  - Vasc: s/p left lower extremity angiogram w/3-vessel run-off to ankle c/w small vessel disease  - Rheum consult appreciated.   - L ankle culture: no growth final  - ID recs PO Doxy and Keflex x 10 days upon d/c, recs appreciated  - Pod plan local wound care pending vasc recs   - Patient will need to follow with dermatology and wound care Dr Way at wound care [facility].   - Podiatry stable for discharge, follow up info in Discharge provider note.   - Discussed with attending

## 2023-04-15 NOTE — PROGRESS NOTE ADULT - ASSESSMENT
72-year-old female with past medical history of breast cancer status post right breast lumpectomy, bladder cancer with mets to the spine, in remission, hypertension, hyperlipidemia presents to the ER complaining of left ankle pain swelling and redness times approximately 1 week. Admit for IV abx for cellulitis.     #  LE / ankle Cellulitis :  doing better   c/w IV abx   ID/ podiatry following     PAD :  -seen by vascular   scheduled for LE angiogram done   distal dis , no stent/ or angioplasty  recommend pletal daily      Anemia.   of ch dis   H/H stable     # HTN (hypertension).   controlled   -c/w amlodipine     #HLD (hyperlipidemia).   c/w statin     dispo: now stable to be d/c home

## 2023-04-15 NOTE — PROGRESS NOTE ADULT - ASSESSMENT
A/P  72-year-old female with past medical history of breast cancer status post right breast lumpectomy, bladder cancer with mets to the spine, in remission, hypertension, hyperlipidemia presents to the ER complaining of left ankle pain swelling and redness times approximately 1 week. Admit for IV abx for cellulitis.     #Cellulitis/foot wound  -abx per med  -pod f/u  -r/o osteo  -pod/vasc w/u  -sp le angio    #Anemia.   -cont to trend heme    #HTN (hypertension).   -stable  -cont amlodipine 2.5 mg QD.      dvt ppx

## 2023-04-16 LAB
ANION GAP SERPL CALC-SCNC: 11 MMOL/L — SIGNIFICANT CHANGE UP (ref 7–14)
BASOPHILS # BLD AUTO: 0.04 K/UL — SIGNIFICANT CHANGE UP (ref 0–0.2)
BASOPHILS NFR BLD AUTO: 0.5 % — SIGNIFICANT CHANGE UP (ref 0–2)
BUN SERPL-MCNC: 23 MG/DL — SIGNIFICANT CHANGE UP (ref 7–23)
CALCIUM SERPL-MCNC: 9.7 MG/DL — SIGNIFICANT CHANGE UP (ref 8.4–10.5)
CHLORIDE SERPL-SCNC: 101 MMOL/L — SIGNIFICANT CHANGE UP (ref 98–107)
CO2 SERPL-SCNC: 27 MMOL/L — SIGNIFICANT CHANGE UP (ref 22–31)
CREAT SERPL-MCNC: 1 MG/DL — SIGNIFICANT CHANGE UP (ref 0.5–1.3)
EGFR: 59 ML/MIN/1.73M2 — LOW
EOSINOPHIL # BLD AUTO: 0.22 K/UL — SIGNIFICANT CHANGE UP (ref 0–0.5)
EOSINOPHIL NFR BLD AUTO: 2.6 % — SIGNIFICANT CHANGE UP (ref 0–6)
GLUCOSE SERPL-MCNC: 107 MG/DL — HIGH (ref 70–99)
HCT VFR BLD CALC: 34.5 % — SIGNIFICANT CHANGE UP (ref 34.5–45)
HGB BLD-MCNC: 10.8 G/DL — LOW (ref 11.5–15.5)
IANC: 5.69 K/UL — SIGNIFICANT CHANGE UP (ref 1.8–7.4)
IMM GRANULOCYTES NFR BLD AUTO: 0.6 % — SIGNIFICANT CHANGE UP (ref 0–0.9)
LYMPHOCYTES # BLD AUTO: 1.53 K/UL — SIGNIFICANT CHANGE UP (ref 1–3.3)
LYMPHOCYTES # BLD AUTO: 18 % — SIGNIFICANT CHANGE UP (ref 13–44)
MAGNESIUM SERPL-MCNC: 2.2 MG/DL — SIGNIFICANT CHANGE UP (ref 1.6–2.6)
MCHC RBC-ENTMCNC: 28.7 PG — SIGNIFICANT CHANGE UP (ref 27–34)
MCHC RBC-ENTMCNC: 31.3 GM/DL — LOW (ref 32–36)
MCV RBC AUTO: 91.8 FL — SIGNIFICANT CHANGE UP (ref 80–100)
MONOCYTES # BLD AUTO: 0.95 K/UL — HIGH (ref 0–0.9)
MONOCYTES NFR BLD AUTO: 11.2 % — SIGNIFICANT CHANGE UP (ref 2–14)
NEUTROPHILS # BLD AUTO: 5.69 K/UL — SIGNIFICANT CHANGE UP (ref 1.8–7.4)
NEUTROPHILS NFR BLD AUTO: 67.1 % — SIGNIFICANT CHANGE UP (ref 43–77)
NRBC # BLD: 0 /100 WBCS — SIGNIFICANT CHANGE UP (ref 0–0)
NRBC # FLD: 0 K/UL — SIGNIFICANT CHANGE UP (ref 0–0)
PHOSPHATE SERPL-MCNC: 3.1 MG/DL — SIGNIFICANT CHANGE UP (ref 2.5–4.5)
PLATELET # BLD AUTO: 267 K/UL — SIGNIFICANT CHANGE UP (ref 150–400)
POTASSIUM SERPL-MCNC: 3.9 MMOL/L — SIGNIFICANT CHANGE UP (ref 3.5–5.3)
POTASSIUM SERPL-SCNC: 3.9 MMOL/L — SIGNIFICANT CHANGE UP (ref 3.5–5.3)
RBC # BLD: 3.76 M/UL — LOW (ref 3.8–5.2)
RBC # FLD: 14.9 % — HIGH (ref 10.3–14.5)
SODIUM SERPL-SCNC: 139 MMOL/L — SIGNIFICANT CHANGE UP (ref 135–145)
VANCOMYCIN TROUGH SERPL-MCNC: 19.2 UG/ML — SIGNIFICANT CHANGE UP (ref 10–20)
WBC # BLD: 8.48 K/UL — SIGNIFICANT CHANGE UP (ref 3.8–10.5)
WBC # FLD AUTO: 8.48 K/UL — SIGNIFICANT CHANGE UP (ref 3.8–10.5)

## 2023-04-16 RX ADMIN — Medication 166.67 MILLIGRAM(S): at 10:00

## 2023-04-16 RX ADMIN — Medication 650 MILLIGRAM(S): at 22:30

## 2023-04-16 RX ADMIN — ATORVASTATIN CALCIUM 10 MILLIGRAM(S): 80 TABLET, FILM COATED ORAL at 21:04

## 2023-04-16 RX ADMIN — GABAPENTIN 300 MILLIGRAM(S): 400 CAPSULE ORAL at 15:43

## 2023-04-16 RX ADMIN — PANTOPRAZOLE SODIUM 40 MILLIGRAM(S): 20 TABLET, DELAYED RELEASE ORAL at 05:15

## 2023-04-16 RX ADMIN — Medication 166.67 MILLIGRAM(S): at 21:00

## 2023-04-16 RX ADMIN — AMLODIPINE BESYLATE 2.5 MILLIGRAM(S): 2.5 TABLET ORAL at 21:03

## 2023-04-16 RX ADMIN — Medication 650 MILLIGRAM(S): at 21:04

## 2023-04-16 RX ADMIN — ENOXAPARIN SODIUM 40 MILLIGRAM(S): 100 INJECTION SUBCUTANEOUS at 05:15

## 2023-04-16 RX ADMIN — GABAPENTIN 300 MILLIGRAM(S): 400 CAPSULE ORAL at 21:04

## 2023-04-16 RX ADMIN — GABAPENTIN 300 MILLIGRAM(S): 400 CAPSULE ORAL at 05:15

## 2023-04-16 NOTE — PROGRESS NOTE ADULT - SUBJECTIVE AND OBJECTIVE BOX
Patient is a 72y old  Female who presents with a chief complaint of cellulitis (15 Apr 2023 17:50)       INTERVAL HPI/OVERNIGHT EVENTS:  Patient seen and evaluated at bedside.  Pt is resting comfortable in NAD. Denies N/V/F/C.    Allergies    sulfa drugs (Unknown)    Intolerances        Vital Signs Last 24 Hrs  T(C): 36.7 (16 Apr 2023 06:00), Max: 36.8 (15 Apr 2023 12:35)  T(F): 98 (16 Apr 2023 06:00), Max: 98.3 (15 Apr 2023 21:30)  HR: 67 (16 Apr 2023 07:16) (67 - 70)  BP: 140/85 (16 Apr 2023 06:00) (112/78 - 140/85)  BP(mean): --  RR: 17 (16 Apr 2023 06:00) (16 - 17)  SpO2: 97% (16 Apr 2023 07:16) (96% - 98%)    Parameters below as of 16 Apr 2023 06:00  Patient On (Oxygen Delivery Method): room air        LABS:                        10.8   8.48  )-----------( 267      ( 16 Apr 2023 05:45 )             34.5     04-16    139  |  101  |  23  ----------------------------<  107<H>  3.9   |  27  |  1.00    Ca    9.7      16 Apr 2023 05:45  Phos  3.1     04-16  Mg     2.20     04-16          CAPILLARY BLOOD GLUCOSE          Lower Extremity Physical Exam:  Vascular: DP/PT 2/4, B/L, CFT <3 seconds B/L, Temperature gradient warm to warm on left , warm to cool on right.   Neuro: Epicritic sensation intact to the level of toes, B/L.  Musculoskeletal/Ortho: pain on palpation to the dorsum of left lateral malleolus mildly improved, L ankle ROM wnl  Skin: L lateral malleoli nodular lesion, now with central lesion wound s/p punch biopsy with derm, granular wound bed with necrotic patch, periwound erythema resolved, no drainage, no pus, no malodor.      RADIOLOGY & ADDITIONAL TESTS:

## 2023-04-16 NOTE — PROGRESS NOTE ADULT - SUBJECTIVE AND OBJECTIVE BOX
CARDIOLOGY FOLLOW UP - Dr. Frost  DATE OF SERVICE: 4/16/23    CC no cp or sob       REVIEW OF SYSTEMS:  CONSTITUTIONAL: No fever, weight loss, or fatigue  RESPIRATORY: No cough, wheezing, chills or hemoptysis; No Shortness of Breath  CARDIOVASCULAR: No chest pain, palpitations, passing out, dizziness, or leg swelling  GASTROINTESTINAL: No abdominal or epigastric pain. No nausea, vomiting, or hematemesis; No diarrhea or constipation. No melena or hematochezia.  VASCULAR: No edema     PHYSICAL EXAM:  T(C): 36.7 (04-16-23 @ 06:00), Max: 36.8 (04-15-23 @ 12:35)  HR: 67 (04-16-23 @ 07:16) (67 - 70)  BP: 140/85 (04-16-23 @ 06:00) (112/78 - 140/85)  RR: 17 (04-16-23 @ 06:00) (16 - 17)  SpO2: 97% (04-16-23 @ 07:16) (96% - 98%)  Wt(kg): --  I&O's Summary    15 Apr 2023 07:01  -  16 Apr 2023 07:00  --------------------------------------------------------  IN: 250 mL / OUT: 0 mL / NET: 250 mL        Appearance: Normal	  Cardiovascular: Normal S1 S2,RRR, No JVD, No murmurs  Respiratory: Lungs clear to auscultation	  Gastrointestinal:  Soft, Non-tender, + BS	  Extremities: Normal range of motion, No clubbing, cyanosis or edema      Home Medications:  gabapentin 300 mg oral tablet: 1 tab(s) orally 3 times a day (08 Apr 2023 22:39)  Norvasc 2.5 mg oral tablet: 1 tab(s) orally once a day (at bedtime) (08 Apr 2023 22:41)  oxyCODONE 5 mg oral tablet:  (08 Apr 2023 22:41)  pravastatin 20 mg oral tablet: 1 orally once a day (at bedtime) (08 Apr 2023 22:40)  Vitamin D2: 1 tab(s) orally once a day (08 Apr 2023 22:41)      MEDICATIONS  (STANDING):  amLODIPine   Tablet 2.5 milliGRAM(s) Oral at bedtime  atorvastatin 10 milliGRAM(s) Oral at bedtime  clobetasol 0.05% Ointment 1 Application(s) Topical two times a day  enoxaparin Injectable 40 milliGRAM(s) SubCutaneous every 24 hours  gabapentin 300 milliGRAM(s) Oral three times a day  pantoprazole    Tablet 40 milliGRAM(s) Oral before breakfast  polyethylene glycol 3350 17 Gram(s) Oral daily  senna 2 Tablet(s) Oral at bedtime  sodium chloride 0.9%. 1000 milliLiter(s) (50 mL/Hr) IV Continuous <Continuous>  vancomycin  IVPB 1250 milliGRAM(s) IV Intermittent every 12 hours      TELEMETRY: 	    ECG:  	  RADIOLOGY:   DIAGNOSTIC TESTING:  [ ] Echocardiogram:  [ ]  Catheterization:  [ ] Stress Test:    OTHER: 	    LABS:	 	                            10.8   8.48  )-----------( 267      ( 16 Apr 2023 05:45 )             34.5     04-16    139  |  101  |  23  ----------------------------<  107<H>  3.9   |  27  |  1.00    Ca    9.7      16 Apr 2023 05:45  Phos  3.1     04-16  Mg     2.20     04-16

## 2023-04-16 NOTE — PROGRESS NOTE ADULT - ASSESSMENT
72F with left lateral ankle lesion s/p punch biopsy with derm.  - Patient seen and evaluated.  - Afebrile, no leukocytosis  - L lateral malleoli nodular lesion, now with central lesion wound s/p punch biopsy with derm, granular wound bed with necrotic patch, periwound erythema resolved, no drainage, no pus, no malodor.  - Left foot xray and ankle so no gas, no OM, no fracture.  - Left ankle MRI: no OM, no abscess.   - HEIDY/PVR: RABI 1.24, RTBI 1.26, LABI 1.24, LTBI 1.24, BL waveforms triphasic.  - Derm Punch Biopsy Path: pending  - Vasc: s/p left lower extremity angiogram w/3-vessel run-off to ankle c/w small vessel disease  - Rheum consult appreciated.   - L ankle culture: no growth final  - ID recs PO Doxy and Keflex x 10 days upon d/c, recs appreciated  - Pod plan local wound care pending vasc recs   - Patient will need to follow with dermatology and wound care Dr Way at wound care [facility].   - Podiatry stable for discharge, follow up info in Discharge provider note.   - Discussed with attending

## 2023-04-16 NOTE — PROGRESS NOTE ADULT - SUBJECTIVE AND OBJECTIVE BOX
Patient is a 72y old  Female who presents with a chief complaint of cellulitis (2023 10:39)      INTERVAL HPI/OVERNIGHT EVENTS: seen and examined , afebrile , no c/o  T(C): 37.4 (23 @ 21:17), Max: 37.4 (23 @ 21:17)  HR: 76 (23 @ 21:17) (63 - 76)  BP: 129/85 (23 @ 21:17) (96/66 - 142/82)  RR: 16 (23 @ 21:17) (16 - 18)  SpO2: 97% (23 @ 21:17) (96% - 98%)  Wt(kg): --  I&O's Summary    15 Apr 2023 07:01  -  2023 07:00  --------------------------------------------------------  IN: 250 mL / OUT: 0 mL / NET: 250 mL        PAST MEDICAL & SURGICAL HISTORY:  Anxiety      Breast CA, left  lumpectomy / RTX in the past      Hypertension      Sleep apnea  uses  cpap machine      Irritable bowel syndrome (IBS)      Polyp of colon  "pre-cancerous" x 2, removed 2014      HLD (hyperlipidemia)      Bladder polyp      S/P       S/P colon resection  reversal, had complications for fibriods      S/P hysterectomy      Breast lump      History of surgery  right groin fatty tissue removed      H/O lumpectomy  left       Personal history of spine surgery  L1-L4 fusion 2017          SOCIAL HISTORY  Alcohol:  Tobacco:  Illicit substance use:    FAMILY HISTORY:    REVIEW OF SYSTEMS:  CONSTITUTIONAL: No fever, weight loss, or fatigue  EYES: No eye pain, visual disturbances, or discharge  ENMT:  No difficulty hearing, tinnitus, vertigo; No sinus or throat pain  NECK: No pain or stiffness  RESPIRATORY: No cough, wheezing, chills or hemoptysis; No shortness of breath  CARDIOVASCULAR: No chest pain, palpitations, dizziness, or leg swelling  GASTROINTESTINAL: No abdominal or epigastric pain. No nausea, vomiting, or hematemesis; No diarrhea or constipation. No melena or hematochezia.  GENITOURINARY: No dysuria, frequency, hematuria, or incontinence  NEUROLOGICAL: No headaches, memory loss, loss of strength, numbness, or tremors  SKIN: No itching, burning, rashes, or lesions   LYMPH NODES: No enlarged glands  ENDOCRINE: No heat or cold intolerance; No hair loss  MUSCULOSKELETAL: No joint pain or swelling; No muscle, back, or extremity pain  PSYCHIATRIC: No depression, anxiety, mood swings, or difficulty sleeping  HEME/LYMPH: No easy bruising, or bleeding gums  ALLERY AND IMMUNOLOGIC: No hives or eczema    RADIOLOGY & ADDITIONAL TESTS:    Imaging Personally Reviewed:  [ ] YES  [ ] NO    Consultant(s) Notes Reviewed:  [ ] YES  [ ] NO    PHYSICAL EXAM:  GENERAL: NAD, well-groomed, well-developed  HEAD:  Atraumatic, Normocephalic  EYES: EOMI, PERRLA, conjunctiva and sclera clear  ENMT: No tonsillar erythema, exudates, or enlargement; Moist mucous membranes, Good dentition, No lesions  NECK: Supple, No JVD, Normal thyroid  NERVOUS SYSTEM:  Alert & Oriented X3, Good concentration; Motor Strength 5/5 B/L upper and lower extremities; DTRs 2+ intact and symmetric  CHEST/LUNG: Clear to percussion bilaterally; No rales, rhonchi, wheezing, or rubs  HEART: Regular rate and rhythm; No murmurs, rubs, or gallops  ABDOMEN: Soft, Nontender, Nondistended; Bowel sounds present  EXTREMITIES:  2+ Peripheral Pulses, No clubbing, cyanosis, or edema  LYMPH: No lymphadenopathy noted  SKIN: No rashes or lesions    LABS:                        10.8   8.48  )-----------( 267      ( 2023 05:45 )             34.5     04-16    139  |  101  |  23  ----------------------------<  107<H>  3.9   |  27  |  1.00    Ca    9.7      2023 05:45  Phos  3.1     04-16  Mg     2.20     04-16          CAPILLARY BLOOD GLUCOSE                MEDICATIONS  (STANDING):  amLODIPine   Tablet 2.5 milliGRAM(s) Oral at bedtime  atorvastatin 10 milliGRAM(s) Oral at bedtime  clobetasol 0.05% Ointment 1 Application(s) Topical two times a day  enoxaparin Injectable 40 milliGRAM(s) SubCutaneous every 24 hours  gabapentin 300 milliGRAM(s) Oral three times a day  pantoprazole    Tablet 40 milliGRAM(s) Oral before breakfast  polyethylene glycol 3350 17 Gram(s) Oral daily  senna 2 Tablet(s) Oral at bedtime  sodium chloride 0.9%. 1000 milliLiter(s) (50 mL/Hr) IV Continuous <Continuous>  vancomycin  IVPB 1250 milliGRAM(s) IV Intermittent every 12 hours    MEDICATIONS  (PRN):  acetaminophen     Tablet .. 650 milliGRAM(s) Oral every 6 hours PRN Temp greater or equal to 38C (100.4F), Mild Pain (1 - 3)  aluminum hydroxide/magnesium hydroxide/simethicone Suspension 30 milliLiter(s) Oral every 6 hours PRN Dyspepsia  melatonin 3 milliGRAM(s) Oral at bedtime PRN Insomnia  ondansetron Injectable 4 milliGRAM(s) IV Push every 6 hours PRN Nausea and/or Vomiting  oxyCODONE    IR 2.5 milliGRAM(s) Oral every 4 hours PRN Moderate Pain (4 - 6)  oxyCODONE    IR 5 milliGRAM(s) Oral every 4 hours PRN Severe Pain (7 - 10)      Care Discussed with Consultants/Other Providers [ ] YES  [ ] NO

## 2023-04-16 NOTE — PROGRESS NOTE ADULT - ASSESSMENT
72-year-old female with past medical history of breast cancer status post right breast lumpectomy, bladder cancer with mets to the spine, in remission, hypertension, hyperlipidemia presents to the ER complaining of left ankle pain swelling and redness times approximately 1 week. Admit for IV abx for cellulitis.     #  LE / ankle Cellulitis :  doing better   c/w IV abx   ID/ podiatry following     PAD :  -seen by vascular   scheduled for LE angiogram done   distal dis , no stent/ or angioplasty  recommend pletal daily      Anemia.   of ch dis   H/H stable     # HTN (hypertension).   controlled   -c/w amlodipine     #HLD (hyperlipidemia).   c/w statin     dispo: as per ID change to oral abx doxy and keflex , podiatry cleared for d/c , f/u with wound care as out pt.   plan for d/c home in am

## 2023-04-17 LAB
ANION GAP SERPL CALC-SCNC: 13 MMOL/L — SIGNIFICANT CHANGE UP (ref 7–14)
BASOPHILS # BLD AUTO: 0.05 K/UL — SIGNIFICANT CHANGE UP (ref 0–0.2)
BASOPHILS NFR BLD AUTO: 0.5 % — SIGNIFICANT CHANGE UP (ref 0–2)
BUN SERPL-MCNC: 20 MG/DL — SIGNIFICANT CHANGE UP (ref 7–23)
CALCIUM SERPL-MCNC: 9.5 MG/DL — SIGNIFICANT CHANGE UP (ref 8.4–10.5)
CHLORIDE SERPL-SCNC: 102 MMOL/L — SIGNIFICANT CHANGE UP (ref 98–107)
CO2 SERPL-SCNC: 28 MMOL/L — SIGNIFICANT CHANGE UP (ref 22–31)
CREAT SERPL-MCNC: 0.94 MG/DL — SIGNIFICANT CHANGE UP (ref 0.5–1.3)
EGFR: 64 ML/MIN/1.73M2 — SIGNIFICANT CHANGE UP
EOSINOPHIL # BLD AUTO: 0.21 K/UL — SIGNIFICANT CHANGE UP (ref 0–0.5)
EOSINOPHIL NFR BLD AUTO: 2.1 % — SIGNIFICANT CHANGE UP (ref 0–6)
GLUCOSE SERPL-MCNC: 117 MG/DL — HIGH (ref 70–99)
HCT VFR BLD CALC: 36.4 % — SIGNIFICANT CHANGE UP (ref 34.5–45)
HGB BLD-MCNC: 11.2 G/DL — LOW (ref 11.5–15.5)
IANC: 6.92 K/UL — SIGNIFICANT CHANGE UP (ref 1.8–7.4)
IMM GRANULOCYTES NFR BLD AUTO: 0.5 % — SIGNIFICANT CHANGE UP (ref 0–0.9)
LYMPHOCYTES # BLD AUTO: 1.73 K/UL — SIGNIFICANT CHANGE UP (ref 1–3.3)
LYMPHOCYTES # BLD AUTO: 17.2 % — SIGNIFICANT CHANGE UP (ref 13–44)
MAGNESIUM SERPL-MCNC: 2.1 MG/DL — SIGNIFICANT CHANGE UP (ref 1.6–2.6)
MCHC RBC-ENTMCNC: 27.2 PG — SIGNIFICANT CHANGE UP (ref 27–34)
MCHC RBC-ENTMCNC: 30.8 GM/DL — LOW (ref 32–36)
MCV RBC AUTO: 88.3 FL — SIGNIFICANT CHANGE UP (ref 80–100)
MONOCYTES # BLD AUTO: 1.11 K/UL — HIGH (ref 0–0.9)
MONOCYTES NFR BLD AUTO: 11 % — SIGNIFICANT CHANGE UP (ref 2–14)
NEUTROPHILS # BLD AUTO: 6.92 K/UL — SIGNIFICANT CHANGE UP (ref 1.8–7.4)
NEUTROPHILS NFR BLD AUTO: 68.7 % — SIGNIFICANT CHANGE UP (ref 43–77)
NRBC # BLD: 0 /100 WBCS — SIGNIFICANT CHANGE UP (ref 0–0)
NRBC # FLD: 0 K/UL — SIGNIFICANT CHANGE UP (ref 0–0)
PHOSPHATE SERPL-MCNC: 2.6 MG/DL — SIGNIFICANT CHANGE UP (ref 2.5–4.5)
PLATELET # BLD AUTO: 289 K/UL — SIGNIFICANT CHANGE UP (ref 150–400)
POTASSIUM SERPL-MCNC: 3.8 MMOL/L — SIGNIFICANT CHANGE UP (ref 3.5–5.3)
POTASSIUM SERPL-SCNC: 3.8 MMOL/L — SIGNIFICANT CHANGE UP (ref 3.5–5.3)
RBC # BLD: 4.12 M/UL — SIGNIFICANT CHANGE UP (ref 3.8–5.2)
RBC # FLD: 14.6 % — HIGH (ref 10.3–14.5)
SODIUM SERPL-SCNC: 143 MMOL/L — SIGNIFICANT CHANGE UP (ref 135–145)
WBC # BLD: 10.07 K/UL — SIGNIFICANT CHANGE UP (ref 3.8–10.5)
WBC # FLD AUTO: 10.07 K/UL — SIGNIFICANT CHANGE UP (ref 3.8–10.5)

## 2023-04-17 PROCEDURE — 71260 CT THORAX DX C+: CPT | Mod: 26

## 2023-04-17 PROCEDURE — 99232 SBSQ HOSP IP/OBS MODERATE 35: CPT

## 2023-04-17 PROCEDURE — 74177 CT ABD & PELVIS W/CONTRAST: CPT | Mod: 26

## 2023-04-17 RX ORDER — CILOSTAZOL 100 MG/1
50 TABLET ORAL
Refills: 0 | Status: DISCONTINUED | OUTPATIENT
Start: 2023-04-17 | End: 2023-04-19

## 2023-04-17 RX ADMIN — ATORVASTATIN CALCIUM 10 MILLIGRAM(S): 80 TABLET, FILM COATED ORAL at 21:16

## 2023-04-17 RX ADMIN — AMLODIPINE BESYLATE 2.5 MILLIGRAM(S): 2.5 TABLET ORAL at 21:16

## 2023-04-17 RX ADMIN — Medication 1 APPLICATION(S): at 17:37

## 2023-04-17 RX ADMIN — GABAPENTIN 300 MILLIGRAM(S): 400 CAPSULE ORAL at 13:04

## 2023-04-17 RX ADMIN — Medication 166.67 MILLIGRAM(S): at 05:02

## 2023-04-17 RX ADMIN — GABAPENTIN 300 MILLIGRAM(S): 400 CAPSULE ORAL at 21:16

## 2023-04-17 RX ADMIN — Medication 650 MILLIGRAM(S): at 06:33

## 2023-04-17 RX ADMIN — GABAPENTIN 300 MILLIGRAM(S): 400 CAPSULE ORAL at 05:02

## 2023-04-17 RX ADMIN — PANTOPRAZOLE SODIUM 40 MILLIGRAM(S): 20 TABLET, DELAYED RELEASE ORAL at 05:02

## 2023-04-17 RX ADMIN — Medication 166.67 MILLIGRAM(S): at 17:34

## 2023-04-17 RX ADMIN — Medication 650 MILLIGRAM(S): at 06:23

## 2023-04-17 RX ADMIN — SENNA PLUS 2 TABLET(S): 8.6 TABLET ORAL at 21:16

## 2023-04-17 RX ADMIN — ENOXAPARIN SODIUM 40 MILLIGRAM(S): 100 INJECTION SUBCUTANEOUS at 05:03

## 2023-04-17 RX ADMIN — CILOSTAZOL 50 MILLIGRAM(S): 100 TABLET ORAL at 17:36

## 2023-04-17 NOTE — PROGRESS NOTE ADULT - SUBJECTIVE AND OBJECTIVE BOX
Patient is a 72y old  Female who presents with a chief complaint of cellulitis (16 Apr 2023 17:41)       INTERVAL HPI/OVERNIGHT EVENTS:  Patient seen and evaluated at bedside.  Pt is resting comfortable in NAD. Denies N/V/F/C.    Allergies    sulfa drugs (Unknown)    Intolerances        Vital Signs Last 24 Hrs  T(C): 37.1 (17 Apr 2023 04:29), Max: 37.4 (16 Apr 2023 21:17)  T(F): 98.7 (17 Apr 2023 04:29), Max: 99.4 (16 Apr 2023 21:17)  HR: 77 (17 Apr 2023 04:29) (63 - 77)  BP: 133/76 (17 Apr 2023 04:29) (96/66 - 142/82)  BP(mean): --  RR: 19 (17 Apr 2023 04:29) (16 - 19)  SpO2: 97% (17 Apr 2023 04:29) (97% - 98%)    Parameters below as of 17 Apr 2023 04:29  Patient On (Oxygen Delivery Method): room air        LABS:                        11.2   10.07 )-----------( 289      ( 17 Apr 2023 05:18 )             36.4     04-17    143  |  102  |  20  ----------------------------<  117<H>  3.8   |  28  |  0.94    Ca    9.5      17 Apr 2023 05:18  Phos  2.6     04-17  Mg     2.10     04-17          CAPILLARY BLOOD GLUCOSE          Lower Extremity Physical Exam:  Vascular: DP/PT 2/4, B/L, CFT <3 seconds B/L, Temperature gradient warm to warm on left , warm to cool on right.   Neuro: Epicritic sensation intact to the level of toes, B/L.  Musculoskeletal/Ortho: pain on palpation to the dorsum of left lateral malleolus mildly improved, L ankle ROM wnl  Skin: L lateral malleoli nodular lesion, now with central lesion wound s/p punch biopsy with derm, granular wound bed with necrotic patch, periwound erythema resolved, no drainage, no pus, no malodor.    RADIOLOGY & ADDITIONAL TESTS:

## 2023-04-17 NOTE — PROGRESS NOTE ADULT - SUBJECTIVE AND OBJECTIVE BOX
Patient is a 72y old  Female who presents with a chief complaint of cellulitis and left ankle ulcer (2023 21:48)      INTERVAL HPI/OVERNIGHT EVENTS: seen and examined   T(C): 37.1 (23 @ 20:50), Max: 37.1 (23 @ 04:29)  HR: 76 (23 @ 20:50) (76 - 84)  BP: 126/86 (23 @ 20:50) (124/83 - 133/76)  RR: 19 (23 @ 20:50) (18 - 19)  SpO2: 99% (23 @ 20:50) (97% - 99%)  Wt(kg): --  I&O's Summary      PAST MEDICAL & SURGICAL HISTORY:  Anxiety      Breast CA, left  lumpectomy / RTX in the past      Hypertension      Sleep apnea  uses  cpap machine      Irritable bowel syndrome (IBS)      Polyp of colon  "pre-cancerous" x 2, removed 2014      HLD (hyperlipidemia)      Bladder polyp      S/P       S/P colon resection  reversal, had complications for fibriods      S/P hysterectomy      Breast lump      History of surgery  right groin fatty tissue removed      H/O lumpectomy  left       Personal history of spine surgery  L1-L4 fusion 2017          SOCIAL HISTORY  Alcohol:  Tobacco:  Illicit substance use:    FAMILY HISTORY:    REVIEW OF SYSTEMS:  CONSTITUTIONAL: No fever, weight loss, or fatigue  EYES: No eye pain, visual disturbances, or discharge  ENMT:  No difficulty hearing, tinnitus, vertigo; No sinus or throat pain  NECK: No pain or stiffness  RESPIRATORY: No cough, wheezing, chills or hemoptysis; No shortness of breath  CARDIOVASCULAR: No chest pain, palpitations, dizziness, or leg swelling  GASTROINTESTINAL: No abdominal or epigastric pain. No nausea, vomiting, or hematemesis; No diarrhea or constipation. No melena or hematochezia.  GENITOURINARY: No dysuria, frequency, hematuria, or incontinence  NEUROLOGICAL: No headaches, memory loss, loss of strength, numbness, or tremors  SKIN: No itching, burning, rashes, or lesions   LYMPH NODES: No enlarged glands  ENDOCRINE: No heat or cold intolerance; No hair loss  MUSCULOSKELETAL: No joint pain or swelling; No muscle, back, or extremity pain  PSYCHIATRIC: No depression, anxiety, mood swings, or difficulty sleeping  HEME/LYMPH: No easy bruising, or bleeding gums  ALLERY AND IMMUNOLOGIC: No hives or eczema    RADIOLOGY & ADDITIONAL TESTS:    Imaging Personally Reviewed:  [ ] YES  [ ] NO    Consultant(s) Notes Reviewed:  [ ] YES  [ ] NO    PHYSICAL EXAM:  GENERAL: NAD, well-groomed, well-developed  HEAD:  Atraumatic, Normocephalic  EYES: EOMI, PERRLA, conjunctiva and sclera clear  ENMT: No tonsillar erythema, exudates, or enlargement; Moist mucous membranes, Good dentition, No lesions  NECK: Supple, No JVD, Normal thyroid  NERVOUS SYSTEM:  Alert & Oriented X3, Good concentration; Motor Strength 5/5 B/L upper and lower extremities; DTRs 2+ intact and symmetric  CHEST/LUNG: Clear to percussion bilaterally; No rales, rhonchi, wheezing, or rubs  HEART: Regular rate and rhythm; No murmurs, rubs, or gallops  ABDOMEN: Soft, Nontender, Nondistended; Bowel sounds present  EXTREMITIES:  2+ Peripheral Pulses, No clubbing, cyanosis, or edema  LYMPH: No lymphadenopathy noted  SKIN: No rashes or lesions    LABS:                        11.2   10.07 )-----------( 289      ( 2023 05:18 )             36.4     04-17    143  |  102  |  20  ----------------------------<  117<H>  3.8   |  28  |  0.94    Ca    9.5      2023 05:18  Phos  2.6     04-17  Mg     2.10     04-17          CAPILLARY BLOOD GLUCOSE                MEDICATIONS  (STANDING):  amLODIPine   Tablet 2.5 milliGRAM(s) Oral at bedtime  atorvastatin 10 milliGRAM(s) Oral at bedtime  cilostazol 50 milliGRAM(s) Oral two times a day  clobetasol 0.05% Ointment 1 Application(s) Topical two times a day  enoxaparin Injectable 40 milliGRAM(s) SubCutaneous every 24 hours  gabapentin 300 milliGRAM(s) Oral three times a day  pantoprazole    Tablet 40 milliGRAM(s) Oral before breakfast  polyethylene glycol 3350 17 Gram(s) Oral daily  senna 2 Tablet(s) Oral at bedtime  sodium chloride 0.9%. 1000 milliLiter(s) (50 mL/Hr) IV Continuous <Continuous>    MEDICATIONS  (PRN):  acetaminophen     Tablet .. 650 milliGRAM(s) Oral every 6 hours PRN Temp greater or equal to 38C (100.4F), Mild Pain (1 - 3)  aluminum hydroxide/magnesium hydroxide/simethicone Suspension 30 milliLiter(s) Oral every 6 hours PRN Dyspepsia  melatonin 3 milliGRAM(s) Oral at bedtime PRN Insomnia  ondansetron Injectable 4 milliGRAM(s) IV Push every 6 hours PRN Nausea and/or Vomiting  oxyCODONE    IR 5 milliGRAM(s) Oral every 4 hours PRN Severe Pain (7 - 10)  oxyCODONE    IR 2.5 milliGRAM(s) Oral every 4 hours PRN Moderate Pain (4 - 6)      Care Discussed with Consultants/Other Providers [ ] YES  [ ] NO

## 2023-04-17 NOTE — PROGRESS NOTE ADULT - SUBJECTIVE AND OBJECTIVE BOX
Patient is a 72y old  Female who presents with a chief complaint of Cellulitis of left lower extremity     (17 Apr 2023 12:40)      Vascular Surgery Attending Progress Note    Interval HPI: pt w/o new c/o     Medications:  acetaminophen     Tablet .. 650 milliGRAM(s) Oral every 6 hours PRN  aluminum hydroxide/magnesium hydroxide/simethicone Suspension 30 milliLiter(s) Oral every 6 hours PRN  amLODIPine   Tablet 2.5 milliGRAM(s) Oral at bedtime  atorvastatin 10 milliGRAM(s) Oral at bedtime  cilostazol 50 milliGRAM(s) Oral two times a day  clobetasol 0.05% Ointment 1 Application(s) Topical two times a day  enoxaparin Injectable 40 milliGRAM(s) SubCutaneous every 24 hours  gabapentin 300 milliGRAM(s) Oral three times a day  melatonin 3 milliGRAM(s) Oral at bedtime PRN  ondansetron Injectable 4 milliGRAM(s) IV Push every 6 hours PRN  oxyCODONE    IR 5 milliGRAM(s) Oral every 4 hours PRN  oxyCODONE    IR 2.5 milliGRAM(s) Oral every 4 hours PRN  pantoprazole    Tablet 40 milliGRAM(s) Oral before breakfast  polyethylene glycol 3350 17 Gram(s) Oral daily  senna 2 Tablet(s) Oral at bedtime  sodium chloride 0.9%. 1000 milliLiter(s) IV Continuous <Continuous>      Vital Signs Last 24 Hrs  T(C): 36.5 (17 Apr 2023 14:03), Max: 37.1 (17 Apr 2023 04:29)  T(F): 97.7 (17 Apr 2023 14:03), Max: 98.7 (17 Apr 2023 04:29)  HR: 84 (17 Apr 2023 14:03) (77 - 84)  BP: 124/83 (17 Apr 2023 14:03) (124/83 - 133/76)  BP(mean): --  RR: 18 (17 Apr 2023 14:03) (18 - 19)  SpO2: 98% (17 Apr 2023 14:03) (97% - 98%)    Parameters below as of 17 Apr 2023 04:29  Patient On (Oxygen Delivery Method): room air      I&O's Summary      Physical Exam:  Neuro  A&Ox3 VSS  Vascular:  left ankle dressing c/d/i    LABS:                        11.2   10.07 )-----------( 289      ( 17 Apr 2023 05:18 )             36.4     04-17    143  |  102  |  20  ----------------------------<  117<H>  3.8   |  28  |  0.94    Ca    9.5      17 Apr 2023 05:18  Phos  2.6     04-17  Mg     2.10     04-17          TONY IRELAND MD  [id number] Cell [phone]

## 2023-04-17 NOTE — DIETITIAN INITIAL EVALUATION ADULT - PROBLEM SELECTOR PLAN 1
Left lateral ankle with area of skin breakdown and surrounding erythema, edema, tenderness w/o any drainage. Pt with reported previous hx of MRSA infection in right foot. Wound cx from 4/6 from orthopedic office without any bacteria growth however pt states only a very small amount of clear fluid was expressed. XR w/o evidence of osteo or gas. Not improved on augmentin. S/p IV clinda in ED. Less likely septic joint vs gout  - will start IV vanc given previous hx of MRSA infection  - f/u podiatry consult  - oxycodone prn and home gabapentin 300 mg TID for pain

## 2023-04-17 NOTE — PROGRESS NOTE ADULT - ASSESSMENT
72F PMHx breast cancer status post right breast lumpectomy, bladder cancer with mets to the spine, in remission, hypertension, hyperlipidemia presents with left lateral ankle nodular lesion. now s/p lle angio  w 3 vessel runoff    Plan  angio results reviewed w pt  continue woundcare  continue letal 50 bid   will follow

## 2023-04-17 NOTE — DIETITIAN INITIAL EVALUATION ADULT - PERTINENT MEDS FT
MEDICATIONS  (STANDING):  amLODIPine   Tablet 2.5 milliGRAM(s) Oral at bedtime  atorvastatin 10 milliGRAM(s) Oral at bedtime  cilostazol 50 milliGRAM(s) Oral two times a day  clobetasol 0.05% Ointment 1 Application(s) Topical two times a day  enoxaparin Injectable 40 milliGRAM(s) SubCutaneous every 24 hours  gabapentin 300 milliGRAM(s) Oral three times a day  pantoprazole    Tablet 40 milliGRAM(s) Oral before breakfast  polyethylene glycol 3350 17 Gram(s) Oral daily  senna 2 Tablet(s) Oral at bedtime  sodium chloride 0.9%. 1000 milliLiter(s) (50 mL/Hr) IV Continuous <Continuous>  vancomycin  IVPB 1250 milliGRAM(s) IV Intermittent every 12 hours    MEDICATIONS  (PRN):  acetaminophen     Tablet .. 650 milliGRAM(s) Oral every 6 hours PRN Temp greater or equal to 38C (100.4F), Mild Pain (1 - 3)  aluminum hydroxide/magnesium hydroxide/simethicone Suspension 30 milliLiter(s) Oral every 6 hours PRN Dyspepsia  melatonin 3 milliGRAM(s) Oral at bedtime PRN Insomnia  ondansetron Injectable 4 milliGRAM(s) IV Push every 6 hours PRN Nausea and/or Vomiting  oxyCODONE    IR 5 milliGRAM(s) Oral every 4 hours PRN Severe Pain (7 - 10)  oxyCODONE    IR 2.5 milliGRAM(s) Oral every 4 hours PRN Moderate Pain (4 - 6)

## 2023-04-17 NOTE — DIETITIAN INITIAL EVALUATION ADULT - LITERATURE/VIDEOS GIVEN
Pt requested verbal diet education regarding sugar intake. RD gave examples of ways to cut out sugar (decreased sugar sweetened beverage intake, decrease dessert/baked goods intake, and don't add sugar to drinks). Discussed protein rich food choices, and encouraged patient to consume HBV proteins (meat, fish, poultry, egg whites, dairy). Pt confirmed understanding and compliance with information provided.

## 2023-04-17 NOTE — PROGRESS NOTE ADULT - ASSESSMENT
72F with left lateral ankle lesion s/p punch biopsy with derm.  - Patient seen and evaluated.  - Afebrile, no leukocytosis  - L lateral malleoli nodular lesion, now with central lesion wound s/p punch biopsy with derm, granular wound bed with necrotic patch, periwound erythema resolved, no drainage, no pus, no malodor.  - Left foot xray and ankle so no gas, no OM, no fracture.  - Left ankle MRI: no OM, no abscess.   - HEIDY/PVR: RABI 1.24, RTBI 1.26, LABI 1.24, LTBI 1.24, BL waveforms triphasic.  - Derm Punch Biopsy Path: pending  - Vasc: s/p left lower extremity angiogram w/3-vessel run-off to ankle c/w small vessel disease  - L ankle culture: no growth final  - ID recs PO Doxy and Keflex x 10 days upon d/c, recs appreciated  - Pod plan local wound care   - Patient will need to follow with dermatology and wound care Dr Way at wound care center.   - Podiatry stable for discharge, follow up info in Discharge provider note.   - Discussed with attending

## 2023-04-17 NOTE — PROGRESS NOTE ADULT - TIME BILLING
Agree with above NP note.  cv stable  cont current tx
agree with above  cont current meds  cv stable
agree with above  cont current meds  cv stable

## 2023-04-17 NOTE — PROGRESS NOTE ADULT - SUBJECTIVE AND OBJECTIVE BOX
CARDIOLOGY FOLLOW UP - Dr. Frost  DATE OF SERVICE: 4/17/23    CC no cp or sob       REVIEW OF SYSTEMS:  CONSTITUTIONAL: No fever, weight loss, or fatigue  RESPIRATORY: No cough, wheezing, chills or hemoptysis; No Shortness of Breath  CARDIOVASCULAR: No chest pain, palpitations, passing out, dizziness, or leg swelling  GASTROINTESTINAL: No abdominal or epigastric pain. No nausea, vomiting, or hematemesis; No diarrhea or constipation. No melena or hematochezia.      PHYSICAL EXAM:  T(C): 37.1 (04-17-23 @ 04:29), Max: 37.4 (04-16-23 @ 21:17)  HR: 77 (04-17-23 @ 04:29) (69 - 77)  BP: 133/76 (04-17-23 @ 04:29) (129/85 - 133/76)  RR: 19 (04-17-23 @ 04:29) (16 - 19)  SpO2: 97% (04-17-23 @ 04:29) (97% - 98%)  Wt(kg): --  I&O's Summary      Appearance: Normal	  Cardiovascular: Normal S1 S2,RRR, No JVD, No murmurs  Respiratory: Lungs clear to auscultation	  Gastrointestinal:  Soft, Non-tender, + BS	  Extremities:  dressing to LE CDI       Home Medications:  gabapentin 300 mg oral tablet: 1 tab(s) orally 3 times a day (08 Apr 2023 22:39)  Norvasc 2.5 mg oral tablet: 1 tab(s) orally once a day (at bedtime) (08 Apr 2023 22:41)  oxyCODONE 5 mg oral tablet:  (08 Apr 2023 22:41)  pravastatin 20 mg oral tablet: 1 orally once a day (at bedtime) (08 Apr 2023 22:40)  Vitamin D2: 1 tab(s) orally once a day (08 Apr 2023 22:41)      MEDICATIONS  (STANDING):  amLODIPine   Tablet 2.5 milliGRAM(s) Oral at bedtime  atorvastatin 10 milliGRAM(s) Oral at bedtime  cilostazol 50 milliGRAM(s) Oral two times a day  clobetasol 0.05% Ointment 1 Application(s) Topical two times a day  enoxaparin Injectable 40 milliGRAM(s) SubCutaneous every 24 hours  gabapentin 300 milliGRAM(s) Oral three times a day  pantoprazole    Tablet 40 milliGRAM(s) Oral before breakfast  polyethylene glycol 3350 17 Gram(s) Oral daily  senna 2 Tablet(s) Oral at bedtime  sodium chloride 0.9%. 1000 milliLiter(s) (50 mL/Hr) IV Continuous <Continuous>  vancomycin  IVPB 1250 milliGRAM(s) IV Intermittent every 12 hours      TELEMETRY: 	    ECG:  	  RADIOLOGY:   DIAGNOSTIC TESTING:  [ ] Echocardiogram:  [ ]  Catheterization:  [ ] Stress Test:    OTHER: 	    LABS:	 	                            11.2   10.07 )-----------( 289      ( 17 Apr 2023 05:18 )             36.4     04-17    143  |  102  |  20  ----------------------------<  117<H>  3.8   |  28  |  0.94    Ca    9.5      17 Apr 2023 05:18  Phos  2.6     04-17  Mg     2.10     04-17

## 2023-04-17 NOTE — DIETITIAN INITIAL EVALUATION ADULT - OTHER INFO
Per chart, 72-year-old female with past medical history of breast cancer status post right breast lumpectomy, bladder cancer with mets to the spine, in remission, hypertension, hyperlipidemia presents to the ER complaining of left ankle pain swelling and redness times approximately 1 week. Admit for IV abx for cellulitis.     Nutrition interview: No recent episodes of nausea, vomiting, diarrhea or constipation, BM noted on yesterday per Pt. Denies any chewing/swallowing difficulties. No food allergies. Stated UBW: ~161#, does not think she has gained any weight. Food preferences explored and noted. Intake is % per RN flowsheets and per pt. Feeding skills: set up help required.     Pt requested verbal diet education regarding sugar intake. RD gave examples of ways to cut out sugar (decreased sugar sweetened beverage intake, decrease dessert/baked goods intake, and don't add sugar to drinks). Discussed protein rich food choices, and encouraged patient to consume HBV proteins (meat, fish, poultry, egg whites, dairy). Pt confirmed understanding and compliance with information provided.

## 2023-04-18 LAB
ANION GAP SERPL CALC-SCNC: 11 MMOL/L — SIGNIFICANT CHANGE UP (ref 7–14)
BASOPHILS # BLD AUTO: 0.05 K/UL — SIGNIFICANT CHANGE UP (ref 0–0.2)
BASOPHILS NFR BLD AUTO: 0.5 % — SIGNIFICANT CHANGE UP (ref 0–2)
BUN SERPL-MCNC: 21 MG/DL — SIGNIFICANT CHANGE UP (ref 7–23)
CALCIUM SERPL-MCNC: 9.3 MG/DL — SIGNIFICANT CHANGE UP (ref 8.4–10.5)
CHLORIDE SERPL-SCNC: 103 MMOL/L — SIGNIFICANT CHANGE UP (ref 98–107)
CO2 SERPL-SCNC: 27 MMOL/L — SIGNIFICANT CHANGE UP (ref 22–31)
CREAT SERPL-MCNC: 0.99 MG/DL — SIGNIFICANT CHANGE UP (ref 0.5–1.3)
EGFR: 60 ML/MIN/1.73M2 — SIGNIFICANT CHANGE UP
EOSINOPHIL # BLD AUTO: 0.18 K/UL — SIGNIFICANT CHANGE UP (ref 0–0.5)
EOSINOPHIL NFR BLD AUTO: 1.9 % — SIGNIFICANT CHANGE UP (ref 0–6)
GLUCOSE SERPL-MCNC: 122 MG/DL — HIGH (ref 70–99)
HCT VFR BLD CALC: 34.2 % — LOW (ref 34.5–45)
HGB BLD-MCNC: 10.4 G/DL — LOW (ref 11.5–15.5)
IANC: 6.59 K/UL — SIGNIFICANT CHANGE UP (ref 1.8–7.4)
IMM GRANULOCYTES NFR BLD AUTO: 0.6 % — SIGNIFICANT CHANGE UP (ref 0–0.9)
LYMPHOCYTES # BLD AUTO: 1.75 K/UL — SIGNIFICANT CHANGE UP (ref 1–3.3)
LYMPHOCYTES # BLD AUTO: 18 % — SIGNIFICANT CHANGE UP (ref 13–44)
MAGNESIUM SERPL-MCNC: 2.1 MG/DL — SIGNIFICANT CHANGE UP (ref 1.6–2.6)
MCHC RBC-ENTMCNC: 27.2 PG — SIGNIFICANT CHANGE UP (ref 27–34)
MCHC RBC-ENTMCNC: 30.4 GM/DL — LOW (ref 32–36)
MCV RBC AUTO: 89.3 FL — SIGNIFICANT CHANGE UP (ref 80–100)
MONOCYTES # BLD AUTO: 1.08 K/UL — HIGH (ref 0–0.9)
MONOCYTES NFR BLD AUTO: 11.1 % — SIGNIFICANT CHANGE UP (ref 2–14)
NEUTROPHILS # BLD AUTO: 6.59 K/UL — SIGNIFICANT CHANGE UP (ref 1.8–7.4)
NEUTROPHILS NFR BLD AUTO: 67.9 % — SIGNIFICANT CHANGE UP (ref 43–77)
NRBC # BLD: 0 /100 WBCS — SIGNIFICANT CHANGE UP (ref 0–0)
NRBC # FLD: 0 K/UL — SIGNIFICANT CHANGE UP (ref 0–0)
PHOSPHATE SERPL-MCNC: 3.2 MG/DL — SIGNIFICANT CHANGE UP (ref 2.5–4.5)
PLATELET # BLD AUTO: 299 K/UL — SIGNIFICANT CHANGE UP (ref 150–400)
POTASSIUM SERPL-MCNC: 4 MMOL/L — SIGNIFICANT CHANGE UP (ref 3.5–5.3)
POTASSIUM SERPL-SCNC: 4 MMOL/L — SIGNIFICANT CHANGE UP (ref 3.5–5.3)
RBC # BLD: 3.83 M/UL — SIGNIFICANT CHANGE UP (ref 3.8–5.2)
RBC # FLD: 14.7 % — HIGH (ref 10.3–14.5)
SODIUM SERPL-SCNC: 141 MMOL/L — SIGNIFICANT CHANGE UP (ref 135–145)
WBC # BLD: 9.71 K/UL — SIGNIFICANT CHANGE UP (ref 3.8–10.5)
WBC # FLD AUTO: 9.71 K/UL — SIGNIFICANT CHANGE UP (ref 3.8–10.5)

## 2023-04-18 PROCEDURE — 99232 SBSQ HOSP IP/OBS MODERATE 35: CPT

## 2023-04-18 RX ADMIN — SENNA PLUS 2 TABLET(S): 8.6 TABLET ORAL at 21:04

## 2023-04-18 RX ADMIN — Medication 650 MILLIGRAM(S): at 06:06

## 2023-04-18 RX ADMIN — PANTOPRAZOLE SODIUM 40 MILLIGRAM(S): 20 TABLET, DELAYED RELEASE ORAL at 05:06

## 2023-04-18 RX ADMIN — GABAPENTIN 300 MILLIGRAM(S): 400 CAPSULE ORAL at 05:06

## 2023-04-18 RX ADMIN — CILOSTAZOL 50 MILLIGRAM(S): 100 TABLET ORAL at 05:05

## 2023-04-18 RX ADMIN — GABAPENTIN 300 MILLIGRAM(S): 400 CAPSULE ORAL at 21:04

## 2023-04-18 RX ADMIN — Medication 1 APPLICATION(S): at 18:02

## 2023-04-18 RX ADMIN — Medication 650 MILLIGRAM(S): at 23:52

## 2023-04-18 RX ADMIN — ATORVASTATIN CALCIUM 10 MILLIGRAM(S): 80 TABLET, FILM COATED ORAL at 21:04

## 2023-04-18 RX ADMIN — Medication 1 APPLICATION(S): at 05:05

## 2023-04-18 RX ADMIN — AMLODIPINE BESYLATE 2.5 MILLIGRAM(S): 2.5 TABLET ORAL at 21:04

## 2023-04-18 RX ADMIN — CILOSTAZOL 50 MILLIGRAM(S): 100 TABLET ORAL at 18:01

## 2023-04-18 NOTE — CHART NOTE - NSCHARTNOTEFT_GEN_A_CORE
Discussed preliminary skin biopsy results with Dermatology. Thus far showing dermal abscess which is not specific. Serologic work up negative. Rheumatology to sign off at this time. Please re-consult as needed    Robert Chow MD, PGY-4  Rheumatology Fellow  Reachable on TEAMS

## 2023-04-18 NOTE — PROGRESS NOTE ADULT - ASSESSMENT
72F PMHx breast cancer status post right breast lumpectomy, bladder cancer with mets to the spine, in remission, hypertension, hyperlipidemia presents with left lateral ankle nodular lesion. now s/p lle angio  w 3 vessel runoff    Plan  continue woundcare  continue pletal 50 bid   d/w planning as per primary service and podiatry   will follow

## 2023-04-18 NOTE — PROGRESS NOTE ADULT - ASSESSMENT
A/P  72-year-old female with past medical history of breast cancer status post right breast lumpectomy, bladder cancer with mets to the spine, in remission, hypertension, hyperlipidemia presents to the ER complaining of left ankle pain swelling and redness times approximately 1 week. Admit for IV abx for cellulitis.     #Cellulitis/foot wound  -abx per med  -pod f/u  -r/o osteo  -pod/vasc w/u  -sp le angio    #Anemia.   -cont to trend heme    #HTN (hypertension).   -stable  -cont amlodipine 2.5 mg QD.      dvt ppx    35 minutes spent on total encounter; more than 50% of the visit was spent counseling and/or coordinating care by the attending physician.

## 2023-04-18 NOTE — PROGRESS NOTE ADULT - SUBJECTIVE AND OBJECTIVE BOX
Patient is a 72y old  Female who presents with a chief complaint of cellulitis (18 Apr 2023 17:26)      Vascular Surgery Attending Progress Note    Interval HPI: pt w/o new c/o     Medications:  acetaminophen     Tablet .. 650 milliGRAM(s) Oral every 6 hours PRN  aluminum hydroxide/magnesium hydroxide/simethicone Suspension 30 milliLiter(s) Oral every 6 hours PRN  amLODIPine   Tablet 2.5 milliGRAM(s) Oral at bedtime  atorvastatin 10 milliGRAM(s) Oral at bedtime  cilostazol 50 milliGRAM(s) Oral two times a day  clobetasol 0.05% Ointment 1 Application(s) Topical two times a day  enoxaparin Injectable 40 milliGRAM(s) SubCutaneous every 24 hours  gabapentin 300 milliGRAM(s) Oral three times a day  melatonin 3 milliGRAM(s) Oral at bedtime PRN  ondansetron Injectable 4 milliGRAM(s) IV Push every 6 hours PRN  oxyCODONE    IR 5 milliGRAM(s) Oral every 4 hours PRN  oxyCODONE    IR 2.5 milliGRAM(s) Oral every 4 hours PRN  pantoprazole    Tablet 40 milliGRAM(s) Oral before breakfast  polyethylene glycol 3350 17 Gram(s) Oral daily  senna 2 Tablet(s) Oral at bedtime  sodium chloride 0.9%. 1000 milliLiter(s) IV Continuous <Continuous>      Vital Signs Last 24 Hrs  T(C): 36.6 (18 Apr 2023 12:26), Max: 36.9 (18 Apr 2023 04:04)  T(F): 97.9 (18 Apr 2023 12:26), Max: 98.4 (18 Apr 2023 04:04)  HR: 91 (18 Apr 2023 12:26) (80 - 91)  BP: 130/82 (18 Apr 2023 12:26) (127/77 - 130/82)  BP(mean): --  RR: 18 (18 Apr 2023 12:26) (18 - 18)  SpO2: 98% (18 Apr 2023 12:26) (96% - 100%)    Parameters below as of 18 Apr 2023 04:04  Patient On (Oxygen Delivery Method): room air      I&O's Summary      Physical Exam:  Neuro  A&Ox3 VSS  Vascular:  dressing c/d/i    LABS:                        10.4   9.71  )-----------( 299      ( 18 Apr 2023 06:00 )             34.2     04-18    141  |  103  |  21  ----------------------------<  122<H>  4.0   |  27  |  0.99    Ca    9.3      18 Apr 2023 06:00  Phos  3.2     04-18  Mg     2.10     04-18          TONY IRELAND MD  [phone] [phone]

## 2023-04-18 NOTE — PROGRESS NOTE ADULT - SUBJECTIVE AND OBJECTIVE BOX
Patient is a 72y old  Female who presents with a chief complaint of cellulitis (2023 15:00)      INTERVAL HPI/OVERNIGHT EVENTS: doing well , afebrile   T(C): 36.6 (23 @ 12:26), Max: 37.1 (23 @ 20:50)  HR: 91 (23 @ 12:26) (76 - 91)  BP: 130/82 (23 @ 12:26) (126/86 - 130/82)  RR: 18 (23 @ 12:26) (18 - 19)  SpO2: 98% (23 @ 12:26) (96% - 100%)  Wt(kg): --  I&O's Summary      PAST MEDICAL & SURGICAL HISTORY:  Anxiety      Breast CA, left  lumpectomy / RTX in the past      Hypertension      Sleep apnea  uses  cpap machine      Irritable bowel syndrome (IBS)      Polyp of colon  "pre-cancerous" x 2, removed 2014      HLD (hyperlipidemia)      Bladder polyp      S/P       S/P colon resection  reversal, had complications for fibriods      S/P hysterectomy      Breast lump      History of surgery  right groin fatty tissue removed      H/O lumpectomy  left       Personal history of spine surgery  L1-L4 fusion 2017          SOCIAL HISTORY  Alcohol:  Tobacco:  Illicit substance use:    FAMILY HISTORY:    REVIEW OF SYSTEMS:  CONSTITUTIONAL: No fever, weight loss, or fatigue  EYES: No eye pain, visual disturbances, or discharge  ENMT:  No difficulty hearing, tinnitus, vertigo; No sinus or throat pain  NECK: No pain or stiffness  RESPIRATORY: No cough, wheezing, chills or hemoptysis; No shortness of breath  CARDIOVASCULAR: No chest pain, palpitations, dizziness, or leg swelling  GASTROINTESTINAL: No abdominal or epigastric pain. No nausea, vomiting, or hematemesis; No diarrhea or constipation. No melena or hematochezia.  GENITOURINARY: No dysuria, frequency, hematuria, or incontinence  NEUROLOGICAL: No headaches, memory loss, loss of strength, numbness, or tremors  SKIN: No itching, burning, rashes, or lesions   LYMPH NODES: No enlarged glands  ENDOCRINE: No heat or cold intolerance; No hair loss  MUSCULOSKELETAL: No joint pain or swelling; No muscle, back, or extremity pain  PSYCHIATRIC: No depression, anxiety, mood swings, or difficulty sleeping  HEME/LYMPH: No easy bruising, or bleeding gums  ALLERY AND IMMUNOLOGIC: No hives or eczema    RADIOLOGY & ADDITIONAL TESTS:    Imaging Personally Reviewed:  [ ] YES  [ ] NO    Consultant(s) Notes Reviewed:  [ ] YES  [ ] NO    PHYSICAL EXAM:  GENERAL: NAD, well-groomed, well-developed  HEAD:  Atraumatic, Normocephalic  EYES: EOMI, PERRLA, conjunctiva and sclera clear  ENMT: No tonsillar erythema, exudates, or enlargement; Moist mucous membranes, Good dentition, No lesions  NECK: Supple, No JVD, Normal thyroid  NERVOUS SYSTEM:  Alert & Oriented X3, Good concentration; Motor Strength 5/5 B/L upper and lower extremities; DTRs 2+ intact and symmetric  CHEST/LUNG: Clear to percussion bilaterally; No rales, rhonchi, wheezing, or rubs  HEART: Regular rate and rhythm; No murmurs, rubs, or gallops  ABDOMEN: Soft, Nontender, Nondistended; Bowel sounds present  EXTREMITIES:  2+ Peripheral Pulses, No clubbing, cyanosis, or edema  LYMPH: No lymphadenopathy noted  SKIN: No rashes or lesions    LABS:                        10.4   9.71  )-----------( 299      ( 2023 06:00 )             34.2     04-18    141  |  103  |  21  ----------------------------<  122<H>  4.0   |  27  |  0.99    Ca    9.3      2023 06:00  Phos  3.2     04-18  Mg     2.10     04-18          CAPILLARY BLOOD GLUCOSE                MEDICATIONS  (STANDING):  amLODIPine   Tablet 2.5 milliGRAM(s) Oral at bedtime  atorvastatin 10 milliGRAM(s) Oral at bedtime  cilostazol 50 milliGRAM(s) Oral two times a day  clobetasol 0.05% Ointment 1 Application(s) Topical two times a day  enoxaparin Injectable 40 milliGRAM(s) SubCutaneous every 24 hours  gabapentin 300 milliGRAM(s) Oral three times a day  pantoprazole    Tablet 40 milliGRAM(s) Oral before breakfast  polyethylene glycol 3350 17 Gram(s) Oral daily  senna 2 Tablet(s) Oral at bedtime  sodium chloride 0.9%. 1000 milliLiter(s) (50 mL/Hr) IV Continuous <Continuous>    MEDICATIONS  (PRN):  acetaminophen     Tablet .. 650 milliGRAM(s) Oral every 6 hours PRN Temp greater or equal to 38C (100.4F), Mild Pain (1 - 3)  aluminum hydroxide/magnesium hydroxide/simethicone Suspension 30 milliLiter(s) Oral every 6 hours PRN Dyspepsia  melatonin 3 milliGRAM(s) Oral at bedtime PRN Insomnia  ondansetron Injectable 4 milliGRAM(s) IV Push every 6 hours PRN Nausea and/or Vomiting  oxyCODONE    IR 5 milliGRAM(s) Oral every 4 hours PRN Severe Pain (7 - 10)  oxyCODONE    IR 2.5 milliGRAM(s) Oral every 4 hours PRN Moderate Pain (4 - 6)      Care Discussed with Consultants/Other Providers [ ] YES  [ ] NO

## 2023-04-18 NOTE — PROGRESS NOTE ADULT - SUBJECTIVE AND OBJECTIVE BOX
CARDIOLOGY FOLLOW UP - Dr. Frost  Date of Service: 4/18/23  CC: no events    Review of Systems:  Constitutional: No fever, weight loss, or fatigue  Respiratory: No cough, wheezing, or hemoptysis, no shortness of breath  Cardiovascular: No chest pain, palpitations, passing out, dizziness, or leg swelling  Gastrointestinal: No abd or epigastric pain. No nausea, vomiting, or hematemesis; no diarrhea or consiptaiton, no melena or hematochezia  Vascular: No edema     TELEMETRY:    PHYSICAL EXAM:  T(C): 36.6 (04-18-23 @ 12:26), Max: 37.1 (04-17-23 @ 20:50)  HR: 91 (04-18-23 @ 12:26) (76 - 91)  BP: 130/82 (04-18-23 @ 12:26) (126/86 - 130/82)  RR: 18 (04-18-23 @ 12:26) (18 - 19)  SpO2: 98% (04-18-23 @ 12:26) (96% - 100%)  Wt(kg): --  I&O's Summary      Appearance: Normal	  Cardiovascular: Normal S1 S2,RRR, No JVD, No murmurs  Respiratory: Lungs clear to auscultation	  Gastrointestinal:  Soft, Non-tender, + BS	  Extremities: Normal range of motion, No clubbing, cyanosis or edema  Vascular: Peripheral pulses palpable 2+ bilaterally       Home Medications:  gabapentin 300 mg oral tablet: 1 tab(s) orally 3 times a day (08 Apr 2023 22:39)  Norvasc 2.5 mg oral tablet: 1 tab(s) orally once a day (at bedtime) (08 Apr 2023 22:41)  oxyCODONE 5 mg oral tablet:  (08 Apr 2023 22:41)  pravastatin 20 mg oral tablet: 1 orally once a day (at bedtime) (08 Apr 2023 22:40)  Vitamin D2: 1 tab(s) orally once a day (08 Apr 2023 22:41)        MEDICATIONS  (STANDING):  amLODIPine   Tablet 2.5 milliGRAM(s) Oral at bedtime  atorvastatin 10 milliGRAM(s) Oral at bedtime  cilostazol 50 milliGRAM(s) Oral two times a day  clobetasol 0.05% Ointment 1 Application(s) Topical two times a day  enoxaparin Injectable 40 milliGRAM(s) SubCutaneous every 24 hours  gabapentin 300 milliGRAM(s) Oral three times a day  pantoprazole    Tablet 40 milliGRAM(s) Oral before breakfast  polyethylene glycol 3350 17 Gram(s) Oral daily  senna 2 Tablet(s) Oral at bedtime  sodium chloride 0.9%. 1000 milliLiter(s) (50 mL/Hr) IV Continuous <Continuous>        EKG:  RADIOLOGY:  DIAGNOSTIC TESTING:  [ ] Echocardiogram:  [ ] Catherterization:  [ ] Stress Test:  OTHER:     LABS:	 	                          10.4   9.71  )-----------( 299      ( 18 Apr 2023 06:00 )             34.2     04-18    141  |  103  |  21  ----------------------------<  122<H>  4.0   |  27  |  0.99    Ca    9.3      18 Apr 2023 06:00  Phos  3.2     04-18  Mg     2.10     04-18            CARDIAC MARKERS:

## 2023-04-18 NOTE — PROGRESS NOTE ADULT - PROBLEM SELECTOR PLAN 1
I Jeremias Anglin MD have personally  seen and examined the patient today and have noted the findings and formulated the plan of care.  I Jeremias Anglin MD have personally seen and examined the patient at bedside today at  9pm
I Jeremias Anglin MD have personally  seen and examined the patient today and have noted the findings and formulated the plan of care.  I Jeremias Anglin MD have personally seen and examined the patient at bedside today at  7pm
I Jeremias Anglin MD have personally  seen and examined the patient today and have noted the findings and formulated the plan of care.  I Jeremias Anglin MD have personally seen and examined the patient at bedside today at  9pm
I Jeremias Anglin MD have personally  seen and examined the patient today and have noted the findings and formulated the plan of care.  I Jeremias Anglin MD have personally seen and examined the patient at bedside today at  9pm
I Jeremias Anglin MD have personally  seen and examined the patient today and have noted the findings and formulated the plan of care.  I Jeremias Anglin MD have personally seen and examined the patient at bedside today at  11am

## 2023-04-18 NOTE — PROGRESS NOTE ADULT - PROBLEM SELECTOR PROBLEM 1
Arterial insufficiency with ischemic ulcer

## 2023-04-19 ENCOUNTER — TRANSCRIPTION ENCOUNTER (OUTPATIENT)
Age: 73
End: 2023-04-19

## 2023-04-19 VITALS
TEMPERATURE: 98 F | HEART RATE: 80 BPM | SYSTOLIC BLOOD PRESSURE: 127 MMHG | DIASTOLIC BLOOD PRESSURE: 81 MMHG | RESPIRATION RATE: 19 BRPM | OXYGEN SATURATION: 98 %

## 2023-04-19 LAB
ANION GAP SERPL CALC-SCNC: 10 MMOL/L — SIGNIFICANT CHANGE UP (ref 7–14)
BUN SERPL-MCNC: 25 MG/DL — HIGH (ref 7–23)
CALCIUM SERPL-MCNC: 9.4 MG/DL — SIGNIFICANT CHANGE UP (ref 8.4–10.5)
CHLORIDE SERPL-SCNC: 104 MMOL/L — SIGNIFICANT CHANGE UP (ref 98–107)
CO2 SERPL-SCNC: 27 MMOL/L — SIGNIFICANT CHANGE UP (ref 22–31)
CREAT SERPL-MCNC: 1.04 MG/DL — SIGNIFICANT CHANGE UP (ref 0.5–1.3)
EGFR: 57 ML/MIN/1.73M2 — LOW
GLUCOSE SERPL-MCNC: 105 MG/DL — HIGH (ref 70–99)
HCT VFR BLD CALC: 34.6 % — SIGNIFICANT CHANGE UP (ref 34.5–45)
HGB BLD-MCNC: 10.3 G/DL — LOW (ref 11.5–15.5)
MAGNESIUM SERPL-MCNC: 2 MG/DL — SIGNIFICANT CHANGE UP (ref 1.6–2.6)
MCHC RBC-ENTMCNC: 27.2 PG — SIGNIFICANT CHANGE UP (ref 27–34)
MCHC RBC-ENTMCNC: 29.8 GM/DL — LOW (ref 32–36)
MCV RBC AUTO: 91.3 FL — SIGNIFICANT CHANGE UP (ref 80–100)
NRBC # BLD: 0 /100 WBCS — SIGNIFICANT CHANGE UP (ref 0–0)
NRBC # FLD: 0 K/UL — SIGNIFICANT CHANGE UP (ref 0–0)
PHOSPHATE SERPL-MCNC: 3.5 MG/DL — SIGNIFICANT CHANGE UP (ref 2.5–4.5)
PLATELET # BLD AUTO: 266 K/UL — SIGNIFICANT CHANGE UP (ref 150–400)
POTASSIUM SERPL-MCNC: 3.6 MMOL/L — SIGNIFICANT CHANGE UP (ref 3.5–5.3)
POTASSIUM SERPL-SCNC: 3.6 MMOL/L — SIGNIFICANT CHANGE UP (ref 3.5–5.3)
RBC # BLD: 3.79 M/UL — LOW (ref 3.8–5.2)
RBC # FLD: 15 % — HIGH (ref 10.3–14.5)
SODIUM SERPL-SCNC: 141 MMOL/L — SIGNIFICANT CHANGE UP (ref 135–145)
WBC # BLD: 8.71 K/UL — SIGNIFICANT CHANGE UP (ref 3.8–10.5)
WBC # FLD AUTO: 8.71 K/UL — SIGNIFICANT CHANGE UP (ref 3.8–10.5)

## 2023-04-19 PROCEDURE — 93010 ELECTROCARDIOGRAM REPORT: CPT

## 2023-04-19 RX ORDER — ATORVASTATIN CALCIUM 80 MG/1
1 TABLET, FILM COATED ORAL
Qty: 30 | Refills: 0
Start: 2023-04-19 | End: 2023-05-18

## 2023-04-19 RX ORDER — POLYETHYLENE GLYCOL 3350 17 G/17G
17 POWDER, FOR SOLUTION ORAL
Qty: 170 | Refills: 0
Start: 2023-04-19 | End: 2023-04-28

## 2023-04-19 RX ORDER — NALOXONE HYDROCHLORIDE 4 MG/.1ML
4 SPRAY NASAL
Qty: 1 | Refills: 0
Start: 2023-04-19 | End: 2023-05-18

## 2023-04-19 RX ORDER — CILOSTAZOL 100 MG/1
1 TABLET ORAL
Qty: 60 | Refills: 0
Start: 2023-04-19 | End: 2023-05-18

## 2023-04-19 RX ORDER — SENNA PLUS 8.6 MG/1
2 TABLET ORAL
Qty: 20 | Refills: 0
Start: 2023-04-19 | End: 2023-04-28

## 2023-04-19 RX ORDER — OXYCODONE HYDROCHLORIDE 5 MG/1
1 TABLET ORAL
Qty: 16 | Refills: 0
Start: 2023-04-19 | End: 2023-04-22

## 2023-04-19 RX ORDER — PANTOPRAZOLE SODIUM 20 MG/1
1 TABLET, DELAYED RELEASE ORAL
Qty: 30 | Refills: 0
Start: 2023-04-19 | End: 2023-05-18

## 2023-04-19 RX ORDER — CEPHALEXIN 500 MG
1 CAPSULE ORAL
Qty: 40 | Refills: 0
Start: 2023-04-19 | End: 2023-04-28

## 2023-04-19 RX ADMIN — Medication 650 MILLIGRAM(S): at 00:22

## 2023-04-19 RX ADMIN — OXYCODONE HYDROCHLORIDE 5 MILLIGRAM(S): 5 TABLET ORAL at 10:05

## 2023-04-19 RX ADMIN — OXYCODONE HYDROCHLORIDE 5 MILLIGRAM(S): 5 TABLET ORAL at 10:47

## 2023-04-19 RX ADMIN — PANTOPRAZOLE SODIUM 40 MILLIGRAM(S): 20 TABLET, DELAYED RELEASE ORAL at 05:31

## 2023-04-19 RX ADMIN — ENOXAPARIN SODIUM 40 MILLIGRAM(S): 100 INJECTION SUBCUTANEOUS at 05:31

## 2023-04-19 RX ADMIN — GABAPENTIN 300 MILLIGRAM(S): 400 CAPSULE ORAL at 05:31

## 2023-04-19 RX ADMIN — CILOSTAZOL 50 MILLIGRAM(S): 100 TABLET ORAL at 05:31

## 2023-04-19 RX ADMIN — Medication 1 APPLICATION(S): at 05:33

## 2023-04-19 NOTE — PROGRESS NOTE ADULT - PROVIDER SPECIALTY LIST ADULT
Cardiology
Internal Medicine
Vascular Surgery
Cardiology
Cardiology
Infectious Disease
Infectious Disease
Internal Medicine
Podiatry
Podiatry
Rheumatology
Cardiology
Heme/Onc
Infectious Disease
Infectious Disease
Internal Medicine
Podiatry
Rheumatology
Cardiology
Internal Medicine
Podiatry
Vascular Surgery

## 2023-04-19 NOTE — PROGRESS NOTE ADULT - SUBJECTIVE AND OBJECTIVE BOX
Patient is a 72y old  Female who presents with a chief complaint of cellulitis (18 Apr 2023 22:00)       INTERVAL HPI/OVERNIGHT EVENTS:  Patient seen and evaluated at bedside.  Pt is resting comfortable in NAD. Denies N/V/F/C.    Allergies    sulfa drugs (Unknown)    Intolerances        Vital Signs Last 24 Hrs  T(C): 37.1 (19 Apr 2023 05:15), Max: 37.1 (19 Apr 2023 05:15)  T(F): 98.7 (19 Apr 2023 05:15), Max: 98.7 (19 Apr 2023 05:15)  HR: 79 (19 Apr 2023 07:42) (68 - 91)  BP: 137/79 (19 Apr 2023 05:15) (130/82 - 137/79)  BP(mean): --  RR: 18 (19 Apr 2023 05:15) (18 - 18)  SpO2: 98% (19 Apr 2023 07:42) (96% - 100%)    Parameters below as of 19 Apr 2023 05:15  Patient On (Oxygen Delivery Method): room air        LABS:                        10.3   8.71  )-----------( 266      ( 19 Apr 2023 05:50 )             34.6     04-19    141  |  104  |  25<H>  ----------------------------<  105<H>  3.6   |  27  |  1.04    Ca    9.4      19 Apr 2023 05:50  Phos  3.5     04-19  Mg     2.00     04-19          CAPILLARY BLOOD GLUCOSE          Lower Extremity Physical Exam:      RADIOLOGY & ADDITIONAL TESTS   Vascular: DP/PT 2/4, B/L, CFT <3 seconds B/L, Temperature gradient warm to warm on left , warm to cool on right.   Neuro: Epicritic sensation intact to the level of toes, B/L.  Musculoskeletal/Ortho: pain on palpation to the dorsum of left lateral malleolus mildly improved, L ankle ROM wnl  Skin: L lateral malleoli nodular lesion, now with central wound s/p punch biopsy with derm, granular wound bed with necrotic patch, periwound erythema resolved, no drainage, no pus, no malodor.

## 2023-04-19 NOTE — DISCHARGE NOTE NURSING/CASE MANAGEMENT/SOCIAL WORK - NSDCVIVACCINE_GEN_ALL_CORE_FT
pneumococcal polysaccharide PPV23; 06-Nov-2014 09:06; Yosvany Smith (RN); [employer] [id number]; IntraMuscular; Deltoid Right.; 0.5 milliLiter(s);

## 2023-04-19 NOTE — PROGRESS NOTE ADULT - SUBJECTIVE AND OBJECTIVE BOX
CARDIOLOGY FOLLOW UP - Dr. Frost  Date of Service: 4/19/2023  CC: no events    Review of Systems:  Constitutional: No fever, weight loss, or fatigue  Respiratory: No cough, wheezing, or hemoptysis, no shortness of breath  Cardiovascular: No chest pain, palpitations, passing out, dizziness, or leg swelling  Gastrointestinal: No abd or epigastric pain. No nausea, vomiting, or hematemesis; no diarrhea or consiptaiton, no melena or hematochezia  Vascular: No edema     TELEMETRY:    PHYSICAL EXAM:  T(C): 36.9 (04-19-23 @ 13:05), Max: 37.1 (04-19-23 @ 05:15)  HR: 77 (04-19-23 @ 14:57) (68 - 85)  BP: 112/72 (04-19-23 @ 13:05) (112/72 - 137/79)  RR: 18 (04-19-23 @ 13:05) (18 - 18)  SpO2: 96% (04-19-23 @ 14:57) (96% - 100%)  Wt(kg): --  I&O's Summary      Appearance: Normal	  Cardiovascular: Normal S1 S2,RRR, No JVD, No murmurs  Respiratory: Lungs clear to auscultation	  Gastrointestinal:  Soft, Non-tender, + BS	  Extremities: Normal range of motion, No clubbing, cyanosis or edema  Vascular: Peripheral pulses palpable 2+ bilaterally       Home Medications:  gabapentin 300 mg oral tablet: 1 tab(s) orally 3 times a day (08 Apr 2023 22:39)  Norvasc 2.5 mg oral tablet: 1 tab(s) orally once a day (at bedtime) (08 Apr 2023 22:41)  oxyCODONE 5 mg oral tablet:  (08 Apr 2023 22:41)  pravastatin 20 mg oral tablet: 1 orally once a day (at bedtime) (08 Apr 2023 22:40)  Vitamin D2: 1 tab(s) orally once a day (08 Apr 2023 22:41)        MEDICATIONS  (STANDING):  amLODIPine   Tablet 2.5 milliGRAM(s) Oral at bedtime  atorvastatin 10 milliGRAM(s) Oral at bedtime  cilostazol 50 milliGRAM(s) Oral two times a day  clobetasol 0.05% Ointment 1 Application(s) Topical two times a day  enoxaparin Injectable 40 milliGRAM(s) SubCutaneous every 24 hours  gabapentin 300 milliGRAM(s) Oral three times a day  pantoprazole    Tablet 40 milliGRAM(s) Oral before breakfast  polyethylene glycol 3350 17 Gram(s) Oral daily  senna 2 Tablet(s) Oral at bedtime  sodium chloride 0.9%. 1000 milliLiter(s) (50 mL/Hr) IV Continuous <Continuous>        EKG:  RADIOLOGY:  DIAGNOSTIC TESTING:  [ ] Echocardiogram:  [ ] Catherterization:  [ ] Stress Test:  OTHER:     LABS:	 	                          10.3   8.71  )-----------( 266      ( 19 Apr 2023 05:50 )             34.6     04-19    141  |  104  |  25<H>  ----------------------------<  105<H>  3.6   |  27  |  1.04    Ca    9.4      19 Apr 2023 05:50  Phos  3.5     04-19  Mg     2.00     04-19            CARDIAC MARKERS:

## 2023-04-19 NOTE — DISCHARGE NOTE NURSING/CASE MANAGEMENT/SOCIAL WORK - PATIENT PORTAL LINK FT
You can access the FollowMyHealth Patient Portal offered by [facility] by registering at the following website: [email or web address]. By joining [employer]’s FollowMyHealth portal, you will also be able to view your health information using other applications (apps) compatible with our system.

## 2023-04-19 NOTE — HISTORY OF PRESENT ILLNESS
[FreeTextEntry1] : Patient is being followed at [facility] for a painful foot problem including cellulitis.

## 2023-04-19 NOTE — DISCHARGE NOTE NURSING/CASE MANAGEMENT/SOCIAL WORK - NSDCPEFALRISK_GEN_ALL_CORE
For information on Fall & Injury Prevention, visit: [email or web address] OR  [email or web address] OR  https://www.cdc.gov/steadi/patient.html

## 2023-04-19 NOTE — PROGRESS NOTE ADULT - REASON FOR ADMISSION
cellulitis
cellulitis and left ankle ulcer
cellulitis

## 2023-04-19 NOTE — PROGRESS NOTE ADULT - ASSESSMENT
72F with left lateral ankle lesion s/p punch biopsy with derm.  - Patient seen and evaluated.  - Afebrile, no leukocytosis  - L lateral malleoli nodular lesion, now with central lesion s/p punch biopsy with derm, granular wound bed with necrotic patch, periwound erythema resolved, no drainage, no pus, no malodor.  - Left foot xray and ankle so no gas, no OM, no fracture.  - Left ankle MRI: no OM, no abscess.   - HEIDY/PVR: RABI 1.24, RTBI 1.26, LABI 1.24, LTBI 1.24, BL waveforms triphasic.  - Derm Punch Biopsy Path: pending  - Vasc: s/p left lower extremity angiogram w/3-vessel run-off to ankle c/w small vessel disease  - L ankle culture: no growth final  - ID recs PO Doxy and Keflex x 10 days upon d/c, recs appreciated  - Pod plan local wound care   - Patient will need to follow with dermatology and wound care Dr Way at wound care center.   - Podiatry stable for discharge, follow up info in Discharge provider note.   - Discussed with attending

## 2023-04-19 NOTE — DISCHARGE NOTE NURSING/CASE MANAGEMENT/SOCIAL WORK - NSDCFUADDAPPT_GEN_ALL_CORE_FT
Continue medications as prescribed. Follow up with your primary care doctor, Dermatologist, Vascular Surgeon, and Foot surgeon for further evaluation and management. Please call to make an appointment within 1-2 weeks of discharge.     Please follow up with Dr. Simental within 1 week of discharge from the hospital, please call [phone] for appointment and discuss that you recently were seen in the hospital.    Please follow up with Dr. Coley within 2 week of discharge from the hospital, please call [phone] for appointment and discuss that you recently were seen in the hospital.      follow up with outpatient dermatology within 2 weeks of discharge with Dr. Dorsey at [address] [facility], [address]

## 2023-04-20 LAB — SURGICAL PATHOLOGY STUDY: SIGNIFICANT CHANGE UP

## 2023-04-21 ENCOUNTER — NON-APPOINTMENT (OUTPATIENT)
Age: 73
End: 2023-04-21

## 2023-04-24 ENCOUNTER — APPOINTMENT (OUTPATIENT)
Dept: DERMATOLOGY | Facility: CLINIC | Age: 73
End: 2023-04-24
Payer: MEDICARE

## 2023-04-24 VITALS — BODY MASS INDEX: 27.99 KG/M2 | WEIGHT: 168 LBS | HEIGHT: 65 IN

## 2023-04-24 PROCEDURE — 99205 OFFICE O/P NEW HI 60 MIN: CPT

## 2023-04-26 ENCOUNTER — NON-APPOINTMENT (OUTPATIENT)
Age: 73
End: 2023-04-26

## 2023-04-27 ENCOUNTER — APPOINTMENT (OUTPATIENT)
Dept: PODIATRY | Facility: CLINIC | Age: 73
End: 2023-04-27
Payer: MEDICARE

## 2023-04-27 PROCEDURE — 99213 OFFICE O/P EST LOW 20 MIN: CPT

## 2023-04-28 ENCOUNTER — APPOINTMENT (OUTPATIENT)
Dept: WOUND CARE | Facility: CLINIC | Age: 73
End: 2023-04-28

## 2023-05-01 ENCOUNTER — APPOINTMENT (OUTPATIENT)
Dept: HEMATOLOGY ONCOLOGY | Facility: CLINIC | Age: 73
End: 2023-05-01
Payer: MEDICARE

## 2023-05-01 VITALS
WEIGHT: 164.91 LBS | HEIGHT: 65 IN | BODY MASS INDEX: 27.47 KG/M2 | HEART RATE: 92 BPM | OXYGEN SATURATION: 97 % | RESPIRATION RATE: 16 BRPM | TEMPERATURE: 206.6 F | DIASTOLIC BLOOD PRESSURE: 79 MMHG | SYSTOLIC BLOOD PRESSURE: 119 MMHG

## 2023-05-01 PROCEDURE — 99214 OFFICE O/P EST MOD 30 MIN: CPT

## 2023-05-01 RX ORDER — PRAVASTATIN SODIUM 20 MG/1
20 TABLET ORAL
Refills: 0 | Status: DISCONTINUED | COMMUNITY
End: 2023-05-01

## 2023-05-01 RX ORDER — DICLOFENAC SODIUM 75 MG/1
75 TABLET, DELAYED RELEASE ORAL
Qty: 60 | Refills: 0 | Status: DISCONTINUED | COMMUNITY
Start: 2022-12-20 | End: 2023-05-01

## 2023-05-01 RX ORDER — METHYLPREDNISOLONE 4 MG/1
4 TABLET ORAL
Qty: 1 | Refills: 0 | Status: DISCONTINUED | COMMUNITY
Start: 2023-03-14 | End: 2023-05-01

## 2023-05-01 RX ORDER — MELOXICAM 15 MG/1
15 TABLET ORAL DAILY
Qty: 14 | Refills: 1 | Status: DISCONTINUED | COMMUNITY
Start: 2023-02-27 | End: 2023-05-01

## 2023-05-01 RX ORDER — TRAMADOL HYDROCHLORIDE 50 MG/1
50 TABLET, COATED ORAL
Qty: 8 | Refills: 0 | Status: DISCONTINUED | COMMUNITY
Start: 2022-11-18 | End: 2023-05-01

## 2023-05-01 RX ORDER — AMLODIPINE BESYLATE 5 MG/1
5 TABLET ORAL
Qty: 90 | Refills: 0 | Status: DISCONTINUED | COMMUNITY
Start: 2022-12-09 | End: 2023-05-01

## 2023-05-01 NOTE — ASSESSMENT
[FreeTextEntry1] : Juju is seen in the office in follow-up today.  She was recently admitted to [facility].  She completed 2 years of atezolizumab in February 2020.  She had cellulitis involving the right foot and was hospitalized in [place] for several days.  She contacted me when she was admitted there.  She returned here to [place] and had the same issue on the left foot.  The discharge summary as below and was reviewed.  A biopsy was performed by dermatology.  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  She was started on Pletal.  She follows with wound care.  She still has some pain in the area.  She says is doing better at this time and she says it is "way better" than it was.  The pain is currently at 4-5, the worst pain in the last 24 hours was at 7 and it hurts more at night.  She is constipated.  She is no longer taking any narcotics.  She takes a stool softener, but only uses it once per day.  She takes MiraLAX as well but does not use it on a regular basis.  There is no vomiting.  She did have some nausea on occasion.  Her weight is stable.  There were no headaches or dizziness.  There is no cough or shortness of breath.  She had a CAT scan done while in the hospital, no issues were noted.  She was worried about a 2 mm nodule in her lung.\par \par On physical examination, she is in no acute distress.  Her performance status was 1.  Her blood pressure was 119/79 and her weight was 74.8 kg which is very similar to prior weights.  There is no findings in the HEENT area.  The lungs are clear and the heart examination is normal.  The abdominal examination was normal.  There is no spinal column or chest wall tenderness to palpation or percussion.  There is a dressed wound in the lower left leg without any overt signs of infection.\par \par I encouraged her to take Colace 3 times a day to use MiraLAX on a daily basis, and to take 2 tablets of senna at bedtime and a routine basis.  In addition, she should drink additional water.  We discussed the pathophysiology of constipation and how it is important to have regular bowel movements to avoid excessive extraction of fluid from stool.\par \par There is nothing to worry about in regards to her malignancy at this time.  I reviewed the results of her recent CT scan.  All questions were answered to the best my ability and to their apparent satisfaction.  Reassurance was given to her, and most of her issues need to be followed by her primary care doctor.

## 2023-05-01 NOTE — PHYSICAL EXAM
[Restricted in physically strenuous activity but ambulatory and able to carry out work of a light or sedentary nature] : Status 1- Restricted in physically strenuous activity but ambulatory and able to carry out work of a light or sedentary nature, e.g., light house work, office work [Normal] : affect appropriate [de-identified] : II/VI murmur at apex, RRR [de-identified] : lesion of left lower extremity dressed, under care of the wound service

## 2023-05-01 NOTE — RESULTS/DATA
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 Evaluate for metastatic disease.\par COMPARISON: 12/13/2021\par CONTRAST/COMPLICATIONS:\par IV Contrast: Omnipaque 350 90 mL administered 10 mL discarded\par Oral Contrast: Oral contrast was administered.\par Complications: No complications were reported at time of exam.\par \par PROCEDURE:\par CT of the Chest, Abdomen and Pelvis was performed.\par Sagittal and coronal reformats were performed.\par \par FINDINGS:\par CHEST:\par LUNGS AND LARGE AIRWAYS: Patent central airways. Right lower lobe calcified granuloma. 2 mm nodule in the superior segment of the right lower lobe, too small to characterize.\par PLEURA: No pleural effusion.\par VESSELS: Within normal limits.\par HEART: Heart size is normal. No pericardial effusion.\par MEDIASTINUM AND ALEJANDRINA: No lymphadenopathy.\par CHEST WALL AND LOWER NECK: Left axillary surgical clips.\par \par ABDOMEN AND PELVIS:\par LIVER: Within normal limits.\par BILE DUCTS: Normal caliber.\par GALLBLADDER: Within normal limits.\par SPLEEN: Within normal limits.\par PANCREAS: Within normal limits.\par ADRENALS: Within normal limits.\par KIDNEYS/URETERS: Nonobstructing stone in the left lower pole. No hydronephrosis. No evidence of enhancing renal mass.\par \par BLADDER: Within normal limits.\par REPRODUCTIVE ORGANS: Hysterectomy.\par \par BOWEL: No bowel obstruction. Appendix is normal. Diverticulosis. Wall thickening of the proximal sigmoid colon.\par PERITONEUM: No ascites.\par VESSELS: Within normal limits.\par RETROPERITONEUM/LYMPH NODES: No lymphadenopathy.\par ABDOMINAL WALL: Midline abdominal and right inguinal postoperative change.\par BONES: Postoperative changes from L4 through S1. Stable sclerotic changes in the L1 vertebral body.\par \par IMPRESSION:\par Wall thickening of the proximal sigmoid colon. Consider further evaluation with colonoscopy to evaluate for potential colonic mass.\par \par Stable sclerotic lesion in the L1 vertebral body.\par 2 mm nodule in the right lower lobe is not identified, and may be inflammatory. Attention on follow-up imaging.\par --- End of Report ---\par KATLYN SCANLON MD; Attending Radiologist\adelfo This document has been electronically signed. Apr 17 2023 1:22PM

## 2023-05-01 NOTE — REVIEW OF SYSTEMS
[Fatigue] : fatigue [Negative] : ENT [Fever] : no fever [Chills] : no chills [Recent Change In Weight] : ~T no recent weight change [Chest Pain] : no chest pain [Palpitations] : no palpitations [Lower Ext Edema] : no lower extremity edema [Wheezing] : no wheezing [Cough] : no cough [SOB on Exertion] : no shortness of breath during exertion [Vomiting] : no vomiting [Constipation] : no constipation [Diarrhea] : no diarrhea [Dysuria] : no dysuria [Incontinence] : no incontinence [Joint Pain] : no joint pain [Joint Stiffness] : no joint stiffness [Muscle Pain] : no muscle pain [Skin Rash] : no skin rash [Dizziness] : no dizziness [Difficulty Walking] : no difficulty walking [Anxiety] : no anxiety [Depression] : no depression [Hot Flashes] : no hot flashes [Muscle Weakness] : no muscle weakness [Easy Bleeding] : no tendency for easy bleeding [Easy Bruising] : no tendency for easy bruising

## 2023-05-03 ENCOUNTER — OUTPATIENT (OUTPATIENT)
Dept: OUTPATIENT SERVICES | Facility: HOSPITAL | Age: 73
LOS: 1 days | End: 2023-05-03
Payer: MEDICARE

## 2023-05-03 ENCOUNTER — APPOINTMENT (OUTPATIENT)
Dept: WOUND CARE | Facility: CLINIC | Age: 73
End: 2023-05-03
Payer: MEDICARE

## 2023-05-03 VITALS
TEMPERATURE: 207.86 F | OXYGEN SATURATION: 96 % | SYSTOLIC BLOOD PRESSURE: 135 MMHG | RESPIRATION RATE: 18 BRPM | DIASTOLIC BLOOD PRESSURE: 90 MMHG | HEART RATE: 85 BPM

## 2023-05-03 DIAGNOSIS — N63 UNSPECIFIED LUMP IN BREAST: Chronic | ICD-10-CM

## 2023-05-03 DIAGNOSIS — Z90.710 ACQUIRED ABSENCE OF BOTH CERVIX AND UTERUS: Chronic | ICD-10-CM

## 2023-05-03 DIAGNOSIS — Z98.89 OTHER SPECIFIED POSTPROCEDURAL STATES: Chronic | ICD-10-CM

## 2023-05-03 DIAGNOSIS — Z98.890 OTHER SPECIFIED POSTPROCEDURAL STATES: Chronic | ICD-10-CM

## 2023-05-03 PROCEDURE — 11042 DBRDMT SUBQ TIS 1ST 20SQCM/<: CPT

## 2023-05-03 RX ORDER — PRAVASTATIN SODIUM 20 MG/1
20 TABLET ORAL
Refills: 0 | Status: DISCONTINUED | COMMUNITY
End: 2023-05-03

## 2023-05-03 RX ORDER — AMLODIPINE BESYLATE 2.5 MG/1
2.5 TABLET ORAL
Refills: 0 | Status: DISCONTINUED | COMMUNITY
End: 2023-05-03

## 2023-05-03 RX ORDER — AMLODIPINE BESYLATE 2.5 MG/1
2.5 TABLET ORAL
Refills: 0 | Status: ACTIVE | COMMUNITY

## 2023-05-03 RX ORDER — GABAPENTIN 100 MG
100 TABLET ORAL
Refills: 0 | Status: DISCONTINUED | COMMUNITY
End: 2023-05-03

## 2023-05-03 RX ORDER — COLCHICINE 0.6 MG/1
0.6 CAPSULE ORAL
Qty: 3 | Refills: 0 | Status: DISCONTINUED | COMMUNITY
Start: 2023-02-17 | End: 2023-05-03

## 2023-05-03 NOTE — ASSESSMENT
[FreeTextEntry1] : 5/3/23\par 72F - h/o breast CA (s/p R breast lumpectomy), bladder CA with spinal metastases (now in remission), HTN, HLD - recently admitted to [facility] with cellulitis of LLE secondary to an open ulcer of the L lateral malleolar region, which first appeared as a cutaneous nodule ('small bump') in March. This painful nodule was initially responsive to oral steroid therapy (rx by her podiatrist, Dr. Coley). \par L LATERAL MALLEOLAR ULCER\par - Wound with coagulative necrosis of dermis, well-circumscribed borders\par - Some violaceous discoloration of immediate periwound skin present\par - Area is quite tender. No local or systemic signs/symptoms of infection. No purulent discharge, no blanching erythema, no malodor, no crepitus or bullae formation. \par - Careful sharp debridement performed, limited to excision of the necrotic dermis and some of the underlying necrotic subcutaneous fatty tissue, depth of debridement 0.5 cm. \par - Mechanical debridement and wound cleansing performed with Vashe-moistened gauze\par - Patient currently on cilostazol to promote peripheral vasodilation. \par - Based on her early clinical course (began as small nodule, erupted into ulcer and became infected, marked tenderness, early improvement with steroid therapy), pyoderma is a distinct possibility.

## 2023-05-03 NOTE — HISTORY OF PRESENT ILLNESS
[FreeTextEntry1] : Ms. KIM ACKERMAN presents for initial evaluation and treatment of a L lateral malleolar ulcer. She has a history of breast CA (s/p R breast lumpectomy), bladder CA with spinal metastases (now in remission), HTN, HLD. She was recently hospitalized at [facility] for a new ulceration of her L lateral malleolar area which had developed cellulitis. She was treated with IV abx and was seen by Vascular Surgery, who performed a LLE angio revealing patent 3 vessel runoff to the L foot. 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 She was discharged on 4/19 on PO antibiotics and with a VNS 3x per week.\par She was seen by Dermatology as outpatient on 4/28. 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 Of note, patient does report that the lesion first appeared in March of this year as a small 'bump', which she saw her podiatrist for (Dr. Stefan Coley). He prescribed a short course of steroids, which alleviated her symptoms. \par \par VNS is currently applying Xeroform to the wound. \par \par \par

## 2023-05-03 NOTE — REVIEW OF SYSTEMS
[Skin Lesions] : skin lesion [Skin Wound] : skin wound [As Noted in HPI] : as noted in HPI [Negative] : Heme/Lymph

## 2023-05-03 NOTE — PLAN
[FreeTextEntry1] : 5/3/23\par - Will switch topical therapy to: Santyl collagenase ointment to wound bed, followed by foam dressing and dry gauze wrap, 3x per week. \par - Wound to be cleansed with Vashe at each dressing change. \par - Nursing orders written, Santyl prescription sent to [facility]\par - Will continue to consider PG as a possible dx.  Will monitor clinical course of wound with this in mind and coordinate with Dermatology for treatment plan as appropriate\par - Follow up in the Wound Care clinic in 2 weeks.

## 2023-05-03 NOTE — PHYSICAL EXAM
[2+] : left 2+ [Ankle Swelling Bilaterally] : bilaterally  [Alert] : alert [Oriented to Person] : oriented to person [Oriented to Place] : oriented to place [Oriented to Time] : oriented to time [Calm] : calm [Please See PDF for Tissue Analytics] : Please See PDF for Tissue Analytics. [Ankle Swelling (On Exam)] : not present [Varicose Veins Of Lower Extremities] : not present [] : not present [de-identified] : NAD [de-identified] : Trachea is midline, neck is soft. Pupils equal, extraocular movements intact B/L.  [de-identified] : Supple [de-identified] : No increased work of breathing or use of accessory muscles. No wheezing or stridor.  [FreeTextEntry1] : Extremities are warm, capillary refill < 2 sec. No hyperemia, hyperkeratosis or hyperpigmentation of either LE. No edema.  [de-identified] : Soft, non-distended [de-identified] : No appreciable muscle-wasting. Full ROM in all 4 extremities. No deformity.  [de-identified] : See TA [de-identified] : No gross deficits, intact B/L

## 2023-05-09 ENCOUNTER — APPOINTMENT (OUTPATIENT)
Dept: VASCULAR SURGERY | Facility: CLINIC | Age: 73
End: 2023-05-09
Payer: MEDICARE

## 2023-05-09 VITALS
TEMPERATURE: 208.94 F | SYSTOLIC BLOOD PRESSURE: 113 MMHG | WEIGHT: 164 LBS | HEIGHT: 65 IN | HEART RATE: 85 BPM | DIASTOLIC BLOOD PRESSURE: 77 MMHG | BODY MASS INDEX: 27.32 KG/M2

## 2023-05-09 DIAGNOSIS — L95.0 LIVEDOID VASCULITIS: ICD-10-CM

## 2023-05-09 PROCEDURE — 99213 OFFICE O/P EST LOW 20 MIN: CPT

## 2023-05-09 RX ORDER — RIVAROXABAN 2.5 MG/1
2.5 TABLET, FILM COATED ORAL
Qty: 180 | Refills: 3 | Status: DISCONTINUED | COMMUNITY
Start: 2023-05-09 | End: 2023-05-09

## 2023-05-09 NOTE — PHYSICAL EXAM
[2+] : left foot dorsalis pedis 2+ [Vibration Dec.] : diminished vibratory sensation at the level of the toes [FreeTextEntry3] : PT: trace bilateral. [FreeTextEntry1] : Peripheral neuropathy from back surgery.

## 2023-05-09 NOTE — ASSESSMENT
[FreeTextEntry1] : \par Impression: Livedoid vasculopathy. Non-pressure ulcer, left ankle.\par \par Treatment: At this point I just want her to finish the Medrol dose pack and rest in terms of mild activity level for the next week and then may gradually increase activity thereafter. I would like her to go for rheumatology appointment because she complains of some locking up in her hands. I want it noted that she does have peripheral neuropathy which is, I think, radiculopathy from her lower back.

## 2023-05-09 NOTE — HISTORY OF PRESENT ILLNESS
[FreeTextEntry1] : pt was eval at [facility]  for left lat mall wound\par s/p lle angio sig  for   small vessel dz\par started on pletal \par pt was eval by podiatry and derm s/p bx\par w/u  s/o liveloidvasculopathy\par pt c/o ongoing   mod  deepika wound pain \par pt was eval by woundcare

## 2023-05-09 NOTE — ASSESSMENT
[FreeTextEntry1] : Impression  art insuff and  liveloid vasculaopathy \par \par \par Plan Med Conservative continue woundcare\par will need le art insuff surveillance \par ov w jessica/pvr   s/o art insuff 12 mo march 2024 \par d/w pt liveloid vasculaopathy  and   potential additional rx of  po anticoag\par i recommended in addition to  ongoing woundcare and pletal  rx \par to start xarelto 2.5 mg  bid\par pt   will call back w decision  she is unsure re accepting xarelto side effects\par telehealth visit in 2  weeks to re eval \par \par \par  [Arterial/Venous Disease] : arterial/venous disease [Medication Management] : medication management [Ulcer Care] : ulcer care

## 2023-05-09 NOTE — HISTORY OF PRESENT ILLNESS
[Sneakers] : rosei [FreeTextEntry1] : Patient presents today with this unusual problem on the outside of her left ankle that became ulcerated and infected. Ultimately the diagnosis by biopsy came back livedoid vasculopathy which is a rare condition that causes ulcerations for unknown reasons. She has been seeing dermatology. She has an appointment tomorrow with wound care. She was in the hospital on IV antibiotics, evaluated by rheumatology, medicine and she was also seen by vascular but she has abounding DP pulse. The ulceration is much less sensitive and mildly sensitive. It is not infected but she continues to take Doxycycline and Keflex.

## 2023-05-09 NOTE — PHYSICAL EXAM
[1+] : left 1+ [2+] : left 2+ [Alert] : alert [Oriented to Person] : oriented to person [Oriented to Place] : oriented to place [Oriented to Time] : oriented to time [Calm] : calm [JVD] : no jugular venous distention  [Ankle Swelling (On Exam)] : not present [de-identified] : nad [de-identified] : wnl [FreeTextEntry1] : Mild arterial insufficiency w moderate trophic skin changes  \par LLE lat malleolus wound 1.3cm diam x 4mm  depth  w limited granulation tissue \par  [de-identified] : ambulates slowly due to left ankle  wound discomfort  [de-identified] : Jordi Cranial nerves 2-12 jordi grossly intact [de-identified] : cooperative

## 2023-05-17 ENCOUNTER — OUTPATIENT (OUTPATIENT)
Dept: OUTPATIENT SERVICES | Facility: HOSPITAL | Age: 73
LOS: 1 days | End: 2023-05-17
Payer: MEDICARE

## 2023-05-17 ENCOUNTER — APPOINTMENT (OUTPATIENT)
Dept: WOUND CARE | Facility: CLINIC | Age: 73
End: 2023-05-17
Payer: MEDICARE

## 2023-05-17 VITALS — TEMPERATURE: 97.1 F

## 2023-05-17 DIAGNOSIS — Z98.890 OTHER SPECIFIED POSTPROCEDURAL STATES: Chronic | ICD-10-CM

## 2023-05-17 DIAGNOSIS — Z98.89 OTHER SPECIFIED POSTPROCEDURAL STATES: Chronic | ICD-10-CM

## 2023-05-17 DIAGNOSIS — Z90.710 ACQUIRED ABSENCE OF BOTH CERVIX AND UTERUS: Chronic | ICD-10-CM

## 2023-05-17 DIAGNOSIS — N63 UNSPECIFIED LUMP IN BREAST: Chronic | ICD-10-CM

## 2023-05-17 PROCEDURE — 11042 DBRDMT SUBQ TIS 1ST 20SQCM/<: CPT

## 2023-05-17 NOTE — PLAN
[FreeTextEntry1] : 5/3/23\par - Will switch topical therapy to: Santyl collagenase ointment to wound bed, followed by foam dressing and dry gauze wrap, 3x per week. \par - Wound to be cleansed with Vashe at each dressing change. \par - Nursing orders written, Santyl prescription sent to [facility]\par - Will continue to consider PG as a possible dx.  Will monitor clinical course of wound with this in mind and coordinate with Dermatology for treatment plan as appropriate\par - Follow up in the Wound Care clinic in 2 weeks. \par \par 5/17/23\par Plan:\par Wash wound with Vashe, pat dry\par Apply Santyl to the wound bed.\par Cover with adhesive foam dressing\par Change three times a week\par Follow up in 3-4 weeks

## 2023-05-17 NOTE — PHYSICAL EXAM
[2+] : left 2+ [Ankle Swelling Bilaterally] : bilaterally  [Alert] : alert [Oriented to Person] : oriented to person [Oriented to Place] : oriented to place [Oriented to Time] : oriented to time [Calm] : calm [Please See PDF for Tissue Analytics] : Please See PDF for Tissue Analytics. [Ankle Swelling (On Exam)] : not present [Varicose Veins Of Lower Extremities] : not present [] : not present [de-identified] : NAD [de-identified] : AT [de-identified] : Supple [de-identified] : No increased work of breathing or use of accessory muscles. No wheezing or stridor.  [FreeTextEntry1] : Extremities are warm  [de-identified] : No appreciable muscle-wasting. Full ROM in all 4 extremities. No deformity.  [de-identified] : See TA [de-identified] : No gross deficits, intact B/L

## 2023-05-17 NOTE — ASSESSMENT
[FreeTextEntry1] : 5/3/23\par 72F - h/o breast CA (s/p R breast lumpectomy), bladder CA with spinal metastases (now in remission), HTN, HLD - recently admitted to [facility] with cellulitis of LLE secondary to an open ulcer of the L lateral malleolar region, which first appeared as a cutaneous nodule ('small bump') in March. This painful nodule was initially responsive to oral steroid therapy (rx by her podiatrist, Dr. Coley). \par L LATERAL MALLEOLAR ULCER\par - Wound with coagulative necrosis of dermis, well-circumscribed borders\par - Some violaceous discoloration of immediate periwound skin present\par - Area is quite tender. No local or systemic signs/symptoms of infection. No purulent discharge, no blanching erythema, no malodor, no crepitus or bullae formation. \par - Careful sharp debridement performed, limited to excision of the necrotic dermis and some of the underlying necrotic subcutaneous fatty tissue, depth of debridement 0.5 cm. \par - Mechanical debridement and wound cleansing performed with Vashe-moistened gauze\par - Patient currently on cilostazol to promote peripheral vasodilation. \par - Based on her early clinical course (began as small nodule, erupted into ulcer and became infected, marked tenderness, early improvement with steroid therapy), pyoderma is a distinct possibility.\par \par 5/17/23\par Patient presents today for follow up of left malleolar wound.\par On exam:\par wound is covered with yellow slough, periwound intact, defined edges\par no s/s of infection\par s/p excisional debridement\par Wash with Vashe \par Apply Santyl to the wound bed and cover with a foam dressing\par Follow up in 3-4 weeks

## 2023-05-17 NOTE — HISTORY OF PRESENT ILLNESS
[FreeTextEntry1] : Ms. KIM ACKERMAN presents for initial evaluation and treatment of a L lateral malleolar ulcer. She has a history of breast CA (s/p R breast lumpectomy), bladder CA with spinal metastases (now in remission), HTN, HLD. She was recently hospitalized at [facility] for a new ulceration of her L lateral malleolar area which had developed cellulitis. She was treated with IV abx and was seen by Vascular Surgery, who performed a LLE angio revealing patent 3 vessel runoff to the L foot. 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 She was discharged on 4/19 on PO antibiotics and with a VNS 3x per week.\par She was seen by Dermatology as outpatient on 4/28. 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 Of note, patient does report that the lesion first appeared in March of this year as a small 'bump', which she saw her podiatrist for (Dr. Stefan Coley). He prescribed a short course of steroids, which alleviated her symptoms. \par \par VNS is currently applying Xeroform to the wound. \par \par \par

## 2023-05-18 ENCOUNTER — APPOINTMENT (OUTPATIENT)
Dept: PODIATRY | Facility: CLINIC | Age: 73
End: 2023-05-18
Payer: MEDICARE

## 2023-05-18 DIAGNOSIS — L97.511 NON-PRESSURE CHRONIC ULCER OF OTHER PART OF RIGHT FOOT LIMITED TO BREAKDOWN OF SKIN: ICD-10-CM

## 2023-05-18 PROCEDURE — 99212 OFFICE O/P EST SF 10 MIN: CPT

## 2023-05-19 PROBLEM — L97.511 ULCER OF RIGHT FOOT, LIMITED TO BREAKDOWN OF SKIN: Status: ACTIVE | Noted: 2023-02-06

## 2023-05-22 NOTE — ASSESSMENT
[FreeTextEntry1] : \par Impression: Impression: Livedoid vasculopathy. Non-pressure ulcer, left ankle.\par \par Treatment: She will continue the ZeniFOAM with the Santyl. She will continue with vascular as well as wound care and I will see her back in a month or sooner with any issues.

## 2023-05-22 NOTE — HISTORY OF PRESENT ILLNESS
[Sneakers] : rosie [FreeTextEntry1] : Patient presents today for necrotizing granuloma of the skin. She has a superficial wound and she is taking Cilostazol and Xarelto and she is using ZeniFOAM 2 x 2" with silicone adhesive border and it is really progressing nicely. Her pain is almost resolved.

## 2023-05-23 ENCOUNTER — APPOINTMENT (OUTPATIENT)
Dept: VASCULAR SURGERY | Facility: CLINIC | Age: 73
End: 2023-05-23
Payer: MEDICARE

## 2023-05-23 VITALS — BODY MASS INDEX: 25.88 KG/M2 | TEMPERATURE: 97.7 F | HEIGHT: 66 IN | WEIGHT: 161 LBS

## 2023-05-23 VITALS — SYSTOLIC BLOOD PRESSURE: 123 MMHG | HEART RATE: 74 BPM | DIASTOLIC BLOOD PRESSURE: 86 MMHG

## 2023-05-23 PROCEDURE — 99213 OFFICE O/P EST LOW 20 MIN: CPT

## 2023-05-23 RX ORDER — DOXYCYCLINE HYCLATE 100 MG/1
100 TABLET ORAL
Refills: 0 | Status: COMPLETED | COMMUNITY
End: 2023-05-23

## 2023-05-23 RX ORDER — GABAPENTIN 300 MG
300 TABLET ORAL
Refills: 0 | Status: COMPLETED | COMMUNITY
End: 2023-05-23

## 2023-05-23 RX ORDER — CEPHALEXIN 500 MG/1
500 CAPSULE ORAL
Refills: 0 | Status: COMPLETED | COMMUNITY
End: 2023-05-23

## 2023-05-23 RX ORDER — CILOSTAZOL 50 MG/1
50 TABLET ORAL
Refills: 0 | Status: COMPLETED | COMMUNITY
End: 2023-05-23

## 2023-05-23 RX ORDER — ONDANSETRON 8 MG/1
8 TABLET ORAL
Qty: 60 | Refills: 0 | Status: COMPLETED | COMMUNITY
Start: 2023-04-25 | End: 2023-05-23

## 2023-05-23 NOTE — HISTORY OF PRESENT ILLNESS
[FreeTextEntry1] : pt was eval at [facility]  for left lat mall wound\par s/p lle angio sig  for   small vessel dz\par started on pletal \par pt was eval by podiatry and derm s/p bx\par w/u  s/o liveloidvasculopathy\par pt c/o ongoing   mod  deepika wound pain \par pt was eval by woundcare  [de-identified] : pt here to evaluate left lateral malleolus wound\par wound is healing well\par pain improved significantly\par pt following up with wound care and getting home care services\par taking pletal 50 mg BID and xarelto 2.5 mg BID\par no new complaints\par

## 2023-05-23 NOTE — PHYSICAL EXAM
[1+] : left 1+ [2+] : left 2+ [Alert] : alert [Oriented to Person] : oriented to person [Oriented to Place] : oriented to place [Oriented to Time] : oriented to time [Calm] : calm [Ankle Swelling (On Exam)] : not present [de-identified] : nad [de-identified] : wnl [de-identified] : unlabored breathing [FreeTextEntry1] : Mild arterial insufficiency w moderate trophic skin changes  \par LLE lat malleolus wound 1.6 cm length x 1.4 cm width x 2mm  depth  w limited granulation tissue \par  [de-identified] : ambulates slowly due to left ankle  wound discomfort  [de-identified] : Jordi Cranial nerves 2-12 jordi grossly intact [de-identified] : cooperative

## 2023-05-23 NOTE — ASSESSMENT
[Arterial/Venous Disease] : arterial/venous disease [Medication Management] : medication management [Ulcer Care] : ulcer care [FreeTextEntry1] : Impression  art insuff and  liveloid vasculaopathy \par \par \par Plan\par Med Conservative\par continue woundcare\par follow up with wound care\par will need le art insuff surveillance \par ov w jessica/pvr   s/o art insuff 12 mo march 2024 \par d/w pt liveloid vasculopathy  and  lifelong po anticoag and pletal 50 mg BID after wound heals\par continue xarelto 2.5 mg  bid and pletal 50 mg bid (refill sent)\par office visit in 2 months July 2023 to eval LLE lateral malleolus wound\par \par \par \par

## 2023-05-27 LAB
CULTURE RESULTS: SIGNIFICANT CHANGE UP
SPECIMEN SOURCE: SIGNIFICANT CHANGE UP

## 2023-05-31 ENCOUNTER — NON-APPOINTMENT (OUTPATIENT)
Age: 73
End: 2023-05-31

## 2023-06-13 DIAGNOSIS — L97.322 NON-PRESSURE CHRONIC ULCER OF LEFT ANKLE WITH FAT LAYER EXPOSED: ICD-10-CM

## 2023-06-13 DIAGNOSIS — L95.0 LIVEDOID VASCULITIS: ICD-10-CM

## 2023-06-14 ENCOUNTER — APPOINTMENT (OUTPATIENT)
Dept: WOUND CARE | Facility: CLINIC | Age: 73
End: 2023-06-14
Payer: MEDICARE

## 2023-06-14 ENCOUNTER — OUTPATIENT (OUTPATIENT)
Dept: OUTPATIENT SERVICES | Facility: HOSPITAL | Age: 73
LOS: 1 days | End: 2023-06-14
Payer: MEDICARE

## 2023-06-14 DIAGNOSIS — Z98.890 OTHER SPECIFIED POSTPROCEDURAL STATES: Chronic | ICD-10-CM

## 2023-06-14 DIAGNOSIS — Z90.710 ACQUIRED ABSENCE OF BOTH CERVIX AND UTERUS: Chronic | ICD-10-CM

## 2023-06-14 DIAGNOSIS — Z98.89 OTHER SPECIFIED POSTPROCEDURAL STATES: Chronic | ICD-10-CM

## 2023-06-14 DIAGNOSIS — N63 UNSPECIFIED LUMP IN BREAST: Chronic | ICD-10-CM

## 2023-06-14 PROCEDURE — 11042 DBRDMT SUBQ TIS 1ST 20SQCM/<: CPT

## 2023-06-14 NOTE — PLAN
[FreeTextEntry1] : 5/3/23\par - Will switch topical therapy to: Santyl collagenase ointment to wound bed, followed by foam dressing and dry gauze wrap, 3x per week. \par - Wound to be cleansed with Vashe at each dressing change. \par - Nursing orders written, Santyl prescription sent to [facility] pharmacy\par - Will continue to consider PG as a possible dx.  Will monitor clinical course of wound with this in mind and coordinate with Dermatology for treatment plan as appropriate\par - Follow up in the Wound Care clinic in 2 weeks. \par \par 5/17/23\par Plan:\par Wash wound with Vashe, pat dry\par Apply Santyl to the wound bed.\par Cover with adhesive foam dressing\par Change three times a week\par Follow up in 3-4 weeks\par \par 6-14-23:\par Plan:\par Wash wound with soap and water and then Vashe, pat dry\par Apply Santyl to the wound bed.\par Cover with adhesive foam dressing\par Change three times a week\par Follow up in 3-4 weeks

## 2023-06-14 NOTE — PHYSICAL EXAM
[2+] : left 2+ [Ankle Swelling Bilaterally] : bilaterally  [Alert] : alert [Oriented to Person] : oriented to person [Oriented to Place] : oriented to place [Oriented to Time] : oriented to time [Calm] : calm [Please See PDF for Tissue Analytics] : Please See PDF for Tissue Analytics. [Ankle Swelling (On Exam)] : not present [Varicose Veins Of Lower Extremities] : not present [] : not present [de-identified] : NAD [de-identified] : AT [de-identified] : Supple [de-identified] : No increased work of breathing or use of accessory muscles. No wheezing or stridor.  [FreeTextEntry1] : Extremities are warm  [de-identified] : No appreciable muscle-wasting. Full ROM in all 4 extremities. No deformity.  [de-identified] : See TA [de-identified] : No gross deficits, intact B/L

## 2023-06-14 NOTE — HISTORY OF PRESENT ILLNESS
[FreeTextEntry1] : Ms. KIM ACKERMAN presents for initial evaluation and treatment of a L lateral malleolar ulcer. She has a history of breast CA (s/p R breast lumpectomy), bladder CA with spinal metastases (now in remission), HTN, HLD. She was recently hospitalized at [facility] for a new ulceration of her L lateral malleolar area which had developed cellulitis. She was treated with IV abx and was seen by Vascular Surgery, who performed a LLE angio revealing patent 3 vessel runoff to the L foot. 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 She was discharged on 4/19 on PO antibiotics and with a VNS 3x per week.\par She was seen by Dermatology as outpatient on 4/28. 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 Of note, patient does report that the lesion first appeared in March of this year as a small 'bump', which she saw her podiatrist for (Dr. Stefan Coley). He prescribed a short course of steroids, which alleviated her symptoms. \par \par VNS is currently applying Xeroform to the wound. \par \par \par

## 2023-06-14 NOTE — ASSESSMENT
[FreeTextEntry1] : 5/3/23\par 72F - h/o breast CA (s/p R breast lumpectomy), bladder CA with spinal metastases (now in remission), HTN, HLD - recently admitted to [facility] with cellulitis of LLE secondary to an open ulcer of the L lateral malleolar region, which first appeared as a cutaneous nodule ('small bump') in March. This painful nodule was initially responsive to oral steroid therapy (rx by her podiatrist, Dr. Coley). \par L LATERAL MALLEOLAR ULCER\par - Wound with coagulative necrosis of dermis, well-circumscribed borders\par - Some violaceous discoloration of immediate periwound skin present\par - Area is quite tender. No local or systemic signs/symptoms of infection. No purulent discharge, no blanching erythema, no malodor, no crepitus or bullae formation. \par - Careful sharp debridement performed, limited to excision of the necrotic dermis and some of the underlying necrotic subcutaneous fatty tissue, depth of debridement 0.5 cm. \par - Mechanical debridement and wound cleansing performed with Vashe-moistened gauze\par - Patient currently on cilostazol to promote peripheral vasodilation. \par - Based on her early clinical course (began as small nodule, erupted into ulcer and became infected, marked tenderness, early improvement with steroid therapy), pyoderma is a distinct possibility.\par \par 5/17/23\par Patient presents today for follow up of left malleolar wound.\par On exam:\par wound is covered with yellow slough, periwound intact, defined edges\par no s/s of infection\par s/p excisional debridement\par Wash with Vashe \par Apply Santyl to the wound bed and cover with a foam dressing\par Follow up in 3-4 weeks\par \par 6-14-23:\par Pt here for f/u\par No new complaints\par On exam:\par LLE:  slough to wound bed.  No s/s of infection. s/p excisional debridement  wound smaller in size\par \par \par

## 2023-06-19 DIAGNOSIS — L97.322 NON-PRESSURE CHRONIC ULCER OF LEFT ANKLE WITH FAT LAYER EXPOSED: ICD-10-CM

## 2023-06-21 ENCOUNTER — APPOINTMENT (OUTPATIENT)
Dept: DERMATOLOGY | Facility: CLINIC | Age: 73
End: 2023-06-21
Payer: MEDICARE

## 2023-06-21 DIAGNOSIS — L85.3 XEROSIS CUTIS: ICD-10-CM

## 2023-06-21 DIAGNOSIS — L20.89 OTHER ATOPIC DERMATITIS: ICD-10-CM

## 2023-06-21 PROCEDURE — 11102 TANGNTL BX SKIN SINGLE LES: CPT

## 2023-06-21 PROCEDURE — 99214 OFFICE O/P EST MOD 30 MIN: CPT | Mod: 25

## 2023-06-21 NOTE — HISTORY OF PRESENT ILLNESS
[FreeTextEntry1] : ulcer; wart; eye bags [de-identified] : Ms. KIM ACKERMAN  is a 73 y/o F with PMH breast cancer s/p R lumpectomy and XRT, bladder cancer with mets to the spine s/p immunotherapy (pt unsure which) in remission, HTN, HLD, here for evaluation of below\par \par Problem: ulcer\par Location: L lateral ankle (previous episode on R foot)\par Duration: several weeks\par Associated symptoms/Aggravating Factors: Painful\par Modifying factors/Treatments: s/p hospitalization see below.\par - Brief hx of hospital course:  Patient presented with L ankle nodule, admitted, and treated with IV abx for cellulitis. \par Patient is s/p left lower extremity angiogram w/3-vessel run-off to ankle c/w small vessel disease (4/14) on cilostazol per vascular surgery. 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 \par Per ID recs- Vanco in hospital; dc'ed on PO Doxycycline 100mg q 12 and Keflex 500mg q6h. \par - 4/24/23: Reports nausea associated with doxy use. Has been taking it at 6am and 6pm with milk. Reports the ulcer has improved, but still with significant pain which she manages with tylenol. \par - 6/2023: much better but not healed yet; follows with vascular sx. Taking cilostazol and Xarelto\par \par #Here for growing painful wart behind L ear\par #Bags under eyes, gets enough sleep\par \par Personal hx of skin cancer: no\par FHx of skin cancer: no\par Social hx: has siblings and son\par

## 2023-06-21 NOTE — ASSESSMENT
[External notes review: [ enter provider(s) name(s) ] :____] : As part of my evaluation, I have reviewed prior clinical note(s) from provider(s) outside of my group practice. 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 Patient is s/p left lower extremity angiogram w/3-vessel run-off to ankle c/w small vessel disease (4/14/23).\par - Diagnosis and treatment options discussed\par We have discussed the nature and course of this condition.\par I have discussed the goals of therapy with the patient.\par We have discussed treatment options and expectations from treatment.\par - Pt is on cilostazol and Xarelto by vascular surgery (Dr. Anglin); advise to continue close followup for small vessel disease\par - FU with wound care (Dr. Simental)\par - Photo in chart for monitoring\par \par # Xerosis cutis\par - Take short showers with warm, not hot, water. Moisturize immediately after bathing, repeating as needed throughout the day. \par - Dry skin care instructions/information with OTC product recommendations (ex: Cerave, Vaseline, Vanicream, LRP) provided. Creams recommended over lotions. \par \par # "Eye bags" infraorbital folds- new dx of uncertain prognosis\par - Trial cool compresses with caffeinated green tea bags, or [employer] caffeinated eye serum\par \par # Neoplasm of uncertain behavior x1, L postauricular area\par - Rule out inflamed skin tag vs. wart vs. SK\par - Recommend tangential shave biopsy for definitive diagnosis.  \par - After informed consent was obtained, using Hibiclens for cleansing and 1% Lidocaine with epinephrine for anesthetic, with sterile technique a shave biopsy was used to obtain a biopsy specimen of the lesion. Hemostasis was obtained with aluminum chloride.  Vaseline was applied, and wound care instructions provided. The specimen was labeled and sent to pathology for evaluation. The procedure was well tolerated without complication.  \par - The patient will be contacted with biopsy results\par

## 2023-06-21 NOTE — PHYSICAL EXAM
[Alert] : alert [Oriented x 3] : ~L oriented x 3 [Well Nourished] : well nourished [Conjunctiva Non-injected] : conjunctiva non-injected [No Visual Lymphadenopathy] : no visual  lymphadenopathy [No Clubbing] : no clubbing [No Edema] : no edema [No Bromhidrosis] : no bromhidrosis [No Chromhidrosis] : no chromhidrosis [Declined] : declined [FreeTextEntry3] : AAOx3, NAD, well-appearing / pleasant\par Focused body exam only (see below) per patient request:\par \par - shallow clean ulcer with healthy pink central granulation tissue on L lateral ankle\par - background xerotic scaling diffusely on BLE and feet\par - hyperpigmented irritated pedunculated papule on L postauricular area\par - mild infraorbital edema\par

## 2023-06-22 ENCOUNTER — APPOINTMENT (OUTPATIENT)
Dept: PODIATRY | Facility: CLINIC | Age: 73
End: 2023-06-22
Payer: MEDICARE

## 2023-06-22 PROCEDURE — 99212 OFFICE O/P EST SF 10 MIN: CPT

## 2023-06-26 NOTE — ASSESSMENT
[FreeTextEntry1] : \par  Impression: Livedoid vasculopathy. Non-pressure ulcer, left ankle.\par \par Treatment: She will continue wound care with ZeniFOAM and Santyl. She will continue wound care. I will see her back in 4 to 6 weeks.

## 2023-06-26 NOTE — HISTORY OF PRESENT ILLNESS
[FreeTextEntry1] : Patient presents today for evaluation. She has been going to wound care. She is progressing nicely. The pain is minimal. It continues to contract and heal in. It is circular. It is just over 1cm. She continues to take Gabapentin as well as Meloxicam to supplement in terms of pain. She is also taking Pletal and Xarelto. She is following up with wound care and recently had debridement.

## 2023-06-26 NOTE — H&P PST ADULT - AS O2 DELIVERY
room air Pain Refusal Text: I offered to prescribe pain medication but the patient refused to take this medication.

## 2023-06-29 ENCOUNTER — NON-APPOINTMENT (OUTPATIENT)
Age: 73
End: 2023-06-29

## 2023-06-29 DIAGNOSIS — L97.322 NON-PRESSURE CHRONIC ULCER OF LEFT ANKLE WITH FAT LAYER EXPOSED: ICD-10-CM

## 2023-07-11 LAB — DERMATOLOGY BIOPSY: NORMAL

## 2023-07-14 ENCOUNTER — OUTPATIENT (OUTPATIENT)
Dept: OUTPATIENT SERVICES | Facility: HOSPITAL | Age: 73
LOS: 1 days | End: 2023-07-14
Payer: MEDICARE

## 2023-07-14 ENCOUNTER — APPOINTMENT (OUTPATIENT)
Dept: WOUND CARE | Facility: CLINIC | Age: 73
End: 2023-07-14
Payer: MEDICARE

## 2023-07-14 VITALS
HEART RATE: 96 BPM | DIASTOLIC BLOOD PRESSURE: 75 MMHG | OXYGEN SATURATION: 97 % | RESPIRATION RATE: 14 BRPM | SYSTOLIC BLOOD PRESSURE: 111 MMHG

## 2023-07-14 DIAGNOSIS — Z90.710 ACQUIRED ABSENCE OF BOTH CERVIX AND UTERUS: Chronic | ICD-10-CM

## 2023-07-14 DIAGNOSIS — Z98.890 OTHER SPECIFIED POSTPROCEDURAL STATES: Chronic | ICD-10-CM

## 2023-07-14 DIAGNOSIS — Z98.89 OTHER SPECIFIED POSTPROCEDURAL STATES: Chronic | ICD-10-CM

## 2023-07-14 DIAGNOSIS — N63 UNSPECIFIED LUMP IN BREAST: Chronic | ICD-10-CM

## 2023-07-14 PROCEDURE — 11042 DBRDMT SUBQ TIS 1ST 20SQCM/<: CPT

## 2023-07-14 NOTE — PHYSICAL EXAM
[2+] : left 2+ [Ankle Swelling Bilaterally] : bilaterally  [Alert] : alert [Oriented to Person] : oriented to person [Oriented to Place] : oriented to place [Oriented to Time] : oriented to time [Calm] : calm [Please See PDF for Tissue Analytics] : Please See PDF for Tissue Analytics. [Ankle Swelling (On Exam)] : not present [Varicose Veins Of Lower Extremities] : not present [] : not present [de-identified] : NAD [de-identified] : AT [de-identified] : Supple [de-identified] : No increased work of breathing or use of accessory muscles. No wheezing or stridor.  [FreeTextEntry1] : Extremities are warm  [de-identified] : No appreciable muscle-wasting. Full ROM in all 4 extremities. No deformity.  [de-identified] : See TA [de-identified] : No gross deficits, intact B/L

## 2023-07-14 NOTE — PLAN
[FreeTextEntry1] : 5/3/23\par - Will switch topical therapy to: Santyl collagenase ointment to wound bed, followed by foam dressing and dry gauze wrap, 3x per week. \par - Wound to be cleansed with Vashe at each dressing change. \par - Nursing orders written, Santyl prescription sent to [facility]\par - Will continue to consider PG as a possible dx.  Will monitor clinical course of wound with this in mind and coordinate with Dermatology for treatment plan as appropriate\par - Follow up in the Wound Care clinic in 2 weeks. \par \par 5/17/23\par Plan:\par Wash wound with Vashe, pat dry\par Apply Santyl to the wound bed.\par Cover with adhesive foam dressing\par Change three times a week\par Follow up in 3-4 weeks\par \par 6-14-23:\par Plan:\par Wash wound with soap and water and then Vashe, pat dry\par Apply Santyl to the wound bed.\par Cover with adhesive foam dressing\par Change three times a week\par Follow up in 3-4 weeks\par \par 07/14/2023\par Plan:\par cont current tx\par Wash wound with soap and water and then Vashe, pat dry\par Apply Santyl to the wound bed.\par Cover with adhesive foam dressing\par Change three times a week\par Follow up in 3-4 weeks\par

## 2023-07-14 NOTE — HISTORY OF PRESENT ILLNESS
[FreeTextEntry1] : Ms. KIM ACKERMAN presents for initial evaluation and treatment of a L lateral malleolar ulcer. She has a history of breast CA (s/p R breast lumpectomy), bladder CA with spinal metastases (now in remission), HTN, HLD. She was recently hospitalized at [facility] for a new ulceration of her L lateral malleolar area which had developed cellulitis. She was treated with IV abx and was seen by Vascular Surgery, who performed a LLE angio revealing patent 3 vessel runoff to the L foot. 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 She was discharged on 4/19 on PO antibiotics and with a VNS 3x per week.\par She was seen by Dermatology as outpatient on 4/28. 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 Of note, patient does report that the lesion first appeared in March of this year as a small 'bump', which she saw her podiatrist for (Dr. Stefan Coley). He prescribed a short course of steroids, which alleviated her symptoms. \par \par VNS is currently applying Xeroform to the wound. \par \par \par

## 2023-07-14 NOTE — ASSESSMENT
[FreeTextEntry1] : 5/3/23\par 72F - h/o breast CA (s/p R breast lumpectomy), bladder CA with spinal metastases (now in remission), HTN, HLD - recently admitted to [facility] with cellulitis of LLE secondary to an open ulcer of the L lateral malleolar region, which first appeared as a cutaneous nodule ('small bump') in March. This painful nodule was initially responsive to oral steroid therapy (rx by her podiatrist, Dr. Coley). \par L LATERAL MALLEOLAR ULCER\par - Wound with coagulative necrosis of dermis, well-circumscribed borders\par - Some violaceous discoloration of immediate periwound skin present\par - Area is quite tender. No local or systemic signs/symptoms of infection. No purulent discharge, no blanching erythema, no malodor, no crepitus or bullae formation. \par - Careful sharp debridement performed, limited to excision of the necrotic dermis and some of the underlying necrotic subcutaneous fatty tissue, depth of debridement 0.5 cm. \par - Mechanical debridement and wound cleansing performed with Vashe-moistened gauze\par - Patient currently on cilostazol to promote peripheral vasodilation. \par - Based on her early clinical course (began as small nodule, erupted into ulcer and became infected, marked tenderness, early improvement with steroid therapy), pyoderma is a distinct possibility.\par \par 5/17/23\par Patient presents today for follow up of left malleolar wound.\par On exam:\par wound is covered with yellow slough, periwound intact, defined edges\par no s/s of infection\par s/p excisional debridement\par Wash with Vashe \par Apply Santyl to the wound bed and cover with a foam dressing\par Follow up in 3-4 weeks\par \par 6-14-23:\par Pt here for f/u\par No new complaints\par On exam:\par LLE:  slough to wound bed.  No s/s of infection. s/p excisional debridement  wound smaller in size\par \par 07/14/2023\par Pt here for f/u on left leg ulcer\par No new complaints\par On exam:\par LLE:  wound appears smaller.  tender to touch, slough to wound bed.  No s/s of infection. s/p excisional debridement  \par

## 2023-07-19 ENCOUNTER — APPOINTMENT (OUTPATIENT)
Dept: PULMONOLOGY | Facility: CLINIC | Age: 73
End: 2023-07-19
Payer: MEDICARE

## 2023-07-19 VITALS
HEIGHT: 66 IN | BODY MASS INDEX: 25.84 KG/M2 | TEMPERATURE: 98.2 F | RESPIRATION RATE: 15 BRPM | WEIGHT: 160.8 LBS | HEART RATE: 95 BPM | DIASTOLIC BLOOD PRESSURE: 74 MMHG | SYSTOLIC BLOOD PRESSURE: 111 MMHG | OXYGEN SATURATION: 95 %

## 2023-07-19 PROCEDURE — 99204 OFFICE O/P NEW MOD 45 MIN: CPT | Mod: GC

## 2023-07-19 NOTE — PHYSICAL EXAM
[General Appearance - Well Developed] : well developed [General Appearance - Well Nourished] : well nourished [Enlarged Base of the Tongue] : enlargement of the base of the tongue [IV] : IV [Neck Appearance] : the appearance of the neck was normal [Heart Rate And Rhythm] : heart rate was normal and rhythm regular [Heart Sounds] : normal S1 and S2 [Exaggerated Use Of Accessory Muscles For Inspiration] : no accessory muscle use [Auscultation Breath Sounds / Voice Sounds] : lungs were clear to auscultation bilaterally [Abnormal Walk] : normal gait [Nail Clubbing] : no clubbing of the fingernails [Cyanosis, Localized] : no localized cyanosis [] : no rash [No Focal Deficits] : no focal deficits [Oriented To Time, Place, And Person] : oriented to person, place, and time [Impaired Insight] : insight and judgment were intact

## 2023-07-20 NOTE — ASSESSMENT
[FreeTextEntry1] : 72 year old female PMH KARI on CPAP, liveloid vasculaopathy on AC, HTN, HLD, Bladder cancer s/p chemotherapy and radiation therapy presents for follow up of sleep apnea. She reports continued benefit from using her device. However, she is symptomatic with daytime fatigue and EDS (ESS 11) with compliance data noting significant periods of leak with residual AHI of 20.3 on average. We will order her for a new auto-titrating PAP device. In the interim we will increase her pressure to 75wfQ1R (currently 8cmH2O) in accordance with her prior cpap titration. She was also recommended to follow up with her PCP  her magnesium supplement dosage.

## 2023-07-20 NOTE — HISTORY OF PRESENT ILLNESS
[Snoring] : snoring [Unintentional Sleep While Inactive] : unintentional sleep while inactive [Awakes with Headache] : headache upon awakening [Daytime Somnolence] : daytime somnolence [FreeTextEntry1] : 72 year old female PMH KARI on CPAP, liveloid vasculaopathy on AC, HTN, HLD, Bladder cancer s/p chemotherapy and radiation therapy presents for follow up of sleep apnea.\par \par Diagnosed with severe KARI in 2014 and has been on CPAP therapy since 2014. Last seen in clinic in 2019. States that she has not had consistent supply for PAP machine due to using her sister's supplies (recently passed away). States that she uses device every other night and on nights that she uses the devices she benefits in regards to sleep quality and daytime energy. \par \par Goes to bed around 12am and wakes up 8am. Wakes up once a night to use the bathroom. \par \par Device: Airsense 10 elite\par DME: [employer]\par Therapy: 8cmH2O\par Compliance: 53% usage, 39% >4hrs; AHI 20.3\par Mask: FFM F20 medium\par \par Diagnostic studies:\par PSG 2/21/2014. Apnea-hypopnea index: 68.7 per hour. T90%: 32.9%. \par CPAP titration 5/22/2016 optimal pressure 10 cmH2O with nasal mask.  [Frequent Nocturnal Awakening] : no nocturnal awakening [Unintentional Sleep while Active] : no unintentional sleep while active [Awakes Unrefreshed] : does not awake unrefreshed [Awakening With Dry Mouth] : no dry mouth upon awakening [Recent  Weight Gain] : no recent weight gain [DIS] : no DIS [DMS] : no DMS [Unusual Sleep Behavior] : no unusual sleep behavior [Hypersomnolence] : no hypersomnolence [Cataplexy] : no cataplexy [Sleep Paralysis] : no sleep paralysis [Hypnagogic Hallucinations] : no hallucinations when falling asleep [Hypnopompic Hallucinations] : no hallucinations when awakening [Lower Extremity Discomfort] : no lower extremity discomfort in evening or at bedtime [ESS] : 11

## 2023-07-20 NOTE — REVIEW OF SYSTEMS
[EDS: ESS=____] : daytime somnolence: ESS=[unfilled] [Fatigue] : fatigue [Difficulty Initiating Sleep] : no difficulty falling asleep [Difficulty Maintaining Sleep] : no difficulty maintaining sleep [Lower Extremity Discomfort] : no lower extremity discomfort [Unusual Sleep Behavior] : no unusual sleep behavior

## 2023-07-24 DIAGNOSIS — L97.322 NON-PRESSURE CHRONIC ULCER OF LEFT ANKLE WITH FAT LAYER EXPOSED: ICD-10-CM

## 2023-07-25 ENCOUNTER — APPOINTMENT (OUTPATIENT)
Dept: VASCULAR SURGERY | Facility: CLINIC | Age: 73
End: 2023-07-25
Payer: MEDICARE

## 2023-07-25 VITALS
TEMPERATURE: 98.2 F | HEART RATE: 79 BPM | BODY MASS INDEX: 25.88 KG/M2 | DIASTOLIC BLOOD PRESSURE: 77 MMHG | WEIGHT: 161 LBS | HEIGHT: 66 IN | SYSTOLIC BLOOD PRESSURE: 112 MMHG

## 2023-07-25 VITALS — DIASTOLIC BLOOD PRESSURE: 76 MMHG | SYSTOLIC BLOOD PRESSURE: 114 MMHG | HEART RATE: 78 BPM

## 2023-07-25 PROCEDURE — 99213 OFFICE O/P EST LOW 20 MIN: CPT

## 2023-07-25 NOTE — ASSESSMENT
[Arterial/Venous Disease] : arterial/venous disease [Medication Management] : medication management [Foot care/Footwear] : foot care/footwear [Ulcer Care] : ulcer care [FreeTextEntry1] : Impression  art insuff and  liveloid vasculopathy \par \par \par Plan\par Med Conservative - foot care and protection\par continue woundcare\par follow up with wound care\par will need le art insuff surveillance \par ov w jessica/pvr   s/o art insuff 12 mo march 2024 \par pt does not want to schedule jessica/pvr in march 2024 since it's too far in advance \par d/w pt liveloid vasculopathy  and  lifelong po anticoag and pletal 50 mg BID after wound heals\par continue xarelto 2.5 mg  bid and pletal 50 mg bid \par office visit in 2-3 months Sep/Oct 2023 to eval LLE lateral malleolus wound\par \par \par \par

## 2023-07-25 NOTE — HISTORY OF PRESENT ILLNESS
[FreeTextEntry1] : pt was eval at [facility]  for left lat mall wound\par s/p lle angio sig  for   small vessel dz\par started on pletal \par pt was eval by podiatry and derm s/p bx\par w/u  s/o liveloidvasculopathy\par pt c/o ongoing   mod  deepika wound pain \par pt was eval by woundcare  [de-identified] : pt here to evaluate left lateral malleolus wound\par wound is healing well and improving\par pain is still present\par pt feels burning pain in deepika wound\par pt is following up with wound care \par pt has been applying santyl and adhesive dressing 3x/week\par taking pletal 50 mg BID and xarelto 2.5 mg BID\par no new complaints\par

## 2023-07-25 NOTE — PHYSICAL EXAM
[1+] : left 1+ [2+] : left 2+ [Alert] : alert [Oriented to Person] : oriented to person [Oriented to Place] : oriented to place [Oriented to Time] : oriented to time [Calm] : calm [Ankle Swelling (On Exam)] : not present [de-identified] : nad [de-identified] : wnl [de-identified] : unlabored breathing [FreeTextEntry1] : Mild arterial insufficiency w moderate trophic skin changes  \par LLE lat malleolus wound 0.5 cm length x 0.6 cm width x 1 mm  depth  w limited granulation tissue \par  [de-identified] : ambulates slowly due to left ankle  wound discomfort  [de-identified] : Jordi Cranial nerves 2-12 jordi grossly intact [de-identified] : cooperative

## 2023-07-27 ENCOUNTER — APPOINTMENT (OUTPATIENT)
Dept: PODIATRY | Facility: CLINIC | Age: 73
End: 2023-07-27
Payer: MEDICARE

## 2023-07-27 PROCEDURE — 99212 OFFICE O/P EST SF 10 MIN: CPT

## 2023-07-27 NOTE — REASON FOR VISIT
UROLOGY Office Visit Note      12/28/2017    UROLOGY CHIEF COMPLAINT  Chief Complaint   Patient presents with   • Urinary Retention       UROLOGY HISTORY OF PRESENT ILLNESS    April Ellison is a 21 year old female who presents to establish care for acute urinary retention. She is status post left thyroid lobectomy on 12/20/17. She presented to the ED on [place] after being unable to urinate for 12 hours. She also had significant constipation and had not had a bowel movement in nearly one week. She was taking Percocet for pain following surgery. A Velasquez catheter was placed in the ED and she was instructed to follow-up with urology as outpatient.    She denies any previous urologic history. Denies history of nephrolithiasis, UTI, kidney and bladder cancers, hematuria, and urinary retention. At baseline she denies any difficulties with urination. Constipation has resolved. She has stopped taking Percocet and is taking Tylenol as needed for pain. She had surgical follow-up yesterday. She has had intermittent pain related to the Velasquez catheter which sounds like bladder spasms. She has also had some associated leakage of urine around the catheter.    She denies fevers/chills, nausea/vomiting.    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   • DRYSOL 20 % topical solution Apply topically 3 times a week.   • norgestimate-ethinyl estradiol, triphasic, (TRI-SPRINTEC) 0.18/0.215/0.25 MG-35 MCG tablet Take 1 tablet by mouth daily.   • naproxen (NAPROSYN) 500 MG tablet Take 1 tablet by mouth 2 times daily (with meals).   • aspirin-acetaminophen-caffeine (EXCEDRIN MIGRAINE) 250-250-65 MG per tablet Take 2 tablets by mouth as needed for Pain.     No current facility-administered medications for this visit.        ALLERGIES    ALLERGIES:   Allergen Reactions   • Codeine Nausea & Vomiting       Review of Systems  As per history of present illness.    PHYSICAL EXAMINATION    Vitals signs:  Blood pressure 128/74, temperature 98.7 °F (37.1 °C), last menstrual period 11/28/2017.  General:  Alert and oriented. No acute distress.  Psych:   Pleasant and appropriate affect.  HEENT:  Normal, cephalic, atraumatic  Lung: No dyspnea or cough.  : Clear yellow urine via Velasquez.  Extremities:  No clubbing or edema.    RESULTS  Creatinine (mg/dL)   Date Value   12/21/2017 0.58     GFR Estimate, Non  (no units)   Date Value   12/21/2017 >90     GFR Estimate,  (no units)   Date Value   12/21/2017 >90     ASSESSMENT/PLAN  April experienced postoperative urinary retention following left thyroid lobectomy last week. She continues to recover from surgery. Constipation is resolved and she is no longer taking narcotic pain medication. She was able to urinate successfully after fill and void and was left without a catheter. Advised that she should be able to urinate within the next 6-8 hours. She will follow-up as needed for any further urinary difficulties.    VIDAL Rome   [Follow-Up Visit] : a follow-up visit for [Foot/Ankle Ulcer] : foot/ankle ulcer

## 2023-07-31 ENCOUNTER — APPOINTMENT (OUTPATIENT)
Dept: DERMATOLOGY | Facility: CLINIC | Age: 73
End: 2023-07-31
Payer: MEDICARE

## 2023-07-31 DIAGNOSIS — C44.91 BASAL CELL CARCINOMA OF SKIN, UNSPECIFIED: ICD-10-CM

## 2023-07-31 PROCEDURE — 11900 INJECT SKIN LESIONS </W 7: CPT

## 2023-07-31 PROCEDURE — 99213 OFFICE O/P EST LOW 20 MIN: CPT | Mod: 25

## 2023-07-31 NOTE — HISTORY OF PRESENT ILLNESS
[FreeTextEntry1] : Patient presents today because of lateral left ankle wound. At this point it is 4mm. It is almost healed in and she is progressing very nicely. She has been following with dermatology.  She states she also had a bit of a wart removed from her neck and she is following up with dermatologic oncology.

## 2023-07-31 NOTE — ASSESSMENT
[FreeTextEntry1] : \par Impression: Non-pressure ulcer, left ankle.\par \par Treatment Continue Collagenase and wound care. She is following up with dermatology and I would like to see her back in 6 weeks just for evaluation as well.

## 2023-08-02 ENCOUNTER — APPOINTMENT (OUTPATIENT)
Dept: DERMATOLOGY | Facility: CLINIC | Age: 73
End: 2023-08-02
Payer: MEDICARE

## 2023-08-02 PROCEDURE — 99214 OFFICE O/P EST MOD 30 MIN: CPT

## 2023-08-07 ENCOUNTER — APPOINTMENT (OUTPATIENT)
Dept: PLASTIC SURGERY | Facility: CLINIC | Age: 73
End: 2023-08-07
Payer: MEDICARE

## 2023-08-07 ENCOUNTER — APPOINTMENT (OUTPATIENT)
Dept: DERMATOLOGY | Facility: CLINIC | Age: 73
End: 2023-08-07
Payer: MEDICARE

## 2023-08-07 ENCOUNTER — LABORATORY RESULT (OUTPATIENT)
Age: 73
End: 2023-08-07

## 2023-08-07 ENCOUNTER — NON-APPOINTMENT (OUTPATIENT)
Age: 73
End: 2023-08-07

## 2023-08-07 VITALS — WEIGHT: 150 LBS | HEIGHT: 65 IN | BODY MASS INDEX: 24.99 KG/M2

## 2023-08-07 PROCEDURE — 99213 OFFICE O/P EST LOW 20 MIN: CPT | Mod: 25

## 2023-08-07 PROCEDURE — 99203 OFFICE O/P NEW LOW 30 MIN: CPT | Mod: 25

## 2023-08-07 PROCEDURE — 10060 I&D ABSCESS SIMPLE/SINGLE: CPT

## 2023-08-07 PROCEDURE — 99214 OFFICE O/P EST MOD 30 MIN: CPT | Mod: 24

## 2023-08-07 NOTE — HISTORY OF PRESENT ILLNESS
[FreeTextEntry1] :  73 y/o F with PMH breast cancer s/p R lumpectomy and XRT, bladder cancer with METS to the spine s/p immunotherapy (pt unsure which) in remission, HTN, HLD, here for evaluation of below. Cyst on posterior scalp, inflamed and tender in the past 2 weeks. Recently got her hair done. The cyst itself has been there for over 40 years but this is the first time it has become inflamed. - Denies fevers, chills, drainage Seen by Dermatology last week, Received ILK injection and started course of Doxycycline. Has been taking over past week.  Denies improvement and reports increase in pain and tenderness.  + drainage H/O BCC of ear

## 2023-08-07 NOTE — PROCEDURE
[Nl] : None [FreeTextEntry1] : infected/inflamed scalp mass, likely pilar cyst [FreeTextEntry2] : incision and drainage  [FreeTextEntry6] : site prepped with betadine. incision made with #15 blade small amount purulent drainage and lare amount cystic contents expressed from site cleaned again with betadine iodoform packing placed hemostasis achieved pt tolerated procedure well gauze dressing applied [FreeTextEntry7] : sent to lab

## 2023-08-11 ENCOUNTER — OUTPATIENT (OUTPATIENT)
Dept: OUTPATIENT SERVICES | Facility: HOSPITAL | Age: 73
LOS: 1 days | End: 2023-08-11
Payer: MEDICARE

## 2023-08-11 ENCOUNTER — APPOINTMENT (OUTPATIENT)
Dept: WOUND CARE | Facility: CLINIC | Age: 73
End: 2023-08-11
Payer: MEDICARE

## 2023-08-11 VITALS — HEART RATE: 71 BPM | OXYGEN SATURATION: 98 % | DIASTOLIC BLOOD PRESSURE: 83 MMHG | SYSTOLIC BLOOD PRESSURE: 137 MMHG

## 2023-08-11 DIAGNOSIS — Z90.710 ACQUIRED ABSENCE OF BOTH CERVIX AND UTERUS: Chronic | ICD-10-CM

## 2023-08-11 DIAGNOSIS — Z98.890 OTHER SPECIFIED POSTPROCEDURAL STATES: Chronic | ICD-10-CM

## 2023-08-11 DIAGNOSIS — Z98.89 OTHER SPECIFIED POSTPROCEDURAL STATES: Chronic | ICD-10-CM

## 2023-08-11 DIAGNOSIS — L97.322 NON-PRESSURE CHRONIC ULCER OF LEFT ANKLE WITH FAT LAYER EXPOSED: ICD-10-CM

## 2023-08-11 DIAGNOSIS — N63 UNSPECIFIED LUMP IN BREAST: Chronic | ICD-10-CM

## 2023-08-11 PROCEDURE — 11042 DBRDMT SUBQ TIS 1ST 20SQCM/<: CPT

## 2023-08-11 NOTE — ASSESSMENT
[FreeTextEntry1] : 5/3/23 72F - h/o breast CA (s/p R breast lumpectomy), bladder CA with spinal metastases (now in remission), HTN, HLD - recently admitted to [facility] with cellulitis of LLE secondary to an open ulcer of the L lateral malleolar region, which first appeared as a cutaneous nodule ('small bump') in March. This painful nodule was initially responsive to oral steroid therapy (rx by her podiatrist, Dr. Coley).  L LATERAL MALLEOLAR ULCER - Wound with coagulative necrosis of dermis, well-circumscribed borders - Some violaceous discoloration of immediate periwound skin present - Area is quite tender. No local or systemic signs/symptoms of infection. No purulent discharge, no blanching erythema, no malodor, no crepitus or bullae formation.  - Careful sharp debridement performed, limited to excision of the necrotic dermis and some of the underlying necrotic subcutaneous fatty tissue, depth of debridement 0.5 cm.  - Mechanical debridement and wound cleansing performed with Vashe-moistened gauze - Patient currently on cilostazol to promote peripheral vasodilation.  - Based on her early clinical course (began as small nodule, erupted into ulcer and became infected, marked tenderness, early improvement with steroid therapy), pyoderma is a distinct possibility.  5/17/23 Patient presents today for follow up of left malleolar wound. On exam: wound is covered with yellow slough, periwound intact, defined edges no s/s of infection s/p excisional debridement Wash with Vashe  Apply Santyl to the wound bed and cover with a foam dressing Follow up in 3-4 weeks  6-14-23: Pt here for f/u No new complaints On exam: LLE:  slough to wound bed.  No s/s of infection. s/p excisional debridement  wound smaller in size  07/14/2023 Pt here for f/u on left leg ulcer No new complaints On exam: LLE:  wound appears smaller.  tender to touch, slough to wound bed.  No s/s of infection. s/p excisional debridement    8/11/23 Patient here for follow up of would on left lateral ankle No new complaints today Patient is going on a cruise in September, asked about getting a pedicure. On exam: Small wound on left lateral maleolus with slough. Excisional debridement performed. Clean underlying tissue. Bilateral ankle edema

## 2023-08-11 NOTE — PHYSICAL EXAM
[2+] : left 2+ [Ankle Swelling (On Exam)] : present [Ankle Swelling Bilaterally] : bilaterally  [Ankle Swelling On The Right] : mild [Skin Ulcer] : ulcer [Alert] : alert [Oriented to Person] : oriented to person [Oriented to Place] : oriented to place [Oriented to Time] : oriented to time [Calm] : calm [Please See PDF for Tissue Analytics] : Please See PDF for Tissue Analytics. [de-identified] : NAD [de-identified] : AT [de-identified] : Supple [de-identified] : symmetric chest rise [FreeTextEntry1] : Extremities are warm  [de-identified] : No appreciable muscle-wasting. Full ROM in all 4 extremities. [de-identified] : See TA [de-identified] : No gross deficits, intact B/L

## 2023-08-11 NOTE — PLAN
[FreeTextEntry1] : 5/3/23 - Will switch topical therapy to: Santyl collagenase ointment to wound bed, followed by foam dressing and dry gauze wrap, 3x per week.  - Wound to be cleansed with Vashe at each dressing change.  - Nursing orders written, Santyl prescription sent to [place] pharmacy - Will continue to consider PG as a possible dx.  Will monitor clinical course of wound with this in mind and coordinate with Dermatology for treatment plan as appropriate - Follow up in the Wound Care clinic in 2 weeks.   5/17/23 Plan: Wash wound with Vashe, pat dry Apply Santyl to the wound bed. Cover with adhesive foam dressing Change three times a week Follow up in 3-4 weeks  6-14-23: Plan: Wash wound with soap and water and then Vashe, pat dry Apply Santyl to the wound bed. Cover with adhesive foam dressing Change three times a week Follow up in 3-4 weeks  07/14/2023 Plan: cont current tx Wash wound with soap and water and then Vashe, pat dry Apply Santyl to the wound bed. Cover with adhesive foam dressing Change three times a week Follow up in 3-4 weeks  8/11/23 Plan: For LLE wound: cleanse with soap and water. Pat dry. Apply Crystal, baindaid or gauze/tape. May change 3x/week. Consider OTC  compression socks Discuss BP meds with PCP  Counseled that she should not submerge left foot for pedicure due to risk of infection Follow up in 3 weeks

## 2023-08-11 NOTE — HISTORY OF PRESENT ILLNESS
[FreeTextEntry1] : Ms. KIM ACKERMAN presents for initial evaluation and treatment of a L lateral malleolar ulcer. She has a history of breast CA (s/p R breast lumpectomy), bladder CA with spinal metastases (now in remission), HTN, HLD. She was recently hospitalized at [facility] for a new ulceration of her L lateral malleolar area which had developed cellulitis. She was treated with IV abx and was seen by Vascular Surgery, who performed a LLE angio revealing patent 3 vessel runoff to the L foot. 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 She was discharged on 4/19 on PO antibiotics and with a VNS 3x per week.\par  She was seen by Dermatology as outpatient on 4/28. 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 Of note, patient does report that the lesion first appeared in March of this year as a small 'bump', which she saw her podiatrist for (Dr. Stefan Coley). He prescribed a short course of steroids, which alleviated her symptoms. \par  \par  VNS is currently applying Xeroform to the wound. \par  \par  \par

## 2023-08-16 ENCOUNTER — APPOINTMENT (OUTPATIENT)
Dept: PLASTIC SURGERY | Facility: CLINIC | Age: 73
End: 2023-08-16

## 2023-08-30 ENCOUNTER — APPOINTMENT (OUTPATIENT)
Dept: WOUND CARE | Facility: CLINIC | Age: 73
End: 2023-08-30
Payer: MEDICARE

## 2023-08-30 ENCOUNTER — OUTPATIENT (OUTPATIENT)
Dept: OUTPATIENT SERVICES | Facility: HOSPITAL | Age: 73
LOS: 1 days | End: 2023-08-30
Payer: MEDICARE

## 2023-08-30 VITALS — SYSTOLIC BLOOD PRESSURE: 126 MMHG | DIASTOLIC BLOOD PRESSURE: 87 MMHG | HEART RATE: 100 BPM | OXYGEN SATURATION: 97 %

## 2023-08-30 DIAGNOSIS — Z98.890 OTHER SPECIFIED POSTPROCEDURAL STATES: Chronic | ICD-10-CM

## 2023-08-30 DIAGNOSIS — Z98.89 OTHER SPECIFIED POSTPROCEDURAL STATES: Chronic | ICD-10-CM

## 2023-08-30 DIAGNOSIS — N63 UNSPECIFIED LUMP IN BREAST: Chronic | ICD-10-CM

## 2023-08-30 DIAGNOSIS — Z90.710 ACQUIRED ABSENCE OF BOTH CERVIX AND UTERUS: Chronic | ICD-10-CM

## 2023-08-30 PROCEDURE — 11042 DBRDMT SUBQ TIS 1ST 20SQCM/<: CPT

## 2023-08-30 PROCEDURE — 0598T R-T FLUOR WOUND IMG 1ST SITE: CPT

## 2023-08-30 NOTE — ASSESSMENT
[FreeTextEntry1] : 5/3/23 72F - h/o breast CA (s/p R breast lumpectomy), bladder CA with spinal metastases (now in remission), HTN, HLD - recently admitted to [facility] with cellulitis of LLE secondary to an open ulcer of the L lateral malleolar region, which first appeared as a cutaneous nodule ('small bump') in March. This painful nodule was initially responsive to oral steroid therapy (rx by her podiatrist, Dr. Coley).  L LATERAL MALLEOLAR ULCER - Wound with coagulative necrosis of dermis, well-circumscribed borders - Some violaceous discoloration of immediate periwound skin present - Area is quite tender. No local or systemic signs/symptoms of infection. No purulent discharge, no blanching erythema, no malodor, no crepitus or bullae formation.  - Careful sharp debridement performed, limited to excision of the necrotic dermis and some of the underlying necrotic subcutaneous fatty tissue, depth of debridement 0.5 cm.  - Mechanical debridement and wound cleansing performed with Vashe-moistened gauze - Patient currently on cilostazol to promote peripheral vasodilation.  - Based on her early clinical course (began as small nodule, erupted into ulcer and became infected, marked tenderness, early improvement with steroid therapy), pyoderma is a distinct possibility.  5/17/23 Patient presents today for follow up of left malleolar wound. On exam: wound is covered with yellow slough, periwound intact, defined edges no s/s of infection s/p excisional debridement Wash with Vashe  Apply Santyl to the wound bed and cover with a foam dressing Follow up in 3-4 weeks  6-14-23: Pt here for f/u No new complaints On exam: LLE:  slough to wound bed.  No s/s of infection. s/p excisional debridement  wound smaller in size  07/14/2023 Pt here for f/u on left leg ulcer No new complaints On exam: LLE:  wound appears smaller.  tender to touch, slough to wound bed.  No s/s of infection. s/p excisional debridement    8/11/23 Patient here for follow up of would on left lateral ankle No new complaints today Patient is going on a cruise in September, asked about getting a pedicure. On exam: Small wound on left lateral maleolus with slough. Excisional debridement performed. Clean underlying tissue. Bilateral ankle edema  8-30-23: Pt here for f/u Pt feels wound has gotten worse in last 2 weeks.   pt also sees podiatry for the wound On exam:  The patient was positioned as follows The fluorescence imaging device was positioned 8-12 cm from the patient and the clinical darkness required for imaging was obtained by () . The wound measured by TA. The [employer] i:X fluorescence imaging device was used to report presence and location of red or cyan fluorescence, indicative of bacteria at loads greater than 104 CFU/g in real-time. Images reported cyan and red. . Due to presence of infection causing bacteria the wound was cleansed with (Vashe) and excisionally debrided.). Fluorescence images acquired after cleansing reported slight  reduction in bacteria   LLE: wound:  wound cx'ed.  wound bed tender.  slough to wound. s/p excisional debridement   No periwound erythema, warmth, tenderness, induration, fluctuance or crepitus.

## 2023-08-30 NOTE — HISTORY OF PRESENT ILLNESS
[FreeTextEntry1] : Ms. KIM ACKERMAN presents for initial evaluation and treatment of a L lateral malleolar ulcer. She has a history of breast CA (s/p R breast lumpectomy), bladder CA with spinal metastases (now in remission), HTN, HLD. She was recently hospitalized at [facility] for a new ulceration of her L lateral malleolar area which had developed cellulitis. She was treated with IV abx and was seen by Vascular Surgery, who performed a LLE angio revealing patent 3 vessel runoff to the L foot. 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 She was discharged on 4/19 on PO antibiotics and with a VNS 3x per week.\par  She was seen by Dermatology as outpatient on 4/28. 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 Of note, patient does report that the lesion first appeared in March of this year as a small 'bump', which she saw her podiatrist for (Dr. Stefan Coley). He prescribed a short course of steroids, which alleviated her symptoms. \par  \par  VNS is currently applying Xeroform to the wound. \par  \par  \par

## 2023-08-30 NOTE — PHYSICAL EXAM
[2+] : left 2+ [Ankle Swelling (On Exam)] : present [Ankle Swelling Bilaterally] : bilaterally  [Ankle Swelling On The Right] : mild [Skin Ulcer] : ulcer [Alert] : alert [Oriented to Person] : oriented to person [Oriented to Place] : oriented to place [Oriented to Time] : oriented to time [Calm] : calm [Please See PDF for Tissue Analytics] : Please See PDF for Tissue Analytics. [de-identified] : NAD [de-identified] : AT [de-identified] : Supple [de-identified] : symmetric chest rise [FreeTextEntry1] : Extremities are warm  [de-identified] : No appreciable muscle-wasting. Full ROM in all 4 extremities. [de-identified] : See TA [de-identified] : No gross deficits, intact B/L

## 2023-08-30 NOTE — PLAN
[FreeTextEntry1] : 5/3/23 - Will switch topical therapy to: Santyl collagenase ointment to wound bed, followed by foam dressing and dry gauze wrap, 3x per week.  - Wound to be cleansed with Vashe at each dressing change.  - Nursing orders written, Santyl prescription sent to [place] pharmacy - Will continue to consider PG as a possible dx.  Will monitor clinical course of wound with this in mind and coordinate with Dermatology for treatment plan as appropriate - Follow up in the Wound Care clinic in 2 weeks.   5/17/23 Plan: Wash wound with Vashe, pat dry Apply Santyl to the wound bed. Cover with adhesive foam dressing Change three times a week Follow up in 3-4 weeks  6-14-23: Plan: Wash wound with soap and water and then Vashe, pat dry Apply Santyl to the wound bed. Cover with adhesive foam dressing Change three times a week Follow up in 3-4 weeks  07/14/2023 Plan: cont current tx Wash wound with soap and water and then Vashe, pat dry Apply Santyl to the wound bed. Cover with adhesive foam dressing Change three times a week Follow up in 3-4 weeks  8/11/23 Plan: For LLE wound: cleanse with soap and water. Pat dry. Apply Crystal, baindaid or gauze/tape. May change 3x/week. Consider OTC  compression socks Discuss BP meds with PCP  Counseled that she should not submerge left foot for pedicure due to risk of infection Follow up in 3 weeks  8-30-23: Plan: wash with soap and water soak with Vashe x 5 min.  Dont rinse.  apply santyl and foam and ACE or compression garment daily f/u cultures (pt wants to wait on cx before starting abx) f/u 2-3 weeks (after cruise)

## 2023-08-31 ENCOUNTER — APPOINTMENT (OUTPATIENT)
Dept: PODIATRY | Facility: CLINIC | Age: 73
End: 2023-08-31

## 2023-09-01 ENCOUNTER — APPOINTMENT (OUTPATIENT)
Dept: PODIATRY | Facility: CLINIC | Age: 73
End: 2023-09-01
Payer: MEDICARE

## 2023-09-01 DIAGNOSIS — M79.671 PAIN IN RIGHT FOOT: ICD-10-CM

## 2023-09-01 DIAGNOSIS — L97.909 NON-PRESSURE CHRONIC ULCER OF UNSPECIFIED PART OF UNSPECIFIED LOWER LEG WITH UNSPECIFIED SEVERITY: ICD-10-CM

## 2023-09-01 PROCEDURE — 99213 OFFICE O/P EST LOW 20 MIN: CPT

## 2023-09-05 PROBLEM — M79.671 RIGHT FOOT PAIN: Status: ACTIVE | Noted: 2023-02-06

## 2023-09-05 LAB — BACTERIA SPEC CULT: ABNORMAL

## 2023-09-06 NOTE — HISTORY OF PRESENT ILLNESS
[FreeTextEntry1] : Patient presents today because she has a left ankle wound that had been healed and then it opened up. She went to wound care. They took a culture. She is going out of town and is not going to be able to follow-up with them.

## 2023-09-10 ENCOUNTER — NON-APPOINTMENT (OUTPATIENT)
Age: 73
End: 2023-09-10

## 2023-09-11 ENCOUNTER — APPOINTMENT (OUTPATIENT)
Dept: DERMATOLOGY | Facility: CLINIC | Age: 73
End: 2023-09-11
Payer: MEDICARE

## 2023-09-11 DIAGNOSIS — C44.219 BASAL CELL CARCINOMA OF SKIN OF LEFT EAR AND EXTERNAL AURICULAR CANAL: ICD-10-CM

## 2023-09-11 DIAGNOSIS — L97.322 NON-PRESSURE CHRONIC ULCER OF LEFT ANKLE WITH FAT LAYER EXPOSED: ICD-10-CM

## 2023-09-11 PROCEDURE — 99213 OFFICE O/P EST LOW 20 MIN: CPT

## 2023-09-11 PROCEDURE — 11042 DBRDMT SUBQ TIS 1ST 20SQCM/<: CPT

## 2023-09-12 ENCOUNTER — APPOINTMENT (OUTPATIENT)
Dept: PLASTIC SURGERY | Facility: CLINIC | Age: 73
End: 2023-09-12

## 2023-09-14 DIAGNOSIS — L97.922 NON-PRESSURE CHRONIC ULCER OF UNSPECIFIED PART OF LEFT LOWER LEG WITH FAT LAYER EXPOSED: ICD-10-CM

## 2023-09-20 ENCOUNTER — APPOINTMENT (OUTPATIENT)
Dept: WOUND CARE | Facility: HOSPITAL | Age: 73
End: 2023-09-20
Payer: MEDICARE

## 2023-09-20 ENCOUNTER — OUTPATIENT (OUTPATIENT)
Dept: OUTPATIENT SERVICES | Facility: HOSPITAL | Age: 73
LOS: 1 days | End: 2023-09-20
Payer: MEDICARE

## 2023-09-20 VITALS — TEMPERATURE: 95.7 F

## 2023-09-20 DIAGNOSIS — N63 UNSPECIFIED LUMP IN BREAST: Chronic | ICD-10-CM

## 2023-09-20 DIAGNOSIS — Z90.710 ACQUIRED ABSENCE OF BOTH CERVIX AND UTERUS: Chronic | ICD-10-CM

## 2023-09-20 DIAGNOSIS — Z98.890 OTHER SPECIFIED POSTPROCEDURAL STATES: Chronic | ICD-10-CM

## 2023-09-20 DIAGNOSIS — L97.309 NON-PRESSURE CHRONIC ULCER OF UNSPECIFIED ANKLE WITH UNSPECIFIED SEVERITY: ICD-10-CM

## 2023-09-20 DIAGNOSIS — Z98.89 OTHER SPECIFIED POSTPROCEDURAL STATES: Chronic | ICD-10-CM

## 2023-09-20 PROCEDURE — 11042 DBRDMT SUBQ TIS 1ST 20SQCM/<: CPT

## 2023-10-01 PROBLEM — Z92.3 HISTORY OF RADIATION THERAPY: Status: RESOLVED | Noted: 2017-10-25 | Resolved: 2023-10-01

## 2023-10-02 ENCOUNTER — APPOINTMENT (OUTPATIENT)
Dept: PODIATRY | Facility: CLINIC | Age: 73
End: 2023-10-02
Payer: MEDICARE

## 2023-10-02 DIAGNOSIS — L97.321 NON-PRESSURE CHRONIC ULCER OF LEFT ANKLE LIMITED TO BREAKDOWN OF SKIN: ICD-10-CM

## 2023-10-02 PROCEDURE — 99212 OFFICE O/P EST SF 10 MIN: CPT

## 2023-10-03 PROBLEM — L97.321 SKIN ULCER OF LEFT ANKLE, LIMITED TO BREAKDOWN OF SKIN: Status: ACTIVE | Noted: 2023-04-06

## 2023-10-17 ENCOUNTER — APPOINTMENT (OUTPATIENT)
Dept: VASCULAR SURGERY | Facility: CLINIC | Age: 73
End: 2023-10-17
Payer: MEDICARE

## 2023-10-17 VITALS
SYSTOLIC BLOOD PRESSURE: 117 MMHG | WEIGHT: 150 LBS | BODY MASS INDEX: 24.99 KG/M2 | TEMPERATURE: 97.8 F | HEIGHT: 65 IN | HEART RATE: 79 BPM | DIASTOLIC BLOOD PRESSURE: 71 MMHG

## 2023-10-17 DIAGNOSIS — L98.499 STRICTURE OF ARTERY: ICD-10-CM

## 2023-10-17 DIAGNOSIS — I77.1 STRICTURE OF ARTERY: ICD-10-CM

## 2023-10-17 PROCEDURE — 99213 OFFICE O/P EST LOW 20 MIN: CPT

## 2023-10-20 ENCOUNTER — APPOINTMENT (OUTPATIENT)
Dept: WOUND CARE | Facility: HOSPITAL | Age: 73
End: 2023-10-20
Payer: MEDICARE

## 2023-10-20 VITALS
RESPIRATION RATE: 16 BRPM | OXYGEN SATURATION: 98 % | HEART RATE: 95 BPM | DIASTOLIC BLOOD PRESSURE: 80 MMHG | TEMPERATURE: 97.5 F | SYSTOLIC BLOOD PRESSURE: 115 MMHG

## 2023-10-20 DIAGNOSIS — L97.922 NON-PRESSURE CHRONIC ULCER OF UNSPECIFIED PART OF LEFT LOWER LEG WITH FAT LAYER EXPOSED: ICD-10-CM

## 2023-10-20 DIAGNOSIS — Z87.828 PERSONAL HISTORY OF OTHER (HEALED) PHYSICAL INJURY AND TRAUMA: ICD-10-CM

## 2023-10-20 PROCEDURE — 99213 OFFICE O/P EST LOW 20 MIN: CPT

## 2023-10-27 ENCOUNTER — APPOINTMENT (OUTPATIENT)
Dept: PLASTIC SURGERY | Facility: CLINIC | Age: 73
End: 2023-10-27
Payer: MEDICARE

## 2023-10-27 PROCEDURE — 99024 POSTOP FOLLOW-UP VISIT: CPT

## 2023-10-31 DIAGNOSIS — L95.0 LIVEDOID VASCULITIS: ICD-10-CM

## 2023-10-31 DIAGNOSIS — L97.309 NON-PRESSURE CHRONIC ULCER OF UNSPECIFIED ANKLE WITH UNSPECIFIED SEVERITY: ICD-10-CM

## 2023-11-16 ENCOUNTER — NON-APPOINTMENT (OUTPATIENT)
Age: 73
End: 2023-11-16

## 2023-11-20 ENCOUNTER — APPOINTMENT (OUTPATIENT)
Dept: DERMATOLOGY | Facility: CLINIC | Age: 73
End: 2023-11-20
Payer: MEDICARE

## 2023-11-20 ENCOUNTER — APPOINTMENT (OUTPATIENT)
Dept: PULMONOLOGY | Facility: CLINIC | Age: 73
End: 2023-11-20
Payer: MEDICARE

## 2023-11-20 VITALS
SYSTOLIC BLOOD PRESSURE: 122 MMHG | RESPIRATION RATE: 14 BRPM | WEIGHT: 147.38 LBS | TEMPERATURE: 97.5 F | OXYGEN SATURATION: 100 % | HEART RATE: 90 BPM | BODY MASS INDEX: 23.69 KG/M2 | HEIGHT: 66 IN | DIASTOLIC BLOOD PRESSURE: 82 MMHG

## 2023-11-20 VITALS — WEIGHT: 150 LBS | BODY MASS INDEX: 24.11 KG/M2 | HEIGHT: 66 IN

## 2023-11-20 PROCEDURE — 99214 OFFICE O/P EST MOD 30 MIN: CPT | Mod: 25

## 2023-11-20 PROCEDURE — 11900 INJECT SKIN LESIONS </W 7: CPT

## 2023-11-20 PROCEDURE — 99214 OFFICE O/P EST MOD 30 MIN: CPT

## 2023-11-20 RX ORDER — CIPROFLOXACIN HYDROCHLORIDE 500 MG/1
500 TABLET, FILM COATED ORAL TWICE DAILY
Qty: 10 | Refills: 0 | Status: COMPLETED | COMMUNITY
Start: 2023-09-01 | End: 2023-11-20

## 2023-11-20 RX ORDER — MUPIROCIN 20 MG/G
2 OINTMENT TOPICAL 3 TIMES DAILY
Qty: 1 | Refills: 1 | Status: COMPLETED | COMMUNITY
Start: 2023-08-07 | End: 2023-11-20

## 2023-11-20 RX ORDER — OXYCODONE AND ACETAMINOPHEN 5; 325 MG/1; MG/1
5-325 TABLET ORAL
Qty: 15 | Refills: 0 | Status: COMPLETED | COMMUNITY
Start: 2023-09-01 | End: 2023-11-20

## 2023-11-20 RX ORDER — COLLAGENASE SANTYL 250 [ARB'U]/G
250 OINTMENT TOPICAL DAILY
Qty: 1 | Refills: 2 | Status: DISCONTINUED | COMMUNITY
Start: 2023-05-03 | End: 2023-11-20

## 2023-11-20 RX ORDER — ATORVASTATIN CALCIUM 10 MG/1
10 TABLET, FILM COATED ORAL
Refills: 0 | Status: DISCONTINUED | COMMUNITY
End: 2023-11-20

## 2023-11-20 RX ORDER — CLINDAMYCIN HYDROCHLORIDE 300 MG/1
CAPSULE ORAL
Refills: 0 | Status: COMPLETED | COMMUNITY

## 2023-11-20 RX ORDER — DOXYCYCLINE HYCLATE 100 MG/1
100 TABLET ORAL
Qty: 28 | Refills: 0 | Status: COMPLETED | COMMUNITY
Start: 2023-07-31 | End: 2023-11-20

## 2023-11-20 RX ORDER — PRAVASTATIN SODIUM 80 MG/1
TABLET ORAL
Refills: 0 | Status: ACTIVE | COMMUNITY

## 2023-11-20 RX ORDER — RIVAROXABAN 2.5 MG/1
TABLET, FILM COATED ORAL
Refills: 0 | Status: ACTIVE | COMMUNITY

## 2023-11-24 ENCOUNTER — APPOINTMENT (OUTPATIENT)
Dept: DERMATOLOGY | Facility: CLINIC | Age: 73
End: 2023-11-24
Payer: MEDICARE

## 2023-11-24 PROCEDURE — 11900 INJECT SKIN LESIONS </W 7: CPT

## 2023-11-24 PROCEDURE — 99212 OFFICE O/P EST SF 10 MIN: CPT | Mod: 25

## 2023-11-29 ENCOUNTER — APPOINTMENT (OUTPATIENT)
Dept: DERMATOLOGY | Facility: CLINIC | Age: 73
End: 2023-11-29
Payer: MEDICARE

## 2023-11-29 ENCOUNTER — APPOINTMENT (OUTPATIENT)
Dept: PULMONOLOGY | Facility: CLINIC | Age: 73
End: 2023-11-29
Payer: MEDICARE

## 2023-11-29 VITALS
SYSTOLIC BLOOD PRESSURE: 111 MMHG | TEMPERATURE: 98 F | WEIGHT: 144 LBS | RESPIRATION RATE: 17 BRPM | DIASTOLIC BLOOD PRESSURE: 75 MMHG | BODY MASS INDEX: 23.14 KG/M2 | OXYGEN SATURATION: 96 % | HEART RATE: 103 BPM | HEIGHT: 66 IN

## 2023-11-29 DIAGNOSIS — G47.33 OBSTRUCTIVE SLEEP APNEA (ADULT) (PEDIATRIC): ICD-10-CM

## 2023-11-29 DIAGNOSIS — L29.9 PRURITUS, UNSPECIFIED: ICD-10-CM

## 2023-11-29 DIAGNOSIS — L30.9 DERMATITIS, UNSPECIFIED: ICD-10-CM

## 2023-11-29 DIAGNOSIS — L72.3 SEBACEOUS CYST: ICD-10-CM

## 2023-11-29 DIAGNOSIS — L72.11 PILAR CYST: ICD-10-CM

## 2023-11-29 PROCEDURE — 10160 PNXR ASPIR ABSC HMTMA BULLA: CPT | Mod: 59

## 2023-11-29 PROCEDURE — 11900 INJECT SKIN LESIONS </W 7: CPT | Mod: 59

## 2023-11-29 PROCEDURE — 94660 CPAP INITIATION&MGMT: CPT

## 2023-11-29 PROCEDURE — 99214 OFFICE O/P EST MOD 30 MIN: CPT | Mod: 25

## 2023-12-11 ENCOUNTER — NON-APPOINTMENT (OUTPATIENT)
Age: 73
End: 2023-12-11

## 2023-12-12 ENCOUNTER — LABORATORY RESULT (OUTPATIENT)
Age: 73
End: 2023-12-12

## 2023-12-12 ENCOUNTER — APPOINTMENT (OUTPATIENT)
Dept: PLASTIC SURGERY | Facility: CLINIC | Age: 73
End: 2023-12-12
Payer: MEDICARE

## 2023-12-12 DIAGNOSIS — D48.9 NEOPLASM OF UNCERTAIN BEHAVIOR, UNSPECIFIED: ICD-10-CM

## 2023-12-12 PROCEDURE — 13120 CMPLX RPR S/A/L 1.1-2.5 CM: CPT | Mod: 58

## 2023-12-12 PROCEDURE — 21011 EXC FACE LES SC <2 CM: CPT

## 2023-12-13 LAB
APPEARANCE: ABNORMAL
BACTERIA: NEGATIVE /HPF
BILIRUBIN URINE: NEGATIVE
BLOOD URINE: ABNORMAL
CAST: 1 /LPF
COLOR: YELLOW
EPITHELIAL CELLS: 9 /HPF
GLUCOSE QUALITATIVE U: NEGATIVE MG/DL
KETONES URINE: NEGATIVE MG/DL
LEUKOCYTE ESTERASE URINE: ABNORMAL
MICROSCOPIC-UA: NORMAL
NITRITE URINE: NEGATIVE
PH URINE: 7
PROTEIN URINE: NEGATIVE MG/DL
RED BLOOD CELLS URINE: 4 /HPF
REVIEW: NORMAL
SPECIFIC GRAVITY URINE: 1.02
UROBILINOGEN URINE: 0.2 MG/DL
WHITE BLOOD CELLS URINE: 2 /HPF

## 2023-12-14 LAB — BACTERIA UR CULT: NORMAL

## 2023-12-15 LAB — URINE CYTOLOGY: NORMAL

## 2023-12-26 ENCOUNTER — APPOINTMENT (OUTPATIENT)
Dept: PLASTIC SURGERY | Facility: CLINIC | Age: 73
End: 2023-12-26

## 2023-12-27 ENCOUNTER — APPOINTMENT (OUTPATIENT)
Dept: VASCULAR SURGERY | Facility: CLINIC | Age: 73
End: 2023-12-27
Payer: MEDICARE

## 2023-12-27 PROCEDURE — 99443: CPT | Mod: 95

## 2023-12-27 NOTE — HISTORY OF PRESENT ILLNESS
[FreeTextEntry1] : pt was eval at [facility]  for left lat mall wound\par  s/p lle angio sig  for   small vessel dz\par  started on pletal \par  pt was eval by podiatry and derm s/p bx\par  w/u  s/o liveloidvasculopathy\par  pt c/o ongoing   mod  deepika wound pain \par  pt was eval by woundcare  [de-identified] : pt is here to evaluate left lateral malleolus wound wound is healed no new wounds taking pletal 50 mg BID and xarelto 2.5 mg BID pt states she is experiencing  ongoing upset stomach/diarrhea at least  on a daily basis  since taking pletal pt states  that she is miserable

## 2023-12-27 NOTE — PHYSICAL EXAM
[1+] : left 1+ [2+] : left 2+ [No Rash or Lesion] : No rash or lesion [Alert] : alert [Oriented to Person] : oriented to person [Oriented to Place] : oriented to place [Oriented to Time] : oriented to time [Calm] : calm [de-identified] : no respiratory distress [FreeTextEntry1] : Physical exam findings via telephonic review with patient   [de-identified] : cooperative

## 2023-12-27 NOTE — ASSESSMENT
[FreeTextEntry1] : Impression art insuff and liveloid vasculopathy and ongoing GI c/o w sig sx    Plan Med Conservative - foot care and protection, exercise regimen, protective measures due to pt's upset stomach recommend to d/c pletal and re eval   GI c/o continue xarelto 2.5 mg bid  d/w pt liveloid vasculopathy and lifelong po anticoag and pletal 50 mg BID after wound heals will need le art insuff surveillance ov w jessica/pvr s/o art insuff 12 mo march 2024 d/w pt if GI sx improve w d/c of pletal then will restart pletal 25  bid and re eval GI c/o  and  try and maintain pt lifenong on 25 mg bid  instead of 50 bid  telehealth visit in 1 mo  Telephonic visit  time duration  22 min  [Arterial/Venous Disease] : arterial/venous disease [Medication Management] : medication management

## 2023-12-27 NOTE — REASON FOR VISIT
[Home] : at home, [unfilled] , at the time of the visit. [Medical Office: ([facility])___] : at the medical office located in  [Other:____] : [unfilled] [Verbal consent obtained from patient] : the patient, [unfilled] [Follow-Up: _____] : a [unfilled] follow-up visit [FreeTextEntry1] : left ankle wound

## 2024-01-01 NOTE — ASSESSMENT
Pharmacy faxed in a request for prior authorization on:    Medication: Ivabradine (Corlanor)   Dosage: 5 mg twice daily  Quantity requested:  60, 3 refills  Pharmacy for prescription has been selected.    Initiation of prior authorization needed.  Prior authorization request has been initiated and sent for completion.     Patient attempted metoprolol tartrate 25 mg daily, but experience shortness of breath d/t history of asthma.  
[FreeTextEntry1] : will treat the culture based on her symptoms\par CT reviewed - may need MRI to confirm no cancer though she notes she hurt in [place] this year 
Pulmonary + weakness

## 2024-01-08 ENCOUNTER — RESULT REVIEW (OUTPATIENT)
Age: 74
End: 2024-01-08

## 2024-01-08 ENCOUNTER — OUTPATIENT (OUTPATIENT)
Dept: OUTPATIENT SERVICES | Facility: HOSPITAL | Age: 74
LOS: 1 days | End: 2024-01-08

## 2024-01-08 ENCOUNTER — INPATIENT (INPATIENT)
Facility: HOSPITAL | Age: 74
LOS: 5 days | Discharge: HOME | DRG: 391 | End: 2024-01-14
Attending: INTERNAL MEDICINE | Admitting: HOSPITALIST
Payer: MEDICARE

## 2024-01-08 ENCOUNTER — APPOINTMENT (OUTPATIENT)
Dept: CT IMAGING | Facility: IMAGING CENTER | Age: 74
End: 2024-01-08
Payer: MEDICARE

## 2024-01-08 VITALS
SYSTOLIC BLOOD PRESSURE: 117 MMHG | TEMPERATURE: 100 F | DIASTOLIC BLOOD PRESSURE: 71 MMHG | RESPIRATION RATE: 16 BRPM | HEIGHT: 66 IN | OXYGEN SATURATION: 95 % | WEIGHT: 147.05 LBS | HEART RATE: 82 BPM

## 2024-01-08 DIAGNOSIS — Z90.710 ACQUIRED ABSENCE OF BOTH CERVIX AND UTERUS: Chronic | ICD-10-CM

## 2024-01-08 DIAGNOSIS — Z98.890 OTHER SPECIFIED POSTPROCEDURAL STATES: Chronic | ICD-10-CM

## 2024-01-08 DIAGNOSIS — N63 UNSPECIFIED LUMP IN BREAST: Chronic | ICD-10-CM

## 2024-01-08 DIAGNOSIS — Z98.89 OTHER SPECIFIED POSTPROCEDURAL STATES: Chronic | ICD-10-CM

## 2024-01-08 DIAGNOSIS — R10.9 UNSPECIFIED ABDOMINAL PAIN: ICD-10-CM

## 2024-01-08 PROCEDURE — 74177 CT ABD & PELVIS W/CONTRAST: CPT | Mod: 26,MH

## 2024-01-08 PROCEDURE — 99285 EMERGENCY DEPT VISIT HI MDM: CPT

## 2024-01-08 NOTE — ED ADULT TRIAGE NOTE - CHIEF COMPLAINT QUOTE
L sided abdominal pain x 2 weeks  had outpatient CT scan showing "abscess" and was sent in by Dr. Pablo Bradford   requesting that MD calls Dr. Saunders [phone] L sided abdominal pain x 2 weeks  had outpatient CT scan showing "abscess" and was sent in by Dr. Pablo Bradford   requesting that MD calls Dr. Saunders [phone]

## 2024-01-09 ENCOUNTER — APPOINTMENT (OUTPATIENT)
Dept: UROLOGY | Facility: CLINIC | Age: 74
End: 2024-01-09

## 2024-01-09 DIAGNOSIS — K57.20 DIVERTICULITIS OF LARGE INTESTINE WITH PERFORATION AND ABSCESS WITHOUT BLEEDING: ICD-10-CM

## 2024-01-09 DIAGNOSIS — K68.12 PSOAS MUSCLE ABSCESS: ICD-10-CM

## 2024-01-09 DIAGNOSIS — Z29.9 ENCOUNTER FOR PROPHYLACTIC MEASURES, UNSPECIFIED: ICD-10-CM

## 2024-01-09 DIAGNOSIS — K57.32 DIVERTICULITIS OF LARGE INTESTINE WITHOUT PERFORATION OR ABSCESS WITHOUT BLEEDING: ICD-10-CM

## 2024-01-09 DIAGNOSIS — I10 ESSENTIAL (PRIMARY) HYPERTENSION: ICD-10-CM

## 2024-01-09 DIAGNOSIS — I73.9 PERIPHERAL VASCULAR DISEASE, UNSPECIFIED: ICD-10-CM

## 2024-01-09 DIAGNOSIS — E78.5 HYPERLIPIDEMIA, UNSPECIFIED: ICD-10-CM

## 2024-01-09 LAB
ALBUMIN SERPL ELPH-MCNC: 3 G/DL — LOW (ref 3.3–5)
ALBUMIN SERPL ELPH-MCNC: 3 G/DL — LOW (ref 3.3–5)
ALBUMIN SERPL ELPH-MCNC: 3.7 G/DL — SIGNIFICANT CHANGE UP (ref 3.3–5)
ALBUMIN SERPL ELPH-MCNC: 3.7 G/DL — SIGNIFICANT CHANGE UP (ref 3.3–5)
ALP SERPL-CCNC: 119 U/L — SIGNIFICANT CHANGE UP (ref 40–120)
ALP SERPL-CCNC: 119 U/L — SIGNIFICANT CHANGE UP (ref 40–120)
ALP SERPL-CCNC: 96 U/L — SIGNIFICANT CHANGE UP (ref 40–120)
ALP SERPL-CCNC: 96 U/L — SIGNIFICANT CHANGE UP (ref 40–120)
ALT FLD-CCNC: 19 U/L — SIGNIFICANT CHANGE UP (ref 10–45)
ALT FLD-CCNC: 19 U/L — SIGNIFICANT CHANGE UP (ref 10–45)
ALT FLD-CCNC: 22 U/L — SIGNIFICANT CHANGE UP (ref 10–45)
ALT FLD-CCNC: 22 U/L — SIGNIFICANT CHANGE UP (ref 10–45)
ANION GAP SERPL CALC-SCNC: 11 MMOL/L — SIGNIFICANT CHANGE UP (ref 5–17)
ANION GAP SERPL CALC-SCNC: 11 MMOL/L — SIGNIFICANT CHANGE UP (ref 5–17)
ANION GAP SERPL CALC-SCNC: 8 MMOL/L — SIGNIFICANT CHANGE UP (ref 5–17)
ANION GAP SERPL CALC-SCNC: 8 MMOL/L — SIGNIFICANT CHANGE UP (ref 5–17)
APPEARANCE UR: CLEAR — SIGNIFICANT CHANGE UP
APPEARANCE UR: CLEAR — SIGNIFICANT CHANGE UP
APTT BLD: 31 SEC — SIGNIFICANT CHANGE UP (ref 24.5–35.6)
APTT BLD: 31 SEC — SIGNIFICANT CHANGE UP (ref 24.5–35.6)
AST SERPL-CCNC: 23 U/L — SIGNIFICANT CHANGE UP (ref 10–40)
AST SERPL-CCNC: 23 U/L — SIGNIFICANT CHANGE UP (ref 10–40)
AST SERPL-CCNC: 30 U/L — SIGNIFICANT CHANGE UP (ref 10–40)
AST SERPL-CCNC: 30 U/L — SIGNIFICANT CHANGE UP (ref 10–40)
BACTERIA # UR AUTO: NEGATIVE /HPF — SIGNIFICANT CHANGE UP
BACTERIA # UR AUTO: NEGATIVE /HPF — SIGNIFICANT CHANGE UP
BASE EXCESS BLDV CALC-SCNC: 2.3 MMOL/L — SIGNIFICANT CHANGE UP (ref -2–3)
BASE EXCESS BLDV CALC-SCNC: 2.3 MMOL/L — SIGNIFICANT CHANGE UP (ref -2–3)
BASOPHILS # BLD AUTO: 0.02 K/UL — SIGNIFICANT CHANGE UP (ref 0–0.2)
BASOPHILS # BLD AUTO: 0.02 K/UL — SIGNIFICANT CHANGE UP (ref 0–0.2)
BASOPHILS # BLD AUTO: 0.03 K/UL — SIGNIFICANT CHANGE UP (ref 0–0.2)
BASOPHILS # BLD AUTO: 0.03 K/UL — SIGNIFICANT CHANGE UP (ref 0–0.2)
BASOPHILS NFR BLD AUTO: 0.4 % — SIGNIFICANT CHANGE UP (ref 0–2)
BILIRUB SERPL-MCNC: 0.3 MG/DL — SIGNIFICANT CHANGE UP (ref 0.2–1.2)
BILIRUB UR-MCNC: NEGATIVE — SIGNIFICANT CHANGE UP
BILIRUB UR-MCNC: NEGATIVE — SIGNIFICANT CHANGE UP
BLD GP AB SCN SERPL QL: NEGATIVE — SIGNIFICANT CHANGE UP
BLD GP AB SCN SERPL QL: NEGATIVE — SIGNIFICANT CHANGE UP
BUN SERPL-MCNC: 12 MG/DL — SIGNIFICANT CHANGE UP (ref 7–23)
BUN SERPL-MCNC: 12 MG/DL — SIGNIFICANT CHANGE UP (ref 7–23)
BUN SERPL-MCNC: 9 MG/DL — SIGNIFICANT CHANGE UP (ref 7–23)
BUN SERPL-MCNC: 9 MG/DL — SIGNIFICANT CHANGE UP (ref 7–23)
CA-I SERPL-SCNC: 1.27 MMOL/L — SIGNIFICANT CHANGE UP (ref 1.15–1.33)
CA-I SERPL-SCNC: 1.27 MMOL/L — SIGNIFICANT CHANGE UP (ref 1.15–1.33)
CALCIUM SERPL-MCNC: 8.7 MG/DL — SIGNIFICANT CHANGE UP (ref 8.4–10.5)
CALCIUM SERPL-MCNC: 8.7 MG/DL — SIGNIFICANT CHANGE UP (ref 8.4–10.5)
CALCIUM SERPL-MCNC: 9.5 MG/DL — SIGNIFICANT CHANGE UP (ref 8.4–10.5)
CALCIUM SERPL-MCNC: 9.5 MG/DL — SIGNIFICANT CHANGE UP (ref 8.4–10.5)
CAST: 0 /LPF — SIGNIFICANT CHANGE UP (ref 0–4)
CAST: 0 /LPF — SIGNIFICANT CHANGE UP (ref 0–4)
CHLORIDE BLDV-SCNC: 101 MMOL/L — SIGNIFICANT CHANGE UP (ref 96–108)
CHLORIDE BLDV-SCNC: 101 MMOL/L — SIGNIFICANT CHANGE UP (ref 96–108)
CHLORIDE SERPL-SCNC: 103 MMOL/L — SIGNIFICANT CHANGE UP (ref 96–108)
CHLORIDE SERPL-SCNC: 103 MMOL/L — SIGNIFICANT CHANGE UP (ref 96–108)
CHLORIDE SERPL-SCNC: 106 MMOL/L — SIGNIFICANT CHANGE UP (ref 96–108)
CHLORIDE SERPL-SCNC: 106 MMOL/L — SIGNIFICANT CHANGE UP (ref 96–108)
CO2 BLDV-SCNC: 29 MMOL/L — HIGH (ref 22–26)
CO2 BLDV-SCNC: 29 MMOL/L — HIGH (ref 22–26)
CO2 SERPL-SCNC: 26 MMOL/L — SIGNIFICANT CHANGE UP (ref 22–31)
CO2 SERPL-SCNC: 26 MMOL/L — SIGNIFICANT CHANGE UP (ref 22–31)
CO2 SERPL-SCNC: 27 MMOL/L — SIGNIFICANT CHANGE UP (ref 22–31)
CO2 SERPL-SCNC: 27 MMOL/L — SIGNIFICANT CHANGE UP (ref 22–31)
COLOR SPEC: YELLOW — SIGNIFICANT CHANGE UP
COLOR SPEC: YELLOW — SIGNIFICANT CHANGE UP
CREAT SERPL-MCNC: 0.56 MG/DL — SIGNIFICANT CHANGE UP (ref 0.5–1.3)
CREAT SERPL-MCNC: 0.56 MG/DL — SIGNIFICANT CHANGE UP (ref 0.5–1.3)
CREAT SERPL-MCNC: 0.6 MG/DL — SIGNIFICANT CHANGE UP (ref 0.5–1.3)
CREAT SERPL-MCNC: 0.6 MG/DL — SIGNIFICANT CHANGE UP (ref 0.5–1.3)
DIFF PNL FLD: ABNORMAL
DIFF PNL FLD: ABNORMAL
EGFR: 94 ML/MIN/1.73M2 — SIGNIFICANT CHANGE UP
EGFR: 94 ML/MIN/1.73M2 — SIGNIFICANT CHANGE UP
EGFR: 96 ML/MIN/1.73M2 — SIGNIFICANT CHANGE UP
EGFR: 96 ML/MIN/1.73M2 — SIGNIFICANT CHANGE UP
EOSINOPHIL # BLD AUTO: 0.16 K/UL — SIGNIFICANT CHANGE UP (ref 0–0.5)
EOSINOPHIL NFR BLD AUTO: 2.3 % — SIGNIFICANT CHANGE UP (ref 0–6)
EOSINOPHIL NFR BLD AUTO: 2.3 % — SIGNIFICANT CHANGE UP (ref 0–6)
EOSINOPHIL NFR BLD AUTO: 2.8 % — SIGNIFICANT CHANGE UP (ref 0–6)
EOSINOPHIL NFR BLD AUTO: 2.8 % — SIGNIFICANT CHANGE UP (ref 0–6)
GAS PNL BLDV: 136 MMOL/L — SIGNIFICANT CHANGE UP (ref 136–145)
GAS PNL BLDV: 136 MMOL/L — SIGNIFICANT CHANGE UP (ref 136–145)
GAS PNL BLDV: SIGNIFICANT CHANGE UP
GLUCOSE BLDV-MCNC: 92 MG/DL — SIGNIFICANT CHANGE UP (ref 70–99)
GLUCOSE BLDV-MCNC: 92 MG/DL — SIGNIFICANT CHANGE UP (ref 70–99)
GLUCOSE SERPL-MCNC: 101 MG/DL — HIGH (ref 70–99)
GLUCOSE SERPL-MCNC: 101 MG/DL — HIGH (ref 70–99)
GLUCOSE SERPL-MCNC: 113 MG/DL — HIGH (ref 70–99)
GLUCOSE SERPL-MCNC: 113 MG/DL — HIGH (ref 70–99)
GLUCOSE UR QL: NEGATIVE MG/DL — SIGNIFICANT CHANGE UP
GLUCOSE UR QL: NEGATIVE MG/DL — SIGNIFICANT CHANGE UP
HCO3 BLDV-SCNC: 28 MMOL/L — SIGNIFICANT CHANGE UP (ref 22–29)
HCO3 BLDV-SCNC: 28 MMOL/L — SIGNIFICANT CHANGE UP (ref 22–29)
HCT VFR BLD CALC: 31.3 % — LOW (ref 34.5–45)
HCT VFR BLD CALC: 31.3 % — LOW (ref 34.5–45)
HCT VFR BLD CALC: 34.2 % — LOW (ref 34.5–45)
HCT VFR BLD CALC: 34.2 % — LOW (ref 34.5–45)
HCT VFR BLDA CALC: 35 % — SIGNIFICANT CHANGE UP (ref 34.5–46.5)
HCT VFR BLDA CALC: 35 % — SIGNIFICANT CHANGE UP (ref 34.5–46.5)
HGB BLD CALC-MCNC: 11.6 G/DL — LOW (ref 11.7–16.1)
HGB BLD CALC-MCNC: 11.6 G/DL — LOW (ref 11.7–16.1)
HGB BLD-MCNC: 10.5 G/DL — LOW (ref 11.5–15.5)
HGB BLD-MCNC: 10.5 G/DL — LOW (ref 11.5–15.5)
HGB BLD-MCNC: 9.5 G/DL — LOW (ref 11.5–15.5)
HGB BLD-MCNC: 9.5 G/DL — LOW (ref 11.5–15.5)
HOROWITZ INDEX BLDV+IHG-RTO: SIGNIFICANT CHANGE UP
HOROWITZ INDEX BLDV+IHG-RTO: SIGNIFICANT CHANGE UP
IMM GRANULOCYTES NFR BLD AUTO: 0.2 % — SIGNIFICANT CHANGE UP (ref 0–0.9)
IMM GRANULOCYTES NFR BLD AUTO: 0.2 % — SIGNIFICANT CHANGE UP (ref 0–0.9)
IMM GRANULOCYTES NFR BLD AUTO: 0.4 % — SIGNIFICANT CHANGE UP (ref 0–0.9)
IMM GRANULOCYTES NFR BLD AUTO: 0.4 % — SIGNIFICANT CHANGE UP (ref 0–0.9)
INR BLD: 1.26 RATIO — HIGH (ref 0.85–1.18)
INR BLD: 1.26 RATIO — HIGH (ref 0.85–1.18)
INR BLD: 1.43 RATIO — HIGH (ref 0.85–1.18)
INR BLD: 1.43 RATIO — HIGH (ref 0.85–1.18)
KETONES UR-MCNC: NEGATIVE MG/DL — SIGNIFICANT CHANGE UP
KETONES UR-MCNC: NEGATIVE MG/DL — SIGNIFICANT CHANGE UP
LACTATE BLDV-MCNC: 1.3 MMOL/L — SIGNIFICANT CHANGE UP (ref 0.5–2)
LACTATE BLDV-MCNC: 1.3 MMOL/L — SIGNIFICANT CHANGE UP (ref 0.5–2)
LEUKOCYTE ESTERASE UR-ACNC: NEGATIVE — SIGNIFICANT CHANGE UP
LEUKOCYTE ESTERASE UR-ACNC: NEGATIVE — SIGNIFICANT CHANGE UP
LIDOCAIN IGE QN: 15 U/L — SIGNIFICANT CHANGE UP (ref 7–60)
LIDOCAIN IGE QN: 15 U/L — SIGNIFICANT CHANGE UP (ref 7–60)
LYMPHOCYTES # BLD AUTO: 1.63 K/UL — SIGNIFICANT CHANGE UP (ref 1–3.3)
LYMPHOCYTES # BLD AUTO: 1.63 K/UL — SIGNIFICANT CHANGE UP (ref 1–3.3)
LYMPHOCYTES # BLD AUTO: 1.69 K/UL — SIGNIFICANT CHANGE UP (ref 1–3.3)
LYMPHOCYTES # BLD AUTO: 1.69 K/UL — SIGNIFICANT CHANGE UP (ref 1–3.3)
LYMPHOCYTES # BLD AUTO: 23.3 % — SIGNIFICANT CHANGE UP (ref 13–44)
LYMPHOCYTES # BLD AUTO: 23.3 % — SIGNIFICANT CHANGE UP (ref 13–44)
LYMPHOCYTES # BLD AUTO: 29.8 % — SIGNIFICANT CHANGE UP (ref 13–44)
LYMPHOCYTES # BLD AUTO: 29.8 % — SIGNIFICANT CHANGE UP (ref 13–44)
MCHC RBC-ENTMCNC: 25.9 PG — LOW (ref 27–34)
MCHC RBC-ENTMCNC: 25.9 PG — LOW (ref 27–34)
MCHC RBC-ENTMCNC: 26.1 PG — LOW (ref 27–34)
MCHC RBC-ENTMCNC: 26.1 PG — LOW (ref 27–34)
MCHC RBC-ENTMCNC: 30.4 GM/DL — LOW (ref 32–36)
MCHC RBC-ENTMCNC: 30.4 GM/DL — LOW (ref 32–36)
MCHC RBC-ENTMCNC: 30.7 GM/DL — LOW (ref 32–36)
MCHC RBC-ENTMCNC: 30.7 GM/DL — LOW (ref 32–36)
MCV RBC AUTO: 84.2 FL — SIGNIFICANT CHANGE UP (ref 80–100)
MCV RBC AUTO: 84.2 FL — SIGNIFICANT CHANGE UP (ref 80–100)
MCV RBC AUTO: 86 FL — SIGNIFICANT CHANGE UP (ref 80–100)
MCV RBC AUTO: 86 FL — SIGNIFICANT CHANGE UP (ref 80–100)
MONOCYTES # BLD AUTO: 0.85 K/UL — SIGNIFICANT CHANGE UP (ref 0–0.9)
MONOCYTES # BLD AUTO: 0.85 K/UL — SIGNIFICANT CHANGE UP (ref 0–0.9)
MONOCYTES # BLD AUTO: 0.98 K/UL — HIGH (ref 0–0.9)
MONOCYTES # BLD AUTO: 0.98 K/UL — HIGH (ref 0–0.9)
MONOCYTES NFR BLD AUTO: 14 % — SIGNIFICANT CHANGE UP (ref 2–14)
MONOCYTES NFR BLD AUTO: 14 % — SIGNIFICANT CHANGE UP (ref 2–14)
MONOCYTES NFR BLD AUTO: 15 % — HIGH (ref 2–14)
MONOCYTES NFR BLD AUTO: 15 % — HIGH (ref 2–14)
NEUTROPHILS # BLD AUTO: 2.94 K/UL — SIGNIFICANT CHANGE UP (ref 1.8–7.4)
NEUTROPHILS # BLD AUTO: 2.94 K/UL — SIGNIFICANT CHANGE UP (ref 1.8–7.4)
NEUTROPHILS # BLD AUTO: 4.17 K/UL — SIGNIFICANT CHANGE UP (ref 1.8–7.4)
NEUTROPHILS # BLD AUTO: 4.17 K/UL — SIGNIFICANT CHANGE UP (ref 1.8–7.4)
NEUTROPHILS NFR BLD AUTO: 51.8 % — SIGNIFICANT CHANGE UP (ref 43–77)
NEUTROPHILS NFR BLD AUTO: 51.8 % — SIGNIFICANT CHANGE UP (ref 43–77)
NEUTROPHILS NFR BLD AUTO: 59.6 % — SIGNIFICANT CHANGE UP (ref 43–77)
NEUTROPHILS NFR BLD AUTO: 59.6 % — SIGNIFICANT CHANGE UP (ref 43–77)
NITRITE UR-MCNC: NEGATIVE — SIGNIFICANT CHANGE UP
NITRITE UR-MCNC: NEGATIVE — SIGNIFICANT CHANGE UP
NRBC # BLD: 0 /100 WBCS — SIGNIFICANT CHANGE UP (ref 0–0)
PCO2 BLDV: 46 MMHG — HIGH (ref 39–42)
PCO2 BLDV: 46 MMHG — HIGH (ref 39–42)
PH BLDV: 7.39 — SIGNIFICANT CHANGE UP (ref 7.32–7.43)
PH BLDV: 7.39 — SIGNIFICANT CHANGE UP (ref 7.32–7.43)
PH UR: 5.5 — SIGNIFICANT CHANGE UP (ref 5–8)
PH UR: 5.5 — SIGNIFICANT CHANGE UP (ref 5–8)
PLATELET # BLD AUTO: 202 K/UL — SIGNIFICANT CHANGE UP (ref 150–400)
PLATELET # BLD AUTO: 202 K/UL — SIGNIFICANT CHANGE UP (ref 150–400)
PLATELET # BLD AUTO: 262 K/UL — SIGNIFICANT CHANGE UP (ref 150–400)
PLATELET # BLD AUTO: 262 K/UL — SIGNIFICANT CHANGE UP (ref 150–400)
PO2 BLDV: 32 MMHG — SIGNIFICANT CHANGE UP (ref 25–45)
PO2 BLDV: 32 MMHG — SIGNIFICANT CHANGE UP (ref 25–45)
POTASSIUM BLDV-SCNC: 3.1 MMOL/L — LOW (ref 3.5–5.1)
POTASSIUM BLDV-SCNC: 3.1 MMOL/L — LOW (ref 3.5–5.1)
POTASSIUM SERPL-MCNC: 3.4 MMOL/L — LOW (ref 3.5–5.3)
POTASSIUM SERPL-MCNC: 3.4 MMOL/L — LOW (ref 3.5–5.3)
POTASSIUM SERPL-MCNC: 3.5 MMOL/L — SIGNIFICANT CHANGE UP (ref 3.5–5.3)
POTASSIUM SERPL-MCNC: 3.5 MMOL/L — SIGNIFICANT CHANGE UP (ref 3.5–5.3)
POTASSIUM SERPL-SCNC: 3.4 MMOL/L — LOW (ref 3.5–5.3)
POTASSIUM SERPL-SCNC: 3.4 MMOL/L — LOW (ref 3.5–5.3)
POTASSIUM SERPL-SCNC: 3.5 MMOL/L — SIGNIFICANT CHANGE UP (ref 3.5–5.3)
POTASSIUM SERPL-SCNC: 3.5 MMOL/L — SIGNIFICANT CHANGE UP (ref 3.5–5.3)
PROT SERPL-MCNC: 6.3 G/DL — SIGNIFICANT CHANGE UP (ref 6–8.3)
PROT SERPL-MCNC: 6.3 G/DL — SIGNIFICANT CHANGE UP (ref 6–8.3)
PROT SERPL-MCNC: 7.4 G/DL — SIGNIFICANT CHANGE UP (ref 6–8.3)
PROT SERPL-MCNC: 7.4 G/DL — SIGNIFICANT CHANGE UP (ref 6–8.3)
PROT UR-MCNC: NEGATIVE MG/DL — SIGNIFICANT CHANGE UP
PROT UR-MCNC: NEGATIVE MG/DL — SIGNIFICANT CHANGE UP
PROTHROM AB SERPL-ACNC: 13.8 SEC — HIGH (ref 9.5–13)
PROTHROM AB SERPL-ACNC: 13.8 SEC — HIGH (ref 9.5–13)
PROTHROM AB SERPL-ACNC: 14.9 SEC — HIGH (ref 9.5–13)
PROTHROM AB SERPL-ACNC: 14.9 SEC — HIGH (ref 9.5–13)
RBC # BLD: 3.64 M/UL — LOW (ref 3.8–5.2)
RBC # BLD: 3.64 M/UL — LOW (ref 3.8–5.2)
RBC # BLD: 4.06 M/UL — SIGNIFICANT CHANGE UP (ref 3.8–5.2)
RBC # BLD: 4.06 M/UL — SIGNIFICANT CHANGE UP (ref 3.8–5.2)
RBC # FLD: 16.7 % — HIGH (ref 10.3–14.5)
RBC CASTS # UR COMP ASSIST: 4 /HPF — SIGNIFICANT CHANGE UP (ref 0–4)
RBC CASTS # UR COMP ASSIST: 4 /HPF — SIGNIFICANT CHANGE UP (ref 0–4)
RH IG SCN BLD-IMP: POSITIVE — SIGNIFICANT CHANGE UP
RH IG SCN BLD-IMP: POSITIVE — SIGNIFICANT CHANGE UP
SAO2 % BLDV: 45.2 % — LOW (ref 67–88)
SAO2 % BLDV: 45.2 % — LOW (ref 67–88)
SODIUM SERPL-SCNC: 140 MMOL/L — SIGNIFICANT CHANGE UP (ref 135–145)
SODIUM SERPL-SCNC: 140 MMOL/L — SIGNIFICANT CHANGE UP (ref 135–145)
SODIUM SERPL-SCNC: 141 MMOL/L — SIGNIFICANT CHANGE UP (ref 135–145)
SODIUM SERPL-SCNC: 141 MMOL/L — SIGNIFICANT CHANGE UP (ref 135–145)
SP GR SPEC: 1.03 — SIGNIFICANT CHANGE UP (ref 1–1.03)
SP GR SPEC: 1.03 — SIGNIFICANT CHANGE UP (ref 1–1.03)
SQUAMOUS # UR AUTO: 2 /HPF — SIGNIFICANT CHANGE UP (ref 0–5)
SQUAMOUS # UR AUTO: 2 /HPF — SIGNIFICANT CHANGE UP (ref 0–5)
UROBILINOGEN FLD QL: 1 MG/DL — SIGNIFICANT CHANGE UP (ref 0.2–1)
UROBILINOGEN FLD QL: 1 MG/DL — SIGNIFICANT CHANGE UP (ref 0.2–1)
WBC # BLD: 5.67 K/UL — SIGNIFICANT CHANGE UP (ref 3.8–10.5)
WBC # BLD: 5.67 K/UL — SIGNIFICANT CHANGE UP (ref 3.8–10.5)
WBC # BLD: 7 K/UL — SIGNIFICANT CHANGE UP (ref 3.8–10.5)
WBC # BLD: 7 K/UL — SIGNIFICANT CHANGE UP (ref 3.8–10.5)
WBC # FLD AUTO: 5.67 K/UL — SIGNIFICANT CHANGE UP (ref 3.8–10.5)
WBC # FLD AUTO: 5.67 K/UL — SIGNIFICANT CHANGE UP (ref 3.8–10.5)
WBC # FLD AUTO: 7 K/UL — SIGNIFICANT CHANGE UP (ref 3.8–10.5)
WBC # FLD AUTO: 7 K/UL — SIGNIFICANT CHANGE UP (ref 3.8–10.5)
WBC UR QL: 1 /HPF — SIGNIFICANT CHANGE UP (ref 0–5)
WBC UR QL: 1 /HPF — SIGNIFICANT CHANGE UP (ref 0–5)

## 2024-01-09 PROCEDURE — 99223 1ST HOSP IP/OBS HIGH 75: CPT

## 2024-01-09 PROCEDURE — 71045 X-RAY EXAM CHEST 1 VIEW: CPT | Mod: 26

## 2024-01-09 RX ORDER — ACETAMINOPHEN 500 MG
1000 TABLET ORAL ONCE
Refills: 0 | Status: COMPLETED | OUTPATIENT
Start: 2024-01-09 | End: 2024-01-09

## 2024-01-09 RX ORDER — GABAPENTIN 400 MG/1
1 CAPSULE ORAL
Refills: 0 | DISCHARGE

## 2024-01-09 RX ORDER — LANOLIN ALCOHOL/MO/W.PET/CERES
3 CREAM (GRAM) TOPICAL AT BEDTIME
Refills: 0 | Status: DISCONTINUED | OUTPATIENT
Start: 2024-01-09 | End: 2024-01-14

## 2024-01-09 RX ORDER — ACETAMINOPHEN 500 MG
650 TABLET ORAL EVERY 6 HOURS
Refills: 0 | Status: DISCONTINUED | OUTPATIENT
Start: 2024-01-09 | End: 2024-01-14

## 2024-01-09 RX ORDER — PIPERACILLIN AND TAZOBACTAM 4; .5 G/20ML; G/20ML
3.38 INJECTION, POWDER, LYOPHILIZED, FOR SOLUTION INTRAVENOUS ONCE
Refills: 0 | Status: DISCONTINUED | OUTPATIENT
Start: 2024-01-09 | End: 2024-01-09

## 2024-01-09 RX ORDER — RIVAROXABAN 15 MG-20MG
1 KIT ORAL
Refills: 0 | DISCHARGE

## 2024-01-09 RX ORDER — SODIUM CHLORIDE 9 MG/ML
1000 INJECTION, SOLUTION INTRAVENOUS
Refills: 0 | Status: DISCONTINUED | OUTPATIENT
Start: 2024-01-09 | End: 2024-01-10

## 2024-01-09 RX ORDER — PIPERACILLIN AND TAZOBACTAM 4; .5 G/20ML; G/20ML
3.38 INJECTION, POWDER, LYOPHILIZED, FOR SOLUTION INTRAVENOUS ONCE
Refills: 0 | Status: COMPLETED | OUTPATIENT
Start: 2024-01-09 | End: 2024-01-09

## 2024-01-09 RX ORDER — AMLODIPINE BESYLATE 2.5 MG/1
2.5 TABLET ORAL AT BEDTIME
Refills: 0 | Status: DISCONTINUED | OUTPATIENT
Start: 2024-01-09 | End: 2024-01-14

## 2024-01-09 RX ORDER — RIVAROXABAN 15 MG-20MG
2.5 KIT ORAL
Refills: 0 | Status: DISCONTINUED | OUTPATIENT
Start: 2024-01-09 | End: 2024-01-14

## 2024-01-09 RX ORDER — INFLUENZA VIRUS VACCINE 15; 15; 15; 15 UG/.5ML; UG/.5ML; UG/.5ML; UG/.5ML
0.7 SUSPENSION INTRAMUSCULAR ONCE
Refills: 0 | Status: COMPLETED | OUTPATIENT
Start: 2024-01-09 | End: 2024-01-09

## 2024-01-09 RX ORDER — PIPERACILLIN AND TAZOBACTAM 4; .5 G/20ML; G/20ML
3.38 INJECTION, POWDER, LYOPHILIZED, FOR SOLUTION INTRAVENOUS EVERY 6 HOURS
Refills: 0 | Status: DISCONTINUED | OUTPATIENT
Start: 2024-01-09 | End: 2024-01-09

## 2024-01-09 RX ORDER — SODIUM CHLORIDE 9 MG/ML
1000 INJECTION INTRAMUSCULAR; INTRAVENOUS; SUBCUTANEOUS ONCE
Refills: 0 | Status: COMPLETED | OUTPATIENT
Start: 2024-01-09 | End: 2024-01-09

## 2024-01-09 RX ORDER — PANTOPRAZOLE SODIUM 20 MG/1
40 TABLET, DELAYED RELEASE ORAL
Refills: 0 | Status: DISCONTINUED | OUTPATIENT
Start: 2024-01-09 | End: 2024-01-14

## 2024-01-09 RX ORDER — ATORVASTATIN CALCIUM 80 MG/1
10 TABLET, FILM COATED ORAL AT BEDTIME
Refills: 0 | Status: DISCONTINUED | OUTPATIENT
Start: 2024-01-09 | End: 2024-01-14

## 2024-01-09 RX ORDER — CILOSTAZOL 100 MG/1
50 TABLET ORAL
Refills: 0 | Status: DISCONTINUED | OUTPATIENT
Start: 2024-01-09 | End: 2024-01-14

## 2024-01-09 RX ORDER — HYDROMORPHONE HYDROCHLORIDE 2 MG/ML
0.2 INJECTION INTRAMUSCULAR; INTRAVENOUS; SUBCUTANEOUS EVERY 4 HOURS
Refills: 0 | Status: DISCONTINUED | OUTPATIENT
Start: 2024-01-09 | End: 2024-01-14

## 2024-01-09 RX ORDER — PIPERACILLIN AND TAZOBACTAM 4; .5 G/20ML; G/20ML
3.38 INJECTION, POWDER, LYOPHILIZED, FOR SOLUTION INTRAVENOUS EVERY 8 HOURS
Refills: 0 | Status: DISCONTINUED | OUTPATIENT
Start: 2024-01-09 | End: 2024-01-14

## 2024-01-09 RX ORDER — ONDANSETRON 8 MG/1
4 TABLET, FILM COATED ORAL EVERY 8 HOURS
Refills: 0 | Status: DISCONTINUED | OUTPATIENT
Start: 2024-01-09 | End: 2024-01-14

## 2024-01-09 RX ORDER — ONDANSETRON 8 MG/1
4 TABLET, FILM COATED ORAL ONCE
Refills: 0 | Status: COMPLETED | OUTPATIENT
Start: 2024-01-09 | End: 2024-01-09

## 2024-01-09 RX ORDER — AMLODIPINE BESYLATE 2.5 MG/1
1 TABLET ORAL
Qty: 0 | Refills: 0 | DISCHARGE

## 2024-01-09 RX ORDER — MORPHINE SULFATE 50 MG/1
4 CAPSULE, EXTENDED RELEASE ORAL ONCE
Refills: 0 | Status: DISCONTINUED | OUTPATIENT
Start: 2024-01-09 | End: 2024-01-09

## 2024-01-09 RX ADMIN — PIPERACILLIN AND TAZOBACTAM 25 GRAM(S): 4; .5 INJECTION, POWDER, LYOPHILIZED, FOR SOLUTION INTRAVENOUS at 17:22

## 2024-01-09 RX ADMIN — Medication 400 MILLIGRAM(S): at 03:09

## 2024-01-09 RX ADMIN — PANTOPRAZOLE SODIUM 40 MILLIGRAM(S): 20 TABLET, DELAYED RELEASE ORAL at 07:38

## 2024-01-09 RX ADMIN — ATORVASTATIN CALCIUM 10 MILLIGRAM(S): 80 TABLET, FILM COATED ORAL at 21:40

## 2024-01-09 RX ADMIN — SODIUM CHLORIDE 1000 MILLILITER(S): 9 INJECTION INTRAMUSCULAR; INTRAVENOUS; SUBCUTANEOUS at 01:38

## 2024-01-09 RX ADMIN — SODIUM CHLORIDE 100 MILLILITER(S): 9 INJECTION, SOLUTION INTRAVENOUS at 07:37

## 2024-01-09 RX ADMIN — CILOSTAZOL 50 MILLIGRAM(S): 100 TABLET ORAL at 17:21

## 2024-01-09 RX ADMIN — AMLODIPINE BESYLATE 2.5 MILLIGRAM(S): 2.5 TABLET ORAL at 21:39

## 2024-01-09 RX ADMIN — MORPHINE SULFATE 4 MILLIGRAM(S): 50 CAPSULE, EXTENDED RELEASE ORAL at 01:31

## 2024-01-09 RX ADMIN — HYDROMORPHONE HYDROCHLORIDE 0.2 MILLIGRAM(S): 2 INJECTION INTRAMUSCULAR; INTRAVENOUS; SUBCUTANEOUS at 16:19

## 2024-01-09 RX ADMIN — HYDROMORPHONE HYDROCHLORIDE 0.2 MILLIGRAM(S): 2 INJECTION INTRAMUSCULAR; INTRAVENOUS; SUBCUTANEOUS at 23:50

## 2024-01-09 RX ADMIN — PIPERACILLIN AND TAZOBACTAM 200 GRAM(S): 4; .5 INJECTION, POWDER, LYOPHILIZED, FOR SOLUTION INTRAVENOUS at 03:09

## 2024-01-09 RX ADMIN — PIPERACILLIN AND TAZOBACTAM 25 GRAM(S): 4; .5 INJECTION, POWDER, LYOPHILIZED, FOR SOLUTION INTRAVENOUS at 07:37

## 2024-01-09 RX ADMIN — ONDANSETRON 4 MILLIGRAM(S): 8 TABLET, FILM COATED ORAL at 01:47

## 2024-01-09 RX ADMIN — RIVAROXABAN 2.5 MILLIGRAM(S): KIT at 17:21

## 2024-01-09 RX ADMIN — PIPERACILLIN AND TAZOBACTAM 25 GRAM(S): 4; .5 INJECTION, POWDER, LYOPHILIZED, FOR SOLUTION INTRAVENOUS at 21:41

## 2024-01-09 RX ADMIN — HYDROMORPHONE HYDROCHLORIDE 0.2 MILLIGRAM(S): 2 INJECTION INTRAMUSCULAR; INTRAVENOUS; SUBCUTANEOUS at 08:17

## 2024-01-09 NOTE — H&P ADULT - NSHPPHYSICALEXAM_GEN_ALL_CORE
Vital Signs Last 24 Hrs  T(C): 37.5 (09 Jan 2024 03:43), Max: 37.8 (08 Jan 2024 20:37)  T(F): 99.5 (09 Jan 2024 03:43), Max: 100.1 (08 Jan 2024 20:37)  HR: 85 (09 Jan 2024 03:43) (82 - 85)  BP: 123/79 (09 Jan 2024 03:43) (117/71 - 123/79)  BP(mean): --  RR: 18 (09 Jan 2024 03:43) (16 - 18)  SpO2: 97% (09 Jan 2024 03:43) (95% - 97%)    Parameters below as of 09 Jan 2024 03:43  Patient On (Oxygen Delivery Method): room air        CONSTITUTIONAL: Well-groomed, in no apparent distress  EYES: No conjunctival or scleral injection, non-icteric;   ENMT: No external nasal lesions; MMM  NECK: Trachea midline without palpable neck mass; thyroid not enlarged and non-tender  RESPIRATORY: Breathing comfortably; no dullness to percussion; lungs CTA without wheeze/rhonchi/rales  CARDIOVASCULAR: +S1S2, RRR, no M/G/R; pedal pulses full and symmetric; no lower extremity edema  GASTROINTESTINAL: No palpable masses or tenderness, +BS throughout, no rebound/guarding; no hepatosplenomegaly; no hernia palpated  LYMPHATIC: No cervical LAD or tenderness  SKIN: No rashes or ulcers noted  NEUROLOGIC: CN II-XII intact; sensation intact in LEs b/l to light touch  PSYCHIATRIC: A+O x 3; mood and affect appropriate; appropriate insight and judgment Vital Signs Last 24 Hrs  T(C): 37.5 (09 Jan 2024 03:43), Max: 37.8 (08 Jan 2024 20:37)  T(F): 99.5 (09 Jan 2024 03:43), Max: 100.1 (08 Jan 2024 20:37)  HR: 85 (09 Jan 2024 03:43) (82 - 85)  BP: 123/79 (09 Jan 2024 03:43) (117/71 - 123/79)  BP(mean): --  RR: 18 (09 Jan 2024 03:43) (16 - 18)  SpO2: 97% (09 Jan 2024 03:43) (95% - 97%)    Parameters below as of 09 Jan 2024 03:43  Patient On (Oxygen Delivery Method): room air      CONSTITUTIONAL: Well-groomed, in no apparent distress  EYES: No conjunctival or scleral injection, non-icteric;   NECK: Trachea midline without palpable neck mass; thyroid not enlarged and non-tender  RESPIRATORY: Breathing comfortably; no dullness to percussion; lungs CTA without wheeze/rhonchi/rales  CARDIOVASCULAR: +S1S2, RRR, no M/G/R; no lower extremity edema  GASTROINTESTINAL: Marked tenderness to palpation in LLQ w/ associated rebound tenderness and guarding, Other quadrants NTND; +BS throughout, no hernia noted  SKIN: No rashes or ulcers noted  NEUROLOGIC: Sensation intact in LEs b/l to light touch  PSYCHIATRIC: A+O x 3; mood and affect appropriate; appropriate insight and judgment

## 2024-01-09 NOTE — H&P ADULT - PROBLEM SELECTOR PLAN 3
- C/w cilostazol 50mg BID  - Hold Xarelto for now given tentative IR procedure - C/w cilostazol 50mg BID  - Hold Xarelto 2.5mg BID for now in setting of tentative IR procedure, restart after

## 2024-01-09 NOTE — CONSULT NOTE ADULT - SUBJECTIVE AND OBJECTIVE BOX
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 Patient followed up with GI, Dr. Bradford, who ordered OP CT w/ findings of acute proximal sigmoid diverticulitis with adjacent left psoas abscess 1.5 x 1.1 x 1.cm w/ mild left hydronephrosis. Patient sent to the ED for evaluation. Colorectal surgery consulted.    In the ED, patient is borderline febrile to 100.1. Labs w/o leukocytosis. Patient endorses intermittent nausea over the last 2 weeks, denies vomiting, fevers, chills, chest pain, SOB. Patient reports hx of diverticulitis for 10+ years with 2 previous hospitalizations for IV abx. This is the 1st episode in the last 12months. Last colonoscopy was in , normal per patient.      PAST MEDICAL & SURGICAL HISTORY:  Anxiety      Breast CA, left  lumpectomy / RTX in the past      Hypertension      Sleep apnea  uses  cpap machine      Irritable bowel syndrome (IBS)      Polyp of colon  "pre-cancerous" x 2, removed 2014      HLD (hyperlipidemia)      Bladder polyp      S/P       S/P colon resection  reversal, had complications for fibriods      S/P hysterectomy      Breast lump      History of surgery  right groin fatty tissue removed      H/O lumpectomy  left       Personal history of spine surgery  L1-L4 fusion 2017        FAMILY HISTORY:  Family history of coronary artery disease (Mother)    [] Family history not pertinent as reviewed with the patient and family    SOCIAL HISTORY:    ALLERGIES: sulfa drugs (Unknown)      HOME MEDICATIONS:    CURRENT MEDICATIONS  MEDICATIONS (STANDING):   MEDICATIONS (PRN):  --------------------------------------------------------------------------------------------    Vitals:   T(C): 37.8 (24 20:37), Max: 37.8 (24 20:37)  HR: 82 (24:37) (82 - 82)  BP: 117/71 (24 20:37) (117/71 - 117/71)  RR: 16 (24:37) (16 - 16)  SpO2: 95% (24:37) (95% - 95%)  CAPILLARY BLOOD GLUCOSE          Height (cm): 167.6 (:)  Weight (kg): 66.7 (:37)  BMI (kg/m2): 23.7 (:37)  BSA (m2): 1.75 (:)      PHYSICAL EXAM:  General: Sitting in chair, uncomfortable  Neuro: Alert and answering questions appropriately   Cardio: Regular rate  Resp: Good effort, no signs of respiratory distress  GI/Abd: Soft, nondistended, LLQ tenderness, no rebound, voluntary guarding  Vascular: All 4 extremities warm  Musculoskeletal: All 4 extremities moving spontaneously, no limitations  --------------------------------------------------------------------------------------------    LABS  CBC ( @ 01:45)                              10.5<L>                         7.00    )----------------(  262        59.6  % Neutrophils, 23.3  % Lymphocytes, ANC: 4.17                                34.2<L>                --------------------------------------------------------------------------------------------    MICROBIOLOGY      --------------------------------------------------------------------------------------------    IMAGING  < from: CT Abdomen and Pelvis w/ Oral Cont and w/ IV Cont (24 @ 16:33) >  FINDINGS:  LOWER CHEST: Mild atelectatic changes. 3 mm calcified granuloma in the   right lower lobe.    LIVER: Within normal limits.  BILE DUCTS: Normal caliber.  GALLBLADDER: Collapsed gallbladder.  SPLEEN: Subcentimeterlow-attenuation lesion in the spleen which is too   small to characterize.  PANCREAS: Within normal limits.  ADRENALS: Within normal limits.  KIDNEYS/URETERS: Mild left-sided hydroureteronephrosis to the level of   inflammation in the colon and left psoas muscle described below.    BLADDER: Within normal limits.  REPRODUCTIVE ORGANS: Hysterectomy.    BOWEL: No bowel obstruction.  Colonic diverticuli. Pericolonic   inflammatory changes in the setting of diverticuli in the proximal   sigmoid colon which is concerning for acute diverticulitis. Artifact from   hardware in the lumbar spine limited evaluation. There is an abscess   containing air in the adjacent left psoas muscle seen best on series 2   image 73 measuring approximately 1.5 x 1.1 cmx 1.7 which is presumably   related to the diverticulitis.  Appendix well visualized. No focal inflammation in the right lower   quadrant.  PERITONEUM: No ascites.  VESSELS: Vascular calcification  RETROPERITONEUM/LYMPH NODES: No lymphadenopathy.  ABDOMINAL WALL: Postsurgical changes in the anterior abdominal wall.  BONES: Degenerative changes of bone. Laminectomy and hardware in the   lower lumbar spine.    IMPRESSION:  Findings concerning for acute diverticulitis in the sigmoid colon with an   adjacent air-containing abscess in the psoas muscle measuring   approximately 1.5 x 1.1 x 1.7 cm. Inflammation is presumably causing   obstruction of the left ureter with mild hydronephrosis.    < end of copied text >      --------------------------------------------------------------------------------------------   COLORECTAL SURGERY CONSULT NOTE  --------------------------------------------------------------------------------------------  HPI:    74y/o F w/ PMHx breast cancer s/p R lumpectomy, bladder cancer w/ spinal mets s/p TURBT, in remission, hx of Casey's in  for sepsis d/t colonic perforation s/p ovarian cyst removal with subsequent reversal, HTN, HLD, PAD on xarelto, diverticulitis presents w/ 2 weeks of crampy abdominal pain. Patient followed up with GI, Dr. Bradford, who ordered OP CT w/ findings of acute proximal sigmoid diverticulitis with adjacent left psoas abscess 1.5 x 1.1 x 1.cm w/ mild left hydronephrosis. Patient sent to the ED for evaluation. Colorectal surgery consulted.    In the ED, patient is borderline febrile to 100.1. Labs w/o leukocytosis. Patient endorses intermittent nausea over the last 2 weeks, denies vomiting, fevers, chills, chest pain, SOB. Patient reports hx of diverticulitis for 10+ years with 2 previous hospitalizations for IV abx. This is the 1st episode in the last 12months. Last colonoscopy was in , normal per patient.      PAST MEDICAL & SURGICAL HISTORY:  Anxiety      Breast CA, left  lumpectomy / RTX in the past      Hypertension      Sleep apnea  uses  cpap machine      Irritable bowel syndrome (IBS)      Polyp of colon  "pre-cancerous" x 2, removed 2014      HLD (hyperlipidemia)      Bladder polyp      S/P       S/P colon resection  reversal, had complications for fibriods      S/P hysterectomy      Breast lump      History of surgery  right groin fatty tissue removed      H/O lumpectomy  left       Personal history of spine surgery  L1-L4 fusion 2017        FAMILY HISTORY:  Family history of coronary artery disease (Mother)    [] Family history not pertinent as reviewed with the patient and family    SOCIAL HISTORY:    ALLERGIES: sulfa drugs (Unknown)      HOME MEDICATIONS:    CURRENT MEDICATIONS  MEDICATIONS (STANDING):   MEDICATIONS (PRN):  --------------------------------------------------------------------------------------------    Vitals:   T(C): 37.8 (24 20:37), Max: 37.8 (24 20:37)  HR: 82 (24:37) (82 - 82)  BP: 117/71 (24 20:37) (117/71 - 117/71)  RR: 16 (24:37) (16 - 16)  SpO2: 95% (24:37) (95% - 95%)  CAPILLARY BLOOD GLUCOSE          Height (cm): 167.6 (:)  Weight (kg): 66.7 (:37)  BMI (kg/m2): 23.7 (:37)  BSA (m2): 1.75 (:)      PHYSICAL EXAM:  General: Sitting in chair, uncomfortable  Neuro: Alert and answering questions appropriately   Cardio: Regular rate  Resp: Good effort, no signs of respiratory distress  GI/Abd: Soft, nondistended, LLQ tenderness, no rebound, voluntary guarding  Vascular: All 4 extremities warm  Musculoskeletal: All 4 extremities moving spontaneously, no limitations  --------------------------------------------------------------------------------------------    LABS  CBC ( @ 01:45)                              10.5<L>                         7.00    )----------------(  262        59.6  % Neutrophils, 23.3  % Lymphocytes, ANC: 4.17                                34.2<L>                --------------------------------------------------------------------------------------------    MICROBIOLOGY      --------------------------------------------------------------------------------------------    IMAGING  < from: CT Abdomen and Pelvis w/ Oral Cont and w/ IV Cont (24 @ 16:33) >  FINDINGS:  LOWER CHEST: Mild atelectatic changes. 3 mm calcified granuloma in the   right lower lobe.    LIVER: Within normal limits.  BILE DUCTS: Normal caliber.  GALLBLADDER: Collapsed gallbladder.  SPLEEN: Subcentimeterlow-attenuation lesion in the spleen which is too   small to characterize.  PANCREAS: Within normal limits.  ADRENALS: Within normal limits.  KIDNEYS/URETERS: Mild left-sided hydroureteronephrosis to the level of   inflammation in the colon and left psoas muscle described below.    BLADDER: Within normal limits.  REPRODUCTIVE ORGANS: Hysterectomy.    BOWEL: No bowel obstruction.  Colonic diverticuli. Pericolonic   inflammatory changes in the setting of diverticuli in the proximal   sigmoid colon which is concerning for acute diverticulitis. Artifact from   hardware in the lumbar spine limited evaluation. There is an abscess   containing air in the adjacent left psoas muscle seen best on series 2   image 73 measuring approximately 1.5 x 1.1 cmx 1.7 which is presumably   related to the diverticulitis.  Appendix well visualized. No focal inflammation in the right lower   quadrant.  PERITONEUM: No ascites.  VESSELS: Vascular calcification  RETROPERITONEUM/LYMPH NODES: No lymphadenopathy.  ABDOMINAL WALL: Postsurgical changes in the anterior abdominal wall.  BONES: Degenerative changes of bone. Laminectomy and hardware in the   lower lumbar spine.    IMPRESSION:  Findings concerning for acute diverticulitis in the sigmoid colon with an   adjacent air-containing abscess in the psoas muscle measuring   approximately 1.5 x 1.1 x 1.7 cm. Inflammation is presumably causing   obstruction of the left ureter with mild hydronephrosis.    < end of copied text >      --------------------------------------------------------------------------------------------

## 2024-01-09 NOTE — ED PROVIDER NOTE - NSFOLLOWUPINSTRUCTIONS_ED_ALL_ED_FT
Abdominal Pain    Many things can cause abdominal pain. Many times, abdominal pain is not caused by a disease and will improve without treatment. Your health care provider will do a physical exam to determine if there is a dangerous cause of your pain; blood tests and imaging may help determine the cause of your pain. However, in many cases, no cause may be found and you may need further testing as an outpatient. Monitor your abdominal pain for any changes.     SEEK IMMEDIATE MEDICAL CARE IF YOU HAVE ANY OF THE FOLLOWING SYMPTOMS: worsening abdominal pain, uncontrollable vomiting, profuse diarrhea, inability to have bowel movements or pass gas, black or bloody stools, fever accompanying chest pain or back pain, or fainting. These symptoms may represent a serious problem that is an emergency. Do not wait to see if the symptoms will go away. Get medical help right away. Call 911 and do not drive yourself to the hospital.     Diverticulitis    Diverticulitis is inflammation or infection of small pouches in your colon that form when you HAVE a condition called diverticulosis. This condition can range from mild to severe potentially leading to perforation or obstructions of your colon. Symptoms include abdominal pain, fever/chills, nausea, vomiting, diarrhea, constipation, or blood in your stool. If you were prescribed an antibiotic medicine, take it as told by your health care provider. Do not stop taking the antibiotic even if you start to feel better.    SEEK IMMEDIATE MEDICAL CARE IF YOU HAVE ANY OF THE FOLLOWING SYMPTOMS: worsening abdominal pain, high fever, inability to hold down liquids or medication, black or bloody stools, inability to pass gas, lightheadedness/dizziness, or a change in mental status.     The results of any blood tests and imaging studies completed during your visit today were discussed and explained to you and a copy provided with your discharge instructions. Please follow up with your primary care doctor within 48 hours.

## 2024-01-09 NOTE — ED ADULT NURSE NOTE - OBJECTIVE STATEMENT
73y female w/ pmh of HTN, HLD, bladder CA presents to ED w/ abdominal pain. Pt states she has had two weeks of abdominal pain with intermittent cramping. Pt states she went for CT scan which showed diverticulitis. Pt endorsing fevers, chills, nausea vomiting and diarrhea. Pt denies urinary symptoms. Pt ambulatory.

## 2024-01-09 NOTE — ED ADULT NURSE NOTE - CHIEF COMPLAINT QUOTE
Please read and dictate sleep study from 4/15/21.   L sided abdominal pain x 2 weeks  had outpatient CT scan showing "abscess" and was sent in by Dr. Pablo Bradford   requesting that MD calls Dr. Saunders [phone] L sided abdominal pain x 2 weeks  had outpatient CT scan showing "abscess" and was sent in by Dr. Pablo Bradford   requesting that MD calls Dr. Saunders [phone]

## 2024-01-09 NOTE — H&P ADULT - NSHPLABSRESULTS_GEN_ALL_CORE
10.5   7.00  )-----------( 262      ( 09 Jan 2024 01:45 )             34.2     01-09    141  |  103  |  12  ----------------------------<  101<H>  3.4<L>   |  27  |  0.60    Ca    9.5      09 Jan 2024 01:45    TPro  7.4  /  Alb  3.7  /  TBili  0.3  /  DBili  x   /  AST  30  /  ALT  22  /  AlkPhos  119  01-09          LIVER FUNCTIONS - ( 09 Jan 2024 01:45 )  Alb: 3.7 g/dL / Pro: 7.4 g/dL / ALK PHOS: 119 U/L / ALT: 22 U/L / AST: 30 U/L / GGT: x           PT/INR - ( 09 Jan 2024 01:45 )   PT: 14.9 sec;   INR: 1.43 ratio         PTT - ( 09 Jan 2024 01:45 )  PTT:31.0 sec  Urinalysis Basic - ( 09 Jan 2024 01:45 )    Color: x / Appearance: x / SG: x / pH: x  Gluc: 101 mg/dL / Ketone: x  / Bili: x / Urobili: x   Blood: x / Protein: x / Nitrite: x   Leuk Esterase: x / RBC: x / WBC x   Sq Epi: x / Non Sq Epi: x / Bacteria: x

## 2024-01-09 NOTE — ED PROVIDER NOTE - CARE PLAN
1 Principal Discharge DX:	LLQ abdominal pain   Principal Discharge DX:	Diverticulitis of large intestine with abscess

## 2024-01-09 NOTE — H&P ADULT - PROBLEM SELECTOR PLAN 2
- Believed to be 2/2 diverticulitis  - 1.5 x 1.1 x 1.1 cm in size  - Colorectal surgery consulted, recommended IR consult to evaluate for drainage  - IR consult placed, f/u recommendations

## 2024-01-09 NOTE — H&P ADULT - HISTORY OF PRESENT ILLNESS
This is a 74 y/o female w/ PMHx PAD on Xarelto, breast CA s/p R lumpectomy, bladder CA w/ spinal mets s/p TURBT in remission, HTN, HLD, and multiple episodes of diverticulitis who presents due to findings of abscess on outpatient CT scan. For the past 3 weeks, she has been having crampy abdominal pain in her LLQ, which she thought was due to gas pains initially. When the pain wouldn't resolve, she went to see her GI, Dr. Pablo Bradford, who ordered an outpatient CT of the abdomen/pelvis. This revealed an acute proximal sigmoid colon diverticulitis with adjacent 1.5 x 1.1 x 1.1 cm L psoas abscess and a mild L hydronephrosis. She was told to go to the hospital for further management.    In the ED, she was almost febrile at 100.1, otherwise hemodynamically stable. No leukocytosis present, baseline normocytic anemia seen. INR slightly elevated at 1.43, mild hypokalemia of 3.4 seen on CMP. Was given Zosyn and 1L IVF in the ED. This is a 72 y/o female w/ PMHx PAD on Xarelto, breast CA s/p R lumpectomy, bladder CA w/ spinal mets s/p TURBT in remission, HTN, HLD, and multiple episodes of diverticulitis who presents due to findings of abscess on outpatient CT scan. For the past 3 weeks, she has been having crampy abdominal pain in her LLQ, which she thought was due to gas pains initially. When the pain wouldn't resolve, she went to see her GI, Dr. Pablo Bradford, who ordered an outpatient CT of the abdomen/pelvis. This revealed an acute proximal sigmoid colon diverticulitis with adjacent 1.5 x 1.1 x 1.1 cm L psoas abscess and a mild L hydronephrosis. She was told to go to the hospital for further management.    In the ED, she was almost febrile at 100.1, otherwise hemodynamically stable. No leukocytosis present, baseline normocytic anemia seen. INR slightly elevated at 1.43, mild hypokalemia of 3.4 seen on CMP. Was given Zosyn and 1L IVF in the ED.

## 2024-01-09 NOTE — ED PROVIDER NOTE - RAPID ASSESSMENT
73f PMHx breast cancer status post right breast lumpectomy, bladder cancer with mets to the spine, in remission, hypertension, hyperlipidemia, PAD on AC, p/w 2 weeks crampy abdominal pain went for outpt CT AP revealing diverticulitis w/ psoas abscess, surgery resident seen speaking to pt in ED, pt having f, c, n, v, abdominal pain. No urinary complaints. Pt febrile in triage    Amrik Fung DO: This patient was seen and orders were placed in the waiting room as per our department's QDoc model.  Patient was to be sent to main ED for full medical evaluation and receiving team was to follow up on any labs, analgesia, clinical imaging ordered.  Any reassessment and disposition decisions were to be made by receiving team as clinically indicated, all decisions regarding the progression of care to be made at their discretion.  I did not perform a comprehensive history and physical on this patient.

## 2024-01-09 NOTE — PATIENT PROFILE ADULT - NSPRESCRALCSCORE_GEN_A_NUR_CAL
Encounter Date: 11/21/2022       History     Chief Complaint   Patient presents with    Wrist Pain     Left wrist pain and swelling since August with unknown cause. 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   Wrist Pain  This is a recurrent problem. The current episode started more than 1 week ago. The problem has not changed since onset.Pertinent negatives include no chest pain, no abdominal pain and no shortness of breath. The symptoms are aggravated by twisting, bending and exertion. The symptoms are relieved by rest.   Review of patient's allergies indicates:  No Known Allergies  No past medical history on file.  No past surgical history on file.  No family history on file.     Review of Systems   Constitutional:  Negative for fever.   HENT:  Negative for sore throat.    Respiratory:  Negative for shortness of breath.    Cardiovascular:  Negative for chest pain.   Gastrointestinal:  Negative for abdominal pain and nausea.   Genitourinary:  Negative for dysuria.   Musculoskeletal:  Negative for back pain.   Skin:  Negative for rash.   Neurological:  Negative for weakness.   Hematological:  Does not bruise/bleed easily.   Psychiatric/Behavioral: Negative.       Physical Exam     Initial Vitals [11/21/22 0945]   BP Pulse Resp Temp SpO2   (!) 168/78 88 18 97.9 °F (36.6 °C) 98 %      MAP       --         Physical Exam    Constitutional: He appears well-developed.   HENT:   Head: Normocephalic and atraumatic.   Eyes: Conjunctivae and EOM are normal. Pupils are equal, round, and reactive to light.   Neck:   Normal range of motion.  Cardiovascular:  Normal rate, regular rhythm and normal heart sounds.            Pulmonary/Chest: Breath sounds normal. He exhibits no tenderness.   Abdominal: Abdomen is soft. Bowel sounds are normal. He exhibits no distension. There is no abdominal tenderness.   Musculoskeletal:      Right wrist: Normal. Normal pulse.      Left wrist: Swelling and tenderness present. Decreased range of motion. Normal pulse.        Arms:       Cervical back: Normal range of motion.     Neurological: He is alert and oriented to person, place, and time. He displays normal reflexes. No cranial nerve deficit or sensory deficit. GCS score is 15. GCS eye subscore is 4. GCS verbal subscore is 5. GCS motor subscore is 6.   Skin: Skin is warm. No pallor.   Psychiatric: He has a normal mood and affect. His behavior is normal. Judgment and thought content normal.       ED Course   Procedures  Labs Reviewed - No data to display       Imaging Results              X-Ray Wrist Complete Left (Final result)  Result time 11/21/22 11:29:06      Final result by Roseann Blevins MD (11/21/22 11:29:06)                   Impression:      No acute bony abnormality      Electronically signed by: Roseann Blevins  Date:    11/21/2022  Time:    11:29               Narrative:    EXAMINATION:  XR WRIST COMPLETE 3 VIEWS LEFT    CLINICAL HISTORY:  Unspecified sprain of left wrist, initial encounter    COMPARISON:  None.    FINDINGS:  There is no acute fracture or malalignment identified.  There are degenerative changes with radiocarpal joint space narrowing.  The soft tissues are unremarkable.                                       X-Ray Hand 3 view Left (Final result)  Result time 11/21/22 11:28:20      Final result by Roseann Blevins MD (11/21/22 11:28:20)                   Impression:      No acute bony abnormality      Electronically signed by: Roseann Blevins  Date:    11/21/2022  Time:    11:28               Narrative:    EXAMINATION:  XR HAND COMPLETE 3 VIEW LEFT    CLINICAL HISTORY:  left wrist pain, patient is a drummer and  lifts heavy items;    COMPARISON:  None.    FINDINGS:  There is no acute fracture or malalignment identified.  There are degenerative changes of the wrist with radiocarpal joint space narrowing.  The soft tissues are unremarkable.                                       Medications - No data to display        APC / Resident Notes:   I was not physically present during the history, exam or disposition of this patient. I was available at all times for consultation. (H[place])                   Clinical Impression:   Final diagnoses:  [S63.502A] Wrist sprain, left, initial encounter (Primary)      ED Disposition Condition    Discharge Stable          ED Prescriptions       Medication Sig Dispense Start Date End Date Auth. Provider    meloxicam (MOBIC) 7.5 MG tablet Take 1 tablet (7.5 mg total) by mouth once daily. for 21 days 21 tablet 11/21/2022 12/12/2022 CHANTEL Olmos          Follow-up Information       Follow up With Specialties Details Why Contact Info    discharge followup    If your symptoms become WORSE or you DO NOT IMPROVE and you are unable to reach your health care provider, you should RETURN to the emergency department    discharge info    Discussed all pertinent ED information, results, diagnosis and treatment plan; All questions and concerns were addressed at this time. Patient voices understanding of information and instructions. Patient is comfortable with plan and discharge             CHANTEL Olmos  11/21/22 1136       Paul Terry MD  11/21/22 2012     1

## 2024-01-09 NOTE — CONSULT NOTE ADULT - SUBJECTIVE AND OBJECTIVE BOX
Interventional Radiology    Evaluate for Procedure: Psoas abscess drainage    HPI: 73 year old Female with a PMH of breast cancer s/p Right lumpectomy, bladder cancer with spinal mets s/p TURBT, in remission, hx of Casey's in 1995 for sepsis d/t colonic perforation s/p ovarian cyst removal with subsequent reversal, HTN, HLD, PAD on Xarelto, diverticulitis presents with 2 weeks of crampy abdominal pain. Colorectal surgery consulted for OP CT findings of acute proximal sigmoid diverticulitis with adjacent left psoas abscess 1.5 x 1.1 x 1.cm with mild left hydronephrosis. IR consulted for drainage.    Allergies: sulfa drugs (Unknown)    Medications (Abx/Cardiac/Anticoagulation/Blood Products)  piperacillin/tazobactam IVPB.: 200 mL/Hr IV Intermittent (01-09 @ 03:09)  piperacillin/tazobactam IVPB.-: 25 mL/Hr IV Intermittent (01-09 @ 07:37)    Data:  167.6  66.7  T(C): 37.5  HR: 85  BP: 123/79  RR: 18  SpO2: 97%    -WBC 5.67 / HgB 9.5 / Hct 31.3 / Plt 202  -Na 140 / Cl 106 / BUN 9 / Glucose 113  -K 3.5 / CO2 26 / Cr 0.56  -ALT 19 / Alk Phos 96 / T.Bili 0.3  -INR 1.26 / PTT --    Radiology: PROCEDURE DATE:  01/08/2024       INTERPRETATION:  CLINICAL INFORMATION: Abdominal pain    COMPARISON: CT scan of the abdomen and pelvis from 4/17/2023    CONTRAST/COMPLICATIONS:  IV Contrast: Isovue 370  90 cc administered   10 cc discarded  Oral Contrast: Omnipaque 300  Complications: None reported at time of study completion    PROCEDURE:  CT of the Abdomen and Pelvis was performed.  Sagittal and coronal reformats were performed.    FINDINGS:  LOWER CHEST: Mild atelectatic changes. 3 mm calcified granuloma in the   right lower lobe.    LIVER: Within normal limits.  BILE DUCTS: Normal caliber.  GALLBLADDER: Collapsed gallbladder.  SPLEEN: Subcentimeterlow-attenuation lesion in the spleen which is too   small to characterize.  PANCREAS: Within normal limits.  ADRENALS: Within normal limits.  KIDNEYS/URETERS: Mild left-sided hydroureteronephrosis to the level of   inflammation in the colon and left psoas muscle described below.    BLADDER: Within normal limits.  REPRODUCTIVE ORGANS: Hysterectomy.    BOWEL: No bowel obstruction.  Colonic diverticuli. Pericolonic   inflammatory changes in the setting of diverticuli in the proximal   sigmoid colon which is concerning for acute diverticulitis. Artifact from   hardware in the lumbar spine limited evaluation. There is an abscess   containing air in the adjacent left psoas muscle seen best on series 2   image 73 measuring approximately 1.5 x 1.1 cmx 1.7 which is presumably   related to the diverticulitis.  Appendix well visualized. No focal inflammation in the right lower   quadrant.  PERITONEUM: No ascites.  VESSELS: Vascular calcification  RETROPERITONEUM/LYMPH NODES: No lymphadenopathy.  ABDOMINAL WALL: Postsurgical changes in the anterior abdominal wall.  BONES: Degenerative changes of bone. Laminectomy and hardware in the   lower lumbar spine.    IMPRESSION:  Findings concerning for acute diverticulitis in the sigmoid colon with an   adjacent air-containing abscess in the psoas muscle measuring   approximately 1.5 x 1.1 x 1.7 cm. Inflammation is presumably causing   obstruction of the left ureter with mild hydronephrosis. This critical   value was discussed with Dr. Bradford at 6:15 PM on 1/8/2024.      Assessment/Plan: 73 year old Female with a PMH of breast cancer s/p Right lumpectomy, bladder cancer with spinal mets s/p TURBT, in remission, hx of [facility]'s in 1995 for sepsis d/t colonic perforation s/p ovarian cyst removal with subsequent reversal, HTN, HLD, PAD on Xarelto, diverticulitis presents with 2 weeks of crampy abdominal pain. Colorectal surgery consulted for OP CT findings of acute proximal sigmoid diverticulitis with adjacent left psoas abscess 1.5 x 1.1 x 1.cm with mild left hydronephrosis. IR consulted for drainage.    - Collection too small and not amendable for drainage/aspiration  - Recommend continued medical management at this time  - No role for IR intervention required at this time  - Case discussed with IR attending' s Dr. Ahumada and Dr. Haider

## 2024-01-09 NOTE — ED PROVIDER NOTE - CHIEF COMPLAINT
The patient is a 73y Female complaining of abdominal pain. Abdominal pain, nausea, low PO intake/weight loss

## 2024-01-09 NOTE — ED PROVIDER NOTE - CLINICAL SUMMARY MEDICAL DECISION MAKING FREE TEXT BOX
73F w/ PMH HTN, HLD, PAD on Xarelto, breast cancer s/p lumpectomy, bladder cancer w/ mets to the spine in remission who presents to [facility] ED on 1/8/2024 with 3-week hx LLQ abdominal pain, found in outpatient CT A/P (1/8) to have acute diverticulitis (sigmoid) and psoas abscess with mild obstructive L hydronephrosis. Associated with nausea with PO intake, weight loss >20 lb over the last several months. Denies melena or hematochezia. Borderline febrile in ED, VS stable. Physical exam notable for exquisite TTP in LLQ with guarding, +psoas sign (L). Ordered labs, UA, ECG, CXR; giving IV fluids, analgesia, and anti-emetics. Consulting Surgery for further management of acute diverticulitis and psoas abscess. 73F w/ PMH HTN, HLD, PAD on Xarelto, breast cancer s/p lumpectomy, bladder cancer w/ mets to the spine in remission who presents to [facility] ED on 1/8/2024 with 3-week hx LLQ abdominal pain, found in outpatient CT A/P (1/8) to have acute diverticulitis (sigmoid) and psoas abscess with mild obstructive L hydronephrosis. Associated with nausea with PO intake, weight loss >20 lb over the last several months. Denies melena or hematochezia. Borderline febrile in ED, VS stable. Physical exam notable for exquisite TTP in LLQ with guarding, +psoas sign (L). Ordered labs, UA, ECG, CXR; giving IV fluids, analgesia, and anti-emetics. Consulting Surgery for further management of acute diverticulitis and psoas abscess. 73F w/ PMH HTN, HLD, PAD on Xarelto, breast cancer s/p lumpectomy, bladder cancer w/ mets to the spine in remission who presents to [facility] ED on 1/8/2024 with 3-week hx LLQ abdominal pain, found in outpatient CT A/P (1/8) to have acute diverticulitis (sigmoid) and psoas abscess with mild obstructive L hydronephrosis. Associated with nausea with PO intake, weight loss >20 lb over the last several months. Denies melena or hematochezia. Borderline febrile in ED, VS stable. Physical exam notable for exquisite TTP in LLQ with guarding, +psoas sign (L). Ordered labs, UA, ECG, CXR; giving IV fluids, analgesia, and anti-emetics. Consulting Surgery for further management of acute diverticulitis and psoas abscess.    Saroj: 73 year old female with pmhx of htn, hld, PAD on Xarelto with mets to spine in remission here with 3 weeks llq pain.  went to CT scan today for worsening pain showing sigmoid diverticulitis with psoas abscess and mild l hydro. no urinary symptoms, no change in urination.  PE: att exam: patient awake alert NAD. LUNGS CTAB no wheeze no crackle. CARD RRR no m/r/g.  Abdomen soft ttp llq with guarding, + psoas ssign, + ttp cva left,  EXT WWP no edema no calf tenderness CV 2+DP/PT bilaterally. neuro A&Ox3 gait normal.  skin warm and dry no rash  plan: will get labs, ivf, iv abx, consult surgery reassess. 73F w/ PMH HTN, HLD, PAD on Xarelto, breast cancer s/p lumpectomy, bladder cancer w/ mets to the spine in remission who presents to [facility] ED on 1/8/2024 with 3-week hx LLQ abdominal pain, found in outpatient CT A/P (1/8) to have acute diverticulitis (sigmoid) and psoas abscess with mild obstructive L hydronephrosis. Associated with nausea with PO intake, weight loss >20 lb over the last several months. Denies melena or hematochezia. Borderline febrile in ED, VS stable. Physical exam notable for exquisite TTP in LLQ with guarding, +psoas sign (L). Ordered labs, UA, ECG, CXR; giving IV fluids, analgesia, and anti-emetics. Consulting Surgery for further management of acute diverticulitis and psoas abscess.    Saroj: 73 year old female with pmhx of htn, hld, PAD on Xarelto with mets to spine in remission here with 3 weeks llq pain.  went to CT scan today for worsening pain showing sigmoid diverticulitis with psoas abscess and mild l hydro. no urinary symptoms, no change in urination.  PE: att exam: patient awake alert NAD. LUNGS CTAB no wheeze no crackle. CARD RRR no m/r/g.  Abdomen soft ttp llq with guarding, + psoas ssign, + ttp cva left,  EXT WWP no edema no calf tenderness CV 2+DP/PT bilaterally. neuro A&Ox3 gait normal.  skin warm and dry no rash  plan: will get labs, ivf, iv abx, consult surgery reassess.

## 2024-01-09 NOTE — CONSULT NOTE ADULT - SUBJECTIVE AND OBJECTIVE BOX
[facility]  Gama Cardona PA-C  [address] [place] [address]  [phone]      Chief Complaint:  Patient is a 73y old  Female who presents with a chief complaint of Diverticulitis w/ abscess (2024 07:53)      HPI: This is a 72 y/o female w/ PMHx PAD on Xarelto, breast CA s/p R lumpectomy, bladder CA w/ spinal mets s/p TURBT in remission, HTN, HLD, and multiple episodes of diverticulitis who presents due to findings of abscess on outpatient CT scan. For the past 3 weeks, she has been having crampy abdominal pain in her LLQ, which she thought was due to gas pains initially. When the pain wouldn't resolve, she went to see her GI, Dr. Pablo Ortiz, who ordered an outpatient CT of the abdomen/pelvis. This revealed an acute proximal sigmoid colon diverticulitis with adjacent 1.5 x 1.1 x 1.1 cm L psoas abscess and a mild L hydronephrosis. She was told to go to the hospital for further management.    In the ED, she was almost febrile at 100.1, otherwise hemodynamically stable. No leukocytosis present, baseline normocytic anemia seen. INR slightly elevated at 1.43, mild hypokalemia of 3.4 seen on CMP. Was given Zosyn and 1L IVF in the ED.    GI asked to consult for diverticulitis. Pt seen in ER. Pt follows without office as an outpaitent with Dr. Ortiz. Last colonoscopy was in .    Allergies:  sulfa drugs (Unknown)      Medications:  acetaminophen     Tablet .. 650 milliGRAM(s) Oral every 6 hours PRN  amLODIPine   Tablet 2.5 milliGRAM(s) Oral at bedtime  atorvastatin 10 milliGRAM(s) Oral at bedtime  cilostazol 50 milliGRAM(s) Oral two times a day  HYDROmorphone  Injectable 0.2 milliGRAM(s) IV Push every 4 hours PRN  lactated ringers. 1000 milliLiter(s) IV Continuous <Continuous>  melatonin 3 milliGRAM(s) Oral at bedtime PRN  ondansetron Injectable 4 milliGRAM(s) IV Push every 8 hours PRN  pantoprazole    Tablet 40 milliGRAM(s) Oral before breakfast  piperacillin/tazobactam IVPB.. 3.375 Gram(s) IV Intermittent every 8 hours      PMHX/PSHX:  Anxiety    Breast CA, left    Hypertension    Sleep apnea    Irritable bowel syndrome (IBS)    Polyp of colon    HLD (hyperlipidemia)    Bladder polyp    S/P     S/P colon resection    S/P hysterectomy    Breast lump    History of surgery    H/O lumpectomy    Personal history of spine surgery        Family history:  Family history of coronary artery disease (Mother)        Social History:     ROS:     General:  No wt loss, fevers, chills, night sweats, fatigue,   Eyes:  Good vision, no reported pain  ENT:  No sore throat, pain, runny nose, dysphagia  CV:  No pain, palpitations, hypo/hypertension  Resp:  No dyspnea, cough, tachypnea, wheezing  GI:  + pain, No nausea, No vomiting, No diarrhea, No constipation, No weight loss, No fever, No pruritis, No rectal bleeding, No tarry stools, No dysphagia,  :  No pain, bleeding, incontinence, nocturia  Muscle:  No pain, weakness  Neuro:  No weakness, tingling, memory problems  Psych:  No fatigue, insomnia, mood problems, depression  Endocrine:  No polyuria, polydipsia, cold/heat intolerance  Heme:  No petechiae, ecchymosis, easy bruisability  Skin:  No rash, tattoos, scars, edema      PHYSICAL EXAM:   Vital Signs:  Vital Signs Last 24 Hrs  T(C): 37 (2024 08:33), Max: 37.8 (2024 20:37)  T(F): 98.6 (2024 08:33), Max: 100.1 (2024 20:37)  HR: 68 (2024 08:33) (68 - 85)  BP: 113/72 (2024 08:33) (113/72 - 123/79)  BP(mean): --  RR: 18 (2024 08:33) (16 - 18)  SpO2: 98% (2024 08:33) (95% - 98%)    Parameters below as of 2024 08:33  Patient On (Oxygen Delivery Method): room air      Daily Height in cm: 167.64 (2024 20:37)    Daily     GENERAL:  Appears stated age,   HEENT:  NC/AT,    CHEST:  Full & symmetric excursion,   HEART:  Regular rhythm  ABDOMEN:  Soft, LLQ + tender, non-distended,   EXTEREMITIES:  no cyanosis,clubbing or edema  SKIN:  No rash  NEURO:  Alert,    LABS:                        9.5    5.67  )-----------( 202      ( 2024 06:47 )             31.3         140  |  106  |  9   ----------------------------<  113<H>  3.5   |  26  |  0.56    Ca    8.7      2024 06:47    TPro  6.3  /  Alb  3.0<L>  /  TBili  0.3  /  DBili  x   /  AST  23  /  ALT  19  /  AlkPhos  96  -09    LIVER FUNCTIONS - ( 2024 06:47 )  Alb: 3.0 g/dL / Pro: 6.3 g/dL / ALK PHOS: 96 U/L / ALT: 19 U/L / AST: 23 U/L / GGT: x           PT/INR - ( 2024 06:47 )   PT: 13.8 sec;   INR: 1.26 ratio         PTT - ( 2024 01:45 )  PTT:31.0 sec  Urinalysis Basic - ( 2024 06:47 )    Color: x / Appearance: x / SG: x / pH: x  Gluc: 113 mg/dL / Ketone: x  / Bili: x / Urobili: x   Blood: x / Protein: x / Nitrite: x   Leuk Esterase: x / RBC: x / WBC x   Sq Epi: x / Non Sq Epi: x / Bacteria: x      Amylase Serum--      Lipase serum15       Ammonia--      Imaging:    < from: CT Abdomen and Pelvis w/ Oral Cont and w/ IV Cont (24 @ 16:33) >    EXAM: [id number] - CT ABDOMEN AND PELVIS OC IC  - ORDERED BY: PABLO ORTIZ      PROCEDURE DATE:  2024           INTERPRETATION:  CLINICAL INFORMATION: Abdominal pain    COMPARISON: CT scan of the abdomen and pelvis from 2023    CONTRAST/COMPLICATIONS:  IV Contrast: Isovue 370  90 cc administered   10 cc discarded  Oral Contrast: Omnipaque 300  Complications: None reported at time of study completion    PROCEDURE:  CT of the Abdomen and Pelvis was performed.  Sagittal and coronal reformats were performed.    FINDINGS:  LOWER CHEST: Mild atelectatic changes. 3 mm calcified granuloma in the   right lower lobe.    LIVER: Within normal limits.  BILE DUCTS: Normal caliber.  GALLBLADDER: Collapsed gallbladder.  SPLEEN: Subcentimeterlow-attenuation lesion in the spleen which is too   small to characterize.  PANCREAS: Within normal limits.  ADRENALS: Within normal limits.  KIDNEYS/URETERS: Mild left-sided hydroureteronephrosis to the level of   inflammation in the colon and left psoas muscle described below.    BLADDER: Within normal limits.  REPRODUCTIVE ORGANS: Hysterectomy.    BOWEL: No bowel obstruction.  Colonic diverticuli. Pericolonic   inflammatory changes in the setting of diverticuli in the proximal   sigmoid colon which is concerning for acute diverticulitis. Artifact from   hardware in the lumbar spine limited evaluation. There is an abscess   containing air in the adjacent left psoas muscle seen best on series 2   image 73 measuring approximately 1.5 x 1.1 cmx 1.7 which is presumably   related to the diverticulitis.  Appendix well visualized. No focal inflammation in the right lower   quadrant.  PERITONEUM: No ascites.  VESSELS: Vascular calcification  RETROPERITONEUM/LYMPH NODES: No lymphadenopathy.  ABDOMINAL WALL: Postsurgical changes in the anterior abdominal wall.  BONES: Degenerative changes of bone. Laminectomy and hardware in the   lower lumbar spine.    IMPRESSION:  Findings concerning for acute diverticulitis in the sigmoid colon with an   adjacent air-containing abscess in the psoas muscle measuring   approximately 1.5 x 1.1 x 1.7 cm. Inflammation is presumably causing   obstruction of the left ureter with mild hydronephrosis. This critical   value was discussed with Dr. Ortiz at 6:15 PM on 2024.        --- End of Report ---               BRITTON GOULD MD; Attending Radiologist   This document has been electronically signed. 2024  6:19PM    < end of copied text >         [facility]  Gama Cardona PA-C  [address] [place] [address]  [phone]      Chief Complaint:  Patient is a 73y old  Female who presents with a chief complaint of Diverticulitis w/ abscess (2024 07:53)      HPI: This is a 72 y/o female w/ PMHx PAD on Xarelto, breast CA s/p R lumpectomy, bladder CA w/ spinal mets s/p TURBT in remission, HTN, HLD, and multiple episodes of diverticulitis who presents due to findings of abscess on outpatient CT scan. For the past 3 weeks, she has been having crampy abdominal pain in her LLQ, which she thought was due to gas pains initially. When the pain wouldn't resolve, she went to see her GI, Dr. Pablo Ortiz, who ordered an outpatient CT of the abdomen/pelvis. This revealed an acute proximal sigmoid colon diverticulitis with adjacent 1.5 x 1.1 x 1.1 cm L psoas abscess and a mild L hydronephrosis. She was told to go to the hospital for further management.    In the ED, she was almost febrile at 100.1, otherwise hemodynamically stable. No leukocytosis present, baseline normocytic anemia seen. INR slightly elevated at 1.43, mild hypokalemia of 3.4 seen on CMP. Was given Zosyn and 1L IVF in the ED.    GI asked to consult for diverticulitis. Pt seen in ER. Pt follows without office as an outpaitent with Dr. Ortiz. Last colonoscopy was in .    Allergies:  sulfa drugs (Unknown)      Medications:  acetaminophen     Tablet .. 650 milliGRAM(s) Oral every 6 hours PRN  amLODIPine   Tablet 2.5 milliGRAM(s) Oral at bedtime  atorvastatin 10 milliGRAM(s) Oral at bedtime  cilostazol 50 milliGRAM(s) Oral two times a day  HYDROmorphone  Injectable 0.2 milliGRAM(s) IV Push every 4 hours PRN  lactated ringers. 1000 milliLiter(s) IV Continuous <Continuous>  melatonin 3 milliGRAM(s) Oral at bedtime PRN  ondansetron Injectable 4 milliGRAM(s) IV Push every 8 hours PRN  pantoprazole    Tablet 40 milliGRAM(s) Oral before breakfast  piperacillin/tazobactam IVPB.. 3.375 Gram(s) IV Intermittent every 8 hours      PMHX/PSHX:  Anxiety    Breast CA, left    Hypertension    Sleep apnea    Irritable bowel syndrome (IBS)    Polyp of colon    HLD (hyperlipidemia)    Bladder polyp    S/P     S/P colon resection    S/P hysterectomy    Breast lump    History of surgery    H/O lumpectomy    Personal history of spine surgery        Family history:  Family history of coronary artery disease (Mother)        Social History:     ROS:     General:  No wt loss, fevers, chills, night sweats, fatigue,   Eyes:  Good vision, no reported pain  ENT:  No sore throat, pain, runny nose, dysphagia  CV:  No pain, palpitations, hypo/hypertension  Resp:  No dyspnea, cough, tachypnea, wheezing  GI:  + pain, No nausea, No vomiting, No diarrhea, No constipation, No weight loss, No fever, No pruritis, No rectal bleeding, No tarry stools, No dysphagia,  :  No pain, bleeding, incontinence, nocturia  Muscle:  No pain, weakness  Neuro:  No weakness, tingling, memory problems  Psych:  No fatigue, insomnia, mood problems, depression  Endocrine:  No polyuria, polydipsia, cold/heat intolerance  Heme:  No petechiae, ecchymosis, easy bruisability  Skin:  No rash, tattoos, scars, edema      PHYSICAL EXAM:   Vital Signs:  Vital Signs Last 24 Hrs  T(C): 37 (2024 08:33), Max: 37.8 (2024 20:37)  T(F): 98.6 (2024 08:33), Max: 100.1 (2024 20:37)  HR: 68 (2024 08:33) (68 - 85)  BP: 113/72 (2024 08:33) (113/72 - 123/79)  BP(mean): --  RR: 18 (2024 08:33) (16 - 18)  SpO2: 98% (2024 08:33) (95% - 98%)    Parameters below as of 2024 08:33  Patient On (Oxygen Delivery Method): room air      Daily Height in cm: 167.64 (2024 20:37)    Daily     GENERAL:  Appears stated age,   HEENT:  NC/AT,    CHEST:  Full & symmetric excursion,   HEART:  Regular rhythm  ABDOMEN:  Soft, LLQ + tender, non-distended,   EXTEREMITIES:  no cyanosis,clubbing or edema  SKIN:  No rash  NEURO:  Alert,    LABS:                        9.5    5.67  )-----------( 202      ( 2024 06:47 )             31.3         140  |  106  |  9   ----------------------------<  113<H>  3.5   |  26  |  0.56    Ca    8.7      2024 06:47    TPro  6.3  /  Alb  3.0<L>  /  TBili  0.3  /  DBili  x   /  AST  23  /  ALT  19  /  AlkPhos  96  -09    LIVER FUNCTIONS - ( 2024 06:47 )  Alb: 3.0 g/dL / Pro: 6.3 g/dL / ALK PHOS: 96 U/L / ALT: 19 U/L / AST: 23 U/L / GGT: x           PT/INR - ( 2024 06:47 )   PT: 13.8 sec;   INR: 1.26 ratio         PTT - ( 2024 01:45 )  PTT:31.0 sec  Urinalysis Basic - ( 2024 06:47 )    Color: x / Appearance: x / SG: x / pH: x  Gluc: 113 mg/dL / Ketone: x  / Bili: x / Urobili: x   Blood: x / Protein: x / Nitrite: x   Leuk Esterase: x / RBC: x / WBC x   Sq Epi: x / Non Sq Epi: x / Bacteria: x      Amylase Serum--      Lipase serum15       Ammonia--      Imaging:    < from: CT Abdomen and Pelvis w/ Oral Cont and w/ IV Cont (24 @ 16:33) >    EXAM: [id number] - CT ABDOMEN AND PELVIS OC IC  - ORDERED BY: PABLO ORTIZ      PROCEDURE DATE:  2024           INTERPRETATION:  CLINICAL INFORMATION: Abdominal pain    COMPARISON: CT scan of the abdomen and pelvis from 2023    CONTRAST/COMPLICATIONS:  IV Contrast: Isovue 370  90 cc administered   10 cc discarded  Oral Contrast: Omnipaque 300  Complications: None reported at time of study completion    PROCEDURE:  CT of the Abdomen and Pelvis was performed.  Sagittal and coronal reformats were performed.    FINDINGS:  LOWER CHEST: Mild atelectatic changes. 3 mm calcified granuloma in the   right lower lobe.    LIVER: Within normal limits.  BILE DUCTS: Normal caliber.  GALLBLADDER: Collapsed gallbladder.  SPLEEN: Subcentimeterlow-attenuation lesion in the spleen which is too   small to characterize.  PANCREAS: Within normal limits.  ADRENALS: Within normal limits.  KIDNEYS/URETERS: Mild left-sided hydroureteronephrosis to the level of   inflammation in the colon and left psoas muscle described below.    BLADDER: Within normal limits.  REPRODUCTIVE ORGANS: Hysterectomy.    BOWEL: No bowel obstruction.  Colonic diverticuli. Pericolonic   inflammatory changes in the setting of diverticuli in the proximal   sigmoid colon which is concerning for acute diverticulitis. Artifact from   hardware in the lumbar spine limited evaluation. There is an abscess   containing air in the adjacent left psoas muscle seen best on series 2   image 73 measuring approximately 1.5 x 1.1 cmx 1.7 which is presumably   related to the diverticulitis.  Appendix well visualized. No focal inflammation in the right lower   quadrant.  PERITONEUM: No ascites.  VESSELS: Vascular calcification  RETROPERITONEUM/LYMPH NODES: No lymphadenopathy.  ABDOMINAL WALL: Postsurgical changes in the anterior abdominal wall.  BONES: Degenerative changes of bone. Laminectomy and hardware in the   lower lumbar spine.    IMPRESSION:  Findings concerning for acute diverticulitis in the sigmoid colon with an   adjacent air-containing abscess in the psoas muscle measuring   approximately 1.5 x 1.1 x 1.7 cm. Inflammation is presumably causing   obstruction of the left ureter with mild hydronephrosis. This critical   value was discussed with Dr. Ortiz at 6:15 PM on 2024.        --- End of Report ---               BRITTON GOULD MD; Attending Radiologist   This document has been electronically signed. 2024  6:19PM    < end of copied text >

## 2024-01-09 NOTE — ED PROVIDER NOTE - PHYSICAL EXAMINATION
PHYSICAL EXAM:    GENERAL: NAD  HEENT:  Atraumatic, MMM  CHEST/LUNG: Chest rise equal bilaterally; CTAB  HEART: Regular rate and rhythm; no m/r/g noted  ABDOMEN: Soft, nondistended; exquisite TTP in LLQ +guarding, +psoas sign (L); +BS  EXTREMITIES:  Extremities warm  PSYCH: A&Ox3  SKIN: No obvious rashes or lesions  NEUROLOGY: strength and sensation intact in all extremities. CN 2 - 12 intact.

## 2024-01-09 NOTE — CONSULT NOTE ADULT - ASSESSMENT
diverticulitis  abnormal imaging     CT noted with diverticulitis and small air containing abscess   IR consult noted; too small to drain   NPO  IVF  cont IV ABx  f/u cultures   advance diet as tolerated  will need colonoscopy in 6-8 weeks  will follow  d/w pt in ER

## 2024-01-09 NOTE — ED ADULT NURSE REASSESSMENT NOTE - NS ED NURSE REASSESS COMMENT FT1
Report received from BRENDEN Sheehan. Pt AAOx4, NAD, resp nonlabored, skin warm/dry, resting comfortably in bed. Pt presented to ED c/o abdominal pain . Pt denies headache, dizziness, chest pain, palpitations, SOB, abd pain, n/v/d, urinary symptoms, fevers, chills, weakness at this time. Pt awaiting bed assignment. Pt in no acute distress. IV access obtained and pain meds given. VSS at this time.

## 2024-01-09 NOTE — H&P ADULT - ASSESSMENT
74 y/o female w/ PMHx PAD on Xarelto, breast CA s/p R lumpectomy, bladder CA w/ spinal mets s/p TURBT in remission, HTN, HLD, and multiple episodes of diverticulitis presenting for acute proximal sigmoid colon diverticulitis w/ L psoas abscess found on outpatient CT. Admitted for IV antibiotics and possible psoas abscess drainage.

## 2024-01-09 NOTE — CONSULT NOTE ADULT - ATTENDING COMMENTS
DATE OF SERVICE: 01-09-24 @ 12:15    73F with pmhx as above, hx of iatrogenic colon injury during ovarian cystectomy in 1994 s/p bhatt and subsequent reversal, hx of diverticulitis here with LLQ pain for 2 wks. No f/c, felt pain initially felt like gas pain but did not improve.   Tm 100.1 upon arrival to ED  WBC NL  CT with diverticulitis and small abscess in psoas  abd soft +LLQ TTP, no rebound/guarding    IR eval noted, too small for drainage at this time  Continue IV abx with Zosyn  NPO for now  IVF hydration  Serial exams  Will follow along

## 2024-01-09 NOTE — CHART NOTE - NSCHARTNOTEFT_GEN_A_CORE
abd pain currently under control. Denies f/chills this am    Exam  well appearing, RRR, ctab/l, +bs, LLQ ttp    Labs reviewed    a/p: 74 y/o F with h/o PAD on xarelto p/w diverticulitis  -continue Zosyn, f/u culture data  -currently on CLD, advance as tolerated  -colonoscopy in 6-8wks  -xarelto restarted as no procedure planned

## 2024-01-09 NOTE — CONSULT NOTE ADULT - ASSESSMENT
ASSESSMENT: 74y/o F w/ PMHx breast cancer s/p R lumpectomy, bladder cancer w/ spinal mets s/p TURBT, in remission, hx of [facility] in 1995 for sepsis d/t colonic perforation s/p ovarian cyst removal with subsequent reversal, HTN, HLD, PAD on xarelto, diverticulitis presents w/ 2 weeks of crampy abdominal pain. Colorectal surgery consulted for OP CT findings of acute proximal sigmoid diverticulitis with adjacent left psoas abscess 1.5 x 1.1 x 1.cm w/ mild left hydronephrosis.    PLAN:   - No acute colorectal surgical intervention  - Recommend medicine admission for IV abx  - NPO/IVF  - Pain control  - IR consult for possible abscess drainage  - Colorectal surgery will follow      Patient to be discussed with colorectal attending.    Shilpi Cullen, PGY-2  [facility]  [id number] ASSESSMENT: 74y/o F w/ PMHx breast cancer s/p R lumpectomy, bladder cancer w/ spinal mets s/p TURBT, in remission, hx of [facility] in 1995 for sepsis d/t colonic perforation s/p ovarian cyst removal with subsequent reversal, HTN, HLD, PAD on xarelto, diverticulitis presents w/ 2 weeks of crampy abdominal pain. Colorectal surgery consulted for OP CT findings of acute proximal sigmoid diverticulitis with adjacent left psoas abscess 1.5 x 1.1 x 1.cm w/ mild left hydronephrosis.    PLAN:   - No acute colorectal surgical intervention  - Recommend medicine admission for IV abx  - NPO/IVF  - Pain control  - IR consult for possible abscess drainage  - Colorectal surgery will follow      Patient to be discussed with colorectal attending.    Shilpi Cullen, PGY-2  [facility]  [id number] ASSESSMENT: 74y/o F w/ PMHx breast cancer s/p R lumpectomy, bladder cancer w/ spinal mets s/p TURBT, in remission, hx of [facility] in 1995 for sepsis d/t colonic perforation s/p ovarian cyst removal with subsequent reversal, HTN, HLD, PAD on xarelto, diverticulitis presents w/ 2 weeks of crampy abdominal pain. Colorectal surgery consulted for OP CT findings of acute proximal sigmoid diverticulitis with adjacent left psoas abscess 1.5 x 1.1 x 1.cm w/ mild left hydronephrosis.    PLAN:   - No acute colorectal surgical intervention  - Recommend medicine admission for IV abx  - NPO/IVF  - Serial abdominal exams  - Pain control  - IR consult for possible abscess drainage  - Colorectal surgery will follow      Patient discussed with colorectal attending, Dr. Figueroa.    Shilpi Cullen, PGY-2  [facility]  [id number] ASSESSMENT: 72y/o F w/ PMHx breast cancer s/p R lumpectomy, bladder cancer w/ spinal mets s/p TURBT, in remission, hx of [facility] in 1995 for sepsis d/t colonic perforation s/p ovarian cyst removal with subsequent reversal, HTN, HLD, PAD on xarelto, diverticulitis presents w/ 2 weeks of crampy abdominal pain. Colorectal surgery consulted for OP CT findings of acute proximal sigmoid diverticulitis with adjacent left psoas abscess 1.5 x 1.1 x 1.cm w/ mild left hydronephrosis.    PLAN:   - No acute colorectal surgical intervention  - Recommend medicine admission for IV abx  - NPO/IVF  - Serial abdominal exams  - Pain control  - IR consult for possible abscess drainage  - Colorectal surgery will follow      Patient discussed with colorectal attending, Dr. Figueroa.    Shilpi Cullen, PGY-2  [facility]  [id number]

## 2024-01-09 NOTE — PATIENT PROFILE ADULT - FUNCTIONAL ASSESSMENT - BASIC MOBILITY 6.
3-calculated by average/Not able to assess (calculate score using [facility] averaging method)  3-calculated by average/Not able to assess (calculate score using [facility] averaging method)

## 2024-01-09 NOTE — PATIENT PROFILE ADULT - FALL HARM RISK - HARM RISK INTERVENTIONS
Assistance with ambulation/Assistance OOB with selected safe patient handling equipment/Communicate Risk of Fall with Harm to all staff/Discuss with provider need for PT consult/Monitor gait and stability/Provide patient with walking aids - walker, cane, crutches/Reinforce activity limits and safety measures with patient and family/Tailored Fall Risk Interventions/Visual Cue: Yellow wristband and red socks/Bed in lowest position, wheels locked, appropriate side rails in place/Call bell, personal items and telephone in reach/Instruct patient to call for assistance before getting out of bed or chair/Non-slip footwear when patient is out of bed/[place] to call system/Physically safe environment - no spills, clutter or unnecessary equipment/Purposeful Proactive Rounding/Room/bathroom lighting operational, light cord in reach Assistance with ambulation/Assistance OOB with selected safe patient handling equipment/Communicate Risk of Fall with Harm to all staff/Discuss with provider need for PT consult/Monitor gait and stability/Provide patient with walking aids - walker, cane, crutches/Reinforce activity limits and safety measures with patient and family/Tailored Fall Risk Interventions/Visual Cue: Yellow wristband and red socks/Bed in lowest position, wheels locked, appropriate side rails in place/Call bell, personal items and telephone in reach/Instruct patient to call for assistance before getting out of bed or chair/Non-slip footwear when patient is out of bed/[place] to call system/Physically safe environment - no spills, clutter or unnecessary equipment/Purposeful Proactive Rounding/Room/bathroom lighting operational, light cord in reach

## 2024-01-09 NOTE — ED PROVIDER NOTE - OBJECTIVE STATEMENT
73F w/ PMH HTN, HLD, PAD on Xarelto, breast cancer s/p lumpectomy, bladder cancer w/ mets to the spine in remission who presents to [facility] ED on 1/8/2024 with 3 week hx LLQ abdominal pain, found in outpatient CT A/P (1/8) to have acute diverticulitis (sigmoid) and psoas abscess with mild obstructive L hydronephrosis.    Pt was borderline febrile in ED (37.8C). Appears in discomfort, states that her LLQ started about 3 weeks ago, and initially thought was "gas pain." The abdominal pain progressively worsened, and outpatient workup by GI led to CT scan (1/8), which revealed the above findings--pt was then sent to ED for further management. Pt endorses nausea with PO intake, no emesis. Unintended weight loss >20 lb in the last several months. +BM, denies melena or hematochezia. Denies chills, vomiting, lightheadedness, dizziness, chest pain, SOB, dysuria, hematuria. 73F w/ PMH HTN, HLD, PAD on Xarelto, breast cancer s/p lumpectomy, bladder cancer w/ mets to the spine in remission who presents to [facility] ED on 1/8/2024 with 3 week hx LLQ abdominal pain, found in outpatient CT A/P (1/8) to have acute diverticulitis (sigmoid) and psoas abscess with mild obstructive L hydronephrosis.    Pt was borderline febrile in ED (37.8C). Appears in discomfort, states that her LLQ started about 3 weeks ago, and initially thought was "gas pain." The abdominal pain progressively worsened, and outpatient workup by GI led to CT scan (1/8), which revealed the above findings--pt was then sent to ED for further management. Pt endorses nausea with PO intake, no emesis. Unintended weight loss >20 lb in the last several months. +BM, denies melena or hematochezia. Denies chills, vomiting, lightheadedness, dizziness, chest pain, SOB, dysuria, hematuria.

## 2024-01-09 NOTE — H&P ADULT - NSHPREVIEWOFSYSTEMS_GEN_ALL_CORE
Review of Systems:   CONSTITUTIONAL: No fever, weight loss  EYES: No eye pain, visual disturbances, or discharge  ENMT:  No difficulty hearing, tinnitus, vertigo; No sinus or throat pain  RESPIRATORY: No SOB. No cough, wheezing, chills or hemoptysis  CARDIOVASCULAR: No chest pain, palpitations, dizziness, or leg swelling  GASTROINTESTINAL: No abdominal or epigastric pain. No nausea, vomiting, or hematemesis; No diarrhea or constipation. No melena or hematochezia.  GENITOURINARY: No dysuria, frequency, hematuria, or incontinence  NEUROLOGICAL: No headaches, memory loss, loss of strength, numbness, or tremors  SKIN: No itching, burning, rashes, or lesions   LYMPH NODES: No enlarged glands  ENDOCRINE: No heat or cold intolerance; No hair loss  MUSCULOSKELETAL: No joint pain or swelling; No muscle, back pain  PSYCHIATRIC: No depression, anxiety, mood swings, or difficulty sleeping  HEME/LYMPH: No easy bruising, or bleeding gums Review of Systems:   CONSTITUTIONAL: No fever, weight loss  EYES: No eye pain, visual disturbances, or discharge  RESPIRATORY: No SOB. No cough, wheezing, chills or hemoptysis  CARDIOVASCULAR: No chest pain, palpitations, dizziness, or leg swelling  GASTROINTESTINAL: No abdominal or epigastric pain. No nausea, vomiting, or hematemesis; No diarrhea or constipation. No melena or hematochezia.  GENITOURINARY: No dysuria, frequency, hematuria, or incontinence  NEUROLOGICAL: No headaches, memory loss, loss of strength, numbness, or tremors  SKIN: No itching, burning, rashes, or lesions   MUSCULOSKELETAL: No joint pain or swelling; No muscle, back pain  PSYCHIATRIC: No depression, anxiety, mood swings, or difficulty sleeping Review of Systems:   CONSTITUTIONAL: No fever, weight loss  EYES: No eye pain, visual disturbances, or discharge  RESPIRATORY: No SOB. No cough, wheezing, chills or hemoptysis  CARDIOVASCULAR: No chest pain, palpitations, dizziness, or leg swelling  GASTROINTESTINAL: +Abdominal pain, nausea, no vomiting; No diarrhea or constipation. No melena or hematochezia.  GENITOURINARY: No dysuria, frequency, hematuria, or incontinence  NEUROLOGICAL: No headaches, memory loss, loss of strength, numbness, or tremors  SKIN: No itching, burning, rashes, or lesions   MUSCULOSKELETAL: + left thigh pain; No joint pain or swelling  PSYCHIATRIC: No depression, anxiety, mood swings, or difficulty sleeping

## 2024-01-10 ENCOUNTER — NON-APPOINTMENT (OUTPATIENT)
Age: 74
End: 2024-01-10

## 2024-01-10 LAB
ANION GAP SERPL CALC-SCNC: 9 MMOL/L — SIGNIFICANT CHANGE UP (ref 5–17)
ANION GAP SERPL CALC-SCNC: 9 MMOL/L — SIGNIFICANT CHANGE UP (ref 5–17)
BUN SERPL-MCNC: 7 MG/DL — SIGNIFICANT CHANGE UP (ref 7–23)
BUN SERPL-MCNC: 7 MG/DL — SIGNIFICANT CHANGE UP (ref 7–23)
CALCIUM SERPL-MCNC: 9.4 MG/DL — SIGNIFICANT CHANGE UP (ref 8.4–10.5)
CALCIUM SERPL-MCNC: 9.4 MG/DL — SIGNIFICANT CHANGE UP (ref 8.4–10.5)
CHLORIDE SERPL-SCNC: 102 MMOL/L — SIGNIFICANT CHANGE UP (ref 96–108)
CHLORIDE SERPL-SCNC: 102 MMOL/L — SIGNIFICANT CHANGE UP (ref 96–108)
CO2 SERPL-SCNC: 30 MMOL/L — SIGNIFICANT CHANGE UP (ref 22–31)
CO2 SERPL-SCNC: 30 MMOL/L — SIGNIFICANT CHANGE UP (ref 22–31)
CREAT SERPL-MCNC: 0.63 MG/DL — SIGNIFICANT CHANGE UP (ref 0.5–1.3)
CREAT SERPL-MCNC: 0.63 MG/DL — SIGNIFICANT CHANGE UP (ref 0.5–1.3)
CULTURE RESULTS: SIGNIFICANT CHANGE UP
CULTURE RESULTS: SIGNIFICANT CHANGE UP
EGFR: 93 ML/MIN/1.73M2 — SIGNIFICANT CHANGE UP
EGFR: 93 ML/MIN/1.73M2 — SIGNIFICANT CHANGE UP
GLUCOSE SERPL-MCNC: 88 MG/DL — SIGNIFICANT CHANGE UP (ref 70–99)
GLUCOSE SERPL-MCNC: 88 MG/DL — SIGNIFICANT CHANGE UP (ref 70–99)
HCT VFR BLD CALC: 32.7 % — LOW (ref 34.5–45)
HCT VFR BLD CALC: 32.7 % — LOW (ref 34.5–45)
HGB BLD-MCNC: 10 G/DL — LOW (ref 11.5–15.5)
HGB BLD-MCNC: 10 G/DL — LOW (ref 11.5–15.5)
MCHC RBC-ENTMCNC: 26 PG — LOW (ref 27–34)
MCHC RBC-ENTMCNC: 26 PG — LOW (ref 27–34)
MCHC RBC-ENTMCNC: 30.6 GM/DL — LOW (ref 32–36)
MCHC RBC-ENTMCNC: 30.6 GM/DL — LOW (ref 32–36)
MCV RBC AUTO: 85.2 FL — SIGNIFICANT CHANGE UP (ref 80–100)
MCV RBC AUTO: 85.2 FL — SIGNIFICANT CHANGE UP (ref 80–100)
NRBC # BLD: 0 /100 WBCS — SIGNIFICANT CHANGE UP (ref 0–0)
NRBC # BLD: 0 /100 WBCS — SIGNIFICANT CHANGE UP (ref 0–0)
PLATELET # BLD AUTO: 290 K/UL — SIGNIFICANT CHANGE UP (ref 150–400)
PLATELET # BLD AUTO: 290 K/UL — SIGNIFICANT CHANGE UP (ref 150–400)
POTASSIUM SERPL-MCNC: 3.5 MMOL/L — SIGNIFICANT CHANGE UP (ref 3.5–5.3)
POTASSIUM SERPL-MCNC: 3.5 MMOL/L — SIGNIFICANT CHANGE UP (ref 3.5–5.3)
POTASSIUM SERPL-SCNC: 3.5 MMOL/L — SIGNIFICANT CHANGE UP (ref 3.5–5.3)
POTASSIUM SERPL-SCNC: 3.5 MMOL/L — SIGNIFICANT CHANGE UP (ref 3.5–5.3)
RBC # BLD: 3.84 M/UL — SIGNIFICANT CHANGE UP (ref 3.8–5.2)
RBC # BLD: 3.84 M/UL — SIGNIFICANT CHANGE UP (ref 3.8–5.2)
RBC # FLD: 16.7 % — HIGH (ref 10.3–14.5)
RBC # FLD: 16.7 % — HIGH (ref 10.3–14.5)
SODIUM SERPL-SCNC: 141 MMOL/L — SIGNIFICANT CHANGE UP (ref 135–145)
SODIUM SERPL-SCNC: 141 MMOL/L — SIGNIFICANT CHANGE UP (ref 135–145)
SPECIMEN SOURCE: SIGNIFICANT CHANGE UP
SPECIMEN SOURCE: SIGNIFICANT CHANGE UP
WBC # BLD: 6.51 K/UL — SIGNIFICANT CHANGE UP (ref 3.8–10.5)
WBC # BLD: 6.51 K/UL — SIGNIFICANT CHANGE UP (ref 3.8–10.5)
WBC # FLD AUTO: 6.51 K/UL — SIGNIFICANT CHANGE UP (ref 3.8–10.5)
WBC # FLD AUTO: 6.51 K/UL — SIGNIFICANT CHANGE UP (ref 3.8–10.5)

## 2024-01-10 RX ORDER — HYOSCYAMINE SULFATE 0.13 MG
0.12 TABLET ORAL EVERY 6 HOURS
Refills: 0 | Status: DISCONTINUED | OUTPATIENT
Start: 2024-01-10 | End: 2024-01-12

## 2024-01-10 RX ORDER — SODIUM CHLORIDE 9 MG/ML
1000 INJECTION, SOLUTION INTRAVENOUS
Refills: 0 | Status: DISCONTINUED | OUTPATIENT
Start: 2024-01-10 | End: 2024-01-14

## 2024-01-10 RX ADMIN — PIPERACILLIN AND TAZOBACTAM 25 GRAM(S): 4; .5 INJECTION, POWDER, LYOPHILIZED, FOR SOLUTION INTRAVENOUS at 22:20

## 2024-01-10 RX ADMIN — PIPERACILLIN AND TAZOBACTAM 25 GRAM(S): 4; .5 INJECTION, POWDER, LYOPHILIZED, FOR SOLUTION INTRAVENOUS at 06:07

## 2024-01-10 RX ADMIN — AMLODIPINE BESYLATE 2.5 MILLIGRAM(S): 2.5 TABLET ORAL at 22:21

## 2024-01-10 RX ADMIN — HYDROMORPHONE HYDROCHLORIDE 0.2 MILLIGRAM(S): 2 INJECTION INTRAMUSCULAR; INTRAVENOUS; SUBCUTANEOUS at 06:43

## 2024-01-10 RX ADMIN — CILOSTAZOL 50 MILLIGRAM(S): 100 TABLET ORAL at 06:07

## 2024-01-10 RX ADMIN — RIVAROXABAN 2.5 MILLIGRAM(S): KIT at 17:25

## 2024-01-10 RX ADMIN — HYDROMORPHONE HYDROCHLORIDE 0.2 MILLIGRAM(S): 2 INJECTION INTRAMUSCULAR; INTRAVENOUS; SUBCUTANEOUS at 07:13

## 2024-01-10 RX ADMIN — PIPERACILLIN AND TAZOBACTAM 25 GRAM(S): 4; .5 INJECTION, POWDER, LYOPHILIZED, FOR SOLUTION INTRAVENOUS at 13:12

## 2024-01-10 RX ADMIN — PANTOPRAZOLE SODIUM 40 MILLIGRAM(S): 20 TABLET, DELAYED RELEASE ORAL at 06:07

## 2024-01-10 RX ADMIN — ATORVASTATIN CALCIUM 10 MILLIGRAM(S): 80 TABLET, FILM COATED ORAL at 22:20

## 2024-01-10 RX ADMIN — Medication 0.12 MILLIGRAM(S): at 17:24

## 2024-01-10 RX ADMIN — RIVAROXABAN 2.5 MILLIGRAM(S): KIT at 06:07

## 2024-01-10 NOTE — PROGRESS NOTE ADULT - ASSESSMENT
diverticulitis  abnormal imaging     CT noted with diverticulitis and small air containing abscess   IR consult noted; too small to drain   IVF  cont IV ABx  f/u cultures   cont CLD  will add levsin for spasms   will need colonoscopy in 6-8 weeks  will follow  d/w pt    I reviewed the overnight course of events on the unit, re-confirming the patient history. I discussed the care with the patient and their family  The plan of care was discussed with the physician assistant and modifications were made to the notation where appropriate.   Differential diagnosis and plan of care discussed with patient after the evaluation  50 minutes spent on total encounter of which more than fifty percent of the encounter was spent counseling and/or coordinating care by the attending physician.  Advanced care planning was discussed with patient and family.  Advanced care planning forms were reviewed and discussed.  Risks, benefits and alternatives of gastroenterologic procedures were discussed in detail and all questions were answered.

## 2024-01-10 NOTE — PROGRESS NOTE ADULT - SUBJECTIVE AND OBJECTIVE BOX
Date of Service  : 01-10-24     INTERVAL HPI/OVERNIGHT EVENTS: I feel slightly better.   Vital Signs Last 24 Hrs  T(C): 36.9 (10 Oh 2024 07:10), Max: 37.1 (09 Jan 2024 23:35)  T(F): 98.4 (10 Oh 2024 07:10), Max: 98.8 (09 Jan 2024 23:35)  HR: 78 (10 Oh 2024 10:49) (65 - 78)  BP: 115/77 (10 Oh 2024 07:10) (108/66 - 125/75)  BP(mean): --  RR: 18 (10 Oh 2024 07:10) (18 - 18)  SpO2: 95% (10 Oh 2024 10:49) (93% - 97%)    Parameters below as of 10 Oh 2024 07:10  Patient On (Oxygen Delivery Method): room air      I&O's Summary    MEDICATIONS  (STANDING):  amLODIPine   Tablet 2.5 milliGRAM(s) Oral at bedtime  atorvastatin 10 milliGRAM(s) Oral at bedtime  cilostazol 50 milliGRAM(s) Oral two times a day  influenza  Vaccine (HIGH DOSE) 0.7 milliLiter(s) IntraMuscular once  lactated ringers. 1000 milliLiter(s) (100 mL/Hr) IV Continuous <Continuous>  pantoprazole    Tablet 40 milliGRAM(s) Oral before breakfast  piperacillin/tazobactam IVPB.. 3.375 Gram(s) IV Intermittent every 8 hours  rivaroxaban 2.5 milliGRAM(s) Oral two times a day    MEDICATIONS  (PRN):  acetaminophen     Tablet .. 650 milliGRAM(s) Oral every 6 hours PRN Temp greater or equal to 38C (100.4F), Mild Pain (1 - 3)  HYDROmorphone  Injectable 0.2 milliGRAM(s) IV Push every 4 hours PRN Severe Pain (7 - 10)  hyoscyamine SL 0.125 milliGRAM(s) SubLingual every 6 hours PRN pain  melatonin 3 milliGRAM(s) Oral at bedtime PRN Insomnia  ondansetron Injectable 4 milliGRAM(s) IV Push every 8 hours PRN Nausea and/or Vomiting    LABS:                        10.0   6.51  )-----------( 290      ( 10 Oh 2024 08:43 )             32.7     01-10    141  |  102  |  7   ----------------------------<  88  3.5   |  30  |  0.63    Ca    9.4      10 Oh 2024 08:43    TPro  6.3  /  Alb  3.0<L>  /  TBili  0.3  /  DBili  x   /  AST  23  /  ALT  19  /  AlkPhos  96  01-09    PT/INR - ( 09 Jan 2024 06:47 )   PT: 13.8 sec;   INR: 1.26 ratio         PTT - ( 09 Jan 2024 01:45 )  PTT:31.0 sec  Urinalysis Basic - ( 10 Oh 2024 08:43 )    Color: x / Appearance: x / SG: x / pH: x  Gluc: 88 mg/dL / Ketone: x  / Bili: x / Urobili: x   Blood: x / Protein: x / Nitrite: x   Leuk Esterase: x / RBC: x / WBC x   Sq Epi: x / Non Sq Epi: x / Bacteria: x      CAPILLARY BLOOD GLUCOSE            Urinalysis Basic - ( 10 Oh 2024 08:43 )    Color: x / Appearance: x / SG: x / pH: x  Gluc: 88 mg/dL / Ketone: x  / Bili: x / Urobili: x   Blood: x / Protein: x / Nitrite: x   Leuk Esterase: x / RBC: x / WBC x   Sq Epi: x / Non Sq Epi: x / Bacteria: x      REVIEW OF SYSTEMS:  CONSTITUTIONAL: No fever, weight loss, or fatigue  EYES: No eye pain, visual disturbances, or discharge  ENMT:  No difficulty hearing, tinnitus, vertigo; No sinus or throat pain  NECK: No pain or stiffness  RESPIRATORY: No cough, wheezing, chills or hemoptysis; No shortness of breath  CARDIOVASCULAR: No chest pain, palpitations, dizziness, or leg swelling  GASTROINTESTINAL: No abdominal or epigastric pain. No nausea, vomiting, or hematemesis; No diarrhea or constipation. No melena or hematochezia.  GENITOURINARY: No dysuria, frequency, hematuria, or incontinence  NEUROLOGICAL: No headaches, memory loss, loss of strength, numbness, or tremors  SKIN: No itching, burning, rashes, or lesions   ENDOCRINE: No heat or cold intolerance; No hair loss  MUSCULOSKELETAL: No joint pain or swelling; No muscle, back, or extremity pain  PSYCHIATRIC: No depression, anxiety, mood swings, or difficulty sleeping  HEME/LYMPH: No easy bruising, or bleeding gums  ALLERY AND IMMUNOLOGIC: No hives or eczema    RADIOLOGY & ADDITIONAL TESTS:    Consultant(s) Notes Reviewed:  [x ] YES  [ ] NO    PHYSICAL EXAM:  GENERAL: NAD, well-groomed, well-developed,not in any distress ,  HEAD:  Atraumatic, Normocephalic  EYES: EOMI, PERRLA, conjunctiva and sclera clear  ENMT: No tonsillar erythema, exudates, or enlargement; Moist mucous membranes, Good dentition, No lesions  NECK: Supple, No JVD, Normal thyroid  NERVOUS SYSTEM:  Alert & Oriented X3, No focal deficit   CHEST/LUNG: Good air entry bilateral with no  rales, rhonchi, wheezing, or rubs  HEART: Regular rate and rhythm; No murmurs, rubs, or gallops  ABDOMEN: Soft,LLQ tender, Nondistended; Bowel sounds present  EXTREMITIES:  2+ Peripheral Pulses, No clubbing, cyanosis, or edema    Care Discussed with Consultants/Other Providers [ x] YES  [ ] NO

## 2024-01-10 NOTE — PROGRESS NOTE ADULT - SUBJECTIVE AND OBJECTIVE BOX
[facility]  Gama Cardona PA-C  [address] [place] [address]  [phone]      INTERVAL HPI/OVERNIGHT EVENTS:  pt seen and examined, no new events  reports cramping LLQ pain  reports pain with CLD      MEDICATIONS  (STANDING):  amLODIPine   Tablet 2.5 milliGRAM(s) Oral at bedtime  atorvastatin 10 milliGRAM(s) Oral at bedtime  cilostazol 50 milliGRAM(s) Oral two times a day  influenza  Vaccine (HIGH DOSE) 0.7 milliLiter(s) IntraMuscular once  lactated ringers. 1000 milliLiter(s) (100 mL/Hr) IV Continuous <Continuous>  pantoprazole    Tablet 40 milliGRAM(s) Oral before breakfast  piperacillin/tazobactam IVPB.. 3.375 Gram(s) IV Intermittent every 8 hours  rivaroxaban 2.5 milliGRAM(s) Oral two times a day    MEDICATIONS  (PRN):  acetaminophen     Tablet .. 650 milliGRAM(s) Oral every 6 hours PRN Temp greater or equal to 38C (100.4F), Mild Pain (1 - 3)  HYDROmorphone  Injectable 0.2 milliGRAM(s) IV Push every 4 hours PRN Severe Pain (7 - 10)  hyoscyamine SL 0.125 milliGRAM(s) SubLingual every 6 hours PRN pain  melatonin 3 milliGRAM(s) Oral at bedtime PRN Insomnia  ondansetron Injectable 4 milliGRAM(s) IV Push every 8 hours PRN Nausea and/or Vomiting      Allergies    sulfa drugs (Unknown)    Intolerances        ROS:   General:  No wt loss, fevers, chills, night sweats, fatigue,   Eyes:  Good vision, no reported pain  ENT:  No sore throat, pain, runny nose, dysphagia  CV:  No pain, palpitations, hypo/hypertension  Resp:  No dyspnea, cough, tachypnea, wheezing  GI:  + pain, No nausea, No vomiting, No diarrhea, No constipation, No weight loss, No fever, No pruritis, No rectal bleeding, No tarry stools, No dysphagia,  :  No pain, bleeding, incontinence, nocturia  Muscle:  No pain, weakness  Neuro:  No weakness, tingling, memory problems  Psych:  No fatigue, insomnia, mood problems, depression  Endocrine:  No polyuria, polydipsia, cold/heat intolerance  Heme:  No petechiae, ecchymosis, easy bruisability  Skin:  No rash, tattoos, scars, edema      PHYSICAL EXAM:   Vital Signs:  Vital Signs Last 24 Hrs  T(C): 36.9 (10 Oh 2024 07:10), Max: 37.1 (2024 23:35)  T(F): 98.4 (10 Oh 2024 07:10), Max: 98.8 (2024 23:35)  HR: 78 (10 Oh 2024 10:49) (59 - 78)  BP: 115/77 (10 Oh 2024 07:10) (108/66 - 125/75)  BP(mean): --  RR: 18 (10 Oh 2024 07:10) (18 - 18)  SpO2: 95% (10 Oh 2024 10:49) (93% - 97%)    Parameters below as of 10 Oh 2024 07:10  Patient On (Oxygen Delivery Method): room air      Daily Height in cm: 166 (10 Oh 2024 01:45)    Daily Weight in k.7 (10 Oh 2024 07:10)    GENERAL:  Appears stated age,   HEENT:  NC/AT,    CHEST:  Full & symmetric excursion,   HEART:  Regular rhythm,  ABDOMEN:  Soft, non-tender, non-distended,  EXTEREMITIES:  no cyanosis  SKIN:  No rash  NEURO:  Alert,       LABS:                        10.0   6.51  )-----------( 290      ( 10 Oh 2024 08:43 )             32.7     01-10    141  |  102  |  7   ----------------------------<  88  3.5   |  30  |  0.63    Ca    9.4      10 Oh 2024 08:43    TPro  6.3  /  Alb  3.0<L>  /  TBili  0.3  /  DBili  x   /  AST  23  /  ALT  19  /  AlkPhos  96  01-09    PT/INR - ( 2024 06:47 )   PT: 13.8 sec;   INR: 1.26 ratio         PTT - ( 2024 01:45 )  PTT:31.0 sec  Urinalysis Basic - ( 10 Oh 2024 08:43 )    Color: x / Appearance: x / SG: x / pH: x  Gluc: 88 mg/dL / Ketone: x  / Bili: x / Urobili: x   Blood: x / Protein: x / Nitrite: x   Leuk Esterase: x / RBC: x / WBC x   Sq Epi: x / Non Sq Epi: x / Bacteria: x        RADIOLOGY & ADDITIONAL TESTS:   [facility]  Gama Cardona PA-C  [address] [place] [address]  [phone]      INTERVAL HPI/OVERNIGHT EVENTS:  pt seen and examined, no new events  reports cramping LLQ pain  reports pain with CLD      MEDICATIONS  (STANDING):  amLODIPine   Tablet 2.5 milliGRAM(s) Oral at bedtime  atorvastatin 10 milliGRAM(s) Oral at bedtime  cilostazol 50 milliGRAM(s) Oral two times a day  influenza  Vaccine (HIGH DOSE) 0.7 milliLiter(s) IntraMuscular once  lactated ringers. 1000 milliLiter(s) (100 mL/Hr) IV Continuous <Continuous>  pantoprazole    Tablet 40 milliGRAM(s) Oral before breakfast  piperacillin/tazobactam IVPB.. 3.375 Gram(s) IV Intermittent every 8 hours  rivaroxaban 2.5 milliGRAM(s) Oral two times a day    MEDICATIONS  (PRN):  acetaminophen     Tablet .. 650 milliGRAM(s) Oral every 6 hours PRN Temp greater or equal to 38C (100.4F), Mild Pain (1 - 3)  HYDROmorphone  Injectable 0.2 milliGRAM(s) IV Push every 4 hours PRN Severe Pain (7 - 10)  hyoscyamine SL 0.125 milliGRAM(s) SubLingual every 6 hours PRN pain  melatonin 3 milliGRAM(s) Oral at bedtime PRN Insomnia  ondansetron Injectable 4 milliGRAM(s) IV Push every 8 hours PRN Nausea and/or Vomiting      Allergies    sulfa drugs (Unknown)    Intolerances        ROS:   General:  No wt loss, fevers, chills, night sweats, fatigue,   Eyes:  Good vision, no reported pain  ENT:  No sore throat, pain, runny nose, dysphagia  CV:  No pain, palpitations, hypo/hypertension  Resp:  No dyspnea, cough, tachypnea, wheezing  GI:  + pain, No nausea, No vomiting, No diarrhea, No constipation, No weight loss, No fever, No pruritis, No rectal bleeding, No tarry stools, No dysphagia,  :  No pain, bleeding, incontinence, nocturia  Muscle:  No pain, weakness  Neuro:  No weakness, tingling, memory problems  Psych:  No fatigue, insomnia, mood problems, depression  Endocrine:  No polyuria, polydipsia, cold/heat intolerance  Heme:  No petechiae, ecchymosis, easy bruisability  Skin:  No rash, tattoos, scars, edema      PHYSICAL EXAM:   Vital Signs:  Vital Signs Last 24 Hrs  T(C): 36.9 (10 Oh 2024 07:10), Max: 37.1 (2024 23:35)  T(F): 98.4 (10 Oh 2024 07:10), Max: 98.8 (2024 23:35)  HR: 78 (10 Oh 2024 10:49) (59 - 78)  BP: 115/77 (10 Oh 2024 07:10) (108/66 - 125/75)  BP(mean): --  RR: 18 (10 Oh 2024 07:10) (18 - 18)  SpO2: 95% (10 Oh 2024 10:49) (93% - 97%)    Parameters below as of 10 Oh 2024 07:10  Patient On (Oxygen Delivery Method): room air      Daily Height in cm: 166 (10 Oh 2024 01:45)    Daily Weight in k.7 (10 Oh 2024 07:10)    GENERAL:  Appears stated age,   HEENT:  NC/AT,    CHEST:  Full & symmetric excursion,   HEART:  Regular rhythm,  ABDOMEN:  Soft, non-tender, non-distended,  EXTEREMITIES:  no cyanosis  SKIN:  No rash  NEURO:  Alert,       LABS:                        10.0   6.51  )-----------( 290      ( 10 Oh 2024 08:43 )             32.7     01-10    141  |  102  |  7   ----------------------------<  88  3.5   |  30  |  0.63    Ca    9.4      10 Oh 2024 08:43    TPro  6.3  /  Alb  3.0<L>  /  TBili  0.3  /  DBili  x   /  AST  23  /  ALT  19  /  AlkPhos  96  01-09    PT/INR - ( 2024 06:47 )   PT: 13.8 sec;   INR: 1.26 ratio         PTT - ( 2024 01:45 )  PTT:31.0 sec  Urinalysis Basic - ( 10 Oh 2024 08:43 )    Color: x / Appearance: x / SG: x / pH: x  Gluc: 88 mg/dL / Ketone: x  / Bili: x / Urobili: x   Blood: x / Protein: x / Nitrite: x   Leuk Esterase: x / RBC: x / WBC x   Sq Epi: x / Non Sq Epi: x / Bacteria: x        RADIOLOGY & ADDITIONAL TESTS:

## 2024-01-10 NOTE — PROGRESS NOTE ADULT - SUBJECTIVE AND OBJECTIVE BOX
SURGERY DAILY PROGRESS NOTE    --------------- OVERNIGHT/INTERVAL---------------  - No acute events overnight    --------------- SUBJECTIVE ---------------  - Patient seen and examined at bedside with surgical team    --------------- OBJECTIVE ---------------  -----VITALS-----  T(C): 36.9, Max: 37.1 (01-09)  T(F): 98.4, Max: 98.8 (01-09)  HR: 72 (59 - 72)  BP: 115/77 (108/66 - 125/80)  BP(mean): --  ABP: --  ABP(mean): --  RR: 18 (18 - 18)  SpO2: 97% (93% - 98%)  CVP(mm Hg): --  CVP(cm H2O): --  room air          -----PHYSICAL EXAM-----  GENERAL: NAD, lying in bed   NEURO: AOx3, awake alert appropriate  HEENT: NCAT, trachea midline  PULM: Respirations non-labored  ABD: Soft, non-tender, non-distended, no peritonitis/rebound tenderness  EXT: Warm, well perfused    -----DRAINS-----  JESSICA:    Chest Tube:    NG Tube:   -----INs & OUTs-----    -----MEDICATIONS-----  STANDING  amLODIPine   Tablet 2.5 milliGRAM(s) Oral at bedtime  atorvastatin 10 milliGRAM(s) Oral at bedtime  cilostazol 50 milliGRAM(s) Oral two times a day  influenza  Vaccine (HIGH DOSE) 0.7 milliLiter(s) IntraMuscular once  lactated ringers. 1000 milliLiter(s) (100 mL/Hr) IV Continuous <Continuous>  pantoprazole    Tablet 40 milliGRAM(s) Oral before breakfast  piperacillin/tazobactam IVPB.. 3.375 Gram(s) IV Intermittent every 8 hours  rivaroxaban 2.5 milliGRAM(s) Oral two times a day    PRN  acetaminophen     Tablet .. 650 milliGRAM(s) Oral every 6 hours PRN Temp greater or equal to 38C (100.4F), Mild Pain (1 - 3)  HYDROmorphone  Injectable 0.2 milliGRAM(s) IV Push every 4 hours PRN Severe Pain (7 - 10)  melatonin 3 milliGRAM(s) Oral at bedtime PRN Insomnia  ondansetron Injectable 4 milliGRAM(s) IV Push every 8 hours PRN Nausea and/or Vomiting    -----LABS-----  CBC (01-09 @ 06:47)                              9.5<L>                         5.67    )----------------(  202        51.8  % Neutrophils, 29.8  % Lymphocytes, ANC: 2.94                                31.3<L>  CBC (01-09 @ 01:45)                              10.5<L>                         7.00    )----------------(  262        59.6  % Neutrophils, 23.3  % Lymphocytes, ANC: 4.17                                34.2<L>    BMP (01-09 @ 06:47)             140     |  106     |  9     		Ca++ --      Ca 8.7                ---------------------------------( 113<H>		Mg --                 3.5     |  26      |  0.56  			Ph --      BMP (01-09 @ 01:45)             141     |  103     |  12    		Ca++ --      Ca 9.5                ---------------------------------( 101<H>		Mg --                 3.4<L>  |  27      |  0.60  			Ph --        LFTs (01-09 @ 06:47)      TPro 6.3 / Alb 3.0<L> / TBili 0.3 / DBili -- / AST 23 / ALT 19 / AlkPhos 96  LFTs (01-09 @ 01:45)      TPro 7.4 / Alb 3.7 / TBili 0.3 / DBili -- / AST 30 / ALT 22 / AlkPhos 119    Coags (01-09 @ 06:47)  aPTT -- / INR 1.26<H> / PT 13.8<H>  Coags (01-09 @ 01:45)  aPTT 31.0 / INR 1.43<H> / PT 14.9<H>        VBG (01-09 @ 01:25)     7.39 / 46<H> / 32 / 28 / 2.3 / 45.2<L>%     Lactate: 1.3    Urinalysis (01-09 @ 06:47):     Color:  / Appearance:  / SG:  / pH:  / Gluc: 113<H> / Ketones:  / Bili:  / Urobili:  / Protein : / Nitrites:  / Leuk.Est:  / RBC:  / WBC:  / Sq Epi:  / Non Sq Epi:  / Bacteria        -> .Blood Blood-Peripheral Culture (01-09 @ 01:25)     NG    NG    No growth at 24 hours    -> .Blood Blood-Peripheral Culture (01-09 @ 01:10)     NG    NG    No growth at 24 hours

## 2024-01-10 NOTE — PROGRESS NOTE ADULT - ASSESSMENT
74y/o F w/ PMHx breast cancer s/p R lumpectomy, bladder cancer w/ spinal mets s/p TURBT, in remission, hx of [facility] in 1995 for sepsis d/t colonic perforation s/p ovarian cyst removal with subsequent reversal, HTN, HLD, PAD on xarelto, diverticulitis presents w/ 2 weeks of crampy abdominal pain. Colorectal surgery consulted for OP CT findings of acute proximal sigmoid diverticulitis with adjacent left psoas abscess 1.5 x 1.1 x 1.cm w/ mild left hydronephrosis.    PLAN:   - No acute colorectal surgical intervention  - NPO/IVF  - Serial abdominal exams  - Pain control    [facility]  [id number]   72y/o F w/ PMHx breast cancer s/p R lumpectomy, bladder cancer w/ spinal mets s/p TURBT, in remission, hx of [facility] in 1995 for sepsis d/t colonic perforation s/p ovarian cyst removal with subsequent reversal, HTN, HLD, PAD on xarelto, diverticulitis presents w/ 2 weeks of crampy abdominal pain. Colorectal surgery consulted for OP CT findings of acute proximal sigmoid diverticulitis with adjacent left psoas abscess 1.5 x 1.1 x 1.cm w/ mild left hydronephrosis.    PLAN:   - No acute colorectal surgical intervention  - NPO/IVF  - Serial abdominal exams  - Pain control    [facility]  [id number]

## 2024-01-10 NOTE — PROGRESS NOTE ADULT - ASSESSMENT
72 y/o female w/ PMHx PAD on Xarelto, breast CA s/p R lumpectomy, bladder CA w/ spinal mets s/p TURBT in remission, HTN, HLD, and multiple episodes of diverticulitis presenting for acute proximal sigmoid colon diverticulitis w/ L psoas abscess found on outpatient CT. Admitted for IV antibiotics and possible psoas abscess drainage.       Problem/Plan - 1:  ·  Problem: Diverticulitis of sigmoid colon with abscess in the psoas muscle .   ·  Plan: - IR and surgery not planning any intervention.   - C/w Zosyn 3.375g q8hrs  - F/u blood cultures  - Continue  LR 100cc/hr for now.    < from: CT Abdomen and Pelvis w/ Oral Cont and w/ IV Cont (01.08.24 @ 16:33) >  IMPRESSION:  Findings concerning for acute diverticulitis in the sigmoid colon with an   adjacent air-containing abscess in the psoas muscle measuring   approximately 1.5 x 1.1 x 1.7 cm. Inflammation is presumably causing   obstruction of the left ureter with mild hydronephrosis. This critical   value was discussed with Dr. Bradford at 6:15 PM on 1/8/2024.    < end of copied text >   Problem/Plan - 2:  ·  Problem: Anemia .   ·  Plan: - Trending CBC.      Problem/Plan - 3:  ·  Problem: PAD (peripheral artery disease).   ·  Plan: - C/w cilostazol 50mg BID  - Hold Xarelto 2.5mg BID for now in setting of tentative IR procedure, restart after.     Problem/Plan - 4:  ·  Problem: HTN (hypertension).   ·  Plan: - C/w amlodipine 2.5mg daily.     Problem/Plan - 5:  ·  Problem: HLD (hyperlipidemia).   ·  Plan: - C/w atorvastatin 10mg QHS in place of home pravastatin.     Problem/Plan - 6:  ·  Problem: Prophylactic measure.   ·  Plan: DVT PPx: SCDs (until restarting Xarelto)  Code Status: CPR.     74 y/o female w/ PMHx PAD on Xarelto, breast CA s/p R lumpectomy, bladder CA w/ spinal mets s/p TURBT in remission, HTN, HLD, and multiple episodes of diverticulitis presenting for acute proximal sigmoid colon diverticulitis w/ L psoas abscess found on outpatient CT. Admitted for IV antibiotics and possible psoas abscess drainage.       Problem/Plan - 1:  ·  Problem: Diverticulitis of sigmoid colon with abscess in the psoas muscle .   ·  Plan: - IR and surgery not planning any intervention.   - C/w Zosyn 3.375g q8hrs  - F/u blood cultures  - Continue  LR 100cc/hr for now.    < from: CT Abdomen and Pelvis w/ Oral Cont and w/ IV Cont (01.08.24 @ 16:33) >  IMPRESSION:  Findings concerning for acute diverticulitis in the sigmoid colon with an   adjacent air-containing abscess in the psoas muscle measuring   approximately 1.5 x 1.1 x 1.7 cm. Inflammation is presumably causing   obstruction of the left ureter with mild hydronephrosis. This critical   value was discussed with Dr. Bradford at 6:15 PM on 1/8/2024.    < end of copied text >   Problem/Plan - 2:  ·  Problem: Anemia .   ·  Plan: - Trending CBC.      Problem/Plan - 3:  ·  Problem: PAD (peripheral artery disease).   ·  Plan: - C/w cilostazol 50mg BID  - Hold Xarelto 2.5mg BID for now in setting of tentative IR procedure, restart after.     Problem/Plan - 4:  ·  Problem: HTN (hypertension).   ·  Plan: - C/w amlodipine 2.5mg daily.     Problem/Plan - 5:  ·  Problem: HLD (hyperlipidemia).   ·  Plan: - C/w atorvastatin 10mg QHS in place of home pravastatin.     Problem/Plan - 6:  ·  Problem: Prophylactic measure.   ·  Plan: DVT PPx: SCDs (until restarting Xarelto)  Code Status: CPR.

## 2024-01-11 RX ORDER — SIMETHICONE 80 MG/1
80 TABLET, CHEWABLE ORAL THREE TIMES A DAY
Refills: 0 | Status: DISCONTINUED | OUTPATIENT
Start: 2024-01-11 | End: 2024-01-12

## 2024-01-11 RX ADMIN — CILOSTAZOL 50 MILLIGRAM(S): 100 TABLET ORAL at 06:13

## 2024-01-11 RX ADMIN — SIMETHICONE 80 MILLIGRAM(S): 80 TABLET, CHEWABLE ORAL at 14:52

## 2024-01-11 RX ADMIN — HYDROMORPHONE HYDROCHLORIDE 0.2 MILLIGRAM(S): 2 INJECTION INTRAMUSCULAR; INTRAVENOUS; SUBCUTANEOUS at 15:05

## 2024-01-11 RX ADMIN — AMLODIPINE BESYLATE 2.5 MILLIGRAM(S): 2.5 TABLET ORAL at 21:37

## 2024-01-11 RX ADMIN — PIPERACILLIN AND TAZOBACTAM 25 GRAM(S): 4; .5 INJECTION, POWDER, LYOPHILIZED, FOR SOLUTION INTRAVENOUS at 21:37

## 2024-01-11 RX ADMIN — RIVAROXABAN 2.5 MILLIGRAM(S): KIT at 06:13

## 2024-01-11 RX ADMIN — ATORVASTATIN CALCIUM 10 MILLIGRAM(S): 80 TABLET, FILM COATED ORAL at 21:37

## 2024-01-11 RX ADMIN — RIVAROXABAN 2.5 MILLIGRAM(S): KIT at 17:10

## 2024-01-11 RX ADMIN — PIPERACILLIN AND TAZOBACTAM 25 GRAM(S): 4; .5 INJECTION, POWDER, LYOPHILIZED, FOR SOLUTION INTRAVENOUS at 14:41

## 2024-01-11 RX ADMIN — PIPERACILLIN AND TAZOBACTAM 25 GRAM(S): 4; .5 INJECTION, POWDER, LYOPHILIZED, FOR SOLUTION INTRAVENOUS at 06:13

## 2024-01-11 RX ADMIN — HYDROMORPHONE HYDROCHLORIDE 0.2 MILLIGRAM(S): 2 INJECTION INTRAMUSCULAR; INTRAVENOUS; SUBCUTANEOUS at 14:43

## 2024-01-11 RX ADMIN — PANTOPRAZOLE SODIUM 40 MILLIGRAM(S): 20 TABLET, DELAYED RELEASE ORAL at 06:13

## 2024-01-11 NOTE — DIETITIAN INITIAL EVALUATION ADULT - ORAL INTAKE PTA/DIET HISTORY
Pt reports poor appetite and PO intakes for the past 1-2 months, Denies difficulty chewing/swallowing. Reports not following any dietary restrictions at home. Did not take any oral nutritional supplement or vitamins/minerals at home. Reports having nausea  and diarrhea and constipation at home.

## 2024-01-11 NOTE — DIETITIAN INITIAL EVALUATION ADULT - NSICDXPASTMEDICALHX_GEN_ALL_CORE_FT
abdomen soft, non-tender, and non-distended. Bowel sounds present.
PAST MEDICAL HISTORY:  Anxiety     Bladder polyp     Breast CA, left lumpectomy / RTX in the past    HLD (hyperlipidemia)     Hypertension     Irritable bowel syndrome (IBS)     Polyp of colon "pre-cancerous" x 2, removed 11/2014    Sleep apnea uses  cpap machine

## 2024-01-11 NOTE — PROGRESS NOTE ADULT - SUBJECTIVE AND OBJECTIVE BOX
Surgery Progress Note     Subjective:  Patient seen and examined. Patient states she is +/+ for flatus/BM. She still endorses abdominal pain but states her pain has improved significantly. She states she experienced mild nausea yesterday but currently denies nausea, vomiting, chest pain, SOB.      Vital Signs:  Vital Signs Last 24 Hrs  T(C): 36.8 (11 Jan 2024 00:45), Max: 36.8 (11 Jan 2024 00:45)  T(F): 98.3 (11 Jan 2024 00:45), Max: 98.3 (11 Jan 2024 00:45)  HR: 70 (11 Jan 2024 00:45) (69 - 80)  BP: 110/73 (11 Jan 2024 00:45) (110/73 - 124/79)  RR: 18 (11 Jan 2024 00:45) (18 - 18)  SpO2: 94% (11 Jan 2024 00:45) (94% - 100%)    Parameters below as of 11 Jan 2024 00:45  Patient On (Oxygen Delivery Method): room air        CAPILLARY BLOOD GLUCOSE          I&O's Detail    10 Oh 2024 07:01  -  11 Jan 2024 07:00  --------------------------------------------------------  IN:    IV PiggyBack: 100 mL    Lactated Ringers: 1600 mL    Oral Fluid: 240 mL  Total IN: 1940 mL    OUT:    Voided (mL): 250 mL  Total OUT: 250 mL    Total NET: 1690 mL            Physical Exam:  General: NAD, resting comfortably in bed  Respiratory: Nonlabored respirations  Cardio: pulse present  Abdomen: soft, nondistended, TTP in LLQ   Vascular: extremities are warm and well perfused.       Labs:    01-10    141  |  102  |  7   ----------------------------<  88  3.5   |  30  |  0.63    Ca    9.4      10 Oh 2024 08:43                              10.0   6.51  )-----------( 290      ( 10 Oh 2024 08:43 )             32.7

## 2024-01-11 NOTE — DIETITIAN INITIAL EVALUATION ADULT - PHYSCIAL ASSESSMENT
Weights:  - Source: Patient  - UBW: 167 pounds   - Reported weight changes: Pt reports weight loss from reported UBW     Current Admission Weights:  - Dosing weight: 65.5 kg/ 144.4 pounds (1/10/24)  - Daily weight: 65.7 kg/ 144.9 pounds  (1/10/24)    Weight History per Karen SHEFFIELD:  - 72.9 kg/ 160.7 pounds (7/19/23)    Weight Change:  - ~ 16 pounds/ ~10% weight loss X 6 months    IBW: 127 pounds   %IBW: 114%

## 2024-01-11 NOTE — DIETITIAN INITIAL EVALUATION ADULT - PERTINENT MEDS FT
MEDICATIONS  (STANDING):  amLODIPine   Tablet 2.5 milliGRAM(s) Oral at bedtime  atorvastatin 10 milliGRAM(s) Oral at bedtime  cilostazol 50 milliGRAM(s) Oral two times a day  influenza  Vaccine (HIGH DOSE) 0.7 milliLiter(s) IntraMuscular once  lactated ringers. 1000 milliLiter(s) (100 mL/Hr) IV Continuous <Continuous>  pantoprazole    Tablet 40 milliGRAM(s) Oral before breakfast  piperacillin/tazobactam IVPB.. 3.375 Gram(s) IV Intermittent every 8 hours  rivaroxaban 2.5 milliGRAM(s) Oral two times a day    MEDICATIONS  (PRN):  acetaminophen     Tablet .. 650 milliGRAM(s) Oral every 6 hours PRN Temp greater or equal to 38C (100.4F), Mild Pain (1 - 3)  HYDROmorphone  Injectable 0.2 milliGRAM(s) IV Push every 4 hours PRN Severe Pain (7 - 10)  hyoscyamine SL 0.125 milliGRAM(s) SubLingual every 6 hours PRN pain  melatonin 3 milliGRAM(s) Oral at bedtime PRN Insomnia  ondansetron Injectable 4 milliGRAM(s) IV Push every 8 hours PRN Nausea and/or Vomiting

## 2024-01-11 NOTE — DIETITIAN INITIAL EVALUATION ADULT - PERTINENT LABORATORY DATA
01-10    141  |  102  |  7   ----------------------------<  88  3.5   |  30  |  0.63    Ca    9.4      10 Oh 2024 08:43

## 2024-01-11 NOTE — DIETITIAN INITIAL EVALUATION ADULT - PROBLEM SELECTOR PLAN 3
- C/w cilostazol 50mg BID  - Hold Xarelto 2.5mg BID for now in setting of tentative IR procedure, restart after

## 2024-01-11 NOTE — PROGRESS NOTE ADULT - SUBJECTIVE AND OBJECTIVE BOX
[facility]  Gama Cardona PA-C  [address] [place] [address]  [phone]      INTERVAL HPI/OVERNIGHT EVENTS:  pt seen and examined, no new events  abdominal pain is improving   had BM  tolerating clears       MEDICATIONS  (STANDING):  amLODIPine   Tablet 2.5 milliGRAM(s) Oral at bedtime  atorvastatin 10 milliGRAM(s) Oral at bedtime  cilostazol 50 milliGRAM(s) Oral two times a day  influenza  Vaccine (HIGH DOSE) 0.7 milliLiter(s) IntraMuscular once  lactated ringers. 1000 milliLiter(s) (100 mL/Hr) IV Continuous <Continuous>  pantoprazole    Tablet 40 milliGRAM(s) Oral before breakfast  piperacillin/tazobactam IVPB.. 3.375 Gram(s) IV Intermittent every 8 hours  rivaroxaban 2.5 milliGRAM(s) Oral two times a day    MEDICATIONS  (PRN):  acetaminophen     Tablet .. 650 milliGRAM(s) Oral every 6 hours PRN Temp greater or equal to 38C (100.4F), Mild Pain (1 - 3)  HYDROmorphone  Injectable 0.2 milliGRAM(s) IV Push every 4 hours PRN Severe Pain (7 - 10)  hyoscyamine SL 0.125 milliGRAM(s) SubLingual every 6 hours PRN pain  melatonin 3 milliGRAM(s) Oral at bedtime PRN Insomnia  ondansetron Injectable 4 milliGRAM(s) IV Push every 8 hours PRN Nausea and/or Vomiting      Allergies    sulfa drugs (Unknown)    Intolerances        ROS:   General:  No wt loss, fevers, chills, night sweats, fatigue,   Eyes:  Good vision, no reported pain  ENT:  No sore throat, pain, runny nose, dysphagia  CV:  No pain, palpitations, hypo/hypertension  Resp:  No dyspnea, cough, tachypnea, wheezing  GI:  + pain, No nausea, No vomiting, No diarrhea, No constipation, No weight loss, No fever, No pruritis, No rectal bleeding, No tarry stools, No dysphagia,  :  No pain, bleeding, incontinence, nocturia  Muscle:  No pain, weakness  Neuro:  No weakness, tingling, memory problems  Psych:  No fatigue, insomnia, mood problems, depression  Endocrine:  No polyuria, polydipsia, cold/heat intolerance  Heme:  No petechiae, ecchymosis, easy bruisability  Skin:  No rash, tattoos, scars, edema      PHYSICAL EXAM:   Vital Signs Last 24 Hrs  T(C): 36.8 (2024 00:45), Max: 36.8 (2024 00:45)  T(F): 98.3 (2024 00:45), Max: 98.3 (2024 00:45)  HR: 70 (2024 00:45) (69 - 80)  BP: 110/73 (2024 00:45) (110/73 - 124/79)  BP(mean): --  RR: 18 (2024 00:45) (18 - 18)  SpO2: 94% (2024 00:45) (94% - 100%)    Parameters below as of 2024 00:45  Patient On (Oxygen Delivery Method): room air        Daily Height in cm: 166 (10 Oh 2024 01:45)    Daily Weight in k.7 (10 Oh 2024 07:10)    GENERAL:  Appears stated age,   HEENT:  NC/AT,    CHEST:  Full & symmetric excursion,   HEART:  Regular rhythm,  ABDOMEN:  Soft, non-tender, non-distended,  EXTEREMITIES:  no cyanosis  SKIN:  No rash  NEURO:  Alert,       LABS:                        10.0   6.51  )-----------( 290      ( 10 Oh 2024 08:43 )             32.7   01-10    141  |  102  |  7   ----------------------------<  88  3.5   |  30  |  0.63    Ca    9.4      10 Oh 2024 08:43        PT/INR - ( 2024 06:47 )   PT: 13.8 sec;   INR: 1.26 ratio         PTT - ( 2024 01:45 )  PTT:31.0 sec  Urinalysis Basic - ( 10 Oh 2024 08:43 )    Color: x / Appearance: x / SG: x / pH: x  Gluc: 88 mg/dL / Ketone: x  / Bili: x / Urobili: x   Blood: x / Protein: x / Nitrite: x   Leuk Esterase: x / RBC: x / WBC x   Sq Epi: x / Non Sq Epi: x / Bacteria: x        RADIOLOGY & ADDITIONAL TESTS:   [facility]  Gama Cardona PA-C  [address] [place] [address]  [phone]      INTERVAL HPI/OVERNIGHT EVENTS:  pt seen and examined, no new events  abdominal pain is improving   had BM  tolerating clears       MEDICATIONS  (STANDING):  amLODIPine   Tablet 2.5 milliGRAM(s) Oral at bedtime  atorvastatin 10 milliGRAM(s) Oral at bedtime  cilostazol 50 milliGRAM(s) Oral two times a day  influenza  Vaccine (HIGH DOSE) 0.7 milliLiter(s) IntraMuscular once  lactated ringers. 1000 milliLiter(s) (100 mL/Hr) IV Continuous <Continuous>  pantoprazole    Tablet 40 milliGRAM(s) Oral before breakfast  piperacillin/tazobactam IVPB.. 3.375 Gram(s) IV Intermittent every 8 hours  rivaroxaban 2.5 milliGRAM(s) Oral two times a day    MEDICATIONS  (PRN):  acetaminophen     Tablet .. 650 milliGRAM(s) Oral every 6 hours PRN Temp greater or equal to 38C (100.4F), Mild Pain (1 - 3)  HYDROmorphone  Injectable 0.2 milliGRAM(s) IV Push every 4 hours PRN Severe Pain (7 - 10)  hyoscyamine SL 0.125 milliGRAM(s) SubLingual every 6 hours PRN pain  melatonin 3 milliGRAM(s) Oral at bedtime PRN Insomnia  ondansetron Injectable 4 milliGRAM(s) IV Push every 8 hours PRN Nausea and/or Vomiting      Allergies    sulfa drugs (Unknown)    Intolerances        ROS:   General:  No wt loss, fevers, chills, night sweats, fatigue,   Eyes:  Good vision, no reported pain  ENT:  No sore throat, pain, runny nose, dysphagia  CV:  No pain, palpitations, hypo/hypertension  Resp:  No dyspnea, cough, tachypnea, wheezing  GI:  + pain, No nausea, No vomiting, No diarrhea, No constipation, No weight loss, No fever, No pruritis, No rectal bleeding, No tarry stools, No dysphagia,  :  No pain, bleeding, incontinence, nocturia  Muscle:  No pain, weakness  Neuro:  No weakness, tingling, memory problems  Psych:  No fatigue, insomnia, mood problems, depression  Endocrine:  No polyuria, polydipsia, cold/heat intolerance  Heme:  No petechiae, ecchymosis, easy bruisability  Skin:  No rash, tattoos, scars, edema      PHYSICAL EXAM:   Vital Signs Last 24 Hrs  T(C): 36.8 (2024 00:45), Max: 36.8 (2024 00:45)  T(F): 98.3 (2024 00:45), Max: 98.3 (2024 00:45)  HR: 70 (2024 00:45) (69 - 80)  BP: 110/73 (2024 00:45) (110/73 - 124/79)  BP(mean): --  RR: 18 (2024 00:45) (18 - 18)  SpO2: 94% (2024 00:45) (94% - 100%)    Parameters below as of 2024 00:45  Patient On (Oxygen Delivery Method): room air        Daily Height in cm: 166 (10 Oh 2024 01:45)    Daily Weight in k.7 (10 Oh 2024 07:10)    GENERAL:  Appears stated age,   HEENT:  NC/AT,    CHEST:  Full & symmetric excursion,   HEART:  Regular rhythm,  ABDOMEN:  Soft, non-tender, non-distended,  EXTEREMITIES:  no cyanosis  SKIN:  No rash  NEURO:  Alert,       LABS:                        10.0   6.51  )-----------( 290      ( 10 Oh 2024 08:43 )             32.7   01-10    141  |  102  |  7   ----------------------------<  88  3.5   |  30  |  0.63    Ca    9.4      10 Oh 2024 08:43        PT/INR - ( 2024 06:47 )   PT: 13.8 sec;   INR: 1.26 ratio         PTT - ( 2024 01:45 )  PTT:31.0 sec  Urinalysis Basic - ( 10 Oh 2024 08:43 )    Color: x / Appearance: x / SG: x / pH: x  Gluc: 88 mg/dL / Ketone: x  / Bili: x / Urobili: x   Blood: x / Protein: x / Nitrite: x   Leuk Esterase: x / RBC: x / WBC x   Sq Epi: x / Non Sq Epi: x / Bacteria: x        RADIOLOGY & ADDITIONAL TESTS:

## 2024-01-11 NOTE — CHART NOTE - NSCHARTNOTEFT_GEN_A_CORE
Pt c/o L groin swelling since 3 mos ago  -- pt states that it became more painful since last mos and she completed a course of Abx in December prescribed by the dermatologist  -- pt states that the area bleeds sometimes, but she did not notice any pus drainage   __   - L groin with the area of induration 1 x 1.5 cm, tender to palpation,   -- will sign out to the team to call general Sx consult in am

## 2024-01-11 NOTE — PROGRESS NOTE ADULT - ATTENDING COMMENTS
DATE OF SERVICE: 01-10-24 @ 11:35    WBC NL, feeling slightly better  abd soft, TTP improved    CLD today  Continue abx
Overall feeling better. Tolerating clears  Abd soft, LLQ tenderness  Continue with antibiotics  Advance diet today

## 2024-01-11 NOTE — PROGRESS NOTE ADULT - SUBJECTIVE AND OBJECTIVE BOX
Date of Service  : 01-11-24     INTERVAL HPI/OVERNIGHT EVENTS: After eating my belly is hurting,   Vital Signs Last 24 Hrs  T(C): 36.8 (11 Jan 2024 00:45), Max: 36.8 (11 Jan 2024 00:45)  T(F): 98.3 (11 Jan 2024 00:45), Max: 98.3 (11 Jan 2024 00:45)  HR: 70 (11 Jan 2024 00:45) (70 - 80)  BP: 110/73 (11 Jan 2024 00:45) (110/73 - 110/73)  BP(mean): --  RR: 18 (11 Jan 2024 00:45) (18 - 18)  SpO2: 94% (11 Jan 2024 00:45) (94% - 99%)    Parameters below as of 11 Jan 2024 00:45  Patient On (Oxygen Delivery Method): room air      I&O's Summary    10 Oh 2024 07:01  -  11 Jan 2024 07:00  --------------------------------------------------------  IN: 1940 mL / OUT: 250 mL / NET: 1690 mL    11 Jan 2024 07:01  -  11 Jan 2024 16:46  --------------------------------------------------------  IN: 100 mL / OUT: 0 mL / NET: 100 mL      MEDICATIONS  (STANDING):  amLODIPine   Tablet 2.5 milliGRAM(s) Oral at bedtime  atorvastatin 10 milliGRAM(s) Oral at bedtime  cilostazol 50 milliGRAM(s) Oral two times a day  influenza  Vaccine (HIGH DOSE) 0.7 milliLiter(s) IntraMuscular once  lactated ringers. 1000 milliLiter(s) (100 mL/Hr) IV Continuous <Continuous>  pantoprazole    Tablet 40 milliGRAM(s) Oral before breakfast  piperacillin/tazobactam IVPB.. 3.375 Gram(s) IV Intermittent every 8 hours  rivaroxaban 2.5 milliGRAM(s) Oral two times a day    MEDICATIONS  (PRN):  acetaminophen     Tablet .. 650 milliGRAM(s) Oral every 6 hours PRN Temp greater or equal to 38C (100.4F), Mild Pain (1 - 3)  HYDROmorphone  Injectable 0.2 milliGRAM(s) IV Push every 4 hours PRN Severe Pain (7 - 10)  hyoscyamine SL 0.125 milliGRAM(s) SubLingual every 6 hours PRN pain  melatonin 3 milliGRAM(s) Oral at bedtime PRN Insomnia  ondansetron Injectable 4 milliGRAM(s) IV Push every 8 hours PRN Nausea and/or Vomiting  simethicone 80 milliGRAM(s) Chew three times a day PRN Gas    LABS:                        10.0   6.51  )-----------( 290      ( 10 Oh 2024 08:43 )             32.7     01-10    141  |  102  |  7   ----------------------------<  88  3.5   |  30  |  0.63    Ca    9.4      10 Oh 2024 08:43        Urinalysis Basic - ( 10 Oh 2024 08:43 )    Color: x / Appearance: x / SG: x / pH: x  Gluc: 88 mg/dL / Ketone: x  / Bili: x / Urobili: x   Blood: x / Protein: x / Nitrite: x   Leuk Esterase: x / RBC: x / WBC x   Sq Epi: x / Non Sq Epi: x / Bacteria: x      CAPILLARY BLOOD GLUCOSE            Urinalysis Basic - ( 10 Oh 2024 08:43 )    Color: x / Appearance: x / SG: x / pH: x  Gluc: 88 mg/dL / Ketone: x  / Bili: x / Urobili: x   Blood: x / Protein: x / Nitrite: x   Leuk Esterase: x / RBC: x / WBC x   Sq Epi: x / Non Sq Epi: x / Bacteria: x      REVIEW OF SYSTEMS:  CONSTITUTIONAL: No fever, weight loss, or fatigue  EYES: No eye pain, visual disturbances, or discharge  ENMT:  No difficulty hearing, tinnitus, vertigo; No sinus or throat pain  NECK: No pain or stiffness  RESPIRATORY: No cough, wheezing, chills or hemoptysis; No shortness of breath  CARDIOVASCULAR: No chest pain, palpitations, dizziness, or leg swelling  GASTROINTESTINAL: No abdominal or epigastric pain. No nausea, vomiting, or hematemesis; No diarrhea or constipation. No melena or hematochezia.  GENITOURINARY: No dysuria, frequency, hematuria, or incontinence  NEUROLOGICAL: No headaches, memory loss, loss of strength, numbness, or tremors      RADIOLOGY & ADDITIONAL TESTS:    Consultant(s) Notes Reviewed:  [x ] YES  [ ] NO    PHYSICAL EXAM:  GENERAL: NAD, well-groomed, well-developed,not in any distress ,  HEAD:  Atraumatic, Normocephalic  EYES: EOMI, PERRLA, conjunctiva and sclera clear  ENMT: No tonsillar erythema, exudates, or enlargement; Moist mucous membranes, Good dentition, No lesions  NECK: Supple, No JVD, Normal thyroid  NERVOUS SYSTEM:  Alert & Oriented X3, No focal deficit   CHEST/LUNG: Good air entry bilateral with no  rales, rhonchi, wheezing, or rubs  HEART: Regular rate and rhythm; No murmurs, rubs, or gallops  ABDOMEN: Soft, RLQ tender, Nondistended; Bowel sounds present  EXTREMITIES:  2+ Peripheral Pulses, No clubbing, cyanosis, or edema      Care Discussed with Consultants/Other Providers [ x] YES  [ ] NO

## 2024-01-11 NOTE — DIETITIAN INITIAL EVALUATION ADULT - REASON INDICATOR FOR ASSESSMENT
MST Score 2 or >   Information obtained from: Review of pt's current medical record, interview with pt in her assigned room on [id number]

## 2024-01-11 NOTE — DIETITIAN INITIAL EVALUATION ADULT - OTHER CALCULATIONS
-- Defer fluid needs to medical team  -- Estimated Calorie/Protein needs Daily weight: 65.7 kg/ 144.9 pounds  (1/10/24)

## 2024-01-11 NOTE — DIETITIAN INITIAL EVALUATION ADULT - ENERGY INTAKE
-- Pt on clear liquid diet from (1/9-1/11), diet advanced today to Low Fiber  -- Handouts provided at bedside for diet education regarding low fiber diet and high calorie and high protein diet

## 2024-01-11 NOTE — DIETITIAN INITIAL EVALUATION ADULT - EDUCATION DIETARY MODIFICATIONS
Provided low-fiber nutrition therapy including importance of avoiding  fiber rich foods, fresh fruits/vegetables, whole grains, and added fiber in processed foods. Discussed importance of adequate consumption of meals/supplements to optimize protein-energy intake. Encouraged small/frequent meals, nutrient dense snacks, prioritizing protein foods at meal time.  Pt verbalized understanding and accepted written handout. Patient with no nutrition-related questions at this time. Made aware RD remains available as needed./teach back/(2) meets goals/outcomes/verbalization

## 2024-01-11 NOTE — DIETITIAN NUTRITION RISK NOTIFICATION - BUCCAL DEPLETION IS
mild Terbinafine Pregnancy And Lactation Text: This medication is Pregnancy Category B and is considered safe during pregnancy. It is also excreted in breast milk and breast feeding isn't recommended.

## 2024-01-11 NOTE — PROGRESS NOTE ADULT - ASSESSMENT
74y/o F w/ PMHx breast cancer s/p R lumpectomy, bladder cancer w/ spinal mets s/p TURBT, in remission, hx of [facility]'s in 1995 for sepsis d/t colonic perforation s/p ovarian cyst removal with subsequent reversal, HTN, HLD, PAD on xarelto, diverticulitis presents w/ 2 weeks of crampy abdominal pain. Colorectal surgery consulted for OP CT findings of acute proximal sigmoid diverticulitis with adjacent left psoas abscess 1.5 x 1.1 x 1.cm w/ mild left hydronephrosis.    PLAN:   - No acute colorectal surgical intervention  - CLD, may advance diet as tolerated as long as her pain is improving  - IV abx   - Serial abdominal exams  - Pain control  - appreciate care per primary team    Red Surgery  1924   74y/o F w/ PMHx breast cancer s/p R lumpectomy, bladder cancer w/ spinal mets s/p TURBT, in remission, hx of [facility]'s in 1995 for sepsis d/t colonic perforation s/p ovarian cyst removal with subsequent reversal, HTN, HLD, PAD on xarelto, diverticulitis presents w/ 2 weeks of crampy abdominal pain. Colorectal surgery consulted for OP CT findings of acute proximal sigmoid diverticulitis with adjacent left psoas abscess 1.5 x 1.1 x 1.cm w/ mild left hydronephrosis.    PLAN:   - No acute colorectal surgical intervention  - CLD, may advance diet as tolerated as long as her pain is improving  - IV abx   - Serial abdominal exams  - Pain control  - appreciate care per primary team    [facility]  [id number]

## 2024-01-11 NOTE — PROGRESS NOTE ADULT - ASSESSMENT
diverticulitis  abnormal imaging     CT noted with diverticulitis and small air containing abscess   IR consult noted; too small to drain   IVF  cont IV ABx  advance to low residue diet   will add levsin for spasms   will need colonoscopy in 6-8 weeks  will follow  d/w pt    I reviewed the overnight course of events on the unit, re-confirming the patient history. I discussed the care with the patient and their family  The plan of care was discussed with the physician assistant and modifications were made to the notation where appropriate.   Differential diagnosis and plan of care discussed with patient after the evaluation  50 minutes spent on total encounter of which more than fifty percent of the encounter was spent counseling and/or coordinating care by the attending physician.  Advanced care planning was discussed with patient and family.  Advanced care planning forms were reviewed and discussed.  Risks, benefits and alternatives of gastroenterologic procedures were discussed in detail and all questions were answered.

## 2024-01-11 NOTE — DIETITIAN INITIAL EVALUATION ADULT - REASON FOR ADMISSION
Chart Reviewed, Events Noted  " 72 y/o M with admit diagnosis of Diverticulitis of large intestine with perforation and abscess without bleeding."      Chart Reviewed, Events Noted  " 74 y/o M with admit diagnosis of Diverticulitis of large intestine with perforation and abscess without bleeding."

## 2024-01-11 NOTE — PROGRESS NOTE ADULT - ASSESSMENT
74 y/o female w/ PMHx PAD on Xarelto, breast CA s/p R lumpectomy, bladder CA w/ spinal mets s/p TURBT in remission, HTN, HLD, and multiple episodes of diverticulitis presenting for acute proximal sigmoid colon diverticulitis w/ L psoas abscess found on outpatient CT. Admitted for IV antibiotics and possible psoas abscess drainage.       Problem/Plan - 1:  ·  Problem: Diverticulitis of sigmoid colon with abscess in the psoas muscle .   ·  Plan: - Diet CL and will advance as tolerated.   IR and surgery not planning any intervention.   - C/w Zosyn 3.375g q8hrs  -blood cultures NGTD   - Continue  LR 100cc/hr for now.    < from: CT Abdomen and Pelvis w/ Oral Cont and w/ IV Cont (01.08.24 @ 16:33) >  IMPRESSION:  Findings concerning for acute diverticulitis in the sigmoid colon with an   adjacent air-containing abscess in the psoas muscle measuring   approximately 1.5 x 1.1 x 1.7 cm. Inflammation is presumably causing   obstruction of the left ureter with mild hydronephrosis. This critical   value was discussed with Dr. Bradford at 6:15 PM on 1/8/2024.     Problem/Plan - 2:  ·  Problem: Anemia .   ·  Plan: - Trending CBC.      Problem/Plan - 3:  ·  Problem: PAD (peripheral artery disease).   ·  Plan: - C/w cilostazol 50mg BID  - Hold Xarelto 2.5mg BID for now in setting of tentative IR procedure, restart after.     Problem/Plan - 4:  ·  Problem: HTN (hypertension).   ·  Plan: - C/w amlodipine 2.5mg daily.     Problem/Plan - 5:  ·  Problem: HLD (hyperlipidemia).   ·  Plan: - C/w atorvastatin 10mg QHS in place of home pravastatin.     Problem/Plan - 6:  ·  Problem: Prophylactic measure.   ·  Plan: DVT PPx: SCDs (until restarting Xarelto)  Code Status: CPR.     72 y/o female w/ PMHx PAD on Xarelto, breast CA s/p R lumpectomy, bladder CA w/ spinal mets s/p TURBT in remission, HTN, HLD, and multiple episodes of diverticulitis presenting for acute proximal sigmoid colon diverticulitis w/ L psoas abscess found on outpatient CT. Admitted for IV antibiotics and possible psoas abscess drainage.       Problem/Plan - 1:  ·  Problem: Diverticulitis of sigmoid colon with abscess in the psoas muscle .   ·  Plan: - Diet CL and will advance as tolerated.   IR and surgery not planning any intervention.   - C/w Zosyn 3.375g q8hrs  -blood cultures NGTD   - Continue  LR 100cc/hr for now.    < from: CT Abdomen and Pelvis w/ Oral Cont and w/ IV Cont (01.08.24 @ 16:33) >  IMPRESSION:  Findings concerning for acute diverticulitis in the sigmoid colon with an   adjacent air-containing abscess in the psoas muscle measuring   approximately 1.5 x 1.1 x 1.7 cm. Inflammation is presumably causing   obstruction of the left ureter with mild hydronephrosis. This critical   value was discussed with Dr. Bradford at 6:15 PM on 1/8/2024.     Problem/Plan - 2:  ·  Problem: Anemia .   ·  Plan: - Trending CBC.      Problem/Plan - 3:  ·  Problem: PAD (peripheral artery disease).   ·  Plan: - C/w cilostazol 50mg BID  - Hold Xarelto 2.5mg BID for now in setting of tentative IR procedure, restart after.     Problem/Plan - 4:  ·  Problem: HTN (hypertension).   ·  Plan: - C/w amlodipine 2.5mg daily.     Problem/Plan - 5:  ·  Problem: HLD (hyperlipidemia).   ·  Plan: - C/w atorvastatin 10mg QHS in place of home pravastatin.     Problem/Plan - 6:  ·  Problem: Prophylactic measure.   ·  Plan: DVT PPx: SCDs (until restarting Xarelto)  Code Status: CPR.     72 y/o female w/ PMHx PAD on Xarelto, breast CA s/p R lumpectomy, bladder CA w/ spinal mets s/p TURBT in remission, HTN, HLD, and multiple episodes of diverticulitis presenting for acute proximal sigmoid colon diverticulitis w/ L psoas abscess found on outpatient CT. Admitted for IV antibiotics and possible psoas abscess drainage.       Problem/Plan - 1:  ·  Problem: Diverticulitis of sigmoid colon with abscess in the psoas muscle .   ·  Plan: - Diet CL and will advance as tolerated.   IR and surgery not planning any intervention.   - C/w Zosyn 3.375g q8hrs  -blood cultures NGTD   - Continue  LR 100cc/hr for now.    < from: CT Abdomen and Pelvis w/ Oral Cont and w/ IV Cont (01.08.24 @ 16:33) >  IMPRESSION:  Findings concerning for acute diverticulitis in the sigmoid colon with an   adjacent air-containing abscess in the psoas muscle measuring   approximately 1.5 x 1.1 x 1.7 cm. Inflammation is presumably causing   obstruction of the left ureter with mild hydronephrosis. This critical   value was discussed with Dr. Bradford at 6:15 PM on 1/8/2024.     Problem/Plan - 2:  ·  Problem: Anemia .   ·  Plan: - Trending CBC.      Problem/Plan - 3:  ·  Problem: PAD (peripheral artery disease).   ·  Plan: - C/w cilostazol 50mg BID  -  Xarelto 2.5mg BID      Problem/Plan - 4:  ·  Problem: HTN (hypertension).   ·  Plan: - C/w amlodipine 2.5mg daily.     Problem/Plan - 5:  ·  Problem: HLD (hyperlipidemia).   ·  Plan: - C/w atorvastatin 10mg QHS in place of home pravastatin.     Problem/Plan - 6:  ·  Problem: Prophylactic measure.   ·  Plan: DVT PPx:  Xarelto)  Code Status: CPR.

## 2024-01-11 NOTE — DIETITIAN INITIAL EVALUATION ADULT - ADD RECOMMEND
1. Continue Low Fiber Diet. Defer diet/texture modification to medical team/SLP as indicated   2. Recommend adding Mighty Shakes 3x/day to provide additional calorie/proteins with meals  3. Encourage adequate PO intakes. Honor food preferences as appropriate and available. Staff to provide assistance with meals as warranted  4. Monitor PO intake, GI tolerance, skin integrity and labs. RD remains available if needed, pt is aware.   5. Reinforce provided diet education as needed prior to discharge.  6. Malnutrition Sticker placed in pt. chart

## 2024-01-12 LAB
ANION GAP SERPL CALC-SCNC: 27 MMOL/L — HIGH (ref 5–17)
ANION GAP SERPL CALC-SCNC: 27 MMOL/L — HIGH (ref 5–17)
ANION GAP SERPL CALC-SCNC: 7 MMOL/L — SIGNIFICANT CHANGE UP (ref 5–17)
ANION GAP SERPL CALC-SCNC: 7 MMOL/L — SIGNIFICANT CHANGE UP (ref 5–17)
BUN SERPL-MCNC: 7 MG/DL — SIGNIFICANT CHANGE UP (ref 7–23)
BUN SERPL-MCNC: 7 MG/DL — SIGNIFICANT CHANGE UP (ref 7–23)
BUN SERPL-MCNC: 9 MG/DL — SIGNIFICANT CHANGE UP (ref 7–23)
BUN SERPL-MCNC: 9 MG/DL — SIGNIFICANT CHANGE UP (ref 7–23)
CALCIUM SERPL-MCNC: 7 MG/DL — LOW (ref 8.4–10.5)
CALCIUM SERPL-MCNC: 7 MG/DL — LOW (ref 8.4–10.5)
CALCIUM SERPL-MCNC: 9.3 MG/DL — SIGNIFICANT CHANGE UP (ref 8.4–10.5)
CALCIUM SERPL-MCNC: 9.3 MG/DL — SIGNIFICANT CHANGE UP (ref 8.4–10.5)
CHLORIDE SERPL-SCNC: 100 MMOL/L — SIGNIFICANT CHANGE UP (ref 96–108)
CHLORIDE SERPL-SCNC: 100 MMOL/L — SIGNIFICANT CHANGE UP (ref 96–108)
CHLORIDE SERPL-SCNC: 78 MMOL/L — LOW (ref 96–108)
CHLORIDE SERPL-SCNC: 78 MMOL/L — LOW (ref 96–108)
CO2 SERPL-SCNC: 19 MMOL/L — LOW (ref 22–31)
CO2 SERPL-SCNC: 19 MMOL/L — LOW (ref 22–31)
CO2 SERPL-SCNC: 32 MMOL/L — HIGH (ref 22–31)
CO2 SERPL-SCNC: 32 MMOL/L — HIGH (ref 22–31)
CREAT SERPL-MCNC: 0.53 MG/DL — SIGNIFICANT CHANGE UP (ref 0.5–1.3)
CREAT SERPL-MCNC: 0.53 MG/DL — SIGNIFICANT CHANGE UP (ref 0.5–1.3)
CREAT SERPL-MCNC: 0.81 MG/DL — SIGNIFICANT CHANGE UP (ref 0.5–1.3)
CREAT SERPL-MCNC: 0.81 MG/DL — SIGNIFICANT CHANGE UP (ref 0.5–1.3)
EGFR: 76 ML/MIN/1.73M2 — SIGNIFICANT CHANGE UP
EGFR: 76 ML/MIN/1.73M2 — SIGNIFICANT CHANGE UP
EGFR: 97 ML/MIN/1.73M2 — SIGNIFICANT CHANGE UP
EGFR: 97 ML/MIN/1.73M2 — SIGNIFICANT CHANGE UP
GLUCOSE BLDC GLUCOMTR-MCNC: 133 MG/DL — HIGH (ref 70–99)
GLUCOSE BLDC GLUCOMTR-MCNC: 133 MG/DL — HIGH (ref 70–99)
GLUCOSE SERPL-MCNC: 114 MG/DL — HIGH (ref 70–99)
GLUCOSE SERPL-MCNC: 114 MG/DL — HIGH (ref 70–99)
GLUCOSE SERPL-MCNC: 921 MG/DL — CRITICAL HIGH (ref 70–99)
GLUCOSE SERPL-MCNC: 921 MG/DL — CRITICAL HIGH (ref 70–99)
MAGNESIUM SERPL-MCNC: 1.4 MG/DL — LOW (ref 1.6–2.6)
MAGNESIUM SERPL-MCNC: 1.4 MG/DL — LOW (ref 1.6–2.6)
MAGNESIUM SERPL-MCNC: 1.8 MG/DL — SIGNIFICANT CHANGE UP (ref 1.6–2.6)
MAGNESIUM SERPL-MCNC: 1.8 MG/DL — SIGNIFICANT CHANGE UP (ref 1.6–2.6)
POTASSIUM SERPL-MCNC: 3 MMOL/L — LOW (ref 3.5–5.3)
POTASSIUM SERPL-MCNC: 3 MMOL/L — LOW (ref 3.5–5.3)
POTASSIUM SERPL-MCNC: 3.7 MMOL/L — SIGNIFICANT CHANGE UP (ref 3.5–5.3)
POTASSIUM SERPL-MCNC: 3.7 MMOL/L — SIGNIFICANT CHANGE UP (ref 3.5–5.3)
POTASSIUM SERPL-SCNC: 3 MMOL/L — LOW (ref 3.5–5.3)
POTASSIUM SERPL-SCNC: 3 MMOL/L — LOW (ref 3.5–5.3)
POTASSIUM SERPL-SCNC: 3.7 MMOL/L — SIGNIFICANT CHANGE UP (ref 3.5–5.3)
POTASSIUM SERPL-SCNC: 3.7 MMOL/L — SIGNIFICANT CHANGE UP (ref 3.5–5.3)
SODIUM SERPL-SCNC: 124 MMOL/L — LOW (ref 135–145)
SODIUM SERPL-SCNC: 124 MMOL/L — LOW (ref 135–145)
SODIUM SERPL-SCNC: 139 MMOL/L — SIGNIFICANT CHANGE UP (ref 135–145)
SODIUM SERPL-SCNC: 139 MMOL/L — SIGNIFICANT CHANGE UP (ref 135–145)

## 2024-01-12 PROCEDURE — 99223 1ST HOSP IP/OBS HIGH 75: CPT | Mod: GC

## 2024-01-12 RX ORDER — HYOSCYAMINE SULFATE 0.13 MG
0.12 TABLET ORAL EVERY 6 HOURS
Refills: 0 | Status: DISCONTINUED | OUTPATIENT
Start: 2024-01-12 | End: 2024-01-14

## 2024-01-12 RX ORDER — SIMETHICONE 80 MG/1
80 TABLET, CHEWABLE ORAL EVERY 6 HOURS
Refills: 0 | Status: DISCONTINUED | OUTPATIENT
Start: 2024-01-12 | End: 2024-01-14

## 2024-01-12 RX ADMIN — RIVAROXABAN 2.5 MILLIGRAM(S): KIT at 06:05

## 2024-01-12 RX ADMIN — RIVAROXABAN 2.5 MILLIGRAM(S): KIT at 17:33

## 2024-01-12 RX ADMIN — Medication 650 MILLIGRAM(S): at 22:29

## 2024-01-12 RX ADMIN — CILOSTAZOL 50 MILLIGRAM(S): 100 TABLET ORAL at 06:05

## 2024-01-12 RX ADMIN — PIPERACILLIN AND TAZOBACTAM 25 GRAM(S): 4; .5 INJECTION, POWDER, LYOPHILIZED, FOR SOLUTION INTRAVENOUS at 22:02

## 2024-01-12 RX ADMIN — PANTOPRAZOLE SODIUM 40 MILLIGRAM(S): 20 TABLET, DELAYED RELEASE ORAL at 06:06

## 2024-01-12 RX ADMIN — Medication 0.12 MILLIGRAM(S): at 12:51

## 2024-01-12 RX ADMIN — Medication 0.12 MILLIGRAM(S): at 23:27

## 2024-01-12 RX ADMIN — SIMETHICONE 80 MILLIGRAM(S): 80 TABLET, CHEWABLE ORAL at 17:34

## 2024-01-12 RX ADMIN — AMLODIPINE BESYLATE 2.5 MILLIGRAM(S): 2.5 TABLET ORAL at 22:03

## 2024-01-12 RX ADMIN — SIMETHICONE 80 MILLIGRAM(S): 80 TABLET, CHEWABLE ORAL at 23:27

## 2024-01-12 RX ADMIN — PIPERACILLIN AND TAZOBACTAM 25 GRAM(S): 4; .5 INJECTION, POWDER, LYOPHILIZED, FOR SOLUTION INTRAVENOUS at 06:06

## 2024-01-12 RX ADMIN — Medication 650 MILLIGRAM(S): at 23:07

## 2024-01-12 RX ADMIN — Medication 0.12 MILLIGRAM(S): at 17:33

## 2024-01-12 RX ADMIN — SIMETHICONE 80 MILLIGRAM(S): 80 TABLET, CHEWABLE ORAL at 12:51

## 2024-01-12 RX ADMIN — ATORVASTATIN CALCIUM 10 MILLIGRAM(S): 80 TABLET, FILM COATED ORAL at 22:02

## 2024-01-12 RX ADMIN — PIPERACILLIN AND TAZOBACTAM 25 GRAM(S): 4; .5 INJECTION, POWDER, LYOPHILIZED, FOR SOLUTION INTRAVENOUS at 13:40

## 2024-01-12 NOTE — PROGRESS NOTE ADULT - ASSESSMENT
74y/o F w/ PMHx breast cancer s/p R lumpectomy, bladder cancer w/ spinal mets s/p TURBT, in remission, hx of [facility]'s in 1995 for sepsis d/t colonic perforation s/p ovarian cyst removal with subsequent reversal, HTN, HLD, PAD on xarelto, diverticulitis presents w/ 2 weeks of crampy abdominal pain. Colorectal surgery consulted for OP CT findings of acute proximal sigmoid diverticulitis with adjacent left psoas abscess 1.5 x 1.1 x 1.cm w/ mild left hydronephrosis.    PLAN:   - No acute colorectal surgical intervention  - CLD, may advance diet as tolerated as long as her pain is improving. per the patient LRD caused pain yesterday but her pain has improved since then  - IV abx   - Serial abdominal exams  - Pain control  - appreciate care per primary team    Surgery Red Team  Pager #[phone]    72y/o F w/ PMHx breast cancer s/p R lumpectomy, bladder cancer w/ spinal mets s/p TURBT, in remission, hx of [facility]'s in 1995 for sepsis d/t colonic perforation s/p ovarian cyst removal with subsequent reversal, HTN, HLD, PAD on xarelto, diverticulitis presents w/ 2 weeks of crampy abdominal pain. Colorectal surgery consulted for OP CT findings of acute proximal sigmoid diverticulitis with adjacent left psoas abscess 1.5 x 1.1 x 1.cm w/ mild left hydronephrosis.    PLAN:   - No acute colorectal surgical intervention  - CLD, may advance diet as tolerated as long as her pain is improving. per the patient LRD caused pain yesterday but her pain has improved since then  - IV abx   - Serial abdominal exams  - Pain control  - appreciate care per primary team    Surgery Red Team  Pager #[phone]    72y/o F w/ PMHx breast cancer s/p R lumpectomy, bladder cancer w/ spinal mets s/p TURBT, in remission, hx of [facility]'s in 1995 for sepsis d/t colonic perforation s/p ovarian cyst removal with subsequent reversal, HTN, HLD, PAD on xarelto, diverticulitis presents w/ 2 weeks of crampy abdominal pain. Colorectal surgery consulted for OP CT findings of acute proximal sigmoid diverticulitis with adjacent left psoas abscess 1.5 x 1.1 x 1.cm w/ mild left hydronephrosis.    PLAN:   - No acute colorectal surgical intervention  - CLD, may advance diet as tolerated as long as her pain is improving. per the patient LRD caused pain yesterday but her pain has improved since then  - IV abx   - Serial abdominal exams  - Pain control  - appreciate care per primary team  - when discharged patient may follow up with Dr. Ruben Figueroa  - please contact us with questions or concerns     Surgery Red Team  Pager #[phone]    72y/o F w/ PMHx breast cancer s/p R lumpectomy, bladder cancer w/ spinal mets s/p TURBT, in remission, hx of [facility]'s in 1995 for sepsis d/t colonic perforation s/p ovarian cyst removal with subsequent reversal, HTN, HLD, PAD on xarelto, diverticulitis presents w/ 2 weeks of crampy abdominal pain. Colorectal surgery consulted for OP CT findings of acute proximal sigmoid diverticulitis with adjacent left psoas abscess 1.5 x 1.1 x 1.cm w/ mild left hydronephrosis.    PLAN:   - No acute colorectal surgical intervention  - CLD, may advance diet as tolerated as long as her pain is improving. per the patient LRD caused pain yesterday but her pain has improved since then  - IV abx   - Serial abdominal exams  - Pain control  - appreciate care per primary team  - when discharged patient may follow up with Dr. Ruben Figueroa  - please contact us with questions or concerns     Surgery Red Team  Pager #[phone]

## 2024-01-12 NOTE — CONSULT NOTE ADULT - CONSULT REQUESTED DATE/TIME
Follow up with me when you return from [place] to schedule IVC filter removal.  Follow up with PCP regarding refilling Eliquis.   Call us if you have any concerns.  Wear compression stockings and stop and walk frequently,during travel.    Arleth Mancilla RN  IR nurse clinician  [phone]    
09-Jan-2024 02:07
09-Jan-2024 07:53
12-Jan-2024
09-Jan-2024 11:46

## 2024-01-12 NOTE — PROGRESS NOTE ADULT - SUBJECTIVE AND OBJECTIVE BOX
Surgery Progress Note     Subjective:  Patient seen and examined. Patient states she experienced abdominal pain after having solid food. She tolerated liquids afterwards. She states she is +/+ for flatus/BM. She denies nausea, vomiting, chest pain, SOB.      Vital Signs:  Vital Signs Last 24 Hrs  T(C): 36.5 (12 Jan 2024 00:51), Max: 36.9 (11 Jan 2024 16:39)  T(F): 97.7 (12 Jan 2024 00:51), Max: 98.4 (11 Jan 2024 16:39)  HR: 67 (12 Jan 2024 00:51) (64 - 70)  BP: 106/67 (12 Jan 2024 00:51) (101/65 - 111/72)  RR: 18 (12 Jan 2024 00:51) (18 - 18)  SpO2: 100% (12 Jan 2024 00:51) (95% - 100%)    Parameters below as of 12 Jan 2024 00:51  Patient On (Oxygen Delivery Method): room air        CAPILLARY BLOOD GLUCOSE          I&O's Detail    11 Jan 2024 07:01  -  12 Jan 2024 07:00  --------------------------------------------------------  IN:    IV PiggyBack: 200 mL    Lactated Ringers: 700 mL  Total IN: 900 mL    OUT:  Total OUT: 0 mL    Total NET: 900 mL            Physical Exam:  General: NAD, resting comfortably in bed  Respiratory: Nonlabored respirations  Cardio: pulse present  Abdomen: softly distended, TTP in LLQ  Vascular: extremities are warm and well perfused.       Labs:

## 2024-01-12 NOTE — CONSULT NOTE ADULT - ASSESSMENT
74 y/o female w/ PMHx PAD on Xarelto, breast CA s/p R lumpectomy, bladder CA w/ spinal mets s/p TURBT in remission, HTN, HLD, and multiple episodes of diverticulitis who presents due to findings of abscess on outpatient CT scan. For the past 3 weeks, she has been having crampy abdominal pain in her LLQ, which she thought was due to gas pains initially. When the pain wouldn't resolve, she went to see her GI, Dr. Pablo Bradford, who ordered an outpatient CT of the abdomen/pelvis. This revealed an acute proximal sigmoid colon diverticulitis with adjacent 1.5 x 1.1 x 1.1 cm L psoas abscess and a mild L hydronephrosis. ID consulted for abx mgmt.    Temp of 100.1 on admission 1/8  WBC wnl  Currently on zosyn 1/9-->  image-CT: Findings concerning for acute diverticulitis in the sigmoid colon with an adjacent air-containing abscess in the psoas muscle measuring   approximately 1.5 x 1.1 x 1.7 cm. Inflammation is presumably causing obstruction of the left ureter with mild hydronephrosis          Plan to be d/w with attending   72 y/o female w/ PMHx PAD on Xarelto, breast CA s/p R lumpectomy, bladder CA w/ spinal mets s/p TURBT in remission, HTN, HLD, and multiple episodes of diverticulitis who presents due to findings of abscess on outpatient CT scan. For the past 3 weeks, she has been having crampy abdominal pain in her LLQ, which she thought was due to gas pains initially. When the pain wouldn't resolve, she went to see her GI, Dr. Pablo Bradford, who ordered an outpatient CT of the abdomen/pelvis. This revealed an acute proximal sigmoid colon diverticulitis with adjacent 1.5 x 1.1 x 1.1 cm L psoas abscess and a mild L hydronephrosis. ID consulted for abx mgmt.    Temp of 100.1 on admission 1/8  WBC wnl  Currently on zosyn 1/9-->  image-CT: Findings concerning for acute diverticulitis in the sigmoid colon with an adjacent air-containing abscess in the psoas muscle measuring   approximately 1.5 x 1.1 x 1.7 cm. Inflammation is presumably causing obstruction of the left ureter with mild hydronephrosis          Plan to be d/w with attending   72 y/o female w/ PMHx PAD on Xarelto, breast CA s/p R lumpectomy, bladder CA w/ spinal mets s/p TURBT in remission, HTN, HLD, and multiple episodes of diverticulitis who presents due to findings of abscess on outpatient CT scan. For the past 3 weeks, she has been having crampy abdominal pain in her LLQ, which she thought was due to gas pains initially. When the pain wouldn't resolve, she went to see her GI, Dr. Pablo Bradford, who ordered an outpatient CT of the abdomen/pelvis. This revealed an acute proximal sigmoid colon diverticulitis with adjacent 1.5 x 1.1 x 1.1 cm L psoas abscess and a mild L hydronephrosis. ID consulted for abx mgmt.    Temp of 100.1 on admission 1/8  WBC wnl  1/9 bld cx neg x 2  Currently on zosyn 1/9-->  image-CT: Findings concerning for acute diverticulitis in the sigmoid colon with an adjacent air-containing abscess in the psoas muscle measuring   approximately 1.5 x 1.1 x 1.7 cm. Inflammation is presumably causing obstruction of the left ureter with mild hydronephrosis          Plan to be d/w with attending   74 y/o female w/ PMHx PAD on Xarelto, breast CA s/p R lumpectomy, bladder CA w/ spinal mets s/p TURBT in remission, HTN, HLD, and multiple episodes of diverticulitis who presents due to findings of abscess on outpatient CT scan. For the past 3 weeks, she has been having crampy abdominal pain in her LLQ, which she thought was due to gas pains initially. When the pain wouldn't resolve, she went to see her GI, Dr. Pablo Bradford, who ordered an outpatient CT of the abdomen/pelvis. This revealed an acute proximal sigmoid colon diverticulitis with adjacent 1.5 x 1.1 x 1.1 cm L psoas abscess and a mild L hydronephrosis. ID consulted for abx mgmt.    Temp of 100.1 on admission 1/8  WBC wnl  1/9 bld cx neg x 2  Currently on zosyn 1/9-->  image-CT: Findings concerning for acute diverticulitis in the sigmoid colon with an adjacent air-containing abscess in the psoas muscle measuring   approximately 1.5 x 1.1 x 1.7 cm. Inflammation is presumably causing obstruction of the left ureter with mild hydronephrosis          Plan to be d/w with attending   72 y/o female w/ PMHx PAD on Xarelto, breast CA s/p R lumpectomy, bladder CA w/ spinal mets s/p TURBT in remission, HTN, HLD, and multiple episodes of diverticulitis who presents due to findings of abscess on outpatient CT scan. For the past 3 weeks, she has been having crampy abdominal pain in her LLQ, which she thought was due to gas pains initially. When the pain wouldn't resolve, she went to see her GI, Dr. Pablo Bradford, who ordered an outpatient CT of the abdomen/pelvis. This revealed an acute proximal sigmoid colon diverticulitis with adjacent 1.5 x 1.1 x 1.1 cm L psoas abscess and a mild L hydronephrosis. ID consulted for abx mgmt.    Temp of 100.1 on admission 1/8  WBC wnl  1/9 bld cx neg x 2  Currently on zosyn 1/9-->  image-CT: Findings concerning for acute diverticulitis in the sigmoid colon with an adjacent air-containing abscess in the psoas muscle measuring   approximately 1.5 x 1.1 x 1.7 cm. Inflammation is presumably causing obstruction of the left ureter with mild hydronephrosis  Able to tolerate clears so far        Plan to be d/w with attending   74 y/o female w/ PMHx PAD on Xarelto, breast CA s/p R lumpectomy, bladder CA w/ spinal mets s/p TURBT in remission, HTN, HLD, and multiple episodes of diverticulitis who presents due to findings of abscess on outpatient CT scan. For the past 3 weeks, she has been having crampy abdominal pain in her LLQ, which she thought was due to gas pains initially. When the pain wouldn't resolve, she went to see her GI, Dr. Pablo Bradford, who ordered an outpatient CT of the abdomen/pelvis. This revealed an acute proximal sigmoid colon diverticulitis with adjacent 1.5 x 1.1 x 1.1 cm L psoas abscess and a mild L hydronephrosis. ID consulted for abx mgmt.    Temp of 100.1 on admission 1/8  WBC wnl  1/9 bld cx neg x 2  Currently on zosyn 1/9-->  image-CT: Findings concerning for acute diverticulitis in the sigmoid colon with an adjacent air-containing abscess in the psoas muscle measuring   approximately 1.5 x 1.1 x 1.7 cm. Inflammation is presumably causing obstruction of the left ureter with mild hydronephrosis  Able to tolerate clears so far        Plan   Non toxic appearing states that she is feeling  better, still has some soreness to LLQ with pain on palpation.  Can d/c zosyn and start Cipro 400mg Q 8h and Flagyl 500 mg q 12  Pt with extremely painful lesion in groin may need surgical eval  She also has newly painful area to back of head where lesion was recently removed now with drainage and should also be evaluated  Continue to monitor wbc   Continue to monitor Temp.  Plan d/w Dr Gregorio     72 y/o female w/ PMHx PAD on Xarelto, breast CA s/p R lumpectomy, bladder CA w/ spinal mets s/p TURBT in remission, HTN, HLD, and multiple episodes of diverticulitis who presents due to findings of abscess on outpatient CT scan. For the past 3 weeks, she has been having crampy abdominal pain in her LLQ, which she thought was due to gas pains initially. When the pain wouldn't resolve, she went to see her GI, Dr. Pablo Bradford, who ordered an outpatient CT of the abdomen/pelvis. This revealed an acute proximal sigmoid colon diverticulitis with adjacent 1.5 x 1.1 x 1.1 cm L psoas abscess and a mild L hydronephrosis. ID consulted for abx mgmt.    Temp of 100.1 on admission 1/8  WBC wnl  1/9 bld cx neg x 2  Currently on zosyn 1/9-->  image-CT: Findings concerning for acute diverticulitis in the sigmoid colon with an adjacent air-containing abscess in the psoas muscle measuring   approximately 1.5 x 1.1 x 1.7 cm. Inflammation is presumably causing obstruction of the left ureter with mild hydronephrosis  Able to tolerate clears so far        Plan   Non toxic appearing states that she is feeling  better, still has some soreness to LLQ with pain on palpation.  Can d/c zosyn and start Cipro 400mg Q 8h and Flagyl 500 mg q 12  Pt with extremely painful lesion in groin may need surgical eval  She also has newly painful area to back of head where lesion was recently removed now with drainage and should also be evaluated  Continue to monitor wbc   Continue to monitor Temp.  Plan d/w Dr Gregorio     74 y/o female w/ PMHx PAD on Xarelto, breast CA s/p R lumpectomy, bladder CA w/ spinal mets s/p TURBT in remission, HTN, HLD, and multiple episodes of diverticulitis who presents due to findings of abscess on outpatient CT scan. For the past 3 weeks, she has been having crampy abdominal pain in her LLQ, which she thought was due to gas pains initially. When the pain wouldn't resolve, she went to see her GI, Dr. Pablo Bradford, who ordered an outpatient CT of the abdomen/pelvis. This revealed an acute proximal sigmoid colon diverticulitis with adjacent 1.5 x 1.1 x 1.1 cm L psoas abscess and a mild L hydronephrosis. ID consulted for abx mgmt.    Temp of 100.1 on admission 1/8  WBC wnl  1/9 bld cx neg x 2  Currently on zosyn 1/9-->  image-CT: Findings concerning for acute diverticulitis in the sigmoid colon with an adjacent air-containing abscess in the psoas muscle measuring   approximately 1.5 x 1.1 x 1.7 cm. Inflammation is presumably causing obstruction of the left ureter with mild hydronephrosis  Able to tolerate clears so far        Plan   Non toxic appearing states that she is feeling  better, still has some soreness to LLQ with pain on palpation.  Can d/c zosyn and start Cipro 400mg Q 8h and Flagyl 500 mg q 12  Pt with extremely painful lesions in groin, was seen by derm as outpt and recommended to seek surgical eval  She also has newly painful area to back of head where lesion was recently removed now with drainage and should also be evaluated  f/u blood cx, NTD   Continue to monitor wbc, no leucocytosis   will need repeat CT in 10-14 days to ensure improvement.   colorectal following.      Plan discussed with consulting team.     Jose Daniel Walden  Please contact through MS Teams   If no response or past 5 pm/weekend call [phone].          72 y/o female w/ PMHx PAD on Xarelto, breast CA s/p R lumpectomy, bladder CA w/ spinal mets s/p TURBT in remission, HTN, HLD, and multiple episodes of diverticulitis who presents due to findings of abscess on outpatient CT scan. For the past 3 weeks, she has been having crampy abdominal pain in her LLQ, which she thought was due to gas pains initially. When the pain wouldn't resolve, she went to see her GI, Dr. Pablo Bradford, who ordered an outpatient CT of the abdomen/pelvis. This revealed an acute proximal sigmoid colon diverticulitis with adjacent 1.5 x 1.1 x 1.1 cm L psoas abscess and a mild L hydronephrosis. ID consulted for abx mgmt.    Temp of 100.1 on admission 1/8  WBC wnl  1/9 bld cx neg x 2  Currently on zosyn 1/9-->  image-CT: Findings concerning for acute diverticulitis in the sigmoid colon with an adjacent air-containing abscess in the psoas muscle measuring   approximately 1.5 x 1.1 x 1.7 cm. Inflammation is presumably causing obstruction of the left ureter with mild hydronephrosis  Able to tolerate clears so far        Plan   Non toxic appearing states that she is feeling  better, still has some soreness to LLQ with pain on palpation.  Can d/c zosyn and start Cipro 400mg Q 8h and Flagyl 500 mg q 12  Pt with extremely painful lesions in groin, was seen by derm as outpt and recommended to seek surgical eval  She also has newly painful area to back of head where lesion was recently removed now with drainage and should also be evaluated  f/u blood cx, NTD   Continue to monitor wbc, no leucocytosis   will need repeat CT in 10-14 days to ensure improvement.   colorectal following.      Plan discussed with consulting team.     Jose Daniel Walden  Please contact through MS Teams   If no response or past 5 pm/weekend call [phone].

## 2024-01-12 NOTE — PROGRESS NOTE ADULT - SUBJECTIVE AND OBJECTIVE BOX
Date of Service  : 01-12-24     INTERVAL HPI/OVERNIGHT EVENTS: I feel fine.   Vital Signs Last 24 Hrs  T(C): 37 (12 Jan 2024 16:15), Max: 37 (12 Jan 2024 16:15)  T(F): 98.6 (12 Jan 2024 16:15), Max: 98.6 (12 Jan 2024 16:15)  HR: 66 (12 Jan 2024 18:13) (58 - 70)  BP: 109/71 (12 Jan 2024 16:15) (106/67 - 111/72)  BP(mean): --  RR: 18 (12 Jan 2024 16:15) (18 - 18)  SpO2: 95% (12 Jan 2024 18:13) (95% - 100%)    Parameters below as of 12 Jan 2024 16:15  Patient On (Oxygen Delivery Method): room air      I&O's Summary    11 Jan 2024 07:01  -  12 Jan 2024 07:00  --------------------------------------------------------  IN: 900 mL / OUT: 0 mL / NET: 900 mL    12 Jan 2024 07:01  -  12 Jan 2024 20:02  --------------------------------------------------------  IN: 100 mL / OUT: 0 mL / NET: 100 mL      MEDICATIONS  (STANDING):  amLODIPine   Tablet 2.5 milliGRAM(s) Oral at bedtime  atorvastatin 10 milliGRAM(s) Oral at bedtime  cilostazol 50 milliGRAM(s) Oral two times a day  hyoscyamine SL 0.125 milliGRAM(s) SubLingual every 6 hours  influenza  Vaccine (HIGH DOSE) 0.7 milliLiter(s) IntraMuscular once  lactated ringers. 1000 milliLiter(s) (100 mL/Hr) IV Continuous <Continuous>  pantoprazole    Tablet 40 milliGRAM(s) Oral before breakfast  piperacillin/tazobactam IVPB.. 3.375 Gram(s) IV Intermittent every 8 hours  rivaroxaban 2.5 milliGRAM(s) Oral two times a day  simethicone 80 milliGRAM(s) Chew every 6 hours    MEDICATIONS  (PRN):  acetaminophen     Tablet .. 650 milliGRAM(s) Oral every 6 hours PRN Temp greater or equal to 38C (100.4F), Mild Pain (1 - 3)  HYDROmorphone  Injectable 0.2 milliGRAM(s) IV Push every 4 hours PRN Severe Pain (7 - 10)  melatonin 3 milliGRAM(s) Oral at bedtime PRN Insomnia  ondansetron Injectable 4 milliGRAM(s) IV Push every 8 hours PRN Nausea and/or Vomiting    LABS:    01-12    139  |  100  |  9   ----------------------------<  114<H>  3.7   |  32<H>  |  0.81    Ca    9.3      12 Jan 2024 18:53  Mg     1.8     01-12        Urinalysis Basic - ( 12 Jan 2024 18:53 )    Color: x / Appearance: x / SG: x / pH: x  Gluc: 114 mg/dL / Ketone: x  / Bili: x / Urobili: x   Blood: x / Protein: x / Nitrite: x   Leuk Esterase: x / RBC: x / WBC x   Sq Epi: x / Non Sq Epi: x / Bacteria: x      CAPILLARY BLOOD GLUCOSE      POCT Blood Glucose.: 133 mg/dL (12 Jan 2024 11:43)        Urinalysis Basic - ( 12 Jan 2024 18:53 )    Color: x / Appearance: x / SG: x / pH: x  Gluc: 114 mg/dL / Ketone: x  / Bili: x / Urobili: x   Blood: x / Protein: x / Nitrite: x   Leuk Esterase: x / RBC: x / WBC x   Sq Epi: x / Non Sq Epi: x / Bacteria: x      REVIEW OF SYSTEMS:  CONSTITUTIONAL: No fever, weight loss, or fatigue  EYES: No eye pain, visual disturbances, or discharge  ENMT:  No difficulty hearing, tinnitus, vertigo; No sinus or throat pain  NECK: No pain or stiffness  RESPIRATORY: No cough, wheezing, chills or hemoptysis; No shortness of breath  CARDIOVASCULAR: No chest pain, palpitations, dizziness, or leg swelling  GASTROINTESTINAL: No abdominal or epigastric pain. No nausea, vomiting, or hematemesis; No diarrhea or constipation. No melena or hematochezia.  GENITOURINARY: No dysuria, frequency, hematuria, or incontinence  NEUROLOGICAL: No headaches, memory loss, loss of strength, numbness, or tremors      Consultant(s) Notes Reviewed:  [x ] YES  [ ] NO    PHYSICAL EXAM:  GENERAL: NAD, well-groomed, well-developed,not in any distress ,  HEAD:  Atraumatic, Normocephalic  NECK: Supple, No JVD, Normal thyroid  NERVOUS SYSTEM:  Alert & Oriented X3, No focal deficit   CHEST/LUNG: Good air entry bilateral with no  rales, rhonchi, wheezing, or rubs  HEART: Regular rate and rhythm; No murmurs, rubs, or gallops  ABDOMEN: Soft, Nontender, Nondistended; Bowel sounds present  EXTREMITIES:  2+ Peripheral Pulses, No clubbing, cyanosis, or edema    Care Discussed with Consultants/Other Providers [ x] YES  [ ] NO

## 2024-01-12 NOTE — CONSULT NOTE ADULT - SUBJECTIVE AND OBJECTIVE BOX
Patient is a 73y old  Female who presents with a chief complaint of Diverticulitis w/ abscess (2024 09:01)    HPI:  This is a 74 y/o female w/ PMHx PAD on Xarelto, breast CA s/p R lumpectomy, bladder CA w/ spinal mets s/p TURBT in remission, HTN, HLD, and multiple episodes of diverticulitis who presents due to findings of abscess on outpatient CT scan. For the past 3 weeks, she has been having crampy abdominal pain in her LLQ, which she thought was due to gas pains initially. When the pain wouldn't resolve, she went to see her GI, Dr. Pablo Bradford, who ordered an outpatient CT of the abdomen/pelvis. This revealed an acute proximal sigmoid colon diverticulitis with adjacent 1.5 x 1.1 x 1.1 cm L psoas abscess and a mild L hydronephrosis. She was told to go to the hospital for further management.    In the ED, she was almost febrile at 100.1, otherwise hemodynamically stable. No leukocytosis present, baseline normocytic anemia seen. INR slightly elevated at 1.43, mild hypokalemia of 3.4 seen on CMP. Was given Zosyn and 1L IVF in the ED. (2024 04:20)     REVIEW OF SYSTEMS  [  ] ROS unobtainable because:    [ x ] All other systems negative except as noted below  Constitutional:  [ ] fever [ ] chills  [ ] weight loss  [ ]night sweat  [ ]poor appetite/PO intake [ ]fatigue   Skin:  [ ] rash [ ] phlebitis	  Eyes: [ ] icterus [ ] pain  [ ] discharge	  ENMT: [ ] sore throat  [ ] thrush [ ] ulcers [ ] exudates [ ]anosmia  Respiratory: [ ] dyspnea [ ] hemoptysis [ ] cough [ ] sputum	  Cardiovascular:  [ ] chest pain [ ] palpitations [ ] edema	  Gastrointestinal:  [ ] nausea [ ] vomiting [ ] diarrhea [ ] constipation [ ] pain	  Genitourinary:  [ ] dysuria [ ] frequency [ ] hematuria [ ] discharge [ ] flank pain  [ ] incontinence  Musculoskeletal:  [ ] myalgias [ ] arthralgias [ ] arthritis  [ ] back pain  Neurological:  [ ] headache [ ] weakness [ ] seizures  [ ] confusion/altered mental status  prior hospital charts reviewed [V]  primary team notes reviewed [V]  other consultant notes reviewed [V]    PAST MEDICAL & SURGICAL HISTORY:  Anxiety      Breast CA, left  lumpectomy / RTX in the past      Hypertension      Sleep apnea  uses  cpap machine      Irritable bowel syndrome (IBS)      Polyp of colon  "pre-cancerous" x 2, removed 2014      HLD (hyperlipidemia)      Bladder polyp      S/P       S/P colon resection  reversal, had complications for fibriods      S/P hysterectomy      Breast lump      History of surgery  right groin fatty tissue removed      H/O lumpectomy  left       Personal history of spine surgery  L1-L4 fusion 2017          SOCIAL HISTORY:  - Denied smoking/vaping/alcohol/recreational drug use    FAMILY HISTORY:  Family history of coronary artery disease (Mother)        Allergies  sulfa drugs (Unknown)        ANTIMICROBIALS:  piperacillin/tazobactam IVPB.. 3.375 every 8 hours      ANTIMICROBIALS (past 90 days):  MEDICATIONS  (STANDING):    piperacillin/tazobactam IVPB.   200 mL/Hr IV Intermittent (24 @ 03:09)    piperacillin/tazobactam IVPB.-   25 mL/Hr IV Intermittent (24 @ 07:37)    piperacillin/tazobactam IVPB..   25 mL/Hr IV Intermittent (24 @ 06:06)   25 mL/Hr IV Intermittent (24 @ 21:37)   25 mL/Hr IV Intermittent (24 @ 14:41)   25 mL/Hr IV Intermittent (24 @ 06:13)   25 mL/Hr IV Intermittent (01-10-24 @ 22:20)   25 mL/Hr IV Intermittent (01-10-24 @ 13:12)   25 mL/Hr IV Intermittent (01-10-24 @ 06:07)   25 mL/Hr IV Intermittent (24 @ 21:41)   25 mL/Hr IV Intermittent (24 @ 17:22)        OTHER MEDS:   MEDICATIONS  (STANDING):  acetaminophen     Tablet .. 650 every 6 hours PRN  amLODIPine   Tablet 2.5 at bedtime  atorvastatin 10 at bedtime  cilostazol 50 two times a day  HYDROmorphone  Injectable 0.2 every 4 hours PRN  hyoscyamine SL 0.125 every 6 hours PRN  influenza  Vaccine (HIGH DOSE) 0.7 once  melatonin 3 at bedtime PRN  ondansetron Injectable 4 every 8 hours PRN  pantoprazole    Tablet 40 before breakfast  rivaroxaban 2.5 two times a day  simethicone 80 three times a day PRN      VITALS:  Vital Signs Last 24 Hrs  T(F): 97.8 (24 @ 09:15), Max: 100.1 (24 @ 20:37)    Vital Signs Last 24 Hrs  HR: 58 (24 @ 09:15) (58 - 70)  BP: 110/73 (24 @ 09:15) (101/65 - 111/72)  RR: 18 (24 @ 09:15)  SpO2: 98% (24 @ 09:15) (95% - 100%)  Wt(kg): --    EXAM:    GA: NAD, AOx3  HEENT: oral cavity no lesion  CV: nl S1/S2, no RMG  Lungs: CTAB, No distress  Abd: BS+, soft, nontender, no rebounding pain  Ext: no edema  Neuro: No focal deficits  Skin: Intact  IV: no phlebitis  Labs:        124<L>  |  78<L>  |  7   ----------------------------<  921<HH>  3.0<L>   |  19<L>  |  0.53    Ca    7.0<L>      2024 09:47  Mg     1.4           WBC Trend:  WBC Count: 6.51 (01-10-24 @ 08:43)  WBC Count: 5.67 (24 @ 06:47)  WBC Count: 7.00 (24 @ 01:45)    Auto Neutrophil #: 2.94 K/uL (24 @ 06:47)  Auto Neutrophil #: 4.17 K/uL (24 @ 01:45)  Auto Neutrophil #: 6.59 K/uL (23 @ 06:00)  Auto Neutrophil #: 6.92 K/uL (23 @ 05:18)  Auto Neutrophil #: 5.69 K/uL (23 @ 05:45)    Creatine Trend:  Creatinine: 0.53 ()  Creatinine: 0.63 (01-10)  Creatinine: 0.56 ()  Creatinine: 0.60 ()    Liver Biochemical Testing Trend:  Alanine Aminotransferase (ALT/SGPT): 19 ()  Alanine Aminotransferase (ALT/SGPT): 22 ()  Alanine Aminotransferase (ALT/SGPT): 10 ()  Alanine Aminotransferase (ALT/SGPT): 14 ()  Aspartate Aminotransferase (AST/SGOT): 23 (24 @ 06:47)  Aspartate Aminotransferase (AST/SGOT): 30 (24 @ 01:45)  Aspartate Aminotransferase (AST/SGOT): 12 (23 @ 05:55)  Aspartate Aminotransferase (AST/SGOT): 21 (23 @ 16:30)  Bilirubin Total: 0.3 ()  Bilirubin Total: 0.3 ()  Bilirubin Total, Serum: 0.3 ()  Bilirubin Total, Serum: 0.2 ()    Trend LDH      Urinalysis Basic - ( 2024 09:47 )    Color: x / Appearance: x / SG: x / pH: x  Gluc: 921 mg/dL / Ketone: x  / Bili: x / Urobili: x   Blood: x / Protein: x / Nitrite: x   Leuk Esterase: x / RBC: x / WBC x   Sq Epi: x / Non Sq Epi: x / Bacteria: x      MICROBIOLOGY:        Culture - Urine (collected 2024 04:28)  Source: Clean Catch Clean Catch (Midstream)  Final Report:    <10,000 CFU/mL Normal Urogenital Carol    Culture - Blood (collected 2024 01:25)  Source: .Blood Blood-Peripheral  Preliminary Report:    No growth at 72 Hours    Culture - Blood (collected 2024 01:10)  Source: .Blood Blood-Peripheral  Preliminary Report:    No growth at 72 Hours    Culture - Abscess with Gram Stain (collected 2023 18:59)  Source: .Abscess Arm - Left  Final Report:    No growth at 5 days    Culture - Fungal, Other (collected 2023 18:59)  Source: .Other Other, L leg  Final Report:    No fungus isolated at 4 weeks.    Culture - Acid Fast - Tissue w/Smear (collected 2023 18:59)  Source: .Tissue Other, L lateral malleous  Final Report:    No acid-fast bacilli isolated after 6 weeks.    Culture - Fungal, Tissue (collected 2023 18:59)  Source: .Tissue Other, L lateral malleous  Final Report:    No fungus isolated at 4 weeks.    Culture - Tissue with Gram Stain (collected 2023 18:59)  Source: .Tissue Other, L lateral malleolus  Final Report:    No growth at 5 days    Culture - Blood (collected 2023 16:20)  Source: .Blood Blood-Peripheral  Final Report:    No Growth Final    Culture - Blood (collected 2023 16:11)  Source: .Blood Blood-Venous  Final Report:    No Growth Final  Sedimentation Rate, Erythrocyte: 62 mm/hr (23 @ 16:30)    CSF:    RADIOLOGY:  < from: CT Abdomen and Pelvis w/ Oral Cont and w/ IV Cont (24 @ 16:33) >  FINDINGS:  LOWER CHEST: Mild atelectatic changes. 3 mm calcified granuloma in the   right lower lobe.    LIVER: Within normal limits.  BILE DUCTS: Normal caliber.  GALLBLADDER: Collapsed gallbladder.  SPLEEN: Subcentimeterlow-attenuation lesion in the spleen which is too   small to characterize.  PANCREAS: Within normal limits.  ADRENALS: Within normal limits.  KIDNEYS/URETERS: Mild left-sided hydroureteronephrosis to the level of   inflammation in the colon and left psoas muscle described below.    BLADDER: Within normal limits.  REPRODUCTIVE ORGANS: Hysterectomy.    BOWEL: No bowel obstruction.  Colonic diverticuli. Pericolonic   inflammatory changes in the setting of diverticuli in the proximal   sigmoid colon which is concerning for acute diverticulitis. Artifact from   hardware in the lumbar spine limited evaluation. There is an abscess   containing air in the adjacent left psoas muscle seen best on series 2   image 73 measuring approximately 1.5 x 1.1 cmx 1.7 which is presumably   related to the diverticulitis.  Appendix well visualized. No focal inflammation in the right lower   quadrant.  PERITONEUM: No ascites.  VESSELS: Vascular calcification  RETROPERITONEUM/LYMPH NODES: No lymphadenopathy.  ABDOMINAL WALL: Postsurgical changes in the anterior abdominal wall.  BONES: Degenerative changes of bone. Laminectomy and hardware in the   lower lumbar spine.    IMPRESSION:  Findings concerning for acute diverticulitis in the sigmoid colon with an   adjacent air-containing abscess in the psoas muscle measuring   approximately 1.5 x 1.1 x 1.7 cm. Inflammation is presumably causing   obstruction of the left ureter with mild hydronephrosis    < end of copied text >   Patient is a 73y old  Female who presents with a chief complaint of Diverticulitis w/ abscess (2024 09:01)    HPI:  This is a 72 y/o female w/ PMHx PAD on Xarelto, breast CA s/p R lumpectomy, bladder CA w/ spinal mets s/p TURBT in remission, HTN, HLD, and multiple episodes of diverticulitis who presents due to findings of abscess on outpatient CT scan. For the past 3 weeks, she has been having crampy abdominal pain in her LLQ, which she thought was due to gas pains initially. When the pain wouldn't resolve, she went to see her GI, Dr. Pablo Bradford, who ordered an outpatient CT of the abdomen/pelvis. This revealed an acute proximal sigmoid colon diverticulitis with adjacent 1.5 x 1.1 x 1.1 cm L psoas abscess and a mild L hydronephrosis. She was told to go to the hospital for further management.    In the ED, she was almost febrile at 100.1, otherwise hemodynamically stable. No leukocytosis present, baseline normocytic anemia seen. INR slightly elevated at 1.43, mild hypokalemia of 3.4 seen on CMP. Was given Zosyn and 1L IVF in the ED. (2024 04:20)     REVIEW OF SYSTEMS  [  ] ROS unobtainable because:    [ x ] All other systems negative except as noted below  Constitutional:  [ ] fever [ ] chills  [ ] weight loss  [ ]night sweat  [ ]poor appetite/PO intake [ ]fatigue   Skin:  [ ] rash [ ] phlebitis [x} lesion to back of head and left groin-painful	  Eyes: [ ] icterus [ ] pain  [ ] discharge	  ENMT: [ ] sore throat  [ ] thrush [ ] ulcers [ ] exudates [ ]anosmia  Respiratory: [ ] dyspnea [ ] hemoptysis [ ] cough [ ] sputum	  Cardiovascular:  [ ] chest pain [ ] palpitations [ ] edema	  Gastrointestinal:  [ ] nausea [ ] vomiting [ ] diarrhea [ ] constipation x[ ] pain	  Genitourinary:  [ ] dysuria [ ] frequency [ ] hematuria [ ] discharge [ ] flank pain  [ ] incontinence  Musculoskeletal:  [ ] myalgias [ ] arthralgias [ ] arthritis  [ ] back pain  Neurological:  [ ] headache [ ] weakness [ ] seizures  [ ] confusion/altered mental status  prior hospital charts reviewed [V]  primary team notes reviewed [V]  other consultant notes reviewed [V]    PAST MEDICAL & SURGICAL HISTORY:  Anxiety      Breast CA, left  lumpectomy / RTX in the past      Hypertension      Sleep apnea  uses  cpap machine      Irritable bowel syndrome (IBS)      Polyp of colon  "pre-cancerous" x 2, removed 2014      HLD (hyperlipidemia)      Bladder polyp      S/P       S/P colon resection  reversal, had complications for fibriods      S/P hysterectomy      Breast lump      History of surgery  right groin fatty tissue removed      H/O lumpectomy  left       Personal history of spine surgery  L1-L4 fusion 2017          SOCIAL HISTORY:  - Denied smoking/vaping/alcohol/recreational drug use    FAMILY HISTORY:  Family history of coronary artery disease (Mother)        Allergies  sulfa drugs (Unknown)        ANTIMICROBIALS:  piperacillin/tazobactam IVPB.. 3.375 every 8 hours      ANTIMICROBIALS (past 90 days):  MEDICATIONS  (STANDING):    piperacillin/tazobactam IVPB.   200 mL/Hr IV Intermittent (24 @ 03:09)    piperacillin/tazobactam IVPB.-   25 mL/Hr IV Intermittent (24 @ 07:37)    piperacillin/tazobactam IVPB..   25 mL/Hr IV Intermittent (24 @ 06:06)   25 mL/Hr IV Intermittent (24 @ 21:37)   25 mL/Hr IV Intermittent (24 @ 14:41)   25 mL/Hr IV Intermittent (24 @ 06:13)   25 mL/Hr IV Intermittent (01-10-24 @ 22:20)   25 mL/Hr IV Intermittent (01-10-24 @ 13:12)   25 mL/Hr IV Intermittent (01-10-24 @ 06:07)   25 mL/Hr IV Intermittent (24 @ 21:41)   25 mL/Hr IV Intermittent (24 @ 17:22)        OTHER MEDS:   MEDICATIONS  (STANDING):  acetaminophen     Tablet .. 650 every 6 hours PRN  amLODIPine   Tablet 2.5 at bedtime  atorvastatin 10 at bedtime  cilostazol 50 two times a day  HYDROmorphone  Injectable 0.2 every 4 hours PRN  hyoscyamine SL 0.125 every 6 hours PRN  influenza  Vaccine (HIGH DOSE) 0.7 once  melatonin 3 at bedtime PRN  ondansetron Injectable 4 every 8 hours PRN  pantoprazole    Tablet 40 before breakfast  rivaroxaban 2.5 two times a day  simethicone 80 three times a day PRN      VITALS:  Vital Signs Last 24 Hrs  T(F): 97.8 (24 @ 09:15), Max: 100.1 (24 @ 20:37)    Vital Signs Last 24 Hrs  HR: 58 (24 @ 09:15) (58 - 70)  BP: 110/73 (24 @ 09:15) (101/65 - 111/72)  RR: 18 (24 @ 09:15)  SpO2: 98% (24 @ 09:15) (95% - 100%)  Wt(kg): --    EXAM:    GA: NAD, AOx3  HEENT: oral cavity no lesion  CV: nl S1/S2, no RMG  Lungs: CTAB, No distress  Abd: BS+, soft, nontender, no rebounding pain  Ext: no edema  Neuro: No focal deficits  Skin:  with draining area to right occiput and left groin area of induration and pain  IV: no phlebitis  Labs:        124<L>  |  78<L>  |  7   ----------------------------<  921<HH>  3.0<L>   |  19<L>  |  0.53    Ca    7.0<L>      2024 09:47  Mg     1.4           WBC Trend:  WBC Count: 6.51 (01-10-24 @ 08:43)  WBC Count: 5.67 (24 @ 06:47)  WBC Count: 7.00 (24 @ 01:45)    Auto Neutrophil #: 2.94 K/uL (24 @ 06:47)  Auto Neutrophil #: 4.17 K/uL (24 @ 01:45)  Auto Neutrophil #: 6.59 K/uL (23 @ 06:00)  Auto Neutrophil #: 6.92 K/uL (23 @ 05:18)  Auto Neutrophil #: 5.69 K/uL (23 @ 05:45)    Creatine Trend:  Creatinine: 0.53 ()  Creatinine: 0.63 (01-10)  Creatinine: 0.56 ()  Creatinine: 0.60 ()    Liver Biochemical Testing Trend:  Alanine Aminotransferase (ALT/SGPT): 19 ()  Alanine Aminotransferase (ALT/SGPT): 22 ()  Alanine Aminotransferase (ALT/SGPT): 10 ()  Alanine Aminotransferase (ALT/SGPT): 14 ()  Aspartate Aminotransferase (AST/SGOT): 23 (24 @ 06:47)  Aspartate Aminotransferase (AST/SGOT): 30 (24 @ 01:45)  Aspartate Aminotransferase (AST/SGOT): 12 (23 @ 05:55)  Aspartate Aminotransferase (AST/SGOT): 21 (23 @ 16:30)  Bilirubin Total: 0.3 ()  Bilirubin Total: 0.3 ()  Bilirubin Total, Serum: 0.3 ()  Bilirubin Total, Serum: 0.2 ()    Trend LDH      Urinalysis Basic - ( 2024 09:47 )    Color: x / Appearance: x / SG: x / pH: x  Gluc: 921 mg/dL / Ketone: x  / Bili: x / Urobili: x   Blood: x / Protein: x / Nitrite: x   Leuk Esterase: x / RBC: x / WBC x   Sq Epi: x / Non Sq Epi: x / Bacteria: x      MICROBIOLOGY:        Culture - Urine (collected 2024 04:28)  Source: Clean Catch Clean Catch (Midstream)  Final Report:    <10,000 CFU/mL Normal Urogenital Carol    Culture - Blood (collected 2024 01:25)  Source: .Blood Blood-Peripheral  Preliminary Report:    No growth at 72 Hours    Culture - Blood (collected 2024 01:10)  Source: .Blood Blood-Peripheral  Preliminary Report:    No growth at 72 Hours    Culture - Abscess with Gram Stain (collected 2023 18:59)  Source: .Abscess Arm - Left  Final Report:    No growth at 5 days    Culture - Fungal, Other (collected 2023 18:59)  Source: .Other Other, L leg  Final Report:    No fungus isolated at 4 weeks.    Culture - Acid Fast - Tissue w/Smear (collected 2023 18:59)  Source: .Tissue Other, L lateral malleous  Final Report:    No acid-fast bacilli isolated after 6 weeks.    Culture - Fungal, Tissue (collected 2023 18:59)  Source: .Tissue Other, L lateral malleous  Final Report:    No fungus isolated at 4 weeks.    Culture - Tissue with Gram Stain (collected 2023 18:59)  Source: .Tissue Other, L lateral malleolus  Final Report:    No growth at 5 days    Culture - Blood (collected 2023 16:20)  Source: .Blood Blood-Peripheral  Final Report:    No Growth Final    Culture - Blood (collected 2023 16:11)  Source: .Blood Blood-Venous  Final Report:    No Growth Final  Sedimentation Rate, Erythrocyte: 62 mm/hr (23 @ 16:30)    CSF:    RADIOLOGY:  < from: CT Abdomen and Pelvis w/ Oral Cont and w/ IV Cont (24 @ 16:33) >  FINDINGS:  LOWER CHEST: Mild atelectatic changes. 3 mm calcified granuloma in the   right lower lobe.    LIVER: Within normal limits.  BILE DUCTS: Normal caliber.  GALLBLADDER: Collapsed gallbladder.  SPLEEN: Subcentimeterlow-attenuation lesion in the spleen which is too   small to characterize.  PANCREAS: Within normal limits.  ADRENALS: Within normal limits.  KIDNEYS/URETERS: Mild left-sided hydroureteronephrosis to the level of   inflammation in the colon and left psoas muscle described below.    BLADDER: Within normal limits.  REPRODUCTIVE ORGANS: Hysterectomy.    BOWEL: No bowel obstruction.  Colonic diverticuli. Pericolonic   inflammatory changes in the setting of diverticuli in the proximal   sigmoid colon which is concerning for acute diverticulitis. Artifact from   hardware in the lumbar spine limited evaluation. There is an abscess   containing air in the adjacent left psoas muscle seen best on series 2   image 73 measuring approximately 1.5 x 1.1 cmx 1.7 which is presumably   related to the diverticulitis.  Appendix well visualized. No focal inflammation in the right lower   quadrant.  PERITONEUM: No ascites.  VESSELS: Vascular calcification  RETROPERITONEUM/LYMPH NODES: No lymphadenopathy.  ABDOMINAL WALL: Postsurgical changes in the anterior abdominal wall.  BONES: Degenerative changes of bone. Laminectomy and hardware in the   lower lumbar spine.    IMPRESSION:  Findings concerning for acute diverticulitis in the sigmoid colon with an   adjacent air-containing abscess in the psoas muscle measuring   approximately 1.5 x 1.1 x 1.7 cm. Inflammation is presumably causing   obstruction of the left ureter with mild hydronephrosis    < end of copied text >   Patient is a 73y old  Female who presents with a chief complaint of Diverticulitis w/ abscess (2024 09:01)    HPI:  This is a 72 y/o female w/ PMHx PAD on Xarelto, breast CA s/p R lumpectomy, bladder CA w/ spinal mets s/p TURBT in remission, HTN, HLD, and multiple episodes of diverticulitis who presents due to findings of abscess on outpatient CT scan. For the past 3 weeks, she has been having crampy abdominal pain in her LLQ, which she thought was due to gas pains initially. When the pain wouldn't resolve, she went to see her GI, Dr. Pablo Bradford, who ordered an outpatient CT of the abdomen/pelvis. This revealed an acute proximal sigmoid colon diverticulitis with adjacent 1.5 x 1.1 x 1.1 cm L psoas abscess and a mild L hydronephrosis. She was told to go to the hospital for further management.    In the ED, she was almost febrile at 100.1, otherwise hemodynamically stable. No leukocytosis present, baseline normocytic anemia seen. INR slightly elevated at 1.43, mild hypokalemia of 3.4 seen on CMP. Was given Zosyn and 1L IVF in the ED. (2024 04:20)     REVIEW OF SYSTEMS  [  ] ROS unobtainable because:    [ x ] All other systems negative except as noted below    Constitutional:  [ ] fever [ ] chills    Skin:  [ ] rash [ ] phlebitis [x} lesion to back of head and left groin-painful	  Eyes: [ ] icterus [ ] pain  [ ] discharge	  ENMT: [ ] sore throat  [ ] thrush  Respiratory: [ ] dyspnea [ ] cough [ ] sputum	  Cardiovascular:  [ ] chest pain   Gastrointestinal:  [ ] nausea [ ] vomiting [ ] diarrhea [ ] constipation [ x] pain	  Genitourinary:  [ ] dysuria [ ] frequency   Musculoskeletal:  [ ] myalgias  [ ] back pain  Neurological:  [ ] headache [ ] confusion/altered mental status  Extremities: No edema.       prior hospital charts reviewed [V]  primary team notes reviewed [V]  other consultant notes reviewed [V]      PAST MEDICAL & SURGICAL HISTORY:  Anxiety      Breast CA, left  lumpectomy / RTX in the past      Hypertension      Sleep apnea  uses  cpap machine      Irritable bowel syndrome (IBS)      Polyp of colon  "pre-cancerous" x 2, removed 2014      HLD (hyperlipidemia)      Bladder polyp      S/P       S/P colon resection  reversal, had complications for fibriods      S/P hysterectomy      Breast lump      History of surgery  right groin fatty tissue removed      H/O lumpectomy  left       Personal history of spine surgery  L1-L4 fusion 2017          SOCIAL HISTORY:  - From home, no smoking.         FAMILY HISTORY:  Family history of coronary artery disease (Mother)        Allergies  sulfa drugs (Unknown)        ANTIMICROBIALS:  piperacillin/tazobactam IVPB.. 3.375 every 8 hours      ANTIMICROBIALS (past 90 days):  MEDICATIONS  (STANDING):    piperacillin/tazobactam IVPB.   200 mL/Hr IV Intermittent (24 @ 03:09)    piperacillin/tazobactam IVPB.-   25 mL/Hr IV Intermittent (24 @ 07:37)    piperacillin/tazobactam IVPB..   25 mL/Hr IV Intermittent (24 @ 06:06)   25 mL/Hr IV Intermittent (24 @ 21:37)   25 mL/Hr IV Intermittent (24 @ 14:41)   25 mL/Hr IV Intermittent (24 @ 06:13)   25 mL/Hr IV Intermittent (01-10-24 @ 22:20)   25 mL/Hr IV Intermittent (01-10-24 @ 13:12)   25 mL/Hr IV Intermittent (01-10-24 @ 06:07)   25 mL/Hr IV Intermittent (24 @ 21:41)   25 mL/Hr IV Intermittent (24 @ 17:22)        OTHER MEDS:   MEDICATIONS  (STANDING):  acetaminophen     Tablet .. 650 every 6 hours PRN  amLODIPine   Tablet 2.5 at bedtime  atorvastatin 10 at bedtime  cilostazol 50 two times a day  HYDROmorphone  Injectable 0.2 every 4 hours PRN  hyoscyamine SL 0.125 every 6 hours PRN  influenza  Vaccine (HIGH DOSE) 0.7 once  melatonin 3 at bedtime PRN  ondansetron Injectable 4 every 8 hours PRN  pantoprazole    Tablet 40 before breakfast  rivaroxaban 2.5 two times a day  simethicone 80 three times a day PRN      VITALS:  Vital Signs Last 24 Hrs  T(F): 97.8 (24 @ 09:15), Max: 100.1 (24 @ 20:37)    Vital Signs Last 24 Hrs  HR: 58 (24 @ 09:15) (58 - 70)  BP: 110/73 (24 @ 09:15) (101/65 - 111/72)  RR: 18 (24 @ 09:15)  SpO2: 98% (24 @ 09:15) (95% - 100%)  Wt(kg): --      EXAM:    GA: NAD, AOx3  Eyes: No discharge   ENT: oral cavity no lesion  CV: nl S1/S2,   Lungs: No distress, + air entry b/l  Abd: soft, some discomfort LLQ.   : No adame  Ext: no edema  Neuro: No new focal deficits  MSK: No joint swelling   Skin:  with draining area to right occiput and left groin 2 nodular areas, extremely tender, skin changes.   IV: no phlebitis        Labs:        124<L>  |  78<L>  |  7   ----------------------------<  921<HH>  3.0<L>   |  19<L>  |  0.53    Ca    7.0<L>      2024 09:47  Mg     1.4           WBC Trend:  WBC Count: 6.51 (01-10-24 @ 08:43)  WBC Count: 5.67 (24 @ 06:47)  WBC Count: 7.00 (24 @ 01:45)    Auto Neutrophil #: 2.94 K/uL (24 @ 06:47)  Auto Neutrophil #: 4.17 K/uL (24 @ 01:45)  Auto Neutrophil #: 6.59 K/uL (23 @ 06:00)  Auto Neutrophil #: 6.92 K/uL (23 @ 05:18)  Auto Neutrophil #: 5.69 K/uL (23 @ 05:45)    Creatine Trend:  Creatinine: 0.53 ()  Creatinine: 0.63 (01-10)  Creatinine: 0.56 ()  Creatinine: 0.60 ()    Liver Biochemical Testing Trend:  Alanine Aminotransferase (ALT/SGPT): 19 ()  Alanine Aminotransferase (ALT/SGPT): 22 ()  Alanine Aminotransferase (ALT/SGPT): 10 (-)  Alanine Aminotransferase (ALT/SGPT): 14 ()  Aspartate Aminotransferase (AST/SGOT): 23 (24 @ 06:47)  Aspartate Aminotransferase (AST/SGOT): 30 (24 @ 01:45)  Aspartate Aminotransferase (AST/SGOT): 12 (23 @ 05:55)  Aspartate Aminotransferase (AST/SGOT): 21 (23 @ 16:30)  Bilirubin Total: 0.3 ()  Bilirubin Total: 0.3 ()  Bilirubin Total, Serum: 0.3 ()  Bilirubin Total, Serum: 0.2 ()    Trend LDH      Urinalysis Basic - ( 2024 09:47 )    Color: x / Appearance: x / SG: x / pH: x  Gluc: 921 mg/dL / Ketone: x  / Bili: x / Urobili: x   Blood: x / Protein: x / Nitrite: x   Leuk Esterase: x / RBC: x / WBC x   Sq Epi: x / Non Sq Epi: x / Bacteria: x      MICROBIOLOGY:        Culture - Urine (collected 2024 04:28)  Source: Clean Catch Clean Catch (Midstream)  Final Report:    <10,000 CFU/mL Normal Urogenital Carol    Culture - Blood (collected 2024 01:25)  Source: .Blood Blood-Peripheral  Preliminary Report:    No growth at 72 Hours    Culture - Blood (collected 2024 01:10)  Source: .Blood Blood-Peripheral  Preliminary Report:    No growth at 72 Hours    Culture - Abscess with Gram Stain (collected 2023 18:59)  Source: .Abscess Arm - Left  Final Report:    No growth at 5 days    Culture - Fungal, Other (collected 2023 18:59)  Source: .Other Other, L leg  Final Report:    No fungus isolated at 4 weeks.    Culture - Acid Fast - Tissue w/Smear (collected 2023 18:59)  Source: .Tissue Other, L lateral malleous  Final Report:    No acid-fast bacilli isolated after 6 weeks.    Culture - Fungal, Tissue (collected 2023 18:59)  Source: .Tissue Other, L lateral malleous  Final Report:    No fungus isolated at 4 weeks.    Culture - Tissue with Gram Stain (collected 2023 18:59)  Source: .Tissue Other, L lateral malleolus  Final Report:    No growth at 5 days    Culture - Blood (collected 2023 16:20)  Source: .Blood Blood-Peripheral  Final Report:    No Growth Final    Culture - Blood (collected 2023 16:11)  Source: .Blood Blood-Venous  Final Report:    No Growth Final  Sedimentation Rate, Erythrocyte: 62 mm/hr (23 @ 16:30)        RADIOLOGY: Imaging reviewed personally and interpretation as mentioned below.     < from: CT Abdomen and Pelvis w/ Oral Cont and w/ IV Cont (24 @ 16:33) >  FINDINGS:  LOWER CHEST: Mild atelectatic changes. 3 mm calcified granuloma in the   right lower lobe.    LIVER: Within normal limits.  BILE DUCTS: Normal caliber.  GALLBLADDER: Collapsed gallbladder.  SPLEEN: Subcentimeterlow-attenuation lesion in the spleen which is too   small to characterize.  PANCREAS: Within normal limits.  ADRENALS: Within normal limits.  KIDNEYS/URETERS: Mild left-sided hydroureteronephrosis to the level of   inflammation in the colon and left psoas muscle described below.    BLADDER: Within normal limits.  REPRODUCTIVE ORGANS: Hysterectomy.    BOWEL: No bowel obstruction.  Colonic diverticuli. Pericolonic   inflammatory changes in the setting of diverticuli in the proximal   sigmoid colon which is concerning for acute diverticulitis. Artifact from   hardware in the lumbar spine limited evaluation. There is an abscess   containing air in the adjacent left psoas muscle seen best on series 2   image 73 measuring approximately 1.5 x 1.1 cmx 1.7 which is presumably   related to the diverticulitis.  Appendix well visualized. No focal inflammation in the right lower   quadrant.  PERITONEUM: No ascites.  VESSELS: Vascular calcification  RETROPERITONEUM/LYMPH NODES: No lymphadenopathy.  ABDOMINAL WALL: Postsurgical changes in the anterior abdominal wall.  BONES: Degenerative changes of bone. Laminectomy and hardware in the   lower lumbar spine.    IMPRESSION:  Findings concerning for acute diverticulitis in the sigmoid colon with an   adjacent air-containing abscess in the psoas muscle measuring   approximately 1.5 x 1.1 x 1.7 cm. Inflammation is presumably causing   obstruction of the left ureter with mild hydronephrosis

## 2024-01-12 NOTE — CONSULT NOTE ADULT - NS ATTEND AMEND GEN_ALL_CORE FT
74 y/o female w/ PMHx PAD on Xarelto, breast CA s/p R lumpectomy, bladder CA w/ spinal mets s/p TURBT in remission, HTN, HLD, and multiple episodes of diverticulitis who presents due to findings of abscess on outpatient CT scan. For the past 3 weeks, she has been having crampy abdominal pain in her LLQ, which she thought was due to gas pains initially. When the pain wouldn't resolve, she went to see her GI, Dr. Pablo Bradford, who ordered an outpatient CT of the abdomen/pelvis. This revealed an acute proximal sigmoid colon diverticulitis with adjacent 1.5 x 1.1 x 1.1 cm L psoas abscess and a mild L hydronephrosis. ID consulted for abx mgmt.    Temp of 100.1 on admission 1/8  WBC wnl  1/9 bld cx neg x 2  Currently on zosyn 1/9-->  image-CT: Findings concerning for acute diverticulitis in the sigmoid colon with an adjacent air-containing abscess in the psoas muscle measuring   approximately 1.5 x 1.1 x 1.7 cm. Inflammation is presumably causing obstruction of the left ureter with mild hydronephrosis  Able to tolerate clears so far    Overall low grade fever on presentation, abnormal CT with perforated diverticulitis with psoas abscess.       Plan   Non toxic appearing states that she is feeling  better, still has some soreness to LLQ with pain on palpation.  Can d/c zosyn and start Cipro 400mg Q 8h and Flagyl 500 mg q 12  Pt with extremely painful lesion in groin may need surgical eval  She also has newly painful area to back of head where lesion was recently removed now with drainage and should also be evaluated  Continue to monitor wbc   Continue to monitor Temp. 72 y/o female w/ PMHx PAD on Xarelto, breast CA s/p R lumpectomy, bladder CA w/ spinal mets s/p TURBT in remission, HTN, HLD, and multiple episodes of diverticulitis who presents due to findings of abscess on outpatient CT scan. For the past 3 weeks, she has been having crampy abdominal pain in her LLQ, which she thought was due to gas pains initially. When the pain wouldn't resolve, she went to see her GI, Dr. Pablo Bradford, who ordered an outpatient CT of the abdomen/pelvis. This revealed an acute proximal sigmoid colon diverticulitis with adjacent 1.5 x 1.1 x 1.1 cm L psoas abscess and a mild L hydronephrosis. ID consulted for abx mgmt.    Temp of 100.1 on admission 1/8  WBC wnl  1/9 bld cx neg x 2  Currently on zosyn 1/9-->  image-CT: Findings concerning for acute diverticulitis in the sigmoid colon with an adjacent air-containing abscess in the psoas muscle measuring   approximately 1.5 x 1.1 x 1.7 cm. Inflammation is presumably causing obstruction of the left ureter with mild hydronephrosis  Able to tolerate clears so far    Overall low grade fever on presentation, abnormal CT with perforated diverticulitis with psoas abscess.       Plan   Non toxic appearing states that she is feeling  better, still has some soreness to LLQ with pain on palpation.  Can d/c zosyn and start Cipro 400mg Q 8h and Flagyl 500 mg q 12  Pt with extremely painful lesion in groin may need surgical eval  She also has newly painful area to back of head where lesion was recently removed now with drainage and should also be evaluated  Continue to monitor wbc   Continue to monitor Temp.

## 2024-01-12 NOTE — PROGRESS NOTE ADULT - ASSESSMENT
diverticulitis  abnormal imaging     CT noted with diverticulitis and small air containing abscess   IR consult noted; too small to drain   IVF  cont IV ABx  will try full liquids today  cont levsin for spasms q6H  add simethicone for gas  will need colonoscopy in 6-8 weeks  will follow  d/w pt    I reviewed the overnight course of events on the unit, re-confirming the patient history. I discussed the care with the patient and their family  The plan of care was discussed with the physician assistant and modifications were made to the notation where appropriate.   Differential diagnosis and plan of care discussed with patient after the evaluation  50 minutes spent on total encounter of which more than fifty percent of the encounter was spent counseling and/or coordinating care by the attending physician.  Advanced care planning was discussed with patient and family.  Advanced care planning forms were reviewed and discussed.  Risks, benefits and alternatives of gastroenterologic procedures were discussed in detail and all questions were answered.

## 2024-01-12 NOTE — PROGRESS NOTE ADULT - SUBJECTIVE AND OBJECTIVE BOX
[facility]  Gama Cardona PA-C  [address] [place] [address]  [phone]      INTERVAL HPI/OVERNIGHT EVENTS:  pt seen and examined,  events noted  had abd pain/cramping with reg diet yesterday   back to [facility], tolerating  no N/V       MEDICATIONS  (STANDING):  amLODIPine   Tablet 2.5 milliGRAM(s) Oral at bedtime  atorvastatin 10 milliGRAM(s) Oral at bedtime  cilostazol 50 milliGRAM(s) Oral two times a day  influenza  Vaccine (HIGH DOSE) 0.7 milliLiter(s) IntraMuscular once  lactated ringers. 1000 milliLiter(s) (100 mL/Hr) IV Continuous <Continuous>  pantoprazole    Tablet 40 milliGRAM(s) Oral before breakfast  piperacillin/tazobactam IVPB.. 3.375 Gram(s) IV Intermittent every 8 hours  rivaroxaban 2.5 milliGRAM(s) Oral two times a day    MEDICATIONS  (PRN):  acetaminophen     Tablet .. 650 milliGRAM(s) Oral every 6 hours PRN Temp greater or equal to 38C (100.4F), Mild Pain (1 - 3)  HYDROmorphone  Injectable 0.2 milliGRAM(s) IV Push every 4 hours PRN Severe Pain (7 - 10)  hyoscyamine SL 0.125 milliGRAM(s) SubLingual every 6 hours PRN pain  melatonin 3 milliGRAM(s) Oral at bedtime PRN Insomnia  ondansetron Injectable 4 milliGRAM(s) IV Push every 8 hours PRN Nausea and/or Vomiting      Allergies    sulfa drugs (Unknown)    Intolerances        ROS:   General:  No wt loss, fevers, chills, night sweats, fatigue,   Eyes:  Good vision, no reported pain  ENT:  No sore throat, pain, runny nose, dysphagia  CV:  No pain, palpitations, hypo/hypertension  Resp:  No dyspnea, cough, tachypnea, wheezing  GI:  + pain, No nausea, No vomiting, No diarrhea, No constipation, No weight loss, No fever, No pruritis, No rectal bleeding, No tarry stools, No dysphagia,  :  No pain, bleeding, incontinence, nocturia  Muscle:  No pain, weakness  Neuro:  No weakness, tingling, memory problems  Psych:  No fatigue, insomnia, mood problems, depression  Endocrine:  No polyuria, polydipsia, cold/heat intolerance  Heme:  No petechiae, ecchymosis, easy bruisability  Skin:  No rash, tattoos, scars, edema      PHYSICAL EXAM:   Vital Signs Last 24 Hrs  T(C): 36.6 (2024 09:15), Max: 36.9 (2024 16:39)  T(F): 97.8 (2024 09:15), Max: 98.4 (2024 16:39)  HR: 58 (2024 09:15) (58 - 70)  BP: 110/73 (2024 09:15) (101/65 - 111/72)  BP(mean): --  RR: 18 (2024 09:15) (18 - 18)  SpO2: 98% (2024 09:15) (95% - 100%)    Parameters below as of 2024 09:15  Patient On (Oxygen Delivery Method): room air        Daily Height in cm: 166 (10 Oh 2024 01:45)    Daily Weight in k.7 (10 Oh 2024 07:10)      GENERAL:  Appears stated age,   HEENT:  NC/AT,    CHEST:  Full & symmetric excursion,   HEART:  Regular rhythm,  ABDOMEN:  Soft, non-tender, non-distended,  EXTEREMITIES:  no cyanosis  SKIN:  No rash  NEURO:  Alert,       LABS:      124<L>  |  78<L>  |  7   ----------------------------<  921<HH>  3.0<L>   |  19<L>  |  0.53    Ca    7.0<L>      2024 09:47  Mg     1.4               PT/INR - ( 2024 06:47 )   PT: 13.8 sec;   INR: 1.26 ratio         PTT - ( 2024 01:45 )  PTT:31.0 sec  Urinalysis Basic - ( 10 Oh 2024 08:43 )    Color: x / Appearance: x / SG: x / pH: x  Gluc: 88 mg/dL / Ketone: x  / Bili: x / Urobili: x   Blood: x / Protein: x / Nitrite: x   Leuk Esterase: x / RBC: x / WBC x   Sq Epi: x / Non Sq Epi: x / Bacteria: x        RADIOLOGY & ADDITIONAL TESTS:   [facility]  Gama Cardona PA-C  [address] [place] [address]  [phone]      INTERVAL HPI/OVERNIGHT EVENTS:  pt seen and examined,  events noted  had abd pain/cramping with reg diet yesterday   back to [facility], tolerating  no N/V       MEDICATIONS  (STANDING):  amLODIPine   Tablet 2.5 milliGRAM(s) Oral at bedtime  atorvastatin 10 milliGRAM(s) Oral at bedtime  cilostazol 50 milliGRAM(s) Oral two times a day  influenza  Vaccine (HIGH DOSE) 0.7 milliLiter(s) IntraMuscular once  lactated ringers. 1000 milliLiter(s) (100 mL/Hr) IV Continuous <Continuous>  pantoprazole    Tablet 40 milliGRAM(s) Oral before breakfast  piperacillin/tazobactam IVPB.. 3.375 Gram(s) IV Intermittent every 8 hours  rivaroxaban 2.5 milliGRAM(s) Oral two times a day    MEDICATIONS  (PRN):  acetaminophen     Tablet .. 650 milliGRAM(s) Oral every 6 hours PRN Temp greater or equal to 38C (100.4F), Mild Pain (1 - 3)  HYDROmorphone  Injectable 0.2 milliGRAM(s) IV Push every 4 hours PRN Severe Pain (7 - 10)  hyoscyamine SL 0.125 milliGRAM(s) SubLingual every 6 hours PRN pain  melatonin 3 milliGRAM(s) Oral at bedtime PRN Insomnia  ondansetron Injectable 4 milliGRAM(s) IV Push every 8 hours PRN Nausea and/or Vomiting      Allergies    sulfa drugs (Unknown)    Intolerances        ROS:   General:  No wt loss, fevers, chills, night sweats, fatigue,   Eyes:  Good vision, no reported pain  ENT:  No sore throat, pain, runny nose, dysphagia  CV:  No pain, palpitations, hypo/hypertension  Resp:  No dyspnea, cough, tachypnea, wheezing  GI:  + pain, No nausea, No vomiting, No diarrhea, No constipation, No weight loss, No fever, No pruritis, No rectal bleeding, No tarry stools, No dysphagia,  :  No pain, bleeding, incontinence, nocturia  Muscle:  No pain, weakness  Neuro:  No weakness, tingling, memory problems  Psych:  No fatigue, insomnia, mood problems, depression  Endocrine:  No polyuria, polydipsia, cold/heat intolerance  Heme:  No petechiae, ecchymosis, easy bruisability  Skin:  No rash, tattoos, scars, edema      PHYSICAL EXAM:   Vital Signs Last 24 Hrs  T(C): 36.6 (2024 09:15), Max: 36.9 (2024 16:39)  T(F): 97.8 (2024 09:15), Max: 98.4 (2024 16:39)  HR: 58 (2024 09:15) (58 - 70)  BP: 110/73 (2024 09:15) (101/65 - 111/72)  BP(mean): --  RR: 18 (2024 09:15) (18 - 18)  SpO2: 98% (2024 09:15) (95% - 100%)    Parameters below as of 2024 09:15  Patient On (Oxygen Delivery Method): room air        Daily Height in cm: 166 (10 Oh 2024 01:45)    Daily Weight in k.7 (10 Oh 2024 07:10)      GENERAL:  Appears stated age,   HEENT:  NC/AT,    CHEST:  Full & symmetric excursion,   HEART:  Regular rhythm,  ABDOMEN:  Soft, non-tender, non-distended,  EXTEREMITIES:  no cyanosis  SKIN:  No rash  NEURO:  Alert,       LABS:      124<L>  |  78<L>  |  7   ----------------------------<  921<HH>  3.0<L>   |  19<L>  |  0.53    Ca    7.0<L>      2024 09:47  Mg     1.4               PT/INR - ( 2024 06:47 )   PT: 13.8 sec;   INR: 1.26 ratio         PTT - ( 2024 01:45 )  PTT:31.0 sec  Urinalysis Basic - ( 10 Oh 2024 08:43 )    Color: x / Appearance: x / SG: x / pH: x  Gluc: 88 mg/dL / Ketone: x  / Bili: x / Urobili: x   Blood: x / Protein: x / Nitrite: x   Leuk Esterase: x / RBC: x / WBC x   Sq Epi: x / Non Sq Epi: x / Bacteria: x        RADIOLOGY & ADDITIONAL TESTS:

## 2024-01-12 NOTE — PROGRESS NOTE ADULT - ASSESSMENT
74 y/o female w/ PMHx PAD on Xarelto, breast CA s/p R lumpectomy, bladder CA w/ spinal mets s/p TURBT in remission, HTN, HLD, and multiple episodes of diverticulitis presenting for acute proximal sigmoid colon diverticulitis w/ L psoas abscess found on outpatient CT. Admitted for IV antibiotics and possible psoas abscess drainage.       Problem/Plan - 1:  ·  Problem: Diverticulitis of sigmoid colon with abscess in the psoas muscle .   ·  Plan: - Diet CL and will advance as tolerated.   IR and surgery not planning any intervention.   - C/w Zosyn 3.375g q8hrs  -blood cultures NGTD   - Continue  LR 100cc/hr for now.    < from: CT Abdomen and Pelvis w/ Oral Cont and w/ IV Cont (01.08.24 @ 16:33) >  IMPRESSION:  Findings concerning for acute diverticulitis in the sigmoid colon with an   adjacent air-containing abscess in the psoas muscle measuring   approximately 1.5 x 1.1 x 1.7 cm. Inflammation is presumably causing   obstruction of the left ureter with mild hydronephrosis. This critical   value was discussed with Dr. Bradford at 6:15 PM on 1/8/2024.     Problem/Plan - 2:  ·  Problem: Anemia .   ·  Plan: - Trending CBC.      Problem/Plan - 3:  ·  Problem: PAD (peripheral artery disease).   ·  Plan: - C/w cilostazol 50mg BID  -  Xarelto 2.5mg BID      Problem/Plan - 4:  ·  Problem: HTN (hypertension).   ·  Plan: - C/w amlodipine 2.5mg daily.     Problem/Plan - 5:  ·  Problem: HLD (hyperlipidemia).   ·  Plan: - C/w atorvastatin 10mg QHS in place of home pravastatin.     Problem/Plan - 6:  ·  Problem: Prophylactic measure.   ·  Plan: DVT PPx:  Xarelto)  Code Status: CPR.     72 y/o female w/ PMHx PAD on Xarelto, breast CA s/p R lumpectomy, bladder CA w/ spinal mets s/p TURBT in remission, HTN, HLD, and multiple episodes of diverticulitis presenting for acute proximal sigmoid colon diverticulitis w/ L psoas abscess found on outpatient CT. Admitted for IV antibiotics and possible psoas abscess drainage.       Problem/Plan - 1:  ·  Problem: Diverticulitis of sigmoid colon with abscess in the psoas muscle .   ·  Plan: - Diet CL and will advance as tolerated.   IR and surgery not planning any intervention.   - C/w Zosyn 3.375g q8hrs  -blood cultures NGTD   - Continue  LR 100cc/hr for now.    < from: CT Abdomen and Pelvis w/ Oral Cont and w/ IV Cont (01.08.24 @ 16:33) >  IMPRESSION:  Findings concerning for acute diverticulitis in the sigmoid colon with an   adjacent air-containing abscess in the psoas muscle measuring   approximately 1.5 x 1.1 x 1.7 cm. Inflammation is presumably causing   obstruction of the left ureter with mild hydronephrosis. This critical   value was discussed with Dr. Bradford at 6:15 PM on 1/8/2024.     Problem/Plan - 2:  ·  Problem: Anemia .   ·  Plan: - Trending CBC.      Problem/Plan - 3:  ·  Problem: PAD (peripheral artery disease).   ·  Plan: - C/w cilostazol 50mg BID  -  Xarelto 2.5mg BID      Problem/Plan - 4:  ·  Problem: HTN (hypertension).   ·  Plan: - C/w amlodipine 2.5mg daily.     Problem/Plan - 5:  ·  Problem: HLD (hyperlipidemia).   ·  Plan: - C/w atorvastatin 10mg QHS in place of home pravastatin.     Problem/Plan - 6:  ·  Problem: Prophylactic measure.   ·  Plan: DVT PPx:  Xarelto)  Code Status: CPR.

## 2024-01-13 RX ORDER — LACTOBACILLUS ACIDOPHILUS 100MM CELL
1 CAPSULE ORAL
Refills: 0 | Status: DISCONTINUED | OUTPATIENT
Start: 2024-01-13 | End: 2024-01-14

## 2024-01-13 RX ADMIN — SIMETHICONE 80 MILLIGRAM(S): 80 TABLET, CHEWABLE ORAL at 23:36

## 2024-01-13 RX ADMIN — AMLODIPINE BESYLATE 2.5 MILLIGRAM(S): 2.5 TABLET ORAL at 21:46

## 2024-01-13 RX ADMIN — Medication 0.12 MILLIGRAM(S): at 11:02

## 2024-01-13 RX ADMIN — ATORVASTATIN CALCIUM 10 MILLIGRAM(S): 80 TABLET, FILM COATED ORAL at 21:46

## 2024-01-13 RX ADMIN — RIVAROXABAN 2.5 MILLIGRAM(S): KIT at 05:38

## 2024-01-13 RX ADMIN — PIPERACILLIN AND TAZOBACTAM 25 GRAM(S): 4; .5 INJECTION, POWDER, LYOPHILIZED, FOR SOLUTION INTRAVENOUS at 21:47

## 2024-01-13 RX ADMIN — Medication 0.12 MILLIGRAM(S): at 17:05

## 2024-01-13 RX ADMIN — PANTOPRAZOLE SODIUM 40 MILLIGRAM(S): 20 TABLET, DELAYED RELEASE ORAL at 05:41

## 2024-01-13 RX ADMIN — CILOSTAZOL 50 MILLIGRAM(S): 100 TABLET ORAL at 05:39

## 2024-01-13 RX ADMIN — SIMETHICONE 80 MILLIGRAM(S): 80 TABLET, CHEWABLE ORAL at 17:04

## 2024-01-13 RX ADMIN — SIMETHICONE 80 MILLIGRAM(S): 80 TABLET, CHEWABLE ORAL at 05:39

## 2024-01-13 RX ADMIN — Medication 0.12 MILLIGRAM(S): at 05:38

## 2024-01-13 RX ADMIN — Medication 0.12 MILLIGRAM(S): at 23:36

## 2024-01-13 RX ADMIN — SIMETHICONE 80 MILLIGRAM(S): 80 TABLET, CHEWABLE ORAL at 11:02

## 2024-01-13 RX ADMIN — PIPERACILLIN AND TAZOBACTAM 25 GRAM(S): 4; .5 INJECTION, POWDER, LYOPHILIZED, FOR SOLUTION INTRAVENOUS at 14:18

## 2024-01-13 RX ADMIN — Medication 1 TABLET(S): at 17:04

## 2024-01-13 RX ADMIN — RIVAROXABAN 2.5 MILLIGRAM(S): KIT at 17:04

## 2024-01-13 RX ADMIN — PIPERACILLIN AND TAZOBACTAM 25 GRAM(S): 4; .5 INJECTION, POWDER, LYOPHILIZED, FOR SOLUTION INTRAVENOUS at 05:38

## 2024-01-13 NOTE — PROGRESS NOTE ADULT - SUBJECTIVE AND OBJECTIVE BOX
Surgery Progress Note     Subjective:  Tolerating LRD. Feels full quickly but reports abdominal pain is better      Vital Signs Last 24 Hrs  T(C): 36.7 (13 Jan 2024 00:00), Max: 37 (12 Jan 2024 16:15)  T(F): 98 (13 Jan 2024 00:00), Max: 98.6 (12 Jan 2024 16:15)  HR: 65 (13 Jan 2024 00:00) (65 - 71)  BP: 109/70 (13 Jan 2024 00:00) (109/70 - 109/71)  BP(mean): --  RR: 18 (13 Jan 2024 00:00) (18 - 18)  SpO2: 98% (13 Jan 2024 00:00) (95% - 98%)    Parameters below as of 13 Jan 2024 00:00  Patient On (Oxygen Delivery Method): BiPAP/CPAP    I&O's Detail    12 Jan 2024 07:01  -  13 Jan 2024 07:00  --------------------------------------------------------  IN:    IV PiggyBack: 200 mL  Total IN: 200 mL    OUT:  Total OUT: 0 mL    Total NET: 200 mL            Physical Exam:  General: NAD, resting comfortably in bed  Abdomen: soft, nondistended, nontender      Labs:      01-12    139  |  100  |  9   ----------------------------<  114<H>  3.7   |  32<H>  |  0.81    Ca    9.3      12 Jan 2024 18:53  Mg     1.8     01-12          Urinalysis Basic - ( 12 Jan 2024 18:53 )    Color: x / Appearance: x / SG: x / pH: x  Gluc: 114 mg/dL / Ketone: x  / Bili: x / Urobili: x   Blood: x / Protein: x / Nitrite: x   Leuk Esterase: x / RBC: x / WBC x   Sq Epi: x / Non Sq Epi: x / Bacteria: x                   Surgery Progress Note     Subjective:  Tolerating LRD. Feels full quickly but reports abdominal pain is better.      Vital Signs Last 24 Hrs  T(C): 36.7 (13 Jan 2024 00:00), Max: 37 (12 Jan 2024 16:15)  T(F): 98 (13 Jan 2024 00:00), Max: 98.6 (12 Jan 2024 16:15)  HR: 65 (13 Jan 2024 00:00) (65 - 71)  BP: 109/70 (13 Jan 2024 00:00) (109/70 - 109/71)  BP(mean): --  RR: 18 (13 Jan 2024 00:00) (18 - 18)  SpO2: 98% (13 Jan 2024 00:00) (95% - 98%)    Parameters below as of 13 Jan 2024 00:00  Patient On (Oxygen Delivery Method): BiPAP/CPAP    I&O's Detail    12 Jan 2024 07:01  -  13 Jan 2024 07:00  --------------------------------------------------------  IN:    IV PiggyBack: 200 mL  Total IN: 200 mL    OUT:  Total OUT: 0 mL    Total NET: 200 mL            Physical Exam:  General: NAD, resting comfortably in bed  Respiratory: Nonlabored respirations  Cardio: pulse present  Abdomen: soft, nondistended, nontender  Groin: two mobile tender nodules in L groin, skin nonerythematous, no fluctuance,       Labs:      01-12    139  |  100  |  9   ----------------------------<  114<H>  3.7   |  32<H>  |  0.81    Ca    9.3      12 Jan 2024 18:53  Mg     1.8     01-12          Urinalysis Basic - ( 12 Jan 2024 18:53 )    Color: x / Appearance: x / SG: x / pH: x  Gluc: 114 mg/dL / Ketone: x  / Bili: x / Urobili: x   Blood: x / Protein: x / Nitrite: x   Leuk Esterase: x / RBC: x / WBC x   Sq Epi: x / Non Sq Epi: x / Bacteria: x

## 2024-01-13 NOTE — PROGRESS NOTE ADULT - NUTRITIONAL ASSESSMENT
This patient has been assessed with a concern for Malnutrition and has been determined to have a diagnosis/diagnoses of Moderate protein-calorie malnutrition.    This patient is being managed with:   Diet Low Fiber-  Entered: Jan 12 2024  3:52PM  
This patient has been assessed with a concern for Malnutrition and has been determined to have a diagnosis/diagnoses of Moderate protein-calorie malnutrition.    This patient is being managed with:   Diet Clear Liquid-  Entered: Jan 11 2024  9:48AM  
This patient has been assessed with a concern for Malnutrition and has been determined to have a diagnosis/diagnoses of Moderate protein-calorie malnutrition.    This patient is being managed with:   Diet Low Fiber-  Entered: Jan 12 2024  3:52PM

## 2024-01-13 NOTE — PROGRESS NOTE ADULT - ASSESSMENT
72 y/o female w/ PMHx PAD on Xarelto, breast CA s/p R lumpectomy, bladder CA w/ spinal mets s/p TURBT in remission, HTN, HLD, and multiple episodes of diverticulitis presenting for acute proximal sigmoid colon diverticulitis w/ L psoas abscess found on outpatient CT. Admitted for IV antibiotics and possible psoas abscess drainage.       Problem/Plan - 1:  ·  Problem: Diverticulitis of sigmoid colon with abscess in the psoas muscle .   ·  Plan: - Diet CL and will advance as tolerated.   IR and surgery not planning any intervention.   - C/w Zosyn 3.375g q8hrs and will change to PO per Abxs.   -blood cultures NGTD   - S/P  LR 100cc/hr for now.    < from: CT Abdomen and Pelvis w/ Oral Cont and w/ IV Cont (01.08.24 @ 16:33) >  IMPRESSION:  Findings concerning for acute diverticulitis in the sigmoid colon with an   adjacent air-containing abscess in the psoas muscle measuring   approximately 1.5 x 1.1 x 1.7 cm. Inflammation is presumably causing   obstruction of the left ureter with mild hydronephrosis. This critical   value was discussed with Dr. Bradford at 6:15 PM on 1/8/2024.     Problem/Plan - 2:  ·  Problem: Anemia .   ·  Plan: - Trending CBC.      Problem/Plan - 3:  ·  Problem: PAD (peripheral artery disease).   ·  Plan: - C/w cilostazol 50mg BID  -  Xarelto 2.5mg BID      Problem/Plan - 4:  ·  Problem: HTN (hypertension).   ·  Plan: - C/w amlodipine 2.5mg daily.     Problem/Plan - 5:  ·  Problem: HLD (hyperlipidemia).   ·  Plan: - C/w atorvastatin 10mg QHS in place of home pravastatin.     Problem/Plan - 6:  ·  Problem: Hidradenitis   Suppurovita .   ·  Plan: Surgery helping .   F/U with Dermatology .      Dispo : DC planning home tomorrow .     74 y/o female w/ PMHx PAD on Xarelto, breast CA s/p R lumpectomy, bladder CA w/ spinal mets s/p TURBT in remission, HTN, HLD, and multiple episodes of diverticulitis presenting for acute proximal sigmoid colon diverticulitis w/ L psoas abscess found on outpatient CT. Admitted for IV antibiotics and possible psoas abscess drainage.       Problem/Plan - 1:  ·  Problem: Diverticulitis of sigmoid colon with abscess in the psoas muscle .   ·  Plan: - Diet CL and will advance as tolerated.   IR and surgery not planning any intervention.   - C/w Zosyn 3.375g q8hrs and will change to PO per Abxs.   -blood cultures NGTD   - S/P  LR 100cc/hr for now.    < from: CT Abdomen and Pelvis w/ Oral Cont and w/ IV Cont (01.08.24 @ 16:33) >  IMPRESSION:  Findings concerning for acute diverticulitis in the sigmoid colon with an   adjacent air-containing abscess in the psoas muscle measuring   approximately 1.5 x 1.1 x 1.7 cm. Inflammation is presumably causing   obstruction of the left ureter with mild hydronephrosis. This critical   value was discussed with Dr. Bradford at 6:15 PM on 1/8/2024.     Problem/Plan - 2:  ·  Problem: Anemia .   ·  Plan: - Trending CBC.      Problem/Plan - 3:  ·  Problem: PAD (peripheral artery disease).   ·  Plan: - C/w cilostazol 50mg BID  -  Xarelto 2.5mg BID      Problem/Plan - 4:  ·  Problem: HTN (hypertension).   ·  Plan: - C/w amlodipine 2.5mg daily.     Problem/Plan - 5:  ·  Problem: HLD (hyperlipidemia).   ·  Plan: - C/w atorvastatin 10mg QHS in place of home pravastatin.     Problem/Plan - 6:  ·  Problem: Hidradenitis   Suppurovita .   ·  Plan: Surgery helping .   F/U with Dermatology .      Dispo : DC planning home tomorrow .

## 2024-01-13 NOTE — PROGRESS NOTE ADULT - ASSESSMENT
72y/o F w/ PMHx breast cancer s/p R lumpectomy, bladder cancer w/ spinal mets s/p TURBT, in remission, hx of [facility] in 1995 for sepsis d/t colonic perforation s/p ovarian cyst removal with subsequent reversal, HTN, HLD, PAD on xarelto, diverticulitis presents w/ 2 weeks of crampy abdominal pain. Colorectal surgery consulted for OP CT findings of acute proximal sigmoid diverticulitis with adjacent left psoas abscess 1.5 x 1.1 x 1.cm w/ mild left hydronephrosis.    Now improved and tolerating LRD    PLAN:   - No acute colorectal surgical intervention  - Complete course of PO antibiotics  - Will have outpatient colonoscopy with GI  - when discharged patient may follow up with Dr. Ruben Figueroa    Surgery Red Team  Pager #[phone]    72y/o F w/ PMHx breast cancer s/p R lumpectomy, bladder cancer w/ spinal mets s/p TURBT, in remission, hx of [facility] in 1995 for sepsis d/t colonic perforation s/p ovarian cyst removal with subsequent reversal, HTN, HLD, PAD on xarelto, diverticulitis presents w/ 2 weeks of crampy abdominal pain. Colorectal surgery consulted for OP CT findings of acute proximal sigmoid diverticulitis with adjacent left psoas abscess 1.5 x 1.1 x 1.cm w/ mild left hydronephrosis.    Now improved and tolerating LRD    PLAN:   - No acute colorectal surgical intervention  - Complete course of PO antibiotics  - Will have outpatient colonoscopy with GI  - when discharged patient may follow up with Dr. Ruben Figueroa    Surgery Red Team  Pager #[phone]    74y/o F w/ PMHx breast cancer s/p R lumpectomy, bladder cancer w/ spinal mets s/p TURBT, in remission, hx of [facility] in 1995 for sepsis d/t colonic perforation s/p ovarian cyst removal with subsequent reversal, HTN, HLD, PAD on xarelto, diverticulitis presents w/ 2 weeks of crampy abdominal pain. Colorectal surgery consulted for OP CT findings of acute proximal sigmoid diverticulitis with adjacent left psoas abscess 1.5 x 1.1 x 1.cm w/ mild left hydronephrosis.    Now improved and tolerating LRD    PLAN:   - No acute colorectal surgical intervention  - Complete course of PO antibiotics  - Will have outpatient colonoscopy with GI  - when discharged patient may follow up with Dr. Ruben Figueroa  - groin lesions are likely due to hidradenitis        - no acute surgical intervention         - may follow up as outpatient         - would recommend dermatology consult for possible immunologic therapy once patient recovers from diverticulitis    Surgery Red Team  Pager #[phone]    72y/o F w/ PMHx breast cancer s/p R lumpectomy, bladder cancer w/ spinal mets s/p TURBT, in remission, hx of [facility] in 1995 for sepsis d/t colonic perforation s/p ovarian cyst removal with subsequent reversal, HTN, HLD, PAD on xarelto, diverticulitis presents w/ 2 weeks of crampy abdominal pain. Colorectal surgery consulted for OP CT findings of acute proximal sigmoid diverticulitis with adjacent left psoas abscess 1.5 x 1.1 x 1.cm w/ mild left hydronephrosis.    Now improved and tolerating LRD    PLAN:   - No acute colorectal surgical intervention  - Complete course of PO antibiotics  - Will have outpatient colonoscopy with GI  - when discharged patient may follow up with Dr. Ruben Figueroa  - groin lesions are likely due to hidradenitis        - no acute surgical intervention         - may follow up as outpatient         - would recommend dermatology consult for possible immunologic therapy once patient recovers from diverticulitis    Surgery Red Team  Pager #[phone]

## 2024-01-13 NOTE — PROGRESS NOTE ADULT - SUBJECTIVE AND OBJECTIVE BOX
Date of Service  : 01-13-24     INTERVAL HPI/OVERNIGHT EVENTS: I feel fine.   Vital Signs Last 24 Hrs  T(C): 36 (13 Jan 2024 15:39), Max: 36.9 (13 Jan 2024 10:26)  T(F): 96.8 (13 Jan 2024 15:39), Max: 98.4 (13 Jan 2024 10:26)  HR: 68 (13 Jan 2024 15:39) (65 - 71)  BP: 118/81 (13 Jan 2024 15:39) (109/70 - 118/81)  BP(mean): --  RR: 18 (13 Jan 2024 15:39) (18 - 18)  SpO2: 98% (13 Jan 2024 15:39) (96% - 99%)    Parameters below as of 13 Jan 2024 15:39  Patient On (Oxygen Delivery Method): room air      I&O's Summary    12 Jan 2024 07:01  -  13 Jan 2024 07:00  --------------------------------------------------------  IN: 200 mL / OUT: 0 mL / NET: 200 mL    13 Jan 2024 07:01  -  13 Jan 2024 21:21  --------------------------------------------------------  IN: 200 mL / OUT: 0 mL / NET: 200 mL      MEDICATIONS  (STANDING):  amLODIPine   Tablet 2.5 milliGRAM(s) Oral at bedtime  atorvastatin 10 milliGRAM(s) Oral at bedtime  cilostazol 50 milliGRAM(s) Oral two times a day  hyoscyamine SL 0.125 milliGRAM(s) SubLingual every 6 hours  influenza  Vaccine (HIGH DOSE) 0.7 milliLiter(s) IntraMuscular once  lactated ringers. 1000 milliLiter(s) (100 mL/Hr) IV Continuous <Continuous>  lactobacillus acidophilus 1 Tablet(s) Oral three times a day with meals  pantoprazole    Tablet 40 milliGRAM(s) Oral before breakfast  piperacillin/tazobactam IVPB.. 3.375 Gram(s) IV Intermittent every 8 hours  rivaroxaban 2.5 milliGRAM(s) Oral two times a day  simethicone 80 milliGRAM(s) Chew every 6 hours    MEDICATIONS  (PRN):  acetaminophen     Tablet .. 650 milliGRAM(s) Oral every 6 hours PRN Temp greater or equal to 38C (100.4F), Mild Pain (1 - 3)  HYDROmorphone  Injectable 0.2 milliGRAM(s) IV Push every 4 hours PRN Severe Pain (7 - 10)  melatonin 3 milliGRAM(s) Oral at bedtime PRN Insomnia  ondansetron Injectable 4 milliGRAM(s) IV Push every 8 hours PRN Nausea and/or Vomiting    LABS:    01-12    139  |  100  |  9   ----------------------------<  114<H>  3.7   |  32<H>  |  0.81    Ca    9.3      12 Jan 2024 18:53  Mg     1.8     01-12        Urinalysis Basic - ( 12 Jan 2024 18:53 )    Color: x / Appearance: x / SG: x / pH: x  Gluc: 114 mg/dL / Ketone: x  / Bili: x / Urobili: x   Blood: x / Protein: x / Nitrite: x   Leuk Esterase: x / RBC: x / WBC x   Sq Epi: x / Non Sq Epi: x / Bacteria: x      CAPILLARY BLOOD GLUCOSE            Urinalysis Basic - ( 12 Jan 2024 18:53 )    Color: x / Appearance: x / SG: x / pH: x  Gluc: 114 mg/dL / Ketone: x  / Bili: x / Urobili: x   Blood: x / Protein: x / Nitrite: x   Leuk Esterase: x / RBC: x / WBC x   Sq Epi: x / Non Sq Epi: x / Bacteria: x          Consultant(s) Notes Reviewed:  [x ] YES  [ ] NO    PHYSICAL EXAM:  GENERAL: NAD, well-groomed, well-developed,not in any distress ,  HEAD:  Atraumatic, Normocephalic  EYES: EOMI, PERRLA, conjunctiva and sclera clear  ENMT: No tonsillar erythema, exudates, or enlargement; Moist mucous membranes, Good dentition, No lesions  NECK: Supple, No JVD, Normal thyroid  NERVOUS SYSTEM:  Alert & Oriented X3, No focal deficit   CHEST/LUNG: Good air entry bilateral with no  rales, rhonchi, wheezing, or rubs  HEART: Regular rate and rhythm; No murmurs, rubs, or gallops  ABDOMEN: Soft, Nontender, Nondistended; Bowel sounds present  EXTREMITIES:  2+ Peripheral Pulses, No clubbing, cyanosis, or edema    Care Discussed with Consultants/Other Providers [ x] YES  [ ] NO

## 2024-01-13 NOTE — PROGRESS NOTE ADULT - SUBJECTIVE AND OBJECTIVE BOX
INTERVAL HPI/OVERNIGHT EVENTS:  No new overnight event.  No N/V/D.  Tolerating diet.  less pain feels much better    Allergies    sulfa drugs (Unknown)    Intolerances    General:  No wt loss, fevers, chills, night sweats, fatigue,   Eyes:  Good vision, no reported pain  ENT:  No sore throat, pain, runny nose, dysphagia  CV:  No pain, palpitations, hypo/hypertension  Resp:  No dyspnea, cough, tachypnea, wheezing  GI:  No pain, No nausea, No vomiting, No diarrhea, No constipation, No weight loss, No fever, No pruritis, No rectal bleeding, No tarry stools, No dysphagia,  :  No pain, bleeding, incontinence, nocturia  Muscle:  No pain, weakness  Neuro:  No weakness, tingling, memory problems  Psych:  No fatigue, insomnia, mood problems, depression  Endocrine:  No polyuria, polydipsia, cold/heat intolerance  Heme:  No petechiae, ecchymosis, easy bruisability  Skin:  No rash, tattoos, scars, edema      PHYSICAL EXAM:   Vital Signs:  Vital Signs Last 24 Hrs  T(C): 36.9 (13 Jan 2024 10:26), Max: 37 (12 Jan 2024 16:15)  T(F): 98.4 (13 Jan 2024 10:26), Max: 98.6 (12 Jan 2024 16:15)  HR: 71 (13 Jan 2024 10:26) (65 - 71)  BP: 113/77 (13 Jan 2024 10:26) (109/70 - 113/77)  BP(mean): --  RR: 18 (13 Jan 2024 10:26) (18 - 18)  SpO2: 99% (13 Jan 2024 10:26) (95% - 99%)    Parameters below as of 13 Jan 2024 10:26  Patient On (Oxygen Delivery Method): room air      Daily     Daily I&O's Summary    12 Jan 2024 07:01  -  13 Jan 2024 07:00  --------------------------------------------------------  IN: 200 mL / OUT: 0 mL / NET: 200 mL        GENERAL:  Appears stated age, well-groomed, well-nourished, no distress  HEENT:  NC/AT,  conjunctivae clear and pink, no thyromegaly, nodules, adenopathy, no JVD, sclera -anicteric  CHEST:  Full & symmetric excursion, no increased effort, breath sounds clear  HEART:  Regular rhythm, S1, S2, no murmur/rub/S3/S4, no abdominal bruit, no edema  ABDOMEN:  Soft, non-tender, non-distended, normoactive bowel sounds,  no masses ,no hepato-splenomegaly, no signs of chronic liver disease  EXTEREMITIES:  no cyanosis,clubbing or edema  SKIN:  No rash/erythema/ecchymoses/petechiae/wounds/abscess/warm/dry  NEURO:  Alert, oriented, no asterixis, no tremor, no encephalopathy      LABS:    01-12    139  |  100  |  9   ----------------------------<  114<H>  3.7   |  32<H>  |  0.81    Ca    9.3      12 Jan 2024 18:53  Mg     1.8     01-12        Urinalysis Basic - ( 12 Jan 2024 18:53 )    Color: x / Appearance: x / SG: x / pH: x  Gluc: 114 mg/dL / Ketone: x  / Bili: x / Urobili: x   Blood: x / Protein: x / Nitrite: x   Leuk Esterase: x / RBC: x / WBC x   Sq Epi: x / Non Sq Epi: x / Bacteria: x      amylase   lipaseLipase: 15 U/L (01-09 @ 01:45)    RADIOLOGY & ADDITIONAL TESTS:

## 2024-01-13 NOTE — PROGRESS NOTE ADULT - ASSESSMENT
diverticulitis  abnormal imaging     CT noted with diverticulitis and small air containing abscess   IR consult noted; too small to drain   IVF  cont IV ABx  add probiotic  will try full liquids today  cont levsin for spasms q6H  add simethicone for gas  will need colonoscopy in 6-8 weeks  will follow  d/w pt    I reviewed the overnight course of events on the unit, re-confirming the patient history. I discussed the care with the patient and their family  The plan of care was discussed with the physician assistant and modifications were made to the notation where appropriate.   Differential diagnosis and plan of care discussed with patient after the evaluation  50 minutes spent on total encounter of which more than fifty percent of the encounter was spent counseling and/or coordinating care by the attending physician.  Advanced care planning was discussed with patient and family.  Advanced care planning forms were reviewed and discussed.  Risks, benefits and alternatives of gastroenterologic procedures were discussed in detail and all questions were answered.

## 2024-01-14 ENCOUNTER — TRANSCRIPTION ENCOUNTER (OUTPATIENT)
Age: 74
End: 2024-01-14

## 2024-01-14 VITALS
OXYGEN SATURATION: 100 % | DIASTOLIC BLOOD PRESSURE: 78 MMHG | TEMPERATURE: 99 F | SYSTOLIC BLOOD PRESSURE: 127 MMHG | HEART RATE: 72 BPM | RESPIRATION RATE: 18 BRPM

## 2024-01-14 LAB
CULTURE RESULTS: SIGNIFICANT CHANGE UP
SPECIMEN SOURCE: SIGNIFICANT CHANGE UP

## 2024-01-14 PROCEDURE — 82947 ASSAY GLUCOSE BLOOD QUANT: CPT

## 2024-01-14 PROCEDURE — 82435 ASSAY OF BLOOD CHLORIDE: CPT

## 2024-01-14 PROCEDURE — 82803 BLOOD GASES ANY COMBINATION: CPT

## 2024-01-14 PROCEDURE — 99232 SBSQ HOSP IP/OBS MODERATE 35: CPT

## 2024-01-14 PROCEDURE — 80053 COMPREHEN METABOLIC PANEL: CPT

## 2024-01-14 PROCEDURE — 74177 CT ABD & PELVIS W/CONTRAST: CPT

## 2024-01-14 PROCEDURE — 86850 RBC ANTIBODY SCREEN: CPT

## 2024-01-14 PROCEDURE — 85025 COMPLETE CBC W/AUTO DIFF WBC: CPT

## 2024-01-14 PROCEDURE — 96374 THER/PROPH/DIAG INJ IV PUSH: CPT

## 2024-01-14 PROCEDURE — 99285 EMERGENCY DEPT VISIT HI MDM: CPT | Mod: 25

## 2024-01-14 PROCEDURE — 85730 THROMBOPLASTIN TIME PARTIAL: CPT

## 2024-01-14 PROCEDURE — 87086 URINE CULTURE/COLONY COUNT: CPT

## 2024-01-14 PROCEDURE — 96375 TX/PRO/DX INJ NEW DRUG ADDON: CPT

## 2024-01-14 PROCEDURE — 85018 HEMOGLOBIN: CPT

## 2024-01-14 PROCEDURE — 86900 BLOOD TYPING SEROLOGIC ABO: CPT

## 2024-01-14 PROCEDURE — 83690 ASSAY OF LIPASE: CPT

## 2024-01-14 PROCEDURE — 71045 X-RAY EXAM CHEST 1 VIEW: CPT

## 2024-01-14 PROCEDURE — 84132 ASSAY OF SERUM POTASSIUM: CPT

## 2024-01-14 PROCEDURE — 82330 ASSAY OF CALCIUM: CPT

## 2024-01-14 PROCEDURE — 86901 BLOOD TYPING SEROLOGIC RH(D): CPT

## 2024-01-14 PROCEDURE — 80048 BASIC METABOLIC PNL TOTAL CA: CPT

## 2024-01-14 PROCEDURE — 85610 PROTHROMBIN TIME: CPT

## 2024-01-14 PROCEDURE — 85027 COMPLETE CBC AUTOMATED: CPT

## 2024-01-14 PROCEDURE — 83605 ASSAY OF LACTIC ACID: CPT

## 2024-01-14 PROCEDURE — 85014 HEMATOCRIT: CPT

## 2024-01-14 PROCEDURE — 84295 ASSAY OF SERUM SODIUM: CPT

## 2024-01-14 PROCEDURE — 87040 BLOOD CULTURE FOR BACTERIA: CPT

## 2024-01-14 PROCEDURE — 83735 ASSAY OF MAGNESIUM: CPT

## 2024-01-14 PROCEDURE — 81001 URINALYSIS AUTO W/SCOPE: CPT

## 2024-01-14 PROCEDURE — 94660 CPAP INITIATION&MGMT: CPT

## 2024-01-14 PROCEDURE — 82962 GLUCOSE BLOOD TEST: CPT

## 2024-01-14 RX ORDER — SIMETHICONE 80 MG/1
1 TABLET, CHEWABLE ORAL
Qty: 56 | Refills: 0
Start: 2024-01-14 | End: 2024-01-27

## 2024-01-14 RX ORDER — METRONIDAZOLE 500 MG
500 TABLET ORAL EVERY 8 HOURS
Refills: 0 | Status: DISCONTINUED | OUTPATIENT
Start: 2024-01-14 | End: 2024-01-14

## 2024-01-14 RX ORDER — METRONIDAZOLE 500 MG
1 TABLET ORAL
Qty: 24 | Refills: 0
Start: 2024-01-14 | End: 2024-01-21

## 2024-01-14 RX ORDER — LACTOBACILLUS ACIDOPHILUS 100MM CELL
1 CAPSULE ORAL
Qty: 0 | Refills: 0 | DISCHARGE
Start: 2024-01-14

## 2024-01-14 RX ORDER — CIPROFLOXACIN LACTATE 400MG/40ML
500 VIAL (ML) INTRAVENOUS EVERY 12 HOURS
Refills: 0 | Status: DISCONTINUED | OUTPATIENT
Start: 2024-01-14 | End: 2024-01-14

## 2024-01-14 RX ORDER — CIPROFLOXACIN LACTATE 400MG/40ML
1 VIAL (ML) INTRAVENOUS
Qty: 16 | Refills: 0
Start: 2024-01-14 | End: 2024-01-21

## 2024-01-14 RX ADMIN — SIMETHICONE 80 MILLIGRAM(S): 80 TABLET, CHEWABLE ORAL at 11:23

## 2024-01-14 RX ADMIN — RIVAROXABAN 2.5 MILLIGRAM(S): KIT at 05:21

## 2024-01-14 RX ADMIN — PANTOPRAZOLE SODIUM 40 MILLIGRAM(S): 20 TABLET, DELAYED RELEASE ORAL at 05:15

## 2024-01-14 RX ADMIN — PIPERACILLIN AND TAZOBACTAM 25 GRAM(S): 4; .5 INJECTION, POWDER, LYOPHILIZED, FOR SOLUTION INTRAVENOUS at 05:21

## 2024-01-14 RX ADMIN — CILOSTAZOL 50 MILLIGRAM(S): 100 TABLET ORAL at 05:34

## 2024-01-14 RX ADMIN — Medication 0.12 MILLIGRAM(S): at 11:07

## 2024-01-14 RX ADMIN — Medication 1 TABLET(S): at 11:09

## 2024-01-14 RX ADMIN — SIMETHICONE 80 MILLIGRAM(S): 80 TABLET, CHEWABLE ORAL at 05:15

## 2024-01-14 RX ADMIN — Medication 0.12 MILLIGRAM(S): at 05:16

## 2024-01-14 RX ADMIN — Medication 1 TABLET(S): at 08:25

## 2024-01-14 NOTE — DISCHARGE NOTE PROVIDER - PROVIDER TOKENS
PROVIDER:[TOKEN:[[id number]]],PROVIDER:[TOKEN:[[id number]]] PROVIDER:[TOKEN:[[id number]]],PROVIDER:[TOKEN:[[id number]]]

## 2024-01-14 NOTE — DISCHARGE NOTE PROVIDER - NSDCFUSCHEDAPPT_GEN_ALL_CORE_FT
Renny Richter  [facility]  [facility]  Scheduled Appointment: 02/06/2024    Jeremias Anglin  [facility]  VASCULAR [address]  Scheduled Appointment: 02/07/2024    Sabi Dorsey  [facility]  DERM [address]  Scheduled Appointment: 03/11/2024    [facility]  VASCULAR [address]  Scheduled Appointment: 03/19/2024    Pat Howard  [facility]  VASCULAR [address]  Scheduled Appointment: 03/19/2024     Renny Richter  [facility]  [facility]  Scheduled Appointment: 02/06/2024    Jeremias Anglin  [facility]  VASCULAR [address]  Scheduled Appointment: 02/07/2024    Sabi Dorsey  [facility]  DERM [address]  Scheduled Appointment: 03/11/2024    [facility]  VASCULAR [address]  Scheduled Appointment: 03/19/2024    Pat Howard  [facility]  VASCULAR [address]  Scheduled Appointment: 03/19/2024

## 2024-01-14 NOTE — PROGRESS NOTE ADULT - SUBJECTIVE AND OBJECTIVE BOX
INTERVAL HPI/OVERNIGHT EVENTS:  No new overnight event.  No N/V/D.  Tolerating diet.    Allergies    sulfa drugs (Unknown)    Intolerances    General:  No wt loss, fevers, chills, night sweats, fatigue,   Eyes:  Good vision, no reported pain  ENT:  No sore throat, pain, runny nose, dysphagia  CV:  No pain, palpitations, hypo/hypertension  Resp:  No dyspnea, cough, tachypnea, wheezing  GI:  No pain, No nausea, No vomiting, No diarrhea, No constipation, No weight loss, No fever, No pruritis, No rectal bleeding, No tarry stools, No dysphagia,  :  No pain, bleeding, incontinence, nocturia  Muscle:  No pain, weakness  Neuro:  No weakness, tingling, memory problems  Psych:  No fatigue, insomnia, mood problems, depression  Endocrine:  No polyuria, polydipsia, cold/heat intolerance  Heme:  No petechiae, ecchymosis, easy bruisability  Skin:  No rash, tattoos, scars, edema      PHYSICAL EXAM:   Vital Signs:  Vital Signs Last 24 Hrs  T(C): 37 (14 Jan 2024 09:27), Max: 37 (14 Jan 2024 09:27)  T(F): 98.6 (14 Jan 2024 09:27), Max: 98.6 (14 Jan 2024 09:27)  HR: 72 (14 Jan 2024 09:27) (60 - 72)  BP: 127/78 (14 Jan 2024 09:27) (113/72 - 127/78)  BP(mean): --  RR: 18 (14 Jan 2024 09:27) (18 - 18)  SpO2: 100% (14 Jan 2024 09:27) (97% - 100%)    Parameters below as of 14 Jan 2024 09:27  Patient On (Oxygen Delivery Method): room air      Daily     Daily I&O's Summary    13 Jan 2024 07:01  -  14 Jan 2024 07:00  --------------------------------------------------------  IN: 200 mL / OUT: 0 mL / NET: 200 mL        GENERAL:  Appears stated age, well-groomed, well-nourished, no distress  HEENT:  NC/AT,  conjunctivae clear and pink, no thyromegaly, nodules, adenopathy, no JVD, sclera -anicteric  CHEST:  Full & symmetric excursion, no increased effort, breath sounds clear  HEART:  Regular rhythm, S1, S2, no murmur/rub/S3/S4, no abdominal bruit, no edema  ABDOMEN:  Soft, non-tender, non-distended, normoactive bowel sounds,  no masses ,no hepato-splenomegaly, no signs of chronic liver disease  EXTEREMITIES:  no cyanosis,clubbing or edema  SKIN:  No rash/erythema/ecchymoses/petechiae/wounds/abscess/warm/dry  NEURO:  Alert, oriented, no asterixis, no tremor, no encephalopathy      LABS:    01-12    139  |  100  |  9   ----------------------------<  114<H>  3.7   |  32<H>  |  0.81    Ca    9.3      12 Jan 2024 18:53  Mg     1.8     01-12        Urinalysis Basic - ( 12 Jan 2024 18:53 )    Color: x / Appearance: x / SG: x / pH: x  Gluc: 114 mg/dL / Ketone: x  / Bili: x / Urobili: x   Blood: x / Protein: x / Nitrite: x   Leuk Esterase: x / RBC: x / WBC x   Sq Epi: x / Non Sq Epi: x / Bacteria: x      amylase   lipase  RADIOLOGY & ADDITIONAL TESTS:

## 2024-01-14 NOTE — DISCHARGE NOTE NURSING/CASE MANAGEMENT/SOCIAL WORK - NSDCVIVACCINE_GEN_ALL_CORE_FT
pneumococcal polysaccharide PPV23; 06-Nov-2014 09:06; Ysovany Smith (RN); [employer] [id number]; IntraMuscular; Deltoid Right.; 0.5 milliLiter(s);    pneumococcal polysaccharide PPV23; 06-Nov-2014 09:06; Yosvany Smith (RN); [employer] [id number]; IntraMuscular; Deltoid Right.; 0.5 milliLiter(s);

## 2024-01-14 NOTE — PROGRESS NOTE ADULT - ASSESSMENT
72 y/o female w/ PMHx PAD on Xarelto, breast CA s/p R lumpectomy, bladder CA w/ spinal mets s/p TURBT in remission, HTN, HLD, and multiple episodes of diverticulitis who presents due to findings of abscess on outpatient CT scan. For the past 3 weeks, she has been having crampy abdominal pain in her LLQ, which she thought was due to gas pains initially. When the pain wouldn't resolve, she went to see her GI, Dr. Pablo Bradfodr, who ordered an outpatient CT of the abdomen/pelvis. This revealed an acute proximal sigmoid colon diverticulitis with adjacent 1.5 x 1.1 x 1.1 cm L psoas abscess and a mild L hydronephrosis. ID consulted for abx mgmt.    Temp of 100.1 on admission 1/8  WBC wnl  1/9 bld cx neg x 2  Currently on zosyn 1/9-->  image-CT: Findings concerning for acute diverticulitis in the sigmoid colon with an adjacent air-containing abscess in the psoas muscle measuring   approximately 1.5 x 1.1 x 1.7 cm. Inflammation is presumably causing obstruction of the left ureter with mild hydronephrosis    Overall low grade fever on presentation, abnormal CT with perforated diverticulitis with psoas abscess.     Plan   Non toxic appearing states that she is feeling  better, still has some soreness to LLQ with pain on palpation.  When discharge planning can d/c zosyn and start Cipro 400mg Q 8h and Flagyl 500 mg q 12  Pt with extremely painful lesion in groin may need surgical eval  She also has newly painful area to back of head where lesion was recently removed now with drainage and should also be evaluated  Continue to monitor wbc   Continue to monitor Temp.    Herb Leos MD  Contact on TEAMS messaging from 9am - 5pm  From 5pm-9am, on weekends, or if no response call [phone] 74 y/o female w/ PMHx PAD on Xarelto, breast CA s/p R lumpectomy, bladder CA w/ spinal mets s/p TURBT in remission, HTN, HLD, and multiple episodes of diverticulitis who presents due to findings of abscess on outpatient CT scan. For the past 3 weeks, she has been having crampy abdominal pain in her LLQ, which she thought was due to gas pains initially. When the pain wouldn't resolve, she went to see her GI, Dr. Pablo Bradford, who ordered an outpatient CT of the abdomen/pelvis. This revealed an acute proximal sigmoid colon diverticulitis with adjacent 1.5 x 1.1 x 1.1 cm L psoas abscess and a mild L hydronephrosis. ID consulted for abx mgmt.    Temp of 100.1 on admission 1/8  WBC wnl  1/9 bld cx neg x 2  Currently on zosyn 1/9-->  image-CT: Findings concerning for acute diverticulitis in the sigmoid colon with an adjacent air-containing abscess in the psoas muscle measuring   approximately 1.5 x 1.1 x 1.7 cm. Inflammation is presumably causing obstruction of the left ureter with mild hydronephrosis    Overall low grade fever on presentation, abnormal CT with perforated diverticulitis with psoas abscess.     Plan   Non toxic appearing states that she is feeling  better, still has some soreness to LLQ with pain on palpation.  When discharge planning can d/c zosyn and start Cipro 400mg Q 8h and Flagyl 500 mg q 12  Pt with extremely painful lesion in groin may need surgical eval  She also has newly painful area to back of head where lesion was recently removed now with drainage and should also be evaluated  Continue to monitor wbc   Continue to monitor Temp.    Herb Leos MD  Contact on TEAMS messaging from 9am - 5pm  From 5pm-9am, on weekends, or if no response call [phone]

## 2024-01-14 NOTE — DISCHARGE NOTE NURSING/CASE MANAGEMENT/SOCIAL WORK - NSDCPEFALRISK_GEN_ALL_CORE
For information on Fall & Injury Prevention, visit: [email or web address] OR  [email or web address] OR  https://www.cdc.gov/steadi/patient.html For information on Fall & Injury Prevention, visit: [email or web address] OR  [email or web address] OR  https://www.cdc.gov/steadi/patient.html

## 2024-01-14 NOTE — PROGRESS NOTE ADULT - SUBJECTIVE AND OBJECTIVE BOX
CC: Abscess    Saw/spoke to patient. Doing well. Abd pain improved. No diarrhea.    Allergies  sulfa drugs (Unknown)    ANTIMICROBIALS:  ciprofloxacin     Tablet 500 every 12 hours  metroNIDAZOLE    Tablet 500 every 8 hours    PE:    Vital Signs Last 24 Hrs  T(C): 37 (14 Jan 2024 09:27), Max: 37 (14 Jan 2024 09:27)  T(F): 98.6 (14 Jan 2024 09:27), Max: 98.6 (14 Jan 2024 09:27)  HR: 72 (14 Jan 2024 09:27) (60 - 72)  BP: 127/78 (14 Jan 2024 09:27) (113/72 - 127/78)  RR: 18 (14 Jan 2024 09:27) (18 - 18)  SpO2: 100% (14 Jan 2024 09:27) (97% - 100%)    Gen: AOx3, NAD, non-toxic  CV: Nontachycardic  Resp: Breathing comfortably, RA  Abd: Soft, nontender  IV/Skin: No thrombophlebitis    LABS    01-12    139  |  100  |  9   ----------------------------<  114<H>  3.7   |  32<H>  |  0.81    Ca    9.3      12 Jan 2024 18:53  Mg     1.8     01-12    Urinalysis Basic - ( 12 Jan 2024 18:53 )    Color: x / Appearance: x / SG: x / pH: x  Gluc: 114 mg/dL / Ketone: x  / Bili: x / Urobili: x   Blood: x / Protein: x / Nitrite: x   Leuk Esterase: x / RBC: x / WBC x   Sq Epi: x / Non Sq Epi: x / Bacteria: x    MICROBIOLOGY:    Clean Catch Clean Catch (Midstream)  01-09-24   <10,000 CFU/mL Normal Urogenital Carol  --  --    .Blood Blood-Peripheral  01-09-24   No growth at 5 days  --  --    .Blood Blood-Peripheral  01-09-24   No growth at 5 days  --  --    RADIOLOGY:    1/9 XR    FINDINGS:    The heart size is normal.  No focal consolidation, pneumothorax, or pleural effusion.  There are no acute osseous abnormalities.    IMPRESSION:  No focal consolidation, pneumothorax, or pleural effusion.

## 2024-01-14 NOTE — DISCHARGE NOTE PROVIDER - HOSPITAL COURSE
PLAN:   - No acute colorectal surgical intervention  - Complete course of PO antibiotics  - Will have outpatient colonoscopy with GI  - when discharged patient may follow up with Dr. Rubne Figueroa  - groin lesions are likely due to hidradenitis        - no acute surgical intervention         - may follow up as outpatient         - would recommend dermatology consult for possible immunologic therapy once patient recovers from diverticulitis       PLAN:   - No acute colorectal surgical intervention  - Complete course of PO antibiotics  - Will have outpatient colonoscopy with GI  - when discharged patient may follow up with Dr. Ruben Figueroa  - groin lesions are likely due to hidradenitis        - no acute surgical intervention         - may follow up as outpatient         - would recommend dermatology consult for possible immunologic therapy once patient recovers from diverticulitis   HPI:  This is a 72 y/o female w/ PMHx PAD on Xarelto, breast CA s/p R lumpectomy, bladder CA w/ spinal mets s/p TURBT in remission, HTN, HLD, and multiple episodes of diverticulitis who presents due to findings of abscess on outpatient CT scan. For the past 3 weeks, she has been having crampy abdominal pain in her LLQ, which she thought was due to gas pains initially. When the pain wouldn't resolve, she went to see her GI, Dr. Pablo Bradford, who ordered an outpatient CT of the abdomen/pelvis. This revealed an acute proximal sigmoid colon diverticulitis with adjacent 1.5 x 1.1 x 1.1 cm L psoas abscess and a mild L hydronephrosis. She was told to go to the hospital for further management.    In the ED, she was almost febrile at 100.1, otherwise hemodynamically stable. No leukocytosis present, baseline normocytic anemia seen. INR slightly elevated at 1.43, mild hypokalemia of 3.4 seen on CMP. Was given Zosyn and 1L IVF in the ED. (09 Jan 2024 04:20)    Hospital Course:  72 y/o female w/ PMHx PAD on Xarelto, breast CA s/p R lumpectomy, bladder CA w/ spinal mets s/p TURBT in remission, HTN, HLD, and multiple episodes of diverticulitis presenting for acute proximal sigmoid colon diverticulitis w/ L psoas abscess found on outpatient CT. Admitted for IV antibiotics and possible psoas abscess drainage. Diverticulitis of sigmoid colon with abscess in the psoas muscle, surgery, ID , GI consulted. IR and surgery not planning any intervention. - C/w Zosyn 3.375g q8hrs and will change to PO per Abxs. -blood cultures NGTD - S/P  LR 100cc/hr for now---> pt is now tolerating solid meals. Hidradenitis - sx consulted, outpt follow-up with dermatology.       Advanced Directives:   [ x] Full code  [ ] DNR  [ ] Hospice    Discharge Diagnoses:  -Hidradenitis  -sigmoid colon diverticulitis w/ L psoas abscess

## 2024-01-14 NOTE — DISCHARGE NOTE NURSING/CASE MANAGEMENT/SOCIAL WORK - NSDCPETBCESMAN_GEN_ALL_CORE
Good postoperative appearance. If you are a smoker, it is important for your health to stop smoking. Please be aware that second hand smoke is also harmful.

## 2024-01-14 NOTE — PROGRESS NOTE ADULT - REASON FOR ADMISSION
Diverticulitis w/ abscess

## 2024-01-14 NOTE — DISCHARGE NOTE PROVIDER - NSDCMRMEDTOKEN_GEN_ALL_CORE_FT
cilostazol 50 mg oral tablet: 1 tab(s) orally 2 times a day  Norvasc 2.5 mg oral tablet: 1 tab(s) orally once a day (at bedtime)  pantoprazole 40 mg oral delayed release tablet: 1 tab(s) orally once a day (before a meal)  pravastatin 20 mg oral tablet: 1 tab(s) orally once a day (at bedtime)  Xarelto 2.5 mg oral tablet: 1 tab(s) orally 2 times a day

## 2024-01-14 NOTE — DISCHARGE NOTE PROVIDER - NSDCCPCAREPLAN_GEN_ALL_CORE_FT
PRINCIPAL DISCHARGE DIAGNOSIS  Diagnosis: Diverticulitis of large intestine with abscess  Assessment and Plan of Treatment:       SECONDARY DISCHARGE DIAGNOSES  Diagnosis: Psoas abscess, left  Assessment and Plan of Treatment:     Diagnosis: HTN (hypertension)  Assessment and Plan of Treatment:     Diagnosis: PAD (peripheral artery disease)  Assessment and Plan of Treatment:      PRINCIPAL DISCHARGE DIAGNOSIS  Diagnosis: Diverticulitis of large intestine with abscess  Assessment and Plan of Treatment: -CT noted with diverticulitis and small air containing abscess   -s/p IV Zosyn ---continue  Cipro/Flagyl as prescribed   -Will need colonoscopy in 6-8 weeks  - Follow-up Surgery Dr. Ruben Figueroa        SECONDARY DISCHARGE DIAGNOSES  Diagnosis: Psoas abscess, left  Assessment and Plan of Treatment: continue antibiotics as prescribed    Diagnosis: HTN (hypertension)  Assessment and Plan of Treatment: continue home medications    Diagnosis: PAD (peripheral artery disease)  Assessment and Plan of Treatment: continue home medications   follow-up with your PCP    Diagnosis: Hidradenitis  Assessment and Plan of Treatment: - groin lesions are likely due to hidradenitis  Follow-up with your Dermatologist for further monitoring and treatment

## 2024-01-14 NOTE — DISCHARGE NOTE NURSING/CASE MANAGEMENT/SOCIAL WORK - PATIENT PORTAL LINK FT
You can access the FollowMyHealth Patient Portal offered by [facility] by registering at the following website: [email or web address]. By joining [email or web address]’s FollowMyHealth portal, you will also be able to view your health information using other applications (apps) compatible with our system. You can access the FollowMyHealth Patient Portal offered by [facility] by registering at the following website: [email or web address]. By joining [employer]’s FollowMyHealth portal, you will also be able to view your health information using other applications (apps) compatible with our system.

## 2024-01-14 NOTE — PROGRESS NOTE ADULT - PROVIDER SPECIALTY LIST ADULT
Gastroenterology
Internal Medicine
Surgery
Surgery
Gastroenterology
Infectious Disease
Internal Medicine
Gastroenterology
Internal Medicine
Surgery
Surgery
Internal Medicine

## 2024-01-14 NOTE — HISTORY OF PRESENT ILLNESS
[FreeTextEntry1] : Patient seen initially last year for management of urethral pain which began following spinal fusion L1-4 - she had a adame for 3 days. After which she noted shooting pain in the urethra at the end of voiding and was associated with increased urinary frequency, urgency and incontinence. She was treated with Ciprofloxacin for a UTI. While in rehab the symptoms recurred/continued and she was treated with ciprofloxacin, oxybutynin and Pyridium. This helped the frequency but not the pain.I ended up doing a cystoscopy due to microscopic hematuria which noted a bladder tumor. She is now s/p TURBT. The tumor was sessile and covering the tight UO which i unroofed. Pathology c/w pT2 disease. F/u CT scan with no evidence of metastasis. There is no hydronephrosis though distal right ureter dilated.   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 Now S/P XRT to lesion and on Tecentriq with stable disease on recent scan. seen in June, today noting some feeling in her bladder; she notes some feeling of more frequency and a tingly sensation not discomfort after voiding. No hematuria or change in FOS.  reviewed recent scan and no obvious tumor in bladder wall.  Comes back with some same feelings - feels pressure alleviates to some degree; Ibuprofen takes care of it. No dysuria or hematuria though some increased frequency. Recent UA negative and culture negative.    about to finish therapy - here as notes chills with no fever, increased urinary frequency and sensation of tingling with voiding; no dysuria or hematuria. PCP checked UA which looked like UTI but culture was negative; took days of Cipro with no help. F/U UA - 6 RBC 7 WBCs.  had cystoscopy 1/10 - negative. PET scan and MRI 2/20 both OK. Including area of spine.   has been on Cefdinir foe sinus infection; then had fever associated with bladder pressure and increased frequency. Seen in Urgent care and started on Cipro -  improvement within days See negative culture in system. Now back to baseline though even before this has a bit more frequency than in the past   12/21 noted increased urgency and urine has strong odor. UA not impressive for UTI and has + 50K Strep Bovis.   1/9/24 -

## 2024-01-14 NOTE — DISCHARGE NOTE PROVIDER - CARE PROVIDER_API CALL
Ruben Figueroa (DO)  Surgery  [address] [facility] [address] [facility] [address], [address]  [place] [address]  Phone: [phone]  Fax: [phone]  Follow Up Time:     Pablo Bradford  Gastroenterology  [address] [place] [address]  Phone: [phone]  Fax: [phone]  Follow Up Time:    Ruben Figueroa (DO)  Surgery  [address] [facility] [address]  [place] [address]  Phone: [phone]  Fax: [phone]  Follow Up Time:     Pablo Bradford  Gastroenterology  [address] [place] [address]  Phone: [phone]  Fax: [phone]  Follow Up Time:

## 2024-01-17 NOTE — ED PROVIDER NOTE - MEDICAL DECISION MAKING DETAILS
PROCEDURE: Intravenous Fluorescence Angiography of the left foot # 2    INDICATION: Microvascular flow to the affected area will be evaluated to determine tissue perfusion.    PROCEDURE PROVIDER: Dr. Felix Brooks     ASSISTANT: DAPHNIE Knight    INFORMED CONSENT: Risks, benefits and alternatives of an Intravenous Fluorescence Angiography procedure have been described. The patient denied allergy to iodine and denied history of significant liver disease. The patient's questions have been answered. The patient agrees to proceed.     PROCEDURE DATE: 1/17/2024    DIAGNOSIS:  DFU aden grade 3    HISTORY: 32 year old diabetic man with history of HTN, anxiety and depression.  He was admitted 1/4/24 to [facility] [place] with a left foot wound that started approximately 5 days prior to admission after stepping on a nail. He was initially not aware the nail punctured his foot. He had progressive redness, pain, and foul smelling drainage leading him to admission. XR left foot 1/4/24 showed extensive gas within the soft tissues at the 5th MTP joint extending into the space between the 4th and 5th metatarsal and along the proximal 5th metatarsal. Per WIR, last tetanus 2015 so given updated tetanus vaccine 1/4/24.  He went emergently to the OR for incision and drainage with partial amputation of the left 4th and 5th ray by Dr. Bell early 1/5/24. Operative diagnosis of necrotizing soft tissue infection.      Wound care has been consulted for assistance with post op wound management and hyperbaric evaluation. He c/o ongoing pain in his foot. He is not feeling well. Reports he is an occasional smoker. He has received wound care and NPWT about 10 years ago for an abscess on his leg but has not had other wounds.     PROCEDURE DETAILS: A peripheral IV was placed prior to the procedure.  DAPHNIE Knight, injected the Indocyanine Green dye (ICG), 3 mL, followed by a saline flush. Video to capture ingress and egress of the ICG in the  target area was taken for evaluation, comparison and reference.    COMPLICATIONS:  None       PHYSICIAN IMPRESSION:     Above documented by OFELIA Portillo.  I have personally interviewed and examined the patient via face-to-face. Case was discussed with the Nurse Practitioner and patient, and I agree with the assessment and plan as documented. I was present for the entire procedure. I confirmed the findings listed below:    Necrotizing fasciitis,   M72.6   Type of infection:  Other specified bacterial agents as the cause of diseases classified elsewhere,  B96.89      First blush at reasonable time. There was rapid ingress aside from plantar lateral \"U\" junction that initially appeared hypofluorescent but 50% did fill in with sluggish flow, but progressively. Just the tip remained hypofluorescent. Appears better than prior CORKY. This represents ongoing but improving ischemia. Continue HBO.            Felix Brooks MD  [facility]/Hyperbaric Medicine & Wound Care    [facility]  Phone: [phone]  Fax: [phone]    [facility]  [facility]  Wound Care Dept: [phone]  Hyperbaric Dept: [phone]       pt is a 65 y/o F w/ a PMHx of recent spinal surgery and chronic suprapubic pain s/p 2 courses of Abx likely 2/2 intersitial cystitis possible pyelo will order CBC, UA, Urine Cx. No concern for SBO or ovarian torsion given exam and history, low suspicion for appy and will order CMP. Will give morphine for pain and post residual. Will need urology consult and likely d/c home w/ Abx and follow-up with urology.

## 2024-01-31 NOTE — HISTORY OF PRESENT ILLNESS
Catheter removed. [de-identified] : Ms. 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 The tumor was found on cystoscopy at the right ureteral orifice. 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 She is referred for consideration of neoadjuvant chemotherapy. In late May, at the time of scheduled back surgery, she thought she had a UTI. Urine was tested and she was treated, then had the surgery. She had burning post-op. She was treated again with an antibiotic. She went to Rehab and was treated again and she was seen by a urologist there. Went to PCP after discharge from rehab, he noted some blood in the urine. She went to [facility] ER and \par she was referred to Dr. Hargrove. She underwent a cystoscopy, revealing tumor. She never noted blood in her urine, but did note it was darker than usual. Still has some "tingling" sensation, no incontinence. Nocturia occurs, which she says is related to awakening from CPAP. No cough. WALKER/bone pains.\par \par 10/27/17...Seen at [facility] [place] in [place] opinion yesterday. They wanted to repeat procedures again, including cystoscopy. They called  again today. Saw Dr. Montalvo. She was asked to return next Thursday. They want to do a PET CT scan. They recommended she receive cis/gem, likely due to the glandular differentiation. She was overwhelmed by all the information she was given. No pains, no cough/SOB.  \par \par 10/27/17...Juju completed cycle 1 this past Friday, and she noticed on Saturday that her lips appeared swollen and she noticed sores in her mouth. This occurred after going out to eat chinese food.Today she feels that it may be going away. She has fatigue, and feels chills but no fever. She just feels " lousy."  She is feeling Nausea this time and if she takes the metoclopramide in time, then she is okay, She denies vomiting. She is also having some dysgeusia, She denies paraesthesias of the fingers and toes.  She is having constipation, and she is controlling it with the stool softeners.She states her appetite is ok, but the food doesn't taste good, and it hurts when she swallows\par \par 17...Chemo with cis/gem #2 due this Friday.has increased lower back pain. The pain had stopped. She had prior back surgery. The pain became more notable since starting the chemo. She had constipation with the chemo and noted an increase in the pain with the constipation in lower back near the site of the surgery. The pain feels like pressure, described as heavy, somewhat relieved by BM. Worse with activities. Took some oxycodone a few days ago, but had a headaches afterwards. Pain score currently at 6-7, no recent pain med use. Taking MiraLAX, senna, Colace and prune juice with occasional MOM. Appetite is good, some altered taste, when present, affects appetite. Had some nausea and vomited x 3 after initial chemo, more gagging than vomiting. She felt she had some sores in the mouth after the gem alone, resolved. No paresthesias. She had some discomfort in the right wrist area, at the site where chemo was administered. Fatigue present all the time, more since the start of chemo. No hematuria, good flow, no urgency or incontinence as before. \par \par 17...day 15, cycle #2, cis/gem.  Juju has increased sensitivity in the lips after chemo. She had some vomiting at the end of her chemo infusion, however she did not have increased nausea and vomiting since. She has fatigue, and she complains of constipation and is taking miralax. She does complain of back pain that has increased in intensity.She notices it when she bends over. She feels that there is something noticeably there in her back. She takes pain medication to help her fall asleep. She doesn't sleep well. She does use c-pap secondary to sleep apnea but she is awaken from her sleep secondary to nocturia. She also notices her pain more when she has constipation and has to go to the bathroom. She describes her lower back pain as 8/10 at worst, and best 5/10. HEr back pain went away after the surgery and she noticed it came back once starting Chemo. She states her appetite is good, She does complain of dysgeusia. She denies paraesthesias. She does not like how the oxycodone makes her feel. It gives her HAs.\par \par 18...Had RT from the  to the . She feels there is some decrease in the pain, not as piercing. She feels the area is stiff. Worse when she awakens. Takes 3 x 325 Tylenol at night which helps the pain. has constipation, went one week w/o evacuation. Was given lactulose, but she feels that MiraLAX works better. She is less active as a result of the pain. the pain is worse when she bends. Appetite is variable. No weight loss. Has occasional nausea. No vomiting. Has some indigestion at times. No blood in the urine, no dysuria. No incontinence. Fatigue is present. No pains elsewhere. \par \par 3/14/18...She still has some pain. She is doing PT, which helps temporarily. She has not had the relief of pain that she thought would be the result. Pain score at 4-5, described as aggravation and it inhibits activities. Worse with bending, getting in and out of cars. She is not taking hydromorphone at this time.. She is distressed with constipation from the pain meds. Says lactulose does not work. She takes some Tylenol sporadically, not daily. Appetite is good, lost 1 kilo. No hematuria, no dysuria, no frequency, no incontinence. No pains elsewhere. She remains very active. She has alopecia. No diarrhea. No cough/WALKER. Fatigue is present, mild. She feels it at the end of the day. No N/V. \par \par 18...Completed 3 cycles of Tecentriq. She noes curling of the right fourth finger involuntarily. She can bend it back up, but it hurts to do so. No difficulty with strength on the right hand, no tremors.  She has tried Icy Hot w/o benefit. She points to DIP and PIP joints as the sites of pain. She feels she has lumps on her head. She is losing her hair, likely secondary to the chemo she received. Her scalp is pruritic. She has also lost pubic hair. Her pain continues to get better,went to PT for 7 weeks. She is able to do all normal activities, with mild fatigue. Appetite is good, weight is stable. No dizziness, no unsteadiness, no headaches. No other issues. No hematuria. \par \par 18...Missed Rx on 18. feels "great". Still has back pains, much improved. No pain while seated, but will come on after a while. No worse with ambulation. Does not awaken her. Pain can be as high as 7-8 with activities, best at 3-4. No pain meds utilized. No blood in urine. denies fatigue until late in the day. Appetite is good, but she has lost about 3 pounds. She is avoiding fat products as she has an upset stomach from fats. Had a URI, treated with azithromycin, Flonase and a codeine containing cough suppressant, still has some residual cough. No diarrhea. No dyspnea. has constipation. \par \par 18...On atezolizumab since 18. Feels well. Still has some pains, not clearly as severe as before. It is worse if she walks for a while, such as several hundred feet. Also more prominent if she stands too long. Does not bother her at night or awaken her. It does not stop her activities. Takes an occasional ibuprofen, not daily. Appetite is good, weight is stable. No N/V. No diarrhea. No cough/WALKER. Some fatigue. No leg edema. \par \par 18...Feels "okay". Still has some back pain, nothing like it was before. Has a bunion of the foot which is bothersome and the podiatrist has recommended surgery. No cough/sputum,. No diarrhea. Appetite is good, but lost 2 pounds since last visit. No other pains. No headaches, no paresthesias. No dizziness noted, no balance issues. Mild fatigue, improved.\par \par 18...Cycle 6 was administered on 18, due #7 on 18. "I'm feeling good". Noted some pressure and noted urinary frequency about one week ago, then resolved. No dysuria, no blood, no frequency, nocturia x 1. The usual lower back pain, no pain at the current time. Extra exertion brings on the low back pain and she is not sure if this is from the cancer or from the prior back surgery. She notes several areas over her skin becoming depigmented in small spots. No pruritus. Appetite is good, no weight loss. Actually gained a few pounds. No diarrhea. has some fatigue towards the end of the day. No dizziness. No paresthesias. No cough/WALKER. \par \par 18...On atezo since 18. Has received 7 cycles. Saw Dr. Hargrove late , cysto recommended, but she decided to wait until  after vacation. She notes lightening of her skin. She didn't go to the dermatologist. She has a prior dermatologist that she might see. No diarrhea. No upset stomach. Appetite is ":great", no weight loss. No cough/WALKER. No paresthesias. Minor fatigue along with aches in the evenings. No headaches, no dizziness. No leg edema. Slight hematuria. \par \par 18...last scans in May\par Reports skin changes due to the vitiligo has been bothering her.  Reports this has been happening more since last month.  Has also been taking Valium from two years ago occasionally for anxiety.  Reports has taken it once every 2-3 weeks, only when she feels overwhelmed with anxiety.  Has not seen an psychiatrist yet, but has been seeing a psychologist.  She has seeing her for one year..  When she gets anxious it occurs mostly at night.  Denies any pain, except after walking a long period of time.  Will occasionally use a cane.  Reports no urinary frequency.  No urinary incontinence, no hematuria.  Reports regular bowel movements.  Reports good appetite, occasional dyspepsia with fried foods.  \par \par 18...Did not have an appt today, was added onto schedule. She has urgency, no dysuria, no foul odor. She feels bloated as well for the past 3 days. She wonders if it is related to her vitiligo, which is prominent in the urogenital area. Urine is clear, no blood. No fevers, no chills. Appetite is "great", gaining weight. No cough/WALKER. NO diarrheal stools, last BM yesterday, normal. No N/V. No vitiligo is more prominent, which concerns her. She says the bottom of her feet are tender, at the ball of the feet. Toes are numb. This has occurred over the last week. She has also had cramps in her hands. Mostly the thumb, told of he had an injection for her trigger finger. received cycle # 11 of atezolizumab today. \par \par 10/11/18...dose # 12 today. Saw Paulie Hargrove, plan for cysto. "I'm doing well".  She says that her feet always feels like there "is something there", involving the toes, no longer affects the ball of the foot. Saw her podiatrist. Cysto is scheduled for the . Her vitiligo is more prominent his is prominent on the hands. She feels that some parts of her hands are darker as well. Appetite is "fabulous". No N/V/D/C. Urine flow is good, no incontinence, no blood, no dysuria. Urgency and fullness sensation resolved. No cough/SOB. No headaches. No fatigue\par \par 10/31/18....Dose #13 today. Feels well at this time. Back pain remains the same as before, no pain med use. If she does extra activities she has pain. She had back surgery before and she believes it is from the prior surgery and not the mets. No fatigue. Appetite is good, weight is stable., No diarrheal stools. No hematuria noted. No dysuria. Usual activities performed without impairment. Vitiligo is somewhat more prominent, which disturbs her. No leg edema. No cough, no WALKER. No upset stomach. No fevers, no chills. Had cysto on , all was normal. \par \par 18...Feels "okay". Pain in the back somewhat more prominent, the pain from her prior surgery.Appetite is jeramy, no weight loss. No cough/WALKER. No diarrhea, no upset stomach. Some minor fatigue. She thinks she is depressed, attends a support group here. Starting with a therapist. He  left her recently. [place]. \par \par 18...Last scans 18. Getting laser therapy for her vitiligo, which she may not continue due to high c-pays. feels as if her tongue is sensitive to heat since starting the laser therapy. She senses salt more than before. Otherwise well, back pain "bearable", RTS, "part of my life". No pain meds used. Appetite is good, weight is stable. No diarrheal stools No cough, No SOB. She has some sensation in the bladder or vaginal area, pruritic, calmed with Vagisil.  She is concerned about some blood in the urine, microscopic....she had a cysto on 10/22/18, revealing no issue. She asked about clearance for sexual activity. No fatigue, no headaches. No dysuria, but occasional mild irritation. No incontinence. No urinary frequency, rare nocturia. \par \par 1/3/19 : On atezolizumab since 18. Feels "great". No pains except for usual aches. Appetite is good, weight has increased. No cough, no WALKER. No diarrheal stools, No upset stomach. No edema. No skin rashes. Vitiligo is "going crazy", goes 2 times a week for laser therapy. States she may stop the laser therapy. \par \par 19...19 : Here for Atezolizumab(since ) and follow up. \par She has been well overall without any AEs aside form vitiligo which is a known side effect of these ICI's. \par \par 19 : One year on atezolizumab. Feels "fine". No diarrheal stool, no dyspepsia, no cough/WALKER. Appetite s good, no weight loss. No headaches, some paresthesias of the feet, no change. Has seen neurology and received injections. Can't open her hands at times. No fatigue, no pruritus. NO skin rashes. Vitiligo continues to be more prominent. Went for laser therapy, wit stopped it. She says her lips blistered. Vitiligo affects genitalia, present prior to Rx, has vitiligo of the hands, axilla and breasts. \par \par 3/6/19...3/6/19 : Ms. Crespo is here for a follow up and atezolizumab. She has been having lower back pain that resembled pain she had when she had recurrence. She underwent MRI on 3/3/19. This showed re-demonstrating the spine lesions, no changes were noted on the MRI. She has DJD and bulging disc with narrowing in L4-5.\par No new lesions were seen. She has not followed up with Dr. VINICIUS Kauffman for this either. OTHerwise she feels well. \par \par 19...saw her orthopedic surgeon, who said the pain is not related to her cancer, but to scar tissue. He gave her Flexeril and Meloxicam. He referred her for PT. The pain is much better. The knot" is not as big as before. Otherwise, :fabulous". Appetite is good, weight is stable. Has some fatigue, noted at the end of the day. No pruritus, no rash. Vitiligo is progressive. No cough, no WALKER. No nausea, no vomiting, occ upset stomach. No diarrheal stools. Now 14 months on therapy. \par \par 19...On Atezo since 2018. Had some back pain exacerbation recently, was on meloxicam and cyclobenzaprine, which has resolved to a great degree. She returned to work in April. Feels well otherwise, has a "head cold", with congestion, yellow sputum. Taking Claritin and other drugs, nasal congestion and runny nose improved. She is concerned it has moved to the chest. No F/C, no SOB, no wheezing. Appetite is good, no weight loss. No diarrheal stools., no upset stomach. Fatigue is present, in  the evening, she is "done". This has been the case since she returned to work. No rash, no pruritus. Vitiligo is progressing.\par \par 19...Working and has fatigue. She feels that the fatigue is mostly due to the work schedule. 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 Cycle 25 is today. Overall, feels well. has some back pain, which she feels is related to her prior surgery. No hematuria noted, no incontinence. No dysuria. Appetite is excellent, no weight loss. No cough, No WALKER. No diarrheal stools. No F/C. No edema. \par \par 19...Now 1.5 years on atezo at this time. Feels well overall. has an occasional tingling when she voids, which she attributes to the genital vitiligo. She has some cream to apply. She is working as a  to the elderly and one of her clients  this AM. This upsets her somewhat. She became upset and tearful. Appetite is normal, weight is stable. She continues to have some back pains as before, related to prior surgery, perhaps somewhat more since she had to expend more effort. No diarrheal stools, no cough. No SOB No headaches, no dizziness. No fatigue, taking Geritol, which she feels has helped her. No fevers, no chills, no urinary issues.\par \par 9/10/19...Feels "wonderful". Back pain RTS, not severe, has taken some oxycodone at bed time recently Uses CPAP to sleep, occasionally awakened by back pain. She saw her orthopedist, who said that everything looked the same.Appetite is good, no weight loss. No cough, no WALKER, No diarrheal stools. No fatigue. No rash, no pruritus. Occ headaches, feel like sinus headaches, no meds, brief duration, slight in intensity. No edema. Orthopedist did plain films. Occ tramadol use. \par \par 10/31/19...On atezo since 2018. Now has more below back pains Become worse in July or August, not present all the time. Occasionally awakens her. Takes oxycodone or hydromorphone infrequently. Pain at 4 currently, worst at  4 in the last 24, best at zero. No worse with walking, actually better. Appetite is "great". Weight stable. o N/V/C/D. No cough, no dyspnea. Working, has some fatigue at the end of the day. No headaches, occ paresthesias.  She had prior surgery with rods in her back. Going t see derm as well. Has some "tingling when she voids, which she attributes to the worsening vitiligo in the urogenital area. NO blood, nocturia x 1, flow is fine, no incontinence. No dysuria. No edema. Wants t cancel  appointment, wants to go to [place].\par \par 19...Had an MRI of the neck, ordered by Dr Naveen Kauffman, who performed her lumbar surgery. She was told she requires surgery in the neck. She was started on meloxicam, which she says does not help.  dose will be # 33. The neck pain feels "like a monkey on my back", started about 6 weeks ago after her last visit. She was told it has nothing to do with her cancer. She did not bring the MRI results with her. Appetite is "great", no weight loss. No blood in the urine, no dysuria. She was started on some topicals and fluconazole for genital rash/vaginitis, saw both GYN and derm. No fatigue. Started atezo in 2018. No edema. No diarrheal stools, no cough/WALKER. getting some PT/hydrotherapy. \par \par 20...Feels well today. Had some gyn issues, told of a urine infection, treated with Cipro and Diflucan, completed both. Saw her PCP for an annual exam. She had large leucocyte esterase, few bacteria , but a negative culture. She has been noting chills recently. No fevers. NO fatigue. Appetite is fine, no weight loss. Back pains are "okay", no meds used. No edema. No cough, No WALKER. No diarrheal stools. She was on her CPAP recently and was also treated with a Z pack. \par \par 20...Saw GYN for routine follow up and had some pruritus as well. She was having intermittent chills as well as some abdominal fullness. No findings on exam apparently. She was sent for a sono, which revealed a mass, vascular, MRI ordered, not done as yet. She feels pressure in the bladder area. The pressure is constant, not increased with voiding, but has a "sensation". Recent cysto was negative. Appetite is good, weight increased. No other areas of pain/discomfort. No fevers,no chills. She has her usual low back pains. She has pressure in the lower pelvis, much better compared to 2 weeks ago. PCP felt a left supraclavicular mass and ordered a CXR.\par \par 20...Completed 2 years of atezolizumab in February. Has had sinus infection, on third course of antibiotics, to have a procedure in 3 weeks. Seeing a TMJ specialist for pain in her jaw. Recently had a partial denture made and she can't use it due to the pain. Back pain the same. "I live with it". She dropped 7 pounds and she can't eat like she used to. Latest antibiotic was doxycycline. She is on Mobic for the jaw pains. Otherwise well. NO blood in urine, no dysuria. no incontinence. No fatigue. No cough, no dyspnea. No headaches. No nausea, no vomiting. Vitiligo progressing\par \par 10/27/20...Verbal permission for telehealth services granted by the patient, Juju Da Silva on 2020 at 10:25 AM\par Feels "okay". To see a gastroenterologist as she is having issues with dairy, stared earlier this year. She has an upset stomach with certain meals with bloating, no N/V/D. If she takes Linzess, she feels better. She does not take it every day. Appetite is good, no weight loss. Back pains continues, may be up to 6 or 7, every day pains, no change form last visit. Rare pain med use. Takes Tylenol ES if he wakes her up at night. No opioids used. No pains elsewhere. Sitting down and getting up brings on the pains, better with activities. No cough, no WALKER. No edema. No hematuria. Saw Dr Hargrove for urinary frequency and pressure in the lower abdomen. No fevers, no chills. NO headaches. NO dizziness. \par Had UTI, seen in in urgent care, had fevers/chills.  She was on several antibiotics for sinusitis from February onwards. PET shows continual opacification of the sinus. Has some jaw pains, and she says this cannot be addressed until sinus cared for. Urine was negative recently. PET shows ZACH. \par \par 2/3/21 - Presents for follow up, refused to do telehealth as she was scheduled for.  Feels well. Appetite is good, gained weight, up 5.5 kilos form last recorded weight. Has not had coronavirus. No edema. No N/V/D/C. No blood in urine, no incontinence. No headaches, no dizziness, some paresthesias since her back surgery, no worse after the chemotherapy. No tinnitus. No fatigue. \par \par 8/3/21 - Presents for follow up. had a fall and had a fracture i her lumbar sine. She had a wound in her leg and was seeing a wound specialist. She is on gabapentin for neuropathy, dose unknown, only at bedtime. She had her films done at [facility]. She says she had an MRI as well. Thinks it was L1 that was fractured. She has chronic low back pain, worse with bending, always some issue present. The neuropathy involves the bottom of the feet and toes. This is only on the right side. The gabapentin helps to some degree. Feels well otherwise, no other sites of pain. Appetite is good, no weight loss that she perceives, although down 3.7 kilos from May. No blood, in the urine, no dysuria, no incontinence. No urgency. No edema noted. No headaches, no dizziness. No cough no WALKER. No N/V/D/C. She saw ortho at [facility] and they wanted to do epidural. Due to the infection, it was not done. After healing of the wound, she no longer needed the epidural. Can't recall he name of the ortho surgeon, she will call with the name. \par \par 21...almost 2 years since the end of 2 years of atezolizumab. Feels "fine". Has her usual aches and pains no change. Appetite is good, follows with PCP, ha A1c checked, says she eliminated sugar and sweets. Appetite is good, dropped 4 kilos since August. No N/V/D/C. No cough, no WALKER. Usual low back pains, prior surgery. No meds used. Urine is "perfect". No blood, no dysuria. no incontinence. Last saw Paulie Hargrove in 2020. The leg wound healed. No headaches, no dizziness. She has paresthesias, since the surgery, no longer taking the gabapentin, wears socks at night, which helps. No F/C. No leg edema. Usual fatigue, no change. \par \par 22...completed atezo in 2020, after 2 years of therapy. Had a mammogram done and she was noted to have microcalcifications, done at [facility]. She is set up for a stereotactic biopsy. This has alarmed her. She says she had a breast cancer in the left breast. had  a lumpectomy and RT at that time. She has some anxiety as a result of this development. there is no palpable mass. Otherwise feels well. Appetite is good, no weight loss/gain. No headaches, no dizziness. NO leg edema. No chest pain./pressure/palpitations. No N/V/D, occ constipation, on Linzess. \par \par 22...completed atezo in 2020, after 2 years of therapy. Her feet started burning due to neuropathy. had swelling of her hand on the left side, was seen in the ER and kept overnight, no blood clot was noted. Had breast cancer diagnosis, had surgery May 3, had revision on May 9th, right side. had DCIS, had RT with Mali Kahn, 5 fractions. Apparently just to the breast. Swelling started after the RT> Now resolving. had an echo done, says her aortic valve is "twisted", likely stenosis, at 25%.  She saw a neurologist for the paresthesias. takes gabapentin 300 mg at bedtime. back is the same, periodic pains, no meds used. Appetite is "fantastic", no weight loss. No cough, no WALKER. No N/V/D/C. No headaches, no dizziness. No balance issues. Works 3 days a week,  to an elderly person. No edema of the legs noted. No fevers, no chills. No pain at this time, none in the past 24 hours. \par \par 21...completed atezo in 2020, after 2 years of therapy. Visiting [place] since November, developed pain and swelling of the foot, told of a fracture of the metatarsal, had a cast placed, then developed a wound covering her foot from the cast. On doxycycline as antibiotic. Had IV antibiotics while in the hospital. Has a wound care nurse who visits.  No fevers, no chills. On tramadol for pain, which she says she has run out of. On gabapentin as well with some help. Appetite is decreased, which she attributes to the antibiotics. Feels she has not lost weight. NO cough, no WALKER. In bed a lot due to pain, No fatigue. NO headaches, no dizziness. NO blood in the urine. No pains elsewhere. No N/V/D/...has constipation. 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 Not on tamoxifen. Saw an oncologist in regards to the breast cancer at [facility]. Was due to be seen this month, but missed appt. 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 Now on atezolizumab since February 12, 2018, ended 2/4/20.  [de-identified] : 5/1/23 ..completed 2 years of atezolizumab in February 2020. Had cellulitis  of is of the right foot, hospitalized in [place] x 5 days. She returned here and had the same issue on the left foot, at [facility]. Discharge summary below. Had a biopsy by derm, told it was small vessel disease of the feet. Discharged on Pletal. Foot is doing better, "way better" than it was.  Still has some pains. Currently pain at 4-5, worst in last 24 hours  at 7.  hurts more at night, constipated, no longer taking any narcotics. Takes stool softener, takes MiraLAX as well. No vomiting. had some nausea on occasion. weight is stable. No headaches, occ dizziness,  No cough, no WALKER. Had CT in the hospital, no issues noted. \par \par Hospital Course: \par Discharge Date	19-Apr-2023 \par Admission Date	08-Apr-2023 19:20 \par Reason for Admission	cellulitis \par Hospital Course	 \par 72-year-old female with past medical history of breast cancer status post right breast lumpectomy, bladder cancer with mets to the spine, in remission, \par hypertension, hyperlipidemia presents to the ER complaining of left ankle pain swelling and redness times approximately 1 week. Admit for IV abx for cellulitis. \par LE / ankle Cellulitis \par - Consulted by Infectious Disease and Podiatry, given IV Vanco and Zosyn, will complete a 10 Day course of Doxycycline 100mg Q12 PO and Keflex 500mg Q6 PO on discharge. \par - Foot XR with no OM, Culture from site reportedly negative, MR with subcutaneous edema, LE edema vs cellulitis, no fluid collection, no OM \par - Rheum team consulted patient, serologic work up negative \par - Derm team consulted patient and L lateral malleoli nodular lesion, now with central lesion s/p punch biopsy with derm, patient to follow up in the \par outpatient setting for biopsy results (negative thus far) \par \par PAD \par -vascular team consulted patient, s/p left lower extremity angiogram w/3-vessel run-off to ankle c/w small vessel disease \par - distal dis , no stent/ or angioplasty \par - received wound care, home wound care established \par - continue pletal 50 bid \par \par PMHx of Breast CA and Bladder Ca \par - Consulted by Heme/Onc team, no Concerning findings for metastatic disease or Osteomyelitis based on the MRI of ankle \par - CT C/A/P w/ IV contrast showed wall thickening of the proximal sigmoid colon and 2 mm nodule in the right lower lobe is not identified, may be inflammatory. \par \par \par -Punch biopsy, lateral malleous left: \par - Ulceration with adjacent epidermal hyperplasia, dermal vessel wall thickening (liveloid vasculopathy), and suppurative inflammation, see comment. \par Comment: Clinical photos and history reviewed. Multiple deeper levels \par examined. The differential diagnosis includes infection and primary or secondary livedoid vasculopathy. Although special stains (Gram, GMS, \par PAS-D, Nohemi, and AFB) are negative for microorganisms, correlation with and tissue cultures to exclude an infection is needed. In addition, \par peripheral venous and arterial studies should be performed to exclude small vessel disease. CK7 stain is negative for carcinoma. Pyoderma gangrenosum is a diagnosis of exclusion, after the above clinical entities are ruled out and requires clinicopathologic correlation. \par  Signatures\par Electronically signed by : BEAR CAMPA\par \par \par \par \par \par \par \par

## 2024-02-01 ENCOUNTER — OUTPATIENT (OUTPATIENT)
Dept: OUTPATIENT SERVICES | Facility: HOSPITAL | Age: 74
LOS: 1 days | Discharge: HOME | End: 2024-02-01

## 2024-02-01 DIAGNOSIS — Z98.890 OTHER SPECIFIED POSTPROCEDURAL STATES: Chronic | ICD-10-CM

## 2024-02-01 DIAGNOSIS — N63 UNSPECIFIED LUMP IN BREAST: Chronic | ICD-10-CM

## 2024-02-01 DIAGNOSIS — Z90.710 ACQUIRED ABSENCE OF BOTH CERVIX AND UTERUS: Chronic | ICD-10-CM

## 2024-02-01 DIAGNOSIS — C67.9 MALIGNANT NEOPLASM OF BLADDER, UNSPECIFIED: ICD-10-CM

## 2024-02-01 DIAGNOSIS — Z98.89 OTHER SPECIFIED POSTPROCEDURAL STATES: Chronic | ICD-10-CM

## 2024-02-06 ENCOUNTER — APPOINTMENT (OUTPATIENT)
Dept: HEMATOLOGY ONCOLOGY | Facility: CLINIC | Age: 74
End: 2024-02-06

## 2024-02-07 ENCOUNTER — APPOINTMENT (OUTPATIENT)
Dept: VASCULAR SURGERY | Facility: CLINIC | Age: 74
End: 2024-02-07
Payer: MEDICARE

## 2024-02-07 PROCEDURE — 99442: CPT | Mod: 93

## 2024-02-07 NOTE — REASON FOR VISIT
[Follow-Up: _____] : a [unfilled] follow-up visit [FreeTextEntry1] : i have poor circulation [Home] : at home, [unfilled] , at the time of the visit. [Medical Office: ([facility])___] : at the medical office located in  [Other:____] : [unfilled] [Verbal consent obtained from patient] : the patient, [unfilled]

## 2024-02-07 NOTE — PHYSICAL EXAM
[1+] : left 1+ [2+] : left 2+ [Alert] : alert [Oriented to Person] : oriented to person [Oriented to Place] : oriented to place [Oriented to Time] : oriented to time [Calm] : calm [de-identified] : no respiratory distress [FreeTextEntry1] : Physical exam findings via telephonic review with patient   [de-identified] : cooperative

## 2024-02-07 NOTE — ASSESSMENT
[FreeTextEntry1] : Impression art insuff and liveloid vasculopathy and recent dx and treatment of diverticulitis   Plan Med Conservative  protective measures continue xarelto 2.5 mg bid  recommended to pt to restart pletal at 25 mg bid d/w pt liveloid vasculopathy and lifelong po anticoag and pletal 25 mg BID  will need le art insuff surveillance ov w jessica/pvr s/o art insuff 12 mo march 2024 f/u w GI prn Telephonic visit  time duration  16 min  [Arterial/Venous Disease] : arterial/venous disease [Medication Management] : medication management Admission

## 2024-02-07 NOTE — HISTORY OF PRESENT ILLNESS
[FreeTextEntry1] : pt was eval at [facility]  for left lat mall wound\par  s/p lle angio sig  for   small vessel dz\par  started on pletal \par  pt was eval by podiatry and derm s/p bx\par  w/u  s/o liveloidvasculopathy\par  pt c/o ongoing   mod  deepika wound pain \par  pt was eval by woundcare  [de-identified] : pt is now on xarelto 2.5 mg BID pt has d/c pletal rx since last ov pt was dx w and was treated diverticulitis  pt states rx has been completed and feel much better

## 2024-02-28 ENCOUNTER — APPOINTMENT (OUTPATIENT)
Dept: UROLOGY | Facility: CLINIC | Age: 74
End: 2024-02-28
Payer: MEDICARE

## 2024-02-28 ENCOUNTER — APPOINTMENT (OUTPATIENT)
Dept: UROLOGY | Facility: CLINIC | Age: 74
End: 2024-02-28

## 2024-02-28 ENCOUNTER — OUTPATIENT (OUTPATIENT)
Dept: OUTPATIENT SERVICES | Facility: HOSPITAL | Age: 74
LOS: 1 days | End: 2024-02-28
Payer: MEDICARE

## 2024-02-28 VITALS
OXYGEN SATURATION: 98 % | HEART RATE: 106 BPM | RESPIRATION RATE: 17 BRPM | DIASTOLIC BLOOD PRESSURE: 76 MMHG | SYSTOLIC BLOOD PRESSURE: 118 MMHG

## 2024-02-28 DIAGNOSIS — C67.6 MALIGNANT NEOPLASM OF URETERIC ORIFICE: ICD-10-CM

## 2024-02-28 DIAGNOSIS — Z90.710 ACQUIRED ABSENCE OF BOTH CERVIX AND UTERUS: Chronic | ICD-10-CM

## 2024-02-28 DIAGNOSIS — Z98.89 OTHER SPECIFIED POSTPROCEDURAL STATES: Chronic | ICD-10-CM

## 2024-02-28 DIAGNOSIS — N63 UNSPECIFIED LUMP IN BREAST: Chronic | ICD-10-CM

## 2024-02-28 DIAGNOSIS — Z98.890 OTHER SPECIFIED POSTPROCEDURAL STATES: Chronic | ICD-10-CM

## 2024-02-28 DIAGNOSIS — R35.0 FREQUENCY OF MICTURITION: ICD-10-CM

## 2024-02-28 PROCEDURE — 52000 CYSTOURETHROSCOPY: CPT

## 2024-03-01 DIAGNOSIS — C67.6 MALIGNANT NEOPLASM OF URETERIC ORIFICE: ICD-10-CM

## 2024-03-11 ENCOUNTER — APPOINTMENT (OUTPATIENT)
Dept: DERMATOLOGY | Facility: CLINIC | Age: 74
End: 2024-03-11

## 2024-03-14 PROBLEM — C68.9 UROTHELIAL CANCER: Status: ACTIVE | Noted: 2019-08-13

## 2024-03-14 PROBLEM — C79.51 CANCER, METASTATIC TO BONE: Status: ACTIVE | Noted: 2022-12-20

## 2024-03-19 ENCOUNTER — APPOINTMENT (OUTPATIENT)
Dept: VASCULAR SURGERY | Facility: CLINIC | Age: 74
End: 2024-03-19
Payer: MEDICARE

## 2024-03-19 VITALS
WEIGHT: 144 LBS | BODY MASS INDEX: 19.5 KG/M2 | DIASTOLIC BLOOD PRESSURE: 80 MMHG | SYSTOLIC BLOOD PRESSURE: 112 MMHG | HEIGHT: 72 IN | TEMPERATURE: 97.8 F | HEART RATE: 78 BPM

## 2024-03-19 PROCEDURE — 99214 OFFICE O/P EST MOD 30 MIN: CPT

## 2024-03-19 PROCEDURE — 93923 UPR/LXTR ART STDY 3+ LVLS: CPT

## 2024-03-19 RX ORDER — CILOSTAZOL 50 MG/1
50 TABLET ORAL
Qty: 180 | Refills: 3 | Status: ACTIVE | COMMUNITY
Start: 2024-03-19 | End: 1900-01-01

## 2024-03-19 NOTE — PHYSICAL EXAM
[1+] : left 1+ [2+] : right 2+ [Alert] : alert [No Rash or Lesion] : No rash or lesion [Oriented to Person] : oriented to person [Oriented to Time] : oriented to time [Oriented to Place] : oriented to place [Calm] : calm [de-identified] : nad [Ankle Swelling (On Exam)] : not present [de-identified] : wnl [de-identified] : no respiratory distress [de-identified] : NILSONL [FreeTextEntry1] : Mild arterial insufficiency w moderate trophic skin changes   LLE lateral malleolus wound healed no wounds or ulcers  [de-identified] : cooperative [de-identified] : Jordi Cranial nerves 2-12 jordi grossly intact

## 2024-03-19 NOTE — HISTORY OF PRESENT ILLNESS
[FreeTextEntry1] : pt was eval at [facility]  for left lat mall wound\par  s/p lle angio sig  for   small vessel dz\par  started on pletal \par  pt was eval by podiatry and derm s/p bx\par  w/u  s/o liveloidvasculopathy\par  pt c/o ongoing   mod  deepika wound pain \par  pt was eval by woundcare  [de-identified] : pt here to evaluate lower extremities no new complaints states her diverticulitis is under control and is now feeling better is now on xarelto 2.5 mg BID taking pletal 50 mg BID was recommended to take pletal 25 mg BID last visit but pt had difficulty cutting pill in half denies claudication, rest pain, nonhealing wounds or ulcers

## 2024-03-19 NOTE — DATA REVIEWED
[FreeTextEntry1] : 3/19/2024 HEIDY/PVR   RLE mod infra geniculate artery insuff LLE mod infra geniculate artery insuff w vessel                                    calcification                                   Rt HEIDY 1.20  Lt HEIDY 1.27

## 2024-03-20 ENCOUNTER — RESULT REVIEW (OUTPATIENT)
Age: 74
End: 2024-03-20

## 2024-03-20 ENCOUNTER — APPOINTMENT (OUTPATIENT)
Dept: HEMATOLOGY ONCOLOGY | Facility: CLINIC | Age: 74
End: 2024-03-20
Payer: MEDICARE

## 2024-03-20 VITALS
OXYGEN SATURATION: 96 % | SYSTOLIC BLOOD PRESSURE: 130 MMHG | TEMPERATURE: 98 F | WEIGHT: 142.2 LBS | RESPIRATION RATE: 17 BRPM | BODY MASS INDEX: 16.43 KG/M2 | DIASTOLIC BLOOD PRESSURE: 75 MMHG | HEART RATE: 105 BPM

## 2024-03-20 DIAGNOSIS — D64.9 ANEMIA, UNSPECIFIED: ICD-10-CM

## 2024-03-20 DIAGNOSIS — C79.51 SECONDARY MALIGNANT NEOPLASM OF BONE: ICD-10-CM

## 2024-03-20 DIAGNOSIS — C68.9 MALIGNANT NEOPLASM OF URINARY ORGAN, UNSPECIFIED: ICD-10-CM

## 2024-03-20 LAB
BASOPHILS # BLD AUTO: 0.04 K/UL — SIGNIFICANT CHANGE UP (ref 0–0.2)
BASOPHILS NFR BLD AUTO: 0.5 % — SIGNIFICANT CHANGE UP (ref 0–2)
EOSINOPHIL # BLD AUTO: 0.21 K/UL — SIGNIFICANT CHANGE UP (ref 0–0.5)
EOSINOPHIL NFR BLD AUTO: 2.4 % — SIGNIFICANT CHANGE UP (ref 0–6)
HCT VFR BLD CALC: 34.1 % — LOW (ref 34.5–45)
HGB BLD-MCNC: 10.6 G/DL — LOW (ref 11.5–15.5)
IMM GRANULOCYTES NFR BLD AUTO: 0.3 % — SIGNIFICANT CHANGE UP (ref 0–0.9)
LYMPHOCYTES # BLD AUTO: 1.57 K/UL — SIGNIFICANT CHANGE UP (ref 1–3.3)
LYMPHOCYTES # BLD AUTO: 18.2 % — SIGNIFICANT CHANGE UP (ref 13–44)
MCHC RBC-ENTMCNC: 26.8 PG — LOW (ref 27–34)
MCHC RBC-ENTMCNC: 31.1 G/DL — LOW (ref 32–36)
MCV RBC AUTO: 86.3 FL — SIGNIFICANT CHANGE UP (ref 80–100)
MONOCYTES # BLD AUTO: 0.95 K/UL — HIGH (ref 0–0.9)
MONOCYTES NFR BLD AUTO: 11 % — SIGNIFICANT CHANGE UP (ref 2–14)
NEUTROPHILS # BLD AUTO: 5.83 K/UL — SIGNIFICANT CHANGE UP (ref 1.8–7.4)
NEUTROPHILS NFR BLD AUTO: 67.6 % — SIGNIFICANT CHANGE UP (ref 43–77)
NRBC # BLD: 0 /100 WBCS — SIGNIFICANT CHANGE UP (ref 0–0)
PLATELET # BLD AUTO: 323 K/UL — SIGNIFICANT CHANGE UP (ref 150–400)
RBC # BLD: 3.95 M/UL — SIGNIFICANT CHANGE UP (ref 3.8–5.2)
RBC # FLD: 17.2 % — HIGH (ref 10.3–14.5)
WBC # BLD: 8.63 K/UL — SIGNIFICANT CHANGE UP (ref 3.8–10.5)
WBC # FLD AUTO: 8.63 K/UL — SIGNIFICANT CHANGE UP (ref 3.8–10.5)

## 2024-03-20 PROCEDURE — 99213 OFFICE O/P EST LOW 20 MIN: CPT

## 2024-03-20 RX ORDER — CHLORHEXIDINE GLUCONATE 4 %
325 (65 FE) LIQUID (ML) TOPICAL
Refills: 0 | Status: ACTIVE | COMMUNITY
Start: 2024-03-20

## 2024-03-20 RX ORDER — DOXYCYCLINE HYCLATE 100 MG/1
100 TABLET ORAL
Qty: 28 | Refills: 3 | Status: DISCONTINUED | COMMUNITY
Start: 2023-11-29 | End: 2024-03-20

## 2024-03-20 RX ORDER — PANTOPRAZOLE SODIUM 40 MG/1
40 GRANULE, DELAYED RELEASE ORAL
Refills: 0 | Status: DISCONTINUED | COMMUNITY
End: 2024-03-20

## 2024-03-20 NOTE — RESULTS/DATA
[FreeTextEntry1] : EXAM: [id number] - CT ABDOMEN AND PELVIS OC IC  - ORDERED BY: JIM ORTIZ PROCEDURE DATE:  01/08/2024 INTERPRETATION:  CLINICAL INFORMATION: Abdominal pain COMPARISON: CT scan of the abdomen and pelvis from 4/17/2023 CONTRAST/COMPLICATIONS: IV Contrast: Isovue 370  90 cc administered   10 cc discarded Oral Contrast: Omnipaque 300 Complications: None reported at time of study completion  PROCEDURE: CT of the Abdomen and Pelvis was performed. Sagittal and coronal reformats were performed. FINDINGS: LOWER CHEST: Mild atelectatic changes. 3 mm calcified granuloma in the right lower lobe.  LIVER: Within normal limits. BILE DUCTS: Normal caliber. GALLBLADDER: Collapsed gallbladder. SPLEEN: Subcentimeter low-attenuation lesion in the spleen which is too small to characterize. PANCREAS: Within normal limits. ADRENALS: Within normal limits. KIDNEYS/URETERS: Mild left-sided hydroureteronephrosis to the level of inflammation in the colon and left psoas muscle described below.  BLADDER: Within normal limits. REPRODUCTIVE ORGANS: Hysterectomy.  BOWEL: No bowel obstruction.  Colonic diverticuli. Pericolonic inflammatory changes in the setting of diverticuli in the proximal sigmoid colon which is concerning for acute diverticulitis. Artifact from hardware in the lumbar spine limited evaluation. There is an abscess containing air in the adjacent left psoas muscle seen best on series 2 image 73 measuring approximately 1.5 x 1.1 cm x 1.7 which is presumably related to the diverticulitis. Appendix well visualized. No focal inflammation in the right lower quadrant. PERITONEUM: No ascites. VESSELS: Vascular calcification RETROPERITONEUM/LYMPH NODES: No lymphadenopathy. ABDOMINAL WALL: Postsurgical changes in the anterior abdominal wall. BONES: Degenerative changes of bone. Laminectomy and hardware in the lower lumbar spine.  IMPRESSION: Findings concerning for acute diverticulitis in the sigmoid colon with an adjacent air-containing abscess in the psoas muscle measuring approximately 1.5 x 1.1 x 1.7 cm. Inflammation is presumably causing obstruction of the left ureter with mild hydronephrosis. This critical value was discussed with Dr. Ortiz at 6:15 PM on 1/8/2024.  --- End of Report --- BRITTON GOULD MD; Attending Radiologist This document has been electronically signed. Jan 8 2024 ***********************************************************

## 2024-03-20 NOTE — HISTORY OF PRESENT ILLNESS
[de-identified] : Ms. 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 The tumor was found on cystoscopy at the right ureteral orifice. 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 She is referred for consideration of neoadjuvant chemotherapy. In late May, at the time of scheduled back surgery, she thought she had a UTI. Urine was tested and she was treated, then had the surgery. She had burning post-op. She was treated again with an antibiotic. She went to Rehab and was treated again and she was seen by a urologist there. Went to PCP after discharge from rehab, he noted some blood in the urine. She went to [facility] ER and  she was referred to Dr. Hargrove. She underwent a cystoscopy, revealing tumor. She never noted blood in her urine, but did note it was darker than usual. Still has some "tingling" sensation, no incontinence. Nocturia occurs, which she says is related to awakening from CPAP. No cough. WALKER/bone pains.  10/27/17...Seen at [facility] in second opinion yesterday. They wanted to repeat procedures again, including cystoscopy. They called  again today. Saw Dr. Montalvo. She was asked to return next Thursday. They want to do a PET CT scan. They recommended she receive cis/gem, likely due to the glandular differentiation. She was overwhelmed by all the information she was given. No pains, no cough/SOB.    10/27/17...Juju completed cycle 1 this past Friday, and she noticed on Saturday that her lips appeared swollen and she noticed sores in her mouth. This occurred after going out to eat chinese food.Today she feels that it may be going away. She has fatigue, and feels chills but no fever. She just feels " lousy."  She is feeling Nausea this time and if she takes the metoclopramide in time, then she is okay, She denies vomiting. She is also having some dysgeusia, She denies paraesthesias of the fingers and toes.  She is having constipation, and she is controlling it with the stool softeners.She states her appetite is ok, but the food doesn't taste good, and it hurts when she swallows  11/21/17...Chemo with cis/gem #2 due this Friday.has increased lower back pain. The pain had stopped. She had prior back surgery. The pain became more notable since starting the chemo. She had constipation with the chemo and noted an increase in the pain with the constipation in lower back near the site of the surgery. The pain feels like pressure, described as heavy, somewhat relieved by BM. Worse with activities. Took some oxycodone a few days ago, but had a headaches afterwards. Pain score currently at 6-7, no recent pain med use. Taking MiraLAX, senna, Colace and prune juice with occasional MOM. Appetite is good, some altered taste, when present, affects appetite. Had some nausea and vomited x 3 after initial chemo, more gagging than vomiting. She felt she had some sores in the mouth after the gem alone, resolved. No paresthesias. She had some discomfort in the right wrist area, at the site where chemo was administered. Fatigue present all the time, more since the start of chemo. No hematuria, good flow, no urgency or incontinence as before.   17...day 15, cycle #2, cis/gem.  Juju has increased sensitivity in the lips after chemo. She had some vomiting at the end of her chemo infusion, however she did not have increased nausea and vomiting since. She has fatigue, and she complains of constipation and is taking miralax. She does complain of back pain that has increased in intensity.She notices it when she bends over. She feels that there is something noticeably there in her back. She takes pain medication to help her fall asleep. She doesn't sleep well. She does use c-pap secondary to sleep apnea but she is awaken from her sleep secondary to nocturia. She also notices her pain more when she has constipation and has to go to the bathroom. She describes her lower back pain as 8/10 at worst, and best 5/10. HEr back pain went away after the surgery and she noticed it came back once starting Chemo. She states her appetite is good, She does complain of dysgeusia. She denies paraesthesias. She does not like how the oxycodone makes her feel. It gives her HAs.  18...Had RT from the  to the . She feels there is some decrease in the pain, not as piercing. She feels the area is stiff. Worse when she awakens. Takes 3 x 325 Tylenol at night which helps the pain. has constipation, went one week w/o evacuation. Was given lactulose, but she feels that MiraLAX works better. She is less active as a result of the pain. the pain is worse when she bends. Appetite is variable. No weight loss. Has occasional nausea. No vomiting. Has some indigestion at times. No blood in the urine, no dysuria. No incontinence. Fatigue is present. No pains elsewhere.   3/14/18...She still has some pain. She is doing PT, which helps temporarily. She has not had the relief of pain that she thought would be the result. Pain score at 4-5, described as aggravation and it inhibits activities. Worse with bending, getting in and out of cars. She is not taking hydromorphone at this time.. She is distressed with constipation from the pain meds. Says lactulose does not work. She takes some Tylenol sporadically, not daily. Appetite is good, lost 1 kilo. No hematuria, no dysuria, no frequency, no incontinence. No pains elsewhere. She remains very active. She has alopecia. No diarrhea. No cough/WALKER. Fatigue is present, mild. She feels it at the end of the day. No N/V.   18...Completed 3 cycles of Tecentriq. She noes curling of the right fourth finger involuntarily. She can bend it back up, but it hurts to do so. No difficulty with strength on the right hand, no tremors.  She has tried Icy Hot w/o benefit. She points to DIP and PIP joints as the sites of pain. She feels she has lumps on her head. She is losing her hair, likely secondary to the chemo she received. Her scalp is pruritic. She has also lost pubic hair. Her pain continues to get better,went to PT for 7 weeks. She is able to do all normal activities, with mild fatigue. Appetite is good, weight is stable. No dizziness, no unsteadiness, no headaches. No other issues. No hematuria.   18...Missed Rx on 18. feels "great". Still has back pains, much improved. No pain while seated, but will come on after a while. No worse with ambulation. Does not awaken her. Pain can be as high as 7-8 with activities, best at 3-4. No pain meds utilized. No blood in urine. denies fatigue until late in the day. Appetite is good, but she has lost about 3 pounds. She is avoiding fat products as she has an upset stomach from fats. Had a URI, treated with azithromycin, Flonase and a codeine containing cough suppressant, still has some residual cough. No diarrhea. No dyspnea. has constipation.   18...On atezolizumab since 18. Feels well. Still has some pains, not clearly as severe as before. It is worse if she walks for a while, such as several hundred feet. Also more prominent if she stands too long. Does not bother her at night or awaken her. It does not stop her activities. Takes an occasional ibuprofen, not daily. Appetite is good, weight is stable. No N/V. No diarrhea. No cough/WALKER. Some fatigue. No leg edema.   18...Feels "okay". Still has some back pain, nothing like it was before. Has a bunion of the foot which is bothersome and the podiatrist has recommended surgery. No cough/sputum,. No diarrhea. Appetite is good, but lost 2 pounds since last visit. No other pains. No headaches, no paresthesias. No dizziness noted, no balance issues. Mild fatigue, improved.  18...Cycle 6 was administered on 18, due #7 on 18. "I'm feeling good". Noted some pressure and noted urinary frequency about one week ago, then resolved. No dysuria, no blood, no frequency, nocturia x 1. The usual lower back pain, no pain at the current time. Extra exertion brings on the low back pain and she is not sure if this is from the cancer or from the prior back surgery. She notes several areas over her skin becoming depigmented in small spots. No pruritus. Appetite is good, no weight loss. Actually gained a few pounds. No diarrhea. has some fatigue towards the end of the day. No dizziness. No paresthesias. No cough/WALKER.   18...On atezo since 18. Has received 7 cycles. Saw Dr. Hargrove late , rocky recommended, but she decided to wait until  after vacation. She notes lightening of her skin. She didn't go to the dermatologist. She has a prior dermatologist that she might see. No diarrhea. No upset stomach. Appetite is ":great", no weight loss. No cough/WALKER. No paresthesias. Minor fatigue along with aches in the evenings. No headaches, no dizziness. No leg edema. Slight hematuria.   18...last scans in May Reports skin changes due to the vitiligo has been bothering her.  Reports this has been happening more since last month.  Has also been taking Valium from two years ago occasionally for anxiety.  Reports has taken it once every 2-3 weeks, only when she feels overwhelmed with anxiety.  Has not seen an psychiatrist yet, but has been seeing a psychologist.  She has seeing her for one year..  When she gets anxious it occurs mostly at night.  Denies any pain, except after walking a long period of time.  Will occasionally use a cane.  Reports no urinary frequency.  No urinary incontinence, no hematuria.  Reports regular bowel movements.  Reports good appetite, occasional dyspepsia with fried foods.    18...Did not have an appt today, was added onto schedule. She has urgency, no dysuria, no foul odor. She feels bloated as well for the past 3 days. She wonders if it is related to her vitiligo, which is prominent in the urogenital area. Urine is clear, no blood. No fevers, no chills. Appetite is "great", gaining weight. No cough/WALKER. NO diarrheal stools, last BM yesterday, normal. No N/V. No vitiligo is more prominent, which concerns her. She says the bottom of her feet are tender, at the ball of the feet. Toes are numb. This has occurred over the last week. She has also had cramps in her hands. Mostly the thumb, told of he had an injection for her trigger finger. received cycle # 11 of atezolizumab today.   10/11/18...dose # 12 today. Saw Paulie Hargrove, plan for cysto. "I'm doing well".  She says that her feet always feels like there "is something there", involving the toes, no longer affects the ball of the foot. Saw her podiatrist. Cysto is scheduled for the . Her vitiligo is more prominent his is prominent on the hands. She feels that some parts of her hands are darker as well. Appetite is "fabulous". No N/V/D/C. Urine flow is good, no incontinence, no blood, no dysuria. Urgency and fullness sensation resolved. No cough/SOB. No headaches. No fatigue  10/31/18....Dose #13 today. Feels well at this time. Back pain remains the same as before, no pain med use. If she does extra activities she has pain. She had back surgery before and she believes it is from the prior surgery and not the mets. No fatigue. Appetite is good, weight is stable., No diarrheal stools. No hematuria noted. No dysuria. Usual activities performed without impairment. Vitiligo is somewhat more prominent, which disturbs her. No leg edema. No cough, no WALKER. No upset stomach. No fevers, no chills. Had cysto on , all was normal.   18...Feels "okay". Pain in the back somewhat more prominent, the pain from her prior surgery.Appetite is jeramy, no weight loss. No cough/WALKER. No diarrhea, no upset stomach. Some minor fatigue. She thinks she is depressed, attends a support group here. Starting with a therapist. He  left [place] recently. [place]   18...Last scans 18. Getting laser therapy for her vitiligo, which she may not continue due to high c-pays. feels as if her tongue is sensitive to heat since starting the laser therapy. She senses salt more than before. Otherwise well, back pain "bearable", RTS, "part of my life". No pain meds used. Appetite is good, weight is stable. No diarrheal stools No cough, No SOB. She has some sensation in the bladder or vaginal area, pruritic, calmed with Vagisil.  She is concerned about some blood in the urine, microscopic....she had a cysto on 10/22/18, revealing no issue. She asked about clearance for sexual activity. No fatigue, no headaches. No dysuria, but occasional mild irritation. No incontinence. No urinary frequency, rare nocturia.   1/3/19 : On atezolizumab since 18. Feels "great". No pains except for usual aches. Appetite is good, weight has increased. No cough, no WALKER. No diarrheal stools, No upset stomach. No edema. No skin rashes. Vitiligo is "going crazy", goes 2 times a week for laser therapy. States she may stop the laser therapy.   19...19 : Here for Atezolizumab(since ) and follow up.  She has been well overall without any AEs aside form vitiligo which is a known side effect of these ICI's.   19 : One year on atezolizumab. Feels "fine". No diarrheal stool, no dyspepsia, no cough/WALKER. Appetite s good, no weight loss. No headaches, some paresthesias of the feet, no change. Has seen neurology and received injections. Can't open her hands at times. No fatigue, no pruritus. NO skin rashes. Vitiligo continues to be more prominent. Went for laser therapy, wit stopped it. She says her lips blistered. Vitiligo affects genitalia, present prior to Rx, has vitiligo of the hands, axilla and breasts.   3/6/19...3/6/19 : Ms. Crespo is here for a follow up and atezolizumab. She has been having lower back pain that resembled pain she had when she had recurrence. She underwent MRI on 3/3/19. This showed re-demonstrating the spine lesions, no changes were noted on the MRI. She has DJD and bulging disc with narrowing in L4-5. No new lesions were seen. She has not followed up with Dr. VINICIUS Kauffman for this either. OTHerwise she feels well.   19...saw her orthopedic surgeon, who said the pain is not related to her cancer, but to scar tissue. He gave her Flexeril and Meloxicam. He referred her for PT. The pain is much better. The knot" is not as big as before. Otherwise, :fabulous". Appetite is good, weight is stable. Has some fatigue, noted at the end of the day. No pruritus, no rash. Vitiligo is progressive. No cough, no WALKER. No nausea, no vomiting, occ upset stomach. No diarrheal stools. Now 14 months on therapy.   19...On Atezo since 2018. Had some back pain exacerbation recently, was on meloxicam and cyclobenzaprine, which has resolved to a great degree. She returned to work in April. Feels well otherwise, has a "head cold", with congestion, yellow sputum. Taking Claritin and other drugs, nasal congestion and runny nose improved. She is concerned it has moved to the chest. No F/C, no SOB, no wheezing. Appetite is good, no weight loss. No diarrheal stools., no upset stomach. Fatigue is present, in  the evening, she is "done". This has been the case since she returned to work. No rash, no pruritus. Vitiligo is progressing.  19...Working and has fatigue. She feels that the fatigue is mostly due to the work schedule. 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 Cycle 25 is today. Overall, feels well. has some back pain, which she feels is related to her prior surgery. No hematuria noted, no incontinence. No dysuria. Appetite is excellent, no weight loss. No cough, No WALKER. No diarrheal stools. No F/C. No edema.   19...Now 1.5 years on atezo at this time. Feels well overall. has an occasional tingling when she voids, which she attributes to the genital vitiligo. She has some cream to apply. She is working as a  to the elderly and one of her clients  this AM. This upsets her somewhat. She became upset and tearful. Appetite is normal, weight is stable. She continues to have some back pains as before, related to prior surgery, perhaps somewhat more since she had to expend more effort. No diarrheal stools, no cough. No SOB No headaches, no dizziness. No fatigue, taking Geritol, which she feels has helped her. No fevers, no chills, no urinary issues.  9/10/19...Feels "wonderful". Back pain RTS, not severe, has taken some oxycodone at bed time recently Uses CPAP to sleep, occasionally awakened by back pain. She saw her orthopedist, who said that everything looked the same.Appetite is good, no weight loss. No cough, no WALKER, No diarrheal stools. No fatigue. No rash, no pruritus. Occ headaches, feel like sinus headaches, no meds, brief duration, slight in intensity. No edema. Orthopedist did plain films. Occ tramadol use.   10/31/19...On atezo since 2018. Now has more below back pains Become worse in July or August, not present all the time. Occasionally awakens her. Takes oxycodone or hydromorphone infrequently. Pain at 4 currently, worst at  4 in the last 24, best at zero. No worse with walking, actually better. Appetite is "great". Weight stable. o N/V/C/D. No cough, no dyspnea. Working, has some fatigue at the end of the day. No headaches, occ paresthesias.  She had prior surgery with rods in her back. Going t see derm as well. Has some "tingling when she voids, which she attributes to the worsening vitiligo in the urogenital area. NO blood, nocturia x 1, flow is fine, no incontinence. No dysuria. No edema. Wants t cancel  appointment, wants to go to [place].  19...Had an MRI of the neck, ordered by Dr Naveen Kauffman, who performed her lumbar surgery. She was told she requires surgery in the neck. She was started on meloxicam, which she says does not help.  dose will be # 33. The neck pain feels "like a monkey on my back", started about 6 weeks ago after her last visit. She was told it has nothing to do with her cancer. She did not bring the MRI results with her. Appetite is "great", no weight loss. No blood in the urine, no dysuria. She was started on some topicals and fluconazole for genital rash/vaginitis, saw both GYN and derm. No fatigue. Started atezo in 2018. No edema. No diarrheal stools, no cough/WALKER. getting some PT/hydrotherapy.   20...Feels well today. Had some gyn issues, told of a urine infection, treated with Cipro and Diflucan, completed both. Saw her PCP for an annual exam. She had large leucocyte esterase, few bacteria , but a negative culture. She has been noting chills recently. No fevers. NO fatigue. Appetite is fine, no weight loss. Back pains are "okay", no meds used. No edema. No cough, No WALKER. No diarrheal stools. She was on her CPAP recently and was also treated with a Z pack.   20...Saw GYN for routine follow up and had some pruritus as well. She was having intermittent chills as well as some abdominal fullness. No findings on exam apparently. She was sent for a sono, which revealed a mass, vascular, MRI ordered, not done as yet. She feels pressure in the bladder area. The pressure is constant, not increased with voiding, but has a "sensation". Recent cysto was negative. Appetite is good, weight increased. No other areas of pain/discomfort. No fevers,no chills. She has her usual low back pains. She has pressure in the lower pelvis, much better compared to 2 weeks ago. PCP felt a left supraclavicular mass and ordered a CXR.  20...Completed 2 years of atezolizumab in February. Has had sinus infection, on third course of antibiotics, to have a procedure in 3 weeks. Seeing a TMJ specialist for pain in her jaw. Recently had a partial denture made and she can't use it due to the pain. Back pain the same. "I live with it". She dropped 7 pounds and she can't eat like she used to. Latest antibiotic was doxycycline. She is on Mobic for the jaw pains. Otherwise well. NO blood in urine, no dysuria. no incontinence. No fatigue. No cough, no dyspnea. No headaches. No nausea, no vomiting. Vitiligo progressing  10/27/20...Verbal permission for telehealth services granted by the patient, Juju Da Silva on 2020 at 10:25 AM Feels "okay". To see a gastroenterologist as she is having issues with dairy, stared earlier this year. She has an upset stomach with certain meals with bloating, no N/V/D. If she takes Linzess, she feels better. She does not take it every day. Appetite is good, no weight loss. Back pains continues, may be up to 6 or 7, every day pains, no change form last visit. Rare pain med use. Takes Tylenol ES if he wakes her up at night. No opioids used. No pains elsewhere. Sitting down and getting up brings on the pains, better with activities. No cough, no WALKER. No edema. No hematuria. Saw Dr Hargrove for urinary frequency and pressure in the lower abdomen. No fevers, no chills. NO headaches. NO dizziness.  Had UTI, seen in in urgent care, had fevers/chills.  She was on several antibiotics for sinusitis from February onwards. PET shows continual opacification of the sinus. Has some jaw pains, and she says this cannot be addressed until sinus cared for. Urine was negative recently. PET shows ZACH.   2/3/21 - Presents for follow up, refused to do telehealth as she was scheduled for.  Feels well. Appetite is good, gained weight, up 5.5 kilos form last recorded weight. Has not had coronavirus. No edema. No N/V/D/C. No blood in urine, no incontinence. No headaches, no dizziness, some paresthesias since her back surgery, no worse after the chemotherapy. No tinnitus. No fatigue.   8/3/21 - Presents for follow up. had a fall and had a fracture i her lumbar sine. She had a wound in her leg and was seeing a wound specialist. She is on gabapentin for neuropathy, dose unknown, only at bedtime. She had her films done at [facility]. She says she had an MRI as well. Thinks it was L1 that was fractured. She has chronic low back pain, worse with bending, always some issue present. The neuropathy involves the bottom of the feet and toes. This is only on the right side. The gabapentin helps to some degree. Feels well otherwise, no other sites of pain. Appetite is good, no weight loss that she perceives, although down 3.7 kilos from May. No blood, in the urine, no dysuria, no incontinence. No urgency. No edema noted. No headaches, no dizziness. No cough no WALKER. No N/V/D/C. She saw ortho at [facility] and they wanted to do epidural. Due to the infection, it was not done. After healing of the wound, she no longer needed the epidural. Can't recall he name of the ortho surgeon, she will call with the name.   21...almost 2 years since the end of 2 years of atezolizumab. Feels "fine". Has her usual aches and pains no change. Appetite is good, follows with PCP, ha A1c checked, says she eliminated sugar and sweets. Appetite is good, dropped 4 kilos since August. No N/V/D/C. No cough, no WALKER. Usual low back pains, prior surgery. No meds used. Urine is "perfect". No blood, no dysuria. no incontinence. Last saw Paulie Hargrove in 2020. The leg wound healed. No headaches, no dizziness. She has paresthesias, since the surgery, no longer taking the gabapentin, wears socks at night, which helps. No F/C. No leg edema. Usual fatigue, no change.   22...completed atezo in 2020, after 2 years of therapy. Had a mammogram done and she was noted to have microcalcifications, done at [facility]. She is set up for a stereotactic biopsy. This has alarmed her. She says she had a breast cancer in the left breast. had  a lumpectomy and RT at that time. She has some anxiety as a result of this development. there is no palpable mass. Otherwise feels well. Appetite is good, no weight loss/gain. No headaches, no dizziness. NO leg edema. No chest pain./pressure/palpitations. No N/V/D, occ constipation, on Linzess.   22...completed atezo in 2020, after 2 years of therapy. Her feet started burning due to neuropathy. had swelling of her hand on the left side, was seen in the ER and kept overnight, no blood clot was noted. Had breast cancer diagnosis, had surgery May 3, had revision on May 9th, right side. had DCIS, had RT with Mali Kahn, 5 fractions. Apparently just to the breast. Swelling started after the RT> Now resolving. had an echo done, says her aortic valve is "twisted", likely stenosis, at 25%.  She saw a neurologist for the paresthesias. takes gabapentin 300 mg at bedtime. back is the same, periodic pains, no meds used. Appetite is "fantastic", no weight loss. No cough, no WALKER. No N/V/D/C. No headaches, no dizziness. No balance issues. Works 3 days a week,  to an elderly person. No edema of the legs noted. No fevers, no chills. No pain at this time, none in the past 24 hours.   21...completed atezo in 2020, after 2 years of therapy. Visiting [place] since November, developed pain and swelling of the foot, told of a fracture of the metatarsal, had a cast placed, then developed a wound covering her foot from the cast. On doxycycline as antibiotic. Had IV antibiotics while in the hospital. Has a wound care nurse who visits.  No fevers, no chills. On tramadol for pain, which she says she has run out of. On gabapentin as well with some help. Appetite is decreased, which she attributes to the antibiotics. Feels she has not lost weight. NO cough, no WALKER. In bed a lot due to pain, No fatigue. NO headaches, no dizziness. NO blood in the urine. No pains elsewhere. No N/V/D/...has constipation. 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 Not on tamoxifen. Saw an oncologist in regards to the breast cancer at [facility]. Was due to be seen this month, but missed appt. Last imaging 2021.   no issues noted.  22 ..completed 2 years of atezolizumab in 2020. Had cellulitis  of is of the right foot, hospitalized in [place] x 5 days. She returned here and had the same issue on the left foot, at [facility]. Discharge summary below. Had a biopsy by derm, told it was small vessel disease of the feet. Discharged on Pletal. Foot is doing better, "way better" than it was.  Still has some pains. Currently pain at 4-5, worst in last 24 hours  at 7.  hurts more at night, constipated, no longer taking any narcotics. Takes stool softener, takes MiraLAX as well. No vomiting. had some nausea on occasion. weight is stable. No headaches, occ dizziness,  No cough, no WALKER. Had CT in the hospital, Hospital Course:  Discharge Date	2023  Admission Date	2023 19:20  Reason for Admission	cellulitis  Hospital Course	  72-year-old female with past medical history of breast cancer status post right breast lumpectomy, bladder cancer with mets to the spine, in remission,  hypertension, hyperlipidemia presents to the ER complaining of left ankle pain swelling and redness times approximately 1 week. Admit for IV abx for cellulitis.  LE / ankle Cellulitis  - Consulted by Infectious Disease and Podiatry, given IV Vanco and Zosyn, will complete a 10 Day course of Doxycycline 100mg Q12 PO and Keflex 500mg Q6 PO on discharge.  - Foot XR with no OM, Culture from site reportedly negative, MR with subcutaneous edema, LE edema vs cellulitis, no fluid collection, no OM  - Rheum team consulted patient, serologic work up negative  - Derm team consulted patient and L lateral malleoli nodular lesion, now with central lesion s/p punch biopsy with derm, patient to follow up in the  outpatient setting for biopsy results (negative thus far)   PAD  -vascular team consulted patient, s/p left lower extremity angiogram w/3-vessel run-off to ankle c/w small vessel disease  - distal dis , no stent/ or angioplasty  - received wound care, home wound care established  - continue pletal 50 bid   PMHx of Breast CA and Bladder Ca  - Consulted by Heme/Onc team, no Concerning findings for metastatic disease or Osteomyelitis based on the MRI of ankle  - CT C/A/P w/ IV contrast showed wall thickening of the proximal sigmoid colon and 2 mm nodule in the right lower lobe is not identified, may be inflammatory.    -Punch biopsy, lateral malleous left:  - Ulceration with adjacent epidermal hyperplasia, dermal vessel wall thickening (liveloid vasculopathy), and suppurative inflammation, see comment.  Comment: Clinical photos and history reviewed. Multiple deeper levels  examined. The differential diagnosis includes infection and primary or secondary livedoid vasculopathy. Although special stains (Gram, GMS,  PAS-D, Nohemi, and AFB) are negative for microorganisms, correlation with and tissue cultures to exclude an infection is needed. In addition,  peripheral venous and arterial studies should be performed to exclude small vessel disease. CK7 stain is negative for carcinoma. Pyoderma gangrenosum is a diagnosis of exclusion, after the above clinical entities are ruled out and requires clinicopathologic correlation.   Signatures Electronically signed by : BEAR CAMPA    [de-identified] : high-grade [de-identified] : 3/20/24...missed appt on 2/6/24...completed 2 years of atezolizumab in February 2020.  admitted for diverticulitis and also report being diagnosed with hidradenitis suppurativa. Reports no symptoms anymore. Still having lots of gassiness since discharge with alternating constipation and diarrhea. Denies pain, nausea, vomiting. Feels much improved  Completed antibiotics. Needed retreatment for divertilculitis at the beginning of March due to increased symptoms. Denies hematuria, dysuria. Had some urgency and was treated with Abx and no symptoms now. Denies leg swelling, easy bruising/bleeding.  Received a total of 4 courses antibiotics since January. (Doxycycline, keflex x2, Flagyl) For diverticulitis, ?UTI, HS. Last dose of Abx was 2-3 weeks ago. Someimtes has diarrhea ewith accidents. Taking probiotics. Abx for HS outpatient  Hospital Course: Discharge Date 14-Jan-2024 Admission Date 09-Jan-2024 04:21 Reason for Admission Diverticulitis w/ abscess Hospital Course  HPI: This is a 72 y/o female w/ PMHx PAD on Xarelto, breast CA s/p R lumpectomy, bladder CA w/ spinal mets s/p TURBT in remission, HTN, HLD, and multiple episodes of diverticulitis who presents due to findings of abscess on outpatient CT scan. For the past 3 weeks, she has been having crampy abdominal pain in her LLQ, which she thought was due to gas pains initially. When the pain wouldn't resolve, she went to see her GI, Dr. Pablo Bradford, who ordered an outpatient CT of the abdomen/pelvis. This revealed an acute proximal sigmoid colon diverticulitis with adjacent 1.5 x 1.1 x 1.1 cm L psoas abscess and a mild L hydronephrosis. She was told to go to the hospital for further management. In the ED, she was almost febrile at 100.1, otherwise hemodynamically stable. No leukocytosis present, baseline normocytic anemia seen. INR slightly elevated at 1.43, mild hypokalemia of 3.4 seen on CMP. Was given Zosyn and 1L IVF in the ED. (09 Jan 2024 04:20) [FreeTextEntry1] : cis/gem, started chemo 11/3/17 as neoadjuvant, then bone mets, had RT. Now on atezolizumab since February 12, 2018, ended 2/4/20.

## 2024-03-20 NOTE — ASSESSMENT
[FreeTextEntry1] : Juju is seen in the office in follow-up today.  She was recently admitted to [facility].  She completed 2 years of atezolizumab in February 2020.  She had cellulitis involving the right foot and was hospitalized in [place] for several days.  She contacted me when she was admitted there.  She returned here to [place] and had the same issue on the left foot.  The discharge summary as below and was reviewed.  A biopsy was performed by dermatology.  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  She was started on Pletal.  She follows with wound care.  She still has some pain in the area.  She says is doing better at this time and she says it is "way better" than it was.  The pain is currently at 4-5, the worst pain in the last 24 hours was at 7 and it hurts more at night.  She is constipated.  She is no longer taking any narcotics.  She takes a stool softener, but only uses it once per day.  She takes MiraLAX as well but does not use it on a regular basis.  There is no vomiting.  She did have some nausea on occasion.  Her weight is stable.  There were no headaches or dizziness.  There is no cough or shortness of breath.  She had a CAT scan done while in the hospital, no issues were noted.  She was worried about a 2 mm nodule in her lung.\par  \par  On physical examination, she is in no acute distress.  Her performance status was 1.  Her blood pressure was 119/79 and her weight was 74.8 kg which is very similar to prior weights.  There is no findings in the HEENT area.  The lungs are clear and the heart examination is normal.  The abdominal examination was normal.  There is no spinal column or chest wall tenderness to palpation or percussion.  There is a dressed wound in the lower left leg without any overt signs of infection.\par  \par  I encouraged her to take Colace 3 times a day to use MiraLAX on a daily basis, and to take 2 tablets of senna at bedtime and a routine basis.  In addition, she should drink additional water.  We discussed the pathophysiology of constipation and how it is important to have regular bowel movements to avoid excessive extraction of fluid from stool.\par  \par  There is nothing to worry about in regards to her malignancy at this time.  I reviewed the results of her recent CT scan.  All questions were answered to the best my ability and to their apparent satisfaction.  Reassurance was given to her, and most of her issues need to be followed by her primary care doctor.

## 2024-03-21 LAB
ALBUMIN SERPL ELPH-MCNC: 3.7 G/DL
ALP BLD-CCNC: 83 U/L
ALT SERPL-CCNC: 10 U/L
ANION GAP SERPL CALC-SCNC: 11 MMOL/L
AST SERPL-CCNC: 13 U/L
BILIRUB SERPL-MCNC: 0.2 MG/DL
BUN SERPL-MCNC: 16 MG/DL
CALCIUM SERPL-MCNC: 9.8 MG/DL
CHLORIDE SERPL-SCNC: 104 MMOL/L
CO2 SERPL-SCNC: 28 MMOL/L
CREAT SERPL-MCNC: 0.71 MG/DL
EGFR: 89 ML/MIN/1.73M2
FERRITIN SERPL-MCNC: 181 NG/ML
GLUCOSE SERPL-MCNC: 113 MG/DL
POTASSIUM SERPL-SCNC: 4.1 MMOL/L
PROT SERPL-MCNC: 6.7 G/DL
SODIUM SERPL-SCNC: 143 MMOL/L
TSH SERPL-ACNC: 0.5 UIU/ML

## 2024-03-21 NOTE — REVIEW OF SYSTEMS
[Constipation] : constipation [Diarrhea: Grade 1 - Increase of <4 stools per day over baseline; mild increase in ostomy output compared to baseline] : Diarrhea: Grade 1 - Increase of <4 stools per day over baseline; mild increase in ostomy output compared to baseline [Negative] : Heme/Lymph

## 2024-03-28 ENCOUNTER — APPOINTMENT (OUTPATIENT)
Dept: PLASTIC SURGERY | Facility: CLINIC | Age: 74
End: 2024-03-28
Payer: MEDICARE

## 2024-03-28 DIAGNOSIS — L72.9 FOLLICULAR CYST OF THE SKIN AND SUBCUTANEOUS TISSUE, UNSPECIFIED: ICD-10-CM

## 2024-03-28 PROCEDURE — 99213 OFFICE O/P EST LOW 20 MIN: CPT

## 2024-03-31 ENCOUNTER — NON-APPOINTMENT (OUTPATIENT)
Age: 74
End: 2024-03-31

## 2024-03-31 PROBLEM — L72.9 CYST OF SKIN: Status: ACTIVE | Noted: 2023-09-11

## 2024-03-31 NOTE — HISTORY OF PRESENT ILLNESS
[FreeTextEntry1] : Patient notes a bump at the excision site Denies drainage denies discomfort  Patient recently diagnosed with hidradenitis suppurativa Patient hospitalized in January for acute diverticulitis and adjacent air-containing abscess in the psoas muscle and secondary obstruction of the ureter with mild hydronephrosis

## 2024-03-31 NOTE — REASON FOR VISIT
[Follow-Up: _____] : a [unfilled] follow-up visit [FreeTextEntry1] : Dop: 12/12/23 S/P: excision of scalp mass. Path:  Inflamed, ruptured epidermal cyst

## 2024-04-01 ENCOUNTER — APPOINTMENT (OUTPATIENT)
Dept: DERMATOLOGY | Facility: CLINIC | Age: 74
End: 2024-04-01
Payer: MEDICARE

## 2024-04-01 DIAGNOSIS — L80 VITILIGO: ICD-10-CM

## 2024-04-01 DIAGNOSIS — L90.0 LICHEN SCLEROSUS ET ATROPHICUS: ICD-10-CM

## 2024-04-01 DIAGNOSIS — L30.9 DERMATITIS, UNSPECIFIED: ICD-10-CM

## 2024-04-01 DIAGNOSIS — L73.2 HIDRADENITIS SUPPURATIVA: ICD-10-CM

## 2024-04-01 PROCEDURE — G0444 DEPRESSION SCREEN ANNUAL: CPT | Mod: 59

## 2024-04-01 PROCEDURE — 99214 OFFICE O/P EST MOD 30 MIN: CPT

## 2024-05-21 ENCOUNTER — APPOINTMENT (OUTPATIENT)
Dept: PODIATRY | Facility: CLINIC | Age: 74
End: 2024-05-21
Payer: MEDICARE

## 2024-05-21 DIAGNOSIS — M79.672 PAIN IN LEFT FOOT: ICD-10-CM

## 2024-05-21 DIAGNOSIS — M72.2 PLANTAR FASCIAL FIBROMATOSIS: ICD-10-CM

## 2024-05-21 PROCEDURE — 73630 X-RAY EXAM OF FOOT: CPT | Mod: LT

## 2024-05-21 PROCEDURE — 99212 OFFICE O/P EST SF 10 MIN: CPT | Mod: 25

## 2024-05-22 PROBLEM — M79.672 LEFT FOOT PAIN: Status: ACTIVE | Noted: 2023-02-28

## 2024-05-23 PROBLEM — M72.2 PLANTAR FASCIITIS, LEFT: Status: ACTIVE | Noted: 2024-05-22

## 2024-05-23 NOTE — PROCEDURE
[FreeTextEntry1] : X-rays taken to evaluate for fracture, spur or bony irregularity. X-ray Report: (Left foot - 2 views) X-rays demonstrate no sign of bony abnormality. No sign of fracture or irregularity or even a spur. No osteoporosis.

## 2024-05-23 NOTE — ASSESSMENT
[FreeTextEntry1] : Impression: Pain, left foot.  Plantar fasciitis.  Treatment: I think this is more fasciitis. I gave her stretching exercises. Her shoes are totally inadequate, and I discussed more memory foam type shoes. I wrote for physical therapy. She will work on the stretching. I discussed follow-up with neurology regarding her peripheral neuropathy. I discussed the possibility of using Qutenza as well.  Follow-up with continued problems that persist.

## 2024-05-23 NOTE — HISTORY OF PRESENT ILLNESS
[FreeTextEntry1] : Patient presents today because of left heel pain. It has been going on for the last couple of weeks. She has had multiple medical issues recently that she has been treated for and is doing well, but she complains of this heel pain for the last 2 weeks. She had an unusual open wound that was painful for months and that has been resolved. That are is doing well. She has a bad case of peripheral neuropathy and takes Gabapentin currently.

## 2024-05-23 NOTE — PHYSICAL EXAM
[Delayed in the Right Toes] : capillary refills delayed in the right toes [Delayed in the Left Toes] : capillary refills delayed in the left toes [2+] : left foot dorsalis pedis 2+ [Vibration Dec.] : diminished vibratory sensation at the level of the toes [FreeTextEntry3] : PT: trace bilateral. [de-identified] : Her pain is in the arch and lateral heel but it is not at the area where she previously had the pain syndrome and skin lesion.   [FreeTextEntry1] : Peripheral neuropathy from back surgery.

## 2024-05-24 ENCOUNTER — INPATIENT (INPATIENT)
Facility: HOSPITAL | Age: 74
LOS: 10 days | Discharge: NURSING, REHAB OR LONG-TERM CARE | End: 2024-06-04
Attending: INTERNAL MEDICINE | Admitting: INTERNAL MEDICINE
Payer: MEDICARE

## 2024-05-24 VITALS
DIASTOLIC BLOOD PRESSURE: 85 MMHG | RESPIRATION RATE: 18 BRPM | HEART RATE: 89 BPM | OXYGEN SATURATION: 97 % | SYSTOLIC BLOOD PRESSURE: 122 MMHG | TEMPERATURE: 99 F

## 2024-05-24 DIAGNOSIS — Z98.890 OTHER SPECIFIED POSTPROCEDURAL STATES: Chronic | ICD-10-CM

## 2024-05-24 DIAGNOSIS — Z98.89 OTHER SPECIFIED POSTPROCEDURAL STATES: Chronic | ICD-10-CM

## 2024-05-24 DIAGNOSIS — Z90.710 ACQUIRED ABSENCE OF BOTH CERVIX AND UTERUS: Chronic | ICD-10-CM

## 2024-05-24 DIAGNOSIS — N63 UNSPECIFIED LUMP IN BREAST: Chronic | ICD-10-CM

## 2024-05-24 LAB
ALBUMIN SERPL ELPH-MCNC: 3.5 G/DL — SIGNIFICANT CHANGE UP (ref 3.3–5)
ALP SERPL-CCNC: 82 U/L — SIGNIFICANT CHANGE UP (ref 40–120)
ALT FLD-CCNC: 11 U/L — SIGNIFICANT CHANGE UP (ref 4–33)
ANION GAP SERPL CALC-SCNC: 13 MMOL/L — SIGNIFICANT CHANGE UP (ref 7–14)
APPEARANCE UR: CLEAR — SIGNIFICANT CHANGE UP
APTT BLD: 26.1 SEC — SIGNIFICANT CHANGE UP (ref 24.5–35.6)
AST SERPL-CCNC: 17 U/L — SIGNIFICANT CHANGE UP (ref 4–32)
BACTERIA # UR AUTO: NEGATIVE /HPF — SIGNIFICANT CHANGE UP
BASE EXCESS BLDV CALC-SCNC: 7.6 MMOL/L — HIGH (ref -2–3)
BASOPHILS # BLD AUTO: 0.03 K/UL — SIGNIFICANT CHANGE UP (ref 0–0.2)
BASOPHILS NFR BLD AUTO: 0.3 % — SIGNIFICANT CHANGE UP (ref 0–2)
BILIRUB SERPL-MCNC: 0.4 MG/DL — SIGNIFICANT CHANGE UP (ref 0.2–1.2)
BILIRUB UR-MCNC: NEGATIVE — SIGNIFICANT CHANGE UP
BUN SERPL-MCNC: 10 MG/DL — SIGNIFICANT CHANGE UP (ref 7–23)
CA-I SERPL-SCNC: 1.3 MMOL/L — SIGNIFICANT CHANGE UP (ref 1.15–1.33)
CALCIUM SERPL-MCNC: 9.7 MG/DL — SIGNIFICANT CHANGE UP (ref 8.4–10.5)
CAST: 0 /LPF — SIGNIFICANT CHANGE UP (ref 0–4)
CHLORIDE BLDV-SCNC: 103 MMOL/L — SIGNIFICANT CHANGE UP (ref 96–108)
CHLORIDE SERPL-SCNC: 101 MMOL/L — SIGNIFICANT CHANGE UP (ref 98–107)
CO2 BLDV-SCNC: 35.2 MMOL/L — HIGH (ref 22–26)
CO2 SERPL-SCNC: 27 MMOL/L — SIGNIFICANT CHANGE UP (ref 22–31)
COLOR SPEC: YELLOW — SIGNIFICANT CHANGE UP
CREAT SERPL-MCNC: 0.63 MG/DL — SIGNIFICANT CHANGE UP (ref 0.5–1.3)
DIFF PNL FLD: ABNORMAL
EGFR: 93 ML/MIN/1.73M2 — SIGNIFICANT CHANGE UP
EOSINOPHIL # BLD AUTO: 0.16 K/UL — SIGNIFICANT CHANGE UP (ref 0–0.5)
EOSINOPHIL NFR BLD AUTO: 1.9 % — SIGNIFICANT CHANGE UP (ref 0–6)
GAS PNL BLDV: 138 MMOL/L — SIGNIFICANT CHANGE UP (ref 136–145)
GAS PNL BLDV: SIGNIFICANT CHANGE UP
GLUCOSE BLDV-MCNC: 103 MG/DL — HIGH (ref 70–99)
GLUCOSE SERPL-MCNC: 100 MG/DL — HIGH (ref 70–99)
GLUCOSE UR QL: NEGATIVE MG/DL — SIGNIFICANT CHANGE UP
HCO3 BLDV-SCNC: 34 MMOL/L — HIGH (ref 22–29)
HCT VFR BLD CALC: 34.7 % — SIGNIFICANT CHANGE UP (ref 34.5–45)
HCT VFR BLDA CALC: 34 % — LOW (ref 34.5–46.5)
HGB BLD CALC-MCNC: 11.2 G/DL — LOW (ref 11.7–16.1)
HGB BLD-MCNC: 10.9 G/DL — LOW (ref 11.5–15.5)
IANC: 5.76 K/UL — SIGNIFICANT CHANGE UP (ref 1.8–7.4)
IMM GRANULOCYTES NFR BLD AUTO: 0.3 % — SIGNIFICANT CHANGE UP (ref 0–0.9)
INR BLD: 1.56 RATIO — HIGH (ref 0.85–1.18)
KETONES UR-MCNC: NEGATIVE MG/DL — SIGNIFICANT CHANGE UP
LACTATE BLDV-MCNC: 1.8 MMOL/L — SIGNIFICANT CHANGE UP (ref 0.5–2)
LEUKOCYTE ESTERASE UR-ACNC: NEGATIVE — SIGNIFICANT CHANGE UP
LYMPHOCYTES # BLD AUTO: 1.47 K/UL — SIGNIFICANT CHANGE UP (ref 1–3.3)
LYMPHOCYTES # BLD AUTO: 17.1 % — SIGNIFICANT CHANGE UP (ref 13–44)
MAGNESIUM SERPL-MCNC: 1.9 MG/DL — SIGNIFICANT CHANGE UP (ref 1.6–2.6)
MCHC RBC-ENTMCNC: 26.4 PG — LOW (ref 27–34)
MCHC RBC-ENTMCNC: 31.4 GM/DL — LOW (ref 32–36)
MCV RBC AUTO: 84 FL — SIGNIFICANT CHANGE UP (ref 80–100)
MONOCYTES # BLD AUTO: 1.14 K/UL — HIGH (ref 0–0.9)
MONOCYTES NFR BLD AUTO: 13.3 % — SIGNIFICANT CHANGE UP (ref 2–14)
NEUTROPHILS # BLD AUTO: 5.76 K/UL — SIGNIFICANT CHANGE UP (ref 1.8–7.4)
NEUTROPHILS NFR BLD AUTO: 67.1 % — SIGNIFICANT CHANGE UP (ref 43–77)
NITRITE UR-MCNC: NEGATIVE — SIGNIFICANT CHANGE UP
NRBC # BLD: 0 /100 WBCS — SIGNIFICANT CHANGE UP (ref 0–0)
NRBC # FLD: 0 K/UL — SIGNIFICANT CHANGE UP (ref 0–0)
PCO2 BLDV: 53 MMHG — HIGH (ref 39–52)
PH BLDV: 7.41 — SIGNIFICANT CHANGE UP (ref 7.32–7.43)
PH UR: 7.5 — SIGNIFICANT CHANGE UP (ref 5–8)
PLATELET # BLD AUTO: 407 K/UL — HIGH (ref 150–400)
PO2 BLDV: 24 MMHG — LOW (ref 25–45)
POTASSIUM BLDV-SCNC: 3.9 MMOL/L — SIGNIFICANT CHANGE UP (ref 3.5–5.1)
POTASSIUM SERPL-MCNC: 3.9 MMOL/L — SIGNIFICANT CHANGE UP (ref 3.5–5.3)
POTASSIUM SERPL-SCNC: 3.9 MMOL/L — SIGNIFICANT CHANGE UP (ref 3.5–5.3)
PROT SERPL-MCNC: 7.7 G/DL — SIGNIFICANT CHANGE UP (ref 6–8.3)
PROT UR-MCNC: NEGATIVE MG/DL — SIGNIFICANT CHANGE UP
PROTHROM AB SERPL-ACNC: 17.4 SEC — HIGH (ref 9.5–13)
RBC # BLD: 4.13 M/UL — SIGNIFICANT CHANGE UP (ref 3.8–5.2)
RBC # FLD: 16.6 % — HIGH (ref 10.3–14.5)
RBC CASTS # UR COMP ASSIST: 15 /HPF — HIGH (ref 0–4)
SAO2 % BLDV: 29.2 % — LOW (ref 67–88)
SODIUM SERPL-SCNC: 141 MMOL/L — SIGNIFICANT CHANGE UP (ref 135–145)
SP GR SPEC: 1.01 — SIGNIFICANT CHANGE UP (ref 1–1.03)
SQUAMOUS # UR AUTO: 7 /HPF — HIGH (ref 0–5)
UROBILINOGEN FLD QL: 1 MG/DL — SIGNIFICANT CHANGE UP (ref 0.2–1)
WBC # BLD: 8.59 K/UL — SIGNIFICANT CHANGE UP (ref 3.8–10.5)
WBC # FLD AUTO: 8.59 K/UL — SIGNIFICANT CHANGE UP (ref 3.8–10.5)
WBC UR QL: 3 /HPF — SIGNIFICANT CHANGE UP (ref 0–5)

## 2024-05-24 PROCEDURE — 74177 CT ABD & PELVIS W/CONTRAST: CPT | Mod: 26,MC

## 2024-05-24 PROCEDURE — 99285 EMERGENCY DEPT VISIT HI MDM: CPT

## 2024-05-24 PROCEDURE — 71045 X-RAY EXAM CHEST 1 VIEW: CPT | Mod: 26

## 2024-05-24 RX ORDER — SODIUM CHLORIDE 9 MG/ML
1000 INJECTION INTRAMUSCULAR; INTRAVENOUS; SUBCUTANEOUS ONCE
Refills: 0 | Status: COMPLETED | OUTPATIENT
Start: 2024-05-24 | End: 2024-05-24

## 2024-05-24 RX ORDER — ACETAMINOPHEN 500 MG
1000 TABLET ORAL ONCE
Refills: 0 | Status: COMPLETED | OUTPATIENT
Start: 2024-05-24 | End: 2024-05-24

## 2024-05-24 RX ORDER — PIPERACILLIN AND TAZOBACTAM 4; .5 G/20ML; G/20ML
3.38 INJECTION, POWDER, LYOPHILIZED, FOR SOLUTION INTRAVENOUS ONCE
Refills: 0 | Status: COMPLETED | OUTPATIENT
Start: 2024-05-24 | End: 2024-05-24

## 2024-05-24 RX ADMIN — PIPERACILLIN AND TAZOBACTAM 200 GRAM(S): 4; .5 INJECTION, POWDER, LYOPHILIZED, FOR SOLUTION INTRAVENOUS at 21:06

## 2024-05-24 RX ADMIN — SODIUM CHLORIDE 1000 MILLILITER(S): 9 INJECTION INTRAMUSCULAR; INTRAVENOUS; SUBCUTANEOUS at 19:56

## 2024-05-24 RX ADMIN — Medication 400 MILLIGRAM(S): at 19:58

## 2024-05-24 NOTE — ED ADULT TRIAGE NOTE - CHIEF COMPLAINT QUOTE
nausea, vomiting, diarrhea x couple days, states too weak to walk, recently on antibiotics for diverticulitis, neg for c.diff

## 2024-05-24 NOTE — ED ADULT NURSE NOTE - ED COMFORT CARE
7/10/2018      RE: Asha Elizabeth  [address]  [facility] [address]       Medical Nutrition Therapy  Nutrition Assessment  Patient seen in [facility], accompanied by mother.    Anthropometrics  Age:  12 year old male   Height:  174.5 cm  94 %ile based on CDC 2-20 Years stature-for-age data using vitals from 7/10/2018.    Weight:  118.3 kg (actual weight), 260 lbs 12.87 oz, >99 %ile based on CDC 2-20 Years weight-for-age data using vitals from 7/10/2018.  BMI:  Body mass index is 38.85 kg/(m^2)., >99 %ile based on CDC 2-20 Years BMI-for-age data using vitals from 7/10/2018.  Nutrition History  Patient presents to [facility] for pediatric weight management initial nutrition visit.  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  Pt lives at home with mom, dad, and younger sister.  Pt works outside for 5 hours, 5 days a week during the summer months. During the summer he goes to bed at 11-12pm and wakes up at 9-11am.  Pt eats 3 meals + 1-2 snacks daily.  During the school year, pt eats school-provided lunches.  Pt struggles with eating large quantities/portion sizes, especially after school for PM snack at home.  Pt rarely eats out and is relatively physically active.  Likes fruits/vegetables - more fruits than vegetables.  Likes cucumbers, broccoli, asparagus.  Pt eats some meals/snacks in the living room, distracted.  Pt reports being very motivated to be here today to learn and begin making dietary changes.  A sample dietary intake noted below.    Nutritional Intakes  Sample intake includes:  Breakfast:   @  Home during summer - omelet (3 eggs, turkey, cheese) + 2 pieces toast, or 3 pancakes without syrup + cucumbers; drinks water or milk (1-2%);  @ home during school year - omelet or cereal with milk or oatmeal (1+ cup), fruit, drink water or milk  AM Snack: not during school year, occasionally in summer - leftovers or  yogurt  Lunch:   @ home during summer - 2 hot dogs or sushi (6 pieces), potato, chicken, [employer], drinks water; @ school - eats hot lunch (varies), drinks nothing  PM Snack:   @ home  - nothing or tostitos chips; @ home after the school day has apple (1-2), cereal with milk or leftovers, bananas (a few), drinks nothing  Dinner:   @ home - chicken and waffles + buffalo wings + Rootbeer (out to eat), or chicken, potatoes and broccoli, water  HS Snack:   @ home - popcorn or leftovers from dinner or an apple or bananas  Beverages: water, soda when out to eat, rarely juice, 1-2% milk, sometimes caloric drinks when out with friends    Dietary Restrictions: Nut allergy.  Does not tolerate honey or mustard well either.    Dining Out  Frequency:  1-2 times per month  Location:  Take-out pizza and possibly 1 other time in a month    Activity  Exercise:  Yes  Type of exercise: wrestling, swimming, biking, working outside for summer job  Frequency: 5 days a week  Duration:  varies    Medications/Vitamins/Minerals  No current outpatient prescriptions on file.    Nutrition Diagnosis  Obesity related to excessive energy intake as evidenced by BMI/age >95th %ile    Interventions & Education  Provided written and verbal education on the following:    Food record  Plate Method - 1/2 plate fruits/vegetables, 1/4 protein, 1/4 grains  Portion sizes - appropriate for pt's age  Increase fruit and vegetable intake  Eliminate caloric/sugary beverages - no-calorie alternatives  Food environment - discussed eating all meals/snacks at the table and without distraction  Food logs - 1 week    Discussed dietary intake/behaviors and pt's motivation to be here and readiness for change. Educated pt on plate method, portion sizes appropriate for pt's age, caloric beverages and alternatives, and logging food intake. Answered nutrition-related question pt and pt's mother had and worked with them to set nutrition goals to work towards until next  visit. Pt and pt's mother would like to discuss packing a lunch and after school snack at next visit.    Goals  1) Reduce BMI  2) Eliminate caloric beverage intake  3) Utilize MyPlate image at TID meals daily  4) Work on decreasing portion sizes of grains/protein and increasing fruit/vegetable intake  5) Food logs    Monitoring/Evaluation  Will continue to monitor progress towards goals and provide education in Pediatric Weight Management.    Spent 45 minutes in consult with patient & mother.      Nevin Peter RD, LD  Pager #[phone]    Nevin Eldridge RD     Patient informed/Family informed

## 2024-05-24 NOTE — ED PROVIDER NOTE - PROGRESS NOTE DETAILS
Magda Hernadez (Rodriguez) PGY3 called Dr. Mauricio for admission. no answer left a message for callback. Magda Hernadez (Rodriguez) PGY3 accepted to Dr. Mauricio

## 2024-05-24 NOTE — ED ADULT NURSE NOTE - OBJECTIVE STATEMENT
date: 10/1/1983     Years since quittin.6     Passive exposure: Never    Smokeless tobacco: Never   Vaping Use    Vaping Use: Never used   Substance and Sexual Activity    Alcohol use: Yes     Alcohol/week: 2.0 standard drinks of alcohol     Types: 2 Drinks containing 0.5 oz of alcohol per week    Drug use: No    Sexual activity: Not Currently     Partners: Male   Other Topics Concern    Not on file   Social History Narrative    Not on file     Social Determinants of Health     Financial Resource Strain: Low Risk  (2024)    Overall Financial Resource Strain (CARDIA)     Difficulty of Paying Living Expenses: Not hard at all   Food Insecurity: No Food Insecurity (2024)    Hunger Vital Sign     Worried About Running Out of Food in the Last Year: Never true     Ran Out of Food in the Last Year: Never true   Transportation Needs: Unknown (2024)    PRAPARE - Transportation     Lack of Transportation (Medical): Not on file     Lack of Transportation (Non-Medical): No   Physical Activity: Not on file   Stress: Not on file   Social Connections: Not on file   Intimate Partner Violence: Not on file   Housing Stability: Unknown (2024)    Housing Stability Vital Sign     Unable to Pay for Housing in the Last Year: Not on file     Number of Places Lived in the Last Year: Not on file     Unstable Housing in the Last Year: No       Family History   Problem Relation Age of Onset    High Blood Pressure Father     Atrial Fibrillation Father     Diabetes Father     Heart Disease Father     Other Neg Hx            Vitals:    24 1215   BP: 122/78   Site: Left Upper Arm   Position: Sitting   Pulse: 79   Resp: 16   Temp: 97.2 °F (36.2 °C)   TempSrc: Temporal   SpO2: 97%   Weight: (!) 139.3 kg (307 lb)   Height: 1.725 m (5' 7.9\")     Estimated body mass index is 46.82 kg/m² as calculated from the following:    Height as of this encounter: 1.725 m (5' 7.9\").    Weight as of this encounter: 139.3 kg (307  Received pt into [facility] , accompanied by daughter. Pt c/o generalized body aches, pelvic pain, chills x 3 days. C/o diarrhea x 2 weeks. Pt was f/u by GI and was CDIFF negative, Pt eval by Dr. Dorsey. Rectal temp 101.5F. Sepsis workup started. Blood work was sent but after 2 unsuccessful IV peripheral line attempts. Pt does not want U/S guided IV line due to pt stating, "i've had them done before and it hurts for months". Dr. Dorsey aware. olegario RN float RN in to attempt. Breathing is easy and unlabored. Abdomen soft/nondistended. Pt placed on tele monitor SR HR 80s.

## 2024-05-24 NOTE — ED PROVIDER NOTE - OBJECTIVE STATEMENT
73-year-old female past medical history of peripheral arterial disease on Xarelto, history of breast cancer with right-sided lumpectomy, bladder cancer with spinal metastasis, history of recurrent diverticulitis last time admitted to the hospital in January 2024 with perforated diverticulitis resulting in a psoas abscess treated on IV antibiotics.  Patient reports ongoing anorexia, episodic abdominal pain diffuse in nature, and now presents to the ED with concerns of diarrhea and generalized weakness.  Patient having 3-4 watery bowel movements a day.  Also mentions having fever for the past few days documented to 101 °F.  Denies headaches, neck pain, sore throat, chest pain, difficulty in breathing, vomitings, urinary complaints, skin rash/lesions.

## 2024-05-24 NOTE — ED ADULT NURSE NOTE - NSFALLHARMRISKINTERV_ED_ALL_ED

## 2024-05-24 NOTE — ED ADULT TRIAGE NOTE - HEART RATE (BEATS/MIN)
Politzerization attempted: no AD middle ear aeration  Rx for Augmentin 875/125 # 20 ; take BID with food  RTC 2-3 weeks  Hydration/gland massage,sialogogoues may help  Pt. directed to audiologist office re: hearing aid troubleshooting        
89

## 2024-05-24 NOTE — ED PROVIDER NOTE - PHYSICAL EXAMINATION
PHYSICAL EXAM:  GENERAL: NAD, lying in bed comfortably  HEAD:  Atraumatic, Normocephalic  EYES: EOMI, PERRLA, conjunctiva and sclera clear  ENT: No erythema/pallor/petechiae/lesions  NECK: Supple  LUNG: CTA b/l  HEART: RRR, +S1/S2; No m/r/g  ABDOMEN: ttp LLQ, no mass palpable, BS audible   EXTREMITIES:  2+ Peripheral Pulses, brisk cap refill. No clubbing, cyanosis, or edema  NERVOUS SYSTEM:  AAOx3, speech clear. No sensory/motor deficits   SKIN: No rashes or lesions

## 2024-05-24 NOTE — ED ADULT NURSE NOTE - NS ED NURSE LEVEL OF CONSCIOUSNESS AFFECT
Calm/Appropriate

## 2024-05-24 NOTE — ED PROVIDER NOTE - ATTENDING CONTRIBUTION TO CARE
Attending note:   After face to face evaluation of this patient, I concur with above noted hx, pe, and care plan for this patient.  Dorsey: 73-year-old female with history of diverticulitis, breast cancer in remission, irritable bowel syndrome, hyperlipidemia and hypertension.  Patient presents to ED with diarrhea.  Patient was diagnosed with diverticulitis in March and has been on intermittent antibiotics ever since.  Patient is followed by Dr. Gordon Contreras of GI and PMD is Dr. Diaz.  Patient over the last few weeks has noted intermittent fever with last high temperature of 101 few days ago.  Patient is also having some chills.  Patient is also knowing diffuse weakness and morning and weakness in the legs and now unable to move.  Patient also feels lightheaded and has not been able to keep any food down for the last few days.  Patient is having 4-6 bowel movements a day that are very watery but no blood is noted.  Patient has episodic intermittent crampy severe abdominal pain that is mainly in the lower abdomen.  Patient denies any chest pain or shortness of breath.  Patient denies any difficulty breathing.  Patient had C. difficile test performed few days ago that was negative.  Patient has last placed on Cipro and Flagyl.  On exam patient is hypotensive 99 over 70s but heart rate is in the 80s.  Patient is afebrile.  Patient does have some slight wasting noted.  Lungs are clear and heart is regular rate and rhythm.  Abdomen is soft with diminished bowel sounds.  There is significant tenderness throughout all abdominal fields but specifically in the suprapubic and left lower quadrant area.  There is no CVA tenderness.  There is no pitting edema.  Patient has a generalized lower extremity weakness more in the right than left but is able to have sensation to light touch.  There is no edema noted.  Given mild hypotension, weakness, neuropathic issues will check electrolytes and give IV hydration.  Will check blood cultures as well.  Will get CT given there is been no imaging performed since March and patient's tenderness.  Patient will likely require admission as she is not able to tolerate p.o.

## 2024-05-24 NOTE — ED ADULT NURSE REASSESSMENT NOTE - NS ED NURSE REASSESS COMMENT FT1
Report received from day RN Jaz. Pt A&ox4, NSR on cardiac monitor, on room air coming to ED c/o N/V diarrhea. Pt respirations even and unlabored, chest rise and fall equal b/l. Pt denies chest pain, HA, SOB, dizziness, N/V/D. Left 22g patent, no redness or swelling to site. Pt pending CT. Stretcher in lowest position, call bell within reach, pt safety maintained.

## 2024-05-24 NOTE — ED PROVIDER NOTE - CLINICAL SUMMARY MEDICAL DECISION MAKING FREE TEXT BOX
3-year-old female past medical history of peripheral arterial disease on Xarelto, history of breast cancer with right-sided lumpectomy, bladder cancer with spinal metastasis, history of recurrent diverticulitis now with reduced appetite, abdominal pain, diarrhea. Is febrile on rectal exam, o/w HD stable, concern for sepsis, diverticulitis, electrolyte imbalance. Will send labs, CT A/P. Shall start IVF, antibiotics, antipyretics. Likely hospital admit. 73-year-old female past medical history of peripheral arterial disease on Xarelto, history of breast cancer with right-sided lumpectomy, bladder cancer with spinal metastasis, history of recurrent diverticulitis now with reduced appetite, abdominal pain, diarrhea. Is febrile on rectal exam, o/w HD stable, concern for sepsis, diverticulitis, electrolyte imbalance. Will send labs, CT A/P. Shall start IVF, antibiotics, antipyretics. Likely hospital admit.

## 2024-05-25 DIAGNOSIS — K52.9 NONINFECTIVE GASTROENTERITIS AND COLITIS, UNSPECIFIED: ICD-10-CM

## 2024-05-25 RX ORDER — PANTOPRAZOLE SODIUM 20 MG/1
40 TABLET, DELAYED RELEASE ORAL
Refills: 0 | Status: DISCONTINUED | OUTPATIENT
Start: 2024-05-25 | End: 2024-05-31

## 2024-05-25 RX ORDER — SIMETHICONE 80 MG/1
80 TABLET, CHEWABLE ORAL EVERY 6 HOURS
Refills: 0 | Status: DISCONTINUED | OUTPATIENT
Start: 2024-05-25 | End: 2024-06-04

## 2024-05-25 RX ORDER — CILOSTAZOL 100 MG/1
50 TABLET ORAL
Refills: 0 | Status: DISCONTINUED | OUTPATIENT
Start: 2024-05-25 | End: 2024-05-31

## 2024-05-25 RX ORDER — MORPHINE SULFATE 50 MG/1
4 CAPSULE, EXTENDED RELEASE ORAL ONCE
Refills: 0 | Status: DISCONTINUED | OUTPATIENT
Start: 2024-05-25 | End: 2024-05-25

## 2024-05-25 RX ORDER — SODIUM CHLORIDE 9 MG/ML
1000 INJECTION INTRAMUSCULAR; INTRAVENOUS; SUBCUTANEOUS
Refills: 0 | Status: DISCONTINUED | OUTPATIENT
Start: 2024-05-25 | End: 2024-06-03

## 2024-05-25 RX ORDER — SIMETHICONE 80 MG/1
80 TABLET, CHEWABLE ORAL ONCE
Refills: 0 | Status: COMPLETED | OUTPATIENT
Start: 2024-05-25 | End: 2024-05-25

## 2024-05-25 RX ORDER — LACTOBACILLUS ACIDOPHILUS 100MM CELL
1 CAPSULE ORAL
Refills: 0 | Status: DISCONTINUED | OUTPATIENT
Start: 2024-05-25 | End: 2024-06-04

## 2024-05-25 RX ORDER — MORPHINE SULFATE 50 MG/1
2 CAPSULE, EXTENDED RELEASE ORAL EVERY 4 HOURS
Refills: 0 | Status: DISCONTINUED | OUTPATIENT
Start: 2024-05-25 | End: 2024-05-28

## 2024-05-25 RX ORDER — PIPERACILLIN AND TAZOBACTAM 4; .5 G/20ML; G/20ML
3.38 INJECTION, POWDER, LYOPHILIZED, FOR SOLUTION INTRAVENOUS EVERY 8 HOURS
Refills: 0 | Status: COMPLETED | OUTPATIENT
Start: 2024-05-25 | End: 2024-05-31

## 2024-05-25 RX ORDER — ENOXAPARIN SODIUM 100 MG/ML
40 INJECTION SUBCUTANEOUS EVERY 24 HOURS
Refills: 0 | Status: DISCONTINUED | OUTPATIENT
Start: 2024-05-25 | End: 2024-05-28

## 2024-05-25 RX ORDER — AMLODIPINE BESYLATE 2.5 MG/1
2.5 TABLET ORAL AT BEDTIME
Refills: 0 | Status: DISCONTINUED | OUTPATIENT
Start: 2024-05-25 | End: 2024-06-04

## 2024-05-25 RX ADMIN — MORPHINE SULFATE 2 MILLIGRAM(S): 50 CAPSULE, EXTENDED RELEASE ORAL at 06:34

## 2024-05-25 RX ADMIN — CILOSTAZOL 50 MILLIGRAM(S): 100 TABLET ORAL at 18:20

## 2024-05-25 RX ADMIN — SIMETHICONE 80 MILLIGRAM(S): 80 TABLET, CHEWABLE ORAL at 03:47

## 2024-05-25 RX ADMIN — SIMETHICONE 80 MILLIGRAM(S): 80 TABLET, CHEWABLE ORAL at 18:20

## 2024-05-25 RX ADMIN — ENOXAPARIN SODIUM 40 MILLIGRAM(S): 100 INJECTION SUBCUTANEOUS at 18:20

## 2024-05-25 RX ADMIN — PIPERACILLIN AND TAZOBACTAM 25 GRAM(S): 4; .5 INJECTION, POWDER, LYOPHILIZED, FOR SOLUTION INTRAVENOUS at 13:33

## 2024-05-25 RX ADMIN — AMLODIPINE BESYLATE 2.5 MILLIGRAM(S): 2.5 TABLET ORAL at 21:51

## 2024-05-25 RX ADMIN — PIPERACILLIN AND TAZOBACTAM 25 GRAM(S): 4; .5 INJECTION, POWDER, LYOPHILIZED, FOR SOLUTION INTRAVENOUS at 21:51

## 2024-05-25 RX ADMIN — MORPHINE SULFATE 2 MILLIGRAM(S): 50 CAPSULE, EXTENDED RELEASE ORAL at 18:35

## 2024-05-25 RX ADMIN — SODIUM CHLORIDE 75 MILLILITER(S): 9 INJECTION INTRAMUSCULAR; INTRAVENOUS; SUBCUTANEOUS at 06:36

## 2024-05-25 RX ADMIN — PANTOPRAZOLE SODIUM 40 MILLIGRAM(S): 20 TABLET, DELAYED RELEASE ORAL at 09:11

## 2024-05-25 RX ADMIN — PIPERACILLIN AND TAZOBACTAM 25 GRAM(S): 4; .5 INJECTION, POWDER, LYOPHILIZED, FOR SOLUTION INTRAVENOUS at 06:37

## 2024-05-25 RX ADMIN — MORPHINE SULFATE 2 MILLIGRAM(S): 50 CAPSULE, EXTENDED RELEASE ORAL at 18:20

## 2024-05-25 RX ADMIN — SODIUM CHLORIDE 75 MILLILITER(S): 9 INJECTION INTRAMUSCULAR; INTRAVENOUS; SUBCUTANEOUS at 21:17

## 2024-05-25 RX ADMIN — MORPHINE SULFATE 4 MILLIGRAM(S): 50 CAPSULE, EXTENDED RELEASE ORAL at 02:11

## 2024-05-25 RX ADMIN — Medication 1 TABLET(S): at 18:20

## 2024-05-25 RX ADMIN — SIMETHICONE 80 MILLIGRAM(S): 80 TABLET, CHEWABLE ORAL at 11:37

## 2024-05-25 RX ADMIN — MORPHINE SULFATE 2 MILLIGRAM(S): 50 CAPSULE, EXTENDED RELEASE ORAL at 07:34

## 2024-05-25 RX ADMIN — Medication 1 TABLET(S): at 09:11

## 2024-05-25 NOTE — ED ADULT NURSE REASSESSMENT NOTE - NS ED NURSE REASSESS COMMENT FT1
Pt with no acute changes at this time. Report given to [address] BRENDEN Mora. Pt medicated as ordered. VSS. Respirations even and unlabored, chest rise and fall equal b/l. Right 20g placed, +blood return, no redness or swelling to site. Left hand 22g patent, flushes without difficulty, no redness or swelling to site. EKG in chart. Pt safety maintained.

## 2024-05-25 NOTE — H&P ADULT - HISTORY OF PRESENT ILLNESS
73-year-old female past medical history of peripheral arterial disease on Xarelto, history of breast cancer with right-sided lumpectomy, bladder cancer with spinal metastasis, history of recurrent diverticulitis last time admitted to the hospital in January 2024 with perforated diverticulitis resulting in a psoas abscess treated on IV antibiotics.	Patient reports ongoing anorexia, episodic abdominal pain diffuse in nature, and now presents to the ED with concerns of diarrhea and generalized weakness.	Patient having 3-4 watery bowel movements a day.  Also mentions having fever for the past few days documented to 101 °F.Denies headaches, neck pain, sore throat, chest pain, difficulty in breathing, vomitings, urinary complaints, skin rash/lesions. 73-year-old female past medical history of peripheral arterial disease on Xarelto, history of breast cancer with right-sided lumpectomy, bladder cancer with spinal metastasis, history of recurrent diverticulitis last time admitted to the hospital in January 2024 with perforated diverticulitis resulting in a psoas abscess treated on IV antibiotics.Patient reports ongoing anorexia, episodic abdominal pain diffuse in nature, and now presents to the ED with concerns of diarrhea and generalized weakness.Patient having 3-4 watery bowel movements a day.  Also mentions having fever for the past few days documented to 101 °F.Denies headaches, neck pain, sore throat, chest pain, difficulty in breathing, vomiting, urinary complaints, skin rash/lesions.

## 2024-05-25 NOTE — PATIENT PROFILE ADULT - FALL HARM RISK - HARM RISK INTERVENTIONS

## 2024-05-25 NOTE — CONSULT NOTE ADULT - ASSESSMENT
72 y/o F PMhx PAD, history of breast cancer with right-sided lumpectomy, bladder cancer with spinal metastasis, recurrent diverticulitis recently hospitalized 1/2024 with perforated diverticulitis resulting in a psoas abscess who presents w/ abd pain.    LLQ abd pain, fever  no leukocytosis  UA negative  CXR- clear  CT- More extensive colonic thickening from left colon through rectum suggests nonspecific proctocolitis. No discrete focal drainable collection. Ill-defined sinus tracts anterior to the left psoas related to previous complicated sigmoid diverticulitis. Mild left hydroureteronephrosis to the level of the UVJ, likely reactive to left lower quadrant inflammation.    Recommendations  c/w zosyn  f/u blood cultures  if diarrhea check GI PCR  GI eval  trend liliya Altamirano M.D.  [facility], Division of Infectious Diseases  [phone]  After 5pm on weekdays and all day on weekends - please call [phone]

## 2024-05-25 NOTE — CONSULT NOTE ADULT - SUBJECTIVE AND OBJECTIVE BOX
[facility], Division of Infectious Diseases  DA Ortez S. Shah, Y. Patel, G. Adarsh  [phone]  ([phone] - weekdays after 5pm and weekends)    KIM ACKERMAN  73y, Female  [id number]    HPI--  HPI:  73-year-old female past medical history of peripheral arterial disease on Xarelto, history of breast cancer with right-sided lumpectomy, bladder cancer with spinal metastasis, history of recurrent diverticulitis last time admitted to the hospital in 2024 with perforated diverticulitis resulting in a psoas abscess treated on IV antibiotics.Patient reports ongoing anorexia, episodic abdominal pain diffuse in nature, and now presents to the ED with concerns of diarrhea and generalized weakness.Patient having 3-4 watery bowel movements a day.  Also mentions having fever for the past few days documented to 101 °F.Denies headaches, neck pain, sore throat, chest pain, difficulty in breathing, vomiting, urinary complaints, skin rash/lesions. (25 May 2024 05:56)  She reports recently completing cipro and flagyl without improvement. Also reports fever and non-watery diarrhea. States C Diff testing done this week was negative.    ROS: 10 point review of systems completed, pertinent positives and negatives as per HPI.    Allergies: sulfa drugs (Unknown)    PMH -- Anxiety    Breast CA, left    Hypertension    Sleep apnea    Irritable bowel syndrome (IBS)    Polyp of colon    HLD (hyperlipidemia)    Bladder polyp      PSH -- S/P     S/P colon resection    S/P hysterectomy    Breast lump    History of surgery    H/O lumpectomy    Personal history of spine surgery      FH -- Family history of coronary artery disease (Mother)      Social History -- denies tobacco, alcohol or illicit drug use    Physical Exam--  Vital Signs Last 24 Hrs  T(F): 98.1 (25 May 2024 04:20), Max: 101.5 (24 May 2024 19:10)  HR: 64 (25 May 2024 04:20) (64 - 91)  BP: 116/76 (25 May 2024 04:20) (113/80 - 124/81)  RR: 18 (25 May 2024 04:20) (16 - 18)  SpO2: 100% (25 May 2024 04:20) (97% - 100%)  General: nontoxic-appearing, no acute distress  HEENT: anicteric  Lungs: Clear bilaterally without rales  Heart: S1, S2, normal rate.   Abdomen: Soft. Nondistended. LLQ tenderness  Neuro: no obvious focal deficits   Extremities: No LE edema.   Skin: Warm. Dry. No rash.  Psychiatric: Appropriate affect and mood for situation.     Laboratory & Imaging Data--  CBC:                       10.9   8.59  )-----------( 407      ( 24 May 2024 19:31 )             34.7     WBC Count: 8.59 K/uL (- @ 19:31)    CMP:     141  |  101  |  10  ----------------------------<  100<H>  3.9   |  27  |  0.63    Ca    9.7      24 May 2024 19:31  Mg     1.90         TPro  7.7  /  Alb  3.5  /  TBili  0.4  /  DBili  x   /  AST  17  /  ALT  11  /  AlkPhos  82      LIVER FUNCTIONS - ( 24 May 2024 19:31 )  Alb: 3.5 g/dL / Pro: 7.7 g/dL / ALK PHOS: 82 U/L / ALT: 11 U/L / AST: 17 U/L / GGT: x           Urinalysis Basic - ( 24 May 2024 22:11 )    Color: Yellow / Appearance: Clear / S.015 / pH: x  Gluc: x / Ketone: Negative mg/dL  / Bili: Negative / Urobili: 1.0 mg/dL   Blood: x / Protein: Negative mg/dL / Nitrite: Negative   Leuk Esterase: Negative / RBC: 15 /HPF / WBC 3 /HPF   Sq Epi: x / Non Sq Epi: 7 /HPF / Bacteria: Negative /HPF      Microbiology:       Radiology--  ***  Active Medications--  amLODIPine   Tablet 2.5 milliGRAM(s) Oral at bedtime  cilostazol 50 milliGRAM(s) Oral two times a day  enoxaparin Injectable 40 milliGRAM(s) SubCutaneous every 24 hours  lactobacillus acidophilus 1 Tablet(s) Oral two times a day with meals  morphine  - Injectable 2 milliGRAM(s) IV Push every 4 hours PRN  oxycodone    5 mG/acetaminophen 325 mG 1 Tablet(s) Oral every 6 hours PRN  pantoprazole    Tablet 40 milliGRAM(s) Oral before breakfast  piperacillin/tazobactam IVPB.. 3.375 Gram(s) IV Intermittent every 8 hours  simethicone 80 milliGRAM(s) Chew every 6 hours  sodium chloride 0.9%. 1000 milliLiter(s) IV Continuous <Continuous>    Antimicrobials:   piperacillin/tazobactam IVPB.. 3.375 Gram(s) IV Intermittent every 8 hours    Immunologic:

## 2024-05-25 NOTE — H&P ADULT - NSHPPHYSICALEXAM_GEN_ALL_CORE
General: WN/WD NAD  PERRLA  Neurology: A&Ox3, nonfocal, MITTAL x 4  Respiratory: CTA B/L  CV: RRR, S1S2, no murmurs, rubs or gallops  Abdominal: Soft, NT, ND +BS, Last BM  Extremities: No edema, + peripheral pulses  Skin Normal

## 2024-05-25 NOTE — H&P ADULT - NSHPLABSRESULTS_GEN_ALL_CORE
Lab Results:  CBC  CBC Full  -  ( 24 May 2024 19:31 )  WBC Count : 8.59 K/uL  RBC Count : 4.13 M/uL  Hemoglobin : 10.9 g/dL  Hematocrit : 34.7 %  Platelet Count - Automated : 407 K/uL  Mean Cell Volume : 84.0 fL  Mean Cell Hemoglobin : 26.4 pg  Mean Cell Hemoglobin Concentration : 31.4 gm/dL  Auto Neutrophil # : 5.76 K/uL  Auto Lymphocyte # : 1.47 K/uL  Auto Monocyte # : 1.14 K/uL  Auto Eosinophil # : 0.16 K/uL  Auto Basophil # : 0.03 K/uL  Auto Neutrophil % : 67.1 %  Auto Lymphocyte % : 17.1 %  Auto Monocyte % : 13.3 %  Auto Eosinophil % : 1.9 %  Auto Basophil % : 0.3 %    .		Differential:	[] Automated		[] Manual  Chemistry                        10.9   8.59  )-----------( 407      ( 24 May 2024 19:31 )             34.7     05-24    141  |  101  |  10  ----------------------------<  100<H>  3.9   |  27  |  0.63    Ca    9.7      24 May 2024 19:31  Mg     1.90     05-24    TPro  7.7  /  Alb  3.5  /  TBili  0.4  /  DBili  x   /  AST  17  /  ALT  11  /  AlkPhos  82  05-24    LIVER FUNCTIONS - ( 24 May 2024 19:31 )  Alb: 3.5 g/dL / Pro: 7.7 g/dL / ALK PHOS: 82 U/L / ALT: 11 U/L / AST: 17 U/L / GGT: x           PT/INR - ( 24 May 2024 19:31 )   PT: 17.4 sec;   INR: 1.56 ratio         PTT - ( 24 May 2024 19:31 )  PTT:26.1 sec  Urinalysis Basic - ( 24 May 2024 22:11 )    Color: Yellow / Appearance: Clear / S.015 / pH: x  Gluc: x / Ketone: Negative mg/dL  / Bili: Negative / Urobili: 1.0 mg/dL   Blood: x / Protein: Negative mg/dL / Nitrite: Negative   Leuk Esterase: Negative / RBC: 15 /HPF / WBC 3 /HPF   Sq Epi: x / Non Sq Epi: 7 /HPF / Bacteria: Negative /HPF            MICROBIOLOGY/CULTURES:      RADIOLOGY RESULTS:

## 2024-05-25 NOTE — H&P ADULT - ASSESSMENT
73-year-old female past medical history of peripheral arterial disease on Xarelto, history of breast cancer with right-sided lumpectomy, bladder cancer with spinal metastasis, history of recurrent diverticulitis last time admitted to the hospital in January 2024 with perforated diverticulitis resulting in a psoas abscess treated on IV antibiotics.	Patient reports ongoing anorexia, episodic abdominal pain diffuse in nature, and now presents to the ED with concerns of diarrhea and generalized weakness.	Patient having 3-4 watery bowel movements a day.  Also mentions having fever for the past few days documented to 101 °F.Denies headaches, neck pain, sore throat, chest pain, difficulty in breathing, vomitings, urinary complaints, skin rash/lesions. 73-year-old female past medical history of peripheral arterial disease on Xarelto, history of breast cancer with right-sided lumpectomy, bladder cancer with spinal metastasis, history of recurrent diverticulitis last time admitted to the hospital in January 2024 with perforated diverticulitis resulting in a psoas abscess treated on IV antibiotics.	Patient reports ongoing anorexia, episodic abdominal pain diffuse in nature, and now presents to the ED with concerns of diarrhea and generalized weakness.	Patient having 3-4 watery bowel movements a day.  Also mentions having fever for the past few days documented to 101 °F.Denies headaches, neck pain, sore throat, chest pain, difficulty in breathing, vomitings, urinary complaints, skin rash/lesions.    Abdominal pain  - sec to colitis  - hx of diverticulitis and psoas abscess  - cw zosyn  - ID fu appreciated  - may need GI fu    Breast cancer, bladder cancer  - fu with heme-onc as outpt   - will monitor     PVD  - was on xarelto 2.5 bid  - started lovenox for dvt prophylaxis     HTN  - cw norvasc  - DASH diet

## 2024-05-26 LAB
ALBUMIN SERPL ELPH-MCNC: 3.1 G/DL — LOW (ref 3.3–5)
ALP SERPL-CCNC: 72 U/L — SIGNIFICANT CHANGE UP (ref 40–120)
ALT FLD-CCNC: 8 U/L — SIGNIFICANT CHANGE UP (ref 4–33)
ANION GAP SERPL CALC-SCNC: 12 MMOL/L — SIGNIFICANT CHANGE UP (ref 7–14)
AST SERPL-CCNC: 12 U/L — SIGNIFICANT CHANGE UP (ref 4–32)
BILIRUB SERPL-MCNC: 0.4 MG/DL — SIGNIFICANT CHANGE UP (ref 0.2–1.2)
BUN SERPL-MCNC: 5 MG/DL — LOW (ref 7–23)
CALCIUM SERPL-MCNC: 9.4 MG/DL — SIGNIFICANT CHANGE UP (ref 8.4–10.5)
CHLORIDE SERPL-SCNC: 101 MMOL/L — SIGNIFICANT CHANGE UP (ref 98–107)
CO2 SERPL-SCNC: 27 MMOL/L — SIGNIFICANT CHANGE UP (ref 22–31)
CREAT SERPL-MCNC: 0.58 MG/DL — SIGNIFICANT CHANGE UP (ref 0.5–1.3)
CULTURE RESULTS: SIGNIFICANT CHANGE UP
EGFR: 95 ML/MIN/1.73M2 — SIGNIFICANT CHANGE UP
GLUCOSE SERPL-MCNC: 100 MG/DL — HIGH (ref 70–99)
HCT VFR BLD CALC: 34.9 % — SIGNIFICANT CHANGE UP (ref 34.5–45)
HGB BLD-MCNC: 10.6 G/DL — LOW (ref 11.5–15.5)
MCHC RBC-ENTMCNC: 25.9 PG — LOW (ref 27–34)
MCHC RBC-ENTMCNC: 30.4 GM/DL — LOW (ref 32–36)
MCV RBC AUTO: 85.3 FL — SIGNIFICANT CHANGE UP (ref 80–100)
NRBC # BLD: 0 /100 WBCS — SIGNIFICANT CHANGE UP (ref 0–0)
NRBC # FLD: 0 K/UL — SIGNIFICANT CHANGE UP (ref 0–0)
PLATELET # BLD AUTO: 353 K/UL — SIGNIFICANT CHANGE UP (ref 150–400)
POTASSIUM SERPL-MCNC: 3.5 MMOL/L — SIGNIFICANT CHANGE UP (ref 3.5–5.3)
POTASSIUM SERPL-SCNC: 3.5 MMOL/L — SIGNIFICANT CHANGE UP (ref 3.5–5.3)
PROT SERPL-MCNC: 6.8 G/DL — SIGNIFICANT CHANGE UP (ref 6–8.3)
RBC # BLD: 4.09 M/UL — SIGNIFICANT CHANGE UP (ref 3.8–5.2)
RBC # FLD: 15.9 % — HIGH (ref 10.3–14.5)
SODIUM SERPL-SCNC: 140 MMOL/L — SIGNIFICANT CHANGE UP (ref 135–145)
SPECIMEN SOURCE: SIGNIFICANT CHANGE UP
WBC # BLD: 5.1 K/UL — SIGNIFICANT CHANGE UP (ref 3.8–10.5)
WBC # FLD AUTO: 5.1 K/UL — SIGNIFICANT CHANGE UP (ref 3.8–10.5)

## 2024-05-26 PROCEDURE — 70450 CT HEAD/BRAIN W/O DYE: CPT | Mod: 26

## 2024-05-26 RX ADMIN — CILOSTAZOL 50 MILLIGRAM(S): 100 TABLET ORAL at 17:31

## 2024-05-26 RX ADMIN — PIPERACILLIN AND TAZOBACTAM 25 GRAM(S): 4; .5 INJECTION, POWDER, LYOPHILIZED, FOR SOLUTION INTRAVENOUS at 21:52

## 2024-05-26 RX ADMIN — Medication 1 TABLET(S): at 08:53

## 2024-05-26 RX ADMIN — SIMETHICONE 80 MILLIGRAM(S): 80 TABLET, CHEWABLE ORAL at 12:58

## 2024-05-26 RX ADMIN — ENOXAPARIN SODIUM 40 MILLIGRAM(S): 100 INJECTION SUBCUTANEOUS at 17:49

## 2024-05-26 RX ADMIN — Medication 1 TABLET(S): at 17:31

## 2024-05-26 RX ADMIN — SIMETHICONE 80 MILLIGRAM(S): 80 TABLET, CHEWABLE ORAL at 06:43

## 2024-05-26 RX ADMIN — PIPERACILLIN AND TAZOBACTAM 25 GRAM(S): 4; .5 INJECTION, POWDER, LYOPHILIZED, FOR SOLUTION INTRAVENOUS at 06:43

## 2024-05-26 RX ADMIN — PIPERACILLIN AND TAZOBACTAM 25 GRAM(S): 4; .5 INJECTION, POWDER, LYOPHILIZED, FOR SOLUTION INTRAVENOUS at 13:02

## 2024-05-26 RX ADMIN — PANTOPRAZOLE SODIUM 40 MILLIGRAM(S): 20 TABLET, DELAYED RELEASE ORAL at 06:43

## 2024-05-26 RX ADMIN — CILOSTAZOL 50 MILLIGRAM(S): 100 TABLET ORAL at 06:43

## 2024-05-26 RX ADMIN — SIMETHICONE 80 MILLIGRAM(S): 80 TABLET, CHEWABLE ORAL at 17:31

## 2024-05-26 NOTE — PROGRESS NOTE ADULT - ASSESSMENT
{\rtf1\kmgrfb06511\ansi\gifgrum1016\ftnbj\uc1\deff0  {\fonttbl{\f0 \fnil Segoe UI;}{\f1 \fnil \fcharset0 Segoe UI;}{\f2 \fnil Times New Rodrick;}}  {\colortbl ;\jhl218\hkwyt620\ivci658 ;\red0\green0\blue0 ;\red0\green0\uvcd980 ;\red0\green0\blue0 ;}  {\stylesheet{\f0\fs20 Normal;}{\cs1 Default Paragraph Font;}{\cs2\f0\fs16 Line Number;}{\cs3\f2\fs24\ul\cf3 Hyperlink;}}  {\*\revtbl{Unknown;}}  \xigfdp78045\nnfdpe30150\zseab1728\xwxbs8183\gtilm9017\afmdb3332\kqotohw041\soteqqw704\nogrowautofit\vtplmy940\formshade\nofeaturethrottle1\dntblnsbdb\fet4\aendnotes\aftnnrlc\pgbrdrhead\pgbrdrfoot  \sectd\yvrwvu01422\kzwfqj85106\guttersxn0\vqzndmws3563\jlboelaf0598\bvhnywpg5380\fmlgyndj4552\ilzgfle471\vdouxzv469\sbkpage\pgncont\pgndec  \plain\plain\f0\fs24\ql\plain\f0\fs24\plain\f0\fs20\ydkr9207\hich\f0\dbch\f0\loch\f0\fs20 73-year-old female past medical history of peripheral arterial disease on Xarelto, history of breast cancer with right-sided lumpectomy, bladder cancer with spinal   metastasis, history of recurrent diverticulitis last time admitted to the hospital in January 2024 with perforated diverticulitis resulting in a psoas abscess treated on IV antibiotics.\tab Patient reports ongoing anorexia, episodic abdominal pain diffuse   in nature, and now presents to the ED with concerns of diarrhea and generalized weakness.\tab Patient having 3-4 watery bowel movements a day.  Also mentions having fever for the past few days documented to 101 \'b0F.Denies headaches, neck pain, sore   throat, chest pain, difficulty in breathing, vomitings, urinary complaints, skin rash/lesions.\par  \par  Abdominal pain\par  - sec to colitis\par  - hx of diverticulitis and psoas abscess\par  - cw zosyn\par  - ID fu appreciated\par  \par  Breast cancer, bladder cancer\par  - fu with heme-onc as outpt \par  - will monitor \par  \par  PVD\par  - was on xarelto 2.5 bid\par  - started lovenox for dvt prophylaxis \par  \par  HTN\par  - cw norvasc\par  - DASH diet \par  \par  \par  Speech difficulty\par  - she says she cant talk properly because of opoids\par  - will check CT head \par  \plain\f1\fs20\rusa7065\hich\f1\dbch\f1\loch\f1\cf2\fs20\strike\plain\f1\fs20\zsxb7438\hich\f1\dbch\f1\loch\f1\cf2\fs20\plain\f0\fs20\vdap0288\hich\f0\dbch\f0\loch\f0\fs20\par  }

## 2024-05-26 NOTE — PROGRESS NOTE ADULT - SUBJECTIVE AND OBJECTIVE BOX
Patient is a 73y old  Female who presents with a chief complaint of abdominal pain (25 May 2024 08:04)    Date of servie : 05-26-24 @ 10:27  INTERVAL HPI/OVERNIGHT EVENTS:  T(C): 36.6 (05-26-24 @ 06:38), Max: 37.3 (05-25-24 @ 21:28)  HR: 66 (05-26-24 @ 06:38) (65 - 80)  BP: 117/70 (05-26-24 @ 06:38) (103/68 - 124/84)  RR: 18 (05-26-24 @ 06:38) (17 - 18)  SpO2: 97% (05-26-24 @ 06:38) (96% - 97%)  Wt(kg): --  I&O's Summary      LABS:                        10.6   5.10  )-----------( 353      ( 26 May 2024 06:55 )             34.9     05-26    140  |  101  |  5<L>  ----------------------------<  100<H>  3.5   |  27  |  0.58    Ca    9.4      26 May 2024 06:55  Mg     1.90     05-24    TPro  6.8  /  Alb  3.1<L>  /  TBili  0.4  /  DBili  x   /  AST  12  /  ALT  8   /  AlkPhos  72  05-26    PT/INR - ( 24 May 2024 19:31 )   PT: 17.4 sec;   INR: 1.56 ratio         PTT - ( 24 May 2024 19:31 )  PTT:26.1 sec  Urinalysis Basic - ( 26 May 2024 06:55 )    Color: x / Appearance: x / SG: x / pH: x  Gluc: 100 mg/dL / Ketone: x  / Bili: x / Urobili: x   Blood: x / Protein: x / Nitrite: x   Leuk Esterase: x / RBC: x / WBC x   Sq Epi: x / Non Sq Epi: x / Bacteria: x      CAPILLARY BLOOD GLUCOSE            Urinalysis Basic - ( 26 May 2024 06:55 )    Color: x / Appearance: x / SG: x / pH: x  Gluc: 100 mg/dL / Ketone: x  / Bili: x / Urobili: x   Blood: x / Protein: x / Nitrite: x   Leuk Esterase: x / RBC: x / WBC x   Sq Epi: x / Non Sq Epi: x / Bacteria: x        MEDICATIONS  (STANDING):  amLODIPine   Tablet 2.5 milliGRAM(s) Oral at bedtime  cilostazol 50 milliGRAM(s) Oral two times a day  enoxaparin Injectable 40 milliGRAM(s) SubCutaneous every 24 hours  lactobacillus acidophilus 1 Tablet(s) Oral two times a day with meals  pantoprazole    Tablet 40 milliGRAM(s) Oral before breakfast  piperacillin/tazobactam IVPB.. 3.375 Gram(s) IV Intermittent every 8 hours  simethicone 80 milliGRAM(s) Chew every 6 hours  sodium chloride 0.9%. 1000 milliLiter(s) (75 mL/Hr) IV Continuous <Continuous>    MEDICATIONS  (PRN):  morphine  - Injectable 2 milliGRAM(s) IV Push every 4 hours PRN Severe Pain (7 - 10)  oxycodone    5 mG/acetaminophen 325 mG 1 Tablet(s) Oral every 6 hours PRN Moderate Pain (4 - 6)          PHYSICAL EXAM:  GENERAL: NAD, well-groomed, well-developed  HEAD:  Atraumatic, Normocephalic  CHEST/LUNG: Clear to percussion bilaterally; No rales, rhonchi, wheezing, or rubs  HEART: Regular rate and rhythm; No murmurs, rubs, or gallops  ABDOMEN: Soft, Nontender, Nondistended; Bowel sounds present  EXTREMITIES:  2+ Peripheral Pulses, No clubbing, cyanosis, or edema  LYMPH: No lymphadenopathy noted  SKIN: No rashes or lesions    Care Discussed with Consultants/Other Providers [ ] YES  [ ] NO

## 2024-05-26 NOTE — PROVIDER CONTACT NOTE (OTHER) - BACKGROUND
73F pmh PAD, breast ca w/ spinal mets, recent watery bowel movements, admitted for noninfectious gastroenteritis

## 2024-05-27 LAB
ALBUMIN SERPL ELPH-MCNC: 2.8 G/DL — LOW (ref 3.3–5)
ALP SERPL-CCNC: 69 U/L — SIGNIFICANT CHANGE UP (ref 40–120)
ALT FLD-CCNC: 8 U/L — SIGNIFICANT CHANGE UP (ref 4–33)
ANION GAP SERPL CALC-SCNC: 12 MMOL/L — SIGNIFICANT CHANGE UP (ref 7–14)
AST SERPL-CCNC: 11 U/L — SIGNIFICANT CHANGE UP (ref 4–32)
BILIRUB SERPL-MCNC: 0.2 MG/DL — SIGNIFICANT CHANGE UP (ref 0.2–1.2)
BUN SERPL-MCNC: 9 MG/DL — SIGNIFICANT CHANGE UP (ref 7–23)
CALCIUM SERPL-MCNC: 8.9 MG/DL — SIGNIFICANT CHANGE UP (ref 8.4–10.5)
CHLORIDE SERPL-SCNC: 103 MMOL/L — SIGNIFICANT CHANGE UP (ref 98–107)
CO2 SERPL-SCNC: 24 MMOL/L — SIGNIFICANT CHANGE UP (ref 22–31)
CREAT SERPL-MCNC: 0.68 MG/DL — SIGNIFICANT CHANGE UP (ref 0.5–1.3)
EGFR: 91 ML/MIN/1.73M2 — SIGNIFICANT CHANGE UP
GI PCR PANEL: SIGNIFICANT CHANGE UP
GLUCOSE SERPL-MCNC: 112 MG/DL — HIGH (ref 70–99)
HCT VFR BLD CALC: 29.7 % — LOW (ref 34.5–45)
HGB BLD-MCNC: 9.5 G/DL — LOW (ref 11.5–15.5)
MAGNESIUM SERPL-MCNC: 1.7 MG/DL — SIGNIFICANT CHANGE UP (ref 1.6–2.6)
MCHC RBC-ENTMCNC: 27.2 PG — SIGNIFICANT CHANGE UP (ref 27–34)
MCHC RBC-ENTMCNC: 32 GM/DL — SIGNIFICANT CHANGE UP (ref 32–36)
MCV RBC AUTO: 85.1 FL — SIGNIFICANT CHANGE UP (ref 80–100)
MRSA PCR RESULT.: SIGNIFICANT CHANGE UP
NRBC # BLD: 0 /100 WBCS — SIGNIFICANT CHANGE UP (ref 0–0)
NRBC # FLD: 0 K/UL — SIGNIFICANT CHANGE UP (ref 0–0)
PHOSPHATE SERPL-MCNC: 3 MG/DL — SIGNIFICANT CHANGE UP (ref 2.5–4.5)
PLATELET # BLD AUTO: 238 K/UL — SIGNIFICANT CHANGE UP (ref 150–400)
POTASSIUM SERPL-MCNC: 3.5 MMOL/L — SIGNIFICANT CHANGE UP (ref 3.5–5.3)
POTASSIUM SERPL-SCNC: 3.5 MMOL/L — SIGNIFICANT CHANGE UP (ref 3.5–5.3)
PROT SERPL-MCNC: 6.7 G/DL — SIGNIFICANT CHANGE UP (ref 6–8.3)
RBC # BLD: 3.49 M/UL — LOW (ref 3.8–5.2)
RBC # FLD: 15.9 % — HIGH (ref 10.3–14.5)
S AUREUS DNA NOSE QL NAA+PROBE: SIGNIFICANT CHANGE UP
SODIUM SERPL-SCNC: 139 MMOL/L — SIGNIFICANT CHANGE UP (ref 135–145)
WBC # BLD: 5.24 K/UL — SIGNIFICANT CHANGE UP (ref 3.8–10.5)
WBC # FLD AUTO: 5.24 K/UL — SIGNIFICANT CHANGE UP (ref 3.8–10.5)

## 2024-05-27 PROCEDURE — 73562 X-RAY EXAM OF KNEE 3: CPT | Mod: 26,RT

## 2024-05-27 PROCEDURE — 99223 1ST HOSP IP/OBS HIGH 75: CPT | Mod: GC

## 2024-05-27 PROCEDURE — 73620 X-RAY EXAM OF FOOT: CPT | Mod: 26,LT

## 2024-05-27 RX ORDER — CHLORHEXIDINE GLUCONATE 213 G/1000ML
1 SOLUTION TOPICAL DAILY
Refills: 0 | Status: DISCONTINUED | OUTPATIENT
Start: 2024-05-27 | End: 2024-06-04

## 2024-05-27 RX ORDER — ACETAMINOPHEN 500 MG
1000 TABLET ORAL ONCE
Refills: 0 | Status: COMPLETED | OUTPATIENT
Start: 2024-05-27 | End: 2024-05-27

## 2024-05-27 RX ADMIN — MORPHINE SULFATE 2 MILLIGRAM(S): 50 CAPSULE, EXTENDED RELEASE ORAL at 10:12

## 2024-05-27 RX ADMIN — PIPERACILLIN AND TAZOBACTAM 25 GRAM(S): 4; .5 INJECTION, POWDER, LYOPHILIZED, FOR SOLUTION INTRAVENOUS at 13:30

## 2024-05-27 RX ADMIN — Medication 1 TABLET(S): at 18:05

## 2024-05-27 RX ADMIN — CILOSTAZOL 50 MILLIGRAM(S): 100 TABLET ORAL at 18:05

## 2024-05-27 RX ADMIN — MORPHINE SULFATE 2 MILLIGRAM(S): 50 CAPSULE, EXTENDED RELEASE ORAL at 00:27

## 2024-05-27 RX ADMIN — SODIUM CHLORIDE 75 MILLILITER(S): 9 INJECTION INTRAMUSCULAR; INTRAVENOUS; SUBCUTANEOUS at 18:05

## 2024-05-27 RX ADMIN — PIPERACILLIN AND TAZOBACTAM 25 GRAM(S): 4; .5 INJECTION, POWDER, LYOPHILIZED, FOR SOLUTION INTRAVENOUS at 21:29

## 2024-05-27 RX ADMIN — SIMETHICONE 80 MILLIGRAM(S): 80 TABLET, CHEWABLE ORAL at 13:30

## 2024-05-27 RX ADMIN — CHLORHEXIDINE GLUCONATE 1 APPLICATION(S): 213 SOLUTION TOPICAL at 18:16

## 2024-05-27 RX ADMIN — PIPERACILLIN AND TAZOBACTAM 25 GRAM(S): 4; .5 INJECTION, POWDER, LYOPHILIZED, FOR SOLUTION INTRAVENOUS at 06:26

## 2024-05-27 RX ADMIN — SIMETHICONE 80 MILLIGRAM(S): 80 TABLET, CHEWABLE ORAL at 06:26

## 2024-05-27 RX ADMIN — ENOXAPARIN SODIUM 40 MILLIGRAM(S): 100 INJECTION SUBCUTANEOUS at 18:05

## 2024-05-27 RX ADMIN — PANTOPRAZOLE SODIUM 40 MILLIGRAM(S): 20 TABLET, DELAYED RELEASE ORAL at 06:26

## 2024-05-27 RX ADMIN — Medication 1 TABLET(S): at 09:23

## 2024-05-27 RX ADMIN — Medication 400 MILLIGRAM(S): at 02:27

## 2024-05-27 RX ADMIN — SIMETHICONE 80 MILLIGRAM(S): 80 TABLET, CHEWABLE ORAL at 00:10

## 2024-05-27 RX ADMIN — AMLODIPINE BESYLATE 2.5 MILLIGRAM(S): 2.5 TABLET ORAL at 21:34

## 2024-05-27 RX ADMIN — MORPHINE SULFATE 2 MILLIGRAM(S): 50 CAPSULE, EXTENDED RELEASE ORAL at 00:11

## 2024-05-27 RX ADMIN — MORPHINE SULFATE 2 MILLIGRAM(S): 50 CAPSULE, EXTENDED RELEASE ORAL at 10:32

## 2024-05-27 RX ADMIN — SODIUM CHLORIDE 75 MILLILITER(S): 9 INJECTION INTRAMUSCULAR; INTRAVENOUS; SUBCUTANEOUS at 00:08

## 2024-05-27 RX ADMIN — SIMETHICONE 80 MILLIGRAM(S): 80 TABLET, CHEWABLE ORAL at 18:05

## 2024-05-27 RX ADMIN — CILOSTAZOL 50 MILLIGRAM(S): 100 TABLET ORAL at 06:26

## 2024-05-27 RX ADMIN — Medication 1000 MILLIGRAM(S): at 03:06

## 2024-05-27 NOTE — PHYSICAL THERAPY INITIAL EVALUATION ADULT - MD/RN NOTIFIED
[facility]: Palliative Care                                                                                        Fax request from [facility] for Clonazepam    Last date written and prescribing provider:  12/26/2022 Dr Finch     Last office visit: 12/8/2022  Next office visit:  2/8/2023     reviewed with no concerns  Last sold per :    12/27 2mg strength of clonazepam  12/26 gabapentin  11/26 1mg clonazepam   However, the pharmacy fax indicates filled on  12/16/2022      Nasra Panda LPN  Palliative  Care Coordination  [phone]  
yes

## 2024-05-27 NOTE — PHYSICAL THERAPY INITIAL EVALUATION ADULT - ACTIVE RANGE OF MOTION EXAMINATION, REHAB EVAL
except c/o pain/swelling on left foot and R knee medially/bilateral upper extremity Active ROM was WFL (within functional limits)/bilateral  lower extremity Active ROM was WFL (within functional limits)

## 2024-05-27 NOTE — PROGRESS NOTE ADULT - ASSESSMENT
73-year-old female past medical history of peripheral arterial disease on Xarelto, history of breast cancer with right-sided lumpectomy, bladder cancer with spinal metastasis, history of recurrent diverticulitis last time admitted to the hospital in January 2024 with perforated diverticulitis resulting in a psoas abscess treated on IV antibiotics.	Patient reports ongoing anorexia, episodic abdominal pain diffuse in nature, and now presents to the ED with concerns of diarrhea and generalized weakness.	Patient having 3-4 watery bowel movements a day.  Also mentions having fever for the past few days documented to 101 °F.Denies headaches, neck pain, sore throat, chest pain, difficulty in breathing, vomitings, urinary complaints, skin rash/lesions.    Abdominal pain  - sec to colitis  - hx of diverticulitis and psoas abscess  - cw zosyn  - ID fu appreciated    Breast cancer, bladder cancer  - fu with heme-onc as outpt - will monitor     PVD  - was on xarelto 2.5 bid  - started lovenox for dvt prophylaxis     HTN  - cw norvasc  - DASH diet     cody henriquez

## 2024-05-27 NOTE — DIETITIAN INITIAL EVALUATION ADULT - ORAL INTAKE PTA/DIET HISTORY
Spoke to pt at bedside. Pt noted with no food allergies or food intolerances however mentioned that milk makes her gassy and bloated. Stated to take probiotic PTA, does not take a protein supplement PTA. Stated having no appetite for a week PTA. Noted with diarrhea and vomiting consistently PTA due to noninfectious gastroenteritis.

## 2024-05-27 NOTE — CONSULT NOTE ADULT - SUBJECTIVE AND OBJECTIVE BOX
HPI:  KIM ACKERMAN is a 73-year-old female past medical history of peripheral arterial disease on Xarelto, history of breast cancer with right-sided lumpectomy, bladder cancer with spinal metastasis, history of recurrent diverticulitis c/b perforated diverticulitis & a psoas abscess s/p IV antibiotics (2024), presented to the ED with concerns of diarrhea and generalized weakness.    Patient had 3-4 watery bowel movements a day with fever. Denies headaches, chest pain, difficulty in breathing, vomiting.     Since admission, diarrhea has slowed down, and minimal in quantity. Abdominal pain also improved. Patient is more distressed about her leg pain and foot pain, and stated that she tolerated breakfast without nausea or vomiting.       PMHX/PSHX:    Anxiety    Breast CA, left    Hypertension    Sleep apnea    Irritable bowel syndrome (IBS)    Polyp of colon    HLD (hyperlipidemia)    Bladder polyp    S/P     S/P colon resection    S/P hysterectomy    Breast lump    History of surgery    H/O lumpectomy    Personal history of spine surgery      Allergies:  sulfa drugs (Unknown)      Home Medications: reviewed  Hospital Medications:  amLODIPine   Tablet 2.5 milliGRAM(s) Oral at bedtime  chlorhexidine 2% Cloths 1 Application(s) Topical daily  cilostazol 50 milliGRAM(s) Oral two times a day  enoxaparin Injectable 40 milliGRAM(s) SubCutaneous every 24 hours  lactobacillus acidophilus 1 Tablet(s) Oral two times a day with meals  morphine  - Injectable 2 milliGRAM(s) IV Push every 4 hours PRN  oxycodone    5 mG/acetaminophen 325 mG 1 Tablet(s) Oral every 6 hours PRN  pantoprazole    Tablet 40 milliGRAM(s) Oral before breakfast  piperacillin/tazobactam IVPB.. 3.375 Gram(s) IV Intermittent every 8 hours  simethicone 80 milliGRAM(s) Chew every 6 hours  sodium chloride 0.9%. 1000 milliLiter(s) IV Continuous <Continuous>        PHYSICAL EXAM:   Vital Signs:  Vital Signs Last 24 Hrs  T(C): 36.7 (27 May 2024 13:02), Max: 37.1 (26 May 2024 21:42)  T(F): 98.1 (27 May 2024 13:02), Max: 98.7 (26 May 2024 21:42)  HR: 89 (27 May 2024 13:02) (68 - 89)  BP: 111/74 (27 May 2024 13:02) (106/66 - 116/78)  BP(mean): --  RR: 19 (27 May 2024 13:02) (18 - 19)  SpO2: 96% (27 May 2024 13:02) (95% - 98%)    Parameters below as of 27 May 2024 13:02  Patient On (Oxygen Delivery Method): room air      Daily     Daily     GENERAL: Emotional and crying  NEURO: alert and oriented x 3  HEENT: NCAT, no conjunctival pallor appreciated; anicteric sclera  CHEST: no respiratory distress, no accessory muscle use  CARDIAC: regular rate, +S1/S2  ABDOMEN: soft, nontender, no rebound or guarding  EXTREMITIES: warm, well perfused  SKIN: no lesions noted    LABS: reviewed                        9.5    5.24  )-----------( 238      ( 27 May 2024 06:20 )             29.7     05-27    139  |  103  |  9   ----------------------------<  112<H>  3.5   |  24  |  0.68    Ca    8.9      27 May 2024 06:20  Phos  3.0     05-  Mg     1.70     -    TPro  6.7  /  Alb  2.8<L>  /  TBili  0.2  /  DBili  x   /  AST  11  /  ALT  8   /  AlkPhos  69  05-27    LIVER FUNCTIONS - ( 27 May 2024 06:20 )  Alb: 2.8 g/dL / Pro: 6.7 g/dL / ALK PHOS: 69 U/L / ALT: 8 U/L / AST: 11 U/L / GGT: x             Culture - Urine (collected 24 May 2024 21:55)  Source: Clean Catch Clean Catch (Midstream)  Final Report (26 May 2024 22:46):    <10,000 CFU/mL Normal Urogenital Carol    Culture - Blood (collected 24 May 2024 19:11)  Source: .Blood Blood-Peripheral  Preliminary Report (27 May 2024 02:02):    No growth at 48 Hours    Culture - Blood (collected 24 May 2024 19:00)  Source: .Blood Blood-Peripheral  Preliminary Report (27 May 2024 02:02):    No growth at 48 Hours      < from: CT Abdomen and Pelvis w/ IV Cont (24 @ 23:01) >  FINDINGS:  LOWER CHEST: Bilateral dependent atelectasis.    LIVER: Subcentimeter hypodensities too small to characterize.  BILE DUCTS: Normal caliber.  GALLBLADDER: Within normal limits.  SPLEEN: Subcentimeter hypodensity too small to characterize.  PANCREAS: Within normal limits.  ADRENALS: Left adrenal thickening.  KIDNEYS/URETERS: Symmetric renal enhancement. Mild left   hydroureteronephrosis to the level of the UVJ. Calcifications adjacent to   the ureter, more likely phleboliths than obstructing calculus.    BLADDER: Distended  REPRODUCTIVE ORGANS: Uterus and adnexa within normal limits.    BOWEL: No bowel obstruction. Appendix is not visualized. Colonic   diverticulosis. Distal sigmoid colon and rectal wall thickening with   adjacent fat stranding. Inflammatory changes in the left lower quadrant   increased compared to 2024. No organized fluid collection.  PERITONEUM: Trace pelvic fluid. No pneumoperitoneum.  VESSELS: Atherosclerotic changes.  RETROPERITONEUM/LYMPH NODES: No lymphadenopathy.  ABDOMINAL WALL: Within normal limits.  BONES: Posterior laminectomy of L3-4 to L5 with intervertebral disc   replacements. Chronic compression deformity of L1.    IMPRESSION:  More extensive colonic thickening from left colon through rectum suggests   nonspecific proctocolitis.  No upstream dilatation.    No discrete focal drainable collection.  Ill-defined sinus tracts   anterior to the left psoas related to previous complicated sigmoid   diverticulitis.    Mild left hydroureteronephrosis to the level of the UVJ, likely reactive   to left lower quadrant inflammation.          --- End of Report ---    < end of copied text >

## 2024-05-27 NOTE — DIETITIAN INITIAL EVALUATION ADULT - ADD RECOMMEND
1) Continue current diet  2) Encourage PO intake and honor food preferences as able.   3) Monitor PO intake, labs, weights, BMs, and skin integrity.

## 2024-05-27 NOTE — PHYSICAL THERAPY INITIAL EVALUATION ADULT - NSPTDISCHREC_GEN_A_CORE
to be determined pending functional ambulation assessment , anticipate possible Rehab facility, please follow further PT notes.

## 2024-05-27 NOTE — CONSULT NOTE ADULT - ASSESSMENT
73-year-old female past medical history of peripheral arterial disease on Xarelto, history of breast cancer with right-sided lumpectomy, bladder cancer with spinal metastasis, history of recurrent diverticulitis c/b perforated diverticulitis & a psoas abscess s/p IV antibiotics (1/2024), presented to the ED with concerns of diarrhea and generalized weakness.    Assessment  #Diarrhea & abdominal pain  #Leg/foot pain  - Diarrhea and abdominal pain have improved and minimal loose stool at this time  - Patient was very distressed about her leg/foot pain, and would like to concentrate on the management of her MSK issues    Recommendations  - F/u GI PCR, but likely no change in current management  - Please address patient's MSK complaints as this is her priority at this time.   - Symptomatic support per primary team  - No indication for urgent endoscopic evaluation at this time with possible inflammation in the colon per CT finding.   - Patient will need outpatient follow up for colonoscopy post-diverticulitis episode to r/o malignancy.    D/w Dr. Oracio Franco  GI/Hepatology Fellow    MONDAY-FRIDAY 8AM-5PM:  Pager# [phone] ([facility]) or [phone] ([facility])    NON-URGENT CONSULTS:  Please email [email or web address]  AT NIGHT AND ON WEEKENDS:  Contact on-call GI fellow via AMION by paging or hospital  from 5pm-8am and on weekends/holidays    73-year-old female past medical history of peripheral arterial disease on Xarelto, history of breast cancer with right-sided lumpectomy, bladder cancer with spinal metastasis, history of recurrent diverticulitis c/b perforated diverticulitis & a psoas abscess s/p IV antibiotics (1/2024), presented to the ED with concerns of diarrhea and generalized weakness.    Assessment  #Diarrhea & abdominal pain, proctocolitis - ddx includes infectious colitis, SCAD from diverticulosis, less likely IBD or colon neoplasms  #Leg/foot pain  - Diarrhea and abdominal pain have improved and minimal loose stool at this time  - Patient was very distressed about her leg/foot pain, and would like to concentrate on the management of her MSK issues    Recommendations  - F/u GI PCR, but likely no change in current management  - Please address patient's MSK complaints as this is her priority at this time.   - Symptomatic support per primary team  - No indication for urgent endoscopic evaluation at this time with possible inflammation in the colon per CT finding.   - If stool studies negative and diarrhea persists, consider colonoscopy    D/w Dr. Oracio Franco  GI/Hepatology Fellow    MONDAY-FRIDAY 8AM-5PM:  Pager# [phone] ([facility] or [phone] ([facility])    NON-URGENT CONSULTS:  Please email [email or web address]  AT NIGHT AND ON WEEKENDS:  Contact on-call GI fellow via AMION by paging or hospital  from 5pm-8am and on weekends/holidays

## 2024-05-27 NOTE — PROGRESS NOTE ADULT - ASSESSMENT
73-year-old female past medical history of peripheral arterial disease on Xarelto, history of breast cancer with right-sided lumpectomy, bladder cancer with spinal metastasis, history of recurrent diverticulitis last time admitted to the hospital in January 2024 with perforated diverticulitis resulting in a psoas abscess treated on IV antibiotics.	Patient reports ongoing anorexia, episodic abdominal pain diffuse in nature, and now presents to the ED with concerns of diarrhea and generalized weakness.	Patient having 3-4 watery bowel movements a day.  Also mentions having fever for the past few days documented to 101 °F.Denies headaches, neck pain, sore throat, chest pain, difficulty in breathing, vomitings, urinary complaints, skin rash/lesions.    Abdominal pain  - sec to colitis  - hx of diverticulitis and psoas abscess  - cw zosyn  - ID fu appreciated    Breast cancer, bladder cancer  - fu with heme-onc as outpt   - will monitor     PVD  - was on xarelto 2.5 bid  - started lovenox for dvt prophylaxis     HTN  - cw norvasc  - DASH diet       Speech difficulty  - she says she cant talk properly because of opoids  - CT head neg    Knee pain  - OA  - PT and outpt fu

## 2024-05-27 NOTE — PHYSICAL THERAPY INITIAL EVALUATION ADULT - GENERAL OBSERVATIONS, REHAB EVAL
Pt received seated out of bed in a chair , +swelling and pain of left foot and R knee medially , RN Blanca/ BRENDEN Fermin is at bedside and is made aware of pt's complaints. /74 89 96%.

## 2024-05-27 NOTE — PROGRESS NOTE ADULT - SUBJECTIVE AND OBJECTIVE BOX
Patient is a 73y old  Female who presents with a chief complaint of Noninfectious gastroenteritis     (27 May 2024 14:25)    Date of servie : 05-27-24 @ 16:37  INTERVAL HPI/OVERNIGHT EVENTS:  T(C): 36.6 (05-27-24 @ 15:51), Max: 37.1 (05-26-24 @ 21:42)  HR: 89 (05-27-24 @ 13:02) (68 - 89)  BP: 111/74 (05-27-24 @ 13:02) (106/66 - 116/78)  RR: 19 (05-27-24 @ 13:02) (18 - 19)  SpO2: 96% (05-27-24 @ 13:02) (95% - 98%)  Wt(kg): --  I&O's Summary      LABS:                        9.5    5.24  )-----------( 238      ( 27 May 2024 06:20 )             29.7     05-27    139  |  103  |  9   ----------------------------<  112<H>  3.5   |  24  |  0.68    Ca    8.9      27 May 2024 06:20  Phos  3.0     05-27  Mg     1.70     05-27    TPro  6.7  /  Alb  2.8<L>  /  TBili  0.2  /  DBili  x   /  AST  11  /  ALT  8   /  AlkPhos  69  05-27      Urinalysis Basic - ( 27 May 2024 06:20 )    Color: x / Appearance: x / SG: x / pH: x  Gluc: 112 mg/dL / Ketone: x  / Bili: x / Urobili: x   Blood: x / Protein: x / Nitrite: x   Leuk Esterase: x / RBC: x / WBC x   Sq Epi: x / Non Sq Epi: x / Bacteria: x      CAPILLARY BLOOD GLUCOSE            Urinalysis Basic - ( 27 May 2024 06:20 )    Color: x / Appearance: x / SG: x / pH: x  Gluc: 112 mg/dL / Ketone: x  / Bili: x / Urobili: x   Blood: x / Protein: x / Nitrite: x   Leuk Esterase: x / RBC: x / WBC x   Sq Epi: x / Non Sq Epi: x / Bacteria: x        MEDICATIONS  (STANDING):  amLODIPine   Tablet 2.5 milliGRAM(s) Oral at bedtime  chlorhexidine 2% Cloths 1 Application(s) Topical daily  cilostazol 50 milliGRAM(s) Oral two times a day  enoxaparin Injectable 40 milliGRAM(s) SubCutaneous every 24 hours  lactobacillus acidophilus 1 Tablet(s) Oral two times a day with meals  pantoprazole    Tablet 40 milliGRAM(s) Oral before breakfast  piperacillin/tazobactam IVPB.. 3.375 Gram(s) IV Intermittent every 8 hours  simethicone 80 milliGRAM(s) Chew every 6 hours  sodium chloride 0.9%. 1000 milliLiter(s) (75 mL/Hr) IV Continuous <Continuous>    MEDICATIONS  (PRN):  morphine  - Injectable 2 milliGRAM(s) IV Push every 4 hours PRN Severe Pain (7 - 10)  oxycodone    5 mG/acetaminophen 325 mG 1 Tablet(s) Oral every 6 hours PRN Moderate Pain (4 - 6)          PHYSICAL EXAM:  GENERAL: NAD, well-groomed, well-developed  HEAD:  Atraumatic, Normocephalic  CHEST/LUNG: Clear to percussion bilaterally; No rales, rhonchi, wheezing, or rubs  HEART: Regular rate and rhythm; No murmurs, rubs, or gallops  ABDOMEN: Soft, Nontender, Nondistended; Bowel sounds present  EXTREMITIES:  2+ Peripheral Pulses, No clubbing, cyanosis, or edema  LYMPH: No lymphadenopathy noted  SKIN: No rashes or lesions    Care Discussed with Consultants/Other Providers [ ] YES  [ ] NO

## 2024-05-27 NOTE — PHYSICAL THERAPY INITIAL EVALUATION ADULT - LEVEL OF INDEPENDENCE: SIT/STAND, REHAB EVAL
due to c/o pain on left foot and R knee rated 10/10 on left foot with swelling - RN 's aware/unable to perform

## 2024-05-27 NOTE — DIETITIAN INITIAL EVALUATION ADULT - OTHER INFO
Medical Course: Per Hospitalist 5/27 - 73-year-old female past medical history of peripheral arterial disease on Xarelto, history of breast cancer with right-sided lumpectomy, bladder cancer with spinal metastasis, history of recurrent diverticulitis last time admitted to the hospital in January 2024 with perforated diverticulitis resulting in a psoas abscess treated on IV antibiotics. Patient reports ongoing anorexia, episodic abdominal pain diffuse in nature, and now presents to the ED with concerns of diarrhea and generalized weakness.	    Nutrition interview: No recent episodes of constipation. Pt experiencing 3-4 watery bowel movements a day. Mentioned nausea and noted a vomiting episode on 5/24. Last BM noted on 5/27 per RN flowsheets. Denies any chewing/swallowing difficulties. Intake is 51-75% per RN flowsheets and per pt. Food preferences explored and noted. Stated UBW: 172 lbs around 3-4 months ago, noted a 24lb weight loss due to lack of appetite.

## 2024-05-27 NOTE — PHYSICAL THERAPY INITIAL EVALUATION ADULT - PERTINENT HX OF CURRENT PROBLEM, REHAB EVAL
This is a 73y F with lumpectomy, bladder cancer with spinal metastasis, history of recurrent diverticulitis last time admitted to the hospital in January 2024 with perforated diverticulitis resulting in a psoas abscess treated on IV antibiotics. Pt reports ongoing anorexia, episodic abdominal pain diffuse in nature, and now presents to the ED with concerns of diarrhea and generalized weakness. Patient having 3-4 watery bowel movements a day. Also mentions having fever for the past few days documented to 101 °F.

## 2024-05-27 NOTE — DIETITIAN INITIAL EVALUATION ADULT - ORAL NUTRITION SUPPLEMENTS
Offered protein supplement to aid in lack of appetite and to meet adequate pro/kcal needs. Pt accepted, requested dairy free supplement. Will provide Orgain Vegan Vanilla (provides 220 kcal, 16 gm protein per 11oz serving) 1 x day.

## 2024-05-27 NOTE — DIETITIAN INITIAL EVALUATION ADULT - PERTINENT LABORATORY DATA
05-27    139  |  103  |  9   ----------------------------<  112<H>  3.5   |  24  |  0.68    Ca    8.9      27 May 2024 06:20  Phos  3.0     05-27  Mg     1.70     05-27    TPro  6.7  /  Alb  2.8<L>  /  TBili  0.2  /  DBili  x   /  AST  11  /  ALT  8   /  AlkPhos  69  05-27

## 2024-05-27 NOTE — DIETITIAN INITIAL EVALUATION ADULT - PERTINENT MEDS FT
MEDICATIONS  (STANDING):  amLODIPine   Tablet 2.5 milliGRAM(s) Oral at bedtime  chlorhexidine 2% Cloths 1 Application(s) Topical daily  cilostazol 50 milliGRAM(s) Oral two times a day  enoxaparin Injectable 40 milliGRAM(s) SubCutaneous every 24 hours  lactobacillus acidophilus 1 Tablet(s) Oral two times a day with meals  pantoprazole    Tablet 40 milliGRAM(s) Oral before breakfast  piperacillin/tazobactam IVPB.. 3.375 Gram(s) IV Intermittent every 8 hours  simethicone 80 milliGRAM(s) Chew every 6 hours  sodium chloride 0.9%. 1000 milliLiter(s) (75 mL/Hr) IV Continuous <Continuous>    MEDICATIONS  (PRN):  morphine  - Injectable 2 milliGRAM(s) IV Push every 4 hours PRN Severe Pain (7 - 10)  oxycodone    5 mG/acetaminophen 325 mG 1 Tablet(s) Oral every 6 hours PRN Moderate Pain (4 - 6)

## 2024-05-27 NOTE — PROGRESS NOTE ADULT - SUBJECTIVE AND OBJECTIVE BOX
Patient is a 73y old  Female who presents with a chief complaint of abdominal pain (27 May 2024 09:42)    Date of servie : 05-27-24 @ 13:57  INTERVAL HPI/OVERNIGHT EVENTS:  T(C): 36.7 (05-27-24 @ 13:02), Max: 37.1 (05-26-24 @ 21:42)  HR: 89 (05-27-24 @ 13:02) (68 - 89)  BP: 111/74 (05-27-24 @ 13:02) (106/66 - 116/78)  RR: 19 (05-27-24 @ 13:02) (18 - 19)  SpO2: 96% (05-27-24 @ 13:02) (95% - 98%)  Wt(kg): --  I&O's Summary      LABS:                        9.5    5.24  )-----------( 238      ( 27 May 2024 06:20 )             29.7     05-27    139  |  103  |  9   ----------------------------<  112<H>  3.5   |  24  |  0.68    Ca    8.9      27 May 2024 06:20  Phos  3.0     05-27  Mg     1.70     05-27    TPro  6.7  /  Alb  2.8<L>  /  TBili  0.2  /  DBili  x   /  AST  11  /  ALT  8   /  AlkPhos  69  05-27      Urinalysis Basic - ( 27 May 2024 06:20 )    Color: x / Appearance: x / SG: x / pH: x  Gluc: 112 mg/dL / Ketone: x  / Bili: x / Urobili: x   Blood: x / Protein: x / Nitrite: x   Leuk Esterase: x / RBC: x / WBC x   Sq Epi: x / Non Sq Epi: x / Bacteria: x      CAPILLARY BLOOD GLUCOSE            Urinalysis Basic - ( 27 May 2024 06:20 )    Color: x / Appearance: x / SG: x / pH: x  Gluc: 112 mg/dL / Ketone: x  / Bili: x / Urobili: x   Blood: x / Protein: x / Nitrite: x   Leuk Esterase: x / RBC: x / WBC x   Sq Epi: x / Non Sq Epi: x / Bacteria: x        MEDICATIONS  (STANDING):  amLODIPine   Tablet 2.5 milliGRAM(s) Oral at bedtime  chlorhexidine 2% Cloths 1 Application(s) Topical daily  cilostazol 50 milliGRAM(s) Oral two times a day  enoxaparin Injectable 40 milliGRAM(s) SubCutaneous every 24 hours  lactobacillus acidophilus 1 Tablet(s) Oral two times a day with meals  pantoprazole    Tablet 40 milliGRAM(s) Oral before breakfast  piperacillin/tazobactam IVPB.. 3.375 Gram(s) IV Intermittent every 8 hours  simethicone 80 milliGRAM(s) Chew every 6 hours  sodium chloride 0.9%. 1000 milliLiter(s) (75 mL/Hr) IV Continuous <Continuous>    MEDICATIONS  (PRN):  morphine  - Injectable 2 milliGRAM(s) IV Push every 4 hours PRN Severe Pain (7 - 10)  oxycodone    5 mG/acetaminophen 325 mG 1 Tablet(s) Oral every 6 hours PRN Moderate Pain (4 - 6)          PHYSICAL EXAM:  GENERAL: NAD, well-groomed, well-developed  HEAD:  Atraumatic, Normocephalic  CHEST/LUNG: Clear to percussion bilaterally; No rales, rhonchi, wheezing, or rubs  HEART: Regular rate and rhythm; No murmurs, rubs, or gallops  ABDOMEN: Soft, Nontender, Nondistended; Bowel sounds present  EXTREMITIES:  2+ Peripheral Pulses, No clubbing, cyanosis, or edema  LYMPH: No lymphadenopathy noted  SKIN: No rashes or lesions    Care Discussed with Consultants/Other Providers [ ] YES  [ ] NO

## 2024-05-27 NOTE — CONSULT NOTE ADULT - ATTENDING COMMENTS
Agree with above. Patient with history of perforated diverticulitis treated conservatively with antibiotics. She presents with diarrhea, and CT shows thickening in distal colon/rectum. She is tearful today, complaining of foot and knee pain. Would check GI PCR, C diff if diarrhea persists. If stool studies are negative and diarrhea persists, consider colonoscopy. Would recommend PT evaluation and consider imaging for foot/knee pain.

## 2024-05-28 LAB
ADD ON TEST-SPECIMEN IN LAB: SIGNIFICANT CHANGE UP
ALBUMIN SERPL ELPH-MCNC: 2.7 G/DL — LOW (ref 3.3–5)
ALP SERPL-CCNC: 64 U/L — SIGNIFICANT CHANGE UP (ref 40–120)
ALT FLD-CCNC: 7 U/L — SIGNIFICANT CHANGE UP (ref 4–33)
ANION GAP SERPL CALC-SCNC: 9 MMOL/L — SIGNIFICANT CHANGE UP (ref 7–14)
AST SERPL-CCNC: 11 U/L — SIGNIFICANT CHANGE UP (ref 4–32)
BILIRUB SERPL-MCNC: <0.2 MG/DL — SIGNIFICANT CHANGE UP (ref 0.2–1.2)
BUN SERPL-MCNC: 8 MG/DL — SIGNIFICANT CHANGE UP (ref 7–23)
CALCIUM SERPL-MCNC: 9 MG/DL — SIGNIFICANT CHANGE UP (ref 8.4–10.5)
CHLORIDE SERPL-SCNC: 103 MMOL/L — SIGNIFICANT CHANGE UP (ref 98–107)
CO2 SERPL-SCNC: 26 MMOL/L — SIGNIFICANT CHANGE UP (ref 22–31)
CREAT SERPL-MCNC: 0.67 MG/DL — SIGNIFICANT CHANGE UP (ref 0.5–1.3)
EGFR: 92 ML/MIN/1.73M2 — SIGNIFICANT CHANGE UP
GLUCOSE SERPL-MCNC: 105 MG/DL — HIGH (ref 70–99)
HCT VFR BLD CALC: 28.8 % — LOW (ref 34.5–45)
HGB BLD-MCNC: 9.1 G/DL — LOW (ref 11.5–15.5)
MAGNESIUM SERPL-MCNC: 1.7 MG/DL — SIGNIFICANT CHANGE UP (ref 1.6–2.6)
MCHC RBC-ENTMCNC: 26.8 PG — LOW (ref 27–34)
MCHC RBC-ENTMCNC: 31.6 GM/DL — LOW (ref 32–36)
MCV RBC AUTO: 84.7 FL — SIGNIFICANT CHANGE UP (ref 80–100)
NRBC # BLD: 0 /100 WBCS — SIGNIFICANT CHANGE UP (ref 0–0)
NRBC # FLD: 0 K/UL — SIGNIFICANT CHANGE UP (ref 0–0)
PHOSPHATE SERPL-MCNC: 2.8 MG/DL — SIGNIFICANT CHANGE UP (ref 2.5–4.5)
PLATELET # BLD AUTO: 348 K/UL — SIGNIFICANT CHANGE UP (ref 150–400)
POTASSIUM SERPL-MCNC: 3.5 MMOL/L — SIGNIFICANT CHANGE UP (ref 3.5–5.3)
POTASSIUM SERPL-SCNC: 3.5 MMOL/L — SIGNIFICANT CHANGE UP (ref 3.5–5.3)
PROT SERPL-MCNC: 6.2 G/DL — SIGNIFICANT CHANGE UP (ref 6–8.3)
RBC # BLD: 3.4 M/UL — LOW (ref 3.8–5.2)
RBC # FLD: 15.7 % — HIGH (ref 10.3–14.5)
SODIUM SERPL-SCNC: 138 MMOL/L — SIGNIFICANT CHANGE UP (ref 135–145)
URATE SERPL-MCNC: 2.9 MG/DL — SIGNIFICANT CHANGE UP (ref 2.5–7)
WBC # BLD: 6.74 K/UL — SIGNIFICANT CHANGE UP (ref 3.8–10.5)
WBC # FLD AUTO: 6.74 K/UL — SIGNIFICANT CHANGE UP (ref 3.8–10.5)

## 2024-05-28 PROCEDURE — 93971 EXTREMITY STUDY: CPT | Mod: 26,LT

## 2024-05-28 PROCEDURE — 99232 SBSQ HOSP IP/OBS MODERATE 35: CPT | Mod: GC

## 2024-05-28 RX ORDER — OXYCODONE HYDROCHLORIDE 5 MG/1
5 TABLET ORAL ONCE
Refills: 0 | Status: DISCONTINUED | OUTPATIENT
Start: 2024-05-28 | End: 2024-05-28

## 2024-05-28 RX ORDER — OXYCODONE HYDROCHLORIDE 5 MG/1
5 TABLET ORAL EVERY 4 HOURS
Refills: 0 | Status: DISCONTINUED | OUTPATIENT
Start: 2024-05-28 | End: 2024-05-30

## 2024-05-28 RX ORDER — RIVAROXABAN 15 MG-20MG
2.5 KIT ORAL
Refills: 0 | Status: DISCONTINUED | OUTPATIENT
Start: 2024-05-28 | End: 2024-05-31

## 2024-05-28 RX ADMIN — RIVAROXABAN 2.5 MILLIGRAM(S): KIT at 18:41

## 2024-05-28 RX ADMIN — PIPERACILLIN AND TAZOBACTAM 25 GRAM(S): 4; .5 INJECTION, POWDER, LYOPHILIZED, FOR SOLUTION INTRAVENOUS at 21:53

## 2024-05-28 RX ADMIN — Medication 1 TABLET(S): at 10:09

## 2024-05-28 RX ADMIN — OXYCODONE HYDROCHLORIDE 5 MILLIGRAM(S): 5 TABLET ORAL at 22:45

## 2024-05-28 RX ADMIN — Medication 20 MILLIGRAM(S): at 15:04

## 2024-05-28 RX ADMIN — PIPERACILLIN AND TAZOBACTAM 25 GRAM(S): 4; .5 INJECTION, POWDER, LYOPHILIZED, FOR SOLUTION INTRAVENOUS at 13:08

## 2024-05-28 RX ADMIN — SIMETHICONE 80 MILLIGRAM(S): 80 TABLET, CHEWABLE ORAL at 23:13

## 2024-05-28 RX ADMIN — CHLORHEXIDINE GLUCONATE 1 APPLICATION(S): 213 SOLUTION TOPICAL at 13:08

## 2024-05-28 RX ADMIN — SIMETHICONE 80 MILLIGRAM(S): 80 TABLET, CHEWABLE ORAL at 18:42

## 2024-05-28 RX ADMIN — OXYCODONE HYDROCHLORIDE 5 MILLIGRAM(S): 5 TABLET ORAL at 22:02

## 2024-05-28 RX ADMIN — OXYCODONE HYDROCHLORIDE 5 MILLIGRAM(S): 5 TABLET ORAL at 10:09

## 2024-05-28 RX ADMIN — Medication 1 TABLET(S): at 18:42

## 2024-05-28 RX ADMIN — SODIUM CHLORIDE 75 MILLILITER(S): 9 INJECTION INTRAMUSCULAR; INTRAVENOUS; SUBCUTANEOUS at 21:58

## 2024-05-28 RX ADMIN — CILOSTAZOL 50 MILLIGRAM(S): 100 TABLET ORAL at 18:42

## 2024-05-28 RX ADMIN — OXYCODONE HYDROCHLORIDE 5 MILLIGRAM(S): 5 TABLET ORAL at 16:04

## 2024-05-28 RX ADMIN — AMLODIPINE BESYLATE 2.5 MILLIGRAM(S): 2.5 TABLET ORAL at 21:53

## 2024-05-28 RX ADMIN — OXYCODONE HYDROCHLORIDE 5 MILLIGRAM(S): 5 TABLET ORAL at 11:09

## 2024-05-28 RX ADMIN — PIPERACILLIN AND TAZOBACTAM 25 GRAM(S): 4; .5 INJECTION, POWDER, LYOPHILIZED, FOR SOLUTION INTRAVENOUS at 06:29

## 2024-05-28 RX ADMIN — OXYCODONE HYDROCHLORIDE 5 MILLIGRAM(S): 5 TABLET ORAL at 15:04

## 2024-05-28 RX ADMIN — PANTOPRAZOLE SODIUM 40 MILLIGRAM(S): 20 TABLET, DELAYED RELEASE ORAL at 06:29

## 2024-05-28 RX ADMIN — CILOSTAZOL 50 MILLIGRAM(S): 100 TABLET ORAL at 06:30

## 2024-05-28 NOTE — PROGRESS NOTE ADULT - SUBJECTIVE AND OBJECTIVE BOX
Interval Events:   No acute events overnight.    Patient stated that the diarrhea is slowed down and only happening in small amounts with urination. No abdominal pain    Hospital Medications:  amLODIPine   Tablet 2.5 milliGRAM(s) Oral at bedtime  chlorhexidine 2% Cloths 1 Application(s) Topical daily  cilostazol 50 milliGRAM(s) Oral two times a day  enoxaparin Injectable 40 milliGRAM(s) SubCutaneous every 24 hours  lactobacillus acidophilus 1 Tablet(s) Oral two times a day with meals  morphine  - Injectable 2 milliGRAM(s) IV Push every 4 hours PRN  oxycodone    5 mG/acetaminophen 325 mG 1 Tablet(s) Oral every 6 hours PRN  pantoprazole    Tablet 40 milliGRAM(s) Oral before breakfast  piperacillin/tazobactam IVPB.. 3.375 Gram(s) IV Intermittent every 8 hours  simethicone 80 milliGRAM(s) Chew every 6 hours  sodium chloride 0.9%. 1000 milliLiter(s) IV Continuous <Continuous>      PHYSICAL EXAM:   Vital Signs:  Vital Signs Last 24 Hrs  T(C): 36.8 (28 May 2024 06:20), Max: 37 (27 May 2024 10:00)  T(F): 98.2 (28 May 2024 06:20), Max: 98.6 (27 May 2024 10:00)  HR: 67 (28 May 2024 06:20) (67 - 89)  BP: 117/61 (28 May 2024 06:20) (111/74 - 123/78)  BP(mean): --  RR: 18 (28 May 2024 06:20) (16 - 19)  SpO2: 97% (28 May 2024 06:20) (96% - 99%)    Parameters below as of 28 May 2024 06:20  Patient On (Oxygen Delivery Method): room air      Daily     Daily     GENERAL: no acute distress  NEURO: alert and oriented x 3  HEENT: NCAT, anicteric sclera  CHEST: no respiratory distress, no accessory muscle use  CARDIAC: regular rate, +S1/S2  ABDOMEN: soft, nontender, no rebound or guarding  EXTREMITIES: warm, well perfused  SKIN: no active lesions noted    LABS: reviewed                        9.1    6.74  )-----------( 348      ( 28 May 2024 03:45 )             28.8     05-28    138  |  103  |  8   ----------------------------<  105<H>  3.5   |  26  |  0.67    Ca    9.0      28 May 2024 03:45  Phos  2.8     05-28  Mg     1.70     05-28    TPro  6.2  /  Alb  2.7<L>  /  TBili  <0.2  /  DBili  x   /  AST  11  /  ALT  7   /  AlkPhos  64  05-28       Interval Events:   No acute events overnight.    Patient stated that the diarrhea is slowed down and only happening in small amounts with urination. No abdominal pain    Hospital Medications:  amLODIPine   Tablet 2.5 milliGRAM(s) Oral at bedtime  chlorhexidine 2% Cloths 1 Application(s) Topical daily  cilostazol 50 milliGRAM(s) Oral two times a day  enoxaparin Injectable 40 milliGRAM(s) SubCutaneous every 24 hours  lactobacillus acidophilus 1 Tablet(s) Oral two times a day with meals  morphine  - Injectable 2 milliGRAM(s) IV Push every 4 hours PRN  oxycodone    5 mG/acetaminophen 325 mG 1 Tablet(s) Oral every 6 hours PRN  pantoprazole    Tablet 40 milliGRAM(s) Oral before breakfast  piperacillin/tazobactam IVPB.. 3.375 Gram(s) IV Intermittent every 8 hours  simethicone 80 milliGRAM(s) Chew every 6 hours  sodium chloride 0.9%. 1000 milliLiter(s) IV Continuous <Continuous>      PHYSICAL EXAM:   Vital Signs:  Vital Signs Last 24 Hrs  T(C): 36.8 (28 May 2024 06:20), Max: 37 (27 May 2024 10:00)  T(F): 98.2 (28 May 2024 06:20), Max: 98.6 (27 May 2024 10:00)  HR: 67 (28 May 2024 06:20) (67 - 89)  BP: 117/61 (28 May 2024 06:20) (111/74 - 123/78)  BP(mean): --  RR: 18 (28 May 2024 06:20) (16 - 19)  SpO2: 97% (28 May 2024 06:20) (96% - 99%)    Parameters below as of 28 May 2024 06:20  Patient On (Oxygen Delivery Method): room air      Daily     Daily     GENERAL: no acute distress  NEURO: alert and oriented x 3  HEENT: NCAT, anicteric sclera  CHEST: no respiratory distress, no accessory muscle use  CARDIAC: regular rate, +S1/S2  ABDOMEN: soft, nontender, no rebound or guarding  EXTREMITIES: warm, well perfused  SKIN: no active lesions noted    LABS: reviewed                        9.1    6.74  )-----------( 348      ( 28 May 2024 03:45 )             28.8     05-28    138  |  103  |  8   ----------------------------<  105<H>  3.5   |  26  |  0.67    Ca    9.0      28 May 2024 03:45  Phos  2.8     05-28  Mg     1.70     05-28    TPro  6.2  /  Alb  2.7<L>  /  TBili  <0.2  /  DBili  x   /  AST  11  /  ALT  7   /  AlkPhos  64  05-28    < from: CT Abdomen and Pelvis w/ IV Cont (05.24.24 @ 23:01) >  BOWEL: No bowel obstruction. Appendix is not visualized. Colonic   diverticulosis. Distal sigmoid colon and rectal wall thickening with   adjacent fat stranding. Inflammatory changes in the left lower quadrant   increased compared to January 2024. No organized fluid collection.  PERITONEUM: Trace pelvic fluid. No pneumoperitoneum.    < end of copied text >

## 2024-05-28 NOTE — PROGRESS NOTE ADULT - ATTENDING COMMENTS
73-year-old female with PAD on Xarelto/history of breast cancer with lumpectomy/bladder CA with spinal mets/history of recurrent diverticulitis complicated by perforation psoas abscess status post IV antibiotics January 2024 presents for diarrhea/generalized weakness with CT findings demonstrating colonic diverticulosis, distal sigmoid colon and rectal wall thickening with adjacent fat stranding, inflammatory changes in the left lower quadrant increased compared to 2024 January, nonspecific proctocolitis    Diarrhea now resolved  GI PCR neg  No plans for urgent colonoscopy at this time, if pt continues to have abdominal pains or rectal bleed/diarrhea, will plan on flex sig

## 2024-05-28 NOTE — PROGRESS NOTE ADULT - SUBJECTIVE AND OBJECTIVE BOX
[facility], Division of Infectious Diseases  DA Oretz Y. Patel, S. Shah, G. Adarsh  [phone]  ([phone] - weekdays after 5pm and weekends)    Name: KIM ACKERMAN  Age/Gender: 73y Female  MRN: [id number]    Interval History:  Notes reviewed.   No concerning overnight events.  Afebrile.   reports diarrhea and abd pain resolved    Allergies: sulfa drugs (Unknown)      Objective:  Vitals:   T(F): 97.7 (05-28-24 @ 12:12), Max: 98.2 (05-28-24 @ 06:20)  HR: 79 (05-28-24 @ 12:12) (67 - 79)  BP: 105/66 (05-28-24 @ 12:12) (105/66 - 123/78)  RR: 18 (05-28-24 @ 12:12) (16 - 18)  SpO2: 97% (05-28-24 @ 12:12) (97% - 99%)  Physical Examination:  General: no acute distress  HEENT: anicteric  Cardio: S1, S2, normal rate  Resp: breathing comfortably on RA   Abd: soft, NT, ND  Ext: no LE edema  Skin: warm, dry    Laboratory Studies:  CBC:                       9.1    6.74  )-----------( 348      ( 28 May 2024 03:45 )             28.8     WBC Trend:  6.74 05-28-24 @ 03:45  5.24 05-27-24 @ 06:20  5.10 05-26-24 @ 06:55  8.59 05-24-24 @ 19:31    CMP: 05-28    138  |  103  |  8   ----------------------------<  105<H>  3.5   |  26  |  0.67    Ca    9.0      28 May 2024 03:45  Phos  2.8     05-28  Mg     1.70     05-28    TPro  6.2  /  Alb  2.7<L>  /  TBili  <0.2  /  DBili  x   /  AST  11  /  ALT  7   /  AlkPhos  64  05-28      LIVER FUNCTIONS - ( 28 May 2024 03:45 )  Alb: 2.7 g/dL / Pro: 6.2 g/dL / ALK PHOS: 64 U/L / ALT: 7 U/L / AST: 11 U/L / GGT: x             Urinalysis Basic - ( 28 May 2024 03:45 )    Color: x / Appearance: x / SG: x / pH: x  Gluc: 105 mg/dL / Ketone: x  / Bili: x / Urobili: x   Blood: x / Protein: x / Nitrite: x   Leuk Esterase: x / RBC: x / WBC x   Sq Epi: x / Non Sq Epi: x / Bacteria: x      Microbiology: reviewed     Culture - Stool (collected 05-27-24 @ 08:32)  Source: .Stool mendez trina  Preliminary Report (05-28-24 @ 13:17):    No enteric pathogens to date: Final culture pending    Culture - Urine (collected 05-24-24 @ 21:55)  Source: Clean Catch Clean Catch (Midstream)  Final Report (05-26-24 @ 22:46):    <10,000 CFU/mL Normal Urogenital Carol    Culture - Blood (collected 05-24-24 @ 19:11)  Source: .Blood Blood-Peripheral  Preliminary Report (05-28-24 @ 02:01):    No growth at 72 Hours    Culture - Blood (collected 05-24-24 @ 19:00)  Source: .Blood Blood-Peripheral  Preliminary Report (05-28-24 @ 02:01):    No growth at 72 Hours        Radiology: reviewed     Medications:  amLODIPine   Tablet 2.5 milliGRAM(s) Oral at bedtime  chlorhexidine 2% Cloths 1 Application(s) Topical daily  cilostazol 50 milliGRAM(s) Oral two times a day  lactobacillus acidophilus 1 Tablet(s) Oral two times a day with meals  methylPREDNISolone sodium succinate Injectable 20 milliGRAM(s) IV Push daily  pantoprazole    Tablet 40 milliGRAM(s) Oral before breakfast  piperacillin/tazobactam IVPB.. 3.375 Gram(s) IV Intermittent every 8 hours  rivaroxaban 2.5 milliGRAM(s) Oral two times a day  simethicone 80 milliGRAM(s) Chew every 6 hours  sodium chloride 0.9%. 1000 milliLiter(s) IV Continuous <Continuous>    Antimicrobials:  piperacillin/tazobactam IVPB.. 3.375 Gram(s) IV Intermittent every 8 hours

## 2024-05-28 NOTE — PROGRESS NOTE ADULT - ASSESSMENT
74 y/o F PMhx PAD, history of breast cancer with right-sided lumpectomy, bladder cancer with spinal metastasis, recurrent diverticulitis recently hospitalized 1/2024 with perforated diverticulitis resulting in a psoas abscess who presents w/ abd pain.    LLQ abd pain & fever resolved  no leukocytosis  UA negative  CXR- clear  blood cultures- NGTD  CT- More extensive colonic thickening from left colon through rectum suggests nonspecific proctocolitis. No discrete focal drainable collection. Ill-defined sinus tracts anterior to the left psoas related to previous complicated sigmoid diverticulitis. Mild left hydroureteronephrosis to the level of the UVJ, likely reactive to left lower quadrant inflammation.  GI PCR negative    patient reports diarrhea and abd pain resolved  Recommendations  c/w zosyn  complete 7 day course of antibiotics w/ last day 5/31  transition to cefpodoxime 200mg bid and flagyl 500mg every 8 hours on discharge    Klever Altamirano M.D.  [facility], Division of Infectious Diseases  [phone]  After 5pm on weekdays and all day on weekends - please call [phone]

## 2024-05-28 NOTE — PROGRESS NOTE ADULT - ASSESSMENT
73-year-old female past medical history of peripheral arterial disease on Xarelto, history of breast cancer with right-sided lumpectomy, bladder cancer with spinal metastasis, history of recurrent diverticulitis c/b perforated diverticulitis & a psoas abscess s/p IV antibiotics (1/2024), presented to the ED with concerns of diarrhea and generalized weakness.    Assessment  #Diarrhea & abdominal pain, proctocolitis - ddx includes infectious colitis, SCAD from diverticulosis, less likely IBD or colon neoplasms  #Leg/foot pain  - Diarrhea and abdominal pain have improved and minimal loose stool at this time  - Patient was more concerned about her leg/foot pain, and would like to concentrate on the management of her MSK issues  - GI PCR negative.     Recommendations  - Please address patient's MSK complaints as this is her priority at this time.   - Symptomatic support per primary team  - No indication for urgent endoscopic evaluation at this time with possible inflammation in the colon per CT finding.   - Incomplete    Antonette Franco  GI/Hepatology Fellow    MONDAY-FRIDAY 8AM-5PM:  Pager# [phone] ([facility]) or [phone] ([facility])    NON-URGENT CONSULTS:  Please email [email or web address]  AT NIGHT AND ON WEEKENDS:  Contact on-call GI fellow via AMION by paging or hospital  from 5pm-8am and on weekends/holidays    73-year-old female past medical history of peripheral arterial disease on Xarelto, history of breast cancer with right-sided lumpectomy, bladder cancer with spinal metastasis, history of recurrent diverticulitis c/b perforated diverticulitis & a psoas abscess s/p IV antibiotics (1/2024), presented to the ED with concerns of diarrhea and generalized weakness.    Assessment  #Diarrhea & abdominal pain, proctocolitis - ddx includes infectious colitis, SCAD from diverticulosis, less likely IBD or colon neoplasms  #Leg/foot pain  - Diarrhea and abdominal pain have improved and minimal loose stool at this time  - Patient was more concerned about her leg/foot pain, and would like to concentrate on the management of her MSK issues  - GI PCR negative.     Recommendations  - Please address patient's MSK complaints as this is her priority at this time.   - Symptomatic support per primary team  - No indication for urgent endoscopic evaluation at this time with possible inflammation in the colon per CT finding.   - GI team will sign off at this time. Please reconsult as needed.     Antonette Franco  GI/Hepatology Fellow    MONDAY-FRIDAY 8AM-5PM:  Pager# [phone] ([facility]) or [phone] ([facility])    NON-URGENT CONSULTS:  Please email [email or web address]  AT NIGHT AND ON WEEKENDS:  Contact on-call GI fellow via AMION by paging or hospital  from 5pm-8am and on weekends/holidays

## 2024-05-28 NOTE — PROGRESS NOTE ADULT - ASSESSMENT
73-year-old female past medical history of peripheral arterial disease on Xarelto, history of breast cancer with right-sided lumpectomy, bladder cancer with spinal metastasis, history of recurrent diverticulitis last time admitted to the hospital in January 2024 with perforated diverticulitis resulting in a psoas abscess treated on IV antibiotics.	Patient reports ongoing anorexia, episodic abdominal pain diffuse in nature, and now presents to the ED with concerns of diarrhea and generalized weakness.	Patient having 3-4 watery bowel movements a day.  Also mentions having fever for the past few days documented to 101 °F.Denies headaches, neck pain, sore throat, chest pain, difficulty in breathing, vomitings, urinary complaints, skin rash/lesions.    Abdominal pain  - sec to colitis  - hx of diverticulitis and psoas abscess  - cw zosyn  - ID fu appreciated    Breast cancer, bladder cancer  - fu with heme-onc as outpt   - will monitor     PVD  - cw  xarelto 2.5 bid  - check LE doppler  - XR foot neg    HTN  - cw norvasc  - DASH diet       Speech difficulty  - she says she cant talk properly because of opoids  - CT head neg    Knee pain  - OA  - PT and outpt fu

## 2024-05-28 NOTE — PROGRESS NOTE ADULT - SUBJECTIVE AND OBJECTIVE BOX
Patient is a 73y old  Female who presents with a chief complaint of abdominal pain (28 May 2024 13:40)    Date of servie : 05-28-24 @ 14:41  INTERVAL HPI/OVERNIGHT EVENTS:  T(C): 36.5 (05-28-24 @ 12:12), Max: 36.8 (05-28-24 @ 06:20)  HR: 79 (05-28-24 @ 12:12) (67 - 79)  BP: 105/66 (05-28-24 @ 12:12) (105/66 - 123/78)  RR: 18 (05-28-24 @ 12:12) (16 - 18)  SpO2: 97% (05-28-24 @ 12:12) (97% - 99%)  Wt(kg): --  I&O's Summary      LABS:                        9.1    6.74  )-----------( 348      ( 28 May 2024 03:45 )             28.8     05-28    138  |  103  |  8   ----------------------------<  105<H>  3.5   |  26  |  0.67    Ca    9.0      28 May 2024 03:45  Phos  2.8     05-28  Mg     1.70     05-28    TPro  6.2  /  Alb  2.7<L>  /  TBili  <0.2  /  DBili  x   /  AST  11  /  ALT  7   /  AlkPhos  64  05-28      Urinalysis Basic - ( 28 May 2024 03:45 )    Color: x / Appearance: x / SG: x / pH: x  Gluc: 105 mg/dL / Ketone: x  / Bili: x / Urobili: x   Blood: x / Protein: x / Nitrite: x   Leuk Esterase: x / RBC: x / WBC x   Sq Epi: x / Non Sq Epi: x / Bacteria: x      CAPILLARY BLOOD GLUCOSE            Urinalysis Basic - ( 28 May 2024 03:45 )    Color: x / Appearance: x / SG: x / pH: x  Gluc: 105 mg/dL / Ketone: x  / Bili: x / Urobili: x   Blood: x / Protein: x / Nitrite: x   Leuk Esterase: x / RBC: x / WBC x   Sq Epi: x / Non Sq Epi: x / Bacteria: x        MEDICATIONS  (STANDING):  amLODIPine   Tablet 2.5 milliGRAM(s) Oral at bedtime  chlorhexidine 2% Cloths 1 Application(s) Topical daily  cilostazol 50 milliGRAM(s) Oral two times a day  lactobacillus acidophilus 1 Tablet(s) Oral two times a day with meals  methylPREDNISolone sodium succinate Injectable 20 milliGRAM(s) IV Push daily  pantoprazole    Tablet 40 milliGRAM(s) Oral before breakfast  piperacillin/tazobactam IVPB.. 3.375 Gram(s) IV Intermittent every 8 hours  rivaroxaban 2.5 milliGRAM(s) Oral two times a day  simethicone 80 milliGRAM(s) Chew every 6 hours  sodium chloride 0.9%. 1000 milliLiter(s) (75 mL/Hr) IV Continuous <Continuous>    MEDICATIONS  (PRN):          PHYSICAL EXAM:  GENERAL: NAD, well-groomed, well-developed  HEAD:  Atraumatic, Normocephalic  CHEST/LUNG: Clear to percussion bilaterally; No rales, rhonchi, wheezing, or rubs  HEART: Regular rate and rhythm; No murmurs, rubs, or gallops  ABDOMEN: Soft, Nontender, Nondistended; Bowel sounds present  EXTREMITIES:  2+ Peripheral Pulses, No clubbing, cyanosis, or edema  LYMPH: No lymphadenopathy noted  SKIN: No rashes or lesions    Care Discussed with Consultants/Other Providers [ ] YES  [ ] NO

## 2024-05-29 LAB
ANION GAP SERPL CALC-SCNC: 11 MMOL/L — SIGNIFICANT CHANGE UP (ref 7–14)
BUN SERPL-MCNC: 9 MG/DL — SIGNIFICANT CHANGE UP (ref 7–23)
CALCIUM SERPL-MCNC: 9.1 MG/DL — SIGNIFICANT CHANGE UP (ref 8.4–10.5)
CHLORIDE SERPL-SCNC: 103 MMOL/L — SIGNIFICANT CHANGE UP (ref 98–107)
CO2 SERPL-SCNC: 26 MMOL/L — SIGNIFICANT CHANGE UP (ref 22–31)
CREAT SERPL-MCNC: 0.57 MG/DL — SIGNIFICANT CHANGE UP (ref 0.5–1.3)
CULTURE RESULTS: SIGNIFICANT CHANGE UP
EGFR: 95 ML/MIN/1.73M2 — SIGNIFICANT CHANGE UP
GLUCOSE SERPL-MCNC: 119 MG/DL — HIGH (ref 70–99)
HCT VFR BLD CALC: 29.4 % — LOW (ref 34.5–45)
HGB BLD-MCNC: 9.6 G/DL — LOW (ref 11.5–15.5)
MCHC RBC-ENTMCNC: 27 PG — SIGNIFICANT CHANGE UP (ref 27–34)
MCHC RBC-ENTMCNC: 32.7 GM/DL — SIGNIFICANT CHANGE UP (ref 32–36)
MCV RBC AUTO: 82.6 FL — SIGNIFICANT CHANGE UP (ref 80–100)
NRBC # BLD: 0 /100 WBCS — SIGNIFICANT CHANGE UP (ref 0–0)
NRBC # FLD: 0 K/UL — SIGNIFICANT CHANGE UP (ref 0–0)
PLATELET # BLD AUTO: 399 K/UL — SIGNIFICANT CHANGE UP (ref 150–400)
POTASSIUM SERPL-MCNC: 3.9 MMOL/L — SIGNIFICANT CHANGE UP (ref 3.5–5.3)
POTASSIUM SERPL-SCNC: 3.9 MMOL/L — SIGNIFICANT CHANGE UP (ref 3.5–5.3)
RBC # BLD: 3.56 M/UL — LOW (ref 3.8–5.2)
RBC # FLD: 15.8 % — HIGH (ref 10.3–14.5)
SODIUM SERPL-SCNC: 140 MMOL/L — SIGNIFICANT CHANGE UP (ref 135–145)
SPECIMEN SOURCE: SIGNIFICANT CHANGE UP
WBC # BLD: 9.02 K/UL — SIGNIFICANT CHANGE UP (ref 3.8–10.5)
WBC # FLD AUTO: 9.02 K/UL — SIGNIFICANT CHANGE UP (ref 3.8–10.5)

## 2024-05-29 RX ADMIN — CHLORHEXIDINE GLUCONATE 1 APPLICATION(S): 213 SOLUTION TOPICAL at 14:05

## 2024-05-29 RX ADMIN — Medication 1 TABLET(S): at 07:49

## 2024-05-29 RX ADMIN — PANTOPRAZOLE SODIUM 40 MILLIGRAM(S): 20 TABLET, DELAYED RELEASE ORAL at 06:22

## 2024-05-29 RX ADMIN — Medication 20 MILLIGRAM(S): at 06:22

## 2024-05-29 RX ADMIN — Medication 1 TABLET(S): at 18:33

## 2024-05-29 RX ADMIN — OXYCODONE HYDROCHLORIDE 5 MILLIGRAM(S): 5 TABLET ORAL at 10:58

## 2024-05-29 RX ADMIN — RIVAROXABAN 2.5 MILLIGRAM(S): KIT at 06:55

## 2024-05-29 RX ADMIN — PIPERACILLIN AND TAZOBACTAM 25 GRAM(S): 4; .5 INJECTION, POWDER, LYOPHILIZED, FOR SOLUTION INTRAVENOUS at 14:03

## 2024-05-29 RX ADMIN — CILOSTAZOL 50 MILLIGRAM(S): 100 TABLET ORAL at 18:37

## 2024-05-29 RX ADMIN — SIMETHICONE 80 MILLIGRAM(S): 80 TABLET, CHEWABLE ORAL at 23:45

## 2024-05-29 RX ADMIN — OXYCODONE HYDROCHLORIDE 5 MILLIGRAM(S): 5 TABLET ORAL at 21:35

## 2024-05-29 RX ADMIN — PIPERACILLIN AND TAZOBACTAM 25 GRAM(S): 4; .5 INJECTION, POWDER, LYOPHILIZED, FOR SOLUTION INTRAVENOUS at 06:22

## 2024-05-29 RX ADMIN — OXYCODONE HYDROCHLORIDE 5 MILLIGRAM(S): 5 TABLET ORAL at 11:58

## 2024-05-29 RX ADMIN — CILOSTAZOL 50 MILLIGRAM(S): 100 TABLET ORAL at 06:28

## 2024-05-29 RX ADMIN — OXYCODONE HYDROCHLORIDE 5 MILLIGRAM(S): 5 TABLET ORAL at 22:10

## 2024-05-29 RX ADMIN — SIMETHICONE 80 MILLIGRAM(S): 80 TABLET, CHEWABLE ORAL at 06:22

## 2024-05-29 RX ADMIN — RIVAROXABAN 2.5 MILLIGRAM(S): KIT at 18:34

## 2024-05-29 RX ADMIN — SIMETHICONE 80 MILLIGRAM(S): 80 TABLET, CHEWABLE ORAL at 14:03

## 2024-05-29 RX ADMIN — AMLODIPINE BESYLATE 2.5 MILLIGRAM(S): 2.5 TABLET ORAL at 21:35

## 2024-05-29 RX ADMIN — PIPERACILLIN AND TAZOBACTAM 25 GRAM(S): 4; .5 INJECTION, POWDER, LYOPHILIZED, FOR SOLUTION INTRAVENOUS at 21:35

## 2024-05-29 NOTE — PROGRESS NOTE ADULT - ASSESSMENT
73-year-old female past medical history of peripheral arterial disease on Xarelto, history of breast cancer with right-sided lumpectomy, bladder cancer with spinal metastasis, history of recurrent diverticulitis last time admitted to the hospital in January 2024 with perforated diverticulitis resulting in a psoas abscess treated on IV antibiotics.	Patient reports ongoing anorexia, episodic abdominal pain diffuse in nature, and now presents to the ED with concerns of diarrhea and generalized weakness.	Patient having 3-4 watery bowel movements a day.  Also mentions having fever for the past few days documented to 101 °F.Denies headaches, neck pain, sore throat, chest pain, difficulty in breathing, vomitings, urinary complaints, skin rash/lesions.    Abdominal pain  - sec to colitis  - hx of diverticulitis and psoas abscess  - cw zosyn  - ID fu appreciated    Breast cancer, bladder cancer  - fu with heme-onc as outpt   - will monitor     PVD  - cw  xarelto 2.5 bid  - LE doppler neg  - XR foot neg    HTN  - cw norvasc  - DASH diet       Speech difficulty  - she says she cant talk properly because of opoids  - CT head neg    Knee pain  - OA  - PT and outpt fu

## 2024-05-29 NOTE — CONSULT NOTE ADULT - SUBJECTIVE AND OBJECTIVE BOX
Neurology Consult    Reason for Consult: Patient is a 73y old  Female who presents with a chief complaint of abdominal pain (28 May 2024 14:40)      HPI:  73-year-old female past medical history of peripheral arterial disease on Xarelto, history of breast cancer with right-sided lumpectomy, bladder cancer with spinal metastasis, history of recurrent diverticulitis last time admitted to the hospital in 2024 with perforated diverticulitis resulting in a psoas abscess treated on IV antibiotics.Patient reports ongoing anorexia, episodic abdominal pain diffuse in nature, and now presents to the ED with concerns of diarrhea and generalized weakness.Patient having 3-4 watery bowel movements a day.  Also mentions having fever for the past few days documented to 101 °F.Denies headaches, neck pain, sore throat, chest pain, difficulty in breathing, vomiting, urinary complaints, skin rash/lesions. (25 May 2024 05:56)       PAST MEDICAL & SURGICAL HISTORY:  Anxiety      Breast CA, left  lumpectomy / RTX in the past      Hypertension      Sleep apnea  uses  cpap machine      Irritable bowel syndrome (IBS)      Polyp of colon  "pre-cancerous" x 2, removed 2014      HLD (hyperlipidemia)      Bladder polyp      S/P       S/P colon resection  reversal, had complications for fibriods      S/P hysterectomy      Breast lump      History of surgery  right groin fatty tissue removed      H/O lumpectomy  left       Personal history of spine surgery  L1-L4 fusion 2017          Allergies: Allergies    sulfa drugs (Unknown)    Intolerances        Social History: Denies toxic habits including tobacco, ETOH or illicit drugs.    Family History: FAMILY HISTORY:  Family history of coronary artery disease (Mother)    . No family history of strokes    Medications: MEDICATIONS  (STANDING):  amLODIPine   Tablet 2.5 milliGRAM(s) Oral at bedtime  chlorhexidine 2% Cloths 1 Application(s) Topical daily  cilostazol 50 milliGRAM(s) Oral two times a day  lactobacillus acidophilus 1 Tablet(s) Oral two times a day with meals  methylPREDNISolone sodium succinate Injectable 20 milliGRAM(s) IV Push daily  pantoprazole    Tablet 40 milliGRAM(s) Oral before breakfast  piperacillin/tazobactam IVPB.. 3.375 Gram(s) IV Intermittent every 8 hours  rivaroxaban 2.5 milliGRAM(s) Oral two times a day  simethicone 80 milliGRAM(s) Chew every 6 hours  sodium chloride 0.9%. 1000 milliLiter(s) (75 mL/Hr) IV Continuous <Continuous>    MEDICATIONS  (PRN):  oxyCODONE    IR 5 milliGRAM(s) Oral every 4 hours PRN Moderate Pain (4-6) and Severe Pain (7-10)      Review of Systems:  CONSTITUTIONAL:  No weight loss, fever, chills, weakness or fatigue.  HEENT:  Eyes:  No visual loss, blurred vision, double vision or yellow sclera. Ears, Nose, Throat:  No hearing loss, sneezing, congestion, runny nose or sore throat.  SKIN:  No rash or itching.  CARDIOVASCULAR:  No chest pain, chest pressure or chest discomfort. No palpitations or edema.  RESPIRATORY:  No shortness of breath, cough or sputum.  GASTROINTESTINAL:  No anorexia, nausea, vomiting or diarrhea. No abdominal pain or blood.  GENITOURINARY:  No burning on urination or incontinence   NEUROLOGICAL:  No headache, dizziness, syncope, paralysis, ataxia, numbness or tingling in the extremities. No change in bowel or bladder control. no limb weakness. no vision changes.   MUSCULOSKELETAL:  No muscle, back pain, joint pain or stiffness.  HEMATOLOGIC:  No anemia, bleeding or bruising.  LYMPHATICS:  No enlarged nodes. No history of splenectomy.  PSYCHIATRIC:  No history of depression or anxiety.  ENDOCRINOLOGIC:  No reports of sweating, cold or heat intolerance. No polyuria or polydipsia.      Vitals:  Vital Signs Last 24 Hrs  T(C): 36.6 (29 May 2024 05:30), Max: 37 (28 May 2024 21:00)  T(F): 97.9 (29 May 2024 05:30), Max: 98.6 (28 May 2024 21:00)  HR: 72 (29 May 2024 05:30) (72 - 94)  BP: 114/71 (29 May 2024 05:30) (105/66 - 129/78)  BP(mean): --  RR: 18 (29 May 2024 05:30) (18 - 18)  SpO2: 97% (29 May 2024 05:30) (96% - 97%)    Parameters below as of 29 May 2024 05:30  Patient On (Oxygen Delivery Method): room air        General Exam:   General Appearance: Appropriately dressed and in no acute distress       Head: Normocephalic, atraumatic and no dysmorphic features  Ear, Nose, and Throat: Moist mucous membranes  CVS: S1S2+  Resp: No SOB, no wheeze or rhonchi  GI: soft NT/ND  Extremities: No edema or cyanosis  Skin: No bruises or rashes     Neurological Exam:  Mental Status: Awake, alert and oriented x 3.  Able to follow simple and complex verbal commands. Able to name and repeat. fluent speech. No obvious aphasia or dysarthria noted.   Cranial Nerves: PERRL, EOMI, VFFC, sensation V1-V3 intact,  no obvious facial asymmetry, equal elevation of palate, scm/trap 5/5, tongue is midline on protrusion. no obvious papilledema on fundoscopic exam. hearing is grossly intact.   Motor: Normal bulk, tone and strength throughout. Fine finger movements were intact and symmetric. no tremors or drift noted.    Sensation: Intact to light touch and pinprick throughout. no right/left confusion. no extinction to tactile on DSS. Romberg was negative.   Reflexes: 1+ throughout at biceps, brachioradialis, triceps, patellars and ankles bilaterally and equal. No clonus. R toe and L toe were both downgoing.  Coordination: No dysmetria on FNF or HKS  Gait: Narrow based and steady. Able to tandem. no limitations in gait.     Data/Labs/Imaging which I personally reviewed.     Labs:     CBC Full  -  ( 29 May 2024 05:37 )  WBC Count : 9.02 K/uL  RBC Count : 3.56 M/uL  Hemoglobin : 9.6 g/dL  Hematocrit : 29.4 %  Platelet Count - Automated : 399 K/uL  Mean Cell Volume : 82.6 fL  Mean Cell Hemoglobin : 27.0 pg  Mean Cell Hemoglobin Concentration : 32.7 gm/dL  Auto Neutrophil # : x  Auto Lymphocyte # : x  Auto Monocyte # : x  Auto Eosinophil # : x  Auto Basophil # : x  Auto Neutrophil % : x  Auto Lymphocyte % : x  Auto Monocyte % : x  Auto Eosinophil % : x  Auto Basophil % : x        140  |  103  |  9   ----------------------------<  119<H>  3.9   |  26  |  0.57    Ca    9.1      29 May 2024 05:37  Phos  2.8     -  Mg     1.70     -    TPro  6.2  /  Alb  2.7<L>  /  TBili  <0.2  /  DBili  x   /  AST  11  /  ALT  7   /  AlkPhos  64  05-28    LIVER FUNCTIONS - ( 28 May 2024 03:45 )  Alb: 2.7 g/dL / Pro: 6.2 g/dL / ALK PHOS: 64 U/L / ALT: 7 U/L / AST: 11 U/L / GGT: x             Urinalysis Basic - ( 29 May 2024 05:37 )    Color: x / Appearance: x / SG: x / pH: x  Gluc: 119 mg/dL / Ketone: x  / Bili: x / Urobili: x   Blood: x / Protein: x / Nitrite: x   Leuk Esterase: x / RBC: x / WBC x   Sq Epi: x / Non Sq Epi: x / Bacteria: x          c< from: CT Head No Cont (24 @ 09:52) >    ACC: [id number] EXAM:  CT BRAIN   ORDERED BY: ANA WONG     PROCEDURE DATE:  2024          INTERPRETATION:  CLINICAL INDICATION:  AMS    TECHNIQUE: Noncontrast CT examination of the head was performed.  Coronal and sagittal reformats were also obtained.    COMPARISON: There are no prior studies available for comparison.    FINDINGS:  INTRA-AXIAL: No intracranial mass effect, acute hemorrhage, midline shift   or acute transcortical infarct is seen. There are periventricular white   matter hypodensities that are nonspecific in nature but may reflect   chronic ischemic microvascular disease.  EXTRA-AXIAL: No extra-axial fluid collection is present.  VENTRICLES AND SULCI: Parenchymal volume is commensurate with patient   age. No hydrocephalus.  VISUALIZED SINUSES: Mild mucosal thickening.  VISUALIZED MASTOIDS: Clear.  CALVARIUM: Normal.    IMPRESSION:    No evidence of acute intracranial hemorrhage, midline shift or CT   evidence of acute territorial infarct.    Please note thata contrast-enhanced brain MRI is more sensitive for the   evaluation of metastatic disease.    < end of copied text >   Neurology Consult    Reason for Consult: Patient is a 73y old  Female who presents with a chief complaint of abdominal pain (28 May 2024 14:40)      HPI:  73-year-old female past medical history of peripheral arterial disease on Xarelto, history of breast cancer with right-sided lumpectomy, bladder cancer with spinal metastasis, history of recurrent diverticulitis last time admitted to the hospital in 2024 with perforated diverticulitis resulting in a psoas abscess treated on IV antibiotics.Patient reports ongoing anorexia, episodic abdominal pain diffuse in nature, and now presents to the ED with concerns of diarrhea and generalized weakness.Patient having 3-4 watery bowel movements a day.  Also mentions having fever for the past few days documented to 101 °F.Denies headaches, neck pain, sore throat, chest pain, difficulty in breathing, vomiting, urinary complaints, skin rash/lesions. (25 May 2024 05:56)       PAST MEDICAL & SURGICAL HISTORY:  Anxiety      Breast CA, left  lumpectomy / RTX in the past      Hypertension      Sleep apnea  uses  cpap machine      Irritable bowel syndrome (IBS)      Polyp of colon  "pre-cancerous" x 2, removed 2014      HLD (hyperlipidemia)      Bladder polyp      S/P       S/P colon resection  reversal, had complications for fibriods      S/P hysterectomy      Breast lump      History of surgery  right groin fatty tissue removed      H/O lumpectomy  left       Personal history of spine surgery  L1-L4 fusion 2017          Allergies: Allergies    sulfa drugs (Unknown)    Intolerances        Social History: Denies toxic habits including tobacco, ETOH or illicit drugs.    Family History: FAMILY HISTORY:  Family history of coronary artery disease (Mother)    . No family history of strokes    Medications: MEDICATIONS  (STANDING):  amLODIPine   Tablet 2.5 milliGRAM(s) Oral at bedtime  chlorhexidine 2% Cloths 1 Application(s) Topical daily  cilostazol 50 milliGRAM(s) Oral two times a day  lactobacillus acidophilus 1 Tablet(s) Oral two times a day with meals  methylPREDNISolone sodium succinate Injectable 20 milliGRAM(s) IV Push daily  pantoprazole    Tablet 40 milliGRAM(s) Oral before breakfast  piperacillin/tazobactam IVPB.. 3.375 Gram(s) IV Intermittent every 8 hours  rivaroxaban 2.5 milliGRAM(s) Oral two times a day  simethicone 80 milliGRAM(s) Chew every 6 hours  sodium chloride 0.9%. 1000 milliLiter(s) (75 mL/Hr) IV Continuous <Continuous>    MEDICATIONS  (PRN):  oxyCODONE    IR 5 milliGRAM(s) Oral every 4 hours PRN Moderate Pain (4-6) and Severe Pain (7-10)      Review of Systems:  CONSTITUTIONAL:  No weight loss, fever, chills, weakness or fatigue.  HEENT:  Eyes:  No visual loss, blurred vision, double vision or yellow sclera. Ears, Nose, Throat:  No hearing loss, sneezing, congestion, runny nose or sore throat.  SKIN:  No rash or itching.  CARDIOVASCULAR:  No chest pain, chest pressure or chest discomfort. No palpitations or edema.  RESPIRATORY:  No shortness of breath, cough or sputum.  GASTROINTESTINAL:  No anorexia, nausea, vomiting or diarrhea. No abdominal pain or blood.  GENITOURINARY:  No burning on urination or incontinence   NEUROLOGICAL:  No headache, dizziness, syncope, paralysis, ataxia, numbness or tingling in the extremities. No change in bowel or bladder control. no limb weakness. no vision changes.   MUSCULOSKELETAL:  No muscle, back pain, joint pain or stiffness.  HEMATOLOGIC:  No anemia, bleeding or bruising.  LYMPHATICS:  No enlarged nodes. No history of splenectomy.  PSYCHIATRIC:  No history of depression or anxiety.  ENDOCRINOLOGIC:  No reports of sweating, cold or heat intolerance. No polyuria or polydipsia.      Vitals:  Vital Signs Last 24 Hrs  T(C): 36.6 (29 May 2024 05:30), Max: 37 (28 May 2024 21:00)  T(F): 97.9 (29 May 2024 05:30), Max: 98.6 (28 May 2024 21:00)  HR: 72 (29 May 2024 05:30) (72 - 94)  BP: 114/71 (29 May 2024 05:30) (105/66 - 129/78)  BP(mean): --  RR: 18 (29 May 2024 05:30) (18 - 18)  SpO2: 97% (29 May 2024 05:30) (96% - 97%)    Parameters below as of 29 May 2024 05:30  Patient On (Oxygen Delivery Method): room air        General Exam:   General Appearance: Appropriately dressed and in no acute distress       Head: Normocephalic, atraumatic and no dysmorphic features  Ear, Nose, and Throat: Moist mucous membranes  CVS: S1S2+  Resp: No SOB, no wheeze or rhonchi  GI: soft NT/ND  Extremities: No edema or cyanosis  Skin: No bruises or rashes     Neurological Exam:  Mental Status: Awake, alert and oriented x 3.  Able to follow simple and complex verbal commands. Able to name and repeat. fluent speech. No obvious aphasia or dysarthria noted.   Cranial Nerves: PERRL, EOMI, VFFC, no nystagmus or skew sensation V1-V3 intact,  no obvious facial asymmetry, equal elevation of palate, scm/trap 5/5, tongue is midline on protrusion. hearing is grossly intact.   Motor: Normal bulk, tone and strength throughout. Fine finger movements were intact and symmetric. no tremors or drift noted.    Sensation: Intact to light touch and pinprick throughout. subjective burning sensation on distal L foot. no right/left confusion. no extinction to tactile on DSS.   Reflexes: 1+ throughout at biceps, brachioradialis, triceps, patellars and ankles bilaterally and equal. No clonus. R toe and L toe were both downgoing.  Coordination: No dysmetria on FNF   Gait: deferred, some truncal ataxia on sitting.    Data/Labs/Imaging which I personally reviewed.     Labs:     CBC Full  -  ( 29 May 2024 05:37 )  WBC Count : 9.02 K/uL  RBC Count : 3.56 M/uL  Hemoglobin : 9.6 g/dL  Hematocrit : 29.4 %  Platelet Count - Automated : 399 K/uL  Mean Cell Volume : 82.6 fL  Mean Cell Hemoglobin : 27.0 pg  Mean Cell Hemoglobin Concentration : 32.7 gm/dL  Auto Neutrophil # : x  Auto Lymphocyte # : x  Auto Monocyte # : x  Auto Eosinophil # : x  Auto Basophil # : x  Auto Neutrophil % : x  Auto Lymphocyte % : x  Auto Monocyte % : x  Auto Eosinophil % : x  Auto Basophil % : x        140  |  103  |  9   ----------------------------<  119<H>  3.9   |  26  |  0.57    Ca    9.1      29 May 2024 05:37  Phos  2.8     -  Mg     1.70     -    TPro  6.2  /  Alb  2.7<L>  /  TBili  <0.2  /  DBili  x   /  AST  11  /  ALT  7   /  AlkPhos  64  05-28    LIVER FUNCTIONS - ( 28 May 2024 03:45 )  Alb: 2.7 g/dL / Pro: 6.2 g/dL / ALK PHOS: 64 U/L / ALT: 7 U/L / AST: 11 U/L / GGT: x             Urinalysis Basic - ( 29 May 2024 05:37 )    Color: x / Appearance: x / SG: x / pH: x  Gluc: 119 mg/dL / Ketone: x  / Bili: x / Urobili: x   Blood: x / Protein: x / Nitrite: x   Leuk Esterase: x / RBC: x / WBC x   Sq Epi: x / Non Sq Epi: x / Bacteria: x          c< from: CT Head No Cont (24 @ 09:52) >    ACC: [id number] EXAM:  CT BRAIN   ORDERED BY: ANA WONG     PROCEDURE DATE:  2024          INTERPRETATION:  CLINICAL INDICATION:  AMS    TECHNIQUE: Noncontrast CT examination of the head was performed.  Coronal and sagittal reformats were also obtained.    COMPARISON: There are no prior studies available for comparison.    FINDINGS:  INTRA-AXIAL: No intracranial mass effect, acute hemorrhage, midline shift   or acute transcortical infarct is seen. There are periventricular white   matter hypodensities that are nonspecific in nature but may reflect   chronic ischemic microvascular disease.  EXTRA-AXIAL: No extra-axial fluid collection is present.  VENTRICLES AND SULCI: Parenchymal volume is commensurate with patient   age. No hydrocephalus.  VISUALIZED SINUSES: Mild mucosal thickening.  VISUALIZED MASTOIDS: Clear.  CALVARIUM: Normal.    IMPRESSION:    No evidence of acute intracranial hemorrhage, midline shift or CT   evidence of acute territorial infarct.    Please note thata contrast-enhanced brain MRI is more sensitive for the   evaluation of metastatic disease.    < end of copied text >

## 2024-05-29 NOTE — PROGRESS NOTE ADULT - SUBJECTIVE AND OBJECTIVE BOX
Patient is a 73y old  Female who presents with a chief complaint of abdominal pain (29 May 2024 11:57)    Date of servie : 05-29-24 @ 13:56  INTERVAL HPI/OVERNIGHT EVENTS:  T(C): 36.6 (05-29-24 @ 05:30), Max: 37 (05-28-24 @ 21:00)  HR: 72 (05-29-24 @ 05:30) (72 - 94)  BP: 114/71 (05-29-24 @ 05:30) (114/71 - 129/78)  RR: 18 (05-29-24 @ 05:30) (18 - 18)  SpO2: 97% (05-29-24 @ 05:30) (96% - 97%)  Wt(kg): --  I&O's Summary    28 May 2024 07:01  -  29 May 2024 07:00  --------------------------------------------------------  IN: 1300 mL / OUT: 1700 mL / NET: -400 mL    29 May 2024 07:01  -  29 May 2024 13:56  --------------------------------------------------------  IN: 250 mL / OUT: 0 mL / NET: 250 mL        LABS:                        9.6    9.02  )-----------( 399      ( 29 May 2024 05:37 )             29.4     05-29    140  |  103  |  9   ----------------------------<  119<H>  3.9   |  26  |  0.57    Ca    9.1      29 May 2024 05:37  Phos  2.8     05-28  Mg     1.70     05-28    TPro  6.2  /  Alb  2.7<L>  /  TBili  <0.2  /  DBili  x   /  AST  11  /  ALT  7   /  AlkPhos  64  05-28      Urinalysis Basic - ( 29 May 2024 05:37 )    Color: x / Appearance: x / SG: x / pH: x  Gluc: 119 mg/dL / Ketone: x  / Bili: x / Urobili: x   Blood: x / Protein: x / Nitrite: x   Leuk Esterase: x / RBC: x / WBC x   Sq Epi: x / Non Sq Epi: x / Bacteria: x      CAPILLARY BLOOD GLUCOSE            Urinalysis Basic - ( 29 May 2024 05:37 )    Color: x / Appearance: x / SG: x / pH: x  Gluc: 119 mg/dL / Ketone: x  / Bili: x / Urobili: x   Blood: x / Protein: x / Nitrite: x   Leuk Esterase: x / RBC: x / WBC x   Sq Epi: x / Non Sq Epi: x / Bacteria: x        MEDICATIONS  (STANDING):  amLODIPine   Tablet 2.5 milliGRAM(s) Oral at bedtime  chlorhexidine 2% Cloths 1 Application(s) Topical daily  cilostazol 50 milliGRAM(s) Oral two times a day  lactobacillus acidophilus 1 Tablet(s) Oral two times a day with meals  methylPREDNISolone sodium succinate Injectable 20 milliGRAM(s) IV Push daily  pantoprazole    Tablet 40 milliGRAM(s) Oral before breakfast  piperacillin/tazobactam IVPB.. 3.375 Gram(s) IV Intermittent every 8 hours  rivaroxaban 2.5 milliGRAM(s) Oral two times a day  simethicone 80 milliGRAM(s) Chew every 6 hours  sodium chloride 0.9%. 1000 milliLiter(s) (75 mL/Hr) IV Continuous <Continuous>    MEDICATIONS  (PRN):  oxyCODONE    IR 5 milliGRAM(s) Oral every 4 hours PRN Moderate Pain (4-6) and Severe Pain (7-10)          PHYSICAL EXAM:  GENERAL: NAD, well-groomed, well-developed  HEAD:  Atraumatic, Normocephalic  CHEST/LUNG: Clear to percussion bilaterally; No rales, rhonchi, wheezing, or rubs  HEART: Regular rate and rhythm; No murmurs, rubs, or gallops  ABDOMEN: Soft, Nontender, Nondistended; Bowel sounds present  EXTREMITIES:  2+ Peripheral Pulses, No clubbing, cyanosis, or edema  LYMPH: No lymphadenopathy noted  SKIN: No rashes or lesions    Care Discussed with Consultants/Other Providers [ ] YES  [ ] NO

## 2024-05-29 NOTE — PROGRESS NOTE ADULT - SUBJECTIVE AND OBJECTIVE BOX
[facility], Division of Infectious Diseases  DA Ortez Y. Patel, S. Shah, G. Adarsh  [phone]  ([phone] - weekdays after 5pm and weekends)    Name: KIM ACKERMAN  Age/Gender: 73y Female  MRN: [id number]    Interval History:  Notes reviewed.   No concerning overnight events.  Afebrile.   denies abd pain or diarrhea    Allergies: sulfa drugs (Unknown)      Objective:  Vitals:   T(F): 97.9 (05-29-24 @ 05:30), Max: 98.6 (05-28-24 @ 21:00)  HR: 72 (05-29-24 @ 05:30) (72 - 94)  BP: 114/71 (05-29-24 @ 05:30) (105/66 - 129/78)  RR: 18 (05-29-24 @ 05:30) (18 - 18)  SpO2: 97% (05-29-24 @ 05:30) (96% - 97%)  Physical Examination:  General: no acute distress  HEENT: anicteric  Cardio: S1, S2, normal rate  Resp: breathing comfortably on RA   Abd: soft, NT, ND  Ext: no LE edema  Skin: warm, dry    Laboratory Studies:  CBC:                       9.6    9.02  )-----------( 399      ( 29 May 2024 05:37 )             29.4     WBC Trend:  9.02 05-29-24 @ 05:37  6.74 05-28-24 @ 03:45  5.24 05-27-24 @ 06:20  5.10 05-26-24 @ 06:55  8.59 05-24-24 @ 19:31    CMP: 05-29    140  |  103  |  9   ----------------------------<  119<H>  3.9   |  26  |  0.57    Ca    9.1      29 May 2024 05:37  Phos  2.8     05-28  Mg     1.70     05-28    TPro  6.2  /  Alb  2.7<L>  /  TBili  <0.2  /  DBili  x   /  AST  11  /  ALT  7   /  AlkPhos  64  05-28      LIVER FUNCTIONS - ( 28 May 2024 03:45 )  Alb: 2.7 g/dL / Pro: 6.2 g/dL / ALK PHOS: 64 U/L / ALT: 7 U/L / AST: 11 U/L / GGT: x             Urinalysis Basic - ( 29 May 2024 05:37 )    Color: x / Appearance: x / SG: x / pH: x  Gluc: 119 mg/dL / Ketone: x  / Bili: x / Urobili: x   Blood: x / Protein: x / Nitrite: x   Leuk Esterase: x / RBC: x / WBC x   Sq Epi: x / Non Sq Epi: x / Bacteria: x      Microbiology: reviewed     Culture - Stool (collected 05-27-24 @ 08:32)  Source: .Stool mendez trina  Final Report (05-29-24 @ 10:10):    No enteric pathogens isolated.    (Stool culture examined for Salmonella,    Shigella, Campylobacter, Aeromonas, Plesiomonas,    Vibrio, E.coli O157 and Yersinia)    Culture - Urine (collected 05-24-24 @ 21:55)  Source: Clean Catch Clean Catch (Midstream)  Final Report (05-26-24 @ 22:46):    <10,000 CFU/mL Normal Urogenital Carol    Culture - Blood (collected 05-24-24 @ 19:11)  Source: .Blood Blood-Peripheral  Preliminary Report (05-29-24 @ 02:01):    No growth at 4 days    Culture - Blood (collected 05-24-24 @ 19:00)  Source: .Blood Blood-Peripheral  Preliminary Report (05-29-24 @ 02:01):    No growth at 4 days        Radiology: reviewed     Medications:  amLODIPine   Tablet 2.5 milliGRAM(s) Oral at bedtime  chlorhexidine 2% Cloths 1 Application(s) Topical daily  cilostazol 50 milliGRAM(s) Oral two times a day  lactobacillus acidophilus 1 Tablet(s) Oral two times a day with meals  methylPREDNISolone sodium succinate Injectable 20 milliGRAM(s) IV Push daily  oxyCODONE    IR 5 milliGRAM(s) Oral every 4 hours PRN  pantoprazole    Tablet 40 milliGRAM(s) Oral before breakfast  piperacillin/tazobactam IVPB.. 3.375 Gram(s) IV Intermittent every 8 hours  rivaroxaban 2.5 milliGRAM(s) Oral two times a day  simethicone 80 milliGRAM(s) Chew every 6 hours  sodium chloride 0.9%. 1000 milliLiter(s) IV Continuous <Continuous>    Antimicrobials:  piperacillin/tazobactam IVPB.. 3.375 Gram(s) IV Intermittent every 8 hours

## 2024-05-29 NOTE — CONSULT NOTE ADULT - ASSESSMENT
73F w/ PAD on Xarelto, history of breast cancer with right-sided lumpectomy, bladder cancer with spinal metastasis, history of recurrent diverticulitis s/p perforated diverticulitis resulting in psoas abscess in 1/2024 now here with diarrhea and generalized weakness on Abx. Neurology consulted for dysarthria which patient attributes to opiate use  CT Head 5/26 with chronic ischemic changes   73F w/ PAD on Xarelto, history of breast cancer with right-sided lumpectomy, bladder cancer with spinal metastasis, history of recurrent diverticulitis s/p perforated diverticulitis resulting in psoas abscess in 1/2024 now here with diarrhea and generalized weakness on Abx. Neurology consulted for dysarthria -? related to opiate use. Pt denies speech changes  CT Head 5/26 with chronic ischemic changes  o/e with hypesthesia in distal L foot, likely related to underlying PAD.    Dysarthria - ?resolved, low suspicion for stroke, mets  Chronic vertigo   Bladder cancer with spinal mets  PAD on xarelto  Diverticulitis     - CTH reviewed  - on xarelto prophylaxtic dose for PAD, ok to continue  - no need for MRI brain at this time, can monitor clinically. If done would do with and without contrast given cancer hx  - chronic vertigo can be further eval as outpatient, may benefit from vestibular therapy  - Abx per ID  - PT/OT  - DVT ppx

## 2024-05-29 NOTE — PROGRESS NOTE ADULT - ASSESSMENT
72 y/o F PMhx PAD, history of breast cancer with right-sided lumpectomy, bladder cancer with spinal metastasis, recurrent diverticulitis recently hospitalized 1/2024 with perforated diverticulitis resulting in a psoas abscess who presents w/ abd pain.    LLQ abd pain & fever resolved  no leukocytosis  UA negative  CXR- clear  blood cultures- NGTD  CT- More extensive colonic thickening from left colon through rectum suggests nonspecific proctocolitis. No discrete focal drainable collection. Ill-defined sinus tracts anterior to the left psoas related to previous complicated sigmoid diverticulitis. Mild left hydroureteronephrosis to the level of the UVJ, likely reactive to left lower quadrant inflammation.  GI PCR negative    patient reports diarrhea and abd pain resolved  Recommendations  c/w zosyn  complete 7 day course of antibiotics w/ last day 5/31  transition to cefpodoxime 200mg bid and flagyl 500mg every 8 hours on discharge    Klever Altamirano M.D.  \[facility]\"", Division of Infectious Diseases  [phone]  After 5pm on weekdays and all day on weekends - please call [phone]

## 2024-05-30 LAB
ANION GAP SERPL CALC-SCNC: 12 MMOL/L — SIGNIFICANT CHANGE UP (ref 7–14)
BUN SERPL-MCNC: 12 MG/DL — SIGNIFICANT CHANGE UP (ref 7–23)
CALCIUM SERPL-MCNC: 9.2 MG/DL — SIGNIFICANT CHANGE UP (ref 8.4–10.5)
CHLORIDE SERPL-SCNC: 105 MMOL/L — SIGNIFICANT CHANGE UP (ref 98–107)
CO2 SERPL-SCNC: 25 MMOL/L — SIGNIFICANT CHANGE UP (ref 22–31)
CREAT SERPL-MCNC: 0.58 MG/DL — SIGNIFICANT CHANGE UP (ref 0.5–1.3)
CULTURE RESULTS: SIGNIFICANT CHANGE UP
CULTURE RESULTS: SIGNIFICANT CHANGE UP
EGFR: 95 ML/MIN/1.73M2 — SIGNIFICANT CHANGE UP
GLUCOSE SERPL-MCNC: 100 MG/DL — HIGH (ref 70–99)
HCT VFR BLD CALC: 29.8 % — LOW (ref 34.5–45)
HGB BLD-MCNC: 9.5 G/DL — LOW (ref 11.5–15.5)
MAGNESIUM SERPL-MCNC: 1.9 MG/DL — SIGNIFICANT CHANGE UP (ref 1.6–2.6)
MCHC RBC-ENTMCNC: 26.6 PG — LOW (ref 27–34)
MCHC RBC-ENTMCNC: 31.9 GM/DL — LOW (ref 32–36)
MCV RBC AUTO: 83.5 FL — SIGNIFICANT CHANGE UP (ref 80–100)
NRBC # BLD: 0 /100 WBCS — SIGNIFICANT CHANGE UP (ref 0–0)
NRBC # FLD: 0 K/UL — SIGNIFICANT CHANGE UP (ref 0–0)
PHOSPHATE SERPL-MCNC: 2.3 MG/DL — LOW (ref 2.5–4.5)
PLATELET # BLD AUTO: 406 K/UL — HIGH (ref 150–400)
POTASSIUM SERPL-MCNC: 3.7 MMOL/L — SIGNIFICANT CHANGE UP (ref 3.5–5.3)
POTASSIUM SERPL-SCNC: 3.7 MMOL/L — SIGNIFICANT CHANGE UP (ref 3.5–5.3)
RBC # BLD: 3.57 M/UL — LOW (ref 3.8–5.2)
RBC # FLD: 15.7 % — HIGH (ref 10.3–14.5)
SODIUM SERPL-SCNC: 142 MMOL/L — SIGNIFICANT CHANGE UP (ref 135–145)
SPECIMEN SOURCE: SIGNIFICANT CHANGE UP
SPECIMEN SOURCE: SIGNIFICANT CHANGE UP
WBC # BLD: 6.58 K/UL — SIGNIFICANT CHANGE UP (ref 3.8–10.5)
WBC # FLD AUTO: 6.58 K/UL — SIGNIFICANT CHANGE UP (ref 3.8–10.5)

## 2024-05-30 RX ORDER — OXYCODONE HYDROCHLORIDE 5 MG/1
5 TABLET ORAL EVERY 4 HOURS
Refills: 0 | Status: DISCONTINUED | OUTPATIENT
Start: 2024-05-30 | End: 2024-06-04

## 2024-05-30 RX ADMIN — Medication 1 TABLET(S): at 17:52

## 2024-05-30 RX ADMIN — AMLODIPINE BESYLATE 2.5 MILLIGRAM(S): 2.5 TABLET ORAL at 21:43

## 2024-05-30 RX ADMIN — RIVAROXABAN 2.5 MILLIGRAM(S): KIT at 05:37

## 2024-05-30 RX ADMIN — SIMETHICONE 80 MILLIGRAM(S): 80 TABLET, CHEWABLE ORAL at 17:49

## 2024-05-30 RX ADMIN — CHLORHEXIDINE GLUCONATE 1 APPLICATION(S): 213 SOLUTION TOPICAL at 12:09

## 2024-05-30 RX ADMIN — CILOSTAZOL 50 MILLIGRAM(S): 100 TABLET ORAL at 05:37

## 2024-05-30 RX ADMIN — OXYCODONE HYDROCHLORIDE 5 MILLIGRAM(S): 5 TABLET ORAL at 15:34

## 2024-05-30 RX ADMIN — RIVAROXABAN 2.5 MILLIGRAM(S): KIT at 17:50

## 2024-05-30 RX ADMIN — SIMETHICONE 80 MILLIGRAM(S): 80 TABLET, CHEWABLE ORAL at 12:10

## 2024-05-30 RX ADMIN — PANTOPRAZOLE SODIUM 40 MILLIGRAM(S): 20 TABLET, DELAYED RELEASE ORAL at 07:11

## 2024-05-30 RX ADMIN — Medication 1 TABLET(S): at 09:31

## 2024-05-30 RX ADMIN — PIPERACILLIN AND TAZOBACTAM 25 GRAM(S): 4; .5 INJECTION, POWDER, LYOPHILIZED, FOR SOLUTION INTRAVENOUS at 13:43

## 2024-05-30 RX ADMIN — CILOSTAZOL 50 MILLIGRAM(S): 100 TABLET ORAL at 17:50

## 2024-05-30 RX ADMIN — Medication 20 MILLIGRAM(S): at 05:36

## 2024-05-30 RX ADMIN — PIPERACILLIN AND TAZOBACTAM 25 GRAM(S): 4; .5 INJECTION, POWDER, LYOPHILIZED, FOR SOLUTION INTRAVENOUS at 05:34

## 2024-05-30 RX ADMIN — SODIUM CHLORIDE 75 MILLILITER(S): 9 INJECTION INTRAMUSCULAR; INTRAVENOUS; SUBCUTANEOUS at 00:37

## 2024-05-30 RX ADMIN — PIPERACILLIN AND TAZOBACTAM 25 GRAM(S): 4; .5 INJECTION, POWDER, LYOPHILIZED, FOR SOLUTION INTRAVENOUS at 21:43

## 2024-05-30 RX ADMIN — SIMETHICONE 80 MILLIGRAM(S): 80 TABLET, CHEWABLE ORAL at 05:37

## 2024-05-30 NOTE — PROGRESS NOTE ADULT - SUBJECTIVE AND OBJECTIVE BOX
Patient is a 73y old  Female who presents with a chief complaint of abdominal pain (30 May 2024 11:57)    Date of servie : 05-30-24 @ 12:52  INTERVAL HPI/OVERNIGHT EVENTS:  T(C): 36.7 (05-30-24 @ 05:30), Max: 36.8 (05-29-24 @ 14:48)  HR: 61 (05-30-24 @ 05:30) (61 - 83)  BP: 126/71 (05-30-24 @ 05:30) (111/79 - 130/75)  RR: 17 (05-30-24 @ 05:30) (17 - 18)  SpO2: 97% (05-30-24 @ 05:30) (96% - 98%)  Wt(kg): --  I&O's Summary    29 May 2024 07:01  -  30 May 2024 07:00  --------------------------------------------------------  IN: 1950 mL / OUT: 2200 mL / NET: -250 mL        LABS:                        9.5    6.58  )-----------( 406      ( 30 May 2024 06:22 )             29.8     05-30    142  |  105  |  12  ----------------------------<  100<H>  3.7   |  25  |  0.58    Ca    9.2      30 May 2024 06:22  Phos  2.3     05-30  Mg     1.90     05-30        Urinalysis Basic - ( 30 May 2024 06:22 )    Color: x / Appearance: x / SG: x / pH: x  Gluc: 100 mg/dL / Ketone: x  / Bili: x / Urobili: x   Blood: x / Protein: x / Nitrite: x   Leuk Esterase: x / RBC: x / WBC x   Sq Epi: x / Non Sq Epi: x / Bacteria: x      CAPILLARY BLOOD GLUCOSE            Urinalysis Basic - ( 30 May 2024 06:22 )    Color: x / Appearance: x / SG: x / pH: x  Gluc: 100 mg/dL / Ketone: x  / Bili: x / Urobili: x   Blood: x / Protein: x / Nitrite: x   Leuk Esterase: x / RBC: x / WBC x   Sq Epi: x / Non Sq Epi: x / Bacteria: x        MEDICATIONS  (STANDING):  amLODIPine   Tablet 2.5 milliGRAM(s) Oral at bedtime  chlorhexidine 2% Cloths 1 Application(s) Topical daily  cilostazol 50 milliGRAM(s) Oral two times a day  lactobacillus acidophilus 1 Tablet(s) Oral two times a day with meals  methylPREDNISolone sodium succinate Injectable 20 milliGRAM(s) IV Push daily  pantoprazole    Tablet 40 milliGRAM(s) Oral before breakfast  piperacillin/tazobactam IVPB.. 3.375 Gram(s) IV Intermittent every 8 hours  rivaroxaban 2.5 milliGRAM(s) Oral two times a day  simethicone 80 milliGRAM(s) Chew every 6 hours  sodium chloride 0.9%. 1000 milliLiter(s) (75 mL/Hr) IV Continuous <Continuous>    MEDICATIONS  (PRN):  oxyCODONE    IR 5 milliGRAM(s) Oral every 4 hours PRN Moderate Pain (4-6) and Severe Pain (7-10)          PHYSICAL EXAM:  GENERAL: NAD, well-groomed, well-developed  HEAD:  Atraumatic, Normocephalic  CHEST/LUNG: Clear to percussion bilaterally; No rales, rhonchi, wheezing, or rubs  HEART: Regular rate and rhythm; No murmurs, rubs, or gallops  ABDOMEN: Soft, Nontender, Nondistended; Bowel sounds present  EXTREMITIES:  2+ Peripheral Pulses, No clubbing, cyanosis, or edema  LYMPH: No lymphadenopathy noted  SKIN: No rashes or lesions    Care Discussed with Consultants/Other Providers [x ] YES  [ ] NO

## 2024-05-30 NOTE — PROGRESS NOTE ADULT - SUBJECTIVE AND OBJECTIVE BOX
[facility], Division of Infectious Diseases  DA Ortez Y. Patel, S. Shah, G. Adarsh  [phone]  ([phone] - weekdays after 5pm and weekends)    Name: KIM ACKERMAN  Age/Gender: 73y Female  MRN: [id number]    Interval History:  Notes reviewed.   No concerning overnight events.  Afebrile.   denies abd pain or diarrhea    Allergies: sulfa drugs (Unknown)      Objective:  Vitals:   T(F): 98.1 (05-30-24 @ 05:30), Max: 98.3 (05-29-24 @ 14:48)  HR: 61 (05-30-24 @ 05:30) (61 - 83)  BP: 126/71 (05-30-24 @ 05:30) (111/79 - 130/75)  RR: 17 (05-30-24 @ 05:30) (17 - 18)  SpO2: 97% (05-30-24 @ 05:30) (96% - 98%)  Physical Examination:  General: no acute distress  HEENT: anicteric  Cardio: S1, S2, normal rate  Resp: breathing comfortably on RA   Abd: soft, NT, ND  Ext: no LE edema  Skin: warm, dry    Laboratory Studies:  CBC:                       9.5    6.58  )-----------( 406      ( 30 May 2024 06:22 )             29.8     WBC Trend:  6.58 05-30-24 @ 06:22  9.02 05-29-24 @ 05:37  6.74 05-28-24 @ 03:45  5.24 05-27-24 @ 06:20  5.10 05-26-24 @ 06:55  8.59 05-24-24 @ 19:31    CMP: 05-30    142  |  105  |  12  ----------------------------<  100<H>  3.7   |  25  |  0.58    Ca    9.2      30 May 2024 06:22  Phos  2.3     05-30  Mg     1.90     05-30            Urinalysis Basic - ( 30 May 2024 06:22 )    Color: x / Appearance: x / SG: x / pH: x  Gluc: 100 mg/dL / Ketone: x  / Bili: x / Urobili: x   Blood: x / Protein: x / Nitrite: x   Leuk Esterase: x / RBC: x / WBC x   Sq Epi: x / Non Sq Epi: x / Bacteria: x      Microbiology: reviewed     Culture - Stool (collected 05-27-24 @ 08:32)  Source: .Stool mendez trina  Final Report (05-29-24 @ 10:10):    No enteric pathogens isolated.    (Stool culture examined for Salmonella,    Shigella, Campylobacter, Aeromonas, Plesiomonas,    Vibrio, E.coli O157 and Yersinia)    Culture - Urine (collected 05-24-24 @ 21:55)  Source: Clean Catch Clean Catch (Midstream)  Final Report (05-26-24 @ 22:46):    <10,000 CFU/mL Normal Urogenital Carol    Culture - Blood (collected 05-24-24 @ 19:11)  Source: .Blood Blood-Peripheral  Final Report (05-30-24 @ 02:00):    No growth at 5 days    Culture - Blood (collected 05-24-24 @ 19:00)  Source: .Blood Blood-Peripheral  Final Report (05-30-24 @ 02:00):    No growth at 5 days        Radiology: reviewed     Medications:  amLODIPine   Tablet 2.5 milliGRAM(s) Oral at bedtime  bisacodyl Suppository 10 milliGRAM(s) Rectal once  chlorhexidine 2% Cloths 1 Application(s) Topical daily  cilostazol 50 milliGRAM(s) Oral two times a day  lactobacillus acidophilus 1 Tablet(s) Oral two times a day with meals  methylPREDNISolone sodium succinate Injectable 20 milliGRAM(s) IV Push daily  oxyCODONE    IR 5 milliGRAM(s) Oral every 4 hours PRN  pantoprazole    Tablet 40 milliGRAM(s) Oral before breakfast  piperacillin/tazobactam IVPB.. 3.375 Gram(s) IV Intermittent every 8 hours  rivaroxaban 2.5 milliGRAM(s) Oral two times a day  simethicone 80 milliGRAM(s) Chew every 6 hours  sodium chloride 0.9%. 1000 milliLiter(s) IV Continuous <Continuous>    Antimicrobials:  piperacillin/tazobactam IVPB.. 3.375 Gram(s) IV Intermittent every 8 hours

## 2024-05-30 NOTE — PROGRESS NOTE ADULT - ASSESSMENT
73-year-old female past medical history of peripheral arterial disease on Xarelto, history of breast cancer with right-sided lumpectomy, bladder cancer with spinal metastasis, history of recurrent diverticulitis last time admitted to the hospital in January 2024 with perforated diverticulitis resulting in a psoas abscess treated on IV antibiotics.	Patient reports ongoing anorexia, episodic abdominal pain diffuse in nature, and now presents to the ED with concerns of diarrhea and generalized weakness.	Patient having 3-4 watery bowel movements a day.  Also mentions having fever for the past few days documented to 101 °F.Denies headaches, neck pain, sore throat, chest pain, difficulty in breathing, vomitings, urinary complaints, skin rash/lesions.    Abdominal pain  - sec to colitis  - hx of diverticulitis and psoas abscess  - cw zosyn  - ID fu appreciated    Breast cancer, bladder cancer  - fu with heme-onc as outpt   - will monitor     PVD  - cw  xarelto 2.5 bid  - LE doppler neg  - XR foot neg    HTN  - cw norvasc  - DASH diet       Speech difficulty  - she says she cant talk properly because of opoids  - CT head neg    Knee pain  - OA  - PT and outpt fu       dc planning

## 2024-05-30 NOTE — PROGRESS NOTE ADULT - ASSESSMENT
72 y/o F PMhx PAD, history of breast cancer with right-sided lumpectomy, bladder cancer with spinal metastasis, recurrent diverticulitis recently hospitalized 1/2024 with perforated diverticulitis resulting in a psoas abscess who presents w/ abd pain.    LLQ abd pain & fever resolved  blood cultures- NGTD  GI PCR negative  CT- More extensive colonic thickening from left colon through rectum suggests nonspecific proctocolitis. No discrete focal drainable collection. Ill-defined sinus tracts anterior to the left psoas related to previous complicated sigmoid diverticulitis. Mild left hydroureteronephrosis to the level of the UVJ, likely reactive to left lower quadrant inflammation.    patient reports diarrhea and abd pain resolved  Recommendations  c/w zosyn  complete 7 day course of antibiotics w/ last day 5/31  transition to cefpodoxime 200mg bid and flagyl 500mg every 8 hours on discharge    Klever Altamirano M.D.  [facility], Division of Infectious Diseases  [phone]  After 5pm on weekdays and all day on weekends - please call [phone]

## 2024-05-30 NOTE — PROGRESS NOTE ADULT - ASSESSMENT
73F w/ PAD on Xarelto, history of breast cancer with right-sided lumpectomy, bladder cancer with spinal metastasis, history of recurrent diverticulitis s/p perforated diverticulitis resulting in psoas abscess in 1/2024 now here with diarrhea and generalized weakness on Abx. Neurology consulted for dysarthria -? related to opiate use. Pt denies speech changes  CT Head 5/26 with chronic ischemic changes  o/e with hypesthesia in distal L foot, likely related to underlying PAD.    Dysarthria - ?resolved, low suspicion for stroke, mets  Chronic vertigo   Bladder cancer with spinal mets  PAD on xarelto  Diverticulitis     - CTH reviewed  - on xarelto prophylaxtic dose for PAD, ok to continue  - no need for MRI brain at this time, can monitor clinically. If done would do with and without contrast given cancer hx  - chronic vertigo can be further eval as outpatient, may benefit from vestibular therapy  - Abx per ID  - PT/OT  - DVT ppx

## 2024-05-30 NOTE — PROGRESS NOTE ADULT - SUBJECTIVE AND OBJECTIVE BOX
Neurology Progress Note    S: Patient seen and examined. No new events overnight.    Medication:  amLODIPine   Tablet 2.5 milliGRAM(s) Oral at bedtime  chlorhexidine 2% Cloths 1 Application(s) Topical daily  cilostazol 50 milliGRAM(s) Oral two times a day  lactobacillus acidophilus 1 Tablet(s) Oral two times a day with meals  methylPREDNISolone sodium succinate Injectable 20 milliGRAM(s) IV Push daily  oxyCODONE    IR 5 milliGRAM(s) Oral every 4 hours PRN  pantoprazole    Tablet 40 milliGRAM(s) Oral before breakfast  piperacillin/tazobactam IVPB.. 3.375 Gram(s) IV Intermittent every 8 hours  rivaroxaban 2.5 milliGRAM(s) Oral two times a day  simethicone 80 milliGRAM(s) Chew every 6 hours  sodium chloride 0.9%. 1000 milliLiter(s) IV Continuous <Continuous>      Vitals:  Vital Signs Last 24 Hrs  T(C): 36.7 (30 May 2024 05:30), Max: 36.8 (29 May 2024 14:48)  T(F): 98.1 (30 May 2024 05:30), Max: 98.3 (29 May 2024 14:48)  HR: 61 (30 May 2024 05:30) (61 - 83)  BP: 126/71 (30 May 2024 05:30) (111/79 - 130/75)  BP(mean): --  RR: 17 (30 May 2024 05:30) (17 - 18)  SpO2: 97% (30 May 2024 05:30) (96% - 98%)    Parameters below as of 30 May 2024 05:30  Patient On (Oxygen Delivery Method): room air        General Exam:   General Appearance: Appropriately dressed and in no acute distress       Head: Normocephalic, atraumatic and no dysmorphic features  Ear, Nose, and Throat: Moist mucous membranes  CVS: S1S2+  Resp: No SOB, no wheeze or rhonchi  Abd: soft NTND  Extremities: No edema, no cyanosis  Skin: No bruises, no rashes        Neurological Exam:  Mental Status: Awake, alert and oriented x 3.  Able to follow simple and complex verbal commands. Able to name and repeat. fluent speech. No obvious aphasia or dysarthria noted.   Cranial Nerves: PERRL, EOMI, VFFC, no nystagmus or skew sensation V1-V3 intact,  no obvious facial asymmetry, equal elevation of palate, scm/trap 5/5, tongue is midline on protrusion. hearing is grossly intact.   Motor: Normal bulk, tone and strength throughout. Fine finger movements were intact and symmetric. no tremors or drift noted.    Sensation: Intact to light touch and pinprick throughout. subjective burning sensation on distal L foot. no right/left confusion. no extinction to tactile on DSS.   Reflexes: 1+ throughout at biceps, brachioradialis, triceps, patellars and ankles bilaterally and equal. No clonus. R toe and L toe were both downgoing.  Coordination: No dysmetria on FNF   Gait: deferred, some truncal ataxia on sitting.    I personally reviewed the below data/images/labs:      CBC Full  -  ( 30 May 2024 06:22 )  WBC Count : 6.58 K/uL  RBC Count : 3.57 M/uL  Hemoglobin : 9.5 g/dL  Hematocrit : 29.8 %  Platelet Count - Automated : 406 K/uL  Mean Cell Volume : 83.5 fL  Mean Cell Hemoglobin : 26.6 pg  Mean Cell Hemoglobin Concentration : 31.9 gm/dL  Auto Neutrophil # : x  Auto Lymphocyte # : x  Auto Monocyte # : x  Auto Eosinophil # : x  Auto Basophil # : x  Auto Neutrophil % : x  Auto Lymphocyte % : x  Auto Monocyte % : x  Auto Eosinophil % : x  Auto Basophil % : x    05-30    142  |  105  |  12  ----------------------------<  100<H>  3.7   |  25  |  0.58    Ca    9.2      30 May 2024 06:22  Phos  2.3     05-30  Mg     1.90     05-30          Urinalysis Basic - ( 30 May 2024 06:22 )    Color: x / Appearance: x / SG: x / pH: x  Gluc: 100 mg/dL / Ketone: x  / Bili: x / Urobili: x   Blood: x / Protein: x / Nitrite: x   Leuk Esterase: x / RBC: x / WBC x   Sq Epi: x / Non Sq Epi: x / Bacteria: x    < from: CT Head No Cont (05.26.24 @ 09:52) >    ACC: [id number] EXAM:  CT BRAIN   ORDERED BY: ANA WONG     PROCEDURE DATE:  05/26/2024          INTERPRETATION:  CLINICAL INDICATION:  AMS    TECHNIQUE: Noncontrast CT examination of the head was performed.  Coronal and sagittal reformats were also obtained.    COMPARISON: There are no prior studies available for comparison.    FINDINGS:  INTRA-AXIAL: No intracranial mass effect, acute hemorrhage, midline shift   or acute transcortical infarct is seen. There are periventricular white   matter hypodensities that are nonspecific in nature but may reflect   chronic ischemic microvascular disease.  EXTRA-AXIAL: No extra-axial fluid collection is present.  VENTRICLES AND SULCI: Parenchymal volume is commensurate with patient   age. No hydrocephalus.  VISUALIZED SINUSES: Mild mucosal thickening.  VISUALIZED MASTOIDS: Clear.  CALVARIUM: Normal.    IMPRESSION:    No evidence of acute intracranial hemorrhage, midline shift or CT   evidence of acute territorial infarct.    Please note thata contrast-enhanced brain MRI is more sensitive for the   evaluation of metastatic disease.    < end of copied text >

## 2024-05-31 LAB
BLD GP AB SCN SERPL QL: NEGATIVE — SIGNIFICANT CHANGE UP
HCT VFR BLD CALC: 30 % — LOW (ref 34.5–45)
HCT VFR BLD CALC: 31.8 % — LOW (ref 34.5–45)
HGB BLD-MCNC: 10.4 G/DL — LOW (ref 11.5–15.5)
HGB BLD-MCNC: 9.5 G/DL — LOW (ref 11.5–15.5)
MCHC RBC-ENTMCNC: 26.7 PG — LOW (ref 27–34)
MCHC RBC-ENTMCNC: 27 PG — SIGNIFICANT CHANGE UP (ref 27–34)
MCHC RBC-ENTMCNC: 31.7 GM/DL — LOW (ref 32–36)
MCHC RBC-ENTMCNC: 32.7 GM/DL — SIGNIFICANT CHANGE UP (ref 32–36)
MCV RBC AUTO: 82.6 FL — SIGNIFICANT CHANGE UP (ref 80–100)
MCV RBC AUTO: 84.3 FL — SIGNIFICANT CHANGE UP (ref 80–100)
NRBC # BLD: 0 /100 WBCS — SIGNIFICANT CHANGE UP (ref 0–0)
NRBC # BLD: 0 /100 WBCS — SIGNIFICANT CHANGE UP (ref 0–0)
NRBC # FLD: 0 K/UL — SIGNIFICANT CHANGE UP (ref 0–0)
NRBC # FLD: 0 K/UL — SIGNIFICANT CHANGE UP (ref 0–0)
PLATELET # BLD AUTO: 410 K/UL — HIGH (ref 150–400)
PLATELET # BLD AUTO: 478 K/UL — HIGH (ref 150–400)
RBC # BLD: 3.56 M/UL — LOW (ref 3.8–5.2)
RBC # BLD: 3.85 M/UL — SIGNIFICANT CHANGE UP (ref 3.8–5.2)
RBC # FLD: 15.8 % — HIGH (ref 10.3–14.5)
RBC # FLD: 16.1 % — HIGH (ref 10.3–14.5)
RH IG SCN BLD-IMP: POSITIVE — SIGNIFICANT CHANGE UP
WBC # BLD: 6.59 K/UL — SIGNIFICANT CHANGE UP (ref 3.8–10.5)
WBC # BLD: 7.11 K/UL — SIGNIFICANT CHANGE UP (ref 3.8–10.5)
WBC # FLD AUTO: 6.59 K/UL — SIGNIFICANT CHANGE UP (ref 3.8–10.5)
WBC # FLD AUTO: 7.11 K/UL — SIGNIFICANT CHANGE UP (ref 3.8–10.5)

## 2024-05-31 PROCEDURE — 73700 CT LOWER EXTREMITY W/O DYE: CPT | Mod: 26,RT

## 2024-05-31 RX ORDER — PANTOPRAZOLE SODIUM 20 MG/1
40 TABLET, DELAYED RELEASE ORAL
Refills: 0 | Status: DISCONTINUED | OUTPATIENT
Start: 2024-05-31 | End: 2024-06-04

## 2024-05-31 RX ORDER — SODIUM,POTASSIUM PHOSPHATES 278-250MG
1 POWDER IN PACKET (EA) ORAL ONCE
Refills: 0 | Status: COMPLETED | OUTPATIENT
Start: 2024-05-31 | End: 2024-05-31

## 2024-05-31 RX ADMIN — OXYCODONE HYDROCHLORIDE 5 MILLIGRAM(S): 5 TABLET ORAL at 19:27

## 2024-05-31 RX ADMIN — OXYCODONE HYDROCHLORIDE 5 MILLIGRAM(S): 5 TABLET ORAL at 01:27

## 2024-05-31 RX ADMIN — PIPERACILLIN AND TAZOBACTAM 25 GRAM(S): 4; .5 INJECTION, POWDER, LYOPHILIZED, FOR SOLUTION INTRAVENOUS at 13:58

## 2024-05-31 RX ADMIN — PIPERACILLIN AND TAZOBACTAM 25 GRAM(S): 4; .5 INJECTION, POWDER, LYOPHILIZED, FOR SOLUTION INTRAVENOUS at 22:37

## 2024-05-31 RX ADMIN — CILOSTAZOL 50 MILLIGRAM(S): 100 TABLET ORAL at 06:55

## 2024-05-31 RX ADMIN — SODIUM CHLORIDE 75 MILLILITER(S): 9 INJECTION INTRAMUSCULAR; INTRAVENOUS; SUBCUTANEOUS at 13:58

## 2024-05-31 RX ADMIN — SIMETHICONE 80 MILLIGRAM(S): 80 TABLET, CHEWABLE ORAL at 19:28

## 2024-05-31 RX ADMIN — SIMETHICONE 80 MILLIGRAM(S): 80 TABLET, CHEWABLE ORAL at 12:03

## 2024-05-31 RX ADMIN — OXYCODONE HYDROCHLORIDE 5 MILLIGRAM(S): 5 TABLET ORAL at 00:27

## 2024-05-31 RX ADMIN — Medication 1 PACKET(S): at 09:46

## 2024-05-31 RX ADMIN — AMLODIPINE BESYLATE 2.5 MILLIGRAM(S): 2.5 TABLET ORAL at 22:37

## 2024-05-31 RX ADMIN — SODIUM CHLORIDE 75 MILLILITER(S): 9 INJECTION INTRAMUSCULAR; INTRAVENOUS; SUBCUTANEOUS at 22:37

## 2024-05-31 RX ADMIN — PANTOPRAZOLE SODIUM 40 MILLIGRAM(S): 20 TABLET, DELAYED RELEASE ORAL at 06:55

## 2024-05-31 RX ADMIN — RIVAROXABAN 2.5 MILLIGRAM(S): KIT at 06:55

## 2024-05-31 RX ADMIN — CHLORHEXIDINE GLUCONATE 1 APPLICATION(S): 213 SOLUTION TOPICAL at 12:07

## 2024-05-31 RX ADMIN — Medication 20 MILLIGRAM(S): at 06:55

## 2024-05-31 RX ADMIN — SODIUM CHLORIDE 75 MILLILITER(S): 9 INJECTION INTRAMUSCULAR; INTRAVENOUS; SUBCUTANEOUS at 06:16

## 2024-05-31 RX ADMIN — SIMETHICONE 80 MILLIGRAM(S): 80 TABLET, CHEWABLE ORAL at 06:55

## 2024-05-31 RX ADMIN — PANTOPRAZOLE SODIUM 40 MILLIGRAM(S): 20 TABLET, DELAYED RELEASE ORAL at 19:28

## 2024-05-31 RX ADMIN — SIMETHICONE 80 MILLIGRAM(S): 80 TABLET, CHEWABLE ORAL at 00:27

## 2024-05-31 RX ADMIN — OXYCODONE HYDROCHLORIDE 5 MILLIGRAM(S): 5 TABLET ORAL at 20:00

## 2024-05-31 RX ADMIN — PIPERACILLIN AND TAZOBACTAM 25 GRAM(S): 4; .5 INJECTION, POWDER, LYOPHILIZED, FOR SOLUTION INTRAVENOUS at 06:55

## 2024-05-31 NOTE — PROGRESS NOTE ADULT - SUBJECTIVE AND OBJECTIVE BOX
Neurology Progress Note    S: Patient seen and examined. No new events overnight.    Medications: MEDICATIONS  (STANDING):  amLODIPine   Tablet 2.5 milliGRAM(s) Oral at bedtime  chlorhexidine 2% Cloths 1 Application(s) Topical daily  cilostazol 50 milliGRAM(s) Oral two times a day  lactobacillus acidophilus 1 Tablet(s) Oral two times a day with meals  pantoprazole  Injectable 40 milliGRAM(s) IV Push two times a day  piperacillin/tazobactam IVPB.. 3.375 Gram(s) IV Intermittent every 8 hours  potassium phosphate / sodium phosphate Powder (PHOS-NaK) 1 Packet(s) Oral once  rivaroxaban 2.5 milliGRAM(s) Oral two times a day  simethicone 80 milliGRAM(s) Chew every 6 hours  sodium chloride 0.9%. 1000 milliLiter(s) (75 mL/Hr) IV Continuous <Continuous>    MEDICATIONS  (PRN):  oxyCODONE    IR 5 milliGRAM(s) Oral every 4 hours PRN Moderate Pain (4-6) and Severe Pain (7-10)       Vitals:  Vital Signs Last 24 Hrs  T(C): 36.8 (31 May 2024 06:39), Max: 36.8 (31 May 2024 06:39)  T(F): 98.3 (31 May 2024 06:39), Max: 98.3 (31 May 2024 06:39)  HR: 77 (31 May 2024 06:39) (62 - 78)  BP: 115/80 (31 May 2024 06:39) (112/75 - 124/70)  BP(mean): --  RR: 18 (31 May 2024 06:39) (18 - 18)  SpO2: 98% (31 May 2024 06:39) (96% - 100%)    Parameters below as of 31 May 2024 06:39  Patient On (Oxygen Delivery Method): room air        General Exam:   General Appearance: Appropriately dressed and in no acute distress       Head: Normocephalic, atraumatic and no dysmorphic features  Ear, Nose, and Throat: Moist mucous membranes  CVS: S1S2+  Resp: No SOB, no wheeze or rhonchi  Abd: soft NTND  Extremities: No edema, no cyanosis  Skin: No bruises, no rashes        Neurological Exam:  Mental Status: Awake, alert and oriented x 3.  Able to follow simple and complex verbal commands. Able to name and repeat. fluent speech. No obvious aphasia or dysarthria noted.   Cranial Nerves: PERRL, EOMI, VFFC, no nystagmus or skew sensation V1-V3 intact,  no obvious facial asymmetry, equal elevation of palate, scm/trap 5/5, tongue is midline on protrusion. hearing is grossly intact.   Motor: Normal bulk, tone and strength throughout. Fine finger movements were intact and symmetric. no tremors or drift noted.    Sensation: Intact to light touch and pinprick throughout. subjective burning sensation on distal L foot. no right/left confusion. no extinction to tactile on DSS.   Reflexes: 1+ throughout at biceps, brachioradialis, triceps, patellars and ankles bilaterally and equal. No clonus. R toe and L toe were both downgoing.  Coordination: No dysmetria on FNF   Gait: deferred, some truncal ataxia on sitting.    I personally reviewed the below data/images/labs:      LABS:                          9.5    7.11  )-----------( 410      ( 31 May 2024 05:23 )             30.0     05-30    142  |  105  |  12  ----------------------------<  100<H>  3.7   |  25  |  0.58    Ca    9.2      30 May 2024 06:22  Phos  2.3     05-30  Mg     1.90     05-30          Urinalysis Basic - ( 30 May 2024 06:22 )    Color: x / Appearance: x / SG: x / pH: x  Gluc: 100 mg/dL / Ketone: x  / Bili: x / Urobili: x   Blood: x / Protein: x / Nitrite: x   Leuk Esterase: x / RBC: x / WBC x   Sq Epi: x / Non Sq Epi: x / Bacteria: x        < from: CT Head No Cont (05.26.24 @ 09:52) >    ACC: [id number] EXAM:  CT BRAIN   ORDERED BY: ANA WONG     PROCEDURE DATE:  05/26/2024          INTERPRETATION:  CLINICAL INDICATION:  AMS    TECHNIQUE: Noncontrast CT examination of the head was performed.  Coronal and sagittal reformats were also obtained.    COMPARISON: There are no prior studies available for comparison.    FINDINGS:  INTRA-AXIAL: No intracranial mass effect, acute hemorrhage, midline shift   or acute transcortical infarct is seen. There are periventricular white   matter hypodensities that are nonspecific in nature but may reflect   chronic ischemic microvascular disease.  EXTRA-AXIAL: No extra-axial fluid collection is present.  VENTRICLES AND SULCI: Parenchymal volume is commensurate with patient   age. No hydrocephalus.  VISUALIZED SINUSES: Mild mucosal thickening.  VISUALIZED MASTOIDS: Clear.  CALVARIUM: Normal.    IMPRESSION:    No evidence of acute intracranial hemorrhage, midline shift or CT   evidence of acute territorial infarct.    Please note thata contrast-enhanced brain MRI is more sensitive for the   evaluation of metastatic disease.    < end of copied text >

## 2024-05-31 NOTE — PROGRESS NOTE ADULT - SUBJECTIVE AND OBJECTIVE BOX
[facility], Division of Infectious Diseases  DA Ortez Y. Patel, S. Shah, G. Adarsh  [phone]  ([phone] - weekdays after 5pm and weekends)    Name: KIM ACKERMAN  Age/Gender: 73y Female  MRN: [id number]    Interval History:  Notes reviewed.   Afebrile.   reports abdominal cramping overnight and nurse reports patient passed clots in stool    Allergies: sulfa drugs (Unknown)      Objective:  Vitals:   T(F): 98.3 (05-31-24 @ 06:39), Max: 98.3 (05-31-24 @ 06:39)  HR: 77 (05-31-24 @ 06:39) (62 - 78)  BP: 115/80 (05-31-24 @ 06:39) (112/75 - 124/70)  RR: 18 (05-31-24 @ 06:39) (18 - 18)  SpO2: 98% (05-31-24 @ 06:39) (96% - 100%)  Physical Examination:  General: no acute distress  HEENT: anicteric  Cardio: S1, S2, normal rate  Resp: breathing comfortably on RA   Abd: soft, NT, ND  Ext: no LE edema  Skin: warm, dry    Laboratory Studies:  CBC:                       9.5    7.11  )-----------( 410      ( 31 May 2024 05:23 )             30.0     WBC Trend:  7.11 05-31-24 @ 05:23  6.58 05-30-24 @ 06:22  9.02 05-29-24 @ 05:37  6.74 05-28-24 @ 03:45  5.24 05-27-24 @ 06:20  5.10 05-26-24 @ 06:55  8.59 05-24-24 @ 19:31    CMP: 05-30    142  |  105  |  12  ----------------------------<  100<H>  3.7   |  25  |  0.58    Ca    9.2      30 May 2024 06:22  Phos  2.3     05-30  Mg     1.90     05-30            Urinalysis Basic - ( 30 May 2024 06:22 )    Color: x / Appearance: x / SG: x / pH: x  Gluc: 100 mg/dL / Ketone: x  / Bili: x / Urobili: x   Blood: x / Protein: x / Nitrite: x   Leuk Esterase: x / RBC: x / WBC x   Sq Epi: x / Non Sq Epi: x / Bacteria: x      Microbiology: reviewed     Culture - Stool (collected 05-27-24 @ 08:32)  Source: .Stool mendez trina  Final Report (05-29-24 @ 10:10):    No enteric pathogens isolated.    (Stool culture examined for Salmonella,    Shigella, Campylobacter, Aeromonas, Plesiomonas,    Vibrio, E.coli O157 and Yersinia)    Culture - Urine (collected 05-24-24 @ 21:55)  Source: Clean Catch Clean Catch (Midstream)  Final Report (05-26-24 @ 22:46):    <10,000 CFU/mL Normal Urogenital Carol    Culture - Blood (collected 05-24-24 @ 19:11)  Source: .Blood Blood-Peripheral  Final Report (05-30-24 @ 02:00):    No growth at 5 days    Culture - Blood (collected 05-24-24 @ 19:00)  Source: .Blood Blood-Peripheral  Final Report (05-30-24 @ 02:00):    No growth at 5 days        Radiology: reviewed     Medications:  amLODIPine   Tablet 2.5 milliGRAM(s) Oral at bedtime  chlorhexidine 2% Cloths 1 Application(s) Topical daily  cilostazol 50 milliGRAM(s) Oral two times a day  lactobacillus acidophilus 1 Tablet(s) Oral two times a day with meals  oxyCODONE    IR 5 milliGRAM(s) Oral every 4 hours PRN  pantoprazole  Injectable 40 milliGRAM(s) IV Push two times a day  piperacillin/tazobactam IVPB.. 3.375 Gram(s) IV Intermittent every 8 hours  rivaroxaban 2.5 milliGRAM(s) Oral two times a day  simethicone 80 milliGRAM(s) Chew every 6 hours  sodium chloride 0.9%. 1000 milliLiter(s) IV Continuous <Continuous>    Antimicrobials:  piperacillin/tazobactam IVPB.. 3.375 Gram(s) IV Intermittent every 8 hours

## 2024-05-31 NOTE — PROGRESS NOTE ADULT - ASSESSMENT
72 y/o F PMhx PAD, history of breast cancer with right-sided lumpectomy, bladder cancer with spinal metastasis, recurrent diverticulitis recently hospitalized 1/2024 with perforated diverticulitis resulting in a psoas abscess who presents w/ abd pain.    LLQ abd pain & fever resolved  blood cultures- NGTD  GI PCR negative  CT- More extensive colonic thickening from left colon through rectum suggests nonspecific proctocolitis. No discrete focal drainable collection. Ill-defined sinus tracts anterior to the left psoas related to previous complicated sigmoid diverticulitis. Mild left hydroureteronephrosis to the level of the UVJ, likely reactive to left lower quadrant inflammation.    reports abdominal cramping overnight and nurse reports patient passed clots in stool    Recommendations  c/w zosyn  complete 7 day course of antibiotics w/ last day today 5/31  transition to cefpodoxime 200mg bid and flagyl 500mg every 8 hours on discharge  consider GI halle Altamirano M.D.  hospitals, Division of Infectious Diseases  [phone]  After 5pm on weekdays and all day on weekends - please call [phone]  Drysol Counseling:  I discussed with the patient the risks of drysol/aluminum chloride including but not limited to skin rash, itching, irritation, burning.

## 2024-05-31 NOTE — PROVIDER CONTACT NOTE (OTHER) - ASSESSMENT
Vitally stable. Clots present in addition to bright red blood per rectum. Abdomen distended but not firm.
No c/o discomfort or distress, able to tolerate PO fluids

## 2024-05-31 NOTE — PROVIDER CONTACT NOTE (OTHER) - SITUATION
Pt w/ 2 IV both infiltrated, IV attempted twice, PT refusing new IV at this time
Clots and poli red stool.

## 2024-05-31 NOTE — PROGRESS NOTE ADULT - SUBJECTIVE AND OBJECTIVE BOX
Patient is a 73y old  Female who presents with a chief complaint of abdominal pain (31 May 2024 12:32)    Date of servie : 05-31-24 @ 15:11  INTERVAL HPI/OVERNIGHT EVENTS:  T(C): 36.8 (05-31-24 @ 06:39), Max: 36.8 (05-31-24 @ 06:39)  HR: 109 (05-31-24 @ 13:50) (62 - 109)  BP: 132/88 (05-31-24 @ 13:50) (115/80 - 132/88)  RR: 17 (05-31-24 @ 13:50) (17 - 18)  SpO2: 100% (05-31-24 @ 13:50) (96% - 100%)  Wt(kg): --  I&O's Summary      LABS:                        9.5    7.11  )-----------( 410      ( 31 May 2024 05:23 )             30.0     05-30    142  |  105  |  12  ----------------------------<  100<H>  3.7   |  25  |  0.58    Ca    9.2      30 May 2024 06:22  Phos  2.3     05-30  Mg     1.90     05-30        Urinalysis Basic - ( 30 May 2024 06:22 )    Color: x / Appearance: x / SG: x / pH: x  Gluc: 100 mg/dL / Ketone: x  / Bili: x / Urobili: x   Blood: x / Protein: x / Nitrite: x   Leuk Esterase: x / RBC: x / WBC x   Sq Epi: x / Non Sq Epi: x / Bacteria: x      CAPILLARY BLOOD GLUCOSE            Urinalysis Basic - ( 30 May 2024 06:22 )    Color: x / Appearance: x / SG: x / pH: x  Gluc: 100 mg/dL / Ketone: x  / Bili: x / Urobili: x   Blood: x / Protein: x / Nitrite: x   Leuk Esterase: x / RBC: x / WBC x   Sq Epi: x / Non Sq Epi: x / Bacteria: x        MEDICATIONS  (STANDING):  amLODIPine   Tablet 2.5 milliGRAM(s) Oral at bedtime  chlorhexidine 2% Cloths 1 Application(s) Topical daily  cilostazol 50 milliGRAM(s) Oral two times a day  lactobacillus acidophilus 1 Tablet(s) Oral two times a day with meals  pantoprazole  Injectable 40 milliGRAM(s) IV Push two times a day  piperacillin/tazobactam IVPB.. 3.375 Gram(s) IV Intermittent every 8 hours  rivaroxaban 2.5 milliGRAM(s) Oral two times a day  simethicone 80 milliGRAM(s) Chew every 6 hours  sodium chloride 0.9%. 1000 milliLiter(s) (75 mL/Hr) IV Continuous <Continuous>    MEDICATIONS  (PRN):  oxyCODONE    IR 5 milliGRAM(s) Oral every 4 hours PRN Moderate Pain (4-6) and Severe Pain (7-10)          PHYSICAL EXAM:  GENERAL: NAD, well-groomed, well-developed  HEAD:  Atraumatic, Normocephalic  CHEST/LUNG: Clear to percussion bilaterally; No rales, rhonchi, wheezing, or rubs  HEART: Regular rate and rhythm; No murmurs, rubs, or gallops  ABDOMEN: Soft, Nontender, Nondistended; Bowel sounds present  EXTREMITIES:  2+ Peripheral Pulses, No clubbing, cyanosis, or edema  LYMPH: No lymphadenopathy noted  SKIN: No rashes or lesions    Care Discussed with Consultants/Other Providers [ ] YES  [ ] NO

## 2024-06-01 LAB
ANION GAP SERPL CALC-SCNC: 17 MMOL/L — HIGH (ref 7–14)
BUN SERPL-MCNC: 12 MG/DL — SIGNIFICANT CHANGE UP (ref 7–23)
CALCIUM SERPL-MCNC: 9.8 MG/DL — SIGNIFICANT CHANGE UP (ref 8.4–10.5)
CHLORIDE SERPL-SCNC: 102 MMOL/L — SIGNIFICANT CHANGE UP (ref 98–107)
CO2 SERPL-SCNC: 25 MMOL/L — SIGNIFICANT CHANGE UP (ref 22–31)
CREAT SERPL-MCNC: 0.56 MG/DL — SIGNIFICANT CHANGE UP (ref 0.5–1.3)
EGFR: 96 ML/MIN/1.73M2 — SIGNIFICANT CHANGE UP
GLUCOSE SERPL-MCNC: 93 MG/DL — SIGNIFICANT CHANGE UP (ref 70–99)
HCT VFR BLD CALC: 30 % — LOW (ref 34.5–45)
HGB BLD-MCNC: 9.8 G/DL — LOW (ref 11.5–15.5)
MAGNESIUM SERPL-MCNC: 1.9 MG/DL — SIGNIFICANT CHANGE UP (ref 1.6–2.6)
MCHC RBC-ENTMCNC: 26.6 PG — LOW (ref 27–34)
MCHC RBC-ENTMCNC: 32.7 GM/DL — SIGNIFICANT CHANGE UP (ref 32–36)
MCV RBC AUTO: 81.3 FL — SIGNIFICANT CHANGE UP (ref 80–100)
NRBC # BLD: 0 /100 WBCS — SIGNIFICANT CHANGE UP (ref 0–0)
NRBC # FLD: 0.02 K/UL — HIGH (ref 0–0)
PHOSPHATE SERPL-MCNC: 3 MG/DL — SIGNIFICANT CHANGE UP (ref 2.5–4.5)
PLATELET # BLD AUTO: 425 K/UL — HIGH (ref 150–400)
POTASSIUM SERPL-MCNC: 3.5 MMOL/L — SIGNIFICANT CHANGE UP (ref 3.5–5.3)
POTASSIUM SERPL-SCNC: 3.5 MMOL/L — SIGNIFICANT CHANGE UP (ref 3.5–5.3)
RBC # BLD: 3.69 M/UL — LOW (ref 3.8–5.2)
RBC # FLD: 16 % — HIGH (ref 10.3–14.5)
SODIUM SERPL-SCNC: 144 MMOL/L — SIGNIFICANT CHANGE UP (ref 135–145)
WBC # BLD: 6.28 K/UL — SIGNIFICANT CHANGE UP (ref 3.8–10.5)
WBC # FLD AUTO: 6.28 K/UL — SIGNIFICANT CHANGE UP (ref 3.8–10.5)

## 2024-06-01 RX ADMIN — SIMETHICONE 80 MILLIGRAM(S): 80 TABLET, CHEWABLE ORAL at 11:59

## 2024-06-01 RX ADMIN — SIMETHICONE 80 MILLIGRAM(S): 80 TABLET, CHEWABLE ORAL at 23:10

## 2024-06-01 RX ADMIN — Medication 1 TABLET(S): at 19:14

## 2024-06-01 RX ADMIN — CHLORHEXIDINE GLUCONATE 1 APPLICATION(S): 213 SOLUTION TOPICAL at 11:59

## 2024-06-01 RX ADMIN — Medication 1 TABLET(S): at 09:38

## 2024-06-01 RX ADMIN — PANTOPRAZOLE SODIUM 40 MILLIGRAM(S): 20 TABLET, DELAYED RELEASE ORAL at 06:31

## 2024-06-01 RX ADMIN — AMLODIPINE BESYLATE 2.5 MILLIGRAM(S): 2.5 TABLET ORAL at 21:34

## 2024-06-01 RX ADMIN — OXYCODONE HYDROCHLORIDE 5 MILLIGRAM(S): 5 TABLET ORAL at 12:14

## 2024-06-01 RX ADMIN — OXYCODONE HYDROCHLORIDE 5 MILLIGRAM(S): 5 TABLET ORAL at 13:14

## 2024-06-01 RX ADMIN — SIMETHICONE 80 MILLIGRAM(S): 80 TABLET, CHEWABLE ORAL at 19:14

## 2024-06-01 RX ADMIN — PANTOPRAZOLE SODIUM 40 MILLIGRAM(S): 20 TABLET, DELAYED RELEASE ORAL at 19:13

## 2024-06-01 NOTE — PROGRESS NOTE ADULT - ASSESSMENT
73-year-old female past medical history of peripheral arterial disease on Xarelto, history of breast cancer with right-sided lumpectomy, bladder cancer with spinal metastasis, history of recurrent diverticulitis last time admitted to the hospital in January 2024 with perforated diverticulitis resulting in a psoas abscess treated on IV antibiotics.	Patient reports ongoing anorexia, episodic abdominal pain diffuse in nature, and now presents to the ED with concerns of diarrhea and generalized weakness.	Patient having 3-4 watery bowel movements a day.  Also mentions having fever for the past few days documented to 101 °F.Denies headaches, neck pain, sore throat, chest pain, difficulty in breathing, vomitings, urinary complaints, skin rash/lesions.    Abdominal pain  - sec to colitis  - hx of diverticulitis and psoas abscess  - cw zosyn  - ID fu appreciated  - Will recall GI for ? rectal bleed     Breast cancer, bladder cancer  - fu with heme-onc as outpt   - will monitor     PVD  - cw  xarelto 2.5 bid  - LE doppler neg  - XR foot neg    HTN  - cw norvasc  - DASH diet       Speech difficulty  - she says she cant talk properly because of opoids  - CT head neg  - resolved     Knee pain  - OA  - PT and outpt fu   - CT neg      case dw pt everyday at length, pt with multiple somatic complaints everyday

## 2024-06-01 NOTE — PROGRESS NOTE ADULT - SUBJECTIVE AND OBJECTIVE BOX
Patient is a 73y old  Female who presents with a chief complaint of abdominal pain (31 May 2024 15:10)    Date of servie : 06-01-24 @ 18:53  INTERVAL HPI/OVERNIGHT EVENTS:  T(C): 36.4 (06-01-24 @ 16:51), Max: 36.7 (06-01-24 @ 10:30)  HR: 59 (06-01-24 @ 16:51) (59 - 74)  BP: 117/77 (06-01-24 @ 16:51) (117/77 - 139/78)  RR: 18 (06-01-24 @ 16:51) (18 - 18)  SpO2: 97% (06-01-24 @ 16:51) (95% - 98%)  Wt(kg): --  I&O's Summary    31 May 2024 07:01  -  01 Jun 2024 07:00  --------------------------------------------------------  IN: 900 mL / OUT: 0 mL / NET: 900 mL        LABS:                        9.8    6.28  )-----------( 425      ( 01 Jun 2024 06:11 )             30.0     06-01    144  |  102  |  12  ----------------------------<  93  3.5   |  25  |  0.56    Ca    9.8      01 Jun 2024 06:11  Phos  3.0     06-01  Mg     1.90     06-01        Urinalysis Basic - ( 01 Jun 2024 06:11 )    Color: x / Appearance: x / SG: x / pH: x  Gluc: 93 mg/dL / Ketone: x  / Bili: x / Urobili: x   Blood: x / Protein: x / Nitrite: x   Leuk Esterase: x / RBC: x / WBC x   Sq Epi: x / Non Sq Epi: x / Bacteria: x      CAPILLARY BLOOD GLUCOSE            Urinalysis Basic - ( 01 Jun 2024 06:11 )    Color: x / Appearance: x / SG: x / pH: x  Gluc: 93 mg/dL / Ketone: x  / Bili: x / Urobili: x   Blood: x / Protein: x / Nitrite: x   Leuk Esterase: x / RBC: x / WBC x   Sq Epi: x / Non Sq Epi: x / Bacteria: x        MEDICATIONS  (STANDING):  amLODIPine   Tablet 2.5 milliGRAM(s) Oral at bedtime  chlorhexidine 2% Cloths 1 Application(s) Topical daily  lactobacillus acidophilus 1 Tablet(s) Oral two times a day with meals  pantoprazole  Injectable 40 milliGRAM(s) IV Push two times a day  simethicone 80 milliGRAM(s) Chew every 6 hours  sodium chloride 0.9%. 1000 milliLiter(s) (75 mL/Hr) IV Continuous <Continuous>    MEDICATIONS  (PRN):  oxyCODONE    IR 5 milliGRAM(s) Oral every 4 hours PRN Moderate Pain (4-6) and Severe Pain (7-10)          PHYSICAL EXAM:  GENERAL: NAD, well-groomed, well-developed  HEAD:  Atraumatic, Normocephalic  CHEST/LUNG: Clear to percussion bilaterally; No rales, rhonchi, wheezing, or rubs  HEART: Regular rate and rhythm; No murmurs, rubs, or gallops  ABDOMEN: Soft, Nontender, Nondistended; Bowel sounds present  EXTREMITIES:  2+ Peripheral Pulses, No clubbing, cyanosis, or edema  LYMPH: No lymphadenopathy noted  SKIN: No rashes or lesions    Care Discussed with Consultants/Other Providers [ ] YES  [ ] NO

## 2024-06-02 LAB
ANION GAP SERPL CALC-SCNC: 13 MMOL/L — SIGNIFICANT CHANGE UP (ref 7–14)
BUN SERPL-MCNC: 13 MG/DL — SIGNIFICANT CHANGE UP (ref 7–23)
CALCIUM SERPL-MCNC: 9.5 MG/DL — SIGNIFICANT CHANGE UP (ref 8.4–10.5)
CHLORIDE SERPL-SCNC: 102 MMOL/L — SIGNIFICANT CHANGE UP (ref 98–107)
CO2 SERPL-SCNC: 25 MMOL/L — SIGNIFICANT CHANGE UP (ref 22–31)
CREAT SERPL-MCNC: 0.53 MG/DL — SIGNIFICANT CHANGE UP (ref 0.5–1.3)
EGFR: 97 ML/MIN/1.73M2 — SIGNIFICANT CHANGE UP
GLUCOSE SERPL-MCNC: 83 MG/DL — SIGNIFICANT CHANGE UP (ref 70–99)
HCT VFR BLD CALC: 32.2 % — LOW (ref 34.5–45)
HGB BLD-MCNC: 10 G/DL — LOW (ref 11.5–15.5)
MAGNESIUM SERPL-MCNC: 1.9 MG/DL — SIGNIFICANT CHANGE UP (ref 1.6–2.6)
MCHC RBC-ENTMCNC: 26.2 PG — LOW (ref 27–34)
MCHC RBC-ENTMCNC: 31.1 GM/DL — LOW (ref 32–36)
MCV RBC AUTO: 84.5 FL — SIGNIFICANT CHANGE UP (ref 80–100)
NRBC # BLD: 0 /100 WBCS — SIGNIFICANT CHANGE UP (ref 0–0)
NRBC # FLD: 0 K/UL — SIGNIFICANT CHANGE UP (ref 0–0)
PHOSPHATE SERPL-MCNC: 2.9 MG/DL — SIGNIFICANT CHANGE UP (ref 2.5–4.5)
PLATELET # BLD AUTO: 434 K/UL — HIGH (ref 150–400)
POTASSIUM SERPL-MCNC: 3.6 MMOL/L — SIGNIFICANT CHANGE UP (ref 3.5–5.3)
POTASSIUM SERPL-SCNC: 3.6 MMOL/L — SIGNIFICANT CHANGE UP (ref 3.5–5.3)
RBC # BLD: 3.81 M/UL — SIGNIFICANT CHANGE UP (ref 3.8–5.2)
RBC # FLD: 16.1 % — HIGH (ref 10.3–14.5)
SODIUM SERPL-SCNC: 140 MMOL/L — SIGNIFICANT CHANGE UP (ref 135–145)
WBC # BLD: 6.43 K/UL — SIGNIFICANT CHANGE UP (ref 3.8–10.5)
WBC # FLD AUTO: 6.43 K/UL — SIGNIFICANT CHANGE UP (ref 3.8–10.5)

## 2024-06-02 PROCEDURE — 99231 SBSQ HOSP IP/OBS SF/LOW 25: CPT | Mod: GC

## 2024-06-02 RX ADMIN — AMLODIPINE BESYLATE 2.5 MILLIGRAM(S): 2.5 TABLET ORAL at 22:07

## 2024-06-02 RX ADMIN — SIMETHICONE 80 MILLIGRAM(S): 80 TABLET, CHEWABLE ORAL at 22:07

## 2024-06-02 RX ADMIN — SIMETHICONE 80 MILLIGRAM(S): 80 TABLET, CHEWABLE ORAL at 11:22

## 2024-06-02 RX ADMIN — OXYCODONE HYDROCHLORIDE 5 MILLIGRAM(S): 5 TABLET ORAL at 10:45

## 2024-06-02 RX ADMIN — SIMETHICONE 80 MILLIGRAM(S): 80 TABLET, CHEWABLE ORAL at 18:37

## 2024-06-02 RX ADMIN — SODIUM CHLORIDE 75 MILLILITER(S): 9 INJECTION INTRAMUSCULAR; INTRAVENOUS; SUBCUTANEOUS at 22:08

## 2024-06-02 RX ADMIN — CHLORHEXIDINE GLUCONATE 1 APPLICATION(S): 213 SOLUTION TOPICAL at 11:22

## 2024-06-02 RX ADMIN — SIMETHICONE 80 MILLIGRAM(S): 80 TABLET, CHEWABLE ORAL at 06:11

## 2024-06-02 RX ADMIN — PANTOPRAZOLE SODIUM 40 MILLIGRAM(S): 20 TABLET, DELAYED RELEASE ORAL at 06:11

## 2024-06-02 RX ADMIN — Medication 1 TABLET(S): at 18:37

## 2024-06-02 RX ADMIN — OXYCODONE HYDROCHLORIDE 5 MILLIGRAM(S): 5 TABLET ORAL at 11:45

## 2024-06-02 RX ADMIN — Medication 1 TABLET(S): at 08:39

## 2024-06-02 RX ADMIN — PANTOPRAZOLE SODIUM 40 MILLIGRAM(S): 20 TABLET, DELAYED RELEASE ORAL at 18:37

## 2024-06-02 NOTE — PROGRESS NOTE ADULT - ATTENDING COMMENTS
Pleasant 73F PAD on AC, history of recurrent diverticulitis c/b perforated diverticulitis & a psoas abscess s/p IV antibiotics (1/2024), p/w diarrhea/bloody stools, GI PCR negative, C diff not checked, CTAP 5/24 w/ "distal sigmoid colon and rectal wall thickening with adjacent fat stranding." Attributed to SCAD vs infectious colitis. GI re-consulted for diarrhea and bloody stools. Patient states she is not having these symptoms. Had a non-bloody "smear" of stool yesterday morning, no bowel movements since. She is happy with her progress. CBC stable without transfusion, BMP wnl, HDS. Ok for regular diet. No GI intervention planned, GI will sign off, patient should follow up with GI in the outpatient setting.     Follow up in Gastroenterology Clinic: [phone] ([facility] at [address] [place] [address]) or [phone] ([facility] at [address] [place] [address]) or [phone] ([facility] at [address] [place] [address])  or [place] Office: [phone] ([address] [place] [address]. [address] [place], [address]). Pleasant 73F PAD on AC, history of recurrent diverticulitis c/b perforated diverticulitis & a psoas abscess s/p IV antibiotics (1/2024), p/w diarrhea/bloody stools, GI PCR negative, C diff not checked, CTAP 5/24 w/ "distal sigmoid colon and rectal wall thickening with adjacent fat stranding." Attributed to SCAD vs infectious colitis. GI re-consulted for diarrhea and bloody stools. Patient states she is not having these symptoms. Had a non-bloody "smear" of stool yesterday morning, no bowel movements since. She is happy with her progress. Discussed high fiber diet to prevent diverticular complications in the future. CBC stable without transfusion, BMP wnl, HDS. Ok for regular diet. No GI intervention planned, GI will sign off, patient should follow up with GI in the outpatient setting.     Follow up in Gastroenterology Clinic: [phone] ([facility] at [address] [place] [address]) or [phone] ([facility] at [address] [place] [address]) or [phone] ([facility] at [address] [place] [address])  or [place] Office: [phone] ([address] [place] [address]. [place], [address]).

## 2024-06-02 NOTE — PROGRESS NOTE ADULT - ASSESSMENT
73-year-old female past medical history of peripheral arterial disease on Xarelto, history of breast cancer with right-sided lumpectomy, bladder cancer with spinal metastasis, history of recurrent diverticulitis c/b perforated diverticulitis & a psoas abscess s/p IV antibiotics (1/2024), presented to the ED with concerns of diarrhea and generalized weakness.    Assessment  #Diarrhea & abdominal pain, proctocolitis, now resolved; ddx includes infectious colitis, SCAD from diverticulosis, less likely IBD or colon neoplasms  #Leg/foot pain  - Diarrhea and abdominal pain have improved; last BM >1 day ago; no hematochezia  - Patient was more concerned about her leg/foot pain, and would like to concentrate on the management of her MSK issues  - GI PCR negative.     Recommendations  - No indication for urgent endoscopic evaluation at this time; continues to report resolution of her diarrhea; no hematochezia  - Symptomatic support per primary team  - GI team will sign off at this time. Please reconsult as needed.     Note and recommendations are incomplete until reviewed and attested by attending.    Trenton Montesinos MD  GI/Hepatology Fellow, PGY4  Long Range Pager [phone] or Vascular Closure Pager [phone]  Teams preferred (7AM to 5PM); after 5PM, call GI fellow on call    On Weekends/Holidays (All Day) and Weekdays after 5PM to 8AM  For non-urgent consults, please email [email or web address] and [email or web address]  For urgent consults, please contact on call GI team. See Amion schedule ([facility]), [employer] paging system ([facility]), or call hospital  ([facility])

## 2024-06-02 NOTE — PROGRESS NOTE ADULT - ASSESSMENT
73-year-old female past medical history of peripheral arterial disease on Xarelto, history of breast cancer with right-sided lumpectomy, bladder cancer with spinal metastasis, history of recurrent diverticulitis last time admitted to the hospital in January 2024 with perforated diverticulitis resulting in a psoas abscess treated on IV antibiotics.	Patient reports ongoing anorexia, episodic abdominal pain diffuse in nature, and now presents to the ED with concerns of diarrhea and generalized weakness.	Patient having 3-4 watery bowel movements a day.  Also mentions having fever for the past few days documented to 101 °F.Denies headaches, neck pain, sore throat, chest pain, difficulty in breathing, vomitings, urinary complaints, skin rash/lesions.    Abdominal pain  - sec to colitis  - hx of diverticulitis and psoas abscess  - cw zosyn  - ID fu appreciated    Breast cancer, bladder cancer  - fu with heme-onc as outpt   - will monitor     PVD  - cw  xarelto 2.5 bid  - LE doppler neg  - XR foot neg    HTN  - cw norvasc  - DASH diet       Speech difficulty  - she says she cant talk properly because of opoids  - CT head neg    Knee pain  - OA  - PT and outpt fu     Rectal bleed  - GI fu  - pt says it is resolved now       dc planning

## 2024-06-02 NOTE — PROGRESS NOTE ADULT - SUBJECTIVE AND OBJECTIVE BOX
Patient is a 73y old  Female who presents with a chief complaint of abdominal pain (01 Jun 2024 18:53)    Date of servie : 06-02-24 @ 10:57  INTERVAL HPI/OVERNIGHT EVENTS:  T(C): 36.4 (06-02-24 @ 06:18), Max: 36.7 (06-01-24 @ 14:30)  HR: 80 (06-02-24 @ 06:18) (58 - 80)  BP: 115/71 (06-02-24 @ 06:18) (115/71 - 160/50)  RR: 18 (06-02-24 @ 06:18) (18 - 18)  SpO2: 98% (06-02-24 @ 06:18) (96% - 98%)  Wt(kg): --  I&O's Summary      LABS:                        10.0   6.43  )-----------( 434      ( 02 Jun 2024 06:40 )             32.2     06-02    140  |  102  |  13  ----------------------------<  83  3.6   |  25  |  0.53    Ca    9.5      02 Jun 2024 06:40  Phos  2.9     06-02  Mg     1.90     06-02        Urinalysis Basic - ( 02 Jun 2024 06:40 )    Color: x / Appearance: x / SG: x / pH: x  Gluc: 83 mg/dL / Ketone: x  / Bili: x / Urobili: x   Blood: x / Protein: x / Nitrite: x   Leuk Esterase: x / RBC: x / WBC x   Sq Epi: x / Non Sq Epi: x / Bacteria: x      CAPILLARY BLOOD GLUCOSE            Urinalysis Basic - ( 02 Jun 2024 06:40 )    Color: x / Appearance: x / SG: x / pH: x  Gluc: 83 mg/dL / Ketone: x  / Bili: x / Urobili: x   Blood: x / Protein: x / Nitrite: x   Leuk Esterase: x / RBC: x / WBC x   Sq Epi: x / Non Sq Epi: x / Bacteria: x        MEDICATIONS  (STANDING):  amLODIPine   Tablet 2.5 milliGRAM(s) Oral at bedtime  chlorhexidine 2% Cloths 1 Application(s) Topical daily  lactobacillus acidophilus 1 Tablet(s) Oral two times a day with meals  pantoprazole  Injectable 40 milliGRAM(s) IV Push two times a day  simethicone 80 milliGRAM(s) Chew every 6 hours  sodium chloride 0.9%. 1000 milliLiter(s) (75 mL/Hr) IV Continuous <Continuous>    MEDICATIONS  (PRN):  oxyCODONE    IR 5 milliGRAM(s) Oral every 4 hours PRN Moderate Pain (4-6) and Severe Pain (7-10)          PHYSICAL EXAM:  GENERAL: NAD, well-groomed, well-developed  HEAD:  Atraumatic, Normocephalic  CHEST/LUNG: Clear to percussion bilaterally; No rales, rhonchi, wheezing, or rubs  HEART: Regular rate and rhythm; No murmurs, rubs, or gallops  ABDOMEN: Soft, Nontender, Nondistended; Bowel sounds present  EXTREMITIES:  2+ Peripheral Pulses, No clubbing, cyanosis, or edema  LYMPH: No lymphadenopathy noted  SKIN: No rashes or lesions    Care Discussed with Consultants/Other Providers [ ] YES  [ ] NO

## 2024-06-02 NOTE — PROGRESS NOTE ADULT - SUBJECTIVE AND OBJECTIVE BOX
Chief Complaint:  Patient is a 73y old  Female who presents with a chief complaint of abdominal pain (2024 12:10)      Interval Events:   GI recalled for diarrhea, hematochezia  pt last BM >1 day ago; no further bleeding per patient    Allergies:  sulfa drugs (Unknown)        Hospital Medications:  amLODIPine   Tablet 2.5 milliGRAM(s) Oral at bedtime  chlorhexidine 2% Cloths 1 Application(s) Topical daily  lactobacillus acidophilus 1 Tablet(s) Oral two times a day with meals  oxyCODONE    IR 5 milliGRAM(s) Oral every 4 hours PRN  pantoprazole  Injectable 40 milliGRAM(s) IV Push two times a day  simethicone 80 milliGRAM(s) Chew every 6 hours  sodium chloride 0.9%. 1000 milliLiter(s) IV Continuous <Continuous>      PMHX/PSHX:  Anxiety    Breast CA, left    Hypertension    Sleep apnea    Irritable bowel syndrome (IBS)    Polyp of colon    HLD (hyperlipidemia)    Bladder polyp    S/P     S/P colon resection    S/P hysterectomy    Breast lump    History of surgery    H/O lumpectomy    Personal history of spine surgery        Family history:  Family history of coronary artery disease (Mother)        PHYSICAL EXAM:   Vital Signs:  Vital Signs Last 24 Hrs  T(C): 37.1 (2024 12:52), Max: 37.1 (2024 12:52)  T(F): 98.7 (2024 12:52), Max: 98.7 (2024 12:52)  HR: 98 (2024 13:32) (58 - 109)  BP: 130/88 (2024 12:52) (115/71 - 160/50)  BP(mean): --  RR: 16 (2024 12:52) (16 - 18)  SpO2: 99% (2024 12:52) (96% - 99%)    Parameters below as of 2024 12:52  Patient On (Oxygen Delivery Method): room air      Daily     Daily     GENERAL:  No acute distress  HEENT:  no scleral icterus  CHEST:  no accessory muscle use  HEART:  Regular rate and rhythm  ABDOMEN:  Soft, non-tender, non-distended  EXTREMITIES:  No edema  SKIN:  No rash/ecchymoses  NEURO:  Alert and oriented x 3    LABS:                        10.0   6.43  )-----------( 434      ( 2024 06:40 )             32.2     Mean Cell Volume: 84.5 fL (-24 @ 06:40)        140  |  102  |  13  ----------------------------<  83  3.6   |  25  |  0.53    Ca    9.5      2024 06:40  Phos  2.9       Mg     1.90               Urinalysis Basic - ( 2024 06:40 )    Color: x / Appearance: x / SG: x / pH: x  Gluc: 83 mg/dL / Ketone: x  / Bili: x / Urobili: x   Blood: x / Protein: x / Nitrite: x   Leuk Esterase: x / RBC: x / WBC x   Sq Epi: x / Non Sq Epi: x / Bacteria: x                              10.0   6.43  )-----------( 434      ( 2024 06:40 )             32.2                         9.8    6.28  )-----------( 425      ( 2024 06:11 )             30.0                         10.4   6.59  )-----------( 478      ( 31 May 2024 20:00 )             31.8                         9.5    7.11  )-----------( 410      ( 31 May 2024 05:23 )             30.0

## 2024-06-03 LAB
ANION GAP SERPL CALC-SCNC: 10 MMOL/L — SIGNIFICANT CHANGE UP (ref 7–14)
BUN SERPL-MCNC: 16 MG/DL — SIGNIFICANT CHANGE UP (ref 7–23)
CALCIUM SERPL-MCNC: 8.8 MG/DL — SIGNIFICANT CHANGE UP (ref 8.4–10.5)
CHLORIDE SERPL-SCNC: 104 MMOL/L — SIGNIFICANT CHANGE UP (ref 98–107)
CO2 SERPL-SCNC: 25 MMOL/L — SIGNIFICANT CHANGE UP (ref 22–31)
CREAT SERPL-MCNC: 0.62 MG/DL — SIGNIFICANT CHANGE UP (ref 0.5–1.3)
EGFR: 93 ML/MIN/1.73M2 — SIGNIFICANT CHANGE UP
GLUCOSE BLDC GLUCOMTR-MCNC: 122 MG/DL — HIGH (ref 70–99)
GLUCOSE SERPL-MCNC: 116 MG/DL — HIGH (ref 70–99)
HCT VFR BLD CALC: 29.4 % — LOW (ref 34.5–45)
HGB BLD-MCNC: 9.5 G/DL — LOW (ref 11.5–15.5)
MAGNESIUM SERPL-MCNC: 1.9 MG/DL — SIGNIFICANT CHANGE UP (ref 1.6–2.6)
MCHC RBC-ENTMCNC: 27.1 PG — SIGNIFICANT CHANGE UP (ref 27–34)
MCHC RBC-ENTMCNC: 32.3 GM/DL — SIGNIFICANT CHANGE UP (ref 32–36)
MCV RBC AUTO: 84 FL — SIGNIFICANT CHANGE UP (ref 80–100)
NRBC # BLD: 0 /100 WBCS — SIGNIFICANT CHANGE UP (ref 0–0)
NRBC # FLD: 0 K/UL — SIGNIFICANT CHANGE UP (ref 0–0)
PHOSPHATE SERPL-MCNC: 2.3 MG/DL — LOW (ref 2.5–4.5)
PLATELET # BLD AUTO: 376 K/UL — SIGNIFICANT CHANGE UP (ref 150–400)
POTASSIUM SERPL-MCNC: 3.7 MMOL/L — SIGNIFICANT CHANGE UP (ref 3.5–5.3)
POTASSIUM SERPL-SCNC: 3.7 MMOL/L — SIGNIFICANT CHANGE UP (ref 3.5–5.3)
RBC # BLD: 3.5 M/UL — LOW (ref 3.8–5.2)
RBC # FLD: 16.4 % — HIGH (ref 10.3–14.5)
SODIUM SERPL-SCNC: 139 MMOL/L — SIGNIFICANT CHANGE UP (ref 135–145)
WBC # BLD: 8.23 K/UL — SIGNIFICANT CHANGE UP (ref 3.8–10.5)
WBC # FLD AUTO: 8.23 K/UL — SIGNIFICANT CHANGE UP (ref 3.8–10.5)

## 2024-06-03 RX ORDER — RIVAROXABAN 15 MG-20MG
2.5 KIT ORAL
Refills: 0 | Status: DISCONTINUED | OUTPATIENT
Start: 2024-06-03 | End: 2024-06-04

## 2024-06-03 RX ORDER — CILOSTAZOL 100 MG/1
50 TABLET ORAL
Refills: 0 | Status: DISCONTINUED | OUTPATIENT
Start: 2024-06-03 | End: 2024-06-04

## 2024-06-03 RX ORDER — SODIUM,POTASSIUM PHOSPHATES 278-250MG
1 POWDER IN PACKET (EA) ORAL
Refills: 0 | Status: COMPLETED | OUTPATIENT
Start: 2024-06-03 | End: 2024-06-04

## 2024-06-03 RX ADMIN — CILOSTAZOL 50 MILLIGRAM(S): 100 TABLET ORAL at 18:03

## 2024-06-03 RX ADMIN — SIMETHICONE 80 MILLIGRAM(S): 80 TABLET, CHEWABLE ORAL at 18:03

## 2024-06-03 RX ADMIN — SODIUM CHLORIDE 75 MILLILITER(S): 9 INJECTION INTRAMUSCULAR; INTRAVENOUS; SUBCUTANEOUS at 05:10

## 2024-06-03 RX ADMIN — Medication 1 TABLET(S): at 18:02

## 2024-06-03 RX ADMIN — PANTOPRAZOLE SODIUM 40 MILLIGRAM(S): 20 TABLET, DELAYED RELEASE ORAL at 18:03

## 2024-06-03 RX ADMIN — RIVAROXABAN 2.5 MILLIGRAM(S): KIT at 18:03

## 2024-06-03 RX ADMIN — SIMETHICONE 80 MILLIGRAM(S): 80 TABLET, CHEWABLE ORAL at 12:03

## 2024-06-03 RX ADMIN — OXYCODONE HYDROCHLORIDE 5 MILLIGRAM(S): 5 TABLET ORAL at 05:10

## 2024-06-03 RX ADMIN — SODIUM CHLORIDE 75 MILLILITER(S): 9 INJECTION INTRAMUSCULAR; INTRAVENOUS; SUBCUTANEOUS at 12:03

## 2024-06-03 RX ADMIN — CHLORHEXIDINE GLUCONATE 1 APPLICATION(S): 213 SOLUTION TOPICAL at 18:08

## 2024-06-03 RX ADMIN — SIMETHICONE 80 MILLIGRAM(S): 80 TABLET, CHEWABLE ORAL at 05:10

## 2024-06-03 RX ADMIN — PANTOPRAZOLE SODIUM 40 MILLIGRAM(S): 20 TABLET, DELAYED RELEASE ORAL at 05:10

## 2024-06-03 RX ADMIN — AMLODIPINE BESYLATE 2.5 MILLIGRAM(S): 2.5 TABLET ORAL at 22:05

## 2024-06-03 RX ADMIN — Medication 1 TABLET(S): at 09:17

## 2024-06-03 RX ADMIN — Medication 1 TABLET(S): at 18:03

## 2024-06-03 NOTE — PROGRESS NOTE ADULT - SUBJECTIVE AND OBJECTIVE BOX
[facility], Division of Infectious Diseases  DA Ortez Y. Patel, S. Shah, G. Adarsh  [phone]  ([phone] - weekdays after 5pm and weekends)    Name: KIM ACKERMAN  Age/Gender: 73y Female  MRN: [id number]    Interval History:  Notes reviewed.   No concerning overnight events.  Afebrile.   denies abd pain or diarrhea    Allergies: sulfa drugs (Unknown)      Objective:  Vitals:   T(F): 98.3 (06-03-24 @ 12:33), Max: 98.7 (06-02-24 @ 12:52)  HR: 67 (06-03-24 @ 12:33) (61 - 109)  BP: 110/63 (06-03-24 @ 12:33) (103/66 - 130/88)  RR: 19 (06-03-24 @ 12:33) (16 - 19)  SpO2: 97% (06-03-24 @ 12:33) (97% - 100%)  Physical Examination:  General: no acute distress  HEENT: anicteric  Cardio: S1, S2, normal rate  Resp: breathing comfortably on RA   Abd: soft, NT, ND  Ext: no LE edema  Skin: warm, dry    Laboratory Studies:  CBC:                       9.5    8.23  )-----------( 376      ( 03 Jun 2024 06:04 )             29.4     WBC Trend:  8.23 06-03-24 @ 06:04  6.43 06-02-24 @ 06:40  6.28 06-01-24 @ 06:11  6.59 05-31-24 @ 20:00  7.11 05-31-24 @ 05:23  6.58 05-30-24 @ 06:22  9.02 05-29-24 @ 05:37  6.74 05-28-24 @ 03:45    CMP: 06-03    139  |  104  |  16  ----------------------------<  116<H>  3.7   |  25  |  0.62    Ca    8.8      03 Jun 2024 06:04  Phos  2.3     06-03  Mg     1.90     06-03            Urinalysis Basic - ( 03 Jun 2024 06:04 )    Color: x / Appearance: x / SG: x / pH: x  Gluc: 116 mg/dL / Ketone: x  / Bili: x / Urobili: x   Blood: x / Protein: x / Nitrite: x   Leuk Esterase: x / RBC: x / WBC x   Sq Epi: x / Non Sq Epi: x / Bacteria: x      Microbiology: reviewed     Culture - Stool (collected 05-27-24 @ 08:32)  Source: .Stool mendez trina  Final Report (05-29-24 @ 10:10):    No enteric pathogens isolated.    (Stool culture examined for Salmonella,    Shigella, Campylobacter, Aeromonas, Plesiomonas,    Vibrio, E.coli O157 and Yersinia)    Culture - Urine (collected 05-24-24 @ 21:55)  Source: Clean Catch Clean Catch (Midstream)  Final Report (05-26-24 @ 22:46):    <10,000 CFU/mL Normal Urogenital Carol    Culture - Blood (collected 05-24-24 @ 19:11)  Source: .Blood Blood-Peripheral  Final Report (05-30-24 @ 02:00):    No growth at 5 days    Culture - Blood (collected 05-24-24 @ 19:00)  Source: .Blood Blood-Peripheral  Final Report (05-30-24 @ 02:00):    No growth at 5 days        Radiology: reviewed     Medications:  amLODIPine   Tablet 2.5 milliGRAM(s) Oral at bedtime  chlorhexidine 2% Cloths 1 Application(s) Topical daily  cilostazol 50 milliGRAM(s) Oral two times a day  lactobacillus acidophilus 1 Tablet(s) Oral two times a day with meals  oxyCODONE    IR 5 milliGRAM(s) Oral every 4 hours PRN  pantoprazole  Injectable 40 milliGRAM(s) IV Push two times a day  potassium phosphate / sodium phosphate Tablet (K-PHOS No. 2) 1 Tablet(s) Oral two times a day  rivaroxaban 2.5 milliGRAM(s) Oral two times a day  simethicone 80 milliGRAM(s) Chew every 6 hours  sodium chloride 0.9%. 1000 milliLiter(s) IV Continuous <Continuous>    Antimicrobials:

## 2024-06-03 NOTE — PROGRESS NOTE ADULT - ASSESSMENT
74 y/o F PMhx PAD, history of breast cancer with right-sided lumpectomy, bladder cancer with spinal metastasis, recurrent diverticulitis recently hospitalized 1/2024 with perforated diverticulitis resulting in a psoas abscess who presents w/ abd pain.    LLQ abd pain & fever resolved  blood cultures- NGTD  GI PCR negative  CT- More extensive colonic thickening from left colon through rectum suggests nonspecific proctocolitis. No discrete focal drainable collection. Ill-defined sinus tracts anterior to the left psoas related to previous complicated sigmoid diverticulitis. Mild left hydroureteronephrosis to the level of the UVJ, likely reactive to left lower quadrant inflammation.    abd pain and diarrhea has resolved  s/p GI eval    Recommendations  s/p 7 day course of antibiotics, completed 5/31  continue off antibiotics    We will sign off. Thank you for allowing us to participate in the care of Ms. Angelo. Please feel free to call with any questions or concerns.     Klever Altamirano M.D.  [facility], Division of Infectious Diseases  [phone]  After 5pm on weekdays and all day on weekends - please call [phone]

## 2024-06-03 NOTE — CHART NOTE - NSCHARTNOTEFT_GEN_A_CORE
Patient will not be receiving chemotherapy while at [facility]. She will follow up with Dr. Richter upon discharge from [facility].     d/w Dr. Garza    ***************************************************************  Constance Troncoso, PGY4  Fellow Hematology/Oncology  pager: [phone]   Available on Microsoft Teams  After 5pm or on weekends please contact  to page on-call fellow   ***************************************************************
Extensive conversation with patient and family at bedside. Patient states " she is concerned because she takes one step forward and one step backwards". Patient reporting ongoing intermittent rectal bleeding x 2 days. Gi signed off on may 28th. Patient has completed IV antibiotics course and awaiting ALLISON per chart. Will reconsult GI at this time. All medical questions answered to the best of my ability. Patient and family in agreement with plan. Case discussed with medical attending.

## 2024-06-03 NOTE — PROGRESS NOTE ADULT - SUBJECTIVE AND OBJECTIVE BOX
Patient is a 73y old  Female who presents with a chief complaint of abdominal pain (03 Jun 2024 13:45)    Date of servie : 06-03-24 @ 14:51  INTERVAL HPI/OVERNIGHT EVENTS:  T(C): 36.8 (06-03-24 @ 12:33), Max: 36.9 (06-03-24 @ 01:07)  HR: 67 (06-03-24 @ 12:33) (61 - 71)  BP: 110/63 (06-03-24 @ 12:33) (103/66 - 120/66)  RR: 19 (06-03-24 @ 12:33) (17 - 19)  SpO2: 97% (06-03-24 @ 12:33) (97% - 100%)  Wt(kg): --  I&O's Summary      LABS:                        9.5    8.23  )-----------( 376      ( 03 Jun 2024 06:04 )             29.4     06-03    139  |  104  |  16  ----------------------------<  116<H>  3.7   |  25  |  0.62    Ca    8.8      03 Jun 2024 06:04  Phos  2.3     06-03  Mg     1.90     06-03        Urinalysis Basic - ( 03 Jun 2024 06:04 )    Color: x / Appearance: x / SG: x / pH: x  Gluc: 116 mg/dL / Ketone: x  / Bili: x / Urobili: x   Blood: x / Protein: x / Nitrite: x   Leuk Esterase: x / RBC: x / WBC x   Sq Epi: x / Non Sq Epi: x / Bacteria: x      CAPILLARY BLOOD GLUCOSE      POCT Blood Glucose.: 122 mg/dL (03 Jun 2024 08:27)        Urinalysis Basic - ( 03 Jun 2024 06:04 )    Color: x / Appearance: x / SG: x / pH: x  Gluc: 116 mg/dL / Ketone: x  / Bili: x / Urobili: x   Blood: x / Protein: x / Nitrite: x   Leuk Esterase: x / RBC: x / WBC x   Sq Epi: x / Non Sq Epi: x / Bacteria: x        MEDICATIONS  (STANDING):  amLODIPine   Tablet 2.5 milliGRAM(s) Oral at bedtime  chlorhexidine 2% Cloths 1 Application(s) Topical daily  cilostazol 50 milliGRAM(s) Oral two times a day  lactobacillus acidophilus 1 Tablet(s) Oral two times a day with meals  pantoprazole  Injectable 40 milliGRAM(s) IV Push two times a day  potassium phosphate / sodium phosphate Tablet (K-PHOS No. 2) 1 Tablet(s) Oral two times a day  rivaroxaban 2.5 milliGRAM(s) Oral two times a day  simethicone 80 milliGRAM(s) Chew every 6 hours  sodium chloride 0.9%. 1000 milliLiter(s) (75 mL/Hr) IV Continuous <Continuous>    MEDICATIONS  (PRN):  oxyCODONE    IR 5 milliGRAM(s) Oral every 4 hours PRN Moderate Pain (4-6) and Severe Pain (7-10)          PHYSICAL EXAM:  GENERAL: NAD, well-groomed, well-developed  HEAD:  Atraumatic, Normocephalic  CHEST/LUNG: Clear to percussion bilaterally; No rales, rhonchi, wheezing, or rubs  HEART: Regular rate and rhythm; No murmurs, rubs, or gallops  ABDOMEN: Soft, Nontender, Nondistended; Bowel sounds present  EXTREMITIES:  2+ Peripheral Pulses, No clubbing, cyanosis, or edema  LYMPH: No lymphadenopathy noted  SKIN: No rashes or lesions    Care Discussed with Consultants/Other Providers [ ] YES  [ ] NO

## 2024-06-03 NOTE — PROGRESS NOTE ADULT - SUBJECTIVE AND OBJECTIVE BOX
Neurology Progress Note    S: Patient seen and examined. No new events overnight.    MEDICATIONS:    amLODIPine   Tablet 2.5 milliGRAM(s) Oral at bedtime  chlorhexidine 2% Cloths 1 Application(s) Topical daily  cilostazol 50 milliGRAM(s) Oral two times a day  lactobacillus acidophilus 1 Tablet(s) Oral two times a day with meals  oxyCODONE    IR 5 milliGRAM(s) Oral every 4 hours PRN  pantoprazole  Injectable 40 milliGRAM(s) IV Push two times a day  potassium phosphate / sodium phosphate Tablet (K-PHOS No. 2) 1 Tablet(s) Oral two times a day  rivaroxaban 2.5 milliGRAM(s) Oral two times a day  simethicone 80 milliGRAM(s) Chew every 6 hours  sodium chloride 0.9%. 1000 milliLiter(s) IV Continuous <Continuous>      LABS:                          9.5    8.23  )-----------( 376      ( 03 Jun 2024 06:04 )             29.4     06-03    139  |  104  |  16  ----------------------------<  116<H>  3.7   |  25  |  0.62    Ca    8.8      03 Jun 2024 06:04  Phos  2.3     06-03  Mg     1.90     06-03      CAPILLARY BLOOD GLUCOSE      POCT Blood Glucose.: 122 mg/dL (03 Jun 2024 08:27)      Urinalysis Basic - ( 03 Jun 2024 06:04 )    Color: x / Appearance: x / SG: x / pH: x  Gluc: 116 mg/dL / Ketone: x  / Bili: x / Urobili: x   Blood: x / Protein: x / Nitrite: x   Leuk Esterase: x / RBC: x / WBC x   Sq Epi: x / Non Sq Epi: x / Bacteria: x      I&O's Summary    Vital Signs Last 24 Hrs  T(C): 36.8 (03 Jun 2024 12:33), Max: 36.9 (03 Jun 2024 01:07)  T(F): 98.3 (03 Jun 2024 12:33), Max: 98.4 (03 Jun 2024 01:07)  HR: 67 (03 Jun 2024 12:33) (61 - 71)  BP: 110/63 (03 Jun 2024 12:33) (103/66 - 120/66)  BP(mean): --  RR: 19 (03 Jun 2024 12:33) (17 - 19)  SpO2: 97% (03 Jun 2024 12:33) (97% - 100%)    Parameters below as of 03 Jun 2024 12:33  Patient On (Oxygen Delivery Method): room air          General Exam:   General Appearance: Appropriately dressed and in no acute distress       Head: Normocephalic, atraumatic and no dysmorphic features  Ear, Nose, and Throat: Moist mucous membranes  CVS: S1S2+  Resp: No SOB, no wheeze or rhonchi  Abd: soft NTND  Extremities: No edema, no cyanosis  Skin: No bruises, no rashes        Neurological Exam:  Mental Status: Awake, alert and oriented x 3.  Able to follow simple and complex verbal commands. Able to name and repeat. fluent speech. No obvious aphasia or dysarthria noted.   Cranial Nerves: PERRL, EOMI, VFFC, no nystagmus or skew sensation V1-V3 intact,  no obvious facial asymmetry, equal elevation of palate, scm/trap 5/5, tongue is midline on protrusion. hearing is grossly intact.   Motor: Normal bulk, tone and strength throughout. Fine finger movements were intact and symmetric. no tremors or drift noted.    Sensation: Intact to light touch and pinprick throughout. subjective burning sensation on distal L foot. no right/left confusion. no extinction to tactile on DSS.   Reflexes: 1+ throughout at biceps, brachioradialis, triceps, patellars and ankles bilaterally and equal. No clonus. R toe and L toe were both downgoing.  Coordination: No dysmetria on FNF   Gait: deferred, some truncal ataxia on sitting.    I personally reviewed the below data/images/labs:      LABS:                          9.5    7.11  )-----------( 410      ( 31 May 2024 05:23 )             30.0     05-30    142  |  105  |  12  ----------------------------<  100<H>  3.7   |  25  |  0.58    Ca    9.2      30 May 2024 06:22  Phos  2.3     05-30  Mg     1.90     05-30          Urinalysis Basic - ( 30 May 2024 06:22 )    Color: x / Appearance: x / SG: x / pH: x  Gluc: 100 mg/dL / Ketone: x  / Bili: x / Urobili: x   Blood: x / Protein: x / Nitrite: x   Leuk Esterase: x / RBC: x / WBC x   Sq Epi: x / Non Sq Epi: x / Bacteria: x        < from: CT Head No Cont (05.26.24 @ 09:52) >    ACC: [id number] EXAM:  CT BRAIN   ORDERED BY: ANA WONG     PROCEDURE DATE:  05/26/2024          INTERPRETATION:  CLINICAL INDICATION:  AMS    TECHNIQUE: Noncontrast CT examination of the head was performed.  Coronal and sagittal reformats were also obtained.    COMPARISON: There are no prior studies available for comparison.    FINDINGS:  INTRA-AXIAL: No intracranial mass effect, acute hemorrhage, midline shift   or acute transcortical infarct is seen. There are periventricular white   matter hypodensities that are nonspecific in nature but may reflect   chronic ischemic microvascular disease.  EXTRA-AXIAL: No extra-axial fluid collection is present.  VENTRICLES AND SULCI: Parenchymal volume is commensurate with patient   age. No hydrocephalus.  VISUALIZED SINUSES: Mild mucosal thickening.  VISUALIZED MASTOIDS: Clear.  CALVARIUM: Normal.    IMPRESSION:    No evidence of acute intracranial hemorrhage, midline shift or CT   evidence of acute territorial infarct.    Please note thata contrast-enhanced brain MRI is more sensitive for the   evaluation of metastatic disease.    < end of copied text >

## 2024-06-03 NOTE — PROGRESS NOTE ADULT - ASSESSMENT
73-year-old female past medical history of peripheral arterial disease on Xarelto, history of breast cancer with right-sided lumpectomy, bladder cancer with spinal metastasis, history of recurrent diverticulitis last time admitted to the hospital in January 2024 with perforated diverticulitis resulting in a psoas abscess treated on IV antibiotics.	Patient reports ongoing anorexia, episodic abdominal pain diffuse in nature, and now presents to the ED with concerns of diarrhea and generalized weakness.	Patient having 3-4 watery bowel movements a day.  Also mentions having fever for the past few days documented to 101 °F.Denies headaches, neck pain, sore throat, chest pain, difficulty in breathing, vomitings, urinary complaints, skin rash/lesions.    Abdominal pain, prococolitis   - sec to colitis  - hx of diverticulitis and psoas abscess  - ID fu appreciated  - rectal bleed     Breast cancer, bladder cancer  - fu with heme-onc as outpt   - will monitor     PVD  - cw  xarelto 2.5 bid  - LE doppler neg  - XR foot neg    HTN  - cw norvasc  - DASH diet       Speech difficulty  - she says she cant talk properly because of opoids  - CT head neg    Knee pain  - OA  - PT and outpt fu     Rectal bleed  - GI fu  - pt says it is resolved now   - no scope as per GI       dc planning

## 2024-06-04 ENCOUNTER — TRANSCRIPTION ENCOUNTER (OUTPATIENT)
Age: 74
End: 2024-06-04

## 2024-06-04 VITALS
OXYGEN SATURATION: 97 % | RESPIRATION RATE: 18 BRPM | TEMPERATURE: 99 F | HEART RATE: 82 BPM | DIASTOLIC BLOOD PRESSURE: 71 MMHG | SYSTOLIC BLOOD PRESSURE: 108 MMHG

## 2024-06-04 LAB
ANION GAP SERPL CALC-SCNC: 12 MMOL/L — SIGNIFICANT CHANGE UP (ref 7–14)
BUN SERPL-MCNC: 14 MG/DL — SIGNIFICANT CHANGE UP (ref 7–23)
CALCIUM SERPL-MCNC: 9.4 MG/DL — SIGNIFICANT CHANGE UP (ref 8.4–10.5)
CHLORIDE SERPL-SCNC: 101 MMOL/L — SIGNIFICANT CHANGE UP (ref 98–107)
CO2 SERPL-SCNC: 25 MMOL/L — SIGNIFICANT CHANGE UP (ref 22–31)
CREAT SERPL-MCNC: 0.59 MG/DL — SIGNIFICANT CHANGE UP (ref 0.5–1.3)
EGFR: 95 ML/MIN/1.73M2 — SIGNIFICANT CHANGE UP
GLUCOSE SERPL-MCNC: 124 MG/DL — HIGH (ref 70–99)
HCT VFR BLD CALC: 29.6 % — LOW (ref 34.5–45)
HGB BLD-MCNC: 9.6 G/DL — LOW (ref 11.5–15.5)
MAGNESIUM SERPL-MCNC: 1.7 MG/DL — SIGNIFICANT CHANGE UP (ref 1.6–2.6)
MCHC RBC-ENTMCNC: 27.2 PG — SIGNIFICANT CHANGE UP (ref 27–34)
MCHC RBC-ENTMCNC: 32.4 GM/DL — SIGNIFICANT CHANGE UP (ref 32–36)
MCV RBC AUTO: 83.9 FL — SIGNIFICANT CHANGE UP (ref 80–100)
NRBC # BLD: 0 /100 WBCS — SIGNIFICANT CHANGE UP (ref 0–0)
NRBC # FLD: 0 K/UL — SIGNIFICANT CHANGE UP (ref 0–0)
PHOSPHATE SERPL-MCNC: 2.9 MG/DL — SIGNIFICANT CHANGE UP (ref 2.5–4.5)
PLATELET # BLD AUTO: 397 K/UL — SIGNIFICANT CHANGE UP (ref 150–400)
POTASSIUM SERPL-MCNC: 3.6 MMOL/L — SIGNIFICANT CHANGE UP (ref 3.5–5.3)
POTASSIUM SERPL-SCNC: 3.6 MMOL/L — SIGNIFICANT CHANGE UP (ref 3.5–5.3)
RBC # BLD: 3.53 M/UL — LOW (ref 3.8–5.2)
RBC # FLD: 16.4 % — HIGH (ref 10.3–14.5)
SODIUM SERPL-SCNC: 138 MMOL/L — SIGNIFICANT CHANGE UP (ref 135–145)
WBC # BLD: 8.22 K/UL — SIGNIFICANT CHANGE UP (ref 3.8–10.5)
WBC # FLD AUTO: 8.22 K/UL — SIGNIFICANT CHANGE UP (ref 3.8–10.5)

## 2024-06-04 RX ORDER — OXYCODONE HYDROCHLORIDE 5 MG/1
1 TABLET ORAL
Qty: 0 | Refills: 0 | DISCHARGE
Start: 2024-06-04

## 2024-06-04 RX ORDER — PANTOPRAZOLE SODIUM 20 MG/1
40 TABLET, DELAYED RELEASE ORAL
Refills: 0 | Status: DISCONTINUED | OUTPATIENT
Start: 2024-06-04 | End: 2024-06-04

## 2024-06-04 RX ADMIN — PANTOPRAZOLE SODIUM 40 MILLIGRAM(S): 20 TABLET, DELAYED RELEASE ORAL at 05:44

## 2024-06-04 RX ADMIN — SIMETHICONE 80 MILLIGRAM(S): 80 TABLET, CHEWABLE ORAL at 05:45

## 2024-06-04 RX ADMIN — OXYCODONE HYDROCHLORIDE 5 MILLIGRAM(S): 5 TABLET ORAL at 10:26

## 2024-06-04 RX ADMIN — RIVAROXABAN 2.5 MILLIGRAM(S): KIT at 05:45

## 2024-06-04 RX ADMIN — SIMETHICONE 80 MILLIGRAM(S): 80 TABLET, CHEWABLE ORAL at 00:55

## 2024-06-04 RX ADMIN — CHLORHEXIDINE GLUCONATE 1 APPLICATION(S): 213 SOLUTION TOPICAL at 11:59

## 2024-06-04 RX ADMIN — CILOSTAZOL 50 MILLIGRAM(S): 100 TABLET ORAL at 05:44

## 2024-06-04 RX ADMIN — SIMETHICONE 80 MILLIGRAM(S): 80 TABLET, CHEWABLE ORAL at 11:59

## 2024-06-04 RX ADMIN — OXYCODONE HYDROCHLORIDE 5 MILLIGRAM(S): 5 TABLET ORAL at 00:55

## 2024-06-04 RX ADMIN — OXYCODONE HYDROCHLORIDE 5 MILLIGRAM(S): 5 TABLET ORAL at 11:26

## 2024-06-04 RX ADMIN — Medication 1 TABLET(S): at 05:44

## 2024-06-04 RX ADMIN — OXYCODONE HYDROCHLORIDE 5 MILLIGRAM(S): 5 TABLET ORAL at 01:55

## 2024-06-04 RX ADMIN — Medication 1 TABLET(S): at 09:09

## 2024-06-04 NOTE — PROGRESS NOTE ADULT - PROVIDER SPECIALTY LIST ADULT
Gastroenterology
Hospitalist
Hospitalist
Infectious Disease
Gastroenterology
Hospitalist
Infectious Disease
Infectious Disease
Neurology
Hospitalist
Hospitalist
Infectious Disease
Infectious Disease
Neurology
Hospitalist
Neurology
Neurology

## 2024-06-04 NOTE — DISCHARGE NOTE NURSING/CASE MANAGEMENT/SOCIAL WORK - PATIENT PORTAL LINK FT
You can access the FollowMyHealth Patient Portal offered by [facility] by registering at the following website: [email or web address]. By joining [facility]’s FollowMyHealth portal, you will also be able to view your health information using other applications (apps) compatible with our system.

## 2024-06-04 NOTE — DISCHARGE NOTE NURSING/CASE MANAGEMENT/SOCIAL WORK - NSDCVIVACCINE_GEN_ALL_CORE_FT
pneumococcal polysaccharide PPV23; 06-Nov-2014 09:06; Yosvany Smith (RN); [employer] [id number]; IntraMuscular; Deltoid Right.; 0.5 milliLiter(s);

## 2024-06-04 NOTE — DISCHARGE NOTE PROVIDER - HOSPITAL COURSE
73 F PMHx PAD (on Xarelto), Hx of breast cancer S/P lumpectomy (in remission), bladder cancer with spinal mets S/P TURBT (in remission), Hx of recurrent diverticulitis, recent admission 1/2024 with perforated diverticulitis resulting in a psoas abscess treated on IV antibiotics. Pt reports ongoing anorexia, episodic abdominal pain diffuse in nature, and now presents to the ED with concerns of diarrhea and generalized weakness. Pt having 3-4 watery bowel movements a day.  Also mentions having fever for the past few days documented to 101 °F. Denies headaches, neck pain, sore throat, chest pain, difficulty in breathing, vomiting, urinary complaints, skin rash/lesions. CT with more extensive colonic thickening from left colon through rectum suggests nonspecific proctocolitis. No discrete focal drainable collection. Ill-defined sinus tracts anterior to the left psoas related to previous complicated sigmoid diverticulitis. Mild left hydroureteronephrosis to the level of the UVJ, likely reactive to left lower quadrant inflammation. ID consulted. GI PCR neg. BCx neg. S/P course of Zosyn with improvement.     Hospital course c/b Rt knee pain, Lt ankle/foot swelling/pain. Xray Rt knee with questionable tibial fracture. Rt knee CT w/o fracture, c/w OA. Xray Lt foot neg. US LLE neg. S/P Solumedrol (5/28-5/31) with improvement.     Noted with dysarthria likely in setting of opioid use. CTH 5/26 with chronic ischemic changes. Neuro consulted. Stable.     Spoke with attending, Dr. Mauricio, pt is medically stable for discharge to rehab

## 2024-06-04 NOTE — DISCHARGE NOTE PROVIDER - CARE PROVIDERS DIRECT ADDRESSES
[email or web address]

## 2024-06-04 NOTE — DISCHARGE NOTE PROVIDER - CARE PROVIDER_API CALL
Renny Richter  Medical Oncology  [address] [place] [address]  Phone: [phone]  Fax: [phone]  Follow Up Time:     Paulie Hargrove  Urology  [address] [place] [address], [address]  [place] [address]  Phone: [phone]  Fax: [phone]  Follow Up Time:

## 2024-06-04 NOTE — DISCHARGE NOTE PROVIDER - NSDCFUSCHEDAPPT_GEN_ALL_CORE_FT
Pat Howard Physician [facility]  VASCULAR [address]  Scheduled Appointment: 06/25/2024

## 2024-06-04 NOTE — DISCHARGE NOTE PROVIDER - NSDCCPCAREPLAN_GEN_ALL_CORE_FT
PRINCIPAL DISCHARGE DIAGNOSIS  Diagnosis: Proctocolitis  Assessment and Plan of Treatment: You have completed a course of antibiotics in-hospital with improvement. Monitor for any further signs and symptoms of further infection, including but not limited to, fevers/chills, shortness of breath, increased heart rate, dizziness, or abrupt changes in mental status.        SECONDARY DISCHARGE DIAGNOSES  Diagnosis: PAD (peripheral artery disease)  Assessment and Plan of Treatment: Continue with Xarelto, Pletal. Continue with activities recommended by therapist to improve in gait and activities of daily living. Follow up outpatient PCP in 1-2 weeks for further management.    Diagnosis: Breast cancer  Assessment and Plan of Treatment: Follow up outpatient with oncologist (Dr. Richter) after discharge in rehab for further management.

## 2024-06-04 NOTE — DISCHARGE NOTE PROVIDER - NSDCFUADDAPPT_GEN_ALL_CORE_FT
March 1, 2024     Patient: Shivani King   YOB: 2010   Date of Visit: 3/1/2024       To Whom it May Concern:    Shivani King was seen in my clinic on 3/1/2024 at 11:30 am.     Please excuse Shivani for her absence from school on the date listed above to be able to make her appointment.    Sincerely,         Royce Camacho MD    Medical information is confidential and cannot be disclosed without the written consent of the patient or her representative.     Follow up in Gastroenterology Clinic: [phone] (Faculty Practice at [facility]) or [phone] ([facility] at [facility]) or [phone] ([facility] at [address] [place] [address])  or [place] Office: [phone] ([address] [place] [address]. [place], [address]).

## 2024-06-04 NOTE — DISCHARGE NOTE NURSING/CASE MANAGEMENT/SOCIAL WORK - NSDCPEFALRISK_GEN_ALL_CORE
For information on Fall & Injury Prevention, visit: [email or web address] OR  [email or web address] OR  https://www.cdc.gov/steadi/patient.html

## 2024-06-04 NOTE — DISCHARGE NOTE PROVIDER - NSDCMRMEDTOKEN_GEN_ALL_CORE_FT
cilostazol 50 mg oral tablet: 1 tab(s) orally 2 times a day  lactobacillus acidophilus oral capsule: 1 cap(s) orally 2 times a day  Norvasc 2.5 mg oral tablet: 1 tab(s) orally once a day (at bedtime)  pantoprazole 40 mg oral delayed release tablet: 1 tab(s) orally once a day (before a meal)  pravastatin 20 mg oral tablet: 1 tab(s) orally once a day (at bedtime)  simethicone 80 mg oral tablet, chewable: 1 tab(s) orally every 6 hours  Xarelto 2.5 mg oral tablet: 1 tab(s) orally 2 times a day   cilostazol 50 mg oral tablet: 1 tab(s) orally 2 times a day  lactobacillus acidophilus oral capsule: 1 cap(s) orally 2 times a day  Norvasc 2.5 mg oral tablet: 1 tab(s) orally once a day (at bedtime)  oxyCODONE 5 mg oral tablet: 1 tab(s) orally every 6 hours as needed for Moderate Pain (4-6) and Severe Pain (7-10)  pantoprazole 40 mg oral delayed release tablet: 1 tab(s) orally once a day (before a meal)  pravastatin 20 mg oral tablet: 1 tab(s) orally once a day (at bedtime)  simethicone 80 mg oral tablet, chewable: 1 tab(s) orally every 6 hours  Xarelto 2.5 mg oral tablet: 1 tab(s) orally 2 times a day

## 2024-06-04 NOTE — DISCHARGE NOTE NURSING/CASE MANAGEMENT/SOCIAL WORK - NSDCFUADDAPPT_GEN_ALL_CORE_FT
Follow up in Gastroenterology Clinic: [phone] (Faculty Practice at [address] [facility]) or [phone] ([facility] at [facility]) or [phone] ([facility] at [address] [place] [address])  or [place] Office: [phone] ([address] [place] [address]. [place], [address]).

## 2024-06-04 NOTE — PROGRESS NOTE ADULT - SUBJECTIVE AND OBJECTIVE BOX
Neurology Progress Note    S: Patient seen and examined. No new events overnight.      MEDICATIONS:    amLODIPine   Tablet 2.5 milliGRAM(s) Oral at bedtime  chlorhexidine 2% Cloths 1 Application(s) Topical daily  cilostazol 50 milliGRAM(s) Oral two times a day  lactobacillus acidophilus 1 Tablet(s) Oral two times a day with meals  oxyCODONE    IR 5 milliGRAM(s) Oral every 4 hours PRN  pantoprazole    Tablet 40 milliGRAM(s) Oral before breakfast  rivaroxaban 2.5 milliGRAM(s) Oral two times a day  simethicone 80 milliGRAM(s) Chew every 6 hours      LABS:                          9.6    8.22  )-----------( 397      ( 04 Jun 2024 05:36 )             29.6     06-04    138  |  101  |  14  ----------------------------<  124<H>  3.6   |  25  |  0.59    Ca    9.4      04 Jun 2024 05:36  Phos  2.9     06-04  Mg     1.70     06-04      CAPILLARY BLOOD GLUCOSE          Urinalysis Basic - ( 04 Jun 2024 05:36 )    Color: x / Appearance: x / SG: x / pH: x  Gluc: 124 mg/dL / Ketone: x  / Bili: x / Urobili: x   Blood: x / Protein: x / Nitrite: x   Leuk Esterase: x / RBC: x / WBC x   Sq Epi: x / Non Sq Epi: x / Bacteria: x      I&O's Summary    Vital Signs Last 24 Hrs  T(C): 37.2 (04 Jun 2024 13:29), Max: 37.2 (04 Jun 2024 13:29)  T(F): 98.9 (04 Jun 2024 13:29), Max: 98.9 (04 Jun 2024 13:29)  HR: 82 (04 Jun 2024 13:29) (70 - 83)  BP: 108/71 (04 Jun 2024 13:29) (108/71 - 130/82)  BP(mean): --  RR: 18 (04 Jun 2024 13:29) (17 - 18)  SpO2: 97% (04 Jun 2024 13:29) (96% - 100%)    Parameters below as of 04 Jun 2024 13:29  Patient On (Oxygen Delivery Method): room air        General Exam:   General Appearance: Appropriately dressed and in no acute distress       Head: Normocephalic, atraumatic and no dysmorphic features  Ear, Nose, and Throat: Moist mucous membranes  CVS: S1S2+  Resp: No SOB, no wheeze or rhonchi  Abd: soft NTND  Extremities: No edema, no cyanosis  Skin: No bruises, no rashes        Neurological Exam:  Mental Status: Awake, alert and oriented x 3.  Able to follow simple and complex verbal commands. Able to name and repeat. fluent speech. No obvious aphasia or dysarthria noted.   Cranial Nerves: PERRL, EOMI, VFFC, no nystagmus or skew sensation V1-V3 intact,  no obvious facial asymmetry, equal elevation of palate, scm/trap 5/5, tongue is midline on protrusion. hearing is grossly intact.   Motor: Normal bulk, tone and strength throughout. Fine finger movements were intact and symmetric. no tremors or drift noted.    Sensation: Intact to light touch and pinprick throughout. subjective burning sensation on distal L foot. no right/left confusion. no extinction to tactile on DSS.   Reflexes: 1+ throughout at biceps, brachioradialis, triceps, patellars and ankles bilaterally and equal. No clonus. R toe and L toe were both downgoing.  Coordination: No dysmetria on FNF   Gait: deferred, some truncal ataxia on sitting.    I personally reviewed the below data/images/labs:      LABS:                          9.5    7.11  )-----------( 410      ( 31 May 2024 05:23 )             30.0     05-30    142  |  105  |  12  ----------------------------<  100<H>  3.7   |  25  |  0.58    Ca    9.2      30 May 2024 06:22  Phos  2.3     05-30  Mg     1.90     05-30          Urinalysis Basic - ( 30 May 2024 06:22 )    Color: x / Appearance: x / SG: x / pH: x  Gluc: 100 mg/dL / Ketone: x  / Bili: x / Urobili: x   Blood: x / Protein: x / Nitrite: x   Leuk Esterase: x / RBC: x / WBC x   Sq Epi: x / Non Sq Epi: x / Bacteria: x        < from: CT Head No Cont (05.26.24 @ 09:52) >    ACC: [id number] EXAM:  CT BRAIN   ORDERED BY: ANA JUDITH     PROCEDURE DATE:  05/26/2024          INTERPRETATION:  CLINICAL INDICATION:  AMS    TECHNIQUE: Noncontrast CT examination of the head was performed.  Coronal and sagittal reformats were also obtained.    COMPARISON: There are no prior studies available for comparison.    FINDINGS:  INTRA-AXIAL: No intracranial mass effect, acute hemorrhage, midline shift   or acute transcortical infarct is seen. There are periventricular white   matter hypodensities that are nonspecific in nature but may reflect   chronic ischemic microvascular disease.  EXTRA-AXIAL: No extra-axial fluid collection is present.  VENTRICLES AND SULCI: Parenchymal volume is commensurate with patient   age. No hydrocephalus.  VISUALIZED SINUSES: Mild mucosal thickening.  VISUALIZED MASTOIDS: Clear.  CALVARIUM: Normal.    IMPRESSION:    No evidence of acute intracranial hemorrhage, midline shift or CT   evidence of acute territorial infarct.    Please note thata contrast-enhanced brain MRI is more sensitive for the   evaluation of metastatic disease.    < end of copied text >

## 2024-06-07 ENCOUNTER — OUTPATIENT (OUTPATIENT)
Dept: OUTPATIENT SERVICES | Facility: HOSPITAL | Age: 74
LOS: 1 days | Discharge: HOME | End: 2024-06-07
Payer: COMMERCIAL

## 2024-06-07 DIAGNOSIS — Z90.710 ACQUIRED ABSENCE OF BOTH CERVIX AND UTERUS: Chronic | ICD-10-CM

## 2024-06-07 DIAGNOSIS — Z98.89 OTHER SPECIFIED POSTPROCEDURAL STATES: Chronic | ICD-10-CM

## 2024-06-07 DIAGNOSIS — Z98.890 OTHER SPECIFIED POSTPROCEDURAL STATES: Chronic | ICD-10-CM

## 2024-06-07 DIAGNOSIS — S90.32XA CONTUSION OF LEFT FOOT, INITIAL ENCOUNTER: ICD-10-CM

## 2024-06-07 DIAGNOSIS — N63 UNSPECIFIED LUMP IN BREAST: Chronic | ICD-10-CM

## 2024-06-07 PROCEDURE — 73721 MRI JNT OF LWR EXTRE W/O DYE: CPT | Mod: 26,LT,MA

## 2024-06-18 ENCOUNTER — APPOINTMENT (OUTPATIENT)
Dept: VASCULAR SURGERY | Facility: CLINIC | Age: 74
End: 2024-06-18
Payer: MEDICARE

## 2024-06-18 VITALS
SYSTOLIC BLOOD PRESSURE: 123 MMHG | HEIGHT: 72 IN | DIASTOLIC BLOOD PRESSURE: 82 MMHG | BODY MASS INDEX: 19.23 KG/M2 | WEIGHT: 142 LBS | TEMPERATURE: 98 F | HEART RATE: 115 BPM

## 2024-06-18 DIAGNOSIS — I77.1 STRICTURE OF ARTERY: ICD-10-CM

## 2024-06-18 DIAGNOSIS — M25.572 PAIN IN LEFT ANKLE AND JOINTS OF LEFT FOOT: ICD-10-CM

## 2024-06-18 DIAGNOSIS — L95.0 LIVEDOID VASCULITIS: ICD-10-CM

## 2024-06-18 PROCEDURE — 93971 EXTREMITY STUDY: CPT | Mod: LT

## 2024-06-18 PROCEDURE — 93971 EXTREMITY STUDY: CPT | Mod: TC

## 2024-06-18 PROCEDURE — 99214 OFFICE O/P EST MOD 30 MIN: CPT

## 2024-06-18 PROCEDURE — 93971 EXTREMITY STUDY: CPT | Mod: 26

## 2024-06-18 NOTE — HISTORY OF PRESENT ILLNESS
[FreeTextEntry1] : pt was eval at [facility]  for left lat mall wound\par  s/p lle angio sig  for   small vessel dz\par  started on pletal \par  pt was eval by podiatry and derm s/p bx\par  w/u  s/o liveloidvasculopathy\par  pt c/o ongoing   mod  deepika wound pain \par  pt was eval by woundcare  [de-identified] : pt is on eliquis  2.5 mg BID pt was previously on xarelto 2.5 mg bid  for maintenance  taking pletal 50 mg BID pt w daughter  in attendance  pt states new left inside of ankle pain unable to walk well

## 2024-06-18 NOTE — PHYSICAL EXAM
[1+] : left 1+ [2+] : left 2+ [No Rash or Lesion] : No rash or lesion [Alert] : alert [Oriented to Person] : oriented to person [Oriented to Place] : oriented to place [Oriented to Time] : oriented to time [Calm] : calm [Ankle Swelling (On Exam)] : not present [de-identified] : nad [de-identified] : wnl [de-identified] : no respiratory distress [FreeTextEntry1] : Mild arterial insufficiency w moderate trophic skin changes   LLE lateral malleolus wound healed no wounds or ulcers right medial mid arch area w tenderness and edema   [de-identified] : NILSONL [de-identified] : Jordi Cranial nerves 2-12 jordi grossly intact [de-identified] : cooperative

## 2024-06-18 NOTE — DATA REVIEWED
[FreeTextEntry1] : 3/19/2024 HEIDY/PVR   RLE mod infra geniculate artery insuff LLE mod infra geniculate artery insuff w vessel                                    calcification                                   Rt HEIDY 1.20  Lt HEIDY 1.27  6/18/2024  LLE Venous Duplex no dvt svt

## 2024-06-18 NOTE — ASSESSMENT
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

## 2024-07-01 RX ORDER — DOXYCYCLINE HYCLATE 100 MG/1
100 CAPSULE ORAL
Qty: 28 | Refills: 0 | Status: ACTIVE | COMMUNITY
Start: 2024-07-01 | End: 1900-01-01

## 2024-07-02 RX ORDER — OXYCODONE 5 MG/1
5 TABLET ORAL
Qty: 28 | Refills: 0 | Status: ACTIVE | COMMUNITY
Start: 2024-07-01 | End: 1900-01-01

## 2024-07-17 ENCOUNTER — APPOINTMENT (OUTPATIENT)
Dept: DERMATOLOGY | Facility: CLINIC | Age: 74
End: 2024-07-17
Payer: MEDICARE

## 2024-07-17 DIAGNOSIS — L30.9 DERMATITIS, UNSPECIFIED: ICD-10-CM

## 2024-07-17 DIAGNOSIS — L30.4 ERYTHEMA INTERTRIGO: ICD-10-CM

## 2024-07-17 DIAGNOSIS — L73.2 HIDRADENITIS SUPPURATIVA: ICD-10-CM

## 2024-07-17 PROCEDURE — 11900 INJECT SKIN LESIONS </W 7: CPT

## 2024-07-17 PROCEDURE — 99214 OFFICE O/P EST MOD 30 MIN: CPT | Mod: 25

## 2024-07-17 RX ORDER — CHLORHEXIDINE GLUCONATE 213 G/1000ML
4 SOLUTION TOPICAL
Qty: 1 | Refills: 6 | Status: ACTIVE | COMMUNITY
Start: 2024-07-17 | End: 1900-01-01

## 2024-07-17 RX ORDER — KETOCONAZOLE 20 MG/G
2 CREAM TOPICAL
Qty: 1 | Refills: 3 | Status: ACTIVE | COMMUNITY
Start: 2024-07-17 | End: 1900-01-01

## 2024-07-17 RX ORDER — CLOBETASOL PROPIONATE 0.5 MG/G
0.05 OINTMENT TOPICAL
Qty: 1 | Refills: 1 | Status: ACTIVE | COMMUNITY
Start: 2024-07-17 | End: 1900-01-01

## 2024-07-22 RX ORDER — RIVAROXABAN 10 MG/1
10 TABLET, FILM COATED ORAL DAILY
Qty: 30 | Refills: 3 | Status: ACTIVE | COMMUNITY
Start: 2024-07-22 | End: 1900-01-01

## 2024-07-23 ENCOUNTER — APPOINTMENT (OUTPATIENT)
Dept: VASCULAR SURGERY | Facility: CLINIC | Age: 74
End: 2024-07-23

## 2024-07-24 ENCOUNTER — APPOINTMENT (OUTPATIENT)
Dept: VASCULAR SURGERY | Facility: CLINIC | Age: 74
End: 2024-07-24
Payer: MEDICARE

## 2024-07-24 DIAGNOSIS — I77.1 STRICTURE OF ARTERY: ICD-10-CM

## 2024-07-24 DIAGNOSIS — L95.0 LIVEDOID VASCULITIS: ICD-10-CM

## 2024-07-24 PROCEDURE — 99443: CPT | Mod: 93

## 2024-07-24 RX ORDER — CILOSTAZOL 50 MG/1
50 TABLET ORAL
Qty: 180 | Refills: 3 | Status: ACTIVE | COMMUNITY
Start: 2024-07-24 | End: 1900-01-01

## 2024-07-24 RX ORDER — AMLODIPINE BESYLATE 5 MG/1
TABLET ORAL
Refills: 0 | Status: ACTIVE | COMMUNITY

## 2024-07-24 NOTE — ASSESSMENT
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

## 2024-07-24 NOTE — HISTORY OF PRESENT ILLNESS
[FreeTextEntry1] : pt was eval at [facility]  for left lat mall wound\par  s/p lle angio sig  for   small vessel dz\par  started on pletal \par  pt was eval by podiatry and derm s/p bx\par  w/u  s/o liveloidvasculopathy\par  pt c/o ongoing   mod  deepika wound pain \par  pt was eval by woundcare  [de-identified] : pt is on  xarelto 10 mg daily  taking pletal 50 mg BID pt states ankle pain is better  pt states no wounds

## 2024-07-24 NOTE — PHYSICAL EXAM
[Alert] : alert [Oriented to Person] : oriented to person [Oriented to Place] : oriented to place [Oriented to Time] : oriented to time [Calm] : calm [de-identified] : no respiratory distress [FreeTextEntry1] : Physical exam findings via telephonic review with patient   [de-identified] : cooperative

## 2024-07-24 NOTE — REASON FOR VISIT
[Follow-Up: _____] : a [unfilled] follow-up visit [Medical Office: ([facility])___] : at the medical office located in  [Verbal consent obtained from patient] : the patient, [unfilled] [FreeTextEntry1] :   left  foot pain is getting better

## 2024-07-29 ENCOUNTER — APPOINTMENT (OUTPATIENT)
Dept: GASTROENTEROLOGY | Facility: CLINIC | Age: 74
End: 2024-07-29

## 2024-08-19 ENCOUNTER — APPOINTMENT (OUTPATIENT)
Dept: DERMATOLOGY | Facility: CLINIC | Age: 74
End: 2024-08-19
Payer: MEDICARE

## 2024-08-19 DIAGNOSIS — L72.0 EPIDERMAL CYST: ICD-10-CM

## 2024-08-19 DIAGNOSIS — L30.9 DERMATITIS, UNSPECIFIED: ICD-10-CM

## 2024-08-19 DIAGNOSIS — L80 VITILIGO: ICD-10-CM

## 2024-08-19 PROCEDURE — 99214 OFFICE O/P EST MOD 30 MIN: CPT

## 2024-08-19 NOTE — PHYSICAL EXAM
[Alert] : alert [Oriented x 3] : ~L oriented x 3 [Well Nourished] : well nourished [Conjunctiva Non-injected] : conjunctiva non-injected [No Visual Lymphadenopathy] : no visual  lymphadenopathy [No Clubbing] : no clubbing [No Edema] : no edema [No Bromhidrosis] : no bromhidrosis [No Chromhidrosis] : no chromhidrosis [Declined] : declined [FreeTextEntry3] : Focused exam only (see below) per patient request: -hyperpigmented nontender nodule in R inner thigh -depigmented patches along inner labia majora intact labia minora and clitoral nava, with no apparent sclerosis -1cm subcutaneous mobile nodule, L axilla

## 2024-08-19 NOTE — ASSESSMENT
[FreeTextEntry1] : # Hidradenitis Suppurativa, Delacruz Stage 1 in L groin - chronic, stable - Diagnosis, chronic nature, disease course, treatment options and goals of therapy discussed - c/w Hibiclens wash to AA daily. SED - c/w Clindamycin 1% lotion to AA daily. SED.   Encouraged to use both hibiclens and clinda daily rather than PRN flares - Encouraged to call for acute appt if flaring for ILTAC given rare frequency of flares (1x q6-7 mos)  # Vulvar Dermatitis - chronic, stable/improved Favor vitiligo (possibly iso immunotherapy for bladder cancer), DDx includes Vitiligoid Lichen Sclerosus given hx of itch in the area.  Itch resolved today - May restart Clobetasol to vulva BID PRN itch. Side effects discussed with pt including but not limited to thinning of the skin, atrophy and dyspigmentation. She does not need a refill  # EIC, L axilla - Disc nature and course, may schedule excision in future if desires   # Livedoid vasculopathy, L lateral ankle - chronic; stable  - Dry skin care for superficial desquamating scale in setting of recent LE edema during hospitalization  RTC 6mo or earlier PRN

## 2024-08-19 NOTE — HISTORY OF PRESENT ILLNESS
[FreeTextEntry1] : f/u HS, vitiligo [de-identified] : Pt is a 74 year old F presenting for below.   Problem: HS in groin Location: R medial thigh Duration: many years, flares intermittently every 6-7 months Associated sx/Aggravating factors: current nodule was very painful, large and draining 1-2 weeks ago, has since subsided Prior history: was previously seeing an outside dermatologist (Dr. Kiah Longo). Has tried topicals including BP, Clinda, these helped a bit but recurs. The area was biopsied last year (by Gynecologist Dr. Eli Judd) and showed "bumps in hair follicle", pt was advised to have the lesion surgically removed. She is here for a second opinion.  - 4/2024: Since LV, has been using hibiclens wash and clindamycin lotion when flaring, worried about flare while traveling to [place] for 6 weeks. Notes that when admitted to hospital last year for diverticulitis, she was on several abx and unsure if they helped or worsened HS flare at the time.  - 07/2024: flare in R medial thigh recently. 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 has a hx of bladder ca s/p immunotherapy in remission, developed depigmented areas around mouth and on fingers afterward, feels depigmentation in vulva may have occurred after as well (did not look there previously) - 7/17/24: has not used Clobetasol in months, was in the hospital and then rehab for 2 months. unable to use topicals there. using OTC Vagisil.  - 8/19/2024: used clobetasol BID x 3 weeks after LV with resolution of itch, currently asymptomatic and no longer using topicals  #Problem: Livedoid vasculopathy on L lateral ankle Duration: years Associated sx/Aggravating factors: ulcers healed many months ago, but still painful at times.  - 7/17/24: legs were swollen while pt was in the hospital and in rehab, which worsened the pain. - 8/19/24: endorses significant improvement since LV with minimal pain  Derm Hx: Livedoid vasculopathy on L lateral ankle Personal hx of skin cancer: BCC on L postauricular area, bx 6/21/23, pt had Mohs consult with difficult site recognition as very small hyperpigmented scar. Therefore, pt declines surgical tx and prefers clinical monitoring FHx of skin cancer: no Social hx: has siblings and son  [TextBox_20] : 04/2024 [TextBox_22] : 0

## 2024-08-26 ENCOUNTER — APPOINTMENT (OUTPATIENT)
Dept: DERMATOLOGY | Facility: CLINIC | Age: 74
End: 2024-08-26
Payer: MEDICARE

## 2024-08-26 DIAGNOSIS — L72.3 SEBACEOUS CYST: ICD-10-CM

## 2024-08-26 DIAGNOSIS — L73.2 HIDRADENITIS SUPPURATIVA: ICD-10-CM

## 2024-08-26 PROCEDURE — 99214 OFFICE O/P EST MOD 30 MIN: CPT | Mod: 25

## 2024-08-26 PROCEDURE — 11900 INJECT SKIN LESIONS </W 7: CPT

## 2024-08-26 NOTE — HISTORY OF PRESENT ILLNESS
[FreeTextEntry1] : f/u HS, vitiligo [de-identified] : Pt is a 74 year old F last seen by Dr. Dorsey a week ago and was doing well. Today she is here for HS flare on the L labia majora for x days. requesting ILK since this has helped in the past. also feeling general malaise but no fever or chills.    Prior derm hx below:   Problem: HS in groin Location: R medial thigh Duration: many years, flares intermittently every 6-7 months Associated sx/Aggravating factors: current nodule was very painful, large and draining 1-2 weeks ago, has since subsided Prior history: was previously seeing an outside dermatologist (Dr. Kiah Longo). Has tried topicals including BP, Clinda, these helped a bit but recurs. The area was biopsied last year (by Gynecologist Dr. Eli Judd) and showed "bumps in hair follicle", pt was advised to have the lesion surgically removed. She is here for a second opinion.  - 4/2024: Since LV, has been using hibiclens wash and clindamycin lotion when flaring, worried about flare while traveling to [place] for 6 weeks. Notes that when admitted to hospital last year for diverticulitis, she was on several abx and unsure if they helped or worsened HS flare at the time.  - 07/2024: flare in R medial thigh recently. 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 has a hx of bladder ca s/p immunotherapy in remission, developed depigmented areas around mouth and on fingers afterward, feels depigmentation in vulva may have occurred after as well (did not look there previously) - 7/17/24: has not used Clobetasol in months, was in the hospital and then rehab for 2 months. unable to use topicals there. using OTC Vagisil.  - 8/19/2024: used clobetasol BID x 3 weeks after LV with resolution of itch, currently asymptomatic and no longer using topicals  #Problem: Livedoid vasculopathy on L lateral ankle Duration: years Associated sx/Aggravating factors: ulcers healed many months ago, but still painful at times.  - 7/17/24: legs were swollen while pt was in the hospital and in rehab, which worsened the pain. - 8/19/24: endorses significant improvement since LV with minimal pain  Derm Hx: Livedoid vasculopathy on L lateral ankle Personal hx of skin cancer: BCC on L postauricular area, bx 6/21/23, pt had Mohs consult with difficult site recognition as very small hyperpigmented scar. Therefore, pt declines surgical tx and prefers clinical monitoring FHx of skin cancer: no Social hx: has siblings and son  [TextBox_20] : 04/2024 [TextBox_22] : 0

## 2024-08-26 NOTE — HISTORY OF PRESENT ILLNESS
[FreeTextEntry1] : f/u HS, vitiligo [de-identified] : Pt is a 74 year old F last seen by Dr. Dorsey a week ago and was doing well. Today she is here for HS flare on the L labia majora for x days. requesting ILK since this has helped in the past. also feeling general malaise but no fever or chills.    Prior derm hx below:   Problem: HS in groin Location: R medial thigh Duration: many years, flares intermittently every 6-7 months Associated sx/Aggravating factors: current nodule was very painful, large and draining 1-2 weeks ago, has since subsided Prior history: was previously seeing an outside dermatologist (Dr. Kiah Longo). Has tried topicals including BP, Clinda, these helped a bit but recurs. The area was biopsied last year (by Gynecologist Dr. Eli Judd) and showed "bumps in hair follicle", pt was advised to have the lesion surgically removed. She is here for a second opinion.  - 4/2024: Since LV, has been using hibiclens wash and clindamycin lotion when flaring, worried about flare while traveling to [place] for 6 weeks. Notes that when admitted to hospital last year for diverticulitis, she was on several abx and unsure if they helped or worsened HS flare at the time.  - 07/2024: flare in R medial thigh recently. 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 has a hx of bladder ca s/p immunotherapy in remission, developed depigmented areas around mouth and on fingers afterward, feels depigmentation in vulva may have occurred after as well (did not look there previously) - 7/17/24: has not used Clobetasol in months, was in the hospital and then rehab for 2 months. unable to use topicals there. using OTC Vagisil.  - 8/19/2024: used clobetasol BID x 3 weeks after LV with resolution of itch, currently asymptomatic and no longer using topicals  #Problem: Livedoid vasculopathy on L lateral ankle Duration: years Associated sx/Aggravating factors: ulcers healed many months ago, but still painful at times.  - 7/17/24: legs were swollen while pt was in the hospital and in rehab, which worsened the pain. - 8/19/24: endorses significant improvement since LV with minimal pain  Derm Hx: Livedoid vasculopathy on L lateral ankle Personal hx of skin cancer: BCC on L postauricular area, bx 6/21/23, pt had Mohs consult with difficult site recognition as very small hyperpigmented scar. Therefore, pt declines surgical tx and prefers clinical monitoring FHx of skin cancer: no Social hx: has siblings and son  [TextBox_20] : 04/2024 [TextBox_22] : 0

## 2024-08-26 NOTE — ASSESSMENT
Patient placed on cardiac monitor, continuous pulse oximeter, and NIBP monitor. Monitor alarms on.        Sarah Thomas RN  10/01/20 9105 [FreeTextEntry1] : # Hidradenitis Suppurativa, Delacruz Stage 1 in L groin - chronic, stable - Diagnosis, chronic nature, disease course, treatment options and goals of therapy discussed - c/w Hibiclens wash to AA daily. SED - c/w Clindamycin 1% lotion to AA daily. SED.   Encouraged to use both hibiclens and clinda daily rather than PRN flares - Encouraged to call for acute appt if flaring for ILTAC given rare frequency of flares (1x q6-7 mos)  #Inflamed cyst  - Intralesional injection of Kenalog, 10 mg/ml A total of 0.4 ml was injected to a total of # 1 lesion(s). The risks/benefits/alternatives of intralesional steroid injections were reviewed with the patient which include but are not limited to pain, atrophy, and dyspigmentation. Intralesional injections were performed in the affected area. The patient tolerated the procedure well.  - Start Doxycycline 100mg PO BID for 14 days SED as below.  - Some common side effects include headache, nausea, diarrhea, extra sensitivity to sunburn.  Sun precautions advised. Take with food to prevent nausea. This medicine should not be taken by pregnant women.   RTC in 6 mo or sooner PRN

## 2024-08-26 NOTE — PHYSICAL EXAM
[Alert] : alert [Oriented x 3] : ~L oriented x 3 [Well Nourished] : well nourished [Conjunctiva Non-injected] : conjunctiva non-injected [No Visual Lymphadenopathy] : no visual  lymphadenopathy [No Clubbing] : no clubbing [No Edema] : no edema [No Bromhidrosis] : no bromhidrosis [No Chromhidrosis] : no chromhidrosis [Declined] : declined [FreeTextEntry3] : Focused exam only (see below) per patient request: - tender indurated nodules coalescing into a broader subcutaneous plaque on the L labia majora

## 2024-08-26 NOTE — ASSESSMENT
[FreeTextEntry1] : # Hidradenitis Suppurativa, Delacruz Stage 1 in L groin - chronic, stable - Diagnosis, chronic nature, disease course, treatment options and goals of therapy discussed - c/w Hibiclens wash to AA daily. SED - c/w Clindamycin 1% lotion to AA daily. SED.   Encouraged to use both hibiclens and clinda daily rather than PRN flares - Encouraged to call for acute appt if flaring for ILTAC given rare frequency of flares (1x q6-7 mos)  #Inflamed cyst  - Intralesional injection of Kenalog, 10 mg/ml A total of 0.4 ml was injected to a total of # 1 lesion(s). The risks/benefits/alternatives of intralesional steroid injections were reviewed with the patient which include but are not limited to pain, atrophy, and dyspigmentation. Intralesional injections were performed in the affected area. The patient tolerated the procedure well.  - Start Doxycycline 100mg PO BID for 14 days SED as below.  - Some common side effects include headache, nausea, diarrhea, extra sensitivity to sunburn.  Sun precautions advised. Take with food to prevent nausea. This medicine should not be taken by pregnant women.   RTC in 6 mo or sooner PRN

## 2024-08-27 ENCOUNTER — APPOINTMENT (OUTPATIENT)
Dept: VASCULAR SURGERY | Facility: CLINIC | Age: 74
End: 2024-08-27
Payer: MEDICARE

## 2024-08-27 VITALS
TEMPERATURE: 99.4 F | BODY MASS INDEX: 21.05 KG/M2 | WEIGHT: 131 LBS | HEIGHT: 66 IN | DIASTOLIC BLOOD PRESSURE: 71 MMHG | SYSTOLIC BLOOD PRESSURE: 104 MMHG | HEART RATE: 102 BPM

## 2024-08-27 DIAGNOSIS — L95.0 LIVEDOID VASCULITIS: ICD-10-CM

## 2024-08-27 DIAGNOSIS — I77.1 STRICTURE OF ARTERY: ICD-10-CM

## 2024-08-27 PROCEDURE — 99213 OFFICE O/P EST LOW 20 MIN: CPT

## 2024-08-27 NOTE — ASSESSMENT
[Arterial/Venous Disease] : arterial/venous disease [Medication Management] : medication management [Foot care/Footwear] : foot care/footwear [FreeTextEntry1] : Impression art insuff and liveloid vasculopathy now clinically inproving w increased  xarelto dosing    Plan Med Conservative  foot care and protection, exercise regimen, follow up with podiatr continue xarelto 10 mg  daily  continue pletal 50 mg BID  d/w pt liveloid vasculopathy and lifelong po anticoag and pletal 50 mg BID  will need le art insuff surveillance ov w jessica/pvr s/o art insuff 12 mo march 2025 ov  dec 2024 to re eval

## 2024-08-27 NOTE — PHYSICAL EXAM
[1+] : left 1+ [2+] : left 2+ [Alert] : alert [Oriented to Person] : oriented to person [Oriented to Place] : oriented to place [Oriented to Time] : oriented to time [Calm] : calm [Ankle Swelling (On Exam)] : not present [de-identified] : nad [de-identified] : wnl [de-identified] : no respiratory distress [FreeTextEntry1] : Mild arterial insufficiency w moderate trophic skin changes   no wounds or ulcers   [de-identified] : NILSONL [de-identified] : Jordi Cranial nerves 2-12 jordi grossly intact [de-identified] : cooperative

## 2024-08-27 NOTE — HISTORY OF PRESENT ILLNESS
[FreeTextEntry1] : pt was eval at [facility]  for left lat mall wound\par  s/p lle angio sig  for   small vessel dz\par  started on pletal \par  pt was eval by podiatry and derm s/p bx\par  w/u  s/o liveloidvasculopathy\par  pt c/o ongoing   mod  deepika wound pain \par  pt was eval by woundcare  [de-identified] : pt is on  xarelto 10 mg daily  taking pletal 50 mg BID pt states ankle pain is much better  pt states no wounds  pt is planning to f/u w pain management

## 2024-09-18 ENCOUNTER — OUTPATIENT (OUTPATIENT)
Dept: OUTPATIENT SERVICES | Facility: HOSPITAL | Age: 74
LOS: 1 days | Discharge: HOME | End: 2024-09-18

## 2024-09-18 DIAGNOSIS — Z98.890 OTHER SPECIFIED POSTPROCEDURAL STATES: Chronic | ICD-10-CM

## 2024-09-18 DIAGNOSIS — Z90.710 ACQUIRED ABSENCE OF BOTH CERVIX AND UTERUS: Chronic | ICD-10-CM

## 2024-09-18 DIAGNOSIS — N63 UNSPECIFIED LUMP IN BREAST: Chronic | ICD-10-CM

## 2024-09-18 DIAGNOSIS — Z98.89 OTHER SPECIFIED POSTPROCEDURAL STATES: Chronic | ICD-10-CM

## 2024-09-18 DIAGNOSIS — C67.9 MALIGNANT NEOPLASM OF BLADDER, UNSPECIFIED: ICD-10-CM

## 2024-09-19 NOTE — PROGRESS NOTE ADULT - GUM GEN PE MLT EXAM PC
Gastroenterology Pre-procedure H&P    History of Present Illness    Kathleen Payne is a 51 y.o. female that  has a past medical history of Arthritis and Hypertension.     Patient with weight loss and unremarkable colonoscopy here for EGD. She does report dysphagia to pills and occasional meats today.      Past Medical History:   Diagnosis Date    Arthritis     Hypertension        Past Surgical History:   Procedure Laterality Date     SECTION         Family History   Problem Relation Name Age of Onset    Hypertension Mother      Alzheimer's disease Father         Social History     Socioeconomic History    Marital status:    Tobacco Use    Smoking status: Every Day     Current packs/day: 1.50     Types: Cigarettes    Smokeless tobacco: Never   Substance and Sexual Activity    Alcohol use: Not Currently    Drug use: Yes     Types: Marijuana    Sexual activity: Not Currently     Partners: Male       Current Outpatient Medications   Medication Sig Dispense Refill    amLODIPine (NORVASC) 10 MG tablet       chlorthalidone (HYGROTEN) 25 MG Tab  (Patient not taking: Reported on 2024)      ergocalciferol (ERGOCALCIFEROL) 50,000 unit Cap Take 50,000 Units by mouth every 7 days. (Patient not taking: Reported on 2024)      gabapentin (NEURONTIN) 800 MG tablet       hydroCHLOROthiazide (HYDRODIURIL) 12.5 MG Tab Take 12.5 mg by mouth once daily.      hydrOXYzine pamoate (VISTARIL) 25 MG Cap Take 25 mg by mouth 2 (two) times daily. (Patient not taking: Reported on 2024)      lithium carbonate 150 MG capsule  (Patient not taking: Reported on 2024)      metoprolol succinate (TOPROL-XL) 50 MG 24 hr tablet       olmesartan (BENICAR) 20 MG tablet Take 20 mg by mouth once daily. (Patient not taking: Reported on 2024)      omeprazole (PRILOSEC) 40 MG capsule       venlafaxine (EFFEXOR-XR) 150 MG Cp24 Take 75 mg by mouth once daily.       No current facility-administered medications for this  encounter.       Review of patient's allergies indicates:  No Known Allergies    Objective:  There were no vitals filed for this visit.     GEN: normal appearing, NAD, AAO x3  HENT: NCAT, anicteric, OP benign  CV: normal rate, regular rhythm  RESP: CTA, symmetric rise, unlabored  ABD: soft, ND, no guarding or TTP  SKIN: warm and dry  NEURO: grossly afocal    Assessment and Plan:    Proceed with:    EGD for weight loss, dysphagia      Bradley Blanchard MD  Gastroenterology   not examined

## 2024-09-22 NOTE — RESULTS/DATA
[FreeTextEntry1] : EXAM: [id number] - CT ABDOMEN AND PELVIS OC IC  - ORDERED BY: JIM ORTIZ PROCEDURE DATE:  01/08/2024 INTERPRETATION:  CLINICAL INFORMATION: Abdominal pain COMPARISON: CT scan of the abdomen and pelvis from 4/17/2023 CONTRAST/COMPLICATIONS: IV Contrast: Isovue 370  90 cc administered   10 cc discarded Oral Contrast: Omnipaque 300 Complications: None reported at time of study completion  PROCEDURE: CT of the Abdomen and Pelvis was performed. Sagittal and coronal reformats were performed. FINDINGS: LOWER CHEST: Mild atelectatic changes. 3 mm calcified granuloma in the right lower lobe.  LIVER: Within normal limits. BILE DUCTS: Normal caliber. GALLBLADDER: Collapsed gallbladder. SPLEEN: Subcentimeter low-attenuation lesion in the spleen which is too small to characterize. PANCREAS: Within normal limits. ADRENALS: Within normal limits. KIDNEYS/URETERS: Mild left-sided hydroureteronephrosis to the level of inflammation in the colon and left psoas muscle described below.  BLADDER: Within normal limits. REPRODUCTIVE ORGANS: Hysterectomy.  BOWEL: No bowel obstruction.  Colonic diverticuli. Pericolonic inflammatory changes in the setting of diverticuli in the proximal sigmoid colon which is concerning for acute diverticulitis. Artifact from hardware in the lumbar spine limited evaluation. There is an abscess containing air in the adjacent left psoas muscle seen best on series 2 image 73 measuring approximately 1.5 x 1.1 cm x 1.7 which is presumably related to the diverticulitis. Appendix well visualized. No focal inflammation in the right lower quadrant. PERITONEUM: No ascites. VESSELS: Vascular calcification RETROPERITONEUM/LYMPH NODES: No lymphadenopathy. ABDOMINAL WALL: Postsurgical changes in the anterior abdominal wall. BONES: Degenerative changes of bone. Laminectomy and hardware in the lower lumbar spine.  IMPRESSION: Findings concerning for acute diverticulitis in the sigmoid colon with an adjacent air-containing abscess in the psoas muscle measuring approximately 1.5 x 1.1 x 1.7 cm. Inflammation is presumably causing obstruction of the left ureter with mild hydronephrosis. This critical value was discussed with Dr. Ortiz at 6:15 PM on 1/8/2024.  --- End of Report --- BRITTON GOULD MD; Attending Radiologist This document has been electronically signed. Jan 8 2024 ***********************************************************

## 2024-09-22 NOTE — CONSULT LETTER
[Dear  ___] : Dear  [unfilled], [Courtesy Letter:] : I had the pleasure of seeing your patient, [unfilled], in my office today. [Please see my note below.] : Please see my note below. [Consult Closing:] : Thank you very much for allowing me to participate in the care of this patient.  If you have any questions, please do not hesitate to contact me. [Sincerely,] : Sincerely, [FreeTextEntry2] : Dr. Kalen Kauffman MD [facility] [DrAzam  ___] : Dr. BRANDT

## 2024-09-22 NOTE — HISTORY OF PRESENT ILLNESS
[Disease: _____________________] : Disease: [unfilled] [T: ___] : T[unfilled] [N: ___] : N[unfilled] [M: ___] : M[unfilled] [AJCC Stage: ____] : AJCC Stage: [unfilled] [de-identified] : Ms. 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 The tumor was found on cystoscopy at the right ureteral orifice. 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 She is referred for consideration of neoadjuvant chemotherapy. In late May, at the time of scheduled back surgery, she thought she had a UTI. Urine was tested and she was treated, then had the surgery. She had burning post-op. She was treated again with an antibiotic. She went to Rehab and was treated again and she was seen by a urologist there. Went to PCP after discharge from rehab, he noted some blood in the urine. She went to [facility] ER and  she was referred to Dr. Hargrove. She underwent a cystoscopy, revealing tumor. She never noted blood in her urine, but did note it was darker than usual. Still has some "tingling" sensation, no incontinence. Nocturia occurs, which she says is related to awakening from CPAP. No cough. WALKER/bone pains.  10/27/17...Seen at [facility] in second opinion yesterday. They wanted to repeat procedures again, including cystoscopy. They called  again today. Saw Dr. Montalvo. She was asked to return next Thursday. They want to do a PET CT scan. They recommended she receive cis/gem, likely due to the glandular differentiation. She was overwhelmed by all the information she was given. No pains, no cough/SOB.    10/27/17...Juju completed cycle 1 this past Friday, and she noticed on Saturday that her lips appeared swollen and she noticed sores in her mouth. This occurred after going out to eat chinese food.Today she feels that it may be going away. She has fatigue, and feels chills but no fever. She just feels " lousy."  She is feeling Nausea this time and if she takes the metoclopramide in time, then she is okay, She denies vomiting. She is also having some dysgeusia, She denies paraesthesias of the fingers and toes.  She is having constipation, and she is controlling it with the stool softeners.She states her appetite is ok, but the food doesn't taste good, and it hurts when she swallows  11/21/17...Chemo with cis/gem #2 due this Friday.has increased lower back pain. The pain had stopped. She had prior back surgery. The pain became more notable since starting the chemo. She had constipation with the chemo and noted an increase in the pain with the constipation in lower back near the site of the surgery. The pain feels like pressure, described as heavy, somewhat relieved by BM. Worse with activities. Took some oxycodone a few days ago, but had a headaches afterwards. Pain score currently at 6-7, no recent pain med use. Taking MiraLAX, senna, Colace and prune juice with occasional MOM. Appetite is good, some altered taste, when present, affects appetite. Had some nausea and vomited x 3 after initial chemo, more gagging than vomiting. She felt she had some sores in the mouth after the gem alone, resolved. No paresthesias. She had some discomfort in the right wrist area, at the site where chemo was administered. Fatigue present all the time, more since the start of chemo. No hematuria, good flow, no urgency or incontinence as before.   17...day 15, cycle #2, cis/gem.  Juju has increased sensitivity in the lips after chemo. She had some vomiting at the end of her chemo infusion, however she did not have increased nausea and vomiting since. She has fatigue, and she complains of constipation and is taking miralax. She does complain of back pain that has increased in intensity.She notices it when she bends over. She feels that there is something noticeably there in her back. She takes pain medication to help her fall asleep. She doesn't sleep well. She does use c-pap secondary to sleep apnea but she is awaken from her sleep secondary to nocturia. She also notices her pain more when she has constipation and has to go to the bathroom. She describes her lower back pain as 8/10 at worst, and best 5/10. HEr back pain went away after the surgery and she noticed it came back once starting Chemo. She states her appetite is good, She does complain of dysgeusia. She denies paraesthesias. She does not like how the oxycodone makes her feel. It gives her HAs.  18...Had RT from the  to the . She feels there is some decrease in the pain, not as piercing. She feels the area is stiff. Worse when she awakens. Takes 3 x 325 Tylenol at night which helps the pain. has constipation, went one week w/o evacuation. Was given lactulose, but she feels that MiraLAX works better. She is less active as a result of the pain. the pain is worse when she bends. Appetite is variable. No weight loss. Has occasional nausea. No vomiting. Has some indigestion at times. No blood in the urine, no dysuria. No incontinence. Fatigue is present. No pains elsewhere.   3/14/18...She still has some pain. She is doing PT, which helps temporarily. She has not had the relief of pain that she thought would be the result. Pain score at 4-5, described as aggravation and it inhibits activities. Worse with bending, getting in and out of cars. She is not taking hydromorphone at this time.. She is distressed with constipation from the pain meds. Says lactulose does not work. She takes some Tylenol sporadically, not daily. Appetite is good, lost 1 kilo. No hematuria, no dysuria, no frequency, no incontinence. No pains elsewhere. She remains very active. She has alopecia. No diarrhea. No cough/WALKER. Fatigue is present, mild. She feels it at the end of the day. No N/V.   18...Completed 3 cycles of Tecentriq. She noes curling of the right fourth finger involuntarily. She can bend it back up, but it hurts to do so. No difficulty with strength on the right hand, no tremors.  She has tried Icy Hot w/o benefit. She points to DIP and PIP joints as the sites of pain. She feels she has lumps on her head. She is losing her hair, likely secondary to the chemo she received. Her scalp is pruritic. She has also lost pubic hair. Her pain continues to get better,went to PT for 7 weeks. She is able to do all normal activities, with mild fatigue. Appetite is good, weight is stable. No dizziness, no unsteadiness, no headaches. No other issues. No hematuria.   18...Missed Rx on 18. feels "great". Still has back pains, much improved. No pain while seated, but will come on after a while. No worse with ambulation. Does not awaken her. Pain can be as high as 7-8 with activities, best at 3-4. No pain meds utilized. No blood in urine. denies fatigue until late in the day. Appetite is good, but she has lost about 3 pounds. She is avoiding fat products as she has an upset stomach from fats. Had a URI, treated with azithromycin, Flonase and a codeine containing cough suppressant, still has some residual cough. No diarrhea. No dyspnea. has constipation.   18...On atezolizumab since 18. Feels well. Still has some pains, not clearly as severe as before. It is worse if she walks for a while, such as several hundred feet. Also more prominent if she stands too long. Does not bother her at night or awaken her. It does not stop her activities. Takes an occasional ibuprofen, not daily. Appetite is good, weight is stable. No N/V. No diarrhea. No cough/WALKER. Some fatigue. No leg edema.   18...Feels "okay". Still has some back pain, nothing like it was before. Has a bunion of the foot which is bothersome and the podiatrist has recommended surgery. No cough/sputum,. No diarrhea. Appetite is good, but lost 2 pounds since last visit. No other pains. No headaches, no paresthesias. No dizziness noted, no balance issues. Mild fatigue, improved.  18...Cycle 6 was administered on 18, due #7 on 18. "I'm feeling good". Noted some pressure and noted urinary frequency about one week ago, then resolved. No dysuria, no blood, no frequency, nocturia x 1. The usual lower back pain, no pain at the current time. Extra exertion brings on the low back pain and she is not sure if this is from the cancer or from the prior back surgery. She notes several areas over her skin becoming depigmented in small spots. No pruritus. Appetite is good, no weight loss. Actually gained a few pounds. No diarrhea. has some fatigue towards the end of the day. No dizziness. No paresthesias. No cough/WALKER.   18...On atezo since 18. Has received 7 cycles. Saw Dr. Hargrove late , rocky recommended, but she decided to wait until  after vacation. She notes lightening of her skin. She didn't go to the dermatologist. She has a prior dermatologist that she might see. No diarrhea. No upset stomach. Appetite is ":great", no weight loss. No cough/WALKER. No paresthesias. Minor fatigue along with aches in the evenings. No headaches, no dizziness. No leg edema. Slight hematuria.   18...last scans in May Reports skin changes due to the vitiligo has been bothering her.  Reports this has been happening more since last month.  Has also been taking Valium from two years ago occasionally for anxiety.  Reports has taken it once every 2-3 weeks, only when she feels overwhelmed with anxiety.  Has not seen an psychiatrist yet, but has been seeing a psychologist.  She has seeing her for one year..  When she gets anxious it occurs mostly at night.  Denies any pain, except after walking a long period of time.  Will occasionally use a cane.  Reports no urinary frequency.  No urinary incontinence, no hematuria.  Reports regular bowel movements.  Reports good appetite, occasional dyspepsia with fried foods.    18...Did not have an appt today, was added onto schedule. She has urgency, no dysuria, no foul odor. She feels bloated as well for the past 3 days. She wonders if it is related to her vitiligo, which is prominent in the urogenital area. Urine is clear, no blood. No fevers, no chills. Appetite is "great", gaining weight. No cough/WALKER. NO diarrheal stools, last BM yesterday, normal. No N/V. No vitiligo is more prominent, which concerns her. She says the bottom of her feet are tender, at the ball of the feet. Toes are numb. This has occurred over the last week. She has also had cramps in her hands. Mostly the thumb, told of he had an injection for her trigger finger. received cycle # 11 of atezolizumab today.   10/11/18...dose # 12 today. Saw Paulie Hargrove, plan for cysto. "I'm doing well".  She says that her feet always feels like there "is something there", involving the toes, no longer affects the ball of the foot. Saw her podiatrist. Cysto is scheduled for the . Her vitiligo is more prominent his is prominent on the hands. She feels that some parts of her hands are darker as well. Appetite is "fabulous". No N/V/D/C. Urine flow is good, no incontinence, no blood, no dysuria. Urgency and fullness sensation resolved. No cough/SOB. No headaches. No fatigue  10/31/18....Dose #13 today. Feels well at this time. Back pain remains the same as before, no pain med use. If she does extra activities she has pain. She had back surgery before and she believes it is from the prior surgery and not the mets. No fatigue. Appetite is good, weight is stable., No diarrheal stools. No hematuria noted. No dysuria. Usual activities performed without impairment. Vitiligo is somewhat more prominent, which disturbs her. No leg edema. No cough, no WALKER. No upset stomach. No fevers, no chills. Had cysto on , all was normal.   18...Feels "okay". Pain in the back somewhat more prominent, the pain from her prior surgery.Appetite is jeramy, no weight loss. No cough/WALKER. No diarrhea, no upset stomach. Some minor fatigue. She thinks she is depressed, attends a support group here. Starting with a therapist. He  left [place] recently. [place]   18...Last scans 18. Getting laser therapy for her vitiligo, which she may not continue due to high c-pays. feels as if her tongue is sensitive to heat since starting the laser therapy. She senses salt more than before. Otherwise well, back pain "bearable", RTS, "part of my life". No pain meds used. Appetite is good, weight is stable. No diarrheal stools No cough, No SOB. She has some sensation in the bladder or vaginal area, pruritic, calmed with Vagisil.  She is concerned about some blood in the urine, microscopic....she had a cysto on 10/22/18, revealing no issue. She asked about clearance for sexual activity. No fatigue, no headaches. No dysuria, but occasional mild irritation. No incontinence. No urinary frequency, rare nocturia.   1/3/19 : On atezolizumab since 18. Feels "great". No pains except for usual aches. Appetite is good, weight has increased. No cough, no WALKER. No diarrheal stools, No upset stomach. No edema. No skin rashes. Vitiligo is "going crazy", goes 2 times a week for laser therapy. States she may stop the laser therapy.   19...19 : Here for Atezolizumab(since ) and follow up.  She has been well overall without any AEs aside form vitiligo which is a known side effect of these ICI's.   19 : One year on atezolizumab. Feels "fine". No diarrheal stool, no dyspepsia, no cough/WALKER. Appetite s good, no weight loss. No headaches, some paresthesias of the feet, no change. Has seen neurology and received injections. Can't open her hands at times. No fatigue, no pruritus. NO skin rashes. Vitiligo continues to be more prominent. Went for laser therapy, wit stopped it. She says her lips blistered. Vitiligo affects genitalia, present prior to Rx, has vitiligo of the hands, axilla and breasts.   3/6/19...3/6/19 : Ms. Crespo is here for a follow up and atezolizumab. She has been having lower back pain that resembled pain she had when she had recurrence. She underwent MRI on 3/3/19. This showed re-demonstrating the spine lesions, no changes were noted on the MRI. She has DJD and bulging disc with narrowing in L4-5. No new lesions were seen. She has not followed up with Dr. VINICIUS Kauffman for this either. OTHerwise she feels well.   19...saw her orthopedic surgeon, who said the pain is not related to her cancer, but to scar tissue. He gave her Flexeril and Meloxicam. He referred her for PT. The pain is much better. The knot" is not as big as before. Otherwise, :fabulous". Appetite is good, weight is stable. Has some fatigue, noted at the end of the day. No pruritus, no rash. Vitiligo is progressive. No cough, no WALKER. No nausea, no vomiting, occ upset stomach. No diarrheal stools. Now 14 months on therapy.   19...On Atezo since 2018. Had some back pain exacerbation recently, was on meloxicam and cyclobenzaprine, which has resolved to a great degree. She returned to work in April. Feels well otherwise, has a "head cold", with congestion, yellow sputum. Taking Claritin and other drugs, nasal congestion and runny nose improved. She is concerned it has moved to the chest. No F/C, no SOB, no wheezing. Appetite is good, no weight loss. No diarrheal stools., no upset stomach. Fatigue is present, in  the evening, she is "done". This has been the case since she returned to work. No rash, no pruritus. Vitiligo is progressing.  19...Working and has fatigue. She feels that the fatigue is mostly due to the work schedule. 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 Cycle 25 is today. Overall, feels well. has some back pain, which she feels is related to her prior surgery. No hematuria noted, no incontinence. No dysuria. Appetite is excellent, no weight loss. No cough, No WALKER. No diarrheal stools. No F/C. No edema.   19...Now 1.5 years on atezo at this time. Feels well overall. has an occasional tingling when she voids, which she attributes to the genital vitiligo. She has some cream to apply. She is working as a  to the elderly and one of her clients  this AM. This upsets her somewhat. She became upset and tearful. Appetite is normal, weight is stable. She continues to have some back pains as before, related to prior surgery, perhaps somewhat more since she had to expend more effort. No diarrheal stools, no cough. No SOB No headaches, no dizziness. No fatigue, taking Geritol, which she feels has helped her. No fevers, no chills, no urinary issues.  9/10/19...Feels "wonderful". Back pain RTS, not severe, has taken some oxycodone at bed time recently Uses CPAP to sleep, occasionally awakened by back pain. She saw her orthopedist, who said that everything looked the same.Appetite is good, no weight loss. No cough, no WALKER, No diarrheal stools. No fatigue. No rash, no pruritus. Occ headaches, feel like sinus headaches, no meds, brief duration, slight in intensity. No edema. Orthopedist did plain films. Occ tramadol use.   10/31/19...On atezo since 2018. Now has more below back pains Become worse in July or August, not present all the time. Occasionally awakens her. Takes oxycodone or hydromorphone infrequently. Pain at 4 currently, worst at  4 in the last 24, best at zero. No worse with walking, actually better. Appetite is "great". Weight stable. o N/V/C/D. No cough, no dyspnea. Working, has some fatigue at the end of the day. No headaches, occ paresthesias.  She had prior surgery with rods in her back. Going t see derm as well. Has some "tingling when she voids, which she attributes to the worsening vitiligo in the urogenital area. NO blood, nocturia x 1, flow is fine, no incontinence. No dysuria. No edema. Wants t cancel  appointment, wants to go to [place].  19...Had an MRI of the neck, ordered by Dr Naveen Kauffman, who performed her lumbar surgery. She was told she requires surgery in the neck. She was started on meloxicam, which she says does not help.  dose will be # 33. The neck pain feels "like a monkey on my back", started about 6 weeks ago after her last visit. She was told it has nothing to do with her cancer. She did not bring the MRI results with her. Appetite is "great", no weight loss. No blood in the urine, no dysuria. She was started on some topicals and fluconazole for genital rash/vaginitis, saw both GYN and derm. No fatigue. Started atezo in 2018. No edema. No diarrheal stools, no cough/WALKER. getting some PT/hydrotherapy.   20...Feels well today. Had some gyn issues, told of a urine infection, treated with Cipro and Diflucan, completed both. Saw her PCP for an annual exam. She had large leucocyte esterase, few bacteria , but a negative culture. She has been noting chills recently. No fevers. NO fatigue. Appetite is fine, no weight loss. Back pains are "okay", no meds used. No edema. No cough, No WALKER. No diarrheal stools. She was on her CPAP recently and was also treated with a Z pack.   20...Saw GYN for routine follow up and had some pruritus as well. She was having intermittent chills as well as some abdominal fullness. No findings on exam apparently. She was sent for a sono, which revealed a mass, vascular, MRI ordered, not done as yet. She feels pressure in the bladder area. The pressure is constant, not increased with voiding, but has a "sensation". Recent cysto was negative. Appetite is good, weight increased. No other areas of pain/discomfort. No fevers,no chills. She has her usual low back pains. She has pressure in the lower pelvis, much better compared to 2 weeks ago. PCP felt a left supraclavicular mass and ordered a CXR.  20...Completed 2 years of atezolizumab in February. Has had sinus infection, on third course of antibiotics, to have a procedure in 3 weeks. Seeing a TMJ specialist for pain in her jaw. Recently had a partial denture made and she can't use it due to the pain. Back pain the same. "I live with it". She dropped 7 pounds and she can't eat like she used to. Latest antibiotic was doxycycline. She is on Mobic for the jaw pains. Otherwise well. NO blood in urine, no dysuria. no incontinence. No fatigue. No cough, no dyspnea. No headaches. No nausea, no vomiting. Vitiligo progressing  10/27/20...Verbal permission for telehealth services granted by the patient, Juju Da Silva on 2020 at 10:25 AM Feels "okay". To see a gastroenterologist as she is having issues with dairy, stared earlier this year. She has an upset stomach with certain meals with bloating, no N/V/D. If she takes Linzess, she feels better. She does not take it every day. Appetite is good, no weight loss. Back pains continues, may be up to 6 or 7, every day pains, no change form last visit. Rare pain med use. Takes Tylenol ES if he wakes her up at night. No opioids used. No pains elsewhere. Sitting down and getting up brings on the pains, better with activities. No cough, no WALKER. No edema. No hematuria. Saw Dr Hargrove for urinary frequency and pressure in the lower abdomen. No fevers, no chills. NO headaches. NO dizziness.  Had UTI, seen in in urgent care, had fevers/chills.  She was on several antibiotics for sinusitis from February onwards. PET shows continual opacification of the sinus. Has some jaw pains, and she says this cannot be addressed until sinus cared for. Urine was negative recently. PET shows ZACH.   2/3/21 - Presents for follow up, refused to do telehealth as she was scheduled for.  Feels well. Appetite is good, gained weight, up 5.5 kilos form last recorded weight. Has not had coronavirus. No edema. No N/V/D/C. No blood in urine, no incontinence. No headaches, no dizziness, some paresthesias since her back surgery, no worse after the chemotherapy. No tinnitus. No fatigue.   8/3/21 - Presents for follow up. had a fall and had a fracture i her lumbar sine. She had a wound in her leg and was seeing a wound specialist. She is on gabapentin for neuropathy, dose unknown, only at bedtime. She had her films done at [facility]. She says she had an MRI as well. Thinks it was L1 that was fractured. She has chronic low back pain, worse with bending, always some issue present. The neuropathy involves the bottom of the feet and toes. This is only on the right side. The gabapentin helps to some degree. Feels well otherwise, no other sites of pain. Appetite is good, no weight loss that she perceives, although down 3.7 kilos from May. No blood, in the urine, no dysuria, no incontinence. No urgency. No edema noted. No headaches, no dizziness. No cough no WALKER. No N/V/D/C. She saw ortho at [facility] and they wanted to do epidural. Due to the infection, it was not done. After healing of the wound, she no longer needed the epidural. Can't recall he name of the ortho surgeon, she will call with the name.   21...almost 2 years since the end of 2 years of atezolizumab. Feels "fine". Has her usual aches and pains no change. Appetite is good, follows with PCP, ha A1c checked, says she eliminated sugar and sweets. Appetite is good, dropped 4 kilos since August. No N/V/D/C. No cough, no WALEKR. Usual low back pains, prior surgery. No meds used. Urine is "perfect". No blood, no dysuria. no incontinence. Last saw Paulie Hargrove in 2020. The leg wound healed. No headaches, no dizziness. She has paresthesias, since the surgery, no longer taking the gabapentin, wears socks at night, which helps. No F/C. No leg edema. Usual fatigue, no change.   22...completed atezo in 2020, after 2 years of therapy. Had a mammogram done and she was noted to have microcalcifications, done at [facility]. She is set up for a stereotactic biopsy. This has alarmed her. She says she had a breast cancer in the left breast. had  a lumpectomy and RT at that time. She has some anxiety as a result of this development. there is no palpable mass. Otherwise feels well. Appetite is good, no weight loss/gain. No headaches, no dizziness. NO leg edema. No chest pain./pressure/palpitations. No N/V/D, occ constipation, on Linzess.   22...completed atezo in 2020, after 2 years of therapy. Her feet started burning due to neuropathy. had swelling of her hand on the left side, was seen in the ER and kept overnight, no blood clot was noted. Had breast cancer diagnosis, had surgery May 3, had revision on May 9th, right side. had DCIS, had RT with Mali Kahn, 5 fractions. Apparently just to the breast. Swelling started after the RT> Now resolving. had an echo done, says her aortic valve is "twisted", likely stenosis, at 25%.  She saw a neurologist for the paresthesias. takes gabapentin 300 mg at bedtime. back is the same, periodic pains, no meds used. Appetite is "fantastic", no weight loss. No cough, no WALKER. No N/V/D/C. No headaches, no dizziness. No balance issues. Works 3 days a week,  to an elderly person. No edema of the legs noted. No fevers, no chills. No pain at this time, none in the past 24 hours.   21...completed atezo in 2020, after 2 years of therapy. Visiting [place] since November, developed pain and swelling of the foot, told of a fracture of the metatarsal, had a cast placed, then developed a wound covering her foot from the cast. On doxycycline as antibiotic. Had IV antibiotics while in the hospital. Has a wound care nurse who visits.  No fevers, no chills. On tramadol for pain, which she says she has run out of. On gabapentin as well with some help. Appetite is decreased, which she attributes to the antibiotics. Feels she has not lost weight. NO cough, no WALKER. In bed a lot due to pain, No fatigue. NO headaches, no dizziness. NO blood in the urine. No pains elsewhere. No N/V/D/...has constipation. 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 Not on tamoxifen. Saw an oncologist in regards to the breast cancer at [facility]. Was due to be seen this month, but missed appt. Last imaging 2021.   no issues noted.  22 ..completed 2 years of atezolizumab in 2020. Had cellulitis  of is of the right foot, hospitalized in [place] x 5 days. She returned here and had the same issue on the left foot, at [facility]. Discharge summary below. Had a biopsy by derm, told it was small vessel disease of the feet. Discharged on Pletal. Foot is doing better, "way better" than it was.  Still has some pains. Currently pain at 4-5, worst in last 24 hours  at 7.  hurts more at night, constipated, no longer taking any narcotics. Takes stool softener, takes MiraLAX as well. No vomiting. had some nausea on occasion. weight is stable. No headaches, occ dizziness,  No cough, no WALKER. Had CT in the hospital, Hospital Course:  Discharge Date	2023  Admission Date	2023 19:20  Reason for Admission	cellulitis  Hospital Course	  72-year-old female with past medical history of breast cancer status post right breast lumpectomy, bladder cancer with mets to the spine, in remission,  hypertension, hyperlipidemia presents to the ER complaining of left ankle pain swelling and redness times approximately 1 week. Admit for IV abx for cellulitis.  LE / ankle Cellulitis  - Consulted by Infectious Disease and Podiatry, given IV Vanco and Zosyn, will complete a 10 Day course of Doxycycline 100mg Q12 PO and Keflex 500mg Q6 PO on discharge.  - Foot XR with no OM, Culture from site reportedly negative, MR with subcutaneous edema, LE edema vs cellulitis, no fluid collection, no OM  - Rheum team consulted patient, serologic work up negative  - Derm team consulted patient and L lateral malleoli nodular lesion, now with central lesion s/p punch biopsy with derm, patient to follow up in the  outpatient setting for biopsy results (negative thus far)   PAD  -vascular team consulted patient, s/p left lower extremity angiogram w/3-vessel run-off to ankle c/w small vessel disease  - distal dis , no stent/ or angioplasty  - received wound care, home wound care established  - continue pletal 50 bid   PMHx of Breast CA and Bladder Ca  - Consulted by Heme/Onc team, no Concerning findings for metastatic disease or Osteomyelitis based on the MRI of ankle  - CT C/A/P w/ IV contrast showed wall thickening of the proximal sigmoid colon and 2 mm nodule in the right lower lobe is not identified, may be inflammatory.    -Punch biopsy, lateral malleous left:  - Ulceration with adjacent epidermal hyperplasia, dermal vessel wall thickening (liveloid vasculopathy), and suppurative inflammation, see comment.  Comment: Clinical photos and history reviewed. Multiple deeper levels  examined. The differential diagnosis includes infection and primary or secondary livedoid vasculopathy. Although special stains (Gram, GMS,  PAS-D, Nohemi, and AFB) are negative for microorganisms, correlation with and tissue cultures to exclude an infection is needed. In addition,  peripheral venous and arterial studies should be performed to exclude small vessel disease. CK7 stain is negative for carcinoma. Pyoderma gangrenosum is a diagnosis of exclusion, after the above clinical entities are ruled out and requires clinicopathologic correlation.   Signatures Electronically signed by : BEAR CAMPA  3/20/24: Here for follow up. Missed appt on 24. Completed 2 years of atezolizumab in 2020.  Patient was admitted to [facility] for diverticulitis with an associated abscess (1.5 x 1.1 x 1.1 cm at L psoas) and also report being diagnosed with hidradenitis suppurativa (vaginal region). She was treated with empiric zosyn and then discharged on PO antibiotics. Reports that she initially presented with abdominal pain and difficulty walking but symptoms now resolved. Still having lots of gassiness since discharge with alternating constipation and diarrhea. Denies pain, nausea, vomiting. Overall, feels much improved. Received a total of 4 courses antibiotics since January. (Doxycycline, keflex x2, Flagyl) For diverticulitis, ?UTI - had some urgency, HS. Last dose of Abx was 2-3 weeks ago (Needed retreatment for diverticulitis at the beginning of March due to increased symptoms.) Complaining of alternating diarrhea/constipation and does have some stool leakage at times. Taking probiotics. Denies hematuria, dysuria, blood in stools, melena, vaginal bleeding, easy bruising/bleeding. Had some urgency and was treated with Abx and no symptoms now. Denies chest pain, SOB, leg swelling, easy bruising/bleeding.  Last cystoscopy last week and reportedly without any issues. Reports seeing PMD about 1 month ago and was instructed to take iron tabs for iron deficiency anemia. Patient has also been taking B12 supplementation on her own accord as she thought this may help her anemia as well.  Planning on going to [place] for vacation next month for about 6 weeks.  Hospital Course: Discharge Date 2024 Admission Date 2024 04:21 Reason for Admission Diverticulitis w/ abscess Hospital Course  HPI: This is a 74 y/o female w/ PMHx PAD on Xarelto, breast CA s/p R lumpectomy, bladder CA w/ spinal mets s/p TURBT in remission, HTN, HLD, and multiple episodes of diverticulitis who presents due to findings of abscess on outpatient CT scan. For the past 3 weeks, she has been having crampy abdominal pain in her LLQ, which she thought was due to gas pains initially. When the pain wouldn't resolve, she went to see her GI, Dr. Pablo Bradford, who ordered an outpatient CT of the abdomen/pelvis. This revealed an acute proximal sigmoid colon diverticulitis with adjacent 1.5 x 1.1 x 1.1 cm L psoas abscess and a mild L hydronephrosis. She was told to go to the hospital for further management. In the ED, she was almost febrile at 100.1, otherwise hemodynamically stable. No leukocytosis present, baseline normocytic anemia seen. INR slightly elevated at 1.43, mild hypokalemia of 3.4 seen on CMP. Was given Zosyn and 1L IVF in the ED. (2024 04:20)  [de-identified] : high-grade [FreeTextEntry1] : cis/gem, started chemo 11/3/17 as neoadjuvant, then bone mets, had RT. Now on atezolizumab since February 12, 2018, ended 2/4/20.  [de-identified] : 9/26/24 here for follow up.  Completed 2 years of atezolizumab in February 2020. Vascular surgery and derm notes reviewed. Admitted with perforated diverticulitis iJujne with psoas abscess.   needs MIPS  Hospital Course: Discharge Date 04-Jun-2024 Admission Date 25-May-2024 02:59 Reason for Admission abdominal pain Hospital Course  73 F PMHx PAD (on Xarelto), Hx of breast cancer S/P lumpectomy (in remission), bladder cancer with spinal mets S/P TURBT (in remission), Hx of recurrent  diverticulitis, recent admission 1/2024 with perforated diverticulitis resulting in a psoas abscess treated on IV antibiotics

## 2024-09-22 NOTE — ASSESSMENT
[FreeTextEntry1] : Juju is seen in the office in follow-up today.  She was recently admitted to [facility] for diverticulitis complicated by an abscess now s/p treatment (more details as above).   Stage IV urothelial carcinoma: - She completed 2 years of atezolizumab in February 2020 and currently has no evidence of recurrent disease - CT Abd/Pelvis from recent hospital admission reviewed - Continue to monitor clinically for evidence of recurrence - Continue to follow with urology for surveillance  Anemia: - Mild, normocytic - likely due to recent acute illness of diverticulitis - Ferritin within normal limits, unlikely to have iron deficiency with no over bleeding and reports normal colonoscopy last year - Recommend holding iron tabs and B12, which should improve constipation - Encouraged healthy diet with iron-rich foods. Denies being vegan, thus at low risk for B12 deficiency. - If anemia persists or patient becomes symptomatic, can do further investigations at that time  Erna Cartagena DO, MPH, PGY-8  Encouraged her to continue to taking her probiotics as her loose stool is likely due to the many recent courses of antibiotics. Advised to seek medical care if diarrhea worsens.  Follow up in 6 months or sooner if needed Continue to follow up with PMD and GI Attending note: The patient was initially evaluated by the oncology fellow, Dr. Cartagena.  I discussed the case with her and then reviewed elements of the history with Juju and I conducted a focused physical examination.  I agree with Dr. Cartagena's assessment and plan as stated above.  I contacted Juju about her lab results later in the evening.  I agree that the anemia is most likely secondary to her recent bout of diverticulitis and the inflammation associated with it.  I agree with the assessment and plan as stated above. Renny Richter MD [Palliative Care Plan] : not applicable at this time

## 2024-09-26 ENCOUNTER — APPOINTMENT (OUTPATIENT)
Dept: HEMATOLOGY ONCOLOGY | Facility: CLINIC | Age: 74
End: 2024-09-26

## 2024-09-26 DIAGNOSIS — C68.9 MALIGNANT NEOPLASM OF URINARY ORGAN, UNSPECIFIED: ICD-10-CM

## 2024-09-26 DIAGNOSIS — M48.50XA COLLAPSED VERTEBRA, NOT ELSEWHERE CLASSIFIED, SITE UNSPECIFIED, INITIAL ENCOUNTER FOR FRACTURE: ICD-10-CM

## 2024-09-26 DIAGNOSIS — C79.51 SECONDARY MALIGNANT NEOPLASM OF BONE: ICD-10-CM

## 2024-10-07 ENCOUNTER — APPOINTMENT (OUTPATIENT)
Dept: HEMATOLOGY ONCOLOGY | Facility: CLINIC | Age: 74
End: 2024-10-07
Payer: MEDICARE

## 2024-10-07 VITALS
HEART RATE: 101 BPM | RESPIRATION RATE: 17 BRPM | TEMPERATURE: 97.8 F | DIASTOLIC BLOOD PRESSURE: 83 MMHG | WEIGHT: 130.29 LBS | SYSTOLIC BLOOD PRESSURE: 129 MMHG | OXYGEN SATURATION: 96 % | BODY MASS INDEX: 21.03 KG/M2

## 2024-10-07 PROCEDURE — 99205 OFFICE O/P NEW HI 60 MIN: CPT

## 2024-11-25 ENCOUNTER — APPOINTMENT (OUTPATIENT)
Dept: VASCULAR SURGERY | Facility: CLINIC | Age: 74
End: 2024-11-25

## 2024-11-26 ENCOUNTER — APPOINTMENT (OUTPATIENT)
Dept: VASCULAR SURGERY | Facility: CLINIC | Age: 74
End: 2024-11-26
Payer: MEDICARE

## 2024-11-26 VITALS
SYSTOLIC BLOOD PRESSURE: 94 MMHG | DIASTOLIC BLOOD PRESSURE: 64 MMHG | HEART RATE: 83 BPM | TEMPERATURE: 98.3 F | HEIGHT: 66 IN | WEIGHT: 130 LBS | BODY MASS INDEX: 20.89 KG/M2

## 2024-11-26 DIAGNOSIS — I77.1 STRICTURE OF ARTERY: ICD-10-CM

## 2024-11-26 DIAGNOSIS — L95.0 LIVEDOID VASCULITIS: ICD-10-CM

## 2024-11-26 PROCEDURE — 99213 OFFICE O/P EST LOW 20 MIN: CPT

## 2024-11-30 ENCOUNTER — NON-APPOINTMENT (OUTPATIENT)
Age: 74
End: 2024-11-30

## 2024-12-17 NOTE — ED PROVIDER NOTE - PRO INTERPRETER NEED 2
PA initiated on higher dose of Wegovy, 0.5 mg dose     To PA pool     Message sent to patient to notify    English

## 2025-01-22 NOTE — ED PROVIDER NOTE - NSFOLLOWUPINSTRUCTIONS_ED_ALL_ED_FT
Detail Level: Detailed You were seen for arm pain and swelling. Your ultrasound is negative for blood clots. At this time there is no emergency to intervene on however please follow up with a vascular surgeon and neurologist for further workup. The numbers are below. Please continue taking gabapentin and Tylenol 650 mg or Motrin 600 mg every 6 hours for pain.     Return to the Emergency Room if you have fevers, chills, nausea, vomiting, chest pain, shortness of breath or if your pain is increasing.

## 2025-02-13 ENCOUNTER — APPOINTMENT (OUTPATIENT)
Dept: COLORECTAL SURGERY | Facility: CLINIC | Age: 75
End: 2025-02-13
Payer: MEDICARE

## 2025-02-13 VITALS
HEART RATE: 85 BPM | WEIGHT: 127 LBS | HEIGHT: 66 IN | RESPIRATION RATE: 16 BRPM | SYSTOLIC BLOOD PRESSURE: 117 MMHG | OXYGEN SATURATION: 99 % | BODY MASS INDEX: 20.41 KG/M2 | DIASTOLIC BLOOD PRESSURE: 82 MMHG

## 2025-02-13 PROCEDURE — 99204 OFFICE O/P NEW MOD 45 MIN: CPT

## 2025-02-15 ENCOUNTER — OUTPATIENT (OUTPATIENT)
Dept: OUTPATIENT SERVICES | Facility: HOSPITAL | Age: 75
LOS: 1 days | End: 2025-02-15
Payer: MEDICARE

## 2025-02-15 ENCOUNTER — APPOINTMENT (OUTPATIENT)
Dept: CT IMAGING | Facility: IMAGING CENTER | Age: 75
End: 2025-02-15
Payer: MEDICARE

## 2025-02-15 DIAGNOSIS — N63 UNSPECIFIED LUMP IN BREAST: Chronic | ICD-10-CM

## 2025-02-15 DIAGNOSIS — Z98.89 OTHER SPECIFIED POSTPROCEDURAL STATES: Chronic | ICD-10-CM

## 2025-02-15 DIAGNOSIS — Z98.890 OTHER SPECIFIED POSTPROCEDURAL STATES: Chronic | ICD-10-CM

## 2025-02-15 DIAGNOSIS — Z90.710 ACQUIRED ABSENCE OF BOTH CERVIX AND UTERUS: Chronic | ICD-10-CM

## 2025-02-15 DIAGNOSIS — K57.32 DIVERTICULITIS OF LARGE INTESTINE WITHOUT PERFORATION OR ABSCESS WITHOUT BLEEDING: ICD-10-CM

## 2025-02-15 PROCEDURE — 74177 CT ABD & PELVIS W/CONTRAST: CPT | Mod: 26

## 2025-02-15 PROCEDURE — 74177 CT ABD & PELVIS W/CONTRAST: CPT | Mod: MH

## 2025-02-19 ENCOUNTER — APPOINTMENT (OUTPATIENT)
Dept: DERMATOLOGY | Facility: CLINIC | Age: 75
End: 2025-02-19
Payer: MEDICARE

## 2025-02-19 DIAGNOSIS — L73.2 HIDRADENITIS SUPPURATIVA: ICD-10-CM

## 2025-02-19 DIAGNOSIS — L30.9 DERMATITIS, UNSPECIFIED: ICD-10-CM

## 2025-02-19 DIAGNOSIS — L95.0 LIVEDOID VASCULITIS: ICD-10-CM

## 2025-02-19 PROCEDURE — G2211 COMPLEX E/M VISIT ADD ON: CPT

## 2025-02-19 PROCEDURE — 99214 OFFICE O/P EST MOD 30 MIN: CPT

## 2025-02-19 RX ORDER — TACROLIMUS 1 MG/G
0.1 OINTMENT TOPICAL TWICE DAILY
Qty: 1 | Refills: 2 | Status: ACTIVE | COMMUNITY
Start: 2025-02-19 | End: 1900-01-01

## 2025-02-20 ENCOUNTER — APPOINTMENT (OUTPATIENT)
Dept: COLORECTAL SURGERY | Facility: CLINIC | Age: 75
End: 2025-02-20
Payer: MEDICARE

## 2025-02-20 PROBLEM — K57.32 DIVERTICULITIS, COLON: Status: ACTIVE | Noted: 2025-02-13

## 2025-02-20 PROCEDURE — 45330 DIAGNOSTIC SIGMOIDOSCOPY: CPT

## 2025-02-20 PROCEDURE — 99213 OFFICE O/P EST LOW 20 MIN: CPT | Mod: 25

## 2025-02-20 RX ORDER — NEOMYCIN SULFATE 500 MG/1
500 TABLET ORAL
Qty: 3 | Refills: 0 | Status: ACTIVE | COMMUNITY
Start: 2025-02-20 | End: 1900-01-01

## 2025-02-20 RX ORDER — METRONIDAZOLE 250 MG/1
250 TABLET ORAL
Qty: 3 | Refills: 0 | Status: ACTIVE | COMMUNITY
Start: 2025-02-20 | End: 1900-01-01

## 2025-02-25 ENCOUNTER — NON-APPOINTMENT (OUTPATIENT)
Age: 75
End: 2025-02-25

## 2025-02-25 ENCOUNTER — OUTPATIENT (OUTPATIENT)
Dept: OUTPATIENT SERVICES | Facility: HOSPITAL | Age: 75
LOS: 1 days | End: 2025-02-25

## 2025-02-25 VITALS
RESPIRATION RATE: 16 BRPM | HEIGHT: 65 IN | TEMPERATURE: 98 F | DIASTOLIC BLOOD PRESSURE: 75 MMHG | HEART RATE: 98 BPM | WEIGHT: 123.9 LBS | SYSTOLIC BLOOD PRESSURE: 108 MMHG | OXYGEN SATURATION: 97 %

## 2025-02-25 DIAGNOSIS — Z98.890 OTHER SPECIFIED POSTPROCEDURAL STATES: Chronic | ICD-10-CM

## 2025-02-25 DIAGNOSIS — K63.89 OTHER SPECIFIED DISEASES OF INTESTINE: ICD-10-CM

## 2025-02-25 DIAGNOSIS — Z98.89 OTHER SPECIFIED POSTPROCEDURAL STATES: Chronic | ICD-10-CM

## 2025-02-25 DIAGNOSIS — E78.5 HYPERLIPIDEMIA, UNSPECIFIED: Chronic | ICD-10-CM

## 2025-02-25 DIAGNOSIS — Z90.710 ACQUIRED ABSENCE OF BOTH CERVIX AND UTERUS: Chronic | ICD-10-CM

## 2025-02-25 DIAGNOSIS — K63.89 OTHER SPECIFIED DISEASES OF INTESTINE: Chronic | ICD-10-CM

## 2025-02-25 DIAGNOSIS — F41.9 ANXIETY DISORDER, UNSPECIFIED: Chronic | ICD-10-CM

## 2025-02-25 DIAGNOSIS — L95.0 LIVEDOID VASCULITIS: ICD-10-CM

## 2025-02-25 DIAGNOSIS — I10 ESSENTIAL (PRIMARY) HYPERTENSION: Chronic | ICD-10-CM

## 2025-02-25 DIAGNOSIS — Z87.19 PERSONAL HISTORY OF OTHER DISEASES OF THE DIGESTIVE SYSTEM: Chronic | ICD-10-CM

## 2025-02-25 DIAGNOSIS — N63 UNSPECIFIED LUMP IN BREAST: Chronic | ICD-10-CM

## 2025-02-25 DIAGNOSIS — C67.9 MALIGNANT NEOPLASM OF BLADDER, UNSPECIFIED: Chronic | ICD-10-CM

## 2025-02-25 DIAGNOSIS — I77.1 STRICTURE OF ARTERY: Chronic | ICD-10-CM

## 2025-02-25 DIAGNOSIS — G47.33 OBSTRUCTIVE SLEEP APNEA (ADULT) (PEDIATRIC): ICD-10-CM

## 2025-02-25 DIAGNOSIS — C50.911 MALIGNANT NEOPLASM OF UNSPECIFIED SITE OF RIGHT FEMALE BREAST: Chronic | ICD-10-CM

## 2025-02-25 DIAGNOSIS — G47.33 OBSTRUCTIVE SLEEP APNEA (ADULT) (PEDIATRIC): Chronic | ICD-10-CM

## 2025-02-25 LAB
A1C WITH ESTIMATED AVERAGE GLUCOSE RESULT: 6.1 % — HIGH (ref 4–5.6)
ANION GAP SERPL CALC-SCNC: 14 MMOL/L — SIGNIFICANT CHANGE UP (ref 7–14)
BLD GP AB SCN SERPL QL: NEGATIVE — SIGNIFICANT CHANGE UP
BUN SERPL-MCNC: 14 MG/DL — SIGNIFICANT CHANGE UP (ref 7–23)
CALCIUM SERPL-MCNC: 9.8 MG/DL — SIGNIFICANT CHANGE UP (ref 8.4–10.5)
CHLORIDE SERPL-SCNC: 100 MMOL/L — SIGNIFICANT CHANGE UP (ref 98–107)
CO2 SERPL-SCNC: 25 MMOL/L — SIGNIFICANT CHANGE UP (ref 22–31)
CREAT SERPL-MCNC: 0.7 MG/DL — SIGNIFICANT CHANGE UP (ref 0.5–1.3)
EGFR: 90 ML/MIN/1.73M2 — SIGNIFICANT CHANGE UP
ESTIMATED AVERAGE GLUCOSE: 128 — SIGNIFICANT CHANGE UP
GLUCOSE SERPL-MCNC: 81 MG/DL — SIGNIFICANT CHANGE UP (ref 70–99)
POTASSIUM SERPL-MCNC: 3.8 MMOL/L — SIGNIFICANT CHANGE UP (ref 3.5–5.3)
POTASSIUM SERPL-SCNC: 3.8 MMOL/L — SIGNIFICANT CHANGE UP (ref 3.5–5.3)
RH IG SCN BLD-IMP: POSITIVE — SIGNIFICANT CHANGE UP
SODIUM SERPL-SCNC: 139 MMOL/L — SIGNIFICANT CHANGE UP (ref 135–145)

## 2025-02-25 NOTE — H&P PST ADULT - PROBLEM SELECTOR PLAN 1
Patient is scheduled for laparoscopic possible open lower anterior resection with abscess drainage and catheters on 3/3/3025. Pre-op instructions provided. Pt given verbal and written instructions with teach back on chlorhexidine shampoo and Pepcid. Pt verbalized understanding with return demonstration.    CBC, BMP, HbA1c, T/S, ABO done at PST

## 2025-02-25 NOTE — H&P PST ADULT - PROBLEM SELECTOR PLAN 2
As per vascular ( Pat BAHENA), pt can hold Xarelto 2 days prior to surgery (last dose 2/28/2025) and Pletal 5-7 days prior to surgery. Patient verbalized understanding.  Pt took morning dose of Pletal today 2/25/2025, surgery is on 3/3/2025. Informed to vascular NP and it is ok as per vascular. Emailed to surgeon. Spoke to vascular ( Pat BAHENA), pt can hold Xarelto 2 days prior to surgery (last dose 2/28/2025) and Pletal 7 days prior to surgery. Patient verbalized understanding.  Pt took morning dose of Pletal today 2/25/2025, surgery is on 3/3/2025. Informed to vascular NP and it is ok as per vascular. Emailed to surgeon.

## 2025-02-25 NOTE — H&P PST ADULT - NSICDXPASTMEDICALHX_GEN_ALL_CORE_FT
PAST MEDICAL HISTORY:  Anxiety and depression     Arterial insufficiency     Bladder polyp     Breast CA, left lumpectomy / RTX in the past    Cancer of right breast     Diverticulitis     HLD (hyperlipidemia)     HTN (hypertension)     Hypertension     Irritable bowel syndrome (IBS)     Livedoid vasculopathy     KARI on CPAP     Other specified diseases of intestine     Polyp of colon "pre-cancerous" x 2, removed 11/2014    Urothelial carcinoma of bladder

## 2025-02-25 NOTE — H&P PST ADULT - CARDIOVASCULAR COMMENTS
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 Hx HTN and HLD, on medication management . 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 Hx HTN and HLD, on medication management . Pt  f/u w pain management

## 2025-02-25 NOTE — H&P PST ADULT - GASTROINTESTINAL COMMENTS
Recurrent diverticulitis >20 years, last hospitalization in 6/2024 for Proctocolitis. She has a history of perforated colon after fibroid removal surgery 30 years ago which required colostomy management.  Colostomy closed within a year. pre op dx: Other specified diseases of intestine

## 2025-02-25 NOTE — H&P PST ADULT - NSICDXPASTSURGICALHX_GEN_ALL_CORE_FT
PAST SURGICAL HISTORY:  H/O lumpectomy left     Personal history of spine surgery L1-L4 fusion 2017    S/P angiogram of extremity     S/P      S/P colon resection reversal, had complications for fibriods    S/P hysterectomy     S/P lumpectomy, left breast     S/P lumpectomy, right breast

## 2025-02-25 NOTE — H&P PST ADULT - GENITOURINARY COMMENTS
declined history of metastatic urothelial carcinoma diagnosed in 2017 for which she received gemcitabine and cisplatin followed by maintenance atezolizumab , She has since been surveilled with exam and imaging every 6 months.

## 2025-02-25 NOTE — H&P PST ADULT - ITE SK HX ROS MEA POS PC
Vitiligo in perianal area and labia majora,  Hx Hidradenitis suppurativa  (not active) , personal hx of SCC , s/p Mohs

## 2025-02-25 NOTE — H&P PST ADULT - HISTORY OF PRESENT ILLNESS
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 Pt reported recurrent complicated and uncomplicated diverticulitis and she has dealt with >20 years of flares of diverticulitis. Pt c/o pain on LLQ. Patient is scheduled for laparoscopic possible open lower anterior resection with abscess drainage and catheters on 3/3/3025.

## 2025-02-26 LAB
BASOPHILS # BLD AUTO: 0.04 K/UL — SIGNIFICANT CHANGE UP (ref 0–0.2)
BASOPHILS NFR BLD AUTO: 0.5 % — SIGNIFICANT CHANGE UP (ref 0–2)
CRP SERPL-MCNC: 82 MG/L — HIGH
EOSINOPHIL # BLD AUTO: 0.18 K/UL — SIGNIFICANT CHANGE UP (ref 0–0.5)
EOSINOPHIL NFR BLD AUTO: 2.1 % — SIGNIFICANT CHANGE UP (ref 0–6)
FERRITIN SERPL-MCNC: 104 NG/ML — SIGNIFICANT CHANGE UP (ref 13–330)
FOLATE SERPL-MCNC: 11.2 NG/ML — SIGNIFICANT CHANGE UP
HCT VFR BLD CALC: 34.2 % — LOW (ref 34.5–45)
HGB BLD-MCNC: 9.8 G/DL — LOW (ref 11.5–15.5)
IMM GRANULOCYTES NFR BLD AUTO: 0.3 % — SIGNIFICANT CHANGE UP (ref 0–0.9)
IRON SATN MFR SERPL: 23 UG/DL — LOW (ref 30–160)
IRON SATN MFR SERPL: 8 % — LOW (ref 14–50)
LYMPHOCYTES # BLD AUTO: 1.37 K/UL — SIGNIFICANT CHANGE UP (ref 1–3.3)
LYMPHOCYTES # BLD AUTO: 15.7 % — SIGNIFICANT CHANGE UP (ref 13–44)
MCHC RBC-ENTMCNC: 23.1 PG — LOW (ref 27–34)
MCHC RBC-ENTMCNC: 28.7 G/DL — LOW (ref 32–36)
MCV RBC AUTO: 80.5 FL — SIGNIFICANT CHANGE UP (ref 80–100)
MONOCYTES # BLD AUTO: 0.92 K/UL — HIGH (ref 0–0.9)
MONOCYTES NFR BLD AUTO: 10.5 % — SIGNIFICANT CHANGE UP (ref 2–14)
NEUTROPHILS # BLD AUTO: 6.21 K/UL — SIGNIFICANT CHANGE UP (ref 1.8–7.4)
NEUTROPHILS NFR BLD AUTO: 70.9 % — SIGNIFICANT CHANGE UP (ref 43–77)
PLATELET # BLD AUTO: 419 K/UL — HIGH (ref 150–400)
RBC # BLD: 4.25 M/UL — SIGNIFICANT CHANGE UP (ref 3.8–5.2)
RBC # FLD: 18.6 % — HIGH (ref 10.3–14.5)
RETICS #: 42.2 K/UL — SIGNIFICANT CHANGE UP (ref 25–125)
RETICS/RBC NFR: 1 % — SIGNIFICANT CHANGE UP (ref 0.5–2.5)
RH IG SCN BLD-IMP: POSITIVE — SIGNIFICANT CHANGE UP
TIBC SERPL-MCNC: 271 UG/DL — SIGNIFICANT CHANGE UP (ref 220–430)
UIBC SERPL-MCNC: 248 UG/DL — SIGNIFICANT CHANGE UP (ref 110–370)
VIT B12 SERPL-MCNC: 452 PG/ML — SIGNIFICANT CHANGE UP (ref 232–1245)
WBC # BLD: 8.75 K/UL — SIGNIFICANT CHANGE UP (ref 3.8–10.5)
WBC # FLD AUTO: 8.75 K/UL — SIGNIFICANT CHANGE UP (ref 3.8–10.5)

## 2025-02-27 ENCOUNTER — LABORATORY RESULT (OUTPATIENT)
Age: 75
End: 2025-02-27

## 2025-02-27 ENCOUNTER — NON-APPOINTMENT (OUTPATIENT)
Age: 75
End: 2025-02-27

## 2025-02-27 ENCOUNTER — APPOINTMENT (OUTPATIENT)
Dept: UROLOGY | Facility: CLINIC | Age: 75
End: 2025-02-27
Payer: MEDICARE

## 2025-02-27 VITALS
WEIGHT: 127 LBS | SYSTOLIC BLOOD PRESSURE: 109 MMHG | OXYGEN SATURATION: 96 % | BODY MASS INDEX: 20.41 KG/M2 | HEIGHT: 66 IN | DIASTOLIC BLOOD PRESSURE: 70 MMHG | RESPIRATION RATE: 16 BRPM | HEART RATE: 98 BPM

## 2025-02-27 DIAGNOSIS — N44.2 BENIGN CYST OF TESTIS: ICD-10-CM

## 2025-02-27 DIAGNOSIS — K57.32 DIVERTICULITIS OF LARGE INTESTINE W/OUT PERFORATION OR ABSCESS W/OUT BLEEDING: ICD-10-CM

## 2025-02-27 DIAGNOSIS — Z85.51 PERSONAL HISTORY OF MALIGNANT NEOPLASM OF BLADDER: ICD-10-CM

## 2025-02-27 DIAGNOSIS — Z01.818 ENCOUNTER FOR OTHER PREPROCEDURAL EXAMINATION: ICD-10-CM

## 2025-02-27 PROBLEM — G47.33 OBSTRUCTIVE SLEEP APNEA (ADULT) (PEDIATRIC): Chronic | Status: ACTIVE | Noted: 2025-02-25

## 2025-02-27 PROBLEM — I10 ESSENTIAL (PRIMARY) HYPERTENSION: Chronic | Status: ACTIVE | Noted: 2025-02-25

## 2025-02-27 PROBLEM — L95.0 LIVEDOID VASCULITIS: Chronic | Status: ACTIVE | Noted: 2025-02-25

## 2025-02-27 PROBLEM — K57.92 DIVERTICULITIS OF INTESTINE, PART UNSPECIFIED, WITHOUT PERFORATION OR ABSCESS WITHOUT BLEEDING: Chronic | Status: ACTIVE | Noted: 2025-02-25

## 2025-02-27 PROBLEM — C67.9 MALIGNANT NEOPLASM OF BLADDER, UNSPECIFIED: Chronic | Status: ACTIVE | Noted: 2025-02-25

## 2025-02-27 PROBLEM — K63.89 OTHER SPECIFIED DISEASES OF INTESTINE: Chronic | Status: ACTIVE | Noted: 2025-02-25

## 2025-02-27 PROBLEM — C50.911 MALIGNANT NEOPLASM OF UNSPECIFIED SITE OF RIGHT FEMALE BREAST: Chronic | Status: ACTIVE | Noted: 2025-02-25

## 2025-02-27 PROBLEM — I77.1 STRICTURE OF ARTERY: Chronic | Status: ACTIVE | Noted: 2025-02-25

## 2025-02-27 PROCEDURE — 99214 OFFICE O/P EST MOD 30 MIN: CPT

## 2025-02-28 LAB
APPEARANCE: CLEAR
BILIRUBIN URINE: NEGATIVE
BLOOD URINE: ABNORMAL
COLOR: YELLOW
GLUCOSE QUALITATIVE U: NEGATIVE MG/DL
KETONES URINE: NEGATIVE MG/DL
LEUKOCYTE ESTERASE URINE: ABNORMAL
NITRITE URINE: NEGATIVE
PH URINE: 5.5
PROTEIN URINE: NORMAL MG/DL
SPECIFIC GRAVITY URINE: 1.02
UROBILINOGEN URINE: 1 MG/DL

## 2025-02-28 NOTE — ASU PATIENT PROFILE, ADULT - NS TRANSFER EYEGLASSES PAIRS
Gen: Alert and interactive, no acute distress  HEENT: PERRLA; EOMI; TMs WNL; Moist mucosa; Oropharynx clear; Neck supple; +erythematous gingiva between teeth 4 - 6, no bleeding or pus  CV: Heart regular, normal S1/2, no murmurs; Extremities WWPx4, cap refill < 2 seconds  Pulm: Lungs clear to auscultation bilaterally  GI: Abdomen non-distended; No organomegaly, no tenderness, no masses  Skin: No rash or peripheral edema
1 pair

## 2025-02-28 NOTE — ASU PATIENT PROFILE, ADULT - FALL HARM RISK - UNIVERSAL INTERVENTIONS
Bed in lowest position, wheels locked, appropriate side rails in place/Call bell, personal items and telephone in reach/Instruct patient to call for assistance before getting out of bed or chair/Non-slip footwear when patient is out of bed/[place] to call system/Physically safe environment - no spills, clutter or unnecessary equipment/Purposeful Proactive Rounding/Room/bathroom lighting operational, light cord in reach

## 2025-03-03 ENCOUNTER — APPOINTMENT (OUTPATIENT)
Dept: UROLOGY | Facility: HOSPITAL | Age: 75
End: 2025-03-03

## 2025-03-03 DIAGNOSIS — K63.89 OTHER SPECIFIED DISEASES OF INTESTINE: ICD-10-CM

## 2025-03-03 LAB — URINE CYTOLOGY: NORMAL

## 2025-03-03 RX ORDER — NEOMYCIN SULFATE 500 MG/1
500 TABLET ORAL
Qty: 3 | Refills: 0 | Status: ACTIVE | COMMUNITY
Start: 2025-03-03 | End: 1900-01-01

## 2025-03-03 RX ORDER — METRONIDAZOLE 250 MG/1
250 TABLET ORAL
Qty: 3 | Refills: 0 | Status: ACTIVE | COMMUNITY
Start: 2025-03-03 | End: 1900-01-01

## 2025-03-04 NOTE — ASU PATIENT PROFILE, ADULT - FALL HARM RISK - FALL HARM RISK
[facility], [place] Emergency Department    CHIEF COMPLAINT  Seizures (at work not feeling well, EMS called and while they were there patient had 20-30 second seizure - no reported hx)      HISTORY OF PRESENT ILLNESS  Emma Sutherland is a 60 y.o. female who presents to the ED complaining of reported seizure this evening witnessed by EMS. Patient reports that she was \"not feeling well\" while at work this evening therefore coworkers called 911 when EMS arrived she had a reported 20-30 second seizure no injury sustained during her seizure. Patient does report abusing alcohol she reports that she drinks approximately 1 bottle of wine a day. Patient does report she had a alcohol withdrawal seizure many years ago. Patient is also had to be admitted for alcohol withdrawal in the past.  Patient denies any other illicit substance abuse. Patient denies recent illness or travel.  at bedside and patient is alert and oriented upon arrival to emergency department. Patient denies injury or trauma patient denies pain. Patient does report nausea. No other complaints, modifying factors or associated symptoms. Nursing notes reviewed. Past Medical History:   Diagnosis Date    Anxiety     Hyperlipidemia     Hypertension      No past surgical history on file. No family history on file.   Social History     Socioeconomic History    Marital status:      Spouse name: Not on file    Number of children: Not on file    Years of education: Not on file    Highest education level: Not on file   Occupational History    Not on file   Social Needs    Financial resource strain: Not on file    Food insecurity:     Worry: Not on file     Inability: Not on file    Transportation needs:     Medical: Not on file     Non-medical: Not on file   Tobacco Use    Smoking status: Former Smoker     Packs/day: 1.50     Years: 20.00     Pack years: 30.00     Types: Cigarettes    Smokeless tobacco: Never APRN - CNP        Or    LORazepam (ATIVAN) injection 3 mg  3 mg Intravenous Q1H PRN Sara A Burriss, APRN - CNP        Or    LORazepam (ATIVAN) tablet 4 mg  4 mg Oral Q1H PRN Sara A Burriss, APRN - CNP        Or    LORazepam (ATIVAN) injection 4 mg  4 mg Intravenous Q1H PRN Sara A Burriss, APRN - CNP         Current Outpatient Medications   Medication Sig Dispense Refill    Multiple Vitamins-Minerals (THERAPEUTIC MULTIVITAMIN-MINERALS) tablet Take 1 tablet by mouth daily      Omega-3 Fatty Acids (FISH OIL) 1200 MG CAPS Take 1 capsule by mouth daily      promethazine (PHENERGAN) 25 MG tablet Take 25 mg by mouth every 6 hours as needed for Nausea      sertraline (ZOLOFT) 100 MG tablet Take 100 mg by mouth daily      aspirin 81 MG tablet Take 81 mg by mouth every other day       lisinopril (PRINIVIL;ZESTRIL) 20 MG tablet Take 20 mg by mouth daily.  amLODIPine (NORVASC) 5 MG tablet Take 5 mg by mouth daily.  simvastatin (ZOCOR) 20 MG tablet Take 20 mg by mouth nightly. Allergies   Allergen Reactions    Codeine Nausea Only    Pcn [Penicillins]        REVIEW OF SYSTEMS  10 systems reviewed, pertinent positives per HPI otherwise noted to be negative    PHYSICAL EXAM  /80   Pulse 119   Temp 97.6 °F (36.4 °C) (Oral)   Resp 18   Wt 170 lb (77.1 kg)   SpO2 92%   BMI 27.44 kg/m²   GENERAL APPEARANCE: Awake and alert. Cooperative. No acute distress. Vital signs are stable. Well appearing and non toxic. HEAD: Normocephalic. Atraumatic. EYES: PERRL. EOM's grossly intact. ENT: Mucous membranes are moist.   NECK: Supple. Normal ROM. HEART: Tachycardiac. No murmurs. Distal pulses are equal and intact. Cap refill less than 2 seconds. LUNGS: Respirations unlabored. CTAB. Good air exchange. Speaking comfortably in full sentences. No wheezing, rhonchi, rales, stridor. ABDOMEN: Soft. Non-distended. Non-tender. No guarding or rebound. EXTREMITIES: No peripheral edema.  Moves all mass identified Periventricular and scattered frontal parietal white matter disease, likely due to small-vessel ischemic change, with more focal remote infarct on the right. Ischemic changes have progressed since 2016       CONSULTS  IP CONSULT TO HOSPITALIST    ED COURSE/MDM  Patient seen and evaluated. Old records reviewed. Diagnostic testing reviewed and results discussed. I have seen this patient in collaboration with supervising physician Dr. April Madrigal. We thoroughly discussed the history, physical exam, diagnostic testing and emergency department course. Samantha Ma presented to the ED today with above noted complaints. CMP notable for hypokalemia of 3.3 and an elevated anion gap of 21. Magnesium is also low at 1.7. Patient was given a loading dose of phenytoin and Ativan per protocol of CIWA scale for her DTs. CT of the head shows no hemorrhage or mass identified. No seizure activity in the emergency department. Given patient's electrolyte abnormalities and reported seizure my attending physician and myself did feel that patient warranted hospital admission for further evaluation and treatment.      While in ED patient received   Medications   sodium chloride flush 0.9 % injection 10 mL (10 mLs Intravenous Not Given 2/28/20 0154)   sodium chloride flush 0.9 % injection 10 mL (has no administration in time range)   LORazepam (ATIVAN) tablet 1 mg ( Oral See Alternative 2/27/20 2343)     Or   LORazepam (ATIVAN) injection 1 mg (1 mg Intravenous Given 2/27/20 2343)     Or   LORazepam (ATIVAN) tablet 2 mg ( Oral See Alternative 2/27/20 2343)     Or   LORazepam (ATIVAN) injection 2 mg ( Intravenous See Alternative 2/27/20 2343)     Or   LORazepam (ATIVAN) tablet 3 mg ( Oral See Alternative 2/27/20 2343)     Or   LORazepam (ATIVAN) injection 3 mg ( Intravenous See Alternative 2/27/20 2343)     Or   LORazepam (ATIVAN) tablet 4 mg ( Oral See Alternative 2/27/20 2343)     Or   LORazepam (ATIVAN) injection 4 mg ( Intravenous See Alternative 2/27/20 5433)   sodium chloride 0.9 % 7,475 mL with folic acid 1 mg, adult multi-vitamin with vitamin k 10 mL, thiamine 100 mg (has no administration in time range)   phenytoin (DILANTIN) 1,155 mg in sodium chloride 0.9 % 100 mL IVPB (loading dose) (has no administration in time range)   ondansetron (ZOFRAN) injection 4 mg (4 mg Intravenous Given 2/27/20 2245)   0.9 % sodium chloride bolus (0 mLs Intravenous Stopped 2/28/20 0115)   potassium chloride (KLOR-CON M) extended release tablet 10 mEq (10 mEq Oral Given 2/28/20 0025)   magnesium sulfate 1 g in dextrose 5% 100 mL IVPB (0 g Intravenous Stopped 2/28/20 0153)         A discussion was had with the patient and/or their surrogate regarding diagnosis, diagnostic testing results, treatment/ plan of care. There was shared decision-making between myself as well as the patient and/or their surrogate and we are all in agreement with hospital admission. There was an opportunity for questions and all questions were answered to the best of my ability and to the satisfaction of the patient and/or patient family.            Results for orders placed or performed during the hospital encounter of 02/27/20   CBC Auto Differential   Result Value Ref Range    WBC 7.8 4.0 - 11.0 K/uL    RBC 4.28 4.00 - 5.20 M/uL    Hemoglobin 14.0 12.0 - 16.0 g/dL    Hematocrit 41.0 36.0 - 48.0 %    MCV 95.8 80.0 - 100.0 fL    MCH 32.7 26.0 - 34.0 pg    MCHC 34.2 31.0 - 36.0 g/dL    RDW 16.5 (H) 12.4 - 15.4 %    Platelets 513 215 - 839 K/uL    MPV 9.1 5.0 - 10.5 fL    Neutrophils % 82.3 %    Lymphocytes % 8.5 %    Monocytes % 8.0 %    Eosinophils % 0.2 %    Basophils % 1.0 %    Neutrophils Absolute 6.4 1.7 - 7.7 K/uL    Lymphocytes Absolute 0.7 (L) 1.0 - 5.1 K/uL    Monocytes Absolute 0.6 0.0 - 1.3 K/uL    Eosinophils Absolute 0.0 0.0 - 0.6 K/uL    Basophils Absolute 0.1 0.0 - 0.2 K/uL   Comprehensive Metabolic Panel w/ Reflex to MG   Result Value Ref Range    Sodium 136 136 - 145 mmol/L    Potassium reflex Magnesium 3.3 (L) 3.5 - 5.1 mmol/L    Chloride 92 (L) 99 - 110 mmol/L    CO2 23 21 - 32 mmol/L    Anion Gap 21 (H) 3 - 16    Glucose 160 (H) 70 - 99 mg/dL    BUN 4 (L) 7 - 20 mg/dL    CREATININE 0.6 0.6 - 1.2 mg/dL    GFR Non-African American >60 >60    GFR African American >60 >60    Calcium 8.9 8.3 - 10.6 mg/dL    Total Protein 8.2 6.4 - 8.2 g/dL    Alb 3.4 3.4 - 5.0 g/dL    Albumin/Globulin Ratio 0.7 (L) 1.1 - 2.2    Total Bilirubin 1.6 (H) 0.0 - 1.0 mg/dL    Alkaline Phosphatase 191 (H) 40 - 129 U/L    ALT 48 (H) 10 - 40 U/L     (H) 15 - 37 U/L    Globulin 4.8 g/dL   Lipase   Result Value Ref Range    Lipase 36.0 13.0 - 60.0 U/L   Ethanol   Result Value Ref Range    Ethanol Lvl None Detected mg/dL   Magnesium   Result Value Ref Range    Magnesium 1.70 (L) 1.80 - 2.40 mg/dL       I spoke with Geraldine GRANADOS. We thoroughly discussed the history, physical exam, laboratory and imaging studies, as well as, emergency department course. Based upon that discussion, we've decided to admit Mak Huynh for further observation and evaluation of Kusum Bush's seizure. As I have deemed necessary from their history, physical and studies, I have considered and [facility] for the following diagnoses:  DIABETES, INTRACRANIAL HEMORRHAGE, MENINGITIS, SEPSIS SUBARACHNOID HEMORRHAGE, SUBDURAL HEMATOMA, & STROKE. FINAL IMPRESSION  1. Seizure ([address]    2. Alcohol withdrawal syndrome with complication ([address]    3. DTs (delirium tremens) ([address]    4. Hypokalemia    5. Hypomagnesemia        Vitals:  Blood pressure 112/80, pulse 119, temperature 97.6 °F (36.4 °C), temperature source Oral, resp. rate 18, weight 170 lb (77.1 kg), SpO2 92 %, not currently breastfeeding. DISPOSITION  Patient was admitted to the hospital in stable condition.      Comment: Please note this report has been produced using speech recognition software and may contain errors related No indicators present

## 2025-03-05 ENCOUNTER — TRANSCRIPTION ENCOUNTER (OUTPATIENT)
Age: 75
End: 2025-03-05

## 2025-03-05 ENCOUNTER — APPOINTMENT (OUTPATIENT)
Dept: UROLOGY | Facility: HOSPITAL | Age: 75
End: 2025-03-05

## 2025-03-05 ENCOUNTER — APPOINTMENT (OUTPATIENT)
Dept: COLORECTAL SURGERY | Facility: HOSPITAL | Age: 75
End: 2025-03-05

## 2025-03-05 ENCOUNTER — INPATIENT (INPATIENT)
Facility: HOSPITAL | Age: 75
LOS: 6 days | Discharge: NURSING, REHAB OR LONG-TERM CARE | End: 2025-03-12
Attending: STUDENT IN AN ORGANIZED HEALTH CARE EDUCATION/TRAINING PROGRAM | Admitting: STUDENT IN AN ORGANIZED HEALTH CARE EDUCATION/TRAINING PROGRAM
Payer: MEDICARE

## 2025-03-05 VITALS
SYSTOLIC BLOOD PRESSURE: 110 MMHG | DIASTOLIC BLOOD PRESSURE: 74 MMHG | RESPIRATION RATE: 16 BRPM | WEIGHT: 123.9 LBS | HEART RATE: 98 BPM | TEMPERATURE: 99 F | HEIGHT: 65 IN | OXYGEN SATURATION: 97 %

## 2025-03-05 DIAGNOSIS — Z98.890 OTHER SPECIFIED POSTPROCEDURAL STATES: Chronic | ICD-10-CM

## 2025-03-05 DIAGNOSIS — K63.89 OTHER SPECIFIED DISEASES OF INTESTINE: ICD-10-CM

## 2025-03-05 DIAGNOSIS — Z90.710 ACQUIRED ABSENCE OF BOTH CERVIX AND UTERUS: Chronic | ICD-10-CM

## 2025-03-05 DIAGNOSIS — Z98.89 OTHER SPECIFIED POSTPROCEDURAL STATES: Chronic | ICD-10-CM

## 2025-03-05 PROBLEM — Z85.51 HISTORY OF MALIGNANT NEOPLASM OF BLADDER: Status: RESOLVED | Noted: 2023-02-03 | Resolved: 2025-03-05

## 2025-03-05 LAB — GLUCOSE BLDC GLUCOMTR-MCNC: 113 MG/DL — HIGH (ref 70–99)

## 2025-03-05 PROCEDURE — 88312 SPECIAL STAINS GROUP 1: CPT | Mod: 26

## 2025-03-05 PROCEDURE — 49060 DRAIN OPEN RETROPERI ABSCESS: CPT | Mod: 59

## 2025-03-05 PROCEDURE — 88307 TISSUE EXAM BY PATHOLOGIST: CPT | Mod: 26

## 2025-03-05 PROCEDURE — 44143 PARTIAL REMOVAL OF COLON: CPT | Mod: 22

## 2025-03-05 PROCEDURE — 44120 REMOVAL OF SMALL INTESTINE: CPT

## 2025-03-05 PROCEDURE — 44121 REMOVAL OF SMALL INTESTINE: CPT

## 2025-03-05 PROCEDURE — 52005 CYSTO W/URTRL CATHJ: CPT

## 2025-03-05 PROCEDURE — 88304 TISSUE EXAM BY PATHOLOGIST: CPT | Mod: 26

## 2025-03-05 DEVICE — STAPLER COVIDIEN TA 30 BLUE RELOAD: Type: IMPLANTABLE DEVICE | Status: FUNCTIONAL

## 2025-03-05 DEVICE — STAPLER COVIDIEN PURSTRING 65MM: Type: IMPLANTABLE DEVICE | Status: FUNCTIONAL

## 2025-03-05 DEVICE — STAPLER COVIDIEN TA 30 BLUE: Type: IMPLANTABLE DEVICE | Status: FUNCTIONAL

## 2025-03-05 DEVICE — URETERAL CATH OPEN END 5FR 70CM: Type: IMPLANTABLE DEVICE | Status: FUNCTIONAL

## 2025-03-05 DEVICE — GUIDEWIRE SENSOR DUAL-FLEX NITINOL STRAIGHT .038" X 150CM: Type: IMPLANTABLE DEVICE | Status: FUNCTIONAL

## 2025-03-05 DEVICE — STAPLER COVIDIEN GIA 80-3.0MM PURPLE: Type: IMPLANTABLE DEVICE | Status: FUNCTIONAL

## 2025-03-05 RX ORDER — ONDANSETRON HCL/PF 4 MG/2 ML
4 VIAL (ML) INJECTION ONCE
Refills: 0 | Status: DISCONTINUED | OUTPATIENT
Start: 2025-03-05 | End: 2025-03-05

## 2025-03-05 RX ORDER — FENTANYL CITRATE-0.9 % NACL/PF 100MCG/2ML
50 SYRINGE (ML) INTRAVENOUS
Refills: 0 | Status: DISCONTINUED | OUTPATIENT
Start: 2025-03-05 | End: 2025-03-05

## 2025-03-05 RX ORDER — HYDROMORPHONE/SOD CHLOR,ISO/PF 2 MG/10 ML
0.5 SYRINGE (ML) INJECTION EVERY 4 HOURS
Refills: 0 | Status: DISCONTINUED | OUTPATIENT
Start: 2025-03-05 | End: 2025-03-07

## 2025-03-05 RX ORDER — CILOSTAZOL 50 MG/1
1 TABLET ORAL
Refills: 0 | DISCHARGE

## 2025-03-05 RX ORDER — AMLODIPINE BESYLATE 10 MG/1
1 TABLET ORAL
Refills: 0 | DISCHARGE

## 2025-03-05 RX ORDER — ATORVASTATIN CALCIUM 80 MG/1
10 TABLET, FILM COATED ORAL AT BEDTIME
Refills: 0 | Status: DISCONTINUED | OUTPATIENT
Start: 2025-03-05 | End: 2025-03-12

## 2025-03-05 RX ORDER — PIPERACILLIN-TAZO-DEXTROSE,ISO 3.375G/5
3.38 IV SOLUTION, PIGGYBACK PREMIX FROZEN(ML) INTRAVENOUS ONCE
Refills: 0 | Status: COMPLETED | OUTPATIENT
Start: 2025-03-05 | End: 2025-03-05

## 2025-03-05 RX ORDER — ACETAMINOPHEN 500 MG/5ML
1000 LIQUID (ML) ORAL EVERY 6 HOURS
Refills: 0 | Status: COMPLETED | OUTPATIENT
Start: 2025-03-05 | End: 2025-03-06

## 2025-03-05 RX ORDER — AMLODIPINE BESYLATE 10 MG/1
2.5 TABLET ORAL AT BEDTIME
Refills: 0 | Status: DISCONTINUED | OUTPATIENT
Start: 2025-03-06 | End: 2025-03-06

## 2025-03-05 RX ORDER — PIPERACILLIN-TAZO-DEXTROSE,ISO 3.375G/5
3.38 IV SOLUTION, PIGGYBACK PREMIX FROZEN(ML) INTRAVENOUS EVERY 8 HOURS
Refills: 0 | Status: DISCONTINUED | OUTPATIENT
Start: 2025-03-06 | End: 2025-03-10

## 2025-03-05 RX ORDER — SODIUM CHLORIDE 9 G/1000ML
1000 INJECTION, SOLUTION INTRAVENOUS
Refills: 0 | Status: DISCONTINUED | OUTPATIENT
Start: 2025-03-05 | End: 2025-03-05

## 2025-03-05 RX ORDER — OXYCODONE HYDROCHLORIDE 30 MG/1
5 TABLET ORAL EVERY 4 HOURS
Refills: 0 | Status: DISCONTINUED | OUTPATIENT
Start: 2025-03-05 | End: 2025-03-07

## 2025-03-05 RX ORDER — HEPARIN SODIUM 1000 [USP'U]/ML
5000 INJECTION INTRAVENOUS; SUBCUTANEOUS EVERY 8 HOURS
Refills: 0 | Status: DISCONTINUED | OUTPATIENT
Start: 2025-03-05 | End: 2025-03-12

## 2025-03-05 RX ORDER — RIVAROXABAN 10 MG/1
1 TABLET, FILM COATED ORAL
Refills: 0 | DISCHARGE

## 2025-03-05 RX ORDER — HYDROMORPHONE/SOD CHLOR,ISO/PF 2 MG/10 ML
0.5 SYRINGE (ML) INJECTION
Refills: 0 | Status: DISCONTINUED | OUTPATIENT
Start: 2025-03-05 | End: 2025-03-05

## 2025-03-05 RX ORDER — NALOXONE HYDROCHLORIDE 0.4 MG/ML
0.1 INJECTION, SOLUTION INTRAMUSCULAR; INTRAVENOUS; SUBCUTANEOUS
Refills: 0 | Status: DISCONTINUED | OUTPATIENT
Start: 2025-03-05 | End: 2025-03-12

## 2025-03-05 RX ORDER — OXYCODONE HYDROCHLORIDE 30 MG/1
2.5 TABLET ORAL EVERY 4 HOURS
Refills: 0 | Status: DISCONTINUED | OUTPATIENT
Start: 2025-03-05 | End: 2025-03-07

## 2025-03-05 RX ORDER — SODIUM CHLORIDE 9 G/1000ML
1000 INJECTION, SOLUTION INTRAVENOUS
Refills: 0 | Status: DISCONTINUED | OUTPATIENT
Start: 2025-03-05 | End: 2025-03-07

## 2025-03-05 RX ORDER — AMITRIPTYLINE HYDROCHLORIDE 25 MG/1
1 TABLET, FILM COATED ORAL
Refills: 0 | DISCHARGE

## 2025-03-05 RX ADMIN — Medication 25 GRAM(S): at 21:50

## 2025-03-05 RX ADMIN — OXYCODONE HYDROCHLORIDE 2.5 MILLIGRAM(S): 30 TABLET ORAL at 23:40

## 2025-03-05 RX ADMIN — Medication 200 GRAM(S): at 17:49

## 2025-03-05 RX ADMIN — HEPARIN SODIUM 5000 UNIT(S): 1000 INJECTION INTRAVENOUS; SUBCUTANEOUS at 21:38

## 2025-03-05 RX ADMIN — Medication 0.5 MILLIGRAM(S): at 19:30

## 2025-03-05 RX ADMIN — Medication 400 MILLIGRAM(S): at 21:32

## 2025-03-05 RX ADMIN — SODIUM CHLORIDE 95 MILLILITER(S): 9 INJECTION, SOLUTION INTRAVENOUS at 17:00

## 2025-03-05 RX ADMIN — Medication 1000 MILLIGRAM(S): at 22:14

## 2025-03-05 RX ADMIN — Medication 0.5 MILLIGRAM(S): at 17:45

## 2025-03-05 NOTE — BRIEF OPERATIVE NOTE - NSICDXBRIEFPREOP_GEN_ALL_CORE_FT
PRE-OP DIAGNOSIS:  Encounter for ureteral catheter placement 05-Mar-2025 11:42:44  Shilpi Cueva  
PRE-OP DIAGNOSIS:  Diverticulitis of large intestine with abscess 05-Mar-2025 16:40:18  Dolores Campbell

## 2025-03-05 NOTE — BRIEF OPERATIVE NOTE - NSICDXBRIEFPROCEDURE_GEN_ALL_CORE_FT
PROCEDURES:  Diagnostic laparoscopy 05-Mar-2025 16:39:25  Dolores Campbell  Casey procedure 05-Mar-2025 16:39:32  Dolores Campbell  Small bowel resection 05-Mar-2025 16:39:54  Dolores Campbell  
PROCEDURES:  Cystoscopy, with bilateral ureteral catheterization 05-Mar-2025 11:42:34  Shilpi Cueva

## 2025-03-05 NOTE — BRIEF OPERATIVE NOTE - FIRST ASSIST NAME
Try naproxen twice daily for 2 weeks  If not doing well enough, we can try switching to other similar medication  If really bothering and not doing well enough, see sports medicine - referral placed    Recommend counseling - let me know if you want referral  
Dolores Campbell (Resident)
Shilpi Cueva (Resident)

## 2025-03-05 NOTE — BRIEF OPERATIVE NOTE - NSICDXBRIEFPOSTOP_GEN_ALL_CORE_FT
POST-OP DIAGNOSIS:  Stenosis of large intestine due to diverticulitis 05-Mar-2025 16:40:50  Dolores Campbell

## 2025-03-05 NOTE — CHART NOTE - NSCHARTNOTEFT_GEN_A_CORE
Post Operative Note    Procedure: S/P Dx lap, JOVI, converted to open, colo-enteric fistula takedown, Casey's procedure, JESSICA drain x1    Subjective: Patient seen at bedside for post-op check. Patient states they have no acute concerns to report at this time. Denies any chest pain, fever, chills, nausea, vomiting, shortness of breath at this time.      Objective:  Vitals: T(F): 97.6 (03-05-25 @ 21:00), Max: 98.6 (03-05-25 @ 08:55)  HR: 90 (03-05-25 @ 20:00)  BP: 86/67 (03-05-25 @ 20:00) (86/67 - 112/76)  RR: 14 (03-05-25 @ 20:00)  SpO2: 99% (03-05-25 @ 20:00)  Vent Settings:     In:   03-05-25 @ 07:01  -  03-05-25 @ 22:30  --------------------------------------------------------  IN: 380 mL      IV Fluids: lactated ringers. 1000 milliLiter(s) (95 mL/Hr) IV Continuous <Continuous>  sodium chloride 0.9%. 1000 milliLiter(s) (95 mL/Hr) IV Continuous <Continuous>      Out:   03-05-25 @ 07:01  -  03-05-25 @ 22:30  --------------------------------------------------------  OUT: 337 mL        Voided Urine:   03-05-25 @ 07:01  -  03-05-25 @ 22:30  --------------------------------------------------------  OUT: 337 mL        JESSICA:   03-05-25 @ 07:01 - 03-05-25 @ 22:30  --------------------------------------------------------  OUT: 157 mL      Physical Examination:  General: NAD, resting comfortably in bed  HEENT: Normocephalic atraumatic  Respiratory: Nonlabored respirations, normal CW expansion.  Cardio: pulse present  Abdomen: softly distended, appropriately tender, surgical dressings intact. Stoma pink, minimal production in ostomy bag. JESSICA drain x1 pelvis SS  Extremities: warm and well perfused.         Assessment:  74yFemale patient S/P Dx lap, JOVI, converted to open, colo-enteric fistula takedown, Casey's procedure, . Progressing well in the immediate post op period.    Plan:  - Pain control PRN  - IV Zosyn  - LR95, floor to get second line to continue fluids  - Diet: NPO  - Activity: OOBTC  - DVT ppx: subq heparin    [facility]  #[id number]      Date/Time: 03-05-25 @ 22:30

## 2025-03-05 NOTE — BRIEF OPERATIVE NOTE - OPERATION/FINDINGS
Diagnostic laparoscopy revealed extensive adhesions. Lysis performed, however converted to open due to dense adhesions in the pelvis. Extensive inter-loop adhesions lysed. Small bowel tethered to the sigmoid in a colo-enteric fistula, which was taken down. Side-bite resections of small bowel where the fistulas were taken x3 and oversewn. Posterior perforation of sigmoid into psoas seen. Proximal colon very distended, showing signs of partial obstruction. Decision made to perform Cee procedure. Rectum stapled with blue TA and tagged with prolene. End colostomy matured in LUQ. 19Fr srinivas drain placed in left pelvis by rectal stump. 
Cystoscopy, no tumor noted in bladder or urethra   Placement of bilateral ureteral catheters

## 2025-03-06 LAB
ANION GAP SERPL CALC-SCNC: 15 MMOL/L — HIGH (ref 7–14)
BUN SERPL-MCNC: 16 MG/DL — SIGNIFICANT CHANGE UP (ref 7–23)
CALCIUM SERPL-MCNC: 8.5 MG/DL — SIGNIFICANT CHANGE UP (ref 8.4–10.5)
CHLORIDE SERPL-SCNC: 100 MMOL/L — SIGNIFICANT CHANGE UP (ref 98–107)
CO2 SERPL-SCNC: 21 MMOL/L — LOW (ref 22–31)
CREAT SERPL-MCNC: 1.11 MG/DL — SIGNIFICANT CHANGE UP (ref 0.5–1.3)
EGFR: 52 ML/MIN/1.73M2 — LOW
EGFR: 52 ML/MIN/1.73M2 — LOW
GLUCOSE SERPL-MCNC: 125 MG/DL — HIGH (ref 70–99)
HCT VFR BLD CALC: 31.7 % — LOW (ref 34.5–45)
HGB BLD-MCNC: 9.4 G/DL — LOW (ref 11.5–15.5)
MAGNESIUM SERPL-MCNC: 1.7 MG/DL — SIGNIFICANT CHANGE UP (ref 1.6–2.6)
MCHC RBC-ENTMCNC: 23.2 PG — LOW (ref 27–34)
MCHC RBC-ENTMCNC: 29.7 G/DL — LOW (ref 32–36)
MCV RBC AUTO: 78.3 FL — LOW (ref 80–100)
NRBC # BLD AUTO: 0 K/UL — SIGNIFICANT CHANGE UP (ref 0–0)
NRBC # FLD: 0 K/UL — SIGNIFICANT CHANGE UP (ref 0–0)
NRBC BLD AUTO-RTO: 0 /100 WBCS — SIGNIFICANT CHANGE UP (ref 0–0)
PHOSPHATE SERPL-MCNC: 5.9 MG/DL — HIGH (ref 2.5–4.5)
PLATELET # BLD AUTO: 348 K/UL — SIGNIFICANT CHANGE UP (ref 150–400)
POTASSIUM SERPL-MCNC: 4.6 MMOL/L — SIGNIFICANT CHANGE UP (ref 3.5–5.3)
POTASSIUM SERPL-SCNC: 4.6 MMOL/L — SIGNIFICANT CHANGE UP (ref 3.5–5.3)
RBC # BLD: 4.05 M/UL — SIGNIFICANT CHANGE UP (ref 3.8–5.2)
RBC # FLD: 18.5 % — HIGH (ref 10.3–14.5)
SODIUM SERPL-SCNC: 136 MMOL/L — SIGNIFICANT CHANGE UP (ref 135–145)
WBC # BLD: 15.9 K/UL — HIGH (ref 3.8–10.5)
WBC # FLD AUTO: 15.9 K/UL — HIGH (ref 3.8–10.5)

## 2025-03-06 RX ORDER — MAGNESIUM SULFATE 500 MG/ML
2 SYRINGE (ML) INJECTION ONCE
Refills: 0 | Status: DISCONTINUED | OUTPATIENT
Start: 2025-03-06 | End: 2025-03-06

## 2025-03-06 RX ORDER — MAGNESIUM SULFATE 500 MG/ML
2 SYRINGE (ML) INJECTION ONCE
Refills: 0 | Status: COMPLETED | OUTPATIENT
Start: 2025-03-06 | End: 2025-03-06

## 2025-03-06 RX ORDER — ACETAMINOPHEN 500 MG/5ML
1000 LIQUID (ML) ORAL EVERY 6 HOURS
Refills: 0 | Status: COMPLETED | OUTPATIENT
Start: 2025-03-06 | End: 2025-03-07

## 2025-03-06 RX ORDER — SODIUM CHLORIDE 9 G/1000ML
1000 INJECTION, SOLUTION INTRAVENOUS ONCE
Refills: 0 | Status: COMPLETED | OUTPATIENT
Start: 2025-03-06 | End: 2025-03-06

## 2025-03-06 RX ADMIN — Medication 400 MILLIGRAM(S): at 09:00

## 2025-03-06 RX ADMIN — HEPARIN SODIUM 5000 UNIT(S): 1000 INJECTION INTRAVENOUS; SUBCUTANEOUS at 13:42

## 2025-03-06 RX ADMIN — Medication 400 MILLIGRAM(S): at 14:27

## 2025-03-06 RX ADMIN — Medication 0.5 MILLIGRAM(S): at 05:20

## 2025-03-06 RX ADMIN — SODIUM CHLORIDE 1000 MILLILITER(S): 9 INJECTION, SOLUTION INTRAVENOUS at 09:14

## 2025-03-06 RX ADMIN — OXYCODONE HYDROCHLORIDE 2.5 MILLIGRAM(S): 30 TABLET ORAL at 00:40

## 2025-03-06 RX ADMIN — Medication 25 GRAM(S): at 13:41

## 2025-03-06 RX ADMIN — Medication 40 MILLIGRAM(S): at 13:40

## 2025-03-06 RX ADMIN — SODIUM CHLORIDE 95 MILLILITER(S): 9 INJECTION, SOLUTION INTRAVENOUS at 10:20

## 2025-03-06 RX ADMIN — Medication 1000 MILLIGRAM(S): at 23:00

## 2025-03-06 RX ADMIN — OXYCODONE HYDROCHLORIDE 5 MILLIGRAM(S): 30 TABLET ORAL at 15:15

## 2025-03-06 RX ADMIN — HEPARIN SODIUM 5000 UNIT(S): 1000 INJECTION INTRAVENOUS; SUBCUTANEOUS at 05:32

## 2025-03-06 RX ADMIN — ATORVASTATIN CALCIUM 10 MILLIGRAM(S): 80 TABLET, FILM COATED ORAL at 22:26

## 2025-03-06 RX ADMIN — HEPARIN SODIUM 5000 UNIT(S): 1000 INJECTION INTRAVENOUS; SUBCUTANEOUS at 22:26

## 2025-03-06 RX ADMIN — SODIUM CHLORIDE 95 MILLILITER(S): 9 INJECTION, SOLUTION INTRAVENOUS at 03:18

## 2025-03-06 RX ADMIN — Medication 1000 MILLIGRAM(S): at 04:00

## 2025-03-06 RX ADMIN — Medication 1000 MILLIGRAM(S): at 15:00

## 2025-03-06 RX ADMIN — Medication 25 GRAM(S): at 05:32

## 2025-03-06 RX ADMIN — SODIUM CHLORIDE 95 MILLILITER(S): 9 INJECTION, SOLUTION INTRAVENOUS at 09:12

## 2025-03-06 RX ADMIN — Medication 0.5 MILLIGRAM(S): at 18:06

## 2025-03-06 RX ADMIN — Medication 400 MILLIGRAM(S): at 22:26

## 2025-03-06 RX ADMIN — Medication 1000 MILLIGRAM(S): at 09:30

## 2025-03-06 RX ADMIN — OXYCODONE HYDROCHLORIDE 5 MILLIGRAM(S): 30 TABLET ORAL at 14:26

## 2025-03-06 RX ADMIN — Medication 25 GRAM(S): at 09:13

## 2025-03-06 RX ADMIN — Medication 0.5 MILLIGRAM(S): at 19:00

## 2025-03-06 RX ADMIN — Medication 0.5 MILLIGRAM(S): at 04:53

## 2025-03-06 RX ADMIN — Medication 25 GRAM(S): at 22:32

## 2025-03-06 RX ADMIN — Medication 400 MILLIGRAM(S): at 03:18

## 2025-03-06 NOTE — PHYSICAL THERAPY INITIAL EVALUATION ADULT - PERTINENT HX OF CURRENT PROBLEM, REHAB EVAL
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

## 2025-03-06 NOTE — PATIENT PROFILE ADULT - FALL HARM RISK - HARM RISK INTERVENTIONS
Assistance with ambulation/Assistance OOB with selected safe patient handling equipment/Communicate Risk of Fall with Harm to all staff/Discuss with provider need for PT consult/Monitor gait and stability/Provide patient with walking aids - walker, cane, crutches/Reinforce activity limits and safety measures with patient and family/Sit up slowly, dangle for a short time, stand at bedside before walking/Tailored Fall Risk Interventions/Use of alarms - bed, chair and/or voice tab/Visual Cue: Yellow wristband and red socks/Bed in lowest position, wheels locked, appropriate side rails in place/Call bell, personal items and telephone in reach/Instruct patient to call for assistance before getting out of bed or chair/Non-slip footwear when patient is out of bed/[place] to call system/Physically safe environment - no spills, clutter or unnecessary equipment/Purposeful Proactive Rounding/Room/bathroom lighting operational, light cord in reach

## 2025-03-06 NOTE — PHYSICAL THERAPY INITIAL EVALUATION ADULT - GENERAL OBSERVATIONS, REHAB EVAL
Chart reviewed and cleared for PT by BRENDEN Bettencourt. Pt received sitting in bedside chair in NAD, all lines intact + pulse ox, adame, IV, SpO2 100%, pts visitor at bedside.

## 2025-03-06 NOTE — PROGRESS NOTE ADULT - SUBJECTIVE AND OBJECTIVE BOX
Surgery Progress Note    SUBJECTIVE:  - Patient seen and examined at bedside. Patient report no nausea, vomiting, report not passing gas, soreness of the abdomen.   --------------------------------------------------------------------------------------------------  OBJECTIVE:   Physical Exam:  General: AAOx3, NAD, lying comfortably in bed  HEENT: NC/AT  Respiratory: nonlabored breathing  Abdomen: non-distended, soft, non-tender. JESSICA with serosanginous output  Extremities: WWP, no edema  Genitourinary: Lua intact and in place. Ucath was removed in the am  --------------------------------------------------------------------------------------------------  V/S:  Vital Signs Last 24 Hrs  T(C): 36.4 (06 Mar 2025 04:48), Max: 37 (05 Mar 2025 16:50)  T(F): 97.6 (06 Mar 2025 04:48), Max: 98.6 (05 Mar 2025 16:50)  HR: 70 (06 Mar 2025 04:48) (70 - 103)  BP: 90/63 (06 Mar 2025 04:48) (86/67 - 112/76)  BP(mean): 76 (05 Mar 2025 22:00) (63 - 103)  RR: 16 (06 Mar 2025 04:48) (11 - 23)  SpO2: 100% (06 Mar 2025 04:48) (87% - 100%)    Parameters below as of 06 Mar 2025 04:48  Patient On (Oxygen Delivery Method): room air        --------------------------------------------------------------------------------------------------  I/Os:    05 Mar 2025 07:01  -  06 Mar 2025 07:00  --------------------------------------------------------  IN:    IV PiggyBack: 200 mL    Lactated Ringers: 950 mL    Oral Fluid: 100 mL    sodium chloride 0.9%: 475 mL  Total IN: 1725 mL    OUT:    Bulb (mL): 204.5 mL    Colostomy (mL): 0 mL    Indwelling Catheter - Urethral (mL): 420 mL  Total OUT: 624.5 mL    Total NET: 1100.5 mL        --------------------------------------------------------------------------------------------------  LABS:                        9.4    15.90 )-----------( 348      ( 06 Mar 2025 05:00 )             31.7     06 Mar 2025 05:00    136    |  100    |  16     ----------------------------<  125    4.6     |  21     |  1.11     Ca    8.5        06 Mar 2025 05:00  Phos  5.9       06 Mar 2025 05:00  Mg     1.70      06 Mar 2025 05:00        CAPILLARY BLOOD GLUCOSE                Urinalysis Basic - ( 06 Mar 2025 05:00 )    Color: x / Appearance: x / SG: x / pH: x  Gluc: 125 mg/dL / Ketone: x  / Bili: x / Urobili: x   Blood: x / Protein: x / Nitrite: x   Leuk Esterase: x / RBC: x / WBC x   Sq Epi: x / Non Sq Epi: x / Bacteria: x      --------------------------------------------------------------------------------------------------  MEDICATIONS  (STANDING):  acetaminophen   IVPB .. 1000 milliGRAM(s) IV Intermittent every 6 hours  atorvastatin 10 milliGRAM(s) Oral at bedtime  heparin   Injectable 5000 Unit(s) SubCutaneous every 8 hours  lactated ringers. 1000 milliLiter(s) (95 mL/Hr) IV Continuous <Continuous>  piperacillin/tazobactam IVPB.. 3.375 Gram(s) IV Intermittent every 8 hours  sodium chloride 0.9%. 1000 milliLiter(s) (95 mL/Hr) IV Continuous <Continuous>    MEDICATIONS  (PRN):  HYDROmorphone  Injectable 0.5 milliGRAM(s) IV Push every 4 hours PRN Severe Pain (7 - 10)  naloxone Injectable 0.1 milliGRAM(s) IV Push every 3 minutes PRN For ANY of the following changes in patient status:  A. RR LESS THAN 10 breaths per minute, B. Oxygen saturation LESS THAN 90%, C. Sedation score of 6  oxyCODONE    IR 2.5 milliGRAM(s) Oral every 4 hours PRN Moderate Pain (4 - 6)  oxyCODONE    IR 5 milliGRAM(s) Oral every 4 hours PRN Severe Pain (7 - 10)    --------------------------------------------------------------------------------------------------

## 2025-03-06 NOTE — PROGRESS NOTE ADULT - SUBJECTIVE AND OBJECTIVE BOX
Anesthesia Pain Management Service    SUBJECTIVE: Patient s/p spinal morphine with pain managable and no problems.  Pain Scale Score:  Refer to charted pain scores    THERAPY:    s/p spinal PF morphine yesterday.      MEDICATIONS  (STANDING):  acetaminophen   IVPB .. 1000 milliGRAM(s) IV Intermittent every 6 hours  atorvastatin 10 milliGRAM(s) Oral at bedtime  heparin   Injectable 5000 Unit(s) SubCutaneous every 8 hours  lactated ringers. 1000 milliLiter(s) (95 mL/Hr) IV Continuous <Continuous>  magnesium sulfate  IVPB 2 Gram(s) IV Intermittent once  piperacillin/tazobactam IVPB.. 3.375 Gram(s) IV Intermittent every 8 hours    MEDICATIONS  (PRN):  HYDROmorphone  Injectable 0.5 milliGRAM(s) IV Push every 4 hours PRN Severe Pain (7 - 10)  naloxone Injectable 0.1 milliGRAM(s) IV Push every 3 minutes PRN For ANY of the following changes in patient status:  A. RR LESS THAN 10 breaths per minute, B. Oxygen saturation LESS THAN 90%, C. Sedation score of 6  oxyCODONE    IR 2.5 milliGRAM(s) Oral every 4 hours PRN Moderate Pain (4 - 6)  oxyCODONE    IR 5 milliGRAM(s) Oral every 4 hours PRN Severe Pain (7 - 10)      OBJECTIVE:    Sedation Score:	[ x] Alert	[ ] Drowsy	[ ] Arousable	[ ] Asleep	[ ] Unresponsive    Side Effects:	[ x] None	[ ] Nausea	[ ] Vomiting	[ ] Pruritus  		  [ ] Weakness		[ ] Numbness	[ ] Other:    Vital Signs Last 24 Hrs  T(C): 36.5 (06 Mar 2025 09:44), Max: 37 (05 Mar 2025 16:50)  T(F): 97.7 (06 Mar 2025 09:44), Max: 98.6 (05 Mar 2025 16:50)  HR: 75 (06 Mar 2025 09:44) (70 - 103)  BP: 90/60 (06 Mar 2025 09:44) (86/67 - 112/76)  BP(mean): 76 (05 Mar 2025 22:00) (63 - 103)  RR: 16 (06 Mar 2025 09:44) (11 - 23)  SpO2: 97% (06 Mar 2025 09:44) (87% - 100%)    Parameters below as of 06 Mar 2025 09:44  Patient On (Oxygen Delivery Method): room air        ASSESSMENT/ PLAN  [x ] Patient transitioned to prn analgesics  [x] Pain management per primary service, pain service to sign off   [x]Documentation and Verification of current medications     Comments: PRN Oral/IV opioids and/or non-opioid Adjuvant analgesics to be used at this point.    Progress Note written now but Patient was seen earlier. Anesthesia Pain Management Service    SUBJECTIVE: Patient s/p spinal morphine with pain managable and no problems. Denies headache or new onset neurological symptoms. Experiencing intermittent nausea after the procedure, no vomiting.   Pain Scale Score:  Refer to charted pain scores    THERAPY:    s/p spinal PF morphine yesterday.      MEDICATIONS  (STANDING):  acetaminophen   IVPB .. 1000 milliGRAM(s) IV Intermittent every 6 hours  atorvastatin 10 milliGRAM(s) Oral at bedtime  heparin   Injectable 5000 Unit(s) SubCutaneous every 8 hours  lactated ringers. 1000 milliLiter(s) (95 mL/Hr) IV Continuous <Continuous>  magnesium sulfate  IVPB 2 Gram(s) IV Intermittent once  piperacillin/tazobactam IVPB.. 3.375 Gram(s) IV Intermittent every 8 hours    MEDICATIONS  (PRN):  HYDROmorphone  Injectable 0.5 milliGRAM(s) IV Push every 4 hours PRN Severe Pain (7 - 10)  naloxone Injectable 0.1 milliGRAM(s) IV Push every 3 minutes PRN For ANY of the following changes in patient status:  A. RR LESS THAN 10 breaths per minute, B. Oxygen saturation LESS THAN 90%, C. Sedation score of 6  oxyCODONE    IR 2.5 milliGRAM(s) Oral every 4 hours PRN Moderate Pain (4 - 6)  oxyCODONE    IR 5 milliGRAM(s) Oral every 4 hours PRN Severe Pain (7 - 10)      OBJECTIVE:    Sedation Score:	[ x] Alert	[ ] Drowsy	[ ] Arousable	[ ] Asleep	[ ] Unresponsive    Side Effects:	[ x] None	[ ] Nausea	[ ] Vomiting	[ ] Pruritus  		  [ ] Weakness		[ ] Numbness	[ ] Other:    Vital Signs Last 24 Hrs  T(C): 36.5 (06 Mar 2025 09:44), Max: 37 (05 Mar 2025 16:50)  T(F): 97.7 (06 Mar 2025 09:44), Max: 98.6 (05 Mar 2025 16:50)  HR: 75 (06 Mar 2025 09:44) (70 - 103)  BP: 90/60 (06 Mar 2025 09:44) (86/67 - 112/76)  BP(mean): 76 (05 Mar 2025 22:00) (63 - 103)  RR: 16 (06 Mar 2025 09:44) (11 - 23)  SpO2: 97% (06 Mar 2025 09:44) (87% - 100%)    Parameters below as of 06 Mar 2025 09:44  Patient On (Oxygen Delivery Method): room air        ASSESSMENT/ PLAN  [x ] Patient transitioned to prn analgesics  [x] Pain management per primary service, pain service to sign off   [x]Documentation and Verification of current medications     Comments: PRN Oral/IV opioids and/or non-opioid Adjuvant analgesics to be used at this point.    Progress Note written now but Patient was seen earlier.

## 2025-03-06 NOTE — PATIENT PROFILE ADULT - FUNCTIONAL ASSESSMENT - BASIC MOBILITY SECTION LABEL
Consent: The patient's consent was obtained including but not limited to risks of crusting, scabbing, blistering, scarring, darker or lighter pigmentary change, recurrence, incomplete removal and infection. Add 52 Modifier (Optional): no Show Topical Anesthesia Variable?: Yes Medical Necessity Information: It is in your best interest to select a reason for this procedure from the list below. All of these items fulfill various CMS LCD requirements except the new and changing color options. Medical Necessity Clause: This procedure was medically necessary because the lesions that were treated were: Spray Paint Text: The liquid nitrogen was applied to the skin utilizing a spray paint frosting technique. Detail Level: Detailed Post-Care Instructions: I reviewed with the patient in detail post-care instructions. Patient is to wear sunprotection, and avoid picking at any of the treated lesions. Pt may apply Vaseline to crusted or scabbing areas. .

## 2025-03-06 NOTE — PHYSICAL THERAPY INITIAL EVALUATION ADULT - IMPAIRMENTS FOUND, PT EVAL
EXAM note      History    Amara Vivas is a 21 year old female who presents with   Chief Complaint   Patient presents with   • Pharyngitis     est rm 204        I have reviewed the past medical, family and social history sections including the medications and allergies listed in the above medical record as well as the nursing notes.     HPI: 4 day history of fever, sweats, chills. Associated sore throat getting worse, swelling in neck glands worsening as well. No cough or cold symptoms.    Review of systems    Review of systems as per HPI.        Physical Exam    Vital Signs:    Vitals:    03/12/18 0842   BP: 110/64   Pulse: 91   Resp: 12   Temp: 99.7 °F (37.6 °C)   TempSrc: Oral   SpO2: 99%   Weight: 72.6 kg   Height: 5' 8\" (1.727 m)   PainSc: 3-4       Gen.: Nontoxic.  HEENT: There appears to be residual tonsillar tissue in the posterior pharynx, not erythematous, no exudates.  Neck: Moderately large bilateral anterior cervical lymph nodes, mild to moderately tender.     Imaging:  None    Labs:  Rapid strep and rapid flu test negative    Assessment  Encounter Diagnoses   Name Primary?   • Cervical adenitis Yes   • Sore throat    • Fever, unspecified fever cause        PLAN    Keflex 500 mg q.i.d. ×10 days. Tylenol/Advil as needed. May return to work, patient does wear a mask at work. Discussed with patient period of contagion. Follow-up if not better         aerobic capacity/endurance/gait, locomotion, and balance/muscle strength/ROM

## 2025-03-06 NOTE — PROGRESS NOTE ADULT - SUBJECTIVE AND OBJECTIVE BOX
ANESTHESIA POSTOP CHECK    74y Female POSTOP DAY 1    Vital Signs Last 24 Hrs  T(C): 36.5 (06 Mar 2025 09:44), Max: 37 (05 Mar 2025 16:50)  T(F): 97.7 (06 Mar 2025 09:44), Max: 98.6 (05 Mar 2025 16:50)  HR: 75 (06 Mar 2025 09:44) (70 - 103)  BP: 90/60 (06 Mar 2025 09:44) (86/67 - 112/76)  BP(mean): 76 (05 Mar 2025 22:00) (63 - 103)  RR: 16 (06 Mar 2025 09:44) (11 - 23)  SpO2: 97% (06 Mar 2025 09:44) (87% - 100%)    Parameters below as of 06 Mar 2025 09:44  Patient On (Oxygen Delivery Method): room air      I&O's Summary    05 Mar 2025 07:01  -  06 Mar 2025 07:00  --------------------------------------------------------  IN: 1725 mL / OUT: 624.5 mL / NET: 1100.5 mL    06 Mar 2025 07:01  -  06 Mar 2025 11:06  --------------------------------------------------------  IN: 0 mL / OUT: 60 mL / NET: -60 mL        [x] NO APPARENT ANESTHESIA COMPLICATIONS       ANESTHESIA POSTOP CHECK    74y Female POSTOP DAY 1    Vital Signs Last 24 Hrs  T(C): 36.5 (06 Mar 2025 09:44), Max: 37 (05 Mar 2025 16:50)  T(F): 97.7 (06 Mar 2025 09:44), Max: 98.6 (05 Mar 2025 16:50)  HR: 75 (06 Mar 2025 09:44) (70 - 103)  BP: 90/60 (06 Mar 2025 09:44) (86/67 - 112/76)  BP(mean): 76 (05 Mar 2025 22:00) (63 - 103)  RR: 16 (06 Mar 2025 09:44) (11 - 23)  SpO2: 97% (06 Mar 2025 09:44) (87% - 100%)    Parameters below as of 06 Mar 2025 09:44  Patient On (Oxygen Delivery Method): room air      I&O's Summary    05 Mar 2025 07:01  -  06 Mar 2025 07:00  --------------------------------------------------------  IN: 1725 mL / OUT: 624.5 mL / NET: 1100.5 mL    06 Mar 2025 07:01  -  06 Mar 2025 11:06  --------------------------------------------------------  IN: 0 mL / OUT: 60 mL / NET: -60 mL        [x] NO APPARENT ANESTHESIA COMPLICATIONS    Patient experienced some nausea however adequately controlled.

## 2025-03-06 NOTE — PHYSICAL THERAPY INITIAL EVALUATION ADULT - ADDITIONAL COMMENTS
Pt lives alone in an apartment with 3 steps to enter. Pt did not use an assistive device and was independent with ADLs prior. Pt reports no falls in the past 6 months.  Pt left sitting in bedside chair in NAD, all lines intact, call deluca in reach and BRENDEN Bettencourt made aware.

## 2025-03-06 NOTE — ADVANCED PRACTICE NURSE CONSULT - ASSESSMENT
Patient a&ox4, reports feeling overwhelmed. Emotional support provided. Overview of surgical procedure and need of ostomy reinforced. Terms defined utilized: Ostomy, wafer, pouch, stoma. Frequency of pouch change and emptying discussed, teach back method utilized. Patient not ready to look at stoma or participate in pouch change procedure at this time, will follow up for teaching. LUQ colostomy pink and moist, 2 piece pouch intact, scant serosanguinous drainage in pouch.

## 2025-03-07 LAB
ADD ON TEST-SPECIMEN IN LAB: SIGNIFICANT CHANGE UP
ANION GAP SERPL CALC-SCNC: 10 MMOL/L — SIGNIFICANT CHANGE UP (ref 7–14)
BUN SERPL-MCNC: 17 MG/DL — SIGNIFICANT CHANGE UP (ref 7–23)
CALCIUM SERPL-MCNC: 8.4 MG/DL — SIGNIFICANT CHANGE UP (ref 8.4–10.5)
CHLORIDE SERPL-SCNC: 102 MMOL/L — SIGNIFICANT CHANGE UP (ref 98–107)
CO2 SERPL-SCNC: 24 MMOL/L — SIGNIFICANT CHANGE UP (ref 22–31)
CREAT SERPL-MCNC: 0.8 MG/DL — SIGNIFICANT CHANGE UP (ref 0.5–1.3)
EGFR: 77 ML/MIN/1.73M2 — SIGNIFICANT CHANGE UP
EGFR: 77 ML/MIN/1.73M2 — SIGNIFICANT CHANGE UP
GLUCOSE SERPL-MCNC: 99 MG/DL — SIGNIFICANT CHANGE UP (ref 70–99)
HCT VFR BLD CALC: 26.9 % — LOW (ref 34.5–45)
HGB BLD-MCNC: 8.4 G/DL — LOW (ref 11.5–15.5)
MAGNESIUM SERPL-MCNC: 2.2 MG/DL — SIGNIFICANT CHANGE UP (ref 1.6–2.6)
MCHC RBC-ENTMCNC: 23.9 PG — LOW (ref 27–34)
MCHC RBC-ENTMCNC: 31.2 G/DL — LOW (ref 32–36)
MCV RBC AUTO: 76.4 FL — LOW (ref 80–100)
NRBC # BLD AUTO: 0 K/UL — SIGNIFICANT CHANGE UP (ref 0–0)
NRBC # FLD: 0 K/UL — SIGNIFICANT CHANGE UP (ref 0–0)
NRBC BLD AUTO-RTO: 0 /100 WBCS — SIGNIFICANT CHANGE UP (ref 0–0)
PHOSPHATE SERPL-MCNC: 2.2 MG/DL — LOW (ref 2.5–4.5)
PLATELET # BLD AUTO: 354 K/UL — SIGNIFICANT CHANGE UP (ref 150–400)
POTASSIUM SERPL-MCNC: 4.4 MMOL/L — SIGNIFICANT CHANGE UP (ref 3.5–5.3)
POTASSIUM SERPL-SCNC: 4.4 MMOL/L — SIGNIFICANT CHANGE UP (ref 3.5–5.3)
RBC # BLD: 3.52 M/UL — LOW (ref 3.8–5.2)
RBC # FLD: 18.4 % — HIGH (ref 10.3–14.5)
SODIUM SERPL-SCNC: 136 MMOL/L — SIGNIFICANT CHANGE UP (ref 135–145)
WBC # BLD: 12.36 K/UL — HIGH (ref 3.8–10.5)
WBC # FLD AUTO: 12.36 K/UL — HIGH (ref 3.8–10.5)

## 2025-03-07 RX ORDER — ACETAMINOPHEN 500 MG/5ML
1000 LIQUID (ML) ORAL EVERY 6 HOURS
Refills: 0 | Status: COMPLETED | OUTPATIENT
Start: 2025-03-07 | End: 2025-03-08

## 2025-03-07 RX ORDER — SODIUM PHOSPHATE,DIBASIC DIHYD
15 POWDER (GRAM) MISCELLANEOUS ONCE
Refills: 0 | Status: COMPLETED | OUTPATIENT
Start: 2025-03-07 | End: 2025-03-07

## 2025-03-07 RX ORDER — POTASSIUM CHLORIDE, DEXTROSE MONOHYDRATE AND SODIUM CHLORIDE 150; 5; 900 MG/100ML; G/100ML; MG/100ML
1000 INJECTION, SOLUTION INTRAVENOUS
Refills: 0 | Status: DISCONTINUED | OUTPATIENT
Start: 2025-03-07 | End: 2025-03-11

## 2025-03-07 RX ORDER — HYDROMORPHONE/SOD CHLOR,ISO/PF 2 MG/10 ML
0.5 SYRINGE (ML) INJECTION EVERY 4 HOURS
Refills: 0 | Status: DISCONTINUED | OUTPATIENT
Start: 2025-03-07 | End: 2025-03-11

## 2025-03-07 RX ORDER — HYDROMORPHONE/SOD CHLOR,ISO/PF 2 MG/10 ML
0.2 SYRINGE (ML) INJECTION EVERY 4 HOURS
Refills: 0 | Status: DISCONTINUED | OUTPATIENT
Start: 2025-03-07 | End: 2025-03-11

## 2025-03-07 RX ADMIN — Medication 0.5 MILLIGRAM(S): at 06:04

## 2025-03-07 RX ADMIN — Medication 400 MILLIGRAM(S): at 18:03

## 2025-03-07 RX ADMIN — Medication 0.5 MILLIGRAM(S): at 12:38

## 2025-03-07 RX ADMIN — POTASSIUM CHLORIDE, DEXTROSE MONOHYDRATE AND SODIUM CHLORIDE 95 MILLILITER(S): 150; 5; 900 INJECTION, SOLUTION INTRAVENOUS at 18:07

## 2025-03-07 RX ADMIN — HEPARIN SODIUM 5000 UNIT(S): 1000 INJECTION INTRAVENOUS; SUBCUTANEOUS at 14:55

## 2025-03-07 RX ADMIN — Medication 25 GRAM(S): at 14:55

## 2025-03-07 RX ADMIN — HEPARIN SODIUM 5000 UNIT(S): 1000 INJECTION INTRAVENOUS; SUBCUTANEOUS at 06:05

## 2025-03-07 RX ADMIN — Medication 0.5 MILLIGRAM(S): at 06:34

## 2025-03-07 RX ADMIN — Medication 0.5 MILLIGRAM(S): at 13:00

## 2025-03-07 RX ADMIN — Medication 400 MILLIGRAM(S): at 03:56

## 2025-03-07 RX ADMIN — Medication 0.5 MILLIGRAM(S): at 18:37

## 2025-03-07 RX ADMIN — Medication 1000 MILLIGRAM(S): at 18:37

## 2025-03-07 RX ADMIN — HEPARIN SODIUM 5000 UNIT(S): 1000 INJECTION INTRAVENOUS; SUBCUTANEOUS at 21:38

## 2025-03-07 RX ADMIN — Medication 25 GRAM(S): at 21:38

## 2025-03-07 RX ADMIN — Medication 1000 MILLIGRAM(S): at 22:37

## 2025-03-07 RX ADMIN — SODIUM CHLORIDE 95 MILLILITER(S): 9 INJECTION, SOLUTION INTRAVENOUS at 12:15

## 2025-03-07 RX ADMIN — Medication 1000 MILLIGRAM(S): at 04:19

## 2025-03-07 RX ADMIN — Medication 40 MILLIGRAM(S): at 04:26

## 2025-03-07 RX ADMIN — Medication 400 MILLIGRAM(S): at 21:37

## 2025-03-07 RX ADMIN — ATORVASTATIN CALCIUM 10 MILLIGRAM(S): 80 TABLET, FILM COATED ORAL at 21:38

## 2025-03-07 RX ADMIN — Medication 400 MILLIGRAM(S): at 12:14

## 2025-03-07 RX ADMIN — Medication 25 GRAM(S): at 06:04

## 2025-03-07 RX ADMIN — Medication 1000 MILLIGRAM(S): at 13:00

## 2025-03-07 RX ADMIN — Medication 63.75 MILLIMOLE(S): at 12:19

## 2025-03-07 RX ADMIN — Medication 0.5 MILLIGRAM(S): at 18:03

## 2025-03-07 NOTE — ADVANCED PRACTICE NURSE CONSULT - ASSESSMENT
Patient AxOx4, ready and willing to learn, daughter at bedside. Actively participating in pouch change. Overview of surgical procedure and need of ostomy reinforced. Terms defined utilized: Ostomy, colostomy, wafer, pouch, stoma. Frequency of pouch change and emptying discussed, teach back method utilized. Patient able to show back how to open, empty and close pouch. Removed ostomy pouch using pull and push technique, cleansed skin with water, patted dry. Taught patient and daughter how to properly assess stoma viability and peristomal skin. Patient and daughter actively participated in stoma measurement, creating template, cutting wafer, applying barrier ring (to protect peristomal skin from enzymatic irritation), applying pouch. Reviewed s/s to report to MD. Showering with and without a pouch discussed as well as dietary restrictions. Informed that visiting nurse would follow up after discharge to set up account for supplies. Printed ostomy educational material provided to patient for review. [facility] Ostomy service contact information provided for any follow up questions/concerns.     Stoma size:   See A&I flowsheet for full assessment details.   Patient AxOx4, ready and willing to learn, daughter at bedside. Actively participating in pouch change. Overview of surgical procedure and need of ostomy reinforced. Terms defined utilized: Ostomy, colostomy, wafer, pouch, stoma. Frequency of pouch change and emptying discussed, teach back method utilized. Patient able to show back how to open, empty and close pouch. Removed ostomy pouch using pull and push technique, cleansed skin with water, patted dry. Taught patient and daughter how to properly assess stoma viability and peristomal skin. Patient and daughter actively participated in stoma measurement, creating template, cutting wafer, applying pouch. Reviewed s/s to report to MD. Showering with and without a pouch discussed as well as dietary restrictions. Informed that visiting nurse would follow up after discharge to set up account for supplies. Printed ostomy educational material provided to patient for review. [facility] Ostomy service contact information provided for any follow up questions/concerns.     Stoma size: 2"  See A&I flowsheet for full assessment details.

## 2025-03-07 NOTE — PROGRESS NOTE ADULT - SUBJECTIVE AND OBJECTIVE BOX
Surgery Progress Note    SUBJECTIVE:  - Patient seen and examined at bedside. Patient report feeling much better today. Report no nausea, vomiting, tolerating sips. Report pain not well controlled with the current regimen.   --------------------------------------------------------------------------------------------------  OBJECTIVE:   Physical Exam:  General: AAOx3, NAD, lying comfortably in bed  HEENT: NC/AT  Respiratory: nonlabored breathing  Abdomen: non-distended, soft, non-tender. JESSICA with serosanginous output. Ostomy with pasty stool and small amount of gas  Extremities: WWP, no edema  Genitourinary: Lua intact and in place. =  --------------------------------------------------------------------------------------------------  V/S:  Vital Signs Last 24 Hrs  T(C): 36.6 (07 Mar 2025 04:37), Max: 37.3 (06 Mar 2025 13:21)  T(F): 97.8 (07 Mar 2025 04:37), Max: 99.2 (06 Mar 2025 13:21)  HR: 79 (07 Mar 2025 04:37) (73 - 89)  BP: 110/70 (07 Mar 2025 04:37) (90/60 - 115/66)  BP(mean): --  RR: 18 (07 Mar 2025 04:37) (16 - 18)  SpO2: 98% (07 Mar 2025 04:37) (96% - 98%)    Parameters below as of 07 Mar 2025 04:37  Patient On (Oxygen Delivery Method): room air        --------------------------------------------------------------------------------------------------  I/Os:    06 Mar 2025 07:01  -  07 Mar 2025 07:00  --------------------------------------------------------  IN:    IV PiggyBack: 400 mL    Lactated Ringers: 1140 mL  Total IN: 1540 mL    OUT:    Bulb (mL): 32.5 mL    Colostomy (mL): 20 mL    Indwelling Catheter - Urethral (mL): 1210 mL    Oral Fluid: 0 mL  Total OUT: 1262.5 mL    Total NET: 277.5 mL        --------------------------------------------------------------------------------------------------  LABS:                        8.4    12.36 )-----------( 354      ( 07 Mar 2025 04:35 )             26.9     07 Mar 2025 04:35    136    |  102    |  17     ----------------------------<  99     4.4     |  24     |  0.80     Ca    8.4        07 Mar 2025 04:35  Phos  5.9       06 Mar 2025 05:00  Mg     1.70      06 Mar 2025 05:00        CAPILLARY BLOOD GLUCOSE                Urinalysis Basic - ( 07 Mar 2025 04:35 )    Color: x / Appearance: x / SG: x / pH: x  Gluc: 99 mg/dL / Ketone: x  / Bili: x / Urobili: x   Blood: x / Protein: x / Nitrite: x   Leuk Esterase: x / RBC: x / WBC x   Sq Epi: x / Non Sq Epi: x / Bacteria: x      --------------------------------------------------------------------------------------------------  MEDICATIONS  (STANDING):  acetaminophen   IVPB .. 1000 milliGRAM(s) IV Intermittent every 6 hours  atorvastatin 10 milliGRAM(s) Oral at bedtime  heparin   Injectable 5000 Unit(s) SubCutaneous every 8 hours  lactated ringers. 1000 milliLiter(s) (95 mL/Hr) IV Continuous <Continuous>  pantoprazole  Injectable 40 milliGRAM(s) IV Push daily  piperacillin/tazobactam IVPB.. 3.375 Gram(s) IV Intermittent every 8 hours    MEDICATIONS  (PRN):  HYDROmorphone  Injectable 0.5 milliGRAM(s) IV Push every 4 hours PRN Severe Pain (7 - 10)  naloxone Injectable 0.1 milliGRAM(s) IV Push every 3 minutes PRN For ANY of the following changes in patient status:  A. RR LESS THAN 10 breaths per minute, B. Oxygen saturation LESS THAN 90%, C. Sedation score of 6  oxyCODONE    IR 2.5 milliGRAM(s) Oral every 4 hours PRN Moderate Pain (4 - 6)  oxyCODONE    IR 5 milliGRAM(s) Oral every 4 hours PRN Severe Pain (7 - 10)    --------------------------------------------------------------------------------------------------

## 2025-03-07 NOTE — ADVANCED PRACTICE NURSE CONSULT - RECOMMEDATIONS
Supplies recommended:   Skin barrier ring- item # [id number]  Flat wafer (2 1/4")- item # [id number]   Drainable pouch (2 1/4")- item # [id number]  One piece drainable pouch- item #[id number]      Ostomy team will continue to follow.  Supplies recommended:   One piece drainable pouch- item #[id number]    Ostomy team will continue to follow.

## 2025-03-08 LAB
ANION GAP SERPL CALC-SCNC: 7 MMOL/L — SIGNIFICANT CHANGE UP (ref 7–14)
BUN SERPL-MCNC: 8 MG/DL — SIGNIFICANT CHANGE UP (ref 7–23)
CALCIUM SERPL-MCNC: 8.6 MG/DL — SIGNIFICANT CHANGE UP (ref 8.4–10.5)
CHLORIDE SERPL-SCNC: 104 MMOL/L — SIGNIFICANT CHANGE UP (ref 98–107)
CO2 SERPL-SCNC: 27 MMOL/L — SIGNIFICANT CHANGE UP (ref 22–31)
CREAT SERPL-MCNC: 0.54 MG/DL — SIGNIFICANT CHANGE UP (ref 0.5–1.3)
EGFR: 96 ML/MIN/1.73M2 — SIGNIFICANT CHANGE UP
EGFR: 96 ML/MIN/1.73M2 — SIGNIFICANT CHANGE UP
GLUCOSE SERPL-MCNC: 114 MG/DL — HIGH (ref 70–99)
HCT VFR BLD CALC: 26.1 % — LOW (ref 34.5–45)
HGB BLD-MCNC: 8.1 G/DL — LOW (ref 11.5–15.5)
MAGNESIUM SERPL-MCNC: 2 MG/DL — SIGNIFICANT CHANGE UP (ref 1.6–2.6)
MCHC RBC-ENTMCNC: 23.3 PG — LOW (ref 27–34)
MCHC RBC-ENTMCNC: 31 G/DL — LOW (ref 32–36)
MCV RBC AUTO: 75.2 FL — LOW (ref 80–100)
NRBC # BLD AUTO: 0 K/UL — SIGNIFICANT CHANGE UP (ref 0–0)
NRBC # FLD: 0 K/UL — SIGNIFICANT CHANGE UP (ref 0–0)
NRBC BLD AUTO-RTO: 0 /100 WBCS — SIGNIFICANT CHANGE UP (ref 0–0)
PHOSPHATE SERPL-MCNC: 1.3 MG/DL — LOW (ref 2.5–4.5)
PLATELET # BLD AUTO: 253 K/UL — SIGNIFICANT CHANGE UP (ref 150–400)
POTASSIUM SERPL-MCNC: 3.8 MMOL/L — SIGNIFICANT CHANGE UP (ref 3.5–5.3)
POTASSIUM SERPL-SCNC: 3.8 MMOL/L — SIGNIFICANT CHANGE UP (ref 3.5–5.3)
RBC # BLD: 3.47 M/UL — LOW (ref 3.8–5.2)
RBC # FLD: 18.5 % — HIGH (ref 10.3–14.5)
SODIUM SERPL-SCNC: 138 MMOL/L — SIGNIFICANT CHANGE UP (ref 135–145)
WBC # BLD: 9.15 K/UL — SIGNIFICANT CHANGE UP (ref 3.8–10.5)
WBC # FLD AUTO: 9.15 K/UL — SIGNIFICANT CHANGE UP (ref 3.8–10.5)

## 2025-03-08 RX ORDER — SODIUM PHOSPHATE,DIBASIC DIHYD
30 POWDER (GRAM) MISCELLANEOUS ONCE
Refills: 0 | Status: COMPLETED | OUTPATIENT
Start: 2025-03-08 | End: 2025-03-08

## 2025-03-08 RX ADMIN — Medication 25 GRAM(S): at 15:37

## 2025-03-08 RX ADMIN — ATORVASTATIN CALCIUM 10 MILLIGRAM(S): 80 TABLET, FILM COATED ORAL at 21:34

## 2025-03-08 RX ADMIN — Medication 400 MILLIGRAM(S): at 05:27

## 2025-03-08 RX ADMIN — Medication 400 MILLIGRAM(S): at 09:57

## 2025-03-08 RX ADMIN — HEPARIN SODIUM 5000 UNIT(S): 1000 INJECTION INTRAVENOUS; SUBCUTANEOUS at 15:37

## 2025-03-08 RX ADMIN — Medication 400 MILLIGRAM(S): at 15:44

## 2025-03-08 RX ADMIN — Medication 40 MILLIGRAM(S): at 05:27

## 2025-03-08 RX ADMIN — HEPARIN SODIUM 5000 UNIT(S): 1000 INJECTION INTRAVENOUS; SUBCUTANEOUS at 05:27

## 2025-03-08 RX ADMIN — Medication 85 MILLIMOLE(S): at 09:57

## 2025-03-08 RX ADMIN — Medication 1000 MILLIGRAM(S): at 10:57

## 2025-03-08 RX ADMIN — HEPARIN SODIUM 5000 UNIT(S): 1000 INJECTION INTRAVENOUS; SUBCUTANEOUS at 21:34

## 2025-03-08 RX ADMIN — Medication 25 GRAM(S): at 05:28

## 2025-03-08 RX ADMIN — Medication 0.5 MILLIGRAM(S): at 01:17

## 2025-03-08 RX ADMIN — Medication 1000 MILLIGRAM(S): at 16:44

## 2025-03-08 RX ADMIN — POTASSIUM CHLORIDE, DEXTROSE MONOHYDRATE AND SODIUM CHLORIDE 95 MILLILITER(S): 150; 5; 900 INJECTION, SOLUTION INTRAVENOUS at 09:57

## 2025-03-08 RX ADMIN — POTASSIUM CHLORIDE, DEXTROSE MONOHYDRATE AND SODIUM CHLORIDE 95 MILLILITER(S): 150; 5; 900 INJECTION, SOLUTION INTRAVENOUS at 01:17

## 2025-03-08 RX ADMIN — Medication 0.5 MILLIGRAM(S): at 02:17

## 2025-03-08 RX ADMIN — Medication 1000 MILLIGRAM(S): at 06:27

## 2025-03-08 NOTE — PROVIDER CONTACT NOTE (OTHER) - BACKGROUND
Pt is s/p cystoscopy, bilateral urethral catheter insertion, laparoscopic converted to open anterior resection, abscess drainage, and small bowel resection.

## 2025-03-08 NOTE — PROGRESS NOTE ADULT - SUBJECTIVE AND OBJECTIVE BOX
Surgery Progress Note    SUBJECTIVE:  - Patient seen and examined at bedside. Patient report feeling much better today. Report no nausea, vomiting, tolerating sips.   --------------------------------------------------------------------------------------------------  OBJECTIVE:   Physical Exam:  General: AAOx3, NAD, lying comfortably in bed  HEENT: NC/AT  Respiratory: nonlabored breathing  Abdomen: non-distended, soft, non-tender. JESSICA with serosanginous output.   Extremities: WWP, no edema  --------------------------------------------------------------------------------------------------  V/S:    Vital Signs Last 24 Hrs  T(C): 36.6 (08 Mar 2025 05:20), Max: 37 (07 Mar 2025 20:11)  T(F): 97.8 (08 Mar 2025 05:20), Max: 98.6 (07 Mar 2025 20:11)  HR: 72 (08 Mar 2025 05:20) (68 - 80)  BP: 135/68 (08 Mar 2025 05:20) (119/75 - 137/66)  BP(mean): --  RR: 17 (08 Mar 2025 05:20) (16 - 18)  SpO2: 99% (08 Mar 2025 05:20) (94% - 100%)    Parameters below as of 08 Mar 2025 05:20  Patient On (Oxygen Delivery Method): room air    --------------------------------------------------------------------------------------------------  I/Os:    I&O's Detail    07 Mar 2025 07:01  -  08 Mar 2025 07:00  --------------------------------------------------------  IN:    dextrose 5% + sodium chloride 0.45% w/ Additives: 2090 mL    IV PiggyBack: 800 mL    Oral Fluid: 220 mL  Total IN: 3110 mL    OUT:    Bulb (mL): 8 mL    Colostomy (mL): 0 mL    Indwelling Catheter - Urethral (mL): 425 mL    Voided (mL): 2300 mL  Total OUT: 2733 mL    Total NET: 377 mL      --------------------------------------------------------------------------------------------------  LABS:                           8.1    9.15  )-----------( 253      ( 08 Mar 2025 06:48 )             26.1       03-08    138  |  104  |  8   ----------------------------<  114[H]  3.8   |  27  |  0.54    Ca    8.6      08 Mar 2025 06:48  Phos  1.3     03-08  Mg     2.00     03-08        --------------------------------------------------------------------------------------------------  MEDICATIONS  (STANDING):  acetaminophen   IVPB .. 1000 milliGRAM(s) IV Intermittent every 6 hours  atorvastatin 10 milliGRAM(s) Oral at bedtime  dextrose 5% + sodium chloride 0.45% with potassium chloride 20 mEq/L 1000 milliLiter(s) (95 mL/Hr) IV Continuous <Continuous>  heparin   Injectable 5000 Unit(s) SubCutaneous every 8 hours  pantoprazole  Injectable 40 milliGRAM(s) IV Push daily  piperacillin/tazobactam IVPB.. 3.375 Gram(s) IV Intermittent every 8 hours  sodium phosphate 30 milliMole(s)/500 mL IVPB 30 milliMole(s) IV Intermittent once    MEDICATIONS  (PRN):  HYDROmorphone  Injectable 0.2 milliGRAM(s) IV Push every 4 hours PRN Moderate Pain (4 - 6)  HYDROmorphone  Injectable 0.5 milliGRAM(s) IV Push every 4 hours PRN Severe Pain (7 - 10)  naloxone Injectable 0.1 milliGRAM(s) IV Push every 3 minutes PRN For ANY of the following changes in patient status:  A. RR LESS THAN 10 breaths per minute, B. Oxygen saturation LESS THAN 90%, C. Sedation score of 6    --------------------------------------------------------------------------------------------------

## 2025-03-09 LAB
ANION GAP SERPL CALC-SCNC: 11 MMOL/L — SIGNIFICANT CHANGE UP (ref 7–14)
BUN SERPL-MCNC: 7 MG/DL — SIGNIFICANT CHANGE UP (ref 7–23)
CALCIUM SERPL-MCNC: 9.1 MG/DL — SIGNIFICANT CHANGE UP (ref 8.4–10.5)
CHLORIDE SERPL-SCNC: 101 MMOL/L — SIGNIFICANT CHANGE UP (ref 98–107)
CO2 SERPL-SCNC: 26 MMOL/L — SIGNIFICANT CHANGE UP (ref 22–31)
CREAT SERPL-MCNC: 0.44 MG/DL — LOW (ref 0.5–1.3)
EGFR: 101 ML/MIN/1.73M2 — SIGNIFICANT CHANGE UP
EGFR: 101 ML/MIN/1.73M2 — SIGNIFICANT CHANGE UP
GLUCOSE SERPL-MCNC: 114 MG/DL — HIGH (ref 70–99)
HCT VFR BLD CALC: 30.6 % — LOW (ref 34.5–45)
HGB BLD-MCNC: 9.5 G/DL — LOW (ref 11.5–15.5)
MAGNESIUM SERPL-MCNC: 1.9 MG/DL — SIGNIFICANT CHANGE UP (ref 1.6–2.6)
MCHC RBC-ENTMCNC: 23.2 PG — LOW (ref 27–34)
MCHC RBC-ENTMCNC: 31 G/DL — LOW (ref 32–36)
MCV RBC AUTO: 74.6 FL — LOW (ref 80–100)
NRBC # BLD AUTO: 0 K/UL — SIGNIFICANT CHANGE UP (ref 0–0)
NRBC # FLD: 0 K/UL — SIGNIFICANT CHANGE UP (ref 0–0)
NRBC BLD AUTO-RTO: 0 /100 WBCS — SIGNIFICANT CHANGE UP (ref 0–0)
PHOSPHATE SERPL-MCNC: 2.2 MG/DL — LOW (ref 2.5–4.5)
PLATELET # BLD AUTO: 473 K/UL — HIGH (ref 150–400)
POTASSIUM SERPL-MCNC: 3.5 MMOL/L — SIGNIFICANT CHANGE UP (ref 3.5–5.3)
POTASSIUM SERPL-SCNC: 3.5 MMOL/L — SIGNIFICANT CHANGE UP (ref 3.5–5.3)
RBC # BLD: 4.1 M/UL — SIGNIFICANT CHANGE UP (ref 3.8–5.2)
RBC # FLD: 18.3 % — HIGH (ref 10.3–14.5)
SODIUM SERPL-SCNC: 138 MMOL/L — SIGNIFICANT CHANGE UP (ref 135–145)
WBC # BLD: 9.39 K/UL — SIGNIFICANT CHANGE UP (ref 3.8–10.5)
WBC # FLD AUTO: 9.39 K/UL — SIGNIFICANT CHANGE UP (ref 3.8–10.5)

## 2025-03-09 RX ORDER — ACETAMINOPHEN 500 MG/5ML
975 LIQUID (ML) ORAL EVERY 6 HOURS
Refills: 0 | Status: COMPLETED | OUTPATIENT
Start: 2025-03-09 | End: 2025-03-09

## 2025-03-09 RX ORDER — ACETAMINOPHEN 500 MG/5ML
975 LIQUID (ML) ORAL EVERY 6 HOURS
Refills: 0 | Status: DISCONTINUED | OUTPATIENT
Start: 2025-03-09 | End: 2025-03-09

## 2025-03-09 RX ORDER — POTASSIUM PHOSPHATE, MONOBASIC POTASSIUM PHOSPHATE, DIBASIC INJECTION, 236; 224 MG/ML; MG/ML
30 SOLUTION, CONCENTRATE INTRAVENOUS ONCE
Refills: 0 | Status: COMPLETED | OUTPATIENT
Start: 2025-03-09 | End: 2025-03-09

## 2025-03-09 RX ADMIN — Medication 100 MILLIEQUIVALENT(S): at 15:06

## 2025-03-09 RX ADMIN — Medication 0.5 MILLIGRAM(S): at 16:42

## 2025-03-09 RX ADMIN — POTASSIUM PHOSPHATE, MONOBASIC POTASSIUM PHOSPHATE, DIBASIC INJECTION, 83.33 MILLIMOLE(S): 236; 224 SOLUTION, CONCENTRATE INTRAVENOUS at 18:13

## 2025-03-09 RX ADMIN — POTASSIUM CHLORIDE, DEXTROSE MONOHYDRATE AND SODIUM CHLORIDE 95 MILLILITER(S): 150; 5; 900 INJECTION, SOLUTION INTRAVENOUS at 09:19

## 2025-03-09 RX ADMIN — Medication 0.5 MILLIGRAM(S): at 22:55

## 2025-03-09 RX ADMIN — Medication 975 MILLIGRAM(S): at 01:45

## 2025-03-09 RX ADMIN — Medication 25 GRAM(S): at 04:41

## 2025-03-09 RX ADMIN — Medication 0.5 MILLIGRAM(S): at 17:40

## 2025-03-09 RX ADMIN — POTASSIUM CHLORIDE, DEXTROSE MONOHYDRATE AND SODIUM CHLORIDE 95 MILLILITER(S): 150; 5; 900 INJECTION, SOLUTION INTRAVENOUS at 16:36

## 2025-03-09 RX ADMIN — Medication 0.5 MILLIGRAM(S): at 12:30

## 2025-03-09 RX ADMIN — Medication 0.5 MILLIGRAM(S): at 04:32

## 2025-03-09 RX ADMIN — Medication 25 GRAM(S): at 11:29

## 2025-03-09 RX ADMIN — HEPARIN SODIUM 5000 UNIT(S): 1000 INJECTION INTRAVENOUS; SUBCUTANEOUS at 05:36

## 2025-03-09 RX ADMIN — Medication 0.5 MILLIGRAM(S): at 23:55

## 2025-03-09 RX ADMIN — Medication 0.5 MILLIGRAM(S): at 11:30

## 2025-03-09 RX ADMIN — Medication 0.5 MILLIGRAM(S): at 03:32

## 2025-03-09 RX ADMIN — Medication 100 MILLIEQUIVALENT(S): at 09:19

## 2025-03-09 RX ADMIN — HEPARIN SODIUM 5000 UNIT(S): 1000 INJECTION INTRAVENOUS; SUBCUTANEOUS at 13:31

## 2025-03-09 RX ADMIN — Medication 25 GRAM(S): at 23:08

## 2025-03-09 RX ADMIN — Medication 100 MILLIEQUIVALENT(S): at 11:29

## 2025-03-09 RX ADMIN — Medication 40 MILLIGRAM(S): at 04:43

## 2025-03-09 RX ADMIN — HEPARIN SODIUM 5000 UNIT(S): 1000 INJECTION INTRAVENOUS; SUBCUTANEOUS at 21:00

## 2025-03-09 RX ADMIN — ATORVASTATIN CALCIUM 10 MILLIGRAM(S): 80 TABLET, FILM COATED ORAL at 21:00

## 2025-03-09 RX ADMIN — Medication 975 MILLIGRAM(S): at 00:45

## 2025-03-09 NOTE — PROGRESS NOTE ADULT - SUBJECTIVE AND OBJECTIVE BOX
Surgery Progress Note    SUBJECTIVE:  - Patient seen and examined at bedside. Patient report doing well this am, report no nausea, vomiting.   --------------------------------------------------------------------------------------------------  OBJECTIVE:   Physical Exam:  General: AAOx3, NAD, lying comfortably in bed  HEENT: NC/AT  Respiratory: nonlabored breathing  Abdomen: non-distended, soft, non-tender. JESSICA with serosanginous output. Gas and bowel sweats in the bag with minimal liquid stool.    Extremities: WWP, no edema  --------------------------------------------------------------------------------------------------  V/S:  Vital Signs Last 24 Hrs  T(C): 36.4 (09 Mar 2025 05:01), Max: 37 (08 Mar 2025 12:30)  T(F): 97.5 (09 Mar 2025 05:01), Max: 98.6 (08 Mar 2025 12:30)  HR: 86 (09 Mar 2025 05:01) (71 - 97)  BP: 125/86 (09 Mar 2025 05:01) (125/86 - 139/70)  BP(mean): --  RR: 18 (09 Mar 2025 05:01) (17 - 18)  SpO2: 100% (09 Mar 2025 05:01) (96% - 100%)    Parameters below as of 09 Mar 2025 05:01  Patient On (Oxygen Delivery Method): BiPAP/CPAP        --------------------------------------------------------------------------------------------------  I/Os:    08 Mar 2025 06:01  -  09 Mar 2025 07:00  --------------------------------------------------------  IN:    dextrose 5% + sodium chloride 0.45% w/ Additives: 2090 mL    IV PiggyBack: 100 mL  Total IN: 2190 mL    OUT:    Bulb (mL): 7.5 mL    Colostomy (mL): 5 mL    Oral Fluid: 0 mL    Voided (mL): 3300 mL  Total OUT: 3312.5 mL    Total NET: -1122.5 mL        --------------------------------------------------------------------------------------------------  LABS:                        9.5    9.39  )-----------( 473      ( 09 Mar 2025 05:39 )             30.6     09 Mar 2025 05:39    138    |  101    |  7      ----------------------------<  114    3.5     |  26     |  0.44     Ca    9.1        09 Mar 2025 05:39  Phos  2.2       09 Mar 2025 05:39  Mg     1.90      09 Mar 2025 05:39        CAPILLARY BLOOD GLUCOSE                Urinalysis Basic - ( 09 Mar 2025 05:39 )    Color: x / Appearance: x / SG: x / pH: x  Gluc: 114 mg/dL / Ketone: x  / Bili: x / Urobili: x   Blood: x / Protein: x / Nitrite: x   Leuk Esterase: x / RBC: x / WBC x   Sq Epi: x / Non Sq Epi: x / Bacteria: x      --------------------------------------------------------------------------------------------------  MEDICATIONS  (STANDING):  atorvastatin 10 milliGRAM(s) Oral at bedtime  dextrose 5% + sodium chloride 0.45% with potassium chloride 20 mEq/L 1000 milliLiter(s) (95 mL/Hr) IV Continuous <Continuous>  heparin   Injectable 5000 Unit(s) SubCutaneous every 8 hours  pantoprazole  Injectable 40 milliGRAM(s) IV Push daily  piperacillin/tazobactam IVPB.. 3.375 Gram(s) IV Intermittent every 8 hours    MEDICATIONS  (PRN):  HYDROmorphone  Injectable 0.2 milliGRAM(s) IV Push every 4 hours PRN Moderate Pain (4 - 6)  HYDROmorphone  Injectable 0.5 milliGRAM(s) IV Push every 4 hours PRN Severe Pain (7 - 10)  naloxone Injectable 0.1 milliGRAM(s) IV Push every 3 minutes PRN For ANY of the following changes in patient status:  A. RR LESS THAN 10 breaths per minute, B. Oxygen saturation LESS THAN 90%, C. Sedation score of 6    --------------------------------------------------------------------------------------------------

## 2025-03-09 NOTE — PROVIDER CONTACT NOTE (OTHER) - BACKGROUND
Pt is s/p cytoscopy, bilateral ureteral catheter insertion, laparoscopic converted to open anterior resection, abscess drainage, and small bowel resection.

## 2025-03-10 LAB
ANION GAP SERPL CALC-SCNC: 7 MMOL/L — SIGNIFICANT CHANGE UP (ref 7–14)
BUN SERPL-MCNC: 8 MG/DL — SIGNIFICANT CHANGE UP (ref 7–23)
CALCIUM SERPL-MCNC: 9.1 MG/DL — SIGNIFICANT CHANGE UP (ref 8.4–10.5)
CHLORIDE SERPL-SCNC: 100 MMOL/L — SIGNIFICANT CHANGE UP (ref 98–107)
CO2 SERPL-SCNC: 28 MMOL/L — SIGNIFICANT CHANGE UP (ref 22–31)
CREAT SERPL-MCNC: 0.62 MG/DL — SIGNIFICANT CHANGE UP (ref 0.5–1.3)
EGFR: 93 ML/MIN/1.73M2 — SIGNIFICANT CHANGE UP
EGFR: 93 ML/MIN/1.73M2 — SIGNIFICANT CHANGE UP
GLUCOSE SERPL-MCNC: 119 MG/DL — HIGH (ref 70–99)
HCT VFR BLD CALC: 29.4 % — LOW (ref 34.5–45)
HGB BLD-MCNC: 9.1 G/DL — LOW (ref 11.5–15.5)
MAGNESIUM SERPL-MCNC: 1.9 MG/DL — SIGNIFICANT CHANGE UP (ref 1.6–2.6)
MCHC RBC-ENTMCNC: 23.3 PG — LOW (ref 27–34)
MCHC RBC-ENTMCNC: 31 G/DL — LOW (ref 32–36)
MCV RBC AUTO: 75.2 FL — LOW (ref 80–100)
NRBC # BLD AUTO: 0.04 K/UL — HIGH (ref 0–0)
NRBC # FLD: 0.04 K/UL — HIGH (ref 0–0)
NRBC BLD AUTO-RTO: 0 /100 WBCS — SIGNIFICANT CHANGE UP (ref 0–0)
PHOSPHATE SERPL-MCNC: 1.9 MG/DL — LOW (ref 2.5–4.5)
PLATELET # BLD AUTO: 473 K/UL — HIGH (ref 150–400)
POTASSIUM SERPL-MCNC: 3.9 MMOL/L — SIGNIFICANT CHANGE UP (ref 3.5–5.3)
POTASSIUM SERPL-SCNC: 3.9 MMOL/L — SIGNIFICANT CHANGE UP (ref 3.5–5.3)
RBC # BLD: 3.91 M/UL — SIGNIFICANT CHANGE UP (ref 3.8–5.2)
RBC # FLD: 18.5 % — HIGH (ref 10.3–14.5)
SODIUM SERPL-SCNC: 135 MMOL/L — SIGNIFICANT CHANGE UP (ref 135–145)
WBC # BLD: 9.62 K/UL — SIGNIFICANT CHANGE UP (ref 3.8–10.5)
WBC # FLD AUTO: 9.62 K/UL — SIGNIFICANT CHANGE UP (ref 3.8–10.5)

## 2025-03-10 RX ORDER — ACETAMINOPHEN 500 MG/5ML
1000 LIQUID (ML) ORAL EVERY 6 HOURS
Refills: 0 | Status: COMPLETED | OUTPATIENT
Start: 2025-03-10 | End: 2025-03-11

## 2025-03-10 RX ORDER — KETOROLAC TROMETHAMINE 30 MG/ML
15 INJECTION, SOLUTION INTRAMUSCULAR; INTRAVENOUS ONCE
Refills: 0 | Status: DISCONTINUED | OUTPATIENT
Start: 2025-03-10 | End: 2025-03-10

## 2025-03-10 RX ORDER — MAGNESIUM SULFATE 500 MG/ML
1 SYRINGE (ML) INJECTION ONCE
Refills: 0 | Status: COMPLETED | OUTPATIENT
Start: 2025-03-10 | End: 2025-03-10

## 2025-03-10 RX ORDER — SOD PHOS DI, MONO/K PHOS MONO 250 MG
1 TABLET ORAL ONCE
Refills: 0 | Status: DISCONTINUED | OUTPATIENT
Start: 2025-03-10 | End: 2025-03-10

## 2025-03-10 RX ORDER — POTASSIUM PHOSPHATE, MONOBASIC POTASSIUM PHOSPHATE, DIBASIC INJECTION, 236; 224 MG/ML; MG/ML
15 SOLUTION, CONCENTRATE INTRAVENOUS ONCE
Refills: 0 | Status: COMPLETED | OUTPATIENT
Start: 2025-03-10 | End: 2025-03-10

## 2025-03-10 RX ADMIN — Medication 400 MILLIGRAM(S): at 10:19

## 2025-03-10 RX ADMIN — Medication 0.5 MILLIGRAM(S): at 22:28

## 2025-03-10 RX ADMIN — POTASSIUM CHLORIDE, DEXTROSE MONOHYDRATE AND SODIUM CHLORIDE 60 MILLILITER(S): 150; 5; 900 INJECTION, SOLUTION INTRAVENOUS at 05:18

## 2025-03-10 RX ADMIN — Medication 0.5 MILLIGRAM(S): at 21:58

## 2025-03-10 RX ADMIN — POTASSIUM PHOSPHATE, MONOBASIC POTASSIUM PHOSPHATE, DIBASIC INJECTION, 62.5 MILLIMOLE(S): 236; 224 SOLUTION, CONCENTRATE INTRAVENOUS at 14:50

## 2025-03-10 RX ADMIN — HEPARIN SODIUM 5000 UNIT(S): 1000 INJECTION INTRAVENOUS; SUBCUTANEOUS at 22:01

## 2025-03-10 RX ADMIN — Medication 1000 MILLIGRAM(S): at 11:00

## 2025-03-10 RX ADMIN — HEPARIN SODIUM 5000 UNIT(S): 1000 INJECTION INTRAVENOUS; SUBCUTANEOUS at 14:44

## 2025-03-10 RX ADMIN — Medication 400 MILLIGRAM(S): at 19:25

## 2025-03-10 RX ADMIN — Medication 0.5 MILLIGRAM(S): at 10:15

## 2025-03-10 RX ADMIN — Medication 100 GRAM(S): at 11:36

## 2025-03-10 RX ADMIN — Medication 40 MILLIGRAM(S): at 03:55

## 2025-03-10 RX ADMIN — KETOROLAC TROMETHAMINE 15 MILLIGRAM(S): 30 INJECTION, SOLUTION INTRAMUSCULAR; INTRAVENOUS at 14:41

## 2025-03-10 RX ADMIN — Medication 0.5 MILLIGRAM(S): at 09:50

## 2025-03-10 RX ADMIN — HEPARIN SODIUM 5000 UNIT(S): 1000 INJECTION INTRAVENOUS; SUBCUTANEOUS at 05:18

## 2025-03-10 NOTE — ADVANCED PRACTICE NURSE CONSULT - ASSESSMENT
Patient A&Ox4, ready and willing to learn. Actively participating in pouch change. Terms defined utilized: Ostomy, Ileostomy, wafer, pouch, stoma. Frequency of pouch change and emptying discussed, teach back method utilized. Stool consistency with colostomy reviewed. Patient able to show back how to open, empty and close pouch. Removed pouch using pull and push technique, cleansed skin with water, patted dry. Taught patient how to properly assess stoma viability and peristomal skin. Patient actively participated in stoma measurement, creating template, cutting wafer, applying barrier ring (to protect peristomal skin from enzymatic irritation), applying pouch. Reviewed s/s to report to MD. Showering with a pouch discussed as well as dietary restrictions. Informed that visiting nurse would follow up after discharge to set up account for supplies

## 2025-03-10 NOTE — PROGRESS NOTE ADULT - SUBJECTIVE AND OBJECTIVE BOX
Morning Surgical Progress Note  Patient is a 74y old  Female who presents with a chief complaint of   SUBJECTIVE: Patient seen and examined at bedside with surgical team, patient without complaints. Denies nausea and vomiting, + ambulation, states she has not had clears    Vital Signs Last 24 Hrs  T(C): 36.6 (10 Mar 2025 05:01), Max: 36.9 (09 Mar 2025 09:28)  T(F): 97.9 (10 Mar 2025 05:01), Max: 98.4 (09 Mar 2025 09:28)  HR: 78 (10 Mar 2025 05:01) (75 - 83)  BP: 116/70 (10 Mar 2025 05:01) (110/81 - 132/80)  BP(mean): --  RR: 17 (10 Mar 2025 05:01) (16 - 18)  SpO2: 99% (10 Mar 2025 05:01) (96% - 100%)    Parameters below as of 10 Mar 2025 05:01  Patient On (Oxygen Delivery Method): room air    I&O's Detail    09 Mar 2025 07:01  -  10 Mar 2025 07:00  --------------------------------------------------------  IN:    dextrose 5% + sodium chloride 0.45% w/ Additives: 335 mL    IV PiggyBack: 100 mL  Total IN: 435 mL    OUT:    Bulb (mL): 7.5 mL    Colostomy (mL): 0 mL    Oral Fluid: 0 mL    Voided (mL): 550 mL  Total OUT: 557.5 mL    Total NET: -122.5 mL        Medications  MEDICATIONS  (STANDING):  acetaminophen   IVPB .. 1000 milliGRAM(s) IV Intermittent every 6 hours  atorvastatin 10 milliGRAM(s) Oral at bedtime  dextrose 5% + sodium chloride 0.45% with potassium chloride 20 mEq/L 1000 milliLiter(s) (60 mL/Hr) IV Continuous <Continuous>  heparin   Injectable 5000 Unit(s) SubCutaneous every 8 hours  pantoprazole  Injectable 40 milliGRAM(s) IV Push daily    MEDICATIONS  (PRN):  HYDROmorphone  Injectable 0.2 milliGRAM(s) IV Push every 4 hours PRN Moderate Pain (4 - 6)  HYDROmorphone  Injectable 0.5 milliGRAM(s) IV Push every 4 hours PRN Severe Pain (7 - 10)  naloxone Injectable 0.1 milliGRAM(s) IV Push every 3 minutes PRN For ANY of the following changes in patient status:  A. RR LESS THAN 10 breaths per minute, B. Oxygen saturation LESS THAN 90%, C. Sedation score of 6    Physical Exam  Constitutional: A&Ox3, NAD  Gastrointestinal: Soft ruq tender no rebound no guarding JESSICA with serosanginous output. Gas and bowel sweats in the bag with minimal liquid stool.    Extremities: Moving all extremities, no edema  Skin: No Rashes, Hematoma, Ecchymosis  LABS:                        9.5    9.39  )-----------( 473      ( 09 Mar 2025 05:39 )             30.6   03-09  138  |  101  |  7   ----------------------------<  114[H]  3.5   |  26  |  0.44[L]  Ca    9.1      09 Mar 2025 05:39  Phos  2.2     03-09  Mg     1.90     03-09    Urinalysis Basic - ( 09 Mar 2025 05:39 )  Color: x / Appearance: x / SG: x / pH: x  Gluc: 114 mg/dL / Ketone: x  / Bili: x / Urobili: x   Blood: x / Protein: x / Nitrite: x   Leuk Esterase: x / RBC: x / WBC x   Sq Epi: x / Non Sq Epi: x / Bacteria: x

## 2025-03-10 NOTE — ADVANCED PRACTICE NURSE CONSULT - ASSESSMENT
Patient is aware and alert. Midline insertion along with risks, benefits, possible complications and infection prevention explained to patient who verbalized understanding. All questions addressed and patient gave verbal consent to place midline. Left arm cleansed with CHG. Using sterile technique under ultra sound guidance, placed PowerGlide Pro Midline 20G /10cm into Left Basilic vein. Brisk blood return and flushed with 20Mls of normal saline. Minimal blood loss and patient tolerated procedure well. CHG dressing placed. All sharps accounted for. Report given to district RN and ordering provider. LOT#: [id number]

## 2025-03-10 NOTE — PROVIDER CONTACT NOTE (OTHER) - BACKGROUND
Pt is s/p Pt is s/p cytoscopy, bilateral ureteral catheter insertion, laparoscopic converted to open anterior resection, abscess drainage, and small bowel resection.

## 2025-03-11 LAB
ANION GAP SERPL CALC-SCNC: 10 MMOL/L — SIGNIFICANT CHANGE UP (ref 7–14)
BUN SERPL-MCNC: 6 MG/DL — LOW (ref 7–23)
CALCIUM SERPL-MCNC: 8.9 MG/DL — SIGNIFICANT CHANGE UP (ref 8.4–10.5)
CHLORIDE SERPL-SCNC: 104 MMOL/L — SIGNIFICANT CHANGE UP (ref 98–107)
CO2 SERPL-SCNC: 25 MMOL/L — SIGNIFICANT CHANGE UP (ref 22–31)
CREAT SERPL-MCNC: 0.49 MG/DL — LOW (ref 0.5–1.3)
EGFR: 98 ML/MIN/1.73M2 — SIGNIFICANT CHANGE UP
EGFR: 98 ML/MIN/1.73M2 — SIGNIFICANT CHANGE UP
GLUCOSE SERPL-MCNC: 107 MG/DL — HIGH (ref 70–99)
HCT VFR BLD CALC: 26.4 % — LOW (ref 34.5–45)
HGB BLD-MCNC: 8.1 G/DL — LOW (ref 11.5–15.5)
MAGNESIUM SERPL-MCNC: 2 MG/DL — SIGNIFICANT CHANGE UP (ref 1.6–2.6)
MCHC RBC-ENTMCNC: 23.3 PG — LOW (ref 27–34)
MCHC RBC-ENTMCNC: 30.7 G/DL — LOW (ref 32–36)
MCV RBC AUTO: 75.9 FL — LOW (ref 80–100)
NRBC # BLD AUTO: 0.02 K/UL — HIGH (ref 0–0)
NRBC # FLD: 0.02 K/UL — HIGH (ref 0–0)
NRBC BLD AUTO-RTO: 0 /100 WBCS — SIGNIFICANT CHANGE UP (ref 0–0)
PHOSPHATE SERPL-MCNC: 2.5 MG/DL — SIGNIFICANT CHANGE UP (ref 2.5–4.5)
PLATELET # BLD AUTO: 433 K/UL — HIGH (ref 150–400)
POTASSIUM SERPL-MCNC: 4.2 MMOL/L — SIGNIFICANT CHANGE UP (ref 3.5–5.3)
POTASSIUM SERPL-SCNC: 4.2 MMOL/L — SIGNIFICANT CHANGE UP (ref 3.5–5.3)
RBC # BLD: 3.48 M/UL — LOW (ref 3.8–5.2)
RBC # FLD: 18.6 % — HIGH (ref 10.3–14.5)
SODIUM SERPL-SCNC: 139 MMOL/L — SIGNIFICANT CHANGE UP (ref 135–145)
WBC # BLD: 7.02 K/UL — SIGNIFICANT CHANGE UP (ref 3.8–10.5)
WBC # FLD AUTO: 7.02 K/UL — SIGNIFICANT CHANGE UP (ref 3.8–10.5)

## 2025-03-11 RX ORDER — OXYCODONE HYDROCHLORIDE 30 MG/1
2.5 TABLET ORAL EVERY 4 HOURS
Refills: 0 | Status: DISCONTINUED | OUTPATIENT
Start: 2025-03-11 | End: 2025-03-12

## 2025-03-11 RX ORDER — OXYCODONE HYDROCHLORIDE 30 MG/1
5 TABLET ORAL EVERY 4 HOURS
Refills: 0 | Status: DISCONTINUED | OUTPATIENT
Start: 2025-03-11 | End: 2025-03-12

## 2025-03-11 RX ORDER — AMITRIPTYLINE HYDROCHLORIDE 25 MG/1
25 TABLET, FILM COATED ORAL DAILY
Refills: 0 | Status: DISCONTINUED | OUTPATIENT
Start: 2025-03-11 | End: 2025-03-12

## 2025-03-11 RX ORDER — ACETAMINOPHEN 500 MG/5ML
975 LIQUID (ML) ORAL EVERY 6 HOURS
Refills: 0 | Status: DISCONTINUED | OUTPATIENT
Start: 2025-03-11 | End: 2025-03-12

## 2025-03-11 RX ORDER — POTASSIUM PHOSPHATE, MONOBASIC POTASSIUM PHOSPHATE, DIBASIC INJECTION, 236; 224 MG/ML; MG/ML
15 SOLUTION, CONCENTRATE INTRAVENOUS ONCE
Refills: 0 | Status: COMPLETED | OUTPATIENT
Start: 2025-03-11 | End: 2025-03-11

## 2025-03-11 RX ORDER — CILOSTAZOL 50 MG/1
50 TABLET ORAL
Refills: 0 | Status: DISCONTINUED | OUTPATIENT
Start: 2025-03-11 | End: 2025-03-12

## 2025-03-11 RX ADMIN — Medication 975 MILLIGRAM(S): at 22:34

## 2025-03-11 RX ADMIN — POTASSIUM CHLORIDE, DEXTROSE MONOHYDRATE AND SODIUM CHLORIDE 60 MILLILITER(S): 150; 5; 900 INJECTION, SOLUTION INTRAVENOUS at 08:42

## 2025-03-11 RX ADMIN — ATORVASTATIN CALCIUM 10 MILLIGRAM(S): 80 TABLET, FILM COATED ORAL at 21:34

## 2025-03-11 RX ADMIN — OXYCODONE HYDROCHLORIDE 5 MILLIGRAM(S): 30 TABLET ORAL at 20:58

## 2025-03-11 RX ADMIN — Medication 975 MILLIGRAM(S): at 21:34

## 2025-03-11 RX ADMIN — Medication 975 MILLIGRAM(S): at 16:15

## 2025-03-11 RX ADMIN — Medication 1000 MILLIGRAM(S): at 02:08

## 2025-03-11 RX ADMIN — POTASSIUM CHLORIDE, DEXTROSE MONOHYDRATE AND SODIUM CHLORIDE 60 MILLILITER(S): 150; 5; 900 INJECTION, SOLUTION INTRAVENOUS at 01:38

## 2025-03-11 RX ADMIN — Medication 1000 MILLIGRAM(S): at 09:42

## 2025-03-11 RX ADMIN — CILOSTAZOL 50 MILLIGRAM(S): 50 TABLET ORAL at 20:52

## 2025-03-11 RX ADMIN — OXYCODONE HYDROCHLORIDE 5 MILLIGRAM(S): 30 TABLET ORAL at 19:58

## 2025-03-11 RX ADMIN — Medication 0.5 MILLIGRAM(S): at 02:39

## 2025-03-11 RX ADMIN — Medication 400 MILLIGRAM(S): at 01:38

## 2025-03-11 RX ADMIN — HEPARIN SODIUM 5000 UNIT(S): 1000 INJECTION INTRAVENOUS; SUBCUTANEOUS at 15:18

## 2025-03-11 RX ADMIN — HEPARIN SODIUM 5000 UNIT(S): 1000 INJECTION INTRAVENOUS; SUBCUTANEOUS at 05:38

## 2025-03-11 RX ADMIN — HEPARIN SODIUM 5000 UNIT(S): 1000 INJECTION INTRAVENOUS; SUBCUTANEOUS at 21:35

## 2025-03-11 RX ADMIN — Medication 0.5 MILLIGRAM(S): at 03:09

## 2025-03-11 RX ADMIN — POTASSIUM PHOSPHATE, MONOBASIC POTASSIUM PHOSPHATE, DIBASIC INJECTION, 62.5 MILLIMOLE(S): 236; 224 SOLUTION, CONCENTRATE INTRAVENOUS at 11:58

## 2025-03-11 RX ADMIN — Medication 975 MILLIGRAM(S): at 15:14

## 2025-03-11 RX ADMIN — Medication 40 MILLIGRAM(S): at 05:38

## 2025-03-11 RX ADMIN — Medication 400 MILLIGRAM(S): at 08:42

## 2025-03-11 NOTE — PROGRESS NOTE ADULT - SUBJECTIVE AND OBJECTIVE BOX
TEAM [ A ] Surgery Daily Progress Note  =====================================================    SUBJECTIVE: Patient seen and examined at bedside on AM rounds. Patient reports that they're feeling well. Denies nausea, vomiting, willing to try regular food this AM.  Ostomy with gas and stool. OOB/Amublating as tolerated. Denies fever, chills.    ALLERGIES:  sulfa drugs (Unknown)      --------------------------------------------------------------------------------------    MEDICATIONS:    Neurologic Medications  acetaminophen     Tablet .. 975 milliGRAM(s) Oral every 6 hours  oxyCODONE    IR 2.5 milliGRAM(s) Oral every 4 hours PRN Moderate Pain (4 - 6)  oxyCODONE    IR 5 milliGRAM(s) Oral every 4 hours PRN Severe Pain (7 - 10)    Respiratory Medications    Cardiovascular Medications    Gastrointestinal Medications  pantoprazole    Tablet 40 milliGRAM(s) Oral before breakfast  potassium phosphate IVPB 15 milliMole(s) IV Intermittent once    Genitourinary Medications    Hematologic/Oncologic Medications  heparin   Injectable 5000 Unit(s) SubCutaneous every 8 hours    Antimicrobial/Immunologic Medications    Endocrine/Metabolic Medications  atorvastatin 10 milliGRAM(s) Oral at bedtime    Topical/Other Medications  naloxone Injectable 0.1 milliGRAM(s) IV Push every 3 minutes PRN For ANY of the following changes in patient status:  A. RR LESS THAN 10 breaths per minute, B. Oxygen saturation LESS THAN 90%, C. Sedation score of 6    --------------------------------------------------------------------------------------    VITAL SIGNS:  T(C): 36.5 (03-11-25 @ 05:46), Max: 36.8 (03-10-25 @ 18:07)  HR: 75 (03-11-25 @ 05:46) (70 - 88)  BP: 135/71 (03-11-25 @ 05:46) (111/73 - 142/79)  RR: 18 (03-11-25 @ 05:46) (16 - 18)  SpO2: 98% (03-11-25 @ 05:46) (95% - 99%)  --------------------------------------------------------------------------------------    EXAM    General: NAD, resting in bed comfortably.  Cardiac: regular rate, warm and well perfused  Respiratory: Nonlabored respirations, normal cw expansion.  Abdomen: Soft, RUQ tenderness, no rebound, no guarding, JESSICA with S/S output. Ostomy with gas and bowel sweats  Extremities: normal strength, FROM, no deformities    --------------------------------------------------------------------------------------    LABS:                        8.1    7.02  )-----------( 433      ( 11 Mar 2025 05:21 )             26.4     03-11    139  |  104  |  6[L]  ----------------------------<  107[H]  4.2   |  25  |  0.49[L]    Ca    8.9      11 Mar 2025 05:21  Phos  2.5     03-11  Mg     2.00     03-11        Urinalysis Basic - ( 11 Mar 2025 05:21 )    Color: x / Appearance: x / SG: x / pH: x  Gluc: 107 mg/dL / Ketone: x  / Bili: x / Urobili: x   Blood: x / Protein: x / Nitrite: x   Leuk Esterase: x / RBC: x / WBC x   Sq Epi: x / Non Sq Epi: x / Bacteria: x      --------------------------------------------------------------------------------------    INS AND OUTS:    03-10-25 @ 07:01  -  03-11-25 @ 07:00  --------------------------------------------------------  IN: 800 mL / OUT: 1720 mL / NET: -920 mL      --------------------------------------------------------------------------------------

## 2025-03-12 ENCOUNTER — TRANSCRIPTION ENCOUNTER (OUTPATIENT)
Age: 75
End: 2025-03-12

## 2025-03-12 VITALS — WEIGHT: 121.7 LBS

## 2025-03-12 LAB
ANION GAP SERPL CALC-SCNC: 8 MMOL/L — SIGNIFICANT CHANGE UP (ref 7–14)
BUN SERPL-MCNC: 5 MG/DL — LOW (ref 7–23)
CALCIUM SERPL-MCNC: 9 MG/DL — SIGNIFICANT CHANGE UP (ref 8.4–10.5)
CHLORIDE SERPL-SCNC: 99 MMOL/L — SIGNIFICANT CHANGE UP (ref 98–107)
CO2 SERPL-SCNC: 27 MMOL/L — SIGNIFICANT CHANGE UP (ref 22–31)
CREAT SERPL-MCNC: 0.56 MG/DL — SIGNIFICANT CHANGE UP (ref 0.5–1.3)
EGFR: 95 ML/MIN/1.73M2 — SIGNIFICANT CHANGE UP
EGFR: 95 ML/MIN/1.73M2 — SIGNIFICANT CHANGE UP
GLUCOSE SERPL-MCNC: 102 MG/DL — HIGH (ref 70–99)
HCT VFR BLD CALC: 26.9 % — LOW (ref 34.5–45)
HGB BLD-MCNC: 8.4 G/DL — LOW (ref 11.5–15.5)
MAGNESIUM SERPL-MCNC: 1.8 MG/DL — SIGNIFICANT CHANGE UP (ref 1.6–2.6)
MCHC RBC-ENTMCNC: 23.7 PG — LOW (ref 27–34)
MCHC RBC-ENTMCNC: 31.2 G/DL — LOW (ref 32–36)
MCV RBC AUTO: 75.8 FL — LOW (ref 80–100)
NRBC # BLD AUTO: 0.02 K/UL — HIGH (ref 0–0)
NRBC # FLD: 0.02 K/UL — HIGH (ref 0–0)
NRBC BLD AUTO-RTO: 0 /100 WBCS — SIGNIFICANT CHANGE UP (ref 0–0)
PHOSPHATE SERPL-MCNC: 2.9 MG/DL — SIGNIFICANT CHANGE UP (ref 2.5–4.5)
PLATELET # BLD AUTO: 489 K/UL — HIGH (ref 150–400)
POTASSIUM SERPL-MCNC: 4.1 MMOL/L — SIGNIFICANT CHANGE UP (ref 3.5–5.3)
POTASSIUM SERPL-SCNC: 4.1 MMOL/L — SIGNIFICANT CHANGE UP (ref 3.5–5.3)
RBC # BLD: 3.55 M/UL — LOW (ref 3.8–5.2)
RBC # FLD: 18.8 % — HIGH (ref 10.3–14.5)
SODIUM SERPL-SCNC: 134 MMOL/L — LOW (ref 135–145)
WBC # BLD: 8.97 K/UL — SIGNIFICANT CHANGE UP (ref 3.8–10.5)
WBC # FLD AUTO: 8.97 K/UL — SIGNIFICANT CHANGE UP (ref 3.8–10.5)

## 2025-03-12 RX ORDER — ACETAMINOPHEN 500 MG/5ML
3 LIQUID (ML) ORAL
Qty: 0 | Refills: 0 | DISCHARGE
Start: 2025-03-12

## 2025-03-12 RX ORDER — ONDANSETRON HCL/PF 4 MG/2 ML
4 VIAL (ML) INJECTION ONCE
Refills: 0 | Status: COMPLETED | OUTPATIENT
Start: 2025-03-12 | End: 2025-03-12

## 2025-03-12 RX ORDER — OXYCODONE HYDROCHLORIDE 30 MG/1
1 TABLET ORAL
Qty: 0 | Refills: 0 | DISCHARGE
Start: 2025-03-12

## 2025-03-12 RX ORDER — MAGNESIUM SULFATE 500 MG/ML
2 SYRINGE (ML) INJECTION ONCE
Refills: 0 | Status: COMPLETED | OUTPATIENT
Start: 2025-03-12 | End: 2025-03-12

## 2025-03-12 RX ADMIN — OXYCODONE HYDROCHLORIDE 5 MILLIGRAM(S): 30 TABLET ORAL at 09:32

## 2025-03-12 RX ADMIN — Medication 975 MILLIGRAM(S): at 03:23

## 2025-03-12 RX ADMIN — Medication 4 MILLIGRAM(S): at 13:28

## 2025-03-12 RX ADMIN — Medication 10 MILLIGRAM(S): at 15:26

## 2025-03-12 RX ADMIN — Medication 25 GRAM(S): at 09:31

## 2025-03-12 RX ADMIN — OXYCODONE HYDROCHLORIDE 5 MILLIGRAM(S): 30 TABLET ORAL at 03:43

## 2025-03-12 RX ADMIN — CILOSTAZOL 50 MILLIGRAM(S): 50 TABLET ORAL at 06:14

## 2025-03-12 RX ADMIN — AMITRIPTYLINE HYDROCHLORIDE 25 MILLIGRAM(S): 25 TABLET, FILM COATED ORAL at 15:24

## 2025-03-12 RX ADMIN — Medication 40 MILLIGRAM(S): at 06:14

## 2025-03-12 RX ADMIN — Medication 975 MILLIGRAM(S): at 15:25

## 2025-03-12 RX ADMIN — Medication 975 MILLIGRAM(S): at 10:30

## 2025-03-12 RX ADMIN — CILOSTAZOL 50 MILLIGRAM(S): 50 TABLET ORAL at 17:16

## 2025-03-12 RX ADMIN — Medication 975 MILLIGRAM(S): at 16:25

## 2025-03-12 RX ADMIN — Medication 975 MILLIGRAM(S): at 04:23

## 2025-03-12 RX ADMIN — Medication 975 MILLIGRAM(S): at 09:31

## 2025-03-12 RX ADMIN — HEPARIN SODIUM 5000 UNIT(S): 1000 INJECTION INTRAVENOUS; SUBCUTANEOUS at 06:14

## 2025-03-12 RX ADMIN — OXYCODONE HYDROCHLORIDE 5 MILLIGRAM(S): 30 TABLET ORAL at 10:30

## 2025-03-12 RX ADMIN — OXYCODONE HYDROCHLORIDE 5 MILLIGRAM(S): 30 TABLET ORAL at 02:43

## 2025-03-12 RX ADMIN — HEPARIN SODIUM 5000 UNIT(S): 1000 INJECTION INTRAVENOUS; SUBCUTANEOUS at 15:26

## 2025-03-12 NOTE — DISCHARGE NOTE PROVIDER - NSDCFUSCHEDAPPT_GEN_ALL_CORE_FT
[facility]  VASCULAR [address]  Scheduled Appointment: 03/25/2025    Pat Howard  [facility]  VASCULAR [address]  Scheduled Appointment: 03/25/2025    Rico Maurice  [facility]  Lila Amaya  Scheduled Appointment: 04/03/2025

## 2025-03-12 NOTE — ADVANCED PRACTICE NURSE CONSULT - REASON FOR CONSULT
Patient seen for follow up ostomy teaching. 
Patient seen for initial ostomy teaching. 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   
Patient seen for follow up colostomy teaching. 
patient seen for follow up ostomy teaching. Patient c/o pain and received pain medication prior to teaching. family is not currently at bedside

## 2025-03-12 NOTE — DISCHARGE NOTE NURSING/CASE MANAGEMENT/SOCIAL WORK - NSDCPNINST_GEN_ALL_CORE
Report to MD/ED for fever, bleeding, drainage from incision site, pain that is not relieved w/ pain med., unable to tolerate foods or liquids, unable to urinate or have BM

## 2025-03-12 NOTE — PROVIDER CONTACT NOTE (OTHER) - DATE AND TIME:
08-Mar-2025 22:15
10-Mar-2025 05:34
08-Mar-2025 23:24
08-Mar-2025 20:40
09-Mar-2025 21:13
10-Mar-2025 05:52
09-Mar-2025 20:44
12-Mar-2025 13:30

## 2025-03-12 NOTE — PROVIDER CONTACT NOTE (OTHER) - RECOMMENDATIONS
Per PA may be discharge to rehab.
MD to bedside.
Provider to asses. Provider to bedside to place new IV ultrasound guided.
come try putting in IV or via ultrasound, if not maybe ask a colleague who can assist in inserting IV
Provider to assess. Possible orders for PO pain medication as pt is in discomfort.
MD to bedside to A-stick pt.
MD to bedside to A-stick pt.
Provider to come to bedside to insert new IV ultrasound guided.

## 2025-03-12 NOTE — DIETITIAN INITIAL EVALUATION ADULT - PERSON TAUGHT/METHOD
Pt provided with written and verbal nutrition education on colostomy nutritional recommendations. Topics discussed include: consumption of 5-6 small meals throughout the day, low fiber food sources (avoiding fruits/vegetables/whole grains), foods recommended/foods to avoid, maintaining adequate fluid intake and monitoring tolerance. Pt verbalized understanding, addressed all concerns as able.

## 2025-03-12 NOTE — DISCHARGE NOTE PROVIDER - NSDCMRMEDTOKEN_GEN_ALL_CORE_FT
amitriptyline 25 mg oral tablet: 1 tab(s) orally once a day  amLODIPine 2.5 mg oral tablet: 1 tab(s) orally once a day (at bedtime)  cilostazol 50 mg oral tablet: 1 tab(s) orally 2 times a day  dicyclomine 10 mg oral capsule: 1 cap(s) orally once a day  pravastatin 20 mg oral tablet: 1 tab(s) orally once a day  Xarelto 10 mg oral tablet: 1 tab(s) orally once a day   acetaminophen 325 mg oral tablet: 3 tab(s) orally every 6 hours  amitriptyline 25 mg oral tablet: 1 tab(s) orally once a day  amLODIPine 2.5 mg oral tablet: 1 tab(s) orally once a day (at bedtime)  cilostazol 50 mg oral tablet: 1 tab(s) orally 2 times a day  dicyclomine 10 mg oral capsule: 1 cap(s) orally once a day  pravastatin 20 mg oral tablet: 1 tab(s) orally once a day  Xarelto 10 mg oral tablet: 1 tab(s) orally once a day

## 2025-03-12 NOTE — PROVIDER CONTACT NOTE (OTHER) - REASON
IV infiltrated
Patient does not have an IV in place due to the previous one being infiltrated
Unable to obtain  labs
IV infiltrated
Unable to draw labs
c/o nausea, abdomen soft, rounded w/ LLQ colostomy w/ scanty output. Verbalized not feeling well.
IV infiltrated
IV infiltrated, currently no IV access

## 2025-03-12 NOTE — PROVIDER CONTACT NOTE (OTHER) - ASSESSMENT
Pt is A&Ox4. Pt denies SOB, chest pain, headache, and palpitations. No signs of acute distress.
AOx4,c/o nausea, abdomen soft, rounded w/ LLQ colostomy w/ scanty output. Verbalized not feeling well.
Pt is A&Ox4. Pt denies SOB, chest pain, headache, and palpitations. No signs of acute distress. ANMs and clinical impact nurses were at the bedside earlier during the shift to attempt IV placement.
Pt is A&Ox4. Pt complains of pain to the right upper extremity where the IV is placed. Right upper arm is swollen. IV removed. Pt previously had multiple ultrasound guided IV insertions and each IV infiltrated. Team or clinical impact nurses have come to the bedside to place new IV ultrasound guided.
Pt is A&Ox4. Pt denies SOB, chest pain, headache, and palpitations. No signs of acute distress.
Pt is A&Ox4. Pt denies SOB, chest pain, headache, and palpitations. No signs of acute distress. R upper arm IV site is swollen and unable to be flushed. During the day team to bedside to place 2 new IVs ultrasound guided, both IVs infiltrated. ISHAAN Esposito attempted but was unsuccessful to place new IV. Pt is NPO and receiving IV fluids.
Pt is A&Ox4. Pt denies SOB, chest pain, headache, and palpitations. Pt is complaining of abdominal pain. Pt currently only has IV PRN pain medications.

## 2025-03-12 NOTE — DIETITIAN NUTRITION RISK NOTIFICATION - ADDITIONAL COMMENTS/DIETITIAN RECOMMENDATIONS
Please see Dietitian Initial Assessment for complete recommendations.  Anayeli Gonsales, ANT, CDN #[id number]  Also available on [employer] Teams

## 2025-03-12 NOTE — DISCHARGE NOTE NURSING/CASE MANAGEMENT/SOCIAL WORK - NSDCVIVACCINE_GEN_ALL_CORE_FT
pneumococcal polysaccharide PPV23; 06-Nov-2014 09:06; Yosvany Smith (RN); [employer] [id number]; IntraMuscular; Deltoid Right.; 0.5 milliLiter(s);

## 2025-03-12 NOTE — PROGRESS NOTE ADULT - SUBJECTIVE AND OBJECTIVE BOX
TEAM [ A ] Surgery Daily Progress Note  =====================================================    SUBJECTIVE: Patient seen and examined at bedside on AM rounds. Patient reports that they're feeling well. Tolerating diet, Denies nausea, vomiting.  Ostomy with gas and stool. OOB/Amublating as tolerated. Denies fever, chills.    ALLERGIES:  sulfa drugs (Unknown)      --------------------------------------------------------------------------------------    MEDICATIONS:    Neurologic Medications  acetaminophen     Tablet .. 975 milliGRAM(s) Oral every 6 hours  amitriptyline 25 milliGRAM(s) Oral daily  oxyCODONE    IR 5 milliGRAM(s) Oral every 4 hours PRN Severe Pain (7 - 10)  oxyCODONE    IR 2.5 milliGRAM(s) Oral every 4 hours PRN Moderate Pain (4 - 6)    Respiratory Medications    Cardiovascular Medications    Gastrointestinal Medications  dicyclomine 10 milliGRAM(s) Oral daily  pantoprazole    Tablet 40 milliGRAM(s) Oral before breakfast    Genitourinary Medications    Hematologic/Oncologic Medications  cilostazol 50 milliGRAM(s) Oral two times a day  heparin   Injectable 5000 Unit(s) SubCutaneous every 8 hours    Antimicrobial/Immunologic Medications    Endocrine/Metabolic Medications  atorvastatin 10 milliGRAM(s) Oral at bedtime    Topical/Other Medications  naloxone Injectable 0.1 milliGRAM(s) IV Push every 3 minutes PRN For ANY of the following changes in patient status:  A. RR LESS THAN 10 breaths per minute, B. Oxygen saturation LESS THAN 90%, C. Sedation score of 6    --------------------------------------------------------------------------------------    VITAL SIGNS:  T(C): 36.7 (03-12-25 @ 09:24), Max: 37.3 (03-11-25 @ 17:00)  HR: 92 (03-12-25 @ 09:24) (77 - 92)  BP: 124/73 (03-12-25 @ 09:24) (124/73 - 157/81)  RR: 16 (03-12-25 @ 09:24) (16 - 18)  SpO2: 97% (03-12-25 @ 09:24) (96% - 100%)  --------------------------------------------------------------------------------------    EXAM    General: NAD, resting in bed comfortably.  Cardiac: regular rate, warm and well perfused  Respiratory: Nonlabored respirations, normal cw expansion.  Abdomen: Soft, RUQ tenderness, no rebound, no guarding, JESSICA with S/S output. Ostomy with gas and bowel sweats  Extremities: normal strength, FROM, no deformities    --------------------------------------------------------------------------------------    LABS:                        8.4    8.97  )-----------( 489      ( 12 Mar 2025 05:05 )             26.9     03-12    134[L]  |  99  |  5[L]  ----------------------------<  102[H]  4.1   |  27  |  0.56    Ca    9.0      12 Mar 2025 05:05  Phos  2.9     03-12  Mg     1.80     03-12        Urinalysis Basic - ( 12 Mar 2025 05:05 )    Color: x / Appearance: x / SG: x / pH: x  Gluc: 102 mg/dL / Ketone: x  / Bili: x / Urobili: x   Blood: x / Protein: x / Nitrite: x   Leuk Esterase: x / RBC: x / WBC x   Sq Epi: x / Non Sq Epi: x / Bacteria: x      --------------------------------------------------------------------------------------    INS AND OUTS:    03-11-25 @ 07:01  -  03-12-25 @ 07:00  --------------------------------------------------------  IN: 1530 mL / OUT: 1984.5 mL / NET: -454.5 mL    03-12-25 @ 07:01  -  03-12-25 @ 11:50  --------------------------------------------------------  IN: 240 mL / OUT: 15 mL / NET: 225 mL      --------------------------------------------------------------------------------------

## 2025-03-12 NOTE — DISCHARGE NOTE PROVIDER - CARE PROVIDER_API CALL
Dread Bethea  Surgery  [address] [place] [address], [address]  [place] [address]  Phone: [phone]  Fax: [phone]  Follow Up Time: 2 weeks

## 2025-03-12 NOTE — DIETITIAN INITIAL EVALUATION ADULT - OTHER INFO
Per chart, pt is 74 year old female PMH metastatic urothelial carcinoma (2017), bilateral breast cancer, diverticulitis, HTN, HLD, livedoid vasculopathy now s/p lap converted to open JOVI, coloenteric fistula takedown, SBR, Casey's procedure (3/5). Colorectal Surgery following. Likely plan for discharge to rehab.     Pt confirms NKFA, denies difficulties chewing/swallowing. Pt living at home PTA, preparing meals independently. Pt reports declining appetite/PO intake over the past year.     Pt reports fair appetite, consumed ~25% of dinner last night and breakfast this morning. Pt amenable to provision of nutrition shake. Colostomy output, per flowsheets: (3/12) 15 mL, (3/11) 220 mL, (3/10) 125 mL.

## 2025-03-12 NOTE — DISCHARGE NOTE PROVIDER - HOSPITAL COURSE
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 Pt reported recurrent complicated and uncomplicated diverticulitis and she has dealt with >20 years of flares of diverticulitis. Pt c/o pain on LLQ. Patient is scheduled for laparoscopic possible open lower anterior resection with abscess drainage and catheters on 3/3/3025.     Patient was admitted and underwent Dx Lap, Hartmans procedure, and SBR. She tolerated the procedure well and was transferred to the floor in stable condition. Once GI function returned, her diet was slowly advanced as tolerated and she was switched to oral pain medications. On POD # 3, she was found to be stable for discharge to home. She was voiding well and ambulating without assistance at the time of discharge. Pain was well-controlled. She will follow-up in 1-2 weeks for his post-op appointment. 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 Pt reported recurrent complicated and uncomplicated diverticulitis and she has dealt with >20 years of flares of diverticulitis. Pt c/o pain on LLQ. Patient is scheduled for laparoscopic possible open lower anterior resection with abscess drainage and catheters on 3/3/3025.     Patient was admitted and underwent Dx Lap, Hartmans procedure, and SBR. She tolerated the procedure well and was transferred to the floor in stable condition. Once GI function returned, her diet was slowly advanced as tolerated and she was switched to oral pain medications. On POD # 3, she was found to be stable for discharge to home. She was voiding well and ambulating without assistance at the time of discharge. Pain was well-controlled. She will follow-up in 1-2 weeks for his post-op appointment.     Patient to start Xarelto tomorrow 3/13.

## 2025-03-12 NOTE — PROVIDER CONTACT NOTE (OTHER) - ACTION/TREATMENT ORDERED:
Provider aware. Provider states to wait for new IV. Plan of care ongoing.
Provider aware. Reach out to clinical impact again. Plan of care ongoing.
MD made aware. Said he will look into it.
Provider aware. Provider asks if another ANM can try and reach out to clinical impact. Plan of care ongoing.
MD states the team will see the pt during morning rounds. Plan of care ongoing.
MD aware. MD states to reach out to clinical impact first.
MD aware. MD states to reach out to clinical impact nurse prior to see if IV can be placed. Plan of care ongoing.
PA made aware. Zofran given as ordered.

## 2025-03-12 NOTE — DISCHARGE NOTE PROVIDER - NSDCCPTREATMENT_GEN_ALL_CORE_FT
PRINCIPAL PROCEDURE  Procedure: Casey procedure  Findings and Treatment:       SECONDARY PROCEDURE  Procedure: Small bowel resection  Findings and Treatment:     Procedure: Diagnostic laparoscopy  Findings and Treatment:     Procedure: Cystoscopy, with bilateral ureteral catheterization  Findings and Treatment:

## 2025-03-12 NOTE — DISCHARGE NOTE PROVIDER - NSDCCPCAREPLAN_GEN_ALL_CORE_FT
PRINCIPAL DISCHARGE DIAGNOSIS  Diagnosis: Diverticulitis  Assessment and Plan of Treatment: s/p Cee's Procedure      SECONDARY DISCHARGE DIAGNOSES  Diagnosis: HTN (hypertension)  Assessment and Plan of Treatment:     Diagnosis: HLD (hyperlipidemia)  Assessment and Plan of Treatment:     Diagnosis: Breast cancer  Assessment and Plan of Treatment:

## 2025-03-12 NOTE — DISCHARGE NOTE PROVIDER - NSDCFUADDINST_GEN_ALL_CORE_FT
WOUND CARE:  Please keep incisions clean and dry. Please do not Scrub or rub incisions. Do not use lotion or powder on incisions.   BATHING: You may shower and/or sponge bathe. You may use warm soapy water in the shower and rinse, pat dry.  ACTIVITY: No heavy lifting or straining. Otherwise, you may return to your usual level of physical activity. If you are taking narcotic pain medication DO NOT drive a car, operate machinery or make important decisions.  DIET: Return to your usual diet.  NOTIFY YOUR SURGEON IF YOU HAVE: any bleeding that does not stop, any pus draining from your wound(s), any fever (over 100.4 F) persistent nausea/vomiting, or if your pain is not controlled on your discharge pain medications, unable to urinate.  Please follow up with your primary care physician in one week regarding your hospitalization, bring copies of your discharge paperwork.  Please follow up with your surgeon, Dr. Bethea as an outpatient, please call to schedule appointment  WOUND CARE:  Please keep incisions clean and dry. Please do not Scrub or rub incisions. Do not use lotion or powder on incisions.   BATHING: You may shower and/or sponge bathe. You may use warm soapy water in the shower and rinse, pat dry.  ACTIVITY: No heavy lifting or straining. Otherwise, you may return to your usual level of physical activity. If you are taking narcotic pain medication DO NOT drive a car, operate machinery or make important decisions.  DIET: Return to your usual diet.  NOTIFY YOUR SURGEON IF YOU HAVE: any bleeding that does not stop, any pus draining from your wound(s), any fever (over 100.4 F) persistent nausea/vomiting, or if your pain is not controlled on your discharge pain medications, unable to urinate.  Please follow up with your primary care physician in one week regarding your hospitalization, bring copies of your discharge paperwork.  Please follow up with your surgeon, Dr. Bethea as an outpatient, please call to schedule appointment     Patient to start Xarelto tomorrow 3/13.

## 2025-03-12 NOTE — PROGRESS NOTE ADULT - NUTRITIONAL ASSESSMENT
This patient has been assessed with a concern for Malnutrition and has been determined to have a diagnosis/diagnoses of Severe protein-calorie malnutrition.    This patient is being managed with:   Diet Low Fiber-  Supplement Feeding Modality:  Oral  Ensure Max Cans or Servings Per Day:  1       Frequency:  Daily  Entered: Mar 12 2025  9:31AM

## 2025-03-12 NOTE — ADVANCED PRACTICE NURSE CONSULT - RECOMMEDATIONS
Supplies recommended and provided at bedside:   One piece drainable pouch- item #0409    Please contact Wound/Ostomy Care Service Line if we can be of further assistance (ext [phone]).

## 2025-03-12 NOTE — PROGRESS NOTE ADULT - PROVIDER SPECIALTY LIST ADULT
Colorectal Surgery
Pain Medicine
Anesthesia
Colorectal Surgery

## 2025-03-12 NOTE — DISCHARGE NOTE NURSING/CASE MANAGEMENT/SOCIAL WORK - FINANCIAL ASSISTANCE
[facility] provides services at a reduced cost to those who are determined to be eligible through [facility]’s financial assistance program. Information regarding [facility]’s financial assistance program can be found by going to [email or web address] or by calling [phone].

## 2025-03-12 NOTE — ADVANCED PRACTICE NURSE CONSULT - ASSESSMENT
Patient AxOx4, ready and willing to learn. Reports she is still afraid to look at ostomy, emotional support provided. Overview of surgical procedure and need of ostomy reinforced. Terms defined utilized: Ostomy, colostomy, wafer, pouch, stoma. Frequency of pouch change and emptying discussed, teach back method utilized. Demonstrated how to open, empty and close pouch. Removed ostomy pouch using pull and push technique, cleansed skin with water, patted dry. Taught patient how to properly assess stoma viability and peristomal skin. Patient actively participated in stoma measurement, creating template, cutting wafer, applying pouch. Reviewed s/s to report to MD. Showering with and without a pouch discussed as well as dietary restrictions. Informed that visiting nurse would follow up after discharge to set up account for supplies. Printed ostomy educational material provided to patient for review. [facility] Ostomy service contact information provided for any follow up questions/concerns. Discussed availability of precut pouches, pouches with filters. Lifestyle changes discussed.  Contact information provided to make follow up appointment after discharge from [facility].     Stoma size: 2" See A&I flowsheet for full assessment details.

## 2025-03-12 NOTE — PROVIDER CONTACT NOTE (OTHER) - NAME OF MD/NP/PA/DO NOTIFIED:
Chau Berumen MD
Chau Berumen MD [id number]
Chau Berumen MD
Eric Tripp
Adan Redmond MD
Josee Reyes
KYLE Ortega
Josee Reyes

## 2025-03-12 NOTE — PROGRESS NOTE ADULT - ASSESSMENT
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     Plan  - Diet: regular  - S/p zosyn  - Pain control prn  - Monitor ostomy and JESSICA output (d/c JESSICA prior to discharge)  - DVT ppx: SQH  - xarelto to start tomorrow   - ALLISON today     A Team Surgery  [id number]
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     Plan  - NPO with sips  - 1L bolus  - Leave the adame in today  - PT consult  - Continue DVT ppx    A Team surgery  [id number]
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     Plan  - NPO with sips  - Continue Zosyn, stopping tomorrow 03/09  - Pain control prn  - Monitor ostomy and JESSICA output (to be dc'd day 5)  - Continue DVT ppx    A Team surgery  [id number]  
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     Plan  - NPO with sips  - Stop zosyn  - Pain control prn  - Monitor ostomy and JESSICA output (to be dc'd day 5) today  - Continue DVT ppx    A Team surgery  [id number]
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     Plan  - d/c adame today  - adjust pain medication, dilaudid 0.25/0.5 for pain control  - NPO with sips  - Continue Zosyn  - Continue DVT ppx    A Team surgery  [id number]  
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     Plan  - Diet: regular  - S/p zosyn  - Pain control prn  - Monitor ostomy and JESSICA output   - DVT ppx: SQH    A Team Surgery  [id number]
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     Plan  - NPO with sips  - Continue Zosyn, stopping tomorrow 03/09  - Pain control prn  - Monitor ostomy and JESSICA output (to be dc'd day 5)  - Continue DVT ppx    A Team surgery  [id number]

## 2025-03-12 NOTE — PROVIDER CONTACT NOTE (OTHER) - SITUATION
Second ISHAAN Medeiros attempted to insert IV. [facility] impact currently unable to come to bedside to insert new IV.
c/o nausea, abdomen soft, rounded w/ LLQ colostomy w/ scanty output. Verbalized not feeling well.
Multiple attempts were made to draw patient's labs but were unsuccessful. Pt has had multiple ultrasound guided IVs, is a hard stick, and has had multiple ANMs attempt to draw blood/place IVs.
Pt IV is infiltrated
After contacting clinical impact as per provider, clinical impact informed RN that they are not part of the chain of command for IVs and blood draws. Provider will need to A-stick.
Pt currently has no IV access. Is NPO except medications and is ordered for IV fluids. After 2 ANMs attempted to insert a new IV and clinical impact was reached out to a new IV has not been placed.
Patient does not have an IV in place due to the previous one being infiltrated. Patient had multiple IVs be infiltrated
Pt has an infiltrated IV.

## 2025-03-12 NOTE — DIETITIAN INITIAL EVALUATION ADULT - OTHER CALCULATIONS
Ideal Body Weight: 125 lbs / 56.8 kg +/-10%   Apparent ~30 lb (14%) weight loss x1 year, per history obtained via chart: (3/5 dosing) 123.8 lbs / 56.2 kg, (2/27) 127 lbs, (11/26) 130 lbs, (3/19) 144 lbs.

## 2025-03-13 ENCOUNTER — INPATIENT (INPATIENT)
Facility: HOSPITAL | Age: 75
LOS: 20 days | Discharge: HOME | End: 2025-04-03
Attending: STUDENT IN AN ORGANIZED HEALTH CARE EDUCATION/TRAINING PROGRAM | Admitting: STUDENT IN AN ORGANIZED HEALTH CARE EDUCATION/TRAINING PROGRAM
Payer: MEDICARE

## 2025-03-13 VITALS
SYSTOLIC BLOOD PRESSURE: 134 MMHG | OXYGEN SATURATION: 95 % | HEART RATE: 96 BPM | DIASTOLIC BLOOD PRESSURE: 86 MMHG | WEIGHT: 149.91 LBS | RESPIRATION RATE: 20 BRPM | HEIGHT: 65 IN | TEMPERATURE: 99 F

## 2025-03-13 DIAGNOSIS — Z90.710 ACQUIRED ABSENCE OF BOTH CERVIX AND UTERUS: Chronic | ICD-10-CM

## 2025-03-13 DIAGNOSIS — Z98.89 OTHER SPECIFIED POSTPROCEDURAL STATES: Chronic | ICD-10-CM

## 2025-03-13 DIAGNOSIS — Z98.890 OTHER SPECIFIED POSTPROCEDURAL STATES: Chronic | ICD-10-CM

## 2025-03-13 DIAGNOSIS — K56.609 UNSPECIFIED INTESTINAL OBSTRUCTION, UNSPECIFIED AS TO PARTIAL VERSUS COMPLETE OBSTRUCTION: ICD-10-CM

## 2025-03-13 LAB
ALBUMIN SERPL ELPH-MCNC: 3.5 G/DL — SIGNIFICANT CHANGE UP (ref 3.3–5)
ALP SERPL-CCNC: 103 U/L — SIGNIFICANT CHANGE UP (ref 40–120)
ALT FLD-CCNC: 9 U/L — SIGNIFICANT CHANGE UP (ref 4–33)
ANION GAP SERPL CALC-SCNC: 11 MMOL/L — SIGNIFICANT CHANGE UP (ref 7–14)
APPEARANCE UR: CLEAR — SIGNIFICANT CHANGE UP
APTT BLD: 29.7 SEC — SIGNIFICANT CHANGE UP (ref 24.5–35.6)
AST SERPL-CCNC: 12 U/L — SIGNIFICANT CHANGE UP (ref 4–32)
BASOPHILS # BLD AUTO: 0.04 K/UL — SIGNIFICANT CHANGE UP (ref 0–0.2)
BASOPHILS NFR BLD AUTO: 0.3 % — SIGNIFICANT CHANGE UP (ref 0–2)
BILIRUB SERPL-MCNC: 0.3 MG/DL — SIGNIFICANT CHANGE UP (ref 0.2–1.2)
BILIRUB UR-MCNC: NEGATIVE — SIGNIFICANT CHANGE UP
BLD GP AB SCN SERPL QL: NEGATIVE — SIGNIFICANT CHANGE UP
BLOOD GAS VENOUS COMPREHENSIVE RESULT: SIGNIFICANT CHANGE UP
BUN SERPL-MCNC: 9 MG/DL — SIGNIFICANT CHANGE UP (ref 7–23)
CALCIUM SERPL-MCNC: 9.9 MG/DL — SIGNIFICANT CHANGE UP (ref 8.4–10.5)
CHLORIDE SERPL-SCNC: 97 MMOL/L — LOW (ref 98–107)
CO2 SERPL-SCNC: 29 MMOL/L — SIGNIFICANT CHANGE UP (ref 22–31)
COLOR SPEC: YELLOW — SIGNIFICANT CHANGE UP
CREAT SERPL-MCNC: 0.63 MG/DL — SIGNIFICANT CHANGE UP (ref 0.5–1.3)
DIFF PNL FLD: NEGATIVE — SIGNIFICANT CHANGE UP
EGFR: 92 ML/MIN/1.73M2 — SIGNIFICANT CHANGE UP
EGFR: 92 ML/MIN/1.73M2 — SIGNIFICANT CHANGE UP
EOSINOPHIL # BLD AUTO: 0.53 K/UL — HIGH (ref 0–0.5)
EOSINOPHIL NFR BLD AUTO: 3.6 % — SIGNIFICANT CHANGE UP (ref 0–6)
GLUCOSE SERPL-MCNC: 101 MG/DL — HIGH (ref 70–99)
GLUCOSE UR QL: NEGATIVE MG/DL — SIGNIFICANT CHANGE UP
HCT VFR BLD CALC: 30.7 % — LOW (ref 34.5–45)
HGB BLD-MCNC: 9.5 G/DL — LOW (ref 11.5–15.5)
IANC: 10.81 K/UL — HIGH (ref 1.8–7.4)
IMM GRANULOCYTES NFR BLD AUTO: 0.6 % — SIGNIFICANT CHANGE UP (ref 0–0.9)
INR BLD: 1.24 RATIO — HIGH (ref 0.85–1.16)
KETONES UR-MCNC: 15 MG/DL
LEUKOCYTE ESTERASE UR-ACNC: NEGATIVE — SIGNIFICANT CHANGE UP
LYMPHOCYTES # BLD AUTO: 13.7 % — SIGNIFICANT CHANGE UP (ref 13–44)
LYMPHOCYTES # BLD AUTO: 2.02 K/UL — SIGNIFICANT CHANGE UP (ref 1–3.3)
MAGNESIUM SERPL-MCNC: 2 MG/DL — SIGNIFICANT CHANGE UP (ref 1.6–2.6)
MCHC RBC-ENTMCNC: 23.2 PG — LOW (ref 27–34)
MCHC RBC-ENTMCNC: 30.9 G/DL — LOW (ref 32–36)
MCV RBC AUTO: 75.1 FL — LOW (ref 80–100)
MONOCYTES # BLD AUTO: 1.23 K/UL — HIGH (ref 0–0.9)
MONOCYTES NFR BLD AUTO: 8.4 % — SIGNIFICANT CHANGE UP (ref 2–14)
NEUTROPHILS # BLD AUTO: 10.81 K/UL — HIGH (ref 1.8–7.4)
NEUTROPHILS NFR BLD AUTO: 73.4 % — SIGNIFICANT CHANGE UP (ref 43–77)
NITRITE UR-MCNC: NEGATIVE — SIGNIFICANT CHANGE UP
NRBC # BLD AUTO: 0.02 K/UL — HIGH (ref 0–0)
NRBC # FLD: 0.02 K/UL — HIGH (ref 0–0)
NRBC BLD AUTO-RTO: 0 /100 WBCS — SIGNIFICANT CHANGE UP (ref 0–0)
PH UR: 6 — SIGNIFICANT CHANGE UP (ref 5–8)
PHOSPHATE SERPL-MCNC: 2.6 MG/DL — SIGNIFICANT CHANGE UP (ref 2.5–4.5)
PLATELET # BLD AUTO: 708 K/UL — HIGH (ref 150–400)
POTASSIUM SERPL-MCNC: 4.6 MMOL/L — SIGNIFICANT CHANGE UP (ref 3.5–5.3)
POTASSIUM SERPL-SCNC: 4.6 MMOL/L — SIGNIFICANT CHANGE UP (ref 3.5–5.3)
PROT SERPL-MCNC: 7.2 G/DL — SIGNIFICANT CHANGE UP (ref 6–8.3)
PROT UR-MCNC: SIGNIFICANT CHANGE UP MG/DL
PROTHROM AB SERPL-ACNC: 14.7 SEC — HIGH (ref 9.9–13.4)
RBC # BLD: 4.09 M/UL — SIGNIFICANT CHANGE UP (ref 3.8–5.2)
RBC # FLD: 19.7 % — HIGH (ref 10.3–14.5)
RH IG SCN BLD-IMP: POSITIVE — SIGNIFICANT CHANGE UP
SODIUM SERPL-SCNC: 137 MMOL/L — SIGNIFICANT CHANGE UP (ref 135–145)
SP GR SPEC: 1.02 — SIGNIFICANT CHANGE UP (ref 1–1.03)
UROBILINOGEN FLD QL: 0.2 MG/DL — SIGNIFICANT CHANGE UP (ref 0.2–1)
WBC # BLD: 14.72 K/UL — HIGH (ref 3.8–10.5)
WBC # FLD AUTO: 14.72 K/UL — HIGH (ref 3.8–10.5)

## 2025-03-13 PROCEDURE — 74177 CT ABD & PELVIS W/CONTRAST: CPT | Mod: 26

## 2025-03-13 PROCEDURE — 71045 X-RAY EXAM CHEST 1 VIEW: CPT | Mod: 26

## 2025-03-13 PROCEDURE — 99285 EMERGENCY DEPT VISIT HI MDM: CPT

## 2025-03-13 RX ORDER — HEPARIN SODIUM 1000 [USP'U]/ML
5000 INJECTION INTRAVENOUS; SUBCUTANEOUS EVERY 8 HOURS
Refills: 0 | Status: DISCONTINUED | OUTPATIENT
Start: 2025-03-13 | End: 2025-04-02

## 2025-03-13 RX ORDER — ACETAMINOPHEN 500 MG/5ML
1000 LIQUID (ML) ORAL EVERY 6 HOURS
Refills: 0 | Status: COMPLETED | OUTPATIENT
Start: 2025-03-13 | End: 2025-03-16

## 2025-03-13 RX ORDER — IOHEXOL 350 MG/ML
30 INJECTION, SOLUTION INTRAVENOUS ONCE
Refills: 0 | Status: DISCONTINUED | OUTPATIENT
Start: 2025-03-13 | End: 2025-03-13

## 2025-03-13 RX ORDER — SODIUM CHLORIDE 9 G/1000ML
1000 INJECTION, SOLUTION INTRAVENOUS
Refills: 0 | Status: COMPLETED | OUTPATIENT
Start: 2025-03-13 | End: 2025-03-13

## 2025-03-13 RX ORDER — SODIUM CHLORIDE 9 G/1000ML
1000 INJECTION, SOLUTION INTRAVENOUS
Refills: 0 | Status: DISCONTINUED | OUTPATIENT
Start: 2025-03-13 | End: 2025-03-14

## 2025-03-13 RX ORDER — ONDANSETRON HCL/PF 4 MG/2 ML
4 VIAL (ML) INJECTION ONCE
Refills: 0 | Status: COMPLETED | OUTPATIENT
Start: 2025-03-13 | End: 2025-03-13

## 2025-03-13 RX ADMIN — Medication 1000 MILLILITER(S): at 18:50

## 2025-03-13 RX ADMIN — Medication 20 MILLIGRAM(S): at 18:50

## 2025-03-13 RX ADMIN — Medication 4 MILLIGRAM(S): at 18:50

## 2025-03-13 RX ADMIN — Medication 4 MILLIGRAM(S): at 19:03

## 2025-03-13 RX ADMIN — SODIUM CHLORIDE 1000 MILLILITER(S): 9 INJECTION, SOLUTION INTRAVENOUS at 22:38

## 2025-03-13 NOTE — H&P ADULT - NSHPLABSRESULTS_GEN_ALL_CORE
< from: CT Abdomen and Pelvis w/ IV Cont (03.13.25 @ 19:23) >      ACC: [id number] EXAM:  CT ABDOMEN AND PELVIS IC   ORDERED BY: LEVI RICO     PROCEDURE DATE:  03/13/2025          INTERPRETATION:  CLINICAL INFORMATION: Vomiting. Status post Casey   procedure on 3/5/2025    COMPARISON: CT abdomen pelvis 2/15/2025    CONTRAST/COMPLICATIONS:  IV Contrast: Omnipaque 350  90 cc administered   10 cc discarded  Oral Contrast: NONE    PROCEDURE:  CT of the Abdomen and Pelvis was performed.  Sagittal and coronal reformats were performed.    FINDINGS:  LOWER CHEST: Bibasilar atelectasis.    LIVER: Within normal limits.  BILE DUCTS: Normal caliber.  GALLBLADDER: Within normal limits.  SPLEEN: Subcentimeter hypodensity in the spleen, unchanged.  PANCREAS: Within normal limits.  ADRENALS: Within normal limits.  KIDNEYS/URETERS: Within normal limits.    BLADDER: Small volume of intraluminal air, likely from recent   instrumentation.  REPRODUCTIVE ORGANS: Hysterectomy.    BOWEL: Status post sigmoid colectomy with left lower quadrant colostomy.   Right upper quadrant small bowel anastomosis. 2 small bowel anastomosis   in the left lower quadrant. Distended stomach and multiple loops of   proximal small bowel with possible transition point in the left upper   quadrant, although the bowel distal to that is also slightly dilated.   Appendix is not visualized. Extensive colonic diverticulosis.  PERITONEUM/RETROPERITONEUM: Small volume free fluid in the pelvis without   organized collection. Small volume pneumoperitoneum, likely postoperative.  VESSELS: Atherosclerotic changes.  LYMPH NODES: No lymphadenopathy.  ABDOMINAL WALL: Postoperative changes with midline skin staples. Left   lower quadrant ostomy.  BONES: Lumbosacral spinal fusion hardware. Unchanged L1 compression   deformity.    IMPRESSION:  Statuspost Cee's procedure with left lower quadrant colostomy.   Postoperative changes in the pelvis with small volume free fluid. No   organized collection.    Distended stomach and multiple loops of proximal small bowel with   possible transition point in the left upper quadrant. Findings may   represent small bowel obstruction or postoperative ileus.    --- End of Report ---      DYLLAN YAO MD; Resident Radiologist  This document has been electronically signed.  TERESA LEE MD; Attending Radiologist  This document has been electronically signed. Mar 13 2025  8:14PM    < end of copied text >

## 2025-03-13 NOTE — ED ADULT NURSE REASSESSMENT NOTE - NS ED NURSE REASSESS COMMENT FT1
Received pt in room, AAOX4, resp even and unlabored, pt verbalized some pain relief with last dose of morphine, no acute distress noted, family at bedside, will continue to monitor

## 2025-03-13 NOTE — CONSULT NOTE ADULT - ASSESSMENT
Ms. 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 Bethea) presenting with nausea and vomiting, concern for ?ileus vs GI infection.    Recommendations:  - CBC, BMP, mag, phos, coags, T&S  - CTAP w/ PO and IV contrast (no PO if patient cannot tolerate)  - GI PCR when ostomy has output  - pain and nausea control  - further recs pending above workup    discussed with Dr. Bethea    [id number] Ms. 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 Bethea) presenting with nausea and vomiting, concern for ?ileus vs GI infection.    Recommendations:  - CBC, BMP, mag, phos, coags, T&S  - CTAP w/ PO and IV contrast (no PO if patient cannot tolerate)  - GI PCR when ostomy has output  - UA  - pain and nausea control  - further recs pending above workup    discussed with Dr. Bethea    [id number]

## 2025-03-13 NOTE — ED ADULT NURSE NOTE - NSFALLHARMRISKINTERV_ED_ALL_ED
Assistance OOB with selected safe patient handling equipment if applicable/Assistance with ambulation/Communicate risk of Fall with Harm to all staff, patient, and family/Monitor gait and stability/Provide visual cue: red socks, yellow wristband, yellow gown, etc/Reinforce activity limits and safety measures with patient and family/Bed in lowest position, wheels locked, appropriate side rails in place/Call bell, personal items and telephone in reach/Instruct patient to call for assistance before getting out of bed/chair/stretcher/Non-slip footwear applied when patient is off stretcher/[place] to call system/Physically safe environment - no spills, clutter or unnecessary equipment/Purposeful Proactive Rounding/Room/bathroom lighting operational, light cord in reach

## 2025-03-13 NOTE — ED ADULT TRIAGE NOTE - CHIEF COMPLAINT QUOTE
From [facility] for multiple episodes of N/V after Casey procedure done on Wednesday, pt with surgical wound and staples to the mid abdomen, intact, ileostomy to LUQ abdomen with no fecal output. phx of diverticulitis, breast CA with no arm precautions, HTN. pt is visibly uncomfortable in triage. From [facility] for multiple episodes of N/V after Casey procedure done on Wednesday, pt with surgical wound and staples to the mid abdomen - intact,  colonoscopy to LUQ abdomen with no fecal output. phx of diverticulitis, breast CA with no arm precautions, HTN. pt is visibly uncomfortable in triage. From [facility] for multiple episodes of N/V after Casey procedure done on Wednesday, pt with surgical wound and staples to the mid abdomen - intact,  colostomy to LUQ abdomen with no fecal output. phx of diverticulitis, breast CA with no arm precautions, HTN. pt is visibly uncomfortable in triage.

## 2025-03-13 NOTE — H&P ADULT - HISTORY OF PRESENT ILLNESS
Ms. 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 Bethea) presenting with nausea and vomiting. Patient was discharged from this hospital yesterday, was previously tolerating PO however developed severe nausea and multiple episodes of emesis. She reports that she kept trying to eat despite feeling nauseous, and having subsequent emesis. She also reports crampy abdominal pain that feels like gas. Her ostomy has been functioning properly and filled up twice already today. Denies fevers or chills. Also reports that she feels burning at the end of urination. Ms. Angelo is a 74 year old woman with PMHx metastatic urothelial carcinoma in 2017 (s/p immunetherapy), b/l breast cancer (s/p RT), diverticulitis, HTN, HLD, arterial insufficiency, livedoid vasculopathy on Xarelto and Pletal , recent Cee procedure for chronic diverticulitis with colo-enteric fistula (3/5, Dr. Bethea) presenting with nausea and vomiting. Patient was discharged from this hospital yesterday, was previously tolerating PO however developed severe nausea and multiple episodes of emesis. She reports that she kept trying to eat despite feeling nauseous, and having subsequent emesis. She also reports crampy abdominal pain that feels like gas. Her ostomy has been functioning properly and filled up twice already today. Denies fevers or chills. Also reports that she feels burning at the end of urination.

## 2025-03-13 NOTE — ED ADULT NURSE REASSESSMENT NOTE - NS ED NURSE REASSESS COMMENT FT1
pt AAOX4, resp evehn and unlabored, no complains no acute distress noted, pt has NG tube on low suction, report given to Harry WILCOX. Waiting on transport.

## 2025-03-13 NOTE — ED PROVIDER NOTE - CLINICAL SUMMARY MEDICAL DECISION MAKING FREE TEXT BOX
elderly female who [facility]) presents today with abdominal pain and vomiting. hd stable. exam abd ttp. ct shows sbo. surg consulted. admited to their service for further management. pain meds and antiemetics started in ED.

## 2025-03-13 NOTE — H&P ADULT - ASSESSMENT
Ms. Angelo is a 74 year old woman with PMHx metastatic urothelial carcinoma in 2017 (s/p immunetherapy), b/l breast cancer (s/p RT), diverticulitis, HTN, HLD, arterial insufficiency, vasculopathy on Xarelto and Pletal, s/p recent Cee procedure for chronic diverticulitis with colo-enteric fistula (10/5, Dr. Bethea) presenting with nausea and vomiting, found to have a post-operative ileus.    Recommendations:  -Admit to A Team, Dr. Bethea.  -NPO/IVF/NGT.  -Pain control.  -GI PCR when ostomy has output.  -Xarelto held while NGT in place.  -Resuscitation.  -F/u NGT xray.    Discussed with Dr. Bethea.    [id number]

## 2025-03-13 NOTE — ED ADULT NURSE NOTE - CHIEF COMPLAINT QUOTE
From [facility] for multiple episodes of N/V after Casey procedure done on Wednesday, pt with surgical wound and staples to the mid abdomen - intact,  colostomy to LUQ abdomen with no fecal output. phx of diverticulitis, breast CA with no arm precautions, HTN. pt is visibly uncomfortable in triage.

## 2025-03-13 NOTE — ED PROVIDER NOTE - IV ALTEPLASE EXCL ABS HIDDEN
Discussed supportive home care including rest, hydration, hot fluids with honey and tylenol/motrin for sore throat and body aches. Handout given. Pt verbalizes understanding.  RTC 3-5 days if not improving  
show

## 2025-03-13 NOTE — ED ADULT NURSE NOTE - OBJECTIVE STATEMENT
73 y/o F arrives to AME rm 18 from [facility]. PHx: HTN, KARI, L breast ca., IBS. Pt is a&ox4, ambulatory at baseline, neg SOB, denies chest pain, +N/V, denies fevers. L 20g USIV placed by PA. Pt had Casey procedure last Weds, states she's had very minimal output since. No output noted in bag. Frequent monitoring in place.

## 2025-03-13 NOTE — H&P ADULT - NSHPPHYSICALEXAM_GEN_ALL_CORE
General - uncomfortable  Lungs - nonlabored respirations  Heart - pulse regular   Abdomen - soft, nontender, nondistended; midline incision healing well with staples in place; ostomy prolapsed, pink and viable, no output in bag   Extremities - all four extremities are warm

## 2025-03-14 LAB
ANION GAP SERPL CALC-SCNC: 16 MMOL/L — HIGH (ref 7–14)
APTT BLD: 30.8 SEC — SIGNIFICANT CHANGE UP (ref 24.5–35.6)
BUN SERPL-MCNC: 5 MG/DL — LOW (ref 7–23)
CALCIUM SERPL-MCNC: 9.4 MG/DL — SIGNIFICANT CHANGE UP (ref 8.4–10.5)
CHLORIDE SERPL-SCNC: 93 MMOL/L — LOW (ref 98–107)
CO2 SERPL-SCNC: 24 MMOL/L — SIGNIFICANT CHANGE UP (ref 22–31)
CREAT SERPL-MCNC: 0.44 MG/DL — LOW (ref 0.5–1.3)
EGFR: 101 ML/MIN/1.73M2 — SIGNIFICANT CHANGE UP
EGFR: 101 ML/MIN/1.73M2 — SIGNIFICANT CHANGE UP
GLUCOSE SERPL-MCNC: 95 MG/DL — SIGNIFICANT CHANGE UP (ref 70–99)
HCT VFR BLD CALC: 30.1 % — LOW (ref 34.5–45)
HGB BLD-MCNC: 9.4 G/DL — LOW (ref 11.5–15.5)
INR BLD: 1.1 RATIO — SIGNIFICANT CHANGE UP (ref 0.85–1.16)
MAGNESIUM SERPL-MCNC: 1.7 MG/DL — SIGNIFICANT CHANGE UP (ref 1.6–2.6)
MCHC RBC-ENTMCNC: 23.4 PG — LOW (ref 27–34)
MCHC RBC-ENTMCNC: 31.2 G/DL — LOW (ref 32–36)
MCV RBC AUTO: 74.9 FL — LOW (ref 80–100)
NRBC # BLD AUTO: 0 K/UL — SIGNIFICANT CHANGE UP (ref 0–0)
NRBC # FLD: 0 K/UL — SIGNIFICANT CHANGE UP (ref 0–0)
NRBC BLD AUTO-RTO: 0 /100 WBCS — SIGNIFICANT CHANGE UP (ref 0–0)
PHOSPHATE SERPL-MCNC: 2.6 MG/DL — SIGNIFICANT CHANGE UP (ref 2.5–4.5)
PLATELET # BLD AUTO: 609 K/UL — HIGH (ref 150–400)
POTASSIUM SERPL-MCNC: 4 MMOL/L — SIGNIFICANT CHANGE UP (ref 3.5–5.3)
POTASSIUM SERPL-SCNC: 4 MMOL/L — SIGNIFICANT CHANGE UP (ref 3.5–5.3)
PROTHROM AB SERPL-ACNC: 12.8 SEC — SIGNIFICANT CHANGE UP (ref 9.9–13.4)
RBC # BLD: 4.02 M/UL — SIGNIFICANT CHANGE UP (ref 3.8–5.2)
RBC # FLD: 19.5 % — HIGH (ref 10.3–14.5)
SODIUM SERPL-SCNC: 133 MMOL/L — LOW (ref 135–145)
WBC # BLD: 12.65 K/UL — HIGH (ref 3.8–10.5)
WBC # FLD AUTO: 12.65 K/UL — HIGH (ref 3.8–10.5)

## 2025-03-14 PROCEDURE — 71045 X-RAY EXAM CHEST 1 VIEW: CPT | Mod: 26

## 2025-03-14 RX ORDER — ONDANSETRON HCL/PF 4 MG/2 ML
4 VIAL (ML) INJECTION ONCE
Refills: 0 | Status: COMPLETED | OUTPATIENT
Start: 2025-03-14 | End: 2025-03-16

## 2025-03-14 RX ORDER — POTASSIUM CHLORIDE, DEXTROSE MONOHYDRATE AND SODIUM CHLORIDE 150; 5; 900 MG/100ML; G/100ML; MG/100ML
1000 INJECTION, SOLUTION INTRAVENOUS
Refills: 0 | Status: DISCONTINUED | OUTPATIENT
Start: 2025-03-14 | End: 2025-03-18

## 2025-03-14 RX ORDER — BENZOCAINE 220 MG/G
1 SPRAY, METERED PERIODONTAL EVERY 6 HOURS
Refills: 0 | Status: DISCONTINUED | OUTPATIENT
Start: 2025-03-14 | End: 2025-03-16

## 2025-03-14 RX ORDER — MAGNESIUM SULFATE 500 MG/ML
2 SYRINGE (ML) INJECTION ONCE
Refills: 0 | Status: COMPLETED | OUTPATIENT
Start: 2025-03-14 | End: 2025-03-14

## 2025-03-14 RX ADMIN — SODIUM CHLORIDE 100 MILLILITER(S): 9 INJECTION, SOLUTION INTRAVENOUS at 15:56

## 2025-03-14 RX ADMIN — HEPARIN SODIUM 5000 UNIT(S): 1000 INJECTION INTRAVENOUS; SUBCUTANEOUS at 05:44

## 2025-03-14 RX ADMIN — Medication 25 GRAM(S): at 13:05

## 2025-03-14 RX ADMIN — HEPARIN SODIUM 5000 UNIT(S): 1000 INJECTION INTRAVENOUS; SUBCUTANEOUS at 15:55

## 2025-03-14 RX ADMIN — Medication 1000 MILLIGRAM(S): at 02:00

## 2025-03-14 RX ADMIN — Medication 63.75 MILLIMOLE(S): at 16:23

## 2025-03-14 RX ADMIN — Medication 400 MILLIGRAM(S): at 01:41

## 2025-03-14 RX ADMIN — BENZOCAINE 1 SPRAY(S): 220 SPRAY, METERED PERIODONTAL at 15:55

## 2025-03-14 NOTE — PROGRESS NOTE ADULT - ASSESSMENT
Ms. Angelo is a 74 year old woman with PMHx metastatic urothelial carcinoma in 2017 (s/p immunetherapy), b/l breast cancer (s/p RT), diverticulitis, HTN, HLD, arterial insufficiency, vasculopathy on Xarelto and Pletal, s/p recent Cee procedure for chronic diverticulitis with colo-enteric fistula (10/5, Dr. Bethea) presenting with nausea and vomiting, found to have a post-operative ileus vs. pSBO. Receiving nonoperative management with NG tube decompression, awaiting return of ostomy function    Recommendations:  -NPO/IVF/NGT.  -Pain control.  -GI PCR when ostomy has output.  -Xarelto held while NGT in place.  -LR @ 100.  - NG tube repositioned --> now in position on repeat CXR    A team surgery  [id number]

## 2025-03-14 NOTE — PATIENT PROFILE ADULT - FALL HARM RISK - HARM RISK INTERVENTIONS

## 2025-03-14 NOTE — PROGRESS NOTE ADULT - SUBJECTIVE AND OBJECTIVE BOX
TEAM [ A ] Surgery Daily Progress Note  =====================================================    SUBJECTIVE: Patient reports some abdominal distention, otherwise no acute complaints.     ALLERGIES:  sulfa drugs (Unknown)      --------------------------------------------------------------------------------------    MEDICATIONS:  acetaminophen   IVPB .. 1000 milliGRAM(s) IV Intermittent every 6 hours PRN  heparin   Injectable 5000 Unit(s) SubCutaneous every 8 hours  lactated ringers. 1000 milliLiter(s) IV Continuous <Continuous>    --------------------------------------------------------------------------------------    VITAL SIGNS:  T(C): 37.1 (03-14-25 @ 05:10), Max: 37.2 (03-13-25 @ 17:00)  HR: 98 (03-14-25 @ 05:10) (91 - 115)  BP: 138/83 (03-14-25 @ 05:10) (134/86 - 148/88)  RR: 17 (03-14-25 @ 05:10) (17 - 20)  SpO2: 98% (03-14-25 @ 05:10) (95% - 100%)  --------------------------------------------------------------------------------------    INS AND OUTS:    03-13-25 @ 07:01  -  03-14-25 @ 07:00  --------------------------------------------------------  IN: 600 mL / OUT: 1350 mL / NET: -750 mL      --------------------------------------------------------------------------------------      EXAM    General - uncomfortable appearing, in no acute distress  Lungs - nonlabored respirations  Heart - pulse regular   Abdomen - soft, nontender, nondistended; midline incision healing well with staples in place; ostomy , pink and viable, no output in bag. NG in place with brown tinged output in tubing.   Extremities - all four extremities are warm    --------------------------------------------------------------------------------------    LABS

## 2025-03-15 LAB
ANION GAP SERPL CALC-SCNC: 12 MMOL/L — SIGNIFICANT CHANGE UP (ref 7–14)
BUN SERPL-MCNC: 6 MG/DL — LOW (ref 7–23)
CALCIUM SERPL-MCNC: 9 MG/DL — SIGNIFICANT CHANGE UP (ref 8.4–10.5)
CHLORIDE SERPL-SCNC: 94 MMOL/L — LOW (ref 98–107)
CO2 SERPL-SCNC: 28 MMOL/L — SIGNIFICANT CHANGE UP (ref 22–31)
CREAT SERPL-MCNC: 0.49 MG/DL — LOW (ref 0.5–1.3)
EGFR: 98 ML/MIN/1.73M2 — SIGNIFICANT CHANGE UP
EGFR: 98 ML/MIN/1.73M2 — SIGNIFICANT CHANGE UP
GI PCR PANEL: SIGNIFICANT CHANGE UP
GLUCOSE SERPL-MCNC: 117 MG/DL — HIGH (ref 70–99)
HCT VFR BLD CALC: 27.8 % — LOW (ref 34.5–45)
HGB BLD-MCNC: 8.7 G/DL — LOW (ref 11.5–15.5)
MAGNESIUM SERPL-MCNC: 1.9 MG/DL — SIGNIFICANT CHANGE UP (ref 1.6–2.6)
MCHC RBC-ENTMCNC: 23.3 PG — LOW (ref 27–34)
MCHC RBC-ENTMCNC: 31.3 G/DL — LOW (ref 32–36)
MCV RBC AUTO: 74.5 FL — LOW (ref 80–100)
NRBC # BLD AUTO: 0 K/UL — SIGNIFICANT CHANGE UP (ref 0–0)
NRBC # FLD: 0 K/UL — SIGNIFICANT CHANGE UP (ref 0–0)
NRBC BLD AUTO-RTO: 0 /100 WBCS — SIGNIFICANT CHANGE UP (ref 0–0)
PHOSPHATE SERPL-MCNC: 2.7 MG/DL — SIGNIFICANT CHANGE UP (ref 2.5–4.5)
PLATELET # BLD AUTO: 597 K/UL — HIGH (ref 150–400)
POTASSIUM SERPL-MCNC: 3.8 MMOL/L — SIGNIFICANT CHANGE UP (ref 3.5–5.3)
POTASSIUM SERPL-SCNC: 3.8 MMOL/L — SIGNIFICANT CHANGE UP (ref 3.5–5.3)
RBC # BLD: 3.73 M/UL — LOW (ref 3.8–5.2)
RBC # FLD: 19.5 % — HIGH (ref 10.3–14.5)
SODIUM SERPL-SCNC: 134 MMOL/L — LOW (ref 135–145)
WBC # BLD: 10.26 K/UL — SIGNIFICANT CHANGE UP (ref 3.8–10.5)
WBC # FLD AUTO: 10.26 K/UL — SIGNIFICANT CHANGE UP (ref 3.8–10.5)

## 2025-03-15 PROCEDURE — 71045 X-RAY EXAM CHEST 1 VIEW: CPT | Mod: 26

## 2025-03-15 RX ORDER — SIMETHICONE 80 MG
80 TABLET,CHEWABLE ORAL ONCE
Refills: 0 | Status: COMPLETED | OUTPATIENT
Start: 2025-03-15 | End: 2025-03-15

## 2025-03-15 RX ORDER — MAGNESIUM SULFATE 500 MG/ML
1 SYRINGE (ML) INJECTION ONCE
Refills: 0 | Status: COMPLETED | OUTPATIENT
Start: 2025-03-15 | End: 2025-03-15

## 2025-03-15 RX ADMIN — HEPARIN SODIUM 5000 UNIT(S): 1000 INJECTION INTRAVENOUS; SUBCUTANEOUS at 09:43

## 2025-03-15 RX ADMIN — Medication 1000 MILLIGRAM(S): at 16:27

## 2025-03-15 RX ADMIN — Medication 100 GRAM(S): at 07:03

## 2025-03-15 RX ADMIN — Medication 80 MILLIGRAM(S): at 23:21

## 2025-03-15 RX ADMIN — HEPARIN SODIUM 5000 UNIT(S): 1000 INJECTION INTRAVENOUS; SUBCUTANEOUS at 01:40

## 2025-03-15 RX ADMIN — POTASSIUM CHLORIDE, DEXTROSE MONOHYDRATE AND SODIUM CHLORIDE 110 MILLILITER(S): 150; 5; 900 INJECTION, SOLUTION INTRAVENOUS at 17:02

## 2025-03-15 RX ADMIN — HEPARIN SODIUM 5000 UNIT(S): 1000 INJECTION INTRAVENOUS; SUBCUTANEOUS at 17:01

## 2025-03-15 RX ADMIN — Medication 400 MILLIGRAM(S): at 15:57

## 2025-03-15 NOTE — PHYSICAL THERAPY INITIAL EVALUATION ADULT - PERTINENT HX OF CURRENT PROBLEM, REHAB EVAL
Pt is a 74 year old woman with a past medical history of metastatic urothelial carcinoma in 2017 (s/p immunetherapy), b/l breast cancer (s/p RT), diverticulitis, HTN, HLD, arterial insufficiency, vasculopathy on Xarelto and Pletal, s/p recent Cee procedure for chronic diverticulitis with colo-enteric fistula (10/5, Dr. Bethea) presenting with nausea and vomiting, found to have a post-operative ileus vs. pSBO. Receiving nonoperative management with NG tube decompression, awaiting return of ostomy function

## 2025-03-15 NOTE — PROGRESS NOTE ADULT - SUBJECTIVE AND OBJECTIVE BOX
TEAM [ A ] Surgery Daily Progress Note  =====================================================    INTERVAL/SUBJECTIVE: Remains NPO with NG tube in place. NG with 1L output, colostomy productive of gas and stool (130mL). Patient still reports some distention, no significant change from yesterday. No other complaints.     ALLERGIES:  sulfa drugs (Unknown)      --------------------------------------------------------------------------------------    MEDICATIONS:  acetaminophen   IVPB .. 1000 milliGRAM(s) IV Intermittent every 6 hours PRN  benzocaine 20% Spray 1 Spray(s) Topical every 6 hours PRN  dextrose 5% + sodium chloride 0.45% with potassium chloride 20 mEq/L 1000 milliLiter(s) IV Continuous <Continuous>  heparin   Injectable 5000 Unit(s) SubCutaneous every 8 hours  ondansetron Injectable 4 milliGRAM(s) IV Push once    --------------------------------------------------------------------------------------    VITAL SIGNS:  T(C): 36.8 (03-15-25 @ 09:30), Max: 36.8 (03-15-25 @ 09:30)  HR: 92 (03-15-25 @ 09:30) (87 - 120)  BP: 137/76 (03-15-25 @ 09:30) (121/74 - 138/82)  RR: 17 (03-15-25 @ 09:30) (17 - 18)  SpO2: 99% (03-15-25 @ 09:30) (98% - 99%)  --------------------------------------------------------------------------------------    INS AND OUTS:    03-14-25 @ 07:01  -  03-15-25 @ 07:00  --------------------------------------------------------  IN: 1390 mL / OUT: 2930 mL / NET: -1540 mL    03-15-25 @ 07:01  -  03-15-25 @ 12:42  --------------------------------------------------------  IN: 240 mL / OUT: 450 mL / NET: -210 mL      --------------------------------------------------------------------------------------      EXAM  General - uncomfortable appearing, in no acute distress  Lungs - nonlabored respirations  Heart - pulse regular   Abdomen - soft, nontender, nondistended; midline incision healing well with staples in place; ostomy , pink and viable, productive of +gas and +stool. NG tube in place with bilious contents.   Extremities - all four extremities are warm    --------------------------------------------------------------------------------------    LABS

## 2025-03-15 NOTE — PHYSICAL THERAPY INITIAL EVALUATION ADULT - ADDITIONAL COMMENTS
Pt is from a rehab facility but was only there ~24 hours. Prior pt lives alone in an apartment with 3 steps to enter. Pt did not use an assistive device and was independent with ADLs prior. Pt reports no falls in the past 6 months.  Pt left sitting in bedside chair in NAD, all lines intact, call bell in reach, LPN at bedside and BRENDEN Duque made aware.

## 2025-03-15 NOTE — PHYSICAL THERAPY INITIAL EVALUATION ADULT - GENERAL OBSERVATIONS, REHAB EVAL
Chart reviewed and cleared for PT by BRENDEN Duque. Pt received semi supine in bed in NAD, all lines intact +IV, NGT, HR 90s.

## 2025-03-15 NOTE — PROGRESS NOTE ADULT - ASSESSMENT
Ms. Angelo is a 74 year old woman with PMHx metastatic urothelial carcinoma in 2017 (s/p immunetherapy), b/l breast cancer (s/p RT), diverticulitis, HTN, HLD, arterial insufficiency, vasculopathy on Xarelto and Pletal, s/p recent Cee procedure for chronic diverticulitis with colo-enteric fistula (10/5, Dr. Bethea) presenting with nausea and vomiting, found to have a post-operative ileus vs. pSBO. Receiving nonoperative management with NG tube decompression, now with return of ostomy function but with persistent high NG tube output    Recommendations:  -NPO/IVF/NGT  -Pain control.  -GI PCR negative   -Xarelto held while NGT in place.  -mIVFs at 110/hr while NPO  - Monitor UOP, Ostomy output  - PT eval --> recommend ALLISON  - Possible clamp trial tomorrow if NG tube output decreased and ostomy continues to function    A team surgery  [id number]

## 2025-03-16 LAB
ANION GAP SERPL CALC-SCNC: 9 MMOL/L — SIGNIFICANT CHANGE UP (ref 7–14)
BUN SERPL-MCNC: 3 MG/DL — LOW (ref 7–23)
CALCIUM SERPL-MCNC: 9.1 MG/DL — SIGNIFICANT CHANGE UP (ref 8.4–10.5)
CHLORIDE SERPL-SCNC: 97 MMOL/L — LOW (ref 98–107)
CO2 SERPL-SCNC: 29 MMOL/L — SIGNIFICANT CHANGE UP (ref 22–31)
CREAT SERPL-MCNC: 0.45 MG/DL — LOW (ref 0.5–1.3)
EGFR: 100 ML/MIN/1.73M2 — SIGNIFICANT CHANGE UP
EGFR: 100 ML/MIN/1.73M2 — SIGNIFICANT CHANGE UP
GLUCOSE SERPL-MCNC: 128 MG/DL — HIGH (ref 70–99)
HCT VFR BLD CALC: 27.8 % — LOW (ref 34.5–45)
HGB BLD-MCNC: 8.5 G/DL — LOW (ref 11.5–15.5)
MAGNESIUM SERPL-MCNC: 1.8 MG/DL — SIGNIFICANT CHANGE UP (ref 1.6–2.6)
MCHC RBC-ENTMCNC: 23.2 PG — LOW (ref 27–34)
MCHC RBC-ENTMCNC: 30.6 G/DL — LOW (ref 32–36)
MCV RBC AUTO: 75.7 FL — LOW (ref 80–100)
NRBC # BLD AUTO: 0 K/UL — SIGNIFICANT CHANGE UP (ref 0–0)
NRBC # FLD: 0 K/UL — SIGNIFICANT CHANGE UP (ref 0–0)
NRBC BLD AUTO-RTO: 0 /100 WBCS — SIGNIFICANT CHANGE UP (ref 0–0)
PHOSPHATE SERPL-MCNC: 2.5 MG/DL — SIGNIFICANT CHANGE UP (ref 2.5–4.5)
PLATELET # BLD AUTO: 593 K/UL — HIGH (ref 150–400)
POTASSIUM SERPL-MCNC: 4.2 MMOL/L — SIGNIFICANT CHANGE UP (ref 3.5–5.3)
POTASSIUM SERPL-SCNC: 4.2 MMOL/L — SIGNIFICANT CHANGE UP (ref 3.5–5.3)
RBC # BLD: 3.67 M/UL — LOW (ref 3.8–5.2)
RBC # FLD: 19.7 % — HIGH (ref 10.3–14.5)
SODIUM SERPL-SCNC: 135 MMOL/L — SIGNIFICANT CHANGE UP (ref 135–145)
WBC # BLD: 9.86 K/UL — SIGNIFICANT CHANGE UP (ref 3.8–10.5)
WBC # FLD AUTO: 9.86 K/UL — SIGNIFICANT CHANGE UP (ref 3.8–10.5)

## 2025-03-16 RX ORDER — MAGNESIUM SULFATE 500 MG/ML
2 SYRINGE (ML) INJECTION ONCE
Refills: 0 | Status: COMPLETED | OUTPATIENT
Start: 2025-03-16 | End: 2025-03-16

## 2025-03-16 RX ADMIN — HEPARIN SODIUM 5000 UNIT(S): 1000 INJECTION INTRAVENOUS; SUBCUTANEOUS at 14:27

## 2025-03-16 RX ADMIN — Medication 400 MILLIGRAM(S): at 16:27

## 2025-03-16 RX ADMIN — Medication 1 SPRAY(S): at 22:33

## 2025-03-16 RX ADMIN — Medication 400 MILLIGRAM(S): at 10:30

## 2025-03-16 RX ADMIN — Medication 40 MILLIGRAM(S): at 19:09

## 2025-03-16 RX ADMIN — Medication 25 GRAM(S): at 09:13

## 2025-03-16 RX ADMIN — HEPARIN SODIUM 5000 UNIT(S): 1000 INJECTION INTRAVENOUS; SUBCUTANEOUS at 07:14

## 2025-03-16 RX ADMIN — Medication 1 SPRAY(S): at 16:53

## 2025-03-16 RX ADMIN — Medication 4 MILLIGRAM(S): at 16:29

## 2025-03-16 RX ADMIN — Medication 1000 MILLIGRAM(S): at 17:27

## 2025-03-16 RX ADMIN — POTASSIUM CHLORIDE, DEXTROSE MONOHYDRATE AND SODIUM CHLORIDE 110 MILLILITER(S): 150; 5; 900 INJECTION, SOLUTION INTRAVENOUS at 10:37

## 2025-03-16 RX ADMIN — Medication 1000 MILLIGRAM(S): at 11:30

## 2025-03-16 NOTE — PROGRESS NOTE ADULT - SUBJECTIVE AND OBJECTIVE BOX
24 Hour Events:  - NAOE    SUBJECTIVE: Pt seen at bedside during AM rounds. No o/n events, patient resting comfortably. States would rather keep NGT longer so she doesn't have to endure possible NGT placement again. -/-    Vital Signs Last 24 Hrs  T(C): 36.9 (16 Mar 2025 05:03), Max: 36.9 (16 Mar 2025 05:03)  T(F): 98.5 (16 Mar 2025 05:03), Max: 98.5 (16 Mar 2025 05:03)  HR: 81 (16 Mar 2025 05:03) (80 - 92)  BP: 136/75 (16 Mar 2025 05:03) (124/80 - 139/80)  BP(mean): --  RR: 17 (16 Mar 2025 05:03) (16 - 18)  SpO2: 96% (16 Mar 2025 05:03) (96% - 100%)    Parameters below as of 16 Mar 2025 05:03  Patient On (Oxygen Delivery Method): room air        I&O's Summary    15 Mar 2025 07:01  -  16 Mar 2025 07:00  --------------------------------------------------------  IN: 1530 mL / OUT: 2260 mL / NET: -730 mL        LABS:                        8.5    9.86  )-----------( 593      ( 16 Mar 2025 04:47 )             27.8     03-16    135  |  97[L]  |  3[L]  ----------------------------<  128[H]  4.2   |  29  |  0.45[L]    Ca    9.1      16 Mar 2025 04:47  Phos  2.5     03-16  Mg     1.80     03-16      PT/INR - ( 14 Mar 2025 10:15 )   PT: 12.8 sec;   INR: 1.10 ratio         PTT - ( 14 Mar 2025 10:15 )  PTT:30.8 sec  Urinalysis Basic - ( 16 Mar 2025 04:47 )    Color: x / Appearance: x / SG: x / pH: x  Gluc: 128 mg/dL / Ketone: x  / Bili: x / Urobili: x   Blood: x / Protein: x / Nitrite: x   Leuk Esterase: x / RBC: x / WBC x   Sq Epi: x / Non Sq Epi: x / Bacteria: x        Physical Exam:  General - uncomfortable appearing, in no acute distress  Lungs - nonlabored respirations  Heart - pulse regular   Abdomen - soft, nontender, nondistended; midline incision healing well with staples in place; ostomy , pink and viable, productive of +gas and +stool. NG tube in place with bilious contents. NGT disconnected from LCS on AM rounds  Extremities - all four extremities are warm

## 2025-03-16 NOTE — PROGRESS NOTE ADULT - ASSESSMENT
Ms. 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 Bethea) presenting with nausea and vomiting, found to have a post-operative ileus vs. pSBO. Receiving nonoperative management with NG tube decompression, now with return of ostomy function but with persistent high NG tube output. Clamp trial today 3/16.    Recommendations:  -NPO/IVF/NGT  - Clamp trial until 11:30am today, may require multiple clamp trials today  -Pain control.  -GI PCR negative   -Xarelto held while NGT in place.  -mIVFs at 110/hr while NPO  - Monitor UOP, Ostomy output  - PT eval --> recommend ALLISON    A team surgery  [id number]

## 2025-03-17 LAB
ANION GAP SERPL CALC-SCNC: 11 MMOL/L — SIGNIFICANT CHANGE UP (ref 7–14)
BUN SERPL-MCNC: 4 MG/DL — LOW (ref 7–23)
CALCIUM SERPL-MCNC: 9.3 MG/DL — SIGNIFICANT CHANGE UP (ref 8.4–10.5)
CHLORIDE SERPL-SCNC: 94 MMOL/L — LOW (ref 98–107)
CO2 SERPL-SCNC: 28 MMOL/L — SIGNIFICANT CHANGE UP (ref 22–31)
CREAT SERPL-MCNC: 0.53 MG/DL — SIGNIFICANT CHANGE UP (ref 0.5–1.3)
EGFR: 96 ML/MIN/1.73M2 — SIGNIFICANT CHANGE UP
EGFR: 96 ML/MIN/1.73M2 — SIGNIFICANT CHANGE UP
GLUCOSE BLDC GLUCOMTR-MCNC: 124 MG/DL — HIGH (ref 70–99)
GLUCOSE SERPL-MCNC: 106 MG/DL — HIGH (ref 70–99)
HCT VFR BLD CALC: 29 % — LOW (ref 34.5–45)
HGB BLD-MCNC: 9 G/DL — LOW (ref 11.5–15.5)
MAGNESIUM SERPL-MCNC: 2 MG/DL — SIGNIFICANT CHANGE UP (ref 1.6–2.6)
MCHC RBC-ENTMCNC: 23.6 PG — LOW (ref 27–34)
MCHC RBC-ENTMCNC: 31 G/DL — LOW (ref 32–36)
MCV RBC AUTO: 76.1 FL — LOW (ref 80–100)
NRBC # BLD AUTO: 0 K/UL — SIGNIFICANT CHANGE UP (ref 0–0)
NRBC # FLD: 0 K/UL — SIGNIFICANT CHANGE UP (ref 0–0)
NRBC BLD AUTO-RTO: 0 /100 WBCS — SIGNIFICANT CHANGE UP (ref 0–0)
PHOSPHATE SERPL-MCNC: 3.2 MG/DL — SIGNIFICANT CHANGE UP (ref 2.5–4.5)
PLATELET # BLD AUTO: 617 K/UL — HIGH (ref 150–400)
POTASSIUM SERPL-MCNC: 4.3 MMOL/L — SIGNIFICANT CHANGE UP (ref 3.5–5.3)
POTASSIUM SERPL-SCNC: 4.3 MMOL/L — SIGNIFICANT CHANGE UP (ref 3.5–5.3)
RBC # BLD: 3.81 M/UL — SIGNIFICANT CHANGE UP (ref 3.8–5.2)
RBC # FLD: 20 % — HIGH (ref 10.3–14.5)
SODIUM SERPL-SCNC: 133 MMOL/L — LOW (ref 135–145)
WBC # BLD: 11.34 K/UL — HIGH (ref 3.8–10.5)
WBC # FLD AUTO: 11.34 K/UL — HIGH (ref 3.8–10.5)

## 2025-03-17 PROCEDURE — 36573 INSJ PICC RS&I 5 YR+: CPT

## 2025-03-17 PROCEDURE — 93010 ELECTROCARDIOGRAM REPORT: CPT

## 2025-03-17 PROCEDURE — 74018 RADEX ABDOMEN 1 VIEW: CPT | Mod: 26,77

## 2025-03-17 PROCEDURE — 74018 RADEX ABDOMEN 1 VIEW: CPT | Mod: 26

## 2025-03-17 PROCEDURE — 99223 1ST HOSP IP/OBS HIGH 75: CPT | Mod: FS,25

## 2025-03-17 RX ORDER — ACETAMINOPHEN 500 MG/5ML
1000 LIQUID (ML) ORAL EVERY 6 HOURS
Refills: 0 | Status: COMPLETED | OUTPATIENT
Start: 2025-03-17 | End: 2025-03-17

## 2025-03-17 RX ORDER — DIATRIZOATE MEGLUMINE, SODIUM 66 %-10 %
90 VIAL (ML) INJECTION ONCE
Refills: 0 | Status: DISCONTINUED | OUTPATIENT
Start: 2025-03-17 | End: 2025-03-19

## 2025-03-17 RX ORDER — METOCLOPRAMIDE HCL 10 MG
10 TABLET ORAL EVERY 8 HOURS
Refills: 0 | Status: DISCONTINUED | OUTPATIENT
Start: 2025-03-17 | End: 2025-04-02

## 2025-03-17 RX ORDER — SODIUM/POT/MAG/CALC/CHLOR/ACET 35-20-5MEQ
1 VIAL (ML) INTRAVENOUS
Refills: 0 | Status: DISCONTINUED | OUTPATIENT
Start: 2025-03-17 | End: 2025-03-17

## 2025-03-17 RX ORDER — I.V. FAT EMULSION 20 G/100ML
10.4 EMULSION INTRAVENOUS
Qty: 25 | Refills: 0 | Status: DISCONTINUED | OUTPATIENT
Start: 2025-03-17 | End: 2025-03-18

## 2025-03-17 RX ADMIN — I.V. FAT EMULSION 10.4 ML/HR: 20 EMULSION INTRAVENOUS at 20:01

## 2025-03-17 RX ADMIN — POTASSIUM CHLORIDE, DEXTROSE MONOHYDRATE AND SODIUM CHLORIDE 110 MILLILITER(S): 150; 5; 900 INJECTION, SOLUTION INTRAVENOUS at 01:31

## 2025-03-17 RX ADMIN — HEPARIN SODIUM 5000 UNIT(S): 1000 INJECTION INTRAVENOUS; SUBCUTANEOUS at 23:58

## 2025-03-17 RX ADMIN — Medication 10 MILLIGRAM(S): at 23:08

## 2025-03-17 RX ADMIN — Medication 1000 MILLIGRAM(S): at 02:27

## 2025-03-17 RX ADMIN — Medication 1 EACH: at 20:00

## 2025-03-17 RX ADMIN — HEPARIN SODIUM 5000 UNIT(S): 1000 INJECTION INTRAVENOUS; SUBCUTANEOUS at 06:28

## 2025-03-17 RX ADMIN — Medication 400 MILLIGRAM(S): at 01:27

## 2025-03-17 RX ADMIN — Medication 1 SPRAY(S): at 06:28

## 2025-03-17 RX ADMIN — HEPARIN SODIUM 5000 UNIT(S): 1000 INJECTION INTRAVENOUS; SUBCUTANEOUS at 15:30

## 2025-03-17 NOTE — PROCEDURE NOTE - NSPERFORMEDBY_GEN_A_CORE
Patient: Adrianne Mclaughlin    Procedure Summary     Date Anesthesia Start Anesthesia Stop Room / Location    03/28/17 0848 [id number]  [facility] / [facility]       Procedure Diagnosis Surgeon Provider    COLONOSCOPY WITH ANESTHESIA (N/A ) Crohn's disease of small intestine without complication  (Crohn's disease of small intestine without complication [K50.00]) MD Frankie Ruth CRNA          Anesthesia Type: general  Last vitals  BP      Temp      Pulse     Resp      SpO2        Post Anesthesia Care and Evaluation    Patient location during evaluation: PACU  Patient participation: complete - patient participated  Level of consciousness: awake and awake and alert  Pain score: 0  Pain management: adequate  Airway patency: patent  Anesthetic complications: No anesthetic complications    Cardiovascular status: acceptable and stable  Respiratory status: acceptable and unassisted  Hydration status: acceptable      
Myself

## 2025-03-17 NOTE — PROGRESS NOTE ADULT - ASSESSMENT
74 year old woman with PMHx metastatic urothelial carcinoma in 2017 (s/p immunetherapy), b/l breast cancer (s/p RT), diverticulitis, HTN, HLD, arterial insufficiency, vasculopathy on Xarelto and Pletal, s/p recent Cee procedure for chronic diverticulitis with colo-enteric fistula (10/5, Dr. Bethea) presenting with nausea and vomiting, found to have a post-operative ileus vs. pSBO. Receiving nonoperative management with NG tube decompression, now with return of ostomy function but with persistent high NG tube output.     Recommendations:  - TPN Consult  -NPO/IVF/NGT  -Give gastrograffin  -Pain control.  -GI PCR negative   -Xarelto held while NGT in place.  -mIVFs at 110/hr while NPO  - Monitor UOP, Ostomy output  - PT eval --> recommend ALLISON    A team surgery  [id number]

## 2025-03-17 NOTE — PROCEDURE NOTE - PROCEDURE FINDINGS AND DETAILS
Successful insertion of a 5Fr double lumen PICC via the left brachial vein.   Tip of PICC in SVC. PICC length: 42cm

## 2025-03-17 NOTE — PROGRESS NOTE ADULT - SUBJECTIVE AND OBJECTIVE BOX
Morning Surgical Progress Note  Patient is a 74y old  Female who presents with a chief complaint of Post-op ileus (16 Mar 2025 09:06)    SUBJECTIVE: Patient seen and examined at bedside with surgical team, patient without complaints. NGT put out 1.2 L    Vital Signs Last 24 Hrs  T(C): 36.8 (17 Mar 2025 05:38), Max: 36.8 (16 Mar 2025 21:22)  T(F): 98.2 (17 Mar 2025 05:38), Max: 98.2 (16 Mar 2025 21:22)  HR: 79 (17 Mar 2025 05:38) (77 - 86)  BP: 130/83 (17 Mar 2025 05:38) (109/69 - 146/84)  RR: 18 (17 Mar 2025 05:38) (16 - 18)  SpO2: 96% (17 Mar 2025 05:38) (96% - 99%)    Parameters below as of 17 Mar 2025 05:38  Patient On (Oxygen Delivery Method): room air    I&O's Detail    16 Mar 2025 07:01  -  17 Mar 2025 07:00  --------------------------------------------------------  IN:    dextrose 5% + sodium chloride 0.45% w/ Additives: 1320 mL  Total IN: 1320 mL  OUT:    Colostomy (mL): 60 mL    Nasogastric/Oral tube (mL): 1050 mL    Oral Fluid: 0 mL    Voided (mL): 1200 mL  Total OUT: 2310 mL  Total NET: -990 mL    Medications  MEDICATIONS  (STANDING):  benzocaine/butamben/tetracaine Spray 1 Spray(s) Topical three times a day  dextrose 5% + sodium chloride 0.45% with potassium chloride 20 mEq/L 1000 milliLiter(s) (110 mL/Hr) IV Continuous <Continuous>  diatrizoate meglumine/diatrizoate sodium. 90 milliLiter(s) Oral once  heparin   Injectable 5000 Unit(s) SubCutaneous every 8 hours    MEDICATIONS  (PRN):    Physical Exam  Constitutional: A&Ox3, NAD, appears down  Gastrointestinal: Soft nontender, nondistended, ostomy with function  Extremities: Moving all extremities, no edema    LABS:                        9.0    11.34 )-----------( 617      ( 17 Mar 2025 05:11 )             29.0     03-17    133[L]  |  94[L]  |  4[L]  ----------------------------<  106[H]  4.3   |  28  |  0.53  Ca    9.3      17 Mar 2025 05:11  Phos  3.2     03-17  Mg     2.00     03-17  Urinalysis Basic - ( 17 Mar 2025 05:11 )  Color: x / Appearance: x / SG: x / pH: x  Gluc: 106 mg/dL / Ketone: x  / Bili: x / Urobili: x   Blood: x / Protein: x / Nitrite: x   Leuk Esterase: x / RBC: x / WBC x   Sq Epi: x / Non Sq Epi: x / Bacteria: x

## 2025-03-17 NOTE — CONSULT NOTE ADULT - SUBJECTIVE AND OBJECTIVE BOX
Ms. 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 Bethea) presenting with nausea and vomiting. Patient was discharged from this hospital yesterday, was previously tolerating PO however developed severe nausea and multiple episodes of emesis. She reports that she kept trying to eat despite feeling nauseous, and having subsequent emesis. She also reports crampy abdominal pain that feels like gas. Her ostomy has been functioning properly and filled up twice already today. Denies fevers or chills. Also reports that she feels burning at the end of urination.    PAST MEDICAL HISTORY: Anxiety    Breast CA, left    Hypertension    Sleep apnea    Irritable bowel syndrome (IBS)    Polyp of colon    HLD (hyperlipidemia)    Bladder polyp    Livedoid vasculopathy    Anxiety and depression    KARI on CPAP    Arterial insufficiency    Diverticulitis    Cancer of right breast    HTN (hypertension)    Urothelial carcinoma of bladder    Other specified diseases of intestine        PAST SURGICAL HISTORY: S/P     S/P colon resection    S/P hysterectomy    Breast lump    History of surgery    H/O lumpectomy    Personal history of spine surgery    S/P lumpectomy, left breast    S/P lumpectomy, right breast    S/P angiogram of extremity    Anxiety and depression    KARI on CPAP    Arterial insufficiency    History of diverticulitis    Breast cancer, right breast    HTN (hypertension)    HLD (hyperlipidemia)    Urothelial carcinoma of bladder    Other specified diseases of intestine        HOME MEDICATIONS:    ALLERGIES: sulfa drugs (Unknown)      FAMILY HISTORY: Family history of coronary artery disease (Mother)        SOCIAL HISTORY:    REVIEW OF SYSTEMS:    VITAL SIGNS:  ICU Vital Signs Last 24 Hrs  T(C): 37.2 (13 Mar 2025 17:00), Max: 37.2 (13 Mar 2025 17:00)  T(F): 98.9 (13 Mar 2025 17:00), Max: 98.9 (13 Mar 2025 17:00)  HR: 96 (13 Mar 2025 17:00) (96 - 96)  BP: 134/86 (13 Mar 2025 17:00) (134/86 - 134/86)  BP(mean): --  ABP: --  ABP(mean): --  RR: 20 (13 Mar 2025 17:00) (20 - 20)  SpO2: 95% (13 Mar 2025 17:00) (95% - 95%)    O2 Parameters below as of 13 Mar 2025 17:00  Patient On (Oxygen Delivery Method): room air            PHYSICAL EXAMINATION:  General - uncomfortable appearing   Lungs - breathing comfortably on room air  Heart - pulse regular   Abdomen - soft, nontender, nondistended; midline incision healing well with staples in place; ostomy prolapsed, pink and viable, no output in bag   Extremities - all four extremities are warm     LABS:                          8.4    8.97  )-----------( 489      ( 12 Mar 2025 05:05 )             26.9       03-12    134[L]  |  99  |  5[L]  ----------------------------<  102[H]  4.1   |  27  |  0.56    Ca    9.0      12 Mar 2025 05:05  Phos  2.9     03-12  Mg     1.80     03-12          
Nutrition Support Consult Note    HPI:  75 y/o F with PMHx metastatic urothelial carcinoma in 2017 (s/p immunotherapy b/l breast cancer (s/p RT), diverticulitis, HTN, HLD, arterial insufficiency, vasculopathy on Xarelto and Pletal, s/p recent Cee procedure for chronic diverticulitis with colo-enteric fistula (3/5/25) presenting with nausea and vomiting, found to have a post-operative ileus vs. pSBO. Pt with persistently high NG tube output. Nutrition support consult called for initiation of parenteral nutrition in view of severe protein calorie malnutrition and anticipated prolonged NPO. TPN was explained to the patient and all questions were answered. Plan for IR PICC placement today. Pt denies chest pain, shortness of breath, nausea or vomiting at this time.     ROS: Except as noted above, all other systems reviewed and are negative     Allergies  sulfa drugs (Unknown)    PAST MEDICAL & SURGICAL HISTORY:  Breast CA, left lumpectomy / RTX in the past  Hypertension  Irritable bowel syndrome (IBS)  Polyp of colon "pre-cancerous" x 2, removed 2014  HLD (hyperlipidemia)  Bladder polyp  Livedoid vasculopathy  Anxiety and depression  KARI on CPAP  Arterial insufficiency  Diverticulitis  Cancer of right breast  HTN (hypertension)  Urothelial carcinoma of bladder  Other specified diseases of intestine  S/P   S/P colon resection reversal, had complications for fibriods  S/P hysterectomy  H/O lumpectomy left   Personal history of spine surgery L1-L4 fusion 2017  S/P lumpectomy, left breast  S/P lumpectomy, right breast  S/P angiogram of extremity    FAMILY HISTORY:  Family history of coronary artery disease (Mother)    Vital Signs Last 24 Hrs  T(C): 36.7 (17 Mar 2025 09:25), Max: 36.8 (16 Mar 2025 21:22)  T(F): 98.1 (17 Mar 2025 09:25), Max: 98.2 (16 Mar 2025 21:22)  HR: 80 (17 Mar 2025 09:25) (77 - 86)  BP: 129/82 (17 Mar 2025 09:25) (109/69 - 146/84)  RR: 18 (17 Mar 2025 09:25) (17 - 18)  SpO2: 98% (17 Mar 2025 09:25) (96% - 99%)    MEDICATIONS  (STANDING):  benzocaine/butamben/tetracaine Spray 1 Spray(s) Topical three times a day  dextrose 5% + sodium chloride 0.45% with potassium chloride 20 mEq/L 1000 milliLiter(s) (110 mL/Hr) IV Continuous <Continuous>  diatrizoate meglumine/diatrizoate sodium. 90 milliLiter(s) Oral once  heparin   Injectable 5000 Unit(s) SubCutaneous every 8 hours  lipid, fat emulsion (Fish Oil and Plant Based) 20% Infusion 10.4 mL/Hr (10.4 mL/Hr) IV Continuous <Continuous>  Parenteral Nutrition - Adult 1 Each (42 mL/Hr) TPN Continuous <Continuous>    I&O's Detail    16 Mar 2025 07:01  -  17 Mar 2025 07:00  --------------------------------------------------------  IN:    dextrose 5% + sodium chloride 0.45% w/ Additives: 1320 mL  Total IN: 1320 mL    OUT:    Colostomy (mL): 60 mL    Nasogastric/Oral tube (mL): 1050 mL    Oral Fluid: 0 mL    Voided (mL): 1200 mL  Total OUT: 2310 mL    Total NET: -990 mL      17 Mar 2025 07:01  -  17 Mar 2025 12:11  --------------------------------------------------------  IN:  Total IN: 0 mL    OUT:    Colostomy (mL): 20 mL    Oral Fluid: 0 mL    Voided (mL): 0 mL  Total OUT: 20 mL    Total NET: -20 mL    Drug Dosing Weight  Height (cm): 165.1 (13 Mar 2025 17:00)  Weight (kg): 68 (13 Mar 2025 17:00)  BMI (kg/m2): 24.9 (13 Mar 2025 17:00)  BSA (m2): 1.75 (13 Mar 2025 17:00)    PHYSICAL EXAM:  Constitutional: A&Ox3, NAD  Gastrointestinal: abdomen soft nontender, nondistended, ostomy with function  Extremities: Moving all extremities, no edema    LABS:                        9.0    11.34 )-----------( 617      ( 17 Mar 2025 05:11 )             29.0     03-17    133[L]  |  94[L]  |  4[L]  ----------------------------<  106[H]  4.3   |  28  |  0.53    Ca    9.3      17 Mar 2025 05:11  Phos  3.2     03-  Mg     2.00     -    CT abd/pelvis 3/13/25:   IMPRESSION:  Status post Cee's procedure with left lower quadrant colostomy. Postoperative changes in the pelvis with small volume free fluid. No organized collection.  Distended stomach and multiple loops of proximal small bowel with possible transition point in the left upper quadrant. Findings may represent small bowel obstruction or postoperative ileus.    Current Weight: 68 kG  Height: 165.1 cm  Ideal Body Weight: 56.5 kG  BMI: 25.0   Current Diet: [ x ]NPO    CLINICAL INDICATORS  Severe protein calorie malnutrition in acute illness/ injury; secondary to poor appetite, weight loss >5% in 1 month and/or >7.5% in 3 months, caloric Intake <50% of nutrition needs >= 5 days, temporal wasting, severe loss of muscle mass/atrophy and loss of body fat stores.    Metabolic Requirements:  The patient will require:  ______25_______ kilocalories / kg / day  Diagnosis:  [  ] Mild protein malnutrition  [  ] Moderate protein calorie malnutrition in acute illness/ injury  [ x ] Severe protein calorie malnutrition in acute illness/ injury  [  ] Moderate protein calorie malnutrition in chronic illness  [  ] Severe protein calorie malnutrition in chronic illness    Nutritional Requirements  Carbohydrates: 1 gram = 3.4 kcal   Lipids: 1 gram = 10 kcal  Proteins: 1 gram = 4 kcal     Plan:  [ x ] Initiate TPN formula today via dedicated lumen of PICC, TPN volume and calories will be increased tomorrow   Carbohydrates:___180___grams, Amino Acid:___80___grams  SMOF Lipids:___40____ grams, Total volume:__2000_____mL.  1. Dedicated Central line must be placed for TPN.  2. Strict Intake and Output.  3. Weights three times a week  4. Monitor BMP, Mg+, Ionized Ca++, Phosphorus daily  5. Monitor Triglycerides monthly  6. Pre-albumin weekly.  7. Fingersticks to monitor glucose every 6 hours until stable then may be decreased to twice a day.    Nutrition Support [id number]

## 2025-03-17 NOTE — CONSULT NOTE ADULT - NS ATTEND AMEND GEN_ALL_CORE FT
I agree with the above history, physical examination, chief complaint/diagnosis, and plan, which I have reviewed and edited where appropriate.  I agree with notes/assessment and detailed interval history of health care providers on my service.  I have seen and examined the patient.  I reviewed the laboratory and available data and agree with the history, physical assessment and plan.  I reviewed and discussed with all consultants, house staff and PA's.  The Nutrition Support Team (NST) discusses on an ongoing basis with the primary team and all consultants, House staff and PA's to have a permanent risk benefit analyses on all decisions and coordinating care.  I was physically present for the key portions of the evaluation and management (E/M) service provided.  75 y/o F with PMHx metastatic urothelial carcinoma, s/p recent Cee procedure for chronic diverticulitis with colo-enteric fistula.  Nutrition support consult called for initiation of parenteral nutrition in view of severe protein calorie malnutrition and anticipated NPO.  PHYSICAL EXAM:  Constitutional: NAD  Lung: clear  Heart: RR  Gastrointestinal: abdomen soft nontender  Extremities:  Metabolic Requirements:  The patient will require:  25kilocalories / kg / day  Diagnosis:  Severe protein calorie malnutrition in acute illness/ injury  Plan:  Initiate TPN

## 2025-03-17 NOTE — CHART NOTE - NSCHARTNOTEFT_GEN_A_CORE
Pre Interventional Radiology Procedure Note  Patient Age: 74y  Patient Gender:Female  Procedure (including site / side if known): double lumen picc  Diagnosis / Indication: TPN for malnutrition  Interventional Radiology Attending Physician: PA  Ordering Attending Physician: Lake  Pertinent Medical History: s/p bhatt now with ileus   PAST MEDICAL & SURGICAL HISTORY:  Breast CA, left  lumpectomy / RTX in the past      Hypertension      Irritable bowel syndrome (IBS)      Polyp of colon  "pre-cancerous" x 2, removed 2014      HLD (hyperlipidemia)      Bladder polyp      Livedoid vasculopathy      Anxiety and depression      KARI on CPAP      Arterial insufficiency      Diverticulitis      Cancer of right breast      HTN (hypertension)      Urothelial carcinoma of bladder      Other specified diseases of intestine      S/P       S/P colon resection  reversal, had complications for fibriods      S/P hysterectomy      H/O lumpectomy  left       Personal history of spine surgery  L1-L4 fusion 2017      S/P lumpectomy, left breast      S/P lumpectomy, right breast      S/P angiogram of extremity          Pertinent Labs:                        9.0    11.34 )-----------( 617      ( 17 Mar 2025 05:11 )             29.0     03-17    133[L]  |  94[L]  |  4[L]  ----------------------------<  106[H]  4.3   |  28  |  0.53    Ca    9.3      17 Mar 2025 05:11  Phos  3.2     03-17  Mg     2.00     03-17        Patient and Family aware: yes Dr Shell approve

## 2025-03-17 NOTE — PRE PROCEDURE NOTE - PRE PROCEDURE EVALUATION
PRE-INTERVENTIONAL RADIOLOGY PROCEDURE NOTE      Patient Age: 74    Patient Gender: F    Procedure: PICC placement    Diagnosis/Indication: Postop ileus, need for TPN      Ordering Attending Physician: Lake    Pertinent Medical History:    Pertinent labs:                      9.0    11.34 )-----------( 617      ( 17 Mar 2025 05:11 )             29.0       03-17    133[L]  |  94[L]  |  4[L]  ----------------------------<  106[H]  4.3   |  28  |  0.53    Ca    9.3      17 Mar 2025 05:11  Phos  3.2     03-17  Mg     2.00     03-17                Patient and Family Aware ? Yes

## 2025-03-18 LAB
ALBUMIN SERPL ELPH-MCNC: 3.3 G/DL — SIGNIFICANT CHANGE UP (ref 3.3–5)
ALP SERPL-CCNC: 91 U/L — SIGNIFICANT CHANGE UP (ref 40–120)
ALT FLD-CCNC: <5 U/L — SIGNIFICANT CHANGE UP (ref 4–33)
ANION GAP SERPL CALC-SCNC: 13 MMOL/L — SIGNIFICANT CHANGE UP (ref 7–14)
ANION GAP SERPL CALC-SCNC: 9 MMOL/L — SIGNIFICANT CHANGE UP (ref 7–14)
AST SERPL-CCNC: 12 U/L — SIGNIFICANT CHANGE UP (ref 4–32)
BILIRUB SERPL-MCNC: 0.3 MG/DL — SIGNIFICANT CHANGE UP (ref 0.2–1.2)
BUN SERPL-MCNC: 10 MG/DL — SIGNIFICANT CHANGE UP (ref 7–23)
BUN SERPL-MCNC: 11 MG/DL — SIGNIFICANT CHANGE UP (ref 7–23)
CA-I BLD-SCNC: 1.21 MMOL/L — SIGNIFICANT CHANGE UP (ref 1.15–1.29)
CALCIUM SERPL-MCNC: 9.6 MG/DL — SIGNIFICANT CHANGE UP (ref 8.4–10.5)
CALCIUM SERPL-MCNC: 9.8 MG/DL — SIGNIFICANT CHANGE UP (ref 8.4–10.5)
CHLORIDE SERPL-SCNC: 94 MMOL/L — LOW (ref 98–107)
CHLORIDE SERPL-SCNC: 98 MMOL/L — SIGNIFICANT CHANGE UP (ref 98–107)
CO2 SERPL-SCNC: 30 MMOL/L — SIGNIFICANT CHANGE UP (ref 22–31)
CO2 SERPL-SCNC: 31 MMOL/L — SIGNIFICANT CHANGE UP (ref 22–31)
CREAT SERPL-MCNC: 0.53 MG/DL — SIGNIFICANT CHANGE UP (ref 0.5–1.3)
CREAT SERPL-MCNC: 0.6 MG/DL — SIGNIFICANT CHANGE UP (ref 0.5–1.3)
EGFR: 94 ML/MIN/1.73M2 — SIGNIFICANT CHANGE UP
EGFR: 94 ML/MIN/1.73M2 — SIGNIFICANT CHANGE UP
EGFR: 96 ML/MIN/1.73M2 — SIGNIFICANT CHANGE UP
EGFR: 96 ML/MIN/1.73M2 — SIGNIFICANT CHANGE UP
GLUCOSE BLDC GLUCOMTR-MCNC: 132 MG/DL — HIGH (ref 70–99)
GLUCOSE BLDC GLUCOMTR-MCNC: 134 MG/DL — HIGH (ref 70–99)
GLUCOSE BLDC GLUCOMTR-MCNC: 142 MG/DL — HIGH (ref 70–99)
GLUCOSE SERPL-MCNC: 129 MG/DL — HIGH (ref 70–99)
GLUCOSE SERPL-MCNC: 131 MG/DL — HIGH (ref 70–99)
HCT VFR BLD CALC: 29.1 % — LOW (ref 34.5–45)
HCT VFR BLD CALC: 30.2 % — LOW (ref 34.5–45)
HGB BLD-MCNC: 9.3 G/DL — LOW (ref 11.5–15.5)
HGB BLD-MCNC: 9.6 G/DL — LOW (ref 11.5–15.5)
MAGNESIUM SERPL-MCNC: 1.9 MG/DL — SIGNIFICANT CHANGE UP (ref 1.6–2.6)
MAGNESIUM SERPL-MCNC: 1.9 MG/DL — SIGNIFICANT CHANGE UP (ref 1.6–2.6)
MCHC RBC-ENTMCNC: 24.3 PG — LOW (ref 27–34)
MCHC RBC-ENTMCNC: 24.3 PG — LOW (ref 27–34)
MCHC RBC-ENTMCNC: 31.8 G/DL — LOW (ref 32–36)
MCHC RBC-ENTMCNC: 32 G/DL — SIGNIFICANT CHANGE UP (ref 32–36)
MCV RBC AUTO: 76.2 FL — LOW (ref 80–100)
MCV RBC AUTO: 76.5 FL — LOW (ref 80–100)
NRBC # BLD AUTO: 0 K/UL — SIGNIFICANT CHANGE UP (ref 0–0)
NRBC # BLD AUTO: 0 K/UL — SIGNIFICANT CHANGE UP (ref 0–0)
NRBC # FLD: 0 K/UL — SIGNIFICANT CHANGE UP (ref 0–0)
NRBC # FLD: 0 K/UL — SIGNIFICANT CHANGE UP (ref 0–0)
NRBC BLD AUTO-RTO: 0 /100 WBCS — SIGNIFICANT CHANGE UP (ref 0–0)
NRBC BLD AUTO-RTO: 0 /100 WBCS — SIGNIFICANT CHANGE UP (ref 0–0)
PHOSPHATE SERPL-MCNC: 2.8 MG/DL — SIGNIFICANT CHANGE UP (ref 2.5–4.5)
PHOSPHATE SERPL-MCNC: 3.3 MG/DL — SIGNIFICANT CHANGE UP (ref 2.5–4.5)
PLATELET # BLD AUTO: 574 K/UL — HIGH (ref 150–400)
PLATELET # BLD AUTO: 623 K/UL — HIGH (ref 150–400)
POTASSIUM SERPL-MCNC: 3.9 MMOL/L — SIGNIFICANT CHANGE UP (ref 3.5–5.3)
POTASSIUM SERPL-MCNC: 4.2 MMOL/L — SIGNIFICANT CHANGE UP (ref 3.5–5.3)
POTASSIUM SERPL-SCNC: 3.9 MMOL/L — SIGNIFICANT CHANGE UP (ref 3.5–5.3)
POTASSIUM SERPL-SCNC: 4.2 MMOL/L — SIGNIFICANT CHANGE UP (ref 3.5–5.3)
PROT SERPL-MCNC: 6.9 G/DL — SIGNIFICANT CHANGE UP (ref 6–8.3)
RBC # BLD: 3.82 M/UL — SIGNIFICANT CHANGE UP (ref 3.8–5.2)
RBC # BLD: 3.95 M/UL — SIGNIFICANT CHANGE UP (ref 3.8–5.2)
RBC # FLD: 20 % — HIGH (ref 10.3–14.5)
RBC # FLD: 20.1 % — HIGH (ref 10.3–14.5)
SODIUM SERPL-SCNC: 137 MMOL/L — SIGNIFICANT CHANGE UP (ref 135–145)
SODIUM SERPL-SCNC: 138 MMOL/L — SIGNIFICANT CHANGE UP (ref 135–145)
SURGICAL PATHOLOGY STUDY: SIGNIFICANT CHANGE UP
TRIGL SERPL-MCNC: 164 MG/DL — HIGH
WBC # BLD: 14.12 K/UL — HIGH (ref 3.8–10.5)
WBC # BLD: 15.92 K/UL — HIGH (ref 3.8–10.5)
WBC # FLD AUTO: 14.12 K/UL — HIGH (ref 3.8–10.5)
WBC # FLD AUTO: 15.92 K/UL — HIGH (ref 3.8–10.5)

## 2025-03-18 PROCEDURE — 99232 SBSQ HOSP IP/OBS MODERATE 35: CPT | Mod: FS

## 2025-03-18 RX ORDER — SODIUM/POT/MAG/CALC/CHLOR/ACET 35-20-5MEQ
1 VIAL (ML) INTRAVENOUS
Refills: 0 | Status: DISCONTINUED | OUTPATIENT
Start: 2025-03-18 | End: 2025-03-18

## 2025-03-18 RX ORDER — LIDOCAINE HYDROCHLORIDE 20 MG/ML
1 JELLY TOPICAL EVERY 24 HOURS
Refills: 0 | Status: DISCONTINUED | OUTPATIENT
Start: 2025-03-18 | End: 2025-04-03

## 2025-03-18 RX ORDER — I.V. FAT EMULSION 20 G/100ML
16.6 EMULSION INTRAVENOUS
Qty: 40 | Refills: 0 | Status: DISCONTINUED | OUTPATIENT
Start: 2025-03-18 | End: 2025-03-19

## 2025-03-18 RX ORDER — HYDROMORPHONE/SOD CHLOR,ISO/PF 2 MG/10 ML
0.2 SYRINGE (ML) INJECTION ONCE
Refills: 0 | Status: DISCONTINUED | OUTPATIENT
Start: 2025-03-18 | End: 2025-03-18

## 2025-03-18 RX ORDER — LIDOCAINE HYDROCHLORIDE 20 MG/ML
1 JELLY TOPICAL ONCE
Refills: 0 | Status: COMPLETED | OUTPATIENT
Start: 2025-03-18 | End: 2025-03-18

## 2025-03-18 RX ORDER — SODIUM CHLORIDE 9 G/1000ML
1000 INJECTION, SOLUTION INTRAVENOUS ONCE
Refills: 0 | Status: COMPLETED | OUTPATIENT
Start: 2025-03-18 | End: 2025-03-18

## 2025-03-18 RX ADMIN — Medication 0.2 MILLIGRAM(S): at 02:46

## 2025-03-18 RX ADMIN — SODIUM CHLORIDE 1000 MILLILITER(S): 9 INJECTION, SOLUTION INTRAVENOUS at 06:41

## 2025-03-18 RX ADMIN — Medication 1 EACH: at 19:05

## 2025-03-18 RX ADMIN — Medication 1 SPRAY(S): at 05:26

## 2025-03-18 RX ADMIN — Medication 1 SPRAY(S): at 21:54

## 2025-03-18 RX ADMIN — HEPARIN SODIUM 5000 UNIT(S): 1000 INJECTION INTRAVENOUS; SUBCUTANEOUS at 14:40

## 2025-03-18 RX ADMIN — HEPARIN SODIUM 5000 UNIT(S): 1000 INJECTION INTRAVENOUS; SUBCUTANEOUS at 05:27

## 2025-03-18 RX ADMIN — Medication 10 MILLIGRAM(S): at 14:40

## 2025-03-18 RX ADMIN — Medication 1 APPLICATION(S): at 15:42

## 2025-03-18 RX ADMIN — Medication 0.2 MILLIGRAM(S): at 03:46

## 2025-03-18 RX ADMIN — I.V. FAT EMULSION 16.6 ML/HR: 20 EMULSION INTRAVENOUS at 19:06

## 2025-03-18 RX ADMIN — LIDOCAINE HYDROCHLORIDE 1 PATCH: 20 JELLY TOPICAL at 14:39

## 2025-03-18 RX ADMIN — HEPARIN SODIUM 5000 UNIT(S): 1000 INJECTION INTRAVENOUS; SUBCUTANEOUS at 21:55

## 2025-03-18 RX ADMIN — LIDOCAINE HYDROCHLORIDE 1 PATCH: 20 JELLY TOPICAL at 03:56

## 2025-03-18 RX ADMIN — Medication 10 MILLIGRAM(S): at 21:55

## 2025-03-18 RX ADMIN — LIDOCAINE HYDROCHLORIDE 1 PATCH: 20 JELLY TOPICAL at 06:28

## 2025-03-18 NOTE — PROGRESS NOTE ADULT - SUBJECTIVE AND OBJECTIVE BOX
A Team Colorectal Surgery Progress Note    S: Pt seen and examined with team on morning rounds. Patient claims abdomen feels slightly better. Denies current nausea/emesis. NGT flushed and clamped by team on rounds. Ostomy +air, + soft stool. No CP/Palpitations/SOB/HA/Dizziness.      Vital Signs Last 24 Hrs  T(C): 37.2 (18 Mar 2025 04:52), Max: 37.6 (17 Mar 2025 21:26)  T(F): 98.9 (18 Mar 2025 04:52), Max: 99.7 (17 Mar 2025 21:26)  HR: 94 (18 Mar 2025 04:52) (80 - 112)  BP: 123/86 (18 Mar 2025 04:52) (123/83 - 141/89)  BP(mean): --  RR: 17 (18 Mar 2025 04:52) (16 - 18)  SpO2: 97% (18 Mar 2025 04:52) (95% - 99%)    Parameters below as of 18 Mar 2025 04:52  Patient On (Oxygen Delivery Method): room air        I&O's Summary    17 Mar 2025 07:01  -  18 Mar 2025 07:00  --------------------------------------------------------  IN: 2844 mL / OUT: 4250 mL / NET: -1406 mL      I&O's Detail    17 Mar 2025 07:01  -  18 Mar 2025 07:00  --------------------------------------------------------  IN:    dextrose 5% + sodium chloride 0.45% w/ Additives: 1320 mL    Fat Emulsion (Fish Oil &amp; Plant Based) 20% Infusion: 104 mL    Lactated Ringers Bolus: 1000 mL    TPN (Total Parenteral Nutrition): 420 mL  Total IN: 2844 mL    OUT:    Colostomy (mL): 50 mL    IV PiggyBack: 0 mL    Nasogastric/Oral tube (mL): 3100 mL    Oral Fluid: 0 mL    Voided (mL): 1100 mL  Total OUT: 4250 mL    Total NET: -1406 mL          General Appearance: Appears well, NAD  Chest: Breathing comfortably on RA.    CV: S1, S2 @ 94  Abdomen: Soft, nondistended, nontender. Ostomy +air/+soft stool.    Extremities: Moves all extremities.    LABS:                        9.6    15.92 )-----------( 623      ( 18 Mar 2025 04:09 )             30.2     03-18    137  |  94[L]  |  10  ----------------------------<  131[H]  4.2   |  30  |  0.60    Ca    9.8      18 Mar 2025 04:09  Phos  3.3     03-18  Mg     1.90     03-18    TPro  6.9  /  Alb  3.3  /  TBili  0.3  /  DBili  x   /  AST  12  /  ALT  <5  /  AlkPhos  91  03-18      Urinalysis Basic - ( 18 Mar 2025 04:09 )    Color: x / Appearance: x / SG: x / pH: x  Gluc: 131 mg/dL / Ketone: x  / Bili: x / Urobili: x   Blood: x / Protein: x / Nitrite: x   Leuk Esterase: x / RBC: x / WBC x   Sq Epi: x / Non Sq Epi: x / Bacteria: x

## 2025-03-18 NOTE — DIETITIAN INITIAL EVALUATION ADULT - PERTINENT LABORATORY DATA
(3/18) Na 137, BUN 10, Cr 0.60, [H], K+ 4.2, Phos 3.3, Mg 1.90, Alk Phos 91, ALT/SGPT <5, AST/SGOT 12  POCT: (3/18) 142, (3/17) 124

## 2025-03-18 NOTE — DIETITIAN INITIAL EVALUATION ADULT - OTHER CALCULATIONS
Dosing weight (3/13) 149.9 lbs / 68 kg, questionable accuracy.  Weight history, per chart: (3/5) 123.8 lbs, (2/27) 127 lbs, (11/26) 130 lbs, (3/19/24) 144 lbs.   Ideal Body Weight: 125 lbs / 56.8 kg +/-10%  Dosing weight (3/13) 149.9 lbs / 68 kg, questionable accuracy. Obtained weight via bed scale during time of visit 120.5 lbs.   Weight history, per chart: (3/5) 123.8 lbs, (2/27) 127 lbs, (11/26) 130 lbs, (3/19/24) 144 lbs.   Apparent ~7 lb (5%) weight loss x<1 month, clinically significant.   Ideal Body Weight: 125 lbs / 56.8 kg +/-10%

## 2025-03-18 NOTE — PROGRESS NOTE ADULT - ASSESSMENT
74 year old woman with PMHx metastatic urothelial carcinoma in 2017 (s/p immunetherapy), b/l breast cancer (s/p RT), diverticulitis, HTN, HLD, arterial insufficiency, vasculopathy on Xarelto and Pletal, s/p recent Cee procedure for chronic diverticulitis with colo-enteric fistula (3/5, Dr. Bethea) presenting with nausea and vomiting, found to have a post-operative ileus vs. pSBO. Receiving nonoperative management with NG tube decompression, now with return of ostomy function but with persistent high NG tube output. 3/17 PICC/TPN. 3/17 Gastrograffin protocol initiated however needed to be returned to suction due to patient complaining of abdominal pain. EKG done and then Reglan started for promotility.     Recommendations:  - 1L Bolus to replace NGT output   - PICC/TPN started 3/17  - NPO/NGT/IVL  - NGT flushed and clamped this AM. Keep clamped as long as possible. If nausea/emesis/increased abdominal pain will return to suction.  - Pain control.  - GI PCR negative   - Xarelto held while NGT in place.  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 Bethea) presenting with nausea and vomiting, found to have a post-operative ileus vs. pSBO. Receiving nonoperative management with NG tube decompression, now with return of ostomy function but with persistent high NG tube output. 3/17 PICC/TPN. 3/17 Gastrograffin protocol initiated however needed to be returned to suction due to patient complaining of abdominal pain. EKG done and then Reglan started for promotility.     Recommendations:  - 1L Bolus to replace NGT output   - PICC/TPN started 3/17  - NPO/NGT/IVL  - NGT flushed and clamped this AM. Keep clamped x 4 hours. If nausea/emesis/increased abdominal pain will return to suction.  - Pain control.  - GI PCR negative   - Xarelto held while NGT in place.  - Monitor UOP, Ostomy output  - PT eval --> recommend ALLISON    A team surgery  [id number]

## 2025-03-18 NOTE — PROGRESS NOTE ADULT - NS ATTEND AMEND GEN_ALL_CORE FT
I agree with the above history, physical examination, chief complaint/diagnosis, and plan, which I have reviewed and edited where appropriate.  I agree with notes/assessment and detailed interval history of health care providers on my service.  I have seen and examined the patient.  I reviewed the laboratory and available data and agree with the history, physical assessment and plan.  I reviewed and discussed with all consultants, house staff and PA's.  The Nutrition Support Team (NST) discusses on an ongoing basis with the primary team and all consultants, House staff and PA's to have a permanent risk benefit analyses on all decisions and coordinating care.  I was physically present for the key portions of the evaluation and management (E/M) service provided.  73 y/o F with PMHx metastatic urothelial carcinoma, s/p recent Cee procedure for chronic diverticulitis with colo-enteric fistula.  Nutrition support consult called for initiation of parenteral nutrition in view of severe protein calorie malnutrition and anticipated NPO.  PHYSICAL EXAM:  Constitutional: NAD  Lung: clear  Heart: RR  Gastrointestinal: abdomen soft nontender  Extremities: warm  Labs reviewed - electrolytes adjusted   TPN: 2L/1352 kcal/day  Monitor fingersticks  Obtain daily weights  Diagnosis:  Severe protein calorie malnutrition in acute illness/ injury

## 2025-03-18 NOTE — DIETITIAN INITIAL EVALUATION ADULT - PERTINENT MEDS FT
lipid fat emulsion (Fish Oil and Plant Based) 20% IV Continuous  metoclopramide Injectable  Parenteral Nutrition TPN Continuous

## 2025-03-18 NOTE — DIETITIAN NUTRITION RISK NOTIFICATION - ADDITIONAL COMMENTS/DIETITIAN RECOMMENDATIONS
Please see Dietitian Initial Assessment for complete recommendations.  Anayeli Gonsales, ANT, CDN #[id number]  Also available on Microsoft Teams

## 2025-03-18 NOTE — DIETITIAN INITIAL EVALUATION ADULT - OTHER INFO
Per chart, pt is 74 year old female PMH metastatic urothelial carcinoma (2017, s/p immunotherapy), bilateral breast cancer (s/p RT), diverticulitis, HTN, HLD, arterial insufficiency, vasculopathy, s/p recent Cee procedure for chronic diverticulitis with colo-enteric fistula (3/5/25) presenting with nausea/vomiting found to have a post-operative ileus vs. pSBO. IR PICC placed (3/17) and TPN initiated. Nutrition Support and Colorectal Surgery following.  Per chart, pt is 74 year old female PMH metastatic urothelial carcinoma (2017, s/p immunotherapy), bilateral breast cancer (s/p RT), diverticulitis, HTN, HLD, arterial insufficiency, vasculopathy, s/p recent Cee procedure for chronic diverticulitis with colo-enteric fistula (3/5/25) presenting with nausea/vomiting found to have a post-operative ileus vs. pSBO. IR PICC placed (3/17) and TPN initiated. Nutrition Support and Colorectal Surgery following.     Pt lethargic during time of visit, familiar from recent admission (3/5-3/12/25) during which time pt met criteria for malnutrition (3/12). NKFA. Pt with nausea/vomiting, abdominal pain following discharge.     Pt has remained NPO since admission (5 days). TPN started yesterday, current order: 1L @ 42 mL/hr (25 gm SMOF lipids, 40 gm amino acids, 90 gm dextrose) to provide 716 kcal/day. Plan to increase TPN today. NGT remains in place, output per flowsheets: (3/18) 850 mL, (3/17) 2700 mL, (3/16) 1100 mL. Colostomy output, per flowsheets: (3/17) 70 mL.

## 2025-03-18 NOTE — PROGRESS NOTE ADULT - SUBJECTIVE AND OBJECTIVE BOX
NUTRITION NOTE  [id number] ACKERMAN  ===============================    Interval events - Patient was seen and examined at bedside, no acute events overnight. Patient denies chest pain, shortness of breath, nausea or vomiting at this time. IR PICC placed and parenteral nutrition was started on 3/17/25. Pt is tolerating TPN without any issues. TPN infusion volume and calories increased today. Pt remains NPO with NGT in place.     ROS: Except as noted above, all other systems reviewed and are negative     Allergies  sulfa drugs (Unknown)    PAST MEDICAL & SURGICAL HISTORY:  Breast CA, left lumpectomy / RTX in the past  Hypertension  Irritable bowel syndrome (IBS)  Polyp of colon "pre-cancerous" x 2, removed 2014  HLD (hyperlipidemia)  Bladder polyp  Livedoid vasculopathy  Anxiety and depression  KARI on CPAP  Arterial insufficiency  Diverticulitis  Cancer of right breast  HTN (hypertension)  Urothelial carcinoma of bladder  Other specified diseases of intestine  S/P   S/P colon resection reversal, had complications for fibriods  S/P hysterectomy  H/O lumpectomy left   Personal history of spine surgery L1-L4 fusion 2017  S/P lumpectomy, left breast  S/P lumpectomy, right breast  S/P angiogram of extremity    FAMILY HISTORY:  Family history of coronary artery disease (Mother)    Vital Signs Last 24 Hrs  T(C): 36.8 (18 Mar 2025 09:25), Max: 37.6 (17 Mar 2025 21:26)  T(F): 98.3 (18 Mar 2025 09:25), Max: 99.7 (17 Mar 2025 21:26)  HR: 89 (18 Mar 2025 09:25) (85 - 112)  BP: 119/65 (18 Mar 2025 09:25) (119/65 - 141/89)  RR: 17 (18 Mar 2025 09:25) (16 - 18)  SpO2: 98% (18 Mar 2025 09:25) (95% - 99%)    MEDICATIONS  (STANDING):  benzocaine/butamben/tetracaine Spray 1 Spray(s) Topical three times a day  chlorhexidine 4% Liquid 1 Application(s) Topical <User Schedule>  diatrizoate meglumine/diatrizoate sodium. 90 milliLiter(s) Oral once  heparin   Injectable 5000 Unit(s) SubCutaneous every 8 hours  lipid, fat emulsion (Fish Oil and Plant Based) 20% Infusion 16.6 mL/Hr (16.6 mL/Hr) IV Continuous <Continuous>  metoclopramide Injectable 10 milliGRAM(s) IV Push every 8 hours  Parenteral Nutrition - Adult 1 Each (42 mL/Hr) TPN Continuous <Continuous>  Parenteral Nutrition - Adult 1 Each (83 mL/Hr) TPN Continuous <Continuous>    MEDICATIONS  (PRN):  sodium chloride 0.9% lock flush 10 milliLiter(s) IV Push every 1 hour PRN Pre/post blood products, medications, blood draw, and to maintain line patency    I&O's Detail    17 Mar 2025 07:01  -  18 Mar 2025 07:00  --------------------------------------------------------  IN:    dextrose 5% + sodium chloride 0.45% w/ Additives: 1320 mL    Fat Emulsion (Fish Oil &amp; Plant Based) 20% Infusion: 104 mL    Lactated Ringers Bolus: 1000 mL    TPN (Total Parenteral Nutrition): 420 mL  Total IN: 2844 mL    OUT:    Colostomy (mL): 50 mL    IV PiggyBack: 0 mL    Nasogastric/Oral tube (mL): 3100 mL    Oral Fluid: 0 mL    Voided (mL): 1100 mL  Total OUT: 4250 mL    Total NET: -1406 mL      18 Mar 2025 07:01  -  18 Mar 2025 11:47  --------------------------------------------------------  IN:    Fat Emulsion (Fish Oil &amp; Plant Based) 20% Infusion: 41.6 mL  Total IN: 41.6 mL    OUT:    Colostomy (mL): 0 mL    Oral Fluid: 0 mL    Voided (mL): 200 mL  Total OUT: 200 mL    Total NET: -158.4 mL    POCT Blood Glucose.: 142 mg/dL (18 Mar 2025 05:30)  POCT Blood Glucose.: 124 mg/dL (17 Mar 2025 23:57)    Drug Dosing Weight  Height (cm): 165.1 (13 Mar 2025 17:00)  Weight (kg): 68 (13 Mar 2025 17:00)  BMI (kg/m2): 24.9 (13 Mar 2025 17:00)  BSA (m2): 1.75 (13 Mar 2025 17:00)    PHYSICAL EXAM:  Constitutional: A&Ox3, NAD  Gastrointestinal: abdomen soft nontender, nondistended, ostomy with function, NGT in place   Extremities: Moving all extremities, no edema  PICC Site: LUE double lumen PICC in place, site clean and dry, placed 3/17/25    Diet: NPO and TPN/lipids (started on 3/17/25)    LABORATORY                        9.6    15.92 )-----------( 623      ( 18 Mar 2025 04:09 )             30.2   03-18    137  |  94[L]  |  10  ----------------------------<  131[H]  4.2   |  30  |  0.60    Ca    9.8      18 Mar 2025 04:09  Phos  3.3     03-18  Mg     1.90     03-18    TPro  6.9  /  Alb  3.3  /  TBili  0.3  /  DBili  x   /  AST  12  /  ALT  <5  /  AlkPhos  91  03-18    LIVER FUNCTIONS - ( 18 Mar 2025 04:09 )  Alb: 3.3 g/dL / Pro: 6.9 g/dL / ALK PHOS: 91 U/L / ALT: <5 U/L / AST: 12 U/L / GGT: x           03-18 Chol -- LDL -- HDL -- Trig 164[H]    ASSESSMENT/PLAN:  73 y/o F with PMHx metastatic urothelial carcinoma in 2017 (s/p immunotherapy b/l breast cancer (s/p RT), diverticulitis, HTN, HLD, arterial insufficiency, vasculopathy on Xarelto and Pletal, s/p recent Cee procedure for chronic diverticulitis with colo-enteric fistula (3/5/25) presenting with nausea and vomiting, found to have a post-operative ileus vs. pSBO. Pt with persistently high NG tube output. Nutrition support consult called for initiation of parenteral nutrition in view of severe protein calorie malnutrition and anticipated prolonged NPO.  IR PICC placed and parenteral nutrition was started on 3/17/25.     TPN infusion volume increased to 2L, TPN will provide 1352 kcal/day    labs reviewed - electrolytes adjusted in TPN bag    monitor fingersticks, obtain daily weights    continue parenteral nutrition at this time, will follow up with primary team on plan     1.  Severe protein calorie malnutrition being optimized with TPN: CHO [180] gm.  AA [85] gm. SMOF Lipids [40] gm.  2.  Hyperglycemia managed with: [0] units of regular insulin    3.  Check fluid balance daily.  Strict I/O  [ ] [ ]   4.  Daily BMP, Ionized Calcium, Magnesium and Phosphorous   5.  Triglycerides at initiation of TPN and monthly  Chol -- LDL -- HDL -- Trig 164[H]    Nutrition Support [id number]

## 2025-03-19 LAB
ANION GAP SERPL CALC-SCNC: 11 MMOL/L — SIGNIFICANT CHANGE UP (ref 7–14)
BASOPHILS # BLD AUTO: 0.04 K/UL — SIGNIFICANT CHANGE UP (ref 0–0.2)
BASOPHILS NFR BLD AUTO: 0.4 % — SIGNIFICANT CHANGE UP (ref 0–2)
BUN SERPL-MCNC: 16 MG/DL — SIGNIFICANT CHANGE UP (ref 7–23)
CALCIUM SERPL-MCNC: 9.8 MG/DL — SIGNIFICANT CHANGE UP (ref 8.4–10.5)
CHLORIDE SERPL-SCNC: 95 MMOL/L — LOW (ref 98–107)
CO2 SERPL-SCNC: 34 MMOL/L — HIGH (ref 22–31)
CREAT SERPL-MCNC: 0.55 MG/DL — SIGNIFICANT CHANGE UP (ref 0.5–1.3)
EGFR: 96 ML/MIN/1.73M2 — SIGNIFICANT CHANGE UP
EGFR: 96 ML/MIN/1.73M2 — SIGNIFICANT CHANGE UP
EOSINOPHIL # BLD AUTO: 0.43 K/UL — SIGNIFICANT CHANGE UP (ref 0–0.5)
EOSINOPHIL NFR BLD AUTO: 4.2 % — SIGNIFICANT CHANGE UP (ref 0–6)
GLUCOSE BLDC GLUCOMTR-MCNC: 132 MG/DL — HIGH (ref 70–99)
GLUCOSE BLDC GLUCOMTR-MCNC: 135 MG/DL — HIGH (ref 70–99)
GLUCOSE SERPL-MCNC: 116 MG/DL — HIGH (ref 70–99)
HCT VFR BLD CALC: 31 % — LOW (ref 34.5–45)
HGB BLD-MCNC: 9.3 G/DL — LOW (ref 11.5–15.5)
IANC: 6.95 K/UL — SIGNIFICANT CHANGE UP (ref 1.8–7.4)
IMM GRANULOCYTES NFR BLD AUTO: 0.5 % — SIGNIFICANT CHANGE UP (ref 0–0.9)
LYMPHOCYTES # BLD AUTO: 1.52 K/UL — SIGNIFICANT CHANGE UP (ref 1–3.3)
LYMPHOCYTES # BLD AUTO: 14.8 % — SIGNIFICANT CHANGE UP (ref 13–44)
MAGNESIUM SERPL-MCNC: 2.1 MG/DL — SIGNIFICANT CHANGE UP (ref 1.6–2.6)
MCHC RBC-ENTMCNC: 23.7 PG — LOW (ref 27–34)
MCHC RBC-ENTMCNC: 30 G/DL — LOW (ref 32–36)
MCV RBC AUTO: 78.9 FL — LOW (ref 80–100)
MONOCYTES # BLD AUTO: 1.26 K/UL — HIGH (ref 0–0.9)
MONOCYTES NFR BLD AUTO: 12.3 % — SIGNIFICANT CHANGE UP (ref 2–14)
NEUTROPHILS # BLD AUTO: 6.95 K/UL — SIGNIFICANT CHANGE UP (ref 1.8–7.4)
NEUTROPHILS NFR BLD AUTO: 67.8 % — SIGNIFICANT CHANGE UP (ref 43–77)
NRBC # BLD AUTO: 0 K/UL — SIGNIFICANT CHANGE UP (ref 0–0)
NRBC # FLD: 0 K/UL — SIGNIFICANT CHANGE UP (ref 0–0)
NRBC BLD AUTO-RTO: 0 /100 WBCS — SIGNIFICANT CHANGE UP (ref 0–0)
PHOSPHATE SERPL-MCNC: 2.9 MG/DL — SIGNIFICANT CHANGE UP (ref 2.5–4.5)
PLATELET # BLD AUTO: 508 K/UL — HIGH (ref 150–400)
POTASSIUM SERPL-MCNC: 3.4 MMOL/L — LOW (ref 3.5–5.3)
POTASSIUM SERPL-SCNC: 3.4 MMOL/L — LOW (ref 3.5–5.3)
RBC # BLD: 3.93 M/UL — SIGNIFICANT CHANGE UP (ref 3.8–5.2)
RBC # FLD: 20.8 % — HIGH (ref 10.3–14.5)
SODIUM SERPL-SCNC: 140 MMOL/L — SIGNIFICANT CHANGE UP (ref 135–145)
WBC # BLD: 10.25 K/UL — SIGNIFICANT CHANGE UP (ref 3.8–10.5)
WBC # FLD AUTO: 10.25 K/UL — SIGNIFICANT CHANGE UP (ref 3.8–10.5)

## 2025-03-19 PROCEDURE — 71045 X-RAY EXAM CHEST 1 VIEW: CPT | Mod: 26

## 2025-03-19 PROCEDURE — 99232 SBSQ HOSP IP/OBS MODERATE 35: CPT | Mod: FS

## 2025-03-19 RX ORDER — ACETAMINOPHEN 500 MG/5ML
1000 LIQUID (ML) ORAL EVERY 6 HOURS
Refills: 0 | Status: COMPLETED | OUTPATIENT
Start: 2025-03-19 | End: 2025-03-21

## 2025-03-19 RX ORDER — POLYETHYLENE GLYCOL 3350 17 G/17G
17 POWDER, FOR SOLUTION ORAL DAILY
Refills: 0 | Status: DISCONTINUED | OUTPATIENT
Start: 2025-03-19 | End: 2025-03-20

## 2025-03-19 RX ORDER — I.V. FAT EMULSION 20 G/100ML
16.6 EMULSION INTRAVENOUS
Qty: 40 | Refills: 0 | Status: DISCONTINUED | OUTPATIENT
Start: 2025-03-19 | End: 2025-03-20

## 2025-03-19 RX ORDER — SODIUM/POT/MAG/CALC/CHLOR/ACET 35-20-5MEQ
1 VIAL (ML) INTRAVENOUS
Refills: 0 | Status: DISCONTINUED | OUTPATIENT
Start: 2025-03-19 | End: 2025-03-19

## 2025-03-19 RX ORDER — POLYETHYLENE GLYCOL 3350 17 G/17G
17 POWDER, FOR SOLUTION ORAL ONCE
Refills: 0 | Status: COMPLETED | OUTPATIENT
Start: 2025-03-19 | End: 2025-03-19

## 2025-03-19 RX ADMIN — HEPARIN SODIUM 5000 UNIT(S): 1000 INJECTION INTRAVENOUS; SUBCUTANEOUS at 06:24

## 2025-03-19 RX ADMIN — HEPARIN SODIUM 5000 UNIT(S): 1000 INJECTION INTRAVENOUS; SUBCUTANEOUS at 14:54

## 2025-03-19 RX ADMIN — HEPARIN SODIUM 5000 UNIT(S): 1000 INJECTION INTRAVENOUS; SUBCUTANEOUS at 22:16

## 2025-03-19 RX ADMIN — POLYETHYLENE GLYCOL 3350 17 GRAM(S): 17 POWDER, FOR SOLUTION ORAL at 10:45

## 2025-03-19 RX ADMIN — LIDOCAINE HYDROCHLORIDE 1 PATCH: 20 JELLY TOPICAL at 03:03

## 2025-03-19 RX ADMIN — Medication 10 MILLIGRAM(S): at 06:24

## 2025-03-19 RX ADMIN — LIDOCAINE HYDROCHLORIDE 1 PATCH: 20 JELLY TOPICAL at 19:06

## 2025-03-19 RX ADMIN — Medication 1 EACH: at 17:42

## 2025-03-19 RX ADMIN — Medication 1 APPLICATION(S): at 06:25

## 2025-03-19 RX ADMIN — LIDOCAINE HYDROCHLORIDE 1 PATCH: 20 JELLY TOPICAL at 16:00

## 2025-03-19 RX ADMIN — I.V. FAT EMULSION 16.6 ML/HR: 20 EMULSION INTRAVENOUS at 17:42

## 2025-03-19 RX ADMIN — POLYETHYLENE GLYCOL 3350 17 GRAM(S): 17 POWDER, FOR SOLUTION ORAL at 17:44

## 2025-03-19 RX ADMIN — Medication 1 SPRAY(S): at 06:25

## 2025-03-19 NOTE — PROGRESS NOTE ADULT - ASSESSMENT
74 year old woman with PMHx metastatic urothelial carcinoma in 2017 (s/p immunetherapy), b/l breast cancer (s/p RT), diverticulitis, HTN, HLD, arterial insufficiency, vasculopathy on Xarelto and Pletal, s/p recent Cee procedure for chronic diverticulitis with colo-enteric fistula (3/5, Dr. Bethea) presenting with nausea and vomiting, found to have a post-operative ileus vs. pSBO. Receiving nonoperative management with NG tube decompression, now with return of ostomy function but with persistent high NG tube output. 3/17 PICC/TPN. 3/17 Gastrograffin protocol initiated however needed to be returned to suction due to patient complaining of abdominal pain. EKG done and then Reglan started for promotility.     PLAN:  - Miralax QD  - PICC/TPN started 3/17  - NPO/NGT/IVL  - NGT flushed and clamped this AM. Keep clamp all day. If nausea/emesis/increased abdominal pain will return to suction.  - Pain control.  - GI PCR negative   - Xarelto held while NGT in place.  - Monitor UOP, Ostomy output  - PT eval --> recommend ALLISON    A team surgery  [id number]

## 2025-03-19 NOTE — PROGRESS NOTE ADULT - SUBJECTIVE AND OBJECTIVE BOX
NUTRITION NOTE  [id number] ACKERMAN  ===============================    Interval events - Patient was seen and examined at bedside, no acute events overnight. Patient denies chest pain, shortness of breath, nausea or vomiting at this time. IR PICC placed and parenteral nutrition was started on 3/17/25. Pt is tolerating TPN without any issues. TPN/lipids ordered for tonight. Pt remains NPO with NGT in place.     ROS: Except as noted above, all other systems reviewed and are negative     Allergies  sulfa drugs (Unknown)    PAST MEDICAL & SURGICAL HISTORY:  Breast CA, left lumpectomy / RTX in the past  Hypertension  Irritable bowel syndrome (IBS)  Polyp of colon "pre-cancerous" x 2, removed 2014  HLD (hyperlipidemia)  Bladder polyp  Livedoid vasculopathy  Anxiety and depression  KARI on CPAP  Arterial insufficiency  Diverticulitis  Cancer of right breast  HTN (hypertension)  Urothelial carcinoma of bladder  Other specified diseases of intestine  S/P   S/P colon resection reversal, had complications for fibriods  S/P hysterectomy  H/O lumpectomy left   Personal history of spine surgery L1-L4 fusion 2017  S/P lumpectomy, left breast  S/P lumpectomy, right breast  S/P angiogram of extremity    FAMILY HISTORY:  Family history of coronary artery disease (Mother)    Vital Signs Last 24 Hrs  T(C): 36.7 (19 Mar 2025 09:45), Max: 37.4 (18 Mar 2025 17:31)  T(F): 98.1 (19 Mar 2025 09:45), Max: 99.3 (18 Mar 2025 17:31)  HR: 80 (19 Mar 2025 09:45) (75 - 95)  BP: 111/71 (19 Mar 2025 09:45) (108/67 - 127/82)  RR: 16 (19 Mar 2025 09:45) (16 - 18)  SpO2: 98% (19 Mar 2025 09:45) (95% - 98%)    MEDICATIONS  (STANDING):  benzocaine/butamben/tetracaine Spray 1 Spray(s) Topical three times a day  chlorhexidine 4% Liquid 1 Application(s) Topical <User Schedule>  heparin   Injectable 5000 Unit(s) SubCutaneous every 8 hours  lipid, fat emulsion (Fish Oil and Plant Based) 20% Infusion 16.6 mL/Hr (16.6 mL/Hr) IV Continuous <Continuous>  metoclopramide Injectable 10 milliGRAM(s) IV Push every 8 hours  Parenteral Nutrition - Adult 1 Each (83 mL/Hr) TPN Continuous <Continuous>  Parenteral Nutrition - Adult 1 Each (83 mL/Hr) TPN Continuous <Continuous>  polyethylene glycol 3350 17 Gram(s) Oral daily    MEDICATIONS  (PRN):  acetaminophen   IVPB .. 1000 milliGRAM(s) IV Intermittent every 6 hours PRN Mild Pain (1 - 3)  lidocaine   4% Patch 1 Patch Transdermal every 24 hours PRN back pain  sodium chloride 0.9% lock flush 10 milliLiter(s) IV Push every 1 hour PRN Pre/post blood products, medications, blood draw, and to maintain line patency    I&O's Detail    18 Mar 2025 07:  -  19 Mar 2025 07:00  --------------------------------------------------------  IN:    Fat Emulsion (Fish Oil &amp; Plant Based) 20% Infusion: 182.6 mL    TPN (Total Parenteral Nutrition): 1249 mL  Total IN: 1431.6 mL    OUT:    Colostomy (mL): 90 mL    Nasogastric/Oral tube (mL): 1400 mL    Oral Fluid: 0 mL    Voided (mL): 400 mL  Total OUT: 1890 mL    Total NET: -458.4 mL      19 Mar 2025 07:01  -  19 Mar 2025 11:41  --------------------------------------------------------  IN:  Total IN: 0 mL    OUT:    Oral Fluid: 0 mL  Total OUT: 0 mL    Total NET: 0 mL    POCT Blood Glucose.: 132 mg/dL (19 Mar 2025 05:52)  POCT Blood Glucose.: 134 mg/dL (18 Mar 2025 21:41)  POCT Blood Glucose.: 132 mg/dL (18 Mar 2025 16:08)    Drug Dosing Weight  Height (cm): 165.1 (13 Mar 2025 17:00)  Weight (kg): 68 (13 Mar 2025 17:00)  BMI (kg/m2): 24.9 (13 Mar 2025 17:00)  BSA (m2): 1.75 (13 Mar 2025 17:00)    PHYSICAL EXAM:  Constitutional: A&Ox3, NAD  Gastrointestinal: abdomen soft nontender, nondistended, ostomy with function, NGT in place   Extremities: Moving all extremities, no edema  PICC Site: LUE double lumen PICC in place, site clean and dry, placed 3/17/25    Diet: NPO and TPN/lipids (started on 3/17/25)    LABORATORY                                 9.3    10.25 )-----------( 508      ( 19 Mar 2025 05:49 )             31.0     03-19    140  |  95[L]  |  16  ----------------------------<  116[H]  3.4[L]   |  34[H]  |  0.55    Ca    9.8      19 Mar 2025 07:35  Phos  2.9     03-19  Mg     2.10     03-19    TPro  6.9  /  Alb  3.3  /  TBili  0.3  /  DBili  x   /  AST  12  /  ALT  <5  /  AlkPhos  91  03-18    LIVER FUNCTIONS - ( 18 Mar 2025 04:09 )  Alb: 3.3 g/dL / Pro: 6.9 g/dL / ALK PHOS: 91 U/L / ALT: <5 U/L / AST: 12 U/L / GGT: x           03-18 Chol -- LDL -- HDL -- Trig 164[H]    ASSESSMENT/PLAN:  73 y/o F with PMHx metastatic urothelial carcinoma in 2017 (s/p immunotherapy b/l breast cancer (s/p RT), diverticulitis, HTN, HLD, arterial insufficiency, vasculopathy on Xarelto and Pletal, s/p recent Cee procedure for chronic diverticulitis with colo-enteric fistula (3/5/25) presenting with nausea and vomiting, found to have a post-operative ileus vs. pSBO. Pt with persistently high NG tube output. Nutrition support consult called for initiation of parenteral nutrition in view of severe protein calorie malnutrition and anticipated prolonged NPO.  IR PICC placed and parenteral nutrition was started on 3/17/25.     continue TPN with infusion volume of 2L, TPN will provide 1392 kcal/day    labs reviewed - electrolytes adjusted in TPN bag    monitor fingersticks, obtain daily weights    continue parenteral nutrition at this time, will follow up with primary team on plan     1.  Severe protein calorie malnutrition being optimized with TPN: CHO [180] gm.  AA [95] gm. SMOF Lipids [40] gm.  2.  Hyperglycemia managed with: [0] units of regular insulin    3.  Check fluid balance daily.  Strict I/O  [ ] [ ]   4.  Daily BMP, Ionized Calcium, Magnesium and Phosphorous   5.  Triglycerides at initiation of TPN and monthly  Chol -- LDL -- HDL -- Trig 164[H]    Nutrition Support [id number]

## 2025-03-19 NOTE — PROGRESS NOTE ADULT - SUBJECTIVE AND OBJECTIVE BOX
TEAM [ A ] Surgery Daily Progress Note  =====================================================    SUBJECTIVE: Patient seen and examined at bedside on AM rounds. Patient reports NGT may have been moved while ambulating to bathroom, repeat XRAY shows no misalignment. Patient agreeable to NGT clamp trial today. NPO/TPN, denies nausea, vomiting.   Ostomy with gas and stool. OOB/Amublating as tolerated. Denies fever, chills.    ALLERGIES:  sulfa drugs (Unknown)      --------------------------------------------------------------------------------------    MEDICATIONS:    Neurologic Medications  acetaminophen   IVPB .. 1000 milliGRAM(s) IV Intermittent every 6 hours PRN Mild Pain (1 - 3)  metoclopramide Injectable 10 milliGRAM(s) IV Push every 8 hours    Respiratory Medications    Cardiovascular Medications    Gastrointestinal Medications  lipid, fat emulsion (Fish Oil and Plant Based) 20% Infusion 16.6 mL/Hr IV Continuous <Continuous>  Parenteral Nutrition - Adult 1 Each TPN Continuous <Continuous>  Parenteral Nutrition - Adult 1 Each TPN Continuous <Continuous>  polyethylene glycol 3350 17 Gram(s) Oral daily  sodium chloride 0.9% lock flush 10 milliLiter(s) IV Push every 1 hour PRN Pre/post blood products, medications, blood draw, and to maintain line patency    Genitourinary Medications    Hematologic/Oncologic Medications  heparin   Injectable 5000 Unit(s) SubCutaneous every 8 hours    Antimicrobial/Immunologic Medications    Endocrine/Metabolic Medications    Topical/Other Medications  benzocaine/butamben/tetracaine Spray 1 Spray(s) Topical three times a day  chlorhexidine 4% Liquid 1 Application(s) Topical <User Schedule>  lidocaine   4% Patch 1 Patch Transdermal every 24 hours PRN back pain    --------------------------------------------------------------------------------------    VITAL SIGNS:  T(C): 36.6 (03-19-25 @ 06:17), Max: 37.4 (03-18-25 @ 17:31)  HR: 84 (03-19-25 @ 06:17) (75 - 95)  BP: 109/65 (03-19-25 @ 06:17) (108/67 - 127/82)  RR: 16 (03-19-25 @ 06:17) (16 - 18)  SpO2: 95% (03-19-25 @ 06:17) (95% - 98%)  --------------------------------------------------------------------------------------    EXAM    General: NAD, resting in bed comfortably.  Cardiac: regular rate, warm and well perfused  Respiratory: Nonlabored respirations, normal cw expansion.  Abdomen: soft, nontender, nondistended. Ostomy +air/+soft stool.    Extremities: normal strength, FROM, no deformities    --------------------------------------------------------------------------------------    LABS:                        9.3    10.25 )-----------( 508      ( 19 Mar 2025 05:49 )             31.0     03-19    140  |  95[L]  |  16  ----------------------------<  116[H]  3.4[L]   |  34[H]  |  0.55    Ca    9.8      19 Mar 2025 07:35  Phos  2.9     03-19  Mg     2.10     03-19    TPro  6.9  /  Alb  3.3  /  TBili  0.3  /  DBili  x   /  AST  12  /  ALT  <5  /  AlkPhos  91  03-18      Urinalysis Basic - ( 19 Mar 2025 07:35 )    Color: x / Appearance: x / SG: x / pH: x  Gluc: 116 mg/dL / Ketone: x  / Bili: x / Urobili: x   Blood: x / Protein: x / Nitrite: x   Leuk Esterase: x / RBC: x / WBC x   Sq Epi: x / Non Sq Epi: x / Bacteria: x      --------------------------------------------------------------------------------------    INS AND OUTS:    03-18-25 @ 07:01  -  03-19-25 @ 07:00  --------------------------------------------------------  IN: 1431.6 mL / OUT: 1890 mL / NET: -458.4 mL      --------------------------------------------------------------------------------------

## 2025-03-19 NOTE — PROGRESS NOTE ADULT - NS ATTEND AMEND GEN_ALL_CORE FT
I agree with the above history, physical examination, chief complaint/diagnosis, and plan, which I have reviewed and edited where appropriate.  I agree with notes/assessment and detailed interval history of health care providers on my service.  I have seen and examined the patient.  I reviewed the laboratory and available data and agree with the history, physical assessment and plan.  I reviewed and discussed with all consultants, house staff and PA's.  The Nutrition Support Team (NST) discusses on an ongoing basis with the primary team and all consultants, House staff and PA's to have a permanent risk benefit analyses on all decisions and coordinating care.  I was physically present for the key portions of the evaluation and management (E/M) service provided.  73 y/o F with PMHx metastatic urothelial carcinoma, s/p recent Cee procedure for chronic diverticulitis with colo-enteric fistula.  Nutrition support consult called for initiation of parenteral nutrition in view of severe protein calorie malnutrition and anticipated NPO.  PHYSICAL EXAM:  Constitutional: NAD  Lung: clear  Heart: RR  Gastrointestinal: abdomen soft nontender  Extremities: warm  Labs reviewed - electrolytes adjusted   TPN: 2L/1392 kcal/day  Monitor fingersticks  Obtain daily weights  Diagnosis:  Severe protein calorie malnutrition in acute illness/ injury

## 2025-03-19 NOTE — ADVANCED PRACTICE NURSE CONSULT - ASSESSMENT
Patient found in chair with NG tube in Left nare. Patient states pouch was changed this AM, continues to c/o cramping to abdomen. Pouch remains intact, stoma visible through pouch window is pink, moist, intact. Bag has liquid to pasty stool. Will return tomorrow for pouch eval and teaching. Family at bedside.

## 2025-03-20 LAB
ANION GAP SERPL CALC-SCNC: 15 MMOL/L — HIGH (ref 7–14)
BASOPHILS # BLD AUTO: 0.08 K/UL — SIGNIFICANT CHANGE UP (ref 0–0.2)
BASOPHILS NFR BLD AUTO: 0.8 % — SIGNIFICANT CHANGE UP (ref 0–2)
BUN SERPL-MCNC: 22 MG/DL — SIGNIFICANT CHANGE UP (ref 7–23)
CALCIUM SERPL-MCNC: 9.6 MG/DL — SIGNIFICANT CHANGE UP (ref 8.4–10.5)
CHLORIDE SERPL-SCNC: 96 MMOL/L — LOW (ref 98–107)
CO2 SERPL-SCNC: 27 MMOL/L — SIGNIFICANT CHANGE UP (ref 22–31)
CREAT SERPL-MCNC: 0.5 MG/DL — SIGNIFICANT CHANGE UP (ref 0.5–1.3)
EGFR: 98 ML/MIN/1.73M2 — SIGNIFICANT CHANGE UP
EGFR: 98 ML/MIN/1.73M2 — SIGNIFICANT CHANGE UP
EOSINOPHIL # BLD AUTO: 0.4 K/UL — SIGNIFICANT CHANGE UP (ref 0–0.5)
EOSINOPHIL NFR BLD AUTO: 4.2 % — SIGNIFICANT CHANGE UP (ref 0–6)
GLUCOSE BLDC GLUCOMTR-MCNC: 123 MG/DL — HIGH (ref 70–99)
GLUCOSE BLDC GLUCOMTR-MCNC: 131 MG/DL — HIGH (ref 70–99)
GLUCOSE SERPL-MCNC: 129 MG/DL — HIGH (ref 70–99)
HCT VFR BLD CALC: 29.9 % — LOW (ref 34.5–45)
HGB BLD-MCNC: 9.1 G/DL — LOW (ref 11.5–15.5)
IANC: 6.04 K/UL — SIGNIFICANT CHANGE UP (ref 1.8–7.4)
IMM GRANULOCYTES NFR BLD AUTO: 0.3 % — SIGNIFICANT CHANGE UP (ref 0–0.9)
LYMPHOCYTES # BLD AUTO: 1.79 K/UL — SIGNIFICANT CHANGE UP (ref 1–3.3)
LYMPHOCYTES # BLD AUTO: 18.6 % — SIGNIFICANT CHANGE UP (ref 13–44)
MAGNESIUM SERPL-MCNC: 2 MG/DL — SIGNIFICANT CHANGE UP (ref 1.6–2.6)
MCHC RBC-ENTMCNC: 23.5 PG — LOW (ref 27–34)
MCHC RBC-ENTMCNC: 30.4 G/DL — LOW (ref 32–36)
MCV RBC AUTO: 77.1 FL — LOW (ref 80–100)
MONOCYTES # BLD AUTO: 1.28 K/UL — HIGH (ref 0–0.9)
MONOCYTES NFR BLD AUTO: 13.3 % — SIGNIFICANT CHANGE UP (ref 2–14)
NEUTROPHILS # BLD AUTO: 6.04 K/UL — SIGNIFICANT CHANGE UP (ref 1.8–7.4)
NEUTROPHILS NFR BLD AUTO: 62.8 % — SIGNIFICANT CHANGE UP (ref 43–77)
NRBC # BLD AUTO: 0 K/UL — SIGNIFICANT CHANGE UP (ref 0–0)
NRBC # FLD: 0 K/UL — SIGNIFICANT CHANGE UP (ref 0–0)
NRBC BLD AUTO-RTO: 0 /100 WBCS — SIGNIFICANT CHANGE UP (ref 0–0)
PHOSPHATE SERPL-MCNC: 3.1 MG/DL — SIGNIFICANT CHANGE UP (ref 2.5–4.5)
PLATELET # BLD AUTO: 464 K/UL — HIGH (ref 150–400)
POTASSIUM SERPL-MCNC: 3.6 MMOL/L — SIGNIFICANT CHANGE UP (ref 3.5–5.3)
POTASSIUM SERPL-SCNC: 3.6 MMOL/L — SIGNIFICANT CHANGE UP (ref 3.5–5.3)
RBC # BLD: 3.88 M/UL — SIGNIFICANT CHANGE UP (ref 3.8–5.2)
RBC # FLD: 20.5 % — HIGH (ref 10.3–14.5)
SODIUM SERPL-SCNC: 138 MMOL/L — SIGNIFICANT CHANGE UP (ref 135–145)
WBC # BLD: 9.62 K/UL — SIGNIFICANT CHANGE UP (ref 3.8–10.5)
WBC # FLD AUTO: 9.62 K/UL — SIGNIFICANT CHANGE UP (ref 3.8–10.5)

## 2025-03-20 PROCEDURE — 99232 SBSQ HOSP IP/OBS MODERATE 35: CPT | Mod: FS

## 2025-03-20 RX ORDER — I.V. FAT EMULSION 20 G/100ML
16.6 EMULSION INTRAVENOUS
Qty: 40 | Refills: 0 | Status: DISCONTINUED | OUTPATIENT
Start: 2025-03-20 | End: 2025-03-21

## 2025-03-20 RX ORDER — POLYETHYLENE GLYCOL 3350 17 G/17G
17 POWDER, FOR SOLUTION ORAL
Refills: 0 | Status: DISCONTINUED | OUTPATIENT
Start: 2025-03-20 | End: 2025-04-03

## 2025-03-20 RX ORDER — SODIUM/POT/MAG/CALC/CHLOR/ACET 35-20-5MEQ
1 VIAL (ML) INTRAVENOUS
Refills: 0 | Status: DISCONTINUED | OUTPATIENT
Start: 2025-03-20 | End: 2025-03-20

## 2025-03-20 RX ADMIN — Medication 1 EACH: at 20:18

## 2025-03-20 RX ADMIN — Medication 1000 MILLIGRAM(S): at 11:33

## 2025-03-20 RX ADMIN — LIDOCAINE HYDROCHLORIDE 1 PATCH: 20 JELLY TOPICAL at 20:11

## 2025-03-20 RX ADMIN — Medication 1 LOZENGE: at 10:55

## 2025-03-20 RX ADMIN — HEPARIN SODIUM 5000 UNIT(S): 1000 INJECTION INTRAVENOUS; SUBCUTANEOUS at 13:37

## 2025-03-20 RX ADMIN — Medication 1 APPLICATION(S): at 05:51

## 2025-03-20 RX ADMIN — LIDOCAINE HYDROCHLORIDE 1 PATCH: 20 JELLY TOPICAL at 16:10

## 2025-03-20 RX ADMIN — HEPARIN SODIUM 5000 UNIT(S): 1000 INJECTION INTRAVENOUS; SUBCUTANEOUS at 05:25

## 2025-03-20 RX ADMIN — POLYETHYLENE GLYCOL 3350 17 GRAM(S): 17 POWDER, FOR SOLUTION ORAL at 19:01

## 2025-03-20 RX ADMIN — I.V. FAT EMULSION 16.6 ML/HR: 20 EMULSION INTRAVENOUS at 20:19

## 2025-03-20 RX ADMIN — POLYETHYLENE GLYCOL 3350 17 GRAM(S): 17 POWDER, FOR SOLUTION ORAL at 12:16

## 2025-03-20 RX ADMIN — Medication 1000 MILLIGRAM(S): at 21:56

## 2025-03-20 RX ADMIN — HEPARIN SODIUM 5000 UNIT(S): 1000 INJECTION INTRAVENOUS; SUBCUTANEOUS at 21:40

## 2025-03-20 RX ADMIN — Medication 400 MILLIGRAM(S): at 10:33

## 2025-03-20 RX ADMIN — Medication 10 MILLIGRAM(S): at 05:53

## 2025-03-20 RX ADMIN — Medication 400 MILLIGRAM(S): at 21:41

## 2025-03-20 RX ADMIN — Medication 1 SPRAY(S): at 05:25

## 2025-03-20 RX ADMIN — Medication 10 MILLIGRAM(S): at 21:41

## 2025-03-20 NOTE — ADVANCED PRACTICE NURSE CONSULT - ASSESSMENT
Patient A&Ox4, ready and willing to learn. Sister at bedside involved in teaching as well. Actively participating in pouch change. Overview of surgical procedure and need of ostomy reinforced. Terms defined utilized: Ostomy, wafer, pouch, stoma. Frequency of pouch change and emptying discussed, teach back method utilized. Patient able to show back how to open, empty and close pouch. Removed pouch using pull and push technique, cleansed skin with water, patted dry. Skin adhesive remover used, pouch over areas of staples. Taught patient how to properly assess stoma viability and peristomal skin. Patient instructed in stoma measurement, creating template, cutting wafer, applying barrier ring (to protect peristomal skin from enzymatic irritation), applying pouch. Oval stencil used and left at bedside. Small area of mucocutaneous separation noted to [place]. Reviewed s/s to report to MD.     Stoma size: 1" 3/4 x 2" - See A&I flowsheet for full assessment details.

## 2025-03-20 NOTE — PROGRESS NOTE ADULT - SUBJECTIVE AND OBJECTIVE BOX
NUTRITION NOTE  [id number] ACKERMAN  ===============================    Interval events - Patient was seen and examined at bedside, no acute events overnight. Patient denies chest pain, shortness of breath, nausea or vomiting at this time. IR PICC placed and parenteral nutrition was started on 3/17/25. Pt is tolerating TPN without any issues. TPN/lipids ordered for tonight.     ROS: Except as noted above, all other systems reviewed and are negative     Allergies  sulfa drugs (Unknown)    PAST MEDICAL & SURGICAL HISTORY:  Breast CA, left lumpectomy / RTX in the past  Hypertension  Irritable bowel syndrome (IBS)  Polyp of colon "pre-cancerous" x 2, removed 2014  HLD (hyperlipidemia)  Bladder polyp  Livedoid vasculopathy  Anxiety and depression  KARI on CPAP  Arterial insufficiency  Diverticulitis  Cancer of right breast  HTN (hypertension)  Urothelial carcinoma of bladder  Other specified diseases of intestine  S/P   S/P colon resection reversal, had complications for fibriods  S/P hysterectomy  H/O lumpectomy left   Personal history of spine surgery L1-L4 fusion 2017  S/P lumpectomy, left breast  S/P lumpectomy, right breast  S/P angiogram of extremity    FAMILY HISTORY:  Family history of coronary artery disease (Mother)    Vital Signs Last 24 Hrs  T(C): 36.7 (20 Mar 2025 09:14), Max: 37.2 (20 Mar 2025 00:41)  T(F): 98.1 (20 Mar 2025 09:14), Max: 99 (20 Mar 2025 00:41)  HR: 76 (20 Mar 2025 09:14) (76 - 96)  BP: 110/67 (20 Mar 2025 09:14) (110/67 - 125/75)  RR: 18 (20 Mar 2025 09:14) (16 - 18)  SpO2: 98% (20 Mar 2025 09:14) (95% - 98%)    MEDICATIONS  (STANDING):  benzocaine/butamben/tetracaine Spray 1 Spray(s) Topical three times a day  chlorhexidine 4% Liquid 1 Application(s) Topical <User Schedule>  heparin   Injectable 5000 Unit(s) SubCutaneous every 8 hours  lipid, fat emulsion (Fish Oil and Plant Based) 20% Infusion 16.6 mL/Hr (16.6 mL/Hr) IV Continuous <Continuous>  metoclopramide Injectable 10 milliGRAM(s) IV Push every 8 hours  Parenteral Nutrition - Adult 1 Each (83 mL/Hr) TPN Continuous <Continuous>  Parenteral Nutrition - Adult 1 Each (83 mL/Hr) TPN Continuous <Continuous>  polyethylene glycol 3350 17 Gram(s) Oral daily    MEDICATIONS  (PRN):  acetaminophen   IVPB .. 1000 milliGRAM(s) IV Intermittent every 6 hours PRN Mild Pain (1 - 3)  lidocaine   4% Patch 1 Patch Transdermal every 24 hours PRN back pain  sodium chloride 0.9% lock flush 10 milliLiter(s) IV Push every 1 hour PRN Pre/post blood products, medications, blood draw, and to maintain line patency    I&O's Detail    19 Mar 2025 07:01  -  20 Mar 2025 07:00  --------------------------------------------------------  IN:    Oral Fluid: 30 mL    TPN (Total Parenteral Nutrition): 1042 mL  Total IN: 1072 mL    OUT:    Colostomy (mL): 75 mL    Nasogastric/Oral tube (mL): 1050 mL    Voided (mL): 1150 mL  Total OUT: 2275 mL    Total NET: -1203 mL    POCT Blood Glucose.: 123 mg/dL (20 Mar 2025 08:01)  POCT Blood Glucose.: 135 mg/dL (19 Mar 2025 11:52)    Daily Weight in k.1 (20 Mar 2025 05:22)    Drug Dosing Weight  Height (cm): 165.1 (13 Mar 2025 17:00)  Weight (kg): 68 (13 Mar 2025 17:00)  BMI (kg/m2): 24.9 (13 Mar 2025 17:00)  BSA (m2): 1.75 (13 Mar 2025 17:00)    PHYSICAL EXAM:  Constitutional: A&Ox3, NAD  Gastrointestinal: abdomen soft nontender, nondistended, ostomy with function, NGT in place   Extremities: Moving all extremities, no edema  PICC Site: LUE double lumen PICC in place, site clean and dry, placed 3/17/25    Diet: NPO and TPN/lipids (started on 3/17/25)    LABORATORY                      9.1    9.62  )-----------( 464      ( 20 Mar 2025 06:18 )             29.9   03-20    138  |  96[L]  |  22  ----------------------------<  129[H]  3.6   |  27  |  0.50    Ca    9.6      20 Mar 2025 06:18  Phos  3.1     03-20  Mg     2.00     03-20    TPro  6.9  /  Alb  3.3  /  TBili  0.3  /  DBili  x   /  AST  12  /  ALT  <5  /  AlkPhos  91  03-18    LIVER FUNCTIONS - ( 18 Mar 2025 04:09 )  Alb: 3.3 g/dL / Pro: 6.9 g/dL / ALK PHOS: 91 U/L / ALT: <5 U/L / AST: 12 U/L / GGT: x           -18 Chol -- LDL -- HDL -- Trig 164[H]    ASSESSMENT/PLAN:  75 y/o F with PMHx metastatic urothelial carcinoma in 2017 (s/p immunotherapy b/l breast cancer (s/p RT), diverticulitis, HTN, HLD, arterial insufficiency, vasculopathy on Xarelto and Pletal, s/p recent Cee procedure for chronic diverticulitis with colo-enteric fistula (3/5/25) presenting with nausea and vomiting, found to have a post-operative ileus vs. pSBO. Pt with persistently high NG tube output. Nutrition support consult called for initiation of parenteral nutrition in view of severe protein calorie malnutrition and anticipated prolonged NPO.  IR PICC placed and parenteral nutrition was started on 3/17/25.     continue TPN with infusion volume of 2L, TPN will provide 1392 kcal/day    labs reviewed - electrolytes adjusted in TPN bag    monitor fingersticks, obtain daily weights    continue parenteral nutrition at this time, will follow up with primary team on plan     1.  Severe protein calorie malnutrition being optimized with TPN: CHO [180] gm.  AA [95] gm. SMOF Lipids [40] gm.  2.  Hyperglycemia managed with: [0] units of regular insulin    3.  Check fluid balance daily.  Strict I/O  [ ] [ ]   4.  Daily BMP, Ionized Calcium, Magnesium and Phosphorous   5.  Triglycerides at initiation of TPN and monthly  Chol -- LDL -- HDL -- Trig 164[H]    Nutrition Support [id number]

## 2025-03-20 NOTE — PROGRESS NOTE ADULT - NS ATTEND AMEND GEN_ALL_CORE FT
I agree with the above history, physical examination, chief complaint/diagnosis, and plan, which I have reviewed and edited where appropriate.  I agree with notes/assessment and detailed interval history of health care providers on my service.  I have seen and examined the patient.  I reviewed the laboratory and available data and agree with the history, physical assessment and plan.  I reviewed and discussed with all consultants, house staff and PA's.  The Nutrition Support Team (NST) discusses on an ongoing basis with the primary team and all consultants, House staff and PA's to have a permanent risk benefit analyses on all decisions and coordinating care.  I was physically present for the key portions of the evaluation and management (E/M) service provided.  75 y/o F with PMHx metastatic urothelial carcinoma, s/p recent Cee procedure for chronic diverticulitis with colo-enteric fistula.  Nutrition support consult called for initiation of parenteral nutrition in view of severe protein calorie malnutrition and anticipated NPO.  PHYSICAL EXAM:  Constitutional: NAD  Lung: clear  Heart: RR  Gastrointestinal: abdomen soft nontender  Extremities: warm  Labs reviewed - electrolytes adjusted   TPN: 2L/1392 kcal/day  Monitor fingersticks  Obtain daily weights  Diagnosis:  Severe protein calorie malnutrition in acute illness/ injury

## 2025-03-20 NOTE — PROGRESS NOTE ADULT - ASSESSMENT
74 year old woman with PMHx metastatic urothelial carcinoma in 2017 (s/p immunetherapy), b/l breast cancer (s/p RT), diverticulitis, HTN, HLD, arterial insufficiency, vasculopathy on Xarelto and Pletal, s/p recent Cee procedure for chronic diverticulitis with colo-enteric fistula (3/5, Dr. Bethea) presenting with nausea and vomiting, found to have a post-operative ileus vs. pSBO. Receiving nonoperative management with NG tube decompression, now with return of ostomy function but with persistent high NG tube output. 3/17 PICC/TPN. 3/17 Gastrograffin protocol initiated however needed to be returned to suction due to patient complaining of abdominal pain. EKG done and then Reglan started for promotility.     PLAN:  - Miralax QD  - PICC/TPN started 3/17  - NPO/NGT/IVL  - NGT flushed and clamped this AM. Clamp trial q6hrs. If nausea/emesis/increased abdominal pain will return to suction  - Pain control  - GI PCR negative   - Xarelto held while NGT in place  - Monitor UOP, Ostomy output  - PT eval --> recommend ALLISON    A Team Surgery  [id number]

## 2025-03-21 LAB
ANION GAP SERPL CALC-SCNC: 11 MMOL/L — SIGNIFICANT CHANGE UP (ref 7–14)
ANISOCYTOSIS BLD QL: SLIGHT — SIGNIFICANT CHANGE UP
BASOPHILS # BLD AUTO: 0.06 K/UL — SIGNIFICANT CHANGE UP (ref 0–0.2)
BASOPHILS NFR BLD AUTO: 0.9 % — SIGNIFICANT CHANGE UP (ref 0–2)
BUN SERPL-MCNC: 22 MG/DL — SIGNIFICANT CHANGE UP (ref 7–23)
CALCIUM SERPL-MCNC: 9.7 MG/DL — SIGNIFICANT CHANGE UP (ref 8.4–10.5)
CHLORIDE SERPL-SCNC: 99 MMOL/L — SIGNIFICANT CHANGE UP (ref 98–107)
CO2 SERPL-SCNC: 31 MMOL/L — SIGNIFICANT CHANGE UP (ref 22–31)
CREAT SERPL-MCNC: 0.43 MG/DL — LOW (ref 0.5–1.3)
EGFR: 101 ML/MIN/1.73M2 — SIGNIFICANT CHANGE UP
EGFR: 101 ML/MIN/1.73M2 — SIGNIFICANT CHANGE UP
EOSINOPHIL # BLD AUTO: 0.51 K/UL — HIGH (ref 0–0.5)
EOSINOPHIL NFR BLD AUTO: 7.2 % — HIGH (ref 0–6)
GIANT PLATELETS BLD QL SMEAR: PRESENT — SIGNIFICANT CHANGE UP
GLUCOSE BLDC GLUCOMTR-MCNC: 122 MG/DL — HIGH (ref 70–99)
GLUCOSE BLDC GLUCOMTR-MCNC: 127 MG/DL — HIGH (ref 70–99)
GLUCOSE SERPL-MCNC: 113 MG/DL — HIGH (ref 70–99)
HCT VFR BLD CALC: 27.8 % — LOW (ref 34.5–45)
HGB BLD-MCNC: 8.5 G/DL — LOW (ref 11.5–15.5)
HYPOCHROMIA BLD QL: SLIGHT — SIGNIFICANT CHANGE UP
IANC: 4.54 K/UL — SIGNIFICANT CHANGE UP (ref 1.8–7.4)
LYMPHOCYTES # BLD AUTO: 0.7 K/UL — LOW (ref 1–3.3)
LYMPHOCYTES # BLD AUTO: 9.9 % — LOW (ref 13–44)
MAGNESIUM SERPL-MCNC: 2.1 MG/DL — SIGNIFICANT CHANGE UP (ref 1.6–2.6)
MANUAL SMEAR VERIFICATION: SIGNIFICANT CHANGE UP
MCHC RBC-ENTMCNC: 23.5 PG — LOW (ref 27–34)
MCHC RBC-ENTMCNC: 30.6 G/DL — LOW (ref 32–36)
MCV RBC AUTO: 76.8 FL — LOW (ref 80–100)
MICROCYTES BLD QL: SIGNIFICANT CHANGE UP
MONOCYTES # BLD AUTO: 0.7 K/UL — SIGNIFICANT CHANGE UP (ref 0–0.9)
MONOCYTES NFR BLD AUTO: 9.9 % — SIGNIFICANT CHANGE UP (ref 2–14)
NEUTROPHILS # BLD AUTO: 4.63 K/UL — SIGNIFICANT CHANGE UP (ref 1.8–7.4)
NEUTROPHILS NFR BLD AUTO: 65.8 % — SIGNIFICANT CHANGE UP (ref 43–77)
NRBC # BLD: 1 /100 WBCS — HIGH (ref 0–0)
NRBC BLD-RTO: 1 /100 WBCS — HIGH (ref 0–0)
PHOSPHATE SERPL-MCNC: 3 MG/DL — SIGNIFICANT CHANGE UP (ref 2.5–4.5)
PLAT MORPH BLD: ABNORMAL
PLATELET # BLD AUTO: 382 K/UL — SIGNIFICANT CHANGE UP (ref 150–400)
PLATELET COUNT - ESTIMATE: NORMAL — SIGNIFICANT CHANGE UP
POTASSIUM SERPL-MCNC: 3.8 MMOL/L — SIGNIFICANT CHANGE UP (ref 3.5–5.3)
POTASSIUM SERPL-SCNC: 3.8 MMOL/L — SIGNIFICANT CHANGE UP (ref 3.5–5.3)
RBC # BLD: 3.62 M/UL — LOW (ref 3.8–5.2)
RBC # FLD: 20.8 % — HIGH (ref 10.3–14.5)
RBC BLD AUTO: ABNORMAL
SODIUM SERPL-SCNC: 141 MMOL/L — SIGNIFICANT CHANGE UP (ref 135–145)
VARIANT LYMPHS # BLD: 6.3 % — HIGH (ref 0–6)
VARIANT LYMPHS NFR BLD MANUAL: 6.3 % — HIGH (ref 0–6)
WBC # BLD: 7.04 K/UL — SIGNIFICANT CHANGE UP (ref 3.8–10.5)
WBC # FLD AUTO: 7.04 K/UL — SIGNIFICANT CHANGE UP (ref 3.8–10.5)

## 2025-03-21 PROCEDURE — 99232 SBSQ HOSP IP/OBS MODERATE 35: CPT | Mod: FS

## 2025-03-21 PROCEDURE — 74018 RADEX ABDOMEN 1 VIEW: CPT | Mod: 26,77,76

## 2025-03-21 PROCEDURE — 74018 RADEX ABDOMEN 1 VIEW: CPT | Mod: 26

## 2025-03-21 RX ORDER — DIATRIZOATE MEGLUMINE, SODIUM 66 %-10 %
90 VIAL (ML) INJECTION ONCE
Refills: 0 | Status: COMPLETED | OUTPATIENT
Start: 2025-03-21 | End: 2025-03-21

## 2025-03-21 RX ORDER — ONDANSETRON HCL/PF 4 MG/2 ML
4 VIAL (ML) INJECTION ONCE
Refills: 0 | Status: DISCONTINUED | OUTPATIENT
Start: 2025-03-21 | End: 2025-03-21

## 2025-03-21 RX ORDER — SODIUM/POT/MAG/CALC/CHLOR/ACET 35-20-5MEQ
1 VIAL (ML) INTRAVENOUS
Refills: 0 | Status: DISCONTINUED | OUTPATIENT
Start: 2025-03-21 | End: 2025-03-21

## 2025-03-21 RX ORDER — I.V. FAT EMULSION 20 G/100ML
16.6 EMULSION INTRAVENOUS
Qty: 40 | Refills: 0 | Status: DISCONTINUED | OUTPATIENT
Start: 2025-03-21 | End: 2025-03-22

## 2025-03-21 RX ADMIN — HEPARIN SODIUM 5000 UNIT(S): 1000 INJECTION INTRAVENOUS; SUBCUTANEOUS at 06:40

## 2025-03-21 RX ADMIN — Medication 10 MILLIGRAM(S): at 21:52

## 2025-03-21 RX ADMIN — Medication 400 MILLIGRAM(S): at 13:30

## 2025-03-21 RX ADMIN — Medication 10 MILLIGRAM(S): at 13:12

## 2025-03-21 RX ADMIN — I.V. FAT EMULSION 16.6 ML/HR: 20 EMULSION INTRAVENOUS at 20:13

## 2025-03-21 RX ADMIN — Medication 400 MILLIGRAM(S): at 20:47

## 2025-03-21 RX ADMIN — POLYETHYLENE GLYCOL 3350 17 GRAM(S): 17 POWDER, FOR SOLUTION ORAL at 06:40

## 2025-03-21 RX ADMIN — Medication 1 LOZENGE: at 13:11

## 2025-03-21 RX ADMIN — Medication 1 EACH: at 20:12

## 2025-03-21 RX ADMIN — HEPARIN SODIUM 5000 UNIT(S): 1000 INJECTION INTRAVENOUS; SUBCUTANEOUS at 13:11

## 2025-03-21 RX ADMIN — HEPARIN SODIUM 5000 UNIT(S): 1000 INJECTION INTRAVENOUS; SUBCUTANEOUS at 21:51

## 2025-03-21 RX ADMIN — Medication 1000 MILLIGRAM(S): at 21:10

## 2025-03-21 RX ADMIN — Medication 1000 MILLIGRAM(S): at 14:00

## 2025-03-21 RX ADMIN — LIDOCAINE HYDROCHLORIDE 1 PATCH: 20 JELLY TOPICAL at 06:10

## 2025-03-21 RX ADMIN — Medication 10 MILLIGRAM(S): at 06:40

## 2025-03-21 RX ADMIN — Medication 90 MILLILITER(S): at 09:29

## 2025-03-21 NOTE — CHART NOTE - NSCHARTNOTEFT_GEN_A_CORE
NUTRITION FOLLOW UP NOTE    Pt seen for malnutrition follow up.     SOURCE: [X] Patient [X] Medical record     Medical Course:  - Per chart, pt is 74 year old female PMH metastatic urothelial carcinoma (2017, s/p immunotherapy), bilateral breast cancer (s/p RT), diverticulitis, HTN, HLD, arterial insufficiency, vasculopathy, s/p recent Cee procedure for chronic diverticulitis with colo-enteric fistula (3/5/25) presenting with nausea/vomiting found to have a post-operative ileus vs. pSBO. IR PICC placed (3/17) and TPN initiated. Nutrition Support and Colorectal Surgery following.     Diet Prescription:   - NPO, Except Medications    Nutrition Course:  - Pt remains NPO on TPN: 2L infusing @ 83 mL/hr (100 gm amino acids, 190 gm dextrose, 40 gm SMOF lipids) to provide 1446 kcal/day (meets 25.5 kcal/kg, 1.8 gm protein/kg @ ideal body weight 56.6 kg).   - Ordered for Miralax 17 gm BID and standing Reglan.   - NGT output, per flowsheets: (3/21) 500 mL, (3/20) 1450 mL. Undergoing clamp trials.   - Colostomy output, per flowsheets: (3/21) 0 mL, (3/20) 75 mL.     Food Allergy/Intolerance:  - NKFA    Pertinent Medications:   - lipid fat emulsion (Fish Oil and Plant Based) 20% IV Continuous, metoclopramide Injectable, Parenteral Nutrition TPN Continuous, polyethylene glycol     Pertinent Labs:  - (3/21) Na 141 mmol/L Glu 113 mg/dL[H] K+ 3.8 mmol/L Cr 0.43 mg/dL[L] BUN 22 mg/dL Phos 3.0 mg/dL   - (3/18) Trig 164 mg/dL[H]  - (2/25) HbA1c 6.1%[H]   - POCT: (3/20) 123-131     Weight: (3/20) 117 lbs / 53.1 kg, (3/18 bed scale obtained by RDN) 120.5 lbs / 54.8 kg, (3/13 dosing) 149.9 lbs / 68 kg (questionable accuracy)   Weight Assessment: Apparent ~4 lb (3%) weight loss x2 days .  Height: 65 in / 165.1 cm  IBW: 125 lbs / 56.6 kg +/-10%  BMI: 19.5 kg/m^2 (at lowest weight)    Physical Assessment, per flowsheets:  Edema/Pressure Injury: none noted     Estimated Needs:   [X] No change since previous assessment, based on ideal body weight 125 lbs / 56.6 kg  9597-4075 kcal daily @30-35 kcal/kg, 79.24-96.22 gm protein daily @1.4-1.7 gm/kg     Previous Nutrition Diagnosis: [X] Severe malnutrition in the context in acute illness or injury, ongoing   New Nutrition Diagnosis: [X] not applicable     Education:  [X] not applicable     Interventions:   1) TPN per Nutrition Support Team.   2) Obtain weekly weights.     Monitor & Evaluate:  Tolerance to diet, nutrition related lab values, weight trends, GI distress, hydration status, skin integrity.    Anayeli Gonsales RDN, CDN #[id number]  Also available on [employer] Teams NUTRITION FOLLOW UP NOTE     Pt seen for malnutrition follow up.     SOURCE: [X] Patient [X] Medical record     Medical Course:  - Per chart, pt is 74 year old female PMH metastatic urothelial carcinoma (2017, s/p immunotherapy), bilateral breast cancer (s/p RT), diverticulitis, HTN, HLD, arterial insufficiency, vasculopathy, s/p recent Cee procedure for chronic diverticulitis with colo-enteric fistula (3/5/25) presenting with nausea/vomiting found to have a post-operative ileus vs. pSBO. IR PICC placed (3/17) and TPN initiated. Nutrition Support and Colorectal Surgery following.     Diet Prescription:   - NPO, Except Medications    Nutrition Course:  - Pt remains NPO on TPN: 2L infusing @ 83 mL/hr (100 gm amino acids, 190 gm dextrose, 40 gm SMOF lipids) to provide 1446 kcal/day (meets 25.5 kcal/kg, 1.8 gm protein/kg @ ideal body weight 56.6 kg).   - Ordered for Miralax 17 gm BID and standing Reglan.   - NGT output, per flowsheets: (3/21) 500 mL, (3/20) 1450 mL. Undergoing clamp trials.   - Colostomy output, per flowsheets: (3/21) 0 mL, (3/20) 75 mL.     Food Allergy/Intolerance:  - NKFA    Pertinent Medications:   - lipid fat emulsion (Fish Oil and Plant Based) 20% IV Continuous, metoclopramide Injectable, Parenteral Nutrition TPN Continuous, polyethylene glycol     Pertinent Labs:  - (3/21) Na 141 mmol/L Glu 113 mg/dL[H] K+ 3.8 mmol/L Cr 0.43 mg/dL[L] BUN 22 mg/dL Phos 3.0 mg/dL   - (3/18) Trig 164 mg/dL[H]  - (2/25) HbA1c 6.1%[H]   - POCT: (3/20) 123-131     Weight: (3/20) 117 lbs / 53.1 kg, (3/18 bed scale obtained by RDN) 120.5 lbs / 54.8 kg, (3/13 dosing) 149.9 lbs / 68 kg (questionable accuracy)   Weight Assessment: Apparent ~4 lb (3%) weight loss x2 days .  Height: 65 in / 165.1 cm  IBW: 125 lbs / 56.6 kg +/-10%  BMI: 19.5 kg/m^2 (at lowest weight)    Physical Assessment, per flowsheets:  Edema/Pressure Injury: none noted     Estimated Needs:   [X] No change since previous assessment, based on ideal body weight 125 lbs / 56.6 kg  7618-1974 kcal daily @30-35 kcal/kg, 79.24-96.22 gm protein daily @1.4-1.7 gm/kg     Previous Nutrition Diagnosis: [X] Severe malnutrition in the context in acute illness or injury, ongoing   New Nutrition Diagnosis: [X] not applicable     Education:  [X] not applicable     Interventions:   1) TPN per Nutrition Support Team.   2) Obtain weekly weights.     Monitor & Evaluate:  Tolerance to diet, nutrition related lab values, weight trends, GI distress, hydration status, skin integrity.    Anayeli Gonsales RDN, CDN #[id number]  Also available on [employer] Teams

## 2025-03-21 NOTE — PROGRESS NOTE ADULT - SUBJECTIVE AND OBJECTIVE BOX
NUTRITION NOTE  [id number] ACKERMAN  ===============================    Interval events - Patient was seen and examined at bedside, no acute events overnight. Patient denies chest pain, shortness of breath, nausea or vomiting at this time. IR PICC placed and parenteral nutrition was started on 3/17/25. Pt is tolerating TPN without any issues. TPN/lipids ordered for tonight.     ROS: Except as noted above, all other systems reviewed and are negative     Allergies  sulfa drugs (Unknown)    PAST MEDICAL & SURGICAL HISTORY:  Breast CA, left lumpectomy / RTX in the past  Hypertension  Irritable bowel syndrome (IBS)  Polyp of colon "pre-cancerous" x 2, removed 2014  HLD (hyperlipidemia)  Bladder polyp  Livedoid vasculopathy  Anxiety and depression  KARI on CPAP  Arterial insufficiency  Diverticulitis  Cancer of right breast  HTN (hypertension)  Urothelial carcinoma of bladder  Other specified diseases of intestine  S/P   S/P colon resection reversal, had complications for fibriods  S/P hysterectomy  H/O lumpectomy left   Personal history of spine surgery L1-L4 fusion 2017  S/P lumpectomy, left breast  S/P lumpectomy, right breast  S/P angiogram of extremity    FAMILY HISTORY:  Family history of coronary artery disease (Mother)    Vital Signs Last 24 Hrs  T(C): 36.7 (21 Mar 2025 06:10), Max: 37 (20 Mar 2025 21:00)  T(F): 98.1 (21 Mar 2025 06:10), Max: 98.6 (20 Mar 2025 21:00)  HR: 79 (21 Mar 2025 06:10) (69 - 85)  BP: 123/76 (21 Mar 2025 06:10) (110/67 - 123/76)  RR: 17 (21 Mar 2025 06:10) (17 - 18)  SpO2: 97% (21 Mar 2025 06:10) (97% - 100%)    MEDICATIONS  (STANDING):  chlorhexidine 4% Liquid 1 Application(s) Topical <User Schedule>  heparin   Injectable 5000 Unit(s) SubCutaneous every 8 hours  lipid, fat emulsion (Fish Oil and Plant Based) 20% Infusion 16.6 mL/Hr (16.6 mL/Hr) IV Continuous <Continuous>  metoclopramide Injectable 10 milliGRAM(s) IV Push every 8 hours  Parenteral Nutrition - Adult 1 Each (83 mL/Hr) TPN Continuous <Continuous>  Parenteral Nutrition - Adult 1 Each (83 mL/Hr) TPN Continuous <Continuous>  polyethylene glycol 3350 17 Gram(s) Oral two times a day    MEDICATIONS  (PRN):  acetaminophen   IVPB .. 1000 milliGRAM(s) IV Intermittent every 6 hours PRN Mild Pain (1 - 3)  benzocaine/menthol Lozenge 1 Lozenge Oral three times a day PRN Sore Throat  lidocaine   4% Patch 1 Patch Transdermal every 24 hours PRN back pain  sodium chloride 0.9% lock flush 10 milliLiter(s) IV Push every 1 hour PRN Pre/post blood products, medications, blood draw, and to maintain line patency    I&O's Detail    20 Mar 2025 07:01  -  21 Mar 2025 07:00  --------------------------------------------------------  IN:    Fat Emulsion (Fish Oil &amp; Plant Based) 20% Infusion: 182.6 mL    IV PiggyBack: 116.6 mL    TPN (Total Parenteral Nutrition): 1660 mL  Total IN: 1959.2 mL    OUT:    Colostomy (mL): 75 mL    Nasogastric/Oral tube (mL): 1500 mL    Oral Fluid: 0 mL    Voided (mL): 800 mL  Total OUT: 2375 mL    Total NET: -415.8 mL    POCT Blood Glucose.: 127 mg/dL (21 Mar 2025 12:05)  POCT Blood Glucose.: 131 mg/dL (20 Mar 2025 20:29)    Daily Weight in k.1 (20 Mar 2025 05:22)    Drug Dosing Weight  Height (cm): 165.1 (13 Mar 2025 17:00)  Weight (kg): 68 (13 Mar 2025 17:00)  BMI (kg/m2): 24.9 (13 Mar 2025 17:00)  BSA (m2): 1.75 (13 Mar 2025 17:00)    PHYSICAL EXAM:  Constitutional: A&Ox3, NAD  Gastrointestinal: abdomen soft nontender, nondistended, ostomy with function, NGT in place   Extremities: Moving all extremities, no edema  PICC Site: LUE double lumen PICC in place, site clean and dry, placed 3/17/25    Diet: NPO and TPN/lipids (started on 3/17/25)    LABORATORY                              8.5    7.04  )-----------( 382      ( 21 Mar 2025 04:17 )             27.8   03-21    141  |  99  |  22  ----------------------------<  113[H]  3.8   |  31  |  0.43[L]    Ca    9.7      21 Mar 2025 04:17  Phos  3.0     03-21  Mg     2.10     -21    TPro  6.9  /  Alb  3.3  /  TBili  0.3  /  DBili  x   /  AST  12  /  ALT  <5  /  AlkPhos  91  03-18    LIVER FUNCTIONS - ( 18 Mar 2025 04:09 )  Alb: 3.3 g/dL / Pro: 6.9 g/dL / ALK PHOS: 91 U/L / ALT: <5 U/L / AST: 12 U/L / GGT: x           03-18 Chol -- LDL -- HDL -- Trig 164[H]    ASSESSMENT/PLAN:  75 y/o F with PMHx metastatic urothelial carcinoma in 2017 (s/p immunotherapy b/l breast cancer (s/p RT), diverticulitis, HTN, HLD, arterial insufficiency, vasculopathy on Xarelto and Pletal, s/p recent Cee procedure for chronic diverticulitis with colo-enteric fistula (3/5/25) presenting with nausea and vomiting, found to have a post-operative ileus vs. pSBO. Pt with persistently high NG tube output. Nutrition support consult called for initiation of parenteral nutrition in view of severe protein calorie malnutrition and anticipated prolonged NPO.  IR PICC placed and parenteral nutrition was started on 3/17/25.     continue TPN with infusion volume of 2L, TPN will provide 1446 kcal/day    labs reviewed - electrolytes adjusted in TPN bag    monitor fingersticks, obtain daily weights    continue parenteral nutrition at this time, will follow up with primary team on plan     1.  Severe protein calorie malnutrition being optimized with TPN: CHO [190] gm.  AA [100] gm. SMOF Lipids [40] gm.  2.  Hyperglycemia managed with: [0] units of regular insulin    3.  Check fluid balance daily.  Strict I/O  [ ] [ ]   4.  Daily BMP, Ionized Calcium, Magnesium and Phosphorous   5.  Triglycerides at initiation of TPN and monthly  Chol -- LDL -- HDL -- Trig 164[H]    Nutrition Support [id number]

## 2025-03-21 NOTE — PROGRESS NOTE ADULT - SUBJECTIVE AND OBJECTIVE BOX
TEAM [ A ] Surgery Daily Progress Note  =====================================================    SUBJECTIVE: Patient seen and examined at bedside on AM rounds. Patient reports that they're feeling well. NPO/TPN + NGT clamp trials, denies nausea, vomiting.  Ostomy with air and stool. OOB/Amublating as tolerated. Denies fever, chills.      ALLERGIES:  sulfa drugs (Unknown)      --------------------------------------------------------------------------------------    MEDICATIONS:    Neurologic Medications  acetaminophen   IVPB .. 1000 milliGRAM(s) IV Intermittent every 6 hours PRN Mild Pain (1 - 3)  metoclopramide Injectable 10 milliGRAM(s) IV Push every 8 hours    Respiratory Medications    Cardiovascular Medications    Gastrointestinal Medications  lipid, fat emulsion (Fish Oil and Plant Based) 20% Infusion 16.6 mL/Hr IV Continuous <Continuous>  Parenteral Nutrition - Adult 1 Each TPN Continuous <Continuous>  polyethylene glycol 3350 17 Gram(s) Oral two times a day  sodium chloride 0.9% lock flush 10 milliLiter(s) IV Push every 1 hour PRN Pre/post blood products, medications, blood draw, and to maintain line patency    Genitourinary Medications    Hematologic/Oncologic Medications  heparin   Injectable 5000 Unit(s) SubCutaneous every 8 hours    Antimicrobial/Immunologic Medications    Endocrine/Metabolic Medications    Topical/Other Medications  benzocaine/menthol Lozenge 1 Lozenge Oral three times a day PRN Sore Throat  chlorhexidine 4% Liquid 1 Application(s) Topical <User Schedule>  lidocaine   4% Patch 1 Patch Transdermal every 24 hours PRN back pain    --------------------------------------------------------------------------------------    VITAL SIGNS:  T(C): 36.8 (03-21-25 @ 01:00), Max: 37 (03-20-25 @ 21:00)  HR: 78 (03-21-25 @ 01:00) (69 - 85)  BP: 112/75 (03-21-25 @ 01:00) (110/67 - 120/70)  RR: 17 (03-21-25 @ 01:00) (17 - 18)  SpO2: 98% (03-21-25 @ 01:00) (97% - 100%)  --------------------------------------------------------------------------------------    EXAM    General: NAD, resting in bed comfortably.  Cardiac: regular rate, warm and well perfused  Respiratory: Nonlabored respirations, normal cw expansion.  Abdomen: soft, nontender, nondistended. Ostomy +air/+soft stool.    Extremities: normal strength, FROM, no deformities    --------------------------------------------------------------------------------------    LABS:                        8.5    7.04  )-----------( 382      ( 21 Mar 2025 04:17 )             27.8     03-21    141  |  99  |  22  ----------------------------<  113[H]  3.8   |  31  |  0.43[L]    Ca    9.7      21 Mar 2025 04:17  Phos  3.0     03-21  Mg     2.10     03-21        Urinalysis Basic - ( 21 Mar 2025 04:17 )    Color: x / Appearance: x / SG: x / pH: x  Gluc: 113 mg/dL / Ketone: x  / Bili: x / Urobili: x   Blood: x / Protein: x / Nitrite: x   Leuk Esterase: x / RBC: x / WBC x   Sq Epi: x / Non Sq Epi: x / Bacteria: x      --------------------------------------------------------------------------------------    INS AND OUTS:    03-20-25 @ 07:01  -  03-21-25 @ 07:00  --------------------------------------------------------  IN: 1760 mL / OUT: 3225 mL / NET: -1465 mL      --------------------------------------------------------------------------------------

## 2025-03-21 NOTE — PROGRESS NOTE ADULT - ASSESSMENT
74 year old woman with PMHx metastatic urothelial carcinoma in 2017 (s/p immunetherapy), b/l breast cancer (s/p RT), diverticulitis, HTN, HLD, arterial insufficiency, vasculopathy on Xarelto and Pletal, s/p recent Cee procedure for chronic diverticulitis with colo-enteric fistula (3/5, Dr. Bethea) presenting with nausea and vomiting, found to have a post-operative ileus vs. pSBO. Receiving nonoperative management with NG tube decompression, now with return of ostomy function but with persistent high NG tube output. 3/17 PICC/TPN. 3/17 Gastrograffin protocol initiated however needed to be returned to suction due to patient complaining of abdominal pain. EKG done and then Reglan started for promotility.     PLAN:  - Miralax BID  - PICC/TPN started 3/17  - NPO/NGT/IVL  - NGT flushed and clamped this AM. Clamp trial q6hrs. If nausea/emesis/increased abdominal pain will return to suction  - Pain control  - GI PCR negative   - Xarelto held while NGT in place  - Monitor UOP, Ostomy output  - PT eval --> recommend ALLISON    A Team Surgery  [id number]

## 2025-03-21 NOTE — PROGRESS NOTE ADULT - NS ATTEND AMEND GEN_ALL_CORE FT

## 2025-03-22 LAB
ANION GAP SERPL CALC-SCNC: 13 MMOL/L — SIGNIFICANT CHANGE UP (ref 7–14)
BUN SERPL-MCNC: 29 MG/DL — HIGH (ref 7–23)
CALCIUM SERPL-MCNC: 9.8 MG/DL — SIGNIFICANT CHANGE UP (ref 8.4–10.5)
CHLORIDE SERPL-SCNC: 103 MMOL/L — SIGNIFICANT CHANGE UP (ref 98–107)
CO2 SERPL-SCNC: 27 MMOL/L — SIGNIFICANT CHANGE UP (ref 22–31)
CREAT SERPL-MCNC: 0.49 MG/DL — LOW (ref 0.5–1.3)
EGFR: 98 ML/MIN/1.73M2 — SIGNIFICANT CHANGE UP
EGFR: 98 ML/MIN/1.73M2 — SIGNIFICANT CHANGE UP
GLUCOSE BLDC GLUCOMTR-MCNC: 122 MG/DL — HIGH (ref 70–99)
GLUCOSE BLDC GLUCOMTR-MCNC: 134 MG/DL — HIGH (ref 70–99)
GLUCOSE SERPL-MCNC: 139 MG/DL — HIGH (ref 70–99)
HCT VFR BLD CALC: 29.1 % — LOW (ref 34.5–45)
HGB BLD-MCNC: 8.8 G/DL — LOW (ref 11.5–15.5)
MAGNESIUM SERPL-MCNC: 2.1 MG/DL — SIGNIFICANT CHANGE UP (ref 1.6–2.6)
MCHC RBC-ENTMCNC: 23.5 PG — LOW (ref 27–34)
MCHC RBC-ENTMCNC: 30.2 G/DL — LOW (ref 32–36)
MCV RBC AUTO: 77.6 FL — LOW (ref 80–100)
NRBC # BLD AUTO: 0 K/UL — SIGNIFICANT CHANGE UP (ref 0–0)
NRBC # FLD: 0 K/UL — SIGNIFICANT CHANGE UP (ref 0–0)
NRBC BLD AUTO-RTO: 0 /100 WBCS — SIGNIFICANT CHANGE UP (ref 0–0)
PHOSPHATE SERPL-MCNC: 3 MG/DL — SIGNIFICANT CHANGE UP (ref 2.5–4.5)
PLATELET # BLD AUTO: 477 K/UL — HIGH (ref 150–400)
POTASSIUM SERPL-MCNC: 4.6 MMOL/L — SIGNIFICANT CHANGE UP (ref 3.5–5.3)
POTASSIUM SERPL-SCNC: 4.6 MMOL/L — SIGNIFICANT CHANGE UP (ref 3.5–5.3)
RBC # BLD: 3.75 M/UL — LOW (ref 3.8–5.2)
RBC # FLD: 21.1 % — HIGH (ref 10.3–14.5)
SODIUM SERPL-SCNC: 143 MMOL/L — SIGNIFICANT CHANGE UP (ref 135–145)
WBC # BLD: 10.97 K/UL — HIGH (ref 3.8–10.5)
WBC # FLD AUTO: 10.97 K/UL — HIGH (ref 3.8–10.5)

## 2025-03-22 PROCEDURE — 99232 SBSQ HOSP IP/OBS MODERATE 35: CPT | Mod: FS

## 2025-03-22 RX ORDER — ACETAMINOPHEN 500 MG/5ML
1000 LIQUID (ML) ORAL EVERY 6 HOURS
Refills: 0 | Status: COMPLETED | OUTPATIENT
Start: 2025-03-22 | End: 2025-03-28

## 2025-03-22 RX ORDER — ERYTHROMYCIN ETHYLSUCCINATE 200 MG/5ML
400 SUSPENSION, RECONSTITUTED, ORAL (ML) ORAL EVERY 8 HOURS
Refills: 0 | Status: DISCONTINUED | OUTPATIENT
Start: 2025-03-22 | End: 2025-03-22

## 2025-03-22 RX ORDER — SODIUM/POT/MAG/CALC/CHLOR/ACET 35-20-5MEQ
1 VIAL (ML) INTRAVENOUS
Refills: 0 | Status: DISCONTINUED | OUTPATIENT
Start: 2025-03-22 | End: 2025-03-22

## 2025-03-22 RX ORDER — ERYTHROMYCIN ETHYLSUCCINATE 200 MG/5ML
250 SUSPENSION, RECONSTITUTED, ORAL (ML) ORAL EVERY 8 HOURS
Refills: 0 | Status: DISCONTINUED | OUTPATIENT
Start: 2025-03-22 | End: 2025-03-22

## 2025-03-22 RX ORDER — I.V. FAT EMULSION 20 G/100ML
16.6 EMULSION INTRAVENOUS
Qty: 40 | Refills: 0 | Status: DISCONTINUED | OUTPATIENT
Start: 2025-03-22 | End: 2025-03-23

## 2025-03-22 RX ORDER — ERYTHROMYCIN ETHYLSUCCINATE 200 MG/5ML
400 SUSPENSION, RECONSTITUTED, ORAL (ML) ORAL EVERY 8 HOURS
Refills: 0 | Status: DISCONTINUED | OUTPATIENT
Start: 2025-03-22 | End: 2025-03-24

## 2025-03-22 RX ORDER — ONDANSETRON HCL/PF 4 MG/2 ML
4 VIAL (ML) INJECTION ONCE
Refills: 0 | Status: COMPLETED | OUTPATIENT
Start: 2025-03-22 | End: 2025-03-24

## 2025-03-22 RX ADMIN — Medication 400 MILLIGRAM(S): at 22:37

## 2025-03-22 RX ADMIN — POLYETHYLENE GLYCOL 3350 17 GRAM(S): 17 POWDER, FOR SOLUTION ORAL at 06:10

## 2025-03-22 RX ADMIN — Medication 400 MILLIGRAM(S): at 03:49

## 2025-03-22 RX ADMIN — Medication 40 MILLIGRAM(S): at 12:26

## 2025-03-22 RX ADMIN — HEPARIN SODIUM 5000 UNIT(S): 1000 INJECTION INTRAVENOUS; SUBCUTANEOUS at 06:10

## 2025-03-22 RX ADMIN — HEPARIN SODIUM 5000 UNIT(S): 1000 INJECTION INTRAVENOUS; SUBCUTANEOUS at 14:36

## 2025-03-22 RX ADMIN — I.V. FAT EMULSION 16.6 ML/HR: 20 EMULSION INTRAVENOUS at 21:44

## 2025-03-22 RX ADMIN — Medication 1 EACH: at 21:43

## 2025-03-22 RX ADMIN — Medication 10 MILLIGRAM(S): at 21:26

## 2025-03-22 RX ADMIN — Medication 10 MILLIGRAM(S): at 06:10

## 2025-03-22 RX ADMIN — Medication 1000 MILLIGRAM(S): at 04:10

## 2025-03-22 RX ADMIN — Medication 400 MILLIGRAM(S): at 12:32

## 2025-03-22 RX ADMIN — HEPARIN SODIUM 5000 UNIT(S): 1000 INJECTION INTRAVENOUS; SUBCUTANEOUS at 21:45

## 2025-03-22 RX ADMIN — Medication 10 MILLIGRAM(S): at 14:36

## 2025-03-22 NOTE — PROGRESS NOTE ADULT - NS ATTEND AMEND GEN_ALL_CORE FT
I agree with the above history, physical examination, chief complaint/diagnosis, and plan, which I have reviewed and edited where appropriate.  I agree with notes/assessment and detailed interval history of health care providers on my service.  I have seen and examined the patient.  I reviewed the laboratory and available data and agree with the history, physical assessment and plan.  I reviewed and discussed with all consultants, house staff and PA's.  The Nutrition Support Team (NST) discusses on an ongoing basis with the primary team and all consultants, House staff and PA's to have a permanent risk benefit analyses on all decisions and coordinating care.  I was physically present for the key portions of the evaluation and management (E/M) service provided.  73 y/o F with PMHx metastatic urothelial carcinoma, s/p recent Cee procedure for chronic diverticulitis with colo-enteric fistula.  Nutrition support consult called for initiation of parenteral nutrition in view of severe protein calorie malnutrition and anticipated NPO.  PHYSICAL EXAM:  Constitutional: NAD  Lung: clear  Heart: RR  Gastrointestinal: abdomen soft nontender  Extremities: warm  Diagnosis:  Severe protein calorie malnutrition in acute illness/injury  Labs reviewed-electrolytes adjusted   TPN: 2L/1446 kcal/day  Monitor fingersticks  Obtain daily weights

## 2025-03-22 NOTE — PROGRESS NOTE ADULT - SUBJECTIVE AND OBJECTIVE BOX
24 Hour Events:  - ~1400 cc output overnight after ending clamp trial    SUBJECTIVE: Pt seen at bedside during AM rounds.    Vital Signs Last 24 Hrs  T(C): 36.6 (22 Mar 2025 09:46), Max: 37 (21 Mar 2025 13:12)  T(F): 97.8 (22 Mar 2025 09:46), Max: 98.6 (21 Mar 2025 13:12)  HR: 97 (22 Mar 2025 09:46) (80 - 100)  BP: 137/85 (22 Mar 2025 09:46) (124/84 - 138/88)  BP(mean): --  RR: 17 (22 Mar 2025 09:46) (17 - 17)  SpO2: 100% (22 Mar 2025 09:46) (97% - 100%)    Parameters below as of 22 Mar 2025 09:46  Patient On (Oxygen Delivery Method): room air        I&O's Summary    21 Mar 2025 07:01  -  22 Mar 2025 07:00  --------------------------------------------------------  IN: 913 mL / OUT: 2400 mL / NET: -1487 mL    22 Mar 2025 07:01  -  22 Mar 2025 10:11  --------------------------------------------------------  IN: 0 mL / OUT: 260 mL / NET: -260 mL        LABS:                        8.5    7.04  )-----------( 382      ( 21 Mar 2025 04:17 )             27.8     03-21    141  |  99  |  22  ----------------------------<  113[H]  3.8   |  31  |  0.43[L]    Ca    9.7      21 Mar 2025 04:17  Phos  3.0     03-21  Mg     2.10     03-21        Urinalysis Basic - ( 21 Mar 2025 04:17 )    Color: x / Appearance: x / SG: x / pH: x  Gluc: 113 mg/dL / Ketone: x  / Bili: x / Urobili: x   Blood: x / Protein: x / Nitrite: x   Leuk Esterase: x / RBC: x / WBC x   Sq Epi: x / Non Sq Epi: x / Bacteria: x        Physical Exam:  General Appearance: Appears well, NAD  Neck: Supple  Chest: Equal expansion bilaterally, equal breath sounds  CV: Pulse present  Abdomen: Soft, nontender  Extremities: Grossly symmetric, SCD's in place

## 2025-03-22 NOTE — PROGRESS NOTE ADULT - SUBJECTIVE AND OBJECTIVE BOX
NUTRITION NOTE  [id number] ACKERMAN  ===============================    Interval events - Patient was seen and examined at bedside, no acute events overnight. Patient denies chest pain, shortness of breath, nausea or vomiting at this time. IR PICC placed and parenteral nutrition was started on 3/17/25. Pt is tolerating TPN without any issues. TPN/lipids ordered for tonight.     ROS: Except as noted above, all other systems reviewed and are negative     Allergies  sulfa drugs (Unknown)    PAST MEDICAL & SURGICAL HISTORY:  Breast CA, left lumpectomy / RTX in the past  Hypertension  Irritable bowel syndrome (IBS)  Polyp of colon "pre-cancerous" x 2, removed 2014  HLD (hyperlipidemia)  Bladder polyp  Livedoid vasculopathy  Anxiety and depression  KARI on CPAP  Arterial insufficiency  Diverticulitis  Cancer of right breast  HTN (hypertension)  Urothelial carcinoma of bladder  Other specified diseases of intestine  S/P   S/P colon resection reversal, had complications for fibriods  S/P hysterectomy  H/O lumpectomy left   Personal history of spine surgery L1-L4 fusion 2017  S/P lumpectomy, left breast  S/P lumpectomy, right breast  S/P angiogram of extremity    FAMILY HISTORY:  Family history of coronary artery disease (Mother)    Vital Signs Last 24 Hrs  T(C): 36.6 (22 Mar 2025 09:46), Max: 37 (21 Mar 2025 13:12)  T(F): 97.8 (22 Mar 2025 09:46), Max: 98.6 (21 Mar 2025 13:12)  HR: 97 (22 Mar 2025 09:46) (80 - 100)  BP: 137/85 (22 Mar 2025 09:46) (124/84 - 138/88)  RR: 17 (22 Mar 2025 09:46) (17 - 17)  SpO2: 100% (22 Mar 2025 09:46) (97% - 100%)    MEDICATIONS  (STANDING):  chlorhexidine 4% Liquid 1 Application(s) Topical <User Schedule>  heparin   Injectable 5000 Unit(s) SubCutaneous every 8 hours  lipid, fat emulsion (Fish Oil and Plant Based) 20% Infusion 16.6 mL/Hr (16.6 mL/Hr) IV Continuous <Continuous>  metoclopramide Injectable 10 milliGRAM(s) IV Push every 8 hours  ondansetron Injectable 4 milliGRAM(s) IV Push once  pantoprazole  Injectable 40 milliGRAM(s) IV Push daily  Parenteral Nutrition - Adult 1 Each (83 mL/Hr) TPN Continuous <Continuous>  Parenteral Nutrition - Adult 1 Each (83 mL/Hr) TPN Continuous <Continuous>  polyethylene glycol 3350 17 Gram(s) Oral two times a day    MEDICATIONS  (PRN):  acetaminophen   IVPB .. 1000 milliGRAM(s) IV Intermittent every 6 hours PRN Mild Pain (1 - 3)  benzocaine/menthol Lozenge 1 Lozenge Oral three times a day PRN Sore Throat  lidocaine   4% Patch 1 Patch Transdermal every 24 hours PRN back pain  sodium chloride 0.9% lock flush 10 milliLiter(s) IV Push every 1 hour PRN Pre/post blood products, medications, blood draw, and to maintain line patency    I&O's Detail    21 Mar 2025 07:01  -  22 Mar 2025 07:00  --------------------------------------------------------  IN:    TPN (Total Parenteral Nutrition): 913 mL  Total IN: 913 mL    OUT:    Colostomy (mL): 150 mL    Nasogastric/Oral tube (mL): 1600 mL    Voided (mL): 650 mL  Total OUT: 2400 mL    Total NET: -1487 mL      22 Mar 2025 07:01  -  22 Mar 2025 10:07  --------------------------------------------------------  IN:  Total IN: 0 mL    OUT:    Oral Fluid: 0 mL    Voided (mL): 200 mL  Total OUT: 200 mL    Total NET: -200 mL    POCT Blood Glucose.: 122 mg/dL (21 Mar 2025 18:32)  POCT Blood Glucose.: 127 mg/dL (21 Mar 2025 12:05)    Daily Weight in k.1 (20 Mar 2025 05:22)    Drug Dosing Weight  Height (cm): 165.1 (13 Mar 2025 17:00)  Weight (kg): 68 (13 Mar 2025 17:00)  BMI (kg/m2): 24.9 (13 Mar 2025 17:00)  BSA (m2): 1.75 (13 Mar 2025 17:00)    PHYSICAL EXAM:  Constitutional: A&Ox3, NAD  Gastrointestinal: abdomen soft nontender, nondistended, ostomy with function, NGT in place   Extremities: Moving all extremities, no edema  PICC Site: LUE double lumen PICC in place, site clean and dry, placed 3/17/25    Diet: NPO and TPN/lipids (started on 3/17/25)    LABORATORY                                       8.5    7.04  )-----------( 382      ( 21 Mar 2025 04:17 )             27.8   03-21    141  |  99  |  22  ----------------------------<  113[H]  3.8   |  31  |  0.43[L]    Ca    9.7      21 Mar 2025 04:17  Phos  3.0     03-21  Mg     2.10     03-21    TPro  6.9  /  Alb  3.3  /  TBili  0.3  /  DBili  x   /  AST  12  /  ALT  <5  /  AlkPhos  91  03-18    LIVER FUNCTIONS - ( 18 Mar 2025 04:09 )  Alb: 3.3 g/dL / Pro: 6.9 g/dL / ALK PHOS: 91 U/L / ALT: <5 U/L / AST: 12 U/L / GGT: x           03-18 Chol -- LDL -- HDL -- Trig 164[H]    ASSESSMENT/PLAN:  75 y/o F with PMHx metastatic urothelial carcinoma in 2017 (s/p immunotherapy b/l breast cancer (s/p RT), diverticulitis, HTN, HLD, arterial insufficiency, vasculopathy on Xarelto and Pletal, s/p recent Cee procedure for chronic diverticulitis with colo-enteric fistula (3/5/25) presenting with nausea and vomiting, found to have a post-operative ileus vs. pSBO. Pt with persistently high NG tube output. Nutrition support consult called for initiation of parenteral nutrition in view of severe protein calorie malnutrition and anticipated prolonged NPO.  IR PICC placed and parenteral nutrition was started on 3/17/25.     continue TPN with infusion volume of 2L, TPN will provide 1446 kcal/day    no MA labs, continue same TPN formula today     monitor fingersticks, obtain daily weights    continue parenteral nutrition at this time, will follow up with primary team on plan     1.  Severe protein calorie malnutrition being optimized with TPN: CHO [190] gm.  AA [100] gm. SMOF Lipids [40] gm.  2.  Hyperglycemia managed with: [0] units of regular insulin    3.  Check fluid balance daily.  Strict I/O  [ ] [ ]   4.  Daily BMP, Ionized Calcium, Magnesium and Phosphorous   5.  Triglycerides at initiation of TPN and monthly  Chol -- LDL -- HDL -- Trig 164[H]    Nutrition Support [id number]  NUTRITION NOTE  [id number] ACKERMAN  ===============================    Interval events - Patient was seen and examined at bedside, no acute events overnight. Patient denies chest pain, shortness of breath, nausea or vomiting at this time. IR PICC placed and parenteral nutrition was started on 3/17/25. Pt is tolerating TPN without any issues. TPN/lipids ordered for tonight.     ROS: Except as noted above, all other systems reviewed and are negative     Allergies  sulfa drugs (Unknown)    PAST MEDICAL & SURGICAL HISTORY:  Breast CA, left lumpectomy / RTX in the past  Hypertension  Irritable bowel syndrome (IBS)  Polyp of colon "pre-cancerous" x 2, removed 2014  HLD (hyperlipidemia)  Bladder polyp  Livedoid vasculopathy  Anxiety and depression  KARI on CPAP  Arterial insufficiency  Diverticulitis  Cancer of right breast  HTN (hypertension)  Urothelial carcinoma of bladder  Other specified diseases of intestine  S/P   S/P colon resection reversal, had complications for fibriods  S/P hysterectomy  H/O lumpectomy left   Personal history of spine surgery L1-L4 fusion 2017  S/P lumpectomy, left breast  S/P lumpectomy, right breast  S/P angiogram of extremity    FAMILY HISTORY:  Family history of coronary artery disease (Mother)    Vital Signs Last 24 Hrs  T(C): 36.6 (22 Mar 2025 09:46), Max: 37 (21 Mar 2025 13:12)  T(F): 97.8 (22 Mar 2025 09:46), Max: 98.6 (21 Mar 2025 13:12)  HR: 97 (22 Mar 2025 09:46) (80 - 100)  BP: 137/85 (22 Mar 2025 09:46) (124/84 - 138/88)  RR: 17 (22 Mar 2025 09:46) (17 - 17)  SpO2: 100% (22 Mar 2025 09:46) (97% - 100%)    MEDICATIONS  (STANDING):  chlorhexidine 4% Liquid 1 Application(s) Topical <User Schedule>  heparin   Injectable 5000 Unit(s) SubCutaneous every 8 hours  lipid, fat emulsion (Fish Oil and Plant Based) 20% Infusion 16.6 mL/Hr (16.6 mL/Hr) IV Continuous <Continuous>  metoclopramide Injectable 10 milliGRAM(s) IV Push every 8 hours  ondansetron Injectable 4 milliGRAM(s) IV Push once  pantoprazole  Injectable 40 milliGRAM(s) IV Push daily  Parenteral Nutrition - Adult 1 Each (83 mL/Hr) TPN Continuous <Continuous>  Parenteral Nutrition - Adult 1 Each (83 mL/Hr) TPN Continuous <Continuous>  polyethylene glycol 3350 17 Gram(s) Oral two times a day    MEDICATIONS  (PRN):  acetaminophen   IVPB .. 1000 milliGRAM(s) IV Intermittent every 6 hours PRN Mild Pain (1 - 3)  benzocaine/menthol Lozenge 1 Lozenge Oral three times a day PRN Sore Throat  lidocaine   4% Patch 1 Patch Transdermal every 24 hours PRN back pain  sodium chloride 0.9% lock flush 10 milliLiter(s) IV Push every 1 hour PRN Pre/post blood products, medications, blood draw, and to maintain line patency    I&O's Detail    21 Mar 2025 07:01  -  22 Mar 2025 07:00  --------------------------------------------------------  IN:    TPN (Total Parenteral Nutrition): 913 mL  Total IN: 913 mL    OUT:    Colostomy (mL): 150 mL    Nasogastric/Oral tube (mL): 1600 mL    Voided (mL): 650 mL  Total OUT: 2400 mL    Total NET: -1487 mL      22 Mar 2025 07:01  -  22 Mar 2025 10:07  --------------------------------------------------------  IN:  Total IN: 0 mL    OUT:    Oral Fluid: 0 mL    Voided (mL): 200 mL  Total OUT: 200 mL    Total NET: -200 mL    POCT Blood Glucose.: 122 mg/dL (21 Mar 2025 18:32)  POCT Blood Glucose.: 127 mg/dL (21 Mar 2025 12:05)    Daily Weight in k.1 (20 Mar 2025 05:22)    Drug Dosing Weight  Height (cm): 165.1 (13 Mar 2025 17:00)  Weight (kg): 68 (13 Mar 2025 17:00)  BMI (kg/m2): 24.9 (13 Mar 2025 17:00)  BSA (m2): 1.75 (13 Mar 2025 17:00)    PHYSICAL EXAM:  Constitutional: A&Ox3, NAD  Gastrointestinal: abdomen soft nontender, nondistended, ostomy with function, NGT in place   Extremities: Moving all extremities, no edema  PICC Site: LUE double lumen PICC in place, site clean and dry, placed 3/17/25    Diet: NPO and TPN/lipids (started on 3/17/25)    LABORATORY                                       8.5    7.04  )-----------( 382      ( 21 Mar 2025 04:17 )             27.8   03-21    141  |  99  |  22  ----------------------------<  113[H]  3.8   |  31  |  0.43[L]    Ca    9.7      21 Mar 2025 04:17  Phos  3.0     03-21  Mg     2.10     03-21    TPro  6.9  /  Alb  3.3  /  TBili  0.3  /  DBili  x   /  AST  12  /  ALT  <5  /  AlkPhos  91  03-18    LIVER FUNCTIONS - ( 18 Mar 2025 04:09 )  Alb: 3.3 g/dL / Pro: 6.9 g/dL / ALK PHOS: 91 U/L / ALT: <5 U/L / AST: 12 U/L / GGT: x           03-18 Chol -- LDL -- HDL -- Trig 164[H]    ASSESSMENT/PLAN:  75 y/o F with PMHx metastatic urothelial carcinoma in 2017 (s/p immunotherapy b/l breast cancer (s/p RT), diverticulitis, HTN, HLD, arterial insufficiency, vasculopathy on Xarelto and Pletal, s/p recent Cee procedure for chronic diverticulitis with colo-enteric fistula (3/5/25) presenting with nausea and vomiting, found to have a post-operative ileus vs. pSBO. Pt with persistently high NG tube output. Nutrition support consult called for initiation of parenteral nutrition in view of severe protein calorie malnutrition and anticipated prolonged NPO.  IR PICC placed and parenteral nutrition was started on 3/17/25.     continue TPN with infusion volume of 2L, TPN will provide 1446 kcal/day    no AM labs, continue same TPN formula today     monitor fingersticks, obtain daily weights    continue parenteral nutrition at this time, will follow up with primary team on plan     1.  Severe protein calorie malnutrition being optimized with TPN: CHO [190] gm.  AA [100] gm. SMOF Lipids [40] gm.  2.  Hyperglycemia managed with: [0] units of regular insulin    3.  Check fluid balance daily.  Strict I/O  [ ] [ ]   4.  Daily BMP, Ionized Calcium, Magnesium and Phosphorous   5.  Triglycerides at initiation of TPN and monthly  Chol -- LDL -- HDL -- Trig 164[H]    Nutrition Support [id number]

## 2025-03-22 NOTE — PROGRESS NOTE ADULT - ASSESSMENT
74 year old woman with PMHx metastatic urothelial carcinoma in 2017 (s/p immunetherapy), b/l breast cancer (s/p RT), diverticulitis, HTN, HLD, arterial insufficiency, vasculopathy on Xarelto and Pletal, s/p recent Cee procedure for chronic diverticulitis with colo-enteric fistula (3/5, Dr. Bethea) presenting with nausea and vomiting, found to have a post-operative ileus vs. pSBO. Receiving nonoperative management with NG tube decompression, now with return of ostomy function but with persistent high NG tube output. 3/17 PICC/TPN. 3/17 Gastrograffin protocol initiated however needed to be returned to suction due to patient complaining of abdominal pain. EKG done and then Reglan started for promotility. Starting erythromycin today for pro-motility.    PLAN:  - Erythromycin 250 q8 oral suspension via NGT  - Miralax BID  - PICC/TPN started 3/17  - NPO/NGT/IVL  - NGT flushed. Clamp trial q6hrs. If nausea/emesis/increased abdominal pain will return to suction. Clamp trial today at 12pm  - Pain control  - GI PCR negative   - Xarelto held while NGT in place  - Monitor UOP, Ostomy output  - PT eval --> recommend ALLISON    A Team Surgery  [id number]

## 2025-03-22 NOTE — CHART NOTE - NSCHARTNOTEFT_GEN_A_CORE
NGT fell out per nurse and patient as 6 hour clamp trial was ending. Pt not nauseous throughout the duration of trial. Patient states statlock unstuck to nose and came out, but didn't actively pull it out herself. Day team aware. Will keep out and see how patient progresses throughout the evening. Will re-insert new NGT if patient has episodes of emesis this shift.     Adan Redmond MD PGY-1  [facility] A  #[phone]

## 2025-03-23 LAB
ALBUMIN SERPL ELPH-MCNC: 3.4 G/DL — SIGNIFICANT CHANGE UP (ref 3.3–5)
ALP SERPL-CCNC: 188 U/L — HIGH (ref 40–120)
ALT FLD-CCNC: 85 U/L — HIGH (ref 4–33)
ANION GAP SERPL CALC-SCNC: 11 MMOL/L — SIGNIFICANT CHANGE UP (ref 7–14)
AST SERPL-CCNC: 60 U/L — HIGH (ref 4–32)
BASOPHILS # BLD AUTO: 0.08 K/UL — SIGNIFICANT CHANGE UP (ref 0–0.2)
BASOPHILS NFR BLD AUTO: 0.8 % — SIGNIFICANT CHANGE UP (ref 0–2)
BILIRUB SERPL-MCNC: 0.2 MG/DL — SIGNIFICANT CHANGE UP (ref 0.2–1.2)
BUN SERPL-MCNC: 26 MG/DL — HIGH (ref 7–23)
CALCIUM SERPL-MCNC: 9.9 MG/DL — SIGNIFICANT CHANGE UP (ref 8.4–10.5)
CHLORIDE SERPL-SCNC: 106 MMOL/L — SIGNIFICANT CHANGE UP (ref 98–107)
CO2 SERPL-SCNC: 24 MMOL/L — SIGNIFICANT CHANGE UP (ref 22–31)
CREAT SERPL-MCNC: 0.46 MG/DL — LOW (ref 0.5–1.3)
EGFR: 100 ML/MIN/1.73M2 — SIGNIFICANT CHANGE UP
EGFR: 100 ML/MIN/1.73M2 — SIGNIFICANT CHANGE UP
EOSINOPHIL # BLD AUTO: 0.37 K/UL — SIGNIFICANT CHANGE UP (ref 0–0.5)
EOSINOPHIL NFR BLD AUTO: 3.8 % — SIGNIFICANT CHANGE UP (ref 0–6)
GLUCOSE BLDC GLUCOMTR-MCNC: 135 MG/DL — HIGH (ref 70–99)
GLUCOSE SERPL-MCNC: 124 MG/DL — HIGH (ref 70–99)
HCT VFR BLD CALC: 30.2 % — LOW (ref 34.5–45)
HGB BLD-MCNC: 9 G/DL — LOW (ref 11.5–15.5)
IANC: 6.57 K/UL — SIGNIFICANT CHANGE UP (ref 1.8–7.4)
IMM GRANULOCYTES NFR BLD AUTO: 0.4 % — SIGNIFICANT CHANGE UP (ref 0–0.9)
LYMPHOCYTES # BLD AUTO: 1.38 K/UL — SIGNIFICANT CHANGE UP (ref 1–3.3)
LYMPHOCYTES # BLD AUTO: 14.2 % — SIGNIFICANT CHANGE UP (ref 13–44)
MAGNESIUM SERPL-MCNC: 2 MG/DL — SIGNIFICANT CHANGE UP (ref 1.6–2.6)
MCHC RBC-ENTMCNC: 23.9 PG — LOW (ref 27–34)
MCHC RBC-ENTMCNC: 29.8 G/DL — LOW (ref 32–36)
MCV RBC AUTO: 80.1 FL — SIGNIFICANT CHANGE UP (ref 80–100)
MONOCYTES # BLD AUTO: 1.31 K/UL — HIGH (ref 0–0.9)
MONOCYTES NFR BLD AUTO: 13.4 % — SIGNIFICANT CHANGE UP (ref 2–14)
NEUTROPHILS # BLD AUTO: 6.57 K/UL — SIGNIFICANT CHANGE UP (ref 1.8–7.4)
NEUTROPHILS NFR BLD AUTO: 67.4 % — SIGNIFICANT CHANGE UP (ref 43–77)
NRBC # BLD AUTO: 0 K/UL — SIGNIFICANT CHANGE UP (ref 0–0)
NRBC # FLD: 0 K/UL — SIGNIFICANT CHANGE UP (ref 0–0)
NRBC BLD AUTO-RTO: 0 /100 WBCS — SIGNIFICANT CHANGE UP (ref 0–0)
PHOSPHATE SERPL-MCNC: 2.7 MG/DL — SIGNIFICANT CHANGE UP (ref 2.5–4.5)
PLATELET # BLD AUTO: 415 K/UL — HIGH (ref 150–400)
POTASSIUM SERPL-MCNC: 4.6 MMOL/L — SIGNIFICANT CHANGE UP (ref 3.5–5.3)
POTASSIUM SERPL-SCNC: 4.6 MMOL/L — SIGNIFICANT CHANGE UP (ref 3.5–5.3)
PROT SERPL-MCNC: 6.8 G/DL — SIGNIFICANT CHANGE UP (ref 6–8.3)
RBC # BLD: 3.77 M/UL — LOW (ref 3.8–5.2)
RBC # FLD: 21.3 % — HIGH (ref 10.3–14.5)
SODIUM SERPL-SCNC: 141 MMOL/L — SIGNIFICANT CHANGE UP (ref 135–145)
WBC # BLD: 9.75 K/UL — SIGNIFICANT CHANGE UP (ref 3.8–10.5)
WBC # FLD AUTO: 9.75 K/UL — SIGNIFICANT CHANGE UP (ref 3.8–10.5)

## 2025-03-23 PROCEDURE — 99232 SBSQ HOSP IP/OBS MODERATE 35: CPT | Mod: FS

## 2025-03-23 PROCEDURE — 74177 CT ABD & PELVIS W/CONTRAST: CPT | Mod: 26

## 2025-03-23 RX ORDER — SODIUM/POT/MAG/CALC/CHLOR/ACET 35-20-5MEQ
1 VIAL (ML) INTRAVENOUS
Refills: 0 | Status: DISCONTINUED | OUTPATIENT
Start: 2025-03-23 | End: 2025-03-23

## 2025-03-23 RX ORDER — I.V. FAT EMULSION 20 G/100ML
16.6 EMULSION INTRAVENOUS
Qty: 40 | Refills: 0 | Status: DISCONTINUED | OUTPATIENT
Start: 2025-03-23 | End: 2025-03-24

## 2025-03-23 RX ADMIN — HEPARIN SODIUM 5000 UNIT(S): 1000 INJECTION INTRAVENOUS; SUBCUTANEOUS at 15:00

## 2025-03-23 RX ADMIN — HEPARIN SODIUM 5000 UNIT(S): 1000 INJECTION INTRAVENOUS; SUBCUTANEOUS at 05:54

## 2025-03-23 RX ADMIN — Medication 1 EACH: at 22:40

## 2025-03-23 RX ADMIN — Medication 40 MILLIGRAM(S): at 15:24

## 2025-03-23 RX ADMIN — I.V. FAT EMULSION 16.6 ML/HR: 20 EMULSION INTRAVENOUS at 22:47

## 2025-03-23 RX ADMIN — Medication 10 MILLIGRAM(S): at 05:29

## 2025-03-23 RX ADMIN — Medication 400 MILLIGRAM(S): at 05:54

## 2025-03-23 RX ADMIN — POLYETHYLENE GLYCOL 3350 17 GRAM(S): 17 POWDER, FOR SOLUTION ORAL at 05:54

## 2025-03-23 RX ADMIN — Medication 400 MILLIGRAM(S): at 23:04

## 2025-03-23 RX ADMIN — Medication 10 MILLIGRAM(S): at 15:00

## 2025-03-23 RX ADMIN — HEPARIN SODIUM 5000 UNIT(S): 1000 INJECTION INTRAVENOUS; SUBCUTANEOUS at 23:04

## 2025-03-23 NOTE — PROGRESS NOTE ADULT - ASSESSMENT
74 year old woman with PMHx metastatic urothelial carcinoma in 2017 (s/p immunetherapy), b/l breast cancer (s/p RT), diverticulitis, HTN, HLD, arterial insufficiency, vasculopathy on Xarelto and Pletal, s/p recent Cee procedure for chronic diverticulitis with colo-enteric fistula (3/5, Dr. Bethea) presenting with nausea and vomiting, found to have a post-operative ileus vs. pSBO. Receiving nonoperative management with NG tube decompression, now with return of ostomy function but with persistent high NG tube output. 3/17 PICC/TPN. 3/17 Gastrograffin protocol initiated however needed to be returned to suction due to patient complaining of abdominal pain. EKG done and then Reglan started for promotility. Starting erythromycin today for pro-motility.    PLAN:  - Erythromycin 250 q8 oral suspension via NGT  - Miralax BID  - PICC/TPN started 3/17  - Diet: Adv to sips  - Will remove midline staples today  - NGT flushed. Clamp trial q6hrs. If nausea/emesis/increased abdominal pain will return to suction. Clamp trial today at 12pm  - Pain control  - GI PCR negative   - Monitor UOP, Ostomy output  - PT eval --> recommend ALLISON  - Will discuss resuming xarelto in coming days    A Team Surgery  [id number]

## 2025-03-23 NOTE — PROGRESS NOTE ADULT - SUBJECTIVE AND OBJECTIVE BOX
NUTRITION NOTE  [id number] ACKERMAN  ===============================    Interval events - Patient was seen and examined at bedside, no acute events overnight. Patient denies chest pain, shortness of breath, nausea or vomiting at this time. IR PICC placed and parenteral nutrition was started on 3/17/25. Pt is tolerating TPN without any issues. TPN/lipids ordered for tonight.     ROS: Except as noted above, all other systems reviewed and are negative     Allergies  sulfa drugs (Unknown)    PAST MEDICAL & SURGICAL HISTORY:  Breast CA, left lumpectomy / RTX in the past  Hypertension  Irritable bowel syndrome (IBS)  Polyp of colon "pre-cancerous" x 2, removed 2014  HLD (hyperlipidemia)  Bladder polyp  Livedoid vasculopathy  Anxiety and depression  KARI on CPAP  Arterial insufficiency  Diverticulitis  Cancer of right breast  HTN (hypertension)  Urothelial carcinoma of bladder  Other specified diseases of intestine  S/P   S/P colon resection reversal, had complications for fibriods  S/P hysterectomy  H/O lumpectomy left   Personal history of spine surgery L1-L4 fusion 2017  S/P lumpectomy, left breast  S/P lumpectomy, right breast  S/P angiogram of extremity    FAMILY HISTORY:  Family history of coronary artery disease (Mother)    Vital Signs Last 24 Hrs  T(C): 36.6 (23 Mar 2025 09:21), Max: 37 (23 Mar 2025 00:55)  T(F): 97.9 (23 Mar 2025 09:21), Max: 98.6 (23 Mar 2025 00:55)  HR: 72 (23 Mar 2025 09:21) (72 - 84)  BP: 140/81 (23 Mar 2025 09:21) (126/72 - 140/81)  RR: 18 (23 Mar 2025 09:21) (16 - 18)  SpO2: 99% (23 Mar 2025 09:21) (97% - 99%)    MEDICATIONS  (STANDING):  chlorhexidine 4% Liquid 1 Application(s) Topical <User Schedule>  erythromycin    ethylsuccinate Suspension 40 mG/mL 400 milliGRAM(s) Oral every 8 hours  heparin   Injectable 5000 Unit(s) SubCutaneous every 8 hours  lipid, fat emulsion (Fish Oil and Plant Based) 20% Infusion 16.6 mL/Hr (16.6 mL/Hr) IV Continuous <Continuous>  metoclopramide Injectable 10 milliGRAM(s) IV Push every 8 hours  ondansetron Injectable 4 milliGRAM(s) IV Push once  pantoprazole  Injectable 40 milliGRAM(s) IV Push daily  Parenteral Nutrition - Adult 1 Each (83 mL/Hr) TPN Continuous <Continuous>  Parenteral Nutrition - Adult 1 Each (83 mL/Hr) TPN Continuous <Continuous>  polyethylene glycol 3350 17 Gram(s) Oral two times a day    MEDICATIONS  (PRN):  acetaminophen   IVPB .. 1000 milliGRAM(s) IV Intermittent every 6 hours PRN Mild Pain (1 - 3)  benzocaine/menthol Lozenge 1 Lozenge Oral three times a day PRN Sore Throat  lidocaine   4% Patch 1 Patch Transdermal every 24 hours PRN back pain  sodium chloride 0.9% lock flush 10 milliLiter(s) IV Push every 1 hour PRN Pre/post blood products, medications, blood draw, and to maintain line patency    I&O's Detail    22 Mar 2025 07:01  -  23 Mar 2025 07:00  --------------------------------------------------------  IN:    Fat Emulsion (Fish Oil &amp; Plant Based) 20% Infusion: 166 mL    Oral Fluid: 200 mL    TPN (Total Parenteral Nutrition): 830 mL  Total IN: 1196 mL    OUT:    Colostomy (mL): 300 mL    Nasogastric/Oral tube (mL): 0 mL    Voided (mL): 400 mL  Total OUT: 700 mL    Total NET: 496 mL      23 Mar 2025 07:01  -  23 Mar 2025 10:30  --------------------------------------------------------  IN:  Total IN: 0 mL    OUT:    Voided (mL): 425 mL  Total OUT: 425 mL    Total NET: -425 mL    POCT Blood Glucose.: 135 mg/dL (23 Mar 2025 07:17)  POCT Blood Glucose.: 122 mg/dL (22 Mar 2025 18:03)    Daily Weight in k.1 (20 Mar 2025 05:22)    Drug Dosing Weight  Height (cm): 165.1 (13 Mar 2025 17:00)  Weight (kg): 68 (13 Mar 2025 17:00)  BMI (kg/m2): 24.9 (13 Mar 2025 17:00)  BSA (m2): 1.75 (13 Mar 2025 17:00)    PHYSICAL EXAM:  Constitutional: A&Ox3, NAD  Gastrointestinal: abdomen soft nontender, nondistended, ostomy with function  Extremities: Moving all extremities, no edema  PICC Site: LUE double lumen PICC in place, site clean and dry, placed 3/17/25    Diet: NPO with sips and TPN/lipids (started on 3/17/25)    LABORATORY                                                9.0    9.75  )-----------( 415      ( 23 Mar 2025 04:20 )             30.2   03-    141  |  106  |  26[H]  ----------------------------<  124[H]  4.6   |  24  |  0.46[L]    Ca    9.9      23 Mar 2025 04:20  Phos  2.7     03-23  Mg     2.00     03-23    TPro  6.8  /  Alb  3.4  /  TBili  0.2  /  DBili  x   /  AST  60[H]  /  ALT  85[H]  /  AlkPhos  188[H]  03-23    LIVER FUNCTIONS - ( 23 Mar 2025 04:20 )  Alb: 3.4 g/dL / Pro: 6.8 g/dL / ALK PHOS: 188 U/L / ALT: 85 U/L / AST: 60 U/L / GGT: x           03-18 Chol -- LDL -- HDL -- Trig 164[H]    ASSESSMENT/PLAN:  75 y/o F with PMHx metastatic urothelial carcinoma in 2017 (s/p immunotherapy b/l breast cancer (s/p RT), diverticulitis, HTN, HLD, arterial insufficiency, vasculopathy on Xarelto and Pletal, s/p recent Cee procedure for chronic diverticulitis with colo-enteric fistula (3/5/25) presenting with nausea and vomiting, found to have a post-operative ileus vs. pSBO. Nutrition support consult called for initiation of parenteral nutrition in view of severe protein calorie malnutrition and anticipated prolonged NPO.  IR PICC placed and parenteral nutrition was started on 3/17/25.     continue TPN with infusion volume of 2L, TPN will provide 1446 kcal/day    labs reve wed - electrolytes adjusted in TPN bag     monitor fingersticks, obtain daily weights    continue parenteral nutrition at this time, will follow up with primary team on plan     1.  Severe protein calorie malnutrition being optimized with TPN: CHO [190] gm.  AA [100] gm. SMOF Lipids [40] gm.  2.  Hyperglycemia managed with: [0] units of regular insulin    3.  Check fluid balance daily.  Strict I/O  [ ] [ ]   4.  Daily BMP, Ionized Calcium, Magnesium and Phosphorous   5.  Triglycerides at initiation of TPN and monthly  Chol -- LDL -- HDL -- Trig 164[H]    Nutrition Support [id number]  NUTRITION NOTE  [id number] ACKERMAN  ===============================    Interval events - Patient was seen and examined at bedside, no acute events overnight. Patient denies chest pain, shortness of breath, nausea or vomiting at this time. IR PICC placed and parenteral nutrition was started on 3/17/25. Pt is tolerating TPN without any issues. TPN/lipids ordered for tonight.     ROS: Except as noted above, all other systems reviewed and are negative     Allergies  sulfa drugs (Unknown)    PAST MEDICAL & SURGICAL HISTORY:  Breast CA, left lumpectomy / RTX in the past  Hypertension  Irritable bowel syndrome (IBS)  Polyp of colon "pre-cancerous" x 2, removed 2014  HLD (hyperlipidemia)  Bladder polyp  Livedoid vasculopathy  Anxiety and depression  KARI on CPAP  Arterial insufficiency  Diverticulitis  Cancer of right breast  HTN (hypertension)  Urothelial carcinoma of bladder  Other specified diseases of intestine  S/P   S/P colon resection reversal, had complications for fibriods  S/P hysterectomy  H/O lumpectomy left   Personal history of spine surgery L1-L4 fusion 2017  S/P lumpectomy, left breast  S/P lumpectomy, right breast  S/P angiogram of extremity    FAMILY HISTORY:  Family history of coronary artery disease (Mother)    Vital Signs Last 24 Hrs  T(C): 36.6 (23 Mar 2025 09:21), Max: 37 (23 Mar 2025 00:55)  T(F): 97.9 (23 Mar 2025 09:21), Max: 98.6 (23 Mar 2025 00:55)  HR: 72 (23 Mar 2025 09:21) (72 - 84)  BP: 140/81 (23 Mar 2025 09:21) (126/72 - 140/81)  RR: 18 (23 Mar 2025 09:21) (16 - 18)  SpO2: 99% (23 Mar 2025 09:21) (97% - 99%)    MEDICATIONS  (STANDING):  chlorhexidine 4% Liquid 1 Application(s) Topical <User Schedule>  erythromycin    ethylsuccinate Suspension 40 mG/mL 400 milliGRAM(s) Oral every 8 hours  heparin   Injectable 5000 Unit(s) SubCutaneous every 8 hours  lipid, fat emulsion (Fish Oil and Plant Based) 20% Infusion 16.6 mL/Hr (16.6 mL/Hr) IV Continuous <Continuous>  metoclopramide Injectable 10 milliGRAM(s) IV Push every 8 hours  ondansetron Injectable 4 milliGRAM(s) IV Push once  pantoprazole  Injectable 40 milliGRAM(s) IV Push daily  Parenteral Nutrition - Adult 1 Each (83 mL/Hr) TPN Continuous <Continuous>  Parenteral Nutrition - Adult 1 Each (83 mL/Hr) TPN Continuous <Continuous>  polyethylene glycol 3350 17 Gram(s) Oral two times a day    MEDICATIONS  (PRN):  acetaminophen   IVPB .. 1000 milliGRAM(s) IV Intermittent every 6 hours PRN Mild Pain (1 - 3)  benzocaine/menthol Lozenge 1 Lozenge Oral three times a day PRN Sore Throat  lidocaine   4% Patch 1 Patch Transdermal every 24 hours PRN back pain  sodium chloride 0.9% lock flush 10 milliLiter(s) IV Push every 1 hour PRN Pre/post blood products, medications, blood draw, and to maintain line patency    I&O's Detail    22 Mar 2025 07:01  -  23 Mar 2025 07:00  --------------------------------------------------------  IN:    Fat Emulsion (Fish Oil &amp; Plant Based) 20% Infusion: 166 mL    Oral Fluid: 200 mL    TPN (Total Parenteral Nutrition): 830 mL  Total IN: 1196 mL    OUT:    Colostomy (mL): 300 mL    Nasogastric/Oral tube (mL): 0 mL    Voided (mL): 400 mL  Total OUT: 700 mL    Total NET: 496 mL      23 Mar 2025 07:01  -  23 Mar 2025 10:30  --------------------------------------------------------  IN:  Total IN: 0 mL    OUT:    Voided (mL): 425 mL  Total OUT: 425 mL    Total NET: -425 mL    POCT Blood Glucose.: 135 mg/dL (23 Mar 2025 07:17)  POCT Blood Glucose.: 122 mg/dL (22 Mar 2025 18:03)    Daily Weight in k.1 (20 Mar 2025 05:22)    Drug Dosing Weight  Height (cm): 165.1 (13 Mar 2025 17:00)  Weight (kg): 68 (13 Mar 2025 17:00)  BMI (kg/m2): 24.9 (13 Mar 2025 17:00)  BSA (m2): 1.75 (13 Mar 2025 17:00)    PHYSICAL EXAM:  Constitutional: A&Ox3, NAD  Gastrointestinal: abdomen soft nontender, nondistended, ostomy with function  Extremities: Moving all extremities, no edema  PICC Site: LUE double lumen PICC in place, site clean and dry, placed 3/17/25    Diet: NPO with sips and TPN/lipids (started on 3/17/25)    LABORATORY                                                9.0    9.75  )-----------( 415      ( 23 Mar 2025 04:20 )             30.2   03-    141  |  106  |  26[H]  ----------------------------<  124[H]  4.6   |  24  |  0.46[L]    Ca    9.9      23 Mar 2025 04:20  Phos  2.7     03-23  Mg     2.00     03-23    TPro  6.8  /  Alb  3.4  /  TBili  0.2  /  DBili  x   /  AST  60[H]  /  ALT  85[H]  /  AlkPhos  188[H]  03-23    LIVER FUNCTIONS - ( 23 Mar 2025 04:20 )  Alb: 3.4 g/dL / Pro: 6.8 g/dL / ALK PHOS: 188 U/L / ALT: 85 U/L / AST: 60 U/L / GGT: x           03-18 Chol -- LDL -- HDL -- Trig 164[H]    ASSESSMENT/PLAN:  75 y/o F with PMHx metastatic urothelial carcinoma in 2017 (s/p immunotherapy b/l breast cancer (s/p RT), diverticulitis, HTN, HLD, arterial insufficiency, vasculopathy on Xarelto and Pletal, s/p recent Cee procedure for chronic diverticulitis with colo-enteric fistula (3/5/25) presenting with nausea and vomiting, found to have a post-operative ileus vs. pSBO. Nutrition support consult called for initiation of parenteral nutrition in view of severe protein calorie malnutrition and anticipated prolonged NPO.  IR PICC placed and parenteral nutrition was started on 3/17/25.     continue TPN with infusion volume of 2L, TPN will provide 1446 kcal/day    labs reviewed - electrolytes adjusted in TPN bag     monitor fingersticks, obtain daily weights    continue parenteral nutrition at this time, will follow up with primary team on plan     1.  Severe protein calorie malnutrition being optimized with TPN: CHO [190] gm.  AA [100] gm. SMOF Lipids [40] gm.  2.  Hyperglycemia managed with: [0] units of regular insulin    3.  Check fluid balance daily.  Strict I/O  [ ] [ ]   4.  Daily BMP, Ionized Calcium, Magnesium and Phosphorous   5.  Triglycerides at initiation of TPN and monthly  Chol -- LDL -- HDL -- Trig 164[H]    Nutrition Support [id number]

## 2025-03-23 NOTE — PROGRESS NOTE ADULT - SUBJECTIVE AND OBJECTIVE BOX
TEAM [ A ] Surgery Daily Progress Note  =====================================================    SUBJECTIVE: Patient seen and examined at bedside on AM rounds. Patient reports feeling well, has some intermittent nausea but no emesis since NG tube fell out yesterday. Desiring sips.     ALLERGIES:  sulfa drugs (Unknown)      --------------------------------------------------------------------------------------    MEDICATIONS:  acetaminophen   IVPB .. 1000 milliGRAM(s) IV Intermittent every 6 hours PRN  benzocaine/menthol Lozenge 1 Lozenge Oral three times a day PRN  chlorhexidine 4% Liquid 1 Application(s) Topical <User Schedule>  erythromycin    ethylsuccinate Suspension 40 mG/mL 400 milliGRAM(s) Oral every 8 hours  heparin   Injectable 5000 Unit(s) SubCutaneous every 8 hours  lidocaine   4% Patch 1 Patch Transdermal every 24 hours PRN  lipid, fat emulsion (Fish Oil and Plant Based) 20% Infusion 16.6 mL/Hr IV Continuous <Continuous>  metoclopramide Injectable 10 milliGRAM(s) IV Push every 8 hours  ondansetron Injectable 4 milliGRAM(s) IV Push once  pantoprazole  Injectable 40 milliGRAM(s) IV Push daily  Parenteral Nutrition - Adult 1 Each TPN Continuous <Continuous>  polyethylene glycol 3350 17 Gram(s) Oral two times a day  sodium chloride 0.9% lock flush 10 milliLiter(s) IV Push every 1 hour PRN    --------------------------------------------------------------------------------------    VITAL SIGNS:  T(C): 36.8 (03-23-25 @ 05:31), Max: 37 (03-23-25 @ 00:55)  HR: 79 (03-23-25 @ 05:31) (78 - 97)  BP: 126/72 (03-23-25 @ 05:31) (126/72 - 137/85)  RR: 16 (03-23-25 @ 05:31) (16 - 17)  SpO2: 99% (03-23-25 @ 05:31) (97% - 100%)  --------------------------------------------------------------------------------------    INS AND OUTS:    03-22-25 @ 07:01  -  03-23-25 @ 07:00  --------------------------------------------------------  IN: 1196 mL / OUT: 700 mL / NET: 496 mL      --------------------------------------------------------------------------------------      EXAM    General: NAD, resting in bed comfortably.  Cardiac: regular rate, warm and well perfused  Respiratory: Nonlabored respirations, normal cw expansion.  Abdomen: Midline staples in place, well approximated. Abdomen is soft, nontender, mildly distended. End colostomy productive of stool and gas    --------------------------------------------------------------------------------------    LABS

## 2025-03-24 LAB
ALBUMIN SERPL ELPH-MCNC: 3.2 G/DL — LOW (ref 3.3–5)
ALP SERPL-CCNC: 170 U/L — HIGH (ref 40–120)
ALT FLD-CCNC: 81 U/L — HIGH (ref 4–33)
ANION GAP SERPL CALC-SCNC: 11 MMOL/L — SIGNIFICANT CHANGE UP (ref 7–14)
AST SERPL-CCNC: 48 U/L — HIGH (ref 4–32)
BASOPHILS # BLD AUTO: 0.06 K/UL — SIGNIFICANT CHANGE UP (ref 0–0.2)
BASOPHILS NFR BLD AUTO: 0.6 % — SIGNIFICANT CHANGE UP (ref 0–2)
BILIRUB SERPL-MCNC: 0.2 MG/DL — SIGNIFICANT CHANGE UP (ref 0.2–1.2)
BUN SERPL-MCNC: 24 MG/DL — HIGH (ref 7–23)
CALCIUM SERPL-MCNC: 9.8 MG/DL — SIGNIFICANT CHANGE UP (ref 8.4–10.5)
CHLORIDE SERPL-SCNC: 107 MMOL/L — SIGNIFICANT CHANGE UP (ref 98–107)
CO2 SERPL-SCNC: 21 MMOL/L — LOW (ref 22–31)
CREAT SERPL-MCNC: 0.42 MG/DL — LOW (ref 0.5–1.3)
EGFR: 102 ML/MIN/1.73M2 — SIGNIFICANT CHANGE UP
EGFR: 102 ML/MIN/1.73M2 — SIGNIFICANT CHANGE UP
EOSINOPHIL # BLD AUTO: 0.69 K/UL — HIGH (ref 0–0.5)
EOSINOPHIL NFR BLD AUTO: 6.7 % — HIGH (ref 0–6)
GLUCOSE BLDC GLUCOMTR-MCNC: 126 MG/DL — HIGH (ref 70–99)
GLUCOSE SERPL-MCNC: 124 MG/DL — HIGH (ref 70–99)
HCT VFR BLD CALC: 28.1 % — LOW (ref 34.5–45)
HGB BLD-MCNC: 8.6 G/DL — LOW (ref 11.5–15.5)
IANC: 6.83 K/UL — SIGNIFICANT CHANGE UP (ref 1.8–7.4)
IMM GRANULOCYTES NFR BLD AUTO: 0.6 % — SIGNIFICANT CHANGE UP (ref 0–0.9)
LYMPHOCYTES # BLD AUTO: 1.33 K/UL — SIGNIFICANT CHANGE UP (ref 1–3.3)
LYMPHOCYTES # BLD AUTO: 12.9 % — LOW (ref 13–44)
MAGNESIUM SERPL-MCNC: 2 MG/DL — SIGNIFICANT CHANGE UP (ref 1.6–2.6)
MCHC RBC-ENTMCNC: 24 PG — LOW (ref 27–34)
MCHC RBC-ENTMCNC: 30.6 G/DL — LOW (ref 32–36)
MCV RBC AUTO: 78.5 FL — LOW (ref 80–100)
MONOCYTES # BLD AUTO: 1.33 K/UL — HIGH (ref 0–0.9)
MONOCYTES NFR BLD AUTO: 12.9 % — SIGNIFICANT CHANGE UP (ref 2–14)
NEUTROPHILS # BLD AUTO: 6.83 K/UL — SIGNIFICANT CHANGE UP (ref 1.8–7.4)
NEUTROPHILS NFR BLD AUTO: 66.3 % — SIGNIFICANT CHANGE UP (ref 43–77)
NRBC # BLD AUTO: 0 K/UL — SIGNIFICANT CHANGE UP (ref 0–0)
NRBC # FLD: 0 K/UL — SIGNIFICANT CHANGE UP (ref 0–0)
NRBC BLD AUTO-RTO: 0 /100 WBCS — SIGNIFICANT CHANGE UP (ref 0–0)
PHOSPHATE SERPL-MCNC: 3.6 MG/DL — SIGNIFICANT CHANGE UP (ref 2.5–4.5)
PLATELET # BLD AUTO: 379 K/UL — SIGNIFICANT CHANGE UP (ref 150–400)
POTASSIUM SERPL-MCNC: 4.7 MMOL/L — SIGNIFICANT CHANGE UP (ref 3.5–5.3)
POTASSIUM SERPL-SCNC: 4.7 MMOL/L — SIGNIFICANT CHANGE UP (ref 3.5–5.3)
PROT SERPL-MCNC: 6.7 G/DL — SIGNIFICANT CHANGE UP (ref 6–8.3)
RBC # BLD: 3.58 M/UL — LOW (ref 3.8–5.2)
RBC # FLD: 21.4 % — HIGH (ref 10.3–14.5)
SODIUM SERPL-SCNC: 139 MMOL/L — SIGNIFICANT CHANGE UP (ref 135–145)
WBC # BLD: 10.3 K/UL — SIGNIFICANT CHANGE UP (ref 3.8–10.5)
WBC # FLD AUTO: 10.3 K/UL — SIGNIFICANT CHANGE UP (ref 3.8–10.5)

## 2025-03-24 PROCEDURE — 93010 ELECTROCARDIOGRAM REPORT: CPT

## 2025-03-24 PROCEDURE — 99232 SBSQ HOSP IP/OBS MODERATE 35: CPT

## 2025-03-24 RX ORDER — ONDANSETRON HCL/PF 4 MG/2 ML
4 VIAL (ML) INJECTION EVERY 6 HOURS
Refills: 0 | Status: DISCONTINUED | OUTPATIENT
Start: 2025-03-24 | End: 2025-04-03

## 2025-03-24 RX ORDER — SODIUM/POT/MAG/CALC/CHLOR/ACET 35-20-5MEQ
1 VIAL (ML) INTRAVENOUS
Refills: 0 | Status: DISCONTINUED | OUTPATIENT
Start: 2025-03-24 | End: 2025-03-24

## 2025-03-24 RX ORDER — ONDANSETRON HCL/PF 4 MG/2 ML
4 VIAL (ML) INJECTION ONCE
Refills: 0 | Status: COMPLETED | OUTPATIENT
Start: 2025-03-24 | End: 2025-03-24

## 2025-03-24 RX ORDER — I.V. FAT EMULSION 20 G/100ML
16.6 EMULSION INTRAVENOUS
Qty: 40 | Refills: 0 | Status: DISCONTINUED | OUTPATIENT
Start: 2025-03-24 | End: 2025-03-25

## 2025-03-24 RX ADMIN — Medication 10 MILLIGRAM(S): at 13:44

## 2025-03-24 RX ADMIN — Medication 1 APPLICATION(S): at 13:53

## 2025-03-24 RX ADMIN — Medication 4 MILLIGRAM(S): at 03:26

## 2025-03-24 RX ADMIN — Medication 1 EACH: at 18:18

## 2025-03-24 RX ADMIN — HEPARIN SODIUM 5000 UNIT(S): 1000 INJECTION INTRAVENOUS; SUBCUTANEOUS at 23:13

## 2025-03-24 RX ADMIN — POLYETHYLENE GLYCOL 3350 17 GRAM(S): 17 POWDER, FOR SOLUTION ORAL at 18:21

## 2025-03-24 RX ADMIN — Medication 10 MILLIGRAM(S): at 23:13

## 2025-03-24 RX ADMIN — Medication 40 MILLIGRAM(S): at 12:56

## 2025-03-24 RX ADMIN — I.V. FAT EMULSION 16.6 ML/HR: 20 EMULSION INTRAVENOUS at 18:20

## 2025-03-24 RX ADMIN — HEPARIN SODIUM 5000 UNIT(S): 1000 INJECTION INTRAVENOUS; SUBCUTANEOUS at 06:47

## 2025-03-24 RX ADMIN — HEPARIN SODIUM 5000 UNIT(S): 1000 INJECTION INTRAVENOUS; SUBCUTANEOUS at 13:08

## 2025-03-24 NOTE — PROGRESS NOTE ADULT - ASSESSMENT
74F s/p Cee procedure for chronic diverticulitis with colo-enteric fistula (3/5, Dr. Bethea) re-presenting with nausea and vomiting, found to have a post-operative ileus, now on TPN/PICC 3/17. Course prolonged by high NGT output which dislodged 3/22, advanced to sips, but continued nausea/vomiting, most likely from erythromycin.     PLAN:  - d/c erythromycin  - EKG  - will digitize stoma with red rubber to assess patency  - Miralax BID  - PICC/TPN 3/17  - Diet: NPO  - Pain control  - GI PCR negative   - Monitor UOP, Ostomy output  - PT eval --> recommend ALLISON  - Will discuss resuming xarelto in coming days    A Team Surgery  [id number]

## 2025-03-24 NOTE — PROGRESS NOTE ADULT - SUBJECTIVE AND OBJECTIVE BOX
24 Hour Events:  - NGT dislodged 3/22, advanced to sips 3/23, but vomited 300 cc after sips in the PM. Pt presumes erythromycin causing nausea and this recent episode of emesis.    SUBJECTIVE: Pt seen at bedside during AM rounds. Patient resting comfortably.     Vital Signs Last 24 Hrs  T(C): 36.8 (24 Mar 2025 05:20), Max: 37.1 (23 Mar 2025 16:53)  T(F): 98.3 (24 Mar 2025 05:20), Max: 98.7 (23 Mar 2025 16:53)  HR: 78 (24 Mar 2025 05:20) (72 - 88)  BP: 114/71 (24 Mar 2025 05:20) (114/71 - 141/61)  BP(mean): --  RR: 19 (24 Mar 2025 05:20) (16 - 19)  SpO2: 99% (24 Mar 2025 05:20) (98% - 100%)    Parameters below as of 24 Mar 2025 05:20  Patient On (Oxygen Delivery Method): room air        I&O's Summary    23 Mar 2025 07:01  -  24 Mar 2025 07:00  --------------------------------------------------------  IN: 210 mL / OUT: 1905 mL / NET: -1695 mL        LABS:                        8.6    10.30 )-----------( 379      ( 24 Mar 2025 05:21 )             28.1     03-24    139  |  107  |  24[H]  ----------------------------<  124[H]  4.7   |  21[L]  |  0.42[L]    Ca    9.8      24 Mar 2025 05:21  Phos  3.6     03-24  Mg     2.00     03-24    TPro  6.7  /  Alb  3.2[L]  /  TBili  0.2  /  DBili  x   /  AST  48[H]  /  ALT  81[H]  /  AlkPhos  170[H]  03-24      Urinalysis Basic - ( 24 Mar 2025 05:21 )    Color: x / Appearance: x / SG: x / pH: x  Gluc: 124 mg/dL / Ketone: x  / Bili: x / Urobili: x   Blood: x / Protein: x / Nitrite: x   Leuk Esterase: x / RBC: x / WBC x   Sq Epi: x / Non Sq Epi: x / Bacteria: x        Physical Exam:  General Appearance: Appears well, NAD  Neck: Supple  Chest: Equal expansion bilaterally, equal breath sounds  CV: Pulse present  Abdomen: Soft, nontender, ostomy appropriate output, midline staples removed 3/23  Extremities: Grossly symmetric, SCD's in place

## 2025-03-24 NOTE — PROGRESS NOTE ADULT - SUBJECTIVE AND OBJECTIVE BOX
NUTRITION NOTE  [id number] ACKERMAN  ===============================    Interval events - Patient was seen and examined at bedside, no acute events overnight. NGT dislodged 3/22, advanced to sips 3/23, but vomited 300 cc after sips in the PM. Pt c/o intermittent nausea throughout the day.     IR PICC placed and parenteral nutrition was started on 3/17/25. Pt is tolerating TPN without any issues. TPN/lipids ordered for tonight.     ROS: Except as noted above, all other systems reviewed and are negative     Allergies  sulfa drugs (Unknown)    PAST MEDICAL & SURGICAL HISTORY:  Breast CA, left lumpectomy / RTX in the past  Hypertension  Irritable bowel syndrome (IBS)  Polyp of colon "pre-cancerous" x 2, removed 2014  HLD (hyperlipidemia)  Bladder polyp  Livedoid vasculopathy  Anxiety and depression  KARI on CPAP  Arterial insufficiency  Diverticulitis  Cancer of right breast  HTN (hypertension)  Urothelial carcinoma of bladder  Other specified diseases of intestine  S/P   S/P colon resection reversal, had complications for fibriods  S/P hysterectomy  H/O lumpectomy left   Personal history of spine surgery L1-L4 fusion 2017  S/P lumpectomy, left breast  S/P lumpectomy, right breast  S/P angiogram of extremity    FAMILY HISTORY:  Family history of coronary artery disease (Mother)    Vital Signs Last 24 Hrs  T(C): 36.8 (24 Mar 2025 10:17), Max: 37.1 (23 Mar 2025 16:53)  T(F): 98.3 (24 Mar 2025 10:17), Max: 98.7 (23 Mar 2025 16:53)  HR: 74 (24 Mar 2025 10:17) (74 - 88)  BP: 116/70 (24 Mar 2025 10:17) (114/71 - 141/61)  RR: 16 (24 Mar 2025 10:17) (16 - 19)  SpO2: 99% (24 Mar 2025 10:17) (98% - 100%)    MEDICATIONS  (STANDING):  chlorhexidine 4% Liquid 1 Application(s) Topical <User Schedule>  heparin   Injectable 5000 Unit(s) SubCutaneous every 8 hours  lipid, fat emulsion (Fish Oil and Plant Based) 20% Infusion 16.6 mL/Hr (16.6 mL/Hr) IV Continuous <Continuous>  metoclopramide Injectable 10 milliGRAM(s) IV Push every 8 hours  pantoprazole  Injectable 40 milliGRAM(s) IV Push daily  Parenteral Nutrition - Adult 1 Each (83 mL/Hr) TPN Continuous <Continuous>  Parenteral Nutrition - Adult 1 Each (83 mL/Hr) TPN Continuous <Continuous>  polyethylene glycol 3350 17 Gram(s) Oral two times a day    MEDICATIONS  (PRN):  acetaminophen   IVPB .. 1000 milliGRAM(s) IV Intermittent every 6 hours PRN Mild Pain (1 - 3)  benzocaine/menthol Lozenge 1 Lozenge Oral three times a day PRN Sore Throat  lidocaine   4% Patch 1 Patch Transdermal every 24 hours PRN back pain  ondansetron Injectable 4 milliGRAM(s) IV Push every 6 hours PRN Nausea  sodium chloride 0.9% lock flush 10 milliLiter(s) IV Push every 1 hour PRN Pre/post blood products, medications, blood draw, and to maintain line patency    Daily Weight in k.1 (20 Mar 2025 05:22)    Drug Dosing Weight  Height (cm): 165.1 (13 Mar 2025 17:00)  Weight (kg): 68 (13 Mar 2025 17:00)  BMI (kg/m2): 24.9 (13 Mar 2025 17:00)  BSA (m2): 1.75 (13 Mar 2025 17:00)    PHYSICAL EXAM:  Constitutional: A&Ox3, NAD  Gastrointestinal: abdomen soft nontender, nondistended, ostomy with function  Extremities: Moving all extremities, no edema  PICC Site: LUE double lumen PICC in place, site clean and dry, placed 3/17/25    Diet: NPO with sips and TPN/lipids (started on 3/17/25)    LABORATORY                                                         8.6    10.30 )-----------( 379      ( 24 Mar 2025 05:21 )             28.1   03-    139  |  107  |  24[H]  ----------------------------<  124[H]  4.7   |  21[L]  |  0.42[L]    Ca    9.8      24 Mar 2025 05:21  Phos  3.6     03-24  Mg     2.00     -24    TPro  6.7  /  Alb  3.2[L]  /  TBili  0.2  /  DBili  x   /  AST  48[H]  /  ALT  81[H]  /  AlkPhos  170[H]  -24    LIVER FUNCTIONS - ( 24 Mar 2025 05:21 )  Alb: 3.2 g/dL / Pro: 6.7 g/dL / ALK PHOS: 170 U/L / ALT: 81 U/L / AST: 48 U/L / GGT: x            Chol -- LDL -- HDL -- Trig 164[H]    ASSESSMENT/PLAN:  73 y/o F with PMHx metastatic urothelial carcinoma in 2017 (s/p immunotherapy b/l breast cancer (s/p RT), diverticulitis, HTN, HLD, arterial insufficiency, vasculopathy on Xarelto and Pletal, s/p recent Cee procedure for chronic diverticulitis with colo-enteric fistula (3/5/25) presenting with nausea and vomiting, found to have a post-operative ileus vs. pSBO. Nutrition support consult called for initiation of parenteral nutrition in view of severe protein calorie malnutrition and anticipated prolonged NPO.  IR PICC placed and parenteral nutrition was started on 3/17/25.     continue TPN with infusion volume of 2L, TPN will provide 1446 kcal/day    labs reve wed - electrolytes adjusted in TPN bag     monitor fingersticks, obtain daily weights    continue parenteral nutrition at this time, will follow up with primary team on plan     1.  Severe protein calorie malnutrition being optimized with TPN: CHO [190] gm.  AA [100] gm. SMOF Lipids [40] gm.  2.  Hyperglycemia managed with: [0] units of regular insulin    3.  Check fluid balance daily.  Strict I/O  [ ] [ ]   4.  Daily BMP, Ionized Calcium, Magnesium and Phosphorous   5.  Triglycerides at initiation of TPN and monthly  Chol -- LDL -- HDL -- Trig 164[H]    Nutrition Support [id number]  NUTRITION NOTE  [id number] ACKERMAN  ===============================    Interval events - Patient was seen and examined at bedside, no acute events overnight. NGT dislodged 3/22, advanced to sips 3/23, but vomited 300 cc after sips in the PM. Pt c/o intermittent nausea throughout the day.     IR PICC placed and parenteral nutrition was started on 3/17/25. Pt is tolerating TPN without any issues. TPN/lipids ordered for tonight.     ROS: Except as noted above, all other systems reviewed and are negative     Allergies  sulfa drugs (Unknown)    PAST MEDICAL & SURGICAL HISTORY:  Breast CA, left lumpectomy / RTX in the past  Hypertension  Irritable bowel syndrome (IBS)  Polyp of colon "pre-cancerous" x 2, removed 2014  HLD (hyperlipidemia)  Bladder polyp  Livedoid vasculopathy  Anxiety and depression  KARI on CPAP  Arterial insufficiency  Diverticulitis  Cancer of right breast  HTN (hypertension)  Urothelial carcinoma of bladder  Other specified diseases of intestine  S/P   S/P colon resection reversal, had complications for fibriods  S/P hysterectomy  H/O lumpectomy left   Personal history of spine surgery L1-L4 fusion 2017  S/P lumpectomy, left breast  S/P lumpectomy, right breast  S/P angiogram of extremity    FAMILY HISTORY:  Family history of coronary artery disease (Mother)    Vital Signs Last 24 Hrs  T(C): 36.8 (24 Mar 2025 10:17), Max: 37.1 (23 Mar 2025 16:53)  T(F): 98.3 (24 Mar 2025 10:17), Max: 98.7 (23 Mar 2025 16:53)  HR: 74 (24 Mar 2025 10:17) (74 - 88)  BP: 116/70 (24 Mar 2025 10:17) (114/71 - 141/61)  RR: 16 (24 Mar 2025 10:17) (16 - 19)  SpO2: 99% (24 Mar 2025 10:17) (98% - 100%)    MEDICATIONS  (STANDING):  chlorhexidine 4% Liquid 1 Application(s) Topical <User Schedule>  heparin   Injectable 5000 Unit(s) SubCutaneous every 8 hours  lipid, fat emulsion (Fish Oil and Plant Based) 20% Infusion 16.6 mL/Hr (16.6 mL/Hr) IV Continuous <Continuous>  metoclopramide Injectable 10 milliGRAM(s) IV Push every 8 hours  pantoprazole  Injectable 40 milliGRAM(s) IV Push daily  Parenteral Nutrition - Adult 1 Each (83 mL/Hr) TPN Continuous <Continuous>  Parenteral Nutrition - Adult 1 Each (83 mL/Hr) TPN Continuous <Continuous>  polyethylene glycol 3350 17 Gram(s) Oral two times a day    MEDICATIONS  (PRN):  acetaminophen   IVPB .. 1000 milliGRAM(s) IV Intermittent every 6 hours PRN Mild Pain (1 - 3)  benzocaine/menthol Lozenge 1 Lozenge Oral three times a day PRN Sore Throat  lidocaine   4% Patch 1 Patch Transdermal every 24 hours PRN back pain  ondansetron Injectable 4 milliGRAM(s) IV Push every 6 hours PRN Nausea  sodium chloride 0.9% lock flush 10 milliLiter(s) IV Push every 1 hour PRN Pre/post blood products, medications, blood draw, and to maintain line patency    Daily Weight in k.1 (20 Mar 2025 05:22)    Drug Dosing Weight  Height (cm): 165.1 (13 Mar 2025 17:00)  Weight (kg): 68 (13 Mar 2025 17:00)  BMI (kg/m2): 24.9 (13 Mar 2025 17:00)  BSA (m2): 1.75 (13 Mar 2025 17:00)    PHYSICAL EXAM:  Constitutional: A&Ox3, NAD  Gastrointestinal: abdomen soft nontender, nondistended, ostomy with function  Extremities: Moving all extremities, no edema  PICC Site: LUE double lumen PICC in place, site clean and dry, placed 3/17/25    Diet: NPO with sips and TPN/lipids (started on 3/17/25)    LABORATORY                                                         8.6    10.30 )-----------( 379      ( 24 Mar 2025 05:21 )             28.1   03-    139  |  107  |  24[H]  ----------------------------<  124[H]  4.7   |  21[L]  |  0.42[L]    Ca    9.8      24 Mar 2025 05:21  Phos  3.6     03-24  Mg     2.00     -24    TPro  6.7  /  Alb  3.2[L]  /  TBili  0.2  /  DBili  x   /  AST  48[H]  /  ALT  81[H]  /  AlkPhos  170[H]  -24    LIVER FUNCTIONS - ( 24 Mar 2025 05:21 )  Alb: 3.2 g/dL / Pro: 6.7 g/dL / ALK PHOS: 170 U/L / ALT: 81 U/L / AST: 48 U/L / GGT: x            Chol -- LDL -- HDL -- Trig 164[H]    ASSESSMENT/PLAN:  75 y/o F with PMHx metastatic urothelial carcinoma in 2017 (s/p immunotherapy b/l breast cancer (s/p RT), diverticulitis, HTN, HLD, arterial insufficiency, vasculopathy on Xarelto and Pletal, s/p recent Cee procedure for chronic diverticulitis with colo-enteric fistula (3/5/25) presenting with nausea and vomiting, found to have a post-operative ileus vs. pSBO. Nutrition support consult called for initiation of parenteral nutrition in view of severe protein calorie malnutrition and anticipated prolonged NPO.  IR PICC placed and parenteral nutrition was started on 3/17/25.     continue TPN with infusion volume of 2L, TPN will provide 1446 kcal/day    labs reviewed - electrolytes adjusted in TPN bag     monitor fingersticks, obtain daily weights    continue parenteral nutrition at this time, will follow up with primary team on plan     1.  Severe protein calorie malnutrition being optimized with TPN: CHO [190] gm.  AA [100] gm. SMOF Lipids [40] gm.  2.  Hyperglycemia managed with: [0] units of regular insulin    3.  Check fluid balance daily.  Strict I/O  [ ] [ ]   4.  Daily BMP, Ionized Calcium, Magnesium and Phosphorous   5.  Triglycerides at initiation of TPN and monthly  Chol -- LDL -- HDL -- Trig 164[H]    Nutrition Support [id number]

## 2025-03-24 NOTE — ADVANCED PRACTICE NURSE CONSULT - ASSESSMENT
Patient states pouch remained intact from last change, upon assessment it is lifted to one side, with mild leaking. Patient states surgeon lifted this morning to assess and probe stoma. Patient educated that if the pouch is ever removed, on purpose or by accident, a new intact pouch needs to be placed to protect Low air loss bed therapy, heel elevation z-flex boots, use of fluidized positioning device for pressure redistribution maintained. Soft pillow between bony prominences maintained. Moisture management with barrier paste & single breathable pad and measures to decrease friction/shear/pressure maintained. and contain stool. Patient actively participating in pouch change. Frequency of pouch change and emptying discussed, teach back method utilized. Removed pouch using pull and push technique, cleansed skin with water, patted dry. Midline staples removed from previous assessment, two staples still noted to area above surgical site. Taught patient how to properly assess stoma viability and peristomal skin. Patient instructed in stoma measurement, creating template, cutting wafer, applying barrier ring (to protect peristomal skin from enzymatic irritation), applying pouch. Oval stencil used and left at bedside. Small area of mucocutaneous separation noted to 3alis. Reviewed s/s to report to MD.     Stoma size: 1" 1/2 x 2" - See A&I flowsheet for full assessment details.  Patient states pouch remained intact from last change, upon assessment it is lifted to one side, with mild leaking. Patient states surgeon lifted this morning to assess and probe stoma. Patient educated that if the pouch is ever removed, on purpose or by accident, a new intact pouch needs to be placed to protect deepika-skin and contain stool. Patient actively participating in pouch change. Frequency of pouch change and emptying discussed, teach back method utilized. Removed pouch using pull and push technique, cleansed skin with water, patted dry. Midline staples removed from previous assessment, two staples still noted to area above surgical site. Taught patient how to properly assess stoma viability and peristomal skin. Patient instructed in stoma measurement, creating template, cutting wafer, applying barrier ring (to protect peristomal skin from enzymatic irritation), applying pouch. Oval stencil used and left at bedside. Small area of mucocutaneous separation noted to 3oclock. Reviewed s/s to report to MD.     Stoma size: 1" 1/2 x 2" - See A&I flowsheet for full assessment details.

## 2025-03-25 ENCOUNTER — APPOINTMENT (OUTPATIENT)
Dept: VASCULAR SURGERY | Facility: CLINIC | Age: 75
End: 2025-03-25

## 2025-03-25 LAB
ANION GAP SERPL CALC-SCNC: 12 MMOL/L — SIGNIFICANT CHANGE UP (ref 7–14)
BUN SERPL-MCNC: 24 MG/DL — HIGH (ref 7–23)
CALCIUM SERPL-MCNC: 9.1 MG/DL — SIGNIFICANT CHANGE UP (ref 8.4–10.5)
CHLORIDE SERPL-SCNC: 108 MMOL/L — HIGH (ref 98–107)
CO2 SERPL-SCNC: 22 MMOL/L — SIGNIFICANT CHANGE UP (ref 22–31)
CREAT SERPL-MCNC: 0.43 MG/DL — LOW (ref 0.5–1.3)
EGFR: 101 ML/MIN/1.73M2 — SIGNIFICANT CHANGE UP
EGFR: 101 ML/MIN/1.73M2 — SIGNIFICANT CHANGE UP
GLUCOSE BLDC GLUCOMTR-MCNC: 128 MG/DL — HIGH (ref 70–99)
GLUCOSE SERPL-MCNC: 119 MG/DL — HIGH (ref 70–99)
HCT VFR BLD CALC: 26.7 % — LOW (ref 34.5–45)
HGB BLD-MCNC: 8.1 G/DL — LOW (ref 11.5–15.5)
MAGNESIUM SERPL-MCNC: 1.9 MG/DL — SIGNIFICANT CHANGE UP (ref 1.6–2.6)
MCHC RBC-ENTMCNC: 23.8 PG — LOW (ref 27–34)
MCHC RBC-ENTMCNC: 30.3 G/DL — LOW (ref 32–36)
MCV RBC AUTO: 78.5 FL — LOW (ref 80–100)
NRBC # BLD AUTO: 0 K/UL — SIGNIFICANT CHANGE UP (ref 0–0)
NRBC # FLD: 0 K/UL — SIGNIFICANT CHANGE UP (ref 0–0)
NRBC BLD AUTO-RTO: 0 /100 WBCS — SIGNIFICANT CHANGE UP (ref 0–0)
PHOSPHATE SERPL-MCNC: 3.5 MG/DL — SIGNIFICANT CHANGE UP (ref 2.5–4.5)
PLATELET # BLD AUTO: 345 K/UL — SIGNIFICANT CHANGE UP (ref 150–400)
POTASSIUM SERPL-MCNC: 4 MMOL/L — SIGNIFICANT CHANGE UP (ref 3.5–5.3)
POTASSIUM SERPL-SCNC: 4 MMOL/L — SIGNIFICANT CHANGE UP (ref 3.5–5.3)
RBC # BLD: 3.4 M/UL — LOW (ref 3.8–5.2)
RBC # FLD: 21.6 % — HIGH (ref 10.3–14.5)
SODIUM SERPL-SCNC: 142 MMOL/L — SIGNIFICANT CHANGE UP (ref 135–145)
WBC # BLD: 8.85 K/UL — SIGNIFICANT CHANGE UP (ref 3.8–10.5)
WBC # FLD AUTO: 8.85 K/UL — SIGNIFICANT CHANGE UP (ref 3.8–10.5)

## 2025-03-25 PROCEDURE — 99232 SBSQ HOSP IP/OBS MODERATE 35: CPT | Mod: FS

## 2025-03-25 RX ORDER — I.V. FAT EMULSION 20 G/100ML
16.6 EMULSION INTRAVENOUS
Qty: 40 | Refills: 0 | Status: DISCONTINUED | OUTPATIENT
Start: 2025-03-25 | End: 2025-03-26

## 2025-03-25 RX ORDER — SODIUM/POT/MAG/CALC/CHLOR/ACET 35-20-5MEQ
1 VIAL (ML) INTRAVENOUS
Refills: 0 | Status: DISCONTINUED | OUTPATIENT
Start: 2025-03-25 | End: 2025-03-25

## 2025-03-25 RX ADMIN — HEPARIN SODIUM 5000 UNIT(S): 1000 INJECTION INTRAVENOUS; SUBCUTANEOUS at 14:22

## 2025-03-25 RX ADMIN — HEPARIN SODIUM 5000 UNIT(S): 1000 INJECTION INTRAVENOUS; SUBCUTANEOUS at 23:34

## 2025-03-25 RX ADMIN — Medication 1000 MILLIGRAM(S): at 15:40

## 2025-03-25 RX ADMIN — Medication 10 MILLIGRAM(S): at 15:52

## 2025-03-25 RX ADMIN — HEPARIN SODIUM 5000 UNIT(S): 1000 INJECTION INTRAVENOUS; SUBCUTANEOUS at 06:10

## 2025-03-25 RX ADMIN — LIDOCAINE HYDROCHLORIDE 1 PATCH: 20 JELLY TOPICAL at 15:12

## 2025-03-25 RX ADMIN — LIDOCAINE HYDROCHLORIDE 1 PATCH: 20 JELLY TOPICAL at 19:11

## 2025-03-25 RX ADMIN — Medication 10 MILLIGRAM(S): at 06:12

## 2025-03-25 RX ADMIN — POLYETHYLENE GLYCOL 3350 17 GRAM(S): 17 POWDER, FOR SOLUTION ORAL at 06:10

## 2025-03-25 RX ADMIN — Medication 1 APPLICATION(S): at 12:13

## 2025-03-25 RX ADMIN — Medication 10 MILLIGRAM(S): at 23:34

## 2025-03-25 RX ADMIN — Medication 40 MILLIGRAM(S): at 12:15

## 2025-03-25 RX ADMIN — I.V. FAT EMULSION 16.6 ML/HR: 20 EMULSION INTRAVENOUS at 18:38

## 2025-03-25 RX ADMIN — Medication 1 EACH: at 18:40

## 2025-03-25 RX ADMIN — Medication 400 MILLIGRAM(S): at 15:12

## 2025-03-25 NOTE — PROGRESS NOTE ADULT - NS ATTEND AMEND GEN_ALL_CORE FT
I agree with the above history, physical examination, chief complaint/diagnosis, and plan, which I have reviewed and edited where appropriate.  I agree with notes/assessment and detailed interval history of health care providers on my service.  I have seen and examined the patient.  I reviewed the laboratory and available data and agree with the history, physical assessment and plan.  I reviewed and discussed with all consultants, house staff and PA's.  The Nutrition Support Team (NST) discusses on an ongoing basis with the primary team and all consultants, House staff and PA's to have a permanent risk benefit analyses on all decisions and coordinating care.  I was physically present for the key portions of the evaluation and management (E/M) service provided.  75 y/o F with PMHx metastatic urothelial carcinoma, s/p recent Cee procedure for chronic diverticulitis with colo-enteric fistula.  Nutrition support consult called for initiation of parenteral nutrition in view of severe protein calorie malnutrition and anticipated NPO.  PHYSICAL EXAM:  Constitutional: NAD  Lung: clear  Heart: RR  Gastrointestinal: abdomen soft nontender  Extremities: warm  Diagnosis:  Severe protein calorie malnutrition in acute illness/injury  Labs reviewed-electrolytes adjusted   TPN: 2L/1446 kcal/day  Monitor fingersticks  Obtain daily weights I agree with the above history, physical examination, chief complaint/diagnosis, and plan, which I have reviewed and edited where appropriate.  I agree with notes/assessment and detailed interval history of health care providers on my service.  I have seen and examined the patient.  I reviewed the laboratory and available data and agree with the history, physical assessment and plan.  I reviewed and discussed with all consultants, house staff and PA's.  The Nutrition Support Team (NST) discusses on an ongoing basis with the primary team and all consultants, House staff and PA's to have a permanent risk benefit analyses on all decisions and coordinating care.  I was physically present for the key portions of the evaluation and management (E/M) service provided.  73 y/o F with PMHx metastatic urothelial carcinoma, s/p recent Cee procedure for chronic diverticulitis with colo-enteric fistula.  Nutrition support consult called for initiation of parenteral nutrition in view of severe protein calorie malnutrition and anticipated NPO.  PHYSICAL EXAM:  Constitutional: NAD  Lung: clear  Heart: RR  Gastrointestinal: abdomen soft nontender  Extremities: warm  Diagnosis:  Severe protein calorie malnutrition in acute illness/injury  Labs reviewed-electrolytes adjusted   TPN: 2L/1446 kcal/day  Monitor fingersticks  Obtain daily weights.

## 2025-03-25 NOTE — PROGRESS NOTE ADULT - SUBJECTIVE AND OBJECTIVE BOX
NUTRITION NOTE  [id number] ACKERMAN  ===============================    Interval events - Patient was seen and examined at bedside, no acute events overnight. Patient denies chest pain, shortness of breath, nausea or vomiting at this time.     IR PICC placed and parenteral nutrition was started on 3/17/25. Pt is tolerating TPN without any issues. TPN/lipids ordered for tonight.     ROS: Except as noted above, all other systems reviewed and are negative     Allergies  sulfa drugs (Unknown)    PAST MEDICAL & SURGICAL HISTORY:  Breast CA, left lumpectomy / RTX in the past  Hypertension  Irritable bowel syndrome (IBS)  Polyp of colon "pre-cancerous" x 2, removed 2014  HLD (hyperlipidemia)  Bladder polyp  Livedoid vasculopathy  Anxiety and depression  KARI on CPAP  Arterial insufficiency  Diverticulitis  Cancer of right breast  HTN (hypertension)  Urothelial carcinoma of bladder  Other specified diseases of intestine  S/P   S/P colon resection reversal, had complications for fibriods  S/P hysterectomy  H/O lumpectomy left   Personal history of spine surgery L1-L4 fusion 2017  S/P lumpectomy, left breast  S/P lumpectomy, right breast  S/P angiogram of extremity    FAMILY HISTORY:  Family history of coronary artery disease (Mother)    Vital Signs Last 24 Hrs  T(C): 36.4 (25 Mar 2025 09:00), Max: 36.8 (24 Mar 2025 13:26)  T(F): 97.6 (25 Mar 2025 09:00), Max: 98.3 (24 Mar 2025 13:26)  HR: 83 (25 Mar 2025 09:00) (74 - 88)  BP: 113/75 (25 Mar 2025 09:00) (111/76 - 122/72)  RR: 18 (25 Mar 2025 09:00) (16 - 19)  SpO2: 100% (25 Mar 2025 09:00) (99% - 100%)    MEDICATIONS  (STANDING):  chlorhexidine 2% Cloths 1 Application(s) Topical daily  heparin   Injectable 5000 Unit(s) SubCutaneous every 8 hours  lipid, fat emulsion (Fish Oil and Plant Based) 20% Infusion 16.6 mL/Hr (16.6 mL/Hr) IV Continuous <Continuous>  metoclopramide Injectable 10 milliGRAM(s) IV Push every 8 hours  pantoprazole  Injectable 40 milliGRAM(s) IV Push daily  Parenteral Nutrition - Adult 1 Each (83 mL/Hr) TPN Continuous <Continuous>  Parenteral Nutrition - Adult 1 Each (83 mL/Hr) TPN Continuous <Continuous>  polyethylene glycol 3350 17 Gram(s) Oral two times a day    MEDICATIONS  (PRN):  acetaminophen   IVPB .. 1000 milliGRAM(s) IV Intermittent every 6 hours PRN Mild Pain (1 - 3)  benzocaine/menthol Lozenge 1 Lozenge Oral three times a day PRN Sore Throat  lidocaine   4% Patch 1 Patch Transdermal every 24 hours PRN back pain  ondansetron Injectable 4 milliGRAM(s) IV Push every 6 hours PRN Nausea  sodium chloride 0.9% lock flush 10 milliLiter(s) IV Push every 1 hour PRN Pre/post blood products, medications, blood draw, and to maintain line patency    I&O's Detail    24 Mar 2025 07:  -  25 Mar 2025 07:00  --------------------------------------------------------  IN:    Fat Emulsion (Fish Oil &amp; Plant Based) 20% Infusion: 49.8 mL    TPN (Total Parenteral Nutrition): 249 mL  Total IN: 298.8 mL    OUT:    Colostomy (mL): 50 mL    Oral Fluid: 0 mL    Voided (mL): 1000 mL  Total OUT: 1050 mL    Total NET: -751.2 mL      25 Mar 2025 07:01  -  25 Mar 2025 12:10  --------------------------------------------------------  IN:    TPN (Total Parenteral Nutrition): 333.2 mL  Total IN: 333.2 mL    OUT:    Colostomy (mL): 250 mL    Oral Fluid: 0 mL    Voided (mL): 100 mL  Total OUT: 350 mL    Total NET: -16.8 mL    Daily Weight in k.1 (20 Mar 2025 05:22)    Drug Dosing Weight  Height (cm): 165.1 (13 Mar 2025 17:00)  Weight (kg): 68 (13 Mar 2025 17:00)  BMI (kg/m2): 24.9 (13 Mar 2025 17:00)  BSA (m2): 1.75 (13 Mar 2025 17:00)    PHYSICAL EXAM:  Constitutional: A&Ox3, NAD  Gastrointestinal: abdomen soft nontender, nondistended, ostomy with function  Extremities: Moving all extremities, no edema  PICC Site: LUE double lumen PICC in place, site clean and dry, placed 3/17/25    Diet: NPO with sips and TPN/lipids (started on 3/17/25)    LABORATORY                                                                  8.1    8.85  )-----------( 345      ( 25 Mar 2025 07:34 )             26.7   03-    142  |  108[H]  |  24[H]  ----------------------------<  119[H]  4.0   |  22  |  0.43[L]    Ca    9.1      25 Mar 2025 07:34  Phos  3.5     03-  Mg     1.90         TPro  6.7  /  Alb  3.2[L]  /  TBili  0.2  /  DBili  x   /  AST  48[H]  /  ALT  81[H]  /  AlkPhos  170[H]  03-24    LIVER FUNCTIONS - ( 24 Mar 2025 05:21 )  Alb: 3.2 g/dL / Pro: 6.7 g/dL / ALK PHOS: 170 U/L / ALT: 81 U/L / AST: 48 U/L / GGT: x           03-18 Chol -- LDL -- HDL -- Trig 164[H]    ASSESSMENT/PLAN:  73 y/o F with PMHx metastatic urothelial carcinoma in 2017 (s/p immunotherapy b/l breast cancer (s/p RT), diverticulitis, HTN, HLD, arterial insufficiency, vasculopathy on Xarelto and Pletal, s/p recent Cee procedure for chronic diverticulitis with colo-enteric fistula (3/5/25) presenting with nausea and vomiting, found to have a post-operative ileus vs. pSBO. Nutrition support consult called for initiation of parenteral nutrition in view of severe protein calorie malnutrition and anticipated prolonged NPO.  IR PICC placed and parenteral nutrition was started on 3/17/25.     continue TPN with infusion volume of 2L, TPN will provide 1446 kcal/day    labs reve wed - electrolytes adjusted in TPN bag     monitor fingersticks, obtain daily weights    continue parenteral nutrition at this time, will follow up with primary team on plan     1.  Severe protein calorie malnutrition being optimized with TPN: CHO [190] gm.  AA [100] gm. SMOF Lipids [40] gm.  2.  Hyperglycemia managed with: [0] units of regular insulin    3.  Check fluid balance daily.  Strict I/O  [ ] [ ]   4.  Daily BMP, Ionized Calcium, Magnesium and Phosphorous   5.  Triglycerides at initiation of TPN and monthly  Chol -- LDL -- HDL -- Trig 164[H]    Nutrition Support [id number]  NUTRITION NOTE  [id number] ACKERMAN  ===============================    Interval events - Patient was seen and examined at bedside, no acute events overnight. Patient denies chest pain, shortness of breath, nausea or vomiting at this time.     IR PICC placed and parenteral nutrition was started on 3/17/25. Pt is tolerating TPN without any issues. TPN/lipids ordered for tonight.     ROS: Except as noted above, all other systems reviewed and are negative     Allergies  sulfa drugs (Unknown)    PAST MEDICAL & SURGICAL HISTORY:  Breast CA, left lumpectomy / RTX in the past  Hypertension  Irritable bowel syndrome (IBS)  Polyp of colon "pre-cancerous" x 2, removed 2014  HLD (hyperlipidemia)  Bladder polyp  Livedoid vasculopathy  Anxiety and depression  KARI on CPAP  Arterial insufficiency  Diverticulitis  Cancer of right breast  HTN (hypertension)  Urothelial carcinoma of bladder  Other specified diseases of intestine  S/P   S/P colon resection reversal, had complications for fibriods  S/P hysterectomy  H/O lumpectomy left   Personal history of spine surgery L1-L4 fusion 2017  S/P lumpectomy, left breast  S/P lumpectomy, right breast  S/P angiogram of extremity    FAMILY HISTORY:  Family history of coronary artery disease (Mother)    Vital Signs Last 24 Hrs  T(C): 36.4 (25 Mar 2025 09:00), Max: 36.8 (24 Mar 2025 13:26)  T(F): 97.6 (25 Mar 2025 09:00), Max: 98.3 (24 Mar 2025 13:26)  HR: 83 (25 Mar 2025 09:00) (74 - 88)  BP: 113/75 (25 Mar 2025 09:00) (111/76 - 122/72)  RR: 18 (25 Mar 2025 09:00) (16 - 19)  SpO2: 100% (25 Mar 2025 09:00) (99% - 100%)    MEDICATIONS  (STANDING):  chlorhexidine 2% Cloths 1 Application(s) Topical daily  heparin   Injectable 5000 Unit(s) SubCutaneous every 8 hours  lipid, fat emulsion (Fish Oil and Plant Based) 20% Infusion 16.6 mL/Hr (16.6 mL/Hr) IV Continuous <Continuous>  metoclopramide Injectable 10 milliGRAM(s) IV Push every 8 hours  pantoprazole  Injectable 40 milliGRAM(s) IV Push daily  Parenteral Nutrition - Adult 1 Each (83 mL/Hr) TPN Continuous <Continuous>  Parenteral Nutrition - Adult 1 Each (83 mL/Hr) TPN Continuous <Continuous>  polyethylene glycol 3350 17 Gram(s) Oral two times a day    MEDICATIONS  (PRN):  acetaminophen   IVPB .. 1000 milliGRAM(s) IV Intermittent every 6 hours PRN Mild Pain (1 - 3)  benzocaine/menthol Lozenge 1 Lozenge Oral three times a day PRN Sore Throat  lidocaine   4% Patch 1 Patch Transdermal every 24 hours PRN back pain  ondansetron Injectable 4 milliGRAM(s) IV Push every 6 hours PRN Nausea  sodium chloride 0.9% lock flush 10 milliLiter(s) IV Push every 1 hour PRN Pre/post blood products, medications, blood draw, and to maintain line patency    I&O's Detail    24 Mar 2025 07:  -  25 Mar 2025 07:00  --------------------------------------------------------  IN:    Fat Emulsion (Fish Oil &amp; Plant Based) 20% Infusion: 49.8 mL    TPN (Total Parenteral Nutrition): 249 mL  Total IN: 298.8 mL    OUT:    Colostomy (mL): 50 mL    Oral Fluid: 0 mL    Voided (mL): 1000 mL  Total OUT: 1050 mL    Total NET: -751.2 mL      25 Mar 2025 07:01  -  25 Mar 2025 12:10  --------------------------------------------------------  IN:    TPN (Total Parenteral Nutrition): 333.2 mL  Total IN: 333.2 mL    OUT:    Colostomy (mL): 250 mL    Oral Fluid: 0 mL    Voided (mL): 100 mL  Total OUT: 350 mL    Total NET: -16.8 mL    Daily Weight in k.1 (20 Mar 2025 05:22)    Drug Dosing Weight  Height (cm): 165.1 (13 Mar 2025 17:00)  Weight (kg): 68 (13 Mar 2025 17:00)  BMI (kg/m2): 24.9 (13 Mar 2025 17:00)  BSA (m2): 1.75 (13 Mar 2025 17:00)    PHYSICAL EXAM:  Constitutional: A&Ox3, NAD  Gastrointestinal: abdomen soft nontender, nondistended, ostomy with function  Extremities: Moving all extremities, no edema  PICC Site: LUE double lumen PICC in place, site clean and dry, placed 3/17/25    Diet: NPO with sips and TPN/lipids (started on 3/17/25)    LABORATORY                                                                  8.1    8.85  )-----------( 345      ( 25 Mar 2025 07:34 )             26.7   03-    142  |  108[H]  |  24[H]  ----------------------------<  119[H]  4.0   |  22  |  0.43[L]    Ca    9.1      25 Mar 2025 07:34  Phos  3.5     03-  Mg     1.90         TPro  6.7  /  Alb  3.2[L]  /  TBili  0.2  /  DBili  x   /  AST  48[H]  /  ALT  81[H]  /  AlkPhos  170[H]  03-24    LIVER FUNCTIONS - ( 24 Mar 2025 05:21 )  Alb: 3.2 g/dL / Pro: 6.7 g/dL / ALK PHOS: 170 U/L / ALT: 81 U/L / AST: 48 U/L / GGT: x           03-18 Chol -- LDL -- HDL -- Trig 164[H]    ASSESSMENT/PLAN:  75 y/o F with PMHx metastatic urothelial carcinoma in 2017 (s/p immunotherapy b/l breast cancer (s/p RT), diverticulitis, HTN, HLD, arterial insufficiency, vasculopathy on Xarelto and Pletal, s/p recent Cee procedure for chronic diverticulitis with colo-enteric fistula (3/5/25) presenting with nausea and vomiting, found to have a post-operative ileus vs. pSBO. Nutrition support consult called for initiation of parenteral nutrition in view of severe protein calorie malnutrition and anticipated prolonged NPO.  IR PICC placed and parenteral nutrition was started on 3/17/25.     continue TPN with infusion volume of 2L, TPN will provide 1446 kcal/day    labs reviewed - electrolytes adjusted in TPN bag     monitor fingersticks, obtain daily weights    continue parenteral nutrition at this time, will follow up with primary team on plan     1.  Severe protein calorie malnutrition being optimized with TPN: CHO [190] gm.  AA [100] gm. SMOF Lipids [40] gm.  2.  Hyperglycemia managed with: [0] units of regular insulin    3.  Check fluid balance daily.  Strict I/O  [ ] [ ]   4.  Daily BMP, Ionized Calcium, Magnesium and Phosphorous   5.  Triglycerides at initiation of TPN and monthly  Chol -- LDL -- HDL -- Trig 164[H]    Nutrition Support [id number]

## 2025-03-25 NOTE — CHART NOTE - NSCHARTNOTEFT_GEN_A_CORE
NUTRITION FOLLOW UP NOTE     Pt seen for malnutrition follow up.     SOURCE: [X] Patient [X] Medical record     Medical Course:  - Per chart, pt is 74 year old female PMH metastatic urothelial carcinoma (2017, s/p immunotherapy), bilateral breast cancer (s/p RT), diverticulitis, HTN, HLD, arterial insufficiency, vasculopathy, s/p recent Cee procedure for chronic diverticulitis with colo-enteric fistula (3/5/25) presenting with nausea/vomiting found to have a post-operative ileus vs. pSBO. IR PICC placed (3/17) and TPN initiated. Nutrition Support and Colorectal Surgery following.     Diet Prescription:   - NPO, Except Medications    Nutrition Course:  - Pt remains NPO on TPN: 2L infusing @ 83 mL/hr (100 gm amino acids, 190 gm dextrose, 40 gm SMOF lipids) to provide 1446 kcal/day (meets 25.5 kcal/kg, 1.8 gm protein/kg @ ideal body weight 56.6 kg).   - Ordered for Miralax 17 gm BID and standing Reglan.   - NGT output, per flowsheets: (3/21) 500 mL, (3/20) 1450 mL. Undergoing clamp trials.   - Colostomy output, per flowsheets: (3/21) 0 mL, (3/20) 75 mL.     Food Allergy/Intolerance:  - NKFA    Pertinent Medications:   - lipid fat emulsion (Fish Oil and Plant Based) 20% IV Continuous, pantoprazole Injectable, metoclopramide Injectable, Parenteral Nutrition TPN Continuous, polyethylene glycol     Pertinent Labs:  - (3/21) Na 141 mmol/L Glu 113 mg/dL[H] K+ 3.8 mmol/L Cr 0.43 mg/dL[L] BUN 22 mg/dL Phos 3.0 mg/dL   - (3/18) Trig 164 mg/dL[H]  - (2/25) HbA1c 6.1%[H]   - POCT: (3/20) 123-131     Weight: (3/20) 117 lbs / 53.1 kg, (3/18 bed scale obtained by RDN) 120.5 lbs / 54.8 kg, (3/13 dosing) 149.9 lbs / 68 kg (questionable accuracy)   Weight Assessment: Apparent ~4 lb (3%) weight loss x2 days .  Height: 65 in / 165.1 cm  IBW: 125 lbs / 56.6 kg +/-10%  BMI: 19.5 kg/m^2 (at lowest weight)    Pertinent Medications: MEDICATIONS  (STANDING):  chlorhexidine 2% Cloths 1 Application(s) Topical daily  heparin   Injectable 5000 Unit(s) SubCutaneous every 8 hours  lipid, fat emulsion (Fish Oil and Plant Based) 20% Infusion 16.6 mL/Hr (16.6 mL/Hr) IV Continuous <Continuous>  metoclopramide Injectable 10 milliGRAM(s) IV Push every 8 hours  pantoprazole Injectable 40 milliGRAM(s) IV Push daily  Parenteral Nutrition - Adult 1 Each (83 mL/Hr) TPN Continuous <Continuous>  polyethylene glycol 3350 17 Gram(s) Oral two times a day    MEDICATIONS  (PRN):  acetaminophen   IVPB .. 1000 milliGRAM(s) IV Intermittent every 6 hours PRN Mild Pain (1 - 3)  benzocaine/menthol Lozenge 1 Lozenge Oral three times a day PRN Sore Throat  lidocaine   4% Patch 1 Patch Transdermal every 24 hours PRN back pain  ondansetron Injectable 4 milliGRAM(s) IV Push every 6 hours PRN Nausea  sodium chloride 0.9% lock flush 10 milliLiter(s) IV Push every 1 hour PRN Pre/post blood products, medications, blood draw, and to maintain line patency    [] Relevant notes on medications:     Pertinent Labs: 03-24 Na139 mmol/L Glu 124 mg/dL[H] K+ 4.7 mmol/L Cr  0.42 mg/dL[L] BUN 24 mg/dL[H] 03-24 Phos 3.6 mg/dL 03-24 Alb 3.2 g/dL[L] 03-18 Chol --    LDL --    HDL --    Trig 164 mg/dL[H]      Physical Assessment, per flowsheets:  Edema/Pressure Injury: none noted     Estimated Needs:   [X] No change since previous assessment, based on ideal body weight 125 lbs / 56.6 kg  3902-0274 kcal daily @30-35 kcal/kg, 79.24-96.22 gm protein daily @1.4-1.7 gm/kg     Previous Nutrition Diagnosis: [X] Severe malnutrition in the context in acute illness or injury, ongoing   New Nutrition Diagnosis: [X] not applicable     Education:  [X] not applicable     Interventions:   1) TPN per Nutrition Support Team.   2) Obtain weekly weights.     Monitor & Evaluate:  Tolerance to diet, nutrition related lab values, weight trends, GI distress, hydration status, skin integrity.    Anayeli Gonsales RDN, CDN #[id number]  Also available on [employer] Teams.      Electronic Signatures: NUTRITION FOLLOW UP NOTE     Pt seen for malnutrition follow up.     SOURCE: [X] Patient [X] Medical record     Medical Course:  - Per chart, pt is 74 year old female PMH metastatic urothelial carcinoma (2017, s/p immunotherapy), bilateral breast cancer (s/p RT), diverticulitis, HTN, HLD, arterial insufficiency, vasculopathy, s/p recent Cee procedure for chronic diverticulitis with colo-enteric fistula (3/5/25) presenting with nausea/vomiting found to have a post-operative ileus vs. pSBO. IR PICC placed (3/17) and TPN initiated. Nutrition Support and Colorectal Surgery following.     Diet Prescription:   - NPO, Except Medications    Nutrition Course:  - Pt remains NPO on TPN: 2L infusing @ 83 mL/hr (100 gm amino acids, 190 gm dextrose, 40 gm SMOF lipids) to provide 1446 kcal/day (meets 25.5 kcal/kg, 1.8 gm protein/kg @ ideal body weight 56.6 kg).   - NGT dislodged (3/22), pt was advanced to sips (3/23) however vomited therefore made NPO.   - Colostomy output, per flowsheets: (3/25) 50 mL, (3/24) 30 mL, (3/23) 210 mL.   - Ordered for Miralax 17 gm BID, standing Reglan and PRN Zofran.     Food Allergy/Intolerance:  - NKFA    Pertinent Medications:   - lipid fat emulsion (Fish Oil and Plant Based) 20% IV Continuous, pantoprazole Injectable, metoclopramide Injectable, Parenteral Nutrition TPN Continuous, polyethylene glycol, ondansetron Injectable PRN    Pertinent Labs:  - (3/24) Na 139 mmol/L Glu 124 mg/dL[H] K+ 4.7 mmol/L Cr 0.42 mg/dL[L] BUN 24 mg/dL[H] Phos 3.6 mg/dL Alb 3.2 g/dL[L]   - (3/18) Trig 164 mg/dL[H]  - (2/25) HbA1c 6.1%[H]   - POCT: (3/24) 126    Weight: (3/23 dosing) 112.2 lbs / 50.9 kg, (3/20) 117 lbs / 53.1 kg, (3/18 bed scale obtained by RDN) 120.5 lbs / 54.8 kg, (3/13 dosing) 149.9 lbs / 68 kg (questionable accuracy)   Weight Assessment: Apparent ~8 lb (7%) weight loss x5 days.  Height: 65 in / 165.1 cm  IBW: 125 lbs / 56.6 kg +/-10%  BMI: 18.7 kg/m^2 (at lowest weight)    Physical Assessment, per flowsheets:  Edema/Pressure Injury: none noted     Estimated Needs:   [X] No change since previous assessment, based on ideal body weight 125 lbs / 56.6 kg  0229-8967 kcal daily @30-35 kcal/kg, 79.24-96.22 gm protein daily @1.4-1.7 gm/kg     Previous Nutrition Diagnosis: [X] Severe malnutrition in the context in acute illness or injury, ongoing   New Nutrition Diagnosis: [X] not applicable     Education:  [X] not applicable     Interventions:   1) TPN per Nutrition Support Team.   2) Obtain weekly weights.     Monitor & Evaluate:  Tolerance to diet, nutrition related lab values, weight trends, GI distress, hydration status, skin integrity.    Anayeli Gonsales RDN, CDN #[id number]  Also available on [employer] Teams.      Electronic Signatures: NUTRITION FOLLOW UP NOTE     Pt seen for malnutrition follow up.     SOURCE: [X] Patient [X] Medical record     Medical Course:  - Per chart, pt is 74 year old female PMH metastatic urothelial carcinoma (2017, s/p immunotherapy), bilateral breast cancer (s/p RT), diverticulitis, HTN, HLD, arterial insufficiency, vasculopathy, s/p recent Cee procedure for chronic diverticulitis with colo-enteric fistula (3/5/25) presenting with nausea/vomiting found to have a post-operative ileus vs. pSBO. IR PICC placed (3/17) and TPN initiated. Nutrition Support and Colorectal Surgery following.     Diet Prescription:   - NPO, Except Medications    Nutrition Course:  - Pt remains NPO on TPN: 2L infusing @ 83 mL/hr (100 gm amino acids, 190 gm dextrose, 40 gm SMOF lipids) to provide 1446 kcal/day (meets 25.5 kcal/kg, 1.8 gm protein/kg @ ideal body weight 56.6 kg).   - NGT dislodged (3/22), pt was advanced to sips (3/23) however vomited therefore made NPO. NGT was not replaced.   - Colostomy output, per flowsheets: (3/25) 50 mL, (3/24) 30 mL, (3/23) 210 mL.   - Ordered for Miralax 17 gm BID, standing Reglan and PRN Zofran.     Food Allergy/Intolerance:  - NKFA    Pertinent Medications:   - lipid fat emulsion (Fish Oil and Plant Based) 20% IV Continuous, pantoprazole Injectable, metoclopramide Injectable, Parenteral Nutrition TPN Continuous, polyethylene glycol, ondansetron Injectable PRN    Pertinent Labs:   - (3/25) Na 142, BUN 24[H], Cr 0.43[L], [H], K+ 4.0, Phos 3.5, Mg 1.90  - (3/18) Trig 164 mg/dL[H]  - (2/25) HbA1c 6.1%[H]   - POCT: (3/24) 126    Weight: (3/23 dosing) 112.2 lbs / 50.9 kg, (3/20) 117 lbs / 53.1 kg, (3/18 bed scale obtained by RDN) 120.5 lbs / 54.8 kg, (3/13 dosing) 149.9 lbs / 68 kg (questionable accuracy)   Weight Assessment: Apparent ~8 lb (7%) weight loss x5 days.  Height: 65 in / 165.1 cm  IBW: 125 lbs / 56.6 kg +/-10%  BMI: 18.7 kg/m^2 (at lowest weight)    Physical Assessment, per flowsheets:  Edema/Pressure Injury: none noted     Estimated Needs:   [X] No change since previous assessment, based on ideal body weight 125 lbs / 56.6 kg  7042-0984 kcal daily @30-35 kcal/kg, 79.24-96.22 gm protein daily @1.4-1.7 gm/kg     Previous Nutrition Diagnosis: [X] Severe malnutrition in the context in acute illness or injury, ongoing   New Nutrition Diagnosis: [X] not applicable     Education:  [X] not applicable     Interventions:   1) TPN per Nutrition Support Team.   2) Obtain weekly weights.     Monitor & Evaluate:  Diet progression, nutrition related lab values, weight trends, GI distress, hydration status, skin integrity.    Anayeli Gonsales RDN, CDN #[id number]  Also available on [employer] Teams.

## 2025-03-25 NOTE — PROGRESS NOTE ADULT - ASSESSMENT
74F s/p Cee procedure for chronic diverticulitis with colo-enteric fistula (3/5, Dr. Bethea) re-presenting with nausea and vomiting, found to have a post-operative ileus, now on TPN/PICC 3/17. Course prolonged by high NGT output which dislodged 3/22, advanced to sips, but continued nausea/vomiting, most likely from erythromycin. Nausea improved off erythromycin and with zofran on board.    PLAN:  - Cont zofran and reglan  - Allow red rubber to come out of stoma on its own  - Miralax BID  - PICC/TPN 3/17  - Diet: NPO  - Pain control  - GI PCR negative   - Monitor UOP, Ostomy output  - PT eval --> recommend ALLISON  - Will discuss resuming xarelto in coming days    A Team Surgery  [id number]

## 2025-03-25 NOTE — PROGRESS NOTE ADULT - SUBJECTIVE AND OBJECTIVE BOX
TEAM [ A ] Surgery Daily Progress Note  =====================================================    SUBJECTIVE: Patient seen and examined at bedside on AM rounds. Patient reports that their nausea has improved. NPO, denies nausea, vomiting. Ostomy with gas and stool. OOB/Amublating as tolerated. Denies fever, chills.    ALLERGIES:  sulfa drugs (Unknown)      --------------------------------------------------------------------------------------    MEDICATIONS:    Neurologic Medications  acetaminophen   IVPB .. 1000 milliGRAM(s) IV Intermittent every 6 hours PRN Mild Pain (1 - 3)  metoclopramide Injectable 10 milliGRAM(s) IV Push every 8 hours  ondansetron Injectable 4 milliGRAM(s) IV Push every 6 hours PRN Nausea    Respiratory Medications    Cardiovascular Medications    Gastrointestinal Medications  lipid, fat emulsion (Fish Oil and Plant Based) 20% Infusion 16.6 mL/Hr IV Continuous <Continuous>  pantoprazole  Injectable 40 milliGRAM(s) IV Push daily  Parenteral Nutrition - Adult 1 Each TPN Continuous <Continuous>  Parenteral Nutrition - Adult 1 Each TPN Continuous <Continuous>  polyethylene glycol 3350 17 Gram(s) Oral two times a day  sodium chloride 0.9% lock flush 10 milliLiter(s) IV Push every 1 hour PRN Pre/post blood products, medications, blood draw, and to maintain line patency    Genitourinary Medications    Hematologic/Oncologic Medications  heparin   Injectable 5000 Unit(s) SubCutaneous every 8 hours    Antimicrobial/Immunologic Medications    Endocrine/Metabolic Medications    Topical/Other Medications  benzocaine/menthol Lozenge 1 Lozenge Oral three times a day PRN Sore Throat  chlorhexidine 2% Cloths 1 Application(s) Topical daily  lidocaine   4% Patch 1 Patch Transdermal every 24 hours PRN back pain    --------------------------------------------------------------------------------------    VITAL SIGNS:  T(C): 36.4 (03-25-25 @ 09:00), Max: 36.8 (03-24-25 @ 13:26)  HR: 83 (03-25-25 @ 09:00) (74 - 88)  BP: 113/75 (03-25-25 @ 09:00) (111/76 - 122/72)  RR: 18 (03-25-25 @ 09:00) (16 - 19)  SpO2: 100% (03-25-25 @ 09:00) (99% - 100%)  --------------------------------------------------------------------------------------    EXAM    General: NAD, resting in bed comfortably.  Cardiac: regular rate, warm and well perfused  Respiratory: Nonlabored respirations, normal cw expansion.  Abdomen: soft, nontender, nondistended. Ostomy with gas and stool in bag, red rubber still in place in stoma. Inferior aspect of midline incision with small superficial opening, covered with gauze and tape.   Extremities: normal strength, FROM, no deformities    --------------------------------------------------------------------------------------    LABS                        8.1    8.85  )-----------( 345      ( 25 Mar 2025 07:34 )             26.7   03-25    142  |  108[H]  |  24[H]  ----------------------------<  119[H]  4.0   |  22  |  0.43[L]    Ca    9.1      25 Mar 2025 07:34  Phos  3.5     03-25  Mg     1.90     03-25    TPro  6.7  /  Alb  3.2[L]  /  TBili  0.2  /  DBili  x   /  AST  48[H]  /  ALT  81[H]  /  AlkPhos  170[H]  03-24    --------------------------------------------------------------------------------------    INS AND OUTS:    03-24-25 @ 07:01  -  03-25-25 @ 07:00  --------------------------------------------------------  IN: 298.8 mL / OUT: 1050 mL / NET: -751.2 mL    03-25-25 @ 07:01  -  03-25-25 @ 11:26  --------------------------------------------------------  IN: 333.2 mL / OUT: 350 mL / NET: -16.8 mL      --------------------------------------------------------------------------------------

## 2025-03-26 LAB
ALBUMIN SERPL ELPH-MCNC: 3 G/DL — LOW (ref 3.3–5)
ALP SERPL-CCNC: 134 U/L — HIGH (ref 40–120)
ALT FLD-CCNC: 46 U/L — HIGH (ref 4–33)
ANION GAP SERPL CALC-SCNC: 10 MMOL/L — SIGNIFICANT CHANGE UP (ref 7–14)
AST SERPL-CCNC: 22 U/L — SIGNIFICANT CHANGE UP (ref 4–32)
BILIRUB DIRECT SERPL-MCNC: <0.2 MG/DL — SIGNIFICANT CHANGE UP (ref 0–0.3)
BILIRUB INDIRECT FLD-MCNC: SIGNIFICANT CHANGE UP MG/DL (ref 0–1)
BILIRUB SERPL-MCNC: <0.2 MG/DL — SIGNIFICANT CHANGE UP (ref 0.2–1.2)
BUN SERPL-MCNC: 20 MG/DL — SIGNIFICANT CHANGE UP (ref 7–23)
CALCIUM SERPL-MCNC: 8.9 MG/DL — SIGNIFICANT CHANGE UP (ref 8.4–10.5)
CHLORIDE SERPL-SCNC: 105 MMOL/L — SIGNIFICANT CHANGE UP (ref 98–107)
CO2 SERPL-SCNC: 23 MMOL/L — SIGNIFICANT CHANGE UP (ref 22–31)
CREAT SERPL-MCNC: 0.38 MG/DL — LOW (ref 0.5–1.3)
EGFR: 104 ML/MIN/1.73M2 — SIGNIFICANT CHANGE UP
EGFR: 104 ML/MIN/1.73M2 — SIGNIFICANT CHANGE UP
GLUCOSE BLDC GLUCOMTR-MCNC: 114 MG/DL — HIGH (ref 70–99)
GLUCOSE BLDC GLUCOMTR-MCNC: 120 MG/DL — HIGH (ref 70–99)
GLUCOSE BLDC GLUCOMTR-MCNC: 127 MG/DL — HIGH (ref 70–99)
GLUCOSE SERPL-MCNC: 127 MG/DL — HIGH (ref 70–99)
HCT VFR BLD CALC: 26.7 % — LOW (ref 34.5–45)
HGB BLD-MCNC: 8.4 G/DL — LOW (ref 11.5–15.5)
MAGNESIUM SERPL-MCNC: 1.8 MG/DL — SIGNIFICANT CHANGE UP (ref 1.6–2.6)
MCHC RBC-ENTMCNC: 24.5 PG — LOW (ref 27–34)
MCHC RBC-ENTMCNC: 31.5 G/DL — LOW (ref 32–36)
MCV RBC AUTO: 77.8 FL — LOW (ref 80–100)
NRBC # BLD AUTO: 0.02 K/UL — HIGH (ref 0–0)
NRBC # FLD: 0.02 K/UL — HIGH (ref 0–0)
NRBC BLD AUTO-RTO: 0 /100 WBCS — SIGNIFICANT CHANGE UP (ref 0–0)
PHOSPHATE SERPL-MCNC: 3 MG/DL — SIGNIFICANT CHANGE UP (ref 2.5–4.5)
PLATELET # BLD AUTO: 327 K/UL — SIGNIFICANT CHANGE UP (ref 150–400)
POTASSIUM SERPL-MCNC: 3.5 MMOL/L — SIGNIFICANT CHANGE UP (ref 3.5–5.3)
POTASSIUM SERPL-SCNC: 3.5 MMOL/L — SIGNIFICANT CHANGE UP (ref 3.5–5.3)
PROT SERPL-MCNC: 6.1 G/DL — SIGNIFICANT CHANGE UP (ref 6–8.3)
RBC # BLD: 3.43 M/UL — LOW (ref 3.8–5.2)
RBC # FLD: 21.4 % — HIGH (ref 10.3–14.5)
SODIUM SERPL-SCNC: 138 MMOL/L — SIGNIFICANT CHANGE UP (ref 135–145)
WBC # BLD: 8.21 K/UL — SIGNIFICANT CHANGE UP (ref 3.8–10.5)
WBC # FLD AUTO: 8.21 K/UL — SIGNIFICANT CHANGE UP (ref 3.8–10.5)

## 2025-03-26 PROCEDURE — 99232 SBSQ HOSP IP/OBS MODERATE 35: CPT | Mod: FS

## 2025-03-26 RX ORDER — SODIUM/POT/MAG/CALC/CHLOR/ACET 35-20-5MEQ
1 VIAL (ML) INTRAVENOUS
Refills: 0 | Status: DISCONTINUED | OUTPATIENT
Start: 2025-03-26 | End: 2025-03-26

## 2025-03-26 RX ORDER — I.V. FAT EMULSION 20 G/100ML
16.6 EMULSION INTRAVENOUS
Qty: 40 | Refills: 0 | Status: DISCONTINUED | OUTPATIENT
Start: 2025-03-26 | End: 2025-03-27

## 2025-03-26 RX ADMIN — HEPARIN SODIUM 5000 UNIT(S): 1000 INJECTION INTRAVENOUS; SUBCUTANEOUS at 13:59

## 2025-03-26 RX ADMIN — Medication 1000 MILLIGRAM(S): at 14:30

## 2025-03-26 RX ADMIN — Medication 400 MILLIGRAM(S): at 13:59

## 2025-03-26 RX ADMIN — Medication 10 MILLIGRAM(S): at 06:05

## 2025-03-26 RX ADMIN — Medication 40 MILLIGRAM(S): at 12:28

## 2025-03-26 RX ADMIN — I.V. FAT EMULSION 16.6 ML/HR: 20 EMULSION INTRAVENOUS at 20:01

## 2025-03-26 RX ADMIN — Medication 1 EACH: at 20:01

## 2025-03-26 RX ADMIN — POLYETHYLENE GLYCOL 3350 17 GRAM(S): 17 POWDER, FOR SOLUTION ORAL at 17:21

## 2025-03-26 RX ADMIN — Medication 1 APPLICATION(S): at 12:06

## 2025-03-26 RX ADMIN — HEPARIN SODIUM 5000 UNIT(S): 1000 INJECTION INTRAVENOUS; SUBCUTANEOUS at 06:05

## 2025-03-26 RX ADMIN — Medication 10 MILLIGRAM(S): at 14:25

## 2025-03-26 NOTE — PROGRESS NOTE ADULT - SUBJECTIVE AND OBJECTIVE BOX
NUTRITION NOTE  [id number] ACKERMAN  ===============================    Interval events - Patient was seen and examined at bedside, no acute events overnight. Patient denies chest pain, shortness of breath, nausea or vomiting at this time.     IR PICC placed and parenteral nutrition was started on 3/17/25. Pt is tolerating TPN without any issues. TPN/lipids ordered for tonight.     ROS: Except as noted above, all other systems reviewed and are negative     Allergies  sulfa drugs (Unknown)    PAST MEDICAL & SURGICAL HISTORY:  Breast CA, left lumpectomy / RTX in the past  Hypertension  Irritable bowel syndrome (IBS)  Polyp of colon "pre-cancerous" x 2, removed 2014  HLD (hyperlipidemia)  Bladder polyp  Livedoid vasculopathy  Anxiety and depression  KARI on CPAP  Arterial insufficiency  Diverticulitis  Cancer of right breast  HTN (hypertension)  Urothelial carcinoma of bladder  Other specified diseases of intestine  S/P   S/P colon resection reversal, had complications for fibriods  S/P hysterectomy  H/O lumpectomy left   Personal history of spine surgery L1-L4 fusion 2017  S/P lumpectomy, left breast  S/P lumpectomy, right breast  S/P angiogram of extremity    FAMILY HISTORY:  Family history of coronary artery disease (Mother)    Vital Signs Last 24 Hrs  T(C): 36.6 (26 Mar 2025 08:35), Max: 36.8 (25 Mar 2025 20:32)  T(F): 97.9 (26 Mar 2025 08:35), Max: 98.2 (25 Mar 2025 20:32)  HR: 70 (26 Mar 2025 08:35) (68 - 76)  BP: 123/67 (26 Mar 2025 08:35) (108/64 - 141/69)  RR: 17 (26 Mar 2025 08:35) (17 - 18)  SpO2: 99% (26 Mar 2025 08:35) (99% - 100%)    MEDICATIONS  (STANDING):  chlorhexidine 2% Cloths 1 Application(s) Topical daily  heparin   Injectable 5000 Unit(s) SubCutaneous every 8 hours  lipid, fat emulsion (Fish Oil and Plant Based) 20% Infusion 16.6 mL/Hr (16.6 mL/Hr) IV Continuous <Continuous>  metoclopramide Injectable 10 milliGRAM(s) IV Push every 8 hours  pantoprazole  Injectable 40 milliGRAM(s) IV Push daily  Parenteral Nutrition - Adult 1 Each (83 mL/Hr) TPN Continuous <Continuous>  Parenteral Nutrition - Adult 1 Each (83 mL/Hr) TPN Continuous <Continuous>  polyethylene glycol 3350 17 Gram(s) Oral two times a day    MEDICATIONS  (PRN):  acetaminophen   IVPB .. 1000 milliGRAM(s) IV Intermittent every 6 hours PRN Mild Pain (1 - 3)  benzocaine/menthol Lozenge 1 Lozenge Oral three times a day PRN Sore Throat  lidocaine   4% Patch 1 Patch Transdermal every 24 hours PRN back pain  ondansetron Injectable 4 milliGRAM(s) IV Push every 6 hours PRN Nausea  sodium chloride 0.9% lock flush 10 milliLiter(s) IV Push every 1 hour PRN Pre/post blood products, medications, blood draw, and to maintain line patency    I&O's Detail    25 Mar 2025 07:01  -  26 Mar 2025 07:00  --------------------------------------------------------  IN:    TPN (Total Parenteral Nutrition): 996 mL  Total IN: 996 mL    OUT:    Colostomy (mL): 350 mL    Oral Fluid: 0 mL    Voided (mL): 825 mL  Total OUT: 1175 mL    Total NET: -179 mL      26 Mar 2025 07:01  -  26 Mar 2025 10:45  --------------------------------------------------------  IN:    Oral Fluid: 50 mL    TPN (Total Parenteral Nutrition): 332 mL  Total IN: 382 mL    OUT:    Colostomy (mL): 100 mL    Voided (mL): 250 mL  Total OUT: 350 mL    Total NET: 32 mL    POCT Blood Glucose.: 127 mg/dL (26 Mar 2025 07:35)  POCT Blood Glucose.: 120 mg/dL (26 Mar 2025 00:44)  POCT Blood Glucose.: 128 mg/dL (25 Mar 2025 12:15)    Daily Weight in k.2 (26 Mar 2025 05:58)    Drug Dosing Weight  Height (cm): 165.1 (13 Mar 2025 17:00)  Weight (kg): 50.9 (23 Mar 2025 06:16)  BMI (kg/m2): 18.7 (23 Mar 2025 06:16)  BSA (m2): 1.55 (23 Mar 2025 06:16)    PHYSICAL EXAM:  Constitutional: A&Ox3, NAD  Gastrointestinal: abdomen soft nontender, nondistended, ostomy with function  Extremities: Moving all extremities, no edema  PICC Site: LUE double lumen PICC in place, site clean and dry, placed 3/17/25    Diet: NPO with sips/chips and TPN/lipids (started on 3/17/25)    LABORATORY                                                  8.4    8.21  )-----------( 327      ( 26 Mar 2025 06:11 )             26.7       138  |  105  |  20  ----------------------------<  127[H]  3.5   |  23  |  0.38[L]    Ca    8.9      26 Mar 2025 06:11  Phos  3.0       Mg     1.80         TPro  6.1  /  Alb  3.0[L]  /  TBili  <0.2  /  DBili  <0.2  /  AST  22  /  ALT  46[H]  /  AlkPhos  134[H]      LIVER FUNCTIONS - ( 26 Mar 2025 06:11 )  Alb: 3.0 g/dL / Pro: 6.1 g/dL / ALK PHOS: 134 U/L / ALT: 46 U/L / AST: 22 U/L / GGT: x           0318 Chol -- LDL -- HDL -- Trig 164[H]    ASSESSMENT/PLAN:  73 y/o F with PMHx metastatic urothelial carcinoma in 2017 (s/p immunotherapy b/l breast cancer (s/p RT), diverticulitis, HTN, HLD, arterial insufficiency, vasculopathy on Xarelto and Pletal, s/p recent Cee procedure for chronic diverticulitis with colo-enteric fistula (3/5/25) presenting with nausea and vomiting, found to have a post-operative ileus vs. pSBO. Nutrition support consult called for initiation of parenteral nutrition in view of severe protein calorie malnutrition and anticipated prolonged NPO.  IR PICC placed and parenteral nutrition was started on 3/17/25.     continue TPN with infusion volume of 2L, TPN will provide 1446 kcal/day    labs reviewed - electrolytes adjusted in TPN bag; labs can be done every other day for TPN adjustments      monitor fingersticks, obtain daily weights    continue parenteral nutrition at this time, will follow up with primary team on plan     1.  Severe protein calorie malnutrition being optimized with TPN: CHO [190] gm.  AA [100] gm. SMOF Lipids [40] gm.  2.  Hyperglycemia managed with: [0] units of regular insulin    3.  Check fluid balance daily.  Strict I/O  [ ] [ ]   4.  Daily BMP, Ionized Calcium, Magnesium and Phosphorous   5.  Triglycerides at initiation of TPN and monthly  Chol -- LDL -- HDL -- Trig 164[H]    Nutrition Support [id number]

## 2025-03-26 NOTE — PROGRESS NOTE ADULT - SUBJECTIVE AND OBJECTIVE BOX
TEAM [ A ] Surgery Daily Progress Note  =====================================================    SUBJECTIVE: Patient seen and examined at bedside on AM rounds. Patient reports improved nausea but anxious to try taking in liquids. NPO with sips, reports some nausea in early AM but resolved, denies vomiting. Ostomy with gas and stool. OOB/Amublating as tolerated. Denies fever, chills.    ALLERGIES:  sulfa drugs (Unknown)      --------------------------------------------------------------------------------------    MEDICATIONS:    Neurologic Medications  acetaminophen   IVPB .. 1000 milliGRAM(s) IV Intermittent every 6 hours PRN Mild Pain (1 - 3)  metoclopramide Injectable 10 milliGRAM(s) IV Push every 8 hours  ondansetron Injectable 4 milliGRAM(s) IV Push every 6 hours PRN Nausea    Respiratory Medications    Cardiovascular Medications    Gastrointestinal Medications  lipid, fat emulsion (Fish Oil and Plant Based) 20% Infusion 16.6 mL/Hr IV Continuous <Continuous>  pantoprazole  Injectable 40 milliGRAM(s) IV Push daily  Parenteral Nutrition - Adult 1 Each TPN Continuous <Continuous>  Parenteral Nutrition - Adult 1 Each TPN Continuous <Continuous>  polyethylene glycol 3350 17 Gram(s) Oral two times a day  sodium chloride 0.9% lock flush 10 milliLiter(s) IV Push every 1 hour PRN Pre/post blood products, medications, blood draw, and to maintain line patency    Genitourinary Medications    Hematologic/Oncologic Medications  heparin   Injectable 5000 Unit(s) SubCutaneous every 8 hours    Antimicrobial/Immunologic Medications    Endocrine/Metabolic Medications    Topical/Other Medications  benzocaine/menthol Lozenge 1 Lozenge Oral three times a day PRN Sore Throat  chlorhexidine 2% Cloths 1 Application(s) Topical daily  lidocaine   4% Patch 1 Patch Transdermal every 24 hours PRN back pain    --------------------------------------------------------------------------------------    VITAL SIGNS:  T(C): 36.6 (03-26-25 @ 08:35), Max: 36.8 (03-25-25 @ 20:32)  HR: 70 (03-26-25 @ 08:35) (68 - 76)  BP: 123/67 (03-26-25 @ 08:35) (108/64 - 141/69)  RR: 17 (03-26-25 @ 08:35) (17 - 18)  SpO2: 99% (03-26-25 @ 08:35) (99% - 100%)  --------------------------------------------------------------------------------------    EXAM    General: NAD, resting in bed comfortably.  Cardiac: regular rate, warm and well perfused  Respiratory: Nonlabored respirations, normal cw expansion.  Abdomen: soft, nontender, nondistended. Ostomy with gas and stool in bag, red rubber still in place in stoma. Inferior aspect of midline incision with small superficial opening, covered with gauze and tape.    Extremities: normal strength, FROM, no deformities    --------------------------------------------------------------------------------------    LABS                        8.4    8.21  )-----------( 327      ( 26 Mar 2025 06:11 )             26.7   03-26    138  |  105  |  20  ----------------------------<  127[H]  3.5   |  23  |  0.38[L]    Ca    8.9      26 Mar 2025 06:11  Phos  3.0     03-26  Mg     1.80     03-26    TPro  6.1  /  Alb  3.0[L]  /  TBili  <0.2  /  DBili  <0.2  /  AST  22  /  ALT  46[H]  /  AlkPhos  134[H]  03-26    --------------------------------------------------------------------------------------    INS AND OUTS:    03-25-25 @ 07:01  -  03-26-25 @ 07:00  --------------------------------------------------------  IN: 996 mL / OUT: 1175 mL / NET: -179 mL    03-26-25 @ 07:01  -  03-26-25 @ 11:31  --------------------------------------------------------  IN: 382 mL / OUT: 350 mL / NET: 32 mL      --------------------------------------------------------------------------------------

## 2025-03-26 NOTE — ADVANCED PRACTICE NURSE CONSULT - ASSESSMENT
Patient noted to be in chair and resting, getting ready to ambulate in hallway. Patient states she is feeling better and that her nausea has improved. Bag remains intact however now a red rubber catheter is inserted into stoma.  stoma noted to be viable, pink and moist. Will see patient tomorrow to change pouch.

## 2025-03-26 NOTE — PROGRESS NOTE ADULT - ASSESSMENT
74F s/p Cee procedure for chronic diverticulitis with colo-enteric fistula (3/5, Dr. Bethea) re-presenting with nausea and vomiting, found to have a post-operative ileus, now on TPN/PICC 3/17. Course prolonged by high NGT output which dislodged 3/22, advanced to sips, but continued nausea/vomiting, most likely from erythromycin. Nausea improved off erythromycin and with zofran on board.    PLAN:  - Cont zofran and reglan  - Allow red rubber to come out of stoma on its own  - Miralax BID  - PICC/TPN 3/17  - Diet: NPO with sips/chips  - Pain control  - GI PCR negative   - Monitor UOP, Ostomy output  - PT eval --> recommend ALLISON  - Will discuss resuming xarelto in coming days    A Team Surgery  [id number]

## 2025-03-27 LAB
ANION GAP SERPL CALC-SCNC: 12 MMOL/L — SIGNIFICANT CHANGE UP (ref 7–14)
BUN SERPL-MCNC: 20 MG/DL — SIGNIFICANT CHANGE UP (ref 7–23)
CALCIUM SERPL-MCNC: 9.1 MG/DL — SIGNIFICANT CHANGE UP (ref 8.4–10.5)
CHLORIDE SERPL-SCNC: 105 MMOL/L — SIGNIFICANT CHANGE UP (ref 98–107)
CO2 SERPL-SCNC: 22 MMOL/L — SIGNIFICANT CHANGE UP (ref 22–31)
CREAT SERPL-MCNC: 0.38 MG/DL — LOW (ref 0.5–1.3)
EGFR: 104 ML/MIN/1.73M2 — SIGNIFICANT CHANGE UP
EGFR: 104 ML/MIN/1.73M2 — SIGNIFICANT CHANGE UP
GLUCOSE BLDC GLUCOMTR-MCNC: 123 MG/DL — HIGH (ref 70–99)
GLUCOSE SERPL-MCNC: 110 MG/DL — HIGH (ref 70–99)
HCT VFR BLD CALC: 29.5 % — LOW (ref 34.5–45)
HGB BLD-MCNC: 9.1 G/DL — LOW (ref 11.5–15.5)
MAGNESIUM SERPL-MCNC: 2.1 MG/DL — SIGNIFICANT CHANGE UP (ref 1.6–2.6)
MCHC RBC-ENTMCNC: 24.3 PG — LOW (ref 27–34)
MCHC RBC-ENTMCNC: 30.8 G/DL — LOW (ref 32–36)
MCV RBC AUTO: 78.7 FL — LOW (ref 80–100)
NRBC # BLD AUTO: 0.02 K/UL — HIGH (ref 0–0)
NRBC # FLD: 0.02 K/UL — HIGH (ref 0–0)
NRBC BLD AUTO-RTO: 0 /100 WBCS — SIGNIFICANT CHANGE UP (ref 0–0)
PHOSPHATE SERPL-MCNC: 3.4 MG/DL — SIGNIFICANT CHANGE UP (ref 2.5–4.5)
PLATELET # BLD AUTO: 312 K/UL — SIGNIFICANT CHANGE UP (ref 150–400)
POTASSIUM SERPL-MCNC: 3.8 MMOL/L — SIGNIFICANT CHANGE UP (ref 3.5–5.3)
POTASSIUM SERPL-SCNC: 3.8 MMOL/L — SIGNIFICANT CHANGE UP (ref 3.5–5.3)
RBC # BLD: 3.75 M/UL — LOW (ref 3.8–5.2)
RBC # FLD: 22.1 % — HIGH (ref 10.3–14.5)
SODIUM SERPL-SCNC: 139 MMOL/L — SIGNIFICANT CHANGE UP (ref 135–145)
WBC # BLD: 8.39 K/UL — SIGNIFICANT CHANGE UP (ref 3.8–10.5)
WBC # FLD AUTO: 8.39 K/UL — SIGNIFICANT CHANGE UP (ref 3.8–10.5)

## 2025-03-27 PROCEDURE — 99232 SBSQ HOSP IP/OBS MODERATE 35: CPT | Mod: FS

## 2025-03-27 RX ORDER — SODIUM/POT/MAG/CALC/CHLOR/ACET 35-20-5MEQ
1 VIAL (ML) INTRAVENOUS
Refills: 0 | Status: DISCONTINUED | OUTPATIENT
Start: 2025-03-27 | End: 2025-03-27

## 2025-03-27 RX ORDER — I.V. FAT EMULSION 20 G/100ML
16.6 EMULSION INTRAVENOUS
Qty: 40 | Refills: 0 | Status: DISCONTINUED | OUTPATIENT
Start: 2025-03-27 | End: 2025-03-28

## 2025-03-27 RX ADMIN — HEPARIN SODIUM 5000 UNIT(S): 1000 INJECTION INTRAVENOUS; SUBCUTANEOUS at 17:07

## 2025-03-27 RX ADMIN — Medication 10 MILLIGRAM(S): at 17:07

## 2025-03-27 RX ADMIN — Medication 1 APPLICATION(S): at 13:18

## 2025-03-27 RX ADMIN — POLYETHYLENE GLYCOL 3350 17 GRAM(S): 17 POWDER, FOR SOLUTION ORAL at 17:07

## 2025-03-27 RX ADMIN — Medication 10 MILLIGRAM(S): at 08:58

## 2025-03-27 RX ADMIN — Medication 10 MILLIGRAM(S): at 00:20

## 2025-03-27 RX ADMIN — HEPARIN SODIUM 5000 UNIT(S): 1000 INJECTION INTRAVENOUS; SUBCUTANEOUS at 08:58

## 2025-03-27 RX ADMIN — I.V. FAT EMULSION 16.6 ML/HR: 20 EMULSION INTRAVENOUS at 18:45

## 2025-03-27 RX ADMIN — POLYETHYLENE GLYCOL 3350 17 GRAM(S): 17 POWDER, FOR SOLUTION ORAL at 06:00

## 2025-03-27 RX ADMIN — Medication 40 MILLIGRAM(S): at 14:07

## 2025-03-27 RX ADMIN — Medication 10 MILLIGRAM(S): at 23:43

## 2025-03-27 RX ADMIN — HEPARIN SODIUM 5000 UNIT(S): 1000 INJECTION INTRAVENOUS; SUBCUTANEOUS at 00:20

## 2025-03-27 RX ADMIN — Medication 1 EACH: at 18:44

## 2025-03-27 RX ADMIN — HEPARIN SODIUM 5000 UNIT(S): 1000 INJECTION INTRAVENOUS; SUBCUTANEOUS at 23:44

## 2025-03-27 NOTE — PROGRESS NOTE ADULT - SUBJECTIVE AND OBJECTIVE BOX
TEAM [ A ] Surgery Daily Progress Note  =====================================================    SUBJECTIVE: Patient seen and examined at bedside on AM rounds. Patient reports feeling much better this morning. Denies nausea or vomiting. Was able to tolerate sips of water, tea, coffee yesterday. Says she would like to try clear liquids today. No acute complaints. Has been out of bed, walking.     ALLERGIES:  sulfa drugs (Unknown)      --------------------------------------------------------------------------------------    MEDICATIONS:  acetaminophen   IVPB .. 1000 milliGRAM(s) IV Intermittent every 6 hours PRN  benzocaine/menthol Lozenge 1 Lozenge Oral three times a day PRN  chlorhexidine 2% Cloths 1 Application(s) Topical daily  heparin   Injectable 5000 Unit(s) SubCutaneous every 8 hours  lidocaine   4% Patch 1 Patch Transdermal every 24 hours PRN  lipid, fat emulsion (Fish Oil and Plant Based) 20% Infusion 16.6 mL/Hr IV Continuous <Continuous>  metoclopramide Injectable 10 milliGRAM(s) IV Push every 8 hours  ondansetron Injectable 4 milliGRAM(s) IV Push every 6 hours PRN  pantoprazole  Injectable 40 milliGRAM(s) IV Push daily  Parenteral Nutrition - Adult 1 Each TPN Continuous <Continuous>  Parenteral Nutrition - Adult 1 Each TPN Continuous <Continuous>  polyethylene glycol 3350 17 Gram(s) Oral two times a day  sodium chloride 0.9% lock flush 10 milliLiter(s) IV Push every 1 hour PRN    --------------------------------------------------------------------------------------    VITAL SIGNS:  T(C): 36.6 (03-27-25 @ 09:03), Max: 36.9 (03-26-25 @ 21:00)  HR: 64 (03-27-25 @ 09:03) (64 - 76)  BP: 123/66 (03-27-25 @ 09:03) (101/65 - 129/71)  RR: 17 (03-27-25 @ 09:03) (17 - 18)  SpO2: 100% (03-27-25 @ 09:03) (100% - 100%)  --------------------------------------------------------------------------------------    INS AND OUTS:    03-26-25 @ 07:01  -  03-27-25 @ 07:00  --------------------------------------------------------  IN: 1544 mL / OUT: 1195 mL / NET: 349 mL    03-27-25 @ 07:01  -  03-27-25 @ 09:50  --------------------------------------------------------  IN: 200 mL / OUT: 430 mL / NET: -230 mL      --------------------------------------------------------------------------------------      EXAM    General: NAD, resting in bed comfortably.  Cardiac: regular rate, warm and well perfused  Respiratory: Nonlabored respirations, normal cw expansion.  Abdomen: soft, nontender, nondistended  Extremities: normal strength, FROM, no deformities    --------------------------------------------------------------------------------------    LABS       TEAM [ A ] Surgery Daily Progress Note  =====================================================    SUBJECTIVE: Patient seen and examined at bedside on AM rounds. Patient reports feeling much better this morning. Denies nausea or vomiting. Was able to tolerate sips of water, tea, coffee yesterday. Says she would like to try clear liquids today. No acute complaints. Has been out of bed, walking.     ALLERGIES:  sulfa drugs (Unknown)      --------------------------------------------------------------------------------------    MEDICATIONS:  acetaminophen   IVPB .. 1000 milliGRAM(s) IV Intermittent every 6 hours PRN  benzocaine/menthol Lozenge 1 Lozenge Oral three times a day PRN  chlorhexidine 2% Cloths 1 Application(s) Topical daily  heparin   Injectable 5000 Unit(s) SubCutaneous every 8 hours  lidocaine   4% Patch 1 Patch Transdermal every 24 hours PRN  lipid, fat emulsion (Fish Oil and Plant Based) 20% Infusion 16.6 mL/Hr IV Continuous <Continuous>  metoclopramide Injectable 10 milliGRAM(s) IV Push every 8 hours  ondansetron Injectable 4 milliGRAM(s) IV Push every 6 hours PRN  pantoprazole  Injectable 40 milliGRAM(s) IV Push daily  Parenteral Nutrition - Adult 1 Each TPN Continuous <Continuous>  Parenteral Nutrition - Adult 1 Each TPN Continuous <Continuous>  polyethylene glycol 3350 17 Gram(s) Oral two times a day  sodium chloride 0.9% lock flush 10 milliLiter(s) IV Push every 1 hour PRN    --------------------------------------------------------------------------------------    VITAL SIGNS:  T(C): 36.6 (03-27-25 @ 09:03), Max: 36.9 (03-26-25 @ 21:00)  HR: 64 (03-27-25 @ 09:03) (64 - 76)  BP: 123/66 (03-27-25 @ 09:03) (101/65 - 129/71)  RR: 17 (03-27-25 @ 09:03) (17 - 18)  SpO2: 100% (03-27-25 @ 09:03) (100% - 100%)  --------------------------------------------------------------------------------------    INS AND OUTS:    03-26-25 @ 07:01  -  03-27-25 @ 07:00  --------------------------------------------------------  IN: 1544 mL / OUT: 1195 mL / NET: 349 mL    03-27-25 @ 07:01  -  03-27-25 @ 09:50  --------------------------------------------------------  IN: 200 mL / OUT: 430 mL / NET: -230 mL      --------------------------------------------------------------------------------------      EXAM    General: NAD, resting in bed comfortably.  Cardiac: regular rate, warm and well perfused  Respiratory: Nonlabored respirations, normal cw expansion.  Abdomen: soft, nontender, nondistended. Ostomy with gas and stool in bag, stoma digitized with red rubber. Inferior aspect of midline incision with small superficial opening, dressed with gauze and nonsaturated.   Extremities: normal strength, FROM, no deformities    --------------------------------------------------------------------------------------    LABS

## 2025-03-27 NOTE — ADVANCED PRACTICE NURSE CONSULT - ASSESSMENT
Patient states pouch remained intact from last change, Red rubber catheter in stoma entrance. Patient actively participating in pouch change. Frequency of pouch change and emptying discussed, teach back method utilized. Removed pouch using pull and push technique, cleansed skin with water, patted dry. Two staples still noted to area above surgical site. Taught patient how to properly assess stoma viability and peristomal skin. Patient instructed in stoma measurement, creating template, cutting wafer, applying barrier ring (to protect peristomal skin from enzymatic irritation), applying pouch. Oval stencil used and left at bedside. Small area of mucocutaneous separation noted to 3ocllola. Reviewed s/s to report to MD. Small dehiscence noted to lower midline abdomen scar, area cleansed and foam applied.    Stoma size: 1" 1/2 x 2" - See A&I flowsheet for full assessment details.

## 2025-03-27 NOTE — PROGRESS NOTE ADULT - ASSESSMENT
74F s/p Cee procedure for chronic diverticulitis with colo-enteric fistula (3/5, Dr. Bethea) re-presenting with nausea and vomiting, found to have a post-operative ileus, now on TPN/PICC 3/17. Course prolonged by high NGT output which dislodged 3/22, now tolerating sips with improved nausea and improved stoma output after being digitized with a red rubber catheter.     PLAN:  - Continue Zofran and Reglan  - Allow red rubber to come out of stoma on its own  - Miralax BID  - PICC/TPN 3/17  - Diet: Adv to CLD, continue TPN  - Pain control  - Monitor UOP, Ostomy output  - Pain: IV apap prn, IV PPI until adequate PO  - PT eval --> recommend ALLISON  - Will discuss resuming xarelto in coming days    A Team Surgery  [id number]

## 2025-03-27 NOTE — PROGRESS NOTE ADULT - SUBJECTIVE AND OBJECTIVE BOX
NUTRITION NOTE  [id number] ACKERMAN  ===============================    Interval events - Patient was seen and examined at bedside, no acute events overnight. Patient denies chest pain, shortness of breath, nausea or vomiting at this time.     IR PICC placed and parenteral nutrition was started on 3/17/25. Pt is tolerating TPN without any issues. TPN/lipids ordered for tonight. Patient was started on clear liquids this morning.      ROS: Except as noted above, all other systems reviewed and are negative     Allergies  sulfa drugs (Unknown)    PAST MEDICAL & SURGICAL HISTORY:  Breast CA, left lumpectomy / RTX in the past  Hypertension  Irritable bowel syndrome (IBS)  Polyp of colon "pre-cancerous" x 2, removed 2014  HLD (hyperlipidemia)  Bladder polyp  Livedoid vasculopathy  Anxiety and depression  KARI on CPAP  Arterial insufficiency  Diverticulitis  Cancer of right breast  HTN (hypertension)  Urothelial carcinoma of bladder  Other specified diseases of intestine  S/P   S/P colon resection reversal, had complications for fibriods  S/P hysterectomy  H/O lumpectomy left   Personal history of spine surgery L1-L4 fusion 2017  S/P lumpectomy, left breast  S/P lumpectomy, right breast  S/P angiogram of extremity    FAMILY HISTORY:  Family history of coronary artery disease (Mother)    Vital Signs Last 24 Hrs  T(C): 36.6 (27 Mar 2025 09:03), Max: 36.9 (26 Mar 2025 21:00)  T(F): 97.9 (27 Mar 2025 09:03), Max: 98.4 (26 Mar 2025 21:00)  HR: 64 (27 Mar 2025 09:03) (64 - 76)  BP: 123/66 (27 Mar 2025 09:03) (101/65 - 129/71)  RR: 17 (27 Mar 2025 09:03) (17 - 18)  SpO2: 100% (27 Mar 2025 09:03) (100% - 100%)    MEDICATIONS  (STANDING):  chlorhexidine 2% Cloths 1 Application(s) Topical daily  heparin   Injectable 5000 Unit(s) SubCutaneous every 8 hours  lipid, fat emulsion (Fish Oil and Plant Based) 20% Infusion 16.6 mL/Hr (16.6 mL/Hr) IV Continuous <Continuous>  metoclopramide Injectable 10 milliGRAM(s) IV Push every 8 hours  pantoprazole  Injectable 40 milliGRAM(s) IV Push daily  Parenteral Nutrition - Adult 1 Each (83 mL/Hr) TPN Continuous <Continuous>  Parenteral Nutrition - Adult 1 Each (83 mL/Hr) TPN Continuous <Continuous>  polyethylene glycol 3350 17 Gram(s) Oral two times a day    MEDICATIONS  (PRN):  acetaminophen   IVPB .. 1000 milliGRAM(s) IV Intermittent every 6 hours PRN Mild Pain (1 - 3)  benzocaine/menthol Lozenge 1 Lozenge Oral three times a day PRN Sore Throat  lidocaine   4% Patch 1 Patch Transdermal every 24 hours PRN back pain  ondansetron Injectable 4 milliGRAM(s) IV Push every 6 hours PRN Nausea  sodium chloride 0.9% lock flush 10 milliLiter(s) IV Push every 1 hour PRN Pre/post blood products, medications, blood draw, and to maintain line patency    I&O's Detail    26 Mar 2025 07:01  -  27 Mar 2025 07:00  --------------------------------------------------------  IN:    Oral Fluid: 50 mL    TPN (Total Parenteral Nutrition): 1494 mL  Total IN: 1544 mL    OUT:    Colostomy (mL): 245 mL    Voided (mL): 950 mL  Total OUT: 1195 mL    Total NET: 349 mL      27 Mar 2025 07:01  -  27 Mar 2025 11:08  --------------------------------------------------------  IN:    Oral Fluid: 200 mL  Total IN: 200 mL    OUT:    Colostomy (mL): 30 mL    Voided (mL): 400 mL  Total OUT: 430 mL    Total NET: -230 mL    POCT Blood Glucose.: 114 mg/dL (26 Mar 2025 20:27)    Daily Weight in k.2 (26 Mar 2025 05:58)    Drug Dosing Weight  Height (cm): 165.1 (13 Mar 2025 17:00)  Weight (kg): 50.9 (23 Mar 2025 06:16)  BMI (kg/m2): 18.7 (23 Mar 2025 06:16)  BSA (m2): 1.55 (23 Mar 2025 06:16)    PHYSICAL EXAM:  Constitutional: A&Ox3, NAD  Gastrointestinal: abdomen soft nontender, nondistended, ostomy with function  Extremities: Moving all extremities, no edema  PICC Site: LUE double lumen PICC in place, site clean and dry, placed 3/17/25    Diet: clear liquids and TPN/lipids (started on 3/17/25)    LABORATORY                                                        8.4    8.21  )-----------( 327      ( 26 Mar 2025 06:11 )             26.7   03-    139  |  105  |  20  ----------------------------<  110[H]  3.8   |  22  |  0.38[L]    Ca    9.1      27 Mar 2025 06:03  Phos  3.4     -  Mg     2.10     -    TPro  6.1  /  Alb  3.0[L]  /  TBili  <0.2  /  DBili  <0.2  /  AST  22  /  ALT  46[H]  /  AlkPhos  134[H]  03-26    LIVER FUNCTIONS - ( 26 Mar 2025 06:11 )  Alb: 3.0 g/dL / Pro: 6.1 g/dL / ALK PHOS: 134 U/L / ALT: 46 U/L / AST: 22 U/L / GGT: x           03-18 Chol -- LDL -- HDL -- Trig 164[H]    ASSESSMENT/PLAN:  75 y/o F with PMHx metastatic urothelial carcinoma in 2017 (s/p immunotherapy b/l breast cancer (s/p RT), diverticulitis, HTN, HLD, arterial insufficiency, vasculopathy on Xarelto and Pletal, s/p recent Cee procedure for chronic diverticulitis with colo-enteric fistula (3/5/25) presenting with nausea and vomiting, found to have a post-operative ileus vs. pSBO. Nutrition support consult called for initiation of parenteral nutrition in view of severe protein calorie malnutrition and anticipated prolonged NPO.  IR PICC placed and parenteral nutrition was started on 3/17/25.     continue TPN with infusion volume of 2L, TPN will provide 1446 kcal/day    labs reviewed - electrolytes adjusted in TPN bag; labs can be done every other day for TPN adjustments      monitor fingersticks, obtain daily weights    continue parenteral nutrition at this time, will follow up with primary team on plan, monitor toleracne to clear liquids, advance diet as tolerated      1.  Severe protein calorie malnutrition being optimized with TPN: CHO [190] gm.  AA [100] gm. SMOF Lipids [40] gm.  2.  Hyperglycemia managed with: [0] units of regular insulin    3.  Check fluid balance daily.  Strict I/O  [ ] [ ]   4.  Daily BMP, Ionized Calcium, Magnesium and Phosphorous   5.  Triglycerides at initiation of TPN and monthly  Chol -- LDL -- HDL -- Trig 164[H]    Nutrition Support [id number]  NUTRITION NOTE  [id number] ACKERMAN  ===============================    Interval events - Patient was seen and examined at bedside, no acute events overnight. Patient denies chest pain, shortness of breath, nausea or vomiting at this time.     IR PICC placed and parenteral nutrition was started on 3/17/25. Pt is tolerating TPN without any issues. TPN/lipids ordered for tonight. Patient was started on clear liquids this morning.      ROS: Except as noted above, all other systems reviewed and are negative     Allergies  sulfa drugs (Unknown)    PAST MEDICAL & SURGICAL HISTORY:  Breast CA, left lumpectomy / RTX in the past  Hypertension  Irritable bowel syndrome (IBS)  Polyp of colon "pre-cancerous" x 2, removed 2014  HLD (hyperlipidemia)  Bladder polyp  Livedoid vasculopathy  Anxiety and depression  KARI on CPAP  Arterial insufficiency  Diverticulitis  Cancer of right breast  HTN (hypertension)  Urothelial carcinoma of bladder  Other specified diseases of intestine  S/P   S/P colon resection reversal, had complications for fibriods  S/P hysterectomy  H/O lumpectomy left   Personal history of spine surgery L1-L4 fusion 2017  S/P lumpectomy, left breast  S/P lumpectomy, right breast  S/P angiogram of extremity    FAMILY HISTORY:  Family history of coronary artery disease (Mother)    Vital Signs Last 24 Hrs  T(C): 36.6 (27 Mar 2025 09:03), Max: 36.9 (26 Mar 2025 21:00)  T(F): 97.9 (27 Mar 2025 09:03), Max: 98.4 (26 Mar 2025 21:00)  HR: 64 (27 Mar 2025 09:03) (64 - 76)  BP: 123/66 (27 Mar 2025 09:03) (101/65 - 129/71)  RR: 17 (27 Mar 2025 09:03) (17 - 18)  SpO2: 100% (27 Mar 2025 09:03) (100% - 100%)    MEDICATIONS  (STANDING):  chlorhexidine 2% Cloths 1 Application(s) Topical daily  heparin   Injectable 5000 Unit(s) SubCutaneous every 8 hours  lipid, fat emulsion (Fish Oil and Plant Based) 20% Infusion 16.6 mL/Hr (16.6 mL/Hr) IV Continuous <Continuous>  metoclopramide Injectable 10 milliGRAM(s) IV Push every 8 hours  pantoprazole  Injectable 40 milliGRAM(s) IV Push daily  Parenteral Nutrition - Adult 1 Each (83 mL/Hr) TPN Continuous <Continuous>  Parenteral Nutrition - Adult 1 Each (83 mL/Hr) TPN Continuous <Continuous>  polyethylene glycol 3350 17 Gram(s) Oral two times a day    MEDICATIONS  (PRN):  acetaminophen   IVPB .. 1000 milliGRAM(s) IV Intermittent every 6 hours PRN Mild Pain (1 - 3)  benzocaine/menthol Lozenge 1 Lozenge Oral three times a day PRN Sore Throat  lidocaine   4% Patch 1 Patch Transdermal every 24 hours PRN back pain  ondansetron Injectable 4 milliGRAM(s) IV Push every 6 hours PRN Nausea  sodium chloride 0.9% lock flush 10 milliLiter(s) IV Push every 1 hour PRN Pre/post blood products, medications, blood draw, and to maintain line patency    I&O's Detail    26 Mar 2025 07:01  -  27 Mar 2025 07:00  --------------------------------------------------------  IN:    Oral Fluid: 50 mL    TPN (Total Parenteral Nutrition): 1494 mL  Total IN: 1544 mL    OUT:    Colostomy (mL): 245 mL    Voided (mL): 950 mL  Total OUT: 1195 mL    Total NET: 349 mL      27 Mar 2025 07:01  -  27 Mar 2025 11:08  --------------------------------------------------------  IN:    Oral Fluid: 200 mL  Total IN: 200 mL    OUT:    Colostomy (mL): 30 mL    Voided (mL): 400 mL  Total OUT: 430 mL    Total NET: -230 mL    POCT Blood Glucose.: 114 mg/dL (26 Mar 2025 20:27)    Daily Weight in k.2 (26 Mar 2025 05:58)    Drug Dosing Weight  Height (cm): 165.1 (13 Mar 2025 17:00)  Weight (kg): 50.9 (23 Mar 2025 06:16)  BMI (kg/m2): 18.7 (23 Mar 2025 06:16)  BSA (m2): 1.55 (23 Mar 2025 06:16)    PHYSICAL EXAM:  Constitutional: A&Ox3, NAD  Gastrointestinal: abdomen soft nontender, nondistended, ostomy with function  Extremities: Moving all extremities, no edema  PICC Site: LUE double lumen PICC in place, site clean and dry, placed 3/17/25    Diet: clear liquids and TPN/lipids (started on 3/17/25)    LABORATORY                                                        8.4    8.21  )-----------( 327      ( 26 Mar 2025 06:11 )             26.7   03-    139  |  105  |  20  ----------------------------<  110[H]  3.8   |  22  |  0.38[L]    Ca    9.1      27 Mar 2025 06:03  Phos  3.4     -  Mg     2.10     -    TPro  6.1  /  Alb  3.0[L]  /  TBili  <0.2  /  DBili  <0.2  /  AST  22  /  ALT  46[H]  /  AlkPhos  134[H]  03-26    LIVER FUNCTIONS - ( 26 Mar 2025 06:11 )  Alb: 3.0 g/dL / Pro: 6.1 g/dL / ALK PHOS: 134 U/L / ALT: 46 U/L / AST: 22 U/L / GGT: x           03-18 Chol -- LDL -- HDL -- Trig 164[H]    ASSESSMENT/PLAN:  73 y/o F with PMHx metastatic urothelial carcinoma in 2017 (s/p immunotherapy b/l breast cancer (s/p RT), diverticulitis, HTN, HLD, arterial insufficiency, vasculopathy on Xarelto and Pletal, s/p recent Cee procedure for chronic diverticulitis with colo-enteric fistula (3/5/25) presenting with nausea and vomiting, found to have a post-operative ileus vs. pSBO. Nutrition support consult called for initiation of parenteral nutrition in view of severe protein calorie malnutrition and anticipated prolonged NPO.  IR PICC placed and parenteral nutrition was started on 3/17/25.     continue TPN with infusion volume of 2L, TPN will provide 1446 kcal/day    labs reviewed - continue same TPN formula today; labs can be done every other day for TPN adjustments      monitor fingersticks, obtain daily weights    continue parenteral nutrition at this time, will follow up with primary team on plan, monitor toleracne to clear liquids, advance diet as tolerated      1.  Severe protein calorie malnutrition being optimized with TPN: CHO [190] gm.  AA [100] gm. SMOF Lipids [40] gm.  2.  Hyperglycemia managed with: [0] units of regular insulin    3.  Check fluid balance daily.  Strict I/O  [ ] [ ]   4.  Daily BMP, Ionized Calcium, Magnesium and Phosphorous   5.  Triglycerides at initiation of TPN and monthly  Chol -- LDL -- HDL -- Trig 164[H]    Nutrition Support [id number]

## 2025-03-28 LAB
ANION GAP SERPL CALC-SCNC: 14 MMOL/L — SIGNIFICANT CHANGE UP (ref 7–14)
BASOPHILS # BLD AUTO: 0.05 K/UL — SIGNIFICANT CHANGE UP (ref 0–0.2)
BASOPHILS NFR BLD AUTO: 0.5 % — SIGNIFICANT CHANGE UP (ref 0–2)
BUN SERPL-MCNC: 20 MG/DL — SIGNIFICANT CHANGE UP (ref 7–23)
CALCIUM SERPL-MCNC: 8.9 MG/DL — SIGNIFICANT CHANGE UP (ref 8.4–10.5)
CHLORIDE SERPL-SCNC: 104 MMOL/L — SIGNIFICANT CHANGE UP (ref 98–107)
CO2 SERPL-SCNC: 18 MMOL/L — LOW (ref 22–31)
CREAT SERPL-MCNC: 0.38 MG/DL — LOW (ref 0.5–1.3)
EGFR: 104 ML/MIN/1.73M2 — SIGNIFICANT CHANGE UP
EGFR: 104 ML/MIN/1.73M2 — SIGNIFICANT CHANGE UP
EOSINOPHIL # BLD AUTO: 0.62 K/UL — HIGH (ref 0–0.5)
EOSINOPHIL NFR BLD AUTO: 6.6 % — HIGH (ref 0–6)
GLUCOSE SERPL-MCNC: 122 MG/DL — HIGH (ref 70–99)
HCT VFR BLD CALC: 29.9 % — LOW (ref 34.5–45)
HGB BLD-MCNC: 9.2 G/DL — LOW (ref 11.5–15.5)
IANC: 5.91 K/UL — SIGNIFICANT CHANGE UP (ref 1.8–7.4)
IMM GRANULOCYTES NFR BLD AUTO: 0.5 % — SIGNIFICANT CHANGE UP (ref 0–0.9)
LYMPHOCYTES # BLD AUTO: 1.65 K/UL — SIGNIFICANT CHANGE UP (ref 1–3.3)
LYMPHOCYTES # BLD AUTO: 17.6 % — SIGNIFICANT CHANGE UP (ref 13–44)
MAGNESIUM SERPL-MCNC: 2.2 MG/DL — SIGNIFICANT CHANGE UP (ref 1.6–2.6)
MCHC RBC-ENTMCNC: 24 PG — LOW (ref 27–34)
MCHC RBC-ENTMCNC: 30.8 G/DL — LOW (ref 32–36)
MCV RBC AUTO: 78.1 FL — LOW (ref 80–100)
MONOCYTES # BLD AUTO: 1.1 K/UL — HIGH (ref 0–0.9)
MONOCYTES NFR BLD AUTO: 11.7 % — SIGNIFICANT CHANGE UP (ref 2–14)
NEUTROPHILS # BLD AUTO: 5.91 K/UL — SIGNIFICANT CHANGE UP (ref 1.8–7.4)
NEUTROPHILS NFR BLD AUTO: 63.1 % — SIGNIFICANT CHANGE UP (ref 43–77)
NRBC # BLD AUTO: 0.02 K/UL — HIGH (ref 0–0)
NRBC # FLD: 0.02 K/UL — HIGH (ref 0–0)
NRBC BLD AUTO-RTO: 0 /100 WBCS — SIGNIFICANT CHANGE UP (ref 0–0)
PHOSPHATE SERPL-MCNC: 3.3 MG/DL — SIGNIFICANT CHANGE UP (ref 2.5–4.5)
PLATELET # BLD AUTO: 250 K/UL — SIGNIFICANT CHANGE UP (ref 150–400)
POTASSIUM SERPL-MCNC: 4.2 MMOL/L — SIGNIFICANT CHANGE UP (ref 3.5–5.3)
POTASSIUM SERPL-SCNC: 4.2 MMOL/L — SIGNIFICANT CHANGE UP (ref 3.5–5.3)
RBC # BLD: 3.83 M/UL — SIGNIFICANT CHANGE UP (ref 3.8–5.2)
RBC # FLD: 21.9 % — HIGH (ref 10.3–14.5)
SODIUM SERPL-SCNC: 136 MMOL/L — SIGNIFICANT CHANGE UP (ref 135–145)
WBC # BLD: 9.38 K/UL — SIGNIFICANT CHANGE UP (ref 3.8–10.5)
WBC # FLD AUTO: 9.38 K/UL — SIGNIFICANT CHANGE UP (ref 3.8–10.5)

## 2025-03-28 PROCEDURE — 99232 SBSQ HOSP IP/OBS MODERATE 35: CPT

## 2025-03-28 RX ORDER — SODIUM/POT/MAG/CALC/CHLOR/ACET 35-20-5MEQ
1 VIAL (ML) INTRAVENOUS
Refills: 0 | Status: DISCONTINUED | OUTPATIENT
Start: 2025-03-28 | End: 2025-03-28

## 2025-03-28 RX ORDER — I.V. FAT EMULSION 20 G/100ML
16.6 EMULSION INTRAVENOUS
Qty: 40 | Refills: 0 | Status: DISCONTINUED | OUTPATIENT
Start: 2025-03-28 | End: 2025-03-29

## 2025-03-28 RX ADMIN — Medication 10 MILLIGRAM(S): at 16:54

## 2025-03-28 RX ADMIN — Medication 1 EACH: at 19:25

## 2025-03-28 RX ADMIN — I.V. FAT EMULSION 16.6 ML/HR: 20 EMULSION INTRAVENOUS at 19:26

## 2025-03-28 RX ADMIN — Medication 1000 MILLIGRAM(S): at 17:20

## 2025-03-28 RX ADMIN — Medication 40 MILLIGRAM(S): at 11:39

## 2025-03-28 RX ADMIN — HEPARIN SODIUM 5000 UNIT(S): 1000 INJECTION INTRAVENOUS; SUBCUTANEOUS at 16:54

## 2025-03-28 RX ADMIN — Medication 400 MILLIGRAM(S): at 16:53

## 2025-03-28 RX ADMIN — Medication 1 APPLICATION(S): at 11:39

## 2025-03-28 RX ADMIN — HEPARIN SODIUM 5000 UNIT(S): 1000 INJECTION INTRAVENOUS; SUBCUTANEOUS at 07:01

## 2025-03-28 RX ADMIN — POLYETHYLENE GLYCOL 3350 17 GRAM(S): 17 POWDER, FOR SOLUTION ORAL at 06:55

## 2025-03-28 RX ADMIN — Medication 10 MILLIGRAM(S): at 07:02

## 2025-03-28 NOTE — CHART NOTE - NSCHARTNOTEFT_GEN_A_CORE
NUTRITION FOLLOW UP NOTE     Pt seen for malnutrition follow up.     SOURCE: [X] Patient [X] Medical record     Medical Course:  - Per chart, pt is 74 year old female PMH metastatic urothelial carcinoma (2017, s/p immunotherapy), bilateral breast cancer (s/p RT), diverticulitis, HTN, HLD, arterial insufficiency, vasculopathy, s/p recent Cee procedure for chronic diverticulitis with colo-enteric fistula (3/5/25) presenting with nausea/vomiting found to have a post-operative ileus vs. pSBO. IR PICC placed (3/17) and TPN initiated. Nutrition Support and Colorectal Surgery following.     Diet Prescription:   - Clear Liquid  - No Carbonated Beverages  - Ensure Clear 1 PO 3x daily     Nutrition Course:    ***INCOMPLETE***      - Pt remains NPO on TPN: 2L infusing @ 83 mL/hr (100 gm amino acids, 190 gm dextrose, 40 gm SMOF lipids) to provide 1446 kcal/day (meets 25.5 kcal/kg, 1.8 gm protein/kg @ ideal body weight 56.6 kg).   - NGT dislodged (3/22), pt was advanced to sips (3/23) however vomited therefore made NPO. NGT was not replaced.   - Colostomy output, per flowsheets: (3/25) 50 mL, (3/24) 30 mL, (3/23) 210 mL.   - Ordered for Miralax 17 gm BID, standing Reglan and PRN Zofran.     Food Allergy/Intolerance:  - NKFA    Pertinent Medications:   - lipid fat emulsion (Fish Oil and Plant Based) 20% IV Continuous, pantoprazole Injectable, metoclopramide Injectable, Parenteral Nutrition TPN Continuous, polyethylene glycol, ondansetron Injectable PRN    Pertinent Labs:   - (3/28) Na 136 mmol/L Glu 122 mg/dL[H] K+ 4.2 mmol/L Cr 0.38 mg/dL[L] BUN 20 mg/dL Phos 3.3 mg/dL  - (3/18) Trig 164 mg/dL[H]  - (2/25) HbA1c 6.1%[H]   - POCT: (3/27) 123    Weight: (3/23 dosing) 112.2 lbs / 50.9 kg, (3/20) 117 lbs / 53.1 kg, (3/18 bed scale obtained by RDN) 120.5 lbs / 54.8 kg, (3/13 dosing) 149.9 lbs / 68 kg (questionable accuracy)   Weight Assessment: Apparent ~8 lb (7%) weight loss x5 days.  Height: 65 in / 165.1 cm  IBW: 125 lbs / 56.6 kg +/-10%  BMI: 18.7 kg/m^2 (at lowest weight)    Physical Assessment, per flowsheets:  Edema/Pressure Injury: none noted     Estimated Needs:   [X] No change since previous assessment, based on ideal body weight 125 lbs / 56.6 kg  4789-0751 kcal daily @30-35 kcal/kg, 79.24-96.22 gm protein daily @1.4-1.7 gm/kg     Previous Nutrition Diagnosis: [X] Severe malnutrition in the context in acute illness or injury, ongoing   New Nutrition Diagnosis: [X] not applicable     Education:  [X] not applicable     Interventions:   1) TPN per Nutrition Support Team.   2) Obtain weekly weights.     Monitor & Evaluate:  PO intake, diet tolerance, nutrition related lab values, weight trends, GI distress, hydration status, skin integrity.    Anayeli Gonsales RDN, CDN #[id number]  Also available on [employer] Teams. NUTRITION FOLLOW UP NOTE     Pt seen for malnutrition follow up.     SOURCE: [X] Patient [X] Medical record     Medical Course:  - Per chart, pt is 74 year old female PMH metastatic urothelial carcinoma (2017, s/p immunotherapy), bilateral breast cancer (s/p RT), diverticulitis, HTN, HLD, arterial insufficiency, vasculopathy, s/p recent Cee procedure for chronic diverticulitis with colo-enteric fistula (3/5/25) presenting with nausea/vomiting found to have a post-operative ileus vs. pSBO. IR PICC placed (3/17) and TPN initiated. Nutrition Support and Colorectal Surgery following.     Diet Prescription:   - Clear Liquid  - No Carbonated Beverages  - Ensure Clear 1 PO 3x daily     Nutrition Course:  - Pt was previously PO x14 days (3/13-3/17), diet now advanced to clear liquids. Pt consumed ~50% of lemon ice and ~25% of broth today at lunch. Pt endorses sensation of fullness however denies bloating/distension/nausea/abdominal pain. Pt dislikes Ensure Clear and is not consuming. TPN continues at full strength: 2L infusing @ 83 mL/hr (100 gm amino acids, 190 gm dextrose, 40 gm SMOF lipids) to provide 1446 kcal/day (meets 25.5 kcal/kg, 1.8 gm protein/kg @ ideal body weight 56.6 kg).   - Colostomy output, per flowsheets: (3/28) 50 mL, (3/27) 335 mL.   - Ordered for Miralax 17 gm BID, standing Reglan and PRN Zofran.     Food Allergy/Intolerance:  - NKFA    Pertinent Medications:   - lipid fat emulsion (Fish Oil and Plant Based) 20% IV Continuous, pantoprazole Injectable, metoclopramide Injectable, Parenteral Nutrition TPN Continuous, polyethylene glycol, ondansetron Injectable PRN    Pertinent Labs:   - (3/28) Na 136 mmol/L Glu 122 mg/dL[H] K+ 4.2 mmol/L Cr 0.38 mg/dL[L] BUN 20 mg/dL Phos 3.3 mg/dL  - (3/18) Trig 164 mg/dL[H]  - (2/25) HbA1c 6.1%[H]   - POCT: (3/27) 123    Weight: (3/26) 126 lbs / 57.2 kg, (3/23 dosing) 112.2 lbs / 50.9 kg, (3/20) 117 lbs / 53.1 kg, (3/18 bed scale obtained by RDN) 120.5 lbs / 54.8 kg, (3/13 dosing) 149.9 lbs / 68 kg (questionable accuracy)   Weight Assessment: Apparent ~8 lb (7%) weight loss x5 days.  Height: 65 in / 165.1 cm  IBW: 125 lbs / 56.6 kg +/-10%  BMI: 18.7 kg/m^2 (at lowest weight)    Physical Assessment, per flowsheets:  Edema/Pressure Injury: none noted     Estimated Needs:   [X] No change since previous assessment, based on ideal body weight 125 lbs / 56.6 kg  7583-6734 kcal daily @30-35 kcal/kg, 79.24-96.22 gm protein daily @1.4-1.7 gm/kg     Previous Nutrition Diagnosis: [X] Severe malnutrition in the context in acute illness or injury, ongoing   New Nutrition Diagnosis: [X] not applicable     Education:  [X] Discussed anticipated diet progression, importance of small/frequent meals.    Interventions:   1) TPN per Nutrition Support Team.   2) As medically feasible, recommend advance to full liquid diet.   3) May d/c Ensure Clear.  2) Obtain daily weights.     Monitor & Evaluate:  PO intake, diet tolerance, nutrition related lab values, weight trends, GI distress, hydration status, skin integrity.    Anayeli Gonsales RDN, CDN #[id number]  Also available on [employer] Teams.

## 2025-03-28 NOTE — PROGRESS NOTE ADULT - SUBJECTIVE AND OBJECTIVE BOX
A Team Colorectal Surgery Progress Note    S: Pt seen and examined with team on morning rounds. Patient feels well. Tolerating CLD without nausea/emesis. Ostomy +flatus/+Soft liquid stool. +voiding. No CP/Palpitations/SOB/HA/Dizziness.      Vital Signs Last 24 Hrs  T(C): 37 (28 Mar 2025 09:52), Max: 37 (28 Mar 2025 09:52)  T(F): 98.6 (28 Mar 2025 09:52), Max: 98.6 (28 Mar 2025 09:52)  HR: 76 (28 Mar 2025 09:52) (72 - 88)  BP: 107/66 (28 Mar 2025 09:52) (101/67 - 114/68)  BP(mean): --  RR: 16 (28 Mar 2025 09:52) (16 - 18)  SpO2: 100% (28 Mar 2025 09:52) (98% - 100%)    Parameters below as of 28 Mar 2025 09:52  Patient On (Oxygen Delivery Method): room air        I&O's Summary    27 Mar 2025 07:01  -  28 Mar 2025 07:00  --------------------------------------------------------  IN: 1895.2 mL / OUT: 915 mL / NET: 980.2 mL    28 Mar 2025 07:01  -  28 Mar 2025 11:03  --------------------------------------------------------  IN: 300 mL / OUT: 0 mL / NET: 300 mL      I&O's Detail    27 Mar 2025 07:01  -  28 Mar 2025 07:00  --------------------------------------------------------  IN:    Fat Emulsion (Fish Oil &amp; Plant Based) 20% Infusion: 199.2 mL    Oral Fluid: 700 mL    TPN (Total Parenteral Nutrition): 996 mL  Total IN: 1895.2 mL    OUT:    Colostomy (mL): 315 mL    Voided (mL): 600 mL  Total OUT: 915 mL    Total NET: 980.2 mL      28 Mar 2025 07:01  -  28 Mar 2025 11:03  --------------------------------------------------------  IN:    Oral Fluid: 300 mL  Total IN: 300 mL    OUT:    Voided (mL): 0 mL  Total OUT: 0 mL    Total NET: 300 mL          General Appearance: Appears well, NAD  Chest: Breathing comfortably on RA.    CV: S1, S2 @ 76  Abdomen: Soft, nondistended, nontender. Ostomy +flatus. +soft liquid stool.   Extremities: Moves all extremities.    LABS:                        9.2    9.38  )-----------( 250      ( 28 Mar 2025 05:07 )             29.9     03-28    136  |  104  |  20  ----------------------------<  122[H]  4.2   |  18[L]  |  0.38[L]    Ca    8.9      28 Mar 2025 05:07  Phos  3.3     03-28  Mg     2.20     03-28        Urinalysis Basic - ( 28 Mar 2025 05:07 )    Color: x / Appearance: x / SG: x / pH: x  Gluc: 122 mg/dL / Ketone: x  / Bili: x / Urobili: x   Blood: x / Protein: x / Nitrite: x   Leuk Esterase: x / RBC: x / WBC x   Sq Epi: x / Non Sq Epi: x / Bacteria: x

## 2025-03-28 NOTE — PROGRESS NOTE ADULT - ASSESSMENT
74F s/p Cee procedure for chronic diverticulitis with colo-enteric fistula (3/5, Dr. Bethea) re-presenting with nausea and vomiting, found to have a post-operative ileus, now on TPN/PICC 3/17. Course prolonged by high NGT output which dislodged 3/22, now tolerating sips with improved nausea and improved stoma output after being digitized with a red rubber catheter.     PLAN:  - Continue Zofran and Reglan  - Allow red rubber to come out of stoma on its own  - Miralax BID  - PICC/TPN 3/17  - Diet: Continue CLD, continue TPN  - Monitor UOP, Ostomy output  - Pain: IV apap prn, IV PPI until adequate PO  - PT eval --> recommend ALLISON  - Will discuss resuming xarelto in coming days    A Team Surgery  [id number]

## 2025-03-28 NOTE — PROGRESS NOTE ADULT - SUBJECTIVE AND OBJECTIVE BOX
NUTRITION NOTE  [id number] ACKERMAN  ===============================    Interval events - Patient was seen and examined at bedside, no acute events overnight. Patient denies chest pain, shortness of breath, nausea or vomiting at this time.     IR PICC placed and parenteral nutrition was started on 3/17/25. Pt is tolerating TPN without any issues. TPN/lipids ordered for tonight. Patient is tolerating clear liquids without any nausea or vomiting.     ROS: Except as noted above, all other systems reviewed and are negative     Allergies  sulfa drugs (Unknown)    PAST MEDICAL & SURGICAL HISTORY:  Breast CA, left lumpectomy / RTX in the past  Hypertension  Irritable bowel syndrome (IBS)  Polyp of colon "pre-cancerous" x 2, removed 2014  HLD (hyperlipidemia)  Bladder polyp  Livedoid vasculopathy  Anxiety and depression  KARI on CPAP  Arterial insufficiency  Diverticulitis  Cancer of right breast  HTN (hypertension)  Urothelial carcinoma of bladder  Other specified diseases of intestine  S/P   S/P colon resection reversal, had complications for fibriods  S/P hysterectomy  H/O lumpectomy left   Personal history of spine surgery L1-L4 fusion 2017  S/P lumpectomy, left breast  S/P lumpectomy, right breast  S/P angiogram of extremity    FAMILY HISTORY:  Family history of coronary artery disease (Mother)    Vital Signs Last 24 Hrs  T(C): 37 (28 Mar 2025 09:52), Max: 37 (28 Mar 2025 09:52)  T(F): 98.6 (28 Mar 2025 09:52), Max: 98.6 (28 Mar 2025 09:52)  HR: 76 (28 Mar 2025 09:52) (72 - 88)  BP: 107/66 (28 Mar 2025 09:52) (101/67 - 114/68)  RR: 16 (28 Mar 2025 09:52) (16 - 18)  SpO2: 100% (28 Mar 2025 09:52) (98% - 100%)    MEDICATIONS  (STANDING):  chlorhexidine 2% Cloths 1 Application(s) Topical daily  heparin   Injectable 5000 Unit(s) SubCutaneous every 8 hours  lipid, fat emulsion (Fish Oil and Plant Based) 20% Infusion 16.6 mL/Hr (16.6 mL/Hr) IV Continuous <Continuous>  metoclopramide Injectable 10 milliGRAM(s) IV Push every 8 hours  pantoprazole  Injectable 40 milliGRAM(s) IV Push daily  Parenteral Nutrition - Adult 1 Each (83 mL/Hr) TPN Continuous <Continuous>  Parenteral Nutrition - Adult 1 Each (83 mL/Hr) TPN Continuous <Continuous>  polyethylene glycol 3350 17 Gram(s) Oral two times a day    MEDICATIONS  (PRN):  acetaminophen   IVPB .. 1000 milliGRAM(s) IV Intermittent every 6 hours PRN Mild Pain (1 - 3)  benzocaine/menthol Lozenge 1 Lozenge Oral three times a day PRN Sore Throat  lidocaine   4% Patch 1 Patch Transdermal every 24 hours PRN back pain  ondansetron Injectable 4 milliGRAM(s) IV Push every 6 hours PRN Nausea  sodium chloride 0.9% lock flush 10 milliLiter(s) IV Push every 1 hour PRN Pre/post blood products, medications, blood draw, and to maintain line patency    I&O's Detail    27 Mar 2025 07:  -  28 Mar 2025 07:00  --------------------------------------------------------  IN:    Fat Emulsion (Fish Oil &amp; Plant Based) 20% Infusion: 199.2 mL    Oral Fluid: 700 mL    TPN (Total Parenteral Nutrition): 996 mL  Total IN: 1895.2 mL    OUT:    Colostomy (mL): 315 mL    Voided (mL): 600 mL  Total OUT: 915 mL    Total NET: 980.2 mL      28 Mar 2025 07:  -  28 Mar 2025 10:29  --------------------------------------------------------  IN:    Oral Fluid: 300 mL  Total IN: 300 mL    OUT:    Voided (mL): 0 mL  Total OUT: 0 mL    Total NET: 300 mL    POCT Blood Glucose.: 123 mg/dL (27 Mar 2025 23:46)    Daily Weight in k.2 (26 Mar 2025 05:58)    Drug Dosing Weight  Height (cm): 165.1 (13 Mar 2025 17:00)  Weight (kg): 50.9 (23 Mar 2025 06:16)  BMI (kg/m2): 18.7 (23 Mar 2025 06:16)  BSA (m2): 1.55 (23 Mar 2025 06:16)    PHYSICAL EXAM:  Constitutional: A&Ox3, NAD  Gastrointestinal: abdomen soft nontender, nondistended, ostomy with function  Extremities: Moving all extremities, no edema  PICC Site: LUE double lumen PICC in place, site clean and dry, placed 3/17/25    Diet: clear liquids and TPN/lipids (started on 3/17/25)    LABORATORY                            9.2    9.38  )-----------( 250      ( 28 Mar 2025 05:07 )             29.9   03-28    136  |  104  |  20  ----------------------------<  122[H]  4.2   |  18[L]  |  0.38[L]    Ca    8.9      28 Mar 2025 05:07  Phos  3.3     03-28  Mg     2.20     03-28    TPro  6.1  /  Alb  3.0[L]  /  TBili  <0.2  /  DBili  <0.2  /  AST  22  /  ALT  46[H]  /  AlkPhos  134[H]  03-26    LIVER FUNCTIONS - ( 26 Mar 2025 06:11 )  Alb: 3.0 g/dL / Pro: 6.1 g/dL / ALK PHOS: 134 U/L / ALT: 46 U/L / AST: 22 U/L / GGT: x           03-18 Chol -- LDL -- HDL -- Trig 164[H]    ASSESSMENT/PLAN:  75 y/o F with PMHx metastatic urothelial carcinoma in 2017 (s/p immunotherapy b/l breast cancer (s/p RT), diverticulitis, HTN, HLD, arterial insufficiency, vasculopathy on Xarelto and Pletal, s/p recent Cee procedure for chronic diverticulitis with colo-enteric fistula (3/5/25) presenting with nausea and vomiting, found to have a post-operative ileus vs. pSBO. Nutrition support consult called for initiation of parenteral nutrition in view of severe protein calorie malnutrition and anticipated prolonged NPO.  IR PICC placed and parenteral nutrition was started on 3/17/25.     continue TPN with infusion volume of 2L, TPN will provide 1446 kcal/day    labs reviewed - electrolytes adjusted in TPN bag; labs can be done every other day for TPN adjustments      monitor fingersticks, obtain daily weights    continue parenteral nutrition at this time, will follow up with primary team on plan, monitor tolerance to clear liquids, advance diet as tolerated      1.  Severe protein calorie malnutrition being optimized with TPN: CHO [190] gm.  AA [100] gm. SMOF Lipids [40] gm.  2.  Hyperglycemia managed with: [0] units of regular insulin    3.  Check fluid balance daily.  Strict I/O  [ ] [ ]   4.  Daily BMP, Ionized Calcium, Magnesium and Phosphorous   5.  Triglycerides at initiation of TPN and monthly  Chol -- LDL -- HDL -- Trig 164[H]    Nutrition Support [id number]

## 2025-03-29 LAB
ANION GAP SERPL CALC-SCNC: 10 MMOL/L — SIGNIFICANT CHANGE UP (ref 7–14)
BUN SERPL-MCNC: 18 MG/DL — SIGNIFICANT CHANGE UP (ref 7–23)
CALCIUM SERPL-MCNC: 8.8 MG/DL — SIGNIFICANT CHANGE UP (ref 8.4–10.5)
CHLORIDE SERPL-SCNC: 105 MMOL/L — SIGNIFICANT CHANGE UP (ref 98–107)
CO2 SERPL-SCNC: 24 MMOL/L — SIGNIFICANT CHANGE UP (ref 22–31)
CREAT SERPL-MCNC: 0.38 MG/DL — LOW (ref 0.5–1.3)
EGFR: 104 ML/MIN/1.73M2 — SIGNIFICANT CHANGE UP
EGFR: 104 ML/MIN/1.73M2 — SIGNIFICANT CHANGE UP
GLUCOSE BLDC GLUCOMTR-MCNC: 134 MG/DL — HIGH (ref 70–99)
GLUCOSE SERPL-MCNC: 130 MG/DL — HIGH (ref 70–99)
HCT VFR BLD CALC: 26.2 % — LOW (ref 34.5–45)
HGB BLD-MCNC: 8.1 G/DL — LOW (ref 11.5–15.5)
MAGNESIUM SERPL-MCNC: 2 MG/DL — SIGNIFICANT CHANGE UP (ref 1.6–2.6)
MCHC RBC-ENTMCNC: 24.3 PG — LOW (ref 27–34)
MCHC RBC-ENTMCNC: 30.9 G/DL — LOW (ref 32–36)
MCV RBC AUTO: 78.7 FL — LOW (ref 80–100)
NRBC # BLD AUTO: 0 K/UL — SIGNIFICANT CHANGE UP (ref 0–0)
NRBC # FLD: 0 K/UL — SIGNIFICANT CHANGE UP (ref 0–0)
NRBC BLD AUTO-RTO: 0 /100 WBCS — SIGNIFICANT CHANGE UP (ref 0–0)
PHOSPHATE SERPL-MCNC: 3.1 MG/DL — SIGNIFICANT CHANGE UP (ref 2.5–4.5)
PLATELET # BLD AUTO: 263 K/UL — SIGNIFICANT CHANGE UP (ref 150–400)
POTASSIUM SERPL-MCNC: 3.4 MMOL/L — LOW (ref 3.5–5.3)
POTASSIUM SERPL-SCNC: 3.4 MMOL/L — LOW (ref 3.5–5.3)
RBC # BLD: 3.33 M/UL — LOW (ref 3.8–5.2)
RBC # FLD: 22.1 % — HIGH (ref 10.3–14.5)
SODIUM SERPL-SCNC: 139 MMOL/L — SIGNIFICANT CHANGE UP (ref 135–145)
WBC # BLD: 8.29 K/UL — SIGNIFICANT CHANGE UP (ref 3.8–10.5)
WBC # FLD AUTO: 8.29 K/UL — SIGNIFICANT CHANGE UP (ref 3.8–10.5)

## 2025-03-29 PROCEDURE — 99232 SBSQ HOSP IP/OBS MODERATE 35: CPT

## 2025-03-29 PROCEDURE — 99024 POSTOP FOLLOW-UP VISIT: CPT

## 2025-03-29 RX ORDER — I.V. FAT EMULSION 20 G/100ML
16.6 EMULSION INTRAVENOUS
Qty: 40 | Refills: 0 | Status: DISCONTINUED | OUTPATIENT
Start: 2025-03-29 | End: 2025-03-30

## 2025-03-29 RX ORDER — SODIUM/POT/MAG/CALC/CHLOR/ACET 35-20-5MEQ
1 VIAL (ML) INTRAVENOUS
Refills: 0 | Status: DISCONTINUED | OUTPATIENT
Start: 2025-03-29 | End: 2025-03-29

## 2025-03-29 RX ORDER — ACETAMINOPHEN 500 MG/5ML
1000 LIQUID (ML) ORAL EVERY 6 HOURS
Refills: 0 | Status: DISCONTINUED | OUTPATIENT
Start: 2025-03-29 | End: 2025-03-31

## 2025-03-29 RX ADMIN — Medication 1 APPLICATION(S): at 13:06

## 2025-03-29 RX ADMIN — Medication 400 MILLIGRAM(S): at 10:46

## 2025-03-29 RX ADMIN — Medication 40 MILLIGRAM(S): at 13:02

## 2025-03-29 RX ADMIN — Medication 1 EACH: at 19:01

## 2025-03-29 RX ADMIN — I.V. FAT EMULSION 16.6 ML/HR: 20 EMULSION INTRAVENOUS at 19:00

## 2025-03-29 RX ADMIN — Medication 10 MILLIGRAM(S): at 17:55

## 2025-03-29 RX ADMIN — Medication 1000 MILLIGRAM(S): at 11:10

## 2025-03-29 RX ADMIN — Medication 10 MILLIGRAM(S): at 00:21

## 2025-03-29 RX ADMIN — HEPARIN SODIUM 5000 UNIT(S): 1000 INJECTION INTRAVENOUS; SUBCUTANEOUS at 09:07

## 2025-03-29 RX ADMIN — HEPARIN SODIUM 5000 UNIT(S): 1000 INJECTION INTRAVENOUS; SUBCUTANEOUS at 00:17

## 2025-03-29 RX ADMIN — Medication 10 MILLIGRAM(S): at 09:12

## 2025-03-29 RX ADMIN — HEPARIN SODIUM 5000 UNIT(S): 1000 INJECTION INTRAVENOUS; SUBCUTANEOUS at 17:55

## 2025-03-29 NOTE — PROGRESS NOTE ADULT - SUBJECTIVE AND OBJECTIVE BOX
Date of service: 03-29-25 @ 10:36    INTERVAL HPI/OVERNIGHT EVENTS:  Patient is a 74yFemale  pain this am with PO intake    Vital Signs Last 24 Hrs  T(C): 36.8 (29 Mar 2025 09:00), Max: 37 (28 Mar 2025 17:57)  T(F): 98.2 (29 Mar 2025 09:00), Max: 98.6 (28 Mar 2025 17:57)  HR: 86 (29 Mar 2025 09:00) (67 - 96)  BP: 115/70 (29 Mar 2025 09:00) (103/69 - 127/73)  BP(mean): --  RR: 18 (29 Mar 2025 09:00) (17 - 19)  SpO2: 96% (29 Mar 2025 09:00) (96% - 100%)    Parameters below as of 29 Mar 2025 09:00  Patient On (Oxygen Delivery Method): room air      03-28 @ 07:01 - 03-29 @ 07:00  --------------------------------------------------------  IN: 1746 mL / OUT: 200 mL / NET: 1546 mL    03-29 @ 07:01 - 03-29 @ 10:36  --------------------------------------------------------  IN: 0 mL / OUT: 350 mL / NET: -350 mL        PHYSICAL EXAM:  Constitutional: well developed, well nourished, NAD  Eyes: anicteric  ENMT: normal facies, symmetric  Neck: supple  Respiratory: CTA bilat  Cardiovascular: RRR  Gastrointestinal: abdomen soft, min tender LUQ, nondistended. No obvious masses. No peritonitis, ostomy pink  Extremities: FROM, warm  Neurological: intact, non-focal  Skin: no gross lesions  Lymph Nodes: no gross adenopathy  Musculoskeletal: equal strength bilateral upper and lower extremities  Psychiatric: oriented x 3; appropriate          LABS:                        8.1    8.29  )-----------( 263      ( 29 Mar 2025 04:38 )             26.2     03-29    139  |  105  |  18  ----------------------------<  130[H]  3.4[L]   |  24  |  0.38[L]    Ca    8.8      29 Mar 2025 04:38  Phos  3.1     03-29  Mg     2.00     03-29        Urinalysis Basic - ( 29 Mar 2025 04:38 )    Color: x / Appearance: x / SG: x / pH: x  Gluc: 130 mg/dL / Ketone: x  / Bili: x / Urobili: x   Blood: x / Protein: x / Nitrite: x   Leuk Esterase: x / RBC: x / WBC x   Sq Epi: x / Non Sq Epi: x / Bacteria: x      Phosphorus: 3.1 mg/dL (03-29 @ 04:38)  Magnesium: 2.00 mg/dL (03-29 @ 04:38)

## 2025-03-29 NOTE — PROGRESS NOTE ADULT - SUBJECTIVE AND OBJECTIVE BOX
NUTRITION NOTE  [id number] ACKERMAN  ===============================    Interval events - Patient was seen and examined at bedside, no acute events overnight. Patient denies chest pain, shortness of breath, nausea or vomiting at this time.     IR PICC placed and parenteral nutrition was started on 3/17/25. Pt is tolerating TPN without any issues. TPN/lipids ordered for tonight. Patient is tolerating liquids without any nausea or vomiting, but only taking sips due to stomach cramps.     ROS: Except as noted above, all other systems reviewed and are negative     Allergies  sulfa drugs (Unknown)    PAST MEDICAL & SURGICAL HISTORY:  Breast CA, left lumpectomy / RTX in the past  Hypertension  Irritable bowel syndrome (IBS)  Polyp of colon "pre-cancerous" x 2, removed 2014  HLD (hyperlipidemia)  Bladder polyp  Livedoid vasculopathy  Anxiety and depression  KARI on CPAP  Arterial insufficiency  Diverticulitis  Cancer of right breast  HTN (hypertension)  Urothelial carcinoma of bladder  Other specified diseases of intestine  S/P   S/P colon resection reversal, had complications for fibriods  S/P hysterectomy  H/O lumpectomy left   Personal history of spine surgery L1-L4 fusion 2017  S/P lumpectomy, left breast  S/P lumpectomy, right breast  S/P angiogram of extremity    FAMILY HISTORY:  Family history of coronary artery disease (Mother)    Vital Signs Last 24 Hrs  T(C): 36.8 (29 Mar 2025 09:00), Max: 37 (28 Mar 2025 17:57)  T(F): 98.2 (29 Mar 2025 09:00), Max: 98.6 (28 Mar 2025 17:57)  HR: 86 (29 Mar 2025 09:00) (67 - 96)  BP: 115/70 (29 Mar 2025 09:00) (103/69 - 127/73)  RR: 18 (29 Mar 2025 09:00) (17 - 19)  SpO2: 96% (29 Mar 2025 09:00) (96% - 100%)    MEDICATIONS  (STANDING):  chlorhexidine 2% Cloths 1 Application(s) Topical daily  heparin   Injectable 5000 Unit(s) SubCutaneous every 8 hours  lipid, fat emulsion (Fish Oil and Plant Based) 20% Infusion 16.6 mL/Hr (16.6 mL/Hr) IV Continuous <Continuous>  metoclopramide Injectable 10 milliGRAM(s) IV Push every 8 hours  pantoprazole    Tablet 40 milliGRAM(s) Oral before breakfast  Parenteral Nutrition - Adult 1 Each (83 mL/Hr) TPN Continuous <Continuous>  Parenteral Nutrition - Adult 1 Each (83 mL/Hr) TPN Continuous <Continuous>  polyethylene glycol 3350 17 Gram(s) Oral two times a day    MEDICATIONS  (PRN):  acetaminophen   IVPB .. 1000 milliGRAM(s) IV Intermittent every 6 hours PRN Mild Pain (1 - 3)  benzocaine/menthol Lozenge 1 Lozenge Oral three times a day PRN Sore Throat  lidocaine   4% Patch 1 Patch Transdermal every 24 hours PRN back pain  ondansetron Injectable 4 milliGRAM(s) IV Push every 6 hours PRN Nausea  sodium chloride 0.9% lock flush 10 milliLiter(s) IV Push every 1 hour PRN Pre/post blood products, medications, blood draw, and to maintain line patency    I&O's Detail    28 Mar 2025 07:  -  29 Mar 2025 07:00  --------------------------------------------------------  IN:    Fat Emulsion (Fish Oil &amp; Plant Based) 20% Infusion: 166 mL    Oral Fluid: 750 mL    TPN (Total Parenteral Nutrition): 830 mL  Total IN: 1746 mL    OUT:    Colostomy (mL): 200 mL    Voided (mL): 0 mL  Total OUT: 200 mL    Total NET: 1546 mL      29 Mar 2025 07:01  -  29 Mar 2025 11:25  --------------------------------------------------------  IN:  Total IN: 0 mL    OUT:    Colostomy (mL): 350 mL    Voided (mL): 350 mL  Total OUT: 700 mL    Total NET: -700 mL    Daily Weight in k.2 (26 Mar 2025 05:58)    Drug Dosing Weight  Height (cm): 165.1 (13 Mar 2025 17:00)  Weight (kg): 50.9 (23 Mar 2025 06:16)  BMI (kg/m2): 18.7 (23 Mar 2025 06:16)  BSA (m2): 1.55 (23 Mar 2025 06:16)    PHYSICAL EXAM:  Constitutional: A&Ox3, NAD  Gastrointestinal: abdomen soft nontender, nondistended, ostomy with function  Extremities: Moving all extremities, no edema  PICC Site: LUE double lumen PICC in place, site clean and dry, placed 3/17/25    Diet: full liquids and TPN/lipids (started on 3/17/25)    LABORATORY                                       8.1    8.29  )-----------( 263      ( 29 Mar 2025 04:38 )             26.2     03-    139  |  105  |  18  ----------------------------<  130[H]  3.4[L]   |  24  |  0.38[L]    Ca    8.8      29 Mar 2025 04:38  Phos  3.1     03-  Mg     2.00     03-29    TPro  6.1  /  Alb  3.0[L]  /  TBili  <0.2  /  DBili  <0.2  /  AST  22  /  ALT  46[H]  /  AlkPhos  134[H]  03-26    LIVER FUNCTIONS - ( 26 Mar 2025 06:11 )  Alb: 3.0 g/dL / Pro: 6.1 g/dL / ALK PHOS: 134 U/L / ALT: 46 U/L / AST: 22 U/L / GGT: x           03-18 Chol -- LDL -- HDL -- Trig 164[H]    ASSESSMENT/PLAN:  75 y/o F with PMHx metastatic urothelial carcinoma in 2017 (s/p immunotherapy b/l breast cancer (s/p RT), diverticulitis, HTN, HLD, arterial insufficiency, vasculopathy on Xarelto and Pletal, s/p recent Cee procedure for chronic diverticulitis with colo-enteric fistula (3/5/25) presenting with nausea and vomiting, found to have a post-operative ileus vs. pSBO. Nutrition support consult called for initiation of parenteral nutrition in view of severe protein calorie malnutrition and anticipated prolonged NPO.  IR PICC placed and parenteral nutrition was started on 3/17/25.     continue TPN with infusion volume of 2L, TPN will provide 1446 kcal/day    labs reviewed - electrolytes adjusted in TPN bag; labs can be done every other day for TPN adjustments      monitor fingersticks, obtain daily weights    continue parenteral nutrition at this time, will follow up with primary team on plan, monitor tolerance to liquids, advance diet as tolerated      1.  Severe protein calorie malnutrition being optimized with TPN: CHO [190] gm.  AA [100] gm. SMOF Lipids [40] gm.  2.  Hyperglycemia managed with: [0] units of regular insulin    3.  Check fluid balance daily.  Strict I/O  [ ] [ ]   4.  Daily BMP, Ionized Calcium, Magnesium and Phosphorous   5.  Triglycerides at initiation of TPN and monthly  Chol -- LDL -- HDL -- Trig 164[H]    Nutrition Support [id number]

## 2025-03-29 NOTE — PROGRESS NOTE ADULT - SUBJECTIVE AND OBJECTIVE BOX
TEAM [ A ] Surgery Daily Progress Note  =====================================================    SUBJECTIVE: Patient seen and examined at bedside on AM rounds. Patient has been tolerating full liquids but says that she is taking in sips slowly due to stomach cramps. She denies nausea and vomiting. Colostomy continues to be productive of gas and stool. She has been out of bed, ambulating    ALLERGIES:  sulfa drugs (Unknown)      --------------------------------------------------------------------------------------    MEDICATIONS:  benzocaine/menthol Lozenge 1 Lozenge Oral three times a day PRN  chlorhexidine 2% Cloths 1 Application(s) Topical daily  heparin   Injectable 5000 Unit(s) SubCutaneous every 8 hours  lidocaine   4% Patch 1 Patch Transdermal every 24 hours PRN  lipid, fat emulsion (Fish Oil and Plant Based) 20% Infusion 16.6 mL/Hr IV Continuous <Continuous>  metoclopramide Injectable 10 milliGRAM(s) IV Push every 8 hours  ondansetron Injectable 4 milliGRAM(s) IV Push every 6 hours PRN  pantoprazole    Tablet 40 milliGRAM(s) Oral before breakfast  Parenteral Nutrition - Adult 1 Each TPN Continuous <Continuous>  Parenteral Nutrition - Adult 1 Each TPN Continuous <Continuous>  polyethylene glycol 3350 17 Gram(s) Oral two times a day  sodium chloride 0.9% lock flush 10 milliLiter(s) IV Push every 1 hour PRN    --------------------------------------------------------------------------------------    VITAL SIGNS:  T(C): 36.8 (03-29-25 @ 09:00), Max: 37 (03-28-25 @ 17:57)  HR: 86 (03-29-25 @ 09:00) (67 - 96)  BP: 115/70 (03-29-25 @ 09:00) (103/69 - 127/73)  RR: 18 (03-29-25 @ 09:00) (17 - 19)  SpO2: 96% (03-29-25 @ 09:00) (96% - 100%)  --------------------------------------------------------------------------------------    INS AND OUTS:    03-28-25 @ 07:01  -  03-29-25 @ 07:00  --------------------------------------------------------  IN: 1746 mL / OUT: 200 mL / NET: 1546 mL    03-29-25 @ 07:01  -  03-29-25 @ 10:32  --------------------------------------------------------  IN: 0 mL / OUT: 350 mL / NET: -350 mL      --------------------------------------------------------------------------------------      EXAM    General Appearance: Appears well, NAD. Sitting in the chair.  Chest: Breathing comfortably on RA.    CV: WWP  Abdomen: Soft, nondistended, nontender. Ostomy +flatus. +soft liquid stool. Midline laparotomy incision is well approximated, small area of superficial dehiscence at inferior aspect of incision.   Extremities: Moves all extremities.    --------------------------------------------------------------------------------------    LABS

## 2025-03-29 NOTE — PROGRESS NOTE ADULT - ASSESSMENT
74F s/p Cee procedure for chronic diverticulitis with colo-enteric fistula (3/5, Dr. Bethea) re-presenting with nausea and vomiting, found to have a post-operative ileus, now on TPN/PICC 3/17. Course prolonged by high NGT output which dislodged 3/22, now tolerating full liquids with improved nausea and improved stoma output after being digitized with a red rubber catheter.     PLAN:  - Continue Zofran and Reglan  - Allow red rubber to come out of stoma on its own  - Miralax BID  - Continue TPN thru PICC (3/17 - )  - Diet: FLD, patient encouraged to take in PO slowly   - Monitor UOP, Ostomy output  - Pain: IV apap prn  - PT eval --> recommend ALLISON  - Will discuss resuming xarelto in the coming days    A Team Surgery  [phone]

## 2025-03-30 LAB
ALBUMIN SERPL ELPH-MCNC: 2.8 G/DL — LOW (ref 3.3–5)
ALBUMIN SERPL ELPH-MCNC: 2.9 G/DL — LOW (ref 3.3–5)
ALP SERPL-CCNC: 102 U/L — SIGNIFICANT CHANGE UP (ref 40–120)
ALP SERPL-CCNC: 104 U/L — SIGNIFICANT CHANGE UP (ref 40–120)
ALT FLD-CCNC: 21 U/L — SIGNIFICANT CHANGE UP (ref 4–33)
ALT FLD-CCNC: 22 U/L — SIGNIFICANT CHANGE UP (ref 4–33)
ANION GAP SERPL CALC-SCNC: 12 MMOL/L — SIGNIFICANT CHANGE UP (ref 7–14)
ANION GAP SERPL CALC-SCNC: 12 MMOL/L — SIGNIFICANT CHANGE UP (ref 7–14)
AST SERPL-CCNC: 15 U/L — SIGNIFICANT CHANGE UP (ref 4–32)
AST SERPL-CCNC: 16 U/L — SIGNIFICANT CHANGE UP (ref 4–32)
BILIRUB SERPL-MCNC: <0.2 MG/DL — SIGNIFICANT CHANGE UP (ref 0.2–1.2)
BILIRUB SERPL-MCNC: <0.2 MG/DL — SIGNIFICANT CHANGE UP (ref 0.2–1.2)
BUN SERPL-MCNC: 19 MG/DL — SIGNIFICANT CHANGE UP (ref 7–23)
BUN SERPL-MCNC: 20 MG/DL — SIGNIFICANT CHANGE UP (ref 7–23)
CALCIUM SERPL-MCNC: 8.8 MG/DL — SIGNIFICANT CHANGE UP (ref 8.4–10.5)
CALCIUM SERPL-MCNC: 8.9 MG/DL — SIGNIFICANT CHANGE UP (ref 8.4–10.5)
CHLORIDE SERPL-SCNC: 106 MMOL/L — SIGNIFICANT CHANGE UP (ref 98–107)
CHLORIDE SERPL-SCNC: 107 MMOL/L — SIGNIFICANT CHANGE UP (ref 98–107)
CO2 SERPL-SCNC: 21 MMOL/L — LOW (ref 22–31)
CO2 SERPL-SCNC: 23 MMOL/L — SIGNIFICANT CHANGE UP (ref 22–31)
CREAT SERPL-MCNC: 0.4 MG/DL — LOW (ref 0.5–1.3)
CREAT SERPL-MCNC: 0.41 MG/DL — LOW (ref 0.5–1.3)
EGFR: 103 ML/MIN/1.73M2 — SIGNIFICANT CHANGE UP
GLUCOSE BLDC GLUCOMTR-MCNC: 124 MG/DL — HIGH (ref 70–99)
GLUCOSE SERPL-MCNC: 119 MG/DL — HIGH (ref 70–99)
GLUCOSE SERPL-MCNC: 121 MG/DL — HIGH (ref 70–99)
HCT VFR BLD CALC: 25.8 % — LOW (ref 34.5–45)
HGB BLD-MCNC: 7.7 G/DL — LOW (ref 11.5–15.5)
MAGNESIUM SERPL-MCNC: 2.1 MG/DL — SIGNIFICANT CHANGE UP (ref 1.6–2.6)
MAGNESIUM SERPL-MCNC: 2.1 MG/DL — SIGNIFICANT CHANGE UP (ref 1.6–2.6)
MCHC RBC-ENTMCNC: 24.1 PG — LOW (ref 27–34)
MCHC RBC-ENTMCNC: 29.8 G/DL — LOW (ref 32–36)
MCV RBC AUTO: 80.6 FL — SIGNIFICANT CHANGE UP (ref 80–100)
NRBC # BLD AUTO: 0 K/UL — SIGNIFICANT CHANGE UP (ref 0–0)
NRBC # FLD: 0 K/UL — SIGNIFICANT CHANGE UP (ref 0–0)
NRBC BLD AUTO-RTO: 0 /100 WBCS — SIGNIFICANT CHANGE UP (ref 0–0)
PHOSPHATE SERPL-MCNC: 3.1 MG/DL — SIGNIFICANT CHANGE UP (ref 2.5–4.5)
PHOSPHATE SERPL-MCNC: 3.3 MG/DL — SIGNIFICANT CHANGE UP (ref 2.5–4.5)
PLATELET # BLD AUTO: 241 K/UL — SIGNIFICANT CHANGE UP (ref 150–400)
POTASSIUM SERPL-MCNC: 3.9 MMOL/L — SIGNIFICANT CHANGE UP (ref 3.5–5.3)
POTASSIUM SERPL-MCNC: 3.9 MMOL/L — SIGNIFICANT CHANGE UP (ref 3.5–5.3)
POTASSIUM SERPL-SCNC: 3.9 MMOL/L — SIGNIFICANT CHANGE UP (ref 3.5–5.3)
POTASSIUM SERPL-SCNC: 3.9 MMOL/L — SIGNIFICANT CHANGE UP (ref 3.5–5.3)
PROT SERPL-MCNC: 5.8 G/DL — LOW (ref 6–8.3)
PROT SERPL-MCNC: 5.9 G/DL — LOW (ref 6–8.3)
RBC # BLD: 3.2 M/UL — LOW (ref 3.8–5.2)
RBC # FLD: 23 % — HIGH (ref 10.3–14.5)
SODIUM SERPL-SCNC: 140 MMOL/L — SIGNIFICANT CHANGE UP (ref 135–145)
SODIUM SERPL-SCNC: 141 MMOL/L — SIGNIFICANT CHANGE UP (ref 135–145)
WBC # BLD: 7.12 K/UL — SIGNIFICANT CHANGE UP (ref 3.8–10.5)
WBC # FLD AUTO: 7.12 K/UL — SIGNIFICANT CHANGE UP (ref 3.8–10.5)

## 2025-03-30 PROCEDURE — 99232 SBSQ HOSP IP/OBS MODERATE 35: CPT

## 2025-03-30 PROCEDURE — 99024 POSTOP FOLLOW-UP VISIT: CPT

## 2025-03-30 RX ORDER — I.V. FAT EMULSION 20 G/100ML
16.6 EMULSION INTRAVENOUS
Qty: 40 | Refills: 0 | Status: DISCONTINUED | OUTPATIENT
Start: 2025-03-30 | End: 2025-04-01

## 2025-03-30 RX ORDER — SODIUM/POT/MAG/CALC/CHLOR/ACET 35-20-5MEQ
1 VIAL (ML) INTRAVENOUS
Refills: 0 | Status: DISCONTINUED | OUTPATIENT
Start: 2025-03-30 | End: 2025-03-30

## 2025-03-30 RX ORDER — SIMETHICONE 80 MG
80 TABLET,CHEWABLE ORAL ONCE
Refills: 0 | Status: COMPLETED | OUTPATIENT
Start: 2025-03-30 | End: 2025-03-30

## 2025-03-30 RX ORDER — SOD PHOS DI, MONO/K PHOS MONO 250 MG
1 TABLET ORAL ONCE
Refills: 0 | Status: COMPLETED | OUTPATIENT
Start: 2025-03-30 | End: 2025-03-30

## 2025-03-30 RX ADMIN — HEPARIN SODIUM 5000 UNIT(S): 1000 INJECTION INTRAVENOUS; SUBCUTANEOUS at 00:56

## 2025-03-30 RX ADMIN — Medication 10 MILLIGRAM(S): at 17:58

## 2025-03-30 RX ADMIN — Medication 40 MILLIGRAM(S): at 06:06

## 2025-03-30 RX ADMIN — I.V. FAT EMULSION 16.6 ML/HR: 20 EMULSION INTRAVENOUS at 18:21

## 2025-03-30 RX ADMIN — Medication 80 MILLIGRAM(S): at 09:15

## 2025-03-30 RX ADMIN — Medication 1 APPLICATION(S): at 09:15

## 2025-03-30 RX ADMIN — Medication 1 EACH: at 18:01

## 2025-03-30 RX ADMIN — Medication 10 MILLIGRAM(S): at 09:42

## 2025-03-30 RX ADMIN — HEPARIN SODIUM 5000 UNIT(S): 1000 INJECTION INTRAVENOUS; SUBCUTANEOUS at 09:15

## 2025-03-30 RX ADMIN — HEPARIN SODIUM 5000 UNIT(S): 1000 INJECTION INTRAVENOUS; SUBCUTANEOUS at 21:26

## 2025-03-30 RX ADMIN — Medication 1 PACKET(S): at 09:16

## 2025-03-30 NOTE — PROGRESS NOTE ADULT - SUBJECTIVE AND OBJECTIVE BOX
NUTRITION NOTE  [id number] ACKERMAN  ===============================    Interval events - Patient was seen and examined at bedside, no acute events overnight. Patient denies chest pain, shortness of breath, nausea or vomiting at this time.     IR PICC placed and parenteral nutrition was started on 3/17/25. Pt is tolerating TPN without any issues. TPN/lipids ordered for tonight. Patient is tolerating liquids without any nausea or vomiting, but only taking sips due to stomach cramps.     ROS: Except as noted above, all other systems reviewed and are negative     Allergies  sulfa drugs (Unknown)    PAST MEDICAL & SURGICAL HISTORY:  Breast CA, left lumpectomy / RTX in the past  Hypertension  Irritable bowel syndrome (IBS)  Polyp of colon "pre-cancerous" x 2, removed 2014  HLD (hyperlipidemia)  Bladder polyp  Livedoid vasculopathy  Anxiety and depression  KARI on CPAP  Arterial insufficiency  Diverticulitis  Cancer of right breast  HTN (hypertension)  Urothelial carcinoma of bladder  Other specified diseases of intestine  S/P   S/P colon resection reversal, had complications for fibriods  S/P hysterectomy  H/O lumpectomy left   Personal history of spine surgery L1-L4 fusion 2017  S/P lumpectomy, left breast  S/P lumpectomy, right breast  S/P angiogram of extremity    FAMILY HISTORY:  Family history of coronary artery disease (Mother)    Vital Signs Last 24 Hrs  T(C): 36.2 (30 Mar 2025 04:03), Max: 36.8 (29 Mar 2025 20:50)  T(F): 97.2 (30 Mar 2025 04:03), Max: 98.2 (29 Mar 2025 20:50)  HR: 79 (30 Mar 2025 04:03) (69 - 79)  BP: 111/62 (30 Mar 2025 04:03) (102/68 - 133/66)  RR: 17 (30 Mar 2025 04:03) (16 - 18)  SpO2: 100% (30 Mar 2025 04:03) (100% - 100%)    MEDICATIONS  (STANDING):  chlorhexidine 2% Cloths 1 Application(s) Topical daily  heparin   Injectable 5000 Unit(s) SubCutaneous every 8 hours  lipid, fat emulsion (Fish Oil and Plant Based) 20% Infusion 16.6 mL/Hr (16.6 mL/Hr) IV Continuous <Continuous>  metoclopramide Injectable 10 milliGRAM(s) IV Push every 8 hours  pantoprazole    Tablet 40 milliGRAM(s) Oral before breakfast  Parenteral Nutrition - Adult 1 Each (83 mL/Hr) TPN Continuous <Continuous>  Parenteral Nutrition - Adult 1 Each (83 mL/Hr) TPN Continuous <Continuous>  polyethylene glycol 3350 17 Gram(s) Oral two times a day  potassium phosphate / sodium phosphate Powder (PHOS-NaK) 1 Packet(s) Oral once  simethicone 80 milliGRAM(s) Chew once    MEDICATIONS  (PRN):  acetaminophen   IVPB .. 1000 milliGRAM(s) IV Intermittent every 6 hours PRN Mild Pain (1 - 3)  benzocaine/menthol Lozenge 1 Lozenge Oral three times a day PRN Sore Throat  lidocaine   4% Patch 1 Patch Transdermal every 24 hours PRN back pain  ondansetron Injectable 4 milliGRAM(s) IV Push every 6 hours PRN Nausea  sodium chloride 0.9% lock flush 10 milliLiter(s) IV Push every 1 hour PRN Pre/post blood products, medications, blood draw, and to maintain line patency    I&O's Detail    29 Mar 2025 07:01  -  30 Mar 2025 07:00  --------------------------------------------------------  IN:    Fat Emulsion (Fish Oil &amp; Plant Based) 20% Infusion: 16 mL    Fat Emulsion (Fish Oil &amp; Plant Based) 20% Infusion: 132.8 mL    IV PiggyBack: 100 mL    Oral Fluid: 610 mL    TPN (Total Parenteral Nutrition): 1660 mL  Total IN: 2518.8 mL    OUT:    Colostomy (mL): 545 mL    Voided (mL): 1125 mL  Total OUT: 1670 mL    Total NET: 848.8 mL    POCT Blood Glucose.: 124 mg/dL (30 Mar 2025 08:09)  POCT Blood Glucose.: 134 mg/dL (29 Mar 2025 12:57)    Daily Weight in k.2 (26 Mar 2025 05:58)    Drug Dosing Weight  Height (cm): 165.1 (13 Mar 2025 17:00)  Weight (kg): 50.9 (23 Mar 2025 06:16)  BMI (kg/m2): 18.7 (23 Mar 2025 06:16)  BSA (m2): 1.55 (23 Mar 2025 06:16)    PHYSICAL EXAM:  Constitutional: A&Ox3, NAD  Gastrointestinal: abdomen soft nontender, nondistended, ostomy with function  Extremities: Moving all extremities, no edema  PICC Site: LUE double lumen PICC in place, site clean and dry, placed 3/17/25    Diet: full liquids and TPN/lipids (started on 3/17/25)    LABORATORY                                     7.7    7.12  )-----------( 241      ( 30 Mar 2025 05:55 )             25.8   03-30    141  |  106  |  20  ----------------------------<  121[H]  3.9   |  23  |  0.40[L]    Ca    8.9      30 Mar 2025 05:55  Phos  3.1     03-30  Mg     2.10     03-30    TPro  5.9[L]  /  Alb  2.9[L]  /  TBili  <0.2  /  DBili  x   /  AST  15  /  ALT  21  /  AlkPhos  104  03-30    LIVER FUNCTIONS - ( 30 Mar 2025 05:55 )  Alb: 2.9 g/dL / Pro: 5.9 g/dL / ALK PHOS: 104 U/L / ALT: 21 U/L / AST: 15 U/L / GGT: x           03-18 Chol -- LDL -- HDL -- Trig 164[H]    ASSESSMENT/PLAN:  75 y/o F with PMHx metastatic urothelial carcinoma in 2017 (s/p immunotherapy b/l breast cancer (s/p RT), diverticulitis, HTN, HLD, arterial insufficiency, vasculopathy on Xarelto and Pletal, s/p recent Cee procedure for chronic diverticulitis with colo-enteric fistula (3/5/25) presenting with nausea and vomiting, found to have a post-operative ileus vs. pSBO. Nutrition support consult called for initiation of parenteral nutrition in view of severe protein calorie malnutrition and anticipated prolonged NPO.  IR PICC placed and parenteral nutrition was started on 3/17/25.     continue TPN with infusion volume of 2L, TPN will provide 1446 kcal/day    labs reviewed - continue same TPN formula today; labs can be done every other day for TPN adjustments      monitor fingersticks, obtain daily weights    continue parenteral nutrition at this time, will follow up with primary team on plan, monitor tolerance to liquids, advance diet as tolerated      1.  Severe protein calorie malnutrition being optimized with TPN: CHO [190] gm.  AA [100] gm. SMOF Lipids [40] gm.  2.  Hyperglycemia managed with: [0] units of regular insulin    3.  Check fluid balance daily.  Strict I/O  [ ] [ ]   4.  Daily BMP, Ionized Calcium, Magnesium and Phosphorous   5.  Triglycerides at initiation of TPN and monthly  Chol -- LDL -- HDL -- Trig 164[H]    Nutrition Support [id number]

## 2025-03-30 NOTE — PROGRESS NOTE ADULT - ASSESSMENT
74F s/p Cee procedure for chronic diverticulitis with colo-enteric fistula (3/5, Dr. Bethea) re-presenting with nausea and vomiting, found to have a post-operative ileus, now on TPN/PICC 3/17. Course prolonged by high NGT output which dislodged 3/22, now tolerating full liquids with improved nausea and improved stoma output after being digitized with a red rubber catheter.     PLAN:  - Simethicone x1  - Continue Zofran and Reglan  - Remove red rubber on next ostomy bag change  - Miralax BID  - Continue TPN thru PICC (3/17 - )  - Diet: FLD, patient encouraged to take in PO slowly   - Monitor UOP, Ostomy output  - Pain: IV apap prn  - PT eval --> recommend ALLISON  - Will discuss resuming xarelto in the coming days    A Team Surgery  [id number]

## 2025-03-30 NOTE — PROGRESS NOTE ADULT - SUBJECTIVE AND OBJECTIVE BOX
24 Hour Events:  - NAOE    SUBJECTIVE: Pt seen at bedside during AM rounds. No o/n events, patient resting comfortably. Pt using BIBAP maching overnight. States full liquid diet is being tolerated, but experiencing some gas pain after eating. Overall, in good spirits.    Vital Signs Last 24 Hrs  T(C): 36.2 (30 Mar 2025 04:03), Max: 36.8 (29 Mar 2025 09:00)  T(F): 97.2 (30 Mar 2025 04:03), Max: 98.2 (29 Mar 2025 09:00)  HR: 79 (30 Mar 2025 04:03) (69 - 86)  BP: 111/62 (30 Mar 2025 04:03) (102/68 - 133/66)  BP(mean): --  RR: 17 (30 Mar 2025 04:03) (16 - 18)  SpO2: 100% (30 Mar 2025 04:03) (96% - 100%)    Parameters below as of 30 Mar 2025 04:03  Patient On (Oxygen Delivery Method): room air        I&O's Summary    29 Mar 2025 07:01  -  30 Mar 2025 07:00  --------------------------------------------------------  IN: 2518.8 mL / OUT: 1670 mL / NET: 848.8 mL        LABS:                        7.7    7.12  )-----------( 241      ( 30 Mar 2025 05:55 )             25.8     03-30    141  |  106  |  20  ----------------------------<  121[H]  3.9   |  23  |  0.40[L]    Ca    8.9      30 Mar 2025 05:55  Phos  3.1     03-30  Mg     2.10     03-30    TPro  5.9[L]  /  Alb  2.9[L]  /  TBili  <0.2  /  DBili  x   /  AST  15  /  ALT  21  /  AlkPhos  104  03-30      Urinalysis Basic - ( 30 Mar 2025 05:55 )    Color: x / Appearance: x / SG: x / pH: x  Gluc: 121 mg/dL / Ketone: x  / Bili: x / Urobili: x   Blood: x / Protein: x / Nitrite: x   Leuk Esterase: x / RBC: x / WBC x   Sq Epi: x / Non Sq Epi: x / Bacteria: x        Physical Exam:  General Appearance: Appears well, NAD. Sitting in the chair.  Chest: Breathing comfortably on RA.    CV: WWP  Abdomen: Soft, nondistended, nontender. Ostomy +flatus. +soft liquid stool. Red rubber fallen out of stoma  Extremities: Moves all extremities.

## 2025-03-31 LAB
ANION GAP SERPL CALC-SCNC: 13 MMOL/L — SIGNIFICANT CHANGE UP (ref 7–14)
BUN SERPL-MCNC: 17 MG/DL — SIGNIFICANT CHANGE UP (ref 7–23)
CALCIUM SERPL-MCNC: 9 MG/DL — SIGNIFICANT CHANGE UP (ref 8.4–10.5)
CHLORIDE SERPL-SCNC: 104 MMOL/L — SIGNIFICANT CHANGE UP (ref 98–107)
CO2 SERPL-SCNC: 22 MMOL/L — SIGNIFICANT CHANGE UP (ref 22–31)
CREAT SERPL-MCNC: 0.38 MG/DL — LOW (ref 0.5–1.3)
EGFR: 104 ML/MIN/1.73M2 — SIGNIFICANT CHANGE UP
EGFR: 104 ML/MIN/1.73M2 — SIGNIFICANT CHANGE UP
GLUCOSE BLDC GLUCOMTR-MCNC: 122 MG/DL — HIGH (ref 70–99)
GLUCOSE SERPL-MCNC: 94 MG/DL — SIGNIFICANT CHANGE UP (ref 70–99)
HCT VFR BLD CALC: 28.3 % — LOW (ref 34.5–45)
HGB BLD-MCNC: 8.6 G/DL — LOW (ref 11.5–15.5)
MAGNESIUM SERPL-MCNC: 2 MG/DL — SIGNIFICANT CHANGE UP (ref 1.6–2.6)
MCHC RBC-ENTMCNC: 24.5 PG — LOW (ref 27–34)
MCHC RBC-ENTMCNC: 30.4 G/DL — LOW (ref 32–36)
MCV RBC AUTO: 80.6 FL — SIGNIFICANT CHANGE UP (ref 80–100)
NRBC # BLD AUTO: 0 K/UL — SIGNIFICANT CHANGE UP (ref 0–0)
NRBC # FLD: 0 K/UL — SIGNIFICANT CHANGE UP (ref 0–0)
NRBC BLD AUTO-RTO: 0 /100 WBCS — SIGNIFICANT CHANGE UP (ref 0–0)
PHOSPHATE SERPL-MCNC: 3.2 MG/DL — SIGNIFICANT CHANGE UP (ref 2.5–4.5)
PLATELET # BLD AUTO: 234 K/UL — SIGNIFICANT CHANGE UP (ref 150–400)
POTASSIUM SERPL-MCNC: 4.1 MMOL/L — SIGNIFICANT CHANGE UP (ref 3.5–5.3)
POTASSIUM SERPL-SCNC: 4.1 MMOL/L — SIGNIFICANT CHANGE UP (ref 3.5–5.3)
RBC # BLD: 3.51 M/UL — LOW (ref 3.8–5.2)
RBC # FLD: 23.5 % — HIGH (ref 10.3–14.5)
SODIUM SERPL-SCNC: 139 MMOL/L — SIGNIFICANT CHANGE UP (ref 135–145)
WBC # BLD: 8.86 K/UL — SIGNIFICANT CHANGE UP (ref 3.8–10.5)
WBC # FLD AUTO: 8.86 K/UL — SIGNIFICANT CHANGE UP (ref 3.8–10.5)

## 2025-03-31 PROCEDURE — 99232 SBSQ HOSP IP/OBS MODERATE 35: CPT | Mod: FS

## 2025-03-31 RX ORDER — ACETAMINOPHEN 500 MG/5ML
1000 LIQUID (ML) ORAL EVERY 6 HOURS
Refills: 0 | Status: DISCONTINUED | OUTPATIENT
Start: 2025-03-31 | End: 2025-04-03

## 2025-03-31 RX ORDER — SODIUM/POT/MAG/CALC/CHLOR/ACET 35-20-5MEQ
1 VIAL (ML) INTRAVENOUS
Refills: 0 | Status: DISCONTINUED | OUTPATIENT
Start: 2025-03-31 | End: 2025-03-31

## 2025-03-31 RX ORDER — I.V. FAT EMULSION 20 G/100ML
8.3 EMULSION INTRAVENOUS
Qty: 20 | Refills: 0 | Status: DISCONTINUED | OUTPATIENT
Start: 2025-03-31 | End: 2025-04-01

## 2025-03-31 RX ADMIN — Medication 10 MILLIGRAM(S): at 08:48

## 2025-03-31 RX ADMIN — I.V. FAT EMULSION 8.3 ML/HR: 20 EMULSION INTRAVENOUS at 18:40

## 2025-03-31 RX ADMIN — Medication 10 MILLIGRAM(S): at 16:16

## 2025-03-31 RX ADMIN — Medication 40 MILLIGRAM(S): at 05:52

## 2025-03-31 RX ADMIN — HEPARIN SODIUM 5000 UNIT(S): 1000 INJECTION INTRAVENOUS; SUBCUTANEOUS at 05:04

## 2025-03-31 RX ADMIN — POLYETHYLENE GLYCOL 3350 17 GRAM(S): 17 POWDER, FOR SOLUTION ORAL at 18:09

## 2025-03-31 RX ADMIN — Medication 42 EACH: at 18:38

## 2025-03-31 RX ADMIN — Medication 1 APPLICATION(S): at 16:37

## 2025-03-31 RX ADMIN — Medication 1000 MILLIGRAM(S): at 05:41

## 2025-03-31 RX ADMIN — HEPARIN SODIUM 5000 UNIT(S): 1000 INJECTION INTRAVENOUS; SUBCUTANEOUS at 16:16

## 2025-03-31 NOTE — PROGRESS NOTE ADULT - SUBJECTIVE AND OBJECTIVE BOX
24 Hour Events:  - NAOE    SUBJECTIVE: Pt seen at bedside during AM rounds. No o/n events, patient resting comfortably. Reports feeling gas pain after drinking clears and self regulates prior to re-attempting to eat. States simethicone given yesterday helped. Patient had her CPAP machine over the weekend and recently started using it since she felt she was getting better and wanted to get back into her home routine since she's been using it for the past 7-8 years.    Vital Signs Last 24 Hrs  T(C): 36.7 (31 Mar 2025 09:00), Max: 37.2 (30 Mar 2025 17:30)  T(F): 98.1 (31 Mar 2025 09:00), Max: 99 (30 Mar 2025 17:30)  HR: 72 (31 Mar 2025 09:00) (68 - 76)  BP: 119/66 (31 Mar 2025 09:00) (112/66 - 137/88)  BP(mean): --  RR: 18 (31 Mar 2025 09:00) (16 - 18)  SpO2: 100% (31 Mar 2025 09:00) (99% - 100%)    Parameters below as of 31 Mar 2025 09:00  Patient On (Oxygen Delivery Method): room air        I&O's Summary    30 Mar 2025 07:01  -  31 Mar 2025 07:00  --------------------------------------------------------  IN: 2386.6 mL / OUT: 1330 mL / NET: 1056.6 mL    31 Mar 2025 07:01  -  31 Mar 2025 10:08  --------------------------------------------------------  IN: 240 mL / OUT: 300 mL / NET: -60 mL        LABS:                        8.6    8.86  )-----------( 234      ( 31 Mar 2025 04:50 )             28.3     03-31    139  |  104  |  17  ----------------------------<  94  4.1   |  22  |  0.38[L]    Ca    9.0      31 Mar 2025 04:50  Phos  3.2     03-31  Mg     2.00     03-31    TPro  5.9[L]  /  Alb  2.9[L]  /  TBili  <0.2  /  DBili  x   /  AST  15  /  ALT  21  /  AlkPhos  104  03-30      Urinalysis Basic - ( 31 Mar 2025 04:50 )    Color: x / Appearance: x / SG: x / pH: x  Gluc: 94 mg/dL / Ketone: x  / Bili: x / Urobili: x   Blood: x / Protein: x / Nitrite: x   Leuk Esterase: x / RBC: x / WBC x   Sq Epi: x / Non Sq Epi: x / Bacteria: x        Physical Exam:  General Appearance: Appears well, NAD  Neck: Supple  Chest: Equal expansion bilaterally, equal breath sounds  CV: Pulse present  Abdomen: Soft, nontender, appropriate incisional tenderness, dressings clean and dry and intact. Ostomy functioning appropriately.  Extremities: Grossly symmetric, SCD's in place        25

## 2025-03-31 NOTE — PROGRESS NOTE ADULT - SUBJECTIVE AND OBJECTIVE BOX
NUTRITION NOTE  [id number] TYRON  ===============================    Interval events; no acute events overnight; pt tolerating po diet will advance today     VITAL SIGNS:  T(C): 36.7 (03-31-25 @ 09:00), Max: 37.2 (03-30-25 @ 17:30)  HR: 72 (03-31-25 @ 09:00) (68 - 73)  BP: 119/66 (03-31-25 @ 09:00) (119/66 - 137/88)  ABP: --  ABP(mean): --  RR: 18 (03-31-25 @ 09:00) (16 - 18)  SpO2: 100% (03-31-25 @ 09:00) (99% - 100%)  CVP(mm Hg): --  03-30 @ 07:01  -  03-31 @ 07:00  --------------------------------------------------------  IN: 2386.6 mL / OUT: 1330 mL / NET: 1056.6 mL    03-31 @ 07:01  -  03-31 @ 13:42  --------------------------------------------------------  IN: 439.2 mL / OUT: 300 mL / NET: 139.2 mL      Glucose 122-134    PHYSICAL EXAM:  Constitutional: A&Ox3, NAD  Gastrointestinal: abdomen soft nontender, nondistended, ostomy with function  Extremities: Moving all extremities, no edema  PICC Site: LUE double lumen PICC in place, site clean and dry, placed 3/17/25      Metabolic/FLUIDS/ELECTROLYTES/NUTRITION:  Gastrointestinal Medications:  lipid, fat emulsion (Fish Oil and Plant Based) 20% Infusion 16.6 mL/Hr IV Continuous <Continuous>  lipid, fat emulsion (Fish Oil and Plant Based) 20% Infusion 8.3 mL/Hr IV Continuous <Continuous>  pantoprazole    Tablet 40 milliGRAM(s) Oral before breakfast  Parenteral Nutrition - Adult 1 Each TPN Continuous <Continuous>  Parenteral Nutrition - Adult 1 Each TPN Continuous <Continuous>  polyethylene glycol 3350 17 Gram(s) Oral two times a day  sodium chloride 0.9% lock flush 10 milliLiter(s) IV Push every 1 hour PRN    I&O's Detail  74y  Daily   03-31    139  |  104  |  17  ----------------------------<  94  4.1   |  22  |  0.38[L]    Ca    9.0      31 Mar 2025 04:50  Phos  3.2     03-31  Mg     2.00     03-31    TPro  5.9[L]  /  Alb  2.9[L]  /  TBili  <0.2  /  DBili  x   /  AST  15  /  ALT  21  /  AlkPhos  104  03-30    OTHER MEDICATIONS:  Endocrine/Metabolic Medications:    Genitourinary Medications:    Topical/Other Medications:  benzocaine/menthol Lozenge 1 Lozenge Oral three times a day PRN  chlorhexidine 2% Cloths 1 Application(s) Topical daily  lidocaine   4% Patch 1 Patch Transdermal every 24 hours PRN    ASSESSMENT/PLAN:  75 y/o F with PMHx metastatic urothelial carcinoma in 2017 (s/p immunotherapy b/l breast cancer (s/p RT), diverticulitis, HTN, HLD, arterial insufficiency, vasculopathy on Xarelto and Pletal, s/p recent Cee procedure for chronic diverticulitis with colo-enteric fistula (3/5/25) presenting with nausea and vomiting, found to have a post-operative ileus vs. pSBO. Nutrition support consult called for initiation of parenteral nutrition in view of severe protein calorie malnutrition and anticipated prolonged NPO.  IR PICC placed and parenteral nutrition was started on 3/17/25.     continue TPN with infusion volume of 1L, TPN will provide 723 kcal/day    labs reviewed - continue same TPN formula today; labs can be done every other day for TPN adjustments      monitor fingersticks, obtain daily weights    continue parenteral nutrition at this time, will follow up with primary team on plan, monitor tolerance to liquids, advance diet as tolerated      1.  Severe protein calorie malnutrition being optimized with TPN: CHO [190] gm.  AA [100] gm. SMOF Lipids [40] gm.  2.  Hyperglycemia managed with: [0] units of regular insulin    3.  Check fluid balance daily.  Strict I/O  [ ] [ ]   4.  Daily BMP, Ionized Calcium, Magnesium and Phosphorous   5.  Triglycerides at initiation of TPN and monthly 03-18 Chol -- LDL -- HDL -- Trig 164[H]    Nutrition Support [id number]             1. Protein calorie malnutrition being optimized with TPN: CHO [ ] gm.  AA [ ] gm. Lipids [ ] gm.  2.  Hyperglycemia managed with: [ ] units of regular insulin    3.  Check fluid balance daily.  Strict I/O  [ ] [ ]   4.  Daily BMP, Ionized Calcium, Magnesium and Phosphorous   5.  Triglycerides at initiation of TPN and monthly [ ] [ ]   6.  Pepcid in TPN for Gi prophylaxis  [ ]

## 2025-03-31 NOTE — PROGRESS NOTE ADULT - ASSESSMENT
74F s/p Cee procedure for chronic diverticulitis with colo-enteric fistula (3/5, Dr. Bethea) re-presenting with nausea and vomiting, found to have a post-operative ileus, now on TPN/PICC 3/17. Course prolonged by high NGT output which dislodged 3/22, now tolerating full liquids with improved nausea and improved stoma output after being digitized with a red rubber catheter.     PLAN:  - Hold off on home CPAP if patient has been satting well without it since admission  - Continue Zofran and Reglan  - Miralax BID  - Continue TPN thru PICC (3/17 - )  - Diet: FLD, patient encouraged to take in PO slowly and will continue with it today  - Monitor UOP, Ostomy output  - PT eval --> recommend ALLISON  - Will discuss resuming xarelto in the coming days    A Team Surgery  [id number] 74F s/p Cee procedure for chronic diverticulitis with colo-enteric fistula (3/5, Dr. Bethea) re-presenting with nausea and vomiting, found to have a post-operative ileus, now on TPN/PICC 3/17. Course prolonged by high NGT output which dislodged 3/22, now tolerating full liquids with improved nausea and improved stoma output after being digitized with a red rubber catheter.     PLAN:  - Hold off on home CPAP if patient has been satting well without it since admission, trialing off tonight, please page if patient O2 saturation drops  - Continue Zofran and Reglan  - Miralax BID  - Continue TPN thru PICC (3/17 - )  - Diet: FLD, patient encouraged to take in PO slowly and will continue with it today  - Monitor UOP, Ostomy output  - PT eval --> recommend ALLISON  - Will discuss resuming xarelto in the coming days    A Team Surgery  [id number]

## 2025-03-31 NOTE — PROGRESS NOTE ADULT - NS ATTEND AMEND GEN_ALL_CORE FT

## 2025-04-01 ENCOUNTER — TRANSCRIPTION ENCOUNTER (OUTPATIENT)
Age: 75
End: 2025-04-01

## 2025-04-01 LAB
ANION GAP SERPL CALC-SCNC: 12 MMOL/L — SIGNIFICANT CHANGE UP (ref 7–14)
BASOPHILS # BLD AUTO: 0.03 K/UL — SIGNIFICANT CHANGE UP (ref 0–0.2)
BASOPHILS NFR BLD AUTO: 0.4 % — SIGNIFICANT CHANGE UP (ref 0–2)
BUN SERPL-MCNC: 15 MG/DL — SIGNIFICANT CHANGE UP (ref 7–23)
CA-I BLD-SCNC: 1.23 MMOL/L — SIGNIFICANT CHANGE UP (ref 1.15–1.29)
CALCIUM SERPL-MCNC: 9.2 MG/DL — SIGNIFICANT CHANGE UP (ref 8.4–10.5)
CHLORIDE SERPL-SCNC: 102 MMOL/L — SIGNIFICANT CHANGE UP (ref 98–107)
CO2 SERPL-SCNC: 26 MMOL/L — SIGNIFICANT CHANGE UP (ref 22–31)
CREAT SERPL-MCNC: 0.43 MG/DL — LOW (ref 0.5–1.3)
EGFR: 101 ML/MIN/1.73M2 — SIGNIFICANT CHANGE UP
EGFR: 101 ML/MIN/1.73M2 — SIGNIFICANT CHANGE UP
EOSINOPHIL # BLD AUTO: 0.41 K/UL — SIGNIFICANT CHANGE UP (ref 0–0.5)
EOSINOPHIL NFR BLD AUTO: 5.7 % — SIGNIFICANT CHANGE UP (ref 0–6)
GLUCOSE BLDC GLUCOMTR-MCNC: 118 MG/DL — HIGH (ref 70–99)
GLUCOSE BLDC GLUCOMTR-MCNC: 130 MG/DL — HIGH (ref 70–99)
GLUCOSE SERPL-MCNC: 105 MG/DL — HIGH (ref 70–99)
HCT VFR BLD CALC: 28.3 % — LOW (ref 34.5–45)
HGB BLD-MCNC: 8.8 G/DL — LOW (ref 11.5–15.5)
IANC: 4.14 K/UL — SIGNIFICANT CHANGE UP (ref 1.8–7.4)
IMM GRANULOCYTES NFR BLD AUTO: 0.3 % — SIGNIFICANT CHANGE UP (ref 0–0.9)
LYMPHOCYTES # BLD AUTO: 1.79 K/UL — SIGNIFICANT CHANGE UP (ref 1–3.3)
LYMPHOCYTES # BLD AUTO: 24.7 % — SIGNIFICANT CHANGE UP (ref 13–44)
MAGNESIUM SERPL-MCNC: 2 MG/DL — SIGNIFICANT CHANGE UP (ref 1.6–2.6)
MCHC RBC-ENTMCNC: 24.4 PG — LOW (ref 27–34)
MCHC RBC-ENTMCNC: 31.1 G/DL — LOW (ref 32–36)
MCV RBC AUTO: 78.4 FL — LOW (ref 80–100)
MONOCYTES # BLD AUTO: 0.85 K/UL — SIGNIFICANT CHANGE UP (ref 0–0.9)
MONOCYTES NFR BLD AUTO: 11.7 % — SIGNIFICANT CHANGE UP (ref 2–14)
NEUTROPHILS # BLD AUTO: 4.14 K/UL — SIGNIFICANT CHANGE UP (ref 1.8–7.4)
NEUTROPHILS NFR BLD AUTO: 57.2 % — SIGNIFICANT CHANGE UP (ref 43–77)
NRBC # BLD AUTO: 0 K/UL — SIGNIFICANT CHANGE UP (ref 0–0)
NRBC # FLD: 0 K/UL — SIGNIFICANT CHANGE UP (ref 0–0)
NRBC BLD AUTO-RTO: 0 /100 WBCS — SIGNIFICANT CHANGE UP (ref 0–0)
PHOSPHATE SERPL-MCNC: 3.8 MG/DL — SIGNIFICANT CHANGE UP (ref 2.5–4.5)
PLATELET # BLD AUTO: 231 K/UL — SIGNIFICANT CHANGE UP (ref 150–400)
POTASSIUM SERPL-MCNC: 4.1 MMOL/L — SIGNIFICANT CHANGE UP (ref 3.5–5.3)
POTASSIUM SERPL-SCNC: 4.1 MMOL/L — SIGNIFICANT CHANGE UP (ref 3.5–5.3)
RBC # BLD: 3.61 M/UL — LOW (ref 3.8–5.2)
RBC # FLD: 22.6 % — HIGH (ref 10.3–14.5)
SODIUM SERPL-SCNC: 140 MMOL/L — SIGNIFICANT CHANGE UP (ref 135–145)
WBC # BLD: 7.24 K/UL — SIGNIFICANT CHANGE UP (ref 3.8–10.5)
WBC # FLD AUTO: 7.24 K/UL — SIGNIFICANT CHANGE UP (ref 3.8–10.5)

## 2025-04-01 PROCEDURE — 99232 SBSQ HOSP IP/OBS MODERATE 35: CPT | Mod: FS

## 2025-04-01 RX ADMIN — Medication 40 MILLIGRAM(S): at 05:46

## 2025-04-01 RX ADMIN — Medication 10 MILLIGRAM(S): at 17:30

## 2025-04-01 RX ADMIN — Medication 1000 MILLIGRAM(S): at 22:13

## 2025-04-01 RX ADMIN — Medication 10 MILLIGRAM(S): at 00:33

## 2025-04-01 RX ADMIN — Medication 1 APPLICATION(S): at 12:26

## 2025-04-01 RX ADMIN — Medication 1000 MILLIGRAM(S): at 23:13

## 2025-04-01 RX ADMIN — HEPARIN SODIUM 5000 UNIT(S): 1000 INJECTION INTRAVENOUS; SUBCUTANEOUS at 00:33

## 2025-04-01 RX ADMIN — HEPARIN SODIUM 5000 UNIT(S): 1000 INJECTION INTRAVENOUS; SUBCUTANEOUS at 10:35

## 2025-04-01 RX ADMIN — Medication 10 MILLIGRAM(S): at 10:00

## 2025-04-01 RX ADMIN — POLYETHYLENE GLYCOL 3350 17 GRAM(S): 17 POWDER, FOR SOLUTION ORAL at 17:43

## 2025-04-01 RX ADMIN — HEPARIN SODIUM 5000 UNIT(S): 1000 INJECTION INTRAVENOUS; SUBCUTANEOUS at 17:43

## 2025-04-01 NOTE — PROGRESS NOTE ADULT - NS ATTEND AMEND GEN_ALL_CORE FT
I agree with the above history, physical examination, chief complaint/diagnosis, and plan, which I have reviewed and edited where appropriate.  I agree with notes/assessment and detailed interval history of health care providers on my service.  I have seen and examined the patient.  I reviewed the laboratory and available data and agree with the history, physical assessment and plan.  I reviewed and discussed with all consultants, house staff and PA's.  The Nutrition Support Team (NST) discusses on an ongoing basis with the primary team and all consultants, House staff and PA's to have a permanent risk benefit analyses on all decisions and coordinating care.  I was physically present for the key portions of the evaluation and management (E/M) service provided.  75 y/o F with PMHx metastatic urothelial carcinoma, s/p recent Cee procedure for chronic diverticulitis with colo-enteric fistula.  Nutrition support consult called for initiation of parenteral nutrition in view of severe protein calorie malnutrition and anticipated NPO.  PHYSICAL EXAM:  Constitutional: NAD  Lung: clear  Heart: RR  Gastrointestinal: abdomen soft nontender  Extremities: warm  Diagnosis:  Severe protein calorie malnutrition in acute illness/injury  Labs reviewed  Continue to monitor PO intake.  Plan to d/c TPN and PICC

## 2025-04-01 NOTE — DISCHARGE NOTE PROVIDER - CARE PROVIDER_API CALL
Dread Bethea  Surgery  [address] [place] [address], [address]  [place] [address]  Phone: [phone]  Fax: [phone]  Follow Up Time: 2 weeks

## 2025-04-01 NOTE — CHART NOTE - NSCHARTNOTESELECT_GEN_ALL_CORE
Follow Up/Nutrition Services
Follow Up/Nutrition Services
NGT fell out
Pre IR
Follow Up/Nutrition Services
Follow Up/Nutrition Services

## 2025-04-01 NOTE — DISCHARGE NOTE PROVIDER - NSDCFUADDINST_GEN_ALL_CORE_FT
Wound Care  for Midline Lower abdomen: Cleanse area with saline, pack with Aquacel ribbon with two inches left out for wicking and easy retrieval, apply small foam. Change daily or PRN if soiled.

## 2025-04-01 NOTE — DISCHARGE NOTE PROVIDER - HOSPITAL COURSE
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 Bethea) presenting with nausea and vomiting. Patient was discharged from this hospital yesterday, was previously tolerating PO however developed severe nausea and multiple episodes of emesis. She reports that she kept trying to eat despite feeling nauseous, and having subsequent emesis. She also reports crampy abdominal pain that feels like gas. Patient admitted to colorectal surgery for management of post-op ileus. NGT placed for decompression and conservative management of ileus vs pSBO.   3/15 - pt with small gas and stool in ostomy but still persistently high NGT output  3/16 - NGT clamp trial  3/17 - pt NGT output still high (1.2L) after clamp trial, plan for GG challenge. TPN consulted, pt had PICC placed and started on TPN. Gastrograffin protocol initiated however needed to be returned to suction due to patient complaining of abdominal pain. EKG done and then Reglan started for promotility.   3/18 - IVF bolus for NGT losses. Repeat clamp trial. GI PCR negative  3/19-22 - Ostomy still with gas/stool but still high NGT output and failed clamp trials due to nausea and abdominal pain. Repeating clamp trials as tolerated q6h. End of day on 3/22 NGT fell out during clamp trial. Patient without nausea or abd pain so decision made to not reinsert. Plan to place NGT if symptoms progress.  3/23 - Advanced to sips. Midline staples removed. Erythromycin started for promotility. 1 episode in emesis in evening after erythromycin dose.  3/24 - Erythromycin dc'd. Ostomy digitized with red rubber to promote motility.   3/27 - Nausea/abd pain improved. 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 Bethea) presenting with nausea and vomiting. Patient was discharged from this hospital yesterday, was previously tolerating PO however developed severe nausea and multiple episodes of emesis. She reports that she kept trying to eat despite feeling nauseous, and having subsequent emesis. She also reports crampy abdominal pain that feels like gas. Patient admitted to colorectal surgery for management of post-op ileus. NGT placed for decompression and conservative management of ileus vs pSBO.   3/15 - pt with small gas and stool in ostomy but still persistently high NGT output  3/16 - NGT clamp trial  3/17 - pt NGT output still high (1.2L) after clamp trial, plan for GG challenge. TPN consulted, pt had PICC placed and started on TPN. Gastrograffin protocol initiated however needed to be returned to suction due to patient complaining of abdominal pain. EKG done and then Reglan started for promotility.   3/18 - IVF bolus for NGT losses. Repeat clamp trial. GI PCR negative  3/19-22 - Ostomy still with gas/stool but still high NGT output and failed clamp trials due to nausea and abdominal pain. Repeating clamp trials as tolerated q6h. End of day on 3/22 NGT fell out during clamp trial. Patient without nausea or abd pain so decision made to not reinsert. Plan to place NGT if symptoms progress.  3/23 - Advanced to sips. Midline staples removed. Erythromycin started for promotility. 1 episode in emesis in evening after erythromycin dose.  3/24 - Erythromycin dc'd. Ostomy digitized with red rubber to promote motility.   3/27 - Nausea/abd pain improved. Ostomy still with gas and stool, little more production   3/29 - Advanced to FLD  4/1 - Advanced to LRD and weaned off the TPN    Post op patient's diet was slowly advanced as tolerated.  At this time, pt is tolerating a regular diet, ambulating and voiding.  Pt has been deemed stable for discharge at this time.

## 2025-04-01 NOTE — PROGRESS NOTE ADULT - ASSESSMENT
74F s/p Cee procedure for chronic diverticulitis with colo-enteric fistula (3/5, Dr. Bethea) re-presenting with nausea and vomiting, found to have a post-operative ileus, now on TPN/PICC 3/17. Course prolonged by high NGT output which dislodged 3/22, now tolerating full liquids with improved nausea and improved stoma output after being digitized with a red rubber catheter.     PLAN:  - Hold off on home CPAP if patient has been satting well without it since admission, trialing off tonight, please page if patient O2 saturation drops --> pt did well off CPAP and gas pain improved, maintain off CPAP for now  - Continue Zofran and Reglan  - Miralax BID  - Continue TPN thru PICC (3/17 - ) --> off TPN by tonight  - Diet: LRD  - Monitor UOP, Ostomy output  - PT eval --> recommend ALLISON  - Will discuss resuming xarelto in the coming days    A Team Surgery  [id number]

## 2025-04-01 NOTE — DISCHARGE NOTE PROVIDER - NSDCMRMEDTOKEN_GEN_ALL_CORE_FT
acetaminophen 325 mg oral tablet: 3 tab(s) orally every 6 hours  amitriptyline 25 mg oral tablet: 1 tab(s) orally once a day  amLODIPine 2.5 mg oral tablet: 1 tab(s) orally once a day (at bedtime)  cilostazol 50 mg oral tablet: 1 tab(s) orally 2 times a day  dicyclomine 10 mg oral capsule: 1 cap(s) orally once a day  oxyCODONE 5 mg oral tablet: 1 tab(s) orally every 4 hours As needed Severe Pain (7 - 10)  pravastatin 20 mg oral tablet: 1 tab(s) orally once a day  Xarelto 10 mg oral tablet: 1 tab(s) orally once a day   acetaminophen 325 mg oral tablet: 3 tab(s) orally every 6 hours  amitriptyline 25 mg oral tablet: 1 tab(s) orally once a day  amLODIPine 2.5 mg oral tablet: 1 tab(s) orally once a day (at bedtime)  cilostazol 50 mg oral tablet: 1 tab(s) orally 2 times a day  dicyclomine 10 mg oral capsule: 1 cap(s) orally once a day  lidocaine 4% topical film: Apply topically to affected area every 24 hours as needed for  muscle spasm  metoclopramide 10 mg oral tablet: 1 tab(s) orally 3 times a day  polyethylene glycol 3350 oral powder for reconstitution: 17 gram(s) orally 2 times a day  pravastatin 20 mg oral tablet: 1 tab(s) orally once a day  Xarelto 10 mg oral tablet: 1 tab(s) orally once a day

## 2025-04-01 NOTE — PROGRESS NOTE ADULT - SUBJECTIVE AND OBJECTIVE BOX
TEAM [ A ] Surgery Daily Progress Note  =====================================================    SUBJECTIVE: Patient seen and examined at bedside on AM rounds. Patient reports that they're feeling well, best that they have felt so far. Tolerating diet, denies nausea, vomiting, reports epigastric gas pain is gone, tolerated some solid food yesterday. OOB/Amublating as tolerated. Denies fever, chills.    ALLERGIES:  sulfa drugs (Unknown)      --------------------------------------------------------------------------------------    MEDICATIONS:    Neurologic Medications  acetaminophen     Tablet .. 1000 milliGRAM(s) Oral every 6 hours PRN Mild Pain (1 - 3)  metoclopramide Injectable 10 milliGRAM(s) IV Push every 8 hours  ondansetron Injectable 4 milliGRAM(s) IV Push every 6 hours PRN Nausea    Respiratory Medications    Cardiovascular Medications    Gastrointestinal Medications  lipid, fat emulsion (Fish Oil and Plant Based) 20% Infusion 16.6 mL/Hr IV Continuous <Continuous>  lipid, fat emulsion (Fish Oil and Plant Based) 20% Infusion 8.3 mL/Hr IV Continuous <Continuous>  pantoprazole    Tablet 40 milliGRAM(s) Oral before breakfast  Parenteral Nutrition - Adult 1 Each TPN Continuous <Continuous>  polyethylene glycol 3350 17 Gram(s) Oral two times a day  sodium chloride 0.9% lock flush 10 milliLiter(s) IV Push every 1 hour PRN Pre/post blood products, medications, blood draw, and to maintain line patency    Genitourinary Medications    Hematologic/Oncologic Medications  heparin   Injectable 5000 Unit(s) SubCutaneous every 8 hours    Antimicrobial/Immunologic Medications    Endocrine/Metabolic Medications    Topical/Other Medications  benzocaine/menthol Lozenge 1 Lozenge Oral three times a day PRN Sore Throat  chlorhexidine 2% Cloths 1 Application(s) Topical daily  lidocaine   4% Patch 1 Patch Transdermal every 24 hours PRN back pain    --------------------------------------------------------------------------------------    VITAL SIGNS:  T(C): 36.6 (04-01-25 @ 05:16), Max: 36.9 (03-31-25 @ 17:00)  HR: 72 (04-01-25 @ 05:16) (67 - 75)  BP: 121/71 (04-01-25 @ 05:16) (103/65 - 122/65)  RR: 18 (04-01-25 @ 05:16) (16 - 18)  SpO2: 100% (04-01-25 @ 05:16) (98% - 100%)  --------------------------------------------------------------------------------------    EXAM    General: NAD, resting in bed comfortably.  Cardiac: regular rate, warm and well perfused  Respiratory: Nonlabored respirations, normal cw expansion.  Abdomen: soft, nontender, nondistended. Ostomy with gas and stool. Well healed midline scar  Extremities: normal strength, FROM, no deformities    --------------------------------------------------------------------------------------    LABS                        8.8    7.24  )-----------( 231      ( 01 Apr 2025 05:52 )             28.3   03-31    139  |  104  |  17  ----------------------------<  94  4.1   |  22  |  0.38[L]    Ca    9.0      31 Mar 2025 04:50  Phos  3.2     03-31  Mg     2.00     03-31      --------------------------------------------------------------------------------------    INS AND OUTS:    03-31-25 @ 07:01  -  04-01-25 @ 07:00  --------------------------------------------------------  IN: 2345.6 mL / OUT: 3130 mL / NET: -784.4 mL      --------------------------------------------------------------------------------------

## 2025-04-01 NOTE — PROGRESS NOTE ADULT - SUBJECTIVE AND OBJECTIVE BOX
NUTRITION NOTE  [id number] ACKERMAN  ===============================    Interval events - Patient was seen and examined at bedside, no acute events overnight. Patient denies chest pain, shortness of breath, nausea or vomiting at this time. Pt is now tolerating PO diet, plan to d/c TPN today.     ROS: Except as noted above, all other systems reviewed and are negative     Allergies  sulfa drugs (Unknown)    PAST MEDICAL & SURGICAL HISTORY:  Breast CA, left lumpectomy / RTX in the past  Hypertension  Irritable bowel syndrome (IBS)  Polyp of colon "pre-cancerous" x 2, removed 2014  HLD (hyperlipidemia)  Bladder polyp  Livedoid vasculopathy  Anxiety and depression  KARI on CPAP  Arterial insufficiency  Diverticulitis  Cancer of right breast  HTN (hypertension)  Urothelial carcinoma of bladder  Other specified diseases of intestine  S/P   S/P colon resection reversal, had complications for fibriods  S/P hysterectomy  H/O lumpectomy left   Personal history of spine surgery L1-L4 fusion 2017  S/P lumpectomy, left breast  S/P lumpectomy, right breast  S/P angiogram of extremity    FAMILY HISTORY:  Family history of coronary artery disease (Mother)    Vital Signs Last 24 Hrs  T(C): 36.8 (2025 09:57), Max: 36.9 (31 Mar 2025 17:00)  T(F): 98.3 (2025 09:57), Max: 98.5 (31 Mar 2025 17:00)  HR: 73 (2025 09:57) (67 - 75)  BP: 118/66 (2025 09:57) (103/65 - 122/65)  RR: 18 (2025 09:57) (16 - 18)  SpO2: 100% (2025 09:57) (98% - 100%)    MEDICATIONS  (STANDING):  chlorhexidine 2% Cloths 1 Application(s) Topical daily  heparin   Injectable 5000 Unit(s) SubCutaneous every 8 hours  metoclopramide Injectable 10 milliGRAM(s) IV Push every 8 hours  pantoprazole    Tablet 40 milliGRAM(s) Oral before breakfast  Parenteral Nutrition - Adult 1 Each (42 mL/Hr) TPN Continuous <Continuous>  polyethylene glycol 3350 17 Gram(s) Oral two times a day    MEDICATIONS  (PRN):  acetaminophen     Tablet .. 1000 milliGRAM(s) Oral every 6 hours PRN Mild Pain (1 - 3)  benzocaine/menthol Lozenge 1 Lozenge Oral three times a day PRN Sore Throat  lidocaine   4% Patch 1 Patch Transdermal every 24 hours PRN back pain  ondansetron Injectable 4 milliGRAM(s) IV Push every 6 hours PRN Nausea  sodium chloride 0.9% lock flush 10 milliLiter(s) IV Push every 1 hour PRN Pre/post blood products, medications, blood draw, and to maintain line patency    I&O's Detail    31 Mar 2025 07:01  -  2025 07:00  --------------------------------------------------------  IN:    Fat Emulsion (Fish Oil &amp; Plant Based) 20% Infusion: 33.2 mL    Fat Emulsion (Fish Oil &amp; Plant Based) 20% Infusion: 99.6 mL    Oral Fluid: 1080 mL    TPN (Total Parenteral Nutrition): 1166 mL  Total IN: 2378.8 mL    OUT:    Colostomy (mL): 330 mL    Emesis (mL): 0 mL    IV PiggyBack: 0 mL    Voided (mL): 2800 mL  Total OUT: 3130 mL    Total NET: -751.2 mL      2025 07:01  -  2025 10:44  --------------------------------------------------------  IN:    Oral Fluid: 250 mL  Total IN: 250 mL    OUT:    Colostomy (mL): 100 mL  Total OUT: 100 mL    Total NET: 150 mL    POCT Blood Glucose.: 118 mg/dL (2025 01:31)  POCT Blood Glucose.: 122 mg/dL (31 Mar 2025 11:39)    Daily Height in cm: 165.1 (2025 06:00)    Daily Weight in k.2 (26 Mar 2025 05:58)    Drug Dosing Weight  Height (cm): 165.1 (2025 06:00)  Weight (kg): 57.606 (2025 06:00)  BMI (kg/m2): 21.1 (2025 06:00)  BSA (m2): 1.63 (2025 06:00)    PHYSICAL EXAM:  Constitutional: A&Ox3, NAD  Gastrointestinal: abdomen soft nontender, nondistended, ostomy with function  Extremities: Moving all extremities, no edema  PICC Site: LUE double lumen PICC in place, site clean and dry, placed 3/17/25    Diet: regular diet and TPN/lipids (started on 3/17/25, d/c on 25)    LABORATORY                                                8.8    7.24  )-----------( 231      ( 2025 05:52 )             28.3   04-01    140  |  102  |  15  ----------------------------<  105[H]  4.1   |  26  |  0.43[L]    Ca    9.2      2025 05:52  Phos  3.8     04-01  Mg     2.00     04-01    TPro  5.9[L]  /  Alb  2.9[L]  /  TBili  <0.2  /  DBili  x   /  AST  15  /  ALT  21  /  AlkPhos  104  03-30    LIVER FUNCTIONS - ( 30 Mar 2025 05:55 )  Alb: 2.9 g/dL / Pro: 5.9 g/dL / ALK PHOS: 104 U/L / ALT: 21 U/L / AST: 15 U/L / GGT: x           03-18 Chol -- LDL -- HDL -- Trig 164[H]    ASSESSMENT/PLAN:  73 y/o F with PMHx metastatic urothelial carcinoma in 2017 (s/p immunotherapy b/l breast cancer (s/p RT), diverticulitis, HTN, HLD, arterial insufficiency, vasculopathy on Xarelto and Pletal, s/p recent Cee procedure for chronic diverticulitis with colo-enteric fistula (3/5/25) presenting with nausea and vomiting, found to have a post-operative ileus vs. pSBO. Nutrition support consult called for initiation of parenteral nutrition in view of severe protein calorie malnutrition and anticipated prolonged NPO.  IR PICC placed and parenteral nutrition was started on 3/17/25.     continue to monitor PO intake and tolerance to diet, plan to d/c TPN today; remove PICC      Nutrition Support [id number]

## 2025-04-02 LAB
ANION GAP SERPL CALC-SCNC: 11 MMOL/L — SIGNIFICANT CHANGE UP (ref 7–14)
BUN SERPL-MCNC: 19 MG/DL — SIGNIFICANT CHANGE UP (ref 7–23)
CALCIUM SERPL-MCNC: 9.6 MG/DL — SIGNIFICANT CHANGE UP (ref 8.4–10.5)
CHLORIDE SERPL-SCNC: 104 MMOL/L — SIGNIFICANT CHANGE UP (ref 98–107)
CO2 SERPL-SCNC: 25 MMOL/L — SIGNIFICANT CHANGE UP (ref 22–31)
CREAT SERPL-MCNC: 0.49 MG/DL — LOW (ref 0.5–1.3)
EGFR: 98 ML/MIN/1.73M2 — SIGNIFICANT CHANGE UP
EGFR: 98 ML/MIN/1.73M2 — SIGNIFICANT CHANGE UP
GLUCOSE BLDC GLUCOMTR-MCNC: 116 MG/DL — HIGH (ref 70–99)
GLUCOSE SERPL-MCNC: 103 MG/DL — HIGH (ref 70–99)
HCT VFR BLD CALC: 29.8 % — LOW (ref 34.5–45)
HGB BLD-MCNC: 9.1 G/DL — LOW (ref 11.5–15.5)
MAGNESIUM SERPL-MCNC: 1.9 MG/DL — SIGNIFICANT CHANGE UP (ref 1.6–2.6)
MCHC RBC-ENTMCNC: 24.6 PG — LOW (ref 27–34)
MCHC RBC-ENTMCNC: 30.5 G/DL — LOW (ref 32–36)
MCV RBC AUTO: 80.5 FL — SIGNIFICANT CHANGE UP (ref 80–100)
NRBC # BLD AUTO: 0 K/UL — SIGNIFICANT CHANGE UP (ref 0–0)
NRBC # FLD: 0 K/UL — SIGNIFICANT CHANGE UP (ref 0–0)
NRBC BLD AUTO-RTO: 0 /100 WBCS — SIGNIFICANT CHANGE UP (ref 0–0)
PHOSPHATE SERPL-MCNC: 4.6 MG/DL — HIGH (ref 2.5–4.5)
PLATELET # BLD AUTO: 228 K/UL — SIGNIFICANT CHANGE UP (ref 150–400)
POTASSIUM SERPL-MCNC: 4.3 MMOL/L — SIGNIFICANT CHANGE UP (ref 3.5–5.3)
POTASSIUM SERPL-SCNC: 4.3 MMOL/L — SIGNIFICANT CHANGE UP (ref 3.5–5.3)
RBC # BLD: 3.7 M/UL — LOW (ref 3.8–5.2)
RBC # FLD: 22.8 % — HIGH (ref 10.3–14.5)
SODIUM SERPL-SCNC: 140 MMOL/L — SIGNIFICANT CHANGE UP (ref 135–145)
WBC # BLD: 6.65 K/UL — SIGNIFICANT CHANGE UP (ref 3.8–10.5)
WBC # FLD AUTO: 6.65 K/UL — SIGNIFICANT CHANGE UP (ref 3.8–10.5)

## 2025-04-02 RX ORDER — METOCLOPRAMIDE HCL 10 MG
10 TABLET ORAL EVERY 8 HOURS
Refills: 0 | Status: DISCONTINUED | OUTPATIENT
Start: 2025-04-02 | End: 2025-04-03

## 2025-04-02 RX ORDER — RIVAROXABAN 10 MG/1
10 TABLET, FILM COATED ORAL DAILY
Refills: 0 | Status: DISCONTINUED | OUTPATIENT
Start: 2025-04-02 | End: 2025-04-03

## 2025-04-02 RX ADMIN — Medication 10 MILLIGRAM(S): at 00:30

## 2025-04-02 RX ADMIN — POLYETHYLENE GLYCOL 3350 17 GRAM(S): 17 POWDER, FOR SOLUTION ORAL at 06:05

## 2025-04-02 RX ADMIN — Medication 1 APPLICATION(S): at 14:23

## 2025-04-02 RX ADMIN — Medication 10 MILLIGRAM(S): at 14:23

## 2025-04-02 RX ADMIN — Medication 40 MILLIGRAM(S): at 06:05

## 2025-04-02 RX ADMIN — RIVAROXABAN 10 MILLIGRAM(S): 10 TABLET, FILM COATED ORAL at 14:22

## 2025-04-02 RX ADMIN — Medication 10 MILLIGRAM(S): at 21:17

## 2025-04-02 RX ADMIN — HEPARIN SODIUM 5000 UNIT(S): 1000 INJECTION INTRAVENOUS; SUBCUTANEOUS at 00:29

## 2025-04-02 NOTE — ADVANCED PRACTICE NURSE CONSULT - ASSESSMENT
Patient states pouch remained intact from last change. Patient actively participating in pouch change. Frequency of pouch change and emptying discussed, teach back method utilized. Pouch should be changed twice a week (Q3-4days). Rubber catheter no longer present. Removed pouch using pull and push technique, cleansed skin with water, patted dry. Two staples still noted to area above surgical site, surgery team made present, will inquire about removal. Taught patient how to properly assess stoma viability and peristomal skin. Patient instructed in stoma measurement, creating template, cutting wafer, applying barrier ring (to protect peristomal skin from enzymatic irritation), applying pouch. Oval stencil used and left at bedside. Small area of mucocutaneous separation noted to 3oclock. Reviewed s/s to report to MD.     Small dehiscence noted to lower midline abdomen scar, dressing saturated. Area cleansed with saline, packed with Aquacel ribbon, with 2inch wick and foam applied. Area measures 1cmx0.5cmx0.5cm.     Stoma size: 1 1/2" x 1 3/4" - See A&I flow sheet for full assessment details.

## 2025-04-02 NOTE — PROGRESS NOTE ADULT - SUBJECTIVE AND OBJECTIVE BOX
TEAM [ A ] Surgery Daily Progress Note  =====================================================    INTERVAL:  - TPN off overnight, continues to tolerate LRD  - IV --> PO meds  - Dispo planning    SUBJECTIVE: Patient seen and examined at bedside on AM rounds. Patient reports that she is tolerated a low-residue diet as long as she eats slowly. She's been out of bed, colostomy remains productive and she denies nausea or vomiting.     ALLERGIES:  sulfa drugs (Unknown)      --------------------------------------------------------------------------------------    MEDICATIONS:  acetaminophen     Tablet .. 1000 milliGRAM(s) Oral every 6 hours PRN  benzocaine/menthol Lozenge 1 Lozenge Oral three times a day PRN  chlorhexidine 2% Cloths 1 Application(s) Topical daily  heparin   Injectable 5000 Unit(s) SubCutaneous every 8 hours  lidocaine   4% Patch 1 Patch Transdermal every 24 hours PRN  metoclopramide 10 milliGRAM(s) Oral every 8 hours  ondansetron Injectable 4 milliGRAM(s) IV Push every 6 hours PRN  pantoprazole    Tablet 40 milliGRAM(s) Oral before breakfast  polyethylene glycol 3350 17 Gram(s) Oral two times a day  sodium chloride 0.9% lock flush 10 milliLiter(s) IV Push every 1 hour PRN    --------------------------------------------------------------------------------------    VITAL SIGNS:  T(C): 36.6 (04-02-25 @ 05:09), Max: 37.1 (04-01-25 @ 21:00)  HR: 71 (04-02-25 @ 05:09) (71 - 87)  BP: 113/71 (04-02-25 @ 05:09) (105/60 - 130/80)  RR: 16 (04-02-25 @ 05:09) (16 - 18)  SpO2: 99% (04-02-25 @ 05:09) (99% - 100%)  --------------------------------------------------------------------------------------    INS AND OUTS:    04-01-25 @ 07:01  -  04-02-25 @ 07:00  --------------------------------------------------------  IN: 2136 mL / OUT: 900 mL / NET: 1236 mL      --------------------------------------------------------------------------------------      EXAM    General: NAD, resting in bed comfortably.  Cardiac: regular rate, warm and well perfused  Respiratory: Nonlabored respirations, normal cw expansion.  Abdomen: soft, nontender, nondistended. Ostomy with gas and stool. Well healed midline scar  Extremities: normal strength, FROM, no deformities    --------------------------------------------------------------------------------------    LABS

## 2025-04-02 NOTE — PROGRESS NOTE ADULT - SUBJECTIVE AND OBJECTIVE BOX
TEAM [ A ] Surgery Daily Progress Note  =====================================================    SUBJECTIVE: Patient seen and examined at bedside on AM rounds. Patient reports that they're feeling well. Tolerating diet, denies nausea, vomiting. Ostomy functioning. OOB/Amublating as tolerated. Denies fever, chills.    ALLERGIES:  sulfa drugs (Unknown)      --------------------------------------------------------------------------------------    MEDICATIONS:    Neurologic Medications  acetaminophen     Tablet .. 1000 milliGRAM(s) Oral every 6 hours PRN Mild Pain (1 - 3)  ondansetron Injectable 4 milliGRAM(s) IV Push every 6 hours PRN Nausea    Respiratory Medications    Cardiovascular Medications    Gastrointestinal Medications  metoclopramide 10 milliGRAM(s) Oral every 8 hours  pantoprazole    Tablet 40 milliGRAM(s) Oral before breakfast  polyethylene glycol 3350 17 Gram(s) Oral two times a day  sodium chloride 0.9% lock flush 10 milliLiter(s) IV Push every 1 hour PRN Pre/post blood products, medications, blood draw, and to maintain line patency    Genitourinary Medications    Hematologic/Oncologic Medications  heparin   Injectable 5000 Unit(s) SubCutaneous every 8 hours    Antimicrobial/Immunologic Medications    Endocrine/Metabolic Medications    Topical/Other Medications  benzocaine/menthol Lozenge 1 Lozenge Oral three times a day PRN Sore Throat  chlorhexidine 2% Cloths 1 Application(s) Topical daily  lidocaine   4% Patch 1 Patch Transdermal every 24 hours PRN back pain    --------------------------------------------------------------------------------------    VITAL SIGNS:  T(C): 36.6 (04-02-25 @ 05:09), Max: 37.1 (04-01-25 @ 21:00)  HR: 71 (04-02-25 @ 05:09) (71 - 87)  BP: 113/71 (04-02-25 @ 05:09) (105/60 - 130/80)  RR: 16 (04-02-25 @ 05:09) (16 - 18)  SpO2: 99% (04-02-25 @ 05:09) (99% - 100%)  --------------------------------------------------------------------------------------    EXAM    General: NAD, resting in bed comfortably.  Cardiac: regular rate, warm and well perfused  Respiratory: Nonlabored respirations, normal cw expansion.  Abdomen: soft, nontender, nondistended. Ostomy with gas and stool in bag. Well healed midline scar.  Extremities: normal strength, FROM, no deformities    --------------------------------------------------------------------------------------    LABS                        9.1    6.65  )-----------( 228      ( 02 Apr 2025 06:00 )             29.8   04-02    140  |  104  |  19  ----------------------------<  103[H]  4.3   |  25  |  0.49[L]    Ca    9.6      02 Apr 2025 06:00  Phos  4.6     04-02  Mg     1.90     04-02      --------------------------------------------------------------------------------------    INS AND OUTS:    04-01-25 @ 07:01  -  04-02-25 @ 07:00  --------------------------------------------------------  IN: 2136 mL / OUT: 900 mL / NET: 1236 mL      --------------------------------------------------------------------------------------

## 2025-04-02 NOTE — PROGRESS NOTE ADULT - ASSESSMENT
74F s/p Cee procedure for chronic diverticulitis with colo-enteric fistula (3/5, Dr. Bethea) re-presenting with nausea and vomiting, found to have a post-operative ileus, now on TPN/PICC 3/17. Course prolonged by high NGT output which dislodged 3/22, now tolerating full liquids with improved nausea and improved stoma output after being digitized with a red rubber catheter.     PLAN:  - Hold off on home CPAP if patient has been satting well without it since admission --> doing well off CPAP and gas pain improved, maintain off CPAP for now  - Continue Zofran and Reglan  - Transition to PO reglan  - Miralax BID  - Off TPN (3/17 - 3/31)  - Diet: LRD  - Monitor UOP, Ostomy output  - PT eval --> recommend [facility]  - Possibly resume Xarelto today  - DC PICC before discharge    A Team Surgery  [id number]

## 2025-04-02 NOTE — ADVANCED PRACTICE NURSE CONSULT - RECOMMEDATIONS
Ostomy: Change Twice a week (Q3-4 days) or PRN if leaking  Liquid barrier film    Skin barrier ring- item # [id number]  One piece drainable pouch- item #8931    Midline Lower abdomen: Cleanse area with saline, pack with Aquacel ribbon with two inches left out for wicking and easy retrieval, apply small foam.  Ostomy: Change Twice a week (Q3-4 days) or PRN if leaking  Liquid barrier film    Skin barrier ring- item # [id number]  One piece drainable pouch- item #8931    Midline Lower abdomen: Cleanse area with saline, pack with Aquacel ribbon with two inches left out for wicking and easy retrieval, apply small foam. Change daily or PRN if soiled.

## 2025-04-02 NOTE — PROGRESS NOTE ADULT - ASSESSMENT
74F s/p Cee procedure for chronic diverticulitis with colo-enteric fistula (3/5, Dr. Bethea) re-presenting with nausea and vomiting, found to have a post-operative ileus, now on TPN/PICC 3/17. Course prolonged by high NGT output which puled on 3/22, now tolerating LRD and TPN weaned to off on 4/1 with colostomy function and pending discharge to [facility].     PLAN:  - Continue to hold home CPAP until outpatient follow up with primary care provider  - IV --> PO reglan  - Continue Miralax BID  - Continue TPN (3/17 -4/1), now off. PICC will be removed immediately prior to discharge to [facility] facility.   - Diet: LRD  - Monitor UOP, Ostomy output  - PT eval --> recommend [facility]  - VTE PPx: SQH, GREGOR    A Team Surgery  [id number]   74F s/p Cee procedure for chronic diverticulitis with colo-enteric fistula (3/5, Dr. Bethea) re-presenting with nausea and vomiting, found to have a post-operative ileus, now on TPN/PICC 3/17. Course prolonged by high NGT output which puled on 3/22, now tolerating LRD and TPN weaned to off on 4/1 with colostomy function and pending discharge to [facility].     PLAN:  - Continue to hold home CPAP until outpatient follow up with Dr. Bethea in clinic. Will not require CPAP at [facility]  - IV --> PO reglan  - Continue Miralax BID  - Continue TPN (3/17 -4/1), now off. PICC will be removed immediately prior to discharge to [facility] facility.   - Diet: LRD  - Monitor UOP, Ostomy output  - PT eval --> recommend [facility]  - VTE PPx: SQH, GREGOR    A Team Surgery  [id number]

## 2025-04-03 ENCOUNTER — APPOINTMENT (OUTPATIENT)
Dept: HEMATOLOGY ONCOLOGY | Facility: CLINIC | Age: 75
End: 2025-04-03

## 2025-04-03 ENCOUNTER — TRANSCRIPTION ENCOUNTER (OUTPATIENT)
Age: 75
End: 2025-04-03

## 2025-04-03 VITALS
TEMPERATURE: 98 F | SYSTOLIC BLOOD PRESSURE: 102 MMHG | RESPIRATION RATE: 16 BRPM | HEART RATE: 75 BPM | DIASTOLIC BLOOD PRESSURE: 63 MMHG | OXYGEN SATURATION: 99 %

## 2025-04-03 LAB
ANION GAP SERPL CALC-SCNC: 14 MMOL/L — SIGNIFICANT CHANGE UP (ref 7–14)
BUN SERPL-MCNC: 18 MG/DL — SIGNIFICANT CHANGE UP (ref 7–23)
CALCIUM SERPL-MCNC: 9.5 MG/DL — SIGNIFICANT CHANGE UP (ref 8.4–10.5)
CHLORIDE SERPL-SCNC: 102 MMOL/L — SIGNIFICANT CHANGE UP (ref 98–107)
CO2 SERPL-SCNC: 22 MMOL/L — SIGNIFICANT CHANGE UP (ref 22–31)
CREAT SERPL-MCNC: 0.52 MG/DL — SIGNIFICANT CHANGE UP (ref 0.5–1.3)
EGFR: 97 ML/MIN/1.73M2 — SIGNIFICANT CHANGE UP
EGFR: 97 ML/MIN/1.73M2 — SIGNIFICANT CHANGE UP
GLUCOSE SERPL-MCNC: 105 MG/DL — HIGH (ref 70–99)
HCT VFR BLD CALC: 28.9 % — LOW (ref 34.5–45)
HGB BLD-MCNC: 8.9 G/DL — LOW (ref 11.5–15.5)
MAGNESIUM SERPL-MCNC: 1.7 MG/DL — SIGNIFICANT CHANGE UP (ref 1.6–2.6)
MCHC RBC-ENTMCNC: 24.5 PG — LOW (ref 27–34)
MCHC RBC-ENTMCNC: 30.8 G/DL — LOW (ref 32–36)
MCV RBC AUTO: 79.4 FL — LOW (ref 80–100)
NRBC # BLD AUTO: 0 K/UL — SIGNIFICANT CHANGE UP (ref 0–0)
NRBC # FLD: 0 K/UL — SIGNIFICANT CHANGE UP (ref 0–0)
NRBC BLD AUTO-RTO: 0 /100 WBCS — SIGNIFICANT CHANGE UP (ref 0–0)
PHOSPHATE SERPL-MCNC: 4.2 MG/DL — SIGNIFICANT CHANGE UP (ref 2.5–4.5)
PLATELET # BLD AUTO: 222 K/UL — SIGNIFICANT CHANGE UP (ref 150–400)
POTASSIUM SERPL-MCNC: 4.1 MMOL/L — SIGNIFICANT CHANGE UP (ref 3.5–5.3)
POTASSIUM SERPL-SCNC: 4.1 MMOL/L — SIGNIFICANT CHANGE UP (ref 3.5–5.3)
RBC # BLD: 3.64 M/UL — LOW (ref 3.8–5.2)
RBC # FLD: 22.7 % — HIGH (ref 10.3–14.5)
SODIUM SERPL-SCNC: 138 MMOL/L — SIGNIFICANT CHANGE UP (ref 135–145)
WBC # BLD: 7.2 K/UL — SIGNIFICANT CHANGE UP (ref 3.8–10.5)
WBC # FLD AUTO: 7.2 K/UL — SIGNIFICANT CHANGE UP (ref 3.8–10.5)

## 2025-04-03 RX ORDER — LIDOCAINE HYDROCHLORIDE 20 MG/ML
1 JELLY TOPICAL
Qty: 0 | Refills: 0 | DISCHARGE
Start: 2025-04-03

## 2025-04-03 RX ORDER — MAGNESIUM SULFATE 500 MG/ML
2 SYRINGE (ML) INJECTION ONCE
Refills: 0 | Status: COMPLETED | OUTPATIENT
Start: 2025-04-03 | End: 2025-04-03

## 2025-04-03 RX ORDER — POLYETHYLENE GLYCOL 3350 17 G/17G
17 POWDER, FOR SOLUTION ORAL
Qty: 0 | Refills: 0 | DISCHARGE
Start: 2025-04-03

## 2025-04-03 RX ORDER — METOCLOPRAMIDE HCL 10 MG
1 TABLET ORAL
Qty: 90 | Refills: 0
Start: 2025-04-03 | End: 2025-05-02

## 2025-04-03 RX ADMIN — LIDOCAINE HYDROCHLORIDE 1 PATCH: 20 JELLY TOPICAL at 13:18

## 2025-04-03 RX ADMIN — Medication 40 MILLIGRAM(S): at 06:50

## 2025-04-03 RX ADMIN — Medication 1000 MILLIGRAM(S): at 13:18

## 2025-04-03 RX ADMIN — Medication 25 GRAM(S): at 12:25

## 2025-04-03 RX ADMIN — Medication 1000 MILLIGRAM(S): at 14:00

## 2025-04-03 RX ADMIN — RIVAROXABAN 10 MILLIGRAM(S): 10 TABLET, FILM COATED ORAL at 13:18

## 2025-04-03 RX ADMIN — Medication 10 MILLIGRAM(S): at 13:19

## 2025-04-03 RX ADMIN — Medication 10 MILLIGRAM(S): at 05:55

## 2025-04-03 RX ADMIN — Medication 1 APPLICATION(S): at 13:19

## 2025-04-03 NOTE — DISCHARGE NOTE NURSING/CASE MANAGEMENT/SOCIAL WORK - FINANCIAL ASSISTANCE
[facility] provides services at a reduced cost to those who are determined to be eligible through [facility]’s financial assistance program. Information regarding [facility]’s financial assistance program can be found by going to [email or web address] or by calling [phone].

## 2025-04-03 NOTE — PROGRESS NOTE ADULT - REASON FOR ADMISSION
Post-op ileus

## 2025-04-03 NOTE — ADVANCED PRACTICE NURSE CONSULT - REASON FOR CONSULT
Patient seen for follow up ostomy teaching, NG tube removed.  
Patient seen for possible d/c today with ostomy
Patient seen for follow up ostomy teaching, remains on TPN. 
Patient seen for follow up ostomy teaching, sister at bedside as well. 
Initial Ostomy follow up, patient known to [facility] service from previous admission- 74 yr old woman with PMHx metastatic urothelial carcinoma in 2017, b/l breast cancer (s/p RT), diverticulitis, HTN, HLD, arterial insufficiency, Cee procedure for chronic diverticulitis with colo-enteric fistula (10/5, Dr. Bethea) presenting with nausea and vomiting. Patient was discharged, was previously tolerating PO however developed severe nausea and multiple episodes of emesis.  She also reports crampy abdominal pain that feels like gas. Patient was found to have a post-operative ileus vs. pSBO. Receiving nonoperative management with NG tube decompression
Patient seen for follow up ostomy teaching 
Patient seen for follow up ostomy teaching

## 2025-04-03 NOTE — DISCHARGE NOTE NURSING/CASE MANAGEMENT/SOCIAL WORK - NSDCPEPTCAREGIVEDUMATLIST _GEN_ALL_CORE
Daily Note     Today's date: 2018  Patient name: Mehran Varghese  : 2003  MRN: [id number]  Referring provider: Elene Curling, MD  Dx:   Encounter Diagnosis     ICD-10-CM    1  Right shoulder pain, unspecified chronicity M25 511                   Subjective: Pt reports that he performed 7 push ups at football practice yesterday morning and his shoulder began hurting after 2 push ups, but kept going  Objective: See treatment diary below  Daily Treatment Diary       Exercise Diary            UBE 3F/3B 3F/3B 3F/3B          Scap 4 Black  3x15 Black  3X15 Black  3x15          UE alphabet 2#  3x 3#  3X 3#  3x          SL ER 4#  3x10 4#  3x12 4#  3x15          Prone pro/ret 3x10 3x10           Wall slides 3#  3x10 3#  3X12 3#  3x12          Prone Raises x3 3x15 Six packs  6X6" Six   Packs  6x6"          Prone ER 3x15 1#  3x10 1#  3x12          TB IR/ER Blue  3x10 Blue  3x10 Black  3x10                                                                                                                                            1/2 kneeling throwing  IP             Assessment: Tolerated treatment well  Patient demonstrated fatigue post treatment, exhibited good technique with therapeutic exercises and would benefit from continued PT      Plan: Continue per plan of care 
Rivaroxaban/Xarelto

## 2025-04-03 NOTE — PROGRESS NOTE ADULT - SUBJECTIVE AND OBJECTIVE BOX
A Team Colorectal Surgery Progress Note    S: Pt seen and examined with team on morning rounds. Patient feels well. Denies pain. Tolerating Low Residue Diet without nausea/emesis. Ostomy +Flatus, +Soft stool. +voiding. +Working with PT. No CP/Palpitations/SOB/HA/Dizziness. Patient smiling and excited to go to rehab today vs tomorrow.       Vital Signs Last 24 Hrs  T(C): 36.8 (03 Apr 2025 05:00), Max: 36.9 (02 Apr 2025 17:36)  T(F): 98.3 (03 Apr 2025 05:00), Max: 98.4 (02 Apr 2025 17:36)  HR: 70 (03 Apr 2025 05:00) (68 - 83)  BP: 121/67 (03 Apr 2025 05:00) (100/64 - 121/67)  BP(mean): --  RR: 18 (03 Apr 2025 05:00) (15 - 18)  SpO2: 99% (03 Apr 2025 05:00) (97% - 100%)    Parameters below as of 03 Apr 2025 05:00  Patient On (Oxygen Delivery Method): room air        I&O's Summary    02 Apr 2025 07:01  -  03 Apr 2025 07:00  --------------------------------------------------------  IN: 940 mL / OUT: 1900 mL / NET: -960 mL      I&O's Detail    02 Apr 2025 07:01  -  03 Apr 2025 07:00  --------------------------------------------------------  IN:    Oral Fluid: 940 mL  Total IN: 940 mL    OUT:    Colostomy (mL): 150 mL    Emesis (mL): 0 mL    IV PiggyBack: 0 mL    TPN (Total Parenteral Nutrition): 0 mL    Voided (mL): 1750 mL  Total OUT: 1900 mL    Total NET: -960 mL          General Appearance: Appears well, NAD  Chest: Breathing comfortably on RA.    CV: S1, S2 @ 70  Abdomen: Soft, nondistended, nontender. Ostomy +flatus, +soft stool.  Extremities: Moves all extremities.    LABS:                        8.9    7.20  )-----------( 222      ( 03 Apr 2025 04:43 )             28.9     04-03    138  |  102  |  18  ----------------------------<  105[H]  4.1   |  22  |  0.52    Ca    9.5      03 Apr 2025 04:43  Phos  4.2     04-03  Mg     1.70     04-03        Urinalysis Basic - ( 03 Apr 2025 04:43 )    Color: x / Appearance: x / SG: x / pH: x  Gluc: 105 mg/dL / Ketone: x  / Bili: x / Urobili: x   Blood: x / Protein: x / Nitrite: x   Leuk Esterase: x / RBC: x / WBC x   Sq Epi: x / Non Sq Epi: x / Bacteria: x

## 2025-04-03 NOTE — DISCHARGE NOTE NURSING/CASE MANAGEMENT/SOCIAL WORK - NSDCPNINST_GEN_ALL_CORE
Please call provider for nausea/vomiting or inability to tolerate oral intake, or a fever of 100.4F or greater, or pan not relieved by pain medicine, or if the Ostomy stops draining.

## 2025-04-03 NOTE — PROGRESS NOTE ADULT - ASSESSMENT
74F s/p Cee procedure for chronic diverticulitis with colo-enteric fistula (3/5, Dr. Bethea) re-presenting with nausea and vomiting, found to have a post-operative ileus, now on TPN/PICC 3/17. Course prolonged by high NGT output which dislodged 3/22, now tolerating LRD with improved stoma output after being digitized with a red rubber catheter.     PLAN:  - No home CPAP required. Patient has been satting well without it since admission --> doing well off CPAP  - Continue Reglan PO, Miralax BID  - Off TPN (3/17 - 3/31)  - Diet: LRD, IVL  - Monitor UOP, Ostomy output  - PT eval --> recommend [facility]  - Xarelto restarted 4/2/25  - DC PICC before discharge to rehab  - DISPO: Plan for d/c to rehab today vs tomorrow.    A Team Surgery  [id number]

## 2025-04-03 NOTE — PROGRESS NOTE ADULT - PROVIDER SPECIALTY LIST ADULT
Colorectal Surgery
Nutrition Support
Colorectal Surgery
Nutrition Support
Surgery
Surgery
Colorectal Surgery
Colorectal Surgery
Nutrition Support
Colorectal Surgery
Nutrition Support
Colorectal Surgery
Surgery

## 2025-04-03 NOTE — DISCHARGE NOTE NURSING/CASE MANAGEMENT/SOCIAL WORK - NSDCPEFALRISK_GEN_ALL_CORE
For information on Fall & Injury Prevention, visit: [email or web address] OR  [email or web address] OR  https://www.cdc.gov/steadi/patient.html

## 2025-04-03 NOTE — ADVANCED PRACTICE NURSE CONSULT - ASSESSMENT
Ostomy supplies given to patient to take to rehab. Printed ostomy educational material provided to patient for review. [facility] Ostomy service contact information provided for any follow up questions/concerns.  Pouch intact and and stoma visible through pouch window viable, moist, and pink.  Please contact Wound/Ostomy Care Service Line if we can be of further assistance (ext [phone]).

## 2025-04-03 NOTE — PROGRESS NOTE ADULT - NUTRITIONAL ASSESSMENT
Severe protein calorie malnutrition in acute illness/ injury; secondary to poor appetite, weight loss >5% in 1 month and/or >7.5% in 3 months, caloric Intake <50% of nutrition needs >= 5 days, temporal wasting, severe loss of muscle mass/atrophy and loss of body fat stores.    Diet, NPO:   Except Medications (03-15-25 @ 23:17) [Active]    lipid, fat emulsion (Fish Oil and Plant Based) 20% Infusion 16.6 mL/Hr (16.6 mL/Hr) IV Continuous <Continuous>  Parenteral Nutrition - Adult 1 Each (83 mL/Hr) TPN Continuous <Continuous>, 03-19-25 @ 17:00, 17:00, Stop order after: 1 Days    **Greater than 50% of the encounter was spent counseling and/coordination of care on parenteral nutrition. 25 minutes were spent face to face with the patient.
Severe protein calorie malnutrition in acute illness/ injury; secondary to poor appetite, weight loss >5% in 1 month and/or >7.5% in 3 months, caloric Intake <50% of nutrition needs >= 5 days, temporal wasting, severe loss of muscle mass/atrophy and loss of body fat stores.    Diet, NPO:   Except Medications (03-15-25 @ 23:17) [Active]    lipid, fat emulsion (Fish Oil and Plant Based) 20% Infusion 16.6 mL/Hr (16.6 mL/Hr) IV Continuous <Continuous>  Parenteral Nutrition - Adult 1 Each (83 mL/Hr) TPN Continuous <Continuous>, 03-21-25 @ 17:00, 17:00, Stop order after: 1 Days    **Greater than 50% of the encounter was spent counseling and/coordination of care on parenteral nutrition. 25 minutes were spent face to face with the patient.
This patient has been assessed with a concern for Malnutrition and has been determined to have a diagnosis/diagnoses of Severe protein-calorie malnutrition.    This patient is being managed with:   Parenteral Nutrition - Adult-  Entered: Mar 19 2025  5:00PM    lipid fat emulsion (Fish Oil and Plant Based) 20% Infusion-[Known as SMOFLIPID 20% Infusion]  40 Gram(s) in IV Solution 200 milliLiter(s) infuse at 16.6 mL/Hr  Dose Rate: 16.6 mL/Hr Infuse Over: 12 Hours  Administration Instructions: Use 1.2 micron in-line filter  Entered: Mar 19 2025  5:00PM    Diet NPO-  Except Medications  Entered: Mar 15 2025 11:17PM  
This patient has been assessed with a concern for Malnutrition and has been determined to have a diagnosis/diagnoses of Severe protein-calorie malnutrition.    This patient is being managed with:   Parenteral Nutrition - Adult-  Entered: Mar 22 2025  5:00PM    lipid fat emulsion (Fish Oil and Plant Based) 20% Infusion-[Known as SMOFLIPID 20% Infusion]  40 Gram(s) in IV Solution 200 milliLiter(s) infuse at 16.6 mL/Hr  Dose Rate: 16.6 mL/Hr Infuse Over: 12 Hours  Administration Instructions: Use 1.2 micron in-line filter  Entered: Mar 22 2025  5:00PM    Parenteral Nutrition - Adult-  Entered: Mar 21 2025  5:00PM    Diet NPO-  Except Medications  Entered: Mar 15 2025 11:17PM  
This patient has been assessed with a concern for Malnutrition and has been determined to have a diagnosis/diagnoses of Severe protein-calorie malnutrition.    This patient is being managed with:   Parenteral Nutrition - Adult-  Entered: Mar 25 2025  5:00PM    lipid fat emulsion (Fish Oil and Plant Based) 20% Infusion-[Known as SMOFLIPID 20% Infusion]  40 Gram(s) in IV Solution 200 milliLiter(s) infuse at 16.6 mL/Hr  Dose Rate: 16.6 mL/Hr Infuse Over: 12 Hours  Administration Instructions: Use 1.2 micron in-line filter  Entered: Mar 25 2025  5:00PM    Parenteral Nutrition - Adult-  Entered: Mar 24 2025  5:00PM    Diet NPO-  Except Medications  Entered: Mar 23 2025 11:00PM  
This patient has been assessed with a concern for Malnutrition and has been determined to have a diagnosis/diagnoses of Severe protein-calorie malnutrition.    This patient is being managed with:   Parenteral Nutrition - Adult-  Entered: Mar 26 2025  5:00PM    lipid fat emulsion (Fish Oil and Plant Based) 20% Infusion-[Known as SMOFLIPID 20% Infusion]  40 Gram(s) in IV Solution 200 milliLiter(s) infuse at 16.6 mL/Hr  Dose Rate: 16.6 mL/Hr Infuse Over: 12 Hours  Administration Instructions: Use 1.2 micron in-line filter  Entered: Mar 26 2025  5:00PM    Parenteral Nutrition - Adult-  Entered: Mar 25 2025  5:00PM    Diet NPO-  Except Medications  With Ice Chips/Sips of Water  Entered: Mar 25 2025  4:12PM  
This patient has been assessed with a concern for Malnutrition and has been determined to have a diagnosis/diagnoses of Severe protein-calorie malnutrition.    This patient is being managed with:   Parenteral Nutrition - Adult-  Entered: Mar 31 2025  5:00PM    Diet Regular-  Fiber/Residue Restricted (LOWFIBER)  Supplement Feeding Modality:  Oral  Ensure Enlive Cans or Servings Per Day:  1       Frequency:  Three Times a day  Entered: Mar 31 2025  1:29PM    lipid fat emulsion (Fish Oil and Plant Based) 20% Infusion-[Known as SMOFLIPID 20% Infusion]  20 Gram(s) in IV Solution 100 milliLiter(s) infuse at 8.3 mL/Hr  Dose Rate: 8.3 mL/Hr Infuse Over: 12 Hours  Administration Instructions: Use 1.2 micron in-line filter  Entered: Mar 31 2025 11:05AM    lipid fat emulsion (Fish Oil and Plant Based) 20% Infusion-[Known as SMOFLIPID 20% Infusion]  40 Gram(s) in IV Solution 200 milliLiter(s) infuse at 16.6 mL/Hr  Dose Rate: 16.6 mL/Hr Infuse Over: 12 Hours  Administration Instructions: Use 1.2 micron in-line filter  Entered: Mar 30 2025  5:00PM  
Severe protein calorie malnutrition in acute illness/ injury; secondary to poor appetite, weight loss >5% in 1 month and/or >7.5% in 3 months, caloric Intake <50% of nutrition needs >= 5 days, temporal wasting, severe loss of muscle mass/atrophy and loss of body fat stores.    Diet, Clear Liquid:   No Carbonated Beverages  Supplement Feeding Modality:  Oral  Ensure Clear Cans or Servings Per Day:  1       Frequency:  Three Times a day (03-27-25 @ 08:27) [Active]    lipid, fat emulsion (Fish Oil and Plant Based) 20% Infusion 16.6 mL/Hr (16.6 mL/Hr) IV Continuous <Continuous>  Parenteral Nutrition - Adult 1 Each (83 mL/Hr) TPN Continuous <Continuous>, 03-27-25 @ 17:00, 17:00, Stop order after: 1 Days    **Greater than 50% of the encounter was spent counseling and/coordination of care on parenteral nutrition. 25 minutes were spent face to face with the patient.
Severe protein calorie malnutrition in acute illness/ injury; secondary to poor appetite, weight loss >5% in 1 month and/or >7.5% in 3 months, caloric Intake <50% of nutrition needs >= 5 days, temporal wasting, severe loss of muscle mass/atrophy and loss of body fat stores.    Diet, NPO:   Except Medications  With Ice Chips/Sips of Water (03-23-25 @ 07:25) [Active]    lipid, fat emulsion (Fish Oil and Plant Based) 20% Infusion 16.6 mL/Hr (16.6 mL/Hr) IV Continuous <Continuous>  Parenteral Nutrition - Adult 1 Each (83 mL/Hr) TPN Continuous <Continuous>, 03-23-25 @ 17:00, 17:00, Stop order after: 1 Days    **Greater than 50% of the encounter was spent counseling and/coordination of care on parenteral nutrition. 25 minutes were spent face to face with the patient.
This patient has been assessed with a concern for Malnutrition and has been determined to have a diagnosis/diagnoses of Severe protein-calorie malnutrition.    This patient is being managed with:   Diet Regular-  Fiber/Residue Restricted (LOWFIBER)  Supplement Feeding Modality:  Oral  Ensure Enlive Cans or Servings Per Day:  1       Frequency:  Three Times a day  Entered: Mar 31 2025  1:29PM  
This patient has been assessed with a concern for Malnutrition and has been determined to have a diagnosis/diagnoses of Severe protein-calorie malnutrition.    This patient is being managed with:   Parenteral Nutrition - Adult-  Entered: Mar 20 2025  5:00PM    lipid fat emulsion (Fish Oil and Plant Based) 20% Infusion-[Known as SMOFLIPID 20% Infusion]  40 Gram(s) in IV Solution 200 milliLiter(s) infuse at 16.6 mL/Hr  Dose Rate: 16.6 mL/Hr Infuse Over: 12 Hours  Administration Instructions: Use 1.2 micron in-line filter  Entered: Mar 20 2025  5:00PM    Diet NPO-  Except Medications  Entered: Mar 15 2025 11:17PM  
Severe protein calorie malnutrition in acute illness/ injury; secondary to poor appetite, weight loss >5% in 1 month and/or >7.5% in 3 months, caloric Intake <50% of nutrition needs >= 5 days, temporal wasting, severe loss of muscle mass/atrophy and loss of body fat stores.    Diet, Full Liquids    lipid, fat emulsion (Fish Oil and Plant Based) 20% Infusion 16.6 mL/Hr (16.6 mL/Hr) IV Continuous <Continuous>  Parenteral Nutrition - Adult 1 Each (83 mL/Hr) TPN Continuous <Continuous>, 03-29-25 @ 17:00, 17:00, Stop order after: 1 Days    **Greater than 50% of the encounter was spent counseling and/coordination of care on parenteral nutrition. 25 minutes were spent face to face with the patient.
Severe protein calorie malnutrition in acute illness/ injury; secondary to poor appetite, weight loss >5% in 1 month and/or >7.5% in 3 months, caloric Intake <50% of nutrition needs >= 5 days, temporal wasting, severe loss of muscle mass/atrophy and loss of body fat stores.    Diet, Full Liquids    lipid, fat emulsion (Fish Oil and Plant Based) 20% Infusion 16.6 mL/Hr (16.6 mL/Hr) IV Continuous <Continuous>  Parenteral Nutrition - Adult 1 Each (83 mL/Hr) TPN Continuous <Continuous>, 03-30-25 @ 17:00, 17:00, Stop order after: 1 Days    **Greater than 50% of the encounter was spent counseling and/coordination of care on parenteral nutrition. 25 minutes were spent face to face with the patient.
Severe protein calorie malnutrition in acute illness/ injury; secondary to poor appetite, weight loss >5% in 1 month and/or >7.5% in 3 months, caloric Intake <50% of nutrition needs >= 5 days, temporal wasting, severe loss of muscle mass/atrophy and loss of body fat stores.    Diet, NPO:   Except Medications  With Ice Chips/Sips of Water (03-25-25 @ 16:12) [Active]    lipid, fat emulsion (Fish Oil and Plant Based) 20% Infusion 16.6 mL/Hr (16.6 mL/Hr) IV Continuous <Continuous>  Parenteral Nutrition - Adult 1 Each (83 mL/Hr) TPN Continuous <Continuous>, 03-26-25 @ 17:00, 17:00, Stop order after: 1 Days    **Greater than 50% of the encounter was spent counseling and/coordination of care on parenteral nutrition. 25 minutes were spent face to face with the patient.
Severe protein calorie malnutrition in acute illness/ injury; secondary to poor appetite, weight loss >5% in 1 month and/or >7.5% in 3 months, caloric Intake <50% of nutrition needs >= 5 days, temporal wasting, severe loss of muscle mass/atrophy and loss of body fat stores.    Diet, NPO:   Except Medications (03-15-25 @ 23:17) [Active]    lipid, fat emulsion (Fish Oil and Plant Based) 20% Infusion 16.6 mL/Hr (16.6 mL/Hr) IV Continuous <Continuous>  Parenteral Nutrition - Adult 1 Each (83 mL/Hr) TPN Continuous <Continuous>, 03-18-25 @ 17:00, 17:00, Stop order after: 1 Days    **Greater than 50% of the encounter was spent counseling and/coordination of care on parenteral nutrition. 25 minutes were spent face to face with the patient.
Severe protein calorie malnutrition in acute illness/ injury; secondary to poor appetite, weight loss >5% in 1 month and/or >7.5% in 3 months, caloric Intake <50% of nutrition needs >= 5 days, temporal wasting, severe loss of muscle mass/atrophy and loss of body fat stores.    Diet, NPO:   Except Medications (03-15-25 @ 23:17) [Active]    lipid, fat emulsion (Fish Oil and Plant Based) 20% Infusion 16.6 mL/Hr (16.6 mL/Hr) IV Continuous <Continuous>  Parenteral Nutrition - Adult 1 Each (83 mL/Hr) TPN Continuous <Continuous>, 03-22-25 @ 17:00, 17:00, Stop order after: 1 Days    **Greater than 50% of the encounter was spent counseling and/coordination of care on parenteral nutrition. 25 minutes were spent face to face with the patient.
Severe protein calorie malnutrition in acute illness/ injury; secondary to poor appetite, weight loss >5% in 1 month and/or >7.5% in 3 months, caloric Intake <50% of nutrition needs >= 5 days, temporal wasting, severe loss of muscle mass/atrophy and loss of body fat stores.    Diet, Regular:   Fiber/Residue Restricted (LOWFIBER)  Supplement Feeding Modality:  Oral  Ensure Enlive Cans or Servings Per Day:  1       Frequency:  Three Times a day (03-31-25 @ 13:29) [Active]    **Greater than 50% of the encounter was spent counseling and/coordination of care on parenteral nutrition. 25 minutes were spent face to face with the patient.
This patient has been assessed with a concern for Malnutrition and has been determined to have a diagnosis/diagnoses of Severe protein-calorie malnutrition.    This patient is being managed with:   Diet NPO-  Except Medications  Entered: Mar 23 2025 11:00PM    Parenteral Nutrition - Adult-  Entered: Mar 23 2025  5:00PM    lipid fat emulsion (Fish Oil and Plant Based) 20% Infusion-[Known as SMOFLIPID 20% Infusion]  40 Gram(s) in IV Solution 200 milliLiter(s) infuse at 16.6 mL/Hr  Dose Rate: 16.6 mL/Hr Infuse Over: 12 Hours  Administration Instructions: Use 1.2 micron in-line filter  Entered: Mar 23 2025  5:00PM  
This patient has been assessed with a concern for Malnutrition and has been determined to have a diagnosis/diagnoses of Severe protein-calorie malnutrition.    This patient is being managed with:   Diet NPO-  Except Medications  With Ice Chips/Sips of Water  Entered: Mar 23 2025  7:25AM    Parenteral Nutrition - Adult-  Entered: Mar 22 2025  5:00PM    lipid fat emulsion (Fish Oil and Plant Based) 20% Infusion-[Known as SMOFLIPID 20% Infusion]  40 Gram(s) in IV Solution 200 milliLiter(s) infuse at 16.6 mL/Hr  Dose Rate: 16.6 mL/Hr Infuse Over: 12 Hours  Administration Instructions: Use 1.2 micron in-line filter  Entered: Mar 22 2025  5:00PM  
This patient has been assessed with a concern for Malnutrition and has been determined to have a diagnosis/diagnoses of Severe protein-calorie malnutrition.    This patient is being managed with:   Parenteral Nutrition - Adult-  Entered: Mar 30 2025  5:00PM    lipid fat emulsion (Fish Oil and Plant Based) 20% Infusion-[Known as SMOFLIPID 20% Infusion]  40 Gram(s) in IV Solution 200 milliLiter(s) infuse at 16.6 mL/Hr  Dose Rate: 16.6 mL/Hr Infuse Over: 12 Hours  Administration Instructions: Use 1.2 micron in-line filter  Entered: Mar 30 2025  5:00PM    Diet Full Liquid-  No Carbonated Beverages  Supplement Feeding Modality:  Oral  Ensure Enlive Cans or Servings Per Day:  1       Frequency:  Three Times a day  Entered: Mar 29 2025 10:28AM  
Severe protein calorie malnutrition in acute illness/ injury; secondary to poor appetite, weight loss >5% in 1 month and/or >7.5% in 3 months, caloric Intake <50% of nutrition needs >= 5 days, temporal wasting, severe loss of muscle mass/atrophy and loss of body fat stores.    Diet, Clear Liquid:   No Carbonated Beverages  Supplement Feeding Modality:  Oral  Ensure Clear Cans or Servings Per Day:  1       Frequency:  Three Times a day (03-27-25 @ 08:27) [Active]    lipid, fat emulsion (Fish Oil and Plant Based) 20% Infusion 16.6 mL/Hr (16.6 mL/Hr) IV Continuous <Continuous>  Parenteral Nutrition - Adult 1 Each (83 mL/Hr) TPN Continuous <Continuous>, 03-28-25 @ 17:00, 17:00, Stop order after: 1 Days    **Greater than 50% of the encounter was spent counseling and/coordination of care on parenteral nutrition. 25 minutes were spent face to face with the patient.
Severe protein calorie malnutrition in acute illness/ injury; secondary to poor appetite, weight loss >5% in 1 month and/or >7.5% in 3 months, caloric Intake <50% of nutrition needs >= 5 days, temporal wasting, severe loss of muscle mass/atrophy and loss of body fat stores.    Diet, NPO:   Except Medications (03-15-25 @ 23:17) [Active]    lipid, fat emulsion (Fish Oil and Plant Based) 20% Infusion 16.6 mL/Hr (16.6 mL/Hr) IV Continuous <Continuous>  Parenteral Nutrition - Adult 1 Each (83 mL/Hr) TPN Continuous <Continuous>, 03-20-25 @ 17:00, 17:00, Stop order after: 1 Days    **Greater than 50% of the encounter was spent counseling and/coordination of care on parenteral nutrition. 25 minutes were spent face to face with the patient.
Severe protein calorie malnutrition in acute illness/ injury; secondary to poor appetite, weight loss >5% in 1 month and/or >7.5% in 3 months, caloric Intake <50% of nutrition needs >= 5 days, temporal wasting, severe loss of muscle mass/atrophy and loss of body fat stores.    Diet, NPO:   Except Medications (03-23-25 @ 23:00) [Active]    lipid, fat emulsion (Fish Oil and Plant Based) 20% Infusion 16.6 mL/Hr (16.6 mL/Hr) IV Continuous <Continuous>  Parenteral Nutrition - Adult 1 Each (83 mL/Hr) TPN Continuous <Continuous>, 03-24-25 @ 17:00, 17:00, Stop order after: 1 Days    **Greater than 50% of the encounter was spent counseling and/coordination of care on parenteral nutrition. 25 minutes were spent face to face with the patient.
Severe protein calorie malnutrition in acute illness/ injury; secondary to poor appetite, weight loss >5% in 1 month and/or >7.5% in 3 months, caloric Intake <50% of nutrition needs >= 5 days, temporal wasting, severe loss of muscle mass/atrophy and loss of body fat stores.    Diet, NPO:   Except Medications (03-23-25 @ 23:00) [Active]    lipid, fat emulsion (Fish Oil and Plant Based) 20% Infusion 16.6 mL/Hr (16.6 mL/Hr) IV Continuous <Continuous>  Parenteral Nutrition - Adult 1 Each (83 mL/Hr) TPN Continuous <Continuous>, 03-25-25 @ 17:00, 17:00, Stop order after: 1 Days    **Greater than 50% of the encounter was spent counseling and/coordination of care on parenteral nutrition. 25 minutes were spent face to face with the patient.
This patient has been assessed with a concern for Malnutrition and has been determined to have a diagnosis/diagnoses of Severe protein-calorie malnutrition.    This patient is being managed with:   Diet Regular-  Fiber/Residue Restricted (LOWFIBER)  Supplement Feeding Modality:  Oral  Ensure Enlive Cans or Servings Per Day:  1       Frequency:  Three Times a day  Entered: Mar 31 2025  1:29PM  
This patient has been assessed with a concern for Malnutrition and has been determined to have a diagnosis/diagnoses of Severe protein-calorie malnutrition.    This patient is being managed with:   Parenteral Nutrition - Adult-  Entered: Mar 27 2025  5:00PM    lipid fat emulsion (Fish Oil and Plant Based) 20% Infusion-[Known as SMOFLIPID 20% Infusion]  40 Gram(s) in IV Solution 200 milliLiter(s) infuse at 16.6 mL/Hr  Dose Rate: 16.6 mL/Hr Infuse Over: 12 Hours  Administration Instructions: Use 1.2 micron in-line filter  Entered: Mar 27 2025  5:00PM    Diet Clear Liquid-  No Carbonated Beverages  Supplement Feeding Modality:  Oral  Ensure Clear Cans or Servings Per Day:  1       Frequency:  Three Times a day  Entered: Mar 27 2025  8:27AM    Parenteral Nutrition - Adult-  Entered: Mar 26 2025  5:00PM  
This patient has been assessed with a concern for Malnutrition and has been determined to have a diagnosis/diagnoses of Severe protein-calorie malnutrition.    This patient is being managed with:   Parenteral Nutrition - Adult-  Entered: Mar 28 2025  5:00PM    lipid fat emulsion (Fish Oil and Plant Based) 20% Infusion-[Known as SMOFLIPID 20% Infusion]  40 Gram(s) in IV Solution 200 milliLiter(s) infuse at 16.6 mL/Hr  Dose Rate: 16.6 mL/Hr Infuse Over: 12 Hours  Administration Instructions: Use 1.2 micron in-line filter  Entered: Mar 28 2025  5:00PM    Parenteral Nutrition - Adult-  Entered: Mar 27 2025  5:00PM    Diet Clear Liquid-  No Carbonated Beverages  Supplement Feeding Modality:  Oral  Ensure Clear Cans or Servings Per Day:  1       Frequency:  Three Times a day  Entered: Mar 27 2025  8:27AM  
This patient has been assessed with a concern for Malnutrition and has been determined to have a diagnosis/diagnoses of Severe protein-calorie malnutrition.    This patient is being managed with:   Parenteral Nutrition - Adult-  Entered: Mar 29 2025  5:00PM    lipid fat emulsion (Fish Oil and Plant Based) 20% Infusion-[Known as SMOFLIPID 20% Infusion]  40 Gram(s) in IV Solution 200 milliLiter(s) infuse at 16.6 mL/Hr  Dose Rate: 16.6 mL/Hr Infuse Over: 12 Hours  Administration Instructions: Use 1.2 micron in-line filter  Entered: Mar 29 2025  5:00PM    Diet Full Liquid-  No Carbonated Beverages  Supplement Feeding Modality:  Oral  Ensure Enlive Cans or Servings Per Day:  1       Frequency:  Three Times a day  Entered: Mar 29 2025 10:28AM    Parenteral Nutrition - Adult-  Entered: Mar 28 2025  5:00PM  
This patient has been assessed with a concern for Malnutrition and has been determined to have a diagnosis/diagnoses of Severe protein-calorie malnutrition.    This patient is being managed with:   Parenteral Nutrition - Adult-  Entered: Mar 29 2025  5:00PM    lipid fat emulsion (Fish Oil and Plant Based) 20% Infusion-[Known as SMOFLIPID 20% Infusion]  40 Gram(s) in IV Solution 200 milliLiter(s) infuse at 16.6 mL/Hr  Dose Rate: 16.6 mL/Hr Infuse Over: 12 Hours  Administration Instructions: Use 1.2 micron in-line filter  Entered: Mar 29 2025  5:00PM    Diet Full Liquid-  No Carbonated Beverages  Supplement Feeding Modality:  Oral  Ensure Enlive Cans or Servings Per Day:  1       Frequency:  Three Times a day  Entered: Mar 29 2025 10:28AM    Parenteral Nutrition - Adult-  Entered: Mar 28 2025  5:00PM  
This patient has been assessed with a concern for Malnutrition and has been determined to have a diagnosis/diagnoses of Severe protein-calorie malnutrition.    This patient is being managed with:   Diet Regular-  Fiber/Residue Restricted (LOWFIBER)  Supplement Feeding Modality:  Oral  Ensure Enlive Cans or Servings Per Day:  1       Frequency:  Three Times a day  Entered: Mar 31 2025  1:29PM  
This patient has been assessed with a concern for Malnutrition and has been determined to have a diagnosis/diagnoses of Severe protein-calorie malnutrition.    This patient is being managed with:   Parenteral Nutrition - Adult-  Entered: Mar 19 2025  5:00PM    lipid fat emulsion (Fish Oil and Plant Based) 20% Infusion-[Known as SMOFLIPID 20% Infusion]  40 Gram(s) in IV Solution 200 milliLiter(s) infuse at 16.6 mL/Hr  Dose Rate: 16.6 mL/Hr Infuse Over: 12 Hours  Administration Instructions: Use 1.2 micron in-line filter  Entered: Mar 19 2025  5:00PM    Parenteral Nutrition - Adult-  Entered: Mar 18 2025  5:00PM    Diet NPO-  Except Medications  Entered: Mar 15 2025 11:17PM  
This patient has been assessed with a concern for Malnutrition and has been determined to have a diagnosis/diagnoses of Severe protein-calorie malnutrition.    This patient is being managed with:   Parenteral Nutrition - Adult-  Entered: Mar 29 2025  5:00PM    lipid fat emulsion (Fish Oil and Plant Based) 20% Infusion-[Known as SMOFLIPID 20% Infusion]  40 Gram(s) in IV Solution 200 milliLiter(s) infuse at 16.6 mL/Hr  Dose Rate: 16.6 mL/Hr Infuse Over: 12 Hours  Administration Instructions: Use 1.2 micron in-line filter  Entered: Mar 29 2025  5:00PM    Diet Full Liquid-  No Carbonated Beverages  Supplement Feeding Modality:  Oral  Ensure Enlive Cans or Servings Per Day:  1       Frequency:  Three Times a day  Entered: Mar 29 2025 10:28AM

## 2025-04-03 NOTE — DISCHARGE NOTE NURSING/CASE MANAGEMENT/SOCIAL WORK - NSDCVIVACCINE_GEN_ALL_CORE_FT
pneumococcal polysaccharide PPV23; 06-Nov-2014 09:06; Yosvany Smith (RN); [employer] [id number]; IntraMuscular; Deltoid Right.; 0.5 milliLiter(s);

## 2025-04-03 NOTE — DISCHARGE NOTE NURSING/CASE MANAGEMENT/SOCIAL WORK - PATIENT PORTAL LINK FT
You can access the FollowMyHealth Patient Portal offered by [facility] by registering at the following website: [email or web address]. By joining [facility]’s FollowMyHealth portal, you will also be able to view your health information using other applications (apps) compatible with our system.

## 2025-04-25 NOTE — PATIENT PROFILE ADULT - FUNCTIONAL SCREEN CURRENT LEVEL: COMMUNICATION, MLM

## 2025-04-29 ENCOUNTER — APPOINTMENT (OUTPATIENT)
Dept: COLORECTAL SURGERY | Facility: CLINIC | Age: 75
End: 2025-04-29
Payer: MEDICARE

## 2025-04-29 DIAGNOSIS — K57.32 DIVERTICULITIS OF LARGE INTESTINE W/OUT PERFORATION OR ABSCESS W/OUT BLEEDING: ICD-10-CM

## 2025-04-29 PROCEDURE — 99024 POSTOP FOLLOW-UP VISIT: CPT

## 2025-05-01 NOTE — ED ADULT NURSE NOTE - PRIMARY CARE PROVIDER
Amphetamine-Dextroamphetamine  [id number]  20MG / Capsule Extended Release 24 Hour  Rx# [id number] Stimulant Qty: 30  Days: 30  Refills: 0 Prescribed: 4/9/2025  Dispensed: 4/10/2025  Sold: 4/11/2025 Ishmael Pinedo  [address] [place] [id number] [id number] - [place], [place]  [address] [facility] [address]     Due for refill on 5/10/25- will post date and route to provider for review  
.

## 2025-05-14 ENCOUNTER — APPOINTMENT (OUTPATIENT)
Dept: UROLOGY | Facility: CLINIC | Age: 75
End: 2025-05-14
Payer: MEDICARE

## 2025-05-14 VITALS
WEIGHT: 129 LBS | HEART RATE: 72 BPM | SYSTOLIC BLOOD PRESSURE: 129 MMHG | HEIGHT: 66 IN | BODY MASS INDEX: 20.73 KG/M2 | OXYGEN SATURATION: 96 % | TEMPERATURE: 97.2 F | DIASTOLIC BLOOD PRESSURE: 77 MMHG | RESPIRATION RATE: 16 BRPM

## 2025-05-14 DIAGNOSIS — R39.89 OTHER SYMPTOMS AND SIGNS INVOLVING THE GENITOURINARY SYSTEM: ICD-10-CM

## 2025-05-14 PROCEDURE — 99213 OFFICE O/P EST LOW 20 MIN: CPT

## 2025-05-14 PROCEDURE — 51798 US URINE CAPACITY MEASURE: CPT

## 2025-05-15 LAB
APPEARANCE: CLEAR
BACTERIA: NEGATIVE /HPF
BILIRUBIN URINE: NEGATIVE
BLOOD URINE: ABNORMAL
CAST: 0 /LPF
COLOR: YELLOW
EPITHELIAL CELLS: 5 /HPF
GLUCOSE QUALITATIVE U: NEGATIVE MG/DL
KETONES URINE: NEGATIVE MG/DL
LEUKOCYTE ESTERASE URINE: NEGATIVE
MICROSCOPIC-UA: NORMAL
NITRITE URINE: NEGATIVE
PH URINE: 6.5
PROTEIN URINE: NEGATIVE MG/DL
RED BLOOD CELLS URINE: 5 /HPF
SPECIFIC GRAVITY URINE: 1.02
UROBILINOGEN URINE: 0.2 MG/DL
WHITE BLOOD CELLS URINE: 0 /HPF

## 2025-05-16 LAB — URINE CYTOLOGY: NORMAL

## 2025-05-18 PROBLEM — N39.0 BACTERIAL UTI: Status: ACTIVE | Noted: 2025-05-18 | Resolved: 2025-06-17

## 2025-05-18 LAB — BACTERIA UR CULT: ABNORMAL

## 2025-05-19 ENCOUNTER — NON-APPOINTMENT (OUTPATIENT)
Age: 75
End: 2025-05-19

## 2025-05-29 ENCOUNTER — APPOINTMENT (OUTPATIENT)
Dept: COLORECTAL SURGERY | Facility: CLINIC | Age: 75
End: 2025-05-29
Payer: MEDICARE

## 2025-05-29 DIAGNOSIS — K57.32 DIVERTICULITIS OF LARGE INTESTINE W/OUT PERFORATION OR ABSCESS W/OUT BLEEDING: ICD-10-CM

## 2025-05-29 PROCEDURE — 99024 POSTOP FOLLOW-UP VISIT: CPT

## 2025-06-02 LAB — BACTERIA UR CULT: ABNORMAL

## 2025-06-02 RX ORDER — NITROFURANTOIN MACROCRYSTALS 100 MG/1
100 CAPSULE ORAL
Qty: 20 | Refills: 0 | Status: ACTIVE | COMMUNITY
Start: 2025-06-02 | End: 1900-01-01

## 2025-06-04 NOTE — PHYSICAL THERAPY INITIAL EVALUATION ADULT - ORIENTATION, REHAB EVAL
[facility]        NAME: Ronaldo Vargas is a 23 y.o. female  : 2001    MRN: [id number]  DATE: 2025  TIME: 4:12 PM    Assessment and Plan   URI with cough and congestion [J06.9]  1. URI with cough and congestion  amoxicillin (AMOXIL) 500 mg capsule    promethazine-dextromethorphan (PHENERGAN-DM) 6.25-15 mg/5 mL oral syrup        Advised to continue flonase bid. Can use promethazine prn for cough. Advised to start amoxicillin bid and take with food.     Patient Instructions       Follow up with PCP in 3-5 days.  Proceed to  ER if symptoms worsen.    If tests are performed, our office will contact you with results only if changes need to made to the care plan discussed with you at the visit. You can review your full results on [facility].    Chief Complaint     Chief Complaint   Patient presents with    Cold Like Symptoms     Pt c/o persistent cough, sore throat, left side of throat is in pain. Was seen here last month, positive for strep but no antibiotics- since then symptoms worsen. Took cough syrup, tylenol- no help.          History of Present Illness       Was seen one month ago and told viral infection. She had a rapid strep which was negative and a the culture came back positive for non group A. Overall started to feel better but the throat pain and cough are persistent.     URI   This is a new problem. The current episode started 1 to 4 weeks ago. The problem has been waxing and waning. There has been no fever. Associated symptoms include coughing and a sore throat. Pertinent negatives include no abdominal pain, chest pain, congestion, diarrhea, dysuria, ear pain, headaches, joint pain, joint swelling, nausea, neck pain, plugged ear sensation, rash, rhinorrhea, sinus pain, sneezing, swollen glands, vomiting or wheezing. She has tried decongestant and acetaminophen for the symptoms. The treatment provided no relief.       Review of Systems   Review of Systems    Constitutional:  Negative for chills, fatigue and fever.   HENT:  Positive for sore throat. Negative for congestion, ear pain, rhinorrhea, sinus pain and sneezing.    Respiratory:  Positive for cough. Negative for chest tightness and wheezing.    Cardiovascular:  Negative for chest pain.   Gastrointestinal:  Negative for abdominal pain, diarrhea, nausea and vomiting.   Genitourinary:  Negative for dysuria.   Musculoskeletal:  Negative for joint pain and neck pain.   Skin:  Negative for rash.   Neurological:  Negative for headaches.         Current Medications     Current Medications[1]    Current Allergies     Allergies as of 06/04/2025    (No Known Allergies)            The following portions of the patient's history were reviewed and updated as appropriate: allergies, current medications, past family history, past medical history, past social history, past surgical history and problem list.     Past Medical History[2]    Past Surgical History[3]    Family History[4]      Medications have been verified.        Objective   /80   Pulse 92   Temp 99.6 °F (37.6 °C)   Resp 19   Wt 64.2 kg (141 lb 9.6 oz)   LMP  (LMP Unknown)   SpO2 100%   BMI 24.31 kg/m²        Physical Exam     Physical Exam  Vitals and nursing note reviewed.   Constitutional:       General: She is not in acute distress.     Appearance: Normal appearance. She is not ill-appearing.   HENT:      Head: Normocephalic and atraumatic.      Right Ear: Tympanic membrane, ear canal and external ear normal.      Left Ear: Tympanic membrane, ear canal and external ear normal.      Nose: Congestion and rhinorrhea present.      Right Turbinates: Enlarged and swollen.      Left Turbinates: Enlarged and swollen.      Right Sinus: No maxillary sinus tenderness or frontal sinus tenderness.      Left Sinus: No maxillary sinus tenderness or frontal sinus tenderness.      Mouth/Throat:      Mouth: Mucous membranes are moist.      Pharynx: Posterior  oropharyngeal erythema present. No oropharyngeal exudate.     Eyes:      Pupils: Pupils are equal, round, and reactive to light.       Cardiovascular:      Rate and Rhythm: Normal rate and regular rhythm.      Pulses: Normal pulses.      Heart sounds: Normal heart sounds.   Pulmonary:      Effort: Pulmonary effort is normal.      Breath sounds: Normal breath sounds.     Musculoskeletal:      Cervical back: Normal range of motion and neck supple.     Skin:     General: Skin is warm and dry.     Neurological:      Mental Status: She is alert.                        [1]   Current Outpatient Medications:     amoxicillin (AMOXIL) 500 mg capsule, Take 1 capsule (500 mg total) by mouth every 12 (twelve) hours for 7 days, Disp: 14 capsule, Rfl: 0    fluticasone (FLONASE) 50 mcg/act nasal spray, 1 spray into each nostril daily, Disp: 11.1 mL, Rfl: 0    promethazine-dextromethorphan (PHENERGAN-DM) 6.25-15 mg/5 mL oral syrup, Take 5 mL by mouth 4 (four) times a day as needed for cough, Disp: 118 mL, Rfl: 0    benzonatate (TESSALON) 200 MG capsule, Take 1 capsule (200 mg total) by mouth 3 (three) times a day as needed for cough (Patient not taking: Reported on 6/4/2025), Disp: 20 capsule, Rfl: 0  [2] No past medical history on file.  [3] No past surgical history on file.  [4] No family history on file.     oriented to person, place, time and situation

## 2025-06-26 ENCOUNTER — APPOINTMENT (OUTPATIENT)
Dept: COLORECTAL SURGERY | Facility: CLINIC | Age: 75
End: 2025-06-26
Payer: MEDICARE

## 2025-06-26 PROCEDURE — 99213 OFFICE O/P EST LOW 20 MIN: CPT

## 2025-07-10 ENCOUNTER — NON-APPOINTMENT (OUTPATIENT)
Age: 75
End: 2025-07-10

## 2025-07-11 ENCOUNTER — APPOINTMENT (OUTPATIENT)
Dept: PULMONOLOGY | Facility: CLINIC | Age: 75
End: 2025-07-11

## 2025-07-11 ENCOUNTER — APPOINTMENT (OUTPATIENT)
Dept: PULMONOLOGY | Facility: CLINIC | Age: 75
End: 2025-07-11
Payer: MEDICARE

## 2025-07-11 VITALS
WEIGHT: 134 LBS | TEMPERATURE: 97.1 F | RESPIRATION RATE: 16 BRPM | HEART RATE: 92 BPM | BODY MASS INDEX: 21.53 KG/M2 | HEIGHT: 66 IN | SYSTOLIC BLOOD PRESSURE: 120 MMHG | DIASTOLIC BLOOD PRESSURE: 79 MMHG | OXYGEN SATURATION: 96 %

## 2025-07-11 PROCEDURE — 99213 OFFICE O/P EST LOW 20 MIN: CPT

## 2025-07-13 LAB — BACTERIA UR CULT: NORMAL

## 2025-07-16 ENCOUNTER — APPOINTMENT (OUTPATIENT)
Dept: PULMONOLOGY | Facility: CLINIC | Age: 75
End: 2025-07-16

## 2025-07-21 ENCOUNTER — APPOINTMENT (OUTPATIENT)
Dept: VASCULAR SURGERY | Facility: CLINIC | Age: 75
End: 2025-07-21
Payer: MEDICARE

## 2025-07-21 VITALS
BODY MASS INDEX: 21.53 KG/M2 | WEIGHT: 134 LBS | TEMPERATURE: 97.7 F | DIASTOLIC BLOOD PRESSURE: 83 MMHG | HEART RATE: 71 BPM | SYSTOLIC BLOOD PRESSURE: 129 MMHG | HEIGHT: 66 IN

## 2025-07-21 DIAGNOSIS — I77.1 STRICTURE OF ARTERY: ICD-10-CM

## 2025-07-21 PROCEDURE — 93923 UPR/LXTR ART STDY 3+ LVLS: CPT | Mod: PD

## 2025-07-21 PROCEDURE — 99214 OFFICE O/P EST MOD 30 MIN: CPT

## 2025-07-22 ENCOUNTER — INPATIENT (INPATIENT)
Facility: HOSPITAL | Age: 75
LOS: 2 days | Discharge: HOME | End: 2025-07-25
Attending: STUDENT IN AN ORGANIZED HEALTH CARE EDUCATION/TRAINING PROGRAM | Admitting: STUDENT IN AN ORGANIZED HEALTH CARE EDUCATION/TRAINING PROGRAM
Payer: MEDICARE

## 2025-07-22 VITALS
WEIGHT: 134.92 LBS | OXYGEN SATURATION: 98 % | TEMPERATURE: 98 F | RESPIRATION RATE: 18 BRPM | DIASTOLIC BLOOD PRESSURE: 65 MMHG | HEIGHT: 66 IN | HEART RATE: 105 BPM | SYSTOLIC BLOOD PRESSURE: 120 MMHG

## 2025-07-22 DIAGNOSIS — Z98.89 OTHER SPECIFIED POSTPROCEDURAL STATES: Chronic | ICD-10-CM

## 2025-07-22 DIAGNOSIS — Z98.890 OTHER SPECIFIED POSTPROCEDURAL STATES: Chronic | ICD-10-CM

## 2025-07-22 DIAGNOSIS — K94.01 COLOSTOMY HEMORRHAGE: ICD-10-CM

## 2025-07-22 DIAGNOSIS — Z90.710 ACQUIRED ABSENCE OF BOTH CERVIX AND UTERUS: Chronic | ICD-10-CM

## 2025-07-22 LAB
ALBUMIN SERPL ELPH-MCNC: 4 G/DL — SIGNIFICANT CHANGE UP (ref 3.3–5)
ALP SERPL-CCNC: 94 U/L — SIGNIFICANT CHANGE UP (ref 40–120)
ALT FLD-CCNC: 15 U/L — SIGNIFICANT CHANGE UP (ref 4–33)
ANION GAP SERPL CALC-SCNC: 10 MMOL/L — SIGNIFICANT CHANGE UP (ref 7–14)
APPEARANCE UR: CLEAR — SIGNIFICANT CHANGE UP
APTT BLD: 29.8 SEC — SIGNIFICANT CHANGE UP (ref 26.1–36.8)
AST SERPL-CCNC: 17 U/L — SIGNIFICANT CHANGE UP (ref 4–32)
BACTERIA # UR AUTO: NEGATIVE /HPF — SIGNIFICANT CHANGE UP
BASOPHILS # BLD AUTO: 0.05 K/UL — SIGNIFICANT CHANGE UP (ref 0–0.2)
BASOPHILS NFR BLD AUTO: 0.8 % — SIGNIFICANT CHANGE UP (ref 0–2)
BILIRUB SERPL-MCNC: 0.2 MG/DL — SIGNIFICANT CHANGE UP (ref 0.2–1.2)
BILIRUB UR-MCNC: NEGATIVE — SIGNIFICANT CHANGE UP
BLD GP AB SCN SERPL QL: NEGATIVE — SIGNIFICANT CHANGE UP
BLOOD GAS VENOUS COMPREHENSIVE RESULT: SIGNIFICANT CHANGE UP
BUN SERPL-MCNC: 20 MG/DL — SIGNIFICANT CHANGE UP (ref 7–23)
CALCIUM SERPL-MCNC: 9.4 MG/DL — SIGNIFICANT CHANGE UP (ref 8.4–10.5)
CAST: 2 /LPF — SIGNIFICANT CHANGE UP (ref 0–4)
CHLORIDE SERPL-SCNC: 107 MMOL/L — SIGNIFICANT CHANGE UP (ref 98–107)
CO2 SERPL-SCNC: 23 MMOL/L — SIGNIFICANT CHANGE UP (ref 22–31)
COLOR SPEC: YELLOW — SIGNIFICANT CHANGE UP
CREAT SERPL-MCNC: 0.7 MG/DL — SIGNIFICANT CHANGE UP (ref 0.5–1.3)
DIFF PNL FLD: ABNORMAL
EGFR: 90 ML/MIN/1.73M2 — SIGNIFICANT CHANGE UP
EGFR: 90 ML/MIN/1.73M2 — SIGNIFICANT CHANGE UP
EOSINOPHIL # BLD AUTO: 0.17 K/UL — SIGNIFICANT CHANGE UP (ref 0–0.5)
EOSINOPHIL NFR BLD AUTO: 2.7 % — SIGNIFICANT CHANGE UP (ref 0–6)
GLUCOSE SERPL-MCNC: 99 MG/DL — SIGNIFICANT CHANGE UP (ref 70–99)
GLUCOSE UR QL: NEGATIVE MG/DL — SIGNIFICANT CHANGE UP
HCT VFR BLD CALC: 34.4 % — LOW (ref 34.5–45)
HCT VFR BLD CALC: 35.5 % — SIGNIFICANT CHANGE UP (ref 34.5–45)
HGB BLD-MCNC: 10.9 G/DL — LOW (ref 11.5–15.5)
HGB BLD-MCNC: 11.2 G/DL — LOW (ref 11.5–15.5)
IMM GRANULOCYTES # BLD AUTO: 0.02 K/UL — SIGNIFICANT CHANGE UP (ref 0–0.07)
IMM GRANULOCYTES NFR BLD AUTO: 0.3 % — SIGNIFICANT CHANGE UP (ref 0–0.9)
INR BLD: 1.17 RATIO — HIGH (ref 0.85–1.16)
KETONES UR QL: NEGATIVE MG/DL — SIGNIFICANT CHANGE UP
LEUKOCYTE ESTERASE UR-ACNC: ABNORMAL
LYMPHOCYTES # BLD AUTO: 1.98 K/UL — SIGNIFICANT CHANGE UP (ref 1–3.3)
LYMPHOCYTES NFR BLD AUTO: 31.5 % — SIGNIFICANT CHANGE UP (ref 13–44)
MCHC RBC-ENTMCNC: 27.3 PG — SIGNIFICANT CHANGE UP (ref 27–34)
MCHC RBC-ENTMCNC: 27.4 PG — SIGNIFICANT CHANGE UP (ref 27–34)
MCHC RBC-ENTMCNC: 31.5 G/DL — LOW (ref 32–36)
MCHC RBC-ENTMCNC: 31.7 G/DL — LOW (ref 32–36)
MCV RBC AUTO: 86.4 FL — SIGNIFICANT CHANGE UP (ref 80–100)
MCV RBC AUTO: 86.6 FL — SIGNIFICANT CHANGE UP (ref 80–100)
MONOCYTES # BLD AUTO: 0.86 K/UL — SIGNIFICANT CHANGE UP (ref 0–0.9)
MONOCYTES NFR BLD AUTO: 13.7 % — SIGNIFICANT CHANGE UP (ref 2–14)
NEUTROPHILS # BLD AUTO: 3.2 K/UL — SIGNIFICANT CHANGE UP (ref 1.8–7.4)
NEUTROPHILS NFR BLD AUTO: 51 % — SIGNIFICANT CHANGE UP (ref 43–77)
NITRITE UR-MCNC: NEGATIVE — SIGNIFICANT CHANGE UP
NRBC # BLD AUTO: 0 K/UL — SIGNIFICANT CHANGE UP (ref 0–0)
NRBC # BLD AUTO: 0 K/UL — SIGNIFICANT CHANGE UP (ref 0–0)
NRBC # FLD: 0 K/UL — SIGNIFICANT CHANGE UP (ref 0–0)
NRBC # FLD: 0 K/UL — SIGNIFICANT CHANGE UP (ref 0–0)
NRBC BLD AUTO-RTO: 0 /100 WBCS — SIGNIFICANT CHANGE UP (ref 0–0)
NRBC BLD AUTO-RTO: 0 /100 WBCS — SIGNIFICANT CHANGE UP (ref 0–0)
PH UR: 6 — SIGNIFICANT CHANGE UP (ref 5–8)
PLATELET # BLD AUTO: 200 K/UL — SIGNIFICANT CHANGE UP (ref 150–400)
PLATELET # BLD AUTO: 205 K/UL — SIGNIFICANT CHANGE UP (ref 150–400)
PMV BLD: 12.2 FL — SIGNIFICANT CHANGE UP (ref 7–13)
PMV BLD: 12.8 FL — SIGNIFICANT CHANGE UP (ref 7–13)
POTASSIUM SERPL-MCNC: 3.9 MMOL/L — SIGNIFICANT CHANGE UP (ref 3.5–5.3)
POTASSIUM SERPL-SCNC: 3.9 MMOL/L — SIGNIFICANT CHANGE UP (ref 3.5–5.3)
PROT SERPL-MCNC: 6.9 G/DL — SIGNIFICANT CHANGE UP (ref 6–8.3)
PROT UR-MCNC: NEGATIVE MG/DL — SIGNIFICANT CHANGE UP
PROTHROM AB SERPL-ACNC: 13.6 SEC — HIGH (ref 9.9–13.4)
RBC # BLD: 3.98 M/UL — SIGNIFICANT CHANGE UP (ref 3.8–5.2)
RBC # BLD: 4.1 M/UL — SIGNIFICANT CHANGE UP (ref 3.8–5.2)
RBC # FLD: 16.4 % — HIGH (ref 10.3–14.5)
RBC # FLD: 16.5 % — HIGH (ref 10.3–14.5)
RBC CASTS # UR COMP ASSIST: 8 /HPF — HIGH (ref 0–4)
RH IG SCN BLD-IMP: POSITIVE — SIGNIFICANT CHANGE UP
SODIUM SERPL-SCNC: 140 MMOL/L — SIGNIFICANT CHANGE UP (ref 135–145)
SP GR SPEC: 1.02 — SIGNIFICANT CHANGE UP (ref 1–1.03)
SQUAMOUS # UR AUTO: 17 /HPF — HIGH (ref 0–5)
UROBILINOGEN FLD QL: 0.2 MG/DL — SIGNIFICANT CHANGE UP (ref 0.2–1)
WBC # BLD: 6.28 K/UL — SIGNIFICANT CHANGE UP (ref 3.8–10.5)
WBC # BLD: 8.41 K/UL — SIGNIFICANT CHANGE UP (ref 3.8–10.5)
WBC # FLD AUTO: 6.28 K/UL — SIGNIFICANT CHANGE UP (ref 3.8–10.5)
WBC # FLD AUTO: 8.41 K/UL — SIGNIFICANT CHANGE UP (ref 3.8–10.5)
WBC UR QL: 9 /HPF — HIGH (ref 0–5)

## 2025-07-22 PROCEDURE — 74174 CTA ABD&PLVS W/CONTRAST: CPT | Mod: 26

## 2025-07-22 PROCEDURE — 99285 EMERGENCY DEPT VISIT HI MDM: CPT | Mod: FS

## 2025-07-22 PROCEDURE — 36410 VNPNXR 3YR/> PHY/QHP DX/THER: CPT

## 2025-07-23 ENCOUNTER — TRANSCRIPTION ENCOUNTER (OUTPATIENT)
Age: 75
End: 2025-07-23

## 2025-07-23 LAB
ANION GAP SERPL CALC-SCNC: 12 MMOL/L — SIGNIFICANT CHANGE UP (ref 7–14)
APTT BLD: 21 SEC — LOW (ref 26.1–36.8)
BUN SERPL-MCNC: 17 MG/DL — SIGNIFICANT CHANGE UP (ref 7–23)
CALCIUM SERPL-MCNC: 8.9 MG/DL — SIGNIFICANT CHANGE UP (ref 8.4–10.5)
CHLORIDE SERPL-SCNC: 105 MMOL/L — SIGNIFICANT CHANGE UP (ref 98–107)
CO2 SERPL-SCNC: 24 MMOL/L — SIGNIFICANT CHANGE UP (ref 22–31)
CREAT SERPL-MCNC: 0.66 MG/DL — SIGNIFICANT CHANGE UP (ref 0.5–1.3)
EGFR: 91 ML/MIN/1.73M2 — SIGNIFICANT CHANGE UP
EGFR: 91 ML/MIN/1.73M2 — SIGNIFICANT CHANGE UP
GLUCOSE SERPL-MCNC: 101 MG/DL — HIGH (ref 70–99)
HCT VFR BLD CALC: 27.8 % — LOW (ref 34.5–45)
HCT VFR BLD CALC: 28.8 % — LOW (ref 34.5–45)
HGB BLD-MCNC: 8.9 G/DL — LOW (ref 11.5–15.5)
HGB BLD-MCNC: 9.1 G/DL — LOW (ref 11.5–15.5)
INR BLD: 1.13 RATIO — SIGNIFICANT CHANGE UP (ref 0.85–1.16)
MAGNESIUM SERPL-MCNC: 1.7 MG/DL — SIGNIFICANT CHANGE UP (ref 1.6–2.6)
MCHC RBC-ENTMCNC: 27.4 PG — SIGNIFICANT CHANGE UP (ref 27–34)
MCHC RBC-ENTMCNC: 27.7 PG — SIGNIFICANT CHANGE UP (ref 27–34)
MCHC RBC-ENTMCNC: 31.6 G/DL — LOW (ref 32–36)
MCHC RBC-ENTMCNC: 32 G/DL — SIGNIFICANT CHANGE UP (ref 32–36)
MCV RBC AUTO: 85.5 FL — SIGNIFICANT CHANGE UP (ref 80–100)
MCV RBC AUTO: 87.5 FL — SIGNIFICANT CHANGE UP (ref 80–100)
NRBC # BLD AUTO: 0 K/UL — SIGNIFICANT CHANGE UP (ref 0–0)
NRBC # BLD AUTO: 0 K/UL — SIGNIFICANT CHANGE UP (ref 0–0)
NRBC # FLD: 0 K/UL — SIGNIFICANT CHANGE UP (ref 0–0)
NRBC # FLD: 0 K/UL — SIGNIFICANT CHANGE UP (ref 0–0)
NRBC BLD AUTO-RTO: 0 /100 WBCS — SIGNIFICANT CHANGE UP (ref 0–0)
NRBC BLD AUTO-RTO: 0 /100 WBCS — SIGNIFICANT CHANGE UP (ref 0–0)
PHOSPHATE SERPL-MCNC: 3 MG/DL — SIGNIFICANT CHANGE UP (ref 2.5–4.5)
PLATELET # BLD AUTO: 146 K/UL — LOW (ref 150–400)
PLATELET # BLD AUTO: 154 K/UL — SIGNIFICANT CHANGE UP (ref 150–400)
PMV BLD: 11.6 FL — SIGNIFICANT CHANGE UP (ref 7–13)
PMV BLD: 11.6 FL — SIGNIFICANT CHANGE UP (ref 7–13)
POTASSIUM SERPL-MCNC: 3.8 MMOL/L — SIGNIFICANT CHANGE UP (ref 3.5–5.3)
POTASSIUM SERPL-SCNC: 3.8 MMOL/L — SIGNIFICANT CHANGE UP (ref 3.5–5.3)
PROTHROM AB SERPL-ACNC: 13.1 SEC — SIGNIFICANT CHANGE UP (ref 9.9–13.4)
RBC # BLD: 3.25 M/UL — LOW (ref 3.8–5.2)
RBC # BLD: 3.29 M/UL — LOW (ref 3.8–5.2)
RBC # FLD: 16.2 % — HIGH (ref 10.3–14.5)
RBC # FLD: 16.5 % — HIGH (ref 10.3–14.5)
SODIUM SERPL-SCNC: 141 MMOL/L — SIGNIFICANT CHANGE UP (ref 135–145)
WBC # BLD: 6.2 K/UL — SIGNIFICANT CHANGE UP (ref 3.8–10.5)
WBC # BLD: 6.84 K/UL — SIGNIFICANT CHANGE UP (ref 3.8–10.5)
WBC # FLD AUTO: 6.2 K/UL — SIGNIFICANT CHANGE UP (ref 3.8–10.5)
WBC # FLD AUTO: 6.84 K/UL — SIGNIFICANT CHANGE UP (ref 3.8–10.5)

## 2025-07-23 PROCEDURE — 45378 DIAGNOSTIC COLONOSCOPY: CPT | Mod: GC

## 2025-07-23 RX ORDER — SODIUM CHLORIDE 9 G/1000ML
1000 INJECTION, SOLUTION INTRAVENOUS
Refills: 0 | Status: DISCONTINUED | OUTPATIENT
Start: 2025-07-23 | End: 2025-07-24

## 2025-07-23 RX ORDER — AMLODIPINE BESYLATE 10 MG/1
2.5 TABLET ORAL DAILY
Refills: 0 | Status: DISCONTINUED | OUTPATIENT
Start: 2025-07-23 | End: 2025-07-23

## 2025-07-23 RX ORDER — ACETAMINOPHEN 500 MG/5ML
1000 LIQUID (ML) ORAL ONCE
Refills: 0 | Status: COMPLETED | OUTPATIENT
Start: 2025-07-23 | End: 2025-07-23

## 2025-07-23 RX ORDER — POLYETHYLENE GLYCOL-3350 AND ELECTROLYTES 236; 6.74; 5.86; 2.97; 22.74 G/274.31G; G/274.31G; G/274.31G; G/274.31G; G/274.31G
4000 POWDER, FOR SOLUTION ORAL ONCE
Refills: 0 | Status: COMPLETED | OUTPATIENT
Start: 2025-07-23 | End: 2025-07-23

## 2025-07-23 RX ORDER — ONDANSETRON HCL/PF 4 MG/2 ML
4 VIAL (ML) INJECTION ONCE
Refills: 0 | Status: DISCONTINUED | OUTPATIENT
Start: 2025-07-23 | End: 2025-07-25

## 2025-07-23 RX ORDER — MAGNESIUM SULFATE 500 MG/ML
2 SYRINGE (ML) INJECTION ONCE
Refills: 0 | Status: COMPLETED | OUTPATIENT
Start: 2025-07-23 | End: 2025-07-23

## 2025-07-23 RX ADMIN — SODIUM CHLORIDE 100 MILLILITER(S): 9 INJECTION, SOLUTION INTRAVENOUS at 02:04

## 2025-07-23 RX ADMIN — Medication 400 MILLIGRAM(S): at 03:41

## 2025-07-23 RX ADMIN — AMLODIPINE BESYLATE 2.5 MILLIGRAM(S): 10 TABLET ORAL at 05:19

## 2025-07-23 RX ADMIN — Medication 1000 MILLIGRAM(S): at 04:41

## 2025-07-23 RX ADMIN — SODIUM CHLORIDE 100 MILLILITER(S): 9 INJECTION, SOLUTION INTRAVENOUS at 12:54

## 2025-07-23 RX ADMIN — Medication 400 MILLIGRAM(S): at 11:42

## 2025-07-23 RX ADMIN — Medication 1000 MILLIGRAM(S): at 12:30

## 2025-07-23 RX ADMIN — SODIUM CHLORIDE 100 MILLILITER(S): 9 INJECTION, SOLUTION INTRAVENOUS at 17:42

## 2025-07-23 RX ADMIN — Medication 25 GRAM(S): at 12:48

## 2025-07-23 RX ADMIN — POLYETHYLENE GLYCOL-3350 AND ELECTROLYTES 4000 MILLILITER(S): 236; 6.74; 5.86; 2.97; 22.74 POWDER, FOR SOLUTION ORAL at 06:07

## 2025-07-23 RX ADMIN — Medication 100 MILLIEQUIVALENT(S): at 17:41

## 2025-07-24 ENCOUNTER — APPOINTMENT (OUTPATIENT)
Dept: COLORECTAL SURGERY | Facility: CLINIC | Age: 75
End: 2025-07-24

## 2025-07-24 DIAGNOSIS — D62 ACUTE POSTHEMORRHAGIC ANEMIA: ICD-10-CM

## 2025-07-24 DIAGNOSIS — I73.9 PERIPHERAL VASCULAR DISEASE, UNSPECIFIED: ICD-10-CM

## 2025-07-24 DIAGNOSIS — Z85.9 PERSONAL HISTORY OF MALIGNANT NEOPLASM, UNSPECIFIED: ICD-10-CM

## 2025-07-24 DIAGNOSIS — I10 ESSENTIAL (PRIMARY) HYPERTENSION: ICD-10-CM

## 2025-07-24 DIAGNOSIS — K92.2 GASTROINTESTINAL HEMORRHAGE, UNSPECIFIED: ICD-10-CM

## 2025-07-24 DIAGNOSIS — E78.5 HYPERLIPIDEMIA, UNSPECIFIED: ICD-10-CM

## 2025-07-24 LAB
-  AMPICILLIN: SIGNIFICANT CHANGE UP
-  CIPROFLOXACIN: SIGNIFICANT CHANGE UP
-  LEVOFLOXACIN: SIGNIFICANT CHANGE UP
-  NITROFURANTOIN: SIGNIFICANT CHANGE UP
-  TETRACYCLINE: SIGNIFICANT CHANGE UP
-  VANCOMYCIN: SIGNIFICANT CHANGE UP
ANION GAP SERPL CALC-SCNC: 12 MMOL/L — SIGNIFICANT CHANGE UP (ref 7–14)
BLD GP AB SCN SERPL QL: NEGATIVE — SIGNIFICANT CHANGE UP
BUN SERPL-MCNC: 8 MG/DL — SIGNIFICANT CHANGE UP (ref 7–23)
CALCIUM SERPL-MCNC: 8.3 MG/DL — LOW (ref 8.4–10.5)
CHLORIDE SERPL-SCNC: 106 MMOL/L — SIGNIFICANT CHANGE UP (ref 98–107)
CO2 SERPL-SCNC: 23 MMOL/L — SIGNIFICANT CHANGE UP (ref 22–31)
CREAT SERPL-MCNC: 0.54 MG/DL — SIGNIFICANT CHANGE UP (ref 0.5–1.3)
CULTURE RESULTS: ABNORMAL
EGFR: 96 ML/MIN/1.73M2 — SIGNIFICANT CHANGE UP
EGFR: 96 ML/MIN/1.73M2 — SIGNIFICANT CHANGE UP
GLUCOSE SERPL-MCNC: 85 MG/DL — SIGNIFICANT CHANGE UP (ref 70–99)
HCT VFR BLD CALC: 24.7 % — LOW (ref 34.5–45)
HCT VFR BLD CALC: 29.1 % — LOW (ref 34.5–45)
HGB BLD-MCNC: 8 G/DL — LOW (ref 11.5–15.5)
HGB BLD-MCNC: 9.3 G/DL — LOW (ref 11.5–15.5)
MAGNESIUM SERPL-MCNC: 1.8 MG/DL — SIGNIFICANT CHANGE UP (ref 1.6–2.6)
MCHC RBC-ENTMCNC: 27.6 PG — SIGNIFICANT CHANGE UP (ref 27–34)
MCHC RBC-ENTMCNC: 27.6 PG — SIGNIFICANT CHANGE UP (ref 27–34)
MCHC RBC-ENTMCNC: 32 G/DL — SIGNIFICANT CHANGE UP (ref 32–36)
MCHC RBC-ENTMCNC: 32.4 G/DL — SIGNIFICANT CHANGE UP (ref 32–36)
MCV RBC AUTO: 85.2 FL — SIGNIFICANT CHANGE UP (ref 80–100)
MCV RBC AUTO: 86.4 FL — SIGNIFICANT CHANGE UP (ref 80–100)
METHOD TYPE: SIGNIFICANT CHANGE UP
NRBC # BLD AUTO: 0 K/UL — SIGNIFICANT CHANGE UP (ref 0–0)
NRBC # BLD AUTO: 0 K/UL — SIGNIFICANT CHANGE UP (ref 0–0)
NRBC # FLD: 0 K/UL — SIGNIFICANT CHANGE UP (ref 0–0)
NRBC # FLD: 0 K/UL — SIGNIFICANT CHANGE UP (ref 0–0)
NRBC BLD AUTO-RTO: 0 /100 WBCS — SIGNIFICANT CHANGE UP (ref 0–0)
NRBC BLD AUTO-RTO: 0 /100 WBCS — SIGNIFICANT CHANGE UP (ref 0–0)
ORGANISM # SPEC MICROSCOPIC CNT: ABNORMAL
ORGANISM # SPEC MICROSCOPIC CNT: ABNORMAL
PHOSPHATE SERPL-MCNC: 2.9 MG/DL — SIGNIFICANT CHANGE UP (ref 2.5–4.5)
PLATELET # BLD AUTO: 141 K/UL — LOW (ref 150–400)
PLATELET # BLD AUTO: 178 K/UL — SIGNIFICANT CHANGE UP (ref 150–400)
PMV BLD: 11.7 FL — SIGNIFICANT CHANGE UP (ref 7–13)
PMV BLD: 12.3 FL — SIGNIFICANT CHANGE UP (ref 7–13)
POTASSIUM SERPL-MCNC: 3.5 MMOL/L — SIGNIFICANT CHANGE UP (ref 3.5–5.3)
POTASSIUM SERPL-SCNC: 3.5 MMOL/L — SIGNIFICANT CHANGE UP (ref 3.5–5.3)
RBC # BLD: 2.9 M/UL — LOW (ref 3.8–5.2)
RBC # BLD: 3.37 M/UL — LOW (ref 3.8–5.2)
RBC # FLD: 16.3 % — HIGH (ref 10.3–14.5)
RBC # FLD: 16.7 % — HIGH (ref 10.3–14.5)
RH IG SCN BLD-IMP: POSITIVE — SIGNIFICANT CHANGE UP
SODIUM SERPL-SCNC: 141 MMOL/L — SIGNIFICANT CHANGE UP (ref 135–145)
SPECIMEN SOURCE: SIGNIFICANT CHANGE UP
WBC # BLD: 5.07 K/UL — SIGNIFICANT CHANGE UP (ref 3.8–10.5)
WBC # BLD: 6.31 K/UL — SIGNIFICANT CHANGE UP (ref 3.8–10.5)
WBC # FLD AUTO: 5.07 K/UL — SIGNIFICANT CHANGE UP (ref 3.8–10.5)
WBC # FLD AUTO: 6.31 K/UL — SIGNIFICANT CHANGE UP (ref 3.8–10.5)

## 2025-07-24 PROCEDURE — 99232 SBSQ HOSP IP/OBS MODERATE 35: CPT | Mod: GC

## 2025-07-24 RX ORDER — POTASSIUM CHLORIDE, DEXTROSE MONOHYDRATE AND SODIUM CHLORIDE 150; 5; 900 MG/100ML; G/100ML; MG/100ML
1000 INJECTION, SOLUTION INTRAVENOUS
Refills: 0 | Status: DISCONTINUED | OUTPATIENT
Start: 2025-07-24 | End: 2025-07-24

## 2025-07-24 RX ORDER — MAGNESIUM SULFATE 500 MG/ML
1 SYRINGE (ML) INJECTION ONCE
Refills: 0 | Status: COMPLETED | OUTPATIENT
Start: 2025-07-24 | End: 2025-07-24

## 2025-07-24 RX ORDER — ACETAMINOPHEN 500 MG/5ML
1000 LIQUID (ML) ORAL EVERY 6 HOURS
Refills: 0 | Status: DISCONTINUED | OUTPATIENT
Start: 2025-07-24 | End: 2025-07-25

## 2025-07-24 RX ADMIN — Medication 100 GRAM(S): at 12:35

## 2025-07-24 RX ADMIN — POTASSIUM CHLORIDE, DEXTROSE MONOHYDRATE AND SODIUM CHLORIDE 125 MILLILITER(S): 150; 5; 900 INJECTION, SOLUTION INTRAVENOUS at 12:35

## 2025-07-24 RX ADMIN — POTASSIUM CHLORIDE, DEXTROSE MONOHYDRATE AND SODIUM CHLORIDE 125 MILLILITER(S): 150; 5; 900 INJECTION, SOLUTION INTRAVENOUS at 03:19

## 2025-07-24 RX ADMIN — Medication 40 MILLIEQUIVALENT(S): at 12:35

## 2025-07-25 ENCOUNTER — TRANSCRIPTION ENCOUNTER (OUTPATIENT)
Age: 75
End: 2025-07-25

## 2025-07-25 VITALS
TEMPERATURE: 98 F | SYSTOLIC BLOOD PRESSURE: 124 MMHG | HEART RATE: 82 BPM | OXYGEN SATURATION: 98 % | RESPIRATION RATE: 17 BRPM | DIASTOLIC BLOOD PRESSURE: 86 MMHG

## 2025-07-25 DIAGNOSIS — I77.1 STRICTURE OF ARTERY: ICD-10-CM

## 2025-07-25 PROCEDURE — 99223 1ST HOSP IP/OBS HIGH 75: CPT

## 2025-08-06 ENCOUNTER — APPOINTMENT (OUTPATIENT)
Dept: COLORECTAL SURGERY | Facility: CLINIC | Age: 75
End: 2025-08-06
Payer: MEDICARE

## 2025-08-06 PROCEDURE — 99212 OFFICE O/P EST SF 10 MIN: CPT

## 2025-08-14 ENCOUNTER — APPOINTMENT (OUTPATIENT)
Dept: COLORECTAL SURGERY | Facility: CLINIC | Age: 75
End: 2025-08-14
Payer: MEDICARE

## 2025-08-14 DIAGNOSIS — K57.32 DIVERTICULITIS OF LARGE INTESTINE W/OUT PERFORATION OR ABSCESS W/OUT BLEEDING: ICD-10-CM

## 2025-08-14 PROCEDURE — 99213 OFFICE O/P EST LOW 20 MIN: CPT

## 2025-08-26 RX ORDER — NEOMYCIN SULFATE 500 MG/1
500 TABLET ORAL
Qty: 3 | Refills: 0 | Status: ACTIVE | COMMUNITY
Start: 2025-08-26 | End: 1900-01-01

## 2025-08-26 RX ORDER — METRONIDAZOLE 500 MG/1
500 TABLET ORAL
Qty: 3 | Refills: 0 | Status: ACTIVE | COMMUNITY
Start: 2025-08-26 | End: 1900-01-01

## 2025-09-19 ENCOUNTER — TRANSCRIPTION ENCOUNTER (OUTPATIENT)
Age: 75
End: 2025-09-19

## 2025-09-19 ENCOUNTER — APPOINTMENT (OUTPATIENT)
Dept: COLORECTAL SURGERY | Facility: HOSPITAL | Age: 75
End: 2025-09-19

## (undated) DEVICE — STAPLER COVIDIEN ENDO GIA SHORT HANDLE

## (undated) DEVICE — SALIVA EJECTOR (BLUE)

## (undated) DEVICE — TUBING SUCTION NONCONDUCTIVE 6MM X 12FT

## (undated) DEVICE — ELCTR BOVIE PENCIL SMOKE EVACUATION

## (undated) DEVICE — DRSG 2X2

## (undated) DEVICE — PACK MAJOR ABDOMINAL W ENDO DRAPE

## (undated) DEVICE — ELCTR BOVIE BLADE 3/4" EXTENDED LENGTH 6"

## (undated) DEVICE — TROCAR COVIDIEN BLUNT TIP HASSAN 10MM

## (undated) DEVICE — STAPLER SKIN MULTI DIRECTION W35

## (undated) DEVICE — Device

## (undated) DEVICE — CONTAINER FORMALIN 80ML YELLOW

## (undated) DEVICE — SUCTION YANKAUER OPEN TIP NO VENT CURVE

## (undated) DEVICE — SOL IRR POUR NS 0.9% 1500ML

## (undated) DEVICE — DRSG BANDAID 0.75X3"

## (undated) DEVICE — SUT VICRYL PLUS 2-0 18" TIES UNDYED

## (undated) DEVICE — POOLE SUCTION TIP

## (undated) DEVICE — POSITIONER PINK PAD PIGAZZI SYSTEM

## (undated) DEVICE — LUBRICATING JELLY HR ONE SHOT 3G

## (undated) DEVICE — POSITIONER STRAP ARMBOARD VELCRO TS-30

## (undated) DEVICE — BLADE SURGICAL #15 CARBON

## (undated) DEVICE — GOWN LG

## (undated) DEVICE — SOL IRR POUR H2O 1500ML

## (undated) DEVICE — GELPORT LAPAROSCOPIC SYSTEM

## (undated) DEVICE — BIOPSY FORCEP RADIAL JAW 4 STANDARD WITH NEEDLE

## (undated) DEVICE — TUBING MEDI-VAC W MAXIGRIP CONNECTORS 1/4"X6'

## (undated) DEVICE — PACK ABDOMINAL CLOSURE

## (undated) DEVICE — DRAPE WARMING SOLUTION 44 X 44"

## (undated) DEVICE — BASIN SET SINGLE

## (undated) DEVICE — SHEARS COVIDIEN ENDO SHEAR 5MM X 31CM W UNIPOLAR CAUTERY

## (undated) DEVICE — SUT SILK 0 18" TIES

## (undated) DEVICE — TUBING IV SET GRAVITY 3Y 100" MACRO

## (undated) DEVICE — ELCTR ECG CONDUCTIVE ADHESIVE

## (undated) DEVICE — TROCAR COVIDIEN VERSAONE BLADELESS FIXATION 5MM STANDARD

## (undated) DEVICE — BIOPSY FORCEP COLD DISP

## (undated) DEVICE — SUT VICRYL 2-0 27" SH UNDYED

## (undated) DEVICE — VENODYNE/SCD SLEEVE CALF MEDIUM

## (undated) DEVICE — CATH IV SAFE BC 22G X 1" (BLUE)

## (undated) DEVICE — TUBING OLYMPUS INSUFFLATION

## (undated) DEVICE — PROTECTOR HEEL / ELBOW FLUFFY

## (undated) DEVICE — ELCTR GROUNDING PAD ADULT COVIDIEN

## (undated) DEVICE — PACK IV START WITH CHG

## (undated) DEVICE — DRSG CURITY GAUZE SPONGE 4 X 4" 12-PLY NON-STERILE

## (undated) DEVICE — FOLEY TRAY 16FR 5CC LF UMETER CLOSED

## (undated) DEVICE — SUT VICRYL 0 27" UR-6

## (undated) DEVICE — ADAPTER URETHRAL CATH CONNECTING

## (undated) DEVICE — TUBING INSUFFLATION LAP FILTER 10FT

## (undated) DEVICE — SUT MAXON 1 36" GS-24

## (undated) DEVICE — TUBING HYDRO-SURG PLUS IRRIGATOR W SMOKEVAC & PROBE

## (undated) DEVICE — BASIN EMESIS 10IN GRADUATED MAUVE

## (undated) DEVICE — DRAPE 3/4 SHEET 52X76"

## (undated) DEVICE — LIGASURE BLUNT TIP 5MM X 37CM

## (undated) DEVICE — TUBING STRYKEFLOW II SUCTION / IRRIGATOR

## (undated) DEVICE — DRSG TAPE MEDIPORE 3"

## (undated) DEVICE — SUT VICRYL 3-0 18" SH UNDYED (POP-OFF)